# Patient Record
Sex: FEMALE | Race: WHITE | NOT HISPANIC OR LATINO | Employment: OTHER | URBAN - METROPOLITAN AREA
[De-identification: names, ages, dates, MRNs, and addresses within clinical notes are randomized per-mention and may not be internally consistent; named-entity substitution may affect disease eponyms.]

---

## 2017-01-15 ENCOUNTER — MYC REFILL (OUTPATIENT)
Dept: INTERNAL MEDICINE | Facility: CLINIC | Age: 62
End: 2017-01-15

## 2017-01-15 DIAGNOSIS — I10 ESSENTIAL HYPERTENSION, BENIGN: Primary | ICD-10-CM

## 2017-01-15 DIAGNOSIS — F41.1 GENERALIZED ANXIETY DISORDER: ICD-10-CM

## 2017-01-17 RX ORDER — LOSARTAN POTASSIUM 100 MG/1
100 TABLET ORAL DAILY
Qty: 90 TABLET | Refills: 1 | Status: SHIPPED | OUTPATIENT
Start: 2017-01-17 | End: 2017-10-05

## 2017-01-18 ENCOUNTER — MYC MEDICAL ADVICE (OUTPATIENT)
Dept: INTERNAL MEDICINE | Facility: CLINIC | Age: 62
End: 2017-01-18

## 2017-01-20 ENCOUNTER — MYC MEDICAL ADVICE (OUTPATIENT)
Dept: INTERNAL MEDICINE | Facility: CLINIC | Age: 62
End: 2017-01-20

## 2017-01-20 RX ORDER — CLONAZEPAM 1 MG/1
TABLET ORAL
Qty: 45 TABLET | Refills: 0 | Status: SHIPPED | OUTPATIENT
Start: 2017-01-20 | End: 2017-02-20

## 2017-02-08 DIAGNOSIS — G47.00 PERSISTENT INSOMNIA: ICD-10-CM

## 2017-02-08 DIAGNOSIS — G25.81 RESTLESS LEGS SYNDROME (RLS): Primary | ICD-10-CM

## 2017-02-08 RX ORDER — GABAPENTIN 300 MG/1
300 CAPSULE ORAL DAILY
Qty: 30 CAPSULE | Refills: 1 | Status: SHIPPED | OUTPATIENT
Start: 2017-02-08 | End: 2017-04-21

## 2017-02-08 NOTE — TELEPHONE ENCOUNTER
Medication not on protocol; forwarding to provider.       Last Written Prescription Date: 12/1/16  Last Fill Quantity: 30,  # refills: 1   Last Office Visit with [facility], [facility] or [facility] prescribing provider: 12/1/16    Sonya Sandy, Pharmacy [facility]

## 2017-03-17 DIAGNOSIS — F41.1 GENERALIZED ANXIETY DISORDER: ICD-10-CM

## 2017-03-17 NOTE — TELEPHONE ENCOUNTER
Clonazepam 1 mg tablet  Last Written Prescription Date: 02/20/17  Last Fill Quantity: 45,   # refills: 0  Last Office Visit with [facility], [facility] or  [facility] prescribing provider: 11/28/16  Future Office visit:       Routing refill request to provider for review/approval because:  Drug not on the [facility], [facility] or [facility] refill protocol or controlled substance    Yaneth Houston CPhT  On Behalf of [facility]

## 2017-03-21 RX ORDER — CLONAZEPAM 1 MG/1
TABLET ORAL
Qty: 45 TABLET | Refills: 0 | Status: SHIPPED | OUTPATIENT
Start: 2017-03-21 | End: 2017-04-21

## 2017-04-21 DIAGNOSIS — G25.81 RESTLESS LEGS SYNDROME (RLS): ICD-10-CM

## 2017-04-21 DIAGNOSIS — F41.1 GENERALIZED ANXIETY DISORDER: ICD-10-CM

## 2017-04-21 DIAGNOSIS — G47.00 PERSISTENT INSOMNIA: ICD-10-CM

## 2017-04-21 NOTE — TELEPHONE ENCOUNTER
Controlled Substance Refill Request for clonazepam 1mg  Problem List Complete:  No     PROVIDER TO CONSIDER COMPLETION OF PROBLEM LIST AND OVERVIEW/CONTROLLED SUBSTANCE AGREEMENT    Last Written Prescription Date:  03/21/2017  Last Fill Quantity: 45,   # refills: 0    Last Office Visit with [facility] [place] care provider: 12/01/2016    Future Office visit:   Next 5 appointments (look out 90 days)     Apr 27, 2017  2:00 PM CDT   MyChart Physical Adult with Pari Wiley MD   [facility]    [address]  [facility] [id number] [phone]                  Controlled substance agreement on file: No.     Processing:  Staff will hand deliver Rx to on-site pharmacy   checked in past 6 months?  No, route to RN    Thanks,  Shellie Bowles CPhT  [facility]  [phone]

## 2017-04-21 NOTE — TELEPHONE ENCOUNTER
Gabapentin 300mg      Last Written Prescription Date: 02/08/2017  Last Fill Quantity: 30,  # refills: 1   Last Office Visit with [facility], [facility] or [facility] prescribing provider: 12/01/2016                                         Next 5 appointments (look out 90 days)     Apr 27, 2017  2:00 PM CDT   MyChart Physical Adult with [facility] MD   [facility]    [address]  [facility] [id number] [phone]                  Thanks,  Shellie Bowles CPhT  [facility]  [phone]

## 2017-04-23 RX ORDER — GABAPENTIN 300 MG/1
300 CAPSULE ORAL DAILY
Qty: 90 CAPSULE | Refills: 3 | Status: SHIPPED | OUTPATIENT
Start: 2017-04-23 | End: 2018-04-26

## 2017-04-24 ENCOUNTER — MYC MEDICAL ADVICE (OUTPATIENT)
Dept: INTERNAL MEDICINE | Facility: CLINIC | Age: 62
End: 2017-04-24

## 2017-04-26 RX ORDER — CLONAZEPAM 1 MG/1
TABLET ORAL
Qty: 45 TABLET | Refills: 0 | Status: SHIPPED | OUTPATIENT
Start: 2017-04-26 | End: 2017-05-22

## 2017-04-26 NOTE — TELEPHONE ENCOUNTER
Pt calling in related to her appt listed below was cancelled.  Pt was reschedule.   She was asking about her medication Clonazepam and Gabapentin.    I see that Dr. Leslie has signed the clonazepam order.  Pt informed.  That I would find and get faxed to pharmacy.    Gabapentin was filled by Dr. Wiley back on 4/23/17.    Kofi COOK RN

## 2017-04-26 NOTE — TELEPHONE ENCOUNTER
Found RX  Faxed to [facility].    Called pt back and informed that Rx are taken care of.    Kofi COOK RN

## 2017-05-11 ENCOUNTER — OFFICE VISIT (OUTPATIENT)
Dept: INTERNAL MEDICINE | Facility: CLINIC | Age: 62
End: 2017-05-11
Payer: COMMERCIAL

## 2017-05-11 VITALS
DIASTOLIC BLOOD PRESSURE: 68 MMHG | OXYGEN SATURATION: 96 % | WEIGHT: 262.6 LBS | HEIGHT: 59 IN | TEMPERATURE: 98.1 F | HEART RATE: 94 BPM | SYSTOLIC BLOOD PRESSURE: 92 MMHG | BODY MASS INDEX: 52.94 KG/M2 | RESPIRATION RATE: 16 BRPM

## 2017-05-11 DIAGNOSIS — R53.83 FATIGUE, UNSPECIFIED TYPE: ICD-10-CM

## 2017-05-11 DIAGNOSIS — E11.22 TYPE 2 DIABETES MELLITUS WITH STAGE 3 CHRONIC KIDNEY DISEASE, WITHOUT LONG-TERM CURRENT USE OF INSULIN (H): ICD-10-CM

## 2017-05-11 DIAGNOSIS — D64.9 ANEMIA, UNSPECIFIED: ICD-10-CM

## 2017-05-11 DIAGNOSIS — Z00.01 ENCOUNTER FOR ROUTINE ADULT MEDICAL EXAM WITH ABNORMAL FINDINGS: Primary | ICD-10-CM

## 2017-05-11 DIAGNOSIS — R06.09 DYSPNEA ON EXERTION: ICD-10-CM

## 2017-05-11 DIAGNOSIS — N18.30 TYPE 2 DIABETES MELLITUS WITH STAGE 3 CHRONIC KIDNEY DISEASE, WITHOUT LONG-TERM CURRENT USE OF INSULIN (H): ICD-10-CM

## 2017-05-11 DIAGNOSIS — E66.01 MORBID OBESITY, UNSPECIFIED OBESITY TYPE (H): ICD-10-CM

## 2017-05-11 DIAGNOSIS — Z11.59 SCREENING FOR VIRAL DISEASE: ICD-10-CM

## 2017-05-11 LAB
BASOPHILS # BLD AUTO: 0.1 10E9/L (ref 0–0.2)
BASOPHILS NFR BLD AUTO: 1 %
DIFFERENTIAL METHOD BLD: ABNORMAL
EOSINOPHIL # BLD AUTO: 0.3 10E9/L (ref 0–0.7)
EOSINOPHIL NFR BLD AUTO: 3 %
ERYTHROCYTE [DISTWIDTH] IN BLOOD BY AUTOMATED COUNT: 19.6 % (ref 10–15)
HBA1C MFR BLD: 6.8 % (ref 4.3–6)
HCT VFR BLD AUTO: 29.2 % (ref 35–47)
HGB BLD-MCNC: 8.7 G/DL (ref 11.7–15.7)
HYPOCHROMIA BLD QL: PRESENT
LYMPHOCYTES # BLD AUTO: 1.7 10E9/L (ref 0.8–5.3)
LYMPHOCYTES NFR BLD AUTO: 20 %
MCH RBC QN AUTO: 19.1 PG (ref 26.5–33)
MCHC RBC AUTO-ENTMCNC: 29.8 G/DL (ref 31.5–36.5)
MCV RBC AUTO: 64 FL (ref 78–100)
MICROCYTES BLD QL SMEAR: PRESENT
MONOCYTES # BLD AUTO: 0.2 10E9/L (ref 0–1.3)
MONOCYTES NFR BLD AUTO: 2 %
NEUTROPHILS # BLD AUTO: 6.3 10E9/L (ref 1.6–8.3)
NEUTROPHILS NFR BLD AUTO: 74 %
PLATELET # BLD AUTO: 298 10E9/L (ref 150–450)
PLATELET # BLD EST: NORMAL 10*3/UL
RBC # BLD AUTO: 4.56 10E12/L (ref 3.8–5.2)
SPHEROCYTES BLD QL SMEAR: SLIGHT
WBC # BLD AUTO: 8.6 10E9/L (ref 4–11)

## 2017-05-11 PROCEDURE — 80050 GENERAL HEALTH PANEL: CPT | Performed by: INTERNAL MEDICINE

## 2017-05-11 PROCEDURE — 86803 HEPATITIS C AB TEST: CPT | Performed by: INTERNAL MEDICINE

## 2017-05-11 PROCEDURE — 80061 LIPID PANEL: CPT | Performed by: INTERNAL MEDICINE

## 2017-05-11 PROCEDURE — 83550 IRON BINDING TEST: CPT | Performed by: INTERNAL MEDICINE

## 2017-05-11 PROCEDURE — 82728 ASSAY OF FERRITIN: CPT | Performed by: INTERNAL MEDICINE

## 2017-05-11 PROCEDURE — 36415 COLL VENOUS BLD VENIPUNCTURE: CPT | Performed by: INTERNAL MEDICINE

## 2017-05-11 PROCEDURE — 99213 OFFICE O/P EST LOW 20 MIN: CPT | Mod: 25 | Performed by: INTERNAL MEDICINE

## 2017-05-11 PROCEDURE — 83540 ASSAY OF IRON: CPT | Performed by: INTERNAL MEDICINE

## 2017-05-11 PROCEDURE — 83036 HEMOGLOBIN GLYCOSYLATED A1C: CPT | Performed by: INTERNAL MEDICINE

## 2017-05-11 PROCEDURE — 99396 PREV VISIT EST AGE 40-64: CPT | Performed by: INTERNAL MEDICINE

## 2017-05-11 PROCEDURE — 82043 UR ALBUMIN QUANTITATIVE: CPT | Performed by: INTERNAL MEDICINE

## 2017-05-11 RX ORDER — LOSARTAN POTASSIUM 100 MG/1
100 TABLET ORAL DAILY
Qty: 90 TABLET | Refills: 1 | Status: CANCELLED | OUTPATIENT
Start: 2017-05-11

## 2017-05-11 RX ORDER — CLONAZEPAM 1 MG/1
TABLET ORAL
Qty: 45 TABLET | Refills: 0 | Status: CANCELLED | OUTPATIENT
Start: 2017-05-11

## 2017-05-11 RX ORDER — SERTRALINE HYDROCHLORIDE 100 MG/1
200 TABLET, FILM COATED ORAL DAILY
Qty: 180 TABLET | Refills: 1 | Status: CANCELLED | OUTPATIENT
Start: 2017-05-11

## 2017-05-11 NOTE — NURSING NOTE
"Chief Complaint   Patient presents with     Physical     fasting       Initial BP 92/68  Pulse 94  Temp 98.1  F (36.7  C) (Oral)  Resp 16  Ht 4' 10.5\" (1.486 m)  Wt 262 lb 9.6 oz (119.1 kg)  LMP 09/15/2007  SpO2 96%  BMI 53.95 kg/m2 Estimated body mass index is 53.95 kg/(m^2) as calculated from the following:    Height as of this encounter: 4' 10.5\" (1.486 m).    Weight as of this encounter: 262 lb 9.6 oz (119.1 kg).  Medication Reconciliation: complete      Clifton Schroeder CMA      "

## 2017-05-11 NOTE — MR AVS SNAPSHOT
After Visit Summary   5/11/2017    Nicole Reyes    MRN: [id number]           Patient Information     Date Of Birth          1955        Visit Information        Provider Department      5/11/2017 2:40 PM Pari Wiley MD [facility]        Today's Diagnoses     Encounter for routine adult medical exam with abnormal findings    -  1    Type 2 diabetes mellitus with stage 3 chronic kidney disease, without long-term current use of insulin (H)        Screening for viral disease        Dyspnea on exertion        Fatigue, unspecified type        Anemia, unspecified          Care Instructions      Preventive Health Recommendations  Female Ages 50 - 64    Yearly exam: See your health care provider every year in order to  o Review health changes.   o Discuss preventive care.    o Review your medicines if your doctor has prescribed any.      Get a Pap test every three years (unless you have an abnormal result and your provider advises testing more often).    If you get Pap tests with HPV test, you only need to test every 5 years, unless you have an abnormal result.     You do not need a Pap test if your uterus was removed (hysterectomy) and you have not had cancer.    You should be tested each year for STDs (sexually transmitted diseases) if you're at risk.     Have a mammogram every 1 to 2 years.    Have a colonoscopy at age 50, or have a yearly FIT test (stool test). These exams screen for colon cancer.      Have a cholesterol test every 5 years, or more often if advised.    Have a diabetes test (fasting glucose) every three years. If you are at risk for diabetes, you should have this test more often.     If you are at risk for osteoporosis (brittle bone disease), think about having a bone density scan (DEXA).    Shots: Get a flu shot each year. Get a tetanus shot every 10 years.    Nutrition:     Eat at least 5 servings of fruits and vegetables each day.    Eat whole-grain bread,  "whole-wheat pasta and brown rice instead of white grains and rice.    Talk to your provider about Calcium and Vitamin D.     Lifestyle    Exercise at least 150 minutes a week (30 minutes a day, 5 days a week). This will help you control your weight and prevent disease.    Limit alcohol to one drink per day.    No smoking.     Wear sunscreen to prevent skin cancer.     See your dentist every six months for an exam and cleaning.    See your eye doctor every 1 to 2 years.          Follow-ups after your visit        Who to contact     If you have questions or need follow up information about today's clinic visit or your schedule please contact [facility] directly at [phone].  Normal or non-critical lab and imaging results will be communicated to you by MyChart, letter or phone within 4 business days after the clinic has received the results. If you do not hear from us within 7 days, please contact the clinic through [employer] or phone. If you have a critical or abnormal lab result, we will notify you by phone as soon as possible.  Submit refill requests through [employer] or call your pharmacy and they will forward the refill request to us. Please allow 3 business days for your refill to be completed.          Additional Information About Your Visit        [employer] Information     [employer] gives you secure access to your electronic health record. If you see a primary care provider, you can also send messages to your care team and make appointments. If you have questions, please call your primary care clinic.  If you do not have a primary care provider, please call [phone] and they will assist you.        Care EveryWhere ID     This is your Care EveryWhere ID. This could be used by other organizations to access your [facility] medical records  [id number]        Your Vitals Were     Pulse Temperature Respirations Height Last Period Pulse Oximetry    94 98.1  F (36.7  C) (Oral) 16 4' 10.5\" (1.486 m) " 09/15/2007 96%    BMI (Body Mass Index)                   53.95 kg/m2            Blood Pressure from Last 3 Encounters:   05/11/17 92/68   12/01/16 142/70   11/28/16 (!) 156/96    Weight from Last 3 Encounters:   05/11/17 262 lb 9.6 oz (119.1 kg)   12/01/16 271 lb (122.9 kg)   11/28/16 268 lb 12.8 oz (121.9 kg)              We Performed the Following     Basic metabolic panel     CBC with platelets differential     Ferritin     Hemoglobin A1c     Hepatitis C Screen Reflex to HCV RNA Quant and Genotype     Iron and iron binding capacity     Lipid Profile with reflex to direct LDL     Microalbumin quantitative random urine     TSH with free T4 reflex        Primary Care Provider Office Phone # Fax #    Pari Wiley [phone]       [facility] [address] [facility] [address]  [facility] [address]        Thank you!     Thank you for choosing [facility]  for your care. Our goal is always to provide you with excellent care. Hearing back from our patients is one way we can continue to improve our services. Please take a few minutes to complete the written survey that you may receive in the mail after your visit with us. Thank you!             Your Updated Medication List - Protect others around you: Learn how to safely use, store and throw away your medicines at www.disposemymeds.org.          This list is accurate as of: 5/11/17  3:28 PM.  Always use your most recent med list.                   Brand Name Dispense Instructions for use    amitriptyline 50 MG tablet    ELAVIL    90 tablet    Take 1 tablet (50 mg) by mouth At Bedtime       clonazePAM 1 MG tablet    klonoPIN    45 tablet    1 tab po qam, 1/2 tab po qpm       cyclobenzaprine 10 MG tablet    FLEXERIL    90 tablet    Take 1 tablet (10 mg) by mouth 3 times daily as needed for muscle spasms       FREESTYLE LITE test strip   Generic drug:  blood glucose monitoring     100 Strip    Test twice a day       gabapentin 300 MG  capsule    NEURONTIN    90 capsule    Take 1 capsule (300 mg) by mouth daily       ibuprofen 600 MG tablet    ADVIL/MOTRIN    30 tablet    Take 1 tablet (600 mg) by mouth every 6 hours as needed for moderate pain       ketoprofen 10% in PLO 10% topical gel     30 g    Place 1 g onto the skin 3 times daily as needed for moderate pain       losartan 100 MG tablet    COZAAR    90 tablet    Take 1 tablet (100 mg) by mouth daily       metFORMIN 500 MG 24 hr tablet    GLUCOPHAGE-XR    180 tablet    Take 2 tablets (1,000 mg) by mouth daily (with breakfast)       Multi-vitamin Tabs tablet   Generic drug:  multivitamin, therapeutic with minerals      Take 1 tablet by mouth At Bedtime       OMEGA-3 FISH OIL PO      Take 2 g by mouth At Bedtime       oxyCODONE 5 MG IR tablet    ROXICODONE    40 tablet    1-2 po q4 hours prn       pantoprazole 40 MG EC tablet    PROTONIX    90 tablet    Take 1 tablet (40 mg) by mouth At Bedtime       sertraline 100 MG tablet    ZOLOFT    180 tablet    Take 2 tablets (200 mg) by mouth daily Increased dose. Can use up tablets at home first.       simvastatin 20 MG tablet    ZOCOR    90 tablet    Take 1 tablet (20 mg) by mouth At Bedtime       zolpidem 10 MG tablet    AMBIEN    30 tablet    Take 1 tablet (10 mg) by mouth nightly as needed for sleep
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
## 2017-05-12 LAB
ALBUMIN SERPL-MCNC: 3.2 G/DL (ref 3.4–5)
ALP SERPL-CCNC: 114 U/L (ref 40–150)
ALT SERPL W P-5'-P-CCNC: 23 U/L (ref 0–50)
ANION GAP SERPL CALCULATED.3IONS-SCNC: 8 MMOL/L (ref 3–14)
AST SERPL W P-5'-P-CCNC: 17 U/L (ref 0–45)
BILIRUB SERPL-MCNC: 0.3 MG/DL (ref 0.2–1.3)
BUN SERPL-MCNC: 11 MG/DL (ref 7–30)
CALCIUM SERPL-MCNC: 8.2 MG/DL (ref 8.5–10.1)
CHLORIDE SERPL-SCNC: 106 MMOL/L (ref 94–109)
CHOLEST SERPL-MCNC: 209 MG/DL
CO2 SERPL-SCNC: 27 MMOL/L (ref 20–32)
CREAT SERPL-MCNC: 0.87 MG/DL (ref 0.52–1.04)
CREAT UR-MCNC: 482 MG/DL
FERRITIN SERPL-MCNC: 19 NG/ML (ref 8–252)
GFR SERPL CREATININE-BSD FRML MDRD: 66 ML/MIN/1.7M2
GLUCOSE SERPL-MCNC: 124 MG/DL (ref 70–99)
HCV AB SERPL QL IA: NORMAL
HDLC SERPL-MCNC: 25 MG/DL
IRON SATN MFR SERPL: 4 % (ref 15–46)
IRON SERPL-MCNC: 17 UG/DL (ref 35–180)
LDLC SERPL CALC-MCNC: 151 MG/DL
MICROALBUMIN UR-MCNC: 82 MG/L
MICROALBUMIN/CREAT UR: 16.97 MG/G CR (ref 0–25)
NONHDLC SERPL-MCNC: 184 MG/DL
POTASSIUM SERPL-SCNC: 3.6 MMOL/L (ref 3.4–5.3)
PROT SERPL-MCNC: 7.2 G/DL (ref 6.8–8.8)
SODIUM SERPL-SCNC: 141 MMOL/L (ref 133–144)
TIBC SERPL-MCNC: 420 UG/DL (ref 240–430)
TRIGL SERPL-MCNC: 165 MG/DL
TSH SERPL DL<=0.005 MIU/L-ACNC: 3.19 MU/L (ref 0.4–4)

## 2017-05-12 ASSESSMENT — PATIENT HEALTH QUESTIONNAIRE - PHQ9: SUM OF ALL RESPONSES TO PHQ QUESTIONS 1-9: 21

## 2017-05-16 ENCOUNTER — MYC MEDICAL ADVICE (OUTPATIENT)
Dept: INTERNAL MEDICINE | Facility: CLINIC | Age: 62
End: 2017-05-16

## 2017-05-22 ENCOUNTER — MYC MEDICAL ADVICE (OUTPATIENT)
Dept: INTERNAL MEDICINE | Facility: CLINIC | Age: 62
End: 2017-05-22

## 2017-05-22 ENCOUNTER — TELEPHONE (OUTPATIENT)
Dept: INTERNAL MEDICINE | Facility: CLINIC | Age: 62
End: 2017-05-22

## 2017-05-22 DIAGNOSIS — G47.09 OTHER INSOMNIA: ICD-10-CM

## 2017-05-22 DIAGNOSIS — D50.9 IRON DEFICIENCY ANEMIA, UNSPECIFIED IRON DEFICIENCY ANEMIA TYPE: Primary | ICD-10-CM

## 2017-05-22 DIAGNOSIS — R06.00 DYSPNEA, UNSPECIFIED TYPE: Primary | ICD-10-CM

## 2017-05-22 DIAGNOSIS — F41.1 GENERALIZED ANXIETY DISORDER: ICD-10-CM

## 2017-05-22 RX ORDER — AMITRIPTYLINE HYDROCHLORIDE 50 MG/1
TABLET ORAL
Qty: 135 TABLET | Refills: 3 | Status: SHIPPED | OUTPATIENT
Start: 2017-05-22 | End: 2017-10-05

## 2017-05-22 NOTE — TELEPHONE ENCOUNTER
Pt calls back for messages.  She was advised that Dr Wiley said she could use Amitriptyline and she says she doesn't have any.  An rx was sent with the 75mg dose was sent to her pharmacy.  She said she will try this.    Pt is crying and says she feels like she's losing her mind.  She says she is taking 27mg Elemental Ferrous Gluconate, and she feels so sick she can' t eat.  Asking if there is another type that might not make her feel so sick.  Asking how long it might be before her hgb gets better?

## 2017-05-22 NOTE — TELEPHONE ENCOUNTER
Fax received from [facility] for review and signature.  ANIL is needed.  Canned and spoke with patient who requested we mail ANIL to her home address and she will sign and return.  Forms are on my desk until signed ANIL is returned.

## 2017-05-25 DIAGNOSIS — G47.00 INSOMNIA, UNSPECIFIED TYPE: ICD-10-CM

## 2017-05-25 RX ORDER — CLONAZEPAM 1 MG/1
TABLET ORAL
Qty: 45 TABLET | Refills: 0 | Status: SHIPPED | OUTPATIENT
Start: 2017-05-25 | End: 2017-06-22

## 2017-05-25 RX ORDER — ZOLPIDEM TARTRATE 10 MG/1
10 TABLET ORAL
Qty: 30 TABLET | Refills: 5 | Status: SHIPPED | OUTPATIENT
Start: 2017-05-25 | End: 2017-12-01

## 2017-05-25 NOTE — TELEPHONE ENCOUNTER
Controlled Substance Refill Request for Zolpidem 10mg  Problem List Complete:  No     PROVIDER TO CONSIDER COMPLETION OF PROBLEM LIST AND OVERVIEW/CONTROLLED SUBSTANCE AGREEMENT    Last Written Prescription Date:  12/02/2016  Last Fill Quantity: 30,   # refills: 5    Last Office Visit with [facility] primary care provider: 5/11/2017    Future Office visit:     Controlled substance agreement on file: No.     Processing:  Staff will hand deliver Rx to on-site pharmacy   checked in past 6 months?  No, route to RN    Thanks,  Shellie Bowles CPhT  [facility]  [phone]

## 2017-05-30 NOTE — TELEPHONE ENCOUNTER
Silvia, nurse from [employer] calls, left  asking to clarify some information on claim. Claim #: [id number].    See epic charting below.    Called pt, states first day off of work was 05/15.    Called [facility] back, asking when pt had last appt: 05/11/17. First day off of work: 05/15.  If pt has seen MD before last appt: yes. Next appt scheduled: no. MDs specialty: IM.    Unable to answer: ICD associated to [facility] claim, estimated return to work, tx plan.    Unsure if form mentioned below is for [employer] or not. If not will need above information either faxed to: [phone] or called to: [phone].    Please advise, thanks.

## 2017-05-31 PROBLEM — R06.00 DYSPNEA, UNSPECIFIED TYPE: Status: ACTIVE | Noted: 2017-05-31

## 2017-06-07 ENCOUNTER — DOCUMENTATION ONLY (OUTPATIENT)
Dept: LAB | Facility: CLINIC | Age: 62
End: 2017-06-07

## 2017-06-07 DIAGNOSIS — D50.9 IRON DEFICIENCY ANEMIA, UNSPECIFIED IRON DEFICIENCY ANEMIA TYPE: Primary | ICD-10-CM

## 2017-06-09 ENCOUNTER — MYC MEDICAL ADVICE (OUTPATIENT)
Dept: INTERNAL MEDICINE | Facility: CLINIC | Age: 62
End: 2017-06-09

## 2017-06-09 DIAGNOSIS — D50.9 IRON DEFICIENCY ANEMIA, UNSPECIFIED IRON DEFICIENCY ANEMIA TYPE: ICD-10-CM

## 2017-06-09 DIAGNOSIS — D50.9 IRON DEFICIENCY ANEMIA, UNSPECIFIED IRON DEFICIENCY ANEMIA TYPE: Primary | ICD-10-CM

## 2017-06-09 LAB — HGB BLD-MCNC: 10.7 G/DL (ref 11.7–15.7)

## 2017-06-09 PROCEDURE — 85018 HEMOGLOBIN: CPT | Performed by: INTERNAL MEDICINE

## 2017-06-09 PROCEDURE — 36415 COLL VENOUS BLD VENIPUNCTURE: CPT | Performed by: INTERNAL MEDICINE

## 2017-06-15 ENCOUNTER — TELEPHONE (OUTPATIENT)
Dept: INTERNAL MEDICINE | Facility: CLINIC | Age: 62
End: 2017-06-15

## 2017-06-15 NOTE — TELEPHONE ENCOUNTER
[facility] and [facility]  Put in Dr. Wiley's in basket.

## 2017-06-21 DIAGNOSIS — R19.7 DIARRHEA, UNSPECIFIED TYPE: ICD-10-CM

## 2017-06-21 DIAGNOSIS — D50.9 IRON DEFICIENCY ANEMIA, UNSPECIFIED IRON DEFICIENCY ANEMIA TYPE: ICD-10-CM

## 2017-06-21 PROCEDURE — 83540 ASSAY OF IRON: CPT | Performed by: INTERNAL MEDICINE

## 2017-06-21 PROCEDURE — 80048 BASIC METABOLIC PNL TOTAL CA: CPT | Performed by: INTERNAL MEDICINE

## 2017-06-21 PROCEDURE — 83550 IRON BINDING TEST: CPT | Performed by: INTERNAL MEDICINE

## 2017-06-21 PROCEDURE — 85025 COMPLETE CBC W/AUTO DIFF WBC: CPT | Performed by: INTERNAL MEDICINE

## 2017-06-21 PROCEDURE — 36415 COLL VENOUS BLD VENIPUNCTURE: CPT | Performed by: INTERNAL MEDICINE

## 2017-06-21 PROCEDURE — 83735 ASSAY OF MAGNESIUM: CPT | Performed by: INTERNAL MEDICINE

## 2017-06-22 ENCOUNTER — RADIANT APPOINTMENT (OUTPATIENT)
Dept: GENERAL RADIOLOGY | Facility: CLINIC | Age: 62
End: 2017-06-22
Attending: INTERNAL MEDICINE
Payer: COMMERCIAL

## 2017-06-22 ENCOUNTER — OFFICE VISIT (OUTPATIENT)
Dept: INTERNAL MEDICINE | Facility: CLINIC | Age: 62
End: 2017-06-22
Payer: COMMERCIAL

## 2017-06-22 VITALS
TEMPERATURE: 98.2 F | HEART RATE: 100 BPM | OXYGEN SATURATION: 88 % | WEIGHT: 262 LBS | HEIGHT: 59 IN | DIASTOLIC BLOOD PRESSURE: 80 MMHG | RESPIRATION RATE: 14 BRPM | SYSTOLIC BLOOD PRESSURE: 126 MMHG | BODY MASS INDEX: 52.82 KG/M2

## 2017-06-22 DIAGNOSIS — F33.1 MAJOR DEPRESSIVE DISORDER, RECURRENT EPISODE, MODERATE (H): ICD-10-CM

## 2017-06-22 DIAGNOSIS — F41.1 GENERALIZED ANXIETY DISORDER: ICD-10-CM

## 2017-06-22 DIAGNOSIS — D50.9 IRON DEFICIENCY ANEMIA, UNSPECIFIED IRON DEFICIENCY ANEMIA TYPE: ICD-10-CM

## 2017-06-22 DIAGNOSIS — R06.09 DYSPNEA ON EXERTION: ICD-10-CM

## 2017-06-22 DIAGNOSIS — R53.83 FATIGUE, UNSPECIFIED TYPE: ICD-10-CM

## 2017-06-22 DIAGNOSIS — R06.09 DYSPNEA ON EXERTION: Primary | ICD-10-CM

## 2017-06-22 LAB
ANION GAP SERPL CALCULATED.3IONS-SCNC: 12 MMOL/L (ref 3–14)
ANISOCYTOSIS BLD QL SMEAR: ABNORMAL
BASOPHILS # BLD AUTO: 0.2 10E9/L (ref 0–0.2)
BASOPHILS NFR BLD AUTO: 1 %
BUN SERPL-MCNC: 10 MG/DL (ref 7–30)
CALCIUM SERPL-MCNC: 9.1 MG/DL (ref 8.5–10.1)
CHLORIDE SERPL-SCNC: 108 MMOL/L (ref 94–109)
CO2 SERPL-SCNC: 22 MMOL/L (ref 20–32)
CREAT SERPL-MCNC: 0.82 MG/DL (ref 0.52–1.04)
DIFFERENTIAL METHOD BLD: ABNORMAL
EOSINOPHIL # BLD AUTO: 4.4 10E9/L (ref 0–0.7)
EOSINOPHIL NFR BLD AUTO: 29 %
ERYTHROCYTE [DISTWIDTH] IN BLOOD BY AUTOMATED COUNT: 27.9 % (ref 10–15)
GFR SERPL CREATININE-BSD FRML MDRD: 71 ML/MIN/1.7M2
GLUCOSE SERPL-MCNC: 153 MG/DL (ref 70–99)
HCT VFR BLD AUTO: 36.6 % (ref 35–47)
HGB BLD-MCNC: 11.2 G/DL (ref 11.7–15.7)
HYPOCHROMIA BLD QL: PRESENT
IRON SATN MFR SERPL: 31 % (ref 15–46)
IRON SERPL-MCNC: 127 UG/DL (ref 35–180)
LYMPHOCYTES # BLD AUTO: 3 10E9/L (ref 0.8–5.3)
LYMPHOCYTES NFR BLD AUTO: 20 %
MACROCYTES BLD QL SMEAR: PRESENT
MAGNESIUM SERPL-MCNC: 1.8 MG/DL (ref 1.6–2.3)
MCH RBC QN AUTO: 20.8 PG (ref 26.5–33)
MCHC RBC AUTO-ENTMCNC: 30.6 G/DL (ref 31.5–36.5)
MCV RBC AUTO: 68 FL (ref 78–100)
MICROCYTES BLD QL SMEAR: PRESENT
NEUTROPHILS # BLD AUTO: 7.6 10E9/L (ref 1.6–8.3)
NEUTROPHILS NFR BLD AUTO: 50 %
PLATELET # BLD AUTO: 320 10E9/L (ref 150–450)
PLATELET # BLD EST: NORMAL 10*3/UL
POTASSIUM SERPL-SCNC: 3.4 MMOL/L (ref 3.4–5.3)
RBC # BLD AUTO: 5.39 10E12/L (ref 3.8–5.2)
SODIUM SERPL-SCNC: 142 MMOL/L (ref 133–144)
SPHEROCYTES BLD QL SMEAR: SLIGHT
TIBC SERPL-MCNC: 413 UG/DL (ref 240–430)
WBC # BLD AUTO: 15.2 10E9/L (ref 4–11)

## 2017-06-22 PROCEDURE — 71020 XR CHEST 2 VW: CPT

## 2017-06-22 PROCEDURE — 99214 OFFICE O/P EST MOD 30 MIN: CPT | Performed by: INTERNAL MEDICINE

## 2017-06-22 NOTE — PROGRESS NOTES
SUBJECTIVE:                                                    Nicole Reyes is a 61 year old female who presents to clinic today for the following health issues:      Follow up anemia: she has been on iron over a month and had labs yesterday. She does not feel any improvement in her fatigue and dyspnea on exertion. She continues to feel very tired with minimal activity, walking. She has not felt any improvement in her stamina despite trying to do some walking. No chest pains. Heart does beat fast with activity, goes with her dyspnea, slows gradually. No palpitations or fast beats at rest.   No new symptoms. She had a cough a few weeks ago, resolved now the past week.   Her diarrhea is a little better.     Patient Active Problem List   Diagnosis     Essential hypertension, benign     Anemia, iron deficiency     Restless leg syndrome     CRISTÓBAL (obstructive sleep apnea)     CKD (chronic kidney disease) stage 3, GFR 30-59 ml/min     Advanced directives, counseling/discussion     GENERALIZED ANXIETY DIS     Major depressive disorder, recurrent episode, moderate (H)     Health Care Home     GERD (gastroesophageal reflux disease)     Hyperlipidemia LDL goal <70     Eczema     Type 2 diabetes mellitus with stage 3 chronic kidney disease (HCC)     Midline low back pain with left-sided sciatica     Morbid obesity (HCC)     Insomnia, unspecified type     Dyspnea, unspecified type     Current Outpatient Prescriptions   Medication Sig Dispense Refill     clonazePAM (KLONOPIN) 1 MG tablet 1 tab po qam, 1/2 tab po qpm 45 tablet 0     zolpidem (AMBIEN) 10 MG tablet Take 1 tablet (10 mg) by mouth nightly as needed for sleep 30 tablet 5     amitriptyline (ELAVIL) 50 MG tablet Take one and a half tabs every night at bedtime 135 tablet 3     Iron Polysacch [id number]-I21--0.025-1 MG CAPS Take 1 capsule by mouth daily 30 capsule 3     gabapentin (NEURONTIN) 300 MG capsule Take 1 capsule (300 mg) by mouth daily 90 capsule 3      losartan (COZAAR) 100 MG tablet Take 1 tablet (100 mg) by mouth daily 90 tablet 1     sertraline (ZOLOFT) 100 MG tablet Take 2 tablets (200 mg) by mouth daily Increased dose. Can use up tablets at home first. 180 tablet 1     oxyCODONE (ROXICODONE) 5 MG immediate release tablet 1-2 po q4 hours prn 40 tablet 0     simvastatin (ZOCOR) 20 MG tablet Take 1 tablet (20 mg) by mouth At Bedtime 90 tablet 3     pantoprazole (PROTONIX) 40 MG enteric coated tablet Take 1 tablet (40 mg) by mouth At Bedtime 90 tablet 3     metFORMIN (GLUCOPHAGE-XR) 500 MG 24 hr tablet Take 2 tablets (1,000 mg) by mouth daily (with breakfast) 180 tablet 3     cyclobenzaprine (FLEXERIL) 10 MG tablet Take 1 tablet (10 mg) by mouth 3 times daily as needed for muscle spasms 90 tablet 0     ibuprofen (ADVIL,MOTRIN) 600 MG tablet Take 1 tablet (600 mg) by mouth every 6 hours as needed for moderate pain 30 tablet 1     ketoprofen 10% in PLO 10% Place 1 g onto the skin 3 times daily as needed for moderate pain 30 g 0     Omega-3 Fatty Acids (OMEGA-3 FISH OIL PO) Take 2 g by mouth At Bedtime        Multiple Vitamin (MULTI-VITAMIN) per tablet Take 1 tablet by mouth At Bedtime        FREESTYLE LITE TEST VI STRP Test twice a day 100 Strip 12      Social History   Substance Use Topics     Smoking status: Former Smoker     Quit date: 3/18/1979     Smokeless tobacco: Never Used      Comment: quit 1979     Alcohol use Yes      Comment: Twice a month      Reviewed and updated as needed this visit by clinical staff  Tobacco  Allergies  Meds  Med Hx  Surg Hx  Fam Hx  Soc Hx      Reviewed and updated as needed this visit by Provider         ROS:  No fever, chills, palpitations, PND, orthopnea, edema, abdominal pain, chest pains, diarrhea better, no blood in stool or urine.     OBJECTIVE:                                                    /80 (BP Location: Right arm, Patient Position: Sitting, Cuff Size: Adult Large)  Pulse 100  Temp 98.2  F (36.8  C)  "(Oral)  Resp 14  Ht 4' 10.5\" (1.486 m)  Wt 262 lb (118.8 kg)  LMP 09/15/2007  SpO2 (!) 88%  Breastfeeding? No  BMI 53.83 kg/m2  Body mass index is 53.83 kg/(m^2).    Lungs: clear  CV: normal S1, S2 without murmur, S3 or S4.   No edema     Lab Results   Component Value Date    HGB 11.2 06/21/2017    HGB 10.7 06/09/2017    HGB 8.7 05/11/2017    HGB 8.9 10/21/2016    HGB 9.1 10/20/2016    HGB 10.6 10/19/2016    HGB 13.8 12/22/2015     WBC: 15.2, normal diff    Normal chemistry      CXR: no changes       ASSESSMENT/PLAN:                                                        1. Dyspnea on exertion  Her symptoms are not improved with improvement in her hemoglobin, today O2 sat dropped after walking to the room, without abnormal CXR, will need to evaluate further, will start with echo, check official CXR report and consider formal PFTs, possible CT chest. Remain off work for now.   - XR Chest 2 Views; Future  - Echocardiogram Complete; Future    2. Fatigue, unspecified type  As above, she is still on bipap so not likely her CRISTÓBAL, no other abnormal labs,    3. Major depressive disorder, recurrent episode, moderate (H)  stable    4. Iron deficiency anemia, unspecified iron deficiency anemia type  Continue iron for now    5. GENERALIZED ANXIETY DIS  Stable, refill med  - clonazePAM (KLONOPIN) 1 MG tablet; 1 tab po qam, 1/2 tab po qpm  Dispense: 45 tablet; Refill: 0        Pari Wiley MD  [facility]    "

## 2017-06-22 NOTE — MR AVS SNAPSHOT
After Visit Summary   6/22/2017    Nicole Reyes    MRN: [id number]           Patient Information     Date Of Birth          1955        Visit Information        Provider Department      6/22/2017 2:20 PM Pari Wiley MD [facility]        Today's Diagnoses     Dyspnea on exertion    -  1    Fatigue, unspecified type        Major depressive disorder, recurrent episode, moderate (H)        Iron deficiency anemia, unspecified iron deficiency anemia type        GENERALIZED ANXIETY DIS           Follow-ups after your visit        Who to contact     If you have questions or need follow up information about today's clinic visit or your schedule please contact [facility] directly at [phone].  Normal or non-critical lab and imaging results will be communicated to you by MyChart, letter or phone within 4 business days after the clinic has received the results. If you do not hear from us within 7 days, please contact the clinic through [employer] or phone. If you have a critical or abnormal lab result, we will notify you by phone as soon as possible.  Submit refill requests through [employer] or call your pharmacy and they will forward the refill request to us. Please allow 3 business days for your refill to be completed.          Additional Information About Your Visit        MyChart Information     [employer] gives you secure access to your electronic health record. If you see a primary care provider, you can also send messages to your care team and make appointments. If you have questions, please call your primary care clinic.  If you do not have a primary care provider, please call [phone] and they will assist you.        Care EveryWhere ID     This is your Care EveryWhere ID. This could be used by other organizations to access your [place] medical records  [id number]        Your Vitals Were     Pulse Temperature Respirations Height Last Period Pulse Oximetry  "   100 98.2  F (36.8  C) (Oral) 14 4' 10.5\" (1.486 m) 09/15/2007 88%    Breastfeeding? BMI (Body Mass Index)                No 53.83 kg/m2           Blood Pressure from Last 3 Encounters:   06/22/17 126/80   05/11/17 92/68   12/01/16 142/70    Weight from Last 3 Encounters:   06/22/17 262 lb (118.8 kg)   05/11/17 262 lb 9.6 oz (119.1 kg)   12/01/16 271 lb (122.9 kg)                 Where to get your medicines      Some of these will need a paper prescription and others can be bought over the counter.  Ask your nurse if you have questions.     Bring a paper prescription for each of these medications     clonazePAM 1 MG tablet          Primary Care Provider Office Phone # Fax #    Pari Wiley [phone]       [facility] [address] [place] [address]        Equal Access to Services     JUDI ARELLANO : Hadii aad ku hadasho Soomaali, waaxda luqadaha, qaybta kaalmada adeegyada, waxay idiin hayjollyn celeste casillas . So [facility] [phone].    ATENCIÓN: Si habla español, tiene a tovar disposición servicios gratuitos de asistencia lingüística. Llame al [phone].    We comply with applicable federal civil rights laws and [place] laws. We do not discriminate on the basis of race, color, national origin, age, disability sex, sexual orientation or gender identity.            Thank you!     Thank you for choosing [facility]  for your care. Our goal is always to provide you with excellent care. Hearing back from our patients is one way we can continue to improve our services. Please take a few minutes to complete the written survey that you may receive in the mail after your visit with us. Thank you!             Your Updated Medication List - Protect others around you: Learn how to safely use, store and throw away your medicines at www.disposemymeds.org.          This list is accurate as of: 6/22/17 11:59 PM.  Always use your most recent med list.                "    Brand Name Dispense Instructions for use Diagnosis    amitriptyline 50 MG tablet    ELAVIL    135 tablet    Take one and a half tabs every night at bedtime    Other insomnia       clonazePAM 1 MG tablet    klonoPIN    45 tablet    1 tab po qam, 1/2 tab po qpm    Generalized anxiety disorder       cyclobenzaprine 10 MG tablet    FLEXERIL    90 tablet    Take 1 tablet (10 mg) by mouth 3 times daily as needed for muscle spasms    Chronic low back pain with sciatica, sciatica laterality unspecified, unspecified back pain laterality       FREESTYLE LITE test strip   Generic drug:  blood glucose monitoring     100 Strip    Test twice a day    Type 2 diabetes, HbA1c goal < 7% (H)       gabapentin 300 MG capsule    NEURONTIN    90 capsule    Take 1 capsule (300 mg) by mouth daily    Restless legs syndrome (RLS), Persistent insomnia       ibuprofen 600 MG tablet    ADVIL/MOTRIN    30 tablet    Take 1 tablet (600 mg) by mouth every 6 hours as needed for moderate pain        Iron Polysacch Hurte-D84--0.025-1 MG Caps     30 capsule    Take 1 capsule by mouth daily    Iron deficiency anemia, unspecified iron deficiency anemia type       ketoprofen 10% in PLO 10% topical gel     30 g    Place 1 g onto the skin 3 times daily as needed for moderate pain    Community acquired pneumonia       losartan 100 MG tablet    COZAAR    90 tablet    Take 1 tablet (100 mg) by mouth daily    Essential hypertension, benign       metFORMIN 500 MG 24 hr tablet    GLUCOPHAGE-XR    180 tablet    Take 2 tablets (1,000 mg) by mouth daily (with breakfast)    Type 2 diabetes mellitus with stage 3 chronic kidney disease, without long-term current use of insulin (H)       Multi-vitamin Tabs tablet   Generic drug:  multivitamin, therapeutic with minerals      Take 1 tablet by mouth At Bedtime        OMEGA-3 FISH OIL PO      Take 2 g by mouth At Bedtime        oxyCODONE 5 MG IR tablet    ROXICODONE    40 tablet    1-2 po q4 hours prn    Pain,  dental       pantoprazole 40 MG EC tablet    PROTONIX    90 tablet    Take 1 tablet (40 mg) by mouth At Bedtime    Gastroesophageal reflux disease without esophagitis       sertraline 100 MG tablet    ZOLOFT    180 tablet    Take 2 tablets (200 mg) by mouth daily Increased dose. Can use up tablets at home first.    Generalized anxiety disorder, Major depressive disorder, recurrent episode, moderate (H)       simvastatin 20 MG tablet    ZOCOR    90 tablet    Take 1 tablet (20 mg) by mouth At Bedtime    Hyperlipidemia LDL goal <70       zolpidem 10 MG tablet    AMBIEN    30 tablet    Take 1 tablet (10 mg) by mouth nightly as needed for sleep    Insomnia, unspecified type

## 2017-06-22 NOTE — NURSING NOTE
"Chief Complaint   Patient presents with     Results       Initial /80 (BP Location: Right arm, Patient Position: Sitting, Cuff Size: Adult Large)  Pulse 100  Temp 98.2  F (36.8  C) (Oral)  Resp 14  Ht 4' 10.5\" (1.486 m)  Wt 262 lb (118.8 kg)  LMP 09/15/2007  SpO2 (!) 88%  Breastfeeding? No  BMI 53.83 kg/m2 Estimated body mass index is 53.83 kg/(m^2) as calculated from the following:    Height as of this encounter: 4' 10.5\" (1.486 m).    Weight as of this encounter: 262 lb (118.8 kg).  Medication Reconciliation: complete   Chaz VICKERS      "

## 2017-06-23 ENCOUNTER — TELEPHONE (OUTPATIENT)
Dept: PHARMACY | Facility: CLINIC | Age: 62
End: 2017-06-23

## 2017-06-23 NOTE — TELEPHONE ENCOUNTER
[facility]  Put in Dr. Wiley's in basket.

## 2017-06-24 RX ORDER — CLONAZEPAM 1 MG/1
TABLET ORAL
Qty: 45 TABLET | Refills: 0 | Status: SHIPPED | OUTPATIENT
Start: 2017-06-24 | End: 2017-07-21

## 2017-06-28 ENCOUNTER — HOSPITAL ENCOUNTER (OUTPATIENT)
Dept: CARDIOLOGY | Facility: CLINIC | Age: 62
Discharge: HOME | End: 2017-06-28
Attending: INTERNAL MEDICINE | Admitting: INTERNAL MEDICINE
Payer: COMMERCIAL

## 2017-06-28 DIAGNOSIS — R06.09 DYSPNEA ON EXERTION: ICD-10-CM

## 2017-06-28 PROCEDURE — 25500064 ZZH RX 255 OP 636: Performed by: INTERNAL MEDICINE

## 2017-06-28 PROCEDURE — 93306 TTE W/DOPPLER COMPLETE: CPT | Mod: 26 | Performed by: INTERNAL MEDICINE

## 2017-06-28 PROCEDURE — 40000264 ECHO COMPLETE WITH OPTISON

## 2017-06-28 RX ADMIN — HUMAN ALBUMIN MICROSPHERES AND PERFLUTREN 5 ML: 10; .22 INJECTION, SOLUTION INTRAVENOUS at 14:15

## 2017-07-03 ENCOUNTER — MYC MEDICAL ADVICE (OUTPATIENT)
Dept: INTERNAL MEDICINE | Facility: CLINIC | Age: 62
End: 2017-07-03

## 2017-07-05 ENCOUNTER — MYC MEDICAL ADVICE (OUTPATIENT)
Dept: INTERNAL MEDICINE | Facility: CLINIC | Age: 62
End: 2017-07-05

## 2017-07-06 DIAGNOSIS — R06.09 DYSPNEA ON EXERTION: Primary | ICD-10-CM

## 2017-07-06 NOTE — TELEPHONE ENCOUNTER
Writer contacted cardiopulmonary and they will put in the order as a PFT and contact patient directly to schedule.     СЕРГЕЙ Byrne

## 2017-07-06 NOTE — TELEPHONE ENCOUNTER
I ordered formal spirometry but not sure I ordered the correct one or that it got to cardiopulmonary, please call them to be sure they got the order and will call patient.

## 2017-07-11 ENCOUNTER — HOSPITAL ENCOUNTER (OUTPATIENT)
Dept: RESPIRATORY THERAPY | Facility: CLINIC | Age: 62
Discharge: HOME | End: 2017-07-11
Attending: INTERNAL MEDICINE | Admitting: INTERNAL MEDICINE
Payer: COMMERCIAL

## 2017-07-11 DIAGNOSIS — R06.09 DYSPNEA ON EXERTION: ICD-10-CM

## 2017-07-11 LAB
DLCOCOR-%PRED-PRE: 82 %
DLCOCOR-PRE: 15.82 ML/MIN/MMHG
DLCOUNC-%PRED-PRE: 76 %
DLCOUNC-PRE: 14.64 ML/MIN/MMHG
DLCOUNC-PRED: 19.2 ML/MIN/MMHG
ERV-%PRED-PRE: -32 %
ERV-PRE: 0.06 L
ERV-PRED: -0.18 L
EXPTIME-PRE: 8.19 SEC
FEF2575-%PRED-PRE: 103 %
FEF2575-PRE: 2 L/SEC
FEF2575-PRED: 1.94 L/SEC
FEFMAX-%PRED-PRE: 104 %
FEFMAX-PRE: 5.64 L/SEC
FEFMAX-PRED: 5.39 L/SEC
FEV1-%PRED-PRE: 94 %
FEV1-PRE: 1.92 L
FEV1FEV6-PRE: 82 %
FEV1FEV6-PRED: 80 %
FEV1FVC-PRE: 81 %
FEV1FVC-PRED: 80 %
FEV1SVC-PRE: 75 %
FEV1SVC-PRED: 80 %
FIFMAX-PRE: 4.32 L/SEC
FRCPLETH-%PRED-PRE: 65 %
FRCPLETH-PRE: 1.56 L
FRCPLETH-PRED: 2.39 L
FVC-%PRED-PRE: 93 %
FVC-PRE: 2.37 L
FVC-PRED: 2.54 L
IC-%PRED-PRE: 91 %
IC-PRE: 2.49 L
IC-PRED: 2.72 L
RVPLETH-%PRED-PRE: 89 %
RVPLETH-PRE: 1.5 L
RVPLETH-PRED: 1.68 L
TLCPLETH-%PRED-PRE: 100 %
TLCPLETH-PRE: 4.04 L
TLCPLETH-PRED: 4.02 L
VA-%PRED-PRE: 79 %
VA-PRE: 3.31 L
VC-%PRED-PRE: 100 %
VC-PRE: 2.55 L
VC-PRED: 2.54 L

## 2017-07-11 PROCEDURE — 94729 DIFFUSING CAPACITY: CPT

## 2017-07-11 PROCEDURE — 94010 BREATHING CAPACITY TEST: CPT

## 2017-07-11 PROCEDURE — 94726 PLETHYSMOGRAPHY LUNG VOLUMES: CPT

## 2017-07-11 NOTE — PROGRESS NOTES
PFT Note:  Pt completed pulmonary function testing with DLCO.  Good Pt effort and cooperation.     Spirometry  Meets all ATS-ERS recommendations.     Plethysmography  All plethysmographic measurements meet ATS-ERS recommendations.    DLCO Meets all ATS-ERS recommendations. DLCO is an average of 2 maneuvers.  Predicted DLCO is corrected for a Hgb of 11.2 drawn on 6/21/2017.     July 11, 2017.2:44 PM  Teddy Chowdhury

## 2017-07-21 ENCOUNTER — OFFICE VISIT (OUTPATIENT)
Dept: INTERNAL MEDICINE | Facility: CLINIC | Age: 62
End: 2017-07-21
Payer: COMMERCIAL

## 2017-07-21 VITALS
TEMPERATURE: 98.6 F | WEIGHT: 264 LBS | DIASTOLIC BLOOD PRESSURE: 90 MMHG | SYSTOLIC BLOOD PRESSURE: 138 MMHG | HEIGHT: 59 IN | BODY MASS INDEX: 53.22 KG/M2 | HEART RATE: 109 BPM | OXYGEN SATURATION: 94 %

## 2017-07-21 DIAGNOSIS — D72.829 LEUKOCYTOSIS, UNSPECIFIED TYPE: ICD-10-CM

## 2017-07-21 DIAGNOSIS — D50.9 IRON DEFICIENCY ANEMIA, UNSPECIFIED IRON DEFICIENCY ANEMIA TYPE: ICD-10-CM

## 2017-07-21 DIAGNOSIS — F41.1 GENERALIZED ANXIETY DISORDER: ICD-10-CM

## 2017-07-21 DIAGNOSIS — R06.09 DYSPNEA ON EXERTION: Primary | ICD-10-CM

## 2017-07-21 LAB
BASOPHILS # BLD AUTO: 0.1 10E9/L (ref 0–0.2)
BASOPHILS NFR BLD AUTO: 0.4 %
DIFFERENTIAL METHOD BLD: ABNORMAL
EOSINOPHIL # BLD AUTO: 0.4 10E9/L (ref 0–0.7)
EOSINOPHIL NFR BLD AUTO: 3.3 %
ERYTHROCYTE [DISTWIDTH] IN BLOOD BY AUTOMATED COUNT: 24.6 % (ref 10–15)
HCT VFR BLD AUTO: 37.3 % (ref 35–47)
HGB BLD-MCNC: 11.1 G/DL (ref 11.7–15.7)
LYMPHOCYTES # BLD AUTO: 2.4 10E9/L (ref 0.8–5.3)
LYMPHOCYTES NFR BLD AUTO: 21 %
MCH RBC QN AUTO: 21.1 PG (ref 26.5–33)
MCHC RBC AUTO-ENTMCNC: 29.8 G/DL (ref 31.5–36.5)
MCV RBC AUTO: 71 FL (ref 78–100)
MONOCYTES # BLD AUTO: 1.1 10E9/L (ref 0–1.3)
MONOCYTES NFR BLD AUTO: 9.2 %
NEUTROPHILS # BLD AUTO: 7.5 10E9/L (ref 1.6–8.3)
NEUTROPHILS NFR BLD AUTO: 66.1 %
PLATELET # BLD AUTO: 406 10E9/L (ref 150–450)
RBC # BLD AUTO: 5.27 10E12/L (ref 3.8–5.2)
WBC # BLD AUTO: 11.4 10E9/L (ref 4–11)

## 2017-07-21 PROCEDURE — 99213 OFFICE O/P EST LOW 20 MIN: CPT | Performed by: INTERNAL MEDICINE

## 2017-07-21 PROCEDURE — 85025 COMPLETE CBC W/AUTO DIFF WBC: CPT | Performed by: INTERNAL MEDICINE

## 2017-07-21 PROCEDURE — 36415 COLL VENOUS BLD VENIPUNCTURE: CPT | Performed by: INTERNAL MEDICINE

## 2017-07-21 NOTE — TELEPHONE ENCOUNTER
Controlled Substance Refill Request for Clonazepam 1mg  Problem List Complete:  No     PROVIDER TO CONSIDER COMPLETION OF PROBLEM LIST AND OVERVIEW/CONTROLLED SUBSTANCE AGREEMENT    Last Written Prescription Date:  6-  Last Fill Quantity: 45,   # refills: 0    Last Office Visit with [facility] primary care provider: 7-    Future Office visit:     Controlled substance agreement on file: No.     Processing:  Fax Rx to [facility]) pharmacy     checked in past 6 months?  No, route to RN     Thanks!    Dominguez Devi  Pharmacy Technician  [facility]  [address] [place] [address] [phone]

## 2017-07-21 NOTE — PROGRESS NOTES
SUBJECTIVE:                                                    Nicole Reyes is a 61 year old female who presents to clinic today for the following health issues: rash under left breast       1. Follow up dyspnea: she has very mild improvement. She had canceled stress test because of severe reaction after echo, over much of lower left chest. It is clearing well now. Spirometry was completely normal. She thinks she can do a treadmill stress test now. She is worried if she could have a skin reaction again to the stress test.    2. Anemia: she continues on iron every other day now.       Patient Active Problem List   Diagnosis     Essential hypertension, benign     Anemia, iron deficiency     Restless leg syndrome     CRISTÓBAL (obstructive sleep apnea)     CKD (chronic kidney disease) stage 3, GFR 30-59 ml/min     Advanced directives, counseling/discussion     GENERALIZED ANXIETY DIS     Major depressive disorder, recurrent episode, moderate (H)     Health Care Home     GERD (gastroesophageal reflux disease)     Hyperlipidemia LDL goal <70     Eczema     Type 2 diabetes mellitus with stage 3 chronic kidney disease (HCC)     Midline low back pain with left-sided sciatica     Morbid obesity (HCC)     Insomnia, unspecified type     Dyspnea, unspecified type     Current Outpatient Prescriptions   Medication Sig Dispense Refill     clonazePAM (KLONOPIN) 1 MG tablet 1 tab po qam, 1/2 tab po qpm 45 tablet 0     zolpidem (AMBIEN) 10 MG tablet Take 1 tablet (10 mg) by mouth nightly as needed for sleep 30 tablet 5     amitriptyline (ELAVIL) 50 MG tablet Take one and a half tabs every night at bedtime 135 tablet 3     Iron Polysacch Qeioy-V07--0.025-1 MG CAPS Take 1 capsule by mouth daily 30 capsule 3     gabapentin (NEURONTIN) 300 MG capsule Take 1 capsule (300 mg) by mouth daily 90 capsule 3     losartan (COZAAR) 100 MG tablet Take 1 tablet (100 mg) by mouth daily 90 tablet 1     sertraline (ZOLOFT) 100 MG tablet Take 2  "tablets (200 mg) by mouth daily Increased dose. Can use up tablets at home first. 180 tablet 1     oxyCODONE (ROXICODONE) 5 MG immediate release tablet 1-2 po q4 hours prn 40 tablet 0     simvastatin (ZOCOR) 20 MG tablet Take 1 tablet (20 mg) by mouth At Bedtime 90 tablet 3     pantoprazole (PROTONIX) 40 MG enteric coated tablet Take 1 tablet (40 mg) by mouth At Bedtime 90 tablet 3     metFORMIN (GLUCOPHAGE-XR) 500 MG 24 hr tablet Take 2 tablets (1,000 mg) by mouth daily (with breakfast) 180 tablet 3     cyclobenzaprine (FLEXERIL) 10 MG tablet Take 1 tablet (10 mg) by mouth 3 times daily as needed for muscle spasms 90 tablet 0     ibuprofen (ADVIL,MOTRIN) 600 MG tablet Take 1 tablet (600 mg) by mouth every 6 hours as needed for moderate pain 30 tablet 1     Omega-3 Fatty Acids (OMEGA-3 FISH OIL PO) Take 2 g by mouth At Bedtime        Multiple Vitamin (MULTI-VITAMIN) per tablet Take 1 tablet by mouth At Bedtime        FREESTYLE LITE TEST VI STRP Test twice a day 100 Strip 12     ketoprofen 10% in PLO 10% Place 1 g onto the skin 3 times daily as needed for moderate pain 30 g 0      ROS:  negative    OBJECTIVE:     /90  Pulse 109  Temp 98.6  F (37  C) (Oral)  Ht 4' 10.5\" (1.486 m)  Wt 264 lb (119.7 kg)  LMP 09/15/2007  SpO2 94%  BMI 54.24 kg/m2  Body mass index is 54.24 kg/(m^2).    Faint erythematous patch extending left lower breast, medial and lower chest anteriorly       ASSESSMENT/PLAN:       1. Dyspnea on exertion  Advised her reaction was probably due to the gel, not the leads given the extensive involvement. Advised I am not ordering stress echo so should not have problems but she should wash areas of leads when she gets home and can use HC on them right away.   - NM Exercise stress test; Future    2. Iron deficiency anemia, unspecified iron deficiency anemia type  recheck  - CBC with platelets differential    3. Leukocytosis, unspecified type  Recheck.   - CBC with platelets " differential        Pari Wiley MD  [facility]

## 2017-07-21 NOTE — MR AVS SNAPSHOT
After Visit Summary   7/21/2017    Nicole Reyes    MRN: [id number]           Patient Information     Date Of Birth          1955        Visit Information        Provider Department      7/21/2017 3:20 PM Pari Wiley MD [facility]        Today's Diagnoses     Dyspnea on exertion    -  1    Iron deficiency anemia, unspecified iron deficiency anemia type        Leukocytosis, unspecified type           Follow-ups after your visit        Future tests that were ordered for you today     Open Future Orders        Priority Expected Expires Ordered    NM Exercise stress test Routine  7/21/2018 7/21/2017            Who to contact     If you have questions or need follow up information about today's clinic visit or your schedule please contact [facility] directly at [phone].  Normal or non-critical lab and imaging results will be communicated to you by [employer], letter or phone within 4 business days after the clinic has received the results. If you do not hear from us within 7 days, please contact the clinic through [employer] or phone. If you have a critical or abnormal lab result, we will notify you by phone as soon as possible.  Submit refill requests through [facility] or call your pharmacy and they will forward the refill request to us. Please allow 3 business days for your refill to be completed.          Additional Information About Your Visit        [employer] Information     [facility] gives you secure access to your electronic health record. If you see a primary care provider, you can also send messages to your care team and make appointments. If you have questions, please call your primary care clinic.  If you do not have a primary care provider, please call [phone] and they will assist you.        Care EveryWhere ID     This is your Care EveryWhere ID. This could be used by other organizations to access your [facility] medical records  [id number]       "  Your Vitals Were     Pulse Temperature Height Last Period Pulse Oximetry BMI (Body Mass Index)    109 98.6  F (37  C) (Oral) 4' 10.5\" (1.486 m) 09/15/2007 94% 54.24 kg/m2       Blood Pressure from Last 3 Encounters:   07/21/17 138/90   06/22/17 126/80   05/11/17 92/68    Weight from Last 3 Encounters:   07/21/17 264 lb (119.7 kg)   06/22/17 262 lb (118.8 kg)   05/11/17 262 lb 9.6 oz (119.1 kg)              We Performed the Following     CBC with platelets differential        Primary Care Provider Office Phone # Fax #    Pari Wiley [phone]       [facility] [address] [facility] [address]  [facility] [address]        Equal Access to Services     JUDI ARELLANO : Hadii aad ku hadasho Somacey, waaxda luqadaha, qaybta kaalmada adejuan manuelyada, derik casillas . So [facility] [phone].    ATENCIÓN: Si habla español, tiene a tovar disposición servicios gratuitos de asistencia lingüística. Niraj al [phone].    We comply with applicable federal civil rights laws and [place] laws. We do not discriminate on the basis of race, color, national origin, age, disability sex, sexual orientation or gender identity.            Thank you!     Thank you for choosing [facility]  for your care. Our goal is always to provide you with excellent care. Hearing back from our patients is one way we can continue to improve our services. Please take a few minutes to complete the written survey that you may receive in the mail after your visit with us. Thank you!             Your Updated Medication List - Protect others around you: Learn how to safely use, store and throw away your medicines at www.disposemymeds.org.          This list is accurate as of: 7/21/17 11:59 PM.  Always use your most recent med list.                   Brand Name Dispense Instructions for use Diagnosis    amitriptyline 50 MG tablet    ELAVIL    135 tablet    Take one and a half tabs every night at bedtime    " Other insomnia       clonazePAM 1 MG tablet    klonoPIN    45 tablet    1 tab po qam, 1/2 tab po qpm    Generalized anxiety disorder       cyclobenzaprine 10 MG tablet    FLEXERIL    90 tablet    Take 1 tablet (10 mg) by mouth 3 times daily as needed for muscle spasms    Chronic low back pain with sciatica, sciatica laterality unspecified, unspecified back pain laterality       FREESTYLE LITE test strip   Generic drug:  blood glucose monitoring     100 Strip    Test twice a day    Type 2 diabetes, HbA1c goal < 7% (H)       gabapentin 300 MG capsule    NEURONTIN    90 capsule    Take 1 capsule (300 mg) by mouth daily    Restless legs syndrome (RLS), Persistent insomnia       ibuprofen 600 MG tablet    ADVIL/MOTRIN    30 tablet    Take 1 tablet (600 mg) by mouth every 6 hours as needed for moderate pain        Iron Polysacch [place]-F19--0.025-1 MG Caps     30 capsule    Take 1 capsule by mouth daily    Iron deficiency anemia, unspecified iron deficiency anemia type       ketoprofen 10% in PLO 10% topical gel     30 g    Place 1 g onto the skin 3 times daily as needed for moderate pain    Community acquired pneumonia       losartan 100 MG tablet    COZAAR    90 tablet    Take 1 tablet (100 mg) by mouth daily    Essential hypertension, benign       metFORMIN 500 MG 24 hr tablet    GLUCOPHAGE-XR    180 tablet    Take 2 tablets (1,000 mg) by mouth daily (with breakfast)    Type 2 diabetes mellitus with stage 3 chronic kidney disease, without long-term current use of insulin (H)       Multi-vitamin Tabs tablet   Generic drug:  multivitamin, therapeutic with minerals      Take 1 tablet by mouth At Bedtime        OMEGA-3 FISH OIL PO      Take 2 g by mouth At Bedtime        oxyCODONE 5 MG IR tablet    ROXICODONE    40 tablet    1-2 po q4 hours prn    Pain, dental       pantoprazole 40 MG EC tablet    PROTONIX    90 tablet    Take 1 tablet (40 mg) by mouth At Bedtime    Gastroesophageal reflux disease without esophagitis        sertraline 100 MG tablet    ZOLOFT    180 tablet    Take 2 tablets (200 mg) by mouth daily Increased dose. Can use up tablets at home first.    Generalized anxiety disorder, Major depressive disorder, recurrent episode, moderate (H)       simvastatin 20 MG tablet    ZOCOR    90 tablet    Take 1 tablet (20 mg) by mouth At Bedtime    Hyperlipidemia LDL goal <70       zolpidem 10 MG tablet    AMBIEN    30 tablet    Take 1 tablet (10 mg) by mouth nightly as needed for sleep    Insomnia, unspecified type

## 2017-07-21 NOTE — NURSING NOTE
"Chief Complaint   Patient presents with     Derm Problem   Located under left breast , painful and itchy , noted about two weeks ago , used cortisone with some releif     Initial /90  Pulse 109  Temp 98.6  F (37  C) (Oral)  Ht 4' 10.5\" (1.486 m)  Wt 264 lb (119.7 kg)  LMP 09/15/2007  SpO2 94%  BMI 54.24 kg/m2 Estimated body mass index is 54.24 kg/(m^2) as calculated from the following:    Height as of this encounter: 4' 10.5\" (1.486 m).    Weight as of this encounter: 264 lb (119.7 kg).  Medication Reconciliation: complete    "

## 2017-07-24 RX ORDER — CLONAZEPAM 1 MG/1
TABLET ORAL
Qty: 45 TABLET | Refills: 0 | Status: SHIPPED | OUTPATIENT
Start: 2017-07-24 | End: 2017-09-10

## 2017-07-26 ENCOUNTER — TELEPHONE (OUTPATIENT)
Dept: INTERNAL MEDICINE | Facility: CLINIC | Age: 62
End: 2017-07-26

## 2017-07-26 NOTE — TELEPHONE ENCOUNTER
[facility]  Put in Dr. Wiley's in basket.

## 2017-07-27 NOTE — TELEPHONE ENCOUNTER
Done, I don't see she is scheduled for stress test yet, call and recommend she get this scheduled, give her cardiopulmonary number if they have not called her yet.

## 2017-07-31 ENCOUNTER — TELEPHONE (OUTPATIENT)
Dept: INTERNAL MEDICINE | Facility: CLINIC | Age: 62
End: 2017-07-31

## 2017-07-31 NOTE — TELEPHONE ENCOUNTER
Pt calls stating she is scheduled for Friday, 8/4 for her NM test.   She states she will need another week off to complete her test and then to follow up with Dr Wiley on 8/10.     She is asking for Dr Wiley to fax in the paperwork

## 2017-07-31 NOTE — TELEPHONE ENCOUNTER
I want her to do the NM test; it is much better. The echo can miss some artery changes and she is likely to have to do the NM test anyway so I strongly recommend it.

## 2017-08-03 ENCOUNTER — HOSPITAL ENCOUNTER (EMERGENCY)
Facility: CLINIC | Age: 62
Discharge: HOME | End: 2017-08-03
Attending: EMERGENCY MEDICINE | Admitting: EMERGENCY MEDICINE
Payer: COMMERCIAL

## 2017-08-03 VITALS
RESPIRATION RATE: 22 BRPM | OXYGEN SATURATION: 97 % | SYSTOLIC BLOOD PRESSURE: 147 MMHG | HEART RATE: 88 BPM | WEIGHT: 262 LBS | BODY MASS INDEX: 52.82 KG/M2 | HEIGHT: 59 IN | DIASTOLIC BLOOD PRESSURE: 97 MMHG

## 2017-08-03 DIAGNOSIS — R68.84 JAW PAIN: ICD-10-CM

## 2017-08-03 PROCEDURE — 64400 NJX AA&/STRD TRIGEMINAL NRV: CPT

## 2017-08-03 PROCEDURE — 99283 EMERGENCY DEPT VISIT LOW MDM: CPT | Mod: 25

## 2017-08-03 ASSESSMENT — ENCOUNTER SYMPTOMS: FEVER: 0

## 2017-08-03 NOTE — ED AVS SNAPSHOT
[facility] Emergency Department    [address] [place] [address]    Phone:  [phone]    Fax:  [phone]                                       Nicole Reyes   MRN: [id number]    Department:  [facility] Emergency Department   Date of Visit:  8/3/2017           Patient Information     Date Of Birth          1955        Your diagnoses for this visit were:     Jaw pain        You were seen by Thompson Martinez MD.        Discharge Instructions       Please see a dentist as soon as possible.    Return to the Emergency Room if you develop facial swelling, trouble swallowing, fevers more than 102 or if you have any new concerns about your health.        It was my pleasure to take care of you today. Thanks for visiting [facility].     Thompson Martinez MD      Discharge Instructions  Dental Pain    You have been seen today for a toothache. Your pain may be caused by an exposed nerve, an infection (pulpitis), a root abscess, or other problems. You will need to see a dentist for a solution to your tooth problem. Emergency Department care is only to help control your problem until you can see a dentist.  Today, we did not find any sign that your toothache was caused by a serious condition, but sometimes symptoms develop over time and cannot be found during an emergency visit, so it is very important that you follow up with your dentist.      Return to the Emergency Department if:    You develop a fever over 101 degrees Fahrenheit    You can t open your mouth normally, can t move your tongue well, or can t swallow    You have new or increased swelling of your face or neck.    You develop drainage of pus or foul smelling material from around your tooth.  What can I do to help myself?    Take any antibiotic the doctor may have prescribed for you today.    Avoid very hot or very cold foods as both can cause pain.    Make an appointment to see a  dentist as soon as possible. If you wish, we can provide you with a list of low-cost dental clinics.       Remember that you can always come back to the Emergency Department if you are not able to see your regular doctor in the amount of time listed above, if you get any new symptoms, or if there is anything that worries you.        Dental Resources  Name/Address/Phone Eligibility Hours Fee   [facility]  [address], [address]  [place] [address]  [phone] Anyone Call for appointment [facility]  Medical Assistance  Private Insurance   [facility]  [address] [place] [address]  [phone] Anyone Call for appointment    [facility] refers to low-cost dental clinics for non-preventive care    [place] Interpreters available Prices start at   Adults        Cleaning $36-$160        X-Ray $20-40  Children        Cleaning $15        X-ray $10-20        Fluoride $10  Accepts cash, check or credit;  Does not take insurance or MA.   [facility]  [address] [place]  [address]  [phone] Anyone Afternoons and evenings    September-May    Answers phones after 10 AM $30.00 per visit   ($15.00 per visit if 62 or older)   Preventive care.  Restoration care; sliding fee; MA   [facility]  [address] [place] [address] [phone] Children birth to age 18 and pregnant women    [facility] Residents without insurance will be asked to apply for Assured Care. M TH F 8:30 am - 5 pm  T W 8:30 am - 7 pm    30 locations metro wide    Call for appointment and to confirm hours. Sliding Fee  [facility]  Medical Assistance  Assured Access  Private Insurance    8 Languages Spoken   [facility] [address] [address]  [phone] Anyone Call for appointment Sliding Fee  Accept insurance, [place],   [place] and self-pay.  Call if no insurance.    All services provided.  Staff  fluent in Hmong, Laotian, Miguelito, Slovenian, Maltese, Monegasque, and Farsi.   [facility]  [address] [place] [address]  [phone] Children  Adults in a walk in basis Mon - Fri. 8 - 5 pm       (closed 1-2 pm)  General Dentists & Hygienists  Private Dentists  Dentures Fees based on family size and income ranging from 40% - 100% payment by patient.   [facility]  [address] [place]  [address] [phone] Anyone Mon - Fri 7:30 am - 5:00 pm  By Appt.    Tues & Wed @ 3:00 call for urgent care Appt for next day service Sliding fee:  MA; Insurance   [facility]  [address] [place], [place]  [address] [phone] Anyone Call for an appointment.  Days open vary every semester. Adult cleaning $25  Child cleaning $15  X-Rays $10-$15  Whitening services available  $75, includes cleaning  Seniors 50% off   [facility]  [address] [place]  [address]  [phone] Anyone M-F 8 am - 5 pm Most insurances accepted.  MA and Sliding Scale.   [facility]  [address] [place] [address]  [phone] Anyone without insurance Call to make appt   M-F 9:00 am - 5 pm   (Closed noon hour 12-1)    6 pm- 8 pm Evening hours also available for care Sliding fee based on income and size of household.   [facility]  [address] [place] [address]  [address]  [phone] press option 1    For the [facility] [place] press option 4 Anyone              Anyone Monday  4 - 6:30 pm  Tuesday 12:30 - 3 & 4-6:30  Thursday 8:30 - 11 am & 12-2:30 pm  September through May only    All year around on Thursdays from 5-9 pm (only time a dentist is in.) Cleanings & X-Rays Only  Cleanings:  Adults $30                   Kids $20  X-Rays:  Adults $34                Kids $10    MA and Sliding fee   [facility]  [address]  [place] [address]    [phone] Anyone    (Hmong interpreters available) M-F 8:00 am - 5:00 pm       By appointment only  (same day appointments available) Sliding fee ($40+ may be due at appointment, remainder billed); MA; Insurance   [facility]  [address] [place] [address]    [phone]   Anyone    (Hmong interpreters available) M-Th 8:00 am - 8:00 pm  F 8:00 am - 5:00 pm    By appointment only  (same day appointments available) Sliding fee ($40+ may be due at appointment, remainder billed); MA; Insurance   [facility]  [address] [place], [place]  [phone] Anyone    Must live within [facility] to qualify for sliding fee services Mon, Tues, Thurs, Fri  8:30 am - 5:00 pm  Wed. 8:30 am - 7:00 pm  All other services by appt. only Sliding Fee; MA   Offer payment plans    Have financial workers that will assist with [place] and will use sliding fee for those who do not qualify.      [facility]  [address] [place] [phone] Anyone without insurance     Hours and day of week vary  (Call ahead for hours)    Walk-in only Free Services    Cleanings; Fillings; Extractions   [facility]  [address] [place] [address]  [id number] [phone] Anyone Call for an appointment Accept patients with [place] and Medical Assistance.  10% discount if bill is paid in full on day of service.  No sliding fee scale.     [facility]  [address] [place], [place] [phone] Anyone M-F  8 am-5 pm  Call for appt.    Walk-in hours 8 am - 11am and 1 pm - 5 pm, however take scheduled appointments first    No emergency services or oral surgeries Sliding Fee available with an [place] or [place] denial letter and proof of income.    Accepts Assured Access card and MA coverage.     Name/Address/Phone Eligibility Hours Fee   [facility]  [address] [place]  [address]  [phone] Anyone with emergencies only M & W clinic begins at 6 pm   Call ahead    Alternate Fridays for children by Appt only Free   [facility]  [address] [place] [address]  [phone]    Emergencies (adults only):  [phone] Anyone Free walk-in screens for oral surgery    Call ahead for hours    All other services by appt. only  Accepts MA for pediatrics only    Rates are about 25% - 40% less than private dental office.    No sliding fee scale   [facility]  [address] [place] [address]  [address]  [phone] Anyone as long as they do not have health insurance Hours on Mondays, Tuesdays, and Thursdays Sliding fees based on monthly income    No root canals, tooth pulls or emergencies   [facility]  [address] [place] [address]  [phone] Anyone  M - F 8:00 am - 5:00 pm  Wed 8:00 am - 8 pm Sliding fees; MA; Insurance   [facility]  [address] [place] [address]  [address]  [phone] Anyone age 55+ M - F 8:00 am - 4:30 pm    Appt. only Set slightly lower fees;  MA; Insurance         Give Kids a smile day in [facility] Children Takes place in February at a few locations                          Dental Pain:      Dear Emergency Department Patient:     Here at [facility], we are always pleased to evaluate you for emergency conditions and offer a screening examination. Today, we have seen you and determined that you have dental pain and/or a dental problem.  We do not have the equipment and/or advanced training to perform definitive dental care, therefore, you need to be seen by a dentist for further care.     You may see your regular dentist if you have one, or we have attached a list of community dental providers, including some who provide care at a reduced fee.      Please be aware that if a narcotic medication was prescribed,  it will not be refilled in the emergency department.  Accordingly, if you should have ongoing problems and/or pain, you should contact a dentist right away for definitive treatment.    Sincerely,        [facility] (PRAKASH)            Future Appointments        Provider Department Dept Phone Center    8/4/2017 1:00 PM [facility] MED ROOM 1 [facility] Nuclear Medicine [phone] [facility]    8/4/2017  2:30 PM Res[facility]3 [facility] Electrocardiolgy [phone] [facility]    8/10/2017 2:40 PM Pari Wiley MD [facility] [phone] [place]      24 Hour Appointment Hotline       To make an appointment at any [facility] call [phone] ([phone]). If you don't have a family doctor or clinic, we will help you find one. [facility] are conveniently located to serve the needs of you and your family.             Review of your medicines      Our records show that you are taking the medicines listed below. If these are incorrect, please call your family doctor or clinic.        Dose / Directions Last dose taken    amitriptyline 50 MG tablet   Commonly known as:  ELAVIL   Quantity:  135 tablet        Take one and a half tabs every night at bedtime   Refills:  3        clonazePAM 1 MG tablet   Commonly known as:  klonoPIN   Quantity:  45 tablet        1 tab po qam, 1/2 tab po qpm   Refills:  0        cyclobenzaprine 10 MG tablet   Commonly known as:  FLEXERIL   Dose:  10 mg   Quantity:  90 tablet        Take 1 tablet (10 mg) by mouth 3 times daily as needed for muscle spasms   Refills:  0        FREESTYLE LITE test strip   Quantity:  100 Strip   Generic drug:  blood glucose monitoring        Test twice a day   Refills:  12        gabapentin 300 MG capsule   Commonly known as:  NEURONTIN   Dose:  300 mg   Quantity:  90 capsule        Take 1 capsule (300 mg) by mouth daily   Refills:  3        ibuprofen 600 MG tablet   Commonly known  as:  ADVIL/MOTRIN   Dose:  600 mg   Quantity:  30 tablet        Take 1 tablet (600 mg) by mouth every 6 hours as needed for moderate pain   Refills:  1        Iron Polysacch Ijcni-D32--0.025-1 MG Caps   Dose:  1 capsule   Quantity:  30 capsule        Take 1 capsule by mouth daily   Refills:  3        ketoprofen 10% in PLO 10% topical gel   Dose:  1 g   Quantity:  30 g        Place 1 g onto the skin 3 times daily as needed for moderate pain   Refills:  0        losartan 100 MG tablet   Commonly known as:  COZAAR   Dose:  100 mg   Quantity:  90 tablet        Take 1 tablet (100 mg) by mouth daily   Refills:  1        metFORMIN 500 MG 24 hr tablet   Commonly known as:  GLUCOPHAGE-XR   Dose:  1000 mg   Quantity:  180 tablet        Take 2 tablets (1,000 mg) by mouth daily (with breakfast)   Refills:  3        Multi-vitamin Tabs tablet   Dose:  1 tablet   Generic drug:  multivitamin, therapeutic with minerals        Take 1 tablet by mouth At Bedtime   Refills:  0        OMEGA-3 FISH OIL PO   Dose:  2 g        Take 2 g by mouth At Bedtime   Refills:  0        oxyCODONE 5 MG IR tablet   Commonly known as:  ROXICODONE   Quantity:  40 tablet        1-2 po q4 hours prn   Refills:  0        pantoprazole 40 MG EC tablet   Commonly known as:  PROTONIX   Dose:  40 mg   Quantity:  90 tablet        Take 1 tablet (40 mg) by mouth At Bedtime   Refills:  3        sertraline 100 MG tablet   Commonly known as:  ZOLOFT   Dose:  200 mg   Quantity:  180 tablet        Take 2 tablets (200 mg) by mouth daily Increased dose. Can use up tablets at home first.   Refills:  1        simvastatin 20 MG tablet   Commonly known as:  ZOCOR   Dose:  20 mg   Quantity:  90 tablet        Take 1 tablet (20 mg) by mouth At Bedtime   Refills:  3        zolpidem 10 MG tablet   Commonly known as:  AMBIEN   Dose:  10 mg   Quantity:  30 tablet        Take 1 tablet (10 mg) by mouth nightly as needed for sleep   Refills:  5                Orders Needing  Specimen Collection     None      Pending Results     No orders found from 8/1/2017 to 8/4/2017.            Pending Culture Results     No orders found from 8/1/2017 to 8/4/2017.            Pending Results Instructions     If you had any lab results that were not finalized at the time of your Discharge, you can call the ED Lab Result RN at [phone]. You will be contacted by this team for any positive Lab results or changes in treatment. The nurses are available 7 days a week from 10A to 6:30P.  You can leave a message 24 hours per day and they will return your call.        Test Results From Your Hospital Stay               Clinical Quality Measure: Blood Pressure Screening     Your blood pressure was checked while you were in the emergency department today. The last reading we obtained was  BP: (!) 147/97 . Please read the guidelines below about what these numbers mean and what you should do about them.  If your systolic blood pressure (the top number) is less than 120 and your diastolic blood pressure (the bottom number) is less than 80, then your blood pressure is normal. There is nothing more that you need to do about it.  If your systolic blood pressure (the top number) is 120-139 or your diastolic blood pressure (the bottom number) is 80-89, your blood pressure may be higher than it should be. You should have your blood pressure rechecked within a year by a primary care provider.  If your systolic blood pressure (the top number) is 140 or greater or your diastolic blood pressure (the bottom number) is 90 or greater, you may have high blood pressure. High blood pressure is treatable, but if left untreated over time it can put you at risk for heart attack, stroke, or kidney failure. You should have your blood pressure rechecked by a primary care provider within the next 4 weeks.  If your provider in the emergency department today gave you specific instructions to follow-up with your doctor or provider even  sooner than that, you should follow that instruction and not wait for up to 4 weeks for your follow-up visit.        Thank you for choosing [facility]       Thank you for choosing [facility] for your care. Our goal is always to provide you with excellent care. Hearing back from our patients is one way we can continue to improve our services. Please take a few minutes to complete the written survey that you may receive in the mail after you visit with us. Thank you!        [facility] Information     [facility] gives you secure access to your electronic health record. If you see a primary care provider, you can also send messages to your care team and make appointments. If you have questions, please call your primary care clinic.  If you do not have a primary care provider, please call [phone] and they will assist you.        Care EveryWhere ID     This is your Care EveryWhere ID. This could be used by other organizations to access your [facility] medical records  [id number]        Equal Access to Services     JUDI ARELLANO : Anna Champion, nilda johns, mike paz, derik casillas . So [facility] [phone].    ATENCIÓN: Si habla español, tiene a tovar disposición servicios gratuitos de asistencia lingüística. Llame al [phone].    We comply with applicable federal civil rights laws and [place] laws. We do not discriminate on the basis of race, color, national origin, age, disability sex, sexual orientation or gender identity.            After Visit Summary       This is your record. Keep this with you and show to your community pharmacist(s) and doctor(s) at your next visit.

## 2017-08-03 NOTE — ED NOTES
Bed: ED01  Expected date: 8/3/17  Expected time: 2:28 AM  Means of arrival: Ambulance  Comments:  [phone]

## 2017-08-03 NOTE — ED PROVIDER NOTES
"  History     Chief Complaint:  Jaw pain    HPI   Nicole Reyes is a 61 year old female who presents with jaw pain. The patient states that she is currently experiencing jaw pain that radiates into her face. She is not sure why she is having painThe pain is worse on her lower jaw. She spoke with the on call dentist 45 minutes ago and she reccommended to come into ER for pain management before coming in tomorrow morning.    Allergies:  Codeine  Penicillins    Medications:    Klonopin  Ambien  Elavil  Neurontin  Cozaar  Zoloft  Zocor  Protonix  Glucophage    Past Medical History:    CKD  Hypertension  RADHA  Abdominal hernia  Hypertension  Insomnia  Iron deficiency anema  Major depression  Obbsity  CRISTÓBAL  Poster operative seroma  Pure hypercholesterolemia  RLS  Type 2 diabtes    Past Surgical History:    Breast biopsy  Hernia repair  C section  Dilation and curettage  EGD  Herniorrhaphy incisional    Family History:    CHF  Hypertension  CAD  Hyperlipidemia  Cancer    Social History:  Smoking Status: No  Smokeless Tobacco: No  Alcohol Use: Yes   Marital Status:   [2]     Review of Systems   Constitutional: Negative for fever.   HENT: Positive for dental problem.    All other systems reviewed and are negative.    Physical Exam   Vitals:  Patient Vitals for the past 24 hrs:   BP Pulse Resp SpO2 Height Weight   08/03/17 0234 (!) 147/97 88 22 97 % 1.499 m (4' 11\") 118.8 kg (262 lb)      Physical Exam  General: Patient is alert and interactive when I enter the room  Head:  The scalp, face, and head appear normal  Eyes:  Conjunctivae are normal  ENT:    The nose is normal    Pinnae are normal    External acoustic canals are normal    Left jaw pain with no facial swelling    Dental inspection shows caries, no periapical abscess.     Floor of mouth is soft.  No peritonsillar abscess. No uvelitis or    swelling.    Neck:  Trachea midline  CV:  Pulses are normal.   Resp:  No respiratory distress   Musc:  Normal muscular " tone    No major joint effusions    No asymmetric leg swelling    Good capillary refill noted  Skin:  No rash or lesions noted. No facial swelling or erythema.  Neuro:  Speech is normal and fluent. Face is symmetric.     Moving all extremities well.  Facial and trigeminal nerve are tested and   intact.   Psych: Awake. Alert.  Normal affect.  Appropriate interactions.    Emergency Department Course     Procedures:  PROCEDURE: Dental Block    LOCATION: Lower jaw    ANESTHESIA: Regional block using Bupivacaine with epinephrine total of 1.8 mLs    PROCEDURE NOTE: The patient tolerated the procedure well with good relief of his discomfort  and there were no complications.    Emergency Department Course:  Nursing notes and vitals reviewed.  I performed an exam of the patient as documented above.     I discussed the treatment plan with the patient. They expressed understanding of this plan and consented to discharge. They will be discharged home with instructions for care and follow up. In addition, the patient will return to the emergency department if their symptoms persist, worsen, if new symptoms arise or if there is any concern.  All questions were answered.     I personally answered all related questions prior to discharge.    Impression & Plan      Medical Decision Making:  Nicole Reyes is a 61 year old female who presents for evaluation of jaw pain.  This could be secondary to a dental issue. There is no abscess detected around the tooth amenable to incision and drainage. The differential diagnosis includes: cracked tooth syndrome, pulpitis, sub-apical abscess, facial cellulitis, alveolitis amongst others. There is no evidence of deep space infections, significant facial swelling, Lemierre's Syndrome or Enmanuel's angina. There are no posterior pharyngeal space (RPA, PTA) infections detected. A dental block of the left lower jaw was performed per the procedure note above. Follow up with a dentist/endodontist in  the coming days is indicated for further work up and treatment. Patient notes that she has already spoken with a dentist and will go in in the morning.    Diagnosis:    ICD-10-CM    1. Jaw pain R68.84       Disposition:   Discharged    Scribe Disclosure:  Wilder CHAVES, am serving as a scribe at 2:43 AM on 8/3/2017 to document services personally performed by Thompson Martinez MD, based on my observations and the provider's statements to me.   [facility] EMERGENCY DEPARTMENT       Thompson Martinez MD  08/03/17 0449

## 2017-08-03 NOTE — TELEPHONE ENCOUNTER
Patient advised.  FYI:  She is having dental issues right now, pt states PCP knows about this.  She was able to get in to see an oral surgeon today and may have to reschedule stress test for next week. DEYSI Magdaleno R.N.

## 2017-08-04 ENCOUNTER — TELEPHONE (OUTPATIENT)
Dept: INTERNAL MEDICINE | Facility: CLINIC | Age: 62
End: 2017-08-04

## 2017-08-04 NOTE — TELEPHONE ENCOUNTER
Pt calls, stating she missed her NM stress test and had to reschedule for next week because she was in the ED for jaw pain.     She is getting tooth pulled on Monday.     NM Test is next Friday.     She is asking if should have her MARYAM extended.   Her returning to work was supposed to be based on her test results.     Please advise.

## 2017-08-08 NOTE — PROCEDURES
"Please see medical chart for graphs and statistics related to this report.       REFERRING PHYSICIAN: Pari Wiley            TECHNICIAN: Teddy Chowdhury   DIAGNOSIS: SOB, HTN, CKD, obesity, CRISTÓBAL, anemia   HEIGHT: 58.50\"            WEIGHT: 261 lb      DYSPNEA: walking more than 100 yards   COUGH: no cough   WHEEZE: rare      YEARS SMOKED: 7   PKS/DAY: 0.8   YEARS QUIT:  38                                                           MEDICATIONS:        POST-TEST COMMENTS: Patient completed pulmonary function testing with DLCO. Good patient effort and cooperation. All testing meets ATS-ERS recommendations. DLCO is an average of 2 maneuvers. Predicted DLCO is corrected for a Hgb of 11.2 drawn on 2017.       INTERPRETATION:   1. Normal mechanics   2. Normal volumes   3. Normal gas transfer         JENNIFER GRADY MD             D: 2017 09:45   T: 2017 09:56   MT:       Name:     ASHANTI PATEL   MRN:      [id number]        Account:        [id number]   :      1955           Procedure Date: 2017      Document: [id number]       cc: Pari Wiley MD   "

## 2017-08-09 ENCOUNTER — MYC MEDICAL ADVICE (OUTPATIENT)
Dept: INTERNAL MEDICINE | Facility: CLINIC | Age: 62
End: 2017-08-09

## 2017-08-09 ENCOUNTER — TELEPHONE (OUTPATIENT)
Dept: INTERNAL MEDICINE | Facility: CLINIC | Age: 62
End: 2017-08-09

## 2017-08-11 ENCOUNTER — HOSPITAL ENCOUNTER (OUTPATIENT)
Dept: NUCLEAR MEDICINE | Facility: CLINIC | Age: 62
Setting detail: NUCLEAR MEDICINE
Discharge: HOME | End: 2017-08-11
Attending: INTERNAL MEDICINE | Admitting: INTERNAL MEDICINE
Payer: COMMERCIAL

## 2017-08-11 DIAGNOSIS — R06.09 DYSPNEA ON EXERTION: ICD-10-CM

## 2017-08-11 PROCEDURE — A9502 TC99M TETROFOSMIN: HCPCS | Performed by: INTERNAL MEDICINE

## 2017-08-11 PROCEDURE — 93018 CV STRESS TEST I&R ONLY: CPT | Performed by: INTERNAL MEDICINE

## 2017-08-11 PROCEDURE — 93016 CV STRESS TEST SUPVJ ONLY: CPT | Performed by: INTERNAL MEDICINE

## 2017-08-11 PROCEDURE — 34300033 ZZH RX 343: Performed by: INTERNAL MEDICINE

## 2017-08-11 PROCEDURE — 78452 HT MUSCLE IMAGE SPECT MULT: CPT | Mod: 26 | Performed by: INTERNAL MEDICINE

## 2017-08-11 PROCEDURE — 78452 HT MUSCLE IMAGE SPECT MULT: CPT

## 2017-08-11 RX ADMIN — TETROFOSMIN 10.1 MCI.: 1.38 INJECTION, POWDER, LYOPHILIZED, FOR SOLUTION INTRAVENOUS at 12:10

## 2017-08-11 RX ADMIN — TETROFOSMIN 30 MCI.: 1.38 INJECTION, POWDER, LYOPHILIZED, FOR SOLUTION INTRAVENOUS at 14:29

## 2017-08-12 ENCOUNTER — MYC MEDICAL ADVICE (OUTPATIENT)
Dept: INTERNAL MEDICINE | Facility: CLINIC | Age: 62
End: 2017-08-12

## 2017-08-18 ENCOUNTER — OFFICE VISIT (OUTPATIENT)
Dept: INTERNAL MEDICINE | Facility: CLINIC | Age: 62
End: 2017-08-18
Payer: COMMERCIAL

## 2017-08-18 VITALS
SYSTOLIC BLOOD PRESSURE: 104 MMHG | TEMPERATURE: 97.9 F | OXYGEN SATURATION: 96 % | DIASTOLIC BLOOD PRESSURE: 60 MMHG | BODY MASS INDEX: 53.71 KG/M2 | HEIGHT: 59 IN | HEART RATE: 104 BPM | WEIGHT: 266.4 LBS

## 2017-08-18 DIAGNOSIS — E11.22 TYPE 2 DIABETES MELLITUS WITH STAGE 3 CHRONIC KIDNEY DISEASE, WITHOUT LONG-TERM CURRENT USE OF INSULIN (H): ICD-10-CM

## 2017-08-18 DIAGNOSIS — R06.00 DYSPNEA, UNSPECIFIED TYPE: Primary | ICD-10-CM

## 2017-08-18 DIAGNOSIS — N18.30 TYPE 2 DIABETES MELLITUS WITH STAGE 3 CHRONIC KIDNEY DISEASE, WITHOUT LONG-TERM CURRENT USE OF INSULIN (H): ICD-10-CM

## 2017-08-18 DIAGNOSIS — D50.9 IRON DEFICIENCY ANEMIA, UNSPECIFIED IRON DEFICIENCY ANEMIA TYPE: ICD-10-CM

## 2017-08-18 PROCEDURE — 99214 OFFICE O/P EST MOD 30 MIN: CPT | Performed by: INTERNAL MEDICINE

## 2017-08-18 NOTE — MR AVS SNAPSHOT
"              After Visit Summary   8/18/2017    Nicole Reyes    MRN: [id number]           Patient Information     Date Of Birth          1955        Visit Information        Provider Department      8/18/2017 4:20 PM Pari Wiley MD [facility]        Care Instructions    [phone]          Follow-ups after your visit        Follow-up notes from your care team     Return in about 3 months (around 11/15/2017) for Lab Work and see me a week later .      Who to contact     If you have questions or need follow up information about today's clinic visit or your schedule please contact [facility] directly at [phone].  Normal or non-critical lab and imaging results will be communicated to you by MyChart, letter or phone within 4 business days after the clinic has received the results. If you do not hear from us within 7 days, please contact the clinic through [facility] or phone. If you have a critical or abnormal lab result, we will notify you by phone as soon as possible.  Submit refill requests through AGELON ? or call your pharmacy and they will forward the refill request to us. Please allow 3 business days for your refill to be completed.          Additional Information About Your Visit        MyChart Information     AGELON ? gives you secure access to your electronic health record. If you see a primary care provider, you can also send messages to your care team and make appointments. If you have questions, please call your primary care clinic.  If you do not have a primary care provider, please call [phone] and they will assist you.        Care EveryWhere ID     This is your Care EveryWhere ID. This could be used by other organizations to access your [place] medical records  [id number]        Your Vitals Were     Pulse Temperature Height Last Period Pulse Oximetry BMI (Body Mass Index)    104 97.9  F (36.6  C) (Oral) 4' 11\" (1.499 m) 09/15/2007 96% 53.81 kg/m2 "       Blood Pressure from Last 3 Encounters:   08/18/17 104/60   08/03/17 (!) 147/97   07/21/17 138/90    Weight from Last 3 Encounters:   08/18/17 266 lb 6.4 oz (120.8 kg)   08/03/17 262 lb (118.8 kg)   07/21/17 264 lb (119.7 kg)              Today, you had the following     No orders found for display       Primary Care Provider Office Phone # Fax #    Pari Wiley [phone]       Valentin KELLER NICOLLET [facility] [address]  [facility] [address]        Equal Access to Services     [facility]: Hadii paddy faust hadasho Somacey, waaxda luqadaha, anumybta kaalmada celesteyacarmen, derik casillas . [facility] [phone].    ATENCIÓN: Si habla español, tiene a tovar disposición servicios gratuitos de asistencia lingüística. [facility] [phone].    We comply with applicable federal civil rights laws and [place] laws. We do not discriminate on the basis of race, color, national origin, age, disability sex, sexual orientation or gender identity.            Thank you!     Thank you for choosing [facility]  for your care. Our goal is always to provide you with excellent care. Hearing back from our patients is one way we can continue to improve our services. Please take a few minutes to complete the written survey that you may receive in the mail after your visit with us. Thank you!             Your Updated Medication List - Protect others around you: Learn how to safely use, store and throw away your medicines at www.disposemymeds.org.          This list is accurate as of: 8/18/17  4:53 PM.  Always use your most recent med list.                   Brand Name Dispense Instructions for use Diagnosis    amitriptyline 50 MG tablet    ELAVIL    135 tablet    Take one and a half tabs every night at bedtime    Other insomnia       clonazePAM 1 MG tablet    klonoPIN    45 tablet    1 tab po qam, 1/2 tab po qpm    Generalized anxiety disorder       cyclobenzaprine 10 MG tablet    FLEXERIL    90 tablet     Take 1 tablet (10 mg) by mouth 3 times daily as needed for muscle spasms    Chronic low back pain with sciatica, sciatica laterality unspecified, unspecified back pain laterality       FREESTYLE LITE test strip   Generic drug:  blood glucose monitoring     100 Strip    Test twice a day    Type 2 diabetes, HbA1c goal < 7% (H)       gabapentin 300 MG capsule    NEURONTIN    90 capsule    Take 1 capsule (300 mg) by mouth daily    Restless legs syndrome (RLS), Persistent insomnia       ibuprofen 600 MG tablet    ADVIL/MOTRIN    30 tablet    Take 1 tablet (600 mg) by mouth every 6 hours as needed for moderate pain        Iron Polysacch [id number]--0.025-1 MG Caps     30 capsule    Take 1 capsule by mouth daily    Iron deficiency anemia, unspecified iron deficiency anemia type       ketoprofen 10% in PLO 10% topical gel     30 g    Place 1 g onto the skin 3 times daily as needed for moderate pain    Community acquired pneumonia       losartan 100 MG tablet    COZAAR    90 tablet    Take 1 tablet (100 mg) by mouth daily    Essential hypertension, benign       metFORMIN 500 MG 24 hr tablet    GLUCOPHAGE-XR    180 tablet    Take 2 tablets (1,000 mg) by mouth daily (with breakfast)    Type 2 diabetes mellitus with stage 3 chronic kidney disease, without long-term current use of insulin (H)       Multi-vitamin Tabs tablet   Generic drug:  multivitamin, therapeutic with minerals      Take 1 tablet by mouth At Bedtime        OMEGA-3 FISH OIL PO      Take 2 g by mouth At Bedtime        oxyCODONE 5 MG IR tablet    ROXICODONE    40 tablet    1-2 po q4 hours prn    Pain, dental       pantoprazole 40 MG EC tablet    PROTONIX    90 tablet    Take 1 tablet (40 mg) by mouth At Bedtime    Gastroesophageal reflux disease without esophagitis       sertraline 100 MG tablet    ZOLOFT    180 tablet    Take 2 tablets (200 mg) by mouth daily Increased dose. Can use up tablets at home first.    Generalized anxiety disorder, Major  depressive disorder, recurrent episode, moderate (H)       simvastatin 20 MG tablet    ZOCOR    90 tablet    Take 1 tablet (20 mg) by mouth At Bedtime    Hyperlipidemia LDL goal <70       zolpidem 10 MG tablet    AMBIEN    30 tablet    Take 1 tablet (10 mg) by mouth nightly as needed for sleep    Insomnia, unspecified type

## 2017-08-18 NOTE — NURSING NOTE
"Chief Complaint   Patient presents with     Letter for School/Work     Lab Result Notice       Initial /60  Pulse 104  Temp 97.9  F (36.6  C) (Oral)  Ht 4' 11\" (1.499 m)  Wt 266 lb 6.4 oz (120.8 kg)  LMP 09/15/2007  SpO2 96%  BMI 53.81 kg/m2 Estimated body mass index is 53.81 kg/(m^2) as calculated from the following:    Height as of this encounter: 4' 11\" (1.499 m).    Weight as of this encounter: 266 lb 6.4 oz (120.8 kg).  Medication Reconciliation: complete    "

## 2017-08-18 NOTE — PROGRESS NOTES
SUBJECTIVE:   Nicole Reyes is a 61 year old female who presents to clinic today for the following health issues:      Follow up dyspnea, anemia:   She finished her testing, stress test negative.   She feels some improvement, feels ready to go back to work next week but would like to go 1/2 time to start.   She is dealing with some dental issues, was in ED for some jaw pain.   She is interested in the WEL program.     Patient Active Problem List   Diagnosis     Essential hypertension, benign     Anemia, iron deficiency     Restless leg syndrome     CRISTÓBAL (obstructive sleep apnea)     CKD (chronic kidney disease) stage 3, GFR 30-59 ml/min     Advanced directives, counseling/discussion     GENERALIZED ANXIETY DIS     Major depressive disorder, recurrent episode, moderate (H)     Health Care Home     GERD (gastroesophageal reflux disease)     Hyperlipidemia LDL goal <70     Eczema     Type 2 diabetes mellitus with stage 3 chronic kidney disease (HCC)     Midline low back pain with left-sided sciatica     Morbid obesity (HCC)     Insomnia, unspecified type     Dyspnea, unspecified type     Current Outpatient Prescriptions   Medication Sig Dispense Refill     clonazePAM (KLONOPIN) 1 MG tablet 1 tab po qam, 1/2 tab po qpm 45 tablet 0     amitriptyline (ELAVIL) 50 MG tablet Take one and a half tabs every night at bedtime 135 tablet 3     Iron Polysacch Bdrwv-T63--0.025-1 MG CAPS Take 1 capsule by mouth daily 30 capsule 3     gabapentin (NEURONTIN) 300 MG capsule Take 1 capsule (300 mg) by mouth daily 90 capsule 3     losartan (COZAAR) 100 MG tablet Take 1 tablet (100 mg) by mouth daily 90 tablet 1     sertraline (ZOLOFT) 100 MG tablet Take 2 tablets (200 mg) by mouth daily Increased dose. Can use up tablets at home first. 180 tablet 1     simvastatin (ZOCOR) 20 MG tablet Take 1 tablet (20 mg) by mouth At Bedtime 90 tablet 3     pantoprazole (PROTONIX) 40 MG enteric coated tablet Take 1 tablet (40 mg) by mouth At  "Bedtime 90 tablet 3     metFORMIN (GLUCOPHAGE-XR) 500 MG 24 hr tablet Take 2 tablets (1,000 mg) by mouth daily (with breakfast) 180 tablet 3     Omega-3 Fatty Acids (OMEGA-3 FISH OIL PO) Take 2 g by mouth At Bedtime        Multiple Vitamin (MULTI-VITAMIN) per tablet Take 1 tablet by mouth At Bedtime        zolpidem (AMBIEN) 10 MG tablet Take 1 tablet (10 mg) by mouth nightly as needed for sleep 30 tablet 5     oxyCODONE (ROXICODONE) 5 MG immediate release tablet 1-2 po q4 hours prn 40 tablet 0     cyclobenzaprine (FLEXERIL) 10 MG tablet Take 1 tablet (10 mg) by mouth 3 times daily as needed for muscle spasms 90 tablet 0     ibuprofen (ADVIL,MOTRIN) 600 MG tablet Take 1 tablet (600 mg) by mouth every 6 hours as needed for moderate pain 30 tablet 1     ketoprofen 10% in PLO 10% Place 1 g onto the skin 3 times daily as needed for moderate pain 30 g 0     FREESTYLE LITE TEST VI STRP Test twice a day 100 Strip 12      Social History   Substance Use Topics     Smoking status: Former Smoker     Quit date: 3/18/1979     Smokeless tobacco: Never Used      Comment: quit 1979     Alcohol use Yes      Comment: Twice a month        Reviewed and updated as needed this visit by clinical staff  Tobacco  Allergies  Meds  Med Hx  Surg Hx  Fam Hx  Soc Hx      Reviewed and updated as needed this visit by Provider         ROS:  No fever, chills, chest pain, cough    OBJECTIVE:     /60  Pulse 104  Temp 97.9  F (36.6  C) (Oral)  Ht 4' 11\" (1.499 m)  Wt 266 lb 6.4 oz (120.8 kg)  LMP 09/15/2007  SpO2 96%  BMI 53.81 kg/m2  Body mass index is 53.81 kg/(m^2).    Not examined.     Lab Results   Component Value Date    HGB 11.1 07/21/2017    HGB 11.2 06/21/2017    HGB 10.7 06/09/2017    HGB 8.7 05/11/2017    HGB 8.9 10/21/2016    HGB 9.1 10/20/2016    HGB 10.6 10/19/2016    HGB 13.8 12/22/2015            ASSESSMENT/PLAN:         1. Dyspnea, unspecified type  Mildly improved, evaluation shows no significant cardiac or pulmonary " cause. Likely this was primarily due to anemia at first and she devloped significant deconditioning which is now affecting her the most. Discussed slowly increasing activity. Approved to go back to work 1/2 time for 2 weeks, then full time. Discussed the WEL program and recommend she consider it.     2. Iron deficiency anemia, unspecified iron deficiency anemia type  Stable, continue iron a few times a week, recheck with next labs for diabetes.     Pari Wiley MD  [facility]      30 minutes spent with the patient, >50% of time spent counseling about test results, conditioning, WEL program, return to work.

## 2017-08-23 ENCOUNTER — TELEPHONE (OUTPATIENT)
Dept: INTERNAL MEDICINE | Facility: CLINIC | Age: 62
End: 2017-08-23

## 2017-08-23 NOTE — TELEPHONE ENCOUNTER
[facility]  Put in Dr. Wiley's in basket.

## 2017-08-31 ENCOUNTER — TELEPHONE (OUTPATIENT)
Dept: INTERNAL MEDICINE | Facility: CLINIC | Age: 62
End: 2017-08-31

## 2017-09-04 ENCOUNTER — MYC REFILL (OUTPATIENT)
Dept: INTERNAL MEDICINE | Facility: CLINIC | Age: 62
End: 2017-09-04

## 2017-09-04 DIAGNOSIS — G89.29 CHRONIC LOW BACK PAIN WITH SCIATICA, SCIATICA LATERALITY UNSPECIFIED, UNSPECIFIED BACK PAIN LATERALITY: ICD-10-CM

## 2017-09-04 DIAGNOSIS — M54.40 CHRONIC LOW BACK PAIN WITH SCIATICA, SCIATICA LATERALITY UNSPECIFIED, UNSPECIFIED BACK PAIN LATERALITY: ICD-10-CM

## 2017-09-05 RX ORDER — CYCLOBENZAPRINE HCL 10 MG
10 TABLET ORAL 3 TIMES DAILY PRN
Qty: 90 TABLET | Refills: 5 | Status: SHIPPED | OUTPATIENT
Start: 2017-09-05 | End: 2018-10-03

## 2017-09-05 NOTE — TELEPHONE ENCOUNTER
Message from [facility]:  Original authorizing provider: MARINA Donovna would like a refill of the following medications:  cyclobenzaprine (FLEXERIL) 10 MG tablet [Chelsea Cuello NP]    Preferred pharmacy: [facility] [address]    Comment:

## 2017-09-10 ENCOUNTER — MYC REFILL (OUTPATIENT)
Dept: INTERNAL MEDICINE | Facility: CLINIC | Age: 62
End: 2017-09-10

## 2017-09-10 DIAGNOSIS — F41.1 GENERALIZED ANXIETY DISORDER: ICD-10-CM

## 2017-09-10 NOTE — LETTER
[facility]    09/12/17    Patient: Nicole Reyes  YOB: 1955  Medical Record Number: [id number]                                                                  Controlled Substance Agreement  I understand that my care provider has prescribed controlled substances (narcotics, tranquilizers, and/or stimulants) to help manage my condition(s).  I am taking this medicine to help me function or work.  I know that this is strong medicine.  It could have serious side effects and even cause a dependency on the drug.  If I stop these medicines suddenly, I could have severe withdrawal symptoms.    The risks, benefits, and side effects of these medication(s) were explained to me.  I agree that:  1. I will take part in other treatments as advised by my provider.  This may be psychiatry or counseling, physical therapy, behavioral therapy, group treatment, or a referral to a pain clinic.  I will reduce or stop my medicine when my provider tells me to do so.   2. I will take my medicines as prescribed.  I will not change the dose or schedule unless my provider tells me to.  There will be no refills if I  run out early.   I may be contacted at any time without warning and asked to complete a drug test or pill count.   3. I will keep all my appointments at the clinic.  If I miss appointments or fail to follow instructions, my provider may stop my medicine.  4. I will not ask other providers to prescribe controlled substances. And I will not accept controlled substances from other people. If I need another prescribed controlled substance for a new reason, I will notify my provider within one business day.  5. If I enroll in the [place] Medical Marijuana program, I will tell my provider.  I will also sign an agreement to share my medical records with my provider.  6. I will use one pharmacy to fill all of my controlled substance prescriptions.  If my prescription is mailed to my pharmacy, it may  take 5 to 7 days for my medicine to be ready.  7. I understand that my provider, clinic care team, and pharmacy can track controlled substance prescriptions from other providers through a central database (prescription monitoring program).  8. I will bring in my list of medications (or my medicine bottles) each time I come to the clinic.  REV- 04/2016                                                                                                                                            Page 1 of 2      [facility]    09/12/17    Patient: Nicole Reyes  YOB: 1955  Medical Record Number: [id number]    9. Refills of controlled substances will be made only during office hours.  It is up to me to make sure that I do not run out of my medicines on weekends or holidays.    10. I am responsible for my prescriptions.  If the medicine is lost or stolen, it will not be replaced.   I also agree not to share these medicines with anyone.  11. I agree to not use ANY illegal or recreational drugs.  This includes marijuana, cocaine, bath salts or other drugs.  I agree not to use alcohol unless my provider says I may.  I agree to give urine samples whenever asked.  If I fail to give a urine sample, the provider may stop my medicine.     12. I will tell my nurse or provider right away if I become pregnant or have a new medical problem treated outside of [facility].  13. I understand that this medicine can affect my thinking and judgment.  It may be unsafe for me to drive, use machinery and do dangerous tasks.  I will not do any of these things until I know how the medicine affects me.  If my dose changes, I will wait to see how it affects me.  I will contact my provider if I have concerns about medicine side effects.  I understand that if I do not follow any of the conditions above, my prescriptions or treatment may be stopped.    I agree that my provider, clinic care team, and pharmacy may  work with any city, state or federal law enforcement agency that investigates the misuse, sale, or other diversion of my controlled medicine. I will allow my provider to discuss my care with or share a copy of this agreement with any other treating provider, pharmacy or emergency room where I receive care.  I agree to give up (waive) any right of privacy or confidentiality with respect to these authorizations.   I have read this agreement and have asked questions about anything I did not understand.   ___________________________________    ___________________________  Patient Signature                                                           Date and Time  ___________________________________     ____________________________  Witness                                                                            Date and Time  ___________________________________  Pari Wiley MD  REV-  04/2016                                                                                                                                                                 Page 2 of 2

## 2017-09-11 RX ORDER — CLONAZEPAM 1 MG/1
TABLET ORAL
Qty: 45 TABLET | Refills: 0 | Status: SHIPPED | OUTPATIENT
Start: 2017-09-11 | End: 2017-10-05

## 2017-09-11 NOTE — TELEPHONE ENCOUNTER
Pt calls back. She doesn't recall signing a CSA.     Will route to Dr Wiley as FYI so when pt schedules OV in the next month or so, she can sign one.

## 2017-09-11 NOTE — TELEPHONE ENCOUNTER
Message from [facility]:  Original authorizing provider: MD Nicole Chávez would like a refill of the following medications:  clonazePAM (KLONOPIN) 1 MG tablet [Pari Wiley MD]    Preferred pharmacy: [facility] [address]    Comment:

## 2017-09-11 NOTE — TELEPHONE ENCOUNTER
RX faxed to pharmacy. LM for Pt to call back, please advised MD message below.  Denia Carrasco MA

## 2017-09-19 ENCOUNTER — APPOINTMENT (OUTPATIENT)
Dept: GENERAL RADIOLOGY | Facility: CLINIC | Age: 62
DRG: 871 | End: 2017-09-19
Attending: EMERGENCY MEDICINE
Payer: COMMERCIAL

## 2017-09-19 ENCOUNTER — HOSPITAL ENCOUNTER (INPATIENT)
Facility: CLINIC | Age: 62
LOS: 4 days | Discharge: HOME | DRG: 871 | End: 2017-09-23
Attending: EMERGENCY MEDICINE | Admitting: HOSPITALIST
Payer: COMMERCIAL

## 2017-09-19 DIAGNOSIS — J96.01 ACUTE RESPIRATORY FAILURE WITH HYPOXIA (H): ICD-10-CM

## 2017-09-19 DIAGNOSIS — J18.9 PNEUMONIA DUE TO INFECTIOUS ORGANISM, UNSPECIFIED LATERALITY, UNSPECIFIED PART OF LUNG: ICD-10-CM

## 2017-09-19 DIAGNOSIS — A41.9 SEPTIC SHOCK (H): ICD-10-CM

## 2017-09-19 DIAGNOSIS — K08.89 PAIN, DENTAL: ICD-10-CM

## 2017-09-19 DIAGNOSIS — M54.6 ACUTE BILATERAL THORACIC BACK PAIN: Primary | ICD-10-CM

## 2017-09-19 DIAGNOSIS — R65.21 SEPTIC SHOCK (H): ICD-10-CM

## 2017-09-19 LAB
ANION GAP SERPL CALCULATED.3IONS-SCNC: 8 MMOL/L (ref 3–14)
BASOPHILS # BLD AUTO: 0 10E9/L (ref 0–0.2)
BASOPHILS NFR BLD AUTO: 0.2 %
BUN SERPL-MCNC: 10 MG/DL (ref 7–30)
CALCIUM SERPL-MCNC: 8.7 MG/DL (ref 8.5–10.1)
CHLORIDE SERPL-SCNC: 103 MMOL/L (ref 94–109)
CO2 SERPL-SCNC: 29 MMOL/L (ref 20–32)
CREAT SERPL-MCNC: 0.84 MG/DL (ref 0.52–1.04)
DIFFERENTIAL METHOD BLD: ABNORMAL
EOSINOPHIL # BLD AUTO: 0.4 10E9/L (ref 0–0.7)
EOSINOPHIL NFR BLD AUTO: 1.8 %
ERYTHROCYTE [DISTWIDTH] IN BLOOD BY AUTOMATED COUNT: 17.5 % (ref 10–15)
FLUAV+FLUBV RNA SPEC QL NAA+PROBE: NEGATIVE
FLUAV+FLUBV RNA SPEC QL NAA+PROBE: NEGATIVE
GFR SERPL CREATININE-BSD FRML MDRD: 68 ML/MIN/1.7M2
GLUCOSE BLDC GLUCOMTR-MCNC: 160 MG/DL (ref 70–99)
GLUCOSE SERPL-MCNC: 167 MG/DL (ref 70–99)
HCT VFR BLD AUTO: 39.7 % (ref 35–47)
HGB BLD-MCNC: 11.4 G/DL (ref 11.7–15.7)
IMM GRANULOCYTES # BLD: 0.1 10E9/L (ref 0–0.4)
IMM GRANULOCYTES NFR BLD: 0.5 %
LACTATE BLD-SCNC: 1.9 MMOL/L (ref 0.7–2)
LACTATE SERPL-SCNC: 3.3 MMOL/L (ref 0.4–2)
LACTATE SERPL-SCNC: 4.5 MMOL/L (ref 0.4–2)
LYMPHOCYTES # BLD AUTO: 2.1 10E9/L (ref 0.8–5.3)
LYMPHOCYTES NFR BLD AUTO: 9.6 %
MCH RBC QN AUTO: 21.6 PG (ref 26.5–33)
MCHC RBC AUTO-ENTMCNC: 28.7 G/DL (ref 31.5–36.5)
MCV RBC AUTO: 75 FL (ref 78–100)
MONOCYTES # BLD AUTO: 0.8 10E9/L (ref 0–1.3)
MONOCYTES NFR BLD AUTO: 3.5 %
NEUTROPHILS # BLD AUTO: 18.7 10E9/L (ref 1.6–8.3)
NEUTROPHILS NFR BLD AUTO: 84.4 %
NRBC # BLD AUTO: 0 10*3/UL
NRBC BLD AUTO-RTO: 0 /100
NT-PROBNP SERPL-MCNC: 433 PG/ML (ref 0–900)
PLATELET # BLD AUTO: 369 10E9/L (ref 150–450)
POTASSIUM SERPL-SCNC: 3.5 MMOL/L (ref 3.4–5.3)
RBC # BLD AUTO: 5.28 10E12/L (ref 3.8–5.2)
RSV RNA SPEC NAA+PROBE: NEGATIVE
SODIUM SERPL-SCNC: 140 MMOL/L (ref 133–144)
SPECIMEN SOURCE: NORMAL
TROPONIN I SERPL-MCNC: <0.015 UG/L (ref 0–0.04)
WBC # BLD AUTO: 22.2 10E9/L (ref 4–11)

## 2017-09-19 PROCEDURE — 84484 ASSAY OF TROPONIN QUANT: CPT | Performed by: EMERGENCY MEDICINE

## 2017-09-19 PROCEDURE — 25000128 H RX IP 250 OP 636: Performed by: EMERGENCY MEDICINE

## 2017-09-19 PROCEDURE — 96365 THER/PROPH/DIAG IV INF INIT: CPT

## 2017-09-19 PROCEDURE — 93005 ELECTROCARDIOGRAM TRACING: CPT

## 2017-09-19 PROCEDURE — 25000128 H RX IP 250 OP 636: Performed by: HOSPITALIST

## 2017-09-19 PROCEDURE — 87186 SC STD MICRODIL/AGAR DIL: CPT | Performed by: EMERGENCY MEDICINE

## 2017-09-19 PROCEDURE — 99223 1ST HOSP IP/OBS HIGH 75: CPT | Mod: AI | Performed by: HOSPITALIST

## 2017-09-19 PROCEDURE — 83880 ASSAY OF NATRIURETIC PEPTIDE: CPT | Performed by: EMERGENCY MEDICINE

## 2017-09-19 PROCEDURE — 83605 ASSAY OF LACTIC ACID: CPT | Performed by: EMERGENCY MEDICINE

## 2017-09-19 PROCEDURE — 85025 COMPLETE CBC W/AUTO DIFF WBC: CPT | Performed by: EMERGENCY MEDICINE

## 2017-09-19 PROCEDURE — 71020 XR CHEST 2 VW: CPT

## 2017-09-19 PROCEDURE — 87040 BLOOD CULTURE FOR BACTERIA: CPT | Performed by: EMERGENCY MEDICINE

## 2017-09-19 PROCEDURE — 12000007 ZZH R&B INTERMEDIATE

## 2017-09-19 PROCEDURE — 83605 ASSAY OF LACTIC ACID: CPT | Performed by: HOSPITALIST

## 2017-09-19 PROCEDURE — 36415 COLL VENOUS BLD VENIPUNCTURE: CPT | Performed by: EMERGENCY MEDICINE

## 2017-09-19 PROCEDURE — 00000146 ZZHCL STATISTIC GLUCOSE BY METER IP

## 2017-09-19 PROCEDURE — 87800 DETECT AGNT MULT DNA DIREC: CPT | Performed by: EMERGENCY MEDICINE

## 2017-09-19 PROCEDURE — 99285 EMERGENCY DEPT VISIT HI MDM: CPT | Mod: 25

## 2017-09-19 PROCEDURE — 80048 BASIC METABOLIC PNL TOTAL CA: CPT | Performed by: EMERGENCY MEDICINE

## 2017-09-19 PROCEDURE — 96366 THER/PROPH/DIAG IV INF ADDON: CPT

## 2017-09-19 PROCEDURE — 25000132 ZZH RX MED GY IP 250 OP 250 PS 637: Performed by: HOSPITALIST

## 2017-09-19 PROCEDURE — 87631 RESP VIRUS 3-5 TARGETS: CPT | Performed by: EMERGENCY MEDICINE

## 2017-09-19 PROCEDURE — 87077 CULTURE AEROBIC IDENTIFY: CPT | Performed by: EMERGENCY MEDICINE

## 2017-09-19 PROCEDURE — 82565 ASSAY OF CREATININE: CPT | Performed by: HOSPITALIST

## 2017-09-19 PROCEDURE — 96367 TX/PROPH/DG ADDL SEQ IV INF: CPT

## 2017-09-19 RX ORDER — AMOXICILLIN 250 MG
1-2 CAPSULE ORAL 2 TIMES DAILY PRN
Status: DISCONTINUED | OUTPATIENT
Start: 2017-09-19 | End: 2017-09-20

## 2017-09-19 RX ORDER — CLONAZEPAM 0.5 MG/1
0.5 TABLET ORAL AT BEDTIME
Status: DISCONTINUED | OUTPATIENT
Start: 2017-09-19 | End: 2017-09-23 | Stop reason: HOSPADM

## 2017-09-19 RX ORDER — ASPIRIN 81 MG/1
81 TABLET ORAL DAILY
COMMUNITY
End: 2022-10-03

## 2017-09-19 RX ORDER — SODIUM CHLORIDE 9 MG/ML
INJECTION, SOLUTION INTRAVENOUS CONTINUOUS
Status: DISCONTINUED | OUTPATIENT
Start: 2017-09-19 | End: 2017-09-21

## 2017-09-19 RX ORDER — ZOLPIDEM TARTRATE 5 MG/1
10 TABLET ORAL
Status: DISCONTINUED | OUTPATIENT
Start: 2017-09-19 | End: 2017-09-23 | Stop reason: HOSPADM

## 2017-09-19 RX ORDER — LEVOFLOXACIN 5 MG/ML
750 INJECTION, SOLUTION INTRAVENOUS ONCE
Status: DISCONTINUED | OUTPATIENT
Start: 2017-09-19 | End: 2017-09-19

## 2017-09-19 RX ORDER — LOSARTAN POTASSIUM 100 MG/1
100 TABLET ORAL DAILY
Status: DISCONTINUED | OUTPATIENT
Start: 2017-09-20 | End: 2017-09-23 | Stop reason: HOSPADM

## 2017-09-19 RX ORDER — DEXTROSE MONOHYDRATE 25 G/50ML
25-50 INJECTION, SOLUTION INTRAVENOUS
Status: DISCONTINUED | OUTPATIENT
Start: 2017-09-19 | End: 2017-09-23 | Stop reason: HOSPADM

## 2017-09-19 RX ORDER — CLONAZEPAM 1 MG/1
1 TABLET ORAL EVERY MORNING
Status: DISCONTINUED | OUTPATIENT
Start: 2017-09-20 | End: 2017-09-22

## 2017-09-19 RX ORDER — METFORMIN HYDROCHLORIDE 750 MG/1
750 TABLET, EXTENDED RELEASE ORAL
COMMUNITY
End: 2018-02-08

## 2017-09-19 RX ORDER — ASCORBIC ACID 500 MG
1 TABLET ORAL EVERY OTHER DAY
COMMUNITY
End: 2019-04-11

## 2017-09-19 RX ORDER — ACETAMINOPHEN 325 MG/1
650 TABLET ORAL EVERY 4 HOURS PRN
Status: DISCONTINUED | OUTPATIENT
Start: 2017-09-19 | End: 2017-09-23 | Stop reason: HOSPADM

## 2017-09-19 RX ORDER — SERTRALINE HYDROCHLORIDE 100 MG/1
200 TABLET, FILM COATED ORAL DAILY
Status: DISCONTINUED | OUTPATIENT
Start: 2017-09-20 | End: 2017-09-23 | Stop reason: HOSPADM

## 2017-09-19 RX ORDER — LEVOFLOXACIN 5 MG/ML
500 INJECTION, SOLUTION INTRAVENOUS EVERY 24 HOURS
Status: DISCONTINUED | OUTPATIENT
Start: 2017-09-20 | End: 2017-09-23 | Stop reason: HOSPADM

## 2017-09-19 RX ORDER — ONDANSETRON 2 MG/ML
4 INJECTION INTRAMUSCULAR; INTRAVENOUS EVERY 6 HOURS PRN
Status: DISCONTINUED | OUTPATIENT
Start: 2017-09-19 | End: 2017-09-23 | Stop reason: HOSPADM

## 2017-09-19 RX ORDER — LEVOFLOXACIN 5 MG/ML
750 INJECTION, SOLUTION INTRAVENOUS ONCE
Status: COMPLETED | OUTPATIENT
Start: 2017-09-19 | End: 2017-09-19

## 2017-09-19 RX ORDER — OXYCODONE HYDROCHLORIDE 5 MG/1
5-10 TABLET ORAL
Status: DISCONTINUED | OUTPATIENT
Start: 2017-09-19 | End: 2017-09-23 | Stop reason: HOSPADM

## 2017-09-19 RX ORDER — SIMVASTATIN 20 MG
20 TABLET ORAL AT BEDTIME
Status: DISCONTINUED | OUTPATIENT
Start: 2017-09-19 | End: 2017-09-23 | Stop reason: HOSPADM

## 2017-09-19 RX ORDER — NALOXONE HYDROCHLORIDE 0.4 MG/ML
.1-.4 INJECTION, SOLUTION INTRAMUSCULAR; INTRAVENOUS; SUBCUTANEOUS
Status: DISCONTINUED | OUTPATIENT
Start: 2017-09-19 | End: 2017-09-23 | Stop reason: HOSPADM

## 2017-09-19 RX ORDER — NICOTINE POLACRILEX 4 MG
15-30 LOZENGE BUCCAL
Status: DISCONTINUED | OUTPATIENT
Start: 2017-09-19 | End: 2017-09-23 | Stop reason: HOSPADM

## 2017-09-19 RX ORDER — ONDANSETRON 4 MG/1
4 TABLET, ORALLY DISINTEGRATING ORAL EVERY 6 HOURS PRN
Status: DISCONTINUED | OUTPATIENT
Start: 2017-09-19 | End: 2017-09-23 | Stop reason: HOSPADM

## 2017-09-19 RX ORDER — GABAPENTIN 300 MG/1
300 CAPSULE ORAL EVERY EVENING
Status: DISCONTINUED | OUTPATIENT
Start: 2017-09-19 | End: 2017-09-23 | Stop reason: HOSPADM

## 2017-09-19 RX ADMIN — SODIUM CHLORIDE, PRESERVATIVE FREE: 5 INJECTION INTRAVENOUS at 20:35

## 2017-09-19 RX ADMIN — VANCOMYCIN HYDROCHLORIDE 2500 MG: 1 INJECTION, POWDER, LYOPHILIZED, FOR SOLUTION INTRAVENOUS at 18:15

## 2017-09-19 RX ADMIN — ENOXAPARIN SODIUM 40 MG: 40 INJECTION SUBCUTANEOUS at 20:06

## 2017-09-19 RX ADMIN — GABAPENTIN 300 MG: 300 CAPSULE ORAL at 20:06

## 2017-09-19 RX ADMIN — ACETAMINOPHEN 650 MG: 325 TABLET, FILM COATED ORAL at 20:06

## 2017-09-19 RX ADMIN — AMITRIPTYLINE HYDROCHLORIDE 75 MG: 25 TABLET, FILM COATED ORAL at 21:42

## 2017-09-19 RX ADMIN — LEVOFLOXACIN 750 MG: 5 INJECTION, SOLUTION INTRAVENOUS at 16:53

## 2017-09-19 RX ADMIN — CEFEPIME HYDROCHLORIDE 2 G: 2 INJECTION, POWDER, FOR SOLUTION INTRAVENOUS at 16:16

## 2017-09-19 RX ADMIN — SIMVASTATIN 20 MG: 20 TABLET, FILM COATED ORAL at 21:43

## 2017-09-19 RX ADMIN — CLONAZEPAM 0.5 MG: 0.5 TABLET ORAL at 21:43

## 2017-09-19 RX ADMIN — SODIUM CHLORIDE, POTASSIUM CHLORIDE, SODIUM LACTATE AND CALCIUM CHLORIDE 2000 ML: 600; 310; 30; 20 INJECTION, SOLUTION INTRAVENOUS at 16:12

## 2017-09-19 ASSESSMENT — ENCOUNTER SYMPTOMS
SHORTNESS OF BREATH: 1
FEVER: 0
COUGH: 1

## 2017-09-19 ASSESSMENT — ACTIVITIES OF DAILY LIVING (ADL)
RETIRED_EATING: 0-->INDEPENDENT
TRANSFERRING: 0-->INDEPENDENT
TOILETING: 0-->INDEPENDENT
BATHING: 0-->INDEPENDENT
DRESS: 0-->INDEPENDENT
FALL_HISTORY_WITHIN_LAST_SIX_MONTHS: NO
AMBULATION: 0-->INDEPENDENT
RETIRED_COMMUNICATION: 0-->UNDERSTANDS/COMMUNICATES WITHOUT DIFFICULTY
COGNITION: 0 - NO COGNITION ISSUES REPORTED
SWALLOWING: 0-->SWALLOWS FOODS/LIQUIDS WITHOUT DIFFICULTY

## 2017-09-19 NOTE — IP AVS SNAPSHOT
MRN:[id number]                      After Visit Summary   9/19/2017    Nicole Reyes    MRN: [id number]           Thank you!     Thank you for choosing [facility] for your care. Our goal is always to provide you with excellent care. Hearing back from our patients is one way we can continue to improve our services. Please take a few minutes to complete the written survey that you may receive in the mail after you visit. If you would like to speak to someone directly about your visit please contact Patient Relations at [phone]. Thank you!          Patient Information     Date Of Birth          1955        Designated Caregiver       Most Recent Value    Caregiver    Will someone help with your care after discharge? no      About your hospital stay     You were admitted on:  September 19, 2017 You last received care in the:  [place] [address] [facility]    You were discharged on:  September 23, 2017        Reason for your hospital stay       You were hospitalized for pneumonia.                  Who to Call     For medical emergencies, please call 911.  For non-urgent questions about your medical care, please call your primary care provider or clinic, [phone]          Attending Provider     Provider Specialty    Ruben Villatoro MD Emergency Medicine    [facility]       Primary Care Provider Office Phone # Fax #    Pari Wiley [phone]      After Care Instructions     Activity       Your activity upon discharge: activity as tolerated            Diet       Follow this diet upon discharge: Orders Placed This Encounter      Moderate Consistent CHO Diet            Oxygen Adult       [facility] Order 3 liter(s) by nasal cannula continuously with use of portable tank. Expected treatment length is 1 months.. Test on conserving device as applicable.    Patients who qualify for home O2 coverage under the CMS  guidelines require ABG tests or O2 sat readings obtained closest to, but no earlier than 2 days prior to the discharge, as evidence of the need for home oxygen therapy. Testing must be performed while patient is in the chronic stable state. See notes for O2 sats.    I certify that this patient, Nicole Reyes has been under my care and that I, or a nurse practitioner or physician's assistant working with me, had a face-to-face encounter that meets the face-to-face encounter requirements with this patient on 9/23/2017. The patient, Nicole Reyes was evaluated or treated in whole, or in part, for the following medical condition, which necessitates the use of the ordered oxygen. Treatment Diagnosis: hypoxic respiratory failure due to pneumonia    Attending Provider: Susan Guaman  Physician signature: See electronic signature associated with these discharge orders  Date of Order: September 23, 2017                  Follow-up Appointments     Follow-up and recommended labs and tests        Follow up with primary care provider, Pari Wiley, within 7 days for hospital follow- up.  The following labs/tests are recommended: oxygen assessment.                  Further instructions from your care team       1. Follow up with your gastroenterologist regarding your GERD and repeat pneumonia.  2. Follow up with your primary care doctor to see when the oxygen can be stopped.    Oxygen Provider:  Arranged through [employer], contact number [phone]. Follow up call will occur within 3 business days, where home visit needs will be assessed. If you have any questions or concerns please call the oxygen company directly.      Pending Results     Date and Time Order Name Status Description    9/20/2017 1959 Blood culture Preliminary     9/20/2017 1959 Blood culture Preliminary     9/19/2017 1509 Blood culture Preliminary             Statement of Approval     Ordered          09/23/17 1127  I have  "reviewed and agree with all the recommendations and orders detailed in this document.  EFFECTIVE NOW     Approved and electronically signed by:  Susan Guaman MD             Admission Information     Date & Time Provider Department Dept. Phone    9/19/2017 Mili Mills MD [place] [address] [facility] [phone]      Your Vitals Were     Blood Pressure Pulse Temperature Respirations Height Weight    130/59 (BP Location: Right arm) 89 97.3  F (36.3  C) (Oral) 20 1.549 m (5' 1\") 129.3 kg (285 lb)    Last Period Pulse Oximetry BMI (Body Mass Index)             09/15/2007 96% 53.85 kg/m2         [place] Information     [employer] gives you secure access to your electronic health record. If you see a primary care provider, you can also send messages to your care team and make appointments. If you have questions, please call your primary care clinic.  If you do not have a primary care provider, please call [phone] and they will assist you.        Care EveryWhere ID     This is your Care EveryWhere ID. This could be used by other organizations to access your [place] medical records  [id number]        Equal Access to Services     JUDI ARELLANO : Hadii paddy Champion, wanighatda nat, qaybta kaalmada brandi, derik fernandez. So [facility] [phone].    ATENCIÓN: Si habla español, tiene a tovar disposición servicios gratuitos de asistencia lingüística. Llame al [phone].    We comply with applicable federal civil rights laws and [place] laws. We do not discriminate on the basis of race, color, national origin, age, disability sex, sexual orientation or gender identity.               Review of your medicines      START taking        Dose / Directions    levofloxacin 500 MG tablet   Commonly known as:  LEVAQUIN   Used for:  Pneumonia due to infectious organism, unspecified laterality, unspecified part of lung        Dose:  500 mg   Take 1 tablet (500 mg) by mouth " every evening   Quantity:  3 tablet   Refills:  0         CONTINUE these medicines which may have CHANGED, or have new prescriptions. If we are uncertain of the size of tablets/capsules you have at home, strength may be listed as something that might have changed.        Dose / Directions    oxyCODONE 5 MG IR tablet   Commonly known as:  ROXICODONE   This may have changed:    - how much to take  - how to take this  - when to take this  - reasons to take this  - additional instructions   Used for:  Acute bilateral thoracic back pain        Dose:  5 mg   Take 1 tablet (5 mg) by mouth every 6 hours as needed for moderate to severe pain   Quantity:  10 tablet   Refills:  0         CONTINUE these medicines which have NOT CHANGED        Dose / Directions    amitriptyline 50 MG tablet   Commonly known as:  ELAVIL   Used for:  Other insomnia        Take one and a half tabs every night at bedtime   Quantity:  135 tablet   Refills:  3       ascorbic acid 500 MG Tabs        Dose:  1 tablet   Take 1 tablet by mouth every other day with iron capsule   Refills:  0       ASPIRIN EC PO        Dose:  81 mg   Take 81 mg by mouth every evening   Refills:  0       clonazePAM 1 MG tablet   Commonly known as:  klonoPIN   Used for:  Generalized anxiety disorder        1 tab po qam, 1/2 tab po qpm   Quantity:  45 tablet   Refills:  0       cyclobenzaprine 10 MG tablet   Commonly known as:  FLEXERIL   Used for:  Chronic low back pain with sciatica, sciatica laterality unspecified, unspecified back pain laterality        Dose:  10 mg   Take 1 tablet (10 mg) by mouth 3 times daily as needed for muscle spasms   Quantity:  90 tablet   Refills:  5       FREESTYLE LITE test strip   Used for:  Type 2 diabetes, HbA1c goal < 7% (H)   Generic drug:  blood glucose monitoring        Test twice a day   Quantity:  100 Strip   Refills:  12       gabapentin 300 MG capsule   Commonly known as:  NEURONTIN   Used for:  Restless legs syndrome (RLS), Persistent  insomnia        Dose:  300 mg   Take 1 capsule (300 mg) by mouth daily   Quantity:  90 capsule   Refills:  3       ibuprofen 600 MG tablet   Commonly known as:  ADVIL/MOTRIN        Dose:  600 mg   Take 1 tablet (600 mg) by mouth every 6 hours as needed for moderate pain   Quantity:  30 tablet   Refills:  1       Iron Polysacch Jwsja-A59--0.025-1 MG Caps        Dose:  1 capsule   Take 1 capsule by mouth every other day   Refills:  0       losartan 100 MG tablet   Commonly known as:  COZAAR   Used for:  Essential hypertension, benign        Dose:  100 mg   Take 1 tablet (100 mg) by mouth daily   Quantity:  90 tablet   Refills:  1       metFORMIN 750 MG 24 hr tablet   Commonly known as:  GLUCOPHAGE-XR        Dose:  750 mg   Take 750 mg by mouth daily (with breakfast)   Refills:  0       Multi-vitamin Tabs tablet   Generic drug:  multivitamin, therapeutic with minerals        Dose:  1 tablet   Take 1 tablet by mouth At Bedtime   Refills:  0       OMEGA-3 FISH OIL PO        Dose:  2 g   Take 2 g by mouth once a week at bedtime   Refills:  0       pantoprazole 40 MG EC tablet   Commonly known as:  PROTONIX   Used for:  Gastroesophageal reflux disease without esophagitis        Dose:  40 mg   Take 1 tablet (40 mg) by mouth At Bedtime   Quantity:  90 tablet   Refills:  3       sertraline 100 MG tablet   Commonly known as:  ZOLOFT   Used for:  Generalized anxiety disorder, Major depressive disorder, recurrent episode, moderate (H)        Dose:  200 mg   Take 2 tablets (200 mg) by mouth daily Increased dose. Can use up tablets at home first.   Quantity:  180 tablet   Refills:  1       simvastatin 20 MG tablet   Commonly known as:  ZOCOR   Used for:  Hyperlipidemia LDL goal <70        Dose:  20 mg   Take 1 tablet (20 mg) by mouth At Bedtime   Quantity:  90 tablet   Refills:  3       zolpidem 10 MG tablet   Commonly known as:  AMBIEN   Used for:  Insomnia, unspecified type        Dose:  10 mg   Take 1 tablet (10 mg) by  mouth nightly as needed for sleep   Quantity:  30 tablet   Refills:  5            Where to get your medicines      These medications were sent to [place], [place] - [address] [facility]  [address] [facility] [address]     Phone:  [phone]     levofloxacin 500 MG tablet         Some of these will need a paper prescription and others can be bought over the counter. Ask your nurse if you have questions.     Bring a paper prescription for each of these medications     oxyCODONE 5 MG IR tablet               ANTIBIOTIC INSTRUCTION     You've Been Prescribed an Antibiotic - Now What?  Your healthcare team thinks that you or your loved one might have an infection. Some infections can be treated with antibiotics, which are powerful, life-saving drugs. Like all medications, antibiotics have side effects and should only be used when necessary. There are some important things you should know about your antibiotic treatment.      Your healthcare team may run tests before you start taking an antibiotic.    Your team may take samples (e.g., from your blood, urine or other areas) to run tests to look for bacteria. These test can be important to determine if you need an antibiotic at all and, if you do, which antibiotic will work best.      Within a few days, your healthcare team might change or even stop your antibiotic.    Your team may start you on an antibiotic while they are working to find out what is making you sick.    Your team might change your antibiotic because test results show that a different antibiotic would be better to treat your infection.    In some cases, once your team has more information, they learn that you do not need an antibiotic at all. They may find out that you don't have an infection, or that the antibiotic you're taking won't work against your infection. For example, an infection caused by a virus can't be treated with antibiotics. Staying on an  antibiotic when you don't need it is more likely to be harmful than helpful.      You may experience side effects from your antibiotic.    Like all medications, antibiotics have side effects. Some of these can be serious.    Let you healthcare team know if you have any known allergies when you are admitted to the hospital.    One significant side effect of nearly all antibiotics is the risk of severe and sometimes deadly diarrhea caused by Clostridium difficile (C. Difficile). This occurs when a person takes antibiotics because some good germs are destroyed. Antibiotic use allows C. diificile to take over, putting patients at high risk for this serious infection.    As a patient or caregiver, it is important to understand your or your loved one's antibiotic treatment. It is especially important for caregivers to speak up when patients can't speak for themselves. Here are some important questions to ask your healthcare team.    What infection is this antibiotic treating and how do you know I have that infection?    What side effects might occur from this antibiotic?    How long will I need to take this antibiotic?    Is it safe to take this antibiotic with other medications or supplements (e.g., vitamins) that I am taking?     Are there any special directions I need to know about taking this antibiotic? For example, should I take it with food?    How will I be monitored to know whether my infection is responding to the antibiotic?    What tests may help to make sure the right antibiotic is prescribed for me?      Information provided by:  www.cdc.gov/getsmart  U.S. Department of Health and Human Services  Centers for disease Control and Prevention  National Center for Emerging and Zoonotic Infectious Diseases  Division of Healthcare Quality Promotion         Protect others around you: Learn how to safely use, store and throw away your medicines at www.disposemymeds.org.             Medication List: This is a list of  all your medications and when to take them. Check marks below indicate your daily home schedule. Keep this list as a reference.      Medications           Morning Afternoon Evening Bedtime As Needed    amitriptyline 50 MG tablet   Commonly known as:  ELAVIL   Take one and a half tabs every night at bedtime   Last time this was given:  75 mg on 9/22/2017  9:31 PM                                   ascorbic acid 500 MG Tabs   Take 1 tablet by mouth every other day with iron capsule   Last time this was given:  500 mg on 9/22/2017  8:09 AM                                ASPIRIN EC PO   Take 81 mg by mouth every evening                                   clonazePAM 1 MG tablet   Commonly known as:  klonoPIN   1 tab po qam, 1/2 tab po qpm   Last time this was given:  0.5 mg on 9/22/2017  9:31 PM                                   cyclobenzaprine 10 MG tablet   Commonly known as:  FLEXERIL   Take 1 tablet (10 mg) by mouth 3 times daily as needed for muscle spasms                                   FREESTYLE LITE test strip   Test twice a day   Generic drug:  blood glucose monitoring                                gabapentin 300 MG capsule   Commonly known as:  NEURONTIN   Take 1 capsule (300 mg) by mouth daily   Last time this was given:  300 mg on 9/22/2017  9:31 PM                                   ibuprofen 600 MG tablet   Commonly known as:  ADVIL/MOTRIN   Take 1 tablet (600 mg) by mouth every 6 hours as needed for moderate pain                                   Iron Polysacch Vwpbo-Q36--0.025-1 MG Caps   Take 1 capsule by mouth every other day                                levofloxacin 500 MG tablet   Commonly known as:  LEVAQUIN   Take 1 tablet (500 mg) by mouth every evening                                   losartan 100 MG tablet   Commonly known as:  COZAAR   Take 1 tablet (100 mg) by mouth daily   Last time this was given:  100 mg on 9/23/2017  9:43 AM                                metFORMIN 750 MG 24 hr  tablet   Commonly known as:  GLUCOPHAGE-XR   Take 750 mg by mouth daily (with breakfast)   Last time this was given:  750 mg on 9/23/2017  9:43 AM                                   Multi-vitamin Tabs tablet   Take 1 tablet by mouth At Bedtime   Generic drug:  multivitamin, therapeutic with minerals                                   OMEGA-3 FISH OIL PO   Take 2 g by mouth once a week at bedtime                                   oxyCODONE 5 MG IR tablet   Commonly known as:  ROXICODONE   Take 1 tablet (5 mg) by mouth every 6 hours as needed for moderate to severe pain   Last time this was given:  10 mg on 9/22/2017  4:06 PM                                   pantoprazole 40 MG EC tablet   Commonly known as:  PROTONIX   Take 1 tablet (40 mg) by mouth At Bedtime   Last time this was given:  40 mg on 9/23/2017  9:43 AM                                   sertraline 100 MG tablet   Commonly known as:  ZOLOFT   Take 2 tablets (200 mg) by mouth daily Increased dose. Can use up tablets at home first.   Last time this was given:  200 mg on 9/23/2017  9:43 AM                                   simvastatin 20 MG tablet   Commonly known as:  ZOCOR   Take 1 tablet (20 mg) by mouth At Bedtime   Last time this was given:  20 mg on 9/22/2017  9:31 PM                                   zolpidem 10 MG tablet   Commonly known as:  AMBIEN   Take 1 tablet (10 mg) by mouth nightly as needed for sleep   Last time this was given:  10 mg on 9/22/2017 11:04 PM                                             More Information        Pneumonia (Adult)  Pneumonia is an infection deep within the lungs. It is in the small air sacs (alveoli). Pneumonia may be caused by a virus or bacteria. Pneumonia caused by bacteria is usually treated with an antibiotic. Severe cases may need to be treated in the hospital. Milder cases can be treated at home. Symptoms usually start to get better during the first 2 days of treatment.    Home care  Follow these guidelines  when caring for yourself at home:    Rest at home for the first 2 to 3 days, or until you feel stronger. Don t let yourself get overly tired when you go back to your activities.    Stay away from cigarette smoke - yours or other people s.    You may use acetaminophen or ibuprofen to control fever or pain, unless another medicine was prescribed. If you have chronic liver or kidney disease, talk with your healthcare provider before using these medicines. Also talk with your provider if you ve had a stomach ulcer or gastrointestinal bleeding. Don t give aspirin to anyone younger than 18 years of age who is ill with a fever. It may cause severe liver damage.    Your appetite may be poor, so a light diet is fine.    Drink 6 to 8 glasses of fluids every day to make sure you are getting enough fluids. Beverages can include water, sport drinks, sodas without caffeine, juices, tea, or soup. Fluids will help loosen secretions in the lung. This will make it easier for you to cough up the phlegm (sputum). If you also have heart or kidney disease, check with your healthcare provider before you drink extra fluids.    Take antibiotic medicine prescribed until it is all gone, even if you are feeling better after a few days.  Follow-up care  Follow up with your healthcare provider in the next 2 to 3 days, or as advised. This is to be sure the medicine is helping you get better.  If you are 65 or older, you should get a pneumococcal vaccine and a yearly flu (influenza) shot. You should also get these vaccines if you have chronic lung disease like asthma, emphysema, or COPD. Recently, a second type of pneumonia vaccine has become available for everyone over 65 years old. This is in addition to the previous vaccine. Ask your provider about this.  When to seek medical advice  Call your healthcare provider right away if any of these occur:    You don t get better within the first 48 hours of treatment    Shortness of breath gets  worse    Rapid breathing (more than 25 breaths per minute)    Coughing up blood    Chest pain gets worse with breathing    Fever of 100.4 F (38 C) or higher that doesn t get better with fever medicine    Weakness, dizziness, or fainting that gets worse    Thirst or dry mouth that gets worse    Sinus pain, headache, or a stiff neck    Chest pain not caused by coughing  Date Last Reviewed: 1/1/2017 2000-2017 [employer]. [address] [place] [address], [place] [address]. All rights reserved. This information is not intended as a substitute for professional medical care. Always follow your healthcare professional's instructions.

## 2017-09-19 NOTE — ED NOTES
Patient presents today for evaluation of cough for 2-3 days, increased SOB that was worsened when coming into to work. Is visibly shaking but denies fever. Denies chest pain but reports mild nausea. Family has been ill with similar symptoms. AOX4

## 2017-09-19 NOTE — ED NOTES
.  [facility]  ED Nurse Handoff Report    Nicole Reyes is a 61 year old female   ED Chief complaint: Cough  . ED Diagnosis:   Final diagnoses:   Septic shock (H)   HCAP (healthcare-associated pneumonia)   Acute respiratory failure with hypoxia (H)     Allergies:   Allergies   Allergen Reactions     Codeine Rash     Penicillins Rash     Pt has tolerated cephalosporins and penems.        Code Status: Full Code  Activity level - Baseline/Home:  Independent. Activity Level - Current:   Stand with Assist. Lift room needed: No. Bariatric: No   Needed: No   Isolation: No. Infection: Not Applicable  Influenza Pending.     Vital Signs:   Vitals:    09/19/17 1530 09/19/17 1545 09/19/17 1550 09/19/17 1655   BP: (!) 196/99 171/82 167/81 149/75   Resp:    29   Temp:       SpO2: 97%  93% 93%       Cardiac Rhythm:  ,      Pain level:    Patient confused: No. Patient Falls Risk: Yes.   Elimination Status: Has not voided yet    Patient Report - Initial Complaint: Cough. Focused Assessment: Nicole Reyes is a 61 year old female who presents with a cough. The patient states that two days ago she began having a cough and feeling generally unwell. She called in sick from work yesterday and has been feeling progressively worse recently. Her  brought her to the ER today due to increasing shortness of breath and worsening of the cough. Her O2 saturation was in the 60's upon arrival. Here in the ER she reports being extremely cold but denies having recorded any fevers. Of note her children at home are also sick and coughing as well. The patient does note having had pneumonia once in the past but denies having any chronic respiratory issues or using any inhalers. Of note the patient works in healthcare.  Tests Performed: .  Labs Ordered and Resulted from Time of ED Arrival Up to the Time of Departure from the ED   CBC WITH PLATELETS DIFFERENTIAL - Abnormal; Notable for the following:        Result  Value    WBC 22.2 (*)     RBC Count 5.28 (*)     Hemoglobin 11.4 (*)     MCV 75 (*)     MCH 21.6 (*)     MCHC 28.7 (*)     RDW 17.5 (*)     Absolute Neutrophil 18.7 (*)     All other components within normal limits   BASIC METABOLIC PANEL - Abnormal; Notable for the following:     Glucose 167 (*)     All other components within normal limits   LACTIC ACID - Abnormal; Notable for the following:     Lactic Acid 4.5 (*)     All other components within normal limits   LACTIC ACID - Abnormal; Notable for the following:     Lactic Acid 3.3 (*)     All other components within normal limits   TROPONIN I   NT PROBNP INPATIENT   CARDIAC CONTINUOUS MONITORING   CARDIAC CONTINUOUS MONITORING   INFLUENZA A AND B AND RSV PCR   BLOOD CULTURE   BLOOD CULTURE      Abnormal Results: Lactic level, WBC  Treatments provided: IV antibiotics, Bolus LR, Oxygen @ 4L Nasal Cannula   Family Comments: Not present   OBS brochure/video discussed/provided to patient:  No  ED Medications:   Medications   vancomycin (VANCOCIN) 2,500 mg in NaCl 0.9 % 500 mL intermittent infusion (not administered)   levofloxacin (LEVAQUIN) infusion 750 mg (750 mg Intravenous New Bag 9/19/17 1653)   lactated ringers BOLUS 2,000 mL (2,000 mLs Intravenous New Bag 9/19/17 1612)   ceFEPIme (MAXIPIME) 2 g vial to attach to  ml bag for ADULTS or 50 ml bag for PEDS (0 g Intravenous Stopped 9/19/17 1653)     Drips infusing:  Yes  For the majority of the shift, the patient's behavior Green. Interventions performed were NA.     Severe Sepsis OR Septic Shock Diagnosis Present:   Yes    Per the ED Provider, Time Zero for severe sepsis or septic shock is:  1515    3 Hour Severe Sepsis Bundle Completion:  1. Initial Lactic Acid Result:   Recent Labs   Lab Test  09/19/17   1710  09/19/17   1515  10/19/16   1850   LACT  3.3*  4.5*  1.2     2. Blood Cultures before Antibiotics: Yes  3. Broad Spectrum Antibiotics Administered:     Anti-infectives (Future)    Start     Dose/Rate  Route Frequency Ordered Stop    09/19/17 1610  levofloxacin (LEVAQUIN) infusion 750 mg      750 mg  100 mL/hr over 90 Minutes Intravenous ONCE 09/19/17 1609      09/19/17 1603  vancomycin (VANCOCIN) 2,500 mg in NaCl 0.9 % 500 mL intermittent infusion      2,500 mg Intravenous ONCE 09/19/17 1603          4. 1500 ml of IV LR fluids have been given so far      6 Hour Severe Sepsis Bundle Completion:    1. Repeat Lactic Acid Level:   Last result   Lab Results   Component Value Date    LACT 3.3 (H) 09/19/2017       2. Patient currently on Vasopressors =  No        ED Nurse Name/Phone Number: Lisa Mello,   5:26 PM      RECEIVING UNIT ED HANDOFF REVIEW    Above ED Nurse Handoff Report was reviewed: Yes  Reviewed by: ISABEL RUBIO on September 19, 2017 at 7:03 PM

## 2017-09-19 NOTE — H&P
[facility]  Hospitalist Admission Note  Name: Nicole Reyes    MRN: [id number]  YOB: 1955    Age: 61 year old  Date of admission: 9/19/2017              Brief patient summary: Pt is a 61 yr old female with h/o CRISTÓBAL, DM2, recurrent PNAs (once a year), iron deficiency anemia who presented on 9/19/2017 with SOB, chills and was found to have RUL PNA          Assessment and Plan:   Acute hypoxic resp failure , sepsis (HR >110, Lactic acid 4.5, WBC 22K) 2/2 RUL and RML PNA: pt has h/o recurrent PNAs (atleast 1/yr)-looking through the chart she has a large hiatal hernia and severe reflux disease-I suspect her PNA is aspiration in nature. She recalls having bad reflux 4 days ago in the middle of the night, induced vomiting and started coughing afterwards. Her symptoms of chills, fatigue and SOB started the next day. Although she reports exposure to sick children I suspect her infection is bacterial but will r/o influenza. She was requiring 10 L of oxygen on admission with sats in 60s- now weaned down to 5 L.   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 Also advised to lose weight.   --influenza PCR pending     Lactic acidosis: 2/2 sepsis and hypoxic resp failure. Cont IVF and hold metformin     Iron deficiency anemia: due to chronic GIB from NSAID use, Hb stable. Cont protonix PO    DM2: Patient's metformin will be held for now given her lactic acidosis.  We will use sliding scale with high intensity.    Generalized anxiety, RLS.  Resume patient's baseline home medications including Klonopin, amitriptyline, Zoloft.       CRISTÓBAL .  Patient reports she is not compliant with the BiPAP machine at home. Will hold off bipap given concern for aspiration PNA     HTN .  Resume the patient's losartan with hold parameters.     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 She initially thought she contracted cold from her grandchildren she was watching over the weekend. Upon further questioning-she reports having bad reflux on Saturday night, she had to get up in the middle of the night and induce vomiting due to having nausea and burning. After inducing vomiting she had a lot of cough but it eventually settled down. Next morning she started feeling chills and SOB which has been progressive.  She works as a H[employer] at [employer] and came to work this morning but due to severe myalgias and SOB -came in to the ER for evaluation.              Past Medical History:     Patient Active Problem List   Diagnosis     Essential hypertension, benign     Anemia, iron deficiency     Restless leg syndrome     CRISTÓBAL (obstructive sleep apnea)     CKD (chronic kidney disease) stage 3, GFR 30-59 ml/min     Advanced directives, counseling/discussion     GENERALIZED ANXIETY DIS     Major depressive disorder, recurrent episode, moderate (H)     Health Care Home     GERD (gastroesophageal reflux disease)     Hyperlipidemia LDL goal <70     Eczema     Type 2 diabetes mellitus with stage 3 chronic kidney disease (HCC)     Midline low back pain with left-sided sciatica     Morbid obesity (HCC)     Insomnia, unspecified type     Dyspnea, unspecified type      Past Medical History:   Diagnosis Date     CKD (chronic kidney disease) stage 3, GFR 30-59 ml/min       Essential hypertension, benign      Generalized anxiety disorder      Hernia, abdominal      Hypertension      Insomnia, unspecified      Iron deficiency anemia      Major depression     sees a psychiatrist     Menopause     age 53     Obesity, unspecified      CRISTÓBAL (obstructive sleep apnea)     bipap     Postoperative seroma 10/11    drained several times     Pure hypercholesterolemia      RLS (restless legs syndrome)      Type II or unspecified type diabetes mellitus without mention of complication, not stated as uncontrolled                 Past Surgical History:     Past Surgical History:   Procedure Laterality Date     BREAST BIOPSY, RT/LT      2 times; benign     C NONSPECIFIC PROCEDURE      Hernia repair      C NONSPECIFIC PROCEDURE      Lymph node biopsy in neck (benign)       SECTION        SECTION        SECTION       DILATION AND CURETTAGE, HYSTEROSCOPY DIAGNOSTIC, COMBINED  3/22/2013    Procedure: COMBINED DILATION AND CURETTAGE, HYSTEROSCOPY DIAGNOSTIC;  DILATION AND CURETTAGE, HYSTEROSCOPY DIAGNOSTIC   Converted to a general;  Surgeon: Robi Edwards MD;  Location:  OR     ESOPHAGOSCOPY, GASTROSCOPY, DUODENOSCOPY (EGD), COMBINED N/A 2015    Procedure: COMBINED ESOPHAGOSCOPY, GASTROSCOPY, DUODENOSCOPY (EGD);  Surgeon: Obinna Gutierrez MD;  Location:  GI     ESOPHAGOSCOPY, GASTROSCOPY, DUODENOSCOPY (EGD), COMBINED N/A 2015    Procedure: COMBINED ENDOSCOPIC ULTRASOUND, ESOPHAGOSCOPY, GASTROSCOPY, DUODENOSCOPY (EGD), FINE NEEDLE ASPIRATE/BIOPSY;  Surgeon: Sabine Olivares MD;  Location:  GI     HERNIORRHAPHY INCISIONAL (LOCATION)  10/8/2011     incarcerated hernia repair with mesh;     HERNIORRHAPHY INCISIONAL (LOCATION)  10/16/2011     repair incisional hernia with mesh, and removal of current implanted mesh;               Home Medications:     Prior to Admission medications    Medication Sig Last Dose Taking? Auth Provider   clonazePAM  (KLONOPIN) 1 MG tablet 1 tab po qam, 1/2 tab po qpm   Isra Pagan MD   cyclobenzaprine (FLEXERIL) 10 MG tablet Take 1 tablet (10 mg) by mouth 3 times daily as needed for muscle spasms   Pari Wiley MD   zolpidem (AMBIEN) 10 MG tablet Take 1 tablet (10 mg) by mouth nightly as needed for sleep   Pari Wiley MD   amitriptyline (ELAVIL) 50 MG tablet Take one and a half tabs every night at bedtime   Pari Wiley MD   Iron Polysacch Eefcv-W56--0.025-1 MG CAPS Take 1 capsule by mouth daily   Pari Wiley MD   gabapentin (NEURONTIN) 300 MG capsule Take 1 capsule (300 mg) by mouth daily   Pari Wiley MD   losartan (COZAAR) 100 MG tablet Take 1 tablet (100 mg) by mouth daily   Pari Wiley MD   sertraline (ZOLOFT) 100 MG tablet Take 2 tablets (200 mg) by mouth daily Increased dose. Can use up tablets at home first.   Candi Mclaughlin, NP   oxyCODONE (ROXICODONE) 5 MG immediate release tablet 1-2 po q4 hours prn   Pari Wiley MD   simvastatin (ZOCOR) 20 MG tablet Take 1 tablet (20 mg) by mouth At Bedtime   Pari Wiley MD   pantoprazole (PROTONIX) 40 MG enteric coated tablet Take 1 tablet (40 mg) by mouth At Bedtime   Pari Wiley MD   metFORMIN (GLUCOPHAGE-XR) 500 MG 24 hr tablet Take 2 tablets (1,000 mg) by mouth daily (with breakfast)   Pari Wiley MD   ibuprofen (ADVIL,MOTRIN) 600 MG tablet Take 1 tablet (600 mg) by mouth every 6 hours as needed for moderate pain   Tal Sanchez MD   ketoprofen 10% in PLO 10% Place 1 g onto the skin 3 times daily as needed for moderate pain   Valentin Reddy,    Omega-3 Fatty Acids (OMEGA-3 FISH OIL PO) Take 2 g by mouth At Bedtime    Unknown, Entered By History   Multiple Vitamin (MULTI-VITAMIN) per tablet Take 1 tablet by mouth At Bedtime    Unknown, Entered By History   FREESTYLE LITE TEST VI STRP Test twice a day   Pari Wiley MD            Allergies:     Allergies   Allergen Reactions     Codeine Rash     Penicillins Rash     Pt has  tolerated cephalosporins and penems.             Social History:     Social History     Social History     Marital status:      Spouse name: N/A     Number of children: 3     Years of education: N/A     Occupational History     [employer]     Labor and Delivery     Social History Main Topics     Smoking status: Former Smoker     Quit date: 3/18/1979     Smokeless tobacco: Never Used      Comment: quit 1979     Alcohol use Yes      Comment: Twice a month     Drug use: No     Sexual activity: No      Comment: menopausal     Other Topics Concern     Seat Belt Yes     Self-Exams Yes     Social History Narrative                  Family History:     Family History   Problem Relation Age of Onset     Cardiovascular Mother      CHF     Hypertension Mother      C.A.D. Mother      50s     Lipids Mother      CANCER Father      Hodgkin's, Leukemia     DIABETES Sister      Connective Tissue Disorder Sister      Lupus     Lipids Sister      Hypertension Brother      Hypertension Sister      Hypertension Sister      CANCER Brother      Hodgkin's     C.A.D. Brother 61     Hypertension Sister      C.A.D. Brother                    Review of Systems:   The 10 point Review of Systems is negative other than noted in the HPI.          Physical Exam:     Heart Rate: 114, Blood pressure 149/75, temperature 98.5  F (36.9  C), resp. rate 29, last menstrual period 09/15/2007, SpO2 93 %, not currently breastfeeding.  0 lbs 0 oz     General: pleasant felame, Alert, awake, no acute distress.  HEENT: NC/AT, eyes anicteric, external occular movements intact, face symmetric.  Dentition WNL, MM moist.  Cardiac: RRR, S1, S2.  No murmurs appreciated.  Pulmonary: Normal chest rise, normal work of breathing.  RUL crackles no wheezing   Abdomen:  Obese, soft, non-tender, non-distended.  Bowel Sounds Present.  No guarding.  Extremities: no deformities.  Warm, well perfused.  Skin: no rashes or lesions noted.  Warm and  Dry.  Neuro: No focal deficits noted.  Speech clear.  Coordination and strength grossly normal.  Psych: Appropriate affect.         Data:   All new lab and imaging data was reviewed.    Recent Labs  Lab 09/19/17  1515   WBC 22.2*   HGB 11.4*   HCT 39.7   MCV 75*          Recent Labs  Lab 09/19/17  1515      POTASSIUM 3.5   CHLORIDE 103   CO2 29   ANIONGAP 8   *   BUN 10   CR 0.84   GFRESTIMATED 68   GFRESTBLACK 83   GRECIA 8.7          Imaging:  Results for orders placed or performed during the hospital encounter of 09/19/17   Chest XR,  PA & LAT    Narrative    XR CHEST 2 VW 9/19/2017 3:45 PM    COMPARISON: 6/22/2017    HISTORY: Cough and dyspnea.      Impression    IMPRESSION: Consolidation in the right upper lobe, concerning for  pneumonia. Lungs otherwise clear. No pleural effusion or pneumothorax.    MD Mili HANCOCK MD  Hospitalist  [facility] [address] [place] [address] [phone]

## 2017-09-19 NOTE — ED PROVIDER NOTES
History     Chief Complaint:  Cough    HPI   Nicole Reyes is a 61 year old female who presents with a cough. The patient states that two days ago she began having a cough and feeling generally unwell. She called in sick from work yesterday and has been feeling progressively worse recently. Her  brought her to the ER today due to increasing shortness of breath and worsening of the cough. Her O2 saturation was in the 60's upon arrival. Here in the ER she reports being extremely cold but denies having recorded any fevers. Of note her grandchildren at home are also sick and coughing as well. The patient does note having had pneumonia once in the past but denies having any chronic respiratory issues or using any inhalers. Of note the patient works in healthcare.    Allergies:  Codeine  Penicillins    Medications:    Klonopin  Ambien  Gabapentin  Cozaar  Zoloft  Simvastatin  Protonix  Metformin    Past Medical History:    CKD  Hypertension  RADHA  Hernia abdominal  Insomnia  Iron deficiency anemia  Major depression  Menopause  Obesity  CRISTÓBAL  Postoperative seroma  Hypercholesterolemia  RLS  Type 2 diabetes    Past Surgical History:    Breast biopsy  Hernia repair   section  D&C  EGD  Herniorrhaphy incisional    Family History:    CHF  Hypertension  CAD  Lipids  Cancer  Diabetes    Social History:  Smoking Status: Former  Smokeless Tobacco: No  Alcohol Use: Yes   Marital Status:   [2]    Review of Systems   Constitutional: Negative for fever.   Respiratory: Positive for cough and shortness of breath.    All other systems reviewed and are negative.    Physical Exam   Vitals:  Patient Vitals for the past 24 hrs:   BP Temp Heart Rate Resp SpO2   17 1655 149/75 - 114 29 93 %   17 1550 167/81 - - - 93 %   17 1545 171/82 - - - -   17 1530 (!) 196/99 - - - 97 %   17 1510 (!) 178/101 98.5  F (36.9  C) 122 (!) 32 (!) 68 %         Physical Exam  Nursing note and vitals  reviewed.  Constitutional: Cooperative.   HENT:   Mouth/Throat: Moist mucous membranes.   Eyes: EOMI, nonicteric sclera  Cardiovascular: tachycardic, regular rhythm, no murmurs, rubs, or gallops  Pulmonary/Chest: Tachypnea. Right-sided rhonchi/crackles, worse in RUL.  Abdominal: Soft. Nontender, nondistended, no guarding or rigidity. BS present.   Musculoskeletal: Normal range of motion.   Neurological: Alert. Moves all extremities spontaneously.   Skin: Skin is warm and dry. No rash noted.   Psychiatric: Normal mood and affect.     Emergency Department Course     ECG:  ECG taken at 1510, ECG read at 1518  Sinus tachycardia. Low voltage QRS. Borderline ECG  Rate 115 bpm. MO interval 198. QRS duration 78. QT/QTc 312/431. P-R-T axes 58, 31, 74.     Imaging:  Radiology findings were communicated with the patient who voiced understanding of the findings.  Chest XR, PA & LAT:  IMPRESSION: Consolidation in the right upper lobe, concerning for  pneumonia. Lungs otherwise clear. No pleural effusion or pneumothorax.  Reading per radiology.     Laboratory:  Laboratory findings were communicated with the patient who voiced understanding of the findings.  CBC: WBC: 22.2(H), RBC: 5.28(H), HGB: 11.4(L), MCV: 75(L), MCH: 21.6(L), MCHC: 28.7(L), RDW: 17.5(H), Neutrophil: 18.7(H), ow WNL ()  BMP: Glucose: 167(H), o/w WNL (Creatinine 0.84)   Troponin (Collected 1515): <0.015   BNP: 433  Lactic acid: 1515: 4.5(HH)  Lactic acid 1710: 3.3(H)  Influenza A and B and RSV PCR: pending    Interventions:  1612 Normal Saline 2000 mL IV  1653 Levaquin 750 mL IV  1653 Maxipime 2 g IV    Emergency Department Course:  Nursing notes and vitals reviewed.  I performed an exam of the patient as documented above.   IV was inserted and blood was drawn for laboratory testing, results above.  The patient was sent for a XR while in the emergency department, results above.     I discussed the treatment plan with the patient. They expressed  understanding of this plan and consented to admission. I discussed the patient with Dr. Mills, who will admit the patient to a monitored bed for further evaluation and treatment.      I personally reviewed the laboratory results with the Patient and answered all related questions prior to admission.    Impression & Plan      Medical Decision Making:  Nicole Reyes is a 61 year old female who presents to the emergency department today with chief complaint of cough and chills. Differential includes influenza, pneumonia, viral URI, COPD/asthma, and many other processes. Workup is notable for a lactic acid of 4.5, leukocytosis, and a right upper lobe pneumonia. Patient meets criteria for septic shock given her lactic acid level grater than 4. 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 She is started on vancomycin, cefepime, and Levaquin. A repeat lactic was performed after just one liter of fluid and is already improving to 3.3. She is currently receiving additional liters. The patient did have tachycardia initially, but that is also improving with fluid. She is also quite hypoxic on arrival that was requiring oxygen supplementation. Given that patient is improving, I do not believe that we need to escalate care with central line or BiPAP at this time. I discussed with our hospitalist, Dr. Mills who accepts the patient. All of the patient questions are answered and she is agreeable with this plan. Patient is stable at the time of admission.    Diagnosis:    ICD-10-CM    1. Septic shock (H) A41.9 Lactic acid    R65.21    2. HCAP (healthcare-associated pneumonia) J18.9    3. Acute respiratory failure with hypoxia (H) J96.01         Disposition:   Admitted    CMS Diagnoses:   The patient has signs of Septic Shock as evidenced by:    1. Presence of Sepsis, AND  2. Lactic Acid level >4    Time sepsis diagnosis confirmed = 1515 as this was  the time whenLactate was resulted and the level was >4      3 Hour Septic Shock Bundle Completion:  1. Initial Lactic Acid Result:   Recent Labs   Lab Test  09/19/17   1710  09/19/17   1515  10/19/16   1850   LACT  3.3*  4.5*  1.2     2. Blood Cultures before Antibiotics: Yes  3. Broad Spectrum Antibiotics Administered: Yes     Anti-infectives (Future)    Start     Dose/Rate Route Frequency Ordered Stop    09/19/17 1610  levofloxacin (LEVAQUIN) infusion 750 mg      750 mg  100 mL/hr over 90 Minutes Intravenous ONCE 09/19/17 1609      09/19/17 1603  vancomycin (VANCOCIN) 2,500 mg in NaCl 0.9 % 500 mL intermittent infusion      2,500 mg Intravenous ONCE 09/19/17 1603          4. 2000 ml of IV fluids. ordered and not finished by time of transfer out of ED.     the patient was transferred out of the ED prior to the 6 hour masoud.         Scribe Disclosure:  I, Wilder Murguia, am serving as a scribe at 3:04 PM on 9/19/2017 to document services personally performed by Ruben Villatoro MD, based on my observations and the provider's statements to me.   [facility] EMERGENCY DEPARTMENT       Ruben Villatoro MD  09/19/17 [id number]

## 2017-09-19 NOTE — PHARMACY-ADMISSION MEDICATION HISTORY
Admission medication history interview status for this patient is complete. See [facility] admission navigator for allergy information, prior to admission medications and immunization status.     Medication history interview source(s):Patient  Medication history resources (including written lists, pill bottles, clinic record):[facility] med list  Primary pharmacy:[facility]    Changes made to [facility] medication list:  Added: ASA, vitamin C  Deleted: ketoprofen  Changed: iron, fish oil, ibuprofen, metformin    Actions taken by pharmacist (provider contacted, etc):None     Additional medication history information:None    Medication reconciliation/reorder completed by provider prior to medication history? No    Do you take OTC medications (eg tylenol, ibuprofen, fish oil, eye/ear drops, etc)? yes(Y/N)    For patients on insulin therapy: NO (Y/N)  Lantus/levemir/NPH/Mix 70/30 dose:   (Y/N) (see Med list for doses)   Sliding scale Novolog Y/N  If Yes, do you have a baseline novolog pre-meal dose:  units with meals  Patients eat three meals a day:   Y/N    How many episodes of hypoglycemia do you have per week: _______  How many missed doses do you have per week: ______  How many times do you check your blood glucose per day: _______   Any Barriers to therapy - Be specific :  cost of medications, comfortable with giving injections (if applicable), comfortable and confident with current diabetes regimen: Y/N ______________      Prior to Admission medications    Medication Sig Last Dose Taking? Auth Provider   Iron Polysacch [id number]--0.025-1 MG CAPS Take 1 capsule by mouth every other day 9/16/2017 at am Yes Unknown, Entered By History   ascorbic acid 500 MG TABS Take 1 tablet by mouth every other day with iron capsule 9/16/2017 at am Yes Unknown, Entered By History   ASPIRIN EC PO Take 81 mg by mouth every evening 9/18/2017 at pm Yes Unknown, Entered By History   metFORMIN (GLUCOPHAGE-XR) 750 MG 24 hr tablet Take 750 mg by  mouth daily (with breakfast) 9/19/2017 at am Yes Unknown, Entered By History   clonazePAM (KLONOPIN) 1 MG tablet 1 tab po qam, 1/2 tab po qpm 9/19/2017 at 1 mg am Yes Isra Pagan MD   zolpidem (AMBIEN) 10 MG tablet Take 1 tablet (10 mg) by mouth nightly as needed for sleep 9/18/2017 at hs Yes Pari Wiley MD   gabapentin (NEURONTIN) 300 MG capsule Take 1 capsule (300 mg) by mouth daily 9/18/2017 at pm Yes Pari Wiley MD   losartan (COZAAR) 100 MG tablet Take 1 tablet (100 mg) by mouth daily 9/19/2017 at am Yes Pari Wiley MD   sertraline (ZOLOFT) 100 MG tablet Take 2 tablets (200 mg) by mouth daily Increased dose. Can use up tablets at home first. 9/19/2017 at am Yes Candi Mclaughlin NP   ibuprofen (ADVIL,MOTRIN) 600 MG tablet Take 1 tablet (600 mg) by mouth every 6 hours as needed for moderate pain Past Month at Unknown time Yes Tal Sanchez MD   cyclobenzaprine (FLEXERIL) 10 MG tablet Take 1 tablet (10 mg) by mouth 3 times daily as needed for muscle spasms More than a month at Unknown time  Pari Wiley MD   amitriptyline (ELAVIL) 50 MG tablet Take one and a half tabs every night at bedtime 9/15/2017 at hs  Pari Wiley MD   oxyCODONE (ROXICODONE) 5 MG immediate release tablet 1-2 po q4 hours prn More than a month at Unknown time  Pari Wiley MD   simvastatin (ZOCOR) 20 MG tablet Take 1 tablet (20 mg) by mouth At Bedtime on hold  Pari Wiley MD   pantoprazole (PROTONIX) 40 MG enteric coated tablet Take 1 tablet (40 mg) by mouth At Bedtime More than a month at Unknown time  Pari Wiley MD   Omega-3 Fatty Acids (OMEGA-3 FISH OIL PO) Take 2 g by mouth once a week at bedtime 9/15/2017 at hs  Unknown, Entered By History   Multiple Vitamin (MULTI-VITAMIN) per tablet Take 1 tablet by mouth At Bedtime  More than a month at Unknown time  Unknown, Entered By History   FREESTYLE LITE TEST VI STRP Test twice a day   Pari Wiley MD

## 2017-09-20 ENCOUNTER — APPOINTMENT (OUTPATIENT)
Dept: SPEECH THERAPY | Facility: CLINIC | Age: 62
DRG: 871 | End: 2017-09-20
Attending: HOSPITALIST
Payer: COMMERCIAL

## 2017-09-20 LAB
ALBUMIN SERPL-MCNC: 2.6 G/DL (ref 3.4–5)
ALP SERPL-CCNC: 119 U/L (ref 40–150)
ALT SERPL W P-5'-P-CCNC: 29 U/L (ref 0–50)
ANION GAP SERPL CALCULATED.3IONS-SCNC: 5 MMOL/L (ref 3–14)
AST SERPL W P-5'-P-CCNC: 26 U/L (ref 0–45)
BACTERIA SPEC CULT: ABNORMAL
BACTERIA SPEC CULT: ABNORMAL
BASOPHILS # BLD AUTO: 0 10E9/L (ref 0–0.2)
BASOPHILS NFR BLD AUTO: 0.2 %
BILIRUB DIRECT SERPL-MCNC: <0.1 MG/DL (ref 0–0.2)
BILIRUB SERPL-MCNC: 0.5 MG/DL (ref 0.2–1.3)
BUN SERPL-MCNC: 10 MG/DL (ref 7–30)
CALCIUM SERPL-MCNC: 7.9 MG/DL (ref 8.5–10.1)
CHLORIDE SERPL-SCNC: 105 MMOL/L (ref 94–109)
CO2 SERPL-SCNC: 30 MMOL/L (ref 20–32)
CREAT SERPL-MCNC: 0.88 MG/DL (ref 0.52–1.04)
DIFFERENTIAL METHOD BLD: ABNORMAL
EOSINOPHIL # BLD AUTO: 0.3 10E9/L (ref 0–0.7)
EOSINOPHIL NFR BLD AUTO: 1.3 %
ERYTHROCYTE [DISTWIDTH] IN BLOOD BY AUTOMATED COUNT: 17.4 % (ref 10–15)
GFR SERPL CREATININE-BSD FRML MDRD: 66 ML/MIN/1.7M2
GLUCOSE BLDC GLUCOMTR-MCNC: 115 MG/DL (ref 70–99)
GLUCOSE BLDC GLUCOMTR-MCNC: 116 MG/DL (ref 70–99)
GLUCOSE BLDC GLUCOMTR-MCNC: 149 MG/DL (ref 70–99)
GLUCOSE BLDC GLUCOMTR-MCNC: 97 MG/DL (ref 70–99)
GLUCOSE SERPL-MCNC: 131 MG/DL (ref 70–99)
GRAM STN SPEC: ABNORMAL
HBA1C MFR BLD: 7.5 % (ref 4.3–6)
HCT VFR BLD AUTO: 29.2 % (ref 35–47)
HGB BLD-MCNC: 8.7 G/DL (ref 11.7–15.7)
IMM GRANULOCYTES # BLD: 0.1 10E9/L (ref 0–0.4)
IMM GRANULOCYTES NFR BLD: 0.6 %
INTERPRETATION ECG - MUSE: NORMAL
LYMPHOCYTES # BLD AUTO: 2.1 10E9/L (ref 0.8–5.3)
LYMPHOCYTES NFR BLD AUTO: 10.5 %
Lab: ABNORMAL
MCH RBC QN AUTO: 22 PG (ref 26.5–33)
MCHC RBC AUTO-ENTMCNC: 29.8 G/DL (ref 31.5–36.5)
MCV RBC AUTO: 74 FL (ref 78–100)
MONOCYTES # BLD AUTO: 1.3 10E9/L (ref 0–1.3)
MONOCYTES NFR BLD AUTO: 6.2 %
NEUTROPHILS # BLD AUTO: 16.6 10E9/L (ref 1.6–8.3)
NEUTROPHILS NFR BLD AUTO: 81.2 %
NRBC # BLD AUTO: 0 10*3/UL
NRBC BLD AUTO-RTO: 0 /100
PLATELET # BLD AUTO: 242 10E9/L (ref 150–450)
POTASSIUM SERPL-SCNC: 3.6 MMOL/L (ref 3.4–5.3)
PROT SERPL-MCNC: 6.3 G/DL (ref 6.8–8.8)
RBC # BLD AUTO: 3.96 10E12/L (ref 3.8–5.2)
SODIUM SERPL-SCNC: 140 MMOL/L (ref 133–144)
SPECIMEN SOURCE: ABNORMAL
SPECIMEN SOURCE: ABNORMAL
WBC # BLD AUTO: 20.4 10E9/L (ref 4–11)

## 2017-09-20 PROCEDURE — 80076 HEPATIC FUNCTION PANEL: CPT | Performed by: HOSPITALIST

## 2017-09-20 PROCEDURE — 25000132 ZZH RX MED GY IP 250 OP 250 PS 637: Performed by: INTERNAL MEDICINE

## 2017-09-20 PROCEDURE — 25000128 H RX IP 250 OP 636: Performed by: HOSPITALIST

## 2017-09-20 PROCEDURE — 25000132 ZZH RX MED GY IP 250 OP 250 PS 637: Performed by: HOSPITALIST

## 2017-09-20 PROCEDURE — 92526 ORAL FUNCTION THERAPY: CPT | Mod: GN

## 2017-09-20 PROCEDURE — 99233 SBSQ HOSP IP/OBS HIGH 50: CPT | Performed by: INTERNAL MEDICINE

## 2017-09-20 PROCEDURE — 87040 BLOOD CULTURE FOR BACTERIA: CPT | Performed by: INTERNAL MEDICINE

## 2017-09-20 PROCEDURE — 85025 COMPLETE CBC W/AUTO DIFF WBC: CPT | Performed by: HOSPITALIST

## 2017-09-20 PROCEDURE — 25000125 ZZHC RX 250: Performed by: INTERNAL MEDICINE

## 2017-09-20 PROCEDURE — 36415 COLL VENOUS BLD VENIPUNCTURE: CPT | Performed by: INTERNAL MEDICINE

## 2017-09-20 PROCEDURE — 00000146 ZZHCL STATISTIC GLUCOSE BY METER IP

## 2017-09-20 PROCEDURE — 87205 SMEAR GRAM STAIN: CPT | Performed by: HOSPITALIST

## 2017-09-20 PROCEDURE — 40000893 ZZH STATISTIC PT IP EVAL DEFER: Performed by: PHYSICAL THERAPIST

## 2017-09-20 PROCEDURE — 83036 HEMOGLOBIN GLYCOSYLATED A1C: CPT | Performed by: HOSPITALIST

## 2017-09-20 PROCEDURE — 92610 EVALUATE SWALLOWING FUNCTION: CPT | Mod: GN

## 2017-09-20 PROCEDURE — 80048 BASIC METABOLIC PNL TOTAL CA: CPT | Performed by: HOSPITALIST

## 2017-09-20 PROCEDURE — 40000225 ZZH STATISTIC SLP WARD VISIT

## 2017-09-20 PROCEDURE — 36415 COLL VENOUS BLD VENIPUNCTURE: CPT | Performed by: HOSPITALIST

## 2017-09-20 PROCEDURE — 12000007 ZZH R&B INTERMEDIATE

## 2017-09-20 RX ORDER — FERROUS SULFATE 325(65) MG
325 TABLET ORAL DAILY
Status: DISCONTINUED | OUTPATIENT
Start: 2017-09-20 | End: 2017-09-23 | Stop reason: HOSPADM

## 2017-09-20 RX ORDER — BISACODYL 5 MG
5 TABLET, DELAYED RELEASE (ENTERIC COATED) ORAL DAILY PRN
Status: DISCONTINUED | OUTPATIENT
Start: 2017-09-20 | End: 2017-09-23 | Stop reason: HOSPADM

## 2017-09-20 RX ORDER — METFORMIN HYDROCHLORIDE 750 MG/1
750 TABLET, EXTENDED RELEASE ORAL
Status: DISCONTINUED | OUTPATIENT
Start: 2017-09-21 | End: 2017-09-23 | Stop reason: HOSPADM

## 2017-09-20 RX ORDER — PANTOPRAZOLE SODIUM 40 MG/1
40 TABLET, DELAYED RELEASE ORAL EVERY MORNING
Status: DISCONTINUED | OUTPATIENT
Start: 2017-09-21 | End: 2017-09-23 | Stop reason: HOSPADM

## 2017-09-20 RX ORDER — CALCIUM CARBONATE 500 MG/1
500-1000 TABLET, CHEWABLE ORAL
Status: DISCONTINUED | OUTPATIENT
Start: 2017-09-20 | End: 2017-09-23 | Stop reason: HOSPADM

## 2017-09-20 RX ORDER — AMOXICILLIN 250 MG
1 CAPSULE ORAL 2 TIMES DAILY PRN
Status: DISCONTINUED | OUTPATIENT
Start: 2017-09-20 | End: 2017-09-23 | Stop reason: HOSPADM

## 2017-09-20 RX ORDER — ASCORBIC ACID 500 MG
500 TABLET ORAL EVERY OTHER DAY
Status: DISCONTINUED | OUTPATIENT
Start: 2017-09-20 | End: 2017-09-23 | Stop reason: HOSPADM

## 2017-09-20 RX ORDER — ALUMINA, MAGNESIA, AND SIMETHICONE 2400; 2400; 240 MG/30ML; MG/30ML; MG/30ML
30 SUSPENSION ORAL EVERY 4 HOURS PRN
Status: DISCONTINUED | OUTPATIENT
Start: 2017-09-20 | End: 2017-09-23 | Stop reason: HOSPADM

## 2017-09-20 RX ADMIN — SODIUM CHLORIDE, PRESERVATIVE FREE: 5 INJECTION INTRAVENOUS at 16:14

## 2017-09-20 RX ADMIN — SIMVASTATIN 20 MG: 20 TABLET, FILM COATED ORAL at 21:04

## 2017-09-20 RX ADMIN — VANCOMYCIN HYDROCHLORIDE 1500 MG: 10 INJECTION, POWDER, LYOPHILIZED, FOR SOLUTION INTRAVENOUS at 22:25

## 2017-09-20 RX ADMIN — ENOXAPARIN SODIUM 40 MG: 40 INJECTION SUBCUTANEOUS at 21:03

## 2017-09-20 RX ADMIN — GABAPENTIN 300 MG: 300 CAPSULE ORAL at 21:04

## 2017-09-20 RX ADMIN — LEVOFLOXACIN 500 MG: 5 INJECTION, SOLUTION INTRAVENOUS at 15:56

## 2017-09-20 RX ADMIN — LOSARTAN POTASSIUM 100 MG: 100 TABLET ORAL at 08:24

## 2017-09-20 RX ADMIN — ACETAMINOPHEN 650 MG: 325 TABLET, FILM COATED ORAL at 04:36

## 2017-09-20 RX ADMIN — CLONAZEPAM 1 MG: 1 TABLET ORAL at 08:24

## 2017-09-20 RX ADMIN — ACETAMINOPHEN 650 MG: 325 TABLET, FILM COATED ORAL at 21:10

## 2017-09-20 RX ADMIN — ALUMINUM HYDROXIDE, MAGNESIUM HYDROXIDE, AND DIMETHICONE 30 ML: 400; 400; 40 SUSPENSION ORAL at 10:35

## 2017-09-20 RX ADMIN — OXYCODONE HYDROCHLORIDE 10 MG: 5 TABLET ORAL at 15:54

## 2017-09-20 RX ADMIN — CLONAZEPAM 0.5 MG: 0.5 TABLET ORAL at 21:04

## 2017-09-20 RX ADMIN — ENOXAPARIN SODIUM 40 MG: 40 INJECTION SUBCUTANEOUS at 08:24

## 2017-09-20 RX ADMIN — PANTOPRAZOLE SODIUM 40 MG: 40 INJECTION, POWDER, FOR SOLUTION INTRAVENOUS at 10:35

## 2017-09-20 RX ADMIN — CALCIUM CARBONATE (ANTACID) CHEW TAB 500 MG 1000 MG: 500 CHEW TAB at 09:20

## 2017-09-20 RX ADMIN — AMITRIPTYLINE HYDROCHLORIDE 75 MG: 25 TABLET, FILM COATED ORAL at 21:04

## 2017-09-20 RX ADMIN — SERTRALINE HYDROCHLORIDE 200 MG: 100 TABLET ORAL at 08:24

## 2017-09-20 RX ADMIN — OXYCODONE HYDROCHLORIDE AND ACETAMINOPHEN 500 MG: 500 TABLET ORAL at 14:42

## 2017-09-20 RX ADMIN — SODIUM CHLORIDE, PRESERVATIVE FREE: 5 INJECTION INTRAVENOUS at 06:12

## 2017-09-20 RX ADMIN — FERROUS SULFATE TAB 325 MG (65 MG ELEMENTAL FE) 325 MG: 325 (65 FE) TAB at 14:43

## 2017-09-20 RX ADMIN — SENNOSIDES AND DOCUSATE SODIUM 1 TABLET: 8.6; 5 TABLET ORAL at 10:36

## 2017-09-20 ASSESSMENT — PAIN DESCRIPTION - DESCRIPTORS: DESCRIPTORS: ACHING

## 2017-09-20 NOTE — PROGRESS NOTES
"SPIRITUAL HEALTH SERVICES  SPIRITUAL ASSESSMENT Progress Note  [facility]    PRIMARY FOCUS        Emotional/spiritual/Church distress    Support for coping    ILLNESS CIRCUMSTANCES:   Reviewed documentation. Reflective conversation shared with Shayy which integrated elements of illness and family narratives.     Context of Serious Illness/Symptom(s) - Shayy stated that she has pneumonia again.    Resources for Support -  and kids are her support.    DISTRESS:     Emotional/Existential/Relational Distress - none expressed.    Spiritual/Jew Distress - none expressed.    Social/Cultural/Economic Distress - none expressed.    SPIRITUAL/Mormonism COPING:     Mormonism/Yana - Nondenominational    Spiritual Practice(s) - Prayer and Bible study.    Emotional/Existential/Relational Connections - Shayy stated the importance of her  and kids being \"saved\".    GOALS OF CARE:    Goals of Care - Shayy knows the symptoms when she is feeling like pneumonia is \"coming on\". She is resigned that it may come again.    Meaning/Sense-Making - Shayy is very grateful for her family support and stated that she has a strong yana and will take whatever comes her way. She said that she is assured of her salvation.    PLAN: SHS will continue to be avaialble.      Cecilia Olmedo   Intern  [phone]    "

## 2017-09-20 NOTE — PROGRESS NOTES
Discharge Planner SLP   Patient plan for discharge: none stated  Current status: Bedside swallow eval completed today. Pts oropharyngeal swallow is WNL. Recommend pt continue regular textures and thin liquids. Pt should follow strict reflux precautions: eat smaller more frequent meals, avoid PO 2 hours before bed, remain upright for 30-60 minutes following PO, avoid acid inducing food groups, and elevated HOB at night. Recommend pt continue to follow up with GI medicine regarding s/sx of reflux.  Barriers to return to prior living situation: none  Recommendations for discharge: recommend GI consult to further assess esophageal integrity   Rationale for recommendations: pt is tolerating baseline diet w/o difficulty; no further ST needs for dysphagia        Entered by: Amarilis Yun 09/20/2017 11:52 AM

## 2017-09-20 NOTE — PROGRESS NOTES
[facility]    Hospitalist Progress Note    Date of Service (when I saw the patient): 09/20/2017    Assessment & Plan     Brief patient summary: Patient is a 61 yr old female with h/o CRISTÓBAL, DM2, recurrent PNAs (once a year), iron deficiency anemia who presented on 9/19/2017 with SOB, chills and was found to have RUL PNA. Patient has history of hiatal hernia and GERD. She takes po Protonix and TUMS as needed for her reflux symptoms.     1. Acute hypoxic resp failure , sepsis (HR >110, Lactic acid 4.5, WBC 22K) 2/2 RUL and RML PNA:   --Patient has h/o recurrent PNAs, likely due to aspiration 2/2  her reflux symptoms. She has hiatal hernia with recurrent reflux symptoms.  --Was given vanc/levo/cefepime in the ER for HCAP coverage. She was put on Levaquin on admission. Today is day # 2 on abx treatment.   --White cell count down from  22.2 to 20.4. Will recheck in am. Will monitor her clinical response on her current antibiotic.   --SLP eval    2. GERD: Patient on Protonix po at home. Continue Protonix. Also discussed with her regarding outpatient follow up with GI once her pneumonia resolves.      3. Lactic acidosis: 2/2 sepsis and hypoxic resp failure. Lactic acid improved. Will resume metformin. Will continue IV fluid for hydration.      4. Iron deficiency anemia: due to chronic GIB from NSAID use, Hb stable.Cont protonix PO.     5. DM2: Continue metformin, sliding scale with high intensity. Will monitor blood sugar.      6. Generalized anxiety, RLS. Continue home medications including Klonopin, amitriptyline, Zoloft.       7. CRISTÓBAL . Patient reports she is not compliant with the BiPAP machine at home.     8. HTN . Will continue losartan with hold parameters.      DVT Prophylaxis: Enoxaparin (Lovenox) SQ    Code Status: Full Code    Disposition: Expected discharge: Anticipate two nights of hospital course.    Zoe Avendano MD    Interval History   Patient seen and examined. He stated that he is feeling  better.     -Data reviewed today: I reviewed all new labs and imaging results over the last 24 hours.  Physical Exam   Temp: 97.4  F (36.3  C) Temp src: Oral BP: 124/55   Heart Rate: 82 Resp: 18 SpO2: 95 % O2 Device: Nasal cannula Oxygen Delivery: 2 LPM  Vitals:    09/19/17 1937 09/20/17 0436   Weight: 128.7 kg (283 lb 11.2 oz) 127.8 kg (281 lb 12.8 oz)     Vital Signs with Ranges  Temp:  [97.2  F (36.2  C)-98.5  F (36.9  C)] 97.4  F (36.3  C)  Heart Rate:  [] 82  Resp:  [18-32] 18  BP: (109-196)/() 124/55  SpO2:  [68 %-97 %] 95 %  I/O last 3 completed shifts:  In: 1551 [P.O.:390; I.V.:1161]  Out: -     GEN:  Alert, oriented x 3, appears comfortable, NAD.  HEENT:  Normocephalic/atraumatic, no scleral icterus, no nasal discharge, mouth moist.  CV:  Regular rate and rhythm, no murmur or JVD.  S1 + S2 noted, no S3 or S4.  LUNGS:  Clear to auscultation bilaterally without rales/rhonchi/wheezing/retractions.  Symmetric chest rise on inhalation noted.  ABD:  Active bowel sounds, soft, non-tender/non-distended.  No rebound/guarding/rigidity.  EXT:  No edema or cyanosis.  Hands/feet warm to touch with good signs of peripheral perfusion.  No joint synovitis noted.  SKIN:  Dry to touch, no exanthems noted in the visualized areas.  NEURO:  Symmetric muscle strength, sensation to touch grossly intact.  No new focal deficits appreciated.    Medications     NaCl 100 mL/hr at 09/20/17 0612       [START ON 9/21/2017] pantoprazole  40 mg Oral QAM     amitriptyline  75 mg Oral At Bedtime     gabapentin  300 mg Oral QPM     losartan  100 mg Oral Daily     sertraline  200 mg Oral Daily     simvastatin  20 mg Oral At Bedtime     enoxaparin  40 mg Subcutaneous Q12H     levofloxacin  500 mg Intravenous Q24H     clonazePAM  1 mg Oral QAM     clonazePAM  0.5 mg Oral At Bedtime     insulin aspart  1-10 Units Subcutaneous TID AC     insulin aspart  1-7 Units Subcutaneous At Bedtime       Data     Recent Labs  Lab 09/20/17  4314  09/19/17  1515   WBC 20.4* 22.2*   HGB 8.7* 11.4*   MCV 74* 75*    369    140   POTASSIUM 3.6 3.5   CHLORIDE 105 103   CO2 30 29   BUN 10 10   CR 0.88 0.84   ANIONGAP 5 8   GRECIA 7.9* 8.7   * 167*   ALBUMIN 2.6*  --    PROTTOTAL 6.3*  --    BILITOTAL 0.5  --    ALKPHOS 119  --    ALT 29  --    AST 26  --    TROPI  --  <0.015       Recent Results (from the past 24 hour(s))   Chest XR,  PA & LAT    Narrative    XR CHEST 2 VW 9/19/2017 3:45 PM    COMPARISON: 6/22/2017    HISTORY: Cough and dyspnea.      Impression    IMPRESSION: Consolidation in the right upper lobe, concerning for  pneumonia. Lungs otherwise clear. No pleural effusion or pneumothorax.    SVEN MEJIA MD

## 2017-09-20 NOTE — PROGRESS NOTES
09/20/17 1138   General Information   Onset Date 09/19/17   Start of Care Date 09/20/17   Referring Physician Mili Mills MD   Patient Profile Review/OT: Additional Occupational Profile Info See Profile for full history and prior level of function   Patient/Family Goals Statement Pt denies any hx of dysphagia    Swallowing Evaluation Bedside swallow evaluation   Behaviorial Observations WNL (within normal limits)   Mode of current nutrition Oral diet   Type of oral diet Regular;Thin liquid   Respiratory Status O2 Supply   Type of O2 supply Nasal cannula   Comments Pt is a 61 yr old female with h/o CRISTÓBAL, DM2, recurrent PNAs (once a year), iron deficiency anemia who presented on 9/19/2017 with SOB, chills and was found to have RUL PNA . Pt denies any hx of dysphagia, however pt does have hx of reflux which often results in emesis if she is unable to experience relief from medicine. Pt also has hx of hiatal hernia. Pt states she eats regular textures and thin liquids at home w/o difficulty. Bedside swallow eval completed per MD orders to further assess oropharyngeal swallow function.    Clinical Swallow Evaluation   Oral Musculature generally intact   Structural Abnormalities none present   Dentition present and adequate   Mucosal Quality adequate   Mandibular Strength and Mobility intact   Oral Labial Strength and Mobility WFL   Lingual Strength and Mobility WFL   Velar Elevation intact   Buccal Strength and Mobility intact   Laryngeal Function Cough;Throat clear;Swallow;Voicing initiated   Oral Musculature Comments WNL   Additional Documentation Yes   Clinical Swallow Eval: Thin Liquid Texture Trial   Mode of Presentation, Thin Liquids cup;self-fed;straw   Volume of Liquid or Food Presented 4 oz   Oral Phase of Swallow WFL   Pharyngeal Phase of Swallow intact   Diagnostic Statement Pt tolerated thin liquids via straw and cup with no overt s/sx of aspiration    Clinical Swallow Eval: Puree Solid Texture  Trial   Mode of Presentation, Puree spoon;self-fed   Volume of Puree Presented 4 tbsp   Oral Phase, Puree WFL   Pharyngeal Phase, Puree intact   Diagnostic Statement Pt tolerated pureed textures via spoon with no overt s/sx of aspiration    Clinical Swallow Eval: Solid Food Texture Trial   Mode of Presentation, Solid self-fed   Volume of Solid Food Presented 1 melissa cracker   Oral Phase, Solid WFL   Pharyngeal Phase, Solid intact   Diagnostic Statement Pt tolerated regular solid textures with no overt s/sx of aspiration    VFSS Evaluation   VFSS Additional Documentation No   FEES Evaluation   Additional Documentation No   Swallow Compensations   Swallow Compensations Alternate viscosity of consistencies;Pacing;Reduce amounts;Multiple swallow   Results No difficulties noted   Esophageal Phase of Swallow   Patient reports or presents with symptoms of esophageal dysphagia Yes   Esophageal comments Pt with hx of GERD; educated pt on reflux precautions   General Therapy Interventions   Planned Therapy Interventions Dysphagia Treatment   Dysphagia treatment Compensatory strategies for swallowing;Instruction of safe swallow strategies   Swallow Eval: Clinical Impressions   Skilled Criteria for Therapy Intervention Skilled criteria met.  Treatment indicated.   Functional Assessment Scale (FAS) 7   Treatment Diagnosis Normal oropharyngeal swallow function; suspected esophageal phase dysphagia    Diet texture recommendations Regular diet;Thin liquids   Recommended Feeding/Eating Techniques alternate between small bites and sips of food/liquid;maintain upright posture during/after eating for 30 mins;small sips/bites;other (see comments)  (reflux precautions)   Demonstrates Need for Referral to Another Service other (see comments)  (GI medicine)   Therapy Frequency other (see comments)  (x1 tx following eval)   Anticipated Discharge Disposition home   Risks and Benefits of Treatment have been explained. Yes   Patient, family  and/or staff in agreement with Plan of Care Yes   Clinical Impression Comments SLP: Bedside swallow eval completed per MD orders. Pts oropharyngeal swallow is WNL. Pt tolerated thin liquids via cup and straw, pureed textures, and regular solid textures with no overt s/sx of aspiration. Pt endorses hx of reflux which often results in emesis if she is unable to feel relief from meds; educated pt on reflux precautions. Recommend pt continue regular textures and thin liquids. Pt should follow strict reflux precautions: eat smaller more frequent meals, avoid PO 2 hours before bed, remain upright for 30-60 minutes following PO, avoid acid inducing food groups, and elevated HOB at night. Recommend pt continue to follow up with GI medicine regarding s/sx of reflux. No further ST needs indicated for dysphagia. Will complete ST orders.    Total Evaluation Time   Total Evaluation Time (Minutes) 8

## 2017-09-20 NOTE — PLAN OF CARE
Problem: Pneumonia (Adult)  Goal: Signs and Symptoms of Listed Potential Problems Will be Absent or Manageable (Pneumonia)  Signs and symptoms of listed potential problems will be absent or manageable by discharge/transition of care (reference Pneumonia (Adult) CPG).   VSS. Edson pain or any other issue. KHALIL while ambulating. Blood cultures pending. 2L NC. NS at 100 mls/hour. IV ABX maintained for PNA. D/c in 3-4 days.

## 2017-09-20 NOTE — PLAN OF CARE
Problem: Pneumonia (Adult)  Goal: Signs and Symptoms of Listed Potential Problems Will be Absent or Manageable (Pneumonia)  Signs and symptoms of listed potential problems will be absent or manageable by discharge/transition of care (reference Pneumonia (Adult) CPG).   Outcome: No Change  Pt c/o nagging headache upon admission to unit, PRN acetaminophen given per pt request. Admitted to floor with SOB, tachypnea, decreased oxygen sats. Pt satting 90% RA, 2L O2 via NC provided and satting at 93-95%. LS dim bilat lower, clear bilat upper lobe. Influenza swab negative, BC pending. Lovenox injections added for DVT protocol. Hx DM and on metformin baseline, holding metformin d/t elevated lactic acid, SSI coverage for now as ordered. PT/SLP to consult. IVF 100ml/hr. SBA - assist 1 d/t gen weakness, pt calls appropriately for help. A&Ox4. Tele: [facility]

## 2017-09-20 NOTE — PLAN OF CARE
"Problem: Pneumonia (Adult)  Goal: Signs and Symptoms of Listed Potential Problems Will be Absent or Manageable (Pneumonia)  Signs and symptoms of listed potential problems will be absent or manageable by discharge/transition of care (reference Pneumonia (Adult) CPG).   Outcome: No Change  RN SHIFT SUMMARY :  DX/STORY : admit of 9/19 - with fever, SOB, Hypoxia >> Pneumonia   HX : DM, CRISTÓBAL, RLS, Anxiety , anemia , pneumonia, Hiatal Hernia >>reflux could be causing Asp. Pneumonia   LABS:  Lacid acid on admit 4.5- had IVF -went to 3.3 & 1.9 . K 3.6, WBC 20.4, Hgb 8.7 , Hgb A1c=7.5    : glucs qid, -sent sputum but was too spitty so re-ordered   TELE : SR -90's  IV ACCESS: Lac( IVF)  + Rt foot   ASSESS : a/o, up in room with SBA. Voiding well, BM Monday- can have a laxative -\" feels ok for now\". Given IS & gets to 500-750. Exp. Wheeze.   Has 02 at 2l- off now as sats mid 90's on RA , Has CRISTÓBAL & doesn't like CPAP at home , loose np cough- sent small sample but not accepted   TEACHING : given handout on all current meds, & discussed POC   PLAN : at baseline is  & lives in [facility] with spouse - cont. POC of IVF, IS, IV Levaquin , wean 02       "

## 2017-09-20 NOTE — PLAN OF CARE
PT: Orders received, PT screen completed.  Pt admitted with SOB found to have PNA.  Pt lives in house with spouse and adult daughter; has x2 steps to enter but also has ramp access if needed.  Pt uses SEC most of the time, but has FWW if needed at home.  IND with all mobility and ADLs at baseline.  Denies falls history.    Discharge Planner PT   Patient plan for discharge: home  Current status: Supervision supine > sit, Supervision sit <> stand, SBA with FWW to amb x95'.  On RA, O2 sats 91% prior to activity, 84% after activity but able to recover within 40sec of seated rest break and PLB  Barriers to return to prior living situation: none noted  Recommendations for discharge: Home with assist as needed  Rationale for recommendations: Pt demonstrates no acute PT needs at this time.  Will defer to nursing staff to monitor O2 sats with increased activity.  Will complete PT order.  Pt and RN in agreement with this plan.       Entered by: Amparo Quintanilla 09/20/2017 2:10 PM

## 2017-09-21 LAB
ANION GAP SERPL CALCULATED.3IONS-SCNC: 6 MMOL/L (ref 3–14)
BASOPHILS # BLD AUTO: 0 10E9/L (ref 0–0.2)
BASOPHILS NFR BLD AUTO: 0.1 %
BUN SERPL-MCNC: 9 MG/DL (ref 7–30)
CALCIUM SERPL-MCNC: 8 MG/DL (ref 8.5–10.1)
CHLORIDE SERPL-SCNC: 104 MMOL/L (ref 94–109)
CO2 SERPL-SCNC: 28 MMOL/L (ref 20–32)
CREAT SERPL-MCNC: 0.83 MG/DL (ref 0.52–1.04)
DIFFERENTIAL METHOD BLD: ABNORMAL
EOSINOPHIL # BLD AUTO: 0.5 10E9/L (ref 0–0.7)
EOSINOPHIL NFR BLD AUTO: 3.2 %
ERYTHROCYTE [DISTWIDTH] IN BLOOD BY AUTOMATED COUNT: 17.6 % (ref 10–15)
GFR SERPL CREATININE-BSD FRML MDRD: 69 ML/MIN/1.7M2
GLUCOSE BLDC GLUCOMTR-MCNC: 100 MG/DL (ref 70–99)
GLUCOSE BLDC GLUCOMTR-MCNC: 120 MG/DL (ref 70–99)
GLUCOSE BLDC GLUCOMTR-MCNC: 127 MG/DL (ref 70–99)
GLUCOSE BLDC GLUCOMTR-MCNC: 177 MG/DL (ref 70–99)
GLUCOSE SERPL-MCNC: 127 MG/DL (ref 70–99)
HCT VFR BLD AUTO: 27.5 % (ref 35–47)
HGB BLD-MCNC: 8.1 G/DL (ref 11.7–15.7)
IMM GRANULOCYTES # BLD: 0.2 10E9/L (ref 0–0.4)
IMM GRANULOCYTES NFR BLD: 1.5 %
LYMPHOCYTES # BLD AUTO: 1.9 10E9/L (ref 0.8–5.3)
LYMPHOCYTES NFR BLD AUTO: 11.9 %
MCH RBC QN AUTO: 21.7 PG (ref 26.5–33)
MCHC RBC AUTO-ENTMCNC: 29.5 G/DL (ref 31.5–36.5)
MCV RBC AUTO: 74 FL (ref 78–100)
MONOCYTES # BLD AUTO: 1 10E9/L (ref 0–1.3)
MONOCYTES NFR BLD AUTO: 6.6 %
NEUTROPHILS # BLD AUTO: 11.9 10E9/L (ref 1.6–8.3)
NEUTROPHILS NFR BLD AUTO: 76.7 %
NRBC # BLD AUTO: 0 10*3/UL
NRBC BLD AUTO-RTO: 0 /100
PLATELET # BLD AUTO: 225 10E9/L (ref 150–450)
POTASSIUM SERPL-SCNC: 3.7 MMOL/L (ref 3.4–5.3)
RBC # BLD AUTO: 3.73 10E12/L (ref 3.8–5.2)
SODIUM SERPL-SCNC: 138 MMOL/L (ref 133–144)
WBC # BLD AUTO: 15.5 10E9/L (ref 4–11)

## 2017-09-21 PROCEDURE — 99207 ZZC CDG-MDM COMPONENT: MEETS MODERATE - UP CODED: CPT | Performed by: INTERNAL MEDICINE

## 2017-09-21 PROCEDURE — 25000128 H RX IP 250 OP 636: Performed by: HOSPITALIST

## 2017-09-21 PROCEDURE — 12000000 ZZH R&B MED SURG/OB

## 2017-09-21 PROCEDURE — 85025 COMPLETE CBC W/AUTO DIFF WBC: CPT | Performed by: INTERNAL MEDICINE

## 2017-09-21 PROCEDURE — 80048 BASIC METABOLIC PNL TOTAL CA: CPT | Performed by: INTERNAL MEDICINE

## 2017-09-21 PROCEDURE — 00000146 ZZHCL STATISTIC GLUCOSE BY METER IP

## 2017-09-21 PROCEDURE — 36415 COLL VENOUS BLD VENIPUNCTURE: CPT | Performed by: INTERNAL MEDICINE

## 2017-09-21 PROCEDURE — 25000132 ZZH RX MED GY IP 250 OP 250 PS 637: Performed by: INTERNAL MEDICINE

## 2017-09-21 PROCEDURE — 25000132 ZZH RX MED GY IP 250 OP 250 PS 637: Performed by: HOSPITALIST

## 2017-09-21 PROCEDURE — 99233 SBSQ HOSP IP/OBS HIGH 50: CPT | Performed by: INTERNAL MEDICINE

## 2017-09-21 RX ADMIN — CLONAZEPAM 1 MG: 1 TABLET ORAL at 08:11

## 2017-09-21 RX ADMIN — PANTOPRAZOLE SODIUM 40 MG: 40 TABLET, DELAYED RELEASE ORAL at 08:10

## 2017-09-21 RX ADMIN — LEVOFLOXACIN 500 MG: 5 INJECTION, SOLUTION INTRAVENOUS at 16:24

## 2017-09-21 RX ADMIN — OXYCODONE HYDROCHLORIDE 10 MG: 5 TABLET ORAL at 01:21

## 2017-09-21 RX ADMIN — ACETAMINOPHEN 650 MG: 325 TABLET, FILM COATED ORAL at 19:51

## 2017-09-21 RX ADMIN — METFORMIN HYDROCHLORIDE 750 MG: 750 TABLET, EXTENDED RELEASE ORAL at 08:10

## 2017-09-21 RX ADMIN — SERTRALINE HYDROCHLORIDE 200 MG: 100 TABLET ORAL at 08:10

## 2017-09-21 RX ADMIN — ZOLPIDEM TARTRATE 10 MG: 5 TABLET, FILM COATED ORAL at 22:46

## 2017-09-21 RX ADMIN — ENOXAPARIN SODIUM 40 MG: 40 INJECTION SUBCUTANEOUS at 08:11

## 2017-09-21 RX ADMIN — CLONAZEPAM 0.5 MG: 0.5 TABLET ORAL at 21:28

## 2017-09-21 RX ADMIN — FERROUS SULFATE TAB 325 MG (65 MG ELEMENTAL FE) 325 MG: 325 (65 FE) TAB at 08:10

## 2017-09-21 RX ADMIN — GABAPENTIN 300 MG: 300 CAPSULE ORAL at 19:50

## 2017-09-21 RX ADMIN — ACETAMINOPHEN 650 MG: 325 TABLET, FILM COATED ORAL at 13:10

## 2017-09-21 RX ADMIN — AMITRIPTYLINE HYDROCHLORIDE 75 MG: 25 TABLET, FILM COATED ORAL at 21:28

## 2017-09-21 RX ADMIN — ACETAMINOPHEN 650 MG: 325 TABLET, FILM COATED ORAL at 05:46

## 2017-09-21 RX ADMIN — SIMVASTATIN 20 MG: 20 TABLET, FILM COATED ORAL at 21:28

## 2017-09-21 RX ADMIN — VANCOMYCIN HYDROCHLORIDE 1500 MG: 10 INJECTION, POWDER, LYOPHILIZED, FOR SOLUTION INTRAVENOUS at 05:02

## 2017-09-21 RX ADMIN — ENOXAPARIN SODIUM 40 MG: 40 INJECTION SUBCUTANEOUS at 19:50

## 2017-09-21 RX ADMIN — SODIUM CHLORIDE, PRESERVATIVE FREE: 5 INJECTION INTRAVENOUS at 08:12

## 2017-09-21 RX ADMIN — LOSARTAN POTASSIUM 100 MG: 100 TABLET ORAL at 08:11

## 2017-09-21 NOTE — PLAN OF CARE
Problem: Pneumonia (Adult)  Goal: Signs and Symptoms of Listed Potential Problems Will be Absent or Manageable (Pneumonia)  Signs and symptoms of listed potential problems will be absent or manageable by discharge/transition of care (reference Pneumonia (Adult) CPG).   Outcome: No Change  Afebrile, intermittent pain - generalized aching. PRN oxy and tylenol given. BC+ for cocci cluster, repeat 2 site BC drawn by lab, pt started on IV vanco for coverage. A&Ox4, SBA. LS rhonchi lower lobes, dyspnea on exertion, 2L O2 via NC. Tele SR. DM: SSI, restart metformin in AM. IVF infusing 100ml/hr.

## 2017-09-21 NOTE — PHARMACY-VANCOMYCIN DOSING SERVICE
Pharmacy Vancomycin Initial Note  Date of Service 2017  Patient's  1955  61 year old, female    Indication: Bacteremia    Current estimated CrCl = Estimated Creatinine Clearance: 84.6 mL/min (based on Cr of 0.88).    Creatinine for last 3 days  2017:  3:15 PM Creatinine 0.84 mg/dL  2017:  7:40 AM Creatinine 0.88 mg/dL    Recent Vancomycin Level(s) for last 3 days  No results found for requested labs within last 72 hours.      Vancomycin IV Administrations (past 72 hours)                   vancomycin (VANCOCIN) 2,500 mg in NaCl 0.9 % 500 mL intermittent infusion (mg) 2,500 mg New Bag 17                Nephrotoxins and other renal medications (Future)    Start     Dose/Rate Route Frequency Ordered Stop    17  vancomycin (VANCOCIN) 1500 mg in 0.9% NaCl 250 mL PREMIX      1,500 mg Intravenous EVERY 8 HOURS 17            Contrast Orders - past 72 hours     None                Plan:  1.  Start vancomycin  1500 mg IV q8h.   2.  Goal Trough Level: 15-20 mg/L   3.  Pharmacy will check trough levels as appropriate in 1-3 Days.    4. Serum creatinine levels will be ordered daily for the first week of therapy and at least twice weekly for subsequent weeks.    5. Wing method utilized to dose vancomycin therapy: Method 1    Bhavesh Awad

## 2017-09-21 NOTE — PROGRESS NOTES
XCOVER.  Notified BC came back GPC in cluster, which is likely Staph spp. This could be a contaminant or a real infection.  - Blood culture x2  - Vancomycin until final culture result is available

## 2017-09-21 NOTE — PROGRESS NOTES
Received call from [facility] lab with results of BC+ from 9/19 at 1515  from left arm. Now showing staph epidermidis. Lab reported to primary RN Deepthi.

## 2017-09-21 NOTE — PROGRESS NOTES
[facility] laboratory called with results of left arm blood culture: gram positive cocci in clusters. MD paged regarding information received.

## 2017-09-21 NOTE — PLAN OF CARE
Problem: Pneumonia (Adult)  Goal: Signs and Symptoms of Listed Potential Problems Will be Absent or Manageable (Pneumonia)  Signs and symptoms of listed potential problems will be absent or manageable by discharge/transition of care (reference Pneumonia (Adult) CPG).   Outcome: Improving  Patient up with SBA. VSS except temp 99.4. Alert ans oriented x 4. Noted SOB with exertion. On 2 L of oxygen. Lung sounds coarse. On vanco. Had 10 mg of oxycodone for back pain and 650 mg of tylenol for headache with adequate relief.

## 2017-09-21 NOTE — PROGRESS NOTES
[facility]  Hospitalist Progress Note  Susan Guaman MD 09/21/17  Text Page  Pager: [phone] (7am-6pm)    Reason for Stay (Diagnosis): SOB, cough         Assessment and Plan:      Summary of Stay: Nicole Reyes is a 61 year old female with past medical history of CRISTÓBAL (not compliant with CPAP), DM2, SHAWNA, hiatal hernia/GERD and recurrent PNA (about once per year) who was admitted on 9/19/2017 with SOB, chills and was found to have RUL PNA causing acute hypoxic respiratory failure and sepsis.  Slowly improving.    Problem List/Assessment and Plan:     1. Acute hypoxic resp failure and sepsis 2/2 RUL and RML PNA: Presented with SOB and chills.  She was found to have hypoxic respiratory failure as well as sepsis (as evidenced by tachycardia, elevated lactate and leukocytosis) with CXR showing infiltrates in the RUL and RML.  She has history of recurrent PNA (about once per year) which could be due to aspiration symptoms from her known hiatal hernia and reflux.  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  Slowly improving, leukocytosis has improved from 22.2 to 15.5.  Continue to encourage ambulation and IS.  (HR >110, Lactic acid 4.5, WBC 22K)   - Continue Levofloxacin  - CBC in AM  - Speech consulted, appreciate recommendations     2. GERD: Patient on Protonix po at home. Continue Protonix. Also discussed with her regarding outpatient follow up with GI once her pneumonia resolves.       3. Lactic acidosis - Resolved: 2/2 sepsis and hypoxic resp failure, has now normalized.      4. Iron deficiency anemia: due to chronic GIB from NSAID use, Hb stable. Cont Protonix PO.      5. DM2: Continue metformin, sliding scale with high intensity. HgbA1C this admission 7.5.      6. Generalized anxiety, RLS: Continue home medications including Klonopin, amitriptyline, Zoloft.        7. CRISTÓBAL: Patient reports she is  "not compliant with the BiPAP machine at home.      8. HTN: Will continue losartan with hold parameters.     DVT Prophylaxis: Enoxaparin (Lovenox) SQ  Code Status: Full Code  Discharge Dispo: Home  Estimated Disch Date / # of Days until Disch: 1-2 more days pending resolution of hypoxia and improvement of respiratory status        Interval History (Subjective):      Patient states she feels about the same as yesterday. Still has some CP with deep breathing and coughing.  Denies fevers or chills.  She does feel SOB.  No nausea, emesis or abdominal pain.  Has been up to the chair to eat breakfast but has not walked yet today.                  Physical Exam:      Last Vital Signs:  /56 (BP Location: Right arm)  Pulse 94  Temp 98.8  F (37.1  C) (Oral)  Resp 20  Ht 1.549 m (5' 1\")  Wt 129.8 kg (286 lb 3.2 oz)  LMP 09/15/2007  SpO2 92%  BMI 54.08 kg/m2    General: Alert, awake, no acute distress.  HEENT: Normocephalic and atraumatic, eyes anicteric and without scleral injection, EOMI, face symmetric, MMM.  Cardiac: RRR, normal S1, S2. No m/g/r, no LE edema.  Pulmonary: Normal chest rise, normal work of breathing.  Lungs CTAB without crackles or wheezing, decreased sounds at bilateral bases.  Abdomen: soft, non-tender, non-distended.  Normoactive bowel sounds, no guarding or rebound tenderness.  Extremities: no deformities.  Warm, well perfused.  Skin: no rashes or lesions.  Warm and Dry.  Neuro: No focal deficits.  Speech clear.  Coordination and strength grossly normal.  Psych: Alert and oriented x3. Appropriate affect.         Medications:      All current medications were reviewed with changes reflected in problem list.         Data:      All new lab and imaging data was reviewed.   Labs:    Recent Labs  Lab 09/21/17  0725 09/20/17  0740 09/19/17  1515    140 140   POTASSIUM 3.7 3.6 3.5   CHLORIDE 104 105 103   CO2 28 30 29   ANIONGAP 6 5 8   * 131* 167*   BUN 9 10 10   CR 0.83 0.88 0.84 "   GFRESTIMATED 69 66 68   GFRESTBLACK 84 79 83   GRECIA 8.0* 7.9* 8.7       Recent Labs  Lab 09/21/17  0725 09/20/17  0740 09/19/17  1515   WBC 15.5* 20.4* 22.2*   HGB 8.1* 8.7* 11.4*   HCT 27.5* 29.2* 39.7   MCV 74* 74* 75*    242 369      Imaging:   No results found for this or any previous visit (from the past 24 hour(s)).    Susan Guaman MD.

## 2017-09-21 NOTE — PLAN OF CARE
Problem: General Plan of Care (Inpatient Behavioral)  Goal: Plan of Care Review (Adult,OB,Behavioral)  The patient and/or their representative will communicate an understanding of their plan of care.  Outcome: Improving  Alert/oriented. No difficulty eating/aspiratoin. Hgb 8.1, IVF dc'ed. Chronic anemia on iron, denies symptoms/dizziness. Lungs diminished, occassional exp wheeze. O2 2L NC. O2 sats resting 2L 92-94. Amblating on 2L NC 85%,  Ambulating on 3L 90-91%. . Glucoses 127 a.m. & noon. PO intake good. BM yesterday, voids w/o difficulty. Tele discontinued. Afebrile.

## 2017-09-22 LAB
BACTERIA SPEC CULT: ABNORMAL
CREAT SERPL-MCNC: 0.79 MG/DL (ref 0.52–1.04)
CREAT SERPL-MCNC: 0.83 MG/DL (ref 0.52–1.04)
GFR SERPL CREATININE-BSD FRML MDRD: 70 ML/MIN/1.7M2
GFR SERPL CREATININE-BSD FRML MDRD: 74 ML/MIN/1.7M2
GLUCOSE BLDC GLUCOMTR-MCNC: 107 MG/DL (ref 70–99)
GLUCOSE BLDC GLUCOMTR-MCNC: 111 MG/DL (ref 70–99)
GLUCOSE BLDC GLUCOMTR-MCNC: 127 MG/DL (ref 70–99)
GLUCOSE BLDC GLUCOMTR-MCNC: 80 MG/DL (ref 70–99)
GLUCOSE BLDC GLUCOMTR-MCNC: 98 MG/DL (ref 70–99)
GLUCOSE BLDC GLUCOMTR-MCNC: 99 MG/DL (ref 70–99)
LACTATE BLD-SCNC: 0.7 MMOL/L (ref 0.7–2)
Lab: ABNORMAL
PLATELET # BLD AUTO: 225 10E9/L (ref 150–450)
SPECIMEN SOURCE: ABNORMAL

## 2017-09-22 PROCEDURE — 00000146 ZZHCL STATISTIC GLUCOSE BY METER IP

## 2017-09-22 PROCEDURE — 25000132 ZZH RX MED GY IP 250 OP 250 PS 637: Performed by: INTERNAL MEDICINE

## 2017-09-22 PROCEDURE — 25000132 ZZH RX MED GY IP 250 OP 250 PS 637: Performed by: HOSPITALIST

## 2017-09-22 PROCEDURE — 83605 ASSAY OF LACTIC ACID: CPT | Performed by: INTERNAL MEDICINE

## 2017-09-22 PROCEDURE — 90686 IIV4 VACC NO PRSV 0.5 ML IM: CPT | Performed by: INTERNAL MEDICINE

## 2017-09-22 PROCEDURE — 99207 ZZC CDG-MDM COMPONENT: MEETS MODERATE - UP CODED: CPT | Performed by: INTERNAL MEDICINE

## 2017-09-22 PROCEDURE — 12000000 ZZH R&B MED SURG/OB

## 2017-09-22 PROCEDURE — 85049 AUTOMATED PLATELET COUNT: CPT | Performed by: HOSPITALIST

## 2017-09-22 PROCEDURE — 25000128 H RX IP 250 OP 636: Performed by: INTERNAL MEDICINE

## 2017-09-22 PROCEDURE — 25000128 H RX IP 250 OP 636: Performed by: HOSPITALIST

## 2017-09-22 PROCEDURE — 82565 ASSAY OF CREATININE: CPT | Performed by: HOSPITALIST

## 2017-09-22 PROCEDURE — 99233 SBSQ HOSP IP/OBS HIGH 50: CPT | Performed by: INTERNAL MEDICINE

## 2017-09-22 PROCEDURE — 36415 COLL VENOUS BLD VENIPUNCTURE: CPT | Performed by: HOSPITALIST

## 2017-09-22 PROCEDURE — 36415 COLL VENOUS BLD VENIPUNCTURE: CPT | Performed by: INTERNAL MEDICINE

## 2017-09-22 RX ADMIN — GABAPENTIN 300 MG: 300 CAPSULE ORAL at 21:31

## 2017-09-22 RX ADMIN — PANTOPRAZOLE SODIUM 40 MG: 40 TABLET, DELAYED RELEASE ORAL at 08:09

## 2017-09-22 RX ADMIN — ACETAMINOPHEN 650 MG: 325 TABLET, FILM COATED ORAL at 05:01

## 2017-09-22 RX ADMIN — CLONAZEPAM 0.5 MG: 0.5 TABLET ORAL at 21:31

## 2017-09-22 RX ADMIN — FERROUS SULFATE TAB 325 MG (65 MG ELEMENTAL FE) 325 MG: 325 (65 FE) TAB at 08:09

## 2017-09-22 RX ADMIN — SIMVASTATIN 20 MG: 20 TABLET, FILM COATED ORAL at 21:31

## 2017-09-22 RX ADMIN — ENOXAPARIN SODIUM 40 MG: 40 INJECTION SUBCUTANEOUS at 21:31

## 2017-09-22 RX ADMIN — LEVOFLOXACIN 500 MG: 5 INJECTION, SOLUTION INTRAVENOUS at 16:08

## 2017-09-22 RX ADMIN — LOSARTAN POTASSIUM 100 MG: 100 TABLET ORAL at 08:09

## 2017-09-22 RX ADMIN — OXYCODONE HYDROCHLORIDE 10 MG: 5 TABLET ORAL at 16:06

## 2017-09-22 RX ADMIN — METFORMIN HYDROCHLORIDE 750 MG: 750 TABLET, EXTENDED RELEASE ORAL at 08:09

## 2017-09-22 RX ADMIN — ZOLPIDEM TARTRATE 10 MG: 5 TABLET, FILM COATED ORAL at 23:04

## 2017-09-22 RX ADMIN — AMITRIPTYLINE HYDROCHLORIDE 75 MG: 25 TABLET, FILM COATED ORAL at 21:31

## 2017-09-22 RX ADMIN — INFLUENZA A VIRUS A/MICHIGAN/45/2015 X-275 (H1N1) ANTIGEN (FORMALDEHYDE INACTIVATED), INFLUENZA A VIRUS A/HONG KONG/4801/2014 X-263B (H3N2) ANTIGEN (FORMALDEHYDE INACTIVATED), INFLUENZA B VIRUS B/PHUKET/3073/2013 ANTIGEN (FORMALDEHYDE INACTIVATED), AND INFLUENZA B VIRUS B/BRISBANE/60/2008 ANTIGEN (FORMALDEHYDE INACTIVATED) 0.5 ML: 15; 15; 15; 15 INJECTION, SUSPENSION INTRAMUSCULAR at 09:38

## 2017-09-22 RX ADMIN — ENOXAPARIN SODIUM 40 MG: 40 INJECTION SUBCUTANEOUS at 08:09

## 2017-09-22 RX ADMIN — SERTRALINE HYDROCHLORIDE 200 MG: 100 TABLET ORAL at 08:09

## 2017-09-22 RX ADMIN — OXYCODONE HYDROCHLORIDE AND ACETAMINOPHEN 500 MG: 500 TABLET ORAL at 08:09

## 2017-09-22 NOTE — PLAN OF CARE
Problem: General Plan of Care (Inpatient Behavioral)  Goal: Individualization/Patient Specific Goal (IP Behavioral)  The patient and/or their representative will achieve their patient-specific goals related to the plan of care.    The patient-specific goals include:   Outcome: Improving   Patient alert and oriented x 4. Up with SBA. And walker, but c/o SOB and generalize aches. On IV Levaquin. Encoraged to move around more. Had 650 mg of tylenol for generalized body aches.

## 2017-09-22 NOTE — PLAN OF CARE
Problem: General Plan of Care (Inpatient Behavioral)  Goal: Plan of Care Review (Adult,OB,Behavioral)  The patient and/or their representative will communicate an understanding of their plan of care.   Outcome: Improving  Pt A&Ox4, up with SBA and WW. IV to right foot is saline locked.  and 177 this shift. O2 at 93% on 2LPM, increased to 3LPM with activity. LS diminished throughout, encouraged cough and deep breathing and IS use. PRN tylenol and ambien given this shift.

## 2017-09-22 NOTE — PROGRESS NOTES
[facility]  Hospitalist Progress Note  Susan Guaman MD 09/21/17  Text Page  Pager: [phone] (7am-6pm)    Reason for Stay (Diagnosis): SOB, cough         Assessment and Plan:      Summary of Stay: Nicole Reyes is a 61 year old female with past medical history of CRISTÓBAL (not compliant with CPAP), DM2, SHAWNA, hiatal hernia/GERD and recurrent PNA (about once per year) who was admitted on 9/19/2017 with SOB, chills and was found to have RUL PNA causing acute hypoxic respiratory failure and sepsis.  Slowly improving.    Problem List/Assessment and Plan:     1. Acute hypoxic resp failure and sepsis 2/2 RUL and RML PNA: Presented with SOB and chills.  She was found to have hypoxic respiratory failure as well as sepsis (as evidenced by tachycardia, elevated lactate and leukocytosis) with CXR showing infiltrates in the RUL and RML.  She has history of recurrent PNA (about once per year) which could be due to aspiration symptoms from her known hiatal hernia and reflux.  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  Slowly improving, leukocytosis has improved from 22.2 to 15.5.  Continue to encourage ambulation and IS.  - Continue Levofloxacin IV  - CBC in AM  - Speech consulted, appreciate recommendations  - Continue supplemental oxygen, wean as able     2. GERD: Patient on Protonix po at home. Continue Protonix. Also discussed with her regarding outpatient follow up with GI once her pneumonia resolves.       3. Lactic acidosis - Resolved: 2/2 sepsis and hypoxic resp failure, has now normalized.      4. Iron deficiency anemia: due to chronic GIB from NSAID use, Hb stable. Cont Protonix PO.      5. DM2: Continue metformin, sliding scale with high intensity. HgbA1C this admission 7.5.      6. Generalized anxiety, RLS: Continue home medications including Klonopin, amitriptyline, Zoloft.        7. CRISTÓBAL: Patient  "reports she is not compliant with the BiPAP machine at home.      8. HTN: Will continue losartan with hold parameters.     9. Positive Blood Culture - Contaminant: From admission 1/2 cultures positive for staph epi.  This is consistent with contaminant.  Subsequent blood cultures negative.    DVT Prophylaxis: Enoxaparin (Lovenox) SQ  Code Status: Full Code  Discharge Dispo: Home  Estimated Disch Date / # of Days until Disch: Tomorrow as long as stable respiratory status        Interval History (Subjective):      Patient was seen with her bedside nurse this morning.  She feels about the same as yesterday but definitely no worse.  Still gets winded with activity and still requiring oxygen.  She has been on oxygen at home in the past.  Denies CP, nausea, emesis, abdominal pain.  She is having minimal cough.  No dizziness or lightheadedness, just feels tired.                   Physical Exam:      Last Vital Signs:  /73 (BP Location: Right arm)  Pulse 94  Temp 98.6  F (37  C) (Oral)  Resp 24  Ht 1.549 m (5' 1\")  Wt 129.1 kg (284 lb 9.6 oz)  LMP 09/15/2007  SpO2 90%  BMI 53.77 kg/m2    General: Alert, awake, no acute distress.  HEENT: Normocephalic and atraumatic, eyes anicteric and without scleral injection, EOMI, face symmetric, MMM.  Cardiac: RRR, normal S1, S2. No m/g/r, no LE edema.  Pulmonary: Normal chest rise, normal work of breathing.  Lungs CTAB without crackles or wheezing, decreased sounds at bilateral bases.  Abdomen: soft, non-tender, non-distended.  Normoactive bowel sounds, no guarding or rebound tenderness.  Extremities: no deformities.  Warm, well perfused.  Skin: no rashes or lesions.  Warm and Dry.  Neuro: No focal deficits.  Speech clear.  Coordination and strength grossly normal.  Psych: Alert and oriented x3. Appropriate affect.         Medications:      All current medications were reviewed with changes reflected in problem list.         Data:      All new lab and imaging data was " reviewed.   Labs:    Recent Labs  Lab 09/22/17  0614 09/22/17  0100 09/21/17  0725 09/20/17  0740 09/19/17  1515   NA  --   --  138 140 140   POTASSIUM  --   --  3.7 3.6 3.5   CHLORIDE  --   --  104 105 103   CO2  --   --  28 30 29   ANIONGAP  --   --  6 5 8   GLC  --   --  127* 131* 167*   BUN  --   --  9 10 10   CR 0.79 0.83 0.83 0.88 0.84   GFRESTIMATED 74 70 69 66 68   GFRESTBLACK 90 84 84 79 83   GRECIA  --   --  8.0* 7.9* 8.7       Recent Labs  Lab 09/22/17  0614 09/21/17  0725 09/20/17  0740 09/19/17  1515   WBC  --  15.5* 20.4* 22.2*   HGB  --  8.1* 8.7* 11.4*   HCT  --  27.5* 29.2* 39.7   MCV  --  74* 74* 75*    225 242 369      Imaging:   No results found for this or any previous visit (from the past 24 hour(s)).    Susan Guaman MD.

## 2017-09-22 NOTE — PROGRESS NOTES
Problem: General Plan of Care (Inpatient Behavioral)  Goal: Plan of Care Review (Adult,OB,Behavioral)  The patient and/or their representative will communicate an understanding of their plan of care.  Outcome: Improving  Alert/oriented. No difficulty eating/aspiratoin. ungs diminished, occassional exp wheeze. O2 2L NC. O2 sats resting 2L 92-94. . Glucoses 111 & 99 PO intake good. BM 2 days ago, refused stool softners day shift, voids w/o difficulty. Afebrile. Ambulated x2 w/O2

## 2017-09-23 ENCOUNTER — DOCUMENTATION ONLY (OUTPATIENT)
Dept: MEDSURG UNIT | Facility: CLINIC | Age: 62
End: 2017-09-23

## 2017-09-23 VITALS
BODY MASS INDEX: 53.81 KG/M2 | DIASTOLIC BLOOD PRESSURE: 59 MMHG | WEIGHT: 285 LBS | SYSTOLIC BLOOD PRESSURE: 130 MMHG | TEMPERATURE: 97.3 F | HEART RATE: 89 BPM | OXYGEN SATURATION: 96 % | HEIGHT: 61 IN | RESPIRATION RATE: 20 BRPM

## 2017-09-23 LAB
CREAT SERPL-MCNC: 0.79 MG/DL (ref 0.52–1.04)
ERYTHROCYTE [DISTWIDTH] IN BLOOD BY AUTOMATED COUNT: 17.7 % (ref 10–15)
GFR SERPL CREATININE-BSD FRML MDRD: 74 ML/MIN/1.7M2
GLUCOSE BLDC GLUCOMTR-MCNC: 102 MG/DL (ref 70–99)
GLUCOSE BLDC GLUCOMTR-MCNC: 123 MG/DL (ref 70–99)
GLUCOSE BLDC GLUCOMTR-MCNC: 134 MG/DL (ref 70–99)
HCT VFR BLD AUTO: 29.4 % (ref 35–47)
HGB BLD-MCNC: 8.5 G/DL (ref 11.7–15.7)
MCH RBC QN AUTO: 21.4 PG (ref 26.5–33)
MCHC RBC AUTO-ENTMCNC: 28.9 G/DL (ref 31.5–36.5)
MCV RBC AUTO: 74 FL (ref 78–100)
PLATELET # BLD AUTO: 240 10E9/L (ref 150–450)
RBC # BLD AUTO: 3.97 10E12/L (ref 3.8–5.2)
WBC # BLD AUTO: 11 10E9/L (ref 4–11)

## 2017-09-23 PROCEDURE — 00000146 ZZHCL STATISTIC GLUCOSE BY METER IP

## 2017-09-23 PROCEDURE — 25000132 ZZH RX MED GY IP 250 OP 250 PS 637: Performed by: HOSPITALIST

## 2017-09-23 PROCEDURE — 99239 HOSP IP/OBS DSCHRG MGMT >30: CPT | Performed by: INTERNAL MEDICINE

## 2017-09-23 PROCEDURE — 25000132 ZZH RX MED GY IP 250 OP 250 PS 637: Performed by: INTERNAL MEDICINE

## 2017-09-23 PROCEDURE — 82565 ASSAY OF CREATININE: CPT | Performed by: HOSPITALIST

## 2017-09-23 PROCEDURE — 36415 COLL VENOUS BLD VENIPUNCTURE: CPT | Performed by: HOSPITALIST

## 2017-09-23 PROCEDURE — 25000128 H RX IP 250 OP 636: Performed by: HOSPITALIST

## 2017-09-23 PROCEDURE — 85027 COMPLETE CBC AUTOMATED: CPT | Performed by: HOSPITALIST

## 2017-09-23 RX ORDER — LEVOFLOXACIN 500 MG/1
500 TABLET, FILM COATED ORAL EVERY EVENING
Qty: 3 TABLET | Refills: 0 | Status: SHIPPED | OUTPATIENT
Start: 2017-09-23 | End: 2017-10-05

## 2017-09-23 RX ORDER — OXYCODONE HYDROCHLORIDE 5 MG/1
5 TABLET ORAL EVERY 6 HOURS PRN
Qty: 10 TABLET | Refills: 0 | Status: SHIPPED | OUTPATIENT
Start: 2017-09-23 | End: 2017-10-05

## 2017-09-23 RX ADMIN — FERROUS SULFATE TAB 325 MG (65 MG ELEMENTAL FE) 325 MG: 325 (65 FE) TAB at 09:43

## 2017-09-23 RX ADMIN — ENOXAPARIN SODIUM 40 MG: 40 INJECTION SUBCUTANEOUS at 09:43

## 2017-09-23 RX ADMIN — SERTRALINE HYDROCHLORIDE 200 MG: 100 TABLET ORAL at 09:43

## 2017-09-23 RX ADMIN — METFORMIN HYDROCHLORIDE 750 MG: 750 TABLET, EXTENDED RELEASE ORAL at 09:43

## 2017-09-23 RX ADMIN — LOSARTAN POTASSIUM 100 MG: 100 TABLET ORAL at 09:43

## 2017-09-23 RX ADMIN — PANTOPRAZOLE SODIUM 40 MG: 40 TABLET, DELAYED RELEASE ORAL at 09:43

## 2017-09-23 NOTE — DISCHARGE INSTRUCTIONS
1. Follow up with your gastroenterologist regarding your GERD and repeat pneumonia.  2. Follow up with your primary care doctor to see when the oxygen can be stopped.    Oxygen Provider:  Arranged through [facility], contact number [phone]. Follow up call will occur within 3 business days, where home visit needs will be assessed. If you have any questions or concerns please call the oxygen company directly.

## 2017-09-23 NOTE — PLAN OF CARE
Problem: Pneumonia (Adult)  Goal: Signs and Symptoms of Listed Potential Problems Will be Absent or Manageable (Pneumonia)  Signs and symptoms of listed potential problems will be absent or manageable by discharge/transition of care (reference Pneumonia (Adult) CPG).   A&O, VSS, up with SBA with FWW, prn Oxycodone for back pain with relief, LS diminished, ,127, Levaquin IV dx PNA, will continue with POC.

## 2017-09-23 NOTE — DISCHARGE SUMMARY
[facility]  Discharge Summary  Name: Nicole Reyes    MRN: [id number]  YOB: 1955    Age: 61 year old  Date of Discharge:  9/23/2017  Date of Admission: 9/19/2017  Primary Care Provider: Pari Wiley  Discharge Physician:  Susan Guaman MD  Discharging Service:  Hospitalist      Discharge Diagnoses:  1. Acute Hypoxic Respiratory Failure and Sepsis due to RUL and RML PNA  2. GERD  3. Lactic Acidosis  4. SHAWNA  5. DM2  6. Generalized Anxiety and RLS  7. CRISTÓBAL  8. HTN  9. Positive Blood Culture - Contaminant     Hospital Course:  Nicole Reyes is a 61 year old female with past medical history of CRISTÓBAL (not compliant with CPAP), DM2, SHAWNA, hiatal hernia/GERD and recurrent PNA (about once per year) who was admitted on 9/19/2017 with SOB, chills and was found to have RUL PNA causing acute hypoxic respiratory failure and sepsis.  Slowly improving but will require oxygen at discharge.     1. Acute hypoxic resp failure and sepsis 2/2 RUL and RML PNA: Presented with SOB and chills.  She was found to have hypoxic respiratory failure as well as sepsis (as evidenced by tachycardia, elevated lactate and leukocytosis) with CXR showing infiltrates in the RUL and RML.  She has history of recurrent PNA (about once per year) which could be due to aspiration symptoms from her known hiatal hernia and reflux.  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  Slowly improving, leukocytosis and lactic acidosis resolved.  She will complete a 7 day course of Levofloxacin.  Also will require supplemental oxygen at discharge.  Follow up in 1 week with PCP to see if oxygen is still needed.      2. GERD: Patient on Protonix po at home. Continue Protonix. Also discussed with her regarding outpatient follow up with GI once her pneumonia resolves.       3. Lactic acidosis - Resolved: 2/2 sepsis and hypoxic resp failure, has now  "normalized.      4. Iron deficiency anemia: due to chronic GIB from NSAID use, Hb stable. Cont Protonix PO.      5. DM2: Continue metformin, sliding scale with high intensity. HgbA1C this admission 7.5.      6. Generalized anxiety, RLS: Continue home medications including Klonopin, amitriptyline, Zoloft.        7. CRISTÓBAL: Patient reports she is not compliant with the BiPAP machine at home.       8. HTN: Continue PTA Losartan.      9. Positive Blood Culture - Contaminant: From admission 1/2 cultures positive for staph epi.  This is consistent with contaminant.  Subsequent blood cultures negative.        Discharge Disposition:  Discharged to home     Allergies:  Allergies   Allergen Reactions     Codeine Rash     Penicillins Rash     Pt has tolerated cephalosporins and penems.         Condition on Discharge:  Discharge condition: Stable   Discharge vitals: Blood pressure 130/59, pulse 89, temperature 97.3  F (36.3  C), temperature source Oral, resp. rate 20, height 1.549 m (5' 1\"), weight 129.3 kg (285 lb), last menstrual period 09/15/2007, SpO2 96 %, not currently breastfeeding.   Code status on discharge: Full Code     History of Illness:  See detailed admission note for full details.    Physical Exam:  Blood pressure 130/59, pulse 89, temperature 97.3  F (36.3  C), temperature source Oral, resp. rate 20, height 1.549 m (5' 1\"), weight 129.3 kg (285 lb), last menstrual period 09/15/2007, SpO2 96 %, not currently breastfeeding.  Wt Readings from Last 1 Encounters:   09/23/17 129.3 kg (285 lb)     General: Alert, awake, no acute distress.  HEENT: Normocephalic and atraumatic, eyes anicteric and without scleral injection, EOMI, face symmetric, MMM.  Cardiac: RRR, normal S1, S2. No m/g/r, no LE edema.  Pulmonary: Normal chest rise, normal work of breathing.  Lungs CTAB without crackles or wheezing, decreased sounds at bilateral bases.  Abdomen: soft, non-tender, non-distended.  Normoactive bowel sounds, no guarding or " rebound tenderness.  Extremities: no deformities.  Warm, well perfused.  Skin: no rashes or lesions.  Warm and Dry.  Neuro: No focal deficits.  Speech clear.  Coordination and strength grossly normal.  Psych: Alert and oriented x3. Appropriate affect.    Procedures other than Imaging:  IV Antibiotics  Telemetry monitoring     Imaging:  Results for orders placed or performed during the hospital encounter of 09/19/17   Chest XR,  PA & LAT    Narrative    XR CHEST 2 VW 9/19/2017 3:45 PM    COMPARISON: 6/22/2017    HISTORY: Cough and dyspnea.      Impression    IMPRESSION: Consolidation in the right upper lobe, concerning for  pneumonia. Lungs otherwise clear. No pleural effusion or pneumothorax.    SVEN MEJIA MD        Consultations:  Consultations This Hospital Stay   PHARMACY TO DOSE VANCO  SPEECH LANGUAGE PATH ADULT IP CONSULT  PHYSICAL THERAPY ADULT IP CONSULT  PHARMACY TO DOSE VANCO     Recent Lab Results:    Recent Labs  Lab 09/23/17  0614 09/22/17  0614 09/21/17  0725 09/20/17  0740   WBC 11.0  --  15.5* 20.4*   HGB 8.5*  --  8.1* 8.7*   HCT 29.4*  --  27.5* 29.2*   MCV 74*  --  74* 74*    225 225 242       Recent Labs  Lab 09/23/17  0614 09/22/17  0614 09/22/17  0100 09/21/17  0725 09/20/17  0740 09/19/17  1515   NA  --   --   --  138 140 140   POTASSIUM  --   --   --  3.7 3.6 3.5   CHLORIDE  --   --   --  104 105 103   CO2  --   --   --  28 30 29   ANIONGAP  --   --   --  6 5 8   GLC  --   --   --  127* 131* 167*   BUN  --   --   --  9 10 10   CR 0.79 0.79 0.83 0.83 0.88 0.84   GFRESTIMATED 74 74 70 69 66 68   GFRESTBLACK 90 90 84 84 79 83   GRECIA  --   --   --  8.0* 7.9* 8.7       Recent Labs  Lab 09/23/17  0749 09/23/17  0235 09/22/17  2139 09/22/17  1728 09/22/17  1327  09/21/17  0725  09/20/17  0740  09/19/17  1515   GLC  --   --   --   --   --   --  127*  --  131*  --  167*   * 123* 127* 107* 99  < >  --   < >  --   < >  --    < > = values in this interval not displayed.       Pending  Results:    Unresulted Labs Ordered in the Past 30 Days of this Admission     Date and Time Order Name Status Description    9/20/2017 1959 Blood culture Preliminary     9/20/2017 1959 Blood culture Preliminary     9/19/2017 1509 Blood culture Preliminary            Discharge Instructions and Follow-Up:   Discharge Orders   No discharge procedures on file.  Discharge Medications   Current Discharge Medication List      START taking these medications    Details   levofloxacin (LEVAQUIN) 500 MG tablet Take 1 tablet (500 mg) by mouth every evening  Qty: 3 tablet, Refills: 0    Associated Diagnoses: Pneumonia due to infectious organism, unspecified laterality, unspecified part of lung         CONTINUE these medications which have CHANGED    Details   oxyCODONE (ROXICODONE) 5 MG IR tablet Take 1 tablet (5 mg) by mouth every 6 hours as needed for moderate to severe pain  Qty: 10 tablet, Refills: 0    Associated Diagnoses: Acute bilateral thoracic back pain         CONTINUE these medications which have NOT CHANGED    Details   Iron Polysacch Dymup-O82--0.025-1 MG CAPS Take 1 capsule by mouth every other day      ascorbic acid 500 MG TABS Take 1 tablet by mouth every other day with iron capsule      ASPIRIN EC PO Take 81 mg by mouth every evening      metFORMIN (GLUCOPHAGE-XR) 750 MG 24 hr tablet Take 750 mg by mouth daily (with breakfast)      clonazePAM (KLONOPIN) 1 MG tablet 1 tab po qam, 1/2 tab po qpm  Qty: 45 tablet, Refills: 0    Associated Diagnoses: Generalized anxiety disorder      zolpidem (AMBIEN) 10 MG tablet Take 1 tablet (10 mg) by mouth nightly as needed for sleep  Qty: 30 tablet, Refills: 5    Associated Diagnoses: Insomnia, unspecified type      gabapentin (NEURONTIN) 300 MG capsule Take 1 capsule (300 mg) by mouth daily  Qty: 90 capsule, Refills: 3    Associated Diagnoses: Restless legs syndrome (RLS); Persistent insomnia      losartan (COZAAR) 100 MG tablet Take 1 tablet (100 mg) by mouth  daily  Qty: 90 tablet, Refills: 1    Associated Diagnoses: Essential hypertension, benign      sertraline (ZOLOFT) 100 MG tablet Take 2 tablets (200 mg) by mouth daily Increased dose. Can use up tablets at home first.  Qty: 180 tablet, Refills: 1    Associated Diagnoses: Generalized anxiety disorder; Major depressive disorder, recurrent episode, moderate (H)      ibuprofen (ADVIL,MOTRIN) 600 MG tablet Take 1 tablet (600 mg) by mouth every 6 hours as needed for moderate pain  Qty: 30 tablet, Refills: 1      cyclobenzaprine (FLEXERIL) 10 MG tablet Take 1 tablet (10 mg) by mouth 3 times daily as needed for muscle spasms  Qty: 90 tablet, Refills: 5    Associated Diagnoses: Chronic low back pain with sciatica, sciatica laterality unspecified, unspecified back pain laterality      amitriptyline (ELAVIL) 50 MG tablet Take one and a half tabs every night at bedtime  Qty: 135 tablet, Refills: 3    Associated Diagnoses: Other insomnia      simvastatin (ZOCOR) 20 MG tablet Take 1 tablet (20 mg) by mouth At Bedtime  Qty: 90 tablet, Refills: 3    Comments: ### DO NOT FILL NOW.  Please update patient's profile to reflect additional refills.  ####  Associated Diagnoses: Hyperlipidemia LDL goal <70      pantoprazole (PROTONIX) 40 MG enteric coated tablet Take 1 tablet (40 mg) by mouth At Bedtime  Qty: 90 tablet, Refills: 3    Comments: ### DO NOT FILL NOW.  Please update patient's profile to reflect additional refills.  ####  Associated Diagnoses: Gastroesophageal reflux disease without esophagitis      Omega-3 Fatty Acids (OMEGA-3 FISH OIL PO) Take 2 g by mouth once a week at bedtime      Multiple Vitamin (MULTI-VITAMIN) per tablet Take 1 tablet by mouth At Bedtime       FREESTYLE LITE TEST VI STRP Test twice a day  Qty: 100 Strip, Refills: 12    Associated Diagnoses: Type 2 diabetes, HbA1c goal < 7% (H)           Time Spent on this Encounter   ANDIE, Susan Guaman, personally saw the patient today and spent greater than 30 minutes  discharging this patient.    Susan Guaman MD

## 2017-09-23 NOTE — PLAN OF CARE
Problem: Pneumonia (Adult)  Goal: Signs and Symptoms of Listed Potential Problems Will be Absent or Manageable (Pneumonia)  Signs and symptoms of listed potential problems will be absent or manageable by discharge/transition of care (reference Pneumonia (Adult) CPG).   Outcome: Adequate for Discharge Date Met:  09/23/17  Vss. Sob with exertion. D/c to home with home O2. . meds filled here and given to pt. A&OX4 denies pain. Ambulating

## 2017-09-23 NOTE — PROGRESS NOTES
.Patient has been assessed for Home Oxygen needs.  Oxygen readings:   *   RA - at rest  Pulse oximetry SPO2 83 %  *   RA - during activity/with exercise SPO2 80 %  *   O2 at  3 liters/minute (at rest) ...SPO2 93 %  *   O2 at  3 liters/minute (during activity/with exercise) ...SPO2 89 %

## 2017-09-23 NOTE — PLAN OF CARE
Problem: Patient Care Overview  Goal: Plan of Care/Patient Progress Review  Outcome: Improving  Vss and afebrile. O2 sats stable on 3L, unable to wean O2. Pt denies CP, SOB, N/V, pain. . A&Ox4. Up SBA. IV abx. Cont with POC.

## 2017-09-23 NOTE — PROGRESS NOTES
Received intake call for home oxygen at 11:36am Reviewed patient's chart; Patient qualifies under Medicare guidelines and all documentation is in the chart including a good order.   12:27pm- Called to offer choice and patient is okay with [facility] setting them up. Discussed equipment with patient and informed them that we would be to bedside with oxygen in the next 2 hours.   12:47pm- Spoke with care coordinator, Lily, confirmed we received the order around 11:50, called back at 12:47pm to tell her we are all good to go and plan to be there by 2pm at the latest.

## 2017-09-25 ENCOUNTER — APPOINTMENT (OUTPATIENT)
Dept: CT IMAGING | Facility: CLINIC | Age: 62
End: 2017-09-25
Attending: EMERGENCY MEDICINE
Payer: COMMERCIAL

## 2017-09-25 ENCOUNTER — APPOINTMENT (OUTPATIENT)
Dept: GENERAL RADIOLOGY | Facility: CLINIC | Age: 62
End: 2017-09-25
Attending: INTERNAL MEDICINE
Payer: COMMERCIAL

## 2017-09-25 ENCOUNTER — HOSPITAL ENCOUNTER (EMERGENCY)
Facility: CLINIC | Age: 62
Discharge: ANOTHER HOSPITAL | End: 2017-09-26
Attending: INTERNAL MEDICINE | Admitting: INTERNAL MEDICINE
Payer: COMMERCIAL

## 2017-09-25 ENCOUNTER — TELEPHONE (OUTPATIENT)
Dept: INTERNAL MEDICINE | Facility: CLINIC | Age: 62
End: 2017-09-25

## 2017-09-25 VITALS
HEART RATE: 108 BPM | TEMPERATURE: 98.2 F | OXYGEN SATURATION: 99 % | BODY MASS INDEX: 53.09 KG/M2 | DIASTOLIC BLOOD PRESSURE: 59 MMHG | WEIGHT: 281 LBS | SYSTOLIC BLOOD PRESSURE: 105 MMHG | RESPIRATION RATE: 30 BRPM

## 2017-09-25 DIAGNOSIS — A41.9 SEPTIC SHOCK (H): ICD-10-CM

## 2017-09-25 DIAGNOSIS — R65.21 SEPTIC SHOCK (H): ICD-10-CM

## 2017-09-25 DIAGNOSIS — J18.9 HCAP (HEALTHCARE-ASSOCIATED PNEUMONIA): ICD-10-CM

## 2017-09-25 LAB
ALBUMIN SERPL-MCNC: 3.1 G/DL (ref 3.4–5)
ALBUMIN UR-MCNC: NEGATIVE MG/DL
ALP SERPL-CCNC: 203 U/L (ref 40–150)
ALT SERPL W P-5'-P-CCNC: 49 U/L (ref 0–50)
ANION GAP SERPL CALCULATED.3IONS-SCNC: 11 MMOL/L (ref 3–14)
APPEARANCE UR: ABNORMAL
AST SERPL W P-5'-P-CCNC: 78 U/L (ref 0–45)
BACTERIA SPEC CULT: NO GROWTH
BASE EXCESS BLDV CALC-SCNC: 1.2 MMOL/L
BASE EXCESS BLDV CALC-SCNC: 2.6 MMOL/L
BASOPHILS # BLD AUTO: 0.1 10E9/L (ref 0–0.2)
BASOPHILS NFR BLD AUTO: 0.3 %
BILIRUB SERPL-MCNC: 0.5 MG/DL (ref 0.2–1.3)
BILIRUB UR QL STRIP: NEGATIVE
BUN SERPL-MCNC: 15 MG/DL (ref 7–30)
CALCIUM SERPL-MCNC: 8.7 MG/DL (ref 8.5–10.1)
CHLORIDE SERPL-SCNC: 98 MMOL/L (ref 94–109)
CO2 SERPL-SCNC: 29 MMOL/L (ref 20–32)
COLOR UR AUTO: YELLOW
CREAT SERPL-MCNC: 1.07 MG/DL (ref 0.52–1.04)
DIFFERENTIAL METHOD BLD: ABNORMAL
EOSINOPHIL # BLD AUTO: 0 10E9/L (ref 0–0.7)
EOSINOPHIL NFR BLD AUTO: 0.2 %
ERYTHROCYTE [DISTWIDTH] IN BLOOD BY AUTOMATED COUNT: 18.5 % (ref 10–15)
GFR SERPL CREATININE-BSD FRML MDRD: 52 ML/MIN/1.7M2
GLUCOSE SERPL-MCNC: 116 MG/DL (ref 70–99)
GLUCOSE UR STRIP-MCNC: NEGATIVE MG/DL
HCO3 BLDV-SCNC: 29 MMOL/L (ref 21–28)
HCO3 BLDV-SCNC: 31 MMOL/L (ref 21–28)
HCT VFR BLD AUTO: 32.3 % (ref 35–47)
HGB BLD-MCNC: 9.3 G/DL (ref 11.7–15.7)
HGB UR QL STRIP: NEGATIVE
HYALINE CASTS #/AREA URNS LPF: 24 /LPF (ref 0–2)
IMM GRANULOCYTES # BLD: 0.9 10E9/L (ref 0–0.4)
IMM GRANULOCYTES NFR BLD: 3.8 %
KETONES UR STRIP-MCNC: NEGATIVE MG/DL
LACTATE BLD-SCNC: 2.7 MMOL/L (ref 0.7–2)
LACTATE BLD-SCNC: 4.7 MMOL/L (ref 0.7–2)
LEUKOCYTE ESTERASE UR QL STRIP: NEGATIVE
LYMPHOCYTES # BLD AUTO: 1.1 10E9/L (ref 0.8–5.3)
LYMPHOCYTES NFR BLD AUTO: 5 %
Lab: NORMAL
MCH RBC QN AUTO: 21.8 PG (ref 26.5–33)
MCHC RBC AUTO-ENTMCNC: 28.8 G/DL (ref 31.5–36.5)
MCV RBC AUTO: 76 FL (ref 78–100)
MONOCYTES # BLD AUTO: 1.6 10E9/L (ref 0–1.3)
MONOCYTES NFR BLD AUTO: 6.7 %
MUCOUS THREADS #/AREA URNS LPF: PRESENT /LPF
NEUTROPHILS # BLD AUTO: 19.4 10E9/L (ref 1.6–8.3)
NEUTROPHILS NFR BLD AUTO: 83 %
NITRATE UR QL: NEGATIVE
NRBC # BLD AUTO: 0.1 10*3/UL
NRBC BLD AUTO-RTO: 1 /100
O2/TOTAL GAS SETTING VFR VENT: 10 %
O2/TOTAL GAS SETTING VFR VENT: ABNORMAL %
OXYHGB MFR BLDV: 31 %
PCO2 BLDV: 65 MM HG (ref 40–50)
PCO2 BLDV: 73 MM HG (ref 40–50)
PH BLDV: 7.24 PH (ref 7.32–7.43)
PH BLDV: 7.26 PH (ref 7.32–7.43)
PH UR STRIP: 5 PH (ref 5–7)
PLATELET # BLD AUTO: 331 10E9/L (ref 150–450)
PLATELET # BLD EST: NORMAL 10*3/UL
PO2 BLDV: 25 MM HG (ref 25–47)
PO2 BLDV: 31 MM HG (ref 25–47)
POTASSIUM SERPL-SCNC: 3.9 MMOL/L (ref 3.4–5.3)
PROT SERPL-MCNC: 7.8 G/DL (ref 6.8–8.8)
RBC # BLD AUTO: 4.27 10E12/L (ref 3.8–5.2)
RBC #/AREA URNS AUTO: <1 /HPF (ref 0–2)
RBC MORPH BLD: ABNORMAL
RENAL EPI CELLS #/AREA URNS HPF: <1 /HPF
SODIUM SERPL-SCNC: 138 MMOL/L (ref 133–144)
SOURCE: ABNORMAL
SP GR UR STRIP: 1.01 (ref 1–1.03)
SPECIMEN SOURCE: NORMAL
SQUAMOUS #/AREA URNS AUTO: <1 /HPF (ref 0–1)
UROBILINOGEN UR STRIP-MCNC: 0 MG/DL (ref 0–2)
WBC # BLD AUTO: 23 10E9/L (ref 4–11)
WBC #/AREA URNS AUTO: 1 /HPF (ref 0–2)

## 2017-09-25 PROCEDURE — 25000125 ZZHC RX 250

## 2017-09-25 PROCEDURE — 96366 THER/PROPH/DIAG IV INF ADDON: CPT

## 2017-09-25 PROCEDURE — 40000275 ZZH STATISTIC RCP TIME EA 10 MIN

## 2017-09-25 PROCEDURE — 71010 XR CHEST PORT 1 VW: CPT

## 2017-09-25 PROCEDURE — 87040 BLOOD CULTURE FOR BACTERIA: CPT | Performed by: EMERGENCY MEDICINE

## 2017-09-25 PROCEDURE — 99285 EMERGENCY DEPT VISIT HI MDM: CPT | Mod: 25

## 2017-09-25 PROCEDURE — 96361 HYDRATE IV INFUSION ADD-ON: CPT

## 2017-09-25 PROCEDURE — 81001 URINALYSIS AUTO W/SCOPE: CPT | Performed by: EMERGENCY MEDICINE

## 2017-09-25 PROCEDURE — 25000128 H RX IP 250 OP 636: Performed by: EMERGENCY MEDICINE

## 2017-09-25 PROCEDURE — 83605 ASSAY OF LACTIC ACID: CPT | Performed by: EMERGENCY MEDICINE

## 2017-09-25 PROCEDURE — 82803 BLOOD GASES ANY COMBINATION: CPT | Performed by: EMERGENCY MEDICINE

## 2017-09-25 PROCEDURE — 85025 COMPLETE CBC W/AUTO DIFF WBC: CPT | Performed by: EMERGENCY MEDICINE

## 2017-09-25 PROCEDURE — 94640 AIRWAY INHALATION TREATMENT: CPT

## 2017-09-25 PROCEDURE — 87086 URINE CULTURE/COLONY COUNT: CPT | Performed by: EMERGENCY MEDICINE

## 2017-09-25 PROCEDURE — 71260 CT THORAX DX C+: CPT

## 2017-09-25 PROCEDURE — 36415 COLL VENOUS BLD VENIPUNCTURE: CPT | Performed by: EMERGENCY MEDICINE

## 2017-09-25 PROCEDURE — 96375 TX/PRO/DX INJ NEW DRUG ADDON: CPT

## 2017-09-25 PROCEDURE — 82805 BLOOD GASES W/O2 SATURATION: CPT | Performed by: EMERGENCY MEDICINE

## 2017-09-25 PROCEDURE — 96365 THER/PROPH/DIAG IV INF INIT: CPT

## 2017-09-25 PROCEDURE — 80053 COMPREHEN METABOLIC PANEL: CPT | Performed by: EMERGENCY MEDICINE

## 2017-09-25 RX ORDER — DIPHENHYDRAMINE HYDROCHLORIDE 50 MG/ML
25 INJECTION INTRAMUSCULAR; INTRAVENOUS ONCE
Status: COMPLETED | OUTPATIENT
Start: 2017-09-25 | End: 2017-09-25

## 2017-09-25 RX ORDER — SODIUM CHLORIDE 9 MG/ML
1000 INJECTION, SOLUTION INTRAVENOUS CONTINUOUS
Status: DISCONTINUED | OUTPATIENT
Start: 2017-09-25 | End: 2017-09-26 | Stop reason: HOSPADM

## 2017-09-25 RX ORDER — LEVOFLOXACIN 5 MG/ML
750 INJECTION, SOLUTION INTRAVENOUS ONCE
Status: DISCONTINUED | OUTPATIENT
Start: 2017-09-25 | End: 2017-09-25

## 2017-09-25 RX ORDER — SODIUM CHLORIDE, SODIUM LACTATE, POTASSIUM CHLORIDE, CALCIUM CHLORIDE 600; 310; 30; 20 MG/100ML; MG/100ML; MG/100ML; MG/100ML
INJECTION, SOLUTION INTRAVENOUS CONTINUOUS
Status: DISCONTINUED | OUTPATIENT
Start: 2017-09-25 | End: 2017-09-26 | Stop reason: HOSPADM

## 2017-09-25 RX ORDER — IPRATROPIUM BROMIDE AND ALBUTEROL SULFATE 2.5; .5 MG/3ML; MG/3ML
SOLUTION RESPIRATORY (INHALATION)
Status: COMPLETED
Start: 2017-09-25 | End: 2017-09-25

## 2017-09-25 RX ORDER — MEROPENEM 500 MG/1
500 INJECTION, POWDER, FOR SOLUTION INTRAVENOUS ONCE
Status: DISCONTINUED | OUTPATIENT
Start: 2017-09-25 | End: 2017-09-25

## 2017-09-25 RX ORDER — IOPAMIDOL 755 MG/ML
500 INJECTION, SOLUTION INTRAVASCULAR ONCE
Status: COMPLETED | OUTPATIENT
Start: 2017-09-25 | End: 2017-09-25

## 2017-09-25 RX ADMIN — VANCOMYCIN HYDROCHLORIDE 3000 MG: 1 INJECTION, POWDER, LYOPHILIZED, FOR SOLUTION INTRAVENOUS at 17:59

## 2017-09-25 RX ADMIN — DIPHENHYDRAMINE HYDROCHLORIDE 25 MG: 50 INJECTION, SOLUTION INTRAMUSCULAR; INTRAVENOUS at 18:31

## 2017-09-25 RX ADMIN — IOPAMIDOL 83 ML: 755 INJECTION, SOLUTION INTRAVENOUS at 18:11

## 2017-09-25 RX ADMIN — IPRATROPIUM BROMIDE AND ALBUTEROL SULFATE 6 ML: .5; 3 SOLUTION RESPIRATORY (INHALATION) at 17:08

## 2017-09-25 RX ADMIN — SODIUM CHLORIDE 90 ML: 9 INJECTION, SOLUTION INTRAVENOUS at 18:11

## 2017-09-25 RX ADMIN — SODIUM CHLORIDE, POTASSIUM CHLORIDE, SODIUM LACTATE AND CALCIUM CHLORIDE 2825 ML: 600; 310; 30; 20 INJECTION, SOLUTION INTRAVENOUS at 19:32

## 2017-09-25 RX ADMIN — SODIUM CHLORIDE 1000 ML: 9 INJECTION, SOLUTION INTRAVENOUS at 17:52

## 2017-09-25 RX ADMIN — CEFEPIME HYDROCHLORIDE 2 G: 2 INJECTION, POWDER, FOR SOLUTION INTRAVENOUS at 18:35

## 2017-09-25 ASSESSMENT — ENCOUNTER SYMPTOMS
FATIGUE: 1
FEVER: 0
SHORTNESS OF BREATH: 1
CHILLS: 1
COUGH: 1

## 2017-09-25 NOTE — ED PROVIDER NOTES
History     Chief Complaint:  Shortness of Breath    HPI   Nicole Reyes is a 61 year old female who presents with shortness of breath. History is obtained from the patient as well as her 2 daughters who are present at bedside.  Patient was admitted to [facility] from -, with a diagnosis of pneumonia and septic shock, for which she was treated on levofloxacin x 7 days, and discharged back home on  with orders for home O2 (3L NC x 24 hrs).  Since discharge, she has been staying at home, though family acknowledges she hasn't had much activity and continues to be quite fatigued and short of breath.  Pt notes ongoing shortness of breath, cough, pleuritic pain with deep inspiration and associated chills.  She denies any known fevers, denies prior hx of PE/DVT, is not on anticoagulation chronically.  Family checked on the patient today, and found her to be cyanotic and ashen gray.  They note that when she turns over in bed, she will rip the nasal cannula from the oximeter, thus not delivering medications.  Family also acknowledges her eyes seem more swollen, though are unaware of any new medications aside from the antibiotic.  No associated vomiting, diarrhea, nor abdominal pain per patient.    Allergies:  Codeine  Penicillins      Medications:    Oxycodone  Levaquin  Aspirin  Metformin  Klonopin  Flexeril  Ambien  Elavil  Neurontin  Cozaar  Zoloft  Zocor  Protonix    Past Medical History:    CKD, Stage III  Hypertension  Anxiety  GERD  Eczema  Anemia  Sepsis  Hernia   Hypertension  Insomnia  Iron Deficiency  Depression  Menopause  Obesity  CRISTÓBAL  Postoperative Seroma  Hypercholesterolemia  RLS  Diabetes Mellitus, Type II    Past Surgical History:    Breast biopsy x 2  Hernia Repair  Lymph Node Biopsy in Neck   section x 3  D&C, Hysteroscopy Diagnostic  EGD, Combined x 2  Herniorrhaphy Incisional x 2    Family History:    CHF  Hypertension  CAD  Cancer - Hodgkin's,  Leukemia  Diabetes  Lupus  Lipids    Social History:  Presents with 2 female companions   Tobacco use: former smoker, QD: 3/18/1979  Alcohol use: yes, twice a month  PCP: Pari Wiley    Marital Status:        Review of Systems   Constitutional: Positive for chills and fatigue. Negative for fever.   Respiratory: Positive for cough and shortness of breath.    Cardiovascular: Positive for chest pain.   All other systems reviewed and are negative.    Physical Exam     Patient Vitals for the past 24 hrs:   BP Temp Temp src Pulse Heart Rate Resp SpO2 Weight   09/25/17 2300 105/59 - - - 80 - 99 % -   09/25/17 2230 127/75 - - - 77 - 93 % -   09/25/17 2200 128/75 - - - 78 - 95 % -   09/25/17 2145 - - - - 77 - 94 % -   09/25/17 2130 131/75 - - - 79 - 93 % -   09/25/17 2115 133/84 - - - 79 - 94 % -   09/25/17 2100 - - - - 80 - 99 % -   09/25/17 2045 - - - - 82 - 100 % -   09/25/17 2030 - - - - 81 - 98 % -   09/25/17 2015 - - - - 83 - 98 % -   09/25/17 2000 - - - - 84 - 98 % -   09/25/17 1945 - - - - 87 - 100 % -   09/25/17 1930 145/86 - - - 81 - 98 % -   09/25/17 1915 143/85 - - - 84 - 99 % -   09/25/17 1900 148/84 - - - 84 - 100 % -   09/25/17 1845 (!) 145/94 - - - 88 - 97 % -   09/25/17 1830 141/79 - - - - - - -   09/25/17 1800 - - - - - - 95 % -   09/25/17 1757 - - - - - - 99 % -   09/25/17 1756 - - - - - - 96 % -   09/25/17 1755 132/80 - - - - - 96 % -   09/25/17 1745 - - - - - - 94 % -   09/25/17 1730 - - - - - - 96 % -   09/25/17 1658 128/72 98.2  F (36.8  C) Temporal 108 - 30 (!) 79 % 127.5 kg (281 lb)           Physical Exam  General:                        Well-nourished                        Speaking in full sentences                        Obese appearing elderly female                        Appears fatigued  Eyes:                        Conjunctiva without injection or scleral icterus                        PERRL                        Bilateral france-orbital swelling  ENT:                        Moist  mucous membranes                        Posterior oropharynx clear without erythema or exudate                        Nares patent                        Pinnae normal  Neck:                        Full ROM                        No stiffness appreciated  Resp:                        Wheezing noted bilaterally             Diminished breath sounds to R side  CV:                                        Tachycardic rate, regular rhythm                        S1 and S2 present                        No murmur, gallop or rub  GI:                        BS present                        Abdomen soft without distention                        Non-tender to light and deep palpation                        No guarding or rebound tenderness  Skin:                        Warm, dry, well perfused                        No rashes or open wounds on exposed skin  MSK:                        Moves all extremities                        No focal deformities or swelling  Neuro:                        Alert                        Answers questions appropriately                        Moves all extremities equally  Psych:                        Normal affect, normal mood    Emergency Department Course   Imaging:  Radiographic findings were communicated with the patient who voiced understanding of the findings.    Chest XR, PA & LAT:   IMPRESSION: Persistent and increasing right lung infiltrate suggesting pneumonia.      CT Chest with IV Contrast:   IMPRESSION:   1. No evidence of pulmonary embolism.  2. Diffuse atelectasis of both lungs with possible mild consolidation in the right parahilar region. Previously seen small right lung nodules are not currently demonstrated, likely obscured by the more  prominent atelectasis and consolidation.  3. Small right pleural effusion.     Results per radiology.     Laboratory:  CBC: WBC 23.0 (H), HGB 9.3 (L) o/w WNL ()    CMP: (Creatinine 1.07 (H)), Glucose 116 (H), GFR 52 (L), Albumin 3.1  (L), Alkphos 203 (H), AST 78 (H) o/w WNL   1709 Lactic Acid: 4.7 (HH)   1940 Lactic Acid: 2.7 (H)  Blood Gas Venous and Oxyhgb: pH 7.26 (L), 65 (H), Bicarbonate 29 (H) o/w WNL  Blood Culture: Pending     UA with micro: <1 renal tubular epi, mucous present, 24 hyaline casts o/w negative   Urine Culture: Pending    Interventions:1708: Duoneb 6 mL Nebulization  1759: Vancocin 3,000 mg Intermittent Infusion IV  1831: Benadryl. 25 mg IV  1835: Maxipime 2 g IV  1932: Lactated Ringers Bolus 2,825  ML IV    Emergency Department Course:  Past medical records, nursing notes, and vitals reviewed.  1657: I performed an exam of the patient and obtained history, as documented above.  IV inserted and blood drawn.   Above interventions provided.  Patient was transferred from Rm 6 to Rm 11.  Duoneb administered as well as supplemental O2  Patient transitioned to BiPAP, which she tolerated well, and was noting improvement in WOB  The patient was sent for a chest xray and chest CT while in the emergency department, findings above.   1801: I rechecked the patient who states she is feeling better.   1850: I rechecked the patient.  1928: I spoke with Dr. Becerra from [facility] regarding this patient.   1945: I rechecked the patient who once again states she is feeling well.  2048: I spoke to  about the patient.  Vent settings adjusted.  2213: I rechecked the patient.   I personally reviewed the laboratory results with the Patient and answered all related questions prior to transfer.   Findings and plan explained to the Patient.  Patient will be transferred to [facility] via EMS. Discussed the case with Dr. Becerra, who will admit the patient to a monitored bed for further monitoring, evaluation, and treatment.      Impression & Plan    CMS Diagnoses:   The patient has signs of Septic Shock as evidenced by:    1. Presence of Sepsis, AND  2. Lactic Acid level >4    Time sepsis diagnosis confirmed = 1731 as this was the time whenLactate was  resulted and the level was >4    3 Hour Septic Shock Bundle Completion:  1. Initial Lactic Acid Result:   Recent Labs   Lab Test  09/25/17   1940  09/25/17   1709  09/22/17   0100   LACT  2.7*  4.7*  0.7     2. Blood Cultures before Antibiotics: Yes  3. Broad Spectrum Antibiotics Administered: Yes     Anti-infectives     None        4. 3825 mL      6 Hour Severe Sepsis Bundle Completion:  1. Repeat Lactic Acid Level: 2.7  2. MAP>65 after initial IVF bolus, will continue to monitor fluid status and vital signs  I attest to having performed a repeat sepsis exam and assessment of perfusion at 1945 and the results demonstrate improved perfusion.    Medical Decision Making:  Nicole Reyes is a 61 year old female presenting to the ED for evaluation of shortness of breath. Her vital signs on presentation are remarkable for oxygen saturation of 79% on 3 L per nasal canula. Prior records are reviewed extensively including recent hospitalization at this facility from 9/19 - 9/23 for pneumonia for which she was treated with Levaquin. At present symptoms are most consistent with HCAP and subsequent septic shock. Imaging studies, including chest xray and CT of the chest confirm the presence of right lung infiltrate in both the upper and lower lobes. Historical features include subjective fevers, chills, as well as leukocytosis with a WBC of 23 and elevated lactate of 4.7 Patient is meeting criteria for septic shock. She was started on IV fluids (30 cc/kg per sepsis protocol) and maintained adequate MAP goals and perfusion. Broad spectrum antibiotics were initiated including Cefepime and Vancomycin. I strongly considered PE in light of patient's recent hospitalization. CT of the chest demonstrates no findings of PE, although again demonstrates findings consistent with pneumonia and a small right pleural effusion. Patient's respiratory status was tenuous initially and she was subsequently placed on BiPAP, which she tolerated  without difficulty. On serial evaluations she has notes significant improvement in symptoms as well as work of breathing.  Initial VBG revealed pCO2 of 65, which increased to 73 on later readings.  Patient continued to remain alert, answering questions appropriately, and ventilator settings were adjusted with goal of increased MV.  Multiple attempts at ABG were unsuccessful. Patient will be admitted to the ICU although given limited bed availability at this as well as outlying [facility], patient will be transferred to [facility]. Results of above CT scan were reviewed with family and patient, including the identification of previous lung nodules on CT scan which were likely obscured by current infiltrate, which will require ongoing surveillance.  The case was discussed with Dr. Becerra who has accepted the patient for ICU care. The patient will be transferred via [facility] ambulance.  Patient was transferred in critical yet stable condition to [facility] ICU. Family members were updated at bedside and are agreeable. All questions answered prior to transfer of care.    Critical Care time was 65 minutes for this patient excluding procedures.      Diagnosis:    ICD-10-CM   1. HCAP (healthcare-associated pneumonia) J18.9   2. Septic shock (H) A41.9       Disposition:  Transferred to [facility].      9/25/2017   [facility] EMERGENCY DEPARTMENT  IAriana, arnaud serving as a scribe at 4:57 PM on 9/25/2017 to document services personally performed by Tal Reddy MD based on my observations and the provider's statements to me.       Tal Reddy MD  09/26/17 1633

## 2017-09-25 NOTE — ED NOTES
61-year-old female presents to the ER with complaints shortness of breath. Pt was recently discharged with pneumonia and sepsis. Her daughters brought her back in today because pt won't keep her oxygen on when she is sleeping and they found her grayish/blue and foaming at the mouth. Pt is alert but lethargic and falls asleep during triage. Pt states she took 2 Klonopin about 0330 this morning. Pt moved self from wheelchair to bed. sats were found to be in the 70s-80s. O2 placed per oxymask. Pt was sent home on 3L NC per home O2 tank. ER MD and RN x2 were called to the room to take over patient care.

## 2017-09-25 NOTE — TELEPHONE ENCOUNTER
Patient's daughter picked up the phone. Patient is currently sleeping. Writer gave daughter number to call clinic back at. Daughter verbalized understanding.

## 2017-09-25 NOTE — TELEPHONE ENCOUNTER
IP F/U    Date: 09/23/17  Diagnosis: Septic Shock, Acute Bilateral Thoracic Back Pain  Is patient active in care coordination? No. Declined  Was patient in TCU? No

## 2017-09-26 NOTE — ED NOTES
Per patients family, patient is pulling at catheter. Does not want in place. I explained to patient and family the reason for catheter and stressed the importance of having catheter to monitor UOP as patient is receiving IVF and she has not voided in over 4 hours. Patient continues to pull at catheter and wanting it removed.

## 2017-09-26 NOTE — ED NOTES
"Patient is very upset that she has sanchez catheter in place. Is pulling at it, stating she is going to pull it out. Family is concerned that catheter is \"not in the right place.\" Family reassured as patient has had 800mls of UOP since catheter placed. MD made aware and due to patients elevated CO2, it is not appropriate to use sedating medications at this time. Per MD, if patient is adamant, catheter may be removed. Patient is insistent that catheter be taken out. Removed prior to transfer to [facility].   "

## 2017-09-26 NOTE — PROGRESS NOTES
Attempted ABG from radial artery twice. Unable to obtain. Patient very agitated.    Alisha Mitchell  September 25, 2017.11:25 PM         
RT NOTE: Pt placed on BIPAP per orders. Pt appears to be tolerating well. Will continue to follow and monitor.  Marleen Glover    
no indicators present

## 2017-09-27 LAB
BACTERIA SPEC CULT: NO GROWTH
Lab: NORMAL
SPECIMEN SOURCE: NORMAL

## 2017-09-30 ENCOUNTER — DOCUMENTATION ONLY (OUTPATIENT)
Dept: CARE COORDINATION | Facility: CLINIC | Age: 62
End: 2017-09-30

## 2017-09-30 NOTE — PROGRESS NOTES
[facility] now requests orders and shares plan of care/discharge summaries for some patients through [facility].  Please REPLY TO THIS MESSAGE in order to give authorization for orders when needed.  This is considered a verbal order, you will still receive a faxed copy of orders for signature.  Thank you for your assistance in improving collaboration for our patients.    ORDERS SN 2w1, 3w1, 2w1, 1w1, 2PRN for medication education and management, antibiotic infusion therapy, line cares, CV assessment, disesa emanagement and education, diet and nutrition, and lab draws as ordered  PT 1d1 to evaluate and treat to include strength and mobility, home safety, home exercise program, and endurance. Evalaution to be completed after 10/1/2017.  Care coordination orders with [facility] for Midline IV catheter as ordered by Dr. Tello Frank  Anticipated length of therapy Cefepime through 10/3/2017  Labs may be drawn from venous catheter  RN will do site care to the access device per [facility] policy and procedure  Lab draw done routinely on the following day of the week Monday  Lab draw for the first time to be done on the following date 10/02/2017  ONCE A WEEK   ALT, alkaline phosphatase, AST, total and direct bilirubin, BUN, serum  creatinine, CBC d/p, CRP inflammation, ESR, LFTs.  Pharmacist to monitor lab results and adjust IV medication doses per I policy  Use Alteplase per [facility] policy to treat clotted venous catheter. Alteplase use in pregnancy is contraindicated, patient must be  seen in controlled setting if unable to administer meds/flush IV catheter.

## 2017-10-01 LAB
BACTERIA SPEC CULT: NO GROWTH
BACTERIA SPEC CULT: NO GROWTH
Lab: NORMAL
Lab: NORMAL
SPECIMEN SOURCE: NORMAL
SPECIMEN SOURCE: NORMAL

## 2017-10-02 ENCOUNTER — DOCUMENTATION ONLY (OUTPATIENT)
Dept: CARE COORDINATION | Facility: CLINIC | Age: 62
End: 2017-10-02

## 2017-10-02 LAB
ALBUMIN SERPL-MCNC: 3.1 G/DL (ref 3.4–5)
ALP SERPL-CCNC: 119 U/L (ref 40–150)
ALT SERPL W P-5'-P-CCNC: 19 U/L (ref 0–50)
AST SERPL W P-5'-P-CCNC: 17 U/L (ref 0–45)
BASOPHILS # BLD AUTO: 0 10E9/L (ref 0–0.2)
BASOPHILS NFR BLD AUTO: 0.4 %
BILIRUB DIRECT SERPL-MCNC: <0.1 MG/DL (ref 0–0.2)
BILIRUB SERPL-MCNC: 0.4 MG/DL (ref 0.2–1.3)
BUN SERPL-MCNC: 10 MG/DL (ref 7–30)
CREAT SERPL-MCNC: 0.71 MG/DL (ref 0.52–1.04)
CRP SERPL-MCNC: 23.9 MG/L (ref 0–8)
DIFFERENTIAL METHOD BLD: ABNORMAL
EOSINOPHIL # BLD AUTO: 1.1 10E9/L (ref 0–0.7)
EOSINOPHIL NFR BLD AUTO: 10 %
ERYTHROCYTE [DISTWIDTH] IN BLOOD BY AUTOMATED COUNT: 19.1 % (ref 10–15)
ERYTHROCYTE [SEDIMENTATION RATE] IN BLOOD BY WESTERGREN METHOD: 18 MM/H (ref 0–30)
GFR SERPL CREATININE-BSD FRML MDRD: 83 ML/MIN/1.7M2
HCT VFR BLD AUTO: 35.1 % (ref 35–47)
HGB BLD-MCNC: 10.3 G/DL (ref 11.7–15.7)
IMM GRANULOCYTES # BLD: 0.1 10E9/L (ref 0–0.4)
IMM GRANULOCYTES NFR BLD: 1.3 %
LYMPHOCYTES # BLD AUTO: 2 10E9/L (ref 0.8–5.3)
LYMPHOCYTES NFR BLD AUTO: 18.5 %
MCH RBC QN AUTO: 21.4 PG (ref 26.5–33)
MCHC RBC AUTO-ENTMCNC: 29.3 G/DL (ref 31.5–36.5)
MCV RBC AUTO: 73 FL (ref 78–100)
MONOCYTES # BLD AUTO: 0.7 10E9/L (ref 0–1.3)
MONOCYTES NFR BLD AUTO: 6.4 %
NEUTROPHILS # BLD AUTO: 6.9 10E9/L (ref 1.6–8.3)
NEUTROPHILS NFR BLD AUTO: 63.4 %
NRBC # BLD AUTO: 0 10*3/UL
NRBC BLD AUTO-RTO: 0 /100
PLATELET # BLD AUTO: 353 10E9/L (ref 150–450)
PROT SERPL-MCNC: 7.3 G/DL (ref 6.8–8.8)
RBC # BLD AUTO: 4.82 10E12/L (ref 3.8–5.2)
WBC # BLD AUTO: 10.8 10E9/L (ref 4–11)

## 2017-10-02 PROCEDURE — 80076 HEPATIC FUNCTION PANEL: CPT | Performed by: INTERNAL MEDICINE

## 2017-10-02 PROCEDURE — 86140 C-REACTIVE PROTEIN: CPT | Performed by: INTERNAL MEDICINE

## 2017-10-02 PROCEDURE — 85025 COMPLETE CBC W/AUTO DIFF WBC: CPT | Performed by: INTERNAL MEDICINE

## 2017-10-02 PROCEDURE — 84520 ASSAY OF UREA NITROGEN: CPT | Performed by: INTERNAL MEDICINE

## 2017-10-02 PROCEDURE — 82565 ASSAY OF CREATININE: CPT | Performed by: INTERNAL MEDICINE

## 2017-10-02 PROCEDURE — 85652 RBC SED RATE AUTOMATED: CPT | Performed by: INTERNAL MEDICINE

## 2017-10-02 NOTE — PROGRESS NOTES
[facility] now requests orders and shares plan of care/discharge summaries for some patients through [employer].  Please REPLY TO THIS MESSAGE in order to give authorization for orders when needed.  This is considered a verbal order, you will still receive a faxed copy of orders for signature.  Thank you for your assistance in improving collaboration for our patients.    ORDER request  PT 1w1, 2w2 for progressive HEP for strength, endurance, balance, gait/stair/transfer training with 2ww and spc for home safety. The plan will also include falls risk assessment and falls prevention plan, monitor and treat pain, monitor skin integrity, monitor for s/s of depression, diabetic foot care, to achieve the following goals 1. Pt will demo safe and ind use of 2ww and O2 for room to amb in home within 2 weeks.   2. Pt will demo safe and ind abililty to amb out of home through garage mod ind with 2ww and ramp within 10 days.   3. Pt will demo safe ability to amb with 2ww and supervision at least 175 ft outdoors within 3 weeks.   4. Pt will be ind with HEP by dc for ongoing functional gains.   RENETTA MelendezT

## 2017-10-04 ENCOUNTER — TELEPHONE (OUTPATIENT)
Dept: OTHER | Facility: CLINIC | Age: 62
End: 2017-10-04

## 2017-10-04 ENCOUNTER — TELEPHONE (OUTPATIENT)
Dept: INTERNAL MEDICINE | Facility: CLINIC | Age: 62
End: 2017-10-04

## 2017-10-04 ENCOUNTER — CARE COORDINATION (OUTPATIENT)
Dept: CARE COORDINATION | Facility: CLINIC | Age: 62
End: 2017-10-04

## 2017-10-04 DIAGNOSIS — E11.9 DIABETES MELLITUS (H): Primary | ICD-10-CM

## 2017-10-04 NOTE — PROGRESS NOTES
Clinic Care Coordination Contact  OUTREACH    Referral Information:  Referral Source: [employer]  Reason for Contact: Product navigator        Universal Utilization:   ED Visits in last year: 3  Hospital visits in last year: 2  Last PCP appointment: 08/18/17  Missed Appointments: 0          Clinical Concerns:    Current Medical Concerns: Patient was inpatient at [facility] from 9/19/17 to 9/23/17 with pneumonia and sepsis. Patient had been having shortness of breath with exertion for several weeks before admission.  She returned to ED on 9/25/17 with increased shortness of breath, again had sepsis and was transferred to [facility] ICU from 9/25/17 to 9/29/17.       Patient was discharged to home with IV antibiotic infusion and home care all with [facility].     IV antibiotics were discontinued on 10/3/17. Patient continues with  home care for nursing, PT and OT.      Patient is tired and feels weak.  She states she has a history of pneumonia, usually once a year, but this was the worst.     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 Currently is not working, has been on medical leave.      Resources and Interventions:  Current Resources: Home Care;  [facility]      Patient/Caregiver understanding: Patient expresses understanding    Frequency of Care Coordination:  (na)    Upcoming appointment: 10/05/17     Plan: Patient is scheduled to see PCP on 10/5/17 for follow up. She feels she has all the services  she needs currently. Provided clinic care coordinator contact information. Will not open to active care coordination at this time.     Teresa Samayoa RN, CCM - Care Coordinator     10/4/2017    9:33 AM  [phone]

## 2017-10-04 NOTE — TELEPHONE ENCOUNTER
Clinical Product Navigator RN reviewed chart; patient on payer product coverage.  Review results: Met referral criteria for Care Coordinator; referral to be sent.    Pt has had two recent IP admission for pneumonia and sepsis.  Pt has had a change in function.    Rebeca Jaramillo RN/Clinical Product Navigator

## 2017-10-04 NOTE — TELEPHONE ENCOUNTER
[facility] and [facility].  Put in Dr. Wiley's in basket.

## 2017-10-05 ENCOUNTER — OFFICE VISIT (OUTPATIENT)
Dept: INTERNAL MEDICINE | Facility: CLINIC | Age: 62
End: 2017-10-05
Payer: COMMERCIAL

## 2017-10-05 VITALS
TEMPERATURE: 98.2 F | OXYGEN SATURATION: 92 % | HEART RATE: 82 BPM | BODY MASS INDEX: 50.28 KG/M2 | WEIGHT: 266.1 LBS | DIASTOLIC BLOOD PRESSURE: 66 MMHG | SYSTOLIC BLOOD PRESSURE: 122 MMHG

## 2017-10-05 DIAGNOSIS — R06.00 DYSPNEA, UNSPECIFIED TYPE: ICD-10-CM

## 2017-10-05 DIAGNOSIS — F41.1 GENERALIZED ANXIETY DISORDER: ICD-10-CM

## 2017-10-05 DIAGNOSIS — A41.9 SEPSIS, DUE TO UNSPECIFIED ORGANISM: Primary | ICD-10-CM

## 2017-10-05 DIAGNOSIS — I10 ESSENTIAL HYPERTENSION, BENIGN: ICD-10-CM

## 2017-10-05 DIAGNOSIS — D50.9 IRON DEFICIENCY ANEMIA, UNSPECIFIED IRON DEFICIENCY ANEMIA TYPE: ICD-10-CM

## 2017-10-05 DIAGNOSIS — R53.1 GENERALIZED WEAKNESS: ICD-10-CM

## 2017-10-05 DIAGNOSIS — K21.9 GASTROESOPHAGEAL REFLUX DISEASE WITHOUT ESOPHAGITIS: ICD-10-CM

## 2017-10-05 DIAGNOSIS — F33.1 MAJOR DEPRESSIVE DISORDER, RECURRENT EPISODE, MODERATE (H): ICD-10-CM

## 2017-10-05 DIAGNOSIS — J96.01 ACUTE RESPIRATORY FAILURE WITH HYPOXIA (H): ICD-10-CM

## 2017-10-05 DIAGNOSIS — J18.9 PNEUMONIA DUE TO INFECTIOUS ORGANISM, UNSPECIFIED LATERALITY, UNSPECIFIED PART OF LUNG: ICD-10-CM

## 2017-10-05 PROCEDURE — 99496 TRANSJ CARE MGMT HIGH F2F 7D: CPT | Performed by: INTERNAL MEDICINE

## 2017-10-05 NOTE — MR AVS SNAPSHOT
After Visit Summary   10/5/2017    Nicole Reyes    MRN: [id number]           Patient Information     Date Of Birth          1955        Visit Information        Provider Department      10/5/2017 2:40 PM Pari Wiley MD [facility]        Today's Diagnoses     GENERALIZED ANXIETY DIS        Major depressive disorder, recurrent episode, moderate (H)        Gastroesophageal reflux disease without esophagitis        Essential hypertension, benign           Follow-ups after your visit        Follow-up notes from your care team     Return in about 6 weeks (around 11/16/2017) for Lab Work.      Who to contact     If you have questions or need follow up information about today's clinic visit or your schedule please contact [facility] directly at [phone].  Normal or non-critical lab and imaging results will be communicated to you by [employer], letter or phone within 4 business days after the clinic has received the results. If you do not hear from us within 7 days, please contact the clinic through [employer] or phone. If you have a critical or abnormal lab result, we will notify you by phone as soon as possible.  Submit refill requests through [facility] or call your pharmacy and they will forward the refill request to us. Please allow 3 business days for your refill to be completed.          Additional Information About Your Visit        MyChart Information     [facility] gives you secure access to your electronic health record. If you see a primary care provider, you can also send messages to your care team and make appointments. If you have questions, please call your primary care clinic.  If you do not have a primary care provider, please call [phone] and they will assist you.        Care EveryWhere ID     This is your Care EveryWhere ID. This could be used by other organizations to access your [facility] medical records  [id number]        Your Vitals Were      Pulse Temperature Last Period Pulse Oximetry BMI (Body Mass Index)       82 98.2  F (36.8  C) (Oral) 09/15/2007 92% 50.28 kg/m2        Blood Pressure from Last 3 Encounters:   10/05/17 122/66   09/25/17 105/59   09/23/17 130/59    Weight from Last 3 Encounters:   10/05/17 266 lb 1.6 oz (120.7 kg)   09/25/17 281 lb (127.5 kg)   09/23/17 285 lb (129.3 kg)              Today, you had the following     No orders found for display         Today's Medication Changes          These changes are accurate as of: 10/5/17  3:31 PM.  If you have any questions, ask your nurse or doctor.               These medicines have changed or have updated prescriptions.        Dose/Directions    clonazePAM 1 MG tablet   Commonly known as:  klonoPIN   This may have changed:  additional instructions   Used for:  Generalized anxiety disorder        1 tab po qam, 1/2 tab po qpm   Quantity:  45 tablet   Refills:  0       zolpidem 10 MG tablet   Commonly known as:  AMBIEN   This may have changed:  how much to take   Used for:  Insomnia, unspecified type        Dose:  10 mg   Take 1 tablet (10 mg) by mouth nightly as needed for sleep   Quantity:  30 tablet   Refills:  5         Stop taking these medicines if you haven't already. Please contact your care team if you have questions.     oxyCODONE 5 MG IR tablet   Commonly known as:  ROXICODONE   Stopped by:  Pari Wiley MD                    Primary Care Provider Office Phone # Fax #    Pari Wiley [phone]       [address] [place] [address]        Equal Access to Services     [facility]: Hadii paddy ku hadasho Soomaali, waaxda luqadaha, qaybta kaalmada adederik mcdaniel idiin hayaan adeeg kharash la'aan . So [facility] [place] [facility] [phone].    ATENCIÓN: Si habla español, tiene a tovar disposición servicios gratuitos de asistencia lingüística. Llame al [phone].    We comply with applicable federal civil rights laws and [place] laws. We do not discriminate on the basis  of race, color, national origin, age, disability, sex, sexual orientation, or gender identity.            Thank you!     Thank you for choosing [facility]  for your care. Our goal is always to provide you with excellent care. Hearing back from our patients is one way we can continue to improve our services. Please take a few minutes to complete the written survey that you may receive in the mail after your visit with us. Thank you!             Your Updated Medication List - Protect others around you: Learn how to safely use, store and throw away your medicines at www.disposemymeds.org.          This list is accurate as of: 10/5/17  3:31 PM.  Always use your most recent med list.                   Brand Name Dispense Instructions for use Diagnosis    ascorbic acid 500 MG Tabs      Take 1 tablet by mouth every other day with iron capsule        ASPIRIN EC PO      Take 81 mg by mouth every evening        clonazePAM 1 MG tablet    klonoPIN    45 tablet    1 tab po qam, 1/2 tab po qpm    Generalized anxiety disorder       cyclobenzaprine 10 MG tablet    FLEXERIL    90 tablet    Take 1 tablet (10 mg) by mouth 3 times daily as needed for muscle spasms    Chronic low back pain with sciatica, sciatica laterality unspecified, unspecified back pain laterality       FREESTYLE LITE test strip   Generic drug:  blood glucose monitoring     100 Strip    Test twice a day    Type 2 diabetes, HbA1c goal < 7% (H)       gabapentin 300 MG capsule    NEURONTIN    90 capsule    Take 1 capsule (300 mg) by mouth daily    Restless legs syndrome (RLS), Persistent insomnia       ibuprofen 600 MG tablet    ADVIL/MOTRIN    30 tablet    Take 1 tablet (600 mg) by mouth every 6 hours as needed for moderate pain        Iron Polysacch Zurec-L94--0.025-1 MG Caps      Take 1 capsule by mouth every other day        losartan 100 MG tablet    COZAAR    90 tablet    Take 1 tablet (100 mg) by mouth daily    Essential hypertension,  benign       metFORMIN 750 MG 24 hr tablet    GLUCOPHAGE-XR     Take 750 mg by mouth daily (with breakfast)        Multi-vitamin Tabs tablet   Generic drug:  multivitamin, therapeutic with minerals      Take 1 tablet by mouth At Bedtime        OMEGA-3 FISH OIL PO      Take 2 g by mouth once a week at bedtime        pantoprazole 40 MG EC tablet    PROTONIX    90 tablet    Take 1 tablet (40 mg) by mouth At Bedtime    Gastroesophageal reflux disease without esophagitis       sertraline 100 MG tablet    ZOLOFT    180 tablet    Take 2 tablets (200 mg) by mouth daily Increased dose. Can use up tablets at home first.    Generalized anxiety disorder, Major depressive disorder, recurrent episode, moderate (H)       simvastatin 20 MG tablet    ZOCOR    90 tablet    Take 1 tablet (20 mg) by mouth At Bedtime    Hyperlipidemia LDL goal <70       zolpidem 10 MG tablet    AMBIEN    30 tablet    Take 1 tablet (10 mg) by mouth nightly as needed for sleep    Insomnia, unspecified type

## 2017-10-05 NOTE — PROGRESS NOTES
SUBJECTIVE:   Nicole Reyes is a 61 year old female who presents to clinic today for the following health issues:          Hospital Follow-up Visit:    Hospital/Nursing Home/IP Rehab Facility: [facility]   Date of Admission: 09/26/2017  Date of Discharge: 09/29/2017  Reason(s) for Admission: sepsis, pneumonia            Problems taking medications regularly:  None       Medication changes since discharge: yes psych med's changed       Problems adhering to non-medication therapy:  None    Summary of hospitalization:  Discharge summary unavailable  Diagnostic Tests/Treatments reviewed.  Follow up needed: none  Other Healthcare Providers Involved in Patient s Care:         Homecare  Update since discharge: mildly improved     She was first admitted to [facility] on 9/19 through 9/23/17 due to sepsis, pneumonia and respiratory failure. She was discharged home but did not improve then acutely worsened on 9/26. She was taken back to ED and transferred to [facility] to ICU as no bed available out here. She does not remember much about the first few days. She did not require ventilator.   She had IV antibiotic, completed and PICC was removed yesterday. She has one more day levaquin.   She had low hgb, 7.3,  taking iron daily now.   Her O2 is at 3 liters. The nurse checked on 2.5 liters yesterday but still dropped a lot with PT so back up to 3 L. Her sats at rest are 95-96%.   She is getting PT 2 times a week, still very weak and very low endurance. She still has dyspnea with minimal activity.   She was given leave until 10/26/17.   She has mild itchy rash,improving.   She is eating okay, appetite better.     Patient Active Problem List   Diagnosis     Essential hypertension, benign     Anemia, iron deficiency     Restless leg syndrome     CRISTÓBAL (obstructive sleep apnea)     CKD (chronic kidney disease) stage 3, GFR 30-59 ml/min     Advanced directives, counseling/discussion     GENERALIZED ANXIETY DIS     Major depressive  disorder, recurrent episode, moderate (H)     Health Care Home     GERD (gastroesophageal reflux disease)     Hyperlipidemia LDL goal <70     Eczema     Type 2 diabetes mellitus with stage 3 chronic kidney disease (HCC)     Midline low back pain with left-sided sciatica     Morbid obesity (HCC)     Insomnia, unspecified type     Dyspnea, unspecified type     Sepsis (H)     Current Outpatient Prescriptions   Medication Sig Dispense Refill     sertraline (ZOLOFT) 100 MG tablet Take 2 tablets (200 mg) by mouth daily 180 tablet 3     pantoprazole (PROTONIX) 40 MG EC tablet Take 1 tablet (40 mg) by mouth At Bedtime 90 tablet 3     losartan (COZAAR) 100 MG tablet Take 1 tablet (100 mg) by mouth daily 90 tablet 3     clonazePAM (KLONOPIN) 1 MG tablet 0.5 tab po qam, 0.5 tab po qpm 30 tablet 5     Iron Polysacch Xxzjo-V92--0.025-1 MG CAPS Take 1 capsule by mouth every other day       ascorbic acid 500 MG TABS Take 1 tablet by mouth every other day with iron capsule       ASPIRIN EC PO Take 81 mg by mouth every evening       metFORMIN (GLUCOPHAGE-XR) 750 MG 24 hr tablet Take 750 mg by mouth daily (with breakfast)       cyclobenzaprine (FLEXERIL) 10 MG tablet Take 1 tablet (10 mg) by mouth 3 times daily as needed for muscle spasms 90 tablet 5     zolpidem (AMBIEN) 10 MG tablet Take 1 tablet (10 mg) by mouth nightly as needed for sleep (Patient taking differently: Take 5 mg by mouth nightly as needed for sleep ) 30 tablet 5     gabapentin (NEURONTIN) 300 MG capsule Take 1 capsule (300 mg) by mouth daily 90 capsule 3     ibuprofen (ADVIL,MOTRIN) 600 MG tablet Take 1 tablet (600 mg) by mouth every 6 hours as needed for moderate pain 30 tablet 1     Multiple Vitamin (MULTI-VITAMIN) per tablet Take 1 tablet by mouth At Bedtime        FREESTYLE LITE TEST VI STRP Test twice a day 100 Strip 12     simvastatin (ZOCOR) 20 MG tablet Take 1 tablet (20 mg) by mouth At Bedtime 90 tablet 3     Omega-3 Fatty Acids (OMEGA-3 FISH OIL PO)  Take 2 g by mouth once a week at bedtime        Social History   Substance Use Topics     Smoking status: Former Smoker     Quit date: 3/18/1979     Smokeless tobacco: Never Used      Comment: quit 1979     Alcohol use Yes      Comment: Twice a month        ROS:   No fever, chills, chest pain, minimal cough, no nausea, vomiting, abdominal pain, no diarrhea.     Objective:  Patient alert in NAD  /66  Pulse 82  Temp 98.2  F (36.8  C) (Oral)  Wt 266 lb 1.6 oz (120.7 kg)  LMP 09/15/2007  SpO2 92%  BMI 50.28 kg/m2     Lungs: decreased breath sounds, few crackles bases     ASSESSMENT:   (A41.9) Sepsis, due to unspecified organism (H)  (primary encounter diagnosis)  Comment: resolving  Plan: monitor for fever, decline in status as antibiotic is completed    (J18.9) Pneumonia due to infectious organism, unspecified laterality, unspecified part of lung  Comment: improving  Plan: finish antibiotic.   Consider repeat CT or CXR in a few months.     (J96.01) Acute respiratory failure with hypoxia (H)  Comment: mild improvement  Plan: continue oxygen, remain off work, may take a few montsh    (R06.00) Dyspnea, unspecified type  Comment: due to pneumonia  Plan: work with PT    (R53.1) Generalized weakness  Comment: significant after most recent episode  Plan: continue PT      Anemia:   Continue iron, check labs in 6 weeks.         (F41.1) GENERALIZED ANXIETY DIS  Comment: stabl3  Plan: sertraline (ZOLOFT) 100 MG tablet, clonazePAM         (KLONOPIN) 1 MG tablet            (F33.1) Major depressive disorder, recurrent episode, moderate (H)  Comment: stable  Plan: sertraline (ZOLOFT) 100 MG tablet            (K21.9) Gastroesophageal reflux disease without esophagitis  Comment: stable  Plan: pantoprazole (PROTONIX) 40 MG EC tablet            (I10) Essential hypertension, benign  Comment: stable  Plan: losartan (COZAAR) 100 MG tablet                 Post Discharge Medication Reconciliation: discharge medications reconciled,  continue medications without change.  Plan of care communicated with patient and family     Coding guidelines for this visit:  Type of Medical   Decision Making Face-to-Face Visit       within 7 Days of discharge Face-to-Face Visit        within 14 days of discharge   Moderate Complexity [id number]   High Complexity [phone] [id number]            Pari Wiley MD  [facility]         Addendum: It is likely she will need to be off work for a while after this new event. I will complete paperwork indicating likely to be off until 12/1/17.

## 2017-10-08 RX ORDER — CLONAZEPAM 1 MG/1
TABLET ORAL
Qty: 30 TABLET | Refills: 5 | Status: SHIPPED | OUTPATIENT
Start: 2017-10-08 | End: 2018-03-27

## 2017-10-08 RX ORDER — LOSARTAN POTASSIUM 100 MG/1
100 TABLET ORAL DAILY
Qty: 90 TABLET | Refills: 3 | Status: SHIPPED | OUTPATIENT
Start: 2017-10-08 | End: 2018-07-07

## 2017-10-08 RX ORDER — SERTRALINE HYDROCHLORIDE 100 MG/1
200 TABLET, FILM COATED ORAL DAILY
Qty: 180 TABLET | Refills: 3 | Status: SHIPPED | OUTPATIENT
Start: 2017-10-08 | End: 2018-07-07

## 2017-10-08 RX ORDER — PANTOPRAZOLE SODIUM 40 MG/1
40 TABLET, DELAYED RELEASE ORAL AT BEDTIME
Qty: 90 TABLET | Refills: 3 | Status: SHIPPED | OUTPATIENT
Start: 2017-10-08 | End: 2018-07-07

## 2017-10-09 ENCOUNTER — MYC MEDICAL ADVICE (OUTPATIENT)
Dept: INTERNAL MEDICINE | Facility: CLINIC | Age: 62
End: 2017-10-09

## 2017-10-10 NOTE — TELEPHONE ENCOUNTER
Nothing charted that Clonazepam rx was faxed. Found rx in [employer] fax folder with message it was faxed on 10/9 to  mail order pharm. Per message pt wanted rx faxed to  [facility]. See message in my chart      Rx is is [facility] fax folder

## 2017-10-11 ENCOUNTER — TELEPHONE (OUTPATIENT)
Dept: INTERNAL MEDICINE | Facility: CLINIC | Age: 62
End: 2017-10-11

## 2017-10-11 NOTE — TELEPHONE ENCOUNTER
Pat from pts Disability insurance, ? [place] left message.     She has a few questions.   Pts prior claim was from 5/22-8/5/17.  She is asking if Dr Wiley released her to return full-time back to work? As of what date? Was this regular duty or with restrictions?    Is pt off of work again? As of what start date? It is the same conditions? New conditions?    How long is she going to be off, or is this not correct? Or is she released to go back?     OK to . [phone]  Ext [phone]

## 2017-10-13 NOTE — TELEPHONE ENCOUNTER
"Pat from [facility] Disability Claims calling for [employer] disability claims, no ANIL in EPIC, asking for dates patient was put on disability and . Advised Pat will need an ANIL before can give any information about patient. Pat will fax over a request and signed ANIL from Patient. This is to pay out patient short term disability.    Unable to locate form from date below, found a \"[employer] workability form\"  9/6/17 and form \"[employer]-Capacity Questions\" dated 9/6/17.      Contacted patient and informed of no ANIL and would need a signed ANIL if patient would like clinic to communicate with [facility].    "

## 2017-10-18 ENCOUNTER — MYC MEDICAL ADVICE (OUTPATIENT)
Dept: INTERNAL MEDICINE | Facility: CLINIC | Age: 62
End: 2017-10-18

## 2017-10-18 ENCOUNTER — TELEPHONE (OUTPATIENT)
Dept: INTERNAL MEDICINE | Facility: CLINIC | Age: 62
End: 2017-10-18

## 2017-10-18 NOTE — TELEPHONE ENCOUNTER
Gisella, [facility] calls stating pt is going to be discharged from Home care.   Goals have been met.   Weaned from Oxygen during the day.   RA 96%, does go down to 90% with ambulation. She does use Oxygen with sleeping.   Ph #[phone].

## 2017-10-20 ENCOUNTER — TELEPHONE (OUTPATIENT)
Dept: INTERNAL MEDICINE | Facility: CLINIC | Age: 62
End: 2017-10-20

## 2017-10-20 NOTE — TELEPHONE ENCOUNTER
[facility] and [facility]  Put in Dr. Wiley's in basket.

## 2017-10-23 NOTE — TELEPHONE ENCOUNTER
There are some [employer] forms scanned in from August 18, 2017. Also one scanned on 9/6/17,  under Media. Would this be it?

## 2017-10-23 NOTE — TELEPHONE ENCOUNTER
She was above to return to work on 9/5/17 but then became disabled again on 9/25/17 and would expect to be out until 12/1/17.

## 2017-10-30 ENCOUNTER — TELEPHONE (OUTPATIENT)
Dept: INTERNAL MEDICINE | Facility: CLINIC | Age: 62
End: 2017-10-30

## 2017-10-30 NOTE — TELEPHONE ENCOUNTER
A form was sent up by interoffice mail for CPAP/BIPAP orders from [facility]. I need to know if she is on BIPAP still or CPAP and the pressure level(s).

## 2017-11-01 ENCOUNTER — TELEPHONE (OUTPATIENT)
Dept: INTERNAL MEDICINE | Facility: CLINIC | Age: 62
End: 2017-11-01

## 2017-11-01 NOTE — TELEPHONE ENCOUNTER
Call back to Pat and left detailed message with Dr Wiley's message below.   Will leave open for a few days in case she calls back. Then will close.

## 2017-11-01 NOTE — TELEPHONE ENCOUNTER
Call to pt and advised.     She has BIPAP.     4.0-8.0 L. She turned it on and this comes up.

## 2017-11-02 ENCOUNTER — MEDICAL CORRESPONDENCE (OUTPATIENT)
Dept: HEALTH INFORMATION MANAGEMENT | Facility: CLINIC | Age: 62
End: 2017-11-02

## 2017-11-02 DIAGNOSIS — Z53.9 DIAGNOSIS NOT YET DEFINED: Primary | ICD-10-CM

## 2017-11-02 PROCEDURE — G0180 MD CERTIFICATION HHA PATIENT: HCPCS | Performed by: INTERNAL MEDICINE

## 2017-11-27 DIAGNOSIS — N18.30 TYPE 2 DIABETES MELLITUS WITH STAGE 3 CHRONIC KIDNEY DISEASE, WITHOUT LONG-TERM CURRENT USE OF INSULIN (H): ICD-10-CM

## 2017-11-27 DIAGNOSIS — I10 ESSENTIAL HYPERTENSION, BENIGN: ICD-10-CM

## 2017-11-27 DIAGNOSIS — D50.9 IRON DEFICIENCY ANEMIA, UNSPECIFIED IRON DEFICIENCY ANEMIA TYPE: ICD-10-CM

## 2017-11-27 DIAGNOSIS — E11.22 TYPE 2 DIABETES MELLITUS WITH STAGE 3 CHRONIC KIDNEY DISEASE, WITHOUT LONG-TERM CURRENT USE OF INSULIN (H): ICD-10-CM

## 2017-11-27 LAB
ANISOCYTOSIS BLD QL SMEAR: ABNORMAL
BASOPHILS # BLD AUTO: 0.2 10E9/L (ref 0–0.2)
BASOPHILS NFR BLD AUTO: 2 %
DIFFERENTIAL METHOD BLD: ABNORMAL
EOSINOPHIL # BLD AUTO: 0.5 10E9/L (ref 0–0.7)
EOSINOPHIL NFR BLD AUTO: 5 %
ERYTHROCYTE [DISTWIDTH] IN BLOOD BY AUTOMATED COUNT: 22.3 % (ref 10–15)
HBA1C MFR BLD: 6.2 % (ref 4.3–6)
HCT VFR BLD AUTO: 39.9 % (ref 35–47)
HGB BLD-MCNC: 12.3 G/DL (ref 11.7–15.7)
LYMPHOCYTES # BLD AUTO: 2 10E9/L (ref 0.8–5.3)
LYMPHOCYTES NFR BLD AUTO: 21 %
MACROCYTES BLD QL SMEAR: PRESENT
MCH RBC QN AUTO: 23.7 PG (ref 26.5–33)
MCHC RBC AUTO-ENTMCNC: 30.8 G/DL (ref 31.5–36.5)
MCV RBC AUTO: 77 FL (ref 78–100)
MICROCYTES BLD QL SMEAR: PRESENT
MONOCYTES # BLD AUTO: 0.5 10E9/L (ref 0–1.3)
MONOCYTES NFR BLD AUTO: 5 %
NEUTROPHILS # BLD AUTO: 6.2 10E9/L (ref 1.6–8.3)
NEUTROPHILS NFR BLD AUTO: 67 %
PLATELET # BLD AUTO: 281 10E9/L (ref 150–450)
PLATELET # BLD EST: NORMAL 10*3/UL
RBC # BLD AUTO: 5.18 10E12/L (ref 3.8–5.2)
WBC # BLD AUTO: 9.4 10E9/L (ref 4–11)

## 2017-11-27 PROCEDURE — 80048 BASIC METABOLIC PNL TOTAL CA: CPT | Performed by: INTERNAL MEDICINE

## 2017-11-27 PROCEDURE — 83550 IRON BINDING TEST: CPT | Performed by: INTERNAL MEDICINE

## 2017-11-27 PROCEDURE — 83036 HEMOGLOBIN GLYCOSYLATED A1C: CPT | Performed by: INTERNAL MEDICINE

## 2017-11-27 PROCEDURE — 83540 ASSAY OF IRON: CPT | Performed by: INTERNAL MEDICINE

## 2017-11-27 PROCEDURE — 85025 COMPLETE CBC W/AUTO DIFF WBC: CPT | Performed by: INTERNAL MEDICINE

## 2017-11-27 PROCEDURE — 36415 COLL VENOUS BLD VENIPUNCTURE: CPT | Performed by: INTERNAL MEDICINE

## 2017-11-28 LAB
ANION GAP SERPL CALCULATED.3IONS-SCNC: 10 MMOL/L (ref 3–14)
BUN SERPL-MCNC: 10 MG/DL (ref 7–30)
CALCIUM SERPL-MCNC: 8.6 MG/DL (ref 8.5–10.1)
CHLORIDE SERPL-SCNC: 103 MMOL/L (ref 94–109)
CO2 SERPL-SCNC: 28 MMOL/L (ref 20–32)
CREAT SERPL-MCNC: 0.71 MG/DL (ref 0.52–1.04)
GFR SERPL CREATININE-BSD FRML MDRD: 84 ML/MIN/1.7M2
GLUCOSE SERPL-MCNC: 127 MG/DL (ref 70–99)
IRON SATN MFR SERPL: 24 % (ref 15–46)
IRON SERPL-MCNC: 98 UG/DL (ref 35–180)
POTASSIUM SERPL-SCNC: 3.3 MMOL/L (ref 3.4–5.3)
SODIUM SERPL-SCNC: 141 MMOL/L (ref 133–144)
TIBC SERPL-MCNC: 413 UG/DL (ref 240–430)

## 2017-12-01 ENCOUNTER — OFFICE VISIT (OUTPATIENT)
Dept: INTERNAL MEDICINE | Facility: CLINIC | Age: 62
End: 2017-12-01
Payer: COMMERCIAL

## 2017-12-01 VITALS
WEIGHT: 267.7 LBS | SYSTOLIC BLOOD PRESSURE: 118 MMHG | RESPIRATION RATE: 16 BRPM | BODY MASS INDEX: 50.58 KG/M2 | OXYGEN SATURATION: 93 % | HEART RATE: 68 BPM | DIASTOLIC BLOOD PRESSURE: 72 MMHG | TEMPERATURE: 98.2 F

## 2017-12-01 DIAGNOSIS — E66.01 MORBID OBESITY (H): ICD-10-CM

## 2017-12-01 DIAGNOSIS — G47.00 INSOMNIA, UNSPECIFIED TYPE: ICD-10-CM

## 2017-12-01 DIAGNOSIS — N18.30 TYPE 2 DIABETES MELLITUS WITH STAGE 3 CHRONIC KIDNEY DISEASE, WITHOUT LONG-TERM CURRENT USE OF INSULIN (H): Primary | ICD-10-CM

## 2017-12-01 DIAGNOSIS — F33.1 MAJOR DEPRESSIVE DISORDER, RECURRENT EPISODE, MODERATE (H): ICD-10-CM

## 2017-12-01 DIAGNOSIS — E78.5 HYPERLIPIDEMIA LDL GOAL <70: ICD-10-CM

## 2017-12-01 DIAGNOSIS — E11.22 TYPE 2 DIABETES MELLITUS WITH STAGE 3 CHRONIC KIDNEY DISEASE, WITHOUT LONG-TERM CURRENT USE OF INSULIN (H): Primary | ICD-10-CM

## 2017-12-01 DIAGNOSIS — I10 ESSENTIAL HYPERTENSION, BENIGN: ICD-10-CM

## 2017-12-01 DIAGNOSIS — R06.00 DYSPNEA, UNSPECIFIED TYPE: ICD-10-CM

## 2017-12-01 DIAGNOSIS — D50.9 IRON DEFICIENCY ANEMIA, UNSPECIFIED IRON DEFICIENCY ANEMIA TYPE: ICD-10-CM

## 2017-12-01 PROBLEM — A41.9 SEPSIS (H): Status: RESOLVED | Noted: 2017-09-19 | Resolved: 2017-12-01

## 2017-12-01 PROCEDURE — 99214 OFFICE O/P EST MOD 30 MIN: CPT | Performed by: INTERNAL MEDICINE

## 2017-12-01 PROCEDURE — 99207 C FOOT EXAM  NO CHARGE: CPT | Performed by: INTERNAL MEDICINE

## 2017-12-01 RX ORDER — ZOLPIDEM TARTRATE 10 MG/1
10 TABLET ORAL
Qty: 30 TABLET | Refills: 5 | Status: CANCELLED | OUTPATIENT
Start: 2017-12-01

## 2017-12-01 RX ORDER — ZOLPIDEM TARTRATE 10 MG/1
5 TABLET ORAL
Qty: 45 TABLET | Refills: 1 | Status: SHIPPED | OUTPATIENT
Start: 2017-12-01 | End: 2018-05-30

## 2017-12-01 ASSESSMENT — PATIENT HEALTH QUESTIONNAIRE - PHQ9
SUM OF ALL RESPONSES TO PHQ QUESTIONS 1-9: 7
5. POOR APPETITE OR OVEREATING: SEVERAL DAYS

## 2017-12-01 ASSESSMENT — ANXIETY QUESTIONNAIRES
GAD7 TOTAL SCORE: 7
1. FEELING NERVOUS, ANXIOUS, OR ON EDGE: SEVERAL DAYS
2. NOT BEING ABLE TO STOP OR CONTROL WORRYING: SEVERAL DAYS
5. BEING SO RESTLESS THAT IT IS HARD TO SIT STILL: SEVERAL DAYS
6. BECOMING EASILY ANNOYED OR IRRITABLE: SEVERAL DAYS
3. WORRYING TOO MUCH ABOUT DIFFERENT THINGS: SEVERAL DAYS
7. FEELING AFRAID AS IF SOMETHING AWFUL MIGHT HAPPEN: SEVERAL DAYS

## 2017-12-01 NOTE — PROGRESS NOTES
SUBJECTIVE:   Nicole Reyes is a 62 year old female who presents to clinic today for the following health issues:      Follow up after pneumonia, sepsis:   She reports she has been continuing to improve and regain strength. She feels she can go back to work 1/2 time next week for 2 weeks. She has return to work forms.     Diabetes Follow-up    Patient is checking blood sugars: one times daily.    Blood sugar testing frequency justification: Patient modifying lifestyle changes (diet, exercise) with blood sugars  Results are as follows:       am - 130, 120, 99        Diabetic concerns: None     Symptoms of hypoglycemia (low blood sugar): none     Paresthesias (numbness or burning in feet) or sores: No     Date of last diabetic eye exam: 12/2017- scheduled      Hypertension Follow-up      Outpatient blood pressures are being checked at home.  Results are normal     Low Salt Diet: low salt          Amount of exercise or physical activity: None    Problems taking medications regularly: No    Medication side effects: none  Diet: low salt    Other problems:   Morbid obesity: maintaininig weight loss so far  Depression and anxiety: improved   Hyperlipidemia: med has been held         Current concerns: none    Patient Active Problem List   Diagnosis     Essential hypertension, benign     Anemia, iron deficiency     Restless leg syndrome     CRISTÓBAL (obstructive sleep apnea)     CKD (chronic kidney disease) stage 3, GFR 30-59 ml/min     Advanced directives, counseling/discussion     GENERALIZED ANXIETY DIS     Major depressive disorder, recurrent episode, moderate (H)     Health Care Home     GERD (gastroesophageal reflux disease)     Hyperlipidemia LDL goal <70     Eczema     Type 2 diabetes mellitus with stage 3 chronic kidney disease (HCC)     Midline low back pain with left-sided sciatica     Morbid obesity (HCC)     Insomnia, unspecified type     Dyspnea, unspecified type       Current Outpatient Prescriptions    Medication Sig Dispense Refill     sertraline (ZOLOFT) 100 MG tablet Take 2 tablets (200 mg) by mouth daily 180 tablet 3     pantoprazole (PROTONIX) 40 MG EC tablet Take 1 tablet (40 mg) by mouth At Bedtime 90 tablet 3     losartan (COZAAR) 100 MG tablet Take 1 tablet (100 mg) by mouth daily 90 tablet 3     clonazePAM (KLONOPIN) 1 MG tablet 0.5 tab po qam, 0.5 tab po qpm 30 tablet 5     Iron Polysacch Iewjp-N61--0.025-1 MG CAPS Take 1 capsule by mouth every other day       ascorbic acid 500 MG TABS Take 1 tablet by mouth every other day with iron capsule       ASPIRIN EC PO Take 81 mg by mouth every evening       metFORMIN (GLUCOPHAGE-XR) 750 MG 24 hr tablet Take 750 mg by mouth daily (with breakfast)       cyclobenzaprine (FLEXERIL) 10 MG tablet Take 1 tablet (10 mg) by mouth 3 times daily as needed for muscle spasms 90 tablet 5     zolpidem (AMBIEN) 10 MG tablet Take 1 tablet (10 mg) by mouth nightly as needed for sleep (Patient taking differently: Take 5 mg by mouth nightly as needed for sleep ) 30 tablet 5     gabapentin (NEURONTIN) 300 MG capsule Take 1 capsule (300 mg) by mouth daily 90 capsule 3     ibuprofen (ADVIL,MOTRIN) 600 MG tablet Take 1 tablet (600 mg) by mouth every 6 hours as needed for moderate pain 30 tablet 1     Multiple Vitamin (MULTI-VITAMIN) per tablet Take 1 tablet by mouth At Bedtime        FREESTYLE LITE TEST VI STRP Test twice a day 100 Strip 12     simvastatin (ZOCOR) 20 MG tablet Take 1 tablet (20 mg) by mouth At Bedtime (Patient not taking: Reported on 12/1/2017) 90 tablet 3     Omega-3 Fatty Acids (OMEGA-3 FISH OIL PO) Take 2 g by mouth once a week at bedtime         Social History   Substance Use Topics     Smoking status: Former Smoker     Quit date: 3/18/1979     Smokeless tobacco: Never Used      Comment: quit 1979     Alcohol use Yes      Comment: Twice a month        ROS:  General: no fever, chills  Weight: stable, had lost after pneumonia  Vision:negative. Last eye exam  "due.  ENT: negative  Respiratory improved but still some dyspnea on exertion .  Cardiac: no chest pain or pressure  Abdominal: no nausea, vomiting, abdominal pain, bowel changes  Vascular no complaints of claudication  Neurologic:no complaints of neuropathy  Feet no lesions, in grown nails, edema   : no polyuria, hematuria, dysuria    Objective:  Patient alert in NAD  /72  Pulse 68  Temp 98.2  F (36.8  C) (Oral)  Resp 16  Wt 267 lb 11.2 oz (121.4 kg)  LMP 09/15/2007  SpO2 93%  BMI 50.58 kg/m2       Wt Readings from Last 4 Encounters:   12/01/17 267 lb 11.2 oz (121.4 kg)   10/05/17 266 lb 1.6 oz (120.7 kg)   09/25/17 281 lb (127.5 kg)   09/23/17 285 lb (129.3 kg)       CV: CV: normal S1, S2 without murmur, S3 or S4.  Carotid pulses: full  LUNGS: clear  Feet: pulses full, normal capillary refill  No lesions, sores or skin changes  Nails normal  Sensation able to feel fine filament  A lot of thick skin    Lab Results   Component Value Date    A1C 6.2 11/27/2017    A1C 7.5 09/20/2017    A1C 6.8 05/11/2017    A1C 7.2 10/25/2016    A1C 7.2 10/20/2016     Lab Results   Component Value Date    CHOL 209 05/11/2017    HDL 25 05/11/2017     05/11/2017    TRIG 165 05/11/2017    CHOLHDLRATIO 3.0 03/26/2015       PHQ-9 SCORE 12/1/2016 5/11/2017 12/1/2017   Total Score - - -   Total Score 12 21 7      RADHA-7 SCORE 11/2/2016 12/1/2016 12/1/2017   Total Score - - -   Total Score 14 11 7       Lab Results   Component Value Date    HGB 12.3 11/27/2017    HGB 10.3 10/02/2017    HGB 9.3 09/25/2017    HGB 8.5 09/23/2017    HGB 8.1 09/21/2017    HGB 8.7 09/20/2017    HGB 11.4 09/19/2017    HGB 11.1 07/21/2017    HGB 11.2 06/21/2017         ASSESSMENT/PLAN:         BMI:   Estimated body mass index is 50.58 kg/(m^2) as calculated from the following:    Height as of 9/19/17: 5' 1\" (1.549 m).    Weight as of this encounter: 267 lb 11.2 oz (121.4 kg).   Weight management plan: Discussed healthy diet and exercise guidelines " and patient will follow up in 12 months in clinic to re-evaluate.        1. Type 2 diabetes mellitus with stage 3 chronic kidney disease, without long-term current use of insulin (H)  Well controlled, continue diet  - Basic metabolic panel; Future  - Hemoglobin A1c; Future  - Albumin Random Urine Quantitative with Creat Ratio; Future  - FOOT EXAM: use amlactin or kerasol    2. Major depressive disorder, recurrent episode, moderate (H)  Stable, continue med    3. Morbid obesity (HCC)  Improved, continue diet, exercise    4. Dyspnea, unspecified type  Improving after pneumonia and sepsis, return to work 1/2 time 2 weeks, then full time    5. Essential hypertension, benign  Controlled, continue med  - Basic metabolic panel; Future  - Albumin Random Urine Quantitative with Creat Ratio; Future    6. Hyperlipidemia LDL goal <70  stable  - Lipid panel reflex to direct LDL Fasting; Future    7. Iron deficiency anemia, unspecified iron deficiency anemia type  Improved, take iron 2 times a week    8. Insomnia, unspecified type    - zolpidem (AMBIEN) 10 MG tablet; Take 0.5 tablets (5 mg) by mouth nightly as needed for sleep  Dispense: 45 tablet; Refill: 1        Pari Wiley MD  [facility]

## 2017-12-01 NOTE — PATIENT INSTRUCTIONS
Kerasol ointment, Amlactin ointment for feet.     Restart simvastatin after the first of the year.

## 2017-12-01 NOTE — MR AVS SNAPSHOT
After Visit Summary   12/1/2017    Nicole Reyes    MRN: [id number]           Patient Information     Date Of Birth          1955        Visit Information        Provider Department      12/1/2017 12:40 PM Pari Wiley MD [facility]        Today's Diagnoses     Insomnia, unspecified type          Care Instructions    Kerasol ointment, Amlactin ointment for feet.     Restart simvastatin after the first of the year.           Follow-ups after your visit        Follow-up notes from your care team     Return in about 3 months (around 3/1/2018) for Lab Work.      Who to contact     If you have questions or need follow up information about today's clinic visit or your schedule please contact [facility] directly at [phone].  Normal or non-critical lab and imaging results will be communicated to you by MyChart, letter or phone within 4 business days after the clinic has received the results. If you do not hear from us within 7 days, please contact the clinic through [employer] or phone. If you have a critical or abnormal lab result, we will notify you by phone as soon as possible.  Submit refill requests through [employer] or call your pharmacy and they will forward the refill request to us. Please allow 3 business days for your refill to be completed.          Additional Information About Your Visit        MyChart Information     [employer] gives you secure access to your electronic health record. If you see a primary care provider, you can also send messages to your care team and make appointments. If you have questions, please call your primary care clinic.  If you do not have a primary care provider, please call [phone] and they will assist you.        Care EveryWhere ID     This is your Care EveryWhere ID. This could be used by other organizations to access your [facility] medical records  [id number]        Your Vitals Were     Pulse Temperature Respirations  Last Period Pulse Oximetry BMI (Body Mass Index)    68 98.2  F (36.8  C) (Oral) 16 09/15/2007 93% 50.58 kg/m2       Blood Pressure from Last 3 Encounters:   12/01/17 118/72   10/05/17 122/66   09/25/17 105/59    Weight from Last 3 Encounters:   12/01/17 267 lb 11.2 oz (121.4 kg)   10/05/17 266 lb 1.6 oz (120.7 kg)   09/25/17 281 lb (127.5 kg)              Today, you had the following     No orders found for display         Where to get your medicines      Some of these will need a paper prescription and others can be bought over the counter.  Ask your nurse if you have questions.     Bring a paper prescription for each of these medications     zolpidem 10 MG tablet          Primary Care Provider Office Phone # Fax #    Pari Wiley [phone]       [address] [address]        Equal Access to Services     [facility]: Hadii paddy faust hadasho Soomaali, waaxda luqadaha, qaybta kaalmada adeegyada, waxay esther casillas . So [facility] [phone].    ATENCIÓN: Si habla español, tiene a tovar disposición servicios gratuitos de asistencia lingüística. Llame al [phone].    We comply with applicable federal civil rights laws and [place] laws. We do not discriminate on the basis of race, color, national origin, age, disability, sex, sexual orientation, or gender identity.            Thank you!     Thank you for choosing [facility]  for your care. Our goal is always to provide you with excellent care. Hearing back from our patients is one way we can continue to improve our services. Please take a few minutes to complete the written survey that you may receive in the mail after your visit with us. Thank you!             Your Updated Medication List - Protect others around you: Learn how to safely use, store and throw away your medicines at www.disposemymeds.org.          This list is accurate as of: 12/1/17  1:20 PM.  Always use your most recent med  list.                   Brand Name Dispense Instructions for use Diagnosis    ascorbic acid 500 MG Tabs      Take 1 tablet by mouth every other day with iron capsule        ASPIRIN EC PO      Take 81 mg by mouth every evening        clonazePAM 1 MG tablet    klonoPIN    30 tablet    0.5 tab po qam, 0.5 tab po qpm    Generalized anxiety disorder       cyclobenzaprine 10 MG tablet    FLEXERIL    90 tablet    Take 1 tablet (10 mg) by mouth 3 times daily as needed for muscle spasms    Chronic low back pain with sciatica, sciatica laterality unspecified, unspecified back pain laterality       FREESTYLE LITE test strip   Generic drug:  blood glucose monitoring     100 Strip    Test twice a day    Type 2 diabetes, HbA1c goal < 7% (H)       gabapentin 300 MG capsule    NEURONTIN    90 capsule    Take 1 capsule (300 mg) by mouth daily    Restless legs syndrome (RLS), Persistent insomnia       ibuprofen 600 MG tablet    ADVIL/MOTRIN    30 tablet    Take 1 tablet (600 mg) by mouth every 6 hours as needed for moderate pain        Iron Polysacch Lnexn-P62--0.025-1 MG Caps      Take 1 capsule by mouth every other day        losartan 100 MG tablet    COZAAR    90 tablet    Take 1 tablet (100 mg) by mouth daily    Essential hypertension, benign       metFORMIN 750 MG 24 hr tablet    GLUCOPHAGE-XR     Take 750 mg by mouth daily (with breakfast)        Multi-vitamin Tabs tablet   Generic drug:  multivitamin, therapeutic with minerals      Take 1 tablet by mouth At Bedtime        OMEGA-3 FISH OIL PO      Take 2 g by mouth once a week at bedtime        pantoprazole 40 MG EC tablet    PROTONIX    90 tablet    Take 1 tablet (40 mg) by mouth At Bedtime    Gastroesophageal reflux disease without esophagitis       sertraline 100 MG tablet    ZOLOFT    180 tablet    Take 2 tablets (200 mg) by mouth daily    Generalized anxiety disorder, Major depressive disorder, recurrent episode, moderate (H)       simvastatin 20 MG tablet    ZOCOR     90 tablet    Take 1 tablet (20 mg) by mouth At Bedtime    Hyperlipidemia LDL goal <70       zolpidem 10 MG tablet    AMBIEN    45 tablet    Take 0.5 tablets (5 mg) by mouth nightly as needed for sleep    Insomnia, unspecified type

## 2017-12-01 NOTE — NURSING NOTE
"Chief Complaint   Patient presents with     RECHECK     DM and HTN- labs completed     Letter Request     return to work?       Initial /72  Pulse 68  Temp 98.2  F (36.8  C) (Oral)  Resp 16  Wt 267 lb 11.2 oz (121.4 kg)  LMP 09/15/2007  SpO2 93%  BMI 50.58 kg/m2 Estimated body mass index is 50.58 kg/(m^2) as calculated from the following:    Height as of 9/19/17: 5' 1\" (1.549 m).    Weight as of this encounter: 267 lb 11.2 oz (121.4 kg).  Medication Reconciliation: juli Schroeder CMA      Discussed Health Maintenance:  Patient agreed to schedule Colonoscopy at a later date having to deal with a lot right now. Order have been place and information given to patient.       "

## 2017-12-02 ASSESSMENT — ANXIETY QUESTIONNAIRES: GAD7 TOTAL SCORE: 7

## 2017-12-04 ENCOUNTER — TELEPHONE (OUTPATIENT)
Dept: INTERNAL MEDICINE | Facility: CLINIC | Age: 62
End: 2017-12-04

## 2017-12-06 ENCOUNTER — TRANSFERRED RECORDS (OUTPATIENT)
Dept: HEALTH INFORMATION MANAGEMENT | Facility: CLINIC | Age: 62
End: 2017-12-06

## 2017-12-07 DIAGNOSIS — E78.5 HYPERLIPIDEMIA LDL GOAL <70: ICD-10-CM

## 2017-12-11 RX ORDER — SIMVASTATIN 20 MG
20 TABLET ORAL AT BEDTIME
Qty: 90 TABLET | Refills: 1 | Status: SHIPPED | OUTPATIENT
Start: 2017-12-11 | End: 2018-07-07

## 2017-12-11 NOTE — TELEPHONE ENCOUNTER
Recent Labs   Lab Test  05/11/17   1545  03/22/16   1418  03/26/15   1439  02/14/14   1520   CHOL  209*  157  130  161   HDL  25*  34*  44*  39*   LDL  151*  84  64  95   TRIG  165*  195*  109  133   CHOLHDLRATIO   --    --   3.0  4.1       Prescription approved per [facility] Refill Protocol.

## 2017-12-19 ENCOUNTER — TELEPHONE (OUTPATIENT)
Dept: INTERNAL MEDICINE | Facility: CLINIC | Age: 62
End: 2017-12-19

## 2017-12-19 NOTE — LETTER
[facility]  [address], [address]  [place], [place]  [address]                                            TEL:[phone]  FAX:[phone]      12/19/2017     Nicole VINCE Eric   1955     To Whom it May Concern:     Ms. Reyes is under my care. It is medically necessary that she use her walker when walking long distances due to degeneration low back. Use of a walker will not interfere with her work duties.           Sincerely,             Pari Wiley MD

## 2017-12-19 NOTE — TELEPHONE ENCOUNTER
Pt needs a letter to go to [facility] that states she can use a walker to and from her work station. She only uses it for long distances for her back.   The letter needs to state that the walker doesn't hinder her job duties in any way.     Fax to [facility] Attn: Karie Styles #[phone].    Also, am re-faxing her workability form from 12/1 and OV notes as she states they didn't receive them.

## 2018-01-20 ENCOUNTER — HEALTH MAINTENANCE LETTER (OUTPATIENT)
Age: 63
End: 2018-01-20

## 2018-01-29 DIAGNOSIS — E11.22 TYPE 2 DIABETES MELLITUS WITH STAGE 3 CHRONIC KIDNEY DISEASE, WITHOUT LONG-TERM CURRENT USE OF INSULIN (H): ICD-10-CM

## 2018-01-29 DIAGNOSIS — N18.30 TYPE 2 DIABETES MELLITUS WITH STAGE 3 CHRONIC KIDNEY DISEASE, WITHOUT LONG-TERM CURRENT USE OF INSULIN (H): ICD-10-CM

## 2018-02-02 ENCOUNTER — MYC MEDICAL ADVICE (OUTPATIENT)
Dept: INTERNAL MEDICINE | Facility: CLINIC | Age: 63
End: 2018-02-02

## 2018-02-02 NOTE — TELEPHONE ENCOUNTER
"Requested Prescriptions   Pending Prescriptions Disp Refills     metFORMIN (GLUCOPHAGE-XR) 500 MG 24 hr tablet [Pharmacy Med Name: METFORMIN HCL ER 500MG TB24] 180 tablet 3     Sig: TAKE TWO TABLETS BY MOUTH EVERY DAY WITH BREAKFAST    Biguanide Agents Passed    1/29/2018  4:03 PM       Passed - Patient's BP is less than 140/90    BP Readings from Last 3 Encounters:   12/01/17 118/72   10/05/17 122/66   09/25/17 105/59          Passed - Patient has documented LDL within the past 12 mos.    Recent Labs   Lab Test  05/11/17   1545   LDL  151*   Per 05/11/17 result note: \"The LDL is much higher as expected without the simvastatin but for now I would not want to start anything until the other issues are cleared up.\"       Passed - Patient has had a Microalbumin in the past 12 mos.    Recent Labs   Lab Test  05/11/17   1546   MICROL  82   UMALCR  16.97          Passed - Patient is age 10 or older       Passed - Patient has documented A1c within the specified period of time.    Recent Labs   Lab Test  11/27/17   1352   A1C  6.2*          Passed - Patient's CR is NOT>1.4 OR Patient's EGFR is NOT<45 within past 12 mos.    Recent Labs   Lab Test  11/27/17   1352   GFRESTIMATED  84   GFRESTBLACK  >90     Recent Labs   Lab Test  11/27/17   1352   CR  0.71          Passed - Patient does NOT have a diagnosis of CHF.       Passed - Patient is not pregnant       Passed - Patient has not had a positive pregnancy test within the past 12 mos.        Passed - Recent (6 mos) or future visit with authorizing provider's specialty    Patient had office visit in the last 6 months or has a visit in the next 30 days with authorizing provider.  See \"Patient Info\" tab in inbasket, or \"Choose Columns\" in Meds & Orders section of the refill encounter.    Last OV: 12/01/17, per OV note: f/u in 3 months        Routing refill request to provider for review/approval because:  Medication is reported/historical    Epic med list states pt reported " taking Metformin 750mg 24 hr tablets daily with breakfast.   Sent pt mychart message to clarify dosing.

## 2018-02-07 NOTE — TELEPHONE ENCOUNTER
Pt called, states she take 2 of the 500 mg tablets of Metformin with breakfast. Evangelina Drake RN

## 2018-02-08 RX ORDER — METFORMIN HCL 500 MG
TABLET, EXTENDED RELEASE 24 HR ORAL
Qty: 180 TABLET | Refills: 1 | Status: SHIPPED | OUTPATIENT
Start: 2018-02-08 | End: 2018-10-03

## 2018-02-19 ENCOUNTER — MYC MEDICAL ADVICE (OUTPATIENT)
Dept: INTERNAL MEDICINE | Facility: CLINIC | Age: 63
End: 2018-02-19

## 2018-02-19 NOTE — TELEPHONE ENCOUNTER
VM received from pt checking on below rx. Call to pharmacy. States they did not receive this rx. Verbal order given.

## 2018-03-05 ENCOUNTER — TELEPHONE (OUTPATIENT)
Dept: FAMILY MEDICINE | Facility: CLINIC | Age: 63
End: 2018-03-05

## 2018-03-05 NOTE — TELEPHONE ENCOUNTER
Per outreach, patient is aware of overdue screening and will call on their own time for scheduling.     Thanks

## 2018-03-27 DIAGNOSIS — F41.1 GENERALIZED ANXIETY DISORDER: ICD-10-CM

## 2018-03-27 NOTE — TELEPHONE ENCOUNTER
Pt is looking for refills on     Clonazepam 1MG  Last fill: 03/01/2018  Qty: 30  Taking 0.5MG Twice daily    Thank you  Claudine Andujar [facility]

## 2018-03-28 NOTE — TELEPHONE ENCOUNTER
Refill to soon-Rx sent to pharm on 10/8 for #30 x5 refills. Below says last refill was 3/1. Will send pharm a message asking for all refill dates of 10/8 rx          Pharm see above question

## 2018-03-29 NOTE — TELEPHONE ENCOUNTER
Fill dates for 10/08/2017 are as followed  10/11/2017  11/06/2017  12/08/2017  01/05/2018  01/30/2018  03/01/2018

## 2018-03-30 RX ORDER — CLONAZEPAM 1 MG/1
TABLET ORAL
Qty: 30 TABLET | Refills: 5 | Status: SHIPPED | OUTPATIENT
Start: 2018-03-30 | End: 2018-09-27

## 2018-04-26 DIAGNOSIS — G25.81 RESTLESS LEGS SYNDROME (RLS): ICD-10-CM

## 2018-04-26 DIAGNOSIS — G47.00 INSOMNIA, UNSPECIFIED TYPE: ICD-10-CM

## 2018-04-26 DIAGNOSIS — G47.00 PERSISTENT INSOMNIA: ICD-10-CM

## 2018-04-26 RX ORDER — ZOLPIDEM TARTRATE 10 MG/1
5 TABLET ORAL
Qty: 45 TABLET | Refills: 1 | Status: CANCELLED | OUTPATIENT
Start: 2018-04-26

## 2018-04-26 RX ORDER — GABAPENTIN 300 MG/1
CAPSULE ORAL
Qty: 90 CAPSULE | Refills: 3 | Status: SHIPPED | OUTPATIENT
Start: 2018-04-26 | End: 2019-04-04

## 2018-04-26 NOTE — TELEPHONE ENCOUNTER
Zolpidem       Last Written Prescription Date:  12/1/17  Last Fill Quantity: 45,   # refills: 1    Last Office Visit: 12/1/17    Future Office visit:       Routing refill request to provider for review/approval because:  Drug not on the [facility] or [facility] refill protocol or controlled substance      Per .   Zolpidem dispensed 12/1/17 for #45 (90 days) and 3/1/18 for #45 (90 days).

## 2018-04-26 NOTE — TELEPHONE ENCOUNTER
Gabapentin      Last Written Prescription Date:  4-23-17  Last Fill Quantity: 90,   # refills: 3  Last Office Visit: 12-1-17  Future Office visit:       Routing refill request to provider for review/approval because:  Drug not on the [facility] or [facility] refill protocol or controlled substance

## 2018-05-30 DIAGNOSIS — G47.00 INSOMNIA, UNSPECIFIED TYPE: ICD-10-CM

## 2018-05-31 RX ORDER — ZOLPIDEM TARTRATE 10 MG/1
5 TABLET ORAL
Qty: 45 TABLET | Refills: 1 | Status: SHIPPED | OUTPATIENT
Start: 2018-05-31 | End: 2018-11-30

## 2018-06-18 ENCOUNTER — TELEPHONE (OUTPATIENT)
Dept: INTERNAL MEDICINE | Facility: CLINIC | Age: 63
End: 2018-06-18

## 2018-06-18 NOTE — LETTER
[facility]  [address], [address]  [place], [place]  [address]                                            TEL:[phone]  FAX:[phone]      Nicole Reyes  [address]  [facility] [address]          June 18, 2018    Dear Nicole,    In order to ensure we are providing the best quality care, we have reviewed your chart and see that you are due for:     1. Colonoscopy for colon cancer screening  2. Mammogram for breast cancer screening    Please call the clinic at your earliest convenience to schedule an appointment.   Thank you for trusting us with your health care.        Sincerely,      Pari Wiley M.D.

## 2018-06-18 NOTE — LETTER
[facility]  [address], [address]  [place], [place]  [address]                                            TEL:[phone]  FAX:[phone]      Nicole Reyes  [address] [facility] [address]          July 9, 2018    Dear Nicole,    We sent you a letter a couple of weeks ago informing you of what you are due for, we hope that you did receive it. Your health is extremely important to us. Please take the time to consider calling the clinic to discuss your preventative health measures.?   Thank you for allowing us to help you take care of your health!      Sincerely,      Pari Wiley M.D.

## 2018-06-18 NOTE — TELEPHONE ENCOUNTER
Panel Management Review      Patient has the following on her problem list:     Depression / Dysthymia review    Measure:  Needs PHQ-9 score of 4 or less during index window.  Administer PHQ-9 and if score is 5 or more, send encounter to provider for next steps.    5 - 7 month window range:     PHQ-9 SCORE 12/1/2016 5/11/2017 12/1/2017   Total Score - - -   Total Score 12 21 7       If PHQ-9 recheck is 5 or more, route to provider for next steps.    Patient is due for:  None      Composite cancer screening  Chart review shows that this patient is due/due soon for the following Mammogram and Colonoscopy  Summary:    Patient is due/failing the following:   COLONOSCOPY and MAMMOGRAM    Action needed:   Patient needs referral/order: Schedule procedure    Type of outreach:    Sent letter.    Questions for provider review:    None                                                                                                                                     Clifton Schroeder [facility]       Chart routed to Care Team .

## 2018-06-28 DIAGNOSIS — E78.5 HYPERLIPIDEMIA LDL GOAL <70: ICD-10-CM

## 2018-06-28 DIAGNOSIS — N18.30 TYPE 2 DIABETES MELLITUS WITH STAGE 3 CHRONIC KIDNEY DISEASE, WITHOUT LONG-TERM CURRENT USE OF INSULIN (H): ICD-10-CM

## 2018-06-28 DIAGNOSIS — E11.22 TYPE 2 DIABETES MELLITUS WITH STAGE 3 CHRONIC KIDNEY DISEASE, WITHOUT LONG-TERM CURRENT USE OF INSULIN (H): ICD-10-CM

## 2018-06-28 DIAGNOSIS — I10 ESSENTIAL HYPERTENSION, BENIGN: ICD-10-CM

## 2018-06-28 DIAGNOSIS — D50.9 IRON DEFICIENCY ANEMIA, UNSPECIFIED IRON DEFICIENCY ANEMIA TYPE: ICD-10-CM

## 2018-06-28 LAB
HBA1C MFR BLD: 7.1 % (ref 0–5.6)
HGB BLD-MCNC: 13.8 G/DL (ref 11.7–15.7)

## 2018-06-28 PROCEDURE — 80061 LIPID PANEL: CPT | Performed by: INTERNAL MEDICINE

## 2018-06-28 PROCEDURE — 82043 UR ALBUMIN QUANTITATIVE: CPT | Performed by: INTERNAL MEDICINE

## 2018-06-28 PROCEDURE — 36415 COLL VENOUS BLD VENIPUNCTURE: CPT | Performed by: INTERNAL MEDICINE

## 2018-06-28 PROCEDURE — 80048 BASIC METABOLIC PNL TOTAL CA: CPT | Performed by: INTERNAL MEDICINE

## 2018-06-28 PROCEDURE — 85018 HEMOGLOBIN: CPT | Performed by: INTERNAL MEDICINE

## 2018-06-28 PROCEDURE — 83036 HEMOGLOBIN GLYCOSYLATED A1C: CPT | Performed by: INTERNAL MEDICINE

## 2018-06-29 LAB
ANION GAP SERPL CALCULATED.3IONS-SCNC: 7 MMOL/L (ref 3–14)
BUN SERPL-MCNC: 13 MG/DL (ref 7–30)
CALCIUM SERPL-MCNC: 8.3 MG/DL (ref 8.5–10.1)
CHLORIDE SERPL-SCNC: 103 MMOL/L (ref 94–109)
CHOLEST SERPL-MCNC: 145 MG/DL
CO2 SERPL-SCNC: 29 MMOL/L (ref 20–32)
CREAT SERPL-MCNC: 0.86 MG/DL (ref 0.52–1.04)
CREAT UR-MCNC: 253 MG/DL
GFR SERPL CREATININE-BSD FRML MDRD: 67 ML/MIN/1.7M2
GLUCOSE SERPL-MCNC: 123 MG/DL (ref 70–99)
HDLC SERPL-MCNC: 37 MG/DL
LDLC SERPL CALC-MCNC: 82 MG/DL
MICROALBUMIN UR-MCNC: 12 MG/L
MICROALBUMIN/CREAT UR: 4.62 MG/G CR (ref 0–25)
NONHDLC SERPL-MCNC: 108 MG/DL
POTASSIUM SERPL-SCNC: 3.7 MMOL/L (ref 3.4–5.3)
SODIUM SERPL-SCNC: 139 MMOL/L (ref 133–144)
TRIGL SERPL-MCNC: 128 MG/DL

## 2018-07-05 ENCOUNTER — OFFICE VISIT (OUTPATIENT)
Dept: INTERNAL MEDICINE | Facility: CLINIC | Age: 63
End: 2018-07-05
Payer: COMMERCIAL

## 2018-07-05 ENCOUNTER — HOSPITAL ENCOUNTER (OUTPATIENT)
Dept: MAMMOGRAPHY | Facility: CLINIC | Age: 63
Discharge: HOME | End: 2018-07-05
Attending: INTERNAL MEDICINE | Admitting: INTERNAL MEDICINE
Payer: COMMERCIAL

## 2018-07-05 VITALS
WEIGHT: 274.3 LBS | RESPIRATION RATE: 24 BRPM | BODY MASS INDEX: 51.79 KG/M2 | DIASTOLIC BLOOD PRESSURE: 70 MMHG | OXYGEN SATURATION: 94 % | SYSTOLIC BLOOD PRESSURE: 130 MMHG | HEIGHT: 61 IN | HEART RATE: 109 BPM

## 2018-07-05 DIAGNOSIS — Z11.59 SCREENING FOR VIRAL DISEASE: ICD-10-CM

## 2018-07-05 DIAGNOSIS — I10 ESSENTIAL HYPERTENSION, BENIGN: ICD-10-CM

## 2018-07-05 DIAGNOSIS — M25.561 RIGHT KNEE PAIN, UNSPECIFIED CHRONICITY: ICD-10-CM

## 2018-07-05 DIAGNOSIS — E11.22 TYPE 2 DIABETES MELLITUS WITH STAGE 3 CHRONIC KIDNEY DISEASE, WITHOUT LONG-TERM CURRENT USE OF INSULIN (H): Primary | ICD-10-CM

## 2018-07-05 DIAGNOSIS — F33.1 MAJOR DEPRESSIVE DISORDER, RECURRENT EPISODE, MODERATE (H): ICD-10-CM

## 2018-07-05 DIAGNOSIS — L98.9 SKIN LESION: ICD-10-CM

## 2018-07-05 DIAGNOSIS — N18.30 TYPE 2 DIABETES MELLITUS WITH STAGE 3 CHRONIC KIDNEY DISEASE, WITHOUT LONG-TERM CURRENT USE OF INSULIN (H): Primary | ICD-10-CM

## 2018-07-05 DIAGNOSIS — E66.01 MORBID OBESITY (H): ICD-10-CM

## 2018-07-05 DIAGNOSIS — F41.1 GENERALIZED ANXIETY DISORDER: ICD-10-CM

## 2018-07-05 DIAGNOSIS — Z12.39 BREAST SCREENING: ICD-10-CM

## 2018-07-05 DIAGNOSIS — E78.5 HYPERLIPIDEMIA LDL GOAL <100: ICD-10-CM

## 2018-07-05 DIAGNOSIS — K21.9 GASTROESOPHAGEAL REFLUX DISEASE WITHOUT ESOPHAGITIS: ICD-10-CM

## 2018-07-05 PROCEDURE — 99214 OFFICE O/P EST MOD 30 MIN: CPT | Performed by: INTERNAL MEDICINE

## 2018-07-05 PROCEDURE — 77067 SCR MAMMO BI INCL CAD: CPT

## 2018-07-05 ASSESSMENT — PAIN SCALES - GENERAL: PAINLEVEL: NO PAIN (0)

## 2018-07-05 NOTE — PROGRESS NOTES
SUBJECTIVE:   Nicole Reyes is a 62 year old female who presents to clinic today for the following health issues:      Diabetes Follow-up    Patient is checking blood sugars: three times daily.   Blood sugar testing frequency justification: Patient modifying lifestyle changes (diet, exercise) with blood sugars  Results are as follows:    120s am fasting,  2 hours post prandial 150-160    Diabetic concerns: None     Symptoms of hypoglycemia (low blood sugar): none     Paresthesias (numbness or burning in feet) or sores: No     Date of last diabetic eye exam: 12/1/17    BP Readings from Last 2 Encounters:   07/05/18 130/70   12/01/17 118/72     Hemoglobin A1C (%)   Date Value   06/28/2018 7.1 (H)   11/27/2017 6.2 (H)     LDL Cholesterol Calculated (mg/dL)   Date Value   06/28/2018 82   05/11/2017 151 (H)       Diabetes Management Resources    Amount of exercise or physical activity: None    Problems taking medications regularly: No    Medication side effects: none    Diet: diabetic      Other problems:   1. HTN: She does not check her blood pressures  2. Hyperlipidemia: Stable, tolerating medication  3. Insomnia: Stable  4.  Morbid obesity: Her weight has gone up, she is planning on working on this more over the summer  5.  Major depression: Stable    Current concerns:   Right knee has given out at times, some pain sometimes.       Patient Active Problem List   Diagnosis     Essential hypertension, benign     Anemia, iron deficiency     Restless leg syndrome     CRISTÓBAL (obstructive sleep apnea)     CKD (chronic kidney disease) stage 3, GFR 30-59 ml/min     Advanced directives, counseling/discussion     GENERALIZED ANXIETY DIS     Major depressive disorder, recurrent episode, moderate (H)     Health Care Home     GERD (gastroesophageal reflux disease)     Hyperlipidemia LDL goal <70     Eczema     Type 2 diabetes mellitus with stage 3 chronic kidney disease, without long-term current use of insulin (H)     Midline  low back pain with left-sided sciatica     Morbid obesity (HCC)     Insomnia, unspecified type     Dyspnea, unspecified type       Current Outpatient Prescriptions   Medication Sig Dispense Refill     ascorbic acid 500 MG TABS Take 1 tablet by mouth every other day with iron capsule       ASPIRIN EC PO Take 81 mg by mouth every evening       clonazePAM (KLONOPIN) 1 MG tablet 0.5 tab po qam, 0.5 tab po qpm 30 tablet 5     cyclobenzaprine (FLEXERIL) 10 MG tablet Take 1 tablet (10 mg) by mouth 3 times daily as needed for muscle spasms 90 tablet 5     FREESTYLE LITE TEST VI STRP Test twice a day 100 Strip 12     gabapentin (NEURONTIN) 300 MG capsule TAKE ONE CAPSULE BY MOUTH EVERY DAY 90 capsule 3     ibuprofen (ADVIL,MOTRIN) 600 MG tablet Take 1 tablet (600 mg) by mouth every 6 hours as needed for moderate pain 30 tablet 1     Iron Polysacch Kykeg-X58--0.025-1 MG CAPS Take 1 capsule by mouth every other day       losartan (COZAAR) 100 MG tablet Take 1 tablet (100 mg) by mouth daily 90 tablet 3     metFORMIN (GLUCOPHAGE-XR) 500 MG 24 hr tablet TAKE TWO TABLETS BY MOUTH EVERY DAY WITH BREAKFAST 180 tablet 1     Multiple Vitamin (MULTI-VITAMIN) per tablet Take 1 tablet by mouth At Bedtime        Omega-3 Fatty Acids (OMEGA-3 FISH OIL PO) Take 2 g by mouth once a week at bedtime       pantoprazole (PROTONIX) 40 MG EC tablet Take 1 tablet (40 mg) by mouth At Bedtime 90 tablet 3     sertraline (ZOLOFT) 100 MG tablet Take 2 tablets (200 mg) by mouth daily 180 tablet 3     simvastatin (ZOCOR) 20 MG tablet Take 1 tablet (20 mg) by mouth At Bedtime 90 tablet 1     zolpidem (AMBIEN) 10 MG tablet Take 0.5 tablets (5 mg) by mouth nightly as needed for sleep 45 tablet 1       Social History   Substance Use Topics     Smoking status: Former Smoker     Quit date: 3/18/1979     Smokeless tobacco: Never Used      Comment: quit 1979     Alcohol use Yes      Comment: Twice a month        ROS:  General: no fever, chills  Weight:  "increased  Vision:negative. Last eye exam 12/17.  ENT: negative  Respiratory stable dyspnea on exertion .  Cardiac: no chest pain or pressure  Abdominal: no nausea, vomiting, abdominal pain, bowel changes  Vascular no complaints of claudication  Neurologic:no complaints of neuropathy  Feet no lesions, in grown nails, edema   : no polyuria, hematuria, dysuria    Objective:  Patient alert in NAD  /70 (BP Location: Left arm, Patient Position: Sitting, Cuff Size: Adult Large)  Pulse 109  Resp 24  Ht 5' 1\" (1.549 m)  Wt 274 lb 4.8 oz (124.4 kg)  LMP 09/15/2007  SpO2 94%  Breastfeeding? No  BMI 51.83 kg/m2       Wt Readings from Last 4 Encounters:   07/05/18 274 lb 4.8 oz (124.4 kg)   12/01/17 267 lb 11.2 oz (121.4 kg)   10/05/17 266 lb 1.6 oz (120.7 kg)   09/25/17 281 lb (127.5 kg)       CV: CV: normal S1, S2 without murmur, S3 or S4.  Carotid pulses: full  LUNGS: clear      Lab Results   Component Value Date    A1C 7.1 06/28/2018    A1C 6.2 11/27/2017    A1C 7.5 09/20/2017    A1C 6.8 05/11/2017    A1C 7.2 10/25/2016       Lab Results   Component Value Date    CHOL 145 06/28/2018    HDL 37 06/28/2018    LDL 82 06/28/2018    TRIG 128 06/28/2018    CHOLHDLRATIO 3.0 03/26/2015     PHQ-9 SCORE 12/1/2016 5/11/2017 12/1/2017   Total Score - - -   Total Score 12 21 7         ASSESSMENT/PLAN:         BMI:   Estimated body mass index is 51.83 kg/(m^2) as calculated from the following:    Height as of this encounter: 5' 1\" (1.549 m).    Weight as of this encounter: 274 lb 4.8 oz (124.4 kg).   Weight management plan: Discussed healthy diet and exercise guidelines and patient will follow up in 6 months in clinic to re-evaluate.      1. Type 2 diabetes mellitus with stage 3 chronic kidney disease, without long-term current use of insulin (H)  A little worse control, she thinks she can improve her diet and exercise, recheck in 6 months  - Basic metabolic panel; Future  - Hemoglobin A1c; Future    2. Morbid obesity " ([facility])  Work on diet and exercise    3. Major depressive disorder, recurrent episode, moderate (H)  stable  - sertraline (ZOLOFT) 100 MG tablet; Take 2 tablets (200 mg) by mouth daily  Dispense: 180 tablet; Refill: 3    4. Essential hypertension, benign  controlled  - losartan (COZAAR) 100 MG tablet; Take 1 tablet (100 mg) by mouth daily  Dispense: 90 tablet; Refill: 1    5. Hyperlipidemia LDL goal <100  controlled  - simvastatin (ZOCOR) 20 MG tablet; Take 1 tablet (20 mg) by mouth At Bedtime  Dispense: 90 tablet; Refill: 1    6. Skin lesion  She requests referral to dermatology for skin check  - DERMATOLOGY REFERRAL    7. Gastroesophageal reflux disease without esophagitis  Stable  - pantoprazole (PROTONIX) 40 MG EC tablet; Take 1 tablet (40 mg) by mouth At Bedtime  Dispense: 90 tablet; Refill: 3    8. GENERALIZED ANXIETY DIS  Stable  - sertraline (ZOLOFT) 100 MG tablet; Take 2 tablets (200 mg) by mouth daily  Dispense: 180 tablet; Refill: 3    9. Screening for viral disease    - HIV Antigen Antibody Combo; Future    Right knee pain: Likely osteoarthritis, she declines referral to orthopedics at this time    Pari Wiley MD  [facility]

## 2018-07-05 NOTE — MR AVS SNAPSHOT
After Visit Summary   7/5/2018    Nicole Reyes    MRN: [id number]           Patient Information     Date Of Birth          1955        Visit Information        Provider Department      7/5/2018 2:20 PM Pari Wiley MD [facility]        Today's Diagnoses     Skin lesion    -  1      Care Instructions    Shingrix is the new shingles vaccine.                Follow-ups after your visit        Additional Services     DERMATOLOGY REFERRAL       Your provider has referred you to: FMG: [facility] [phone]   [email or web address]  [facility] [phone]  FHN: [facility] [phone]   [email or web address]  [facility] [phone]   [email or web address]    Please be aware that coverage of these services is subject to the terms and limitations of your health insurance plan.  Call member services at your health plan with any benefit or coverage questions.      Please bring the following with you to your appointment:    (1) Any X-Rays, CTs or MRIs which have been performed.  Contact the facility where they were done to arrange for  prior to your scheduled appointment.    (2) List of current medications  (3) This referral request   (4) Any documents/labs given to you for this referral                  Follow-up notes from your care team     Return in about 6 months (around 1/5/2019) for Lab Work with urine and blood and see me a week later.      Who to contact     If you have questions or need follow up information about today's clinic visit or your schedule please contact [facility] directly at [phone].  Normal or non-critical lab and imaging results will be communicated to you by MyChart, letter or phone within 4 business days after the clinic has received the results. If  "you do not hear from us within 7 days, please contact the clinic through [facility] or phone. If you have a critical or abnormal lab result, we will notify you by phone as soon as possible.  Submit refill requests through [facility] or call your pharmacy and they will forward the refill request to us. Please allow 3 business days for your refill to be completed.          Additional Information About Your Visit        [employer] Information     [facility] gives you secure access to your electronic health record. If you see a primary care provider, you can also send messages to your care team and make appointments. If you have questions, please call your primary care clinic.  If you do not have a primary care provider, please call [phone] and they will assist you.        Care EveryWhere ID     This is your Care EveryWhere ID. This could be used by other organizations to access your [place] medical records  [id number]        Your Vitals Were     Pulse Respirations Height Last Period Pulse Oximetry Breastfeeding?    109 24 5' 1\" (1.549 m) 09/15/2007 94% No    BMI (Body Mass Index)                   51.83 kg/m2            Blood Pressure from Last 3 Encounters:   07/05/18 130/70   12/01/17 118/72   10/05/17 122/66    Weight from Last 3 Encounters:   07/05/18 274 lb 4.8 oz (124.4 kg)   12/01/17 267 lb 11.2 oz (121.4 kg)   10/05/17 266 lb 1.6 oz (120.7 kg)              We Performed the Following     DERMATOLOGY REFERRAL        Primary Care Provider Office Phone # Fax #    Pari Wiley [phone]       [address] [facility] [address]  [facility] [address]        Equal Access to Services     [facility]: Hadii aad ku hadasho Soomaali, waaxda luqadaha, qaybta kaalmada brandi, derik fernandez. So [facility] [phone].    ATENCIÓN: Si habla español, tiene a tovar disposición servicios gratuitos de asistencia lingüística. Llame al [phone].    We comply with applicable federal civil rights laws " and [place] laws. We do not discriminate on the basis of race, color, national origin, age, disability, sex, sexual orientation, or gender identity.            Thank you!     Thank you for choosing [facility]  for your care. Our goal is always to provide you with excellent care. Hearing back from our patients is one way we can continue to improve our services. Please take a few minutes to complete the written survey that you may receive in the mail after your visit with us. Thank you!             Your Updated Medication List - Protect others around you: Learn how to safely use, store and throw away your medicines at www.disposemymeds.org.          This list is accurate as of 7/5/18  3:05 PM.  Always use your most recent med list.                   Brand Name Dispense Instructions for use Diagnosis    ascorbic acid 500 MG Tabs      Take 1 tablet by mouth every other day with iron capsule        ASPIRIN EC PO      Take 81 mg by mouth every evening        clonazePAM 1 MG tablet    klonoPIN    30 tablet    0.5 tab po qam, 0.5 tab po qpm    Generalized anxiety disorder       cyclobenzaprine 10 MG tablet    FLEXERIL    90 tablet    Take 1 tablet (10 mg) by mouth 3 times daily as needed for muscle spasms    Chronic low back pain with sciatica, sciatica laterality unspecified, unspecified back pain laterality       FREESTYLE LITE test strip   Generic drug:  blood glucose monitoring     100 Strip    Test twice a day    Type 2 diabetes, HbA1c goal < 7% (H)       gabapentin 300 MG capsule    NEURONTIN    90 capsule    TAKE ONE CAPSULE BY MOUTH EVERY DAY    Restless legs syndrome (RLS), Persistent insomnia       ibuprofen 600 MG tablet    ADVIL/MOTRIN    30 tablet    Take 1 tablet (600 mg) by mouth every 6 hours as needed for moderate pain        Iron Polysacch Ffsys-X80--0.025-1 MG Caps      Take 1 capsule by mouth every other day        losartan 100 MG tablet    COZAAR    90 tablet    Take 1 tablet  (100 mg) by mouth daily    Essential hypertension, benign       metFORMIN 500 MG 24 hr tablet    GLUCOPHAGE-XR    180 tablet    TAKE TWO TABLETS BY MOUTH EVERY DAY WITH BREAKFAST    Type 2 diabetes mellitus with stage 3 chronic kidney disease, without long-term current use of insulin (H)       Multi-vitamin Tabs tablet   Generic drug:  multivitamin, therapeutic with minerals      Take 1 tablet by mouth At Bedtime        OMEGA-3 FISH OIL PO      Take 2 g by mouth once a week at bedtime        pantoprazole 40 MG EC tablet    PROTONIX    90 tablet    Take 1 tablet (40 mg) by mouth At Bedtime    Gastroesophageal reflux disease without esophagitis       sertraline 100 MG tablet    ZOLOFT    180 tablet    Take 2 tablets (200 mg) by mouth daily    Generalized anxiety disorder, Major depressive disorder, recurrent episode, moderate (H)       simvastatin 20 MG tablet    ZOCOR    90 tablet    Take 1 tablet (20 mg) by mouth At Bedtime    Hyperlipidemia LDL goal <70       zolpidem 10 MG tablet    AMBIEN    45 tablet    Take 0.5 tablets (5 mg) by mouth nightly as needed for sleep    Insomnia, unspecified type

## 2018-07-05 NOTE — NURSING NOTE
"/70 (BP Location: Left arm, Patient Position: Sitting, Cuff Size: Adult Large)  Pulse 109  Resp 24  Ht 5' 1\" (1.549 m)  Wt 274 lb 4.8 oz (124.4 kg)  LMP 09/15/2007  SpO2 94%  Breastfeeding? No  BMI 51.83 kg/m2    Discussed Advance Directive planning with patient; however, patient declined at this time.   Cari Parker, Medical Assistant    "

## 2018-07-07 RX ORDER — SERTRALINE HYDROCHLORIDE 100 MG/1
200 TABLET, FILM COATED ORAL DAILY
Qty: 180 TABLET | Refills: 3 | Status: SHIPPED | OUTPATIENT
Start: 2018-07-07 | End: 2019-04-04

## 2018-07-07 RX ORDER — SIMVASTATIN 20 MG
20 TABLET ORAL AT BEDTIME
Qty: 90 TABLET | Refills: 1 | Status: SHIPPED | OUTPATIENT
Start: 2018-07-07 | End: 2019-03-29

## 2018-07-07 RX ORDER — PANTOPRAZOLE SODIUM 40 MG/1
40 TABLET, DELAYED RELEASE ORAL AT BEDTIME
Qty: 90 TABLET | Refills: 3 | Status: SHIPPED | OUTPATIENT
Start: 2018-07-07 | End: 2019-04-04

## 2018-07-07 RX ORDER — LOSARTAN POTASSIUM 100 MG/1
100 TABLET ORAL DAILY
Qty: 90 TABLET | Refills: 1 | Status: SHIPPED | OUTPATIENT
Start: 2018-07-07 | End: 2019-03-29

## 2018-08-15 ENCOUNTER — OFFICE VISIT (OUTPATIENT)
Dept: INTERNAL MEDICINE | Facility: CLINIC | Age: 63
End: 2018-08-15
Payer: COMMERCIAL

## 2018-08-15 ENCOUNTER — RADIANT APPOINTMENT (OUTPATIENT)
Dept: GENERAL RADIOLOGY | Facility: CLINIC | Age: 63
End: 2018-08-15
Attending: INTERNAL MEDICINE
Payer: COMMERCIAL

## 2018-08-15 VITALS
WEIGHT: 265 LBS | HEIGHT: 58 IN | DIASTOLIC BLOOD PRESSURE: 82 MMHG | TEMPERATURE: 98.1 F | HEART RATE: 90 BPM | SYSTOLIC BLOOD PRESSURE: 128 MMHG | OXYGEN SATURATION: 93 % | BODY MASS INDEX: 55.62 KG/M2

## 2018-08-15 DIAGNOSIS — E66.01 MORBID OBESITY (H): ICD-10-CM

## 2018-08-15 DIAGNOSIS — Z23 NEED FOR TDAP VACCINATION: ICD-10-CM

## 2018-08-15 DIAGNOSIS — M25.562 ACUTE PAIN OF LEFT KNEE: Primary | ICD-10-CM

## 2018-08-15 DIAGNOSIS — M25.562 ACUTE PAIN OF LEFT KNEE: ICD-10-CM

## 2018-08-15 PROCEDURE — 99213 OFFICE O/P EST LOW 20 MIN: CPT | Mod: 25 | Performed by: INTERNAL MEDICINE

## 2018-08-15 PROCEDURE — 90471 IMMUNIZATION ADMIN: CPT | Performed by: INTERNAL MEDICINE

## 2018-08-15 PROCEDURE — 73560 X-RAY EXAM OF KNEE 1 OR 2: CPT | Mod: LT

## 2018-08-15 PROCEDURE — 90715 TDAP VACCINE 7 YRS/> IM: CPT | Performed by: INTERNAL MEDICINE

## 2018-08-15 NOTE — NURSING NOTE
"/82 (BP Location: Left arm, Cuff Size: Adult Large)  Pulse 90  Temp 98.1  F (36.7  C) (Oral)  Ht 4' 10\" (1.473 m)  Wt 265 lb (120.2 kg)  LMP 09/15/2007  SpO2 93%  Breastfeeding? No  BMI 55.39 kg/m2    "

## 2018-08-15 NOTE — PROGRESS NOTES
"  SUBJECTIVE:   Nicole Reyes is a 62 year old female who presents to clinic today for the following health issues:      Knee pain.  3 weeks ago she started to have pain on the inside of her knee.  She has not had any swelling.   Not painful when sitting.     She felt a pull on her left knee after climbing the stairs to her pool 3 weeks ago. No trauma to the knee.  She has had arthritis of her right knee in the past.   She had tried icing her knee and resting.  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 She has pain when getting up.  Walking is painful.    She has been using a cane since this incident.      Problem list and histories reviewed & adjusted, as indicated.      Reviewed and updated as needed this visit by clinical staff  Tobacco  Allergies  Meds  Med Hx  Surg Hx  Fam Hx  Soc Hx      Reviewed and updated as needed this visit by Provider         ROS:  CONSTITUTIONAL: NEGATIVE for fever, chills; POS 10 pounds since eating less at night  RESP: NEGATIVE for significant cough or SOB  CV: NEGATIVE for chest pain, palpitations or peripheral edema  MSK: right knee pain    OBJECTIVE:     /82 (BP Location: Left arm, Cuff Size: Adult Large)  Pulse 90  Temp 98.1  F (36.7  C) (Oral)  Ht 4' 10\" (1.473 m)  Wt 265 lb (120.2 kg)  LMP 09/15/2007  SpO2 93%  Breastfeeding? No  BMI 55.39 kg/m2  Body mass index is 55.39 kg/(m^2).  GENERAL: healthy, alert and no distress  NECK: no adenopathy, no asymmetry, masses, or scars and thyroid normal to palpation  RESP: lungs clear to auscultation - no rales, rhonchi or wheezes  CV: regular rate and rhythm, normal S1 S2, no S3 or S4, no murmur, click or rub  MSK: right knee with pain upon palpation of joint line; no synovitis appreciated;  Negative varus; positive valgus      ASSESSMENT/PLAN:       (M25.562) Acute pain of left knee  (primary encounter diagnosis)  Comment: assess for fracture; possible ligamental tear- will have ortho order MRI if deemed " appropriate  Plan: ORTHO  REFERRAL, XR Knee Standing Left        2 Views        -RICE    (Z23) Need for Tdap vaccination  Comment:   Plan: TDAP VACCINE (ADACEL)          -counseled on weight loss      Margaret Parra MD  [facility]

## 2018-08-15 NOTE — MR AVS SNAPSHOT
After Visit Summary   8/15/2018    Nicole Reyes    MRN: [id number]           Patient Information     Date Of Birth          1955        Visit Information        Provider Department      8/15/2018 1:00 PM Margaret Parra MD [facility]        Today's Diagnoses     Acute pain of left knee    -  1       Follow-ups after your visit        Additional Services     ORTHO  REFERRAL       [facility] is referring you to the Orthopedic  Services at [facility].       The  Representative will assist you in the coordination of your Orthopedic and Musculoskeletal Care as prescribed by your physician.    The  Representative will call you within 1 business day to help schedule your appointment, or you may contact the  Representative at:    All areas ~ [phone]     Type of Referral : Non Surgical       Timeframe requested: 3 - 5 days    Coverage of these services is subject to the terms and limitations of your health insurance plan.  Please call member services at your health plan with any benefit or coverage questions.      If X-rays, CT or MRI's have been performed, please contact the facility where they were done to arrange for , prior to your scheduled appointment.  Please bring this referral request to your appointment and present it to your specialist.                  Future tests that were ordered for you today     Open Future Orders        Priority Expected Expires Ordered    XR Knee Standing Left 2 Views Routine 8/15/2018 8/15/2019 8/15/2018            Who to contact     If you have questions or need follow up information about today's clinic visit or your schedule please contact [facility] directly at [phone].  Normal or non-critical lab and imaging results will be communicated to you by MyChart, letter or phone within 4 business days after the clinic has  "received the results. If you do not hear from us within 7 days, please contact the clinic through [facility] or phone. If you have a critical or abnormal lab result, we will notify you by phone as soon as possible.  Submit refill requests through [facility] or call your pharmacy and they will forward the refill request to us. Please allow 3 business days for your refill to be completed.          Additional Information About Your Visit        [employer] Information     [facility] gives you secure access to your electronic health record. If you see a primary care provider, you can also send messages to your care team and make appointments. If you have questions, please call your primary care clinic.  If you do not have a primary care provider, please call [phone] and they will assist you.        Care EveryWhere ID     This is your Care EveryWhere ID. This could be used by other organizations to access your [place] medical records  [id number]        Your Vitals Were     Pulse Temperature Height Last Period Pulse Oximetry Breastfeeding?    90 98.1  F (36.7  C) (Oral) 4' 10\" (1.473 m) 09/15/2007 93% No    BMI (Body Mass Index)                   55.39 kg/m2            Blood Pressure from Last 3 Encounters:   08/15/18 128/82   07/05/18 130/70   12/01/17 118/72    Weight from Last 3 Encounters:   08/15/18 265 lb (120.2 kg)   07/05/18 274 lb 4.8 oz (124.4 kg)   12/01/17 267 lb 11.2 oz (121.4 kg)              We Performed the Following     ORTHO  REFERRAL        Primary Care Provider Office Phone # Fax #    Pari Wiley [phone]       [address]  [facility] [address]        Equal Access to Services     [facility]: Hadii aad ku hadasho Soomaali, waaxda luqadaha, qaybta kaalmada adeegyada, derik casillas . So [facility] [phone].    ATENCIÓN: Si habla español, tiene a tovar disposición servicios gratuitos de asistencia lingüística. Llame al [phone].    We comply " with applicable federal civil rights laws and [place] laws. We do not discriminate on the basis of race, color, national origin, age, disability, sex, sexual orientation, or gender identity.            Thank you!     Thank you for choosing [facility]  for your care. Our goal is always to provide you with excellent care. Hearing back from our patients is one way we can continue to improve our services. Please take a few minutes to complete the written survey that you may receive in the mail after your visit with us. Thank you!             Your Updated Medication List - Protect others around you: Learn how to safely use, store and throw away your medicines at www.disposemymeds.org.          This list is accurate as of 8/15/18  1:18 PM.  Always use your most recent med list.                   Brand Name Dispense Instructions for use Diagnosis    ascorbic acid 500 MG Tabs      Take 1 tablet by mouth every other day with iron capsule        ASPIRIN EC PO      Take 81 mg by mouth every evening        clonazePAM 1 MG tablet    klonoPIN    30 tablet    0.5 tab po qam, 0.5 tab po qpm    Generalized anxiety disorder       cyclobenzaprine 10 MG tablet    FLEXERIL    90 tablet    Take 1 tablet (10 mg) by mouth 3 times daily as needed for muscle spasms    Chronic low back pain with sciatica, sciatica laterality unspecified, unspecified back pain laterality       FREESTYLE LITE test strip   Generic drug:  blood glucose monitoring     100 Strip    Test twice a day    Type 2 diabetes, HbA1c goal < 7% (H)       gabapentin 300 MG capsule    NEURONTIN    90 capsule    TAKE ONE CAPSULE BY MOUTH EVERY DAY    Restless legs syndrome (RLS), Persistent insomnia       ibuprofen 600 MG tablet    ADVIL/MOTRIN    30 tablet    Take 1 tablet (600 mg) by mouth every 6 hours as needed for moderate pain        Iron Polysacch [id number]-M76--0.025-1 MG Caps      Take 1 capsule by mouth every other day        losartan 100 MG tablet     COZAAR    90 tablet    Take 1 tablet (100 mg) by mouth daily    Essential hypertension, benign       metFORMIN 500 MG 24 hr tablet    GLUCOPHAGE-XR    180 tablet    TAKE TWO TABLETS BY MOUTH EVERY DAY WITH BREAKFAST    Type 2 diabetes mellitus with stage 3 chronic kidney disease, without long-term current use of insulin (H)       Multi-vitamin Tabs tablet   Generic drug:  multivitamin, therapeutic with minerals      Take 1 tablet by mouth At Bedtime        OMEGA-3 FISH OIL PO      Take 2 g by mouth once a week at bedtime        pantoprazole 40 MG EC tablet    PROTONIX    90 tablet    Take 1 tablet (40 mg) by mouth At Bedtime    Gastroesophageal reflux disease without esophagitis       sertraline 100 MG tablet    ZOLOFT    180 tablet    Take 2 tablets (200 mg) by mouth daily    Generalized anxiety disorder, Major depressive disorder, recurrent episode, moderate (H)       simvastatin 20 MG tablet    ZOCOR    90 tablet    Take 1 tablet (20 mg) by mouth At Bedtime    Hyperlipidemia LDL goal <100       zolpidem 10 MG tablet    AMBIEN    45 tablet    Take 0.5 tablets (5 mg) by mouth nightly as needed for sleep    Insomnia, unspecified type

## 2018-08-16 ASSESSMENT — PATIENT HEALTH QUESTIONNAIRE - PHQ9: SUM OF ALL RESPONSES TO PHQ QUESTIONS 1-9: 10

## 2018-08-21 ENCOUNTER — MYC MEDICAL ADVICE (OUTPATIENT)
Dept: INTERNAL MEDICINE | Facility: CLINIC | Age: 63
End: 2018-08-21

## 2018-08-21 DIAGNOSIS — M25.562 ACUTE PAIN OF LEFT KNEE: Primary | ICD-10-CM

## 2018-08-23 ENCOUNTER — OFFICE VISIT (OUTPATIENT)
Dept: ORTHOPEDICS | Facility: CLINIC | Age: 63
End: 2018-08-23
Payer: COMMERCIAL

## 2018-08-23 VITALS — DIASTOLIC BLOOD PRESSURE: 78 MMHG | WEIGHT: 265 LBS | SYSTOLIC BLOOD PRESSURE: 130 MMHG | BODY MASS INDEX: 55.39 KG/M2

## 2018-08-23 DIAGNOSIS — M25.562 ACUTE PAIN OF LEFT KNEE: Primary | ICD-10-CM

## 2018-08-23 DIAGNOSIS — M25.362 KNEE INSTABILITY, LEFT: ICD-10-CM

## 2018-08-23 PROCEDURE — 99204 OFFICE O/P NEW MOD 45 MIN: CPT | Performed by: FAMILY MEDICINE

## 2018-08-23 NOTE — PATIENT INSTRUCTIONS
1. Acute pain of left knee    2. Knee instability, left      Concern for meniscal tear  MRI of your left knee has been ordered. Schedule with [facility] ([phone]).   Xray shows no significant arthritis  Ok to start paperwork for short term disability    [facility] and next steps.

## 2018-08-23 NOTE — PROGRESS NOTES
ASSESSMENT & PLAN    1. Acute pain of left knee    2. Knee instability, left      Concern for meniscal tear  Xray shows no significant arthritis  MRI of your left knee has been ordered. Schedule with [facility] ([phone]).   Ok to start paperwork for short term disability. Will send for my signature.    Follow up over [facility] with results and next steps.      -----    SUBJECTIVE  Nicole Reyes is a/an 62 year old female who is seen in consultation at the request of  Margaret Parra M.D. for evaluation of left knee pain. The patient is seen by themselves.    Onset: 1 month(s) ago. Reports insidious onset without acute precipitating event. Patient states that she has been utilizing their outdoor pool and going up and down ladders.  Location of Pain: left medial knee pain.   Rating of Pain at worst: 8/10 with use  Rating of Pain Currently: 8/10 while weightbearing.   Worsened by: flexion, going up and down stairs.   Better with: sitting with knee slightly flexed  Treatments tried: elevation, ice and ibuprofen  Associated symptoms: no distal numbness or tingling; denies swelling or warmth constant dull aching pain, intermittent shooting pains in the knee. Catching noticed in the knee with extension.   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     Patient's past medical, surgical, social, and family histories were reviewed today and no changes are noted.    REVIEW OF SYSTEMS:  10 point ROS is negative other than symptoms noted above in HPI, Past Medical History or as stated below  Constitutional: NEGATIVE for fever, chills, change in weight  Skin: NEGATIVE for worrisome rashes, moles or lesions  GI/: NEGATIVE for bowel or bladder changes  Neuro: NEGATIVE for weakness, dizziness or paresthesias    OBJECTIVE:  /78 (BP Location: Right arm, Patient Position: Chair, Cuff Size: Adult Large)   Wt 265 lb (120.2 kg)  LMP 09/15/2007  BMI 55.39 kg/m2   General: healthy, alert and in no distress, morbidly obese  HEENT: no scleral icterus or conjunctival erythema  Skin: no suspicious lesions or rash. No jaundice.  CV: no pedal edema  Resp: normal respiratory effort without conversational dyspnea   Psych: normal mood and affect  Gait: using a cane, fair coordination and balance  Neuro: Normal light sensory exam of lower extremity  MSK:  LEFT KNEE  Inspection:    normal alignment  Palpation:    Tender about the medial joint line. Remainder of bony and ligamentous landmarks are nontender.    Difficult to appreciate effusion due to body habitus    Patellofemoral crepitus is Present  Range of Motion:     00 extension to 1200 flexion  Strength:    Extensor mechanism intact  Special Tests:    Positive: Jude's    Negative: MCL/valgus stress (0 & 30 deg), LCL/varus stress (0 & 30 deg), Lachman's, posterior drawer    Independent visualization of the below image:  Recent Results (from the past 744 hour(s))   XR Knee Standing Left 2 Views    Narrative    XR KNEE STANDING LT 2 VW 8/15/2018 1:42 PM    COMPARISON: 8/18/2009    HISTORY: Acute LEFT knee pain.      Impression    IMPRESSION: No fractures are seen in the LEFT knee. Joint spaces are  preserved. Small amount of fluid in the joint.    SVEN MEJIA MD     Patient's conditions were thoroughly discussed during today's visit with greater than 50% of the visit spent counseling the patient with total time spent face-to-face with the patient being 15 minutes.    Jomar Helm,  [facility]

## 2018-08-23 NOTE — LETTER
8/23/2018         RE: Nicole Reyes  [address]  [place] [address]        Dear Colleague,    Thank you for referring your patient, Nicole Reyes, to the [facility] SP[facility]TS [facility]. Please see a copy of my visit note below.    ASSESSMENT & PLAN    1. Acute pain of left knee    2. Knee instability, left      Concern for meniscal tear  Xray shows no significant arthritis  MRI of your left knee has been ordered. Schedule with [place] ([phone]).   Ok to start paperwork for short term disability. Will send for my signature.    Follow up over [facility] with results and next steps.      -----    SUBJECTIVE  Nicole Reyes is a/an 62 year old female who is seen in consultation at the request of  Margaret Parra M.D. for evaluation of left knee pain. The patient is seen by themselves.    Onset: 1 month(s) ago. Reports insidious onset without acute precipitating event. Patient states that she has been utilizing their outdoor pool and going up and down ladders.  Location of Pain: left medial knee pain.   Rating of Pain at worst: 8/10 with use  Rating of Pain Currently: 8/10 while weightbearing.   Worsened by: flexion, going up and down stairs.   Better with: sitting with knee slightly flexed  Treatments tried: elevation, ice and ibuprofen  Associated symptoms: no distal numbness or tingling; denies swelling or warmth constant dull aching pain, intermittent shooting pains in the knee. Catching noticed in the knee with extension.   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     Patient's past medical, surgical, social, and family histories were reviewed today and no changes are noted.    REVIEW OF SYSTEMS:  10 point ROS is negative other than symptoms noted above in HPI, Past Medical History or as stated below  Constitutional: NEGATIVE for fever, chills, change in  weight  Skin: NEGATIVE for worrisome rashes, moles or lesions  GI/: NEGATIVE for bowel or bladder changes  Neuro: NEGATIVE for weakness, dizziness or paresthesias    OBJECTIVE:  /78 (BP Location: Right arm, Patient Position: Chair, Cuff Size: Adult Large)  Wt 265 lb (120.2 kg)  LMP 09/15/2007  BMI 55.39 kg/m2   General: healthy, alert and in no distress, morbidly obese  HEENT: no scleral icterus or conjunctival erythema  Skin: no suspicious lesions or rash. No jaundice.  CV: no pedal edema  Resp: normal respiratory effort without conversational dyspnea   Psych: normal mood and affect  Gait: using a cane, fair coordination and balance  Neuro: Normal light sensory exam of lower extremity  MSK:  LEFT KNEE  Inspection:    normal alignment  Palpation:    Tender about the medial joint line. Remainder of bony and ligamentous landmarks are nontender.    Difficult to appreciate effusion due to body habitus    Patellofemoral crepitus is Present  Range of Motion:     00 extension to 1200 flexion  Strength:    Extensor mechanism intact  Special Tests:    Positive: Jude's    Negative: MCL/valgus stress (0 & 30 deg), LCL/varus stress (0 & 30 deg), Lachman's, posterior drawer    Independent visualization of the below image:  Recent Results (from the past 744 hour(s))   XR Knee Standing Left 2 Views    Narrative    XR KNEE STANDING LT 2 VW 8/15/2018 1:42 PM    COMPARISON: 8/18/2009    HISTORY: Acute LEFT knee pain.      Impression    IMPRESSION: No fractures are seen in the LEFT knee. Joint spaces are  preserved. Small amount of fluid in the joint.    SVEN MEJIA MD     Patient's conditions were thoroughly discussed during today's visit with greater than 50% of the visit spent counseling the patient with total time spent face-to-face with the patient being 15 minutes.    Jomar Helm, DO [facility]      Again, thank you for allowing me to participate in the care of your patient.         Sincerely,        Jomar Helm DO

## 2018-08-23 NOTE — MR AVS SNAPSHOT
After Visit Summary   8/23/2018    Nicole Reyes    MRN: [id number]           Patient Information     Date Of Birth          1955        Visit Information        Provider Department      8/23/2018 4:20 PM Jomar Helm,  [facility] SP[facility]        Today's Diagnoses     Acute pain of left knee    -  1    Knee instability, left          Care Instructions    1. Acute pain of left knee    2. Knee instability, left      Concern for meniscal tear  MRI of your left knee has been ordered. Schedule with [facility] ([phone]).   Xray shows no significant arthritis  Ok to start paperwork for short term disability    Follow up over [employer] with results and next steps.            Follow-ups after your visit        Future tests that were ordered for you today     Open Future Orders        Priority Expected Expires Ordered    MR Knee Left w/o Contrast Routine  8/24/2019 8/23/2018            Who to contact     If you have questions or need follow up information about today's clinic visit or your schedule please contact [facility] directly at [phone].  Normal or non-critical lab and imaging results will be communicated to you by [employer], letter or phone within 4 business days after the clinic has received the results. If you do not hear from us within 7 days, please contact the clinic through [employer] or phone. If you have a critical or abnormal lab result, we will notify you by phone as soon as possible.  Submit refill requests through [employer] or call your pharmacy and they will forward the refill request to us. Please allow 3 business days for your refill to be completed.          Additional Information About Your Visit        [employer] Information     [employer] gives you secure access to your electronic health record. If you see a primary care provider, you can also send messages to your care team and make appointments. If you have questions, please call your  primary care clinic.  If you do not have a primary care provider, please call [phone] and they will assist you.        Care EveryWhere ID     This is your Care EveryWhere ID. This could be used by other organizations to access your [facility] medical records  [id number]        Your Vitals Were     Last Period BMI (Body Mass Index)                09/15/2007 55.39 kg/m2           Blood Pressure from Last 3 Encounters:   08/23/18 130/78   08/15/18 128/82   07/05/18 130/70    Weight from Last 3 Encounters:   08/23/18 265 lb (120.2 kg)   08/15/18 265 lb (120.2 kg)   07/05/18 274 lb 4.8 oz (124.4 kg)               Primary Care Provider Office Phone # Fax #    Pari Wiley [phone]       Valentin [address] [place] [id number]        Equal Access to Services     [facility] : Hadii aad ku hadasho Soomaali, waaxda luqadaha, qaybta kaalmada adeegyada, waxay cucoin hayjollyn celeste casillas . So [facility] [phone].    ATENCIÓN: Si habla español, tiene a tovar disposición servicios gratuitos de asistencia lingüística. Niraj al [phone].    We comply with applicable federal civil rights laws and [place] laws. We do not discriminate on the basis of race, color, national origin, age, disability, sex, sexual orientation, or gender identity.            Thank you!     Thank you for choosing [facility] SPORTS MEDICINE  for your care. Our goal is always to provide you with excellent care. Hearing back from our patients is one way we can continue to improve our services. Please take a few minutes to complete the written survey that you may receive in the mail after your visit with us. Thank you!             Your Updated Medication List - Protect others around you: Learn how to safely use, store and throw away your medicines at www.disposemymeds.org.          This list is accurate as of 8/23/18  4:53 PM.  Always use your most recent med list.                   Brand Name Dispense Instructions for  use Diagnosis    ascorbic acid 500 MG Tabs      Take 1 tablet by mouth every other day with iron capsule        ASPIRIN EC PO      Take 81 mg by mouth every evening        clonazePAM 1 MG tablet    klonoPIN    30 tablet    0.5 tab po qam, 0.5 tab po qpm    Generalized anxiety disorder       cyclobenzaprine 10 MG tablet    FLEXERIL    90 tablet    Take 1 tablet (10 mg) by mouth 3 times daily as needed for muscle spasms    Chronic low back pain with sciatica, sciatica laterality unspecified, unspecified back pain laterality       FREESTYLE LITE test strip   Generic drug:  blood glucose monitoring     100 Strip    Test twice a day    Type 2 diabetes, HbA1c goal < 7% (H)       gabapentin 300 MG capsule    NEURONTIN    90 capsule    TAKE ONE CAPSULE BY MOUTH EVERY DAY    Restless legs syndrome (RLS), Persistent insomnia       ibuprofen 600 MG tablet    ADVIL/MOTRIN    30 tablet    Take 1 tablet (600 mg) by mouth every 6 hours as needed for moderate pain        Iron Polysacch Jvbcb-A95--0.025-1 MG Caps      Take 1 capsule by mouth every other day        losartan 100 MG tablet    COZAAR    90 tablet    Take 1 tablet (100 mg) by mouth daily    Essential hypertension, benign       metFORMIN 500 MG 24 hr tablet    GLUCOPHAGE-XR    180 tablet    TAKE TWO TABLETS BY MOUTH EVERY DAY WITH BREAKFAST    Type 2 diabetes mellitus with stage 3 chronic kidney disease, without long-term current use of insulin (H)       Multi-vitamin Tabs tablet   Generic drug:  multivitamin, therapeutic with minerals      Take 1 tablet by mouth At Bedtime        OMEGA-3 FISH OIL PO      Take 2 g by mouth once a week at bedtime        pantoprazole 40 MG EC tablet    PROTONIX    90 tablet    Take 1 tablet (40 mg) by mouth At Bedtime    Gastroesophageal reflux disease without esophagitis       sertraline 100 MG tablet    ZOLOFT    180 tablet    Take 2 tablets (200 mg) by mouth daily    Generalized anxiety disorder, Major depressive disorder,  recurrent episode, moderate (H)       simvastatin 20 MG tablet    ZOCOR    90 tablet    Take 1 tablet (20 mg) by mouth At Bedtime    Hyperlipidemia LDL goal <100       zolpidem 10 MG tablet    AMBIEN    45 tablet    Take 0.5 tablets (5 mg) by mouth nightly as needed for sleep    Insomnia, unspecified type

## 2018-08-24 RX ORDER — TRAMADOL HYDROCHLORIDE 50 MG/1
50 TABLET ORAL EVERY 6 HOURS PRN
Qty: 10 TABLET | Refills: 0 | Status: SHIPPED | OUTPATIENT
Start: 2018-08-24 | End: 2019-04-04

## 2018-08-24 NOTE — TELEPHONE ENCOUNTER
Tramadol prescribed.  Recommend no more refills after this    Do not want to prescribe oxycontin, as this is long-acting.

## 2018-08-27 ENCOUNTER — TELEPHONE (OUTPATIENT)
Dept: ORTHOPEDICS | Facility: CLINIC | Age: 63
End: 2018-08-27

## 2018-08-27 NOTE — TELEPHONE ENCOUNTER
Received 2 requests for the same information/form below:    Reason for Call:  Form, our goal is to have forms completed with 7 days, however, some forms may require a visit or additional information.    Type of letter, form or note:  Attending Physician Statement    Who is the form from?: [employer]    Where did the form come from: form was faxed in    Phone number of person requesting form: n/a  Can we leave a detailed message on this number:  Not Applicable    Desired completion date of form: as soon as possible      How will form be returned?:  fax to [phone]    Has the patient signed a consent form for release of information (may be included with form)? Not Applicable    Form was started and place in Provider Basket [facility] Helm.     WINSTON Goldman RN

## 2018-08-27 NOTE — TELEPHONE ENCOUNTER
Received voicemail from Osman Cordero, regarding STD claim#[id number] stating she needs to verify information for the claim.   She can be reached at [phone].   She did not leave a birth date for patient.     Phone call to [employer] and spoke with Rosamaria. Was able to identify patient via claim number.   She was not sure what was needed. Informed we had not received a written request for information and our fax number was provided.     Please watch for forms.     WINSTON Goldman RN

## 2018-08-29 ENCOUNTER — HOSPITAL ENCOUNTER (OUTPATIENT)
Dept: MRI IMAGING | Facility: CLINIC | Age: 63
Discharge: HOME | End: 2018-08-29
Attending: FAMILY MEDICINE | Admitting: FAMILY MEDICINE
Payer: COMMERCIAL

## 2018-08-29 DIAGNOSIS — M25.562 ACUTE PAIN OF LEFT KNEE: ICD-10-CM

## 2018-08-29 DIAGNOSIS — M25.362 KNEE INSTABILITY, LEFT: ICD-10-CM

## 2018-08-29 PROCEDURE — 73721 MRI JNT OF LWR EXTRE W/O DYE: CPT | Mod: LT

## 2018-08-31 ENCOUNTER — MYC MEDICAL ADVICE (OUTPATIENT)
Dept: ORTHOPEDICS | Facility: CLINIC | Age: 63
End: 2018-08-31

## 2018-08-31 NOTE — TELEPHONE ENCOUNTER
Form filled out / signed and placed in outbox.    First office visit was on 8/23/18.  At which point an MRI was ordered.  Given results of MRI recommend intra-articular injection physical therapy.  2 weeks of light duty (predominantly seated activity, no more than 2 hours of total walking during her shift) after the injection and then going forward full duty thereafter.    MRI results communicated through my chart.  Recommend intra-articular cortisone injection and physical therapy thereafter.  Also communicated recommendations in regards to work.    Requested the patient schedule follow-up visit for cortisone injection.    Jomar Helm DO, CAQSM  [facility]

## 2018-08-31 NOTE — TELEPHONE ENCOUNTER
Patient completed MRI on 8/29/2018.  Plan was to follow-up over my chart with the results and plan.    Please advise on recent MRI and whether short-term disability form should be completed to reflect the recent MRI or whether it should be completed based off of last office visit on 8/23/2018.     Impression    IMPRESSION:   1. Compromised image quality because of large body habitus.  2. Degeneration and probable complex tear of the mid body of the  medial meniscus.  3. Grade 1 medial collateral ligament sprain.  4. Chondromalacia in the medial and patellofemoral compartments as  described above.  5. Small joint space effusion and moderate-sized popliteal cyst.    MD Jay DOUGLAS, ATC

## 2018-09-05 NOTE — TELEPHONE ENCOUNTER
Completed form faxed to the number listed below in previous message.  Form submitted for scanning.    Jay Jones ATC

## 2018-09-07 ENCOUNTER — OFFICE VISIT (OUTPATIENT)
Dept: ORTHOPEDICS | Facility: CLINIC | Age: 63
End: 2018-09-07
Payer: COMMERCIAL

## 2018-09-07 VITALS
HEIGHT: 58 IN | WEIGHT: 265 LBS | SYSTOLIC BLOOD PRESSURE: 142 MMHG | BODY MASS INDEX: 55.62 KG/M2 | DIASTOLIC BLOOD PRESSURE: 90 MMHG

## 2018-09-07 DIAGNOSIS — M25.462 KNEE EFFUSION, LEFT: ICD-10-CM

## 2018-09-07 DIAGNOSIS — S83.232A COMPLEX TEAR OF MEDIAL MENISCUS OF LEFT KNEE AS CURRENT INJURY, INITIAL ENCOUNTER: ICD-10-CM

## 2018-09-07 DIAGNOSIS — M17.12 PRIMARY OSTEOARTHRITIS OF LEFT KNEE: Primary | ICD-10-CM

## 2018-09-07 PROCEDURE — 20611 DRAIN/INJ JOINT/BURSA W/US: CPT | Mod: LT | Performed by: FAMILY MEDICINE

## 2018-09-07 PROCEDURE — 99213 OFFICE O/P EST LOW 20 MIN: CPT | Mod: 25 | Performed by: FAMILY MEDICINE

## 2018-09-07 RX ADMIN — METHYLPREDNISOLONE ACETATE 40 MG: 40 INJECTION, SUSPENSION INTRA-ARTICULAR; INTRALESIONAL; INTRAMUSCULAR; SOFT TISSUE at 14:49

## 2018-09-07 NOTE — PATIENT INSTRUCTIONS
1. Primary osteoarthritis of left knee    2. Complex tear of medial meniscus of left knee as current injury, initial encounter    3. Knee effusion, left      Discussed MRI  Steroid injection of the left knee was performed today in clinic  - Ok to shower  - No bathtub, hot tub or swimming for 2 days  - The lidocaine (what is giving you pain relief right now) will likely stop working in 1-2 hours.  You will then have pain again, similar to before you received the injection. The corticosteroid will not start working until approximately 1-2 weeks from now.  Can repeat in 4 months or anytime thereafter  Referral to physical therapy  Letter provided clarifying restrictions: 2 weeks light duty - mostly seated, no more than 2 hours total of waking during a shift and no running / responding quickly to code situations.    Follow up if not improving after 4-6 therapy sessions.      Official Walk with a Doc Chapter  Join me downstairs in the lobby of the [facility] the 1st Saturday of every month at 1pm for a free health talk. Come, listen and walk at your own pace!

## 2018-09-07 NOTE — MR AVS SNAPSHOT
After Visit Summary   9/7/2018    Nicole Reyes    MRN: [id number]           Patient Information     Date Of Birth          1955        Visit Information        Provider Department      9/7/2018 2:20 PM Jomar Helm,  [facility] SPORTS MEDICINE        Today's Diagnoses     Primary osteoarthritis of left knee    -  1    Complex tear of medial meniscus of left knee as current injury, initial encounter        Knee effusion, left          Care Instructions    1. Primary osteoarthritis of left knee    2. Complex tear of medial meniscus of left knee as current injury, initial encounter    3. Knee effusion, left      Discussed MRI  Steroid injection of the left knee was performed today in clinic  - Ok to shower  - No bathtub, hot tub or swimming for 2 days  - The lidocaine (what is giving you pain relief right now) will likely stop working in 1-2 hours.  You will then have pain again, similar to before you received the injection. The corticosteroid will not start working until approximately 1-2 weeks from now.  Can repeat in 4 months or anytime thereafter  Referral to physical therapy  Letter provided clarifying restrictions: 2 weeks light duty - mostly seated, no more than 2 hours total of waking during a shift and no running / responding quickly to code situations.    Follow up if not improving after 4-6 therapy sessions.      Official Walk with a Doc Chapter  Join me downstairs in the lobby of the [facility] the 1st Saturday of every month at 1pm for a free health talk. Come, listen and walk at your own pace!              Follow-ups after your visit        Who to contact     If you have questions or need follow up information about today's clinic visit or your schedule please contact [facility] SPORTS MEDICINE directly at [phone].  Normal or non-critical lab and imaging results will be communicated to you by MyChart, letter or phone within 4 business  "days after the clinic has received the results. If you do not hear from us within 7 days, please contact the clinic through [employer] or phone. If you have a critical or abnormal lab result, we will notify you by phone as soon as possible.  Submit refill requests through [employer] or call your pharmacy and they will forward the refill request to us. Please allow 3 business days for your refill to be completed.          Additional Information About Your Visit        [employer] Information     [employer] gives you secure access to your electronic health record. If you see a primary care provider, you can also send messages to your care team and make appointments. If you have questions, please call your primary care clinic.  If you do not have a primary care provider, please call [phone] and they will assist you.        Care EveryWhere ID     This is your Care EveryWhere ID. This could be used by other organizations to access your [facility] medical records  [id number]        Your Vitals Were     Height Last Period BMI (Body Mass Index)             4' 10\" (1.473 m) 09/15/2007 55.39 kg/m2          Blood Pressure from Last 3 Encounters:   09/07/18 142/90   08/23/18 130/78   08/15/18 128/82    Weight from Last 3 Encounters:   09/07/18 265 lb (120.2 kg)   08/23/18 265 lb (120.2 kg)   08/15/18 265 lb (120.2 kg)              We Performed the Following     Large Joint Injection/Arthocentesis        Primary Care Provider Office Phone # Fax #    Pari Wiley [phone]       [address] [facility] [address]  [facility] [address]        Equal Access to Services     [facility]: Hadii aad ku hadasho Soomaali, waaxda luqadaha, qaybta kaalmada derik paz . So [facility] [phone].    ATENCIÓN: Si habla español, tiene a tovar disposición servicios gratuitos de asistencia lingüística. Llame al [phone].    We comply with applicable federal civil rights laws and [place] laws. We do not " discriminate on the basis of race, color, national origin, age, disability, sex, sexual orientation, or gender identity.            Thank you!     Thank you for choosing [facility] SPORTS MEDICINE  for your care. Our goal is always to provide you with excellent care. Hearing back from our patients is one way we can continue to improve our services. Please take a few minutes to complete the written survey that you may receive in the mail after your visit with us. Thank you!             Your Updated Medication List - Protect others around you: Learn how to safely use, store and throw away your medicines at www.disposemymeds.org.          This list is accurate as of 9/7/18  2:51 PM.  Always use your most recent med list.                   Brand Name Dispense Instructions for use Diagnosis    ascorbic acid 500 MG Tabs      Take 1 tablet by mouth every other day with iron capsule        ASPIRIN EC PO      Take 81 mg by mouth every evening        clonazePAM 1 MG tablet    klonoPIN    30 tablet    0.5 tab po qam, 0.5 tab po qpm    Generalized anxiety disorder       cyclobenzaprine 10 MG tablet    FLEXERIL    90 tablet    Take 1 tablet (10 mg) by mouth 3 times daily as needed for muscle spasms    Chronic low back pain with sciatica, sciatica laterality unspecified, unspecified back pain laterality       FREESTYLE LITE test strip   Generic drug:  blood glucose monitoring     100 Strip    Test twice a day    Type 2 diabetes, HbA1c goal < 7% (H)       gabapentin 300 MG capsule    NEURONTIN    90 capsule    TAKE ONE CAPSULE BY MOUTH EVERY DAY    Restless legs syndrome (RLS), Persistent insomnia       ibuprofen 600 MG tablet    ADVIL/MOTRIN    30 tablet    Take 1 tablet (600 mg) by mouth every 6 hours as needed for moderate pain        Iron Polysacch Zbiis-W18--0.025-1 MG Caps      Take 1 capsule by mouth every other day        losartan 100 MG tablet    COZAAR    90 tablet    Take 1 tablet (100 mg) by mouth daily     Essential hypertension, benign       metFORMIN 500 MG 24 hr tablet    GLUCOPHAGE-XR    180 tablet    TAKE TWO TABLETS BY MOUTH EVERY DAY WITH BREAKFAST    Type 2 diabetes mellitus with stage 3 chronic kidney disease, without long-term current use of insulin (H)       Multi-vitamin Tabs tablet   Generic drug:  multivitamin, therapeutic with minerals      Take 1 tablet by mouth At Bedtime        OMEGA-3 FISH OIL PO      Take 2 g by mouth once a week at bedtime        pantoprazole 40 MG EC tablet    PROTONIX    90 tablet    Take 1 tablet (40 mg) by mouth At Bedtime    Gastroesophageal reflux disease without esophagitis       sertraline 100 MG tablet    ZOLOFT    180 tablet    Take 2 tablets (200 mg) by mouth daily    Generalized anxiety disorder, Major depressive disorder, recurrent episode, moderate (H)       simvastatin 20 MG tablet    ZOCOR    90 tablet    Take 1 tablet (20 mg) by mouth At Bedtime    Hyperlipidemia LDL goal <100       traMADol 50 MG tablet    ULTRAM    10 tablet    Take 1 tablet (50 mg) by mouth every 6 hours as needed for severe pain    Acute pain of left knee       zolpidem 10 MG tablet    AMBIEN    45 tablet    Take 0.5 tablets (5 mg) by mouth nightly as needed for sleep    Insomnia, unspecified type

## 2018-09-07 NOTE — LETTER
September 7, 2018        Nicole Reyes  [address]  [place] [address]      To whom it may concern:    RE: Nicole Reyes    Patient was seen and treated today at our clinic. I reviewed her MRI and completed a cortisone injection for her left knee.  I would recommend 2 weeks of light duty to include mostly seated work, no more than 2 hours of walking during her shift and would not recommend any running / responding in a code situation.  I have also recommended formal physical therapy.  Beginning Monday, September 24 she may return to full duty and if she unable to do so will require follow-up in my office.    Please contact me for questions or concerns.    Sincerely,        Jomar Helm DO, CAM  [facility] on behalf of Dr. Helm

## 2018-09-07 NOTE — PROGRESS NOTES
"ASSESSMENT & PLAN    1. Primary osteoarthritis of left knee    2. Complex tear of medial meniscus of left knee as current injury, initial encounter    3. 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    Follow up if not improving after 4-6 therapy sessions.      -----    SUBJECTIVE:  Nicole Reyes is a 62 year old female who is seen in follow-up for discussion of left knee MRI results.They were last seen 8/27/2018.     Since their last visit reports 0% - (About the same as last time). They indicate that their current pain level is 8/10. Pain is worse with transitioning from sitting to standing and walking. They have tried rest/activity avoidance, ice and ibuprofen.  She has been off work because she doesn't feel like she can perform all the duties needed for her to return to work.    The patient is seen by themselves.    Patient's past medical, surgical, social, and family histories were reviewed today and no changes are noted.    REVIEW OF SYSTEMS:  Constitutional: NEGATIVE for fever, chills, change in weight  Skin: NEGATIVE for worrisome rashes, moles or lesions  GI/: NEGATIVE for bowel or bladder changes  Neuro: NEGATIVE for weakness, dizziness or paresthesias    OBJECTIVE:  /90  Ht 4' 10\" (1.473 m)  Wt 265 lb (120.2 kg)  LMP 09/15/2007  BMI 55.39 kg/m2   General: healthy, alert and in mild distress, morbidly obese  HEENT: no scleral icterus or conjunctival erythema  Skin: no suspicious lesions or rash. No jaundice.  CV: regular rhythm by palpation, no pedal edema  Resp: normal respiratory effort without conversational dyspnea   Psych: normal " mood and affect  Gait: using a cane, antalgic gait, fair coordination and balance  Neuro: normal light touch sensory exam of the extremities.    MSK:  Deferred    Independent visualization of the below image:  Recent Results (from the past 744 hour(s))   XR Knee Standing Left 2 Views    Narrative    XR KNEE STANDING LT 2 VW 8/15/2018 1:42 PM    COMPARISON: 8/18/2009    HISTORY: Acute LEFT knee pain.      Impression    IMPRESSION: No fractures are seen in the LEFT knee. Joint spaces are  preserved. Small amount of fluid in the joint.    SVEN MEJIA MD   MR Knee Left w/o Contrast    Narrative    MR KNEE LEFT WITHOUT CONTRAST 8/29/2018 4:23 PM    HISTORY:  4-6 week history of medial left knee pain. No specific  injury.     TECHNIQUE:  Axial and coronal T2 with fat suppression. Coronal T1.  Sagittal dual echo T2. Image quality is compromised by large body  habitus.    COMPARISON: None.    FINDINGS:   Medial Meniscus: Degeneration and probable complex tear of the mid  body. The anterior and posterior horn appear normal. No displaced  meniscal fragments or flaps.       Lateral Meniscus: Intrasubstance myxoid degenerative change in the mid  body. No evidence for tear. The remainder of the lateral meniscus is  normal.       Anterior Cruciate Ligament: Unremarkable.     Posterior Cruciate Ligament: Unremarkable.     Medial Collateral Ligament: Soft tissue edema superficial to the  medial collateral ligament consistent with a grade 1 sprain (image 17  of series 5).    Lateral Collateral Ligament Complex, Popliteus Tendon: The iliotibial  band, fibular collateral ligament, biceps femoris tendon, and  popliteus tendon are unremarkable.    Osseous and Cartilaginous Structures: No acute bony abnormality. Grade  II chondromalacia weightbearing medial compartment. Articular  cartilages in the lateral compartment appear normal. Grade III  chondromalacia lateral patellar facet with grade II chondromalacia on  the medial patellar  facet. Trochlear groove articular cartilage shows  probable grade II chondromalacia laterally.    Extensor Mechanism: The quadriceps and patellar tendons are  unremarkable. The medial and lateral patellar retinacula are normal.    Joint Space: Small joint effusion. No definite loose bodies  appreciated.    Additional Findings: 5 x 3 x 0.7 cm popliteal cyst. No  semimembranosus-tibial collateral ligament or pes anserine bursitis.      Impression    IMPRESSION:   1. Compromised image quality because of large body habitus.  2. Degeneration and probable complex tear of the mid body of the  medial meniscus.  3. Grade 1 medial collateral ligament sprain.  4. Chondromalacia in the medial and patellofemoral compartments as  described above.  5. Small joint space effusion and moderate-sized popliteal cyst.    LUCIAN ESTRELLA MD     Large Joint Injection/Arthocentesis  Date/Time: 9/7/2018 2:49 PM  Performed by: MANJEET HELM  Authorized by: MANJEET HELM     Indications:  Osteoarthritis and pain  Needle Size:  22 G  Guidance: ultrasound    Approach:  Superolateral  Location:  Knee  Medications:  40 mg methylPREDNISolone acetate 40 MG/ML  Outcome:  Tolerated well, no immediate complications  Procedure discussed: discussed risks, benefits, and alternatives    Consent Given by:  Patient  Timeout: timeout called immediately prior to procedure    Prep: patient was prepped and draped in usual sterile fashion       Pain noted to be a 8/10 before completion of the procedure and 8/10 after completion of the procedure.            Patient's conditions were thoroughly discussed during today's visit with greater than 50% of the visit spent counseling the patient with total time spent face-to-face with the patient being 25 minutes with injection taking 5 minutes.    Manjeet Helm DO [facility] [place] [facility]

## 2018-09-07 NOTE — LETTER
"    9/7/2018         RE: Nicole Reyes  [address]  [place] [address]        Dear Colleague,    Thank you for referring your patient, Nicole Reyes, to the [facility]. Please see a copy of my visit note below.    ASSESSMENT & PLAN    1. Primary osteoarthritis of left knee    2. Complex tear of medial meniscus of left knee as current injury, initial encounter    3. 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    Follow up if not improving after 4-6 therapy sessions.      -----    SUBJECTIVE:  Nicole Reyes is a 62 year old female who is seen in follow-up for discussion of left knee MRI results.They were last seen 8/27/2018.     Since their last visit reports 0% - (About the same as last time). They indicate that their current pain level is 8/10. Pain is worse with transitioning from sitting to standing and walking. They have tried rest/activity avoidance, ice and ibuprofen.  She has been off work because she doesn't feel like she can perform all the duties needed for her to return to work.    The patient is seen by themselves.    Patient's past medical, surgical, social, and family histories were reviewed today and no changes are noted.    REVIEW OF SYSTEMS:  Constitutional: NEGATIVE for fever, chills, change in weight  Skin: NEGATIVE for worrisome rashes, moles or lesions  GI/: NEGATIVE for bowel or bladder changes  Neuro: NEGATIVE for weakness, dizziness or paresthesias    OBJECTIVE:  /90  Ht 4' 10\" (1.473 m)  Wt 265 lb (120.2 kg)  LMP 09/15/2007  BMI 55.39 kg/m2   General: healthy, alert and in mild " distress, morbidly obese  HEENT: no scleral icterus or conjunctival erythema  Skin: no suspicious lesions or rash. No jaundice.  CV: regular rhythm by palpation, no pedal edema  Resp: normal respiratory effort without conversational dyspnea   Psych: normal mood and affect  Gait: using a cane, antalgic gait, fair coordination and balance  Neuro: normal light touch sensory exam of the extremities.    MSK:  Deferred    Independent visualization of the below image:  Recent Results (from the past 744 hour(s))   XR Knee Standing Left 2 Views    Narrative    XR KNEE STANDING LT 2 VW 8/15/2018 1:42 PM    COMPARISON: 8/18/2009    HISTORY: Acute LEFT knee pain.      Impression    IMPRESSION: No fractures are seen in the LEFT knee. Joint spaces are  preserved. Small amount of fluid in the joint.    SVEN MEJIA MD   MR Knee Left w/o Contrast    Narrative    MR KNEE LEFT WITHOUT CONTRAST 8/29/2018 4:23 PM    HISTORY:  4-6 week history of medial left knee pain. No specific  injury.     TECHNIQUE:  Axial and coronal T2 with fat suppression. Coronal T1.  Sagittal dual echo T2. Image quality is compromised by large body  habitus.    COMPARISON: None.    FINDINGS:   Medial Meniscus: Degeneration and probable complex tear of the mid  body. The anterior and posterior horn appear normal. No displaced  meniscal fragments or flaps.       Lateral Meniscus: Intrasubstance myxoid degenerative change in the mid  body. No evidence for tear. The remainder of the lateral meniscus is  normal.       Anterior Cruciate Ligament: Unremarkable.     Posterior Cruciate Ligament: Unremarkable.     Medial Collateral Ligament: Soft tissue edema superficial to the  medial collateral ligament consistent with a grade 1 sprain (image 17  of series 5).    Lateral Collateral Ligament Complex, Popliteus Tendon: The iliotibial  band, fibular collateral ligament, biceps femoris tendon, and  popliteus tendon are unremarkable.    Osseous and Cartilaginous  Structures: No acute bony abnormality. Grade  II chondromalacia weightbearing medial compartment. Articular  cartilages in the lateral compartment appear normal. Grade III  chondromalacia lateral patellar facet with grade II chondromalacia on  the medial patellar facet. Trochlear groove articular cartilage shows  probable grade II chondromalacia laterally.    Extensor Mechanism: The quadriceps and patellar tendons are  unremarkable. The medial and lateral patellar retinacula are normal.    Joint Space: Small joint effusion. No definite loose bodies  appreciated.    Additional Findings: 5 x 3 x 0.7 cm popliteal cyst. No  semimembranosus-tibial collateral ligament or pes anserine bursitis.      Impression    IMPRESSION:   1. Compromised image quality because of large body habitus.  2. Degeneration and probable complex tear of the mid body of the  medial meniscus.  3. Grade 1 medial collateral ligament sprain.  4. Chondromalacia in the medial and patellofemoral compartments as  described above.  5. Small joint space effusion and moderate-sized popliteal cyst.    LUCIAN ESTRELLA MD     Large Joint Injection/Arthocentesis  Date/Time: 9/7/2018 2:49 PM  Performed by: MANJEET CARTAGENA  Authorized by: MANJEET CARTAGENA     Indications:  Osteoarthritis and pain  Needle Size:  22 G  Guidance: ultrasound    Approach:  Superolateral  Location:  Knee  Medications:  40 mg methylPREDNISolone acetate 40 MG/ML  Outcome:  Tolerated well, no immediate complications  Procedure discussed: discussed risks, benefits, and alternatives    Consent Given by:  Patient  Timeout: timeout called immediately prior to procedure    Prep: patient was prepped and draped in usual sterile fashion       Pain noted to be a 8/10 before completion of the procedure and 8/10 after completion of the procedure.            Patient's conditions were thoroughly discussed during today's visit with greater than 50% of the visit spent counseling the patient with  total time spent face-to-face with the patient being 25 minutes with injection taking 5 minutes.    Jomar Helm DO [facility]          Again, thank you for allowing me to participate in the care of your patient.        Sincerely,        Jomar Helm DO

## 2018-09-12 ENCOUNTER — TELEPHONE (OUTPATIENT)
Dept: ORTHOPEDICS | Facility: CLINIC | Age: 63
End: 2018-09-12

## 2018-09-12 RX ORDER — METHYLPREDNISOLONE ACETATE 40 MG/ML
40 INJECTION, SUSPENSION INTRA-ARTICULAR; INTRALESIONAL; INTRAMUSCULAR; SOFT TISSUE
Status: DISCONTINUED | OUTPATIENT
Start: 2018-09-07 | End: 2019-04-04

## 2018-09-12 NOTE — TELEPHONE ENCOUNTER
Reason for Call:  Form, our goal is to have forms completed with 7 days, however, some forms may require a visit or additional information.    Type of letter, form or note: Disability forms    Who is the form from?:  [employer]    Where did the form come from: form was faxed in    Phone number of person requesting form: Yossi Gates  Can we leave a detailed message on this number:  YES    Desired completion date of form: 7 days    How will form be returned?:  fax to [phone]    Has the patient signed a consent form for release of information (may be included with form)? Not Applicable    Additional comments:     Form was started and place in Provider Basket for provider review/ completion at [facility].     Jay Jones ATC

## 2018-09-19 ENCOUNTER — THERAPY VISIT (OUTPATIENT)
Dept: PHYSICAL THERAPY | Facility: CLINIC | Age: 63
End: 2018-09-19
Payer: COMMERCIAL

## 2018-09-19 DIAGNOSIS — M25.562 ACUTE PAIN OF LEFT KNEE: Primary | ICD-10-CM

## 2018-09-19 PROCEDURE — 97161 PT EVAL LOW COMPLEX 20 MIN: CPT | Mod: GP | Performed by: PHYSICAL THERAPIST

## 2018-09-19 PROCEDURE — 97110 THERAPEUTIC EXERCISES: CPT | Mod: GP | Performed by: PHYSICAL THERAPIST

## 2018-09-19 ASSESSMENT — ACTIVITIES OF DAILY LIVING (ADL)
HOW_WOULD_YOU_RATE_THE_OVERALL_FUNCTION_OF_YOUR_KNEE_DURING_YOUR_USUAL_DAILY_ACTIVITIES?: NEARLY NORMAL
GIVING WAY, BUCKLING OR SHIFTING OF KNEE: THE SYMPTOM AFFECTS MY ACTIVITY MODERATELY
KNEE_ACTIVITY_OF_DAILY_LIVING_SCORE: 44.29
KNEEL ON THE FRONT OF YOUR KNEE: ACTIVITY IS VERY DIFFICULT
RISE FROM A CHAIR: ACTIVITY IS FAIRLY DIFFICULT
GO DOWN STAIRS: ACTIVITY IS FAIRLY DIFFICULT
SQUAT: ACTIVITY IS FAIRLY DIFFICULT
RAW_SCORE: 31
STIFFNESS: THE SYMPTOM AFFECTS MY ACTIVITY MODERATELY
KNEE_ACTIVITY_OF_DAILY_LIVING_SUM: 31
WALK: ACTIVITY IS FAIRLY DIFFICULT
STAND: ACTIVITY IS VERY DIFFICULT
SIT WITH YOUR KNEE BENT: ACTIVITY IS MINIMALLY DIFFICULT
SWELLING: I DO NOT HAVE THE SYMPTOM
PAIN: THE SYMPTOM AFFECTS MY ACTIVITY MODERATELY
HOW_WOULD_YOU_RATE_THE_CURRENT_FUNCTION_OF_YOUR_KNEE_DURING_YOUR_USUAL_DAILY_ACTIVITIES_ON_A_SCALE_FROM_0_TO_100_WITH_100_BEING_YOUR_LEVEL_OF_KNEE_FUNCTION_PRIOR_TO_YOUR_INJURY_AND_0_BEING_THE_INABILITY_TO_PERFORM_ANY_OF_YOUR_USUAL_DAILY_ACTIVITIES?: 45
LIMPING: THE SYMPTOM AFFECTS MY ACTIVITY MODERATELY
AS_A_RESULT_OF_YOUR_KNEE_INJURY,_HOW_WOULD_YOU_RATE_YOUR_CURRENT_LEVEL_OF_DAILY_ACTIVITY?: NEARLY NORMAL
WEAKNESS: THE SYMPTOM AFFECTS MY ACTIVITY MODERATELY
GO UP STAIRS: ACTIVITY IS FAIRLY DIFFICULT

## 2018-09-19 NOTE — PROGRESS NOTES
[facility] Initial Evaluation  Subjective:  Patient is a 62 year old female presenting with rehab left knee hpi. The history is provided by the patient. No  was used.   Nicole Reyes is a 62 year old female with a left knee condition.  Condition occurred with:  Insidious onset.  Condition occurred: for unknown reasons.  This is a new condition  Pt c/o L knee pain x 2-3 months.   Denies injury.   MD visit date 9/7/18.  Imaging showed OA and degenerative meniscus tearing.  Had injection 9/7/18 with moderate improvement..    Patient reports pain:  Medial.    Pain is described as stabbing, shooting and sharp  and reported as 3/10.  Associated symptoms:  Loss of motion/stiffness and loss of strength. Pain is the same all the time.  Symptoms are exacerbated by ascending stairs, bending/squatting, descending stairs, standing, transfers and walking and relieved by activity/movement, rest and ice.  Since onset symptoms are gradually improving.  Special tests:  MRI (degenerative meniscus, OA).      General health as reported by patient is fair.  Pertinent medical history includes:  Anemia, depression, diabetes, high blood pressure, overweight and sleep disorder/apnea.  Medical allergies: PCN, codiene.    Current medications:  Anti-depressants, high blood pressure medication, muscle relaxants and sleep medication.  Current occupation is Health unit coordinator  .  Patient is currently not working due to present treatment problem.  Primary job tasks include:  Prolonged sitting, repetitive tasks and other (computer).                                Objective:  System                                                Knee Evaluation:  ROM:  Strength wnl knee: L knee 4+/5, R knee 5-/5.  AROM      Extension:  Left: 0    Right:  0  Flexion: Left: 90    Right: 120  PROM    Hyperextension: Left: 6   Right:   Extension: Left: 0    Right:  0  Flexion: Left: 105    Right:  125      Strength:          Quad Set Left:  Fair    Pain: +/-   Quad Set Right:  Good    Pain: -    Special Tests:   Left knee positive for the following special tests:  Meniscal and Patellar Compression    Palpation:    Left knee tenderness present at:  Medial Joint Line    Edema:  Edema of the knee: mild swelling ant and medial ankle.      Functional Testing:            Proprioception:   Stork Balance Test:  Left:  10sec  Right:  15-20sec  % of Uninvolved:           General     ROS    Assessment/Plan:    Patient is a 62 year old female with left side knee complaints.    Patient has the following significant findings with corresponding treatment plan.                Diagnosis 1:  L knee pain  Pain -  hot/cold therapy, US, manual therapy, splint/taping/bracing/orthotics, directional preference exercise and home program  Decreased ROM/flexibility - manual therapy and therapeutic exercise  Decreased strength - therapeutic exercise and therapeutic activities  Decreased proprioception - neuro re-education and therapeutic activities  Edema - cold therapy  Impaired gait - gait training  Impaired muscle performance - neuro re-education  Decreased function - therapeutic activities    Therapy Evaluation Codes:   1) History comprised of:   Personal factors that impact the plan of care:      None.    Comorbidity factors that impact the plan of care are:      Diabetes, Depression, High blood pressure, Overweight and Sleep disorder/apnea.     Medications impacting care: Anti-depressant, High blood pressure, Muscle relaxant and Sleep.  2) Examination of Body Systems comprised of:   Body structures and functions that impact the plan of care:      Knee.   Activity limitations that impact the plan of care are:      Driving, Dressing, Sitting, Squatting/kneeling, Stairs, Standing, Walking and Working.  3) Clinical presentation characteristics are:   Stable/Uncomplicated.  4) Decision-Making    Low complexity using standardized patient assessment  instrument and/or measureable assessment of functional outcome.  Cumulative Therapy Evaluation is: Low complexity.    Previous and current functional limitations:  (See Goal Flow Sheet for this information)    Short term and Long term goals: (See Goal Flow Sheet for this information)     Communication ability:  Patient appears to be able to clearly communicate and understand verbal and written communication and follow directions correctly.  Treatment Explanation - The following has been discussed with the patient:   RX ordered/plan of care  Anticipated outcomes  Possible risks and side effects  This patient would benefit from PT intervention to resume normal activities.   Rehab potential is good.    Frequency:  1 X week, once daily  Duration:  for 8 weeks  Discharge Plan:  Achieve all LTG.  Independent in home treatment program.  Reach maximal therapeutic benefit.    Please refer to the daily flowsheet for treatment today, total treatment time and time spent performing 1:1 timed codes.        Anxious

## 2018-09-19 NOTE — MR AVS SNAPSHOT
After Visit Summary   9/19/2018    Nicole Reyes    MRN: [id number]           Patient Information     Date Of Birth          1955        Visit Information        Provider Department      9/19/2018 10:20 AM Giovanni Walton PT [facility] Physical Therapy        Today's Diagnoses     Acute pain of left knee    -  1       Follow-ups after your visit        Your next 10 appointments already scheduled     Sep 26, 2018  3:00 PM CDT   [facility] Extremity with Giovanni Walton PT   [facility] ([facility]    [address]  [facility] [id number] [phone]              Who to contact     If you have questions or need follow up information about today's clinic visit or your schedule please contact [facility] directly at [phone].  Normal or non-critical lab and imaging results will be communicated to you by Subblimehart, letter or phone within 4 business days after the clinic has received the results. If you do not hear from us within 7 days, please contact the clinic through Subblimehart or phone. If you have a critical or abnormal lab result, we will notify you by phone as soon as possible.  Submit refill requests through [employer] or call your pharmacy and they will forward the refill request to us. Please allow 3 business days for your refill to be completed.          Additional Information About Your Visit        MyChart Information     [employer] gives you secure access to your electronic health record. If you see a primary care provider, you can also send messages to your care team and make appointments. If you have questions, please call your primary care clinic.  If you do not have a primary care provider, please call [phone] and they will assist you.        Care EveryWhere ID     This is your Care EveryWhere ID. This could be used by  other organizations to access your [place] medical records  [id number]        Your Vitals Were     Last Period                   09/15/2007            Blood Pressure from Last 3 Encounters:   09/07/18 142/90   08/23/18 130/78   08/15/18 128/82    Weight from Last 3 Encounters:   09/07/18 120.2 kg (265 lb)   08/23/18 120.2 kg (265 lb)   08/15/18 120.2 kg (265 lb)              We Performed the Following     HC PT EVAL, LOW COMPLEXITY     CAM[facility]LE INITIAL EVAL REPORT     THERAPEUTIC EXERCISES        Primary Care Provider Office Phone # Fax #    Pari Wiley [phone]       [address] [facility] [address]  [facility] [address]        Equal Access to Services     JUDI ARELLANO : Hadii paddy resendizo Somacey, waaxda luqadaha, qaybta kaalmada adeegyada, derik casillas . So [facility] [phone].    ATENCIÓN: Si habla español, tiene a tovar disposición servicios gratuitos de asistencia lingüística. [facility] [phone].    We comply with applicable federal civil rights laws and [place] laws. We do not discriminate on the basis of race, color, national origin, age, disability, sex, sexual orientation, or gender identity.            Thank you!     Thank you for choosing [facility] PH[facility] THERAP[facility]  for your care. Our goal is always to provide you with excellent care. Hearing back from our patients is one way we can continue to improve our services. Please take a few minutes to complete the written survey that you may receive in the mail after your visit with us. Thank you!             Your Updated Medication List - Protect others around you: Learn how to safely use, store and throw away your medicines at www.disposemymeds.org.          This list is accurate as of 9/19/18 11:43 AM.  Always use your most recent med list.                   Brand Name Dispense Instructions for use Diagnosis    ascorbic acid 500 MG Tabs      Take 1 tablet by mouth every other day  with iron capsule        ASPIRIN EC PO      Take 81 mg by mouth every evening        clonazePAM 1 MG tablet    klonoPIN    30 tablet    0.5 tab po qam, 0.5 tab po qpm    Generalized anxiety disorder       cyclobenzaprine 10 MG tablet    FLEXERIL    90 tablet    Take 1 tablet (10 mg) by mouth 3 times daily as needed for muscle spasms    Chronic low back pain with sciatica, sciatica laterality unspecified, unspecified back pain laterality       FREESTYLE LITE test strip   Generic drug:  blood glucose monitoring     100 Strip    Test twice a day    Type 2 diabetes, HbA1c goal < 7% (H)       gabapentin 300 MG capsule    NEURONTIN    90 capsule    TAKE ONE CAPSULE BY MOUTH EVERY DAY    Restless legs syndrome (RLS), Persistent insomnia       ibuprofen 600 MG tablet    ADVIL/MOTRIN    30 tablet    Take 1 tablet (600 mg) by mouth every 6 hours as needed for moderate pain        Iron Polysacch [id number]--0.025-1 MG Caps      Take 1 capsule by mouth every other day        losartan 100 MG tablet    COZAAR    90 tablet    Take 1 tablet (100 mg) by mouth daily    Essential hypertension, benign       metFORMIN 500 MG 24 hr tablet    GLUCOPHAGE-XR    180 tablet    TAKE TWO TABLETS BY MOUTH EVERY DAY WITH BREAKFAST    Type 2 diabetes mellitus with stage 3 chronic kidney disease, without long-term current use of insulin (H)       Multi-vitamin Tabs tablet   Generic drug:  multivitamin, therapeutic with minerals      Take 1 tablet by mouth At Bedtime        OMEGA-3 FISH OIL PO      Take 2 g by mouth once a week at bedtime        pantoprazole 40 MG EC tablet    PROTONIX    90 tablet    Take 1 tablet (40 mg) by mouth At Bedtime    Gastroesophageal reflux disease without esophagitis       sertraline 100 MG tablet    ZOLOFT    180 tablet    Take 2 tablets (200 mg) by mouth daily    Generalized anxiety disorder, Major depressive disorder, recurrent episode, moderate (H)       simvastatin 20 MG tablet    ZOCOR    90 tablet    Take 1  tablet (20 mg) by mouth At Bedtime    Hyperlipidemia LDL goal <100       traMADol 50 MG tablet    ULTRAM    10 tablet    Take 1 tablet (50 mg) by mouth every 6 hours as needed for severe pain    Acute pain of left knee       zolpidem 10 MG tablet    AMBIEN    45 tablet    Take 0.5 tablets (5 mg) by mouth nightly as needed for sleep    Insomnia, unspecified type

## 2018-09-26 ENCOUNTER — THERAPY VISIT (OUTPATIENT)
Dept: PHYSICAL THERAPY | Facility: CLINIC | Age: 63
End: 2018-09-26
Payer: COMMERCIAL

## 2018-09-26 DIAGNOSIS — M25.562 ACUTE PAIN OF LEFT KNEE: ICD-10-CM

## 2018-09-26 PROCEDURE — 97112 NEUROMUSCULAR REEDUCATION: CPT | Mod: GP | Performed by: PHYSICAL THERAPIST

## 2018-09-26 PROCEDURE — 97110 THERAPEUTIC EXERCISES: CPT | Mod: GP | Performed by: PHYSICAL THERAPIST

## 2018-09-27 DIAGNOSIS — F41.1 GENERALIZED ANXIETY DISORDER: ICD-10-CM

## 2018-10-02 RX ORDER — CLONAZEPAM 1 MG/1
TABLET ORAL
Qty: 30 TABLET | Refills: 5 | Status: SHIPPED | OUTPATIENT
Start: 2018-10-02 | End: 2019-04-01

## 2018-10-02 NOTE — TELEPHONE ENCOUNTER
Clonazepam.        Last Written Prescription Date:  3/30/18  Last Fill Quantity: 30  # refills: 5    Last Office Visit: 8/15/18  Future Office visit:       Routing refill request to provider for review/approval because:  Drug not on the [facility] or [facility] refill protocol or controlled substance    Confirmed  8/25/18, Dispensed #30, 5/5 refills.

## 2018-10-03 DIAGNOSIS — G89.29 CHRONIC LOW BACK PAIN WITH SCIATICA, SCIATICA LATERALITY UNSPECIFIED, UNSPECIFIED BACK PAIN LATERALITY: ICD-10-CM

## 2018-10-03 DIAGNOSIS — E11.22 TYPE 2 DIABETES MELLITUS WITH STAGE 3 CHRONIC KIDNEY DISEASE, WITHOUT LONG-TERM CURRENT USE OF INSULIN (H): ICD-10-CM

## 2018-10-03 DIAGNOSIS — M54.40 CHRONIC LOW BACK PAIN WITH SCIATICA, SCIATICA LATERALITY UNSPECIFIED, UNSPECIFIED BACK PAIN LATERALITY: ICD-10-CM

## 2018-10-03 DIAGNOSIS — N18.30 TYPE 2 DIABETES MELLITUS WITH STAGE 3 CHRONIC KIDNEY DISEASE, WITHOUT LONG-TERM CURRENT USE OF INSULIN (H): ICD-10-CM

## 2018-10-03 NOTE — TELEPHONE ENCOUNTER
"Requested Prescriptions   Pending Prescriptions Disp Refills     metFORMIN (GLUCOPHAGE-XR) 500 MG 24 hr tablet [Pharmacy Med Name: METFORMIN 500MG ER TABLET] 180 tablet 1    Last Written Prescription Date:  02/08/2018  Last Fill Quantity: 180,  # refills: 1   Last office visit: 8/15/2018 with prescribing provider:     Future Office Visit:   Sig: TAKE TWO TABLETS BY MOUTH EVERY DAY WITH BREAKFAST    Biguanide Agents Failed    10/3/2018  1:57 PM       Failed - Blood pressure less than 140/90 in past 6 months    BP Readings from Last 3 Encounters:   09/07/18 142/90   08/23/18 130/78   08/15/18 128/82                Passed - Patient has documented LDL within the past 12 mos.    Recent Labs   Lab Test  06/28/18   1252   LDL  82            Passed - Patient has had a Microalbumin in the past 15 mos.    Recent Labs   Lab Test  06/28/18   1252   MICROL  12   UMALCR  4.62            Passed - Patient is age 10 or older       Passed - Patient has documented A1c within the specified period of time.    If HgbA1C is 8 or greater, it needs to be on file within the past 3 months.  If less than 8, must be on file within the past 6 months.     Recent Labs   Lab Test  06/28/18   1252   A1C  7.1*            Passed - Patient's CR is NOT>1.4 OR Patient's EGFR is NOT<45 within past 12 mos.    Recent Labs   Lab Test  06/28/18   1252   GFRESTIMATED  67   GFRESTBLACK  81       Recent Labs   Lab Test  06/28/18   1252   CR  0.86            Passed - Patient does NOT have a diagnosis of CHF.       Passed - Patient is not pregnant       Passed - Patient has not had a positive pregnancy test within the past 12 mos.        Passed - Recent (6 mo) or future (30 days) visit within the authorizing provider's specialty    Patient had office visit in the last 6 months or has a visit in the next 30 days with authorizing provider or within the authorizing provider's specialty.  See \"Patient Info\" tab in inbasket, or \"Choose Columns\" in Meds & Orders section " of the refill encounter.            cyclobenzaprine (FLEXERIL) 10 MG tablet [Pharmacy Med Name: CYCLOBENZAPRINE HCL 10MG TABS] 90 tablet 2    Last Written Prescription Date:  09/05/2017  Last Fill Quantity: 90,  # refills: 5   Last office visit: 8/15/2018 with prescribing provider:     Future Office Visit:   Sig: TAKE ONE TABLET BY MOUTH THREE TIMES A DAY AS NEEDED FOR MUSCLE SPASMS    There is no refill protocol information for this order

## 2018-10-05 RX ORDER — CYCLOBENZAPRINE HCL 10 MG
TABLET ORAL
Qty: 90 TABLET | Refills: 2 | Status: SHIPPED | OUTPATIENT
Start: 2018-10-05 | End: 2019-04-04

## 2018-10-05 RX ORDER — METFORMIN HCL 500 MG
TABLET, EXTENDED RELEASE 24 HR ORAL
Qty: 180 TABLET | Refills: 0 | Status: SHIPPED | OUTPATIENT
Start: 2018-10-05 | End: 2019-04-04

## 2018-10-05 NOTE — TELEPHONE ENCOUNTER
Metformin:  Prescription approved per [facility] Refill Protocol.    Cyclobenzaprine:  Routing refill request to provider for review/approval because:  Drug not on the [facility] refill protocol

## 2018-10-10 ENCOUNTER — THERAPY VISIT (OUTPATIENT)
Dept: PHYSICAL THERAPY | Facility: CLINIC | Age: 63
End: 2018-10-10
Payer: COMMERCIAL

## 2018-10-10 DIAGNOSIS — M25.562 ACUTE PAIN OF LEFT KNEE: ICD-10-CM

## 2018-10-10 PROCEDURE — 97110 THERAPEUTIC EXERCISES: CPT | Mod: GP | Performed by: PHYSICAL THERAPIST

## 2018-10-10 PROCEDURE — 97112 NEUROMUSCULAR REEDUCATION: CPT | Mod: GP | Performed by: PHYSICAL THERAPIST

## 2018-10-18 ENCOUNTER — THERAPY VISIT (OUTPATIENT)
Dept: PHYSICAL THERAPY | Facility: CLINIC | Age: 63
End: 2018-10-18
Payer: COMMERCIAL

## 2018-10-18 DIAGNOSIS — M25.562 ACUTE PAIN OF LEFT KNEE: ICD-10-CM

## 2018-10-18 PROCEDURE — 97530 THERAPEUTIC ACTIVITIES: CPT | Mod: GP | Performed by: PHYSICAL THERAPIST

## 2018-10-18 PROCEDURE — 97112 NEUROMUSCULAR REEDUCATION: CPT | Mod: GP | Performed by: PHYSICAL THERAPIST

## 2018-10-18 PROCEDURE — 97110 THERAPEUTIC EXERCISES: CPT | Mod: GP | Performed by: PHYSICAL THERAPIST

## 2018-10-25 ENCOUNTER — THERAPY VISIT (OUTPATIENT)
Dept: PHYSICAL THERAPY | Facility: CLINIC | Age: 63
End: 2018-10-25
Payer: COMMERCIAL

## 2018-10-25 DIAGNOSIS — M25.562 ACUTE PAIN OF LEFT KNEE: ICD-10-CM

## 2018-10-25 PROCEDURE — 97530 THERAPEUTIC ACTIVITIES: CPT | Mod: GP | Performed by: PHYSICAL THERAPIST

## 2018-10-25 PROCEDURE — 97112 NEUROMUSCULAR REEDUCATION: CPT | Mod: GP | Performed by: PHYSICAL THERAPIST

## 2018-10-25 PROCEDURE — 97110 THERAPEUTIC EXERCISES: CPT | Mod: GP | Performed by: PHYSICAL THERAPIST

## 2018-11-07 ENCOUNTER — THERAPY VISIT (OUTPATIENT)
Dept: PHYSICAL THERAPY | Facility: CLINIC | Age: 63
End: 2018-11-07
Payer: COMMERCIAL

## 2018-11-07 DIAGNOSIS — M25.562 ACUTE PAIN OF LEFT KNEE: ICD-10-CM

## 2018-11-07 PROCEDURE — 97530 THERAPEUTIC ACTIVITIES: CPT | Mod: GP | Performed by: PHYSICAL THERAPIST

## 2018-11-07 PROCEDURE — 97110 THERAPEUTIC EXERCISES: CPT | Mod: GP | Performed by: PHYSICAL THERAPIST

## 2018-11-07 PROCEDURE — 97112 NEUROMUSCULAR REEDUCATION: CPT | Mod: GP | Performed by: PHYSICAL THERAPIST

## 2018-11-07 ASSESSMENT — ACTIVITIES OF DAILY LIVING (ADL)
GO DOWN STAIRS: ACTIVITY IS SOMEWHAT DIFFICULT
GIVING WAY, BUCKLING OR SHIFTING OF KNEE: I DO NOT HAVE THE SYMPTOM
LIMPING: I HAVE THE SYMPTOM BUT IT DOES NOT AFFECT MY ACTIVITY
STIFFNESS: I HAVE THE SYMPTOM BUT IT DOES NOT AFFECT MY ACTIVITY
AS_A_RESULT_OF_YOUR_KNEE_INJURY,_HOW_WOULD_YOU_RATE_YOUR_CURRENT_LEVEL_OF_DAILY_ACTIVITY?: NEARLY NORMAL
HOW_WOULD_YOU_RATE_THE_CURRENT_FUNCTION_OF_YOUR_KNEE_DURING_YOUR_USUAL_DAILY_ACTIVITIES_ON_A_SCALE_FROM_0_TO_100_WITH_100_BEING_YOUR_LEVEL_OF_KNEE_FUNCTION_PRIOR_TO_YOUR_INJURY_AND_0_BEING_THE_INABILITY_TO_PERFORM_ANY_OF_YOUR_USUAL_DAILY_ACTIVITIES?: 80
GO UP STAIRS: ACTIVITY IS MINIMALLY DIFFICULT
RAW_SCORE: 54
KNEE_ACTIVITY_OF_DAILY_LIVING_SUM: 54
WEAKNESS: I HAVE THE SYMPTOM BUT IT DOES NOT AFFECT MY ACTIVITY
STAND: ACTIVITY IS MINIMALLY DIFFICULT
SIT WITH YOUR KNEE BENT: ACTIVITY IS MINIMALLY DIFFICULT
SQUAT: ACTIVITY IS SOMEWHAT DIFFICULT
HOW_WOULD_YOU_RATE_THE_OVERALL_FUNCTION_OF_YOUR_KNEE_DURING_YOUR_USUAL_DAILY_ACTIVITIES?: NEARLY NORMAL
PAIN: I HAVE THE SYMPTOM BUT IT DOES NOT AFFECT MY ACTIVITY
WALK: ACTIVITY IS MINIMALLY DIFFICULT
RISE FROM A CHAIR: ACTIVITY IS MINIMALLY DIFFICULT
SWELLING: I DO NOT HAVE THE SYMPTOM
KNEEL ON THE FRONT OF YOUR KNEE: ACTIVITY IS FAIRLY DIFFICULT
KNEE_ACTIVITY_OF_DAILY_LIVING_SCORE: 77.14

## 2018-11-07 NOTE — PROGRESS NOTES
"Subjective:  HPI       Knee Activity of Daily Living Score: 77.14            Objective:  System    Physical Exam    General     ROS    Assessment/Plan:    DISCHARGE REPORT    Progress reporting period is from IE to 11/7/18.       SUBJECTIVE  Subjective changes noted by patient:  .  Subjective: Knee pain mostly gone, more limited by LBP (to see MD soon)    Current pain level is  Current Pain level:  (0-1/10).     Previous pain level was   Initial Pain level: 3/10.   Changes in function:  Yes (See Goal flowsheet attached for changes in current functional level)  Adverse reaction to treatment or activity: None    OBJECTIVE  Changes noted in objective findings:    Objective: B Knee ROM WNL (ERT flex).  Good QS.  MMT: B knee 5/5 (-).  Good control 2\" step down  SLS 30.      ASSESSMENT/PLAN  Updated problem list and treatment plan: Diagnosis 1:  L knee pain  Pain -  home program  Decreased strength - home program  Decreased proprioception - home program  Impaired muscle performance - home program  Decreased function - home program  STG/LTGs have been met or progress has been made towards goals:  Yes (See Goal flow sheet completed today.)  Assessment of Progress: The patient's condition is improving.  Self Management Plans:  Patient is independent in a home treatment program.  Patient is independent in self management of symptoms.  I have re-evaluated this patient and find that the nature, scope, duration and intensity of the therapy is appropriate for the medical condition of the patient.  Nicole continues to require the following intervention to meet STG and LTG's:  PT intervention is no longer required to meet STG/LTG.    Recommendations:  This patient is ready to be discharged from therapy and continue their home treatment program.    Please refer to the daily flowsheet for treatment today, total treatment time and time spent performing 1:1 timed codes.            "

## 2018-11-07 NOTE — MR AVS SNAPSHOT
After Visit Summary   11/7/2018    Nicole Reyes    MRN: [id number]           Patient Information     Date Of Birth          1955        Visit Information        Provider Department      11/7/2018 3:00 PM Giovanni Walton PT [facility] Physical [facility]        Today's Diagnoses     Acute pain of left knee           Follow-ups after your visit        Who to contact     If you have questions or need follow up information about today's clinic visit or your schedule please contact [facility] directly at [phone].  Normal or non-critical lab and imaging results will be communicated to you by MEDEMhart, letter or phone within 4 business days after the clinic has received the results. If you do not hear from us within 7 days, please contact the clinic through MEDEMhart or phone. If you have a critical or abnormal lab result, we will notify you by phone as soon as possible.  Submit refill requests through [employer] or call your pharmacy and they will forward the refill request to us. Please allow 3 business days for your refill to be completed.          Additional Information About Your Visit        MyChart Information     [employer] gives you secure access to your electronic health record. If you see a primary care provider, you can also send messages to your care team and make appointments. If you have questions, please call your primary care clinic.  If you do not have a primary care provider, please call [phone] and they will assist you.        Care EveryWhere ID     This is your Care EveryWhere ID. This could be used by other organizations to access your [place] medical records  [id number]        Your Vitals Were     Last Period                   09/15/2007            Blood Pressure from Last 3 Encounters:   09/07/18 142/90   08/23/18 130/78   08/15/18 128/82    Weight from Last 3 Encounters:    09/07/18 120.2 kg (265 lb)   08/23/18 120.2 kg (265 lb)   08/15/18 120.2 kg (265 lb)              We Performed the Following     [facility] PROGRESS NOTES REPORT     NEUROMUSCULAR RE-EDUCATION     THERAPEUTIC ACTIVITIES     THERAPEUTIC EXERCISES        Primary Care Provider Office Phone # Fax #    Pari Wiley [phone]       [address] [facility] [address]  [facility] [address]        Equal Access to Services     LENORA ARELLANO : Hadii aad ku hadasho Soomaali, waaxda luqadaha, qaybta kaalmada adeegyada, waxay idiin hayaan adeeg kharash la'jollyn . So [facility] [phone].    ATENCIÓN: Si carolina hess, tiene a tovar disposición servicios gratuitos de asistencia lingüística. Llame al [phone].    We comply with applicable federal civil rights laws and [place] laws. We do not discriminate on the basis of race, color, national origin, age, disability, sex, sexual orientation, or gender identity.            Thank you!     Thank you for choosing [facility] PHYSICAL THERAP[facility]  for your care. Our goal is always to provide you with excellent care. Hearing back from our patients is one way we can continue to improve our services. Please take a few minutes to complete the written survey that you may receive in the mail after your visit with us. Thank you!             Your Updated Medication List - Protect others around you: Learn how to safely use, store and throw away your medicines at www.disposemymeds.org.          This list is accurate as of 11/7/18  3:28 PM.  Always use your most recent med list.                   Brand Name Dispense Instructions for use Diagnosis    ascorbic acid 500 MG Tabs      Take 1 tablet by mouth every other day with iron capsule        ASPIRIN EC PO      Take 81 mg by mouth every evening        clonazePAM 1 MG tablet    klonoPIN    30 tablet    0.5 tab po qam, 0.5 tab po qpm    Generalized anxiety disorder       cyclobenzaprine 10 MG tablet    FLEXERIL    90  tablet    TAKE ONE TABLET BY MOUTH THREE TIMES A DAY AS NEEDED FOR MUSCLE SPASMS    Chronic low back pain with sciatica, sciatica laterality unspecified, unspecified back pain laterality       FREESTYLE LITE test strip   Generic drug:  blood glucose monitoring     100 Strip    Test twice a day    Type 2 diabetes, HbA1c goal < 7% (H)       gabapentin 300 MG capsule    NEURONTIN    90 capsule    TAKE ONE CAPSULE BY MOUTH EVERY DAY    Restless legs syndrome (RLS), Persistent insomnia       ibuprofen 600 MG tablet    ADVIL/MOTRIN    30 tablet    Take 1 tablet (600 mg) by mouth every 6 hours as needed for moderate pain        Iron Polysacch Jjskv-M48--0.025-1 MG Caps      Take 1 capsule by mouth every other day        losartan 100 MG tablet    COZAAR    90 tablet    Take 1 tablet (100 mg) by mouth daily    Essential hypertension, benign       metFORMIN 500 MG 24 hr tablet    GLUCOPHAGE-XR    180 tablet    TAKE TWO TABLETS BY MOUTH EVERY DAY WITH BREAKFAST    Type 2 diabetes mellitus with stage 3 chronic kidney disease, without long-term current use of insulin (H)       Multi-vitamin Tabs tablet   Generic drug:  multivitamin, therapeutic with minerals      Take 1 tablet by mouth At Bedtime        OMEGA-3 FISH OIL PO      Take 2 g by mouth once a week at bedtime        pantoprazole 40 MG EC tablet    PROTONIX    90 tablet    Take 1 tablet (40 mg) by mouth At Bedtime    Gastroesophageal reflux disease without esophagitis       sertraline 100 MG tablet    ZOLOFT    180 tablet    Take 2 tablets (200 mg) by mouth daily    Generalized anxiety disorder, Major depressive disorder, recurrent episode, moderate (H)       simvastatin 20 MG tablet    ZOCOR    90 tablet    Take 1 tablet (20 mg) by mouth At Bedtime    Hyperlipidemia LDL goal <100       traMADol 50 MG tablet    ULTRAM    10 tablet    Take 1 tablet (50 mg) by mouth every 6 hours as needed for severe pain    Acute pain of left knee       zolpidem 10 MG tablet     AMBIEN    45 tablet    Take 0.5 tablets (5 mg) by mouth nightly as needed for sleep    Insomnia, unspecified type

## 2018-11-30 DIAGNOSIS — Z79.899 CONTROLLED SUBSTANCE AGREEMENT SIGNED: Primary | ICD-10-CM

## 2018-11-30 DIAGNOSIS — G47.00 INSOMNIA, UNSPECIFIED TYPE: ICD-10-CM

## 2018-12-04 RX ORDER — ZOLPIDEM TARTRATE 10 MG/1
5 TABLET ORAL
Qty: 45 TABLET | Refills: 1 | Status: ON HOLD | OUTPATIENT
Start: 2018-12-04 | End: 2019-04-15

## 2018-12-04 NOTE — TELEPHONE ENCOUNTER
Ambien      Last Written Prescription Date:  5/31/18   Last Fill Quantity: 45,   # refills: 0  Last Office Visit: 8/15/18  Future Office visit:       Routing refill request to provider for review/approval because:  Drug not on the [facility], [facility] or  Health refill protocol or controlled substance    CSA signed    RX monitoring program ([place]) reviewed: Unable to access    [place] profile:  [email or web address]

## 2019-03-04 ENCOUNTER — MYC MEDICAL ADVICE (OUTPATIENT)
Dept: INTERNAL MEDICINE | Facility: CLINIC | Age: 64
End: 2019-03-04

## 2019-03-29 DIAGNOSIS — I10 ESSENTIAL HYPERTENSION, BENIGN: ICD-10-CM

## 2019-03-29 DIAGNOSIS — E78.5 HYPERLIPIDEMIA LDL GOAL <100: ICD-10-CM

## 2019-03-29 DIAGNOSIS — F41.1 GENERALIZED ANXIETY DISORDER: ICD-10-CM

## 2019-03-29 NOTE — TELEPHONE ENCOUNTER
"Requested Prescriptions   Pending Prescriptions Disp Refills     simvastatin (ZOCOR) 20 MG tablet [Pharmacy Med Name: SIMVASTATIN 20MG TABS] 90 tablet 1    Last Written Prescription Date:  07/07/2018  Last Fill Quantity: 90,  # refills: 01   Last office visit: 8/15/2018 with prescribing provider:     Future Office Visit:   Next 5 appointments (look out 90 days)    Apr 04, 2019  3:00 PM CDT  PHYSICAL with Pari Wiley MD  [facility] [address]  [facility] [address]  [phone]        Sig: TAKE ONE TABLET BY MOUTH EVERY NIGHT AT BEDTIME    Statins Protocol Passed - 3/29/2019  9:08 AM       Passed - LDL on file in past 12 months    Recent Labs   Lab Test 06/28/18  1252   LDL 82            Passed - No abnormal creatine kinase in past 12 months    No lab results found.            Passed - Recent (12 mo) or future (30 days) visit within the authorizing provider's specialty    Patient had office visit in the last 12 months or has a visit in the next 30 days with authorizing provider or within the authorizing provider's specialty.  See \"Patient Info\" tab in inbasket, or \"Choose Columns\" in Meds & Orders section of the refill encounter.             Passed - Medication is active on med list       Passed - Patient is age 18 or older       Passed - No active pregnancy on record       Passed - No positive pregnancy test in past 12 months        losartan (COZAAR) 100 MG tablet [Pharmacy Med Name: LOSARTAN POTASSIUM 100MG TABS] 90 tablet 1    Last Written Prescription Date:  07/07/2018  Last Fill Quantity: 90  # refills: 1   Last office visit: 8/15/2018 with prescribing provider:     Future Office Visit:   Next 5 appointments (look out 90 days)    Apr 04, 2019  3:00 PM CDT  PHYSICAL with Pari Wiley MD  [facility] ([facility]) [address]  [facility] [id number] [phone]        Sig: TAKE ONE TABLET BY MOUTH EVERY " "DAY    Angiotensin-II Receptors Failed - 3/29/2019  9:08 AM       Failed - Blood pressure under 140/90 in past 12 months    BP Readings from Last 3 Encounters:   09/07/18 142/90   08/23/18 130/78   08/15/18 128/82                Passed - Recent (12 mo) or future (30 days) visit within the authorizing provider's specialty    Patient had office visit in the last 12 months or has a visit in the next 30 days with authorizing provider or within the authorizing provider's specialty.  See \"Patient Info\" tab in inbasket, or \"Choose Columns\" in Meds & Orders section of the refill encounter.             Passed - Medication is active on med list       Passed - Patient is age 18 or older       Passed - No active pregnancy on record       Passed - Normal serum creatinine on file in past 12 months    Recent Labs   Lab Test 06/28/18  1252   CR 0.86            Passed - Normal serum potassium on file in past 12 months    Recent Labs   Lab Test 06/28/18  1252   POTASSIUM 3.7                   Passed - No positive pregnancy test in past 12 months        "

## 2019-04-01 RX ORDER — LOSARTAN POTASSIUM 100 MG/1
TABLET ORAL
Qty: 90 TABLET | Refills: 1 | Status: SHIPPED | OUTPATIENT
Start: 2019-04-01 | End: 2019-04-04

## 2019-04-01 RX ORDER — CLONAZEPAM 1 MG/1
TABLET ORAL
Qty: 30 TABLET | Refills: 5 | Status: SHIPPED | OUTPATIENT
Start: 2019-04-01 | End: 2019-04-11

## 2019-04-01 RX ORDER — SIMVASTATIN 20 MG
TABLET ORAL
Qty: 90 TABLET | Refills: 0 | Status: SHIPPED | OUTPATIENT
Start: 2019-04-01 | End: 2019-04-04

## 2019-04-01 NOTE — TELEPHONE ENCOUNTER
Simvastatin   Prescription approved per [facility] Refill Protocol.    Losartan   Routing refill request to provider for review/approval because:  Blood pressures  Next 5 appointments (look out 90 days)    Apr 04, 2019  3:00 PM CDT  PHYSICAL with Pari Wiley MD  [facility] ([facility]) [address]  [facility] [id number] [phone]

## 2019-04-01 NOTE — TELEPHONE ENCOUNTER
Controlled Substance Refill Request for Clonazepam  Problem List Complete:  No     PROVIDER TO CONSIDER COMPLETION OF PROBLEM LIST AND OVERVIEW/CONTROLLED SUBSTANCE AGREEMENT    Last Written Prescription Date:  10-2-18  Last Fill Quantity: 30,   # refills: 5    THE MOST RECENT OFFICE VISIT MUST BE WITHIN THE PAST 3 MONTHS. AT LEAST ONE FACE TO FACE VISIT MUST OCCUR EVERY 6 MONTHS. ADDITIONAL VISITS CAN BE VIRTUAL.  (THIS STATEMENT SHOULD BE DELETED.)    Last Office Visit with [facility] primary care provider: 8-15-18    Future Office visit:   Next 5 appointments (look out 90 days)    Apr 02, 2019  2:15 PM CDT  Lab visit with [facility]  [facility] ([facility]) [address]  [facility] [address]  [phone]   Apr 04, 2019  3:00 PM CDT  PHYSICAL with Pari Wiley MD  [facility] ([facility]) [address]  [facility] [address]  [phone]          Controlled substance agreement:   Encounter-Level CSA - 10/05/2017:    Controlled Substance Agreement - Scan on 10/25/2017  6:56 AM: CONTROLLED SUBSTANCE AGREEMENT (below)       Patient-Level CSA:    There are no patient-level csa.         Last Urine Drug Screen: No results found for: CDAUT, No results found for: COMDAT, No results found for: THC13, PCP13, COC13, MAMP13, OPI13, AMP13, BZO13, TCA13, MTD13, BAR13, OXY13, PPX13, BUP13     Processing:  Fax Rx to  Specialty pharmacy     [email or web address]       checked in past 3 months?  Yes 4-1-19 No concerns    Please advise, thanks.

## 2019-04-02 DIAGNOSIS — N18.30 TYPE 2 DIABETES MELLITUS WITH STAGE 3 CHRONIC KIDNEY DISEASE, WITHOUT LONG-TERM CURRENT USE OF INSULIN (H): ICD-10-CM

## 2019-04-02 DIAGNOSIS — E11.22 TYPE 2 DIABETES MELLITUS WITH STAGE 3 CHRONIC KIDNEY DISEASE, WITHOUT LONG-TERM CURRENT USE OF INSULIN (H): ICD-10-CM

## 2019-04-02 DIAGNOSIS — Z11.59 SCREENING FOR VIRAL DISEASE: ICD-10-CM

## 2019-04-02 LAB — HBA1C MFR BLD: 6.5 % (ref 0–5.6)

## 2019-04-02 PROCEDURE — 87389 HIV-1 AG W/HIV-1&-2 AB AG IA: CPT | Performed by: INTERNAL MEDICINE

## 2019-04-02 PROCEDURE — 83036 HEMOGLOBIN GLYCOSYLATED A1C: CPT | Performed by: INTERNAL MEDICINE

## 2019-04-02 PROCEDURE — 80048 BASIC METABOLIC PNL TOTAL CA: CPT | Performed by: INTERNAL MEDICINE

## 2019-04-02 PROCEDURE — 36415 COLL VENOUS BLD VENIPUNCTURE: CPT | Performed by: INTERNAL MEDICINE

## 2019-04-03 LAB
ANION GAP SERPL CALCULATED.3IONS-SCNC: 7 MMOL/L (ref 3–14)
BUN SERPL-MCNC: 11 MG/DL (ref 7–30)
CALCIUM SERPL-MCNC: 8.7 MG/DL (ref 8.5–10.1)
CHLORIDE SERPL-SCNC: 106 MMOL/L (ref 94–109)
CO2 SERPL-SCNC: 29 MMOL/L (ref 20–32)
CREAT SERPL-MCNC: 0.86 MG/DL (ref 0.52–1.04)
GFR SERPL CREATININE-BSD FRML MDRD: 72 ML/MIN/{1.73_M2}
GLUCOSE SERPL-MCNC: 127 MG/DL (ref 70–99)
HIV 1+2 AB+HIV1 P24 AG SERPL QL IA: NONREACTIVE
POTASSIUM SERPL-SCNC: 3.5 MMOL/L (ref 3.4–5.3)
SODIUM SERPL-SCNC: 142 MMOL/L (ref 133–144)

## 2019-04-04 ENCOUNTER — OFFICE VISIT (OUTPATIENT)
Dept: INTERNAL MEDICINE | Facility: CLINIC | Age: 64
End: 2019-04-04
Payer: COMMERCIAL

## 2019-04-04 DIAGNOSIS — Z00.00 ENCOUNTER FOR ROUTINE ADULT HEALTH EXAMINATION WITHOUT ABNORMAL FINDINGS: Primary | ICD-10-CM

## 2019-04-04 DIAGNOSIS — Z12.11 SPECIAL SCREENING FOR MALIGNANT NEOPLASMS, COLON: ICD-10-CM

## 2019-04-04 DIAGNOSIS — N18.30 TYPE 2 DIABETES MELLITUS WITH STAGE 3 CHRONIC KIDNEY DISEASE, WITHOUT LONG-TERM CURRENT USE OF INSULIN (H): ICD-10-CM

## 2019-04-04 DIAGNOSIS — N18.30 CKD (CHRONIC KIDNEY DISEASE) STAGE 3, GFR 30-59 ML/MIN (H): ICD-10-CM

## 2019-04-04 DIAGNOSIS — G25.81 RESTLESS LEGS SYNDROME (RLS): ICD-10-CM

## 2019-04-04 DIAGNOSIS — G47.00 PERSISTENT INSOMNIA: ICD-10-CM

## 2019-04-04 DIAGNOSIS — E66.01 MORBID OBESITY (H): ICD-10-CM

## 2019-04-04 DIAGNOSIS — K21.9 GASTROESOPHAGEAL REFLUX DISEASE WITHOUT ESOPHAGITIS: ICD-10-CM

## 2019-04-04 DIAGNOSIS — F41.1 GENERALIZED ANXIETY DISORDER: ICD-10-CM

## 2019-04-04 DIAGNOSIS — Z12.4 SCREENING FOR MALIGNANT NEOPLASM OF CERVIX: ICD-10-CM

## 2019-04-04 DIAGNOSIS — M54.40 CHRONIC LOW BACK PAIN WITH SCIATICA, SCIATICA LATERALITY UNSPECIFIED, UNSPECIFIED BACK PAIN LATERALITY: ICD-10-CM

## 2019-04-04 DIAGNOSIS — I10 ESSENTIAL HYPERTENSION, BENIGN: ICD-10-CM

## 2019-04-04 DIAGNOSIS — F33.1 MAJOR DEPRESSIVE DISORDER, RECURRENT EPISODE, MODERATE (H): ICD-10-CM

## 2019-04-04 DIAGNOSIS — E11.22 TYPE 2 DIABETES MELLITUS WITH STAGE 3 CHRONIC KIDNEY DISEASE, WITHOUT LONG-TERM CURRENT USE OF INSULIN (H): ICD-10-CM

## 2019-04-04 DIAGNOSIS — G89.29 CHRONIC LOW BACK PAIN WITH SCIATICA, SCIATICA LATERALITY UNSPECIFIED, UNSPECIFIED BACK PAIN LATERALITY: ICD-10-CM

## 2019-04-04 DIAGNOSIS — E78.5 HYPERLIPIDEMIA LDL GOAL <100: ICD-10-CM

## 2019-04-04 PROCEDURE — 99207 C FOOT EXAM  NO CHARGE: CPT | Mod: 25 | Performed by: INTERNAL MEDICINE

## 2019-04-04 PROCEDURE — 99214 OFFICE O/P EST MOD 30 MIN: CPT | Mod: 25 | Performed by: INTERNAL MEDICINE

## 2019-04-04 PROCEDURE — 99396 PREV VISIT EST AGE 40-64: CPT | Performed by: INTERNAL MEDICINE

## 2019-04-04 PROCEDURE — G0145 SCR C/V CYTO,THINLAYER,RESCR: HCPCS | Performed by: INTERNAL MEDICINE

## 2019-04-04 PROCEDURE — 87624 HPV HI-RISK TYP POOLED RSLT: CPT | Performed by: INTERNAL MEDICINE

## 2019-04-04 RX ORDER — METFORMIN HCL 500 MG
1000 TABLET, EXTENDED RELEASE 24 HR ORAL EVERY MORNING
Qty: 180 TABLET | Refills: 3 | Status: SHIPPED | OUTPATIENT
Start: 2019-04-04 | End: 2020-06-01

## 2019-04-04 ASSESSMENT — ENCOUNTER SYMPTOMS
JOINT SWELLING: 1
SORE THROAT: 0
FREQUENCY: 0
HEARTBURN: 1
WEAKNESS: 0
HEMATURIA: 0
PARESTHESIAS: 0
CHILLS: 0
NERVOUS/ANXIOUS: 1
CONSTIPATION: 0
PALPITATIONS: 0
SHORTNESS OF BREATH: 0
MYALGIAS: 1
ARTHRALGIAS: 1
HEMATOCHEZIA: 0
FEVER: 0
BREAST MASS: 0
HEADACHES: 0
NAUSEA: 0
EYE PAIN: 0
ABDOMINAL PAIN: 0
DYSURIA: 0
DIZZINESS: 0
DIARRHEA: 0
COUGH: 0

## 2019-04-04 ASSESSMENT — MIFFLIN-ST. JEOR: SCORE: 1597.12

## 2019-04-04 NOTE — PATIENT INSTRUCTIONS
Shingrix is the new shingles vaccine. Call insurance to find out if covered, whether covered at a pharmacy or doctor's office and the copay.      Try taking 600 mg gabapentin at night, let me know if that helps.       PREVENTIVE HEALTH RECOMMENDATIONS:     Vaccines: Get a flu shot each year. Get a tetanus shot every 10 years.     Exercise for at least 150 minutes a week (an average of 30 minutes a day, 5 days of the week). This will help you control your weight and prevent disease.    Limit alcohol to one drink per day.    No smoking.     Wear sunscreen to prevent skin cancer.     See your dentist twice a year for an exam and cleaning.    Try to get Calcium 1200 mg total per day. It is best to not take it all at once. Try to get Vitamin D at least 0399-6633 units (25-50 mcg) per day.    BMI or Body Mass Index is a way of indicating weight and health risk for cardiovascular diseases, high blood pressure, diabetes.   Definitions:    Underweight is less than 18.5 and will be associated with health risk.   Normal BMI is 18.5 to 25   Overweight is 25-29   Obesity is 30 or greater   Morbid Obesity is 40 or greater or 35 or greater with diabetes, prediabetes or abnormal blood sugar, high blood pressure or elevated cholesterol  Obesity and Morbid Obesity are associated with higher health risks. Lowering calories, exercising more may lower your BMI and even small decreases can have positive impact on lowering health risks.   Your Body mass index is 51.83 kg/m ..,

## 2019-04-04 NOTE — NURSING NOTE
"/90   Pulse 109   Temp 98.4  F (36.9  C) (Oral)   Resp 24   Ht 1.486 m (4' 10.5\")   Wt 114.4 kg (252 lb 4.8 oz)   LMP 09/15/2007   SpO2 91%   BMI 51.83 kg/m      Reviewed health maintenance- pt due for Colonoscopy, pap smear, .     Patient have decided to discuss Cologuard with PCP. Complete pap today.      "
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
## 2019-04-07 VITALS
HEIGHT: 59 IN | DIASTOLIC BLOOD PRESSURE: 86 MMHG | WEIGHT: 252.3 LBS | HEART RATE: 109 BPM | BODY MASS INDEX: 50.86 KG/M2 | RESPIRATION RATE: 24 BRPM | TEMPERATURE: 98.4 F | OXYGEN SATURATION: 91 % | SYSTOLIC BLOOD PRESSURE: 138 MMHG

## 2019-04-07 RX ORDER — LOSARTAN POTASSIUM 100 MG/1
100 TABLET ORAL DAILY
Qty: 90 TABLET | Refills: 1 | Status: SHIPPED | OUTPATIENT
Start: 2019-04-07 | End: 2020-03-17

## 2019-04-07 RX ORDER — GABAPENTIN 300 MG/1
300 CAPSULE ORAL DAILY
Qty: 90 CAPSULE | Refills: 3 | Status: SHIPPED | OUTPATIENT
Start: 2019-04-07 | End: 2019-04-07

## 2019-04-07 RX ORDER — CYCLOBENZAPRINE HCL 10 MG
10 TABLET ORAL 3 TIMES DAILY PRN
Qty: 90 TABLET | Refills: 11 | Status: ON HOLD | OUTPATIENT
Start: 2019-04-07 | End: 2019-11-17

## 2019-04-07 RX ORDER — PANTOPRAZOLE SODIUM 40 MG/1
40 TABLET, DELAYED RELEASE ORAL AT BEDTIME
Qty: 90 TABLET | Refills: 3 | Status: SHIPPED | OUTPATIENT
Start: 2019-04-07 | End: 2020-06-26

## 2019-04-07 RX ORDER — GABAPENTIN 300 MG/1
600 CAPSULE ORAL DAILY
Qty: 180 CAPSULE | Refills: 3 | Status: SHIPPED | OUTPATIENT
Start: 2019-04-07 | End: 2020-03-31

## 2019-04-07 RX ORDER — SIMVASTATIN 20 MG
20 TABLET ORAL AT BEDTIME
Qty: 90 TABLET | Refills: 1 | Status: SHIPPED | OUTPATIENT
Start: 2019-04-07 | End: 2020-02-26

## 2019-04-07 RX ORDER — SERTRALINE HYDROCHLORIDE 100 MG/1
200 TABLET, FILM COATED ORAL DAILY
Qty: 180 TABLET | Refills: 3 | Status: ON HOLD | OUTPATIENT
Start: 2019-04-07 | End: 2020-04-14

## 2019-04-07 ASSESSMENT — ANXIETY QUESTIONNAIRES
7. FEELING AFRAID AS IF SOMETHING AWFUL MIGHT HAPPEN: SEVERAL DAYS
6. BECOMING EASILY ANNOYED OR IRRITABLE: SEVERAL DAYS
2. NOT BEING ABLE TO STOP OR CONTROL WORRYING: SEVERAL DAYS
1. FEELING NERVOUS, ANXIOUS, OR ON EDGE: SEVERAL DAYS
5. BEING SO RESTLESS THAT IT IS HARD TO SIT STILL: NOT AT ALL
3. WORRYING TOO MUCH ABOUT DIFFERENT THINGS: SEVERAL DAYS
GAD7 TOTAL SCORE: 6

## 2019-04-07 ASSESSMENT — PATIENT HEALTH QUESTIONNAIRE - PHQ9
SUM OF ALL RESPONSES TO PHQ QUESTIONS 1-9: 12
5. POOR APPETITE OR OVEREATING: SEVERAL DAYS

## 2019-04-08 ASSESSMENT — ANXIETY QUESTIONNAIRES: GAD7 TOTAL SCORE: 6

## 2019-04-09 LAB
COPATH REPORT: NORMAL
PAP: NORMAL

## 2019-04-10 DIAGNOSIS — G47.00 INSOMNIA, UNSPECIFIED TYPE: ICD-10-CM

## 2019-04-11 ENCOUNTER — HOSPITAL ENCOUNTER (INPATIENT)
Facility: CLINIC | Age: 64
LOS: 7 days | Discharge: HOME | DRG: 871 | End: 2019-04-18
Attending: PHYSICIAN ASSISTANT | Admitting: INTERNAL MEDICINE
Payer: COMMERCIAL

## 2019-04-11 ENCOUNTER — APPOINTMENT (OUTPATIENT)
Dept: GENERAL RADIOLOGY | Facility: CLINIC | Age: 64
DRG: 871 | End: 2019-04-11
Attending: PHYSICIAN ASSISTANT
Payer: COMMERCIAL

## 2019-04-11 DIAGNOSIS — A41.9 SEPSIS (H): ICD-10-CM

## 2019-04-11 DIAGNOSIS — J18.9 PNEUMONIA OF LEFT LOWER LOBE DUE TO INFECTIOUS ORGANISM: ICD-10-CM

## 2019-04-11 LAB
ALBUMIN UR-MCNC: 20 MG/DL
ANION GAP SERPL CALCULATED.3IONS-SCNC: 7 MMOL/L (ref 3–14)
APPEARANCE UR: CLEAR
BACTERIA #/AREA URNS HPF: ABNORMAL /HPF
BASOPHILS # BLD AUTO: 0 10E9/L (ref 0–0.2)
BASOPHILS NFR BLD AUTO: 0.2 %
BILIRUB UR QL STRIP: NEGATIVE
BUN SERPL-MCNC: 9 MG/DL (ref 7–30)
CALCIUM SERPL-MCNC: 8.7 MG/DL (ref 8.5–10.1)
CHLORIDE SERPL-SCNC: 104 MMOL/L (ref 94–109)
CO2 SERPL-SCNC: 28 MMOL/L (ref 20–32)
COLOR UR AUTO: YELLOW
CREAT SERPL-MCNC: 0.89 MG/DL (ref 0.52–1.04)
DIFFERENTIAL METHOD BLD: ABNORMAL
EOSINOPHIL # BLD AUTO: 0.1 10E9/L (ref 0–0.7)
EOSINOPHIL NFR BLD AUTO: 0.5 %
ERYTHROCYTE [DISTWIDTH] IN BLOOD BY AUTOMATED COUNT: 14.1 % (ref 10–15)
FINAL DIAGNOSIS: NORMAL
FLUAV+FLUBV AG SPEC QL: NEGATIVE
FLUAV+FLUBV AG SPEC QL: NEGATIVE
GFR SERPL CREATININE-BSD FRML MDRD: 68 ML/MIN/{1.73_M2}
GLUCOSE BLDC GLUCOMTR-MCNC: 131 MG/DL (ref 70–99)
GLUCOSE BLDC GLUCOMTR-MCNC: 139 MG/DL (ref 70–99)
GLUCOSE SERPL-MCNC: 146 MG/DL (ref 70–99)
GLUCOSE UR STRIP-MCNC: NEGATIVE MG/DL
HCT VFR BLD AUTO: 44.9 % (ref 35–47)
HGB BLD-MCNC: 14.1 G/DL (ref 11.7–15.7)
HGB UR QL STRIP: NEGATIVE
HPV HR 12 DNA CVX QL NAA+PROBE: NEGATIVE
HPV16 DNA SPEC QL NAA+PROBE: NEGATIVE
HPV18 DNA SPEC QL NAA+PROBE: NEGATIVE
IMM GRANULOCYTES # BLD: 0.1 10E9/L (ref 0–0.4)
IMM GRANULOCYTES NFR BLD: 0.3 %
KETONES UR STRIP-MCNC: NEGATIVE MG/DL
LACTATE BLD-SCNC: 1.9 MMOL/L (ref 0.7–2)
LACTATE BLD-SCNC: 4.5 MMOL/L (ref 0.7–2)
LEUKOCYTE ESTERASE UR QL STRIP: NEGATIVE
LYMPHOCYTES # BLD AUTO: 0.5 10E9/L (ref 0.8–5.3)
LYMPHOCYTES NFR BLD AUTO: 2.7 %
MAGNESIUM SERPL-MCNC: 1.4 MG/DL (ref 1.6–2.3)
MCH RBC QN AUTO: 26.2 PG (ref 26.5–33)
MCHC RBC AUTO-ENTMCNC: 31.4 G/DL (ref 31.5–36.5)
MCV RBC AUTO: 84 FL (ref 78–100)
MONOCYTES # BLD AUTO: 0.8 10E9/L (ref 0–1.3)
MONOCYTES NFR BLD AUTO: 4.2 %
MUCOUS THREADS #/AREA URNS LPF: PRESENT /LPF
NEUTROPHILS # BLD AUTO: 17.8 10E9/L (ref 1.6–8.3)
NEUTROPHILS NFR BLD AUTO: 92.1 %
NITRATE UR QL: NEGATIVE
NRBC # BLD AUTO: 0 10*3/UL
NRBC BLD AUTO-RTO: 0 /100
NT-PROBNP SERPL-MCNC: 147 PG/ML (ref 0–900)
PH UR STRIP: 6 PH (ref 5–7)
PLATELET # BLD AUTO: 236 10E9/L (ref 150–450)
PLATELET # BLD AUTO: 264 10E9/L (ref 150–450)
POTASSIUM SERPL-SCNC: 2.8 MMOL/L (ref 3.4–5.3)
RBC # BLD AUTO: 5.38 10E12/L (ref 3.8–5.2)
RBC #/AREA URNS AUTO: 1 /HPF (ref 0–2)
SODIUM SERPL-SCNC: 139 MMOL/L (ref 133–144)
SOURCE: ABNORMAL
SP GR UR STRIP: 1.03 (ref 1–1.03)
SPECIMEN DESCRIPTION: NORMAL
SPECIMEN SOURCE CVX/VAG CYTO: NORMAL
SPECIMEN SOURCE: NORMAL
SQUAMOUS #/AREA URNS AUTO: 2 /HPF (ref 0–1)
TROPONIN I SERPL-MCNC: 0.02 UG/L (ref 0–0.04)
TSH SERPL DL<=0.005 MIU/L-ACNC: 2.87 MU/L (ref 0.4–4)
UROBILINOGEN UR STRIP-MCNC: NORMAL MG/DL (ref 0–2)
WBC # BLD AUTO: 19.3 10E9/L (ref 4–11)
WBC #/AREA URNS AUTO: 3 /HPF (ref 0–5)

## 2019-04-11 PROCEDURE — 85049 AUTOMATED PLATELET COUNT: CPT | Performed by: INTERNAL MEDICINE

## 2019-04-11 PROCEDURE — 99223 1ST HOSP IP/OBS HIGH 75: CPT | Mod: AI | Performed by: INTERNAL MEDICINE

## 2019-04-11 PROCEDURE — 87106 FUNGI IDENTIFICATION YEAST: CPT | Performed by: INTERNAL MEDICINE

## 2019-04-11 PROCEDURE — 83605 ASSAY OF LACTIC ACID: CPT | Performed by: INTERNAL MEDICINE

## 2019-04-11 PROCEDURE — 99291 CRITICAL CARE FIRST HOUR: CPT

## 2019-04-11 PROCEDURE — 87077 CULTURE AEROBIC IDENTIFY: CPT | Performed by: INTERNAL MEDICINE

## 2019-04-11 PROCEDURE — 84484 ASSAY OF TROPONIN QUANT: CPT | Performed by: PHYSICIAN ASSISTANT

## 2019-04-11 PROCEDURE — 25000132 ZZH RX MED GY IP 250 OP 250 PS 637: Performed by: PHYSICIAN ASSISTANT

## 2019-04-11 PROCEDURE — 00000146 ZZHCL STATISTIC GLUCOSE BY METER IP

## 2019-04-11 PROCEDURE — 87205 SMEAR GRAM STAIN: CPT | Performed by: INTERNAL MEDICINE

## 2019-04-11 PROCEDURE — 85025 COMPLETE CBC W/AUTO DIFF WBC: CPT | Performed by: PHYSICIAN ASSISTANT

## 2019-04-11 PROCEDURE — 83605 ASSAY OF LACTIC ACID: CPT

## 2019-04-11 PROCEDURE — 93005 ELECTROCARDIOGRAM TRACING: CPT

## 2019-04-11 PROCEDURE — 83880 ASSAY OF NATRIURETIC PEPTIDE: CPT | Performed by: PHYSICIAN ASSISTANT

## 2019-04-11 PROCEDURE — 84443 ASSAY THYROID STIM HORMONE: CPT | Performed by: PHYSICIAN ASSISTANT

## 2019-04-11 PROCEDURE — 12000000 ZZH R&B MED SURG/OB

## 2019-04-11 PROCEDURE — 83735 ASSAY OF MAGNESIUM: CPT | Performed by: PHYSICIAN ASSISTANT

## 2019-04-11 PROCEDURE — 71046 X-RAY EXAM CHEST 2 VIEWS: CPT

## 2019-04-11 PROCEDURE — 25000125 ZZHC RX 250: Performed by: PHYSICIAN ASSISTANT

## 2019-04-11 PROCEDURE — 80048 BASIC METABOLIC PNL TOTAL CA: CPT | Performed by: PHYSICIAN ASSISTANT

## 2019-04-11 PROCEDURE — 96367 TX/PROPH/DG ADDL SEQ IV INF: CPT

## 2019-04-11 PROCEDURE — 36415 COLL VENOUS BLD VENIPUNCTURE: CPT | Performed by: INTERNAL MEDICINE

## 2019-04-11 PROCEDURE — 96365 THER/PROPH/DIAG IV INF INIT: CPT

## 2019-04-11 PROCEDURE — 40000274 ZZH STATISTIC RCP CONSULT EA 30 MIN

## 2019-04-11 PROCEDURE — 25000132 ZZH RX MED GY IP 250 OP 250 PS 637: Performed by: INTERNAL MEDICINE

## 2019-04-11 PROCEDURE — 81001 URINALYSIS AUTO W/SCOPE: CPT | Performed by: PHYSICIAN ASSISTANT

## 2019-04-11 PROCEDURE — 87070 CULTURE OTHR SPECIMN AEROBIC: CPT | Performed by: INTERNAL MEDICINE

## 2019-04-11 PROCEDURE — 87186 SC STD MICRODIL/AGAR DIL: CPT | Performed by: INTERNAL MEDICINE

## 2019-04-11 PROCEDURE — 25000128 H RX IP 250 OP 636: Performed by: INTERNAL MEDICINE

## 2019-04-11 PROCEDURE — 82803 BLOOD GASES ANY COMBINATION: CPT

## 2019-04-11 PROCEDURE — 83605 ASSAY OF LACTIC ACID: CPT | Performed by: PHYSICIAN ASSISTANT

## 2019-04-11 PROCEDURE — 25000128 H RX IP 250 OP 636: Performed by: PHYSICIAN ASSISTANT

## 2019-04-11 PROCEDURE — 25800030 ZZH RX IP 258 OP 636: Performed by: PHYSICIAN ASSISTANT

## 2019-04-11 PROCEDURE — 87804 INFLUENZA ASSAY W/OPTIC: CPT | Performed by: INTERNAL MEDICINE

## 2019-04-11 PROCEDURE — 94640 AIRWAY INHALATION TREATMENT: CPT

## 2019-04-11 RX ORDER — IPRATROPIUM BROMIDE AND ALBUTEROL SULFATE 2.5; .5 MG/3ML; MG/3ML
3 SOLUTION RESPIRATORY (INHALATION) ONCE
Status: COMPLETED | OUTPATIENT
Start: 2019-04-11 | End: 2019-04-11

## 2019-04-11 RX ORDER — DEXTROSE MONOHYDRATE 25 G/50ML
25-50 INJECTION, SOLUTION INTRAVENOUS
Status: DISCONTINUED | OUTPATIENT
Start: 2019-04-11 | End: 2019-04-18 | Stop reason: HOSPADM

## 2019-04-11 RX ORDER — ASPIRIN 81 MG/1
81 TABLET ORAL EVERY EVENING
Status: DISCONTINUED | OUTPATIENT
Start: 2019-04-11 | End: 2019-04-18 | Stop reason: HOSPADM

## 2019-04-11 RX ORDER — CLONAZEPAM 1 MG/1
0.5 TABLET ORAL 2 TIMES DAILY
COMMUNITY
End: 2019-09-30

## 2019-04-11 RX ORDER — POTASSIUM CL/LIDO/0.9 % NACL 10MEQ/0.1L
10 INTRAVENOUS SOLUTION, PIGGYBACK (ML) INTRAVENOUS
Status: DISCONTINUED | OUTPATIENT
Start: 2019-04-11 | End: 2019-04-18 | Stop reason: HOSPADM

## 2019-04-11 RX ORDER — GABAPENTIN 300 MG/1
600 CAPSULE ORAL DAILY
Status: DISCONTINUED | OUTPATIENT
Start: 2019-04-11 | End: 2019-04-12

## 2019-04-11 RX ORDER — AMOXICILLIN 250 MG
1 CAPSULE ORAL 2 TIMES DAILY PRN
Status: DISCONTINUED | OUTPATIENT
Start: 2019-04-11 | End: 2019-04-18 | Stop reason: HOSPADM

## 2019-04-11 RX ORDER — ALBUTEROL SULFATE 0.83 MG/ML
3 SOLUTION RESPIRATORY (INHALATION)
Status: DISCONTINUED | OUTPATIENT
Start: 2019-04-11 | End: 2019-04-11

## 2019-04-11 RX ORDER — CEFTRIAXONE 2 G/1
2 INJECTION, POWDER, FOR SOLUTION INTRAMUSCULAR; INTRAVENOUS EVERY 24 HOURS
Status: DISCONTINUED | OUTPATIENT
Start: 2019-04-12 | End: 2019-04-18 | Stop reason: HOSPADM

## 2019-04-11 RX ORDER — NALOXONE HYDROCHLORIDE 0.4 MG/ML
.1-.4 INJECTION, SOLUTION INTRAMUSCULAR; INTRAVENOUS; SUBCUTANEOUS
Status: DISCONTINUED | OUTPATIENT
Start: 2019-04-11 | End: 2019-04-18 | Stop reason: HOSPADM

## 2019-04-11 RX ORDER — LANOLIN ALCOHOL/MO/W.PET/CERES
3 CREAM (GRAM) TOPICAL
Status: DISCONTINUED | OUTPATIENT
Start: 2019-04-11 | End: 2019-04-18 | Stop reason: HOSPADM

## 2019-04-11 RX ORDER — HEPARIN SODIUM 5000 [USP'U]/.5ML
5000 INJECTION, SOLUTION INTRAVENOUS; SUBCUTANEOUS EVERY 12 HOURS
Status: DISCONTINUED | OUTPATIENT
Start: 2019-04-11 | End: 2019-04-18 | Stop reason: HOSPADM

## 2019-04-11 RX ORDER — POTASSIUM CHLORIDE 29.8 MG/ML
20 INJECTION INTRAVENOUS
Status: DISCONTINUED | OUTPATIENT
Start: 2019-04-11 | End: 2019-04-18 | Stop reason: HOSPADM

## 2019-04-11 RX ORDER — POLYETHYLENE GLYCOL 3350 17 G/17G
17 POWDER, FOR SOLUTION ORAL DAILY PRN
Status: DISCONTINUED | OUTPATIENT
Start: 2019-04-11 | End: 2019-04-18 | Stop reason: HOSPADM

## 2019-04-11 RX ORDER — ACETAMINOPHEN 325 MG/1
650 TABLET ORAL ONCE
Status: COMPLETED | OUTPATIENT
Start: 2019-04-11 | End: 2019-04-11

## 2019-04-11 RX ORDER — PANTOPRAZOLE SODIUM 40 MG/1
40 TABLET, DELAYED RELEASE ORAL AT BEDTIME
Status: DISCONTINUED | OUTPATIENT
Start: 2019-04-11 | End: 2019-04-18 | Stop reason: HOSPADM

## 2019-04-11 RX ORDER — NICOTINE POLACRILEX 4 MG
15-30 LOZENGE BUCCAL
Status: DISCONTINUED | OUTPATIENT
Start: 2019-04-11 | End: 2019-04-18 | Stop reason: HOSPADM

## 2019-04-11 RX ORDER — SIMVASTATIN 20 MG
20 TABLET ORAL AT BEDTIME
Status: DISCONTINUED | OUTPATIENT
Start: 2019-04-11 | End: 2019-04-18 | Stop reason: HOSPADM

## 2019-04-11 RX ORDER — AMOXICILLIN 250 MG
2 CAPSULE ORAL 2 TIMES DAILY PRN
Status: DISCONTINUED | OUTPATIENT
Start: 2019-04-11 | End: 2019-04-18 | Stop reason: HOSPADM

## 2019-04-11 RX ORDER — POTASSIUM CHLORIDE 1.5 G/1.58G
20-40 POWDER, FOR SOLUTION ORAL
Status: DISCONTINUED | OUTPATIENT
Start: 2019-04-11 | End: 2019-04-18 | Stop reason: HOSPADM

## 2019-04-11 RX ORDER — POTASSIUM CHLORIDE 1500 MG/1
20-40 TABLET, EXTENDED RELEASE ORAL
Status: DISCONTINUED | OUTPATIENT
Start: 2019-04-11 | End: 2019-04-18 | Stop reason: HOSPADM

## 2019-04-11 RX ORDER — ALBUTEROL SULFATE 0.83 MG/ML
3 SOLUTION RESPIRATORY (INHALATION) EVERY 4 HOURS PRN
Status: DISCONTINUED | OUTPATIENT
Start: 2019-04-11 | End: 2019-04-18 | Stop reason: HOSPADM

## 2019-04-11 RX ORDER — IPRATROPIUM BROMIDE AND ALBUTEROL SULFATE 2.5; .5 MG/3ML; MG/3ML
3 SOLUTION RESPIRATORY (INHALATION) 4 TIMES DAILY
Status: DISCONTINUED | OUTPATIENT
Start: 2019-04-11 | End: 2019-04-17

## 2019-04-11 RX ORDER — CEFTRIAXONE 2 G/1
2 INJECTION, POWDER, FOR SOLUTION INTRAMUSCULAR; INTRAVENOUS ONCE
Status: COMPLETED | OUTPATIENT
Start: 2019-04-11 | End: 2019-04-11

## 2019-04-11 RX ORDER — ACETAMINOPHEN 650 MG/1
650 SUPPOSITORY RECTAL EVERY 4 HOURS PRN
Status: DISCONTINUED | OUTPATIENT
Start: 2019-04-11 | End: 2019-04-18 | Stop reason: HOSPADM

## 2019-04-11 RX ORDER — SODIUM CHLORIDE 9 MG/ML
INJECTION, SOLUTION INTRAVENOUS CONTINUOUS
Status: DISCONTINUED | OUTPATIENT
Start: 2019-04-11 | End: 2019-04-12

## 2019-04-11 RX ORDER — CLONAZEPAM 0.5 MG/1
0.5 TABLET ORAL 2 TIMES DAILY
Status: DISCONTINUED | OUTPATIENT
Start: 2019-04-11 | End: 2019-04-18 | Stop reason: HOSPADM

## 2019-04-11 RX ORDER — LIDOCAINE 40 MG/G
CREAM TOPICAL
Status: DISCONTINUED | OUTPATIENT
Start: 2019-04-11 | End: 2019-04-18 | Stop reason: HOSPADM

## 2019-04-11 RX ORDER — PROCHLORPERAZINE MALEATE 10 MG
10 TABLET ORAL EVERY 6 HOURS PRN
Status: DISCONTINUED | OUTPATIENT
Start: 2019-04-11 | End: 2019-04-18 | Stop reason: HOSPADM

## 2019-04-11 RX ORDER — ACETAMINOPHEN 325 MG/1
650 TABLET ORAL EVERY 4 HOURS PRN
Status: DISCONTINUED | OUTPATIENT
Start: 2019-04-11 | End: 2019-04-18 | Stop reason: HOSPADM

## 2019-04-11 RX ORDER — POTASSIUM CHLORIDE 7.45 MG/ML
10 INJECTION INTRAVENOUS
Status: DISCONTINUED | OUTPATIENT
Start: 2019-04-11 | End: 2019-04-18 | Stop reason: HOSPADM

## 2019-04-11 RX ORDER — BISACODYL 10 MG
10 SUPPOSITORY, RECTAL RECTAL DAILY PRN
Status: DISCONTINUED | OUTPATIENT
Start: 2019-04-11 | End: 2019-04-18 | Stop reason: HOSPADM

## 2019-04-11 RX ORDER — PROCHLORPERAZINE 25 MG
25 SUPPOSITORY, RECTAL RECTAL EVERY 12 HOURS PRN
Status: DISCONTINUED | OUTPATIENT
Start: 2019-04-11 | End: 2019-04-18 | Stop reason: HOSPADM

## 2019-04-11 RX ORDER — NITROGLYCERIN 0.4 MG/1
0.4 TABLET SUBLINGUAL EVERY 5 MIN PRN
Status: DISCONTINUED | OUTPATIENT
Start: 2019-04-11 | End: 2019-04-18 | Stop reason: HOSPADM

## 2019-04-11 RX ADMIN — HEPARIN SODIUM 5000 UNITS: 5000 INJECTION, SOLUTION INTRAVENOUS; SUBCUTANEOUS at 20:21

## 2019-04-11 RX ADMIN — CEFTRIAXONE 2 G: 2 INJECTION, POWDER, FOR SOLUTION INTRAMUSCULAR; INTRAVENOUS at 17:15

## 2019-04-11 RX ADMIN — ACETAMINOPHEN 650 MG: 325 TABLET, FILM COATED ORAL at 20:56

## 2019-04-11 RX ADMIN — IPRATROPIUM BROMIDE AND ALBUTEROL SULFATE 3 ML: .5; 3 SOLUTION RESPIRATORY (INHALATION) at 17:14

## 2019-04-11 RX ADMIN — POTASSIUM CHLORIDE 40 MEQ: 1500 TABLET, EXTENDED RELEASE ORAL at 22:53

## 2019-04-11 RX ADMIN — SIMVASTATIN 20 MG: 20 TABLET, FILM COATED ORAL at 21:24

## 2019-04-11 RX ADMIN — PANTOPRAZOLE SODIUM 40 MG: 40 TABLET, DELAYED RELEASE ORAL at 21:24

## 2019-04-11 RX ADMIN — CLONAZEPAM 0.5 MG: 0.5 TABLET ORAL at 20:21

## 2019-04-11 RX ADMIN — AZITHROMYCIN MONOHYDRATE 500 MG: 500 INJECTION, POWDER, LYOPHILIZED, FOR SOLUTION INTRAVENOUS at 18:22

## 2019-04-11 RX ADMIN — GABAPENTIN 600 MG: 300 CAPSULE ORAL at 20:21

## 2019-04-11 RX ADMIN — SODIUM CHLORIDE 1000 ML: 9 INJECTION, SOLUTION INTRAVENOUS at 17:15

## 2019-04-11 RX ADMIN — MELATONIN TAB 3 MG 3 MG: 3 TAB at 22:53

## 2019-04-11 RX ADMIN — SODIUM CHLORIDE 2000 ML: 9 INJECTION, SOLUTION INTRAVENOUS at 20:19

## 2019-04-11 RX ADMIN — ACETAMINOPHEN 650 MG: 325 TABLET, FILM COATED ORAL at 17:15

## 2019-04-11 RX ADMIN — POTASSIUM CHLORIDE 40 MEQ: 1500 TABLET, EXTENDED RELEASE ORAL at 20:56

## 2019-04-11 RX ADMIN — SODIUM CHLORIDE 1000 ML: 9 INJECTION, SOLUTION INTRAVENOUS at 18:21

## 2019-04-11 RX ADMIN — ASPIRIN 81 MG: 81 TABLET, COATED ORAL at 20:21

## 2019-04-11 ASSESSMENT — ENCOUNTER SYMPTOMS
FEVER: 0
COUGH: 1
SHORTNESS OF BREATH: 1
VOMITING: 1
DIZZINESS: 1
RHINORRHEA: 0
CHILLS: 1
SORE THROAT: 0

## 2019-04-11 ASSESSMENT — MIFFLIN-ST. JEOR
SCORE: 1587.81
SCORE: 1647.01

## 2019-04-11 ASSESSMENT — ACTIVITIES OF DAILY LIVING (ADL): ADLS_ACUITY_SCORE: 17

## 2019-04-11 NOTE — TELEPHONE ENCOUNTER
Controlled Substance Refill Request for Zolpidem  Problem List Complete:  No     PROVIDER TO CONSIDER COMPLETION OF PROBLEM LIST AND OVERVIEW/CONTROLLED SUBSTANCE AGREEMENT    Last Written Prescription Date:  12/4/18  Last Fill Quantity: 45,   # refills: 1    THE MOST RECENT OFFICE VISIT MUST BE WITHIN THE PAST 3 MONTHS. AT LEAST ONE FACE TO FACE VISIT MUST OCCUR EVERY 6 MONTHS. ADDITIONAL VISITS CAN BE VIRTUAL.  (THIS STATEMENT SHOULD BE DELETED.)    Last Office Visit with [facility] primary care provider: 4/4/19    Future Office visit:     Controlled substance agreement:   Encounter-Level CSA - 10/05/2017:    Controlled Substance Agreement - Scan on 10/25/2017  6:56 AM: CONTROLLED SUBSTANCE AGREEMENT (below)       Patient-Level CSA:    There are no patient-level csa.         Last Urine Drug Screen: No results found for: CDAUT, No results found for: COMDAT, No results found for: THC13, PCP13, COC13, MAMP13, OPI13, AMP13, BZO13, TCA13, MTD13, BAR13, OXY13, PPX13, BUP13     Processing:  Fax Rx to  Mail/Specialty pharmacy     [email or web address]       checked in past 3 months?  Yes 4/1/19 No concerns     Please advise, thanks.

## 2019-04-11 NOTE — PHARMACY-ADMISSION MEDICATION HISTORY
Admission medication history interview status for this patient is complete. See [facility] admission navigator for allergy information, prior to admission medications and immunization status.     Medication history interview source(s):Patient and Family  Medication history resources (including written lists, pill bottles, clinic record):None  Primary pharmacy:[facility] mail order/ [facility]    Changes made to PTA medication list:  Added: -  Deleted: vit c, mvi  Changed: -    Actions taken by pharmacist (provider contacted, etc):None     Additional medication history information:None    Medication reconciliation/reorder completed by provider prior to medication history? No    Do you take OTC medications (eg tylenol, ibuprofen, fish oil, eye/ear drops, etc)? yes(Y/N)    For patients on insulin therapy: no (Y/N)  Lantus/levemir/NPH/Mix 70/30 dose:   (Y/N) (see Med list for doses)   Sliding scale Novolog Y/N  If Yes, do you have a baseline novolog pre-meal dose:  units with meals  Patients eat three meals a day:   Y/N    How many episodes of hypoglycemia do you have per week: _______  How many missed doses do you have per week: ______  How many times do you check your blood glucose per day: _______  Do you have a Continuous glucose monitor (CGM)   Y/N (remind pt that not approved for hospital use)   Any Barriers to therapy - Be specific :  cost of medications, comfortable with giving injections (if applicable), comfortable and confident with current diabetes regimen: Y/N ______________      Prior to Admission medications    Medication Sig Last Dose Taking? Auth Provider   ASPIRIN EC PO Take 81 mg by mouth every evening 4/10/2019 at Unknown time Yes Unknown, Entered By History   clonazePAM (KLONOPIN) 1 MG tablet Take 0.5 mg by mouth 2 times daily 4/11/2019 at x1 Yes Unknown, Entered By History   cyclobenzaprine (FLEXERIL) 10 MG tablet Take 1 tablet (10 mg) by mouth 3 times daily as needed for muscle spasms  Yes Pari Wiley MD    gabapentin (NEURONTIN) 300 MG capsule Take 2 capsules (600 mg) by mouth daily Past Month at Unknown time Yes Pari Wiley MD   ibuprofen (ADVIL,MOTRIN) 600 MG tablet Take 1 tablet (600 mg) by mouth every 6 hours as needed for moderate pain  Yes Tal Sanchez MD   losartan (COZAAR) 100 MG tablet Take 1 tablet (100 mg) by mouth daily 4/10/2019 at hs Yes Pari Wiley MD   metFORMIN (GLUCOPHAGE-XR) 500 MG 24 hr tablet Take 2 tablets (1,000 mg) by mouth every morning Past Week at Unknown time Yes Pari Wiley MD   pantoprazole (PROTONIX) 40 MG EC tablet Take 1 tablet (40 mg) by mouth At Bedtime 4/10/2019 at Unknown time Yes Pari Wiley MD   sertraline (ZOLOFT) 100 MG tablet Take 2 tablets (200 mg) by mouth daily 4/11/2019 at Unknown time Yes Pari Wiley MD   simvastatin (ZOCOR) 20 MG tablet Take 1 tablet (20 mg) by mouth At Bedtime 4/10/2019 at Unknown time Yes Pari Wiley MD   zolpidem (AMBIEN) 10 MG tablet Take 0.5 tablets (5 mg) by mouth nightly as needed for sleep  Yes Anson Hicks MD   FREESTYLE LITE TEST VI STRP Test twice a day   Pari Wiley MD

## 2019-04-11 NOTE — ED PROVIDER NOTES
History     Chief Complaint:  Cough    HPI   Nicole Reyes is a 63 year old female with a history of pneumonia, CKD stage 3, HTN, HLD, DM2 who presents to the emergency department for evaluation of a cough. The patient reports she was seen in clinic in PCP around 2 days ago, with no notable issues. She states that last night, she began developing chills, vomiting, cough, and shortness of breath. The patient indicates she has experienced dizziness and chest congestion today (does have history of vertigo, this is worse than normal). The patient denies any recorded fever, as well as any nasal congestion, rhinorrhea, or sore throat. She further denies any chest pain or leg swelling. She notes she has had multiple episodes of pneumonia in the past. She denies any history of asthma or COPD.    Allergies:  Codeine  Penicillins     Medications:    Aspirin  Klonopin  Flexeril  Gabapentin  Losartan  Metformin  Protonix  Zoloft  Zocor  Ambien     Past Medical History:    CKD stage 3  HTN  RADHA  Hernia  HTN  Insomnia  Anemia  Depression  Menopause  Obesity  CRISTÓBAL  Postop seroma  Eczema  HLD  RLS   DM2    Past Surgical History:    Breast biopsy  Hernia repair  Lymph node biopsy  C section  D&C  EGD combined  Herniorrhaphy incisional    Family History:    CHF  HTN  CAD  Lipids  Cancer  Diabetes  Lupus  BCC    Social History:  Presents with daughter.  Former smoker, quit 1979.  Positive for alcohol use.   Marital Status:   [2]     Review of Systems   Constitutional: Positive for chills. Negative for fever.   HENT: Positive for congestion. Negative for rhinorrhea and sore throat.    Respiratory: Positive for cough and shortness of breath.    Cardiovascular: Negative for chest pain and leg swelling.   Gastrointestinal: Positive for vomiting.   Neurological: Positive for dizziness.   All other systems reviewed and are negative.      Physical Exam     Patient Vitals for the past 24 hrs:   BP Temp Temp src Pulse Heart Rate  "Resp SpO2 Height Weight   04/11/19 1745 130/71 -- -- 120 -- -- -- -- --   04/11/19 1730 106/76 -- -- 113 -- -- -- -- --   04/11/19 1715 118/66 -- -- 121 -- 24 91 % -- --   04/11/19 1630 100/60 -- -- 129 -- 20 93 % -- --   04/11/19 1624 106/86 100.2  F (37.9  C) Oral -- 127 24 92 % 1.473 m (4' 10\") 114.3 kg (252 lb)     Physical Exam  Constitutional: ill appearing  Head: No external signs of trauma noted to head or face.   Eyes: Pupils are equal, round, and reactive to light. Conjunctiva normal  ENT: MMM. Oropharynx clear and moist. Normal voice.   Neck: normal ROM.    Cardiovascular: Normal rate, regular rhythm, and intact distal pulses.    Respiratory: Effort normal. No respiratory distress. Rales in left base, otherwise lungs are clear.   GI: Soft. There is no tenderness.    Musculoskeletal: No deformities appreciated. Normal ROM. No edema noted.  Neurological: Alert and Oriented x 3. Speech normal. Moves all extremities equally.    Psychiatric: Appropriate mood, affect, and behavior.   Skin: Skin is warm and dry.     Emergency Department Course   ECG:  Time: 1544  Vent. Rate 132 bpm. RI interval 112. QRS duration 74. QT/QTc 366/542. P-R-T axis * 38 73.  Sinus tachycardia.  Low voltage QRS.  Cannot rule out anterior infarct, age undetermined.  Abnormal ECG.  Read time: 1546    Imaging:  Radiographic findings were communicated with the patient who voiced understanding of the findings.    XR Chest 2 views:   Left lower lobe pneumonia. As per radiology.     Laboratory:  UA with micro: Albumin 20, Bacteria Few, Squamous 2 (H), Mucous Present, o/w negative    CBC: WBC: 19.3 (H), HGB: 14.1, PLT: 264  BMP: Glucose 146 (H), Potassium 2.8 (L), o/w WNL (Creatinine: 0.89)    Lactic acid: 4.5 (HH)  Lactic acid (repeat): pending    BNP: 147    1624 Troponin I: 0.019    Blood gas arterial and oxyhgb pending.    Interventions:  1714 DuoNeb 3 mL Nebulization  1715 Tylenol 650 mg PO   Rocephin 2 g IV   NS 1L IV BOLUS  1821 NS 1L " IV BOLUS  Zithromax 500 mg IV ordered, pending administration.    Emergency Department Course:  Nursing notes and vitals reviewed. 1552 I performed an exam of the patient as documented above.     EKG obtained in the ED, see results above.     IV inserted. Medicine administered as documented above. Blood drawn. This was sent to the lab for further testing, results above.    The patient was sent for a XR Chest while in the emergency department, findings above.     1721 I spoke with Dr. Reddy, hospitalist, who agreed to admit the patient.    1722 I rechecked the patient and discussed the results of her workup thus far.    Findings and plan explained to the Patient and daughter who consents to admission. Discussed the patient with Dr. Reddy, who will admit the patient to a medical bed for further monitoring, evaluation, and treatment.    I personally reviewed the laboratory results with the Patient and daughter and answered all related questions prior to admission.    Impression & Plan      The patient has signs of Severe Sepsis as evidenced by:    1. 2 SIRS criteria, AND  2. Suspected infection, AND   3. Organ dysfunction: Lactic Acid > 1.9    Time severe sepsis diagnosis confirmed = 1637 as this was the time when Lactate resulted, and the level was > 1.9      3 Hour Severe Sepsis Bundle Completion:  1. Initial Lactic Acid Result:   Recent Labs   Lab Test 04/11/19  1624 09/25/17  1940 09/25/17  1709   LACT 4.5* 2.7* 4.7*     2. Blood Cultures before Antibiotics: No, antibiotics were started prior to blood culture collection because waiting for the blood culture to be collected would have resulted in a delay of 45 minutes or more to the administration of antibiotics.  3. Broad Spectrum Antibiotics Administered: Yes     Anti-infectives (From now, onward)    Start     Dose/Rate Route Frequency Ordered Stop    04/11/19 1652  azithromycin (ZITHROMAX) 500 mg in sodium chloride 0.9 % 250 mL intermittent infusion       500 mg  over 1 Hours Intravenous ONCE 19 1651          4. 2000 ml of IV fluids.    Severe Sepsis reassessment:  1. Repeat Lactic Acid Level: pending  2. MAP>65 after initial IVF bolus, will continue to monitor fluid status and vital signs  I attest to having performed a repeat sepsis exam and assessment of perfusion at 1722 and the results demonstrate improved perfusion.      Medical Decision Makin year old female presenting with cough and fever. Differential diagnosis includes pneumonia, influenza, sepsis, among others. She has a low grade temp on arrival and is tachycardic and hypoxic. She has a history of sepsis related to pneumonia and that is my concern today as well. EKG does not reveal any ischemia and troponin is negative. Symptoms unlikely cardiac in etiology. No evidence of pulmonary edema or CHF. CXR reveals LLL pneumonia. She meets criteria for sepsis given her tachycardia, hypoxia, fever, and elevated lactate. Fluids administered, repeat lactate improving. She has maintained a normal BP with MAP >65 and does not require pressors. Started on ceftriaxone and azithromycin for community acquired pneumonia. She will be admitted for continued management. Discussed with Dr. Reddy, who accepted the patient for admission to a medical bed for further management.     Diagnosis:    ICD-10-CM   1. Pneumonia of left lower lobe due to infectious organism (H) J18.1   2. Sepsis (H) A41.9       Disposition:  Admitted to Dr. Reddy.    Ahmet CHAVES, am serving as a scribe on 2019 at 3:42 PM to personally document services performed by Cecilia Ferguson PA-C based on my observations and the provider's statements to me.     Ahmet Aranda  2019   [facility] EMERGENCY DEPARTMENT       Cecilia Ferguson PA-C  [phone]

## 2019-04-11 NOTE — ED NOTES
"[facility]  ED Nurse Handoff Report    Nicole Reyes is a 63 year old female   ED Chief complaint: Cough  . ED Diagnosis:   Final diagnoses:   Pneumonia of left lower lobe due to infectious organism (H)   Sepsis (H)     Allergies:   Allergies   Allergen Reactions     Codeine Rash     Penicillins Rash     Pt has tolerated cephalosporins and penems.        Code Status: Full Code  Activity level - Baseline/Home:  Independent. Activity Level - Current:   Stand with Assist. Lift room needed: No. Bariatric: No   Needed: No   Isolation: No. Infection: Not Applicable.     Vital Signs:   Vitals:    04/11/19 1624 04/11/19 1630 04/11/19 1715   BP: 106/86 100/60 118/66   Pulse:  129 121   Resp: 24 20 24   Temp: 100.2  F (37.9  C)     TempSrc: Oral     SpO2: 92% 93% 91%   Weight: 114.3 kg (252 lb)     Height: 1.473 m (4' 10\")         Cardiac Rhythm:  ,      Pain level: 0-10 Pain Scale: 0  Patient confused: No. Patient Falls Risk: Yes.   Elimination Status: Has voided; ambulates to restroom independently with supervision   Patient Report - Initial Complaint: SOB, cough.   Focused Assessment:     Nicole Reyes is a 63 year old female with a history of pneumonia, CKD stage 3, HTN, HLD, DM2 who presents to the emergency department for evaluation of a cough. The patient reports she was seen in clinic in PCP around 2 days ago, with no notable issues. She states that last night, she began developing chills, vomiting, cough, and shortness of breath. The patient indicates she has experienced dizziness and chest congestion today (does have history of vertigo, this is worse than normal). The patient denies any recorded fever, as well as any nasal congestion, rhinorrhea, or sore throat. She further denies any chest pain or leg swelling. She notes she has had multiple episodes of pneumonia in the past. She denies any history of asthma or COPD.    Tests Performed:   XR Chest 2 Views   Preliminary Result "   IMPRESSION: Left lower lobe pneumonia.        Abnormal Results:   Labs Ordered and Resulted from Time of ED Arrival Up to the Time of Departure from the ED   CBC WITH PLATELETS DIFFERENTIAL - Abnormal; Notable for the following components:       Result Value    WBC 19.3 (*)     RBC Count 5.38 (*)     MCH 26.2 (*)     MCHC 31.4 (*)     Absolute Neutrophil 17.8 (*)     Absolute Lymphocytes 0.5 (*)     All other components within normal limits   BASIC METABOLIC PANEL - Abnormal; Notable for the following components:    Potassium 2.8 (*)     Glucose 146 (*)     All other components within normal limits   LACTIC ACID WHOLE BLOOD - Abnormal; Notable for the following components:    Lactic Acid 4.5 (*)     All other components within normal limits   ROUTINE UA WITH MICROSCOPIC - Abnormal; Notable for the following components:    Protein Albumin Urine 20 (*)     Bacteria Urine Few (*)     Squamous Epithelial /HPF Urine 2 (*)     Mucous Urine Present (*)     All other components within normal limits   TROPONIN I   NT PROBNP INPATIENT   BLOOD GAS ARTERIAL AND OXYHGB      Treatments provided: Nebs, abx, PIV & fluids, ABGs  Family Comments:  and daughter at bedside, updated on pt's condition and plan of care.  OBS brochure/video discussed/provided to patient:  N/A  ED Medications:   Medications   0.9% sodium chloride BOLUS (1,000 mLs Intravenous New Bag 4/11/19 1715)   azithromycin (ZITHROMAX) 500 mg in sodium chloride 0.9 % 250 mL intermittent infusion (has no administration in time range)   0.9% sodium chloride BOLUS (has no administration in time range)   acetaminophen (TYLENOL) tablet 650 mg (650 mg Oral Given 4/11/19 1715)   ipratropium - albuterol 0.5 mg/2.5 mg/3 mL (DUONEB) neb solution 3 mL (3 mLs Nebulization Given 4/11/19 1714)   cefTRIAXone (ROCEPHIN) 2 g vial to attach to  ml bag for ADULTS or NS 50 ml bag for PEDS (2 g Intravenous New Bag 4/11/19 1715)     Drips infusing:  Yes  For the majority of  the shift, the patient's behavior Green.     Severe Sepsis OR Septic Shock Diagnosis Present: No      ED Nurse Name/Phone Number: Lisa DEYSI Stephenson,   5:46 PM    RECEIVING UNIT ED HANDOFF REVIEW    Above ED Nurse Handoff Report was reviewed: Yes  Reviewed by: Bambi Quinn on April 11, 2019 at 6:03 PM

## 2019-04-12 ENCOUNTER — APPOINTMENT (OUTPATIENT)
Dept: PHYSICAL THERAPY | Facility: CLINIC | Age: 64
DRG: 871 | End: 2019-04-12
Attending: INTERNAL MEDICINE
Payer: COMMERCIAL

## 2019-04-12 LAB
ANION GAP SERPL CALCULATED.3IONS-SCNC: 2 MMOL/L (ref 3–14)
BUN SERPL-MCNC: 13 MG/DL (ref 7–30)
CALCIUM SERPL-MCNC: 8 MG/DL (ref 8.5–10.1)
CHLORIDE SERPL-SCNC: 112 MMOL/L (ref 94–109)
CO2 BLDCOV-SCNC: 26 MMOL/L (ref 21–28)
CO2 SERPL-SCNC: 29 MMOL/L (ref 20–32)
CREAT SERPL-MCNC: 1.02 MG/DL (ref 0.52–1.04)
ERYTHROCYTE [DISTWIDTH] IN BLOOD BY AUTOMATED COUNT: 14.5 % (ref 10–15)
GFR SERPL CREATININE-BSD FRML MDRD: 58 ML/MIN/{1.73_M2}
GLUCOSE BLDC GLUCOMTR-MCNC: 111 MG/DL (ref 70–99)
GLUCOSE BLDC GLUCOMTR-MCNC: 128 MG/DL (ref 70–99)
GLUCOSE BLDC GLUCOMTR-MCNC: 141 MG/DL (ref 70–99)
GLUCOSE BLDC GLUCOMTR-MCNC: 177 MG/DL (ref 70–99)
GLUCOSE BLDC GLUCOMTR-MCNC: 218 MG/DL (ref 70–99)
GLUCOSE SERPL-MCNC: 143 MG/DL (ref 70–99)
GRAM STN SPEC: ABNORMAL
HCT VFR BLD AUTO: 38.3 % (ref 35–47)
HGB BLD-MCNC: 11.6 G/DL (ref 11.7–15.7)
INTERPRETATION ECG - MUSE: NORMAL
LACTATE BLD-SCNC: 2.7 MMOL/L (ref 0.7–2.1)
Lab: ABNORMAL
MAGNESIUM SERPL-MCNC: 2.5 MG/DL (ref 1.6–2.3)
MCH RBC QN AUTO: 26.1 PG (ref 26.5–33)
MCHC RBC AUTO-ENTMCNC: 30.3 G/DL (ref 31.5–36.5)
MCV RBC AUTO: 86 FL (ref 78–100)
PCO2 BLDV: 43 MM HG (ref 40–50)
PH BLDV: 7.39 PH (ref 7.32–7.43)
PLATELET # BLD AUTO: 227 10E9/L (ref 150–450)
PO2 BLDV: 48 MM HG (ref 25–47)
POTASSIUM SERPL-SCNC: 3.9 MMOL/L (ref 3.4–5.3)
POTASSIUM SERPL-SCNC: 4 MMOL/L (ref 3.4–5.3)
RBC # BLD AUTO: 4.45 10E12/L (ref 3.8–5.2)
SAO2 % BLDV FROM PO2: 83 %
SODIUM SERPL-SCNC: 143 MMOL/L (ref 133–144)
SPECIMEN SOURCE: ABNORMAL
WBC # BLD AUTO: 21.4 10E9/L (ref 4–11)

## 2019-04-12 PROCEDURE — 25800030 ZZH RX IP 258 OP 636: Performed by: INTERNAL MEDICINE

## 2019-04-12 PROCEDURE — 99233 SBSQ HOSP IP/OBS HIGH 50: CPT | Performed by: INTERNAL MEDICINE

## 2019-04-12 PROCEDURE — 80048 BASIC METABOLIC PNL TOTAL CA: CPT | Performed by: INTERNAL MEDICINE

## 2019-04-12 PROCEDURE — 97530 THERAPEUTIC ACTIVITIES: CPT | Mod: GP | Performed by: PHYSICAL THERAPIST

## 2019-04-12 PROCEDURE — 97161 PT EVAL LOW COMPLEX 20 MIN: CPT | Mod: GP | Performed by: PHYSICAL THERAPIST

## 2019-04-12 PROCEDURE — 84132 ASSAY OF SERUM POTASSIUM: CPT | Performed by: INTERNAL MEDICINE

## 2019-04-12 PROCEDURE — 25000128 H RX IP 250 OP 636: Performed by: INTERNAL MEDICINE

## 2019-04-12 PROCEDURE — 25000131 ZZH RX MED GY IP 250 OP 636 PS 637: Performed by: INTERNAL MEDICINE

## 2019-04-12 PROCEDURE — 12000000 ZZH R&B MED SURG/OB

## 2019-04-12 PROCEDURE — 40000275 ZZH STATISTIC RCP TIME EA 10 MIN

## 2019-04-12 PROCEDURE — 83735 ASSAY OF MAGNESIUM: CPT | Performed by: INTERNAL MEDICINE

## 2019-04-12 PROCEDURE — 94640 AIRWAY INHALATION TREATMENT: CPT | Mod: 76

## 2019-04-12 PROCEDURE — 25000132 ZZH RX MED GY IP 250 OP 250 PS 637: Performed by: INTERNAL MEDICINE

## 2019-04-12 PROCEDURE — 85027 COMPLETE CBC AUTOMATED: CPT | Performed by: INTERNAL MEDICINE

## 2019-04-12 PROCEDURE — 36415 COLL VENOUS BLD VENIPUNCTURE: CPT | Performed by: INTERNAL MEDICINE

## 2019-04-12 PROCEDURE — 94640 AIRWAY INHALATION TREATMENT: CPT

## 2019-04-12 PROCEDURE — 25000125 ZZHC RX 250: Performed by: INTERNAL MEDICINE

## 2019-04-12 PROCEDURE — 00000146 ZZHCL STATISTIC GLUCOSE BY METER IP

## 2019-04-12 RX ORDER — GABAPENTIN 300 MG/1
600 CAPSULE ORAL AT BEDTIME
Status: DISCONTINUED | OUTPATIENT
Start: 2019-04-13 | End: 2019-04-18 | Stop reason: HOSPADM

## 2019-04-12 RX ORDER — MAGNESIUM SULFATE HEPTAHYDRATE 40 MG/ML
4 INJECTION, SOLUTION INTRAVENOUS EVERY 4 HOURS PRN
Status: DISCONTINUED | OUTPATIENT
Start: 2019-04-12 | End: 2019-04-18 | Stop reason: HOSPADM

## 2019-04-12 RX ORDER — PREDNISONE 20 MG/1
40 TABLET ORAL DAILY
Status: DISCONTINUED | OUTPATIENT
Start: 2019-04-12 | End: 2019-04-14

## 2019-04-12 RX ADMIN — IPRATROPIUM BROMIDE AND ALBUTEROL SULFATE 3 ML: .5; 3 SOLUTION RESPIRATORY (INHALATION) at 07:48

## 2019-04-12 RX ADMIN — ACETAMINOPHEN 650 MG: 325 TABLET, FILM COATED ORAL at 09:51

## 2019-04-12 RX ADMIN — MICONAZOLE NITRATE: 2 POWDER TOPICAL at 11:02

## 2019-04-12 RX ADMIN — MAGNESIUM SULFATE HEPTAHYDRATE 4 G: 40 INJECTION, SOLUTION INTRAVENOUS at 11:59

## 2019-04-12 RX ADMIN — IPRATROPIUM BROMIDE AND ALBUTEROL SULFATE 3 ML: .5; 3 SOLUTION RESPIRATORY (INHALATION) at 15:44

## 2019-04-12 RX ADMIN — SIMVASTATIN 20 MG: 20 TABLET, FILM COATED ORAL at 21:22

## 2019-04-12 RX ADMIN — IPRATROPIUM BROMIDE AND ALBUTEROL SULFATE 3 ML: .5; 3 SOLUTION RESPIRATORY (INHALATION) at 19:31

## 2019-04-12 RX ADMIN — GABAPENTIN 600 MG: 300 CAPSULE ORAL at 08:38

## 2019-04-12 RX ADMIN — IPRATROPIUM BROMIDE AND ALBUTEROL SULFATE 3 ML: .5; 3 SOLUTION RESPIRATORY (INHALATION) at 11:30

## 2019-04-12 RX ADMIN — CLONAZEPAM 0.5 MG: 0.5 TABLET ORAL at 21:22

## 2019-04-12 RX ADMIN — HEPARIN SODIUM 5000 UNITS: 5000 INJECTION, SOLUTION INTRAVENOUS; SUBCUTANEOUS at 21:21

## 2019-04-12 RX ADMIN — ACETAMINOPHEN 650 MG: 325 TABLET, FILM COATED ORAL at 17:15

## 2019-04-12 RX ADMIN — SODIUM CHLORIDE: 9 INJECTION, SOLUTION INTRAVENOUS at 00:27

## 2019-04-12 RX ADMIN — AZITHROMYCIN MONOHYDRATE 250 MG: 500 INJECTION, POWDER, LYOPHILIZED, FOR SOLUTION INTRAVENOUS at 18:08

## 2019-04-12 RX ADMIN — HEPARIN SODIUM 5000 UNITS: 5000 INJECTION, SOLUTION INTRAVENOUS; SUBCUTANEOUS at 08:37

## 2019-04-12 RX ADMIN — CEFTRIAXONE 2 G: 2 INJECTION, POWDER, FOR SOLUTION INTRAMUSCULAR; INTRAVENOUS at 16:38

## 2019-04-12 RX ADMIN — INSULIN HUMAN 5 UNITS: 100 INJECTION, SUSPENSION SUBCUTANEOUS at 11:49

## 2019-04-12 RX ADMIN — CLONAZEPAM 0.5 MG: 0.5 TABLET ORAL at 08:38

## 2019-04-12 RX ADMIN — PREDNISONE 40 MG: 20 TABLET ORAL at 11:49

## 2019-04-12 RX ADMIN — INSULIN ASPART 1 UNITS: 100 INJECTION, SOLUTION INTRAVENOUS; SUBCUTANEOUS at 18:52

## 2019-04-12 RX ADMIN — ASPIRIN 81 MG: 81 TABLET, COATED ORAL at 21:22

## 2019-04-12 RX ADMIN — PANTOPRAZOLE SODIUM 40 MG: 40 TABLET, DELAYED RELEASE ORAL at 21:22

## 2019-04-12 ASSESSMENT — ACTIVITIES OF DAILY LIVING (ADL)
ADLS_ACUITY_SCORE: 18
ADLS_ACUITY_SCORE: 18
ADLS_ACUITY_SCORE: 17

## 2019-04-12 ASSESSMENT — MIFFLIN-ST. JEOR: SCORE: 1670.6

## 2019-04-12 NOTE — PLAN OF CARE
Vss, no co cp, sob/villavicencio, tylenol given for HA.   Pt educated on use and importance of IS.  Pt was able to verbalize understanding and return proper demonstration.  Tele SR.  , 128.  IVF dc'd, mag replaced with recheck ordered for am and 1630, K+ 4.0, wbc 21.4.  Strict I and O, alarms on for safety, up Ax1+cane, LS with exp wheezes, powder applied to red abd folds, see mar for details.  Pt was started on prednisone, insulin was adjusted as well. Continue poc and monitoring.

## 2019-04-12 NOTE — PROGRESS NOTES
"[facility] Progress Note  FR Med Surg 3    Pt alert and pleasant, denies discomfort and shared context of admission. Shayy is supported by her spouse and children, who live locally and are attentive to her needs.   Shayy shared she and her spouse are \"looking for a new loc community\" as their previous Taoism \"was not working for us fundamentally\".  Shayy remails faithful to her Protestant roots.   Prayer requested and provided for continued healing.    Introduced patient to [facility] and availability for support during   hospital stay.     Plan: [facility] remains available for additional emotional/spiritual support.    Mckinley Wong MA  Staff   Pager: [phone]  Phone: [phone]        "

## 2019-04-12 NOTE — PLAN OF CARE
Lethargic. VSS. LS coarse. Remains on 4L O2. Denies pain. Antibiotics continue for PNA. NS @ 100 ml/hr. . K+ 3.9. Tele SR. Up with 1 assist and cane.   Kelly Gramajo RN on 4/12/2019 at 5:10 AM

## 2019-04-12 NOTE — PROGRESS NOTES
[facility]    Medicine Progress Note - Hospitalist Service       Date of Admission:  4/11/2019  Assessment & Plan   Nicole Reyes is a 63 year old female admitted on 4/11/2019. She has a past medical history significant for diabetes mellitus, hyperlipidemia, sleep apnea, morbid obesity, depression, hypertension, anxiety, and chronic kidney disease.  She presented to emergency room with cough, weakness, and chills.  She is found to have pneumonia.     1.  Pneumonia.  Continue azithromycin and IV ceftriaxone.     2.  Acute bronchospasm.  Having a more prominent wheeze today.  Continued to require supplemental oxygen.  Start prednisone 40 mg a day.  Continue duo nebs.  Continue antibiotics as noted above.     3.  Diabetes mellitus.  Hold metformin due to initial lactic acidosis.  Continue NovoLog sliding scale.  With the addition of prednisone, start NPH 5 units every morning.     4.  Sepsis.  Due to pneumonia.  Sepsis now improving.  Continue antibiotics as noted above.  Stop continuous IV fluids.    5.  Acute hypoxic respiratory failure.  Due to pneumonia and bronchospasm.  Supplemental oxygen as needed.  Antibiotics as above.  Duo nebs 4 times a day.  Add prednisone 40 mg a day as noted above.  Albuterol more often if needed.     6.  Hypertension.  Blood pressure remained low at times.    Continue to hold losartan.     7.  Hyperlipidemia.  Continue simvastatin.     8.  GERD.  Continue pantoprazole.     9.  Hypokalemia.  Potassium replacement protocol.     10.  Lactic acidosis.  Suspect due to sepsis and metformin.  Resolved with IV fluids.  Continue to hold metformin.     11.  Weakness.  Physical therapy consult.    12.  Hypomagnesemia.  Start magnesium replacement protocol.            Diet: Moderate Consistent CHO Diet    DVT Prophylaxis: Heparin SQ  Suarez Catheter: not present  Code Status: Full Code      Disposition Plan   Expected discharge: 2 - 3 days, recommended to prior living  arrangement  Entered: Valentin Reddy DO 04/12/2019, 10:42 AM           Valentin Reddy DO  Hospitalist Service  [facility]    ______________________________________________________________________    Interval History   Cough is increased today.  Has become mildly productive.  Short of breath at times.  Chest feels sore when coughing.  No chest pain otherwise.  Denies fevers, chills, nausea, vomiting, or diarrhea.    Data reviewed today: I reviewed all medications, new labs and imaging results over the last 24 hours.    Physical Exam   Vital Signs: Temp: 98  F (36.7  C) Temp src: Oral BP: 99/45 Pulse: 112 Heart Rate: 84 Resp: 20 SpO2: 97 % O2 Device: Nasal cannula Oxygen Delivery: 4 LPM  Weight: 268 lbs 8 oz  Gen:  NAD, A&Ox3.  Eyes:  PERRL, sclera anicteric.  OP:  MMM, no lesions.  Neck:  Supple.  CV:  Regular, no murmurs.  Lung:  Significant wheeze b/l, normal effort.  Ab:  +BS, soft.  Skin:  Warm, dry to touch.  No rash.  Ext:  No pitting edema LE b/l.      Data   Recent Labs   Lab 04/12/19  0631 04/12/19  0256 04/11/19  2140 04/11/19  1624   WBC 21.4*  --   --  19.3*   HGB 11.6*  --   --  14.1   MCV 86  --   --  84     --  236 264     --   --  139   POTASSIUM 4.0 3.9  --  2.8*   CHLORIDE 112*  --   --  104   CO2 29  --   --  28   BUN 13  --   --  9   CR 1.02  --   --  0.89   ANIONGAP 2*  --   --  7   GRECIA 8.0*  --   --  8.7   *  --   --  146*   TROPI  --   --   --  0.019

## 2019-04-12 NOTE — PLAN OF CARE
PT: PT eval completed. Pt is here with PNA. Pt lives with dtg and spouse. Pt lives ramp accessible home. Pt spouse uses W/C. Pt dtg assists with grocery shopping. 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 Pt inde with all cares, uses cane at baseline, but has a 4WW as well.   Discharge Planner PT   Patient plan for discharge: home with dtg support  Current status: Pt seen at tail end of her walking with RN in [facility], SOB, but SBA, no LOB. Pt on RA at the time, sats dropping to 87% with this activity- ambulating 100 feet. Pt educated on energy conservation and PLB.   Barriers to return to prior living situation: decreased activity tolerance  Recommendations for discharge: home with dtg support  Rationale for recommendations: Pt would benefit from continued IP PT to progress functional endurance. Pt likely able to meet goals to return home with continued skilled therapy. Has support from dtg and is motivated to improve.        Entered by: Pari Leiva 04/12/2019 3:37 PM

## 2019-04-12 NOTE — H&P
[facility] [place]    History and Physical - Hospitalist Service       Date of Admission:  4/11/2019    Assessment & Plan   Nicole Reyes is a 63 year old female admitted on 4/11/2019. She has a past medical history significant for diabetes mellitus, hyperlipidemia, sleep apnea, morbid obesity, depression, hypertension, anxiety, and chronic kidney disease.  She presented to emergency room with cough, weakness, and chills.  She is found to have pneumonia.    1.  Pneumonia.  Continue azithromycin and IV ceftriaxone.    2.  Acute bronchospasm.  Only mild wheeze at this time.  Start scheduled duo nebs.  Antibiotics as noted above.  Albuterol more often if needed.  Try to hold off on steroids if possible due to her diabetes.  If bronchospasm were to get worse, reconsider steroids.    3.  Diabetes mellitus.  Hold metformin due to lactic acidosis.  Start NovoLog sliding scale.  If she does require steroids, would likely need subcutaneous long-acting insulin.    4.  Sepsis.  Due to pneumonia.  Give 2 L normal saline IV fluid bolus now in addition to IV fluids given in the emergency room.  Continue to treat pneumonia with antibiotics as noted above.  Monitor on telemetry.    5.  Acute hypoxic respiratory failure.  Due to pneumonia.  Supplemental oxygen as needed.  Antibiotics as above.  Duo nebs 4 times a day.  Albuterol more often if needed.    6.  Hypertension.  Blood pressure a bit on the low side at times.  Hold losartan initially.    7.  Hyperlipidemia.  Restart simvastatin.    8.  GERD.  Restart pantoprazole.    9.  Hypokalemia.  Start potassium replacement protocol.    10.  Lactic acidosis.  Suspect due to sepsis and metformin.  Give additional 2 L IV fluid bolus now.  Recheck lactic acid level in 2 hours.  Antibiotics as noted above.  Stop metformin.    11.  Weakness.  Physical therapy consult.     Diet: Diabetic diet.  DVT Prophylaxis: Heparin SQ  Suarez Catheter: not present  Code Status: Full  code.    Disposition Plan   Expected discharge: 2 - 3 days, recommended to prior living arrangement   Entered: Valentin Reddy DO 04/11/2019, 7:00 PM         Valentin Reddy DO  [facility]    ______________________________________________________________________    Chief Complaint   Cough.    History is obtained from the patient    History of Present Illness   Nicole Reyes is a 63 year old female who has a past medical history significant for diabetes mellitus, hyperlipidemia, sleep apnea, morbid obesity, depression, hypertension, anxiety, and chronic kidney disease.  She developed a cough yesterday.  Cough has been nonproductive.  She is also been having chills.  Has felt weak compared to usual.  Does feel short of breath at times.  No chest pain.  No palpitations.  No known sick contacts.  Nothing at home was making her feel better.  She did present to emergency in for further evaluation.  Nothing in particular was making her feel worse.  She has had a few episodes of pneumonia in the past.  Symptoms feel similar to previous pneumonia.  Medications given in the emergency room have helped her to feel better.  No other complaints.    Review of Systems    The 10 point Review of Systems is negative other than noted in the HPI     Past Medical History    I have reviewed this patient's medical history and updated it with pertinent information if needed.   Past Medical History:   Diagnosis Date     CKD (chronic kidney disease) stage 3, GFR 30-59 ml/min (H)      Essential hypertension, benign      Generalized anxiety disorder      Hernia, abdominal      Hypertension      Insomnia, unspecified      Iron deficiency anemia 8/09     Major depression     sees a psychiatrist     Menopause     age 53     Obesity, unspecified      CRISTÓBAL (obstructive sleep apnea)     bipap     Postoperative seroma 10/11    drained several times     Pure hypercholesterolemia      RLS (restless legs syndrome)      Type II  or unspecified type diabetes mellitus without mention of complication, not stated as uncontrolled        Past Surgical History   I have reviewed this patient's surgical history and updated it with pertinent information if needed.  Past Surgical History:   Procedure Laterality Date     BREAST BIOPSY, RT/LT      2 times; benign     C NONSPECIFIC PROCEDURE      Hernia repair      C NONSPECIFIC PROCEDURE      Lymph node biopsy in neck (benign)       SECTION        SECTION        SECTION       DILATION AND CURETTAGE, HYSTEROSCOPY DIAGNOSTIC, COMBINED  3/22/2013    Procedure: COMBINED DILATION AND CURETTAGE, HYSTEROSCOPY DIAGNOSTIC;  DILATION AND CURETTAGE, HYSTEROSCOPY DIAGNOSTIC   Converted to a general;  Surgeon: Robi Edwards MD;  Location:  OR     ESOPHAGOSCOPY, GASTROSCOPY, DUODENOSCOPY (EGD), COMBINED N/A 2015    Procedure: COMBINED ESOPHAGOSCOPY, GASTROSCOPY, DUODENOSCOPY (EGD);  Surgeon: Obinna Gutierrez MD;  Location:  GI     ESOPHAGOSCOPY, GASTROSCOPY, DUODENOSCOPY (EGD), COMBINED N/A 2015    Procedure: COMBINED ENDOSCOPIC ULTRASOUND, ESOPHAGOSCOPY, GASTROSCOPY, DUODENOSCOPY (EGD), FINE NEEDLE ASPIRATE/BIOPSY;  Surgeon: Sabine Olivares MD;  Location:  GI     HERNIORRHAPHY INCISIONAL (LOCATION)  10/8/2011     incarcerated hernia repair with mesh;     HERNIORRHAPHY INCISIONAL (LOCATION)  10/16/2011     repair incisional hernia with mesh, and removal of current implanted mesh;       Social History   I have reviewed this patient's social history and updated it with pertinent information if needed.  Social History     Tobacco Use     Smoking status: Former Smoker     Last attempt to quit: 3/18/1979     Years since quittin.0     Smokeless tobacco: Never Used     Tobacco comment: quit    Substance Use Topics     Alcohol use: Yes     Comment: Twice a month     Drug use: No       Family History   I have reviewed this patient's family history and  updated it with pertinent information if needed.   Family History   Problem Relation Age of Onset     Cardiovascular Mother         CHF     Hypertension Mother      C.A.D. Mother         50s     Lipids Mother      Cancer Father         Hodgkin's, Leukemia     Diabetes Sister      Connective Tissue Disorder Sister         Lupus     Lipids Sister      Hypertension Brother      Hypertension Sister      Hypertension Sister      Cancer Brother         Hodgkin's     C.A.D. Brother 61     Hypertension Sister      C.A.D. Brother      Basal cell carcinoma Daughter 38        top of head       Prior to Admission Medications   Prior to Admission Medications   Prescriptions Last Dose Informant Patient Reported? Taking?   ASPIRIN EC PO 4/10/2019 at Unknown time  Yes Yes   Sig: Take 81 mg by mouth every evening   FREESTYLE LITE TEST VI STRP  Self No No   Sig: Test twice a day   clonazePAM (KLONOPIN) 1 MG tablet 4/11/2019 at x1  Yes Yes   Sig: Take 0.5 mg by mouth 2 times daily   cyclobenzaprine (FLEXERIL) 10 MG tablet   No Yes   Sig: Take 1 tablet (10 mg) by mouth 3 times daily as needed for muscle spasms   gabapentin (NEURONTIN) 300 MG capsule Past Month at Unknown time  No Yes   Sig: Take 2 capsules (600 mg) by mouth daily   ibuprofen (ADVIL,MOTRIN) 600 MG tablet   No Yes   Sig: Take 1 tablet (600 mg) by mouth every 6 hours as needed for moderate pain   losartan (COZAAR) 100 MG tablet 4/10/2019 at hs  No Yes   Sig: Take 1 tablet (100 mg) by mouth daily   metFORMIN (GLUCOPHAGE-XR) 500 MG 24 hr tablet Past Week at Unknown time  No Yes   Sig: Take 2 tablets (1,000 mg) by mouth every morning   pantoprazole (PROTONIX) 40 MG EC tablet 4/10/2019 at Unknown time  No Yes   Sig: Take 1 tablet (40 mg) by mouth At Bedtime   sertraline (ZOLOFT) 100 MG tablet 4/11/2019 at Unknown time  No Yes   Sig: Take 2 tablets (200 mg) by mouth daily   simvastatin (ZOCOR) 20 MG tablet 4/10/2019 at Unknown time  No Yes   Sig: Take 1 tablet (20 mg) by mouth  At Bedtime   zolpidem (AMBIEN) 10 MG tablet   No Yes   Sig: Take 0.5 tablets (5 mg) by mouth nightly as needed for sleep      Facility-Administered Medications: None     Allergies   Allergies   Allergen Reactions     Codeine Rash     Penicillins Rash     Pt has tolerated cephalosporins and penems.        Physical Exam   Vital Signs: Temp: 100.2  F (37.9  C) Temp src: Oral BP: 126/74 Pulse: 112 Heart Rate: 127 Resp: 18 SpO2: 93 % O2 Device: Nasal cannula with humidification Oxygen Delivery: 6 LPM  Weight: 252 lbs 0 oz    Gen:  NAD, A&Ox3.  Eyes:  PERRL, sclera anicteric.  OP:  MMM, no lesions.  Neck:  Supple.  CV:  Regular, mildly tachycardic, no murmurs.  Lung:  Mild wheeze b/l, normal effort.  Ab:  +BS, soft.  Skin:  Warm, dry to touch.  No rash.  Ext:  No pitting edema LE b/l.      Data   Data reviewed today: I reviewed all medications, new labs and imaging results over the last 24 hours. I personally reviewed the EKG tracing showing Sinus tachycardia.  No obvious acute ischemia.    Recent Labs   Lab 04/11/19  1624   WBC 19.3*   HGB 14.1   MCV 84         POTASSIUM 2.8*   CHLORIDE 104   CO2 28   BUN 9   CR 0.89   ANIONGAP 7   GRECIA 8.7   *   TROPI 0.019

## 2019-04-12 NOTE — PROGRESS NOTES
04/12/19 1427   Quick Adds   Type of Visit Initial PT Evaluation   Living Environment   Lives With spouse;child(mariama), adult   Living Arrangements house   Home Accessibility stairs to enter home;stairs within home   Number of Stairs, Main Entrance   (ramp to enter home)   Number of Stairs, Within Home, Primary 1  (sunken level)   Living Environment Comment Pt lives with dtg and W/C dependent . 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   Functional Level Prior   Ambulation 1-->assistive equipment   Transferring 1-->assistive equipment   Toileting 0-->independent   Bathing 0-->independent   Communication 0-->understands/communicates without difficulty   Swallowing 0-->swallows foods/liquids without difficulty   Cognition 0 - no cognition issues reported   Fall history within last six months no   Prior Functional Level Comment Independent with cane. No home O2   General Information   Onset of Illness/Injury or Date of Surgery - Date 04/11/19   Referring Physician Dr. Reddy    Patient/Family Goals Statement Pt would like to DC home   Pertinent History of Current Problem (include personal factors and/or comorbidities that impact the POC) Nicole Reyes is a 63 year old female admitted on 4/11/2019. She has a past medical history significant for diabetes mellitus, hyperlipidemia, sleep apnea, morbid obesity, depression, hypertension, anxiety, and chronic kidney disease.  She presented to emergency room with cough, weakness, and chills.  She is found to have pneumonia.   General Info Comments Pt on 4 liters of O2 initially- RN ok'd activity on RA for trial   Cognitive Status Examination   Orientation orientation to person, place and time   Level of  "Consciousness alert   Follows Commands and Answers Questions 100% of the time;able to follow multistep instructions   Personal Safety and Judgment intact   Memory intact   Pain Assessment   Patient Currently in Pain No  (mild knee pain at times, but none currently)   Posture    Posture Forward head position;Protracted shoulders   Range of Motion (ROM)   ROM Comment limited due to adipose tissue   Strength   Strength Comments functional weakness with ambulation, transfers   Bed Mobility   Bed Mobility Comments inde, but effortful   Transfer Skills   Transfer Comments SBA with hurricane   Gait   Gait Comments SBA with hurricane, SOB, increased lateral sway, dropping to 87% on RA   General Therapy Interventions   Planned Therapy Interventions gait training;transfer training;strengthening;risk factor education;home program guidelines;progressive activity/exercise   Clinical Impression   Criteria for Skilled Therapeutic Intervention yes, treatment indicated   PT Diagnosis decreased functional endurance   Influenced by the following impairments SOB, decreased sats with activity   Functional limitations due to impairments decreased ambulation/activity tolerance   Clinical Presentation Stable/Uncomplicated   Clinical Presentation Rationale improving   Clinical Decision Making (Complexity) Low complexity   Therapy Frequency` 3 times/week   Predicted Duration of Therapy Intervention (days/wks) 3 days   Anticipated Equipment Needs at Discharge   (recommend use of her 4WW)   Anticipated Discharge Disposition Home with Assist   Risk & Benefits of therapy have been explained Yes   Patient, Family & other staff in agreement with plan of care Yes   [facility] [employer] TM \"6 Clicks\"   2016, Trustees of [facility], under license to [employer].  All rights reserved.   6 Clicks Short Forms Basic Mobility Inpatient Short Form   [facility] AM-PAC  \"6 Clicks\" V.2 Basic Mobility Inpatient Short Form   1. Turning from " your back to your side while in a flat bed without using bedrails? 3 - A Little   2. Moving from lying on your back to sitting on the side of a flat bed without using bedrails? 3 - A Little   3. Moving to and from a bed to a chair (including a wheelchair)? 3 - A Little   4. Standing up from a chair using your arms (e.g., wheelchair, or bedside chair)? 3 - A Little   5. To walk in hospital room? 3 - A Little   6. Climbing 3-5 steps with a railing? 3 - A Little   Basic Mobility Raw Score (Score out of 24.Lower scores equate to lower levels of function) 18   Total Evaluation Time   Total Evaluation Time (Minutes) 10

## 2019-04-12 NOTE — PLAN OF CARE
Pt arrived to unit at 1910 from ED, accompanied by spouse and daughter. Pt A&Ox4, up with assist x1 with gait belt and pt personal cane. Pt c/o vertigo, SOB, Dyspnea on exertion and pain to chest d/t cough. Pt on 4 lpm nc sating at  96%. LS coarse, expiratory wheezes throughout. Productive cough- Sputum collected. Lactic 4.5 on admission - IV 2L NS bolus given. Lactic recheck 1.9. Potassium 2.8- replaced per protocol. IV abx Zithromax and Rocephin. , 139. Pt has redness noted to abd fold - area cleansed. Miconazole powdered ordered. PT following. Continue with POC.

## 2019-04-13 ENCOUNTER — APPOINTMENT (OUTPATIENT)
Dept: PHYSICAL THERAPY | Facility: CLINIC | Age: 64
DRG: 871 | End: 2019-04-13
Payer: COMMERCIAL

## 2019-04-13 LAB
GLUCOSE BLDC GLUCOMTR-MCNC: 115 MG/DL (ref 70–99)
GLUCOSE BLDC GLUCOMTR-MCNC: 116 MG/DL (ref 70–99)
GLUCOSE BLDC GLUCOMTR-MCNC: 141 MG/DL (ref 70–99)
GLUCOSE BLDC GLUCOMTR-MCNC: 170 MG/DL (ref 70–99)
GLUCOSE BLDC GLUCOMTR-MCNC: 178 MG/DL (ref 70–99)
MAGNESIUM SERPL-MCNC: 2.1 MG/DL (ref 1.6–2.3)

## 2019-04-13 PROCEDURE — 36415 COLL VENOUS BLD VENIPUNCTURE: CPT | Performed by: INTERNAL MEDICINE

## 2019-04-13 PROCEDURE — 94640 AIRWAY INHALATION TREATMENT: CPT | Mod: 76

## 2019-04-13 PROCEDURE — 25000128 H RX IP 250 OP 636: Performed by: INTERNAL MEDICINE

## 2019-04-13 PROCEDURE — 40000275 ZZH STATISTIC RCP TIME EA 10 MIN

## 2019-04-13 PROCEDURE — 94640 AIRWAY INHALATION TREATMENT: CPT

## 2019-04-13 PROCEDURE — 99232 SBSQ HOSP IP/OBS MODERATE 35: CPT | Performed by: INTERNAL MEDICINE

## 2019-04-13 PROCEDURE — 25800030 ZZH RX IP 258 OP 636: Performed by: INTERNAL MEDICINE

## 2019-04-13 PROCEDURE — 25000131 ZZH RX MED GY IP 250 OP 636 PS 637: Performed by: INTERNAL MEDICINE

## 2019-04-13 PROCEDURE — 12000000 ZZH R&B MED SURG/OB

## 2019-04-13 PROCEDURE — 83735 ASSAY OF MAGNESIUM: CPT | Performed by: INTERNAL MEDICINE

## 2019-04-13 PROCEDURE — 25000125 ZZHC RX 250: Performed by: INTERNAL MEDICINE

## 2019-04-13 PROCEDURE — 00000146 ZZHCL STATISTIC GLUCOSE BY METER IP

## 2019-04-13 PROCEDURE — 25000132 ZZH RX MED GY IP 250 OP 250 PS 637: Performed by: INTERNAL MEDICINE

## 2019-04-13 PROCEDURE — 97530 THERAPEUTIC ACTIVITIES: CPT | Mod: GP

## 2019-04-13 RX ORDER — LOSARTAN POTASSIUM 25 MG/1
25 TABLET ORAL DAILY
Status: DISCONTINUED | OUTPATIENT
Start: 2019-04-13 | End: 2019-04-14

## 2019-04-13 RX ORDER — HYDROXYZINE HYDROCHLORIDE 25 MG/1
25-50 TABLET, FILM COATED ORAL EVERY 6 HOURS PRN
Status: DISCONTINUED | OUTPATIENT
Start: 2019-04-13 | End: 2019-04-18 | Stop reason: HOSPADM

## 2019-04-13 RX ORDER — IBUPROFEN 400 MG/1
400 TABLET, FILM COATED ORAL EVERY 8 HOURS PRN
Status: DISCONTINUED | OUTPATIENT
Start: 2019-04-13 | End: 2019-04-18 | Stop reason: HOSPADM

## 2019-04-13 RX ORDER — HYDRALAZINE HYDROCHLORIDE 20 MG/ML
10 INJECTION INTRAMUSCULAR; INTRAVENOUS EVERY 4 HOURS PRN
Status: DISCONTINUED | OUTPATIENT
Start: 2019-04-13 | End: 2019-04-18 | Stop reason: HOSPADM

## 2019-04-13 RX ADMIN — PREDNISONE 40 MG: 20 TABLET ORAL at 10:28

## 2019-04-13 RX ADMIN — PANTOPRAZOLE SODIUM 40 MG: 40 TABLET, DELAYED RELEASE ORAL at 21:14

## 2019-04-13 RX ADMIN — INSULIN HUMAN 5 UNITS: 100 INJECTION, SUSPENSION SUBCUTANEOUS at 10:25

## 2019-04-13 RX ADMIN — IPRATROPIUM BROMIDE AND ALBUTEROL SULFATE 3 ML: .5; 3 SOLUTION RESPIRATORY (INHALATION) at 19:47

## 2019-04-13 RX ADMIN — HEPARIN SODIUM 5000 UNITS: 5000 INJECTION, SOLUTION INTRAVENOUS; SUBCUTANEOUS at 21:14

## 2019-04-13 RX ADMIN — AZITHROMYCIN MONOHYDRATE 250 MG: 500 INJECTION, POWDER, LYOPHILIZED, FOR SOLUTION INTRAVENOUS at 17:43

## 2019-04-13 RX ADMIN — GABAPENTIN 600 MG: 300 CAPSULE ORAL at 21:14

## 2019-04-13 RX ADMIN — HEPARIN SODIUM 5000 UNITS: 5000 INJECTION, SOLUTION INTRAVENOUS; SUBCUTANEOUS at 10:26

## 2019-04-13 RX ADMIN — IPRATROPIUM BROMIDE AND ALBUTEROL SULFATE 3 ML: .5; 3 SOLUTION RESPIRATORY (INHALATION) at 08:02

## 2019-04-13 RX ADMIN — MICONAZOLE NITRATE: 2 POWDER TOPICAL at 16:09

## 2019-04-13 RX ADMIN — ASPIRIN 81 MG: 81 TABLET, COATED ORAL at 21:14

## 2019-04-13 RX ADMIN — LOSARTAN POTASSIUM 25 MG: 25 TABLET, FILM COATED ORAL at 13:33

## 2019-04-13 RX ADMIN — ACETAMINOPHEN 650 MG: 325 TABLET, FILM COATED ORAL at 00:09

## 2019-04-13 RX ADMIN — CEFTRIAXONE 2 G: 2 INJECTION, POWDER, FOR SOLUTION INTRAMUSCULAR; INTRAVENOUS at 15:54

## 2019-04-13 RX ADMIN — CLONAZEPAM 0.5 MG: 0.5 TABLET ORAL at 10:28

## 2019-04-13 RX ADMIN — INSULIN ASPART 1 UNITS: 100 INJECTION, SOLUTION INTRAVENOUS; SUBCUTANEOUS at 17:43

## 2019-04-13 RX ADMIN — SIMVASTATIN 20 MG: 20 TABLET, FILM COATED ORAL at 21:14

## 2019-04-13 RX ADMIN — IPRATROPIUM BROMIDE AND ALBUTEROL SULFATE 3 ML: .5; 3 SOLUTION RESPIRATORY (INHALATION) at 15:13

## 2019-04-13 RX ADMIN — CLONAZEPAM 0.5 MG: 0.5 TABLET ORAL at 21:14

## 2019-04-13 ASSESSMENT — ACTIVITIES OF DAILY LIVING (ADL)
ADLS_ACUITY_SCORE: 17

## 2019-04-13 ASSESSMENT — MIFFLIN-ST. JEOR: SCORE: 1691.92

## 2019-04-13 NOTE — PLAN OF CARE
Discharge Planner PT   Patient plan for discharge: home with dtg support  Current status: Patient supine upon arrival, on 3L NC throughout session.  Patient is independent with bed mobility.  Patient performed sit to stand transfers with 2WW with graded assist from SBA to independent.  Patient amb 250 feet with 2WW and SBA.  Patient required 2 seated rest breaks secondary to fatigue and SOB; maintained SaO2 between 89-93% with mobility (always recovered above 92% within 30 sec standing or seated rest break).  Verbal cueing required for pursed lip breathing at times.  Recommended amb 3-4x daily with RN and walker/cane in order to maintain activity tolerance and strength.  Barriers to return to prior living situation: decreased activity tolerance  Recommendations for discharge: home with dtg support, patient reports that she will use walker at discharge instead of cane (already has a walker at home for use).  Rationale for recommendations: Pt would benefit from continued IP PT to progress functional endurance. Pt likely able to meet goals to return home with continued skilled therapy. Has support from dtg and is motivated to improve.        Entered by: Bea Mendoza 04/13/2019 10:43 AM

## 2019-04-13 NOTE — PLAN OF CARE
VSS. Tele SR.Alert and oriented x4. Up with 1 assist walker and GB. Some SOB on exertion on 3L O2, sats at 94-96 %. Ambulated the oconnell x1 this PM. C/O 4/10 headache, PRN tylenol given with relief. On Mod CHO diet, good appetite.  and 218. Insulin given per protocol.Voiding well. On IV rocephin and IV Zithromax.

## 2019-04-13 NOTE — PROGRESS NOTES
[facility]    Medicine Progress Note - Hospitalist Service       Date of Admission:  4/11/2019  Assessment & Plan   Nicole Reyes is a 63 year old female admitted on 4/11/2019. She has a past medical history significant for diabetes mellitus, hyperlipidemia, sleep apnea, morbid obesity, depression, hypertension, anxiety, and chronic kidney disease.  She presented to emergency room with cough, weakness, and chills.  She is found to have pneumonia.     1.  Pneumonia.  Continue azithromycin and IV ceftriaxone.     2.  Acute bronchospasm.  Continue supplemental oxygen as needed.  Continue prednisone 40 mg a day, duo nebs, antibiotics.  Albuterol more often if needed.     3.  Diabetes mellitus.  Continue NovoLog sliding scale.  NPH 5 units every morning with prednisone while on prednisone.     4.  Sepsis.  Due to pneumonia.  Sepsis now resolved.  Continue antibiotics as noted above.      5.  Acute hypoxic respiratory failure.  Due to pneumonia and bronchospasm.  Supplemental oxygen as needed.  Antibiotics as above.  Duo nebs 4 times a day.  Prednisone 40 mg a day.  Albuterol more often if needed.     6.  Hypertension.  Restart losartan at 25 mg daily.  Have IV hydralazine available if needed.     7.  Hyperlipidemia.  Continue simvastatin.     8.  GERD.  Continue pantoprazole.     9.  Hypokalemia.  Potassium replacement protocol.     10.  Lactic acidosis.  Suspect due to sepsis and metformin.  Resolved with IV fluids.  Continue to hold metformin.     11.  Weakness.  Physical therapy consult.     12.  Hypomagnesemia.  Magnesium replacement protocol.           Diet: Moderate Consistent CHO Diet    DVT Prophylaxis: Heparin SQ  Suarez Catheter: not present  Code Status: Full Code      Disposition Plan   Expected discharge: 1-2 days, recommended to prior living arrangement   Entered: Valentin Reddy DO 04/13/2019, 12:13 PM           Valentin Reddy DO  Hospitalist Service  [facility]  Hospital    ______________________________________________________________________    Interval History   Short of breath.  Coughing.  Has a headache.  Denies chest pain, fevers, chills, nausea, vomiting, or diarrhea.    Data reviewed today: I reviewed all medications, new labs and imaging results over the last 24 hours.     Physical Exam   Vital Signs: Temp: 97.3  F (36.3  C) Temp src: Oral BP: 140/76 Pulse: 77 Heart Rate: 73 Resp: 18 SpO2: 98 % O2 Device: Nasal cannula Oxygen Delivery: 2 LPM  Weight: 273 lbs 3.2 oz  Gen:  NAD, A&Ox3.  Eyes:  PERRL, sclera anicteric.  OP:  MMM, no lesions.  Neck:  Supple.  CV:  Regular, no murmurs.  Lung:  Wheeze b/l, normal effort.  Ab:  +BS, soft.  Skin:  Warm, dry to touch.  No rash.  Ext:  Mild non pitting edema LE b/l.      Data   Recent Labs   Lab 04/12/19  0631 04/12/19  0256 04/11/19  2140 04/11/19  1624   WBC 21.4*  --   --  19.3*   HGB 11.6*  --   --  14.1   MCV 86  --   --  84     --  236 264     --   --  139   POTASSIUM 4.0 3.9  --  2.8*   CHLORIDE 112*  --   --  104   CO2 29  --   --  28   BUN 13  --   --  9   CR 1.02  --   --  0.89   ANIONGAP 2*  --   --  7   GRECIA 8.0*  --   --  8.7   *  --   --  146*   TROPI  --   --   --  0.019

## 2019-04-13 NOTE — PLAN OF CARE
Vitals: VSS, pt afebrile  Situation/Status: Pt admitted for cough, fever, and chills. Pt diagnosed with pnuemonia. Pt c/o of KHALIL and is on 3 L of O2.   Neuro: A/O x 4  Pain: Pt c/o of rib cage/chest pain when taking a deep breath or coughing. Prn tylenol given with some relief.  Activity: A1 with walker and gait belt   Diet: Moderate Carb Diet  Tele/Cardiac: SR  Lungs: LS diminished with fine crackles on left side  GI/: No nausea/vomiting, + Flatus, BS x4.  Skin: CMS intact  Labs:    Plan: Continue with IV antibiotics, nebs, and prednisone. Continue to monitor per POC.

## 2019-04-13 NOTE — PLAN OF CARE
Vss, no co cp/pain/sob, villavicencio when ambulating in halls with staff, weaned O2 down to 2L per NC with sats in the mid to upper 90s%.   Pt continues to use IS.  Tele SR.  , 115.  Mag recheck was 2.1, prednisone continues, strict I and O, alarms on for safety, up Ax1+walker, LS with exp wheezes and LLL fine crackles, declined powder to abd folds this shift.   Continue poc and monitoring.

## 2019-04-14 ENCOUNTER — APPOINTMENT (OUTPATIENT)
Dept: PHYSICAL THERAPY | Facility: CLINIC | Age: 64
DRG: 871 | End: 2019-04-14
Payer: COMMERCIAL

## 2019-04-14 LAB
ANION GAP SERPL CALCULATED.3IONS-SCNC: 2 MMOL/L (ref 3–14)
BUN SERPL-MCNC: 17 MG/DL (ref 7–30)
CALCIUM SERPL-MCNC: 8.8 MG/DL (ref 8.5–10.1)
CHLORIDE SERPL-SCNC: 105 MMOL/L (ref 94–109)
CO2 SERPL-SCNC: 31 MMOL/L (ref 20–32)
CREAT SERPL-MCNC: 0.85 MG/DL (ref 0.52–1.04)
GFR SERPL CREATININE-BSD FRML MDRD: 73 ML/MIN/{1.73_M2}
GLUCOSE BLDC GLUCOMTR-MCNC: 121 MG/DL (ref 70–99)
GLUCOSE BLDC GLUCOMTR-MCNC: 152 MG/DL (ref 70–99)
GLUCOSE BLDC GLUCOMTR-MCNC: 159 MG/DL (ref 70–99)
GLUCOSE BLDC GLUCOMTR-MCNC: 171 MG/DL (ref 70–99)
GLUCOSE BLDC GLUCOMTR-MCNC: 191 MG/DL (ref 70–99)
GLUCOSE SERPL-MCNC: 134 MG/DL (ref 70–99)
PLATELET # BLD AUTO: 216 10E9/L (ref 150–450)
POTASSIUM SERPL-SCNC: 4.5 MMOL/L (ref 3.4–5.3)
SODIUM SERPL-SCNC: 138 MMOL/L (ref 133–144)

## 2019-04-14 PROCEDURE — 99232 SBSQ HOSP IP/OBS MODERATE 35: CPT | Performed by: INTERNAL MEDICINE

## 2019-04-14 PROCEDURE — 94640 AIRWAY INHALATION TREATMENT: CPT

## 2019-04-14 PROCEDURE — 97530 THERAPEUTIC ACTIVITIES: CPT | Mod: GP | Performed by: PHYSICAL THERAPIST

## 2019-04-14 PROCEDURE — 25000131 ZZH RX MED GY IP 250 OP 636 PS 637: Performed by: INTERNAL MEDICINE

## 2019-04-14 PROCEDURE — 25800030 ZZH RX IP 258 OP 636: Performed by: INTERNAL MEDICINE

## 2019-04-14 PROCEDURE — 40000275 ZZH STATISTIC RCP TIME EA 10 MIN

## 2019-04-14 PROCEDURE — 25000132 ZZH RX MED GY IP 250 OP 250 PS 637: Performed by: INTERNAL MEDICINE

## 2019-04-14 PROCEDURE — 12000000 ZZH R&B MED SURG/OB

## 2019-04-14 PROCEDURE — 36415 COLL VENOUS BLD VENIPUNCTURE: CPT | Performed by: INTERNAL MEDICINE

## 2019-04-14 PROCEDURE — 25000128 H RX IP 250 OP 636: Performed by: INTERNAL MEDICINE

## 2019-04-14 PROCEDURE — 85049 AUTOMATED PLATELET COUNT: CPT | Performed by: INTERNAL MEDICINE

## 2019-04-14 PROCEDURE — 25000125 ZZHC RX 250: Performed by: INTERNAL MEDICINE

## 2019-04-14 PROCEDURE — 94640 AIRWAY INHALATION TREATMENT: CPT | Mod: 76

## 2019-04-14 PROCEDURE — 80048 BASIC METABOLIC PNL TOTAL CA: CPT | Performed by: INTERNAL MEDICINE

## 2019-04-14 PROCEDURE — 00000146 ZZHCL STATISTIC GLUCOSE BY METER IP

## 2019-04-14 RX ORDER — LOSARTAN POTASSIUM 100 MG/1
100 TABLET ORAL DAILY
Status: DISCONTINUED | OUTPATIENT
Start: 2019-04-15 | End: 2019-04-18 | Stop reason: HOSPADM

## 2019-04-14 RX ORDER — PREDNISONE 20 MG/1
20 TABLET ORAL DAILY
Status: DISCONTINUED | OUTPATIENT
Start: 2019-04-15 | End: 2019-04-18 | Stop reason: HOSPADM

## 2019-04-14 RX ADMIN — GABAPENTIN 600 MG: 300 CAPSULE ORAL at 21:22

## 2019-04-14 RX ADMIN — IBUPROFEN 400 MG: 400 TABLET ORAL at 00:20

## 2019-04-14 RX ADMIN — INSULIN ASPART 1 UNITS: 100 INJECTION, SOLUTION INTRAVENOUS; SUBCUTANEOUS at 17:59

## 2019-04-14 RX ADMIN — IPRATROPIUM BROMIDE AND ALBUTEROL SULFATE 3 ML: .5; 3 SOLUTION RESPIRATORY (INHALATION) at 15:53

## 2019-04-14 RX ADMIN — CEFTRIAXONE 2 G: 2 INJECTION, POWDER, FOR SOLUTION INTRAMUSCULAR; INTRAVENOUS at 16:07

## 2019-04-14 RX ADMIN — LOSARTAN POTASSIUM 25 MG: 25 TABLET, FILM COATED ORAL at 09:16

## 2019-04-14 RX ADMIN — CLONAZEPAM 0.5 MG: 0.5 TABLET ORAL at 09:16

## 2019-04-14 RX ADMIN — SIMVASTATIN 20 MG: 20 TABLET, FILM COATED ORAL at 21:22

## 2019-04-14 RX ADMIN — IPRATROPIUM BROMIDE AND ALBUTEROL SULFATE 3 ML: .5; 3 SOLUTION RESPIRATORY (INHALATION) at 19:05

## 2019-04-14 RX ADMIN — AZITHROMYCIN MONOHYDRATE 250 MG: 500 INJECTION, POWDER, LYOPHILIZED, FOR SOLUTION INTRAVENOUS at 17:58

## 2019-04-14 RX ADMIN — IPRATROPIUM BROMIDE AND ALBUTEROL SULFATE 3 ML: .5; 3 SOLUTION RESPIRATORY (INHALATION) at 11:27

## 2019-04-14 RX ADMIN — HYDRALAZINE HYDROCHLORIDE 10 MG: 20 INJECTION INTRAMUSCULAR; INTRAVENOUS at 16:13

## 2019-04-14 RX ADMIN — INSULIN HUMAN 5 UNITS: 100 INJECTION, SUSPENSION SUBCUTANEOUS at 09:17

## 2019-04-14 RX ADMIN — PANTOPRAZOLE SODIUM 40 MG: 40 TABLET, DELAYED RELEASE ORAL at 21:22

## 2019-04-14 RX ADMIN — MICONAZOLE NITRATE: 2 POWDER TOPICAL at 21:23

## 2019-04-14 RX ADMIN — GUAIFENESIN 10 ML: 100 SOLUTION ORAL at 00:20

## 2019-04-14 RX ADMIN — PREDNISONE 40 MG: 20 TABLET ORAL at 09:16

## 2019-04-14 RX ADMIN — IPRATROPIUM BROMIDE AND ALBUTEROL SULFATE 3 ML: .5; 3 SOLUTION RESPIRATORY (INHALATION) at 07:45

## 2019-04-14 RX ADMIN — HYDROXYZINE HYDROCHLORIDE 25 MG: 25 TABLET ORAL at 00:20

## 2019-04-14 RX ADMIN — HEPARIN SODIUM 5000 UNITS: 5000 INJECTION, SOLUTION INTRAVENOUS; SUBCUTANEOUS at 21:22

## 2019-04-14 RX ADMIN — IBUPROFEN 400 MG: 400 TABLET ORAL at 16:59

## 2019-04-14 RX ADMIN — ASPIRIN 81 MG: 81 TABLET, COATED ORAL at 21:22

## 2019-04-14 RX ADMIN — HEPARIN SODIUM 5000 UNITS: 5000 INJECTION, SOLUTION INTRAVENOUS; SUBCUTANEOUS at 09:17

## 2019-04-14 RX ADMIN — INSULIN ASPART 2 UNITS: 100 INJECTION, SOLUTION INTRAVENOUS; SUBCUTANEOUS at 12:40

## 2019-04-14 RX ADMIN — CLONAZEPAM 0.5 MG: 0.5 TABLET ORAL at 21:22

## 2019-04-14 ASSESSMENT — MIFFLIN-ST. JEOR: SCORE: 1691.92

## 2019-04-14 ASSESSMENT — ACTIVITIES OF DAILY LIVING (ADL)
ADLS_ACUITY_SCORE: 17

## 2019-04-14 NOTE — PROGRESS NOTES
"[facility]     Date:4/14/19  Admission Dx:PNA  Pulmonary History former smoker,COPD,  CRISTÓBAL  Home Nebulizer/MDI Use:  Home Oxygen:  Acuity Level (RCAT flow sheet):3    Aerosol Therapy initiated: Michael Qid      Pulmonary Hygiene initiated: DB&C      Volume Expansion initiated: I.S volumes      Current Oxygen Requirements: 2lnc.  Current SpO2:93%    Re-evaluation date: 4/17/19    Patient Education: Work on DB&C to bring up secretions      See \"RT Assessments\" flow sheet for patient assessment scoring and Acuity Level Details.             "

## 2019-04-14 NOTE — PLAN OF CARE
Vss ex SBP elevated, see mar for meds given, no co cp/pain/sob, villavicencio when ambulating in halls with staff (she desated on 2L to 83% and took 1min to recover), will continue 2L per NC for now.   Pt continues to use IS with encouragement.  Tele SR.  , 191.   Prednisone and IVS x2 continues, strict I and O, alarms on for safety, up Ax1+walker, up to chiar for meals, LS with exp wheezes, declined powder to abd folds this shift, PT following.   Continue poc and monitoring.

## 2019-04-14 NOTE — PLAN OF CARE
VSS. Tele SR. Alert and oriented x4. No c/o of pain. Some SOB with exertion, on 2L O2 sats at 94 %. Up with 1 assist and walker. Ambulated the oconnell x1 this shift. On mod CHO diet, good appetite.  and 178. On IV Rocephin and IV Zithromax. Mg 2.1. PT following. Will continue to monitor pt.

## 2019-04-14 NOTE — PLAN OF CARE
Discharge Planner PT   Patient plan for discharge: home with dtg support  Current status: Patient supine upon arrival, on 2L NC throughout session.  Patient is independent with bed mobility.  Patient performed sit to stand transfers with 4WW as has one at home independent.  Patient amb 400 feet with 2WW and SBA.  Patient required 2 seated rest breaks secondary to fatigue and SOB; maintained SaO2 95% with mobility.  Verbal cueing required for pursed lip breathing at times. Pt trialed small ambulation into BR with cues, RA. Pt desating to 83% with this. Need 1 min to recover with PLB. Recommended amb 3-4x daily with RN and walker/cane in order to maintain activity tolerance and strength.  Barriers to return to prior living situation: decreased activity tolerance  Recommendations for discharge: home with dtg support, patient reports that she will use walker at discharge instead of cane (already has a walker at home for use).  Rationale for recommendations: Pt would benefit from continued IP PT to progress functional endurance. Pt likely able to meet goals to return home with continued skilled therapy. Has support from dtg and is motivated to improve.        Entered by: Pari Leiva 04/14/2019 9:31 AM

## 2019-04-14 NOTE — PLAN OF CARE
Vitals: VSS. BP elevated but not enough for prn hydralazine. Recheck BP 150s/70s.  Situation/Status: Pt being treated for pneumonia with IV antibiotics. Pt still requiring 2 L of O2, working to wean off.   Neuro: A/O x 4  Pain: Pt c/o pain with taking deep breaths and coughing. Prn atarax, ibuprofen, and robotussin for cough.   Activity: A1 with walker and gait belt  Diet: Consistent carb  Tele/Cardiac: SR  Lungs: LS diminished with fine crackles in lower lobes   GI/: No nausea/vomiting, + Flatus, BS x4.  Skin: CMS intact, trace edema  Plan: Continue with course of antibiotics and plan to discharge in 1-2 days. Continue to monitor per POC.

## 2019-04-14 NOTE — PROGRESS NOTES
[facility]    Medicine Progress Note - Hospitalist Service       Date of Admission:  4/11/2019  Assessment & Plan   Nicole Reyes is a 63 year old female admitted on 4/11/2019. She has a past medical history significant for diabetes mellitus, hyperlipidemia, sleep apnea, morbid obesity, depression, hypertension, anxiety, and chronic kidney disease.  She presented to emergency room with cough, weakness, and chills.  She is found to have pneumonia.     1.  Pneumonia.  Continue azithromycin and IV ceftriaxone.     2.  Acute bronchospasm.  Continue supplemental oxygen as needed.  Decrease prednisone to 20 mg a day, duonebs, antibiotics.  Albuterol more often if needed.     3.  Diabetes mellitus.  Continue NovoLog sliding scale.  NPH 5 units every morning with prednisone while on prednisone.     4.  Sepsis.  Due to pneumonia.  Sepsis now resolved.  Continue antibiotics as noted above.      5.  Acute hypoxic respiratory failure.  Due to pneumonia and bronchospasm.  Supplemental oxygen as needed.  Antibiotics as above.  Duo nebs 4 times a day.  Decrease prednisone to 20 mg a day.  Albuterol more often if needed.     6.  Hypertension.  Increase losartan to 100 mg daily.  Have IV hydralazine available if needed.     7.  Hyperlipidemia.  Continue simvastatin.     8.  GERD.  Continue pantoprazole.     9.  Hypokalemia.  Potassium replacement protocol.     10.  Lactic acidosis.  Suspect due to sepsis and metformin.  Resolved with IV fluids.  Continue to hold metformin.     11.  Weakness.  Physical therapy consult.     12.  Hypomagnesemia.  Magnesium replacement protocol.            Diet: Moderate Consistent CHO Diet    DVT Prophylaxis: Heparin SQ  Suarez Catheter: not present  Code Status: Full Code      Disposition Plan   Expected discharge: 1-2 days, recommended to prior living arrangement   Entered: Valentin Reddy DO 04/14/2019, 2:48 PM           Valentin Reddy DO  Hospitalist Service  [place]  [facility]    ______________________________________________________________________    Interval History   Continues to be short of breath.  Coughing.  Feels weak.  Denies chest pain, fevers, chills, nausea, vomiting, or diarrhea.    Data reviewed today: I reviewed all medications, new labs and imaging results over the last 24 hours.     Physical Exam   Vital Signs: Temp: 95.7  F (35.4  C) Temp src: Oral BP: 161/74   Heart Rate: 69 Resp: 20 SpO2: 92 % O2 Device: Nasal cannula Oxygen Delivery: 2 LPM  Weight: 273 lbs 3.2 oz  Gen:  NAD, A&Ox3.  Eyes:  PERRL, sclera anicteric.  OP:  MMM, no lesions.  Neck:  Supple.  CV:  Regular, no murmurs.  Lung:  Mild wheeze b/l, normal effort.  Ab:  +BS, soft.  Skin:  Warm, dry to touch.  No rash.  Ext:  No pitting edema LE b/l.      Data   Recent Labs   Lab 04/14/19  0612 04/12/19  0631 04/12/19  0256 04/11/19  2140 04/11/19  1624   WBC  --  21.4*  --   --  19.3*   HGB  --  11.6*  --   --  14.1   MCV  --  86  --   --  84    227  --  236 264    143  --   --  139   POTASSIUM 4.5 4.0 3.9  --  2.8*   CHLORIDE 105 112*  --   --  104   CO2 31 29  --   --  28   BUN 17 13  --   --  9   CR 0.85 1.02  --   --  0.89   ANIONGAP 2* 2*  --   --  7   GRECIA 8.8 8.0*  --   --  8.7   * 143*  --   --  146*   TROPI  --   --   --   --  0.019

## 2019-04-15 LAB
BACTERIA SPEC CULT: ABNORMAL
GLUCOSE BLDC GLUCOMTR-MCNC: 109 MG/DL (ref 70–99)
GLUCOSE BLDC GLUCOMTR-MCNC: 117 MG/DL (ref 70–99)
GLUCOSE BLDC GLUCOMTR-MCNC: 117 MG/DL (ref 70–99)
GLUCOSE BLDC GLUCOMTR-MCNC: 172 MG/DL (ref 70–99)
GLUCOSE BLDC GLUCOMTR-MCNC: 83 MG/DL (ref 70–99)
SPECIMEN SOURCE: ABNORMAL

## 2019-04-15 PROCEDURE — 25000132 ZZH RX MED GY IP 250 OP 250 PS 637: Performed by: INTERNAL MEDICINE

## 2019-04-15 PROCEDURE — 94640 AIRWAY INHALATION TREATMENT: CPT

## 2019-04-15 PROCEDURE — 25000125 ZZHC RX 250: Performed by: INTERNAL MEDICINE

## 2019-04-15 PROCEDURE — 25000131 ZZH RX MED GY IP 250 OP 636 PS 637: Performed by: INTERNAL MEDICINE

## 2019-04-15 PROCEDURE — 94640 AIRWAY INHALATION TREATMENT: CPT | Mod: 76

## 2019-04-15 PROCEDURE — 12000000 ZZH R&B MED SURG/OB

## 2019-04-15 PROCEDURE — 40000275 ZZH STATISTIC RCP TIME EA 10 MIN

## 2019-04-15 PROCEDURE — 00000146 ZZHCL STATISTIC GLUCOSE BY METER IP

## 2019-04-15 PROCEDURE — 25000128 H RX IP 250 OP 636: Performed by: INTERNAL MEDICINE

## 2019-04-15 PROCEDURE — 99232 SBSQ HOSP IP/OBS MODERATE 35: CPT | Performed by: INTERNAL MEDICINE

## 2019-04-15 RX ORDER — AMLODIPINE BESYLATE 5 MG/1
5 TABLET ORAL DAILY
Status: DISCONTINUED | OUTPATIENT
Start: 2019-04-15 | End: 2019-04-18 | Stop reason: HOSPADM

## 2019-04-15 RX ORDER — AZITHROMYCIN 250 MG/1
250 TABLET, FILM COATED ORAL EVERY 24 HOURS
Status: COMPLETED | OUTPATIENT
Start: 2019-04-15 | End: 2019-04-15

## 2019-04-15 RX ORDER — SACCHAROMYCES BOULARDII 250 MG
250 CAPSULE ORAL 2 TIMES DAILY
Status: DISCONTINUED | OUTPATIENT
Start: 2019-04-15 | End: 2019-04-18 | Stop reason: HOSPADM

## 2019-04-15 RX ORDER — ZOLPIDEM TARTRATE 10 MG/1
5 TABLET ORAL
Qty: 45 TABLET | Refills: 1 | Status: SHIPPED | OUTPATIENT
Start: 2019-04-15 | End: 2019-09-19

## 2019-04-15 RX ADMIN — INSULIN ASPART 1 UNITS: 100 INJECTION, SOLUTION INTRAVENOUS; SUBCUTANEOUS at 12:37

## 2019-04-15 RX ADMIN — ACETAMINOPHEN 650 MG: 325 TABLET, FILM COATED ORAL at 23:24

## 2019-04-15 RX ADMIN — IPRATROPIUM BROMIDE AND ALBUTEROL SULFATE 3 ML: .5; 3 SOLUTION RESPIRATORY (INHALATION) at 11:52

## 2019-04-15 RX ADMIN — HEPARIN SODIUM 5000 UNITS: 5000 INJECTION, SOLUTION INTRAVENOUS; SUBCUTANEOUS at 11:04

## 2019-04-15 RX ADMIN — Medication 250 MG: at 12:40

## 2019-04-15 RX ADMIN — LOSARTAN POTASSIUM 100 MG: 100 TABLET ORAL at 08:20

## 2019-04-15 RX ADMIN — HYDROXYZINE HYDROCHLORIDE 25 MG: 25 TABLET ORAL at 00:33

## 2019-04-15 RX ADMIN — IPRATROPIUM BROMIDE AND ALBUTEROL SULFATE 3 ML: .5; 3 SOLUTION RESPIRATORY (INHALATION) at 20:08

## 2019-04-15 RX ADMIN — SIMVASTATIN 20 MG: 20 TABLET, FILM COATED ORAL at 20:59

## 2019-04-15 RX ADMIN — Medication 250 MG: at 20:58

## 2019-04-15 RX ADMIN — GUAIFENESIN 10 ML: 100 SOLUTION ORAL at 00:33

## 2019-04-15 RX ADMIN — HYDROXYZINE HYDROCHLORIDE 25 MG: 25 TABLET ORAL at 23:24

## 2019-04-15 RX ADMIN — INSULIN HUMAN 5 UNITS: 100 INJECTION, SUSPENSION SUBCUTANEOUS at 08:19

## 2019-04-15 RX ADMIN — CLONAZEPAM 0.5 MG: 0.5 TABLET ORAL at 20:58

## 2019-04-15 RX ADMIN — CEFTRIAXONE 2 G: 2 INJECTION, POWDER, FOR SOLUTION INTRAMUSCULAR; INTRAVENOUS at 16:43

## 2019-04-15 RX ADMIN — GABAPENTIN 600 MG: 300 CAPSULE ORAL at 20:59

## 2019-04-15 RX ADMIN — PANTOPRAZOLE SODIUM 40 MG: 40 TABLET, DELAYED RELEASE ORAL at 20:59

## 2019-04-15 RX ADMIN — AZITHROMYCIN 250 MG: 250 TABLET, FILM COATED ORAL at 18:06

## 2019-04-15 RX ADMIN — GUAIFENESIN 10 ML: 100 SOLUTION ORAL at 23:24

## 2019-04-15 RX ADMIN — ASPIRIN 81 MG: 81 TABLET, COATED ORAL at 20:58

## 2019-04-15 RX ADMIN — HEPARIN SODIUM 5000 UNITS: 5000 INJECTION, SOLUTION INTRAVENOUS; SUBCUTANEOUS at 20:58

## 2019-04-15 RX ADMIN — ACETAMINOPHEN 650 MG: 325 TABLET, FILM COATED ORAL at 00:33

## 2019-04-15 RX ADMIN — IPRATROPIUM BROMIDE AND ALBUTEROL SULFATE 3 ML: .5; 3 SOLUTION RESPIRATORY (INHALATION) at 08:37

## 2019-04-15 RX ADMIN — IPRATROPIUM BROMIDE AND ALBUTEROL SULFATE 3 ML: .5; 3 SOLUTION RESPIRATORY (INHALATION) at 15:22

## 2019-04-15 RX ADMIN — AMLODIPINE BESYLATE 5 MG: 5 TABLET ORAL at 16:40

## 2019-04-15 RX ADMIN — CLONAZEPAM 0.5 MG: 0.5 TABLET ORAL at 08:20

## 2019-04-15 RX ADMIN — PREDNISONE 20 MG: 20 TABLET ORAL at 08:20

## 2019-04-15 ASSESSMENT — ACTIVITIES OF DAILY LIVING (ADL)
ADLS_ACUITY_SCORE: 15
ADLS_ACUITY_SCORE: 17

## 2019-04-15 NOTE — PLAN OF CARE
Pt a/o x 4; VSS, pt denies pain, headache, dizziness, & n/v. Pt reports dyspnea upon exertion. Pt up SBA w/cane. Lung sounds diminished, fine crackles, on 2L O2. Tele: SR. BS 83, 172. Potassium level 4.5. Pt on PO prednisone, IV Zithromax, Rocephin. Added PO probiotics today. Pt had shower and ambulated around room. Possible discharge 1-2 days. Will continue with plan of care.

## 2019-04-15 NOTE — PROGRESS NOTES
[facility]    Medicine Progress Note - Hospitalist Service       Date of Admission:  4/11/2019  Assessment & Plan   Nicole Reyes is a 63 year old female admitted on 4/11/2019. She has a past medical history significant for diabetes mellitus, hyperlipidemia, sleep apnea, morbid obesity, depression, hypertension, anxiety, and chronic kidney disease.  She presented to emergency room with cough, weakness, and chills.  She is found to have pneumonia.     1.  Pneumonia.  Continue azithromycin and IV ceftriaxone.     2.  Acute bronchospasm.  Continue supplemental oxygen as needed.  Continue prednisone 20 mg a day, duonebs, antibiotics.  Albuterol more often if needed.     3.  Diabetes mellitus.  Continue NovoLog sliding scale.  NPH 5 units every morning with prednisone while on prednisone.     4.  Sepsis.  Due to pneumonia.  Sepsis now resolved.  Continue antibiotics as noted above.      5.  Acute hypoxic respiratory failure.  Due to pneumonia and bronchospasm.  Supplemental oxygen as needed.  Antibiotics as above.  Duo nebs 4 times a day.  Prednisone 20 mg a day.  Albuterol more often if needed.     6.  Hypertension.  Start amlodipine 5 mg a day.  Continue losartan to 100 mg daily.  Have IV hydralazine available if needed.     7.  Hyperlipidemia.  Continue simvastatin.     8.  GERD.  Continue pantoprazole.     9.  Hypokalemia.  Potassium replacement protocol.     10.  Lactic acidosis.  Suspect due to sepsis and metformin.  Resolved with IV fluids.  Continue to hold metformin.     11.  Weakness.  Physical therapy consult.     12.  Hypomagnesemia.  Magnesium replacement protocol.            Diet: Moderate Consistent CHO Diet    DVT Prophylaxis: Heparin SQ  Suarez Catheter: not present  Code Status: Full Code      Disposition Plan   Expected discharge: Tomorrow, recommended to prior living arrangement   Entered: Valentin Reddy DO 04/15/2019, 4:34 PM           Valentin Reddy DO  Hospitalist  Service  [facility]    ______________________________________________________________________    Interval History   Short of breath.  Occasional cough.  Denies chest pain, fevers, chills, nausea, vomiting.    Data reviewed today: I reviewed all medications, new labs and imaging results over the last 24 hours.    Physical Exam   Vital Signs: Temp: 97.8  F (36.6  C) Temp src: Oral BP: (!) 176/99   Heart Rate: 72 Resp: 18 SpO2: 97 % O2 Device: Nasal cannula Oxygen Delivery: 2 LPM  Weight: 273 lbs 3.2 oz  Gen:  NAD, A&Ox3.  Eyes:  PERRL, sclera anicteric.  OP:  MMM, no lesions.  Neck:  Supple.  CV:  Regular, no murmurs.  Lung:  Wheeze b/l, normal effort.  Ab:  +BS, soft.  Skin:  Warm, dry to touch.  No rash.  Ext:  No pitting edema LE b/l.      Data   Recent Labs   Lab 04/14/19  0612 04/12/19  0631 04/12/19  0256 04/11/19  2140 04/11/19  1624   WBC  --  21.4*  --   --  19.3*   HGB  --  11.6*  --   --  14.1   MCV  --  86  --   --  84    227  --  236 264    143  --   --  139   POTASSIUM 4.5 4.0 3.9  --  2.8*   CHLORIDE 105 112*  --   --  104   CO2 31 29  --   --  28   BUN 17 13  --   --  9   CR 0.85 1.02  --   --  0.89   ANIONGAP 2* 2*  --   --  7   GRECIA 8.8 8.0*  --   --  8.7   * 143*  --   --  146*   TROPI  --   --   --   --  0.019

## 2019-04-15 NOTE — PLAN OF CARE
VSS. BP elevated at the start of shift 189/97, PRN hydralazine given. Recheck /70. Tele SR. Up with standby assist and walker. Ambulated oconnell x2 this shift, tolerated well. Some SOB with extertion, but pt states feeling less SOB compared to yesterday, on 2L sats at 94%. C/O headache at beginning of shift, PRN Ibuprofen given with relief of headache. On Mod CHO diet, good appetite.  and 171. Insulin coverage given per protocol. On IV Rocephin and IV Zithromax. K 4.5. Will continue to monitor pt and follow POC.

## 2019-04-15 NOTE — PLAN OF CARE
Vitals: VSS, pt still on 2L of O2  Situation/Status: Pt admitted to hospital with pneumonia, being treated with IV antibiotics, O2, nebs, and prednisone.   Neuro: A/O x 4  Pain: C/o discomfort with coughing and taking a deep breath. Tylenol, atarax, and robotussin given with some relief.   Activity: A1 with walker  Diet: CHO diet  Tele/Cardiac: SR  Lungs: LS diminished   GI/: No nausea/vomiting, + Flatus, BS x4.  Skin: CMS intact, trace edema  Lines/drains: PIV x2  Labs: K 4.5, Mg 2.1, .   Plan: Continue with IV AB and work to wean O2. Continue to monitor per POC.

## 2019-04-16 ENCOUNTER — APPOINTMENT (OUTPATIENT)
Dept: PHYSICAL THERAPY | Facility: CLINIC | Age: 64
DRG: 871 | End: 2019-04-16
Payer: COMMERCIAL

## 2019-04-16 LAB
ANION GAP SERPL CALCULATED.3IONS-SCNC: 5 MMOL/L (ref 3–14)
BUN SERPL-MCNC: 22 MG/DL (ref 7–30)
CALCIUM SERPL-MCNC: 8.2 MG/DL (ref 8.5–10.1)
CHLORIDE SERPL-SCNC: 102 MMOL/L (ref 94–109)
CO2 SERPL-SCNC: 33 MMOL/L (ref 20–32)
CREAT SERPL-MCNC: 0.92 MG/DL (ref 0.52–1.04)
GFR SERPL CREATININE-BSD FRML MDRD: 66 ML/MIN/{1.73_M2}
GLUCOSE BLDC GLUCOMTR-MCNC: 101 MG/DL (ref 70–99)
GLUCOSE BLDC GLUCOMTR-MCNC: 103 MG/DL (ref 70–99)
GLUCOSE BLDC GLUCOMTR-MCNC: 120 MG/DL (ref 70–99)
GLUCOSE BLDC GLUCOMTR-MCNC: 122 MG/DL (ref 70–99)
GLUCOSE BLDC GLUCOMTR-MCNC: 148 MG/DL (ref 70–99)
GLUCOSE SERPL-MCNC: 105 MG/DL (ref 70–99)
POTASSIUM SERPL-SCNC: 3.7 MMOL/L (ref 3.4–5.3)
SODIUM SERPL-SCNC: 140 MMOL/L (ref 133–144)

## 2019-04-16 PROCEDURE — 25000132 ZZH RX MED GY IP 250 OP 250 PS 637: Performed by: INTERNAL MEDICINE

## 2019-04-16 PROCEDURE — 25000125 ZZHC RX 250: Performed by: INTERNAL MEDICINE

## 2019-04-16 PROCEDURE — 94640 AIRWAY INHALATION TREATMENT: CPT

## 2019-04-16 PROCEDURE — 40000275 ZZH STATISTIC RCP TIME EA 10 MIN

## 2019-04-16 PROCEDURE — 00000146 ZZHCL STATISTIC GLUCOSE BY METER IP

## 2019-04-16 PROCEDURE — 25000128 H RX IP 250 OP 636: Performed by: INTERNAL MEDICINE

## 2019-04-16 PROCEDURE — 80048 BASIC METABOLIC PNL TOTAL CA: CPT | Performed by: INTERNAL MEDICINE

## 2019-04-16 PROCEDURE — 99232 SBSQ HOSP IP/OBS MODERATE 35: CPT | Performed by: INTERNAL MEDICINE

## 2019-04-16 PROCEDURE — 36415 COLL VENOUS BLD VENIPUNCTURE: CPT | Performed by: INTERNAL MEDICINE

## 2019-04-16 PROCEDURE — 25000131 ZZH RX MED GY IP 250 OP 636 PS 637: Performed by: INTERNAL MEDICINE

## 2019-04-16 PROCEDURE — 97530 THERAPEUTIC ACTIVITIES: CPT | Mod: GP | Performed by: PHYSICAL THERAPIST

## 2019-04-16 PROCEDURE — 12000000 ZZH R&B MED SURG/OB

## 2019-04-16 RX ADMIN — IPRATROPIUM BROMIDE AND ALBUTEROL SULFATE 3 ML: .5; 3 SOLUTION RESPIRATORY (INHALATION) at 08:30

## 2019-04-16 RX ADMIN — Medication 250 MG: at 20:52

## 2019-04-16 RX ADMIN — Medication 250 MG: at 09:50

## 2019-04-16 RX ADMIN — SIMVASTATIN 20 MG: 20 TABLET, FILM COATED ORAL at 20:53

## 2019-04-16 RX ADMIN — PANTOPRAZOLE SODIUM 40 MG: 40 TABLET, DELAYED RELEASE ORAL at 20:52

## 2019-04-16 RX ADMIN — CLONAZEPAM 0.5 MG: 0.5 TABLET ORAL at 09:51

## 2019-04-16 RX ADMIN — AMLODIPINE BESYLATE 5 MG: 5 TABLET ORAL at 09:51

## 2019-04-16 RX ADMIN — INSULIN ASPART 1 UNITS: 100 INJECTION, SOLUTION INTRAVENOUS; SUBCUTANEOUS at 14:03

## 2019-04-16 RX ADMIN — CEFTRIAXONE 2 G: 2 INJECTION, POWDER, FOR SOLUTION INTRAMUSCULAR; INTRAVENOUS at 17:16

## 2019-04-16 RX ADMIN — HEPARIN SODIUM 5000 UNITS: 5000 INJECTION, SOLUTION INTRAVENOUS; SUBCUTANEOUS at 20:53

## 2019-04-16 RX ADMIN — LOSARTAN POTASSIUM 100 MG: 100 TABLET ORAL at 09:51

## 2019-04-16 RX ADMIN — PREDNISONE 20 MG: 20 TABLET ORAL at 09:50

## 2019-04-16 RX ADMIN — GABAPENTIN 600 MG: 300 CAPSULE ORAL at 20:53

## 2019-04-16 RX ADMIN — ASPIRIN 81 MG: 81 TABLET, COATED ORAL at 20:52

## 2019-04-16 RX ADMIN — HEPARIN SODIUM 5000 UNITS: 5000 INJECTION, SOLUTION INTRAVENOUS; SUBCUTANEOUS at 09:49

## 2019-04-16 RX ADMIN — IBUPROFEN 400 MG: 400 TABLET ORAL at 02:40

## 2019-04-16 RX ADMIN — CLONAZEPAM 0.5 MG: 0.5 TABLET ORAL at 20:52

## 2019-04-16 RX ADMIN — INSULIN HUMAN 5 UNITS: 100 INJECTION, SUSPENSION SUBCUTANEOUS at 09:51

## 2019-04-16 RX ADMIN — ACETAMINOPHEN 650 MG: 325 TABLET, FILM COATED ORAL at 09:53

## 2019-04-16 ASSESSMENT — MIFFLIN-ST. JEOR: SCORE: 1662.43

## 2019-04-16 ASSESSMENT — ACTIVITIES OF DAILY LIVING (ADL)
ADLS_ACUITY_SCORE: 15

## 2019-04-16 NOTE — PLAN OF CARE
Discharge Planner PT   Patient plan for discharge: home with dtg support  Current status: Pt doing well with home exercise program of walking with RNs throughout the day. Pt agreeable to ambulation with PT. Pt was cued for 100 feet, seated rest break, then 150 feet seated rest break and another 50 feet. Pt on 1 liter throughout. Pt on continuous pulse oximetry. Pt desating to 87% with activity. Needing 30 sec seated rest break at most with this activity. Pt using FWW throughout. Pt was educated on PLB, taking smaller walks as needed at home as well. Overall pt understands home program, discussed pulse oximetry and has home unit. PT also educated on monitoring her symptoms too using OMNI scale. Pt agreeable.   Barriers to return to prior living situation:may need home O2  Recommendations for discharge: home with dtg support, patient reports that she will use walker at discharge instead of cane (already has a walker at home for use).  Rationale for recommendations: Pt meeting PT goals with this session, comfortable with HEP       Entered by: Pari Leiva 04/16/2019 4:37 PM     Physical Therapy Discharge Summary     Reason for therapy discharge:    All goals and outcomes met, no further needs identified.     Progress towards therapy goal(s). See goals on Care Plan in [facility] electronic health record for goal details.  Goals met     Therapy recommendation(s):    Continue home exercise program.

## 2019-04-16 NOTE — PLAN OF CARE
VSS on 1L O2 at rest, 2L O2 while ambulating. Tele: SR.  and 148. A/O. SBA. Tylenol given for headache see MAR. Encouraged IS, ambulating and sitting up in chair. Plan for discharge home tomorrow. Continue POC.

## 2019-04-16 NOTE — PLAN OF CARE
Pt A&Ox4, VSS O2 1L NC. Pt o2 stat drop high 80's when ambulating on fadi shift on RA. up independent in rm, SBA while ambulating in oconnell. LS clear/diminished. Tele SR.  ABX IV rocephin, PO Zithromax. Possible discharge tomorrow. Continue POC.

## 2019-04-16 NOTE — PLAN OF CARE
Pt A&Ox4, up independent in rm, SBA while ambulating in oconnell. Pt ambulated x 2 this shift with 1 Lpm Oxygen 89-94%, at rest on 1 lpm 97%. LS clear. Tele SR with peaked T wave. IV SL. ABX IV rocephin. Possible discharge tomorrow. Continue POC.

## 2019-04-16 NOTE — PROGRESS NOTES
[facility]    Medicine Progress Note - Hospitalist Service       Date of Admission:  4/11/2019  Assessment & Plan   Nicole Reyes is a 63 year old female admitted on 4/11/2019. She has a past medical history significant for diabetes mellitus, hyperlipidemia, sleep apnea, morbid obesity, depression, hypertension, anxiety, and chronic kidney disease.  She presented to emergency room with cough, weakness, and chills.  She is found to have pneumonia.     1.  Pneumonia.  Continue IV ceftriaxone with plan for 7 days total.  Today is day 5 of 7.  Did complete course of azithromycin.     2.  Acute bronchospasm.  Continue supplemental oxygen as needed.  Continue prednisone 20 mg a day (today is day 2 of 20 mg dose), duonebs, antibiotics.  Albuterol more often if needed.     3.  Diabetes mellitus.  Continue NovoLog sliding scale.  NPH 5 units every morning with prednisone while on prednisone.     4.  Sepsis.  Due to pneumonia.  Sepsis now resolved.  Continue antibiotics as noted above.      5.  Acute hypoxic respiratory failure.  Due to pneumonia and bronchospasm.  Supplemental oxygen as needed.  Antibiotics as above.  Duo nebs 4 times a day.  Prednisone 20 mg a day.  Albuterol more often if needed.     6.  Hypertension.  Continue losartan 100 mg daily and amlodipine 5 mg daily.  Have IV hydralazine available if needed.     7.  Hyperlipidemia.  Continue simvastatin.     8.  GERD.  Continue pantoprazole.     9.  Hypokalemia.  Potassium replacement protocol.     10.  Lactic acidosis.  Suspect due to sepsis and metformin.  Resolved with IV fluids.  Continue to hold metformin.     11.  Weakness.  Physical therapy consult.     12.  Hypomagnesemia.  Magnesium replacement protocol.            Diet: Moderate Consistent CHO Diet    DVT Prophylaxis: Heparin SQ  Suarez Catheter: not present  Code Status: Full Code      Disposition Plan   Expected discharge: Tomorrow, recommended to prior living arrangement   Entered:  Valentin Reddy DO 04/16/2019, 12:05 PM           Valentin Reddy DO  Hospitalist Service  [facility]    ______________________________________________________________________    Interval History   Short of breath at times.  Occasional cough.  Feels weak.  Denies chest pain, fevers, chills, nausea, vomiting, or diarrhea.    Data reviewed today: I reviewed all medications, new labs and imaging results over the last 24 hours.     Physical Exam   Vital Signs: Temp: 96.3  F (35.7  C) Temp src: Oral BP: 133/68   Heart Rate: 76 Resp: 20 SpO2: 98 % O2 Device: Nasal cannula Oxygen Delivery: 1 LPM  Weight: 266 lbs 11.2 oz  Gen:  NAD, A&Ox3.  Eyes:  PERRL, sclera anicteric.  OP:  MMM, no lesions.  Neck:  Supple.  CV:  Regular, no murmurs.  Lung:  CTA b/l, normal effort.  Ab:  +BS, soft.  Skin:  Warm, dry to touch.  No rash.  Ext:  No pitting edema LE b/l.      Data   Recent Labs   Lab 04/16/19  0650 04/14/19  0612 04/12/19  0631  04/11/19  2140 04/11/19  1624   WBC  --   --  21.4*  --   --  19.3*   HGB  --   --  11.6*  --   --  14.1   MCV  --   --  86  --   --  84   PLT  --  216 227  --  236 264    138 143  --   --  139   POTASSIUM 3.7 4.5 4.0   < >  --  2.8*   CHLORIDE 102 105 112*  --   --  104   CO2 33* 31 29  --   --  28   BUN 22 17 13  --   --  9   CR 0.92 0.85 1.02  --   --  0.89   ANIONGAP 5 2* 2*  --   --  7   GRECIA 8.2* 8.8 8.0*  --   --  8.7   * 134* 143*  --   --  146*   TROPI  --   --   --   --   --  0.019    < > = values in this interval not displayed.

## 2019-04-16 NOTE — PLAN OF CARE
Pt A&Ox4, up independent in rm, SBA while ambulating in oconnell. Pt ambulated x2 this shift with Oxygen. Attempt to wean O2 with ambulating pt ambulated ~150 ft on RA Sating 89-90% at rest on RA 90-94% on 1lpm nc sats 90-97% on RA while ambulating. LS clear/diminished. Tele SR. IV SL. ABX IV rocephin, PO Zithromax. Possible discharge tomorrow. Continue POC.      Patient has been assessed for Home Oxygen needs.  Oxygen readings:   *   RA - at rest  Pulse oximetry SPO2 90-94 %  *   RA - during activity/with exercise SPO2 87-90 %  *   O2 at  1 liters/minute (at rest) ...SPO2 97-99 %  *   O2 at  1 liters/minute (during activity/with exercise) ...SPO2 94-97 %

## 2019-04-17 ENCOUNTER — APPOINTMENT (OUTPATIENT)
Dept: CARDIOLOGY | Facility: CLINIC | Age: 64
DRG: 871 | End: 2019-04-17
Attending: INTERNAL MEDICINE
Payer: COMMERCIAL

## 2019-04-17 ENCOUNTER — TELEPHONE (OUTPATIENT)
Dept: INTERNAL MEDICINE | Facility: CLINIC | Age: 64
End: 2019-04-17

## 2019-04-17 LAB
GLUCOSE BLDC GLUCOMTR-MCNC: 131 MG/DL (ref 70–99)
GLUCOSE BLDC GLUCOMTR-MCNC: 141 MG/DL (ref 70–99)
GLUCOSE BLDC GLUCOMTR-MCNC: 143 MG/DL (ref 70–99)
GLUCOSE BLDC GLUCOMTR-MCNC: 87 MG/DL (ref 70–99)
GLUCOSE BLDC GLUCOMTR-MCNC: 88 MG/DL (ref 70–99)
NT-PROBNP SERPL-MCNC: 1853 PG/ML (ref 0–900)
PLATELET # BLD AUTO: 244 10E9/L (ref 150–450)

## 2019-04-17 PROCEDURE — 83880 ASSAY OF NATRIURETIC PEPTIDE: CPT | Performed by: INTERNAL MEDICINE

## 2019-04-17 PROCEDURE — 36415 COLL VENOUS BLD VENIPUNCTURE: CPT | Performed by: INTERNAL MEDICINE

## 2019-04-17 PROCEDURE — 93306 TTE W/DOPPLER COMPLETE: CPT | Mod: 26 | Performed by: INTERNAL MEDICINE

## 2019-04-17 PROCEDURE — 40000264 ECHOCARDIOGRAM COMPLETE

## 2019-04-17 PROCEDURE — 00000146 ZZHCL STATISTIC GLUCOSE BY METER IP

## 2019-04-17 PROCEDURE — 25000125 ZZHC RX 250: Performed by: INTERNAL MEDICINE

## 2019-04-17 PROCEDURE — 12000000 ZZH R&B MED SURG/OB

## 2019-04-17 PROCEDURE — 40000275 ZZH STATISTIC RCP TIME EA 10 MIN

## 2019-04-17 PROCEDURE — 85049 AUTOMATED PLATELET COUNT: CPT | Performed by: INTERNAL MEDICINE

## 2019-04-17 PROCEDURE — 25000132 ZZH RX MED GY IP 250 OP 250 PS 637: Performed by: INTERNAL MEDICINE

## 2019-04-17 PROCEDURE — 99232 SBSQ HOSP IP/OBS MODERATE 35: CPT | Performed by: INTERNAL MEDICINE

## 2019-04-17 PROCEDURE — 25000131 ZZH RX MED GY IP 250 OP 636 PS 637: Performed by: INTERNAL MEDICINE

## 2019-04-17 PROCEDURE — 25000128 H RX IP 250 OP 636: Performed by: INTERNAL MEDICINE

## 2019-04-17 PROCEDURE — 94640 AIRWAY INHALATION TREATMENT: CPT

## 2019-04-17 PROCEDURE — 25500064 ZZH RX 255 OP 636: Performed by: INTERNAL MEDICINE

## 2019-04-17 PROCEDURE — 94640 AIRWAY INHALATION TREATMENT: CPT | Mod: 76

## 2019-04-17 RX ORDER — IPRATROPIUM BROMIDE AND ALBUTEROL SULFATE 2.5; .5 MG/3ML; MG/3ML
3 SOLUTION RESPIRATORY (INHALATION) EVERY 4 HOURS PRN
Status: DISCONTINUED | OUTPATIENT
Start: 2019-04-17 | End: 2019-04-18 | Stop reason: HOSPADM

## 2019-04-17 RX ADMIN — INSULIN ASPART 1 UNITS: 100 INJECTION, SOLUTION INTRAVENOUS; SUBCUTANEOUS at 14:03

## 2019-04-17 RX ADMIN — PANTOPRAZOLE SODIUM 40 MG: 40 TABLET, DELAYED RELEASE ORAL at 21:52

## 2019-04-17 RX ADMIN — HEPARIN SODIUM 5000 UNITS: 5000 INJECTION, SOLUTION INTRAVENOUS; SUBCUTANEOUS at 21:52

## 2019-04-17 RX ADMIN — INSULIN HUMAN 5 UNITS: 100 INJECTION, SUSPENSION SUBCUTANEOUS at 09:43

## 2019-04-17 RX ADMIN — LOSARTAN POTASSIUM 100 MG: 100 TABLET ORAL at 09:02

## 2019-04-17 RX ADMIN — Medication 250 MG: at 09:02

## 2019-04-17 RX ADMIN — SIMVASTATIN 20 MG: 20 TABLET, FILM COATED ORAL at 21:52

## 2019-04-17 RX ADMIN — CEFTRIAXONE 2 G: 2 INJECTION, POWDER, FOR SOLUTION INTRAMUSCULAR; INTRAVENOUS at 15:57

## 2019-04-17 RX ADMIN — AMLODIPINE BESYLATE 5 MG: 5 TABLET ORAL at 09:03

## 2019-04-17 RX ADMIN — HYDROXYZINE HYDROCHLORIDE 25 MG: 25 TABLET ORAL at 02:06

## 2019-04-17 RX ADMIN — IPRATROPIUM BROMIDE AND ALBUTEROL SULFATE 3 ML: .5; 3 SOLUTION RESPIRATORY (INHALATION) at 11:48

## 2019-04-17 RX ADMIN — HEPARIN SODIUM 5000 UNITS: 5000 INJECTION, SOLUTION INTRAVENOUS; SUBCUTANEOUS at 09:43

## 2019-04-17 RX ADMIN — GABAPENTIN 600 MG: 300 CAPSULE ORAL at 21:51

## 2019-04-17 RX ADMIN — IBUPROFEN 400 MG: 400 TABLET ORAL at 15:50

## 2019-04-17 RX ADMIN — ASPIRIN 81 MG: 81 TABLET, COATED ORAL at 20:06

## 2019-04-17 RX ADMIN — HUMAN ALBUMIN MICROSPHERES AND PERFLUTREN 2 ML: 10; .22 INJECTION, SOLUTION INTRAVENOUS at 10:21

## 2019-04-17 RX ADMIN — GUAIFENESIN 10 ML: 100 SOLUTION ORAL at 02:06

## 2019-04-17 RX ADMIN — ACETAMINOPHEN 650 MG: 325 TABLET, FILM COATED ORAL at 02:06

## 2019-04-17 RX ADMIN — Medication 250 MG: at 20:06

## 2019-04-17 RX ADMIN — PREDNISONE 20 MG: 20 TABLET ORAL at 09:02

## 2019-04-17 RX ADMIN — IPRATROPIUM BROMIDE AND ALBUTEROL SULFATE 3 ML: .5; 3 SOLUTION RESPIRATORY (INHALATION) at 08:11

## 2019-04-17 RX ADMIN — MICONAZOLE NITRATE: 2 POWDER TOPICAL at 17:53

## 2019-04-17 RX ADMIN — CLONAZEPAM 0.5 MG: 0.5 TABLET ORAL at 20:06

## 2019-04-17 RX ADMIN — CLONAZEPAM 0.5 MG: 0.5 TABLET ORAL at 09:02

## 2019-04-17 ASSESSMENT — ACTIVITIES OF DAILY LIVING (ADL)
ADLS_ACUITY_SCORE: 15
ADLS_ACUITY_SCORE: 14

## 2019-04-17 ASSESSMENT — MIFFLIN-ST. JEOR: SCORE: 1643.84

## 2019-04-17 NOTE — PROGRESS NOTES
Patient was seen and examined by me today and medical record reviewed.Plan of care was discussed with patient/family and staff.  Complete progress note will follow.Please refer to my note for detail progress at a later time     Interval History/objective :    Nicole Reyes is still complaining of generalized weakness and shortness of breath.  She is feeling better.  She has been having some pleuritic chest pain as well.     She describes 2 episodes of tachycardia which resolved on its own.  No history of heart disease  Echocardiogram couple years ago showed preserved EF.    Still requires a liter of oxygen and I suspect she may need with exertion    Morbidly obese, no respiratory distress at rest  Diffuse generalized decreased air entry, no crackles or wheezing  No peripheral edema noted    Current problem/Assessment:      Hypoxia    Extreme obesity    Tachyarrhythmia    Pneumonia.      Plan for today       We will check echocardiogram, continue antibiotic, continue telemetry monitoring, try to wean off oxygen as tolerated.  Continue to monitor patient another day.

## 2019-04-17 NOTE — PLAN OF CARE
Pt A&Ox4, up independent in rm, SBA while ambulating in oconnell. Tele SR. Plan to discharge today. Continue POC.

## 2019-04-17 NOTE — PROGRESS NOTES
Patient has been assessed for Home Oxygen needs. Oxygen readings:    *Pulse oximetry (SpO2) = 93% on room air at rest while awake.    *SpO2 improved to 97% on 1liters/minute at rest.    *SpO2 = 84% on room air during activity/with exercise.    *SpO2 improved to 98% on 2liters/minute during activity/with exercise.

## 2019-04-17 NOTE — PLAN OF CARE
VSS. RA while at rest, 2L on ambulation. Tele SR. Echo performed today. Independent in the room, SBA with walker in the oconnell. Plan to continue POC with hope to discharge home tomorrow with home O2.

## 2019-04-17 NOTE — PROGRESS NOTES
[facility]    Medicine Progress Note - Hospitalist Service       Date of Admission:  4/11/2019  Assessment & Plan   Nicole Reyes is a 63 year old female admitted on 4/11/2019. She has a past medical history significant for diabetes mellitus, hyperlipidemia, sleep apnea, morbid obesity, depression, hypertension, anxiety, and chronic kidney disease.  She presented to emergency room with cough, weakness, and chills.  She is found to have pneumonia.     1.  Pneumonia. CAP,Left lower lobe pneumonia.   Continue IV ceftriaxone with plan for 7 days total.  Today is day 6 of 7.  Did complete course of azithromycin.     2.  Acute bronchospasm.  Continue supplemental oxygen as needed.  Continue prednisone 20 mg a day (today is day 3 of 20 mg dose), duonebs, antibiotics.  Albuterol more often if needed.     3.  Diabetes mellitus.  Continue NovoLog sliding scale.  NPH 5 units every morning with prednisone while on prednisone.     4.  Sepsis.  Due to pneumonia.  Sepsis now resolved.  Continue antibiotics as noted above.      5.  Acute hypoxic respiratory failure.  Due to pneumonia and bronchospasm.  Supplemental oxygen as needed.  Antibiotics as above.  Duo nebs 4 times a day.  Prednisone 20 mg a day.  Albuterol more often if needed.     6.  Hypertension.  Continue losartan 100 mg daily and amlodipine 5 mg daily.  Have IV hydralazine available if needed.     7.  Hyperlipidemia.  Continue simvastatin.     8.  GERD.  Continue pantoprazole.     9.  Hypokalemia.  Potassium replacement protocol.     10.  Lactic acidosis.  Suspect due to sepsis and metformin.  Resolved with IV fluids.  Continue to hold metformin.     11.  Weakness.  Physical therapy consult.     12.  Hypomagnesemia.  Magnesium replacement protocol.    13.Tachycardia- get echo , tele monitoring             Diet: Moderate Consistent CHO Diet    DVT Prophylaxis: Heparin SQ  Suarez Catheter: not present  Code Status: Full Code      Disposition Plan    Expected discharge: Tomorrow, recommended to prior living arrangement   Entered: Messi Ross MD 04/17/2019, 1:28 PM           Messi Ross MD  Hospitalist Service  [facility]    ______________________________________________________________________    Interval History       Nicole Reyes is still complaining of generalized weakness and shortness of breath.  She is feeling better.  She has been having some pleuritic chest pain as well.     She describes 2 episodes of tachycardia which resolved on its own.  No history of heart disease  Echocardiogram couple years ago showed preserved EF.    Still requires a liter of oxygen and I suspect she may need with exertion      Data reviewed today: I reviewed all medications, new labs and imaging results over the last 24 hours.     Physical Exam   Vital Signs: Temp: 97.2  F (36.2  C) Temp src: Oral BP: 145/76   Heart Rate: 69 Resp: 20 SpO2: 95 % O2 Device: Nasal cannula Oxygen Delivery: 1 LPM  Weight: 262 lbs 9.6 oz  Gen:  NAD, A&Ox3.  Eyes:  PERRL, sclera anicteric.  OP:  MMM, no lesions.  Neck:  Supple.  CV:  Regular, no murmurs.  Lung:  CTA b/l, normal effort.  Ab:  +BS, soft.  Skin:  Warm, dry to touch.  No rash.  Ext:  No pitting edema LE b/l.    Morbidly obese, no respiratory distress at rest  Diffuse generalized decreased air entry, no crackles or wheezing  No peripheral edema noted        Data   Recent Labs   Lab 04/17/19  0721 04/16/19  0650 04/14/19  0612 04/12/19  0631  04/11/19  1624   WBC  --   --   --  21.4*  --  19.3*   HGB  --   --   --  11.6*  --  14.1   MCV  --   --   --  86  --  84     --  216 227   < > 264   NA  --  140 138 143  --  139   POTASSIUM  --  3.7 4.5 4.0   < > 2.8*   CHLORIDE  --  102 105 112*  --  104   CO2  --  33* 31 29  --  28   BUN  --  22 17 13  --  9   CR  --  0.92 0.85 1.02  --  0.89   ANIONGAP  --  5 2* 2*  --  7   GRECIA  --  8.2* 8.8 8.0*  --  8.7   GLC  --  105* 134* 143*  --  146*   TROPI  --   --    --   --   --  0.019    < > = values in this interval not displayed.

## 2019-04-17 NOTE — TELEPHONE ENCOUNTER
Fax received from [facility]  for review and signature.  Put in Dr. Wiley's in basket.    ANIL is scanned into chart effective 10/12/18.

## 2019-04-18 VITALS
WEIGHT: 260.7 LBS | BODY MASS INDEX: 52.56 KG/M2 | SYSTOLIC BLOOD PRESSURE: 132 MMHG | RESPIRATION RATE: 20 BRPM | TEMPERATURE: 96 F | DIASTOLIC BLOOD PRESSURE: 70 MMHG | HEIGHT: 59 IN | OXYGEN SATURATION: 97 % | HEART RATE: 77 BPM

## 2019-04-18 LAB
ANION GAP SERPL CALCULATED.3IONS-SCNC: 3 MMOL/L (ref 3–14)
BUN SERPL-MCNC: 18 MG/DL (ref 7–30)
CALCIUM SERPL-MCNC: 8.3 MG/DL (ref 8.5–10.1)
CHLORIDE SERPL-SCNC: 102 MMOL/L (ref 94–109)
CO2 SERPL-SCNC: 34 MMOL/L (ref 20–32)
CREAT SERPL-MCNC: 0.89 MG/DL (ref 0.52–1.04)
ERYTHROCYTE [DISTWIDTH] IN BLOOD BY AUTOMATED COUNT: 14.6 % (ref 10–15)
GFR SERPL CREATININE-BSD FRML MDRD: 68 ML/MIN/{1.73_M2}
GLUCOSE BLDC GLUCOMTR-MCNC: 92 MG/DL (ref 70–99)
GLUCOSE SERPL-MCNC: 108 MG/DL (ref 70–99)
HCT VFR BLD AUTO: 39.5 % (ref 35–47)
HGB BLD-MCNC: 12.5 G/DL (ref 11.7–15.7)
MCH RBC QN AUTO: 26.3 PG (ref 26.5–33)
MCHC RBC AUTO-ENTMCNC: 31.6 G/DL (ref 31.5–36.5)
MCV RBC AUTO: 83 FL (ref 78–100)
PLATELET # BLD AUTO: 248 10E9/L (ref 150–450)
POTASSIUM SERPL-SCNC: 3.6 MMOL/L (ref 3.4–5.3)
RBC # BLD AUTO: 4.75 10E12/L (ref 3.8–5.2)
SODIUM SERPL-SCNC: 139 MMOL/L (ref 133–144)
WBC # BLD AUTO: 13.6 10E9/L (ref 4–11)

## 2019-04-18 PROCEDURE — 25000132 ZZH RX MED GY IP 250 OP 250 PS 637: Performed by: INTERNAL MEDICINE

## 2019-04-18 PROCEDURE — 25000128 H RX IP 250 OP 636: Performed by: INTERNAL MEDICINE

## 2019-04-18 PROCEDURE — 99239 HOSP IP/OBS DSCHRG MGMT >30: CPT | Performed by: INTERNAL MEDICINE

## 2019-04-18 PROCEDURE — 25000131 ZZH RX MED GY IP 250 OP 636 PS 637: Performed by: INTERNAL MEDICINE

## 2019-04-18 PROCEDURE — 85027 COMPLETE CBC AUTOMATED: CPT | Performed by: INTERNAL MEDICINE

## 2019-04-18 PROCEDURE — 00000146 ZZHCL STATISTIC GLUCOSE BY METER IP

## 2019-04-18 PROCEDURE — 80048 BASIC METABOLIC PNL TOTAL CA: CPT | Performed by: INTERNAL MEDICINE

## 2019-04-18 PROCEDURE — 36415 COLL VENOUS BLD VENIPUNCTURE: CPT | Performed by: INTERNAL MEDICINE

## 2019-04-18 RX ADMIN — AMLODIPINE BESYLATE 5 MG: 5 TABLET ORAL at 09:07

## 2019-04-18 RX ADMIN — CEFTRIAXONE 2 G: 2 INJECTION, POWDER, FOR SOLUTION INTRAMUSCULAR; INTRAVENOUS at 09:18

## 2019-04-18 RX ADMIN — Medication 250 MG: at 09:07

## 2019-04-18 RX ADMIN — PREDNISONE 20 MG: 20 TABLET ORAL at 09:07

## 2019-04-18 RX ADMIN — HYDROXYZINE HYDROCHLORIDE 25 MG: 25 TABLET ORAL at 03:53

## 2019-04-18 RX ADMIN — LOSARTAN POTASSIUM 100 MG: 100 TABLET ORAL at 09:07

## 2019-04-18 RX ADMIN — ACETAMINOPHEN 650 MG: 325 TABLET, FILM COATED ORAL at 03:53

## 2019-04-18 RX ADMIN — INSULIN HUMAN 5 UNITS: 100 INJECTION, SUSPENSION SUBCUTANEOUS at 10:10

## 2019-04-18 RX ADMIN — CLONAZEPAM 0.5 MG: 0.5 TABLET ORAL at 09:07

## 2019-04-18 ASSESSMENT — ACTIVITIES OF DAILY LIVING (ADL)
ADLS_ACUITY_SCORE: 16

## 2019-04-18 ASSESSMENT — MIFFLIN-ST. JEOR: SCORE: 1635.22

## 2019-04-18 NOTE — DISCHARGE SUMMARY
[facility]  Hospitalist Discharge Summary       Date of Admission:  4/11/2019  Date of Discharge:  4/18/2019 12:23 PM  Discharging Provider: Messi Ross MD      Discharge Diagnoses   #1.  Committee acquired pneumonia with left lower lobe infiltrate adequately treated with ceftriaxone and azithromycin  #2.  Acute bronchospasm  #3.  Acute sepsis secondary to pneumonia  #4.  Hypomagnesemia  #5.  Sinus tachycardia    Follow-ups Needed After Discharge   Follow-up Appointments     Follow-up and recommended labs and tests       Follow up with primary care provider, Pari Wiley, within 7 days for   hospital follow- up.  The following labs/tests are recommended:  Oxygen   supplement need .             Discharge Disposition   Discharged to home  Condition at discharge: Stable    Hospital Course     Nicole Reyes is a 63 year old female admitted on 4/11/2019. She has a past medical history significant for diabetes mellitus, hyperlipidemia, sleep apnea, morbid obesity, depression, hypertension, anxiety, and chronic kidney disease.  She presented to emergency room with cough, weakness, and chills.  She is found to have pneumonia.  Patient was adequately treated with intravenous ceftriaxone and azithromycin as well.  She was treated with bronchodilator therapy as well.  She developed sepsis on the basis of lactic acidosis and tachycardia as well as pneumonia which resolved with antibiotic treatment.  No evidence of bacteremia on blood culture studies.  Patient also had acute hypoxic respiratory failure secondary to combination of sepsis, pneumonia and bronchospasm which has improved significantly.  Patient was closely monitored in the hospital and was discharged in stable condition.  She was recommended to follow-up with her primary care provider in the next 1 or 2 weeks.  Patient understood the plan of care and was stable on discharge.             Consultations This Hospital Stay   PHYSICAL THERAPY  ADULT IP CONSULT    Code Status   Full Code    Time Spent on this Encounter   I, Messi Ross, personally saw the patient today and spent greater than 30 minutes discharging this patient.       Messi Ross MD  [facility]  ______________________________________________________________________    Physical Exam   Vital Signs: Temp: 96  F (35.6  C) Temp src: Oral BP: 132/70   Heart Rate: 67 Resp: 20 SpO2: 97 % O2 Device: Nasal cannula Oxygen Delivery: 1 LPM  Weight: 260 lbs 11.2 oz         Primary Care Physician   Pari Wiley    Discharge Orders      Reason for your hospital stay    Pneumonia     Follow-up and recommended labs and tests     Follow up with primary care provider, Pari Wiley, within 7 days for hospital follow- up.  The following labs/tests are recommended:  Oxygen supplement need .     Activity    Your activity upon discharge: activity as tolerated     Full Code     Diet    Follow this diet upon discharge: Orders Placed This Encounter      Moderate Consistent CHO Diet       Significant Results and Procedures   Most Recent 3 CBC's:  Recent Labs   Lab Test 04/18/19  0715 04/17/19  0721 04/14/19  0612 04/12/19  0631  04/11/19  1624   WBC 13.6*  --   --  21.4*  --  19.3*   HGB 12.5  --   --  11.6*  --  14.1   MCV 83  --   --  86  --  84    244 216 227   < > 264    < > = values in this interval not displayed.     Most Recent 3 BMP's:  Recent Labs   Lab Test 04/18/19  0715 04/16/19  0650 04/14/19  0612    140 138   POTASSIUM 3.6 3.7 4.5   CHLORIDE 102 102 105   CO2 34* 33* 31   BUN 18 22 17   CR 0.89 0.92 0.85   ANIONGAP 3 5 2*   GRECIA 8.3* 8.2* 8.8   * 105* 134*     Most Recent 2 LFT's:  Recent Labs   Lab Test 10/02/17  1445 09/25/17  1709   AST 17 78*   ALT 19 49   ALKPHOS 119 203*   BILITOTAL 0.4 0.5     Most Recent 3 Troponin's:  Recent Labs   Lab Test 04/11/19  1624 09/19/17  1515 12/05/15  0520   TROPI 0.019 <0.015 <0.015  The 99th percentile for upper  reference range is 0.045 ug/L.  Troponin values in   the range of 0.045 - 0.120 ug/L may be associated with risks of adverse   clinical events.     ,   Results for orders placed or performed during the hospital encounter of 04/11/19   XR Chest 2 Views    Narrative    CHEST TWO VIEWS   4/11/2019 4:47 PM     HISTORY: Cough, shortness of breath.    COMPARISON: 9/25/2017.    FINDINGS: The heart size is normal. There is a large left lower lobe  infiltrate. The right lung is clear. No pneumothorax or pleural  effusion. There is a hiatal hernia.      Impression    IMPRESSION: Left lower lobe pneumonia.    RICHARD BARRY MD       Discharge Medications   Discharge Medication List as of 4/18/2019 11:04 AM      CONTINUE these medications which have NOT CHANGED    Details   ASPIRIN EC PO Take 81 mg by mouth every evening, Historical      clonazePAM (KLONOPIN) 1 MG tablet Take 0.5 mg by mouth 2 times daily, Historical      cyclobenzaprine (FLEXERIL) 10 MG tablet Take 1 tablet (10 mg) by mouth 3 times daily as needed for muscle spasms, Disp-90 tablet, R-11, E-PrescribeProfile Rx: patient will contact pharmacy when needed      FREESTYLE LITE TEST VI STRP Test twice a day, Disp-100 Strip, R-12, Fax      gabapentin (NEURONTIN) 300 MG capsule Take 2 capsules (600 mg) by mouth daily, Disp-180 capsule, R-3, E-PrescribeDisregard prescription sent earlier today. ### DO NOT FILL NOW.  Please update patient's profile to reflect additional refills.  ####      ibuprofen (ADVIL,MOTRIN) 600 MG tablet Take 1 tablet (600 mg) by mouth every 6 hours as needed for moderate pain, Disp-30 tablet, R-1, Local Print      losartan (COZAAR) 100 MG tablet Take 1 tablet (100 mg) by mouth daily, Disp-90 tablet, R-1, E-PrescribeProfile Rx: patient will contact pharmacy when needed      metFORMIN (GLUCOPHAGE-XR) 500 MG 24 hr tablet Take 2 tablets (1,000 mg) by mouth every morning, Disp-180 tablet, R-3, E-PrescribeProfile Rx: patient will contact pharmacy when  needed      pantoprazole (PROTONIX) 40 MG EC tablet Take 1 tablet (40 mg) by mouth At Bedtime, Disp-90 tablet, R-3, E-PrescribeProfile Rx: patient will contact pharmacy when needed      sertraline (ZOLOFT) 100 MG tablet Take 2 tablets (200 mg) by mouth daily, Disp-180 tablet, R-3, E-PrescribeProfile Rx: patient will contact pharmacy when needed      simvastatin (ZOCOR) 20 MG tablet Take 1 tablet (20 mg) by mouth At Bedtime, Disp-90 tablet, R-1, E-PrescribeProfile Rx: patient will contact pharmacy when needed      zolpidem (AMBIEN) 10 MG tablet Take 0.5 tablets (5 mg) by mouth nightly as needed for sleep, Disp-45 tablet, R-1, Local Print           Allergies   Allergies   Allergen Reactions     Codeine Rash     Penicillins Rash     Pt has tolerated cephalosporins and penems.

## 2019-04-18 NOTE — PLAN OF CARE
VSS, afebrile, tele displaying SR; on day 6 of 7 IV Rocephin; denies SOB at rest, mild KHALIL, still requiring O2 for ambulating in halls, lung sounds clear, continues with cough but states less frequent, using IS up to 1750; Echo done today EF 55-60%; BG 89 and 131; c/o HA earlier in shift, relief with Ibuprofen; hope to discharge tomorrow with home O2

## 2019-04-18 NOTE — PLAN OF CARE
VSS, afebrile, tele SR;  denies SOB at rest, still requiring O2 for ambulating in halls an while laying in bed, lung sounds clear, continues with cough but states less frequent, using IS.  Echo done today EF 55-60%; BG 92; c/o head ache. Anticipate discharge today with home O2

## 2019-04-18 NOTE — PLAN OF CARE
VSS on RA. Pt ambulated in the [place]nell and O2 sats remained above 92% for entire length of walk and while at rest. Pt denies SOB and pain. Discharged home with all belongings. Verbalized understanding of discharge instructions.

## 2019-04-19 ENCOUNTER — TELEPHONE (OUTPATIENT)
Dept: INTERNAL MEDICINE | Facility: CLINIC | Age: 64
End: 2019-04-19

## 2019-04-19 NOTE — TELEPHONE ENCOUNTER
IP F/U    Date: 04/18/19  Diagnosis: Pneumonia of Left Lower Lobe Due to Infectious Organism  Is patient active in care coordination? No  Was patient in TCU? No    Next 5 appointments (look out 90 days)    Apr 22, 2019  3:00 PM CDT  SHORT with Pari Wiley MD  [facility] [address]  [facility] [address]  [phone]

## 2019-04-22 ENCOUNTER — OFFICE VISIT (OUTPATIENT)
Dept: INTERNAL MEDICINE | Facility: CLINIC | Age: 64
End: 2019-04-22
Payer: COMMERCIAL

## 2019-04-22 VITALS
HEART RATE: 90 BPM | OXYGEN SATURATION: 92 % | BODY MASS INDEX: 52.01 KG/M2 | HEIGHT: 59 IN | RESPIRATION RATE: 22 BRPM | WEIGHT: 258 LBS | TEMPERATURE: 98.2 F | DIASTOLIC BLOOD PRESSURE: 80 MMHG | SYSTOLIC BLOOD PRESSURE: 136 MMHG

## 2019-04-22 DIAGNOSIS — E11.22 TYPE 2 DIABETES MELLITUS WITH STAGE 3 CHRONIC KIDNEY DISEASE, WITHOUT LONG-TERM CURRENT USE OF INSULIN (H): ICD-10-CM

## 2019-04-22 DIAGNOSIS — J18.9 PNEUMONIA DUE TO INFECTIOUS ORGANISM, UNSPECIFIED LATERALITY, UNSPECIFIED PART OF LUNG: ICD-10-CM

## 2019-04-22 DIAGNOSIS — J96.01 ACUTE RESPIRATORY FAILURE WITH HYPOXIA (H): Primary | ICD-10-CM

## 2019-04-22 DIAGNOSIS — N18.30 TYPE 2 DIABETES MELLITUS WITH STAGE 3 CHRONIC KIDNEY DISEASE, WITHOUT LONG-TERM CURRENT USE OF INSULIN (H): ICD-10-CM

## 2019-04-22 PROCEDURE — 99495 TRANSJ CARE MGMT MOD F2F 14D: CPT | Performed by: INTERNAL MEDICINE

## 2019-04-22 ASSESSMENT — MIFFLIN-ST. JEOR: SCORE: 1622.97

## 2019-04-22 NOTE — TELEPHONE ENCOUNTER
Patient has hospital follow up scheduled today. Will keep open to make sure patient is seen. Close if appointment is completed.

## 2019-04-22 NOTE — PROGRESS NOTES
SUBJECTIVE:   Nicole Reyes is a 63 year old female who presents to clinic today for the following   health issues:            Hospital Follow-up Visit:    Hospital/Nursing Home/IP Rehab Facility: [facility]  Date of Admission: 04/11/2019  Date of Discharge: 04/18/2019  Reason(s) for Admission: Pneumonia with sepsis            Problems taking medications regularly:  None       Medication changes since discharge: None       Problems adhering to non-medication therapy:  None    Summary of hospitalization:  [facility] discharge summary reviewed  Diagnostic Tests/Treatments reviewed.  Follow up needed: none  Other Healthcare Providers Involved in Patient s Care:         None  Update since discharge: improved.   She reports that she is having improvement in cough with only a mild cough now, she still has dyspnea on exertion but it has improved.  She continues to feel significant generalized weakness that is still very pronounced.  She was to be off work reports that right now the disability has her going back in a week but she does not believe she will be ready to return next week.  She works 4 days one week then 2 days the next for 8-hour shifts, next week would be a 4-day week.  She would feel she would be more likely able to go back the following week, possibly half time.    She reports that she had been told she might go home with oxygen and apparently they said she would not, she recalls that she did have significant drop in her oxygen level 2 nights before discharge associated with tachycardia.  She was not really feeling the tachycardia, was not sure if it was an abnormal rhythm or not.  I saw some small notes regarding this but no rhythm strips or discussion of the rhythm other than in the discharge it said sinus tachycardia without much detail.  Reviewed the records with nursing and therapy the day before discharge and her oxygen saturations were above 90 with exertion.    Her sugar  was high with the steroids in the hospital but it seems to be getting back down towards baseline now.    Post Discharge Medication Reconciliation: discharge medications reconciled, continue medications without change.  Plan of care communicated with patient     Coding guidelines for this visit:  Type of Medical   Decision Making Face-to-Face Visit       within 7 Days of discharge Face-to-Face Visit        within 14 days of discharge   Moderate Complexity [id number]   High Complexity [id number]              Patient Active Problem List   Diagnosis     Essential hypertension, benign     Restless leg syndrome     CRISTÓBAL (obstructive sleep apnea)     CKD (chronic kidney disease) stage 3, GFR 30-59 ml/min (H)     Advanced directives, counseling/discussion     GENERALIZED ANXIETY DIS     Major depressive disorder, recurrent episode, moderate (H)     Health Care Home     GERD (gastroesophageal reflux disease)     Hyperlipidemia LDL goal <100     Eczema     Type 2 diabetes mellitus with stage 3 chronic kidney disease, without long-term current use of insulin (H)     Midline low back pain with left-sided sciatica     Morbid obesity (HCC)     Insomnia, unspecified type     Dyspnea, unspecified type     Acute pain of left knee     Controlled substance agreement signed-- checked 4-1-19 No Concerns     Screening for cervical cancer     Pneumonia     Current Outpatient Medications   Medication Sig Dispense Refill     ASPIRIN EC PO Take 81 mg by mouth every evening       clonazePAM (KLONOPIN) 1 MG tablet Take 0.5 mg by mouth 2 times daily       cyclobenzaprine (FLEXERIL) 10 MG tablet Take 1 tablet (10 mg) by mouth 3 times daily as needed for muscle spasms 90 tablet 11     FREESTYLE LITE TEST VI STRP Test twice a day 100 Strip 12     gabapentin (NEURONTIN) 300 MG capsule Take 2 capsules (600 mg) by mouth daily 180 capsule 3     ibuprofen (ADVIL,MOTRIN) 600 MG tablet Take 1 tablet (600 mg) by mouth every 6 hours as needed for  "moderate pain 30 tablet 1     losartan (COZAAR) 100 MG tablet Take 1 tablet (100 mg) by mouth daily 90 tablet 1     metFORMIN (GLUCOPHAGE-XR) 500 MG 24 hr tablet Take 2 tablets (1,000 mg) by mouth every morning 180 tablet 3     pantoprazole (PROTONIX) 40 MG EC tablet Take 1 tablet (40 mg) by mouth At Bedtime 90 tablet 3     sertraline (ZOLOFT) 100 MG tablet Take 2 tablets (200 mg) by mouth daily 180 tablet 3     simvastatin (ZOCOR) 20 MG tablet Take 1 tablet (20 mg) by mouth At Bedtime 90 tablet 1     zolpidem (AMBIEN) 10 MG tablet Take 0.5 tablets (5 mg) by mouth nightly as needed for sleep 45 tablet 1     order for DME Equipment being ordered: CPAP 1 Device 0      Social History     Tobacco Use     Smoking status: Former Smoker     Last attempt to quit: 3/18/1979     Years since quittin.1     Smokeless tobacco: Never Used     Tobacco comment: quit    Substance Use Topics     Alcohol use: Yes     Comment: Twice a month     Drug use: No            ROS:  No fever, chills, some mild decreased appetite, mild shortness of breath without PND, orthopnea, no current palpitations, syncope, chest pain, nausea, vomiting    OBJECTIVE:     /80 (BP Location: Right arm, Patient Position: Chair, Cuff Size: Adult Large)   Pulse 90   Temp 98.2  F (36.8  C) (Oral)   Resp 22   Ht 1.486 m (4' 10.5\")   Wt 117 kg (258 lb)   LMP 09/15/2007   SpO2 92%   BMI 53.00 kg/m    Body mass index is 53 kg/m .    Lungs: Clear without wheezes  CV: normal S1, S2 without murmur, S3 or S4.       ASSESSMENT/PLAN:         1. Acute respiratory failure with hypoxia (H)  Related to pneumonia, improving, reviewed her saturations last day and reassured she does not need oxygen, the wheezing is improving.  Continue taking deep breaths to expand the lungs    2. Pneumonia due to infectious organism, unspecified laterality, unspecified part of lung  Resolving.  Will plan on having her return to work on May 6 rather than , she can " contact me later this week to confirm that she is making those plans and will update the following week to make sure she is able to go back.  Encouraged good protein intake and work on exercise.    3. Type 2 diabetes mellitus with stage 3 chronic kidney disease, without long-term current use of insulin (H)  Improving sugars, continue monitoring carefully.    4.  Tachycardia: Appears to the records that this was just sinus tachycardia and probably was related to overexerting and oxygen dropped rather than an separate heart rhythm disturbance.  Currently she is in sinus rhythm, she was not aware of any symptoms associated with the tachycardia so reassured no need to do monitoring at this time.      Pari Wiley MD  [facility]

## 2019-04-23 ENCOUNTER — TELEPHONE (OUTPATIENT)
Dept: INTERNAL MEDICINE | Facility: CLINIC | Age: 64
End: 2019-04-23

## 2019-04-23 NOTE — TELEPHONE ENCOUNTER
Fax received from [facility] for review and signature.  Put in Dr. Wiley's in basket.

## 2019-04-25 ENCOUNTER — MYC MEDICAL ADVICE (OUTPATIENT)
Dept: INTERNAL MEDICINE | Facility: CLINIC | Age: 64
End: 2019-04-25

## 2019-04-25 DIAGNOSIS — L98.9 SKIN LESION: ICD-10-CM

## 2019-04-25 DIAGNOSIS — G47.33 OSA (OBSTRUCTIVE SLEEP APNEA): Primary | ICD-10-CM

## 2019-04-26 DIAGNOSIS — N18.30 TYPE 2 DIABETES MELLITUS WITH STAGE 3 CHRONIC KIDNEY DISEASE, WITHOUT LONG-TERM CURRENT USE OF INSULIN (H): Primary | ICD-10-CM

## 2019-04-26 DIAGNOSIS — E11.9 TYPE 2 DIABETES, HBA1C GOAL < 7% (H): ICD-10-CM

## 2019-04-26 DIAGNOSIS — E11.22 TYPE 2 DIABETES MELLITUS WITH STAGE 3 CHRONIC KIDNEY DISEASE, WITHOUT LONG-TERM CURRENT USE OF INSULIN (H): Primary | ICD-10-CM

## 2019-04-26 NOTE — TELEPHONE ENCOUNTER
See mychart message.   Would like Derm referral. OV notes are still in progress.     Pended DME. Would like Rx MAILED to her Home.

## 2019-04-27 PROBLEM — J18.9 PNEUMONIA: Status: RESOLVED | Noted: 2019-04-11 | Resolved: 2019-04-27

## 2019-04-27 PROBLEM — J96.01 ACUTE RESPIRATORY FAILURE WITH HYPOXIA (H): Status: ACTIVE | Noted: 2019-04-27

## 2019-04-27 PROBLEM — J96.01 ACUTE RESPIRATORY FAILURE WITH HYPOXIA (H): Status: RESOLVED | Noted: 2019-04-27 | Resolved: 2019-04-27

## 2019-04-29 NOTE — TELEPHONE ENCOUNTER
"Requested Prescriptions   Pending Prescriptions Disp Refills     blood glucose (FREESTYLE LITE) test strip  Last refill:   Last office visit: 4/22/19 100 strip 12       Diabetic Supplies Protocol Passed - 4/26/2019 12:37 PM        Passed - Medication is active on med list        Passed - Patient is 18 years of age or older        Passed - Recent (6 mo) or future (30 days) visit within the authorizing provider's specialty     Patient had office visit in the last 6 months or has a visit in the next 30 days with authorizing provider.  See \"Patient Info\" tab in inbasket, or \"Choose Columns\" in Meds & Orders section of the refill encounter.               "

## 2019-04-29 NOTE — TELEPHONE ENCOUNTER
"Routing refill request to provider for review/approval because:  A break in medication    Last refill of Freestyle Lite Test VI strip 2/26/10    Per office visit note 4/4/19:  \"2. Type 2 diabetes mellitus with stage 3 chronic kidney disease, without long-term current use of insulin (H)  Improved control, likely related to weight loss.  Encouraged her to continue working on this.  - metFORMIN (GLUCOPHAGE-XR) 500 MG 24 hr tablet; Take 2 tablets (1,000 mg) by mouth every morning  Dispense: 180 tablet; Refill: 3\"    "

## 2019-04-30 ENCOUNTER — TELEPHONE (OUTPATIENT)
Dept: INTERNAL MEDICINE | Facility: CLINIC | Age: 64
End: 2019-04-30

## 2019-04-30 NOTE — TELEPHONE ENCOUNTER
Fax received from [facility] for review and signature.  Put in Dr. Wiley's in basket.

## 2019-04-30 NOTE — TELEPHONE ENCOUNTER
Medication: Freestyle Lite Test Strips              Other Information:          [facility]  Phone: [phone]  Fax: [phone]

## 2019-05-03 NOTE — TELEPHONE ENCOUNTER
PA Initiation    Medication: Freestyle Lite Test Strips  Insurance Company: [employer] - Phone [phone] Fax [phone]  Pharmacy Filling the Rx: [facility] - [place], [place] - [facility] [address]  Filling Pharmacy Phone: [phone]  Filling Pharmacy Fax:    Start Date: 5/3/2019

## 2019-05-07 NOTE — TELEPHONE ENCOUNTER
Please advise patient and then see if she has a choice of which to use, send new kit and supplies.

## 2019-05-07 NOTE — TELEPHONE ENCOUNTER
PRIOR AUTHORIZATION DENIED    Medication: Freestyle Lite Test Strips- DENIED    Denial Date: 5/7/2019    Denial Rational: Freestyle is non-formulary. Pt must have history of using formulary test strip products (Accu-chek and Onetouch) or non-formulary test strip is using with insulin pump.    Appeal Information:

## 2019-05-08 ENCOUNTER — MYC MEDICAL ADVICE (OUTPATIENT)
Dept: INTERNAL MEDICINE | Facility: CLINIC | Age: 64
End: 2019-05-08

## 2019-05-08 ENCOUNTER — TELEPHONE (OUTPATIENT)
Dept: INTERNAL MEDICINE | Facility: CLINIC | Age: 64
End: 2019-05-08

## 2019-05-08 DIAGNOSIS — N18.30 TYPE 2 DIABETES MELLITUS WITH STAGE 3 CHRONIC KIDNEY DISEASE, WITHOUT LONG-TERM CURRENT USE OF INSULIN (H): ICD-10-CM

## 2019-05-08 DIAGNOSIS — E11.22 TYPE 2 DIABETES MELLITUS WITH STAGE 3 CHRONIC KIDNEY DISEASE, WITHOUT LONG-TERM CURRENT USE OF INSULIN (H): ICD-10-CM

## 2019-05-08 NOTE — TELEPHONE ENCOUNTER
Fax received from [employer] - Return to Work for review and signature.  Put in [employer] in basket.

## 2019-05-08 NOTE — TELEPHONE ENCOUNTER
Per patient's [employer] message, needs new prescription for Accuchek Stephany plus test strips.    Last office visit 4-22-19    Prescription approved per [facility] Refill Protocol.    Patient informed via [employer].

## 2019-06-21 DIAGNOSIS — E78.5 HYPERLIPIDEMIA LDL GOAL <100: ICD-10-CM

## 2019-06-21 DIAGNOSIS — N18.30 CKD (CHRONIC KIDNEY DISEASE) STAGE 3, GFR 30-59 ML/MIN (H): ICD-10-CM

## 2019-06-21 DIAGNOSIS — N18.30 TYPE 2 DIABETES MELLITUS WITH STAGE 3 CHRONIC KIDNEY DISEASE, WITHOUT LONG-TERM CURRENT USE OF INSULIN (H): ICD-10-CM

## 2019-06-21 DIAGNOSIS — E11.22 TYPE 2 DIABETES MELLITUS WITH STAGE 3 CHRONIC KIDNEY DISEASE, WITHOUT LONG-TERM CURRENT USE OF INSULIN (H): ICD-10-CM

## 2019-06-21 LAB
ANION GAP SERPL CALCULATED.3IONS-SCNC: 9 MMOL/L (ref 3–14)
BUN SERPL-MCNC: 13 MG/DL (ref 7–30)
CALCIUM SERPL-MCNC: 8.7 MG/DL (ref 8.5–10.1)
CHLORIDE SERPL-SCNC: 105 MMOL/L (ref 94–109)
CHOLEST SERPL-MCNC: 170 MG/DL
CO2 SERPL-SCNC: 28 MMOL/L (ref 20–32)
CREAT SERPL-MCNC: 0.95 MG/DL (ref 0.52–1.04)
CREAT UR-MCNC: 183 MG/DL
GFR SERPL CREATININE-BSD FRML MDRD: 63 ML/MIN/{1.73_M2}
GLUCOSE SERPL-MCNC: 108 MG/DL (ref 70–99)
HBA1C MFR BLD: 6.7 % (ref 0–5.6)
HDLC SERPL-MCNC: 40 MG/DL
LDLC SERPL CALC-MCNC: 96 MG/DL
MICROALBUMIN UR-MCNC: 6 MG/L
MICROALBUMIN/CREAT UR: 3.53 MG/G CR (ref 0–25)
NONHDLC SERPL-MCNC: 130 MG/DL
POTASSIUM SERPL-SCNC: 4 MMOL/L (ref 3.4–5.3)
SODIUM SERPL-SCNC: 142 MMOL/L (ref 133–144)
TRIGL SERPL-MCNC: 169 MG/DL
TSH SERPL DL<=0.005 MIU/L-ACNC: 2.31 MU/L (ref 0.4–4)

## 2019-06-21 PROCEDURE — 82043 UR ALBUMIN QUANTITATIVE: CPT | Performed by: INTERNAL MEDICINE

## 2019-06-21 PROCEDURE — 36415 COLL VENOUS BLD VENIPUNCTURE: CPT | Performed by: INTERNAL MEDICINE

## 2019-06-21 PROCEDURE — 80048 BASIC METABOLIC PNL TOTAL CA: CPT | Performed by: INTERNAL MEDICINE

## 2019-06-21 PROCEDURE — 83036 HEMOGLOBIN GLYCOSYLATED A1C: CPT | Performed by: INTERNAL MEDICINE

## 2019-06-21 PROCEDURE — 80061 LIPID PANEL: CPT | Performed by: INTERNAL MEDICINE

## 2019-06-21 PROCEDURE — 84443 ASSAY THYROID STIM HORMONE: CPT | Performed by: INTERNAL MEDICINE

## 2019-06-25 ENCOUNTER — OFFICE VISIT (OUTPATIENT)
Dept: INTERNAL MEDICINE | Facility: CLINIC | Age: 64
End: 2019-06-25
Payer: COMMERCIAL

## 2019-06-25 VITALS
TEMPERATURE: 98.6 F | HEIGHT: 59 IN | SYSTOLIC BLOOD PRESSURE: 144 MMHG | OXYGEN SATURATION: 98 % | HEART RATE: 94 BPM | BODY MASS INDEX: 51.16 KG/M2 | WEIGHT: 253.8 LBS | DIASTOLIC BLOOD PRESSURE: 82 MMHG | RESPIRATION RATE: 16 BRPM

## 2019-06-25 DIAGNOSIS — I10 ESSENTIAL HYPERTENSION, BENIGN: ICD-10-CM

## 2019-06-25 DIAGNOSIS — M25.561 PAIN IN BOTH KNEES, UNSPECIFIED CHRONICITY: ICD-10-CM

## 2019-06-25 DIAGNOSIS — E11.22 TYPE 2 DIABETES MELLITUS WITH STAGE 3 CHRONIC KIDNEY DISEASE, WITHOUT LONG-TERM CURRENT USE OF INSULIN (H): Primary | ICD-10-CM

## 2019-06-25 DIAGNOSIS — G47.00 INSOMNIA, UNSPECIFIED TYPE: ICD-10-CM

## 2019-06-25 DIAGNOSIS — N18.30 CKD (CHRONIC KIDNEY DISEASE) STAGE 3, GFR 30-59 ML/MIN (H): ICD-10-CM

## 2019-06-25 DIAGNOSIS — N18.30 TYPE 2 DIABETES MELLITUS WITH STAGE 3 CHRONIC KIDNEY DISEASE, WITHOUT LONG-TERM CURRENT USE OF INSULIN (H): Primary | ICD-10-CM

## 2019-06-25 DIAGNOSIS — M25.562 PAIN IN BOTH KNEES, UNSPECIFIED CHRONICITY: ICD-10-CM

## 2019-06-25 DIAGNOSIS — E78.5 HYPERLIPIDEMIA LDL GOAL <100: ICD-10-CM

## 2019-06-25 DIAGNOSIS — E66.01 MORBID OBESITY (H): ICD-10-CM

## 2019-06-25 DIAGNOSIS — M54.42 MIDLINE LOW BACK PAIN WITH LEFT-SIDED SCIATICA, UNSPECIFIED CHRONICITY: ICD-10-CM

## 2019-06-25 PROCEDURE — 99214 OFFICE O/P EST MOD 30 MIN: CPT | Performed by: INTERNAL MEDICINE

## 2019-06-25 RX ORDER — ZOLPIDEM TARTRATE 6.25 MG/1
6.25 TABLET, FILM COATED, EXTENDED RELEASE ORAL
Qty: 30 TABLET | Refills: 2 | Status: SHIPPED | OUTPATIENT
Start: 2019-06-25 | End: 2019-10-03

## 2019-06-25 ASSESSMENT — MIFFLIN-ST. JEOR: SCORE: 1603.92

## 2019-06-25 NOTE — NURSING NOTE
"Vital signs:  Temp: 98.6  F (37  C) Temp src: Oral BP: 144/82 Pulse: 94   Resp: 16 SpO2: 98 %     Height: 148.6 cm (4' 10.5\") Weight: 115.1 kg (253 lb 12.8 oz)  Estimated body mass index is 52.14 kg/m  as calculated from the following:    Height as of this encounter: 1.486 m (4' 10.5\").    Weight as of this encounter: 115.1 kg (253 lb 12.8 oz).          "

## 2019-06-25 NOTE — PROGRESS NOTES
Subjective     Nicole Reyes is a 63 year old female who presents to clinic today for the following health issues:    HPI   Diabetes Follow-up      How often are you checking your blood sugar? A few times a month    What time of day are you checking your blood sugars (select all that apply)?  At bedtime    Have you had any blood sugars above 200?  No    Have you had any blood sugars below 70?  No    What symptoms do you notice when your blood sugar is low?  None    What concerns do you have today about your diabetes? None     Do you have any of these symptoms? (Select all that apply)  No numbness or tingling in feet.  No redness, sores or blisters on feet.  No complaints of excessive thirst.  No reports of blurry vision.  No significant changes to weight.     Have you had a diabetic eye exam in the last 12 months? No due     BP Readings from Last 2 Encounters:   04/22/19 136/80   04/18/19 132/70     Hemoglobin A1C (%)   Date Value   06/21/2019 6.7 (H)   04/02/2019 6.5 (H)     LDL Cholesterol Calculated (mg/dL)   Date Value   06/21/2019 96   06/28/2018 82       Diabetes Management Resources  Hyperlipidemia Follow-Up      Are you having any of the following symptoms? (Select all that apply)  Shortness of breath    Are you regularly taking any medication or supplement to lower your cholesterol?   Yes- statin    Are you having muscle aches or other side effects that you think could be caused by your cholesterol lowering medication?  Yes       Hypertension Follow-up  Her blood pressures are running high at home, 180/100 and it was the highest, usually 170/90.  She has been taking ibuprofen a couple tablets once a day for her knees.    Do you check your blood pressure regularly outside of the clinic? Yes     Are you following a low salt diet? No    Are your blood pressures ever more than 140 on the top number (systolic) OR more   than 90 on the bottom number (diastolic), for example 140/90? Yes     Depression and  Anxiety Follow-Up    How are you doing with your depression since your last visit? No change    How are you doing with your anxiety since your last visit?  No change    Are you having other symptoms that might be associated with depression or anxiety? Yes:  anxiety lack of sleep     Have you had a significant life event? No     Do you have any concerns with your use of alcohol or other drugs? No    Social History     Tobacco Use     Smoking status: Former Smoker     Last attempt to quit: 3/18/1979     Years since quittin.2     Smokeless tobacco: Never Used     Tobacco comment: quit    Substance Use Topics     Alcohol use: Yes     Comment: Twice a month     Drug use: No     PHQ 2017 8/15/2018 2019   PHQ-9 Total Score 7 10 12   Q9: Thoughts of better off dead/self-harm past 2 weeks Not at all Not at all Not at all     RADHA-7 SCORE 2016   Total Score - - -   Total Score 11 7 6         Suicide Assessment Five-step Evaluation and Treatment (SAFE-T)      Amount of exercise or physical activity: None    Problems taking medications regularly: No    Medication side effects: muscle aches    Diet: regular (no restrictions)      Other problems:   1.  Insomnia: She reports that she is able to get to sleep with gabapentin 690 but is awakening after about 2 hours.  There does not seem to be any specific thing that awakens her, she does not think it is due to joint pain.  She is getting about 4 hours sleep maximum.  2.  Back pain: Her sciatica is continuing to bother her, physical therapy has not helped.  He also is continuing to have knee pain.      Current concerns:   No new concerns    Patient Active Problem List   Diagnosis     Essential hypertension, benign     Restless leg syndrome     CRISTÓBAL (obstructive sleep apnea)     CKD (chronic kidney disease) stage 3, GFR 30-59 ml/min (H)     Advanced directives, counseling/discussion     GENERALIZED ANXIETY DIS     Major depressive disorder,  recurrent episode, moderate (H)     Health Care Home     GERD (gastroesophageal reflux disease)     Hyperlipidemia LDL goal <100     Eczema     Type 2 diabetes mellitus with stage 3 chronic kidney disease, without long-term current use of insulin (H)     Midline low back pain with left-sided sciatica     Morbid obesity (HCC)     Insomnia, unspecified type     Dyspnea, unspecified type     Acute pain of left knee     Controlled substance agreement signed-- checked 4-1-19 No Concerns       Current Outpatient Medications   Medication Sig Dispense Refill     ASPIRIN EC PO Take 81 mg by mouth every evening       blood glucose (ACCU-CHEK BYRON PLUS) test strip Use to test blood sugar 2 times daily or as directed. 100 each 12     clonazePAM (KLONOPIN) 1 MG tablet Take 0.5 mg by mouth 2 times daily       cyclobenzaprine (FLEXERIL) 10 MG tablet Take 1 tablet (10 mg) by mouth 3 times daily as needed for muscle spasms 90 tablet 11     gabapentin (NEURONTIN) 300 MG capsule Take 2 capsules (600 mg) by mouth daily 180 capsule 3     ibuprofen (ADVIL,MOTRIN) 600 MG tablet Take 1 tablet (600 mg) by mouth every 6 hours as needed for moderate pain 30 tablet 1     losartan (COZAAR) 100 MG tablet Take 1 tablet (100 mg) by mouth daily 90 tablet 1     metFORMIN (GLUCOPHAGE-XR) 500 MG 24 hr tablet Take 2 tablets (1,000 mg) by mouth every morning 180 tablet 3     order for DME Equipment being ordered: CPAP 1 Device 0     pantoprazole (PROTONIX) 40 MG EC tablet Take 1 tablet (40 mg) by mouth At Bedtime 90 tablet 3     sertraline (ZOLOFT) 100 MG tablet Take 2 tablets (200 mg) by mouth daily 180 tablet 3     simvastatin (ZOCOR) 20 MG tablet Take 1 tablet (20 mg) by mouth At Bedtime 90 tablet 1     zolpidem (AMBIEN) 10 MG tablet Take 0.5 tablets (5 mg) by mouth nightly as needed for sleep 45 tablet 1     zolpidem ER (AMBIEN CR) 6.25 MG CR tablet Take 1 tablet (6.25 mg) by mouth nightly as needed for sleep 30 tablet 2       Social History  "    Tobacco Use     Smoking status: Former Smoker     Last attempt to quit: 3/18/1979     Years since quittin.3     Smokeless tobacco: Never Used     Tobacco comment: quit    Substance Use Topics     Alcohol use: Yes     Comment: Twice a month     Drug use: No        ROS:  General: no fever, chills  Weight: down a little  Vision:negative. Last eye exam .  ENT: negative  Respiratory negative.  Cardiac: no chest pain or pressure  Abdominal: no nausea, vomiting, abdominal pain, bowel changes  Vascular no complaints of claudication  Neurologic:no complaints of neuropathy  Feet no lesions, in grown nails, edema   : no polyuria, hematuria, dysuria    Objective:  Patient alert in NAD  /82   Pulse 94   Temp 98.6  F (37  C) (Oral)   Resp 16   Ht 1.486 m (4' 10.5\")   Wt 115.1 kg (253 lb 12.8 oz)   LMP 09/15/2007   SpO2 98%   BMI 52.14 kg/m         Wt Readings from Last 4 Encounters:   19 115.1 kg (253 lb 12.8 oz)   19 117 kg (258 lb)   19 118.3 kg (260 lb 11.2 oz)   19 114.4 kg (252 lb 4.8 oz)       CV: CV: normal S1, S2 without murmur, S3 or S4.  Carotid pulses: full  LUNGS: clear      Lab Results   Component Value Date    A1C 6.7 2019    A1C 6.5 2019    A1C 7.1 2018    A1C 6.2 2017    A1C 7.5 2017       Lab Results   Component Value Date    CHOL 170 2019    HDL 40 2019    LDL 96 2019    TRIG 169 2019    CHOLHDLRATIO 3.0 2015     Assessment & Plan     1. Type 2 diabetes mellitus with stage 3 chronic kidney disease, without long-term current use of insulin (H)  Well-controlled, recheck 6 months    2. CKD (chronic kidney disease) stage 3, GFR 30-59 ml/min (H)  Stable, continue medication    3. Essential hypertension, benign  It is elevated here though the reading she is been getting are much higher than the reading we have here.  Advised her to try to do some checks may be at work    4. Hyperlipidemia LDL goal " "<100  Controlled, continue medication    5. Midline low back pain with left-sided sciatica, unspecified chronicity  Recommend referral to orthopedics for her back in her knees    6. Insomnia, unspecified type  Advised to try the Ambien CR product as it will may be gave her more prolonged sleep  - zolpidem ER (AMBIEN CR) 6.25 MG CR tablet; Take 1 tablet (6.25 mg) by mouth nightly as needed for sleep  Dispense: 30 tablet; Refill: 2     Morbid obesity: Work on aggressive diet and exercise  BMI:   Estimated body mass index is 52.14 kg/m  as calculated from the following:    Height as of this encounter: 1.486 m (4' 10.5\").    Weight as of this encounter: 115.1 kg (253 lb 12.8 oz).   Weight management plan: Discussed healthy diet and exercise guidelines            No follow-ups on file.    Pari Wiley MD  [facility]        "

## 2019-07-06 ENCOUNTER — HOSPITAL ENCOUNTER (INPATIENT)
Facility: CLINIC | Age: 64
LOS: 6 days | Discharge: HOME | DRG: 871 | End: 2019-07-12
Attending: EMERGENCY MEDICINE | Admitting: INTERNAL MEDICINE
Payer: COMMERCIAL

## 2019-07-06 ENCOUNTER — APPOINTMENT (OUTPATIENT)
Dept: GENERAL RADIOLOGY | Facility: CLINIC | Age: 64
DRG: 871 | End: 2019-07-06
Attending: EMERGENCY MEDICINE
Payer: COMMERCIAL

## 2019-07-06 DIAGNOSIS — R65.21 SEPTIC SHOCK (H): ICD-10-CM

## 2019-07-06 DIAGNOSIS — J15.9 COMMUNITY ACQUIRED BACTERIAL PNEUMONIA: ICD-10-CM

## 2019-07-06 DIAGNOSIS — R06.89 RESPIRATORY INSUFFICIENCY: ICD-10-CM

## 2019-07-06 DIAGNOSIS — A41.9 SEPTIC SHOCK (H): ICD-10-CM

## 2019-07-06 PROBLEM — J96.00 ACUTE RESPIRATORY FAILURE, UNSP W HYPOXIA OR HYPERCAPNIA (H): Status: ACTIVE | Noted: 2019-07-06

## 2019-07-06 LAB
ALBUMIN SERPL-MCNC: 3.3 G/DL (ref 3.4–5)
ALP SERPL-CCNC: 122 U/L (ref 40–150)
ALT SERPL W P-5'-P-CCNC: 32 U/L (ref 0–50)
ANION GAP SERPL CALCULATED.3IONS-SCNC: 11 MMOL/L (ref 3–14)
APTT PPP: 36 SEC (ref 22–37)
AST SERPL W P-5'-P-CCNC: 39 U/L (ref 0–45)
BASOPHILS # BLD AUTO: 0 10E9/L (ref 0–0.2)
BASOPHILS NFR BLD AUTO: 0.2 %
BILIRUB SERPL-MCNC: 0.7 MG/DL (ref 0.2–1.3)
BUN SERPL-MCNC: 13 MG/DL (ref 7–30)
CALCIUM SERPL-MCNC: 9.1 MG/DL (ref 8.5–10.1)
CHLORIDE SERPL-SCNC: 104 MMOL/L (ref 94–109)
CO2 BLDCOV-SCNC: 29 MMOL/L (ref 21–28)
CO2 SERPL-SCNC: 25 MMOL/L (ref 20–32)
CREAT SERPL-MCNC: 0.87 MG/DL (ref 0.52–1.04)
DIFFERENTIAL METHOD BLD: ABNORMAL
EOSINOPHIL # BLD AUTO: 0 10E9/L (ref 0–0.7)
EOSINOPHIL NFR BLD AUTO: 0 %
ERYTHROCYTE [DISTWIDTH] IN BLOOD BY AUTOMATED COUNT: 15.4 % (ref 10–15)
GFR SERPL CREATININE-BSD FRML MDRD: 71 ML/MIN/{1.73_M2}
GLUCOSE SERPL-MCNC: 119 MG/DL (ref 70–99)
HCT VFR BLD AUTO: 43.5 % (ref 35–47)
HGB BLD-MCNC: 13.6 G/DL (ref 11.7–15.7)
IMM GRANULOCYTES # BLD: 0.2 10E9/L (ref 0–0.4)
IMM GRANULOCYTES NFR BLD: 0.9 %
INR PPP: 1.07 (ref 0.86–1.14)
LACTATE BLD-SCNC: 2.2 MMOL/L (ref 0.7–2)
LACTATE BLD-SCNC: 3.5 MMOL/L (ref 0.7–2.1)
LACTATE BLD-SCNC: 4.1 MMOL/L (ref 0.7–2)
LYMPHOCYTES # BLD AUTO: 0.8 10E9/L (ref 0.8–5.3)
LYMPHOCYTES NFR BLD AUTO: 3.9 %
MCH RBC QN AUTO: 25.6 PG (ref 26.5–33)
MCHC RBC AUTO-ENTMCNC: 31.3 G/DL (ref 31.5–36.5)
MCV RBC AUTO: 82 FL (ref 78–100)
MONOCYTES # BLD AUTO: 0.9 10E9/L (ref 0–1.3)
MONOCYTES NFR BLD AUTO: 4.4 %
NEUTROPHILS # BLD AUTO: 19.3 10E9/L (ref 1.6–8.3)
NEUTROPHILS NFR BLD AUTO: 90.6 %
NRBC # BLD AUTO: 0 10*3/UL
NRBC BLD AUTO-RTO: 0 /100
NT-PROBNP SERPL-MCNC: 1739 PG/ML (ref 0–900)
PCO2 BLDV: 49 MM HG (ref 40–50)
PH BLDV: 7.38 PH (ref 7.32–7.43)
PLATELET # BLD AUTO: 298 10E9/L (ref 150–450)
PO2 BLDV: 33 MM HG (ref 25–47)
POTASSIUM SERPL-SCNC: 3.4 MMOL/L (ref 3.4–5.3)
PROT SERPL-MCNC: 7.3 G/DL (ref 6.8–8.8)
RBC # BLD AUTO: 5.31 10E12/L (ref 3.8–5.2)
SAO2 % BLDV FROM PO2: 60 %
SODIUM SERPL-SCNC: 140 MMOL/L (ref 133–144)
TROPONIN I SERPL-MCNC: <0.015 UG/L (ref 0–0.04)
WBC # BLD AUTO: 21.4 10E9/L (ref 4–11)

## 2019-07-06 PROCEDURE — 12000000 ZZH R&B MED SURG/OB

## 2019-07-06 PROCEDURE — 99285 EMERGENCY DEPT VISIT HI MDM: CPT | Mod: 25

## 2019-07-06 PROCEDURE — 25000128 H RX IP 250 OP 636: Performed by: EMERGENCY MEDICINE

## 2019-07-06 PROCEDURE — 82803 BLOOD GASES ANY COMBINATION: CPT

## 2019-07-06 PROCEDURE — 83880 ASSAY OF NATRIURETIC PEPTIDE: CPT | Performed by: EMERGENCY MEDICINE

## 2019-07-06 PROCEDURE — 96361 HYDRATE IV INFUSION ADD-ON: CPT

## 2019-07-06 PROCEDURE — 83605 ASSAY OF LACTIC ACID: CPT

## 2019-07-06 PROCEDURE — 40000275 ZZH STATISTIC RCP TIME EA 10 MIN

## 2019-07-06 PROCEDURE — 40000983 ZZH STATISTIC HFNC ADULT NON-CPAP

## 2019-07-06 PROCEDURE — 99223 1ST HOSP IP/OBS HIGH 75: CPT | Mod: AI | Performed by: INTERNAL MEDICINE

## 2019-07-06 PROCEDURE — 27210300 ZZH CANNULA HIGH FLOW, ADULT

## 2019-07-06 PROCEDURE — 83605 ASSAY OF LACTIC ACID: CPT | Performed by: EMERGENCY MEDICINE

## 2019-07-06 PROCEDURE — 25000128 H RX IP 250 OP 636: Performed by: INTERNAL MEDICINE

## 2019-07-06 PROCEDURE — 36415 COLL VENOUS BLD VENIPUNCTURE: CPT | Performed by: EMERGENCY MEDICINE

## 2019-07-06 PROCEDURE — 85610 PROTHROMBIN TIME: CPT | Performed by: EMERGENCY MEDICINE

## 2019-07-06 PROCEDURE — 80053 COMPREHEN METABOLIC PANEL: CPT | Performed by: EMERGENCY MEDICINE

## 2019-07-06 PROCEDURE — 96365 THER/PROPH/DIAG IV INF INIT: CPT

## 2019-07-06 PROCEDURE — 85730 THROMBOPLASTIN TIME PARTIAL: CPT | Performed by: EMERGENCY MEDICINE

## 2019-07-06 PROCEDURE — 25000132 ZZH RX MED GY IP 250 OP 250 PS 637: Performed by: EMERGENCY MEDICINE

## 2019-07-06 PROCEDURE — 71045 X-RAY EXAM CHEST 1 VIEW: CPT

## 2019-07-06 PROCEDURE — 25800030 ZZH RX IP 258 OP 636: Performed by: EMERGENCY MEDICINE

## 2019-07-06 PROCEDURE — 00000146 ZZHCL STATISTIC GLUCOSE BY METER IP

## 2019-07-06 PROCEDURE — 85025 COMPLETE CBC W/AUTO DIFF WBC: CPT | Performed by: EMERGENCY MEDICINE

## 2019-07-06 PROCEDURE — 84484 ASSAY OF TROPONIN QUANT: CPT | Performed by: EMERGENCY MEDICINE

## 2019-07-06 PROCEDURE — 96367 TX/PROPH/DG ADDL SEQ IV INF: CPT

## 2019-07-06 PROCEDURE — 87040 BLOOD CULTURE FOR BACTERIA: CPT | Performed by: EMERGENCY MEDICINE

## 2019-07-06 PROCEDURE — 40000281 ZZH STATISTIC TRANSPORT TIME EA 15 MIN

## 2019-07-06 PROCEDURE — 93005 ELECTROCARDIOGRAM TRACING: CPT

## 2019-07-06 PROCEDURE — 25000132 ZZH RX MED GY IP 250 OP 250 PS 637: Performed by: INTERNAL MEDICINE

## 2019-07-06 RX ORDER — CLONAZEPAM 0.5 MG/1
0.5 TABLET ORAL 2 TIMES DAILY
Status: DISCONTINUED | OUTPATIENT
Start: 2019-07-06 | End: 2019-07-12 | Stop reason: HOSPADM

## 2019-07-06 RX ORDER — POTASSIUM CHLORIDE 1500 MG/1
20-40 TABLET, EXTENDED RELEASE ORAL
Status: DISCONTINUED | OUTPATIENT
Start: 2019-07-06 | End: 2019-07-12 | Stop reason: HOSPADM

## 2019-07-06 RX ORDER — NALOXONE HYDROCHLORIDE 0.4 MG/ML
.1-.4 INJECTION, SOLUTION INTRAMUSCULAR; INTRAVENOUS; SUBCUTANEOUS
Status: DISCONTINUED | OUTPATIENT
Start: 2019-07-06 | End: 2019-07-12 | Stop reason: HOSPADM

## 2019-07-06 RX ORDER — IBUPROFEN 600 MG/1
600 TABLET, FILM COATED ORAL ONCE
Status: COMPLETED | OUTPATIENT
Start: 2019-07-06 | End: 2019-07-06

## 2019-07-06 RX ORDER — CYCLOBENZAPRINE HCL 10 MG
10 TABLET ORAL 3 TIMES DAILY PRN
Status: DISCONTINUED | OUTPATIENT
Start: 2019-07-06 | End: 2019-07-12 | Stop reason: HOSPADM

## 2019-07-06 RX ORDER — BISACODYL 10 MG
10 SUPPOSITORY, RECTAL RECTAL DAILY PRN
Status: DISCONTINUED | OUTPATIENT
Start: 2019-07-06 | End: 2019-07-12 | Stop reason: HOSPADM

## 2019-07-06 RX ORDER — IPRATROPIUM BROMIDE AND ALBUTEROL SULFATE 2.5; .5 MG/3ML; MG/3ML
6 SOLUTION RESPIRATORY (INHALATION) ONCE
Status: DISCONTINUED | OUTPATIENT
Start: 2019-07-06 | End: 2019-07-06

## 2019-07-06 RX ORDER — POTASSIUM CHLORIDE 1.5 G/1.58G
20-40 POWDER, FOR SOLUTION ORAL
Status: DISCONTINUED | OUTPATIENT
Start: 2019-07-06 | End: 2019-07-12 | Stop reason: HOSPADM

## 2019-07-06 RX ORDER — PROCHLORPERAZINE MALEATE 5 MG
10 TABLET ORAL EVERY 6 HOURS PRN
Status: DISCONTINUED | OUTPATIENT
Start: 2019-07-06 | End: 2019-07-12 | Stop reason: HOSPADM

## 2019-07-06 RX ORDER — ACETAMINOPHEN 325 MG/1
650 TABLET ORAL EVERY 4 HOURS PRN
Status: DISCONTINUED | OUTPATIENT
Start: 2019-07-06 | End: 2019-07-12 | Stop reason: HOSPADM

## 2019-07-06 RX ORDER — GUAIFENESIN 600 MG/1
600 TABLET, EXTENDED RELEASE ORAL 2 TIMES DAILY
Status: DISCONTINUED | OUTPATIENT
Start: 2019-07-06 | End: 2019-07-12 | Stop reason: HOSPADM

## 2019-07-06 RX ORDER — PROCHLORPERAZINE 25 MG
25 SUPPOSITORY, RECTAL RECTAL EVERY 12 HOURS PRN
Status: DISCONTINUED | OUTPATIENT
Start: 2019-07-06 | End: 2019-07-12 | Stop reason: HOSPADM

## 2019-07-06 RX ORDER — GABAPENTIN 300 MG/1
600 CAPSULE ORAL EVERY EVENING
Status: DISCONTINUED | OUTPATIENT
Start: 2019-07-06 | End: 2019-07-12 | Stop reason: HOSPADM

## 2019-07-06 RX ORDER — NICOTINE POLACRILEX 4 MG
15-30 LOZENGE BUCCAL
Status: DISCONTINUED | OUTPATIENT
Start: 2019-07-06 | End: 2019-07-12 | Stop reason: HOSPADM

## 2019-07-06 RX ORDER — SODIUM CHLORIDE 9 MG/ML
INJECTION, SOLUTION INTRAVENOUS CONTINUOUS
Status: DISCONTINUED | OUTPATIENT
Start: 2019-07-06 | End: 2019-07-08

## 2019-07-06 RX ORDER — HYDROCODONE BITARTRATE AND ACETAMINOPHEN 5; 325 MG/1; MG/1
1-2 TABLET ORAL EVERY 4 HOURS PRN
Status: DISCONTINUED | OUTPATIENT
Start: 2019-07-06 | End: 2019-07-12 | Stop reason: HOSPADM

## 2019-07-06 RX ORDER — DEXTROSE MONOHYDRATE 25 G/50ML
25-50 INJECTION, SOLUTION INTRAVENOUS
Status: DISCONTINUED | OUTPATIENT
Start: 2019-07-06 | End: 2019-07-12 | Stop reason: HOSPADM

## 2019-07-06 RX ORDER — GUAIFENESIN/DEXTROMETHORPHAN 100-10MG/5
10 SYRUP ORAL EVERY 6 HOURS
Status: DISCONTINUED | OUTPATIENT
Start: 2019-07-06 | End: 2019-07-12 | Stop reason: HOSPADM

## 2019-07-06 RX ORDER — SIMVASTATIN 20 MG
20 TABLET ORAL AT BEDTIME
Status: DISCONTINUED | OUTPATIENT
Start: 2019-07-06 | End: 2019-07-12 | Stop reason: HOSPADM

## 2019-07-06 RX ORDER — ONDANSETRON 4 MG/1
4 TABLET, ORALLY DISINTEGRATING ORAL EVERY 6 HOURS PRN
Status: DISCONTINUED | OUTPATIENT
Start: 2019-07-06 | End: 2019-07-12 | Stop reason: HOSPADM

## 2019-07-06 RX ORDER — POLYETHYLENE GLYCOL 3350 17 G/17G
17 POWDER, FOR SOLUTION ORAL DAILY PRN
Status: DISCONTINUED | OUTPATIENT
Start: 2019-07-06 | End: 2019-07-12 | Stop reason: HOSPADM

## 2019-07-06 RX ORDER — ASPIRIN 81 MG/1
81 TABLET ORAL EVERY EVENING
Status: DISCONTINUED | OUTPATIENT
Start: 2019-07-06 | End: 2019-07-12 | Stop reason: HOSPADM

## 2019-07-06 RX ORDER — LEVOFLOXACIN 5 MG/ML
500 INJECTION, SOLUTION INTRAVENOUS EVERY 24 HOURS
Status: DISCONTINUED | OUTPATIENT
Start: 2019-07-06 | End: 2019-07-11

## 2019-07-06 RX ORDER — PANTOPRAZOLE SODIUM 40 MG/1
40 TABLET, DELAYED RELEASE ORAL AT BEDTIME
Status: DISCONTINUED | OUTPATIENT
Start: 2019-07-06 | End: 2019-07-12 | Stop reason: HOSPADM

## 2019-07-06 RX ORDER — POTASSIUM CHLORIDE 29.8 MG/ML
20 INJECTION INTRAVENOUS
Status: DISCONTINUED | OUTPATIENT
Start: 2019-07-06 | End: 2019-07-12 | Stop reason: HOSPADM

## 2019-07-06 RX ORDER — HYDROMORPHONE HYDROCHLORIDE 1 MG/ML
.3-.5 INJECTION, SOLUTION INTRAMUSCULAR; INTRAVENOUS; SUBCUTANEOUS
Status: DISCONTINUED | OUTPATIENT
Start: 2019-07-06 | End: 2019-07-12 | Stop reason: HOSPADM

## 2019-07-06 RX ORDER — POTASSIUM CHLORIDE 7.45 MG/ML
10 INJECTION INTRAVENOUS
Status: DISCONTINUED | OUTPATIENT
Start: 2019-07-06 | End: 2019-07-12 | Stop reason: HOSPADM

## 2019-07-06 RX ORDER — IPRATROPIUM BROMIDE AND ALBUTEROL SULFATE 2.5; .5 MG/3ML; MG/3ML
3 SOLUTION RESPIRATORY (INHALATION)
Status: DISCONTINUED | OUTPATIENT
Start: 2019-07-07 | End: 2019-07-12 | Stop reason: HOSPADM

## 2019-07-06 RX ORDER — ONDANSETRON 2 MG/ML
4 INJECTION INTRAMUSCULAR; INTRAVENOUS EVERY 6 HOURS PRN
Status: DISCONTINUED | OUTPATIENT
Start: 2019-07-06 | End: 2019-07-12 | Stop reason: HOSPADM

## 2019-07-06 RX ORDER — LIDOCAINE 40 MG/G
CREAM TOPICAL
Status: DISCONTINUED | OUTPATIENT
Start: 2019-07-06 | End: 2019-07-12 | Stop reason: HOSPADM

## 2019-07-06 RX ORDER — ZOLPIDEM TARTRATE 5 MG/1
5 TABLET ORAL
Status: DISCONTINUED | OUTPATIENT
Start: 2019-07-06 | End: 2019-07-12 | Stop reason: HOSPADM

## 2019-07-06 RX ORDER — SERTRALINE HYDROCHLORIDE 100 MG/1
200 TABLET, FILM COATED ORAL DAILY
Status: DISCONTINUED | OUTPATIENT
Start: 2019-07-06 | End: 2019-07-12 | Stop reason: HOSPADM

## 2019-07-06 RX ORDER — POTASSIUM CL/LIDO/0.9 % NACL 10MEQ/0.1L
10 INTRAVENOUS SOLUTION, PIGGYBACK (ML) INTRAVENOUS
Status: DISCONTINUED | OUTPATIENT
Start: 2019-07-06 | End: 2019-07-12 | Stop reason: HOSPADM

## 2019-07-06 RX ORDER — IPRATROPIUM BROMIDE AND ALBUTEROL SULFATE 2.5; .5 MG/3ML; MG/3ML
SOLUTION RESPIRATORY (INHALATION)
Status: DISCONTINUED
Start: 2019-07-06 | End: 2019-07-06 | Stop reason: HOSPADM

## 2019-07-06 RX ADMIN — GUAIFENESIN 600 MG: 600 TABLET, EXTENDED RELEASE ORAL at 23:02

## 2019-07-06 RX ADMIN — SERTRALINE HYDROCHLORIDE 200 MG: 100 TABLET ORAL at 23:00

## 2019-07-06 RX ADMIN — IBUPROFEN 600 MG: 600 TABLET ORAL at 20:41

## 2019-07-06 RX ADMIN — PANTOPRAZOLE SODIUM 40 MG: 40 TABLET, DELAYED RELEASE ORAL at 23:02

## 2019-07-06 RX ADMIN — CLONAZEPAM 0.5 MG: 0.5 TABLET ORAL at 23:00

## 2019-07-06 RX ADMIN — SIMVASTATIN 20 MG: 20 TABLET, FILM COATED ORAL at 23:02

## 2019-07-06 RX ADMIN — GUAIFENESIN AND DEXTROMETHORPHAN 10 ML: 100; 10 SYRUP ORAL at 22:59

## 2019-07-06 RX ADMIN — TAZOBACTAM SODIUM AND PIPERACILLIN SODIUM 4.5 G: 500; 4 INJECTION, SOLUTION INTRAVENOUS at 19:47

## 2019-07-06 RX ADMIN — ENOXAPARIN SODIUM 40 MG: 40 INJECTION SUBCUTANEOUS at 23:04

## 2019-07-06 RX ADMIN — GABAPENTIN 600 MG: 300 CAPSULE ORAL at 23:02

## 2019-07-06 RX ADMIN — SODIUM CHLORIDE, POTASSIUM CHLORIDE, SODIUM LACTATE AND CALCIUM CHLORIDE 2000 ML: 600; 310; 30; 20 INJECTION, SOLUTION INTRAVENOUS at 18:57

## 2019-07-06 RX ADMIN — VANCOMYCIN HYDROCHLORIDE 2500 MG: 1 INJECTION, POWDER, LYOPHILIZED, FOR SOLUTION INTRAVENOUS at 22:01

## 2019-07-06 RX ADMIN — ASPIRIN 81 MG: 81 TABLET, COATED ORAL at 23:00

## 2019-07-06 ASSESSMENT — ENCOUNTER SYMPTOMS
WEAKNESS: 1
COUGH: 1
FEVER: 0
NAUSEA: 1
SHORTNESS OF BREATH: 1
CHILLS: 1

## 2019-07-06 ASSESSMENT — MIFFLIN-ST. JEOR: SCORE: 1591.44

## 2019-07-06 NOTE — ED PROVIDER NOTES
History     Chief Complaint:  Shortness of Breath      HPI   Nicole Reyes is a 63 year old female hypertension, hyperlipidemia and a history of pnemonia who presents with shortness of breath. She notes that yesterday night she was unable to get out of bed or go to the bathroom due to marked dyspnea. She notes that she has whole body weakness and would cough when trying to get up. She states that when she would cough she would have fluid and phlegm come up and it was dark brown colored but no blood was present. Here, she notes that she is short of breath and nauseous. She states that walking, physical exertion, and lying flat worsen her cough and shortness of breath. She has no associated chest pain. She has history of pneumonia with last admission in 2019. She did have an episode of post-tussive emesis in which her difficulty breathing seemed to worsen after. The patient's significant other noted that she did have chills this morning.  She denies fever, recent weight gain or history of asthma or COPD.    Cardiac/PE/DVT Risk Factors:  History of hypertension - Yes  History of hyperlipidemia - Yes  History of diabetes - Yes  History of smoking - Yes  Personal history of PE/DVT - No  Family history of PE/DVT - No  Family history of heart complications - Yes  Recent travel - No  Recent surgery - No  Other immobilizations - No  Cancer - No      Allergies:  Codeine  Penicillins    Medications:    Clonazepam  Cyclobenzaprine  Gabapentin  Losartan  Metformin  Pantoprazole  Sertraline  Simvastatin  Zolpidem    Past Medical History:    CKD  Hypertension  Anxiety  Hernia  Insomnia  Depression  Menopause  Obesity  CRISTÓBAL  Pure Hypercholesterolemia  RLS  DM2  GERD  Eczema    Past Surgical History:    Hernia Repair   Section x3  Dilation and Curettage      Family History:    CHF  Hypertension  CAD  Cancer  Basal Cell carcinoma  Diabetes  Lupus      Social History:  Smoking status: Former  Alcohol use: Yes  Drug  use: No  PCP: Pari Wiley  Presents to the ED with spouse and daughter.  Marital Status:         Review of Systems   Constitutional: Positive for chills. Negative for fever.   Respiratory: Positive for cough and shortness of breath.    Cardiovascular: Negative for chest pain.   Gastrointestinal: Positive for nausea.   Neurological: Positive for weakness.   All other systems reviewed and are negative.      Physical Exam     Patient Vitals for the past 24 hrs:   BP Temp Temp src Pulse Heart Rate Resp SpO2   07/06/19 1945 122/74 -- -- 89 87 25 93 %   07/06/19 1930 117/66 -- -- 86 86 23 96 %   07/06/19 1915 114/65 -- -- 91 92 23 95 %   07/06/19 1900 108/69 -- -- 93 96 22 91 %   07/06/19 1856 -- -- -- -- 96 20 94 %   07/06/19 1845 98/61 -- -- 98 98 26 90 %   07/06/19 1830 -- -- -- -- 105 19 92 %   07/06/19 1825 -- -- -- -- 107 18 (!) 88 %   07/06/19 1819 116/64 99.6  F (37.6  C) Oral -- 113 20 (!) 74 %         Physical Exam    General:   Pleasant, ill appearing  female  HEENT:    Oropharynx is moist, without lesions or trismus.  Eyes:    Conjunctiva normal  Neck:    Supple, no meningismus.     CV:     Tachycardic, regular rhythm.      No murmurs, rubs or gallops.       No JVD or unilateral leg swelling.       2+ radial pulses bilateral.       No lower extremity edema.  PULM:    Diffuse inspiratory rales on left base and mid lung     Mild diffuse expiratory wheezing     No respiratory distress although tachypneic     No stridor.  ABD:    Soft, non-tender, non-distended.       No pulsatile masses.       No rebound, guarding or rigidity.  MSK:     No gross deformity to all four extremities.   LYMPH:   No cervical lymphadenopathy.  NEURO:   Alert. Good muscle tone, no atrophy.  Skin:    Warm, dry and intact.    Psych:    Mood is good and affect is appropriate.        Emergency Department Course     ECG:  ECG taken at 1828, ECG read at 1829  Sinus Tachycardia  Cannot rule out inferior infarct, age  undetermined  Anterior Infarct, age undetermined  Abnormal ECG  Rate 106 bpm. AZ interval 188 ms. QRS duration 84 ms. QT/QTc 340/451 ms. P-R-T axes 45 8 61.    Imaging:  Radiology findings were communicated with the patient who voiced understanding of the findings.    XR Chest Port 1 view:   Increased infiltrates in the left lung especially lateral  to left pulmonary hilum and behind heart on the left. Right lung is  clear. Normal pulmonary vascularity.     JAMES SHAW MD  Reading per radiology      Laboratory:  Laboratory findings were communicated with the patient who voiced understanding of the findings.    CBC: WBC 21.4(H) , HGB 13.6,   CMP: Glucose 119(H) o/w WNL (Creatinine 0.87)  INR: 1.07  Partial Thromboplastin time: 36  ISTAT gases lactate venous POCT: Bicarbonate 29(H),    Lactic Acid whole blood(1840): 4.1  Lactic acid (1845): 3.5(H)      Emergency Department Course:   Nursing notes and vitals reviewed.  1827 I performed an exam of the patient as documented above.   1830  EKG was performed, see results above.   1831 The patient was sent for a Chest Port 1 view while in the emergency department, results above.   1841 IV was inserted and blood was drawn for laboratory testing, results above.  1850 Upon recheck patient has increased lung aeration, improved with high flow nasal cannula, FiO2 decreased to 90%    FiO2 decreased to 80%    I spoke to Dr. Sanchez of the hospitalist service who accepts the patient for admission.      I personally reviewed the results with the patient and answered all related questions prior to admission.    Impression & Plan      Medical Decision Making:  Nicole Reyes is a 63 year old female who presents to the emergency department today for evaluation of difficulty breathing.  Patient was noted to have marked hypoxia upon presentation.  She had focal pulmonary findings at the left lung base and left midlung.  Chest x-ray reveals acute infiltrate consistent with  pneumonia.  She has no findings to suggest pulmonary edema and has no signs of volume overload on examination.  Patient was given Zosyn and vancomycin out of concern of hospital-acquired pneumonia given recent hospitalization the last 3 months as well as possible aspiration pneumonia as she had vomited last evening resulted by worsening shortness of breath.  Initial lactate was elevated at 4.1.  Patient given 2 L of IV fluids in which lactic is remarkably improved at 2.2.  She has had no evidence of hypotension in the ED that would require further fluid resuscitation or initiation of vasopressors.    Patient was markedly hypoxic and had significant oxygen requirements upon presentation.  She was on oxygen mask at 15 L with sats at 89%.  She was ventilating well and was not overtly distressed.  We gave her a trial of high flow nasal cannula over BiPAP given the adequate ventilation and lack of distress.  She is on 4 L flow rate with decreasing FiO2 currently at 80%.  Patient will be transferred to the C bed given her high oxygen requirements.    Total critical care time excluding procedures: 30 minutes    Diagnosis:    ICD-10-CM    1. Community acquired bacterial pneumonia J15.9    2. Septic shock (H) A41.9     R65.21    3. Respiratory insufficiency R06.89      Disposition:   The patient is admitted into the care of Dr. Sanchez.      CMS Diagnoses:   The patient has signs of Septic Shock as evidenced by:    1. Presence of Sepsis, AND  2. Lactic Acid level greater than or equal to 4    Time septic shock diagnosis confirmed = 1846 as this was the time when Lactate was resulted and the level was greater than or equal to 4 and diagnosis of bacterial infection with pneumonia on CXR    3 Hour Septic Shock Bundle Completion:  1. Initial Lactic Acid Result:   Recent Labs   Lab Test 07/06/19 2047 07/06/19 1849 07/06/19  1840   LACT 2.2* 3.5* 4.1*     2. Blood Cultures before Antibiotics: Yes  3. Broad Spectrum Antibiotics  Administered: Yes     Anti-infectives (From admission through now)    Start     Dose/Rate Route Frequency Ordered Stop    07/06/19 2110  vancomycin (VANCOCIN) 2,500 mg in sodium chloride 0.9 % 500 mL intermittent infusion      2,500 mg  over 2.5 Hours Intravenous ONCE 07/06/19 2110 07/06/19 1849  piperacillin-tazobactam (ZOSYN) intermittent infusion 4.5 g      4.5 g  200 mL/hr over 30 Minutes Intravenous ONCE 07/06/19 1848 07/06/19 2017        4. 2000 ml of IV fluids.  Patient must be at least 60 in tall to calculate ideal body weight    Severe Sepsis reassessment:  1. Repeat Lactic Acid Level: 2.2  2. MAP>65 after initial IVF bolus, will continue to monitor fluid status and vital signs  I attest to having performed a repeat sepsis exam and assessment of perfusion at 2000 and the results demonstrate improved perfusion.         Scribe Disclosure:  I, Eva Rodríguez, am serving as a scribe at 6:27 PM on 7/6/2019 to document services personally performed by Damien Tapia MD based on my observations and the provider's statements to me.  [facility] EMERGENCY DEPARTMENT       Damien Tapia MD  07/06/19 2212       Damien Tapia MD  07/27/19 0500

## 2019-07-06 NOTE — LETTER
Transition Communication Hand-off for Care Transitions to Next Level of Care Provider    Name: Nicole Reyes  : 1955  MRN #: [id number]  Primary Care Provider: Pari Wiley  Primary Care MD Name: mendoza  Primary Clinic: [address] [facility] [address]  [facility] [address]  Primary Care Clinic Name: [facility]  Reason for Hospitalization:  Respiratory insufficiency [R06.89]  Community acquired bacterial pneumonia [J15.9]  Septic shock (H) [A41.9, R65.21]  Admit Date/Time: 2019  6:15 PM  Discharge Date: 19  Payor Source: Payor: PREFERREDONE / Plan: [employer] [facility] / Product Type: HMO /     Readmission Assessment Measure (COURTNEY) Risk Score/category: Average           Reason for Communication Hand-off Referral: Admission diagnoses: PN    Discharge Plan: Home       Concern for non-adherence with plan of care:   No  Discharge Needs Assessment:  Needs      Most Recent Value   Equipment Currently Used at Home  shower chair, grab bar, tub/shower   # of Referrals Placed by [facility]  Internal Clinic Care Coordination          Already enrolled in Tele-monitoring program and name of program:  NA  Follow-up specialty is recommended: Yes    Follow-up plan:    Future Appointments   Date Time Provider Department Center   7/15/2019  2:20 PM Pari Wiley MD [facility]       Any outstanding tests or procedures:    Procedures     Future Labs/Procedures    Oxygen Adult     Comments:    New Home Oxygen Order 1 liter(s) by nasal cannula continuously with use of portable tank. Expected treatment length is 1 months.. Test on conserving device as applicable.    Patients who qualify for home O2 coverage under the CMS guidelines require ABG tests or O2 sat readings obtained closest to, but no earlier than 2 days prior to the discharge, as evidence of the need for home oxygen therapy. Testing must be performed while patient is in the chronic stable state. See notes for O2 sats.    I certify that this patient,  Nicole Reyes has been under my care and that I, or a nurse practitioner or physician's assistant working with me, had a face-to-face encounter that meets the face-to-face encounter requirements with this patient on 7/11/2019. The patient, Nicole Reyes was evaluated or treated in whole, or in part, for the following medical condition, which necessitates the use of the ordered oxygen. Treatment Diagnosis: pneumonia    Attending Provider: Jose L Weaver  Physician signature: See electronic signature associated with these discharge orders  Date of Order: July 11, 2019                Key Recommendations:  Pt is admitted with PNA.  We did discuss the PNA action care plan.  She is aware of when to contact her primary care provider.  I did give pt an IS and she was able to demonstrate proper use of it.  Pt has a history of CRISTÓBAL.  She said she isn't able to wear a CPAP because she has insomnia when she puts on the mask.  Pt said she was told she should follow up with a sleep center.  I gave pt a hand out on [facility] in [place].  She will discuss it with Dr Wiley and get a referral from her.  Pt has DM.  Her a1c is 6.7 as of 6/21/19.  She checks her blood glucose daily.  It ranges 103 to 120 usually.  She is aware that her blood glucose may be elevated d/t her infection and steroids.   Pt prefers to make her own f/u appt as she has to schedule it around her 's Dialysis days.    Recommendations are to follow up for resolution of pneumonia and complete antibiotics as prescribed. Patient was discharged with O2.     Suzanne Jordan & Jolly Cai    AVS/Discharge Summary is the source of truth; this is a helpful guide for improved communication of patient story

## 2019-07-07 ENCOUNTER — APPOINTMENT (OUTPATIENT)
Dept: PHYSICAL THERAPY | Facility: CLINIC | Age: 64
DRG: 871 | End: 2019-07-07
Attending: INTERNAL MEDICINE
Payer: COMMERCIAL

## 2019-07-07 ENCOUNTER — APPOINTMENT (OUTPATIENT)
Dept: SPEECH THERAPY | Facility: CLINIC | Age: 64
DRG: 871 | End: 2019-07-07
Attending: INTERNAL MEDICINE
Payer: COMMERCIAL

## 2019-07-07 LAB
ANION GAP SERPL CALCULATED.3IONS-SCNC: 9 MMOL/L (ref 3–14)
BUN SERPL-MCNC: 19 MG/DL (ref 7–30)
CALCIUM SERPL-MCNC: 8.2 MG/DL (ref 8.5–10.1)
CHLORIDE SERPL-SCNC: 105 MMOL/L (ref 94–109)
CO2 SERPL-SCNC: 26 MMOL/L (ref 20–32)
CREAT SERPL-MCNC: 0.91 MG/DL (ref 0.52–1.04)
ERYTHROCYTE [DISTWIDTH] IN BLOOD BY AUTOMATED COUNT: 15.2 % (ref 10–15)
GFR SERPL CREATININE-BSD FRML MDRD: 67 ML/MIN/{1.73_M2}
GLUCOSE BLDC GLUCOMTR-MCNC: 103 MG/DL (ref 70–99)
GLUCOSE BLDC GLUCOMTR-MCNC: 106 MG/DL (ref 70–99)
GLUCOSE BLDC GLUCOMTR-MCNC: 109 MG/DL (ref 70–99)
GLUCOSE BLDC GLUCOMTR-MCNC: 123 MG/DL (ref 70–99)
GLUCOSE BLDC GLUCOMTR-MCNC: 124 MG/DL (ref 70–99)
GLUCOSE BLDC GLUCOMTR-MCNC: 184 MG/DL (ref 70–99)
GLUCOSE SERPL-MCNC: 102 MG/DL (ref 70–99)
HCT VFR BLD AUTO: 36.2 % (ref 35–47)
HGB BLD-MCNC: 11.6 G/DL (ref 11.7–15.7)
INTERPRETATION ECG - MUSE: NORMAL
LACTATE BLD-SCNC: 1.1 MMOL/L (ref 0.7–2)
MCH RBC QN AUTO: 26 PG (ref 26.5–33)
MCHC RBC AUTO-ENTMCNC: 32 G/DL (ref 31.5–36.5)
MCV RBC AUTO: 81 FL (ref 78–100)
PLATELET # BLD AUTO: 162 10E9/L (ref 150–450)
POTASSIUM SERPL-SCNC: 3.2 MMOL/L (ref 3.4–5.3)
POTASSIUM SERPL-SCNC: 3.6 MMOL/L (ref 3.4–5.3)
RBC # BLD AUTO: 4.46 10E12/L (ref 3.8–5.2)
SODIUM SERPL-SCNC: 140 MMOL/L (ref 133–144)
VANCOMYCIN SERPL-MCNC: 31.8 MG/L
WBC # BLD AUTO: 17.8 10E9/L (ref 4–11)

## 2019-07-07 PROCEDURE — 00000146 ZZHCL STATISTIC GLUCOSE BY METER IP

## 2019-07-07 PROCEDURE — 25000128 H RX IP 250 OP 636: Performed by: INTERNAL MEDICINE

## 2019-07-07 PROCEDURE — 94640 AIRWAY INHALATION TREATMENT: CPT

## 2019-07-07 PROCEDURE — 40000809 ZZH STATISTIC NO DOCUMENTATION TO SUPPORT CHARGE

## 2019-07-07 PROCEDURE — 99233 SBSQ HOSP IP/OBS HIGH 50: CPT | Performed by: INTERNAL MEDICINE

## 2019-07-07 PROCEDURE — 12000000 ZZH R&B MED SURG/OB

## 2019-07-07 PROCEDURE — 25000132 ZZH RX MED GY IP 250 OP 250 PS 637: Performed by: INTERNAL MEDICINE

## 2019-07-07 PROCEDURE — 83605 ASSAY OF LACTIC ACID: CPT | Performed by: INTERNAL MEDICINE

## 2019-07-07 PROCEDURE — 25000131 ZZH RX MED GY IP 250 OP 636 PS 637: Performed by: INTERNAL MEDICINE

## 2019-07-07 PROCEDURE — 25000125 ZZHC RX 250: Performed by: INTERNAL MEDICINE

## 2019-07-07 PROCEDURE — 25800030 ZZH RX IP 258 OP 636: Performed by: INTERNAL MEDICINE

## 2019-07-07 PROCEDURE — 84132 ASSAY OF SERUM POTASSIUM: CPT | Performed by: INTERNAL MEDICINE

## 2019-07-07 PROCEDURE — 80048 BASIC METABOLIC PNL TOTAL CA: CPT | Performed by: INTERNAL MEDICINE

## 2019-07-07 PROCEDURE — 80202 ASSAY OF VANCOMYCIN: CPT | Performed by: INTERNAL MEDICINE

## 2019-07-07 PROCEDURE — 97530 THERAPEUTIC ACTIVITIES: CPT | Mod: GP

## 2019-07-07 PROCEDURE — 97161 PT EVAL LOW COMPLEX 20 MIN: CPT | Mod: GP

## 2019-07-07 PROCEDURE — 97110 THERAPEUTIC EXERCISES: CPT | Mod: GP

## 2019-07-07 PROCEDURE — 40000275 ZZH STATISTIC RCP TIME EA 10 MIN

## 2019-07-07 PROCEDURE — 94640 AIRWAY INHALATION TREATMENT: CPT | Mod: 76

## 2019-07-07 PROCEDURE — 40000274 ZZH STATISTIC RCP CONSULT EA 30 MIN

## 2019-07-07 PROCEDURE — 36415 COLL VENOUS BLD VENIPUNCTURE: CPT | Performed by: INTERNAL MEDICINE

## 2019-07-07 PROCEDURE — 92610 EVALUATE SWALLOWING FUNCTION: CPT | Mod: GN | Performed by: SPEECH-LANGUAGE PATHOLOGIST

## 2019-07-07 PROCEDURE — 85027 COMPLETE CBC AUTOMATED: CPT | Performed by: INTERNAL MEDICINE

## 2019-07-07 PROCEDURE — 40000983 ZZH STATISTIC HFNC ADULT NON-CPAP

## 2019-07-07 RX ORDER — IPRATROPIUM BROMIDE AND ALBUTEROL SULFATE 2.5; .5 MG/3ML; MG/3ML
3 SOLUTION RESPIRATORY (INHALATION) EVERY 4 HOURS PRN
Status: DISCONTINUED | OUTPATIENT
Start: 2019-07-07 | End: 2019-07-12 | Stop reason: HOSPADM

## 2019-07-07 RX ORDER — PREDNISONE 20 MG/1
40 TABLET ORAL DAILY
Status: DISCONTINUED | OUTPATIENT
Start: 2019-07-07 | End: 2019-07-08

## 2019-07-07 RX ADMIN — HYDROCODONE BITARTRATE AND ACETAMINOPHEN 1 TABLET: 5; 325 TABLET ORAL at 00:13

## 2019-07-07 RX ADMIN — LEVOFLOXACIN 500 MG: 5 INJECTION, SOLUTION INTRAVENOUS at 00:53

## 2019-07-07 RX ADMIN — PREDNISONE 40 MG: 20 TABLET ORAL at 17:16

## 2019-07-07 RX ADMIN — IPRATROPIUM BROMIDE AND ALBUTEROL SULFATE 3 ML: .5; 3 SOLUTION RESPIRATORY (INHALATION) at 15:00

## 2019-07-07 RX ADMIN — GUAIFENESIN 600 MG: 600 TABLET, EXTENDED RELEASE ORAL at 20:50

## 2019-07-07 RX ADMIN — TAZOBACTAM SODIUM AND PIPERACILLIN SODIUM 4.5 G: 500; 4 INJECTION, SOLUTION INTRAVENOUS at 15:43

## 2019-07-07 RX ADMIN — HYDROCODONE BITARTRATE AND ACETAMINOPHEN 1 TABLET: 5; 325 TABLET ORAL at 10:24

## 2019-07-07 RX ADMIN — SERTRALINE HYDROCHLORIDE 200 MG: 100 TABLET ORAL at 09:07

## 2019-07-07 RX ADMIN — GUAIFENESIN AND DEXTROMETHORPHAN 10 ML: 100; 10 SYRUP ORAL at 17:16

## 2019-07-07 RX ADMIN — IPRATROPIUM BROMIDE AND ALBUTEROL SULFATE 3 ML: .5; 3 SOLUTION RESPIRATORY (INHALATION) at 11:38

## 2019-07-07 RX ADMIN — SIMVASTATIN 20 MG: 20 TABLET, FILM COATED ORAL at 22:24

## 2019-07-07 RX ADMIN — GUAIFENESIN AND DEXTROMETHORPHAN 10 ML: 100; 10 SYRUP ORAL at 22:24

## 2019-07-07 RX ADMIN — ASPIRIN 81 MG: 81 TABLET, COATED ORAL at 19:52

## 2019-07-07 RX ADMIN — IPRATROPIUM BROMIDE AND ALBUTEROL SULFATE 3 ML: .5; 3 SOLUTION RESPIRATORY (INHALATION) at 07:22

## 2019-07-07 RX ADMIN — GUAIFENESIN AND DEXTROMETHORPHAN 10 ML: 100; 10 SYRUP ORAL at 10:25

## 2019-07-07 RX ADMIN — SODIUM CHLORIDE: 9 INJECTION, SOLUTION INTRAVENOUS at 00:52

## 2019-07-07 RX ADMIN — PANTOPRAZOLE SODIUM 40 MG: 40 TABLET, DELAYED RELEASE ORAL at 22:24

## 2019-07-07 RX ADMIN — GABAPENTIN 600 MG: 300 CAPSULE ORAL at 19:52

## 2019-07-07 RX ADMIN — GUAIFENESIN 600 MG: 600 TABLET, EXTENDED RELEASE ORAL at 09:07

## 2019-07-07 RX ADMIN — TAZOBACTAM SODIUM AND PIPERACILLIN SODIUM 4.5 G: 500; 4 INJECTION, SOLUTION INTRAVENOUS at 02:19

## 2019-07-07 RX ADMIN — SODIUM CHLORIDE: 9 INJECTION, SOLUTION INTRAVENOUS at 20:54

## 2019-07-07 RX ADMIN — POTASSIUM CHLORIDE 40 MEQ: 1500 TABLET, EXTENDED RELEASE ORAL at 11:46

## 2019-07-07 RX ADMIN — CLONAZEPAM 0.5 MG: 0.5 TABLET ORAL at 20:50

## 2019-07-07 RX ADMIN — Medication 1500 MG: at 05:25

## 2019-07-07 RX ADMIN — IPRATROPIUM BROMIDE AND ALBUTEROL SULFATE 3 ML: .5; 3 SOLUTION RESPIRATORY (INHALATION) at 20:27

## 2019-07-07 RX ADMIN — ENOXAPARIN SODIUM 40 MG: 40 INJECTION SUBCUTANEOUS at 22:23

## 2019-07-07 RX ADMIN — CLONAZEPAM 0.5 MG: 0.5 TABLET ORAL at 09:08

## 2019-07-07 RX ADMIN — Medication 1500 MG: at 13:37

## 2019-07-07 RX ADMIN — IPRATROPIUM BROMIDE AND ALBUTEROL SULFATE 3 ML: .5; 3 SOLUTION RESPIRATORY (INHALATION) at 23:30

## 2019-07-07 RX ADMIN — TAZOBACTAM SODIUM AND PIPERACILLIN SODIUM 4.5 G: 500; 4 INJECTION, SOLUTION INTRAVENOUS at 09:08

## 2019-07-07 RX ADMIN — POTASSIUM CHLORIDE 20 MEQ: 1500 TABLET, EXTENDED RELEASE ORAL at 14:05

## 2019-07-07 RX ADMIN — GUAIFENESIN AND DEXTROMETHORPHAN 10 ML: 100; 10 SYRUP ORAL at 05:31

## 2019-07-07 RX ADMIN — TAZOBACTAM SODIUM AND PIPERACILLIN SODIUM 4.5 G: 500; 4 INJECTION, SOLUTION INTRAVENOUS at 20:50

## 2019-07-07 ASSESSMENT — ACTIVITIES OF DAILY LIVING (ADL)
ADLS_ACUITY_SCORE: 15
ADLS_ACUITY_SCORE: 14
ADLS_ACUITY_SCORE: 14
ADLS_ACUITY_SCORE: 15
ADLS_ACUITY_SCORE: 14
ADLS_ACUITY_SCORE: 14

## 2019-07-07 NOTE — PROGRESS NOTES
"[facility] RCAT      Date: 07/07/2019     Admission Dx: Acute Respiratory Failure     Pulmonary History: Recurrent PNA     Home Nebulizer/MDI Use: Not consistent with home care     Home Oxygen: None     Acuity Level (RCAT flow sheet): 3     Aerosol Therapy initiated: Duoneb QID, and Q4 prn     Pulmonary Hygiene initiated: Deep breathe and cough TID     Volume Expansion initiated: Incentive Spirometer TID     Current Oxygen Requirements: HFNC 40L/70%     Current SpO2: 94%     Re-evaluation date: 07/10/2019     Patient Education: Reviewed medication mechanisms of action     See \"RT Assessments\" flow sheet for patient assessment scoring and Acuity Level Details.                                          "

## 2019-07-07 NOTE — PLAN OF CARE
VS stable. A & O x 4.   Tele:sinus rhythm.   Diet:mod cho.   Ambulates:SBA.   IV meds: NS running at 100 ml/hr. IV antibiotics given.   B, 109  Pain:pain in ribs from coughing. PRN's given   Abnormal labs: K+ 3.2, replaced. Recheck pending. WBC 17.8.   Abnormal assessments: APPLE skin fully this shift as patient was working with therapy. Did see bottom of feet where she has patches of dry skin. Patient says she has psoriasis. Lungs- crackles & expiratory wheezes.     Intake & output: Voiding.   Drains/devices : High flow nasal cannula in place- respiratory monitoring closely.   Discharge plan: TBD. Will continue to monitor and review plan of care.

## 2019-07-07 NOTE — PROGRESS NOTES
07/07/19 1000   General Information   Onset Date 07/06/19   Start of Care Date 07/07/19   Referring Physician Dr. Sanchez   Patient Profile Review/OT: Additional Occupational Profile Info See Profile for full history and prior level of function   Patient/Family Goals Statement None stated   Swallowing Evaluation Bedside swallow evaluation   Behavorial Observations WNL (within normal limits)   Mode of current nutrition Oral diet   Type of oral diet Regular;Thin liquid   Respiratory Status O2 Supply   Type of O2 supply Nasal cannula   Comments Pt was admitted 7/6/19 with recurrent PNA. She was hospitalized in 4/19 with PNA as well. She reports that this is her 4th PNA in the past 5 years. Pt has GERD and reports severe reflux with emesis the night before she became ill and started coughing. She denies difficulty eating or drinking PTA. She does not have dentures with her, but reports wearing an upper plate to eat at home. She denies difficulty with chewing during admission, as she is self-selecting softer foods.   Clinical Swallow Evaluation   Oral Musculature generally intact   Structural Abnormalities present   Dentition   (Missing top teeth and all but 3 lower teeth)   Additional Documentation Yes   Clinical Swallow Eval: Thin Liquid Texture Trial   Mode of Presentation, Thin Liquids cup;straw;self-fed   Volume of Liquid or Food Presented 1 ounce apple juice   Oral Phase of Swallow WFL   Pharyngeal Phase of Swallow intact   Diagnostic Statement No overt signs of penetration/aspiration   Clinical Swallow Eval: Puree Solid Texture Trial   Mode of Presentation, Puree spoon;self-fed   Volume of Puree Presented 3 bites apple sauce   Oral Phase, Puree WFL   Pharyngeal Phase, Puree intact   Diagnostic Statement No overt signs of penetration/aspiration   Clinical Swallow Eval: Solid Food Texture Trial   Mode of Presentation, Solid self-fed   Volume of Solid Food Presented 1/2 melissa cracker   Oral Phase, Solid WFL    Pharyngeal Phase, Solid repeated swallows;reduction in laryngeal movement   Diagnostic Statement No overt signs of penetration/aspiration   Swallow Eval: Clinical Impressions   Skilled Criteria for Therapy Intervention No problems identified which require skilled intervention   Functional Assessment Scale (FAS) 7   Treatment Diagnosis Swallow WFL, no oropharyngeal dysphagia   Diet texture recommendations Regular diet;Thin liquids   Recommended Feeding/Eating Techniques alternate between small bites and sips of food/liquid;maintain upright posture during/after eating for 30 mins;small sips/bites   Anticipated Discharge Disposition home   Risks and Benefits of Treatment have been explained. Yes   Patient, family and/or staff in agreement with Plan of Care Yes   Clinical Impression Comments Patient was seen for clinical swallow evaluation per MD orders. Patient presents with swallow WFL and no signs of oropharyngeal dysphagia. Patient did require a little additional time to masticate a dry cracker. Also noted with solids were multiple swallows and slightly decreased hyolaryngeal excursion. Suspect these are d/t debility and odynophagia rather than pharyngeal impairment. Based on patient's description of severe reflux with emesis the night before PNA symptoms appeared, suspect that if there is an aspiration component to recurrent PNAs, this is d/t aspiration of refluxate and not oropharyngeal dysphagia. RECOMMEND: Continue regular consistency diet (with patient self-selecting softer foods until she gets her dentures) with thin liquids. Patient should follow general safe swallowing precautions as well as strict reflux precautions. Further speech-language treatment is not indicated at this time. Please re-consult as indicated.   Total Evaluation Time   Total Evaluation Time (Minutes) 20

## 2019-07-07 NOTE — PHARMACY-VANCOMYCIN DOSING SERVICE
Pharmacy Vancomycin Initial Note  Date of Service 2019  Patient's  1955  63 year old, female    Indication: [facility] Pneumonia    Current estimated CrCl = Estimated Creatinine Clearance: 119.8 mL/min (based on SCr of 0.87 mg/dL).    Creatinine for last 3 days  2019:  6:40 PM Creatinine 0.87 mg/dL    Recent Vancomycin Level(s) for last 3 days  No results found for requested labs within last 72 hours.      Vancomycin IV Administrations (past 72 hours)                   vancomycin (VANCOCIN) 2,500 mg in sodium chloride 0.9 % 500 mL intermittent infusion (mg) 2,500 mg New Bag 19 2201                Nephrotoxins and other renal medications (From now, onward)    Start     Dose/Rate Route Frequency Ordered Stop    19 0600  vancomycin 1500 mg in 0.9% NaCl 250 ml intermittent infusion 1,500 mg      1,500 mg  over 90 Minutes Intravenous EVERY 8 HOURS 19 2259      19 0200  piperacillin-tazobactam (ZOSYN) intermittent infusion 4.5 g     Note to Pharmacy:  Pharmacy can adjust dose based on renal function.    4.5 g  200 mL/hr over 30 Minutes Intravenous EVERY 6 HOURS 19 2241            Contrast Orders - past 72 hours (72h ago, onward)    None                Plan:  1.  Start vancomycin  1500 mg IV q8h.   2.  Goal Trough Level: 15-20 mg/L   3.  Pharmacy will check trough levels as appropriate in 1-3 Days.    4. Serum creatinine levels will be ordered daily for the first week of therapy and at least twice weekly for subsequent weeks.    5. Tollhouse method utilized to dose vancomycin therapy: Method 1    Adriana Mccrary

## 2019-07-07 NOTE — PROGRESS NOTES
RT- Patient placed on HFNC 40L 100% at 1855 for increased oxygen needs. Patient tolerating well. Will continue to monitor and support.     Domo Max, RT on 7/6/2019 at 7:07 PM

## 2019-07-07 NOTE — PROGRESS NOTES
07/07/19 1002   Quick Adds   Type of Visit Initial PT Evaluation   Living Environment   Lives With spouse   Living Arrangements house   Home Accessibility stairs to enter home;stairs within home   Number of Stairs, Main Entrance 2   Number of Stairs, Within Home, Primary 10   Transportation Anticipated family or friend will provide   Living Environment Comment Pt denies concerns about going home.    Self-Care   Usual Activity Tolerance good   Current Activity Tolerance fair   Equipment Currently Used at Home shower chair;grab bar, tub/shower   Functional Level Prior   Ambulation 0-->independent   Transferring 0-->independent   Toileting 0-->independent   Bathing 0-->independent   Communication 0-->understands/communicates without difficulty   Fall history within last six months no   Which of the above functional risks had a recent onset or change? ambulation   General Information   Onset of Illness/Injury or Date of Surgery - Date 07/06/19   Referring Physician Yenifer Sanchez MD   Patient/Family Goals Statement Pt plans to retun home after her hospital stay.    Pertinent History of Current Problem (include personal factors and/or comorbidities that impact the POC) 63 year old female with history of type 2 diabetes mellitus, obesity, hypertension, hyperlipidemia, history of obstructive sleep apnea noncompliant with CPAP, osteoarthritis and sciatica, history of recurrent pneumonias presented to the emergency room with complaints of cough and shortness of breath.   Precautions/Limitations fall precautions   Cognitive Status Examination   Orientation orientation to person, place and time   Level of Consciousness alert   Follows Commands and Answers Questions 100% of the time   Personal Safety and Judgment intact   Memory intact   Pain Assessment   Patient Currently in Pain Yes, see Vital Sign flowsheet  (Muscular chest pain: 5-6/10)   Integumentary/Edema   Integumentary/Edema no deficits were identifed   Range of  "Motion (ROM)   ROM Quick Adds No deficits were identified   Strength   Strength Comments Pt is able to perform sit<> stand with no assistance.Strength WFL.    Bed Mobility   Bed Mobility Comments SBA bed <> sitting EOB.   Transfer Skills   Transfer Comments SBA without AD bed <> bed side chair.    Gait   Gait Comments Pt ambulates 5' in without AD in  room with SBA for lines/tubes.     Balance   Balance Comments Pt has good balance with sitting and standing and does not need assistance from PT.     Sensory Examination   Sensory Perception no deficits were identified   General Therapy Interventions   Planned Therapy Interventions home program guidelines;progressive activity/exercise;strengthening;gait training;transfer training   Clinical Impression   Criteria for Skilled Therapeutic Intervention yes, treatment indicated   PT Diagnosis Generalized weakness   Influenced by the following impairments Decreased LE strength, decreased activity tolerance, and O2 supplementation.    Functional limitations due to impairments Transfers,  gait, and activity tolerance.    Clinical Presentation Stable/Uncomplicated   Clinical Presentation Rationale 1-2 body system involvement with stable condition.    Clinical Decision Making (Complexity) Low complexity   Therapy Frequency Daily   Predicted Duration of Therapy Intervention (days/wks) 2-3   Anticipated Discharge Disposition Home with Assist   Risk & Benefits of therapy have been explained Yes   Patient, Family & other staff in agreement with plan of care Yes   [facility] LED Light Sense TM \"6 Clicks\"   2016, Trustees of [facility], under license to [employer].  All rights reserved.   6 Clicks Short Forms Basic Mobility Inpatient Short Form   [facility] [employer]  \"6 Clicks\" V.2 Basic Mobility Inpatient Short Form   1. Turning from your back to your side while in a flat bed without using bedrails? 4 - None   2. Moving from lying on your back to sitting on the side of a " flat bed without using bedrails? 4 - None   3. Moving to and from a bed to a chair (including a wheelchair)? 4 - None   4. Standing up from a chair using your arms (e.g., wheelchair, or bedside chair)? 4 - None   5. To walk in hospital room? 4 - None   6. Climbing 3-5 steps with a railing? 3 - A Little   Basic Mobility Raw Score (Score out of 24.Lower scores equate to lower levels of function) 23   Total Evaluation Time   Total Evaluation Time (Minutes) 14

## 2019-07-07 NOTE — PLAN OF CARE
"Patient admitted to room 313, VS stable, c/o muscles pain\" coughing a lot\". Robitussin given.  Alert and oriented. Vanco  running.  Continue to monitor.     "

## 2019-07-07 NOTE — PROGRESS NOTES
RT- Patient transported to 3rd floor from ED on 15L oxymask. Patient tolerated well. No compllications noted. Patient placed on HFNC 40L 70% on arrival to 3rd floor. Will continue to monitor and support.    Domo Max, RT on 7/6/2019 at 11:51 PM

## 2019-07-07 NOTE — PHARMACY-ADMISSION MEDICATION HISTORY
Admission medication history interview status for this patient is complete. See [facility] admission navigator for allergy information, prior to admission medications and immunization status.     Medication history interview source(s):Patient  Medication history resources (including written lists, pill bottles, clinic record):None  Primary pharmacy: [facility] ([facility] or [facility])    Changes made to PTA medication list:  Added: none  Deleted: none  Changed: none    Actions taken by pharmacist (provider contacted, etc):None     Additional medication history information: Changing from Ambien IR to Ambien ER to try and see if ER helps sleep through the night.    Medication reconciliation/reorder completed by provider prior to medication history? No    Do you take OTC medications (eg tylenol, ibuprofen, fish oil, eye/ear drops, etc)? N     Prior to Admission medications    Medication Sig Last Dose Taking? Auth Provider   ASPIRIN EC PO Take 81 mg by mouth every evening 7/5/2019 at PM Yes Unknown, Entered By History   clonazePAM (KLONOPIN) 1 MG tablet Take 0.5 mg by mouth 2 times daily 7/5/2019 at PM Yes Unknown, Entered By History   gabapentin (NEURONTIN) 300 MG capsule Take 2 capsules (600 mg) by mouth daily 7/5/2019 at PM Yes Pari Wiley MD   ibuprofen (ADVIL,MOTRIN) 600 MG tablet Take 1 tablet (600 mg) by mouth every 6 hours as needed for moderate pain 7/6/2019 at 1500 Yes Tal Sanchez MD   losartan (COZAAR) 100 MG tablet Take 1 tablet (100 mg) by mouth daily 7/5/2019 at AM Yes Pari Wiley MD   metFORMIN (GLUCOPHAGE-XR) 500 MG 24 hr tablet Take 2 tablets (1,000 mg) by mouth every morning 7/5/2019 at AM Yes Pari Wiley MD   pantoprazole (PROTONIX) 40 MG EC tablet Take 1 tablet (40 mg) by mouth At Bedtime 7/5/2019 at PM Yes Pari Wiley MD   sertraline (ZOLOFT) 100 MG tablet Take 2 tablets (200 mg) by mouth daily 7/5/2019 at AM Yes Pari Wiley MD   simvastatin (ZOCOR) 20 MG tablet Take 1 tablet (20 mg) by  mouth At Bedtime 7/5/2019 at PM Yes Pari Wiley MD   zolpidem (AMBIEN) 10 MG tablet Take 0.5 tablets (5 mg) by mouth nightly as needed for sleep Past Month Yes Pari Wiley MD   zolpidem ER (AMBIEN CR) 6.25 MG CR tablet Take 1 tablet (6.25 mg) by mouth nightly as needed for sleep 7/4/2019 at PM Yes Pari Wiley MD   blood glucose (ACCU-CHEK BYRON PLUS) test strip Use to test blood sugar 2 times daily or as directed.   Pari Wiley MD   cyclobenzaprine (FLEXERIL) 10 MG tablet Take 1 tablet (10 mg) by mouth 3 times daily as needed for muscle spasms Unknown  Pari Wiley MD   order for DME Equipment being ordered: CPAP   Pari Wiley MD

## 2019-07-07 NOTE — H&P
[facility]  History and Physical   Hospitalist  Yenifer Sanchez MD       Nicole Reyes MRN# [id number]   YOB: 1955 Age: 63 year old      Date of Admission:  7/6/2019         Assessment and Plan:   minda Reyes is a 63 year old female with history of type 2 diabetes mellitus, obesity, hypertension, hyperlipidemia, history of obstructive sleep apnea noncompliant with CPAP, osteoarthritis and sciatica, history of recurrent pneumonias presented to the emergency room with complaints of cough and shortness of breath.  Was found to be hypoxic to 79% needing high flow nasal cannula at 6 L and lactate elevated at 4.1, WBC count of 21K    1.  Acute hypoxic respiratory failure and sepsis secondary to possible hospital-acquired pneumonia;  She was hospitalized recently with pneumonia in April 2019 so hospital-acquired pneumonia is a possibility so we will continue IV Zosyn and vancomycin and will add Levaquin 500 mg IV daily  Follow-up on the cultures once the cultures remain negative will plan on  Narrowing  down the antibiotics  Sputum culture will be ordered  Wean oxygen as tolerated  DuoNeb's 4 times daily  Robitussin-DM 10 mL every 6 hours to help with cough  Encourage incentive spirometry  RCA T CONSULTATION will be obtained   encourage incentive spirometry  Speech therapy consultation for evaluation for dysphagia    2.  Diabetes mellitus type 2;  Hemoglobin A1c was 6.7% on June 21 of 2019  Hold metformin for now  Sliding scale insulin with NovoLog medium dose will be ordered  Moderate carb controlled diet    3.  Hypertension;  She is at risk of hypotension with sepsis and respiratory failure and pneumonia  So we will hold the losartan  Monitor blood pressure closely    4.  Hyperlipidemia;  Continue statin prior to admission dose    5.  Obesity;  Needs lifestyle modification  6.  Obstructive sleep apnea;  Noncompliant with CPAP discussed with her the importance of using CPAP and  that she needs to follow-up in.  Sleep clinic  7.  DVT prophylaxis;  Subcu Lovenox    8.  History of GERD;  Continue Protonix    CODE STATUS;  Full code    She will be admitted as inpatient to the [facility] telemetry unit with close monitoring             Code Status:   Full Code         Primary Care Physician:   Pari Wiley [phone]         Chief Complaint:   Cough shortness of breath, hypoxia    History is obtained from the patient         History of Present Illness:   Nicole Reyes is a 63 year old female with history of type 2 diabetes mellitus, obesity, hypertension, hyperlipidemia, history of obstructive sleep apnea noncompliant with CPAP, osteoarthritis and sciatica, history of recurrent pneumonias presented to the emergency room with complaints of cough and shortness of breath.  She started feeling short of breath since yesterday and started coughing since last night with brownish fluidlike sputum production she also had low-grade fever 100.4  F at home prior to coming into the emergency room.  Also had some chills last night.  She is coughing so much having gagging spells and posttussive emesis with brownish colored fluid several times since last night.  She was hospitalized from April 11-18, 2019 with pneumonia with acute hypoxic respiratory failure and sepsis and was treated with ceftriaxone Zithromax and improved and got discharged home.  She tells me that this is her fourth hospitalization for pneumonia.  She was seen by Dr. Damien Tapia in the emergency room.  On arrival she was hypoxic to 79% on room air so she was placed on high flow nasal cannula and she is currently needing 6 L of oxygen on a high flow nasal cannula and is able to maintain oxygen saturations at 94%.  Chest x-ray showed left-sided infiltrate consistent with pneumonia.  she was given a DuoNeb,.  Ibuprofen and a dose of Zosyn.  With recent hospitalization for pneumonia in April there is concern for hospital-acquired pneumonia  so the antibiotic coverage was broadened with vancomycin and Zosyn.  She denies having any leg leg swellings or recent weight gain . she does have history of rash with itching with penicillins but is was able to tolerate Zosyn okay in the emergency room.  She showed signs of sepsis with tachycardia on arrival to the emergency room with elevated lactate at 4.1 and WBC count was elevated at 21K.  So she is getting admitted with acute hypoxic respiratory failure and sepsis secondary to possible hospital-acquired pneumonia with history of recurrent pneumonia         Past Medical History:     Past Medical History:   Diagnosis Date     CKD (chronic kidney disease) stage 3, GFR 30-59 ml/min (H) creatinine is normal today at 0.8      Essential hypertension, benign      Generalized anxiety disorder      Hernia, abdominal      Hypertension      Insomnia, unspecified      Iron deficiency anemia      Major depression     sees a psychiatrist     Menopause     age 53     Obesity, unspecified      CRISTÓBAL (obstructive sleep apnea) noncompliant with  CPAP or BiPAP     bipap     Postoperative seroma 10/11    drained several times     Pure hypercholesterolemia      RLS (restless legs syndrome)      Type II or unspecified type diabetes mellitus without mention of complication, not stated as uncontrolled  Hemoglobin A1c was 6.7% on               Past Surgical History:     Past Surgical History:   Procedure Laterality Date     BREAST BIOPSY, RT/LT      2 times; benign     C NONSPECIFIC PROCEDURE      Hernia repair      C NONSPECIFIC PROCEDURE      Lymph node biopsy in neck (benign)       SECTION        SECTION        SECTION       DILATION AND CURETTAGE, HYSTEROSCOPY DIAGNOSTIC, COMBINED  3/22/2013    Procedure: COMBINED DILATION AND CURETTAGE, HYSTEROSCOPY DIAGNOSTIC;  DILATION AND CURETTAGE, HYSTEROSCOPY DIAGNOSTIC   Converted to a general;  Surgeon: Robi Edwards MD;  Location:  OR      ESOPHAGOSCOPY, GASTROSCOPY, DUODENOSCOPY (EGD), COMBINED N/A 12/8/2015    Procedure: COMBINED ESOPHAGOSCOPY, GASTROSCOPY, DUODENOSCOPY (EGD);  Surgeon: Obinna Gutierrez MD;  Location:  GI     ESOPHAGOSCOPY, GASTROSCOPY, DUODENOSCOPY (EGD), COMBINED N/A 12/22/2015    Procedure: COMBINED ENDOSCOPIC ULTRASOUND, ESOPHAGOSCOPY, GASTROSCOPY, DUODENOSCOPY (EGD), FINE NEEDLE ASPIRATE/BIOPSY;  Surgeon: Sabine Olivares MD;  Location:  GI     HERNIORRHAPHY INCISIONAL (LOCATION)  10/8/2011     incarcerated hernia repair with mesh;     HERNIORRHAPHY INCISIONAL (LOCATION)  10/16/2011     repair incisional hernia with mesh, and removal of current implanted mesh;              Home Medications:     Prior to Admission medications    Medication Sig Last Dose Taking? Auth Provider   ASPIRIN EC PO Take 81 mg by mouth every evening 7/5/2019 at PM Yes Unknown, Entered By History   clonazePAM (KLONOPIN) 1 MG tablet Take 0.5 mg by mouth 2 times daily 7/5/2019 at PM Yes Unknown, Entered By History   gabapentin (NEURONTIN) 300 MG capsule Take 2 capsules (600 mg) by mouth daily 7/5/2019 at PM Yes Pari Wiley MD   ibuprofen (ADVIL,MOTRIN) 600 MG tablet Take 1 tablet (600 mg) by mouth every 6 hours as needed for moderate pain 7/6/2019 at 1500 Yes Tal Sanchez MD   losartan (COZAAR) 100 MG tablet Take 1 tablet (100 mg) by mouth daily 7/5/2019 at AM Yes Pari Wiley MD   metFORMIN (GLUCOPHAGE-XR) 500 MG 24 hr tablet Take 2 tablets (1,000 mg) by mouth every morning 7/5/2019 at AM Yes Pari Wiley MD   pantoprazole (PROTONIX) 40 MG EC tablet Take 1 tablet (40 mg) by mouth At Bedtime 7/5/2019 at PM Yes Pari Wiley MD   sertraline (ZOLOFT) 100 MG tablet Take 2 tablets (200 mg) by mouth daily 7/5/2019 at AM Yes Pari Wiley MD   simvastatin (ZOCOR) 20 MG tablet Take 1 tablet (20 mg) by mouth At Bedtime 7/5/2019 at PM Yes Pari Wiley MD   zolpidem (AMBIEN) 10 MG tablet Take 0.5 tablets (5 mg) by mouth nightly as  needed for sleep Past Month Yes Pari Wiley MD   zolpidem ER (AMBIEN CR) 6.25 MG CR tablet Take 1 tablet (6.25 mg) by mouth nightly as needed for sleep 2019 at PM Yes Pari Wiley MD   blood glucose (ACCU-CHEK BYRON PLUS) test strip Use to test blood sugar 2 times daily or as directed.   Pari Wiley MD   cyclobenzaprine (FLEXERIL) 10 MG tablet Take 1 tablet (10 mg) by mouth 3 times daily as needed for muscle spasms Unknown  Pari Wiley MD   order for DME Equipment being ordered: CPAP   Pari Wiley MD            Allergies:     Allergies   Allergen Reactions     Codeine Rash     Penicillins Rash     Pt has tolerated cephalosporins and penems.   Pt tolerated Zosyn 2019            Social History:     Social History     Tobacco Use     Smoking status: Former Smoker     Last attempt to quit: 3/18/1979     Years since quittin.3     Smokeless tobacco: Never Used     Tobacco comment: quit    Substance Use Topics     Alcohol use: Yes     Comment: Twice a month     She only smoked for 5 years            Family History:     Family History   Problem Relation Age of Onset     Cardiovascular Mother         CHF     Hypertension Mother      C.A.D. Mother         50s     Lipids Mother      Cancer Father         Hodgkin's, Leukemia     Diabetes Sister      Connective Tissue Disorder Sister         Lupus     Lipids Sister      Hypertension Brother      Hypertension Sister      Hypertension Sister      Cancer Brother         Hodgkin's     C.A.D. Brother 61     Hypertension Sister      C.A.D. Brother      Basal cell carcinoma Daughter 38        top of head            Review of Systems:       The 10 point Review of Systems is negative other than noted in the HPI           Physical Exam:   Blood pressure (!) 131/96, pulse 84, temperature 99.6  F (37.6  C), temperature source Oral, resp. rate 17, last menstrual period 09/15/2007, SpO2 95 %, not currently breastfeeding.  0 lbs 0 oz    Constitutional: Alert, awake  not in any distress   Psych: Mood and affect are normal    Neuro: Cranial nerves 2-12 are intact, muscle power 5/5, no focal weakness   Eyes: No conjunctival injection, No scleral icterus, PERRLA   ENT: Ear, nose , throat are normal. Mucous membranes are dry       Cardiovascular:  Normal rate rhythm regular, no murmurs   Lungs:  Coarse breath sounds bilaterally.  No rales rhonchi or wheezing heard on auscultation   Abdomen:  Soft, nontender, nondistended, bowel sounds positive   Skin:  Warm and dry   Musculoskeletal:  No signs of active tenosynovitis seen   Other:           Data:   All new lab and imaging data was reviewed.   Recent Labs   Lab Test 07/06/19  1921 07/06/19  1840 04/18/19  0715  10/19/16  1630   WBC  --  21.4* 13.6*   < > 17.7*   HGB  --  13.6 12.5   < > 10.6*   MCV  --  82 83   < > 72*   PLT  --  298 248   < > 271   INR 1.07  --   --   --  0.99    < > = values in this interval not displayed.      Last Comprehensive Metabolic Panel:  Sodium   Date Value Ref Range Status   07/06/2019 140 133 - 144 mmol/L Final     Potassium   Date Value Ref Range Status   07/06/2019 3.4 3.4 - 5.3 mmol/L Final     Comment:     Specimen slightly hemolyzed, potassium may be falsely elevated     Chloride   Date Value Ref Range Status   07/06/2019 104 94 - 109 mmol/L Final     Carbon Dioxide   Date Value Ref Range Status   07/06/2019 25 20 - 32 mmol/L Final     Anion Gap   Date Value Ref Range Status   07/06/2019 11 3 - 14 mmol/L Final     Glucose   Date Value Ref Range Status   07/06/2019 119 (H) 70 - 99 mg/dL Final     Urea Nitrogen   Date Value Ref Range Status   07/06/2019 13 7 - 30 mg/dL Final     Creatinine   Date Value Ref Range Status   07/06/2019 0.87 0.52 - 1.04 mg/dL Final     GFR Estimate   Date Value Ref Range Status   07/06/2019 71 >60 mL/min/[1.73_m2] Final     Comment:     Non  GFR Calc  Starting 12/18/2018, serum creatinine based estimated GFR (eGFR) will be   calculated using the Chronic  Kidney Disease Epidemiology Collaboration   (CKD-EPI) equation.       Calcium   Date Value Ref Range Status   07/06/2019 9.1 8.5 - 10.1 mg/dL Final     Bilirubin Total   Date Value Ref Range Status   07/06/2019 0.7 0.2 - 1.3 mg/dL Final     Alkaline Phosphatase   Date Value Ref Range Status   07/06/2019 122 40 - 150 U/L Final     ALT   Date Value Ref Range Status   07/06/2019 32 0 - 50 U/L Final     AST   Date Value Ref Range Status   07/06/2019 39 0 - 45 U/L Final     Comment:     Specimen is hemolyzed which can falsely elevate AST. Analysis of a   non-hemolyzed specimen may result in a lower value.           Recent Results (from the past 24 hour(s))   XR Chest Port 1 View    Narrative    CHEST ONE VIEW PORTABLE   7/6/2019 6:43 PM     HISTORY: Short of breath.    COMPARISON: 4/11/2019.      Impression    IMPRESSION: Increased infiltrates in the left lung especially lateral  to left pulmonary hilum and behind heart on the left. Right lung is  clear. Normal pulmonary vascularity.     JAMES SHAW MD       Twelve-lead EKG shows sinus rhythm with anterior infarct or inferior infarct no change from previous EKG  Recent Labs   Lab Test 07/06/19  1840 06/21/19  1438    142   POTASSIUM 3.4 4.0   CHLORIDE 104 105   CO2 25 28   BUN 13 13   CR 0.87 0.95   ANIONGAP 11 9   GRECIA 9.1 8.7   * 108*     Recent Labs   Lab Test 07/06/19  1840 04/11/19  1624 09/19/17  1515   TROPI <0.015 0.019 <0.015

## 2019-07-07 NOTE — PLAN OF CARE
Discharge Planner SLP   Patient plan for discharge: None stated  Current status: Patient was seen for clinical swallow evaluation per MD orders. Patient presents with swallow WFL and no signs of oropharyngeal dysphagia. Patient did require a little additional time to masticate a dry cracker. Also noted with solids were multiple swallows and slightly decreased hyolaryngeal excursion. Suspect these are d/t debility and odynophagia rather than pharyngeal impairment. Based on patient's description of severe reflux with emesis the night before PNA symptoms appeared, suspect that if there is an aspiration component to recurrent PNAs, this is d/t aspiration of refluxate and not oropharyngeal dysphagia. RECOMMEND: Continue regular consistency diet (with patient self-selecting softer foods until she gets her dentures) with thin liquids. Patient should follow general safe swallowing precautions as well as strict reflux precautions. Further speech-language treatment is not indicated at this time. Please re-consult as indicated.  Barriers to return to prior living situation: Medical/respiratory status  Recommendations for discharge: Home  Rationale for recommendations: Patient is tolerating baseline diet with swallow WFL. No further speech-language intervention indicated at this time.       Entered by: Carol Bobby 07/07/2019 11:15 AM

## 2019-07-07 NOTE — PLAN OF CARE
PT: Orders Received. Chart Reviewed. Pt is a 63 year old female with history of type 2 diabetes mellitus, obesity, hypertension, hyperlipidemia, history of obstructive sleep apnea noncompliant with CPAP, osteoarthritis and sciatica, history of recurrent pneumonias presented to the emergency room with complaints of cough and shortness of breath. Pt lives in a home with her spouse who is able to assist for portions of the day if needed.  She has stairs to enter her home and 10 stairs she needs to take inside her home. She did not need any  assistance with her ADL's before her admit to  the hospital.     Discharge Planner PT   Patient plan for discharge: Home with assist   Current status: Pt is supine with HOB elevated at the start of therapy with stable vitals on HFNC 40L/70%. Pt is able tp perform bed mobility supine <> sitting EOB with SBA. She perform bed to bedside chair tranfer 5'  with SBA  without an AD and has stable COM but decreaed nilsa and step length.  Pt performs sit  <> stand x 2 with SBA and remains above 90 throughout. Pt is limited with ambulation today due to HFNC.    Barriers to return to prior living situation: Need for O2 at this point, activity tolerance,  and stair ambulation.    Recommendations for discharge: Home with assist  Rationale for recommendations: Pt does not need assistance with her bed mobility, tranfers, or short gait today.  If she is able to wean from O2 will be appropriate for discharge home.       Entered by: Michael Rehman 07/07/2019 10:37 AM

## 2019-07-07 NOTE — PROGRESS NOTES
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  Was found to be hypoxic to 79% needing high flow nasal cannula at 6 L and lactate elevated at 4.1, WBC count of 21K     1.  Acute hypoxic respiratory failure and sepsis secondary to possible hospital-acquired pneumonia;  --She was hospitalized recently with pneumonia in April 2019 so hospital-acquired pneumonia is a possibility so we will continue IV Zosyn and vancomycin and Levaquin (started 7/6)  --She reports this is about her fourth hospitalization in 5 years for pneumonia.  --She has had swallow evaluation which she said was unremarkable  --Cultures are negative thus far  --Now afebrile.  White blood cell count improving and lactate normalized.  --Continue with high flow oxygen and wean as able.  She reports she has used up on oxygen at home when recovering from pneumonias.  She was not on oxygen prior to admission.  --We will add prednisone for a 5-day course  --DuoNeb 4 times daily  --Robitussin-DM 10 mL every 6 hours to help with cough  --Encourage incentive spirometry  --Refer to pulmonology at discharge to assess etiology of recurrent pneumonia.  She does states she has pretty significant GERD and this may be playing a role.     2.  Diabetes mellitus type 2;  --Hemoglobin A1c was 6.7% on June 21 of 2019  --Hold metformin for now  --Sliding scale insulin with NovoLog medium dose will be ordered  --Moderate carb controlled diet  --BG okay  "right now at 102 but may increase with steroid treatment.     3.  Hypertension;  --Blood pressure okay off her chronic dose of losartan.  Resume with blood pressure increases     4.  Hyperlipidemia;  --Continue statin prior to admission dose     5.  Obstructive sleep apnea;  --Does not use CPAP regularly.  Discussed with her the importance of using CPAP   -- follow-up in sleep clinic    6.  History of GERD;  Continue Protonix     CODE STATUS;  Full code      DVT Prophylaxis: Enoxaparin (Lovenox) SQ  Code Status: Full Code  Disposition Plan   Expected discharge in 3 days to prior living arrangement once breathing and sepsis improved.     Entered: Bg Acevedo 07/07/2019, 3:53 PM     Incidental Findings/ Discharge Issues: None            Interval History (Subjective):      Feels a little better.  She has chest wall pain with coughing.  She has a productive cough.  She feels generally tired.                  Physical Exam:      Last Vital Signs:  /65 (BP Location: Right arm)   Pulse 85   Temp 97.7  F (36.5  C) (Oral)   Resp 20   Ht 1.473 m (4' 10\")   Wt 114.7 kg (252 lb 12.8 oz)   LMP 09/15/2007   SpO2 97%   BMI 52.84 kg/m        Vital signs reviewed  General:  Alert, calm, NAD  CV: regular rate and rhythm, no murmurs or rubs  Lungs: Coarse bilaterally, normal respiratory effort  HEENT:  Pupil round, equal, conjuctivae, sclerae and lids normal, neck is supple  Abdomen:  Soft, nontender, nondistended, no masses, normal bowel sounds  Extremities:  No edema  Neuro: normal strength and sensation in all 4 extremities, cranial nerves grossly intact  Psychiatric:  Mood and affect within normal limits  Skin:  No rashes or wounds evident  Heme:  No lymphandenopathy appreciated           Medications:      All current medications were reviewed with changes reflected in problem list.         Data:      All new lab and imaging data was reviewed.   Labs:  WBC 17.8 from 21.4  Lactate 1.1 from 2.2  "

## 2019-07-07 NOTE — ED NOTES
[facility]  ED Nurse Handoff Report    Nicole Reyes is a 63 year old female   ED Chief complaint: Shortness of Breath  . ED Diagnosis:   Final diagnoses:   Community acquired bacterial pneumonia   Septic shock (H)   Respiratory insufficiency     Allergies:   Allergies   Allergen Reactions     Codeine Rash     Penicillins Rash     Pt has tolerated cephalosporins and penems.   Pt tolerated Zosyn 7/6/2019       Code Status: Full Code  Activity level - Baseline/Home:  Independent. Activity Level - Current:   Assist X 1. Lift room needed: No. Bariatric: No   Needed: No   Isolation: No. Infection: Not Applicable.     Vital Signs:   Vitals:    07/06/19 2015 07/06/19 2030 07/06/19 2045 07/06/19 2100   BP: 127/69 111/69 123/79 (!) 131/96   Pulse: 87 89 85 84   Resp: 9 20 26 17   Temp:       TempSrc:       SpO2: 95% 94% 96% 95%       Cardiac Rhythm:  ,      Pain level: 0-10 Pain Scale: 7  Patient confused: No. Patient Falls Risk: Yes.   Elimination Status: Has voided   Patient Report - Initial Complaint: Patient c/o vomiting, sore throat, SOB and cough which started yesterday. Febrile at home. Intital SpO2 74% on room air.  Focused Assessment: Respiratory - Respiratory WDL: -WDL except; effort/expansion; rhythm/pattern  Rhythm/Pattern, Respiratory: assisted mechanically; shortness of breath (HFNC) Expansion/Accessory Muscles/Retractions: abdominal muscle use  Breath Sounds: All Fields   Head To Toe Assessment - All Lung Fields Breath Sounds: Anterior:; Lateral:; diminished     Current SpO2 93% on 80% FI02 per 6L HFNC   Tests Performed: Lab, CXR. Abnormal Results:   Labs Ordered and Resulted from Time of ED Arrival Up to the Time of Departure from the ED   CBC WITH PLATELETS DIFFERENTIAL - Abnormal; Notable for the following components:       Result Value    WBC 21.4 (*)     RBC Count 5.31 (*)     MCH 25.6 (*)     MCHC 31.3 (*)     RDW 15.4 (*)     Absolute Neutrophil 19.3 (*)     All other  components within normal limits   COMPREHENSIVE METABOLIC PANEL - Abnormal; Notable for the following components:    Glucose 119 (*)     Albumin 3.3 (*)     All other components within normal limits   LACTIC ACID WHOLE BLOOD - Abnormal; Notable for the following components:    Lactic Acid 4.1 (*)     All other components within normal limits   NT PROBNP INPATIENT - Abnormal; Notable for the following components:    N-Terminal Pro BNP Inpatient 1,739 (*)     All other components within normal limits   LACTIC ACID WHOLE BLOOD - Abnormal; Notable for the following components:    Lactic Acid 2.2 (*)     All other components within normal limits   ISTAT  GASES LACTATE NIMO POCT - Abnormal; Notable for the following components:    Bicarbonate Venous 29 (*)     Lactic Acid 3.5 (*)     All other components within normal limits   TROPONIN I   INR   PARTIAL THROMBOPLASTIN TIME   PULSE OXIMETRY NURSING   CARDIAC CONTINUOUS MONITORING   MEASURE URINE OUTPUT   PATIENT CARE ORDER   ISTAT CG4 GASES LACTATE NIMO NURSING POCT   BLOOD CULTURE   BLOOD CULTURE     XR Chest Port 1 View   Final Result   IMPRESSION: Increased infiltrates in the left lung especially lateral   to left pulmonary hilum and behind heart on the left. Right lung is   clear. Normal pulmonary vascularity.       JAMES SHAW MD      .   Treatments provided: See MAR.  Lactate improving with 2L LR infusion.  Family Comments: Multiple family members present  OBS brochure/video discussed/provided to patient:  N/A  ED Medications:   Medications   ipratropium - albuterol 0.5 mg/2.5 mg/3 mL (DUONEB) 0.5-2.5 (3) MG/3ML neb solution (  Canceled Entry 7/6/19 1856)   ipratropium - albuterol 0.5 mg/2.5 mg/3 mL (DUONEB) neb solution 6 mL (6 mLs Nebulization Not Given 7/6/19 1856)   vancomycin (VANCOCIN) 2,500 mg in sodium chloride 0.9 % 500 mL intermittent infusion (has no administration in time range)   piperacillin-tazobactam (ZOSYN) intermittent infusion 4.5 g (0 g  Intravenous Stopped 7/6/19 2017)   lactated ringers BOLUS 2,000 mL (0 mLs Intravenous Stopped 7/6/19 2046)   ibuprofen (ADVIL/MOTRIN) tablet 600 mg (600 mg Oral Given 7/6/19 2041)     Drips infusing:  Yes  For the majority of the shift, the patient's behavior Green. Interventions performed were none.     Severe Sepsis OR Septic Shock Diagnosis Present: Yes    Per the ED Provider, Time Zero for severe sepsis or septic shock is:  1846    3 Hour Severe Sepsis Bundle Completion:  1. Initial Lactic Acid Result:   Recent Labs   Lab Test 07/06/19 2047 07/06/19 1849 07/06/19 1840   LACT 2.2* 3.5* 4.1*     2. Blood Cultures before Antibiotics: Yes  3. Broad Spectrum Antibiotics Administered: Zosyn and Vanc     Anti-infectives (From now, onward)    Start     Dose/Rate Route Frequency Ordered Stop    07/06/19 2110  vancomycin (VANCOCIN) 2,500 mg in sodium chloride 0.9 % 500 mL intermittent infusion      2,500 mg  over 2.5 Hours Intravenous ONCE 07/06/19 2110          4. 2000 ml of IV fluids have been given so far      6 Hour Severe Sepsis Bundle Completion:    1. Repeat Lactic Acid Level:   Last result   Lab Results   Component Value Date    LACT 2.2 (H) 07/06/2019     2. Patient currently on Vasopressors =  No      ED Nurse Name/Phone Number: Matt Cisneros,   9:27 PM  RECEIVING UNIT ED HANDOFF REVIEW    Above ED Nurse Handoff Report was reviewed: Yes  Reviewed by: Johanny Ruiz on July 6, 2019 at 9:41 PM

## 2019-07-07 NOTE — PLAN OF CARE
Cardiac- WDL  Lungs- crackles, pneumonia. SOB. 40 L O2 at 70% on HFNC.  Skin- wdl  GI- BM on 7/06  - retained 464 ml of urine. Nurse talked with patient about straight cath and going to bathroom. Pt. Understood and would get up after more sleep.  Diet- diabetic.   Pain- gave 1 tablet Norco for back pain and muscle pain from coughing. Reported decrease in pain.  Mobility- stand by assist  Neuro- AxOm x4    Denia Sena on 7/7/2019 at 6:22 AM

## 2019-07-08 ENCOUNTER — APPOINTMENT (OUTPATIENT)
Dept: PHYSICAL THERAPY | Facility: CLINIC | Age: 64
DRG: 871 | End: 2019-07-08
Payer: COMMERCIAL

## 2019-07-08 LAB
ANION GAP SERPL CALCULATED.3IONS-SCNC: 5 MMOL/L (ref 3–14)
BUN SERPL-MCNC: 16 MG/DL (ref 7–30)
CALCIUM SERPL-MCNC: 8.2 MG/DL (ref 8.5–10.1)
CHLORIDE SERPL-SCNC: 108 MMOL/L (ref 94–109)
CO2 SERPL-SCNC: 29 MMOL/L (ref 20–32)
CREAT SERPL-MCNC: 0.8 MG/DL (ref 0.52–1.04)
ERYTHROCYTE [DISTWIDTH] IN BLOOD BY AUTOMATED COUNT: 15.2 % (ref 10–15)
GFR SERPL CREATININE-BSD FRML MDRD: 78 ML/MIN/{1.73_M2}
GLUCOSE BLDC GLUCOMTR-MCNC: 159 MG/DL (ref 70–99)
GLUCOSE BLDC GLUCOMTR-MCNC: 182 MG/DL (ref 70–99)
GLUCOSE BLDC GLUCOMTR-MCNC: 183 MG/DL (ref 70–99)
GLUCOSE BLDC GLUCOMTR-MCNC: 184 MG/DL (ref 70–99)
GLUCOSE BLDC GLUCOMTR-MCNC: 213 MG/DL (ref 70–99)
GLUCOSE SERPL-MCNC: 162 MG/DL (ref 70–99)
HCT VFR BLD AUTO: 33.3 % (ref 35–47)
HGB BLD-MCNC: 10.3 G/DL (ref 11.7–15.7)
MCH RBC QN AUTO: 26.1 PG (ref 26.5–33)
MCHC RBC AUTO-ENTMCNC: 30.9 G/DL (ref 31.5–36.5)
MCV RBC AUTO: 85 FL (ref 78–100)
MRSA DNA SPEC QL NAA+PROBE: NEGATIVE
PLATELET # BLD AUTO: 193 10E9/L (ref 150–450)
POTASSIUM SERPL-SCNC: 4.5 MMOL/L (ref 3.4–5.3)
RBC # BLD AUTO: 3.94 10E12/L (ref 3.8–5.2)
SODIUM SERPL-SCNC: 142 MMOL/L (ref 133–144)
SPECIMEN SOURCE: NORMAL
VANCOMYCIN SERPL-MCNC: 20 MG/L
WBC # BLD AUTO: 10.5 10E9/L (ref 4–11)

## 2019-07-08 PROCEDURE — 25000132 ZZH RX MED GY IP 250 OP 250 PS 637: Performed by: INTERNAL MEDICINE

## 2019-07-08 PROCEDURE — 36415 COLL VENOUS BLD VENIPUNCTURE: CPT | Performed by: INTERNAL MEDICINE

## 2019-07-08 PROCEDURE — 80048 BASIC METABOLIC PNL TOTAL CA: CPT | Performed by: INTERNAL MEDICINE

## 2019-07-08 PROCEDURE — 00000146 ZZHCL STATISTIC GLUCOSE BY METER IP

## 2019-07-08 PROCEDURE — 40000983 ZZH STATISTIC HFNC ADULT NON-CPAP

## 2019-07-08 PROCEDURE — 25000125 ZZHC RX 250: Performed by: INTERNAL MEDICINE

## 2019-07-08 PROCEDURE — 85027 COMPLETE CBC AUTOMATED: CPT | Performed by: INTERNAL MEDICINE

## 2019-07-08 PROCEDURE — 25000128 H RX IP 250 OP 636: Performed by: INTERNAL MEDICINE

## 2019-07-08 PROCEDURE — 97530 THERAPEUTIC ACTIVITIES: CPT | Mod: GP | Performed by: PHYSICAL THERAPY ASSISTANT

## 2019-07-08 PROCEDURE — 94640 AIRWAY INHALATION TREATMENT: CPT

## 2019-07-08 PROCEDURE — 12000000 ZZH R&B MED SURG/OB

## 2019-07-08 PROCEDURE — 87640 STAPH A DNA AMP PROBE: CPT | Performed by: INTERNAL MEDICINE

## 2019-07-08 PROCEDURE — 80202 ASSAY OF VANCOMYCIN: CPT | Performed by: INTERNAL MEDICINE

## 2019-07-08 PROCEDURE — 94640 AIRWAY INHALATION TREATMENT: CPT | Mod: 76

## 2019-07-08 PROCEDURE — 87641 MR-STAPH DNA AMP PROBE: CPT | Performed by: INTERNAL MEDICINE

## 2019-07-08 PROCEDURE — 25800030 ZZH RX IP 258 OP 636: Performed by: INTERNAL MEDICINE

## 2019-07-08 PROCEDURE — 25000131 ZZH RX MED GY IP 250 OP 636 PS 637: Performed by: INTERNAL MEDICINE

## 2019-07-08 PROCEDURE — 40000275 ZZH STATISTIC RCP TIME EA 10 MIN

## 2019-07-08 PROCEDURE — 99232 SBSQ HOSP IP/OBS MODERATE 35: CPT | Performed by: INTERNAL MEDICINE

## 2019-07-08 RX ADMIN — ACETAMINOPHEN 650 MG: 325 TABLET, FILM COATED ORAL at 14:53

## 2019-07-08 RX ADMIN — GABAPENTIN 600 MG: 300 CAPSULE ORAL at 22:25

## 2019-07-08 RX ADMIN — ZOLPIDEM TARTRATE 5 MG: 5 TABLET, FILM COATED ORAL at 01:25

## 2019-07-08 RX ADMIN — SIMVASTATIN 20 MG: 20 TABLET, FILM COATED ORAL at 22:26

## 2019-07-08 RX ADMIN — GUAIFENESIN AND DEXTROMETHORPHAN 10 ML: 100; 10 SYRUP ORAL at 11:18

## 2019-07-08 RX ADMIN — HYDROCODONE BITARTRATE AND ACETAMINOPHEN 1 TABLET: 5; 325 TABLET ORAL at 01:04

## 2019-07-08 RX ADMIN — IPRATROPIUM BROMIDE AND ALBUTEROL SULFATE 3 ML: .5; 3 SOLUTION RESPIRATORY (INHALATION) at 19:35

## 2019-07-08 RX ADMIN — HYDROCODONE BITARTRATE AND ACETAMINOPHEN 1 TABLET: 5; 325 TABLET ORAL at 22:25

## 2019-07-08 RX ADMIN — IPRATROPIUM BROMIDE AND ALBUTEROL SULFATE 3 ML: .5; 3 SOLUTION RESPIRATORY (INHALATION) at 15:31

## 2019-07-08 RX ADMIN — CLONAZEPAM 0.5 MG: 0.5 TABLET ORAL at 09:11

## 2019-07-08 RX ADMIN — PANTOPRAZOLE SODIUM 40 MG: 40 TABLET, DELAYED RELEASE ORAL at 22:26

## 2019-07-08 RX ADMIN — VANCOMYCIN HYDROCHLORIDE 1500 MG: 5 INJECTION, POWDER, LYOPHILIZED, FOR SOLUTION INTRAVENOUS at 09:21

## 2019-07-08 RX ADMIN — GUAIFENESIN AND DEXTROMETHORPHAN 10 ML: 100; 10 SYRUP ORAL at 22:24

## 2019-07-08 RX ADMIN — ASPIRIN 81 MG: 81 TABLET, COATED ORAL at 22:25

## 2019-07-08 RX ADMIN — GUAIFENESIN 600 MG: 600 TABLET, EXTENDED RELEASE ORAL at 09:10

## 2019-07-08 RX ADMIN — IPRATROPIUM BROMIDE AND ALBUTEROL SULFATE 3 ML: .5; 3 SOLUTION RESPIRATORY (INHALATION) at 07:46

## 2019-07-08 RX ADMIN — ZOLPIDEM TARTRATE 5 MG: 5 TABLET, FILM COATED ORAL at 22:24

## 2019-07-08 RX ADMIN — TAZOBACTAM SODIUM AND PIPERACILLIN SODIUM 4.5 G: 500; 4 INJECTION, SOLUTION INTRAVENOUS at 22:24

## 2019-07-08 RX ADMIN — GUAIFENESIN 600 MG: 600 TABLET, EXTENDED RELEASE ORAL at 22:25

## 2019-07-08 RX ADMIN — TAZOBACTAM SODIUM AND PIPERACILLIN SODIUM 4.5 G: 500; 4 INJECTION, SOLUTION INTRAVENOUS at 16:15

## 2019-07-08 RX ADMIN — PREDNISONE 40 MG: 20 TABLET ORAL at 09:10

## 2019-07-08 RX ADMIN — TAZOBACTAM SODIUM AND PIPERACILLIN SODIUM 4.5 G: 500; 4 INJECTION, SOLUTION INTRAVENOUS at 04:50

## 2019-07-08 RX ADMIN — IPRATROPIUM BROMIDE AND ALBUTEROL SULFATE 3 ML: .5; 3 SOLUTION RESPIRATORY (INHALATION) at 11:09

## 2019-07-08 RX ADMIN — CLONAZEPAM 0.5 MG: 0.5 TABLET ORAL at 22:24

## 2019-07-08 RX ADMIN — GUAIFENESIN AND DEXTROMETHORPHAN 10 ML: 100; 10 SYRUP ORAL at 05:13

## 2019-07-08 RX ADMIN — GUAIFENESIN AND DEXTROMETHORPHAN 10 ML: 100; 10 SYRUP ORAL at 16:15

## 2019-07-08 RX ADMIN — LEVOFLOXACIN 500 MG: 5 INJECTION, SOLUTION INTRAVENOUS at 01:05

## 2019-07-08 RX ADMIN — TAZOBACTAM SODIUM AND PIPERACILLIN SODIUM 4.5 G: 500; 4 INJECTION, SOLUTION INTRAVENOUS at 11:18

## 2019-07-08 RX ADMIN — HYDROCODONE BITARTRATE AND ACETAMINOPHEN 1 TABLET: 5; 325 TABLET ORAL at 23:18

## 2019-07-08 RX ADMIN — SERTRALINE HYDROCHLORIDE 200 MG: 100 TABLET ORAL at 09:11

## 2019-07-08 RX ADMIN — ENOXAPARIN SODIUM 40 MG: 40 INJECTION SUBCUTANEOUS at 22:24

## 2019-07-08 ASSESSMENT — ACTIVITIES OF DAILY LIVING (ADL)
ADLS_ACUITY_SCORE: 15

## 2019-07-08 NOTE — PHARMACY-VANCOMYCIN DOSING SERVICE
Pharmacy Vancomycin Note  Date of Service 2019  Patient's  1955   63 year old, female    Indication: Healthcare-Associated Pneumonia  Goal Trough Level: 15-20 mg/L  Day of Therapy: 2  Current Vancomycin regimen:  On hold (previously 1500mg q8h)    Current estimated CrCl = Estimated Creatinine Clearance: 130.3 mL/min (based on SCr of 0.8 mg/dL).    Creatinine for last 3 days  2019:  6:40 PM Creatinine 0.87 mg/dL  2019:  6:12 AM Creatinine 0.91 mg/dL  2019:  6:16 AM Creatinine 0.80 mg/dL    Recent Vancomycin Levels (past 3 days)  2019:  9:11 PM Vancomycin Level 31.8 mg/L  2019:  6:16 AM Vancomycin Level 20.0 mg/L    Vancomycin IV Administrations (past 72 hours)                   vancomycin 1500 mg in 0.9% NaCl 250 ml intermittent infusion 1,500 mg (mg) 1,500 mg Given 19 1337     1,500 mg Given  0525    vancomycin (VANCOCIN) 2,500 mg in sodium chloride 0.9 % 500 mL intermittent infusion (mg) 2,500 mg New Bag 19 2201                Nephrotoxins and other renal medications (From now, onward)    Start     Dose/Rate Route Frequency Ordered Stop    19 1000  vancomycin 1500 mg in 0.9% NaCl 250 ml intermittent infusion 1,500 mg      1,500 mg  over 90 Minutes Intravenous EVERY 12 HOURS 19 0836      19 0200  piperacillin-tazobactam (ZOSYN) intermittent infusion 4.5 g     Note to Pharmacy:  Pharmacy can adjust dose based on renal function.    4.5 g  200 mL/hr over 30 Minutes Intravenous EVERY 6 HOURS 19 2241               Contrast Orders - past 72 hours (72h ago, onward)    None          Interpretation of levels and current regimen:  Trough level is  Therapeutic    Has serum creatinine changed > 50% in last 72 hours: No    Urine output:  unable to determine    Renal Function: Stable    Plan:  1.  Decrease Dose to 1500mg q12h  2.  Pharmacy will check trough levels as appropriate in 1-3 Days.    3. Serum creatinine levels will be ordered daily for the first  week of therapy and at least twice weekly for subsequent weeks.      Teddy Champion        .

## 2019-07-08 NOTE — PLAN OF CARE
Cardiac- Tele SR  Lungs- diminished with crackles. 35 L O2 with 45%  Skin- psoriasis on bottom of feet  GI- BM today  - WDL  Diet- diabetic   Pain- gave norco for back and muscle pain due to coughing. Reported decrease in pain.   Mobility- assist with 1

## 2019-07-08 NOTE — PLAN OF CARE
RN [id number]. VSS. Pt. A and ox4. High flow canula, decreased O2 his shift per Rt. On cont. Pulse ox. Mod CHO diet. SBA. Lung sounds diminished. Awaiting flyer RN o place new PIV in order to cont. Antibiotics. Pt. vanco level 31.1, per pharm hold vanco and redraw levels in AM. 1 bowel movement during shift. Pt. Resting in bed, will continue to monitor.

## 2019-07-08 NOTE — PHARMACY-VANCOMYCIN DOSING SERVICE
Pharmacy Vancomycin Note  Date of Service 2019  Patient's  1955   63 year old, female    Indication: Healthcare-Associated Pneumonia  Goal Trough Level: 15-20 mg/L  Day of Therapy: 2  Current Vancomycin regimen:  1,500 mg IV q8h after loading dose 2,500 mg IV given  in ED.    Current estimated CrCl = Estimated Creatinine Clearance: 114.6 mL/min (based on SCr of 0.91 mg/dL).    Creatinine for last 3 days  2019:  6:40 PM Creatinine 0.87 mg/dL  2019:  6:12 AM Creatinine 0.91 mg/dL    Recent Vancomycin Levels (past 3 days)  2019:  9:11 PM Vancomycin Level 31.8 mg/L    Vancomycin IV Administrations (past 72 hours)                   vancomycin 1500 mg in 0.9% NaCl 250 ml intermittent infusion 1,500 mg (mg) 1,500 mg Given 19 1337     1,500 mg Given  05    vancomycin (VANCOCIN) 2,500 mg in sodium chloride 0.9 % 500 mL intermittent infusion (mg) 2,500 mg New Bag 19 2201                Nephrotoxins and other renal medications (From now, onward)    Start     Dose/Rate Route Frequency Ordered Stop    19 2245  vancomycin 1500 mg in 0.9% NaCl 250 ml intermittent infusion 1,500 mg      1,500 mg  over 90 Minutes Intravenous HOLD 19 2235      19 0200  piperacillin-tazobactam (ZOSYN) intermittent infusion 4.5 g     Note to Pharmacy:  Pharmacy can adjust dose based on renal function.    4.5 g  200 mL/hr over 30 Minutes Intravenous EVERY 6 HOURS 19 2241               Contrast Orders - past 72 hours (72h ago, onward)    None          Interpretation of levels and current regimen:  Trough level is  Supratherapeutic    Has serum creatinine changed > 50% in last 72 hours: No    Urine output:  unable to determine    Renal Function: Stable    Plan:  1. Hold vanco dose tonight.  2. Recheck vanco level in AM 8.    Ana Buck Pharm.D.        .

## 2019-07-08 NOTE — PLAN OF CARE
Discharge Planner PT   Patient plan for discharge: Home with assist   Current status:   PT - Pt in bed upon arrival and agreeable to PT.  Pt transfers supine to sit and sit to/from stand indep.  Pt transfers bed to chair with supervision without AD with highflow O2. Pt performed seated thera exs to B LE x 15 reps: heel/toe raises, marching, LAQs and hip add/abd.  Pt tolerated well.  Note O2 sats @ beginning to be 93% and HR 78 and O2 sats during and following 95% and HR 76 with high flow O2.   Barriers to return to prior living situation: Need for O2 at this point, activity tolerance,  and stair ambulation.    Recommendations for discharge: Home with assist per plan established by the PT.    Rationale for recommendations: Pt does not need assistance with her bed mobility, tranfers, or short gait today.  If she is able to wean from O2 will be appropriate for discharge home.       Entered by: Daniela Henriquez 07/08/2019 8:28 AM

## 2019-07-08 NOTE — PROGRESS NOTES
[facility]    Hospitalist Progress Note      Assessment & Plan   Nicole Reyes is a 63 year old female who was admitted on 7/6/2019.    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  Admitted for left-sided pneumonia and sepsis.  For her significant hypoxia she has been requiring high flow oxygen.    Overall slow but steady improvement.  Breathing has improved but still requiring high flow oxygen.  But still has cough.  Gets short of breath with minimal activity.  Her high flow oxygen is slowly being titrated down.    Plan:    Pneumonia.  Presumed hospital-acquired pneumonia.  - Continue triple antibiotic treatment with IV vancomycin, IV Zosyn, IV Levaquin.   (start date 7/6/2019).  - Check MRSA nares swab.  If negative then vancomycin can potentially be discontinued.  - Leukocytosis has resolved.  Patient is now afebrile.    Acute hypoxic respiratory failure.  -Secondary to pneumonia.  -On high flow oxygen.  Slowly titrate down the oxygen.  - Was started on oral prednisone for wheezing, probably due to associated bronchitis.  No history of asthma or COPD.    Diabetes  -Holding the metformin.  Last A1c of 6.7.  -On insulin sliding scale.  Closely monitor steroid treatment.    Hypertension  -Losartan on hold.    Dyslipidemia  - On statin.    Gastroesophageal reflux  -On Protonix.      DVT Prophylaxis: Enoxaparin (Lovenox) SQ  Code Status: Full Code  Expected discharge: 2 - 3 days, depending on clinical response.    Jose L Weaver MD  Text Page (7am - 6pm, M-F)    Interval History   Patient was evaluated with nursing staff. Overnight issues  discussed.  Still has shortness of breath especially with activity.  Overall feels better.  Still feels very tired and exhausted.  Has high flow oxygen in place.    -Data reviewed today: Labs and medications.    Physical Exam   Temp: 97.9  F (36.6  C) Temp src: Oral BP: 135/61   Heart Rate: 70 Resp: 20 SpO2: 95 % O2 Device: High Flow Nasal Cannula (HFNC) Oxygen Delivery: Other (Comments)(30 LPM)  Vitals:    07/06/19 2237   Weight: 114.7 kg (252 lb 12.8 oz)     Vital Signs with Ranges  Temp:  [97.7  F (36.5  C)-98.4  F (36.9  C)] 97.9  F (36.6  C)  Heart Rate:  [63-93] 70  Resp:  [18-22] 20  BP: (115-135)/(61-65) 135/61  FiO2 (%):  [40 %-70 %] 40 %  SpO2:  [94 %-100 %] 95 %  I/O last 3 completed shifts:  In: 480 [P.O.:480]  Out: -     Constitutional: Awake, alert, cooperative, no apparent distress  HEENT: Trachea midline, sclera is clear   Respiratory: Left-sided crackles.  Minimal wheezing.  Cardiovascular: Regular rate and rhythm, normal S1 and S2, and no murmur noted  GI: Normal bowel sounds, soft, non-distended, non-tender  Skin/Integumen: No rashes, no cyanosis, no edema  Psych: appropriate affect, no agitation   Extremities: No pitting edema     Medications     sodium chloride 100 mL/hr at 07/07/19 2054       aspirin  81 mg Oral QPM     clonazePAM  0.5 mg Oral BID     enoxaparin  40 mg Subcutaneous Q24H     gabapentin  600 mg Oral QPM     guaiFENesin  600 mg Oral BID     guaiFENesin-dextromethorphan  10 mL Oral Q6H     insulin aspart  1-7 Units Subcutaneous TID AC     insulin aspart  1-5 Units Subcutaneous At Bedtime     ipratropium - albuterol 0.5 mg/2.5 mg/3 mL  3 mL Nebulization 4x daily     levofloxacin  500 mg Intravenous Q24H     pantoprazole  40 mg Oral At Bedtime     piperacillin-tazobactam  4.5 g Intravenous Q6H     [START ON 7/9/2019] predniSONE  30 mg Oral Daily     sertraline  200 mg Oral Daily     simvastatin  20 mg Oral At Bedtime     sodium chloride (PF)  3 mL Intracatheter Q8H     vancomycin  (VANCOCIN) IV  1,500 mg Intravenous Q12H       Data   Recent Labs   Lab 07/08/19  0616 07/07/19  1836 07/07/19  0612 07/06/19  1921 07/06/19  1840   WBC 10.5  --  17.8*  --  21.4*   HGB 10.3*  --  11.6*  --  13.6   MCV 85  --  81  --  82     --  162  --  298   INR  --   --   --  1.07  --      --  140  --  140   POTASSIUM 4.5 3.6 3.2*  --  3.4   CHLORIDE 108  --  105  --  104   CO2 29  --  26  --  25   BUN 16  --  19  --  13   CR 0.80  --  0.91  --  0.87   ANIONGAP 5  --  9  --  11   GRECIA 8.2*  --  8.2*  --  9.1   *  --  102*  --  119*   ALBUMIN  --   --   --   --  3.3*   PROTTOTAL  --   --   --   --  7.3   BILITOTAL  --   --   --   --  0.7   ALKPHOS  --   --   --   --  122   ALT  --   --   --   --  32   AST  --   --   --   --  39   TROPI  --   --   --   --  <0.015       No results found for this or any previous visit (from the past 24 hour(s)).

## 2019-07-08 NOTE — PLAN OF CARE
Pt A&Ox4 up w/ SBA, tele SR, /182, nasal swab collected - neg for MRSA and vanco d/c'd, IVF d/c'd, sating 95% on 9 lpm NC, full shower, pt makes needs known, will continue POC.

## 2019-07-08 NOTE — CONSULTS
Care Transition Initial Assessment - RN        Met with: Patient.  DATA   Active Problems:    Acute respiratory failure, unsp w hypoxia or hypercapnia (H)       Cognitive Status: awake, alert and oriented.  Primary Care Clinic Name: [facility]  Primary Care MD Name: [email or web address]  Contact information and PCP information verified: YES  Lives With: spouse   Living Arrangements: house         Who is your support system?:        Insurance concerns: No Insurance issues identified  ASSESSMENT  Patient currently receives the following services:  none       Identified issues/concerns regarding health management: Pt is admitted with PNA.  We did discuss the PNA action care plan.  She is aware of when to contact her primary care provider.  I did give pt an IS and she was able to demonstrate proper use of it.  Pt has a history of CRISTÓBAL.  She said she isn't able to wear a CPAP because she has insomnia when she puts on the mask.  Pt said she was told she should follow up with a sleep center.  I gave pt a hand out on [facility] in [place].  She will discuss it with Dr Wiley and get a referral from her.  Pt has DM.  Her a1c is 6.7 as of 6/21/19.  She checks her blood glucose daily.  It ranges 103 to 120 usually.  She is aware that her blood glucose may be elevated d/t her infection and steroids.  Hand off will be give to Dr Wiley's RN CTS at time of discharge.  Pt prefers to make her own f/u appt as she has to schedule it around her 's Dialysis days.      PLAN  Patient given options and choices for discharge yes .  Patient/family is agreeable to the plan?  Yes:   Patient anticipates discharging to home .        Patient anticipates needs for home equipment: No    Appointments: TBD pt prefers to schedule    Care  (CTS) will continue to follow as needed.    Yarelis RUSHING CTS [id number]

## 2019-07-08 NOTE — PLAN OF CARE
Patient on HFNC 35L 45% titrated Fi02 down from 60%, breath sounds decreased, received scheduled neb.    Zoie Holt  July 8, 2019.2:07 AM

## 2019-07-09 ENCOUNTER — APPOINTMENT (OUTPATIENT)
Dept: PHYSICAL THERAPY | Facility: CLINIC | Age: 64
DRG: 871 | End: 2019-07-09
Payer: COMMERCIAL

## 2019-07-09 LAB
ANION GAP SERPL CALCULATED.3IONS-SCNC: 2 MMOL/L (ref 3–14)
BUN SERPL-MCNC: 18 MG/DL (ref 7–30)
CALCIUM SERPL-MCNC: 8.7 MG/DL (ref 8.5–10.1)
CHLORIDE SERPL-SCNC: 107 MMOL/L (ref 94–109)
CO2 SERPL-SCNC: 32 MMOL/L (ref 20–32)
CREAT SERPL-MCNC: 0.8 MG/DL (ref 0.52–1.04)
GFR SERPL CREATININE-BSD FRML MDRD: 77 ML/MIN/{1.73_M2}
GLUCOSE BLDC GLUCOMTR-MCNC: 144 MG/DL (ref 70–99)
GLUCOSE BLDC GLUCOMTR-MCNC: 152 MG/DL (ref 70–99)
GLUCOSE BLDC GLUCOMTR-MCNC: 160 MG/DL (ref 70–99)
GLUCOSE BLDC GLUCOMTR-MCNC: 185 MG/DL (ref 70–99)
GLUCOSE BLDC GLUCOMTR-MCNC: 94 MG/DL (ref 70–99)
GLUCOSE SERPL-MCNC: 110 MG/DL (ref 70–99)
PLATELET # BLD AUTO: 196 10E9/L (ref 150–450)
POTASSIUM SERPL-SCNC: 4.1 MMOL/L (ref 3.4–5.3)
SODIUM SERPL-SCNC: 141 MMOL/L (ref 133–144)

## 2019-07-09 PROCEDURE — 85049 AUTOMATED PLATELET COUNT: CPT | Performed by: INTERNAL MEDICINE

## 2019-07-09 PROCEDURE — 00000146 ZZHCL STATISTIC GLUCOSE BY METER IP

## 2019-07-09 PROCEDURE — 25000125 ZZHC RX 250: Performed by: INTERNAL MEDICINE

## 2019-07-09 PROCEDURE — 25000131 ZZH RX MED GY IP 250 OP 636 PS 637: Performed by: INTERNAL MEDICINE

## 2019-07-09 PROCEDURE — 97530 THERAPEUTIC ACTIVITIES: CPT | Mod: GP | Performed by: PHYSICAL THERAPIST

## 2019-07-09 PROCEDURE — 99232 SBSQ HOSP IP/OBS MODERATE 35: CPT | Performed by: INTERNAL MEDICINE

## 2019-07-09 PROCEDURE — 94640 AIRWAY INHALATION TREATMENT: CPT

## 2019-07-09 PROCEDURE — 25000132 ZZH RX MED GY IP 250 OP 250 PS 637: Performed by: INTERNAL MEDICINE

## 2019-07-09 PROCEDURE — 36415 COLL VENOUS BLD VENIPUNCTURE: CPT | Performed by: INTERNAL MEDICINE

## 2019-07-09 PROCEDURE — 80048 BASIC METABOLIC PNL TOTAL CA: CPT | Performed by: INTERNAL MEDICINE

## 2019-07-09 PROCEDURE — 25000128 H RX IP 250 OP 636: Performed by: INTERNAL MEDICINE

## 2019-07-09 PROCEDURE — 12000000 ZZH R&B MED SURG/OB

## 2019-07-09 PROCEDURE — 40000275 ZZH STATISTIC RCP TIME EA 10 MIN

## 2019-07-09 PROCEDURE — 94640 AIRWAY INHALATION TREATMENT: CPT | Mod: 76

## 2019-07-09 RX ORDER — LOSARTAN POTASSIUM 100 MG/1
100 TABLET ORAL DAILY
Status: DISCONTINUED | OUTPATIENT
Start: 2019-07-09 | End: 2019-07-12 | Stop reason: HOSPADM

## 2019-07-09 RX ADMIN — GABAPENTIN 600 MG: 300 CAPSULE ORAL at 22:14

## 2019-07-09 RX ADMIN — TAZOBACTAM SODIUM AND PIPERACILLIN SODIUM 4.5 G: 500; 4 INJECTION, SOLUTION INTRAVENOUS at 16:47

## 2019-07-09 RX ADMIN — GUAIFENESIN AND DEXTROMETHORPHAN 10 ML: 100; 10 SYRUP ORAL at 12:42

## 2019-07-09 RX ADMIN — HYDROCODONE BITARTRATE AND ACETAMINOPHEN 1 TABLET: 5; 325 TABLET ORAL at 22:14

## 2019-07-09 RX ADMIN — PREDNISONE 30 MG: 20 TABLET ORAL at 09:29

## 2019-07-09 RX ADMIN — IPRATROPIUM BROMIDE AND ALBUTEROL SULFATE 3 ML: .5; 3 SOLUTION RESPIRATORY (INHALATION) at 20:19

## 2019-07-09 RX ADMIN — IPRATROPIUM BROMIDE AND ALBUTEROL SULFATE 3 ML: .5; 3 SOLUTION RESPIRATORY (INHALATION) at 16:55

## 2019-07-09 RX ADMIN — GUAIFENESIN 600 MG: 600 TABLET, EXTENDED RELEASE ORAL at 22:15

## 2019-07-09 RX ADMIN — TAZOBACTAM SODIUM AND PIPERACILLIN SODIUM 4.5 G: 500; 4 INJECTION, SOLUTION INTRAVENOUS at 04:07

## 2019-07-09 RX ADMIN — IPRATROPIUM BROMIDE AND ALBUTEROL SULFATE 3 ML: .5; 3 SOLUTION RESPIRATORY (INHALATION) at 11:48

## 2019-07-09 RX ADMIN — ENOXAPARIN SODIUM 40 MG: 40 INJECTION SUBCUTANEOUS at 22:14

## 2019-07-09 RX ADMIN — PANTOPRAZOLE SODIUM 40 MG: 40 TABLET, DELAYED RELEASE ORAL at 22:15

## 2019-07-09 RX ADMIN — ASPIRIN 81 MG: 81 TABLET, COATED ORAL at 19:44

## 2019-07-09 RX ADMIN — IPRATROPIUM BROMIDE AND ALBUTEROL SULFATE 3 ML: .5; 3 SOLUTION RESPIRATORY (INHALATION) at 08:32

## 2019-07-09 RX ADMIN — GUAIFENESIN AND DEXTROMETHORPHAN 10 ML: 100; 10 SYRUP ORAL at 05:44

## 2019-07-09 RX ADMIN — TAZOBACTAM SODIUM AND PIPERACILLIN SODIUM 4.5 G: 500; 4 INJECTION, SOLUTION INTRAVENOUS at 09:53

## 2019-07-09 RX ADMIN — LEVOFLOXACIN 500 MG: 5 INJECTION, SOLUTION INTRAVENOUS at 00:31

## 2019-07-09 RX ADMIN — CLONAZEPAM 0.5 MG: 0.5 TABLET ORAL at 22:14

## 2019-07-09 RX ADMIN — CLONAZEPAM 0.5 MG: 0.5 TABLET ORAL at 09:30

## 2019-07-09 RX ADMIN — GUAIFENESIN 600 MG: 600 TABLET, EXTENDED RELEASE ORAL at 09:30

## 2019-07-09 RX ADMIN — TAZOBACTAM SODIUM AND PIPERACILLIN SODIUM 4.5 G: 500; 4 INJECTION, SOLUTION INTRAVENOUS at 22:14

## 2019-07-09 RX ADMIN — GUAIFENESIN AND DEXTROMETHORPHAN 10 ML: 100; 10 SYRUP ORAL at 18:05

## 2019-07-09 RX ADMIN — SIMVASTATIN 20 MG: 20 TABLET, FILM COATED ORAL at 22:14

## 2019-07-09 RX ADMIN — LOSARTAN POTASSIUM 100 MG: 100 TABLET ORAL at 13:36

## 2019-07-09 RX ADMIN — SERTRALINE HYDROCHLORIDE 200 MG: 100 TABLET ORAL at 09:29

## 2019-07-09 ASSESSMENT — ACTIVITIES OF DAILY LIVING (ADL)
ADLS_ACUITY_SCORE: 16
ADLS_ACUITY_SCORE: 15
ADLS_ACUITY_SCORE: 16

## 2019-07-09 NOTE — PLAN OF CARE
VSS. 02 3L nc stat high 90's. IV levofloxacin and iv zosyn for pneumonia. . Tele SR. Anticipate discharge home 1-2 days. Will continue to monitor.

## 2019-07-09 NOTE — PLAN OF CARE
Discharge Planner PT   Patient plan for discharge: home once medically stable  Current status: SBA/independent with functional transfers and gait skills in hallway x 200+ft, no assistive device needed. Pt on 1.5-2L O2 via nc throughout session.  SaO2 stable at rest 95-96%, dropping to 83-85% with activity, requires 30-45 sec PLB/rest to recover > 90%.  Instructed pt in progressive LE strengthening and walking program, should be walking hallways 3-4x/day.  Barriers to return to prior living situation: none anticipated, pending O2 wean to RA   Recommendations for discharge: home with continued HEP/walking program once medically stable  Rationale for recommendations: Pt has met/nearly met all PT mobility goals, can continue established HEP/walking program on her own with nursing staff to monitor/titrate O2 needs.  IP PT signing off.       Entered by: Celso Rowley 07/09/2019 12:05 PM       Physical Therapy Discharge Summary    Reason for therapy discharge:    All goals and outcomes met, no further needs identified.    Progress towards therapy goal(s). See goals on Care Plan in [facility] electronic health record for goal details.  Goals met    Therapy recommendation(s):    Continue home exercise program.

## 2019-07-09 NOTE — PROGRESS NOTES
[facility]    Hospitalist Progress Note      Assessment & Plan   Nicole Reyes is a 63 year old female who was admitted on 7/6/2019.    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  Admitted for left-sided pneumonia and sepsis.    Initially she required high flow oxygen.  She was transitioned off the high flow oxygen and to 3 L nasal cannula on 7/8/2019.     Slow improvement.  Still has shortness of breath and significant oxygen requirement.    Plan:    Pneumonia.  Presumed hospital-acquired pneumonia.  - Continue IV Zosyn, IV Levaquin.   (start date 7/6/2019).  IV vancomycin was stopped yesterday after MRSA swab came back negative.  - We will complete 5 days of IV antibiotics and then transition to oral antibiotics.     Acute hypoxic respiratory failure.  -Secondary to pneumonia.  -Was initially treated with high flow oxygen and currently on 3 L nasal cannula.  - Was started on oral prednisone for wheezing, probably due to associated bronchitis.  No history of asthma or COPD.  Titrate down the oral prednisone.     Diabetes  -Holding the metformin.  Last A1c of 6.7.  -On insulin sliding scale.  Closely monitor due to steroid treatment.     Hypertension  -Resume home dose of losartan     Dyslipidemia  - On statin.     Gastroesophageal reflux  -On Protonix.        DVT Prophylaxis: Enoxaparin (Lovenox) SQ  Code Status: Full Code  Expected discharge: Likely in the next 1 to 2 days.  May require home oxygen.    Jose L Weaver MD  Text Page (7am - 6pm, M-F)    Interval History   Patient was evaluated with nursing staff. Overnight issues discussed.  Feeling  better.  Shortness of breath has improved.  Still has significant cough.  At rest she is comfortable but short of breath with minimal activity.     -Data reviewed today: Labs and medications.    Physical Exam   Temp: 97.9  F (36.6  C) Temp src: Oral BP: 155/88   Heart Rate: 67 Resp: 20 SpO2: 93 % O2 Device: Nasal cannula Oxygen Delivery: 1 LPM  Vitals:    07/06/19 2237   Weight: 114.7 kg (252 lb 12.8 oz)     Vital Signs with Ranges  Temp:  [97.9  F (36.6  C)-98.5  F (36.9  C)] 97.9  F (36.6  C)  Heart Rate:  [60-82] 67  Resp:  [18-20] 20  BP: (144-180)/(82-95) 155/88  SpO2:  [91 %-100 %] 93 %  I/O last 3 completed shifts:  In: 600 [P.O.:600]  Out: -     Constitutional: Awake, alert, cooperative, no apparent distress  HEENT: Trachea midline, sclera is clear   Respiratory: Left-sided crackles have improved.  Occasional wheezing.  Cardiovascular: Regular rate and rhythm, normal S1 and S2, and no murmur noted  GI: Normal bowel sounds, soft, non-distended, non-tender  Skin/Integumen: No rashes, no cyanosis, no edema  Psych: appropriate affect, no agitation   Extremities: No pitting edema     Medications       aspirin  81 mg Oral QPM     clonazePAM  0.5 mg Oral BID     enoxaparin  40 mg Subcutaneous Q24H     gabapentin  600 mg Oral QPM     guaiFENesin  600 mg Oral BID     guaiFENesin-dextromethorphan  10 mL Oral Q6H     insulin aspart  1-7 Units Subcutaneous TID AC     insulin aspart  1-5 Units Subcutaneous At Bedtime     ipratropium - albuterol 0.5 mg/2.5 mg/3 mL  3 mL Nebulization 4x daily     levofloxacin  500 mg Intravenous Q24H     pantoprazole  40 mg Oral At Bedtime     piperacillin-tazobactam  4.5 g Intravenous Q6H     predniSONE  30 mg Oral Daily     sertraline  200 mg Oral Daily     simvastatin  20 mg Oral At Bedtime     sodium chloride (PF)  3 mL Intracatheter Q8H       Data   Recent Labs   Lab 07/09/19  0612 07/08/19  0616 07/07/19  1836 07/07/19  0612 07/06/19  1921 07/06/19  1840   WBC  --  10.5  --  17.8*   --  21.4*   HGB  --  10.3*  --  11.6*  --  13.6   MCV  --  85  --  81  --  82    193  --  162  --  298   INR  --   --   --   --  1.07  --     142  --  140  --  140   POTASSIUM 4.1 4.5 3.6 3.2*  --  3.4   CHLORIDE 107 108  --  105  --  104   CO2 32 29  --  26  --  25   BUN 18 16  --  19  --  13   CR 0.80 0.80  --  0.91  --  0.87   ANIONGAP 2* 5  --  9  --  11   GRECIA 8.7 8.2*  --  8.2*  --  9.1   * 162*  --  102*  --  119*   ALBUMIN  --   --   --   --   --  3.3*   PROTTOTAL  --   --   --   --   --  7.3   BILITOTAL  --   --   --   --   --  0.7   ALKPHOS  --   --   --   --   --  122   ALT  --   --   --   --   --  32   AST  --   --   --   --   --  39   TROPI  --   --   --   --   --  <0.015       No results found for this or any previous visit (from the past 24 hour(s)).

## 2019-07-09 NOTE — PLAN OF CARE
Pt A/O x 4. VSS on 1.5 L O2 per NC , weaned from 3L this AM , sats low to mid 90's. Denied pain. BG 94 and 160. Tele SB , denied CP . LS coarse at bilat bases with exp wheezes. Intermittent, congested cough; scheduled Robitussin given. PIV to right FA intact, SL . Up SBA, voiding well, had BM . Ambulated halls x 2 today with RT and support staff, using walker outside room. Possible discharge 1-2 days, continue POC.

## 2019-07-09 NOTE — PLAN OF CARE
VSS, afeb., BP's elevated a bit at 155/86. LS are dim, 96% on 4L, weaned down from 6L. Pt ambulated to BR x2 with sba x1. Good appetite, ate 100% for dinner, good fluid intake. Cont on IV Levaquin and Zosyn. Plan to stay 2-3 + days for IV abx. BG's were 213 and 184. Tele is SR.

## 2019-07-10 LAB
GLUCOSE BLDC GLUCOMTR-MCNC: 112 MG/DL (ref 70–99)
GLUCOSE BLDC GLUCOMTR-MCNC: 117 MG/DL (ref 70–99)
GLUCOSE BLDC GLUCOMTR-MCNC: 146 MG/DL (ref 70–99)
GLUCOSE BLDC GLUCOMTR-MCNC: 161 MG/DL (ref 70–99)
GLUCOSE BLDC GLUCOMTR-MCNC: 92 MG/DL (ref 70–99)

## 2019-07-10 PROCEDURE — 40000274 ZZH STATISTIC RCP CONSULT EA 30 MIN

## 2019-07-10 PROCEDURE — 99232 SBSQ HOSP IP/OBS MODERATE 35: CPT | Performed by: INTERNAL MEDICINE

## 2019-07-10 PROCEDURE — 94640 AIRWAY INHALATION TREATMENT: CPT | Mod: 76

## 2019-07-10 PROCEDURE — 25000132 ZZH RX MED GY IP 250 OP 250 PS 637: Performed by: INTERNAL MEDICINE

## 2019-07-10 PROCEDURE — 25000128 H RX IP 250 OP 636: Performed by: INTERNAL MEDICINE

## 2019-07-10 PROCEDURE — 40000275 ZZH STATISTIC RCP TIME EA 10 MIN

## 2019-07-10 PROCEDURE — 25000125 ZZHC RX 250: Performed by: INTERNAL MEDICINE

## 2019-07-10 PROCEDURE — 94640 AIRWAY INHALATION TREATMENT: CPT

## 2019-07-10 PROCEDURE — 00000146 ZZHCL STATISTIC GLUCOSE BY METER IP

## 2019-07-10 PROCEDURE — 25000131 ZZH RX MED GY IP 250 OP 636 PS 637: Performed by: INTERNAL MEDICINE

## 2019-07-10 PROCEDURE — 12000000 ZZH R&B MED SURG/OB

## 2019-07-10 RX ORDER — PREDNISONE 20 MG/1
20 TABLET ORAL DAILY
Status: COMPLETED | OUTPATIENT
Start: 2019-07-11 | End: 2019-07-11

## 2019-07-10 RX ADMIN — ENOXAPARIN SODIUM 40 MG: 40 INJECTION SUBCUTANEOUS at 22:23

## 2019-07-10 RX ADMIN — TAZOBACTAM SODIUM AND PIPERACILLIN SODIUM 4.5 G: 500; 4 INJECTION, SOLUTION INTRAVENOUS at 22:25

## 2019-07-10 RX ADMIN — TAZOBACTAM SODIUM AND PIPERACILLIN SODIUM 4.5 G: 500; 4 INJECTION, SOLUTION INTRAVENOUS at 03:55

## 2019-07-10 RX ADMIN — TAZOBACTAM SODIUM AND PIPERACILLIN SODIUM 4.5 G: 500; 4 INJECTION, SOLUTION INTRAVENOUS at 16:18

## 2019-07-10 RX ADMIN — GABAPENTIN 600 MG: 300 CAPSULE ORAL at 22:23

## 2019-07-10 RX ADMIN — IPRATROPIUM BROMIDE AND ALBUTEROL SULFATE 3 ML: .5; 3 SOLUTION RESPIRATORY (INHALATION) at 08:08

## 2019-07-10 RX ADMIN — GUAIFENESIN 600 MG: 600 TABLET, EXTENDED RELEASE ORAL at 08:45

## 2019-07-10 RX ADMIN — TAZOBACTAM SODIUM AND PIPERACILLIN SODIUM 4.5 G: 500; 4 INJECTION, SOLUTION INTRAVENOUS at 11:12

## 2019-07-10 RX ADMIN — IPRATROPIUM BROMIDE AND ALBUTEROL SULFATE 3 ML: .5; 3 SOLUTION RESPIRATORY (INHALATION) at 11:36

## 2019-07-10 RX ADMIN — PREDNISONE 30 MG: 20 TABLET ORAL at 08:45

## 2019-07-10 RX ADMIN — IPRATROPIUM BROMIDE AND ALBUTEROL SULFATE 3 ML: .5; 3 SOLUTION RESPIRATORY (INHALATION) at 20:24

## 2019-07-10 RX ADMIN — SIMVASTATIN 20 MG: 20 TABLET, FILM COATED ORAL at 22:23

## 2019-07-10 RX ADMIN — CLONAZEPAM 0.5 MG: 0.5 TABLET ORAL at 20:31

## 2019-07-10 RX ADMIN — SERTRALINE HYDROCHLORIDE 200 MG: 100 TABLET ORAL at 08:45

## 2019-07-10 RX ADMIN — LOSARTAN POTASSIUM 100 MG: 100 TABLET ORAL at 08:45

## 2019-07-10 RX ADMIN — GUAIFENESIN AND DEXTROMETHORPHAN 10 ML: 100; 10 SYRUP ORAL at 14:53

## 2019-07-10 RX ADMIN — GUAIFENESIN AND DEXTROMETHORPHAN 10 ML: 100; 10 SYRUP ORAL at 00:02

## 2019-07-10 RX ADMIN — IPRATROPIUM BROMIDE AND ALBUTEROL SULFATE 3 ML: .5; 3 SOLUTION RESPIRATORY (INHALATION) at 15:57

## 2019-07-10 RX ADMIN — ASPIRIN 81 MG: 81 TABLET, COATED ORAL at 19:18

## 2019-07-10 RX ADMIN — GUAIFENESIN AND DEXTROMETHORPHAN 10 ML: 100; 10 SYRUP ORAL at 08:45

## 2019-07-10 RX ADMIN — GUAIFENESIN 600 MG: 600 TABLET, EXTENDED RELEASE ORAL at 20:31

## 2019-07-10 RX ADMIN — GUAIFENESIN AND DEXTROMETHORPHAN 10 ML: 100; 10 SYRUP ORAL at 19:18

## 2019-07-10 RX ADMIN — LEVOFLOXACIN 500 MG: 5 INJECTION, SOLUTION INTRAVENOUS at 00:05

## 2019-07-10 RX ADMIN — CLONAZEPAM 0.5 MG: 0.5 TABLET ORAL at 08:45

## 2019-07-10 RX ADMIN — PANTOPRAZOLE SODIUM 40 MG: 40 TABLET, DELAYED RELEASE ORAL at 22:23

## 2019-07-10 RX ADMIN — ZOLPIDEM TARTRATE 5 MG: 5 TABLET, FILM COATED ORAL at 00:02

## 2019-07-10 ASSESSMENT — MIFFLIN-ST. JEOR: SCORE: 1485.3

## 2019-07-10 ASSESSMENT — ACTIVITIES OF DAILY LIVING (ADL)
ADLS_ACUITY_SCORE: 16
ADLS_ACUITY_SCORE: 15
ADLS_ACUITY_SCORE: 16
ADLS_ACUITY_SCORE: 15
ADLS_ACUITY_SCORE: 16
ADLS_ACUITY_SCORE: 16

## 2019-07-10 NOTE — PLAN OF CARE
VSS ex /105. Recheck  following no intervention 137/75.  02 1.5L nc stat high 90's. IV levofloxacin and iv zosyn for pneumonia. . Tele SR. Anticipate discharge home 1-2 days. Will continue to monitor.

## 2019-07-10 NOTE — PROGRESS NOTES
"[facility] RCAT     Date: 07/10/19  Admission Dx: Respiratory failure/Pneumonia  Pulmonary History: CRISTÓBAL, recurrent pneumonia hx, former smoker  Home Nebulizer/MDI Use: none  Home Oxygen: None  Acuity Level (RCAT flow sheet): 3  Aerosol Therapy initiated: Duoneb QID, Duoneb Q4 prn      Pulmonary Hygiene initiated: Coughing techniques      Volume Expansion initiated: Incentive Spirometry      Current Oxygen Requirements: 1 LPM nasal cannula  Current SpO2: 96%    Re-evaluation date: 07/13/19    Patient Education: Discussed use and benefits of respiratory medications.      See \"RT Assessments\" flow sheet for patient assessment scoring and Acuity Level Details.             "

## 2019-07-10 NOTE — PROGRESS NOTES
"SPIRITUAL HEALTH SERVICES Progress Note  [facility] Med/Surg 3    SHS visit per length of stay.    Introduced myself and oriented pt, Shayy, to SHS.  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    Pt was very receptive of SHS visit and welcomed conversation.  Pt briefly discussed her hospitalization experience and recent bouts of pneumonia, for which she is currently being treated.    Pt identifies as an Spiritism Hinduism and describes her theology as conservative.  Pt spoke of her respect for all people, of all faiths, but believes that the God of the Hinduism loc is the one true God of all.  She spoke of \"coming to the Lord\" at the age of 23 following what she, now, identifies as postpartum depression which presented some coping mechanism challenges-- \"I placed my trust in the Lord to help me and I woke up the next day a total believer.  It was night and day.\"  Pt engaged in further storytelling of other Buddhist experiences.    Pt spoke of the Bible as the Word of God, and believes in a literal interpretation.  \"Everything is explained in the book. Answers to every question are in there.  In order to find them, you need to know it.\"  i.e. pt cited that she feels the \"end times\" are near and spoke of the book of Revelation as the key to understanding what's to come.  Pt joyfully expressed her ongoing study of the Bible and prayer practice.    Pt spoke of her support system which consists of: her spouse, Julien, 2 daughters, a son, and many extended family members, coworkers, and friends.  Pt stated, \"There's a lot of prayers happening.\"    SHS visit came to a close as several members of her care team entered to provide other medical services. Pt feels that SHS is an important part of caring for patients and is grateful for our work  Pt welcomed prayers and thanked me for my visit.  Pt led closing prayer.    Plan: SHS continues to be available for spiritual/emotional " support.      Amarilis Silva Intern  Pager: [phone]

## 2019-07-10 NOTE — PLAN OF CARE
Pt A/O x 4. VSS on 1L O2 per NC , O2 sats in low to mid 90's. Denied pain. BG 92 and 146. Tele SR , denied CP . LS dim, some SOB and KHALIL, intermittent tight, congested cough. PIV to right hand intact, SL . Up to bathroom SBA , had two loose BM's today, voiding well. Ambulated hallways with assist x 1 and walker. Using IS independently. Plan to complete IV abx and discharge home 7/11. Continue POC.

## 2019-07-10 NOTE — PLAN OF CARE
VSS, afebrile; O2 at 1-1.5 L NC, sats maintained low 90's; coarse BS; frequent cough-scheduled Robitussin, using IS, pt states helps; up ambulating in [place] with walker/SBA, tolerating well;  pre dinner; probably home 1-2 days

## 2019-07-10 NOTE — PROGRESS NOTES
[facility]    Hospitalist Progress Note      Assessment & Plan   Nicole Reyes is a 63 year old female who was admitted on 7/6/2019.    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  Admitted for left-sided pneumonia and sepsis.     Initially she required high flow oxygen.  She was transitioned off the high flow oxygen and to 3 L nasal cannula on 7/8/2019.  Today she is transitioning down to 1 L nasal cannula.     Slow improvement.  Shortness of breath is improving.     Plan:     Pneumonia.  Presumed hospital-acquired pneumonia.  - Continue IV Zosyn, IV Levaquin.   (start date 7/6/2019).  IV vancomycin was stopped on 7/8/2019 after MRSA swab came back negative.  - We will complete 5 days of IV antibiotics and then transition to oral antibiotics.  So 1 more day of IV antibiotics.     Acute hypoxic respiratory failure.  -Secondary to pneumonia.  -Was initially treated with high flow oxygen and currently on 3 L nasal cannula.  - Was started on oral prednisone for wheezing, probably due to associated bronchitis.  No history of asthma or COPD.  Titrate down the oral prednisone.     Diabetes  -Holding the metformin.  Last A1c of 6.7.  -On insulin sliding scale.  Closely monitor due to steroid treatment.     Hypertension  -Elevated blood pressure as losartan was discontinued at admission.  It was restarted yesterday.     Dyslipidemia  - On statin.     Gastroesophageal reflux  -On Protonix.        DVT Prophylaxis: Enoxaparin (Lovenox) SQ  Code Status: Full Code  Expected discharge: Likely discharge tomorrow.  May require home oxygen.    Jose L Weaver MD  Text  Page (7am - 6pm, M-F)    Interval History   Patient was evaluated with nursing staff. Overnight issues discussed.  Feeling better.  Looking forward to get up and walk.  Shortness of breath is still present.  Requiring less oxygen.  Still has cough.  Less productive.  Denies any chest pain.  Denies nausea or vomiting.  appetite is improving.    -Data reviewed today: Labs and medications.    Physical Exam   Temp: 97.9  F (36.6  C) Temp src: Oral BP: 141/74   Heart Rate: 76 Resp: 18 SpO2: 92 % O2 Device: Nasal cannula Oxygen Delivery: 1 LPM  Vitals:    07/06/19 2237 07/10/19 0549   Weight: 114.7 kg (252 lb 12.8 oz) 104.1 kg (229 lb 6.4 oz)     Vital Signs with Ranges  Temp:  [97.8  F (36.6  C)-98.2  F (36.8  C)] 97.9  F (36.6  C)  Heart Rate:  [59-76] 76  Resp:  [18-20] 18  BP: (137-194)/() 141/74  SpO2:  [84 %-97 %] 92 %  I/O last 3 completed shifts:  In: 740 [P.O.:740]  Out: -     Constitutional: Awake, alert, cooperative, no apparent distress  HEENT: Trachea midline, sclera is clear   Respiratory: Clear to auscultation bilaterally, minimal wheezing.  Mild crackles more so on the left side.  Cardiovascular: Regular rate and rhythm, normal S1 and S2, and no murmur noted  GI: Normal bowel sounds, soft, non-distended, non-tender  Skin/Integumen: No rashes, no cyanosis, no edema      Medications       aspirin  81 mg Oral QPM     clonazePAM  0.5 mg Oral BID     enoxaparin  40 mg Subcutaneous Q24H     gabapentin  600 mg Oral QPM     guaiFENesin  600 mg Oral BID     guaiFENesin-dextromethorphan  10 mL Oral Q6H     insulin aspart  1-7 Units Subcutaneous TID AC     insulin aspart  1-5 Units Subcutaneous At Bedtime     ipratropium - albuterol 0.5 mg/2.5 mg/3 mL  3 mL Nebulization 4x daily     levofloxacin  500 mg Intravenous Q24H     losartan  100 mg Oral Daily     pantoprazole  40 mg Oral At Bedtime     piperacillin-tazobactam  4.5 g Intravenous Q6H     predniSONE  30 mg Oral Daily     sertraline  200 mg Oral Daily      simvastatin  20 mg Oral At Bedtime     sodium chloride (PF)  3 mL Intracatheter Q8H       Data   Recent Labs   Lab 07/09/19  0612 07/08/19  0616 07/07/19  1836 07/07/19 0612 07/06/19  1921 07/06/19  1840   WBC  --  10.5  --  17.8*  --  21.4*   HGB  --  10.3*  --  11.6*  --  13.6   MCV  --  85  --  81  --  82    193  --  162  --  298   INR  --   --   --   --  1.07  --     142  --  140  --  140   POTASSIUM 4.1 4.5 3.6 3.2*  --  3.4   CHLORIDE 107 108  --  105  --  104   CO2 32 29  --  26  --  25   BUN 18 16  --  19  --  13   CR 0.80 0.80  --  0.91  --  0.87   ANIONGAP 2* 5  --  9  --  11   GRECIA 8.7 8.2*  --  8.2*  --  9.1   * 162*  --  102*  --  119*   ALBUMIN  --   --   --   --   --  3.3*   PROTTOTAL  --   --   --   --   --  7.3   BILITOTAL  --   --   --   --   --  0.7   ALKPHOS  --   --   --   --   --  122   ALT  --   --   --   --   --  32   AST  --   --   --   --   --  39   TROPI  --   --   --   --   --  <0.015       No results found for this or any previous visit (from the past 24 hour(s)).

## 2019-07-11 ENCOUNTER — TELEPHONE (OUTPATIENT)
Dept: INTERNAL MEDICINE | Facility: CLINIC | Age: 64
End: 2019-07-11

## 2019-07-11 LAB
GLUCOSE BLDC GLUCOMTR-MCNC: 116 MG/DL (ref 70–99)
GLUCOSE BLDC GLUCOMTR-MCNC: 143 MG/DL (ref 70–99)
GLUCOSE BLDC GLUCOMTR-MCNC: 149 MG/DL (ref 70–99)
GLUCOSE BLDC GLUCOMTR-MCNC: 89 MG/DL (ref 70–99)

## 2019-07-11 PROCEDURE — 25000132 ZZH RX MED GY IP 250 OP 250 PS 637: Performed by: INTERNAL MEDICINE

## 2019-07-11 PROCEDURE — 25000125 ZZHC RX 250: Performed by: INTERNAL MEDICINE

## 2019-07-11 PROCEDURE — 94640 AIRWAY INHALATION TREATMENT: CPT | Mod: 76

## 2019-07-11 PROCEDURE — 00000146 ZZHCL STATISTIC GLUCOSE BY METER IP

## 2019-07-11 PROCEDURE — 25000128 H RX IP 250 OP 636: Performed by: INTERNAL MEDICINE

## 2019-07-11 PROCEDURE — 12000000 ZZH R&B MED SURG/OB

## 2019-07-11 PROCEDURE — 25000131 ZZH RX MED GY IP 250 OP 636 PS 637: Performed by: INTERNAL MEDICINE

## 2019-07-11 PROCEDURE — 94640 AIRWAY INHALATION TREATMENT: CPT

## 2019-07-11 PROCEDURE — 99232 SBSQ HOSP IP/OBS MODERATE 35: CPT | Performed by: INTERNAL MEDICINE

## 2019-07-11 PROCEDURE — 25000132 ZZH RX MED GY IP 250 OP 250 PS 637: Performed by: HOSPITALIST

## 2019-07-11 PROCEDURE — 40000275 ZZH STATISTIC RCP TIME EA 10 MIN

## 2019-07-11 RX ORDER — CEFDINIR 300 MG/1
300 CAPSULE ORAL EVERY 12 HOURS SCHEDULED
Status: DISCONTINUED | OUTPATIENT
Start: 2019-07-11 | End: 2019-07-12 | Stop reason: HOSPADM

## 2019-07-11 RX ORDER — GUAIFENESIN/DEXTROMETHORPHAN 100-10MG/5
10 SYRUP ORAL 3 TIMES DAILY PRN
Qty: 236 ML | Refills: 0 | Status: ON HOLD | OUTPATIENT
Start: 2019-07-11 | End: 2019-11-17

## 2019-07-11 RX ORDER — FUROSEMIDE 10 MG/ML
20 INJECTION INTRAMUSCULAR; INTRAVENOUS ONCE
Status: COMPLETED | OUTPATIENT
Start: 2019-07-11 | End: 2019-07-11

## 2019-07-11 RX ORDER — CEFDINIR 300 MG/1
300 CAPSULE ORAL 2 TIMES DAILY
Qty: 10 CAPSULE | Refills: 0 | Status: SHIPPED | OUTPATIENT
Start: 2019-07-11 | End: 2019-09-06

## 2019-07-11 RX ORDER — ALBUTEROL SULFATE 90 UG/1
1-2 AEROSOL, METERED RESPIRATORY (INHALATION) EVERY 6 HOURS PRN
Qty: 1 INHALER | Refills: 0 | Status: SHIPPED | OUTPATIENT
Start: 2019-07-11 | End: 2021-11-22

## 2019-07-11 RX ORDER — CEFUROXIME AXETIL 250 MG/1
500 TABLET ORAL EVERY 12 HOURS SCHEDULED
Status: DISCONTINUED | OUTPATIENT
Start: 2019-07-11 | End: 2019-07-11

## 2019-07-11 RX ADMIN — ZOLPIDEM TARTRATE 5 MG: 5 TABLET, FILM COATED ORAL at 00:26

## 2019-07-11 RX ADMIN — IPRATROPIUM BROMIDE AND ALBUTEROL SULFATE 3 ML: .5; 3 SOLUTION RESPIRATORY (INHALATION) at 08:00

## 2019-07-11 RX ADMIN — GABAPENTIN 600 MG: 300 CAPSULE ORAL at 21:52

## 2019-07-11 RX ADMIN — ASPIRIN 81 MG: 81 TABLET, COATED ORAL at 19:41

## 2019-07-11 RX ADMIN — CLONAZEPAM 0.5 MG: 0.5 TABLET ORAL at 10:11

## 2019-07-11 RX ADMIN — GUAIFENESIN 600 MG: 600 TABLET, EXTENDED RELEASE ORAL at 21:52

## 2019-07-11 RX ADMIN — CEFDINIR 300 MG: 300 CAPSULE ORAL at 23:00

## 2019-07-11 RX ADMIN — IPRATROPIUM BROMIDE AND ALBUTEROL SULFATE 3 ML: .5; 3 SOLUTION RESPIRATORY (INHALATION) at 19:57

## 2019-07-11 RX ADMIN — TAZOBACTAM SODIUM AND PIPERACILLIN SODIUM 4.5 G: 500; 4 INJECTION, SOLUTION INTRAVENOUS at 16:29

## 2019-07-11 RX ADMIN — GUAIFENESIN 600 MG: 600 TABLET, EXTENDED RELEASE ORAL at 10:11

## 2019-07-11 RX ADMIN — GUAIFENESIN AND DEXTROMETHORPHAN 10 ML: 100; 10 SYRUP ORAL at 19:41

## 2019-07-11 RX ADMIN — SIMVASTATIN 20 MG: 20 TABLET, FILM COATED ORAL at 21:52

## 2019-07-11 RX ADMIN — CLONAZEPAM 0.5 MG: 0.5 TABLET ORAL at 21:52

## 2019-07-11 RX ADMIN — TAZOBACTAM SODIUM AND PIPERACILLIN SODIUM 4.5 G: 500; 4 INJECTION, SOLUTION INTRAVENOUS at 04:23

## 2019-07-11 RX ADMIN — ENOXAPARIN SODIUM 40 MG: 40 INJECTION SUBCUTANEOUS at 21:55

## 2019-07-11 RX ADMIN — LOSARTAN POTASSIUM 100 MG: 100 TABLET ORAL at 10:11

## 2019-07-11 RX ADMIN — GUAIFENESIN AND DEXTROMETHORPHAN 10 ML: 100; 10 SYRUP ORAL at 13:26

## 2019-07-11 RX ADMIN — LEVOFLOXACIN 500 MG: 5 INJECTION, SOLUTION INTRAVENOUS at 00:26

## 2019-07-11 RX ADMIN — PREDNISONE 20 MG: 20 TABLET ORAL at 10:11

## 2019-07-11 RX ADMIN — GUAIFENESIN AND DEXTROMETHORPHAN 10 ML: 100; 10 SYRUP ORAL at 10:10

## 2019-07-11 RX ADMIN — HYDROCODONE BITARTRATE AND ACETAMINOPHEN 1 TABLET: 5; 325 TABLET ORAL at 00:32

## 2019-07-11 RX ADMIN — TAZOBACTAM SODIUM AND PIPERACILLIN SODIUM 4.5 G: 500; 4 INJECTION, SOLUTION INTRAVENOUS at 10:05

## 2019-07-11 RX ADMIN — FUROSEMIDE 20 MG: 10 INJECTION, SOLUTION INTRAVENOUS at 16:27

## 2019-07-11 RX ADMIN — SERTRALINE HYDROCHLORIDE 200 MG: 100 TABLET ORAL at 10:11

## 2019-07-11 RX ADMIN — IPRATROPIUM BROMIDE AND ALBUTEROL SULFATE 3 ML: .5; 3 SOLUTION RESPIRATORY (INHALATION) at 11:28

## 2019-07-11 RX ADMIN — IPRATROPIUM BROMIDE AND ALBUTEROL SULFATE 3 ML: .5; 3 SOLUTION RESPIRATORY (INHALATION) at 16:36

## 2019-07-11 RX ADMIN — TAZOBACTAM SODIUM AND PIPERACILLIN SODIUM 4.5 G: 500; 4 INJECTION, SOLUTION INTRAVENOUS at 21:54

## 2019-07-11 RX ADMIN — PANTOPRAZOLE SODIUM 40 MG: 40 TABLET, DELAYED RELEASE ORAL at 21:52

## 2019-07-11 RX ADMIN — GUAIFENESIN AND DEXTROMETHORPHAN 10 ML: 100; 10 SYRUP ORAL at 01:24

## 2019-07-11 ASSESSMENT — ACTIVITIES OF DAILY LIVING (ADL)
ADLS_ACUITY_SCORE: 16
ADLS_ACUITY_SCORE: 16
ADLS_ACUITY_SCORE: 17
ADLS_ACUITY_SCORE: 15
ADLS_ACUITY_SCORE: 16
ADLS_ACUITY_SCORE: 15

## 2019-07-11 NOTE — PROGRESS NOTES
I certify that this patient, Nicole Reyes has been under my care and that I, or a nurse practitioner or physician's assistant working with me, had a face-to-face encounter that meets face-to-face encounter requirements with this patient on July 11, 2019.    Nicole Reyes is now in a chronic stable state and continues to require supplemental oxygen due to continued oxygen desaturation.  This patient has been treated in part, or in whole for the following medical condition(s):  Pneumonia  Treatments tried and failed or ruled out to treat hypoxemia include antibiotics/bronchodilators/steroid.  If portability is ordered, is the patient mobile within the home? Yes    Oxygen readings:   *   RA - at rest  Pulse oximetry SP 82 %  *   RA - during activity/with exercise SPO2 82 %  *   O2 at 1 liters/minute (at rest) ...SPO2  96%  *   O2 at 2liters/minute (during activity/with exercise) ...SPO2 91%

## 2019-07-11 NOTE — DISCHARGE SUMMARY
[facility]    Discharge Summary  Hospitalist    Date of Admission:  7/6/2019  Date of Discharge:  7/12/2019  Discharging Provider: Jose L Weaver MD  Date of Service (when I saw the patient): 07/12/19    Discharge Diagnoses   pneumonia.  Unspecified organism.  Treated as hospital-acquired pneumonia due to recent hospitalization.  Sepsis at presentation.  Acute hypoxic respiratory failure secondary to pneumonia.  Diabetes.  Dyslipidemia.  Gastroesophageal reflux disease.  Hypertension.    History of Present Illness   Nicole Reyes is an 63 year old female with history of type 2 diabetes mellitus, obesity, hypertension, hyperlipidemia, history of obstructive sleep apnea noncompliant with CPAP, osteoarthritis and sciatica, history of recurrent pneumonias presented to the emergency room with complaints of cough and shortness of breath.  Was found to be hypoxic to 79% needing high flow nasal cannula at 6 L and lactate elevated at 4.1, WBC count of 21K.    Hospital Course     Patient was treated with broad-spectrum antibiotics in form of IV vancomycin, IV Zosyn, IV Levaquin.  Patient was given broad-spectrum antibiotic treatment regime due to her sepsis and concern of hospital-acquired pneumonia.She was treated as hospital-acquired pneumonia due to her recent hospitalization back in April.    She was initially very hypoxic and required high flow oxygen to maintain satisfactory oxygenation.  She was treated with broad-spectrum antibiotics, IV fluids, high flow oxygen.  She was also treated with bronchodilators due to wheezing.  Later on prednisone was also added due to her ongoing wheezing in the setting of hypoxia.  She does not carry a diagnosis of asthma or COPD.    Slowly, patient showed a positive response.  We managed to wean her off of the high flow oxygen to nasal cannula.  Currently she is requiring 1 L of oxygen by nasal cannula.  She desaturates on room air to 82%.  She will require home oxygen  which is being arranged.  She agrees to be on oxygen at home.    She completed 5 days of IV antibiotic treatment including IV Levaquin for atypical coverage.  She has been discharged on oral Omnicef.    Her home medications are being continued as before.  Follow-up will be with her primary care physician including a follow-up chest x-ray.    I personally evaluated and examined the patient on the day of discharge.    Jose L Weaver MD      Pending Results   These results will be followed up by PCP  Unresulted Labs Ordered in the Past 30 Days of this Admission     Date and Time Order Name Status Description    7/6/2019 1836 Blood culture Preliminary     7/6/2019 1836 Blood culture Preliminary                Primary Care Physician   Pari Wiley        Discharge Disposition   Discharged to home  Condition at discharge: Stable    Consultations This Hospital Stay   PHARMACY TO DOSE VANCO  PHARMACY TO DOSE VANCO  SPEECH LANGUAGE PATH ADULT IP CONSULT  PHYSICAL THERAPY ADULT IP CONSULT  CARE COORDINATOR IP CONSULT    Time Spent on this Encounter   Discharge time: greater than 30 minutes.    Discharge Orders      Reason for your hospital stay    Pneumonia     Follow-up and recommended labs and tests     Follow up with primary care provider, Pari Wiley, within 7 days for hospital follow- up.  The following labs/tests are recommended: repeat chest xray in 4-6 weeks.     Activity    Your activity upon discharge: activity as tolerated     Oxygen Adult    [place] [facility] Order 1 liter(s) by nasal cannula continuously with use of portable tank. Expected treatment length is 1 months.. Test on conserving device as applicable.    Patients who qualify for home O2 coverage under the CMS guidelines require ABG tests or O2 sat readings obtained closest to, but no earlier than 2 days prior to the discharge, as evidence of the need for home oxygen therapy. Testing must be performed while patient is in the chronic stable state. See notes  for O2 sats.    I certify that this patient, Nicole Reyes has been under my care and that I, or a nurse practitioner or physician's assistant working with me, had a face-to-face encounter that meets the face-to-face encounter requirements with this patient on 7/11/2019. The patient, Nicole Reyes was evaluated or treated in whole, or in part, for the following medical condition, which necessitates the use of the ordered oxygen. Treatment Diagnosis: pneumonia    Attending Provider: Jose L Weaver  Physician signature: See electronic signature associated with these discharge orders  Date of Order: July 11, 2019     Diet    Follow this diet upon discharge: Orders Placed This Encounter      Moderate Consistent CHO Diet     Discharge Medications   Current Discharge Medication List      START taking these medications    Details   albuterol (PROAIR HFA/PROVENTIL HFA/VENTOLIN HFA) 108 (90 Base) MCG/ACT inhaler Inhale 1-2 puffs into the lungs every 6 hours as needed for shortness of breath / dyspnea or wheezing  Qty: 1 Inhaler, Refills: 0    Comments: Please also dispense a spacer  Associated Diagnoses: Community acquired bacterial pneumonia      cefdinir (OMNICEF) 300 MG capsule Take 1 capsule (300 mg) by mouth 2 times daily for 5 days  Qty: 10 capsule, Refills: 0    Associated Diagnoses: Community acquired bacterial pneumonia      guaiFENesin-dextromethorphan (ROBITUSSIN DM) 100-10 MG/5ML syrup Take 10 mLs by mouth 3 times daily as needed for cough  Qty: 236 mL, Refills: 0    Associated Diagnoses: Community acquired bacterial pneumonia         CONTINUE these medications which have NOT CHANGED    Details   ASPIRIN EC PO Take 81 mg by mouth every evening      clonazePAM (KLONOPIN) 1 MG tablet Take 0.5 mg by mouth 2 times daily      gabapentin (NEURONTIN) 300 MG capsule Take 2 capsules (600 mg) by mouth daily  Qty: 180 capsule, Refills: 3    Comments: Disregard prescription sent earlier today. ### DO NOT FILL NOW.   Please update patient's profile to reflect additional refills.  ####  Associated Diagnoses: Restless legs syndrome (RLS); Persistent insomnia      ibuprofen (ADVIL,MOTRIN) 600 MG tablet Take 1 tablet (600 mg) by mouth every 6 hours as needed for moderate pain  Qty: 30 tablet, Refills: 1      losartan (COZAAR) 100 MG tablet Take 1 tablet (100 mg) by mouth daily  Qty: 90 tablet, Refills: 1    Comments: Profile Rx: patient will contact pharmacy when needed  Associated Diagnoses: Essential hypertension, benign      metFORMIN (GLUCOPHAGE-XR) 500 MG 24 hr tablet Take 2 tablets (1,000 mg) by mouth every morning  Qty: 180 tablet, Refills: 3    Comments: Profile Rx: patient will contact pharmacy when needed  Associated Diagnoses: Type 2 diabetes mellitus with stage 3 chronic kidney disease, without long-term current use of insulin (H)      pantoprazole (PROTONIX) 40 MG EC tablet Take 1 tablet (40 mg) by mouth At Bedtime  Qty: 90 tablet, Refills: 3    Comments: Profile Rx: patient will contact pharmacy when needed  Associated Diagnoses: Gastroesophageal reflux disease without esophagitis      sertraline (ZOLOFT) 100 MG tablet Take 2 tablets (200 mg) by mouth daily  Qty: 180 tablet, Refills: 3    Comments: Profile Rx: patient will contact pharmacy when needed  Associated Diagnoses: Generalized anxiety disorder; Major depressive disorder, recurrent episode, moderate (H)      simvastatin (ZOCOR) 20 MG tablet Take 1 tablet (20 mg) by mouth At Bedtime  Qty: 90 tablet, Refills: 1    Comments: Profile Rx: patient will contact pharmacy when needed  Associated Diagnoses: Hyperlipidemia LDL goal <100      zolpidem (AMBIEN) 10 MG tablet Take 0.5 tablets (5 mg) by mouth nightly as needed for sleep  Qty: 45 tablet, Refills: 1    Associated Diagnoses: Insomnia, unspecified type      zolpidem ER (AMBIEN CR) 6.25 MG CR tablet Take 1 tablet (6.25 mg) by mouth nightly as needed for sleep  Qty: 30 tablet, Refills: 2    Associated Diagnoses:  Insomnia, unspecified type      blood glucose (ACCU-CHEK BYRON PLUS) test strip Use to test blood sugar 2 times daily or as directed.  Qty: 100 each, Refills: 12    Associated Diagnoses: Type 2 diabetes mellitus with stage 3 chronic kidney disease, without long-term current use of insulin (H)      cyclobenzaprine (FLEXERIL) 10 MG tablet Take 1 tablet (10 mg) by mouth 3 times daily as needed for muscle spasms  Qty: 90 tablet, Refills: 11    Comments: Profile Rx: patient will contact pharmacy when needed  Associated Diagnoses: Chronic low back pain with sciatica, sciatica laterality unspecified, unspecified back pain laterality      order for DME Equipment being ordered: CPAP  Qty: 1 Device, Refills: 0    Associated Diagnoses: CRISTÓBAL (obstructive sleep apnea)           Allergies   Allergies   Allergen Reactions     Codeine Rash     Penicillins Rash     Pt has tolerated cephalosporins and penems.   Pt tolerated Zosyn 7/6/2019     Data   Most Recent 3 CBC's:  Recent Labs   Lab Test 07/09/19  0612 07/08/19  0616 07/07/19  0612 07/06/19  1840   WBC  --  10.5 17.8* 21.4*   HGB  --  10.3* 11.6* 13.6   MCV  --  85 81 82    193 162 298      Most Recent 3 BMP's:  Recent Labs   Lab Test 07/09/19  0612 07/08/19  0616 07/07/19  1836 07/07/19  0612    142  --  140   POTASSIUM 4.1 4.5 3.6 3.2*   CHLORIDE 107 108  --  105   CO2 32 29  --  26   BUN 18 16  --  19   CR 0.80 0.80  --  0.91   ANIONGAP 2* 5  --  9   GRECIA 8.7 8.2*  --  8.2*   * 162*  --  102*     Most Recent 2 LFT's:  Recent Labs   Lab Test 07/06/19  1840 10/02/17  1445   AST 39 17   ALT 32 19   ALKPHOS 122 119   BILITOTAL 0.7 0.4     Most Recent INR's and Anticoagulation Dosing History:  Anticoagulation Dose History     Recent Dosing and Labs Latest Ref Rng & Units 10/17/2011 10/28/2011 10/19/2016 7/6/2019    INR 0.86 - 1.14 1.14 1.04 0.99 1.07        Most Recent 3 Troponin's:  Recent Labs   Lab Test 07/06/19  1840 04/11/19  1624 09/19/17  1515   TROPI  <0.015 0.019 <0.015     Most Recent Cholesterol Panel:  Recent Labs   Lab Test 06/21/19  1438   CHOL 170   LDL 96   HDL 40*   TRIG 169*     Most Recent 6 Bacteria Isolates From Any Culture (See EPIC Reports for Culture Details):  Recent Labs   Lab Test 07/06/19  1840 04/11/19  2200 09/25/17  2320 09/25/17  1755 09/25/17  1709 09/20/17  2030   CULT No growth after 5 days  No growth after 5 days Moderate growth  Normal zenon    Heavy growth  Leeanne albicans / dubliniensis  Candida albicans and Candida dubliniensis are not routinely speciated  Susceptibility testing not routinely done  *  Light growth  Streptococcus agalactiae sero group B  * No growth No growth No growth No growth     Most Recent TSH, T4 and A1c Labs:  Recent Labs   Lab Test 06/21/19  1438  03/22/16  1418   TSH 2.31   < > 4.09*   T4  --   --  1.12   A1C 6.7*   < > 6.8*    < > = values in this interval not displayed.

## 2019-07-11 NOTE — PROGRESS NOTES
Patient has been assessed for Home Oxygen needs.  Oxygen readings:   *   RA - at rest  Pulse oximetry SP 82 %  *   RA - during activity/with exercise SPO2 82 %  *   O2 at 1 liters/minute (at rest) ...SPO2  96%  *   O2 at 2liters/minute (during activity/with exercise) ...SPO2 91%

## 2019-07-11 NOTE — PROGRESS NOTES
[facility]    Hospitalist Progress Note      Assessment & Plan   Nicole Reyes is a 63 year old female who was admitted on 7/6/2019.    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  Admitted for left-sided pneumonia and sepsis.     Initially she required high flow oxygen.  She was transitioned off the high flow oxygen and to 3 L nasal cannula on 7/8/2019.  Today she is transitioning down to 1 L nasal cannula.     Slow improvement.  Shortness of breath is improving.  We are hoping to discharge her today but in the afternoon she was not feeling well.  Feeling very tired.  Elevated blood pressure.  Also complaining of bilateral lower extremity edema.    Plan:     Pneumonia.  Presumed hospital-acquired pneumonia.  - Continue IV Zosyn, IV Levaquin.   (start date 7/6/2019).  IV vancomycin was stopped on 7/8/2019 after MRSA swab came back negative.  - Continue IV antibiotic.  Completed 5 days of Levaquin so that can be stopped.  Continue IV Zosyn.     Acute hypoxic respiratory failure.  -Secondary to pneumonia.  May also have volume overload.  -Was initially treated with high flow oxygen and currently on 1 L nasal cannula.  - Was started on oral prednisone for wheezing, probably due to associated bronchitis.  No history of asthma or COPD.  Titrate down the oral prednisone.  -We will give a dose of IV Lasix for bilateral lower extremity edema which should help her blood pressure as well.     Diabetes  -Holding the metformin.  Last A1c of 6.7.  -On insulin sliding scale.  Closely monitor due to steroid treatment.     Hypertension  -Elevated blood pressure as  losartan was discontinued at admission.  It has been restarted.  - Volume overload is also contributing along with prednisone use.  - We will give a dose of IV Lasix.  Closely monitor.  - IV blood pressure medicines for systolic blood pressure greater than 180.     Dyslipidemia  - On statin.     Gastroesophageal reflux  -On Protonix.        DVT Prophylaxis: Enoxaparin (Lovenox) SQ  Code Status: Full Code  Expected discharge: Likely discharge tomorrow.     Jose L Weaver MD  Text Page (7am - 6pm, M-F)    Interval History   Patient was evaluated with nursing staff. Overnight issues discussed.  Feeling tired and somewhat unwell this afternoon.  Blood pressure elevation.  Also complaining of bilateral lower extremity edema.  No tenderness.      -Data reviewed today: Labs and medications.    Physical Exam   Temp: 98.2  F (36.8  C) Temp src: Oral BP: 165/84 Pulse: 71 Heart Rate: 69 Resp: 20 SpO2: (!) 88 % O2 Device: Nasal cannula Oxygen Delivery: 1 LPM  Vitals:    07/06/19 2237 07/10/19 0549   Weight: 114.7 kg (252 lb 12.8 oz) 104.1 kg (229 lb 6.4 oz)     Vital Signs with Ranges  Temp:  [98  F (36.7  C)-98.7  F (37.1  C)] 98.2  F (36.8  C)  Pulse:  [71] 71  Heart Rate:  [63-73] 69  Resp:  [16-20] 20  BP: (139-165)/() 165/84  SpO2:  [88 %-97 %] 88 %  I/O last 3 completed shifts:  In: 1053 [P.O.:1040; I.V.:13]  Out: -     Constitutional: Awake, alert, cooperative, no apparent distress  HEENT: Trachea midline, sclera is clear   Respiratory: Minimal wheezing, occasionally.  Bibasilar crackles more so on the left side, fine crackles.  Good air entry bilaterally.  Cardiovascular: Regular rate and rhythm, normal S1 and S2, and no murmur noted  GI: Normal bowel sounds, soft, non-distended, non-tender  Skin/Integumen: No rashes, no cyanosis, bilateral lower extremity pitting edema.  No calf tenderness.    Medications       aspirin  81 mg Oral QPM     clonazePAM  0.5 mg Oral BID     enoxaparin  40 mg Subcutaneous Q24H      furosemide  20 mg Intravenous Once     gabapentin  600 mg Oral QPM     guaiFENesin  600 mg Oral BID     guaiFENesin-dextromethorphan  10 mL Oral Q6H     insulin aspart  1-7 Units Subcutaneous TID AC     insulin aspart  1-5 Units Subcutaneous At Bedtime     ipratropium - albuterol 0.5 mg/2.5 mg/3 mL  3 mL Nebulization 4x daily     losartan  100 mg Oral Daily     pantoprazole  40 mg Oral At Bedtime     piperacillin-tazobactam  4.5 g Intravenous Q6H     sertraline  200 mg Oral Daily     simvastatin  20 mg Oral At Bedtime     sodium chloride (PF)  3 mL Intracatheter Q8H       Data   Recent Labs   Lab 07/09/19  0612 07/08/19  0616 07/07/19  1836 07/07/19 0612 07/06/19  1921 07/06/19  1840   WBC  --  10.5  --  17.8*  --  21.4*   HGB  --  10.3*  --  11.6*  --  13.6   MCV  --  85  --  81  --  82    193  --  162  --  298   INR  --   --   --   --  1.07  --     142  --  140  --  140   POTASSIUM 4.1 4.5 3.6 3.2*  --  3.4   CHLORIDE 107 108  --  105  --  104   CO2 32 29  --  26  --  25   BUN 18 16  --  19  --  13   CR 0.80 0.80  --  0.91  --  0.87   ANIONGAP 2* 5  --  9  --  11   GRECIA 8.7 8.2*  --  8.2*  --  9.1   * 162*  --  102*  --  119*   ALBUMIN  --   --   --   --   --  3.3*   PROTTOTAL  --   --   --   --   --  7.3   BILITOTAL  --   --   --   --   --  0.7   ALKPHOS  --   --   --   --   --  122   ALT  --   --   --   --   --  32   AST  --   --   --   --   --  39   TROPI  --   --   --   --   --  <0.015       No results found for this or any previous visit (from the past 24 hour(s)).

## 2019-07-11 NOTE — PROVIDER NOTIFICATION
"Paged Dr. Weaver at 1411 \"Pt BP is 165/84 and 150/114. Do you feel like she needs any adjustments before discharge? Thanks!\"      Paged Dr. Weaver at 1504 \" Pt states she does not feel comfortable discharging today. Could you please come talk to her? Thanks!  "

## 2019-07-11 NOTE — PLAN OF CARE
Writer here for four hours. Pt A/O x 4. VSS on 1L O2 per NC . C/O abdominal pain, denied pharmacological interventions and wanted to rest. BG 89, breakfast consumed. Tele SB/SR with prol QT , denied CP . LS dim, SOB and KHALIL present. PIV to right hand intact, SL . Up to bathroom SBA , voiding well and had loose BM per LPN report. Completion of IV abx today, adequate for discharge. Continue POC.

## 2019-07-11 NOTE — PLAN OF CARE
VSS. IV zosyn and  Levaquin for pneumonia. Anticipate switching to PO antibiotics to day as discharge home is planned today. PRN pain med for abd pain with relief. . O2 1L stat 96%. Pt may need home O2. Tele SR. Will continue to monitor.

## 2019-07-11 NOTE — PLAN OF CARE
"This writer took care of this patient for 4 hours today. VSS other than elevated BP. Patient also still requiring oxygen (see flowsheets for details).  Home medical was called in order to get her oxygen for at home. They stated to the patient that they'd be \"seeing her in a little bit\". Paged Dr. Weaver about slightly elevated BP. Patient also had + 2 edema in bilateral ankles & legs. MD aware. Pt still coughing and just states \"I feel exhausted and still don't feel good and don't feel comfortable to go home\".  All info relayed to Dr. Weaver.  Per conversation with Dr. Weaver he decided to keep her one more day and give her IV lasix.   "

## 2019-07-11 NOTE — PLAN OF CARE
VSS on 1L. Tele - SR. Pain 5/10 from coughing, but per patient is tolerable. LS diminished. Blood sugars 161 and 117. Receiving IV zosyn for pneumonia. Up SBA. Plan: discharge home tomorrow.

## 2019-07-12 VITALS
BODY MASS INDEX: 52.45 KG/M2 | TEMPERATURE: 97.7 F | OXYGEN SATURATION: 93 % | HEART RATE: 71 BPM | WEIGHT: 249.9 LBS | RESPIRATION RATE: 18 BRPM | HEIGHT: 58 IN | SYSTOLIC BLOOD PRESSURE: 140 MMHG | DIASTOLIC BLOOD PRESSURE: 70 MMHG

## 2019-07-12 LAB
BACTERIA SPEC CULT: NO GROWTH
BACTERIA SPEC CULT: NO GROWTH
CREAT SERPL-MCNC: 0.99 MG/DL (ref 0.52–1.04)
GFR SERPL CREATININE-BSD FRML MDRD: 60 ML/MIN/{1.73_M2}
GLUCOSE BLDC GLUCOMTR-MCNC: 103 MG/DL (ref 70–99)
GLUCOSE BLDC GLUCOMTR-MCNC: 103 MG/DL (ref 70–99)
GLUCOSE BLDC GLUCOMTR-MCNC: 181 MG/DL (ref 70–99)
Lab: NORMAL
Lab: NORMAL
PLATELET # BLD AUTO: 258 10E9/L (ref 150–450)
SPECIMEN SOURCE: NORMAL
SPECIMEN SOURCE: NORMAL

## 2019-07-12 PROCEDURE — 00000146 ZZHCL STATISTIC GLUCOSE BY METER IP

## 2019-07-12 PROCEDURE — 36415 COLL VENOUS BLD VENIPUNCTURE: CPT | Performed by: INTERNAL MEDICINE

## 2019-07-12 PROCEDURE — 25000125 ZZHC RX 250: Performed by: INTERNAL MEDICINE

## 2019-07-12 PROCEDURE — 99239 HOSP IP/OBS DSCHRG MGMT >30: CPT | Performed by: INTERNAL MEDICINE

## 2019-07-12 PROCEDURE — 25000132 ZZH RX MED GY IP 250 OP 250 PS 637: Performed by: HOSPITALIST

## 2019-07-12 PROCEDURE — 25000132 ZZH RX MED GY IP 250 OP 250 PS 637: Performed by: INTERNAL MEDICINE

## 2019-07-12 PROCEDURE — 85049 AUTOMATED PLATELET COUNT: CPT | Performed by: INTERNAL MEDICINE

## 2019-07-12 PROCEDURE — 40000275 ZZH STATISTIC RCP TIME EA 10 MIN

## 2019-07-12 PROCEDURE — 82565 ASSAY OF CREATININE: CPT | Performed by: INTERNAL MEDICINE

## 2019-07-12 PROCEDURE — 94640 AIRWAY INHALATION TREATMENT: CPT

## 2019-07-12 RX ADMIN — GUAIFENESIN AND DEXTROMETHORPHAN 10 ML: 100; 10 SYRUP ORAL at 09:59

## 2019-07-12 RX ADMIN — IPRATROPIUM BROMIDE AND ALBUTEROL SULFATE 3 ML: .5; 3 SOLUTION RESPIRATORY (INHALATION) at 07:15

## 2019-07-12 RX ADMIN — LOSARTAN POTASSIUM 100 MG: 100 TABLET ORAL at 09:58

## 2019-07-12 RX ADMIN — ZOLPIDEM TARTRATE 5 MG: 5 TABLET, FILM COATED ORAL at 00:05

## 2019-07-12 RX ADMIN — CLONAZEPAM 0.5 MG: 0.5 TABLET ORAL at 09:58

## 2019-07-12 RX ADMIN — SERTRALINE HYDROCHLORIDE 200 MG: 100 TABLET ORAL at 09:58

## 2019-07-12 RX ADMIN — GUAIFENESIN 600 MG: 600 TABLET, EXTENDED RELEASE ORAL at 09:59

## 2019-07-12 RX ADMIN — CEFDINIR 300 MG: 300 CAPSULE ORAL at 09:58

## 2019-07-12 RX ADMIN — GUAIFENESIN AND DEXTROMETHORPHAN 10 ML: 100; 10 SYRUP ORAL at 02:00

## 2019-07-12 ASSESSMENT — MIFFLIN-ST. JEOR: SCORE: 1578.29

## 2019-07-12 ASSESSMENT — ACTIVITIES OF DAILY LIVING (ADL)
ADLS_ACUITY_SCORE: 15
ADLS_ACUITY_SCORE: 16
ADLS_ACUITY_SCORE: 16

## 2019-07-12 NOTE — PLAN OF CARE
Elevated blood pressure otherwise VSS. Tele - SR. Muscular pain 5/10 due to coughing - per patient is tolerable for her at the moment. IV lasix x1 for BLE. Blood sugar 181. Ambulated in hallway x1 this shift. Up SBA . Plan: discharge tomorrow with home oxygen.     2240: patient lost IV access after IV zosyn. MD paged if okay for no IV access.  2244: new orders placed by MD. See note. Orders placed for okay for not IV access.

## 2019-07-12 NOTE — PROGRESS NOTES
Pt discharged to home with daughter as transport. Discharge meds filled by [facility] pharmacy-with pt upon discharge. Supplemental oxygen delivered to pt yesterday-with pt upon discharge. Pt attempted to reach [facility] home medical prior to leaving hospital-unable to reach-left a VM for home O2 set up. Discharge instructions reviewed with pt-no questions or concerns.

## 2019-07-12 NOTE — PLAN OF CARE
VSS on 1L O2 NC. . Pt started on PO antibiotics. Anticipate discharge today. Will continue to monitor.

## 2019-07-12 NOTE — PROGRESS NOTES
[facility] Hospitalist Note:    This writer was paged pt lost PIV. Discharge planned tomorrow on Omnicef. Will start now.    Bhavesh Paiz DO MPH  [facility] Hospitalist  [address] [place] [address]  Pager: [phone]  07/11/2019

## 2019-07-12 NOTE — PROGRESS NOTES
Transition Communication Hand-off for Care Transitions to Next Level of Care Provider    Name: Nicole Reyes  : 1955  MRN #: [id number]  Primary Care Provider: Pari Wiley  Primary Care MD Name: mendoza  Primary Clinic: [address] [facility] [address]  [facility] [address]  Primary Care Clinic [facility]: [facility]  Reason for Hospitalization:  Respiratory insufficiency [R06.89]  Community acquired bacterial pneumonia [J15.9]  Septic shock (H) [A41.9, R65.21]  Admit Date/Time: 2019  6:15 PM  Discharge Date: 19  Payor Source: Payor: PREFERREDONE / Plan: [employer] NON [facility] / Product Type: HMO /     Readmission Assessment Measure (COURTNEY) Risk Score/category: Average           Reason for Communication Hand-off Referral: Admission diagnoses: PN    Discharge Plan: Home       Concern for non-adherence with plan of care:   No  Discharge Needs Assessment:  Needs      Most Recent Value   Equipment Currently Used at Home  shower chair, grab bar, tub/shower   # of Referrals Placed by [facility]  Internal Clinic Care Coordination          Already enrolled in Tele-monitoring program and name of program:  NA  Follow-up specialty is recommended: Yes    Follow-up plan:    Future Appointments   Date Time Provider Department Center   7/15/2019  2:20 PM Pari Wiley MD [facility] [place]       Any outstanding tests or procedures:    Procedures     Future Labs/Procedures    Oxygen Adult     Comments:    New Home Oxygen Order 1 liter(s) by nasal cannula continuously with use of portable tank. Expected treatment length is 1 months.. Test on conserving device as applicable.    Patients who qualify for home O2 coverage under the CMS guidelines require ABG tests or O2 sat readings obtained closest to, but no earlier than 2 days prior to the discharge, as evidence of the need for home oxygen therapy. Testing must be performed while patient is in the chronic stable state. See notes for O2 sats.    I certify that this patient,  Nicole Reyes has been under my care and that I, or a nurse practitioner or physician's assistant working with me, had a face-to-face encounter that meets the face-to-face encounter requirements with this patient on 7/11/2019. The patient, Nicole Reyes was evaluated or treated in whole, or in part, for the following medical condition, which necessitates the use of the ordered oxygen. Treatment Diagnosis: pneumonia    Attending Provider: Jose L Weaver  Physician signature: See electronic signature associated with these discharge orders  Date of Order: July 11, 2019                Key Recommendations:  Pt is admitted with PNA.  We did discuss the PNA action care plan.  She is aware of when to contact her primary care provider.  I did give pt an IS and she was able to demonstrate proper use of it.  Pt has a history of CRISTÓBAL.  She said she isn't able to wear a CPAP because she has insomnia when she puts on the mask.  Pt said she was told she should follow up with a sleep center.  I gave pt a hand out on [facility] in [place].  She will discuss it with Dr Wiley and get a referral from her.  Pt has DM.  Her a1c is 6.7 as of 6/21/19.  She checks her blood glucose daily.  It ranges 103 to 120 usually.  She is aware that her blood glucose may be elevated d/t her infection and steroids.   Pt prefers to make her own f/u appt as she has to schedule it around her 's Dialysis days.    Recommendations are to follow up for resolution of pneumonia and complete antibiotics as prescribed. Patient was discharged with O2.     Suzanne Jordan & Jolly aCi    AVS/Discharge Summary is the source of truth; this is a helpful guide for improved communication of patient story

## 2019-07-12 NOTE — PLAN OF CARE
"/70 (BP Location: Left arm)   Pulse 71   Temp 97.7  F (36.5  C) (Oral)   Resp 18   Ht 1.473 m (4' 10\")   Wt 113.4 kg (249 lb 14.4 oz)   LMP 09/15/2007   SpO2 93%   BMI 52.23 kg/m      Tele: SB/SR with PVC's. Up ind in room, SBA in hallways. KHALIL. Continues on 1-2L NC at rest and with activity. O2 sats 86% RA at rest. . No PIV in place. BLE edema improved per pt report. +1 BLE edema. Supplemental oxygen ordered and delivered to pt's room yesterday for anticipated discharge yesterday. Discharge home today. Meds to be filled by [facility] pharmacy.   "

## 2019-07-15 ENCOUNTER — OFFICE VISIT (OUTPATIENT)
Dept: INTERNAL MEDICINE | Facility: CLINIC | Age: 64
End: 2019-07-15
Payer: COMMERCIAL

## 2019-07-15 ENCOUNTER — TELEPHONE (OUTPATIENT)
Dept: INTERNAL MEDICINE | Facility: CLINIC | Age: 64
End: 2019-07-15

## 2019-07-15 VITALS
WEIGHT: 249.8 LBS | DIASTOLIC BLOOD PRESSURE: 72 MMHG | SYSTOLIC BLOOD PRESSURE: 128 MMHG | HEART RATE: 91 BPM | RESPIRATION RATE: 24 BRPM | TEMPERATURE: 99.1 F | HEIGHT: 58 IN | OXYGEN SATURATION: 90 % | BODY MASS INDEX: 52.44 KG/M2

## 2019-07-15 DIAGNOSIS — J18.9 RECURRENT PNEUMONIA: Primary | ICD-10-CM

## 2019-07-15 DIAGNOSIS — J96.00 ACUTE RESPIRATORY FAILURE, UNSP W HYPOXIA OR HYPERCAPNIA (H): ICD-10-CM

## 2019-07-15 DIAGNOSIS — E11.22 TYPE 2 DIABETES MELLITUS WITH STAGE 3 CHRONIC KIDNEY DISEASE, WITHOUT LONG-TERM CURRENT USE OF INSULIN (H): ICD-10-CM

## 2019-07-15 DIAGNOSIS — N18.30 TYPE 2 DIABETES MELLITUS WITH STAGE 3 CHRONIC KIDNEY DISEASE, WITHOUT LONG-TERM CURRENT USE OF INSULIN (H): ICD-10-CM

## 2019-07-15 PROCEDURE — 99495 TRANSJ CARE MGMT MOD F2F 14D: CPT | Performed by: INTERNAL MEDICINE

## 2019-07-15 ASSESSMENT — MIFFLIN-ST. JEOR: SCORE: 1577.84

## 2019-07-15 NOTE — NURSING NOTE
"Vital signs:  Temp: 99.1  F (37.3  C) Temp src: Oral BP: 128/72 Pulse: 91   Resp: 24 SpO2: 90 %     Height: 147.3 cm (4' 10\") Weight: 113.3 kg (249 lb 12.8 oz)  Estimated body mass index is 52.21 kg/m  as calculated from the following:    Height as of this encounter: 1.473 m (4' 10\").    Weight as of this encounter: 113.3 kg (249 lb 12.8 oz).          "

## 2019-07-15 NOTE — TELEPHONE ENCOUNTER
Fax received from [facility]   for review and signature.  Put in Dr Wiley's in basket.

## 2019-07-15 NOTE — PROGRESS NOTES
Subjective     Nicole Reyes is a 63 year old female who presents to clinic today for the following health issues:    \[facility]\""       Hospital Follow-up Visit:    Hospital/Nursing Home/IP Rehab Facility: [facility]  Date of Admission: 07/06/2019  Date of Discharge: 07/12/2019  Reason(s) for Admission: Sepsis due to recurrent pneumonia            Problems taking medications regularly:  None       Medication changes since discharge: None       Problems adhering to non-medication therapy:  None    Summary of hospitalization:  [facility] discharge summary reviewed  Diagnostic Tests/Treatments reviewed.  Follow up needed: none  Other Healthcare Providers Involved in Patient s Care:         None  Update since discharge: stable.     She reports that she continues on 1 L of oxygen at rest, increases it to 2 L with activity since her saturations were running 86-88 with activity, they are running about 90 at rest.  She has gone off of the prednisone without any worsening since stopping it but has continued stable cough with a little green mucus, some pain with the breath and continued dyspnea.  No fever or chills, certainly no worsening since she is been at home.  She was not given any specific return to work date.  She had not been back to work for very long after the last pneumonia in April when this happened.  Is concerned it may take much longer to recover she still felt fairly weak when she started back at work.  There was no consultation to pulmonary made.  She reports her blood sugars are coming down after getting off of the steroids.    Post Discharge Medication Reconciliation: discharge medications reconciled, continue medications without change.  Plan of care communicated with patient     Coding guidelines for this visit:  Type of Medical   Decision Making Face-to-Face Visit       within 7 Days of discharge Face-to-Face Visit        within 14 days of discharge   Moderate Complexity [id number]    High Complexity [id number] [id number]              Patient Active Problem List   Diagnosis     Essential hypertension, benign     Restless leg syndrome     CRISTÓBAL (obstructive sleep apnea)     CKD (chronic kidney disease) stage 3, GFR 30-59 ml/min (H)     Advanced directives, counseling/discussion     GENERALIZED ANXIETY DIS     Major depressive disorder, recurrent episode, moderate (H)     Health Care Home     GERD (gastroesophageal reflux disease)     Hyperlipidemia LDL goal <100     Eczema     Type 2 diabetes mellitus with stage 3 chronic kidney disease, without long-term current use of insulin (H)     Midline low back pain with left-sided sciatica     Morbid obesity (HCC)     Insomnia, unspecified type     Dyspnea, unspecified type     Acute pain of left knee     Controlled substance agreement signed-- checked 4-1-19 No Concerns     Acute respiratory failure, unsp w hypoxia or hypercapnia (H)     Current Outpatient Medications   Medication Sig Dispense Refill     albuterol (PROAIR HFA/PROVENTIL HFA/VENTOLIN HFA) 108 (90 Base) MCG/ACT inhaler Inhale 1-2 puffs into the lungs every 6 hours as needed for shortness of breath / dyspnea or wheezing 1 Inhaler 0     ASPIRIN EC PO Take 81 mg by mouth every evening       blood glucose (ACCU-CHEK BYRON PLUS) test strip Use to test blood sugar 2 times daily or as directed. 100 each 12     clonazePAM (KLONOPIN) 1 MG tablet Take 0.5 mg by mouth 2 times daily       cyclobenzaprine (FLEXERIL) 10 MG tablet Take 1 tablet (10 mg) by mouth 3 times daily as needed for muscle spasms 90 tablet 11     gabapentin (NEURONTIN) 300 MG capsule Take 2 capsules (600 mg) by mouth daily 180 capsule 3     guaiFENesin-dextromethorphan (ROBITUSSIN DM) 100-10 MG/5ML syrup Take 10 mLs by mouth 3 times daily as needed for cough 236 mL 0     ibuprofen (ADVIL,MOTRIN) 600 MG tablet Take 1 tablet (600 mg) by mouth every 6 hours as needed for moderate pain 30 tablet 1     losartan (COZAAR) 100 MG tablet Take 1  "tablet (100 mg) by mouth daily 90 tablet 1     metFORMIN (GLUCOPHAGE-XR) 500 MG 24 hr tablet Take 2 tablets (1,000 mg) by mouth every morning 180 tablet 3     order for DME Equipment being ordered: CPAP 1 Device 0     pantoprazole (PROTONIX) 40 MG EC tablet Take 1 tablet (40 mg) by mouth At Bedtime 90 tablet 3     sertraline (ZOLOFT) 100 MG tablet Take 2 tablets (200 mg) by mouth daily 180 tablet 3     simvastatin (ZOCOR) 20 MG tablet Take 1 tablet (20 mg) by mouth At Bedtime 90 tablet 1     zolpidem ER (AMBIEN CR) 6.25 MG CR tablet Take 1 tablet (6.25 mg) by mouth nightly as needed for sleep 30 tablet 2     zolpidem (AMBIEN) 10 MG tablet Take 0.5 tablets (5 mg) by mouth nightly as needed for sleep (Patient not taking: Reported on 7/15/2019) 45 tablet 1      Social History     Tobacco Use     Smoking status: Former Smoker     Last attempt to quit: 3/18/1979     Years since quittin.3     Smokeless tobacco: Never Used     Tobacco comment: quit    Substance Use Topics     Alcohol use: Yes     Comment: Twice a month     Drug use: No          Reviewed and updated as needed this visit by Provider         Review of Systems   No current fever, chills, still some cough, occasionally productive of small amounts of green mucus but decreasing overall, some soreness in her chest, appetite is okay, no abdominal concerns, no palpitations, lightheadedness      Objective    /72   Pulse 91   Temp 99.1  F (37.3  C) (Oral)   Resp 24   Ht 1.473 m (4' 10\")   Wt 113.3 kg (249 lb 12.8 oz)   LMP 09/15/2007   SpO2 90%   BMI 52.21 kg/m    Body mass index is 52.21 kg/m .  Physical Exam     Lungs: There is some dullness at the left base with few crackles, no wheezes          Assessment & Plan     1. Recurrent pneumonia  She has had now 2 serious pneumonias despite having had both pneumonia shots, fairly short time between these 2 episodes so I recommend a consultation with pulmonary to help determine if there is seems to " be any underlying reason why she may have severe recurrent pneumonias or any recommendations for management or prevention.  She is in agreement with this plan.  Completed her disability paperwork for now with a return to work date in about 3 months.  It is possible she may be able to return sooner however given the too bad she has had this year anticipating a longer recovery time.  No need for any continued antibiotics or steroids.  - PULMONARY MEDICINE REFERRAL    2. Acute respiratory failure, unsp w hypoxia or hypercapnia (H)  Improving, still requiring oxygen with low saturations.  She likely has significant restriction because of her body habitus.  Continue the oxygen at the 2 L with activity and 1 L at rest    3. Type 2 diabetes mellitus with stage 3 chronic kidney disease, without long-term current use of insulin (H)  Overall stable, sugars are improving, call for any concerns regarding elevated levels         Pari Wiley MD  [facility]

## 2019-07-16 ENCOUNTER — PATIENT OUTREACH (OUTPATIENT)
Dept: CARE COORDINATION | Facility: CLINIC | Age: 64
End: 2019-07-16

## 2019-07-16 ASSESSMENT — ACTIVITIES OF DAILY LIVING (ADL): DEPENDENT_IADLS:: INDEPENDENT

## 2019-07-16 NOTE — LETTER
[facility]  Complex Care Plan  About Me:    Patient Name:  Nicole Patel    YOB: 1955  Age:         63 year old   [facility] MRN:    [id number] Telephone Information:  Home Phone [phone]   Mobile [phone]       Address:  [address] [place] [address] Email address:  [email or web address]      Emergency Contact(s)    Name Relationship Lgl Grd Work Phone Home Phone Mobile Phone   1. STAR PATEL Spouse  NONE [phone]   2. LUCIA PATEL* Daughter  NONE [phone]   3. IVONE CLEVELAND Daughter   [phone]            Primary language:  English     needed? No   [facility] [employer]:  [phone] op. 1  Other communication barriers:    Preferred Method of Communication:  Talat  Current living arrangement: I live in a private home with spouse  Mobility Status/ Medical Equipment: Independent    Health Maintenance  Health Maintenance Reviewed:      My Access Plan  Medical Emergency 911   Primary Clinic Line [facility] - [phone]   24 Hour Appointment Line [phone] or  [phone] ([phone]) (toll-free)   24 Hour Nurse Line [phone] (toll-free)   Preferred Urgent Care     Preferred Hospital [facility]  [phone]   Preferred Pharmacy [facility] MAIL ORDER     Behavioral Health Crisis Line The National Suicide Prevention Lifeline at 1-884.159.3838 or 911       My Care Team Members  Patient Care Team       Relationship Specialty Notifications Start End    Pari Wiley MD PCP - General   6/4/03     Phone: [phone] Fax: [phone]         [address] [facility] [address]    Pari Wiley MD Assigned PCP   10/4/12     Phone: [phone] Fax: [phone]         [address] [facility] [address]    Tatiana Morley, RN Lead Care Coordinator Primary Care - CC  7/16/19     Phone: [phone] Fax: [phone]                My  Care Plans  Goals and (Comments)  Goals        General    return to work     Notes - Note created  7/16/2019  2:24 PM by Tatiana Morley RN    Goal Statement: I will be able to return to work.  Measure of Success: Patient will recover from pneumonia and will be able to maintain baseline activity level without getting short of breath.  When patient is able to return to normal activity level with out shortness of breath and supplemental oxygen she will be able to return to work.  Supportive Steps to Achieve: Follow up with providers as recommended.  Establish care with Pulmonology.  Take medications as recommended.  Increase activity as tolerated.  Barriers: shortness of breath with activity.  Patient needed to increase supplemental Oxygen to 2 L NC.  Strengths: Very supportive .  Work is able to support MARYAM until September 2019.  Date to Achieve By: 1 Sept 2019  Patient expressed understanding of goal: yes.                    My Medical and Care Information  Problem List   Patient Active Problem List   Diagnosis     Essential hypertension, benign     Restless leg syndrome     CRISTÓBAL (obstructive sleep apnea)     CKD (chronic kidney disease) stage 3, GFR 30-59 ml/min (H)     Advanced directives, counseling/discussion     GENERALIZED ANXIETY DIS     Major depressive disorder, recurrent episode, moderate (H)     Health Care Home     GERD (gastroesophageal reflux disease)     Hyperlipidemia LDL goal <100     Eczema     Type 2 diabetes mellitus with stage 3 chronic kidney disease, without long-term current use of insulin (H)     Midline low back pain with left-sided sciatica     Morbid obesity (HCC)     Insomnia, unspecified type     Dyspnea, unspecified type     Acute pain of left knee     Controlled substance agreement signed-- checked 4-1-19 No Concerns     Acute respiratory failure, unsp w hypoxia or hypercapnia (H)          Care Coordination Start Date: 7/16/2019   Frequency of Care Coordination: monthly    Form Last Updated: 07/16/2019

## 2019-07-16 NOTE — LETTER
[place] CARE COORDINATION  [address]  [facility] [address]    July 16, 2019    Nicole Reyes  [address]  [place] [address]      Dear Nicole,    I am a clinic care coordinator who works with Pari Wiley MD at [facility]. I wanted to thank you for spending the time to talk with me.  Please do not hesitate to call me with questions or concerns about your health care. Below is a description of clinic care coordination and how I can further assist you.     The clinic care coordinator is a registered nurse and/or  who understand the health care system. The goal of clinic care coordination is to help you manage your health and improve access to the [facility] system in the most efficient manner. The registered nurse can assist you in meeting your health care goals by providing education, coordinating services, and strengthening the communication among your providers. The  can assist you with financial, behavioral, psychosocial, chemical dependency, counseling, and/or psychiatric resources.    Please feel free to contact me at [phone], with any questions or concerns. We at [facility] are focused on providing you with the highest-quality healthcare experience possible and that all starts with you.     Sincerely,     Tatiana Morley RN  Care Coordination  Phone:  [phone]  Email: [email or web address]

## 2019-07-16 NOTE — PROGRESS NOTES
Clinic Care Coordination Contact    Referral Information:  Referral Source: IP Handoff    Chart reviewed.  Patient was admitted to [facility] 7/6/19-7/12/19 for treatment of pneumonia.  CTS handoff received.  Patient was discharged home with new home O2.    Patient has been enrolled in care coordination.     Chief Complaint   Patient presents with     Clinic Care Coordination - [facility]        Universal Utilization: No concerns.     Clinical Concerns:  Current Medical Concerns:  RN CC spoke with patient over phone.  Pneumonia- patient states she is till very shortness of breath with minimal activity.  She had f/u with PCP yesterday and they decided to increase her supplemental O2 to 2 L to assist with keeping O2 saturations above 90%.  Patient states with this change she is able to maintain 91-92% with activity.  Patient has pulse ox that she is using to monitor O2 sats closely.  She is also using Incentive Spirometer, meter is reaching 1500.  She is keeping IS by chair and using regularly throughout the day.  PCP has referred patient to [facility] for recurrent PNA.  She has not yet scheduled this appointment.    Patient has history of DM2, which is well controlled.  A1c 6.7.  Patient checks blood sugar daily.    Current Behavioral Concerns: none      Education Provided to patient: care coordination.      Health Maintenance Reviewed:    Clinical Pathway: Clinic Care Coordination Pneumonia Assessment  Oxygen/DME    Are you currently on oxygen? Yes     Is this new for you? Yes     Is it set up at home? Yes     What liter flow were you instructed to use and how often do you use it? 2L, continuously    Who is your supply company? [facility] [employer]  Activity:    How much activity can you do before you are SOB? minimal    Have you had to reduce your activities because of dyspnea or other symptoms?  Yes, doing small projects around house.  Not able to return to work until able to tolerate activity  better.  Emotions/Lifestyle:      Do you smoke? reports that she quit smoking about 40 years ago. She has never used smokeless tobacco.   Patient is well supported by her spouse, who is very attentive and helpful.    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    Medication Management:  No questions or concerns    START taking these medications     Details   albuterol (PROAIR HFA/PROVENTIL HFA/VENTOLIN HFA) 108 (90 Base) MCG/ACT inhaler Inhale 1-2 puffs into the lungs every 6 hours as needed for shortness of breath / dyspnea or wheezing  Qty: 1 Inhaler, Refills: 0     Comments: Please also dispense a spacer  Associated Diagnoses: Community acquired bacterial pneumonia       cefdinir (OMNICEF) 300 MG capsule Take 1 capsule (300 mg) by mouth 2 times daily for 5 days  Qty: 10 capsule, Refills: 0     Associated Diagnoses: Community acquired bacterial pneumonia       guaiFENesin-dextromethorphan (ROBITUSSIN DM) 100-10 MG/5ML syrup Take 10 mLs by mouth 3 times daily as needed for cough  Qty: 236 mL, Refills: 0     Associated Diagnoses: Community acquired bacterial pneumonia         Functional Status:  Dependent ADLs:: Independent  Dependent IADLs:: Independent  Bed or wheelchair confined:: No  Mobility Status: Independent  Fallen 2 or more times in the past year?: No  Any fall with injury in the past year?: No    Living Situation:  Current living arrangement:: I live in a private home with spouse  Type of residence:: Private home - stairs    Diet/Exercise/Sleep:  MOD CHO diet.    Transportation:  No concerns.           Psychosocial:  Quaker or spiritual beliefs that impact treatment:: No  Mental health DX:: No  Mental health management concern (GOAL):: No  Informal Support system:: Spouse     Financial/Insurance:   Possible concern.  Patient is on MARYAM from work until September 2019 due to shortness of breath with minimal activity.  Patient cannot return to work until she is able to tolerate  activity without shortness of breath or need of supplemental O2.     Resources and Interventions:  Community Resources: None  Supplies used at home:: Oxygen Tubing/Supplies  Equipment Currently Used at Home: shower chair, grab bar, tub/shower    Advance Care Plan/Directive  Advanced Care Plans/Directives on file:: No  Advanced Care Plan/Directive Status: Considering Options    Referrals Placed: Specialty Providers     Goals:   Goals        General    return to work (pt-stated)     Notes - Note created  7/16/2019  2:24 PM by Tatiana Morley RN    Goal Statement: I will be able to return to work.  Measure of Success: Patient will recover from pneumonia and will be able to maintain baseline activity level without getting short of breath.  When patient is able to return to normal activity level with out shortness of breath and supplemental oxygen she will be able to return to work.  Supportive Steps to Achieve: Follow up with providers as recommended.  Establish care with Pulmonology.  Take medications as recommended.  Increase activity as tolerated.  Barriers: shortness of breath with activity.  Patient needed to increase supplemental Oxygen to 2 L NC.  Strengths: Very supportive .  Work is able to support MARYAM until September 2019.  Date to Achieve By: 1 Sept 2019  Patient expressed understanding of goal: yes.              Outreach Frequency: monthly      Plan:   Patient to establish care with Pulmonology. Patient will continue to use Incentive Spirometer and increase activity daily as she is able to tolerate.  Take medications as prescribed.    Patient has been enrolled in care coordination with goal of returning to work.  RN CC will outreach monthly.  Patient has been given RN CC contact info and is encouraged to call with any question or concerns.    Tatiana Morley RN  Care Coordination  Phone:  [phone]  Email: [email or web address]

## 2019-07-17 ENCOUNTER — TELEPHONE (OUTPATIENT)
Dept: INTERNAL MEDICINE | Facility: CLINIC | Age: 64
End: 2019-07-17

## 2019-07-17 NOTE — TELEPHONE ENCOUNTER
[facility]  Put in Dr. Wiley's in basket.

## 2019-07-29 ENCOUNTER — TRANSFERRED RECORDS (OUTPATIENT)
Dept: HEALTH INFORMATION MANAGEMENT | Facility: CLINIC | Age: 64
End: 2019-07-29

## 2019-08-15 ENCOUNTER — MYC MEDICAL ADVICE (OUTPATIENT)
Dept: INTERNAL MEDICINE | Facility: CLINIC | Age: 64
End: 2019-08-15

## 2019-08-15 ENCOUNTER — TELEPHONE (OUTPATIENT)
Dept: INTERNAL MEDICINE | Facility: CLINIC | Age: 64
End: 2019-08-15

## 2019-08-15 NOTE — TELEPHONE ENCOUNTER
Panel Management Review      Patient has the following on her problem list:     Depression / Dysthymia review    Measure:  Needs PHQ-9 score of 4 or less during index window.  Administer PHQ-9 and if score is 5 or more, send encounter to provider for next steps.    5 - 7 month window range: 09/2019    PHQ-9 SCORE 12/1/2017 8/15/2018 4/7/2019   PHQ-9 Total Score - - -   PHQ-9 Total Score 7 10 12       If PHQ-9 recheck is 5 or more, route to provider for next steps.    Patient is due for:  None    Diabetes    ASA: Passed    Last A1C  Lab Results   Component Value Date    A1C 6.7 06/21/2019    A1C 6.5 04/02/2019    A1C 7.1 06/28/2018    A1C 6.2 11/27/2017    A1C 7.5 09/20/2017     A1C tested: MONITOR    Last LDL:    Lab Results   Component Value Date    CHOL 170 06/21/2019     Lab Results   Component Value Date    HDL 40 06/21/2019     Lab Results   Component Value Date    LDL 96 06/21/2019     Lab Results   Component Value Date    TRIG 169 06/21/2019     Lab Results   Component Value Date    CHOLHDLRATIO 3.0 03/26/2015     Lab Results   Component Value Date    NHDL 130 06/21/2019       Is the patient on a Statin? YES             Is the patient on Aspirin? YES    Medications     HMG CoA Reductase Inhibitors     simvastatin (ZOCOR) 20 MG tablet       Salicylates     ASPIRIN EC PO             Last three blood pressure readings:  BP Readings from Last 3 Encounters:   07/15/19 128/72   07/12/19 140/70   06/25/19 144/82       Date of last diabetes office visit: 06/25/2019     Tobacco History:     History   Smoking Status     Former Smoker     Quit date: 3/18/1979   Smokeless Tobacco     Never Used     Comment: quit 1979         Hypertension   Last three blood pressure readings:  BP Readings from Last 3 Encounters:   07/15/19 128/72   07/12/19 140/70   06/25/19 144/82     Blood pressure: Passed    HTN Guidelines:  Less than 140/90      Composite cancer screening  Chart review shows that this patient is due/due soon for the  following Colonoscopy  Summary:    Patient is due/failing the following:   Eye exam and COLONOSCOPY    Action needed:   Ask patient for information of last eye exam and remind pt to schedule colonoscopy.    Type of outreach:    Sent [employer]il[employer] message.    Questions for provider review:    None                                                                                                                                     Clifton Schroeder CMA       Chart routed to Care Team .

## 2019-08-23 ENCOUNTER — PATIENT OUTREACH (OUTPATIENT)
Dept: CARE COORDINATION | Facility: CLINIC | Age: 64
End: 2019-08-23

## 2019-08-23 NOTE — PROGRESS NOTES
Scheduled Follow Up Call: Attempt 1   Community Health Worker called and left a message for the patient. If the patient is returning my call, please transfer the patient to [facility] at [phone].    Plan  Patient had an appointment with [facility] on 07/29/19 with Anila Land MD.  1.  Follow up how the appointment went, and if there were any instructions for her.  2.  Per chart review Dr. Land states patient does have sleep apnea, but has not been using CPAP.    ______________________  Next Outreach: 09/03/19  Planned Outreach Frequency: Monthly  Preferred Phone Number: [phone]    Last Assessment Date: 07/16/19  Care Plan Completion Date: 07/16/19

## 2019-09-04 ENCOUNTER — PATIENT OUTREACH (OUTPATIENT)
Dept: CARE COORDINATION | Facility: CLINIC | Age: 64
End: 2019-09-04

## 2019-09-04 NOTE — PROGRESS NOTES
Scheduled Follow Up Call: Attempt 2   Community Health Worker called and left a message for the patient. If the patient is returning my call, please [facility] [phone].  I have called the patient 2 times over the past two weeks and have been unsuccessful in reaching him/her.    The patient has not returned any of my messages.                                                   I have mailed a letter to the patient requesting a return call and will continue attempting to reach out to the patient in one month.   I will also check the patient's chart for upcoming appointments, ER reports that may contain a new phone number, or any other recent activity.    Plan  Patient had an appointment with [facility] on 07/29/19 with Anila Land MD.  1.  Follow up how the appointment went, and if there were any instructions for her.  2.  Per chart review Dr. Land states patient does have sleep apnea, but has not been using CPAP.    ______________________  Next Outreach: 10/04/19  Planned Outreach Frequency: Monthly  Preferred Phone Number: [phone]    Last Assessment Date: 07/16/19  Care Plan Completion Date: 07/16/19

## 2019-09-04 NOTE — LETTER
September 4, 2019      Nicole Reyes  [address]  [place] [address]      Dear Shayy,                                                                        On July 16th, 2019, you enrolled with the [facility] Care  Coordination Services.  In order for the Clinic Care  Coordination team to provide guidance in completing the goals and actions steps you have established, you and I agreed we would be in contact every month.                                  We last spoke on July 16th, 2019, in order to follow up on goals and action steps.  Since then, I have tried calling you 2 times in the last two weeks and have been unsuccessful in reaching you.              Please call me at [phone] at your earliest convenience.  If you reach my voicemail, please leave a message with your daytime telephone number and a date and time that I can return your call.                                                                            Sincerely,                                                                         Fabrice Rogers, MARTHA                                                                                          Clinic Care Coordination                                                  [facility] : [place], [place] and [place]                               Phone: [phone]

## 2019-09-06 ENCOUNTER — OFFICE VISIT (OUTPATIENT)
Dept: INTERNAL MEDICINE | Facility: CLINIC | Age: 64
End: 2019-09-06
Payer: COMMERCIAL

## 2019-09-06 ENCOUNTER — ANCILLARY PROCEDURE (OUTPATIENT)
Dept: GENERAL RADIOLOGY | Facility: CLINIC | Age: 64
End: 2019-09-06
Attending: INTERNAL MEDICINE
Payer: COMMERCIAL

## 2019-09-06 VITALS
WEIGHT: 250.1 LBS | SYSTOLIC BLOOD PRESSURE: 134 MMHG | HEART RATE: 114 BPM | RESPIRATION RATE: 14 BRPM | BODY MASS INDEX: 52.5 KG/M2 | HEIGHT: 58 IN | DIASTOLIC BLOOD PRESSURE: 86 MMHG | TEMPERATURE: 97.6 F | OXYGEN SATURATION: 91 %

## 2019-09-06 DIAGNOSIS — R05.9 COUGH: Primary | ICD-10-CM

## 2019-09-06 DIAGNOSIS — R05.9 COUGH: ICD-10-CM

## 2019-09-06 DIAGNOSIS — K44.9 HIATAL HERNIA: ICD-10-CM

## 2019-09-06 DIAGNOSIS — J18.9 RECURRENT PNEUMONIA: ICD-10-CM

## 2019-09-06 PROCEDURE — 99214 OFFICE O/P EST MOD 30 MIN: CPT | Performed by: INTERNAL MEDICINE

## 2019-09-06 PROCEDURE — 71046 X-RAY EXAM CHEST 2 VIEWS: CPT

## 2019-09-06 RX ORDER — CEFDINIR 300 MG/1
300 CAPSULE ORAL 2 TIMES DAILY
Qty: 20 CAPSULE | Refills: 0 | Status: SHIPPED | OUTPATIENT
Start: 2019-09-06 | End: 2019-10-15

## 2019-09-06 RX ORDER — PREDNISONE 20 MG/1
TABLET ORAL
Qty: 18 TABLET | Refills: 0 | Status: SHIPPED | OUTPATIENT
Start: 2019-09-06 | End: 2019-10-15

## 2019-09-06 ASSESSMENT — PATIENT HEALTH QUESTIONNAIRE - PHQ9
SUM OF ALL RESPONSES TO PHQ QUESTIONS 1-9: 8
SUM OF ALL RESPONSES TO PHQ QUESTIONS 1-9: 8

## 2019-09-06 ASSESSMENT — MIFFLIN-ST. JEOR: SCORE: 1579.2

## 2019-09-06 NOTE — PROGRESS NOTES
Subjective     Nicole Reyes is a 63 year old female who presents to clinic today for the following health issues:    HPI     Follow-up pneumonia:  She reports that she has been having some URI type symptoms the past week, gradually worsening.  She has had a lot of malaise, increasing cough, had a temp of 101 several days ago.  She is having a fair amount of sneezing.  She had been gradually feeling better prior to the onset of the symptoms.    She is having to go back to work the week of October 22.  I will be getting some disability paperwork for that.  She saw pulmonary who did not feel she had any significant evidence of any underlying disease but suggested that she could be having some aspiration from acid reflux and they recommended referral to GI.  They also suggested an evaluation with immunology to see if she has some type of immunologic disorder that is making her susceptible to repeat recurrent pneumonias.    Patient Active Problem List   Diagnosis     Essential hypertension, benign     Restless leg syndrome     CRISTÓBAL (obstructive sleep apnea)     CKD (chronic kidney disease) stage 3, GFR 30-59 ml/min (H)     Advanced directives, counseling/discussion     GENERALIZED ANXIETY DIS     Major depressive disorder, recurrent episode, moderate (H)     Health Care Home     GERD (gastroesophageal reflux disease)     Hyperlipidemia LDL goal <100     Eczema     Type 2 diabetes mellitus with stage 3 chronic kidney disease, without long-term current use of insulin (H)     Midline low back pain with left-sided sciatica     Morbid obesity (HCC)     Insomnia, unspecified type     Dyspnea, unspecified type     Acute pain of left knee     Controlled substance agreement signed     Acute respiratory failure, unsp w hypoxia or hypercapnia (H)     Current Outpatient Medications   Medication Sig Dispense Refill     albuterol (PROAIR HFA/PROVENTIL HFA/VENTOLIN HFA) 108 (90 Base) MCG/ACT inhaler Inhale 1-2 puffs into the lungs  every 6 hours as needed for shortness of breath / dyspnea or wheezing 1 Inhaler 0     ASPIRIN EC PO Take 81 mg by mouth every evening       blood glucose (ACCU-CHEK BYRON PLUS) test strip Use to test blood sugar 2 times daily or as directed. 100 each 12     cefdinir (OMNICEF) 300 MG capsule Take 1 capsule (300 mg) by mouth 2 times daily 20 capsule 0     clonazePAM (KLONOPIN) 1 MG tablet Take 0.5 mg by mouth 2 times daily       cyclobenzaprine (FLEXERIL) 10 MG tablet Take 1 tablet (10 mg) by mouth 3 times daily as needed for muscle spasms 90 tablet 11     gabapentin (NEURONTIN) 300 MG capsule Take 2 capsules (600 mg) by mouth daily 180 capsule 3     guaiFENesin-dextromethorphan (ROBITUSSIN DM) 100-10 MG/5ML syrup Take 10 mLs by mouth 3 times daily as needed for cough 236 mL 0     ibuprofen (ADVIL,MOTRIN) 600 MG tablet Take 1 tablet (600 mg) by mouth every 6 hours as needed for moderate pain 30 tablet 1     losartan (COZAAR) 100 MG tablet Take 1 tablet (100 mg) by mouth daily 90 tablet 1     metFORMIN (GLUCOPHAGE-XR) 500 MG 24 hr tablet Take 2 tablets (1,000 mg) by mouth every morning 180 tablet 3     order for DME Equipment being ordered: CPAP 1 Device 0     pantoprazole (PROTONIX) 40 MG EC tablet Take 1 tablet (40 mg) by mouth At Bedtime 90 tablet 3     predniSONE (DELTASONE) 20 MG tablet 2 po daily x5, then 1 po daily x5, then 1/2 po daily x5 18 tablet 0     sertraline (ZOLOFT) 100 MG tablet Take 2 tablets (200 mg) by mouth daily 180 tablet 3     simvastatin (ZOCOR) 20 MG tablet Take 1 tablet (20 mg) by mouth At Bedtime 90 tablet 1     zolpidem ER (AMBIEN CR) 6.25 MG CR tablet Take 1 tablet (6.25 mg) by mouth nightly as needed for sleep 30 tablet 2     zolpidem (AMBIEN) 10 MG tablet Take 0.5 tablets (5 mg) by mouth nightly as needed for sleep (Patient not taking: Reported on 7/15/2019) 45 tablet 1      Social History     Tobacco Use     Smoking status: Former Smoker     Last attempt to quit: 3/18/1979     Years  "since quittin.5     Smokeless tobacco: Never Used     Tobacco comment: quit    Substance Use Topics     Alcohol use: Yes     Comment: Twice a month     Drug use: No          Reviewed and updated as needed this visit by Provider         Review of Systems   She had a fever several days ago, no night sweats, some chills with the fever, no sinus pain, postnasal drainage, purulent rhinorrhea, no sore throat, minimal nasal congestion, no chest pain, cough is productive of some mucus , no palpitations, no nausea, vomiting, she does not feel any heartburn, not really aware of waterbrash      Objective    /86   Pulse 114   Temp 97.6  F (36.4  C) (Oral)   Resp 14   Ht 1.473 m (4' 10\")   Wt 113.4 kg (250 lb 1.6 oz)   LMP 09/15/2007   SpO2 91%   BMI 52.27 kg/m    Body mass index is 52.27 kg/m .  Physical Exam     Lungs: No crackles, moderate wheezes  CV: normal S1, S2 without murmur, S3 or S4.   Chest x-ray: No Infiltrates, previous pneumonia has cleared        Assessment & Plan     1. Cough  Advised that chest x-ray does not show any pneumonia, but with the change in cough, fever and increased wheezing will start on antibiotic and prednisone.  - XR Chest 2 Views; Future  - cefdinir (OMNICEF) 300 MG capsule; Take 1 capsule (300 mg) by mouth 2 times daily  Dispense: 20 capsule; Refill: 0  - predniSONE (DELTASONE) 20 MG tablet; 2 po daily x5, then 1 po daily x5, then 1/2 po daily x5  Dispense: 18 tablet; Refill: 0    2. Recurrent pneumonia  Reviewed and discussed the pulmonary consultation, advised that I do suggest she go ahead and undergo immunology evaluation, referral provided.  We will complete her paperwork to plan on returning to work on 2020  - IMMUNOLOGY REFERRAL    3. Hiatal hernia  She does have a known hiatal hernia and could still have significant reflux even if the acid is controlled with the PPI.  Agree that I would recommend a GI consultation.  - GASTROENTEROLOGY ADULT REF " "CONSULT ONLY     BMI:   Estimated body mass index is 52.27 kg/m  as calculated from the following:    Height as of this encounter: 1.473 m (4' 10\").    Weight as of this encounter: 113.4 kg (250 lb 1.6 oz).             No follow-ups on file.    Pari Wiley MD  [facility]    "

## 2019-09-07 ASSESSMENT — PATIENT HEALTH QUESTIONNAIRE - PHQ9: SUM OF ALL RESPONSES TO PHQ QUESTIONS 1-9: 8

## 2019-09-12 ENCOUNTER — TELEPHONE (OUTPATIENT)
Dept: INTERNAL MEDICINE | Facility: CLINIC | Age: 64
End: 2019-09-12

## 2019-09-17 ENCOUNTER — MYC REFILL (OUTPATIENT)
Dept: INTERNAL MEDICINE | Facility: CLINIC | Age: 64
End: 2019-09-17

## 2019-09-17 DIAGNOSIS — G47.00 INSOMNIA, UNSPECIFIED TYPE: ICD-10-CM

## 2019-09-17 RX ORDER — ZOLPIDEM TARTRATE 6.25 MG/1
6.25 TABLET, FILM COATED, EXTENDED RELEASE ORAL
Qty: 30 TABLET | Refills: 2 | Status: CANCELLED | OUTPATIENT
Start: 2019-09-17

## 2019-09-19 DIAGNOSIS — G47.00 INSOMNIA, UNSPECIFIED TYPE: ICD-10-CM

## 2019-09-19 RX ORDER — ZOLPIDEM TARTRATE 10 MG/1
5 TABLET ORAL
Qty: 45 TABLET | Refills: 1 | Status: SHIPPED | OUTPATIENT
Start: 2019-09-19 | End: 2019-10-15

## 2019-09-19 NOTE — TELEPHONE ENCOUNTER
Patient has been trying a different dose 6.25 er but wants to go back to the old one 10 mg with a 3 mo supply sent to mail order.    Mandy Perez, [facility]  [place]

## 2019-09-19 NOTE — TELEPHONE ENCOUNTER
Zolpidem refill request.     Last refill on 6/25/19 for #30 with 2 refills     Last OV on 9/6/19     Routing refill request to provider for review/approval because:  Drug not on the [facility] refill protocol

## 2019-09-23 RX ORDER — ZOLPIDEM TARTRATE 6.25 MG/1
TABLET, FILM COATED, EXTENDED RELEASE ORAL
Qty: 30 TABLET | Refills: 2 | OUTPATIENT
Start: 2019-09-23

## 2019-09-30 DIAGNOSIS — G47.00 INSOMNIA, UNSPECIFIED TYPE: ICD-10-CM

## 2019-09-30 RX ORDER — ZOLPIDEM TARTRATE 10 MG/1
5 TABLET ORAL
Qty: 45 TABLET | Refills: 1 | Status: CANCELLED | OUTPATIENT
Start: 2019-09-30

## 2019-10-02 ENCOUNTER — HEALTH MAINTENANCE LETTER (OUTPATIENT)
Age: 64
End: 2019-10-02

## 2019-10-02 NOTE — TELEPHONE ENCOUNTER
Clonazepam refill request.   Pt should have Zolpidem at her pharmacy.     Call to  pharmacy. Zolpidem 10 mg, #45 mailed to pt on 9/25/19. Confirmed via [place] . This is 3 month supply     Last Clonazepam refilled on 8/29/19 for #30.     Last OV 9/6/19    Routing refill request to provider for review/approval because:  Drug not on the [facility] refill protocol

## 2019-10-02 NOTE — TELEPHONE ENCOUNTER
Patient states that she takes Zolpidem 10mg HS instead of 1/2 tablet (5mg) that we have on file. If change appropriate, pls send new rx

## 2019-10-02 NOTE — TELEPHONE ENCOUNTER
Pl advise pt that she should not be taking Ambien more than 5 mg.  And clonazepam not refilled by Dr. Wiley. , Pl hold for PCP

## 2019-10-03 RX ORDER — CLONAZEPAM 1 MG/1
0.5 TABLET ORAL 2 TIMES DAILY
Qty: 30 TABLET | Refills: 3 | Status: SHIPPED | OUTPATIENT
Start: 2019-10-03 | End: 2020-02-13

## 2019-10-03 NOTE — TELEPHONE ENCOUNTER
Dr Wiley, please see pts message. She has been taking 10 mg of Zolpidem. Please advise if OK to continue or if needs to cut back?

## 2019-10-05 ENCOUNTER — MYC MEDICAL ADVICE (OUTPATIENT)
Dept: INTERNAL MEDICINE | Facility: CLINIC | Age: 64
End: 2019-10-05

## 2019-10-07 NOTE — TELEPHONE ENCOUNTER
See her [employer] message.     She was sent Panel Management in AUGUST but just read it on 10/5.   Lastly, Dr Wiley would like to see you in office in mid September. Please schedule fasting labs 1 week prior to seeing her.   Thank You. Clifton Schroeder, RANCHO      Also, please advise for the Shortness of breath, low sats.

## 2019-10-10 NOTE — TELEPHONE ENCOUNTER
She has not read  my message, she does not have labs and diabetes appointment until December.  Please    call her and give her this message.

## 2019-10-11 ENCOUNTER — PATIENT OUTREACH (OUTPATIENT)
Dept: CARE COORDINATION | Facility: CLINIC | Age: 64
End: 2019-10-11

## 2019-10-11 NOTE — TELEPHONE ENCOUNTER
Spoke with patient.  Advised of primary care provider's mychart message below.  States she does have an appointment 10-15-19 for a return to work assessment.    Also states she has future appointments scheduled with the immunologist and the GI specialist.

## 2019-10-11 NOTE — LETTER
October 11, 2019      Nicole Reyes  [address]  [place] [address]      Dear Nicole,                                                                       You enrolled with the [facility] Care Coordination Services.  In order for us provide guidance we need to connect on a monthly bases. I have been trying to reach you for 2 months and have been unsuccessful.  At this time, you have been disenrolled from Clinic Care Coordination and you will no longer receive follow up calls from the Clinic Care Coordination team. If you would like access to services in the future, please discuss your needs with your Primary Care Provider.                                                                          Sincerely,                                                                         Fabrice Rogers, MARTHA                                                                                          Clinic Care Coordination                                                  [facility] : [place], [place] and [place]                               Phone: [phone]

## 2019-10-11 NOTE — PROGRESS NOTES
Scheduled Follow Up Call: Attempt 3   Community Health Worker called and left a message for the patient. If the patient is returning my call, please transfer the patient to Elvia at [phone].   I have called the patient 3 times over the past six weeks, mailed an disenrollment letter today and have been unsuccessful in reaching him/her.      Patient has been disenrolled from Clinic Care coordination services, should they return my call or answer my letter, I will discuss clinic care coordination re-enrollment.

## 2019-10-15 ENCOUNTER — MEDICAL CORRESPONDENCE (OUTPATIENT)
Dept: HEALTH INFORMATION MANAGEMENT | Facility: CLINIC | Age: 64
End: 2019-10-15

## 2019-10-15 ENCOUNTER — TRANSFERRED RECORDS (OUTPATIENT)
Dept: HEALTH INFORMATION MANAGEMENT | Facility: CLINIC | Age: 64
End: 2019-10-15

## 2019-10-15 ENCOUNTER — TELEPHONE (OUTPATIENT)
Dept: INTERNAL MEDICINE | Facility: CLINIC | Age: 64
End: 2019-10-15

## 2019-10-15 ENCOUNTER — OFFICE VISIT (OUTPATIENT)
Dept: INTERNAL MEDICINE | Facility: CLINIC | Age: 64
End: 2019-10-15
Payer: COMMERCIAL

## 2019-10-15 VITALS
OXYGEN SATURATION: 86 % | DIASTOLIC BLOOD PRESSURE: 73 MMHG | TEMPERATURE: 99.2 F | RESPIRATION RATE: 18 BRPM | BODY MASS INDEX: 53.84 KG/M2 | HEART RATE: 125 BPM | WEIGHT: 256.5 LBS | SYSTOLIC BLOOD PRESSURE: 129 MMHG | HEIGHT: 58 IN

## 2019-10-15 DIAGNOSIS — G47.00 INSOMNIA, UNSPECIFIED TYPE: ICD-10-CM

## 2019-10-15 DIAGNOSIS — R06.00 DYSPNEA, UNSPECIFIED TYPE: Primary | ICD-10-CM

## 2019-10-15 DIAGNOSIS — R09.02 HYPOXIA: ICD-10-CM

## 2019-10-15 PROCEDURE — 99214 OFFICE O/P EST MOD 30 MIN: CPT | Performed by: INTERNAL MEDICINE

## 2019-10-15 RX ORDER — ZOLPIDEM TARTRATE 10 MG/1
10 TABLET ORAL AT BEDTIME
Qty: 90 TABLET | Refills: 1 | Status: SHIPPED | OUTPATIENT
Start: 2019-10-15 | End: 2020-05-06

## 2019-10-15 ASSESSMENT — MIFFLIN-ST. JEOR: SCORE: 1608.23

## 2019-10-15 NOTE — NURSING NOTE
"/73 (BP Location: Left arm, Patient Position: Sitting, Cuff Size: Adult Large)   Pulse 125   Temp 99.2  F (37.3  C) (Oral)   Resp 18   Ht 1.473 m (4' 10\")   Wt 116.3 kg (256 lb 8 oz)   LMP 09/15/2007   SpO2 (!) 86%   BMI 53.61 kg/m      "

## 2019-10-15 NOTE — PROGRESS NOTES
Subjective     Nicole Reyes is a 63 year old female who presents to clinic today for the following health issues:    HPI     Follow-up pneumonia, hypoxia: She reports that her breathing has minimally improved.  She continues on oxygen with activity and at night.  When she sleeps with oxygen on, her a.m. saturation is 92%.  During the day, the saturation on oxygen is in the low to mid 90s.  It still drops down below 90% with activity without oxygen, today after walking down the [place], her sat dropped to 86% off of oxygen.  She had a consultation with pulmonary.  She reports that was a fairly brief visit, had normal spirometry and DLCO and was told that her pneumonia may be related to reflux and/or immune issues so she has appointments pending with GI and immunology.  She continues to feel generalized weakness with activity.    In the past couple days she has had some feeling warm, mild cough, slight wheezes with cough.  This is not really affected her breathing significantly, no nasal congestion, rhinorrhea or sore throat.    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      Patient Active Problem List   Diagnosis     Essential hypertension, benign     Restless leg syndrome     CRISTÓBAL (obstructive sleep apnea)     CKD (chronic kidney disease) stage 3, GFR 30-59 ml/min (H)     Advanced directives, counseling/discussion     GENERALIZED ANXIETY DIS     Major depressive disorder, recurrent episode, moderate (H)     Health Care Home     GERD (gastroesophageal reflux disease)     Hyperlipidemia LDL goal <100     Eczema     Type 2 diabetes mellitus with stage 3 chronic kidney disease, without long-term current use of insulin (H)     Midline low back pain with left-sided sciatica     Morbid obesity (HCC)     Insomnia, unspecified type     Dyspnea, unspecified type     Acute pain of left knee     Controlled substance agreement signed     Acute respiratory failure, unsp w hypoxia or  hypercapnia (H)     Current Outpatient Medications   Medication Sig Dispense Refill     albuterol (PROAIR HFA/PROVENTIL HFA/VENTOLIN HFA) 108 (90 Base) MCG/ACT inhaler Inhale 1-2 puffs into the lungs every 6 hours as needed for shortness of breath / dyspnea or wheezing 1 Inhaler 0     ASPIRIN EC PO Take 81 mg by mouth every evening       blood glucose (ACCU-CHEK BYRON PLUS) test strip Use to test blood sugar 2 times daily or as directed. 100 each 12     clonazePAM (KLONOPIN) 1 MG tablet Take 0.5 tablets (0.5 mg) by mouth 2 times daily 30 tablet 3     cyclobenzaprine (FLEXERIL) 10 MG tablet Take 1 tablet (10 mg) by mouth 3 times daily as needed for muscle spasms 90 tablet 11     gabapentin (NEURONTIN) 300 MG capsule Take 2 capsules (600 mg) by mouth daily 180 capsule 3     guaiFENesin-dextromethorphan (ROBITUSSIN DM) 100-10 MG/5ML syrup Take 10 mLs by mouth 3 times daily as needed for cough 236 mL 0     ibuprofen (ADVIL,MOTRIN) 600 MG tablet Take 1 tablet (600 mg) by mouth every 6 hours as needed for moderate pain 30 tablet 1     losartan (COZAAR) 100 MG tablet Take 1 tablet (100 mg) by mouth daily 90 tablet 1     metFORMIN (GLUCOPHAGE-XR) 500 MG 24 hr tablet Take 2 tablets (1,000 mg) by mouth every morning 180 tablet 3     order for DME Equipment being ordered: CPAP 1 Device 0     pantoprazole (PROTONIX) 40 MG EC tablet Take 1 tablet (40 mg) by mouth At Bedtime 90 tablet 3     sertraline (ZOLOFT) 100 MG tablet Take 2 tablets (200 mg) by mouth daily 180 tablet 3     simvastatin (ZOCOR) 20 MG tablet Take 1 tablet (20 mg) by mouth At Bedtime 90 tablet 1     zolpidem (AMBIEN) 10 MG tablet Take 1 tablet (10 mg) by mouth At Bedtime 90 tablet 1      Social History     Tobacco Use     Smoking status: Former Smoker     Last attempt to quit: 3/18/1979     Years since quittin.6     Smokeless tobacco: Never Used     Tobacco comment: quit    Substance Use Topics     Alcohol use: Yes     Comment: Twice a month     Drug  "use: No          Reviewed and updated as needed this visit by Provider         Review of Systems   No true fever but is felt warm and cold a few days, no sore throat, minimal rhinorrhea, no nasal congestion, postnasal drainage, chest pain, palpitations      Objective    /73 (BP Location: Left arm, Patient Position: Sitting, Cuff Size: Adult Large)   Pulse 125   Temp 99.2  F (37.3  C) (Oral)   Resp 18   Ht 1.473 m (4' 10\")   Wt 116.3 kg (256 lb 8 oz)   LMP 09/15/2007   SpO2 (!) 86%   BMI 53.61 kg/m    Body mass index is 53.61 kg/m .  Physical Exam     Oropharynx clear  Neck without adenopathy  Lungs: Slight decreased breath sounds, no wheezes on normal expiration but mild wheezing on forced expiration          Assessment & Plan     1. Dyspnea, unspecified type  Pulmonary did not really have much explanation as to why she has continued dyspnea, hypoxia.  She will be having evaluation with GI and immunology primarily relating to helping understand why she is had recurrent pneumonia.  I recommend she see cardiology to help understand the dyspnea and hypoxia issue and she is in agreement with this.  She does have some mild URI type symptoms, continue using inhalers but at this point it seems more viral than bacterial.  If she is having increasing productive cough, more wheezing may need to reconsider antibiotic and prednisone  - CARDIOLOGY EVAL ADULT REFERRAL    2. Hypoxia  As above  - CARDIOLOGY EVAL ADULT REFERRAL    3. Insomnia, unspecified type    - zolpidem (AMBIEN) 10 MG tablet; Take 1 tablet (10 mg) by mouth At Bedtime  Dispense: 90 tablet; Refill: 1     BMI:   Estimated body mass index is 53.61 kg/m  as calculated from the following:    Height as of this encounter: 1.473 m (4' 10\").    Weight as of this encounter: 116.3 kg (256 lb 8 oz).   Weight management plan: Discussed healthy diet and exercise guidelines            No follow-ups on file.    Pari Wiley MD  [facility]        "

## 2019-10-15 NOTE — TELEPHONE ENCOUNTER
Fax received from [facility] for review and signature.  Put in Dr. Wiley's in basket.

## 2019-10-21 ENCOUNTER — TELEPHONE (OUTPATIENT)
Dept: INTERNAL MEDICINE | Facility: CLINIC | Age: 64
End: 2019-10-21

## 2019-10-21 NOTE — TELEPHONE ENCOUNTER
Fax received from [employer] for review and signature.  Put in Dr. Wiley's in basket.

## 2019-10-24 ENCOUNTER — TRANSFERRED RECORDS (OUTPATIENT)
Dept: HEALTH INFORMATION MANAGEMENT | Facility: CLINIC | Age: 64
End: 2019-10-24

## 2019-10-25 ENCOUNTER — OFFICE VISIT (OUTPATIENT)
Dept: CARDIOLOGY | Facility: CLINIC | Age: 64
End: 2019-10-25
Attending: INTERNAL MEDICINE
Payer: COMMERCIAL

## 2019-10-25 VITALS
BODY MASS INDEX: 53.53 KG/M2 | HEIGHT: 58 IN | DIASTOLIC BLOOD PRESSURE: 102 MMHG | WEIGHT: 255 LBS | OXYGEN SATURATION: 89 % | HEART RATE: 92 BPM | SYSTOLIC BLOOD PRESSURE: 146 MMHG

## 2019-10-25 DIAGNOSIS — R06.02 SHORTNESS OF BREATH: Primary | ICD-10-CM

## 2019-10-25 PROCEDURE — 99204 OFFICE O/P NEW MOD 45 MIN: CPT | Performed by: INTERNAL MEDICINE

## 2019-10-25 ASSESSMENT — MIFFLIN-ST. JEOR: SCORE: 1596.29

## 2019-10-25 NOTE — PROGRESS NOTES
"HISTORY OF PRESENT ILLNESS:  \"recurrent pneumonias\" evaluated by pulmonary. PFTs OK. Diagnosed with sleep apnea, difficult to use machine. Currently very limited exercise tolerance : profound fatigue. No angina but short of breath easily. No hemoptysis, no yellow sputum. Frequently gets 'pneurmonia\"  7 times in since 2014  Sepsis 2 times. Going to see a gastroenterologist because of esophageal H/H. Seen initially on CT 2017.Has severe reflux. Could be aspiration. No MI. Has diabetes. Does not smoke. Has hypertension. On simvastatin. De saturates easily.     Family history of CAD.     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     Orders this Visit:  No orders of the defined types were placed in this encounter.    No orders of the defined types were placed in this encounter.    There are no discontinued medications.    No diagnosis found.    CURRENT MEDICATIONS:  Current Outpatient Medications   Medication Sig Dispense Refill     albuterol (PROAIR HFA/PROVENTIL HFA/VENTOLIN HFA) 108 (90 Base) MCG/ACT inhaler Inhale 1-2 puffs into the lungs every 6 hours as needed for shortness of breath / dyspnea or wheezing 1 Inhaler 0     ASPIRIN EC PO Take 81 mg by mouth every evening       blood glucose (ACCU-CHEK BYRON PLUS) test strip Use to test blood sugar 2 times daily or as directed. 100 each 12     clonazePAM (KLONOPIN) 1 MG tablet Take 0.5 tablets (0.5 mg) by mouth 2 times daily 30 tablet 3     cyclobenzaprine (FLEXERIL) 10 MG tablet Take 1 tablet (10 mg) by mouth 3 times daily as needed for muscle spasms 90 tablet 11     gabapentin (NEURONTIN) 300 MG capsule Take 2 capsules (600 mg) by mouth daily 180 capsule 3     guaiFENesin-dextromethorphan (ROBITUSSIN DM) 100-10 MG/5ML syrup Take 10 mLs by mouth 3 times daily as needed for cough 236 mL 0     ibuprofen (ADVIL,MOTRIN) 600 MG tablet Take 1 tablet (600 mg) by mouth every 6 hours as needed for moderate pain 30 tablet 1     losartan (COZAAR) 100 MG tablet Take 1 tablet (100 mg) " "by mouth daily 90 tablet 1     metFORMIN (GLUCOPHAGE-XR) 500 MG 24 hr tablet Take 2 tablets (1,000 mg) by mouth every morning 180 tablet 3     order for DME Equipment being ordered: CPAP 1 Device 0     pantoprazole (PROTONIX) 40 MG EC tablet Take 1 tablet (40 mg) by mouth At Bedtime 90 tablet 3     sertraline (ZOLOFT) 100 MG tablet Take 2 tablets (200 mg) by mouth daily 180 tablet 3     simvastatin (ZOCOR) 20 MG tablet Take 1 tablet (20 mg) by mouth At Bedtime 90 tablet 1     zolpidem (AMBIEN) 10 MG tablet Take 1 tablet (10 mg) by mouth At Bedtime 90 tablet 1       ALLERGIES     Allergies   Allergen Reactions     Codeine Rash     Penicillins Rash     Pt has tolerated cephalosporins and penems.   Pt tolerated Zosyn 7/6/2019       PAST MEDICAL, SURGICAL, FAMILY, SOCIAL HISTORY:  History was reviewed and updated as needed, see medical record.    Review of Systems:  A 12-point review of systems was completed, see medical record for detailed review of systems information.    Physical Exam:  Vitals: BP (!) 146/102 (BP Location: Right arm, Patient Position: Sitting, Cuff Size: Adult Regular)   Pulse 92   Ht 1.473 m (4' 9.99\")   Wt 115.7 kg (255 lb)   LMP 09/15/2007   SpO2 (!) 89%   BMI 53.31 kg/m      Constitutional:           Skin:           Head:           Eyes:           ENT:           Neck:           Chest:           Cardiac:                    Abdomen:           Vascular:                                        Extremities and Back:           Neurological:           ASSESSMENT:  Suspect recurrent aspiration Sleep apnea morbid obesity contributing to poor pulmonary mechanics. I think cardiac testing would be very low yield and would consider repeat CT scan to exclude PE, but would not do before pulmonary sees.       RECOMMENDATIONS:   1) Hold off on cardiac testing at this time  2) GI to see   3) increase simvastatin to 40 daily  4) consider CT scan if GI finds nothinge      Recent Lab Results:  LIPID " RESULTS:  Lab Results   Component Value Date    CHOL 170 06/21/2019    HDL 40 (L) 06/21/2019    LDL 96 06/21/2019    TRIG 169 (H) 06/21/2019    CHOLHDLRATIO 3.0 03/26/2015       LIVER ENZYME RESULTS:  Lab Results   Component Value Date    AST 39 07/06/2019    ALT 32 07/06/2019       CBC RESULTS:  Lab Results   Component Value Date    WBC 10.5 07/08/2019    RBC 3.94 07/08/2019    HGB 10.3 (L) 07/08/2019    HCT 33.3 (L) 07/08/2019    MCV 85 07/08/2019    MCH 26.1 (L) 07/08/2019    MCHC 30.9 (L) 07/08/2019    RDW 15.2 (H) 07/08/2019     07/12/2019       BMP RESULTS:  Lab Results   Component Value Date     07/09/2019    POTASSIUM 4.1 07/09/2019    CHLORIDE 107 07/09/2019    CO2 32 07/09/2019    ANIONGAP 2 (L) 07/09/2019     (H) 07/09/2019    BUN 18 07/09/2019    CR 0.99 07/12/2019    GFRESTIMATED 60 (L) 07/12/2019    GFRESTBLACK 70 07/12/2019    GRECIA 8.7 07/09/2019        A1C RESULTS:  Lab Results   Component Value Date    A1C 6.7 (H) 06/21/2019       INR RESULTS:  Lab Results   Component Value Date    INR 1.07 07/06/2019    INR 0.99 10/19/2016       We greatly appreciate the opportunity to be involved in the care of your patient, Nicole Reyes.    Sincerely,  Edwar Merritt MD      CC  Pari Wiley MD  [address]  [place] [address]

## 2019-10-25 NOTE — LETTER
"10/25/2019    Pari Wiley MD  [address]  [facility] [address]    RE: Nicole Reyes       Dear Colleague,    I had the pleasure of seeing Nicole Reyes in the [facility].    HISTORY OF PRESENT ILLNESS:  \"recurrent pneumonias\" evaluated by pulmonary. PFTs OK. Diagnosed with sleep apnea, difficult to use machine. Currently very limited exercise tolerance : profound fatigue. No angina but short of breath easily. No hemoptysis, no yellow sputum. Frequently gets 'pneurmonia\"  7 times in since 2014  Sepsis 2 times. Going to see a gastroenterologist because of esophageal H/H. Seen initially on CT 2017.Has severe reflux. Could be aspiration. No MI. Has diabetes. Does not smoke. Has hypertension. On simvastatin. De saturates easily.     Family history of CAD.     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     Orders this Visit:  No orders of the defined types were placed in this encounter.    No orders of the defined types were placed in this encounter.    There are no discontinued medications.    No diagnosis found.    CURRENT MEDICATIONS:  Current Outpatient Medications   Medication Sig Dispense Refill     albuterol (PROAIR HFA/PROVENTIL HFA/VENTOLIN HFA) 108 (90 Base) MCG/ACT inhaler Inhale 1-2 puffs into the lungs every 6 hours as needed for shortness of breath / dyspnea or wheezing 1 Inhaler 0     ASPIRIN EC PO Take 81 mg by mouth every evening       blood glucose (ACCU-CHEK BYRON PLUS) test strip Use to test blood sugar 2 times daily or as directed. 100 each 12     clonazePAM (KLONOPIN) 1 MG tablet Take 0.5 tablets (0.5 mg) by mouth 2 times daily 30 tablet 3     cyclobenzaprine (FLEXERIL) 10 MG tablet Take 1 tablet (10 mg) by mouth 3 times daily as needed for muscle spasms 90 tablet 11     gabapentin (NEURONTIN) 300 MG capsule Take 2 capsules (600 mg) by mouth daily 180 capsule 3     guaiFENesin-dextromethorphan (ROBITUSSIN DM) 100-10 MG/5ML syrup Take 10 mLs by mouth 3 " "times daily as needed for cough 236 mL 0     ibuprofen (ADVIL,MOTRIN) 600 MG tablet Take 1 tablet (600 mg) by mouth every 6 hours as needed for moderate pain 30 tablet 1     losartan (COZAAR) 100 MG tablet Take 1 tablet (100 mg) by mouth daily 90 tablet 1     metFORMIN (GLUCOPHAGE-XR) 500 MG 24 hr tablet Take 2 tablets (1,000 mg) by mouth every morning 180 tablet 3     order for DME Equipment being ordered: CPAP 1 Device 0     pantoprazole (PROTONIX) 40 MG EC tablet Take 1 tablet (40 mg) by mouth At Bedtime 90 tablet 3     sertraline (ZOLOFT) 100 MG tablet Take 2 tablets (200 mg) by mouth daily 180 tablet 3     simvastatin (ZOCOR) 20 MG tablet Take 1 tablet (20 mg) by mouth At Bedtime 90 tablet 1     zolpidem (AMBIEN) 10 MG tablet Take 1 tablet (10 mg) by mouth At Bedtime 90 tablet 1       ALLERGIES     Allergies   Allergen Reactions     Codeine Rash     Penicillins Rash     Pt has tolerated cephalosporins and penems.   Pt tolerated Zosyn 7/6/2019       PAST MEDICAL, SURGICAL, FAMILY, SOCIAL HISTORY:  History was reviewed and updated as needed, see medical record.    Review of Systems:  A 12-point review of systems was completed, see medical record for detailed review of systems information.    Physical Exam:  Vitals: BP (!) 146/102 (BP Location: Right arm, Patient Position: Sitting, Cuff Size: Adult Regular)   Pulse 92   Ht 1.473 m (4' 9.99\")   Wt 115.7 kg (255 lb)   LMP 09/15/2007   SpO2 (!) 89%   BMI 53.31 kg/m       Constitutional:           Skin:           Head:           Eyes:           ENT:           Neck:           Chest:           Cardiac:                    Abdomen:           Vascular:                                        Extremities and Back:           Neurological:           ASSESSMENT:  Suspect recurrent aspiration Sleep apnea morbid obesity contributing to poor pulmonary mechanics. I think cardiac testing would be very low yield and would consider repeat CT scan to exclude PE, but would not do " before pulmonary sees.       RECOMMENDATIONS:   1) Hold off on cardiac testing at this time  2) GI to see   3) increase simvastatin to 40 daily  4) consider CT scan if GI finds nothinge      Recent Lab Results:  LIPID RESULTS:  Lab Results   Component Value Date    CHOL 170 06/21/2019    HDL 40 (L) 06/21/2019    LDL 96 06/21/2019    TRIG 169 (H) 06/21/2019    CHOLHDLRATIO 3.0 03/26/2015       LIVER ENZYME RESULTS:  Lab Results   Component Value Date    AST 39 07/06/2019    ALT 32 07/06/2019       CBC RESULTS:  Lab Results   Component Value Date    WBC 10.5 07/08/2019    RBC 3.94 07/08/2019    HGB 10.3 (L) 07/08/2019    HCT 33.3 (L) 07/08/2019    MCV 85 07/08/2019    MCH 26.1 (L) 07/08/2019    MCHC 30.9 (L) 07/08/2019    RDW 15.2 (H) 07/08/2019     07/12/2019       BMP RESULTS:  Lab Results   Component Value Date     07/09/2019    POTASSIUM 4.1 07/09/2019    CHLORIDE 107 07/09/2019    CO2 32 07/09/2019    ANIONGAP 2 (L) 07/09/2019     (H) 07/09/2019    BUN 18 07/09/2019    CR 0.99 07/12/2019    GFRESTIMATED 60 (L) 07/12/2019    GFRESTBLACK 70 07/12/2019    GRECIA 8.7 07/09/2019        A1C RESULTS:  Lab Results   Component Value Date    A1C 6.7 (H) 06/21/2019       INR RESULTS:  Lab Results   Component Value Date    INR 1.07 07/06/2019    INR 0.99 10/19/2016       We greatly appreciate the opportunity to be involved in the care of your patient, Nicole Reyes.    Sincerely,  Edwar Merritt MD      CC  Pari Wiley MD  [address] [place] [address]                                                                       Thank you for allowing me to participate in the care of your patient.      Sincerely,     Edwar Merritt MD     [facility]    cc:   Pari Wiley MD  [address]  [place] [phone]

## 2019-10-25 NOTE — LETTER
10/25/2019      Pari Wiley MD  [address] [facility] [address]  [facility] [address]      RE: Nicole CLARKE Eric       Dear Colleague,    I had the pleasure of seeing Nicole Reyes in the [facility].    Service Date: 10/25/2019      PRIMARY CARE DOCTOR:  Dr. Wiley.      HISTORY OF PRESENT ILLNESS:  Nicole Reyes, a 64-year-old woman with morbid obesity, sleep apnea, recurrent pneumonia, diabetes mellitus, hypertension, and hiatal hernia was evaluated in consultation at your request for hypoxia, fatigue, and exertional dyspnea.        Since  2014, Ms. Reyes estimates she has been hospitalized at least 7 times for recurrent pneumonia, most recently in 07/2019.  On 2 occasions, her pneumonia has been complicated by sepsis.  The patient has a known esophageal hiatal hernia which was first documented in 2017 on a CT scan.  The patient states that she desaturates very easily.  When seen in your office, her oxygen saturations were in the 86%-88% range.  She uses oxygen therapy on an intermittent basis.      The patient carries a diagnosis of sleep apnea and admits it is very difficult to comply with her CPAP mask.  She was seen in consultation by the [facility] in 07/2019.  Their evaluation included normal pulmonary function studies and at the time she was seen, she was not requiring oxygen for ambulation.  An appointment with Gastroenterology is planned for next week.      The patient does not describe chest, arm, neck, jaw or back discomfort with exertion.  There is no documented history of coronary artery disease.  There is a family history of premature coronary artery disease.  She has been treated with statin therapy and blood pressure medications for hypertension and for diabetes mellitus.  She specifically denies fever, cough, hemoptysis, purulent sputum, pleuritic chest pain, or deep venous thrombosis.  Echocardiogram in 04/2019 showed an ejection fraction of 60% with mild  left atrial enlargement but no significant valvular stenosis or insufficiency.  Right-sided chambers were of normal size with no tricuspid insufficiency for estimation of pulmonary pressures.      PAST MEDICAL HISTORY:   1.  Morbid obesity.   2.  Sleep apnea.   3.  Large hiatal hernia.   4.  Hypertension.   5.  Diabetes mellitus.   6.  Sleep apnea.      ALLERGIES:  Codeine, penicillins.      HABITS:  The patient does not abuse tobacco or alcohol.      SOCIAL HISTORY:  The patient is .  She has 3 children and 3 grandchildren.  She has worked as a health unit coordinator.  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      REVIEW OF SYSTEMS:  A 12-point review of systems was performed.  Outside the issues mentioned in the HPI, there are no other complaints.      MEDICATIONS:   1.  Albuterol inhaler p.r.n.   2.  Aspirin 81 daily.   3.  Clonazepam 1 mg tablet 0.5 twice a day.   4.  Gabapentin 600 mg daily.   5.  Losartan 100 mg daily.   6.  Metformin 1 gram twice a day.   7.  Oxygen therapy as needed.   8.  Sertraline 200 mg daily.   9.  Simvastatin 20 mg at bedtime.      PHYSICAL EXAMINATION:   GENERAL:  Exam today demonstrates a very pleasant, cooperative and intelligent 64-year-old woman who is overweight.   VITAL SIGNS:  Her blood pressure was 144/102.  Her heart rate was 92.  Her height is 1.5 meters, her weight is 115.7 kg.  Her BMI is 53.   LUNGS:  Clear to percussion and auscultation.   CARDIOVASCULAR:  Exam shows a normal S1 with a normal S2.  There is no S3.  There is no murmur, rub or click.   EXTREMITIES:  Her pulses are full and symmetrical.   ABDOMEN:  Exam shows obesity, not feeling internal organs.   NEUROLOGIC:  Cranial nerves II-XII are intact.  Strength equal and symmetrical.  She displays normal insight and judgment.      LABORATORY STUDIES:  Her echocardiogram from 04/17/2019 showed a normal ejection fraction with mild left atrial enlargement but  otherwise is unremarkable.  Her ECG from 07/2019 showed a normal sinus rhythm with poor R-wave progression which is likely due to lead placement.  It otherwise was unremarkable.      ASSESSMENT:  This 64-year-old woman with morbid obesity and sleep apnea has a history of recurrent pneumonia in the setting of a large hiatal hernia.  She has no history of hemoptysis, purulent sputum, pleuritic chest pain, or deep venous thrombosis at this time.  A CT scan in 2017  showed no evidence of pulmonary embolus or parenchymal lung disease at that time.  Her echo shows a normal ejection fraction with no evidence of intracardiac shunting.  I believe the likelihood of finding a cardiac cause for her dyspnea and hypoxia is quite low at this time.  I agree with your plans for the GI service consultation to examine the possibility of recurrent aspiration pneumonia related to hiatal hernia.  I am certain that her obesity also affects lung mechanics, as does her sleep apnea.  Weight loss will need to be part of her treatment.  If the GI service feels that aspiration pneumonia from her hiatal hernia is not a likely cause of her complaints, you could consider repeating a CT scan to look for pulmonary embolus.  Her medical therapy appears to be reasonable, but I would increase her simvastatin from 20 to 40 mg daily, the dose most proven to prevent adverse vascular events. .      RECOMMENDATIONS:   1.  I am not enthusiastic about further cardiac testing at this time.   2.  I would strongly recommend weight loss and a regular exercise program.   3.  I agree with plans for GI evaluation to assess for possible aspiration pneumonia from hiatal hernia.   4.  If GI evaluation is negative, I would recommend you consider repeat CT scan to assess for vascular or parenchymal lung disease.   5.  Increase simvastatin from 20 to 40 mg daily.   6.  Treatment of sleep apnea.      We have appreciated the opportunity to be involved in the care of this  most pleasant woman.      Edwar Merritt MD       cc:   Pari Wiley MD    [facility]    [address]    [place]  [address]         EDWAR MERRITT MD             D: 10/25/2019   T: 10/25/2019   MT: [id number]      Name:     ASHANTI PATEL   MRN:      [id number]        Account:      [id number]   :      1955           Service Date: 10/25/2019      Document: [id number]           Outpatient Encounter Medications as of 10/25/2019   Medication Sig Dispense Refill     albuterol (PROAIR HFA/PROVENTIL HFA/VENTOLIN HFA) 108 (90 Base) MCG/ACT inhaler Inhale 1-2 puffs into the lungs every 6 hours as needed for shortness of breath / dyspnea or wheezing 1 Inhaler 0     ASPIRIN EC PO Take 81 mg by mouth every evening       blood glucose (ACCU-CHEK BYRON PLUS) test strip Use to test blood sugar 2 times daily or as directed. 100 each 12     clonazePAM (KLONOPIN) 1 MG tablet Take 0.5 tablets (0.5 mg) by mouth 2 times daily 30 tablet 3     cyclobenzaprine (FLEXERIL) 10 MG tablet Take 1 tablet (10 mg) by mouth 3 times daily as needed for muscle spasms 90 tablet 11     gabapentin (NEURONTIN) 300 MG capsule Take 2 capsules (600 mg) by mouth daily 180 capsule 3     guaiFENesin-dextromethorphan (ROBITUSSIN DM) 100-10 MG/5ML syrup Take 10 mLs by mouth 3 times daily as needed for cough 236 mL 0     ibuprofen (ADVIL,MOTRIN) 600 MG tablet Take 1 tablet (600 mg) by mouth every 6 hours as needed for moderate pain 30 tablet 1     losartan (COZAAR) 100 MG tablet Take 1 tablet (100 mg) by mouth daily 90 tablet 1     metFORMIN (GLUCOPHAGE-XR) 500 MG 24 hr tablet Take 2 tablets (1,000 mg) by mouth every morning 180 tablet 3     order for DME Equipment being ordered: CPAP 1 Device 0     pantoprazole (PROTONIX) 40 MG EC tablet Take 1 tablet (40 mg) by mouth At Bedtime 90 tablet 3     sertraline (ZOLOFT) 100 MG tablet Take 2 tablets (200 mg) by mouth daily 180 tablet 3     simvastatin (ZOCOR) 20 MG tablet  Take 1 tablet (20 mg) by mouth At Bedtime 90 tablet 1     zolpidem (AMBIEN) 10 MG tablet Take 1 tablet (10 mg) by mouth At Bedtime 90 tablet 1     No facility-administered encounter medications on file as of 10/25/2019.        Again, thank you for allowing me to participate in the care of your patient.      Sincerely,    Edwar Merritt MD     [facility]

## 2019-10-25 NOTE — PROGRESS NOTES
Service Date: 10/25/2019      PRIMARY CARE DOCTOR:  Dr. Wiley.      HISTORY OF PRESENT ILLNESS:  Nicole Reyes, a 64-year-old woman with morbid obesity, sleep apnea, recurrent pneumonia, diabetes mellitus, hypertension, and hiatal hernia was evaluated in consultation at your request for hypoxia, fatigue, and exertional dyspnea.        Since  2014, Ms. Reyes estimates she has been hospitalized at least 7 times for recurrent pneumonia, most recently in 07/2019.  On 2 occasions, her pneumonia has been complicated by sepsis.  The patient has a known esophageal hiatal hernia which was first documented in 2017 on a CT scan.  The patient states that she desaturates very easily.  When seen in your office, her oxygen saturations were in the 86%-88% range.  She uses oxygen therapy on an intermittent basis.      The patient carries a diagnosis of sleep apnea and admits it is very difficult to comply with her CPAP mask.  She was seen in consultation by the [facility] in 07/2019.  Their evaluation included normal pulmonary function studies and at the time she was seen, she was not requiring oxygen for ambulation.  An appointment with Gastroenterology is planned for next week.      The patient does not describe chest, arm, neck, jaw or back discomfort with exertion.  There is no documented history of coronary artery disease.  There is a family history of premature coronary artery disease.  She has been treated with statin therapy and blood pressure medications for hypertension and for diabetes mellitus.  She specifically denies fever, cough, hemoptysis, purulent sputum, pleuritic chest pain, or deep venous thrombosis.  Echocardiogram in 04/2019 showed an ejection fraction of 60% with mild left atrial enlargement but no significant valvular stenosis or insufficiency.  Right-sided chambers were of normal size with no tricuspid insufficiency for estimation of pulmonary pressures.      PAST MEDICAL HISTORY:   1.  Morbid  obesity.   2.  Sleep apnea.   3.  Large hiatal hernia.   4.  Hypertension.   5.  Diabetes mellitus.   6.  Sleep apnea.      ALLERGIES:  Codeine, penicillins.      HABITS:  The patient does not abuse tobacco or alcohol.      SOCIAL HISTORY:  The patient is .  She has 3 children and 3 grandchildren.  She has worked as a health unit coordinator.  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      REVIEW OF SYSTEMS:  A 12-point review of systems was performed.  Outside the issues mentioned in the HPI, there are no other complaints.      MEDICATIONS:   1.  Albuterol inhaler p.r.n.   2.  Aspirin 81 daily.   3.  Clonazepam 1 mg tablet 0.5 twice a day.   4.  Gabapentin 600 mg daily.   5.  Losartan 100 mg daily.   6.  Metformin 1 gram twice a day.   7.  Oxygen therapy as needed.   8.  Sertraline 200 mg daily.   9.  Simvastatin 20 mg at bedtime.      PHYSICAL EXAMINATION:   GENERAL:  Exam today demonstrates a very pleasant, cooperative and intelligent 64-year-old woman who is overweight.   VITAL SIGNS:  Her blood pressure was 144/102.  Her heart rate was 92.  Her height is 1.5 meters, her weight is 115.7 kg.  Her BMI is 53.   LUNGS:  Clear to percussion and auscultation.   CARDIOVASCULAR:  Exam shows a normal S1 with a normal S2.  There is no S3.  There is no murmur, rub or click.   EXTREMITIES:  Her pulses are full and symmetrical.   ABDOMEN:  Exam shows obesity, not feeling internal organs.   NEUROLOGIC:  Cranial nerves II-XII are intact.  Strength equal and symmetrical.  She displays normal insight and judgment.      LABORATORY STUDIES:  Her echocardiogram from 04/17/2019 showed a normal ejection fraction with mild left atrial enlargement but otherwise is unremarkable.  Her ECG from 07/2019 showed a normal sinus rhythm with poor R-wave progression which is likely due to lead placement.  It otherwise was unremarkable.      ASSESSMENT:  This 64-year-old woman with morbid obesity  and sleep apnea has a history of recurrent pneumonia in the setting of a large hiatal hernia.  She has no history of hemoptysis, purulent sputum, pleuritic chest pain, or deep venous thrombosis at this time.  A CT scan in 2017  showed no evidence of pulmonary embolus or parenchymal lung disease at that time.  Her echo shows a normal ejection fraction with no evidence of intracardiac shunting.  I believe the likelihood of finding a cardiac cause for her dyspnea and hypoxia is quite low at this time.  I agree with your plans for the GI service consultation to examine the possibility of recurrent aspiration pneumonia related to hiatal hernia.  I am certain that her obesity also affects lung mechanics, as does her sleep apnea.  Weight loss will need to be part of her treatment.  If the GI service feels that aspiration pneumonia from her hiatal hernia is not a likely cause of her complaints, you could consider repeating a CT scan to look for pulmonary embolus.  Her medical therapy appears to be reasonable, but I would increase her simvastatin from 20 to 40 mg daily, the dose most proven to prevent adverse vascular events. .      RECOMMENDATIONS:   1.  I am not enthusiastic about further cardiac testing at this time.   2.  I would strongly recommend weight loss and a regular exercise program.   3.  I agree with plans for GI evaluation to assess for possible aspiration pneumonia from hiatal hernia.   4.  If GI evaluation is negative, I would recommend you consider repeat CT scan to assess for vascular or parenchymal lung disease.   5.  Increase simvastatin from 20 to 40 mg daily.   6.  Treatment of sleep apnea.      We have appreciated the opportunity to be involved in the care of this most pleasant woman.      Mary Jo Merritt MD       cc:   Pari Wiley MD    [facility]    [address]    [place]  [address]         MARYJ O MERRITT MD             D: 10/25/2019   T: 10/25/2019   MT:  LJ      Name:     ASHANTI PATEL   MRN:      [id number]        Account:      [id number]   :      1955           Service Date: 10/25/2019      Document: [id number]

## 2019-10-30 ENCOUNTER — TELEPHONE (OUTPATIENT)
Dept: INTERNAL MEDICINE | Facility: CLINIC | Age: 64
End: 2019-10-30

## 2019-10-30 NOTE — TELEPHONE ENCOUNTER
This is a form for her CPAP and they are requesting that they include device and setting in face-to-face notes.  I need to know what her settings are.

## 2019-10-30 NOTE — TELEPHONE ENCOUNTER
Fax received from [facility] for review and signature.  Put in Dr. Wiley's in basket.

## 2019-10-31 ENCOUNTER — TRANSFERRED RECORDS (OUTPATIENT)
Dept: HEALTH INFORMATION MANAGEMENT | Facility: CLINIC | Age: 64
End: 2019-10-31

## 2019-11-13 ENCOUNTER — HOSPITAL ENCOUNTER (INPATIENT)
Facility: CLINIC | Age: 64
LOS: 5 days | Discharge: HOME | DRG: 871 | End: 2019-11-18
Attending: EMERGENCY MEDICINE | Admitting: INTERNAL MEDICINE
Payer: COMMERCIAL

## 2019-11-13 ENCOUNTER — APPOINTMENT (OUTPATIENT)
Dept: GENERAL RADIOLOGY | Facility: CLINIC | Age: 64
DRG: 871 | End: 2019-11-13
Attending: EMERGENCY MEDICINE
Payer: COMMERCIAL

## 2019-11-13 DIAGNOSIS — A41.9 SEVERE SEPSIS (H): ICD-10-CM

## 2019-11-13 DIAGNOSIS — R65.20 SEVERE SEPSIS (H): ICD-10-CM

## 2019-11-13 DIAGNOSIS — J18.9 PNEUMONIA DUE TO INFECTIOUS ORGANISM, UNSPECIFIED LATERALITY, UNSPECIFIED PART OF LUNG: Primary | ICD-10-CM

## 2019-11-13 DIAGNOSIS — J18.9 COMMUNITY ACQUIRED PNEUMONIA OF LEFT LOWER LOBE OF LUNG: ICD-10-CM

## 2019-11-13 LAB
ALBUMIN SERPL-MCNC: 3.5 G/DL (ref 3.4–5)
ALP SERPL-CCNC: 118 U/L (ref 40–150)
ALT SERPL W P-5'-P-CCNC: 33 U/L (ref 0–50)
ANION GAP SERPL CALCULATED.3IONS-SCNC: 6 MMOL/L (ref 3–14)
ANION GAP SERPL CALCULATED.3IONS-SCNC: 7 MMOL/L (ref 3–14)
ANISOCYTOSIS BLD QL SMEAR: SLIGHT
AST SERPL W P-5'-P-CCNC: 25 U/L (ref 0–45)
BASOPHILS # BLD AUTO: 0 10E9/L (ref 0–0.2)
BASOPHILS NFR BLD AUTO: 0.1 %
BILIRUB SERPL-MCNC: 0.4 MG/DL (ref 0.2–1.3)
BUN SERPL-MCNC: 13 MG/DL (ref 7–30)
BUN SERPL-MCNC: 14 MG/DL (ref 7–30)
CALCIUM SERPL-MCNC: 7.6 MG/DL (ref 8.5–10.1)
CALCIUM SERPL-MCNC: 8.5 MG/DL (ref 8.5–10.1)
CHLORIDE SERPL-SCNC: 104 MMOL/L (ref 94–109)
CHLORIDE SERPL-SCNC: 108 MMOL/L (ref 94–109)
CO2 BLDCOV-SCNC: 24 MMOL/L (ref 21–28)
CO2 BLDCOV-SCNC: 28 MMOL/L (ref 21–28)
CO2 SERPL-SCNC: 26 MMOL/L (ref 20–32)
CO2 SERPL-SCNC: 28 MMOL/L (ref 20–32)
CREAT SERPL-MCNC: 0.74 MG/DL (ref 0.52–1.04)
CREAT SERPL-MCNC: 0.77 MG/DL (ref 0.52–1.04)
DIFFERENTIAL METHOD BLD: ABNORMAL
EOSINOPHIL # BLD AUTO: 0.1 10E9/L (ref 0–0.7)
EOSINOPHIL NFR BLD AUTO: 0.3 %
ERYTHROCYTE [DISTWIDTH] IN BLOOD BY AUTOMATED COUNT: 16.2 % (ref 10–15)
FLUAV+FLUBV AG SPEC QL: NEGATIVE
FLUAV+FLUBV AG SPEC QL: NEGATIVE
GFR SERPL CREATININE-BSD FRML MDRD: 82 ML/MIN/{1.73_M2}
GFR SERPL CREATININE-BSD FRML MDRD: 85 ML/MIN/{1.73_M2}
GLUCOSE BLDC GLUCOMTR-MCNC: 116 MG/DL (ref 70–99)
GLUCOSE SERPL-MCNC: 120 MG/DL (ref 70–99)
GLUCOSE SERPL-MCNC: 161 MG/DL (ref 70–99)
HCT VFR BLD AUTO: 39.9 % (ref 35–47)
HGB BLD-MCNC: 11.5 G/DL (ref 11.7–15.7)
IMM GRANULOCYTES # BLD: 0.1 10E9/L (ref 0–0.4)
IMM GRANULOCYTES NFR BLD: 0.4 %
LACTATE BLD-SCNC: 1.5 MMOL/L (ref 0.7–2.1)
LACTATE BLD-SCNC: 3 MMOL/L (ref 0.7–2.1)
LYMPHOCYTES # BLD AUTO: 0.8 10E9/L (ref 0.8–5.3)
LYMPHOCYTES NFR BLD AUTO: 4.3 %
MCH RBC QN AUTO: 21.2 PG (ref 26.5–33)
MCHC RBC AUTO-ENTMCNC: 28.8 G/DL (ref 31.5–36.5)
MCV RBC AUTO: 74 FL (ref 78–100)
MICROCYTES BLD QL SMEAR: PRESENT
MONOCYTES # BLD AUTO: 0.5 10E9/L (ref 0–1.3)
MONOCYTES NFR BLD AUTO: 3 %
NEUTROPHILS # BLD AUTO: 16.4 10E9/L (ref 1.6–8.3)
NEUTROPHILS NFR BLD AUTO: 91.9 %
NRBC # BLD AUTO: 0 10*3/UL
NRBC BLD AUTO-RTO: 0 /100
PCO2 BLDV: 38 MM HG (ref 40–50)
PCO2 BLDV: 46 MM HG (ref 40–50)
PH BLDV: 7.39 PH (ref 7.32–7.43)
PH BLDV: 7.41 PH (ref 7.32–7.43)
PLATELET # BLD AUTO: 212 10E9/L (ref 150–450)
PLATELET # BLD AUTO: 235 10E9/L (ref 150–450)
PLATELET # BLD EST: ABNORMAL 10*3/UL
PO2 BLDV: 38 MM HG (ref 25–47)
PO2 BLDV: 53 MM HG (ref 25–47)
POTASSIUM SERPL-SCNC: 3 MMOL/L (ref 3.4–5.3)
POTASSIUM SERPL-SCNC: 3.4 MMOL/L (ref 3.4–5.3)
PROT SERPL-MCNC: 7.7 G/DL (ref 6.8–8.8)
RBC # BLD AUTO: 5.42 10E12/L (ref 3.8–5.2)
SAO2 % BLDV FROM PO2: 72 %
SAO2 % BLDV FROM PO2: 86 %
SODIUM SERPL-SCNC: 139 MMOL/L (ref 133–144)
SODIUM SERPL-SCNC: 140 MMOL/L (ref 133–144)
SPECIMEN SOURCE: NORMAL
TROPONIN I SERPL-MCNC: <0.015 UG/L (ref 0–0.04)
WBC # BLD AUTO: 17.8 10E9/L (ref 4–11)

## 2019-11-13 PROCEDURE — 94660 CPAP INITIATION&MGMT: CPT

## 2019-11-13 PROCEDURE — 87641 MR-STAPH DNA AMP PROBE: CPT | Performed by: INTERNAL MEDICINE

## 2019-11-13 PROCEDURE — 00000146 ZZHCL STATISTIC GLUCOSE BY METER IP

## 2019-11-13 PROCEDURE — 85049 AUTOMATED PLATELET COUNT: CPT | Performed by: INTERNAL MEDICINE

## 2019-11-13 PROCEDURE — 80048 BASIC METABOLIC PNL TOTAL CA: CPT | Performed by: INTERNAL MEDICINE

## 2019-11-13 PROCEDURE — 96375 TX/PRO/DX INJ NEW DRUG ADDON: CPT

## 2019-11-13 PROCEDURE — 25000128 H RX IP 250 OP 636: Performed by: EMERGENCY MEDICINE

## 2019-11-13 PROCEDURE — 87800 DETECT AGNT MULT DNA DIREC: CPT | Performed by: EMERGENCY MEDICINE

## 2019-11-13 PROCEDURE — 84484 ASSAY OF TROPONIN QUANT: CPT | Performed by: EMERGENCY MEDICINE

## 2019-11-13 PROCEDURE — 20000003 ZZH R&B ICU

## 2019-11-13 PROCEDURE — 87040 BLOOD CULTURE FOR BACTERIA: CPT | Performed by: EMERGENCY MEDICINE

## 2019-11-13 PROCEDURE — 99285 EMERGENCY DEPT VISIT HI MDM: CPT | Mod: 25

## 2019-11-13 PROCEDURE — 96365 THER/PROPH/DIAG IV INF INIT: CPT

## 2019-11-13 PROCEDURE — 83605 ASSAY OF LACTIC ACID: CPT

## 2019-11-13 PROCEDURE — 80053 COMPREHEN METABOLIC PANEL: CPT | Performed by: EMERGENCY MEDICINE

## 2019-11-13 PROCEDURE — 71045 X-RAY EXAM CHEST 1 VIEW: CPT

## 2019-11-13 PROCEDURE — 25000128 H RX IP 250 OP 636: Performed by: INTERNAL MEDICINE

## 2019-11-13 PROCEDURE — 25800030 ZZH RX IP 258 OP 636: Performed by: EMERGENCY MEDICINE

## 2019-11-13 PROCEDURE — 40000275 ZZH STATISTIC RCP TIME EA 10 MIN

## 2019-11-13 PROCEDURE — 87186 SC STD MICRODIL/AGAR DIL: CPT | Performed by: EMERGENCY MEDICINE

## 2019-11-13 PROCEDURE — 82803 BLOOD GASES ANY COMBINATION: CPT

## 2019-11-13 PROCEDURE — 25000132 ZZH RX MED GY IP 250 OP 250 PS 637: Performed by: INTERNAL MEDICINE

## 2019-11-13 PROCEDURE — 87640 STAPH A DNA AMP PROBE: CPT | Performed by: INTERNAL MEDICINE

## 2019-11-13 PROCEDURE — 36415 COLL VENOUS BLD VENIPUNCTURE: CPT | Performed by: INTERNAL MEDICINE

## 2019-11-13 PROCEDURE — 99223 1ST HOSP IP/OBS HIGH 75: CPT | Mod: AI | Performed by: INTERNAL MEDICINE

## 2019-11-13 PROCEDURE — 87804 INFLUENZA ASSAY W/OPTIC: CPT | Performed by: EMERGENCY MEDICINE

## 2019-11-13 PROCEDURE — 96361 HYDRATE IV INFUSION ADD-ON: CPT

## 2019-11-13 PROCEDURE — 25800030 ZZH RX IP 258 OP 636: Performed by: INTERNAL MEDICINE

## 2019-11-13 PROCEDURE — 85025 COMPLETE CBC W/AUTO DIFF WBC: CPT | Performed by: EMERGENCY MEDICINE

## 2019-11-13 PROCEDURE — 87077 CULTURE AEROBIC IDENTIFY: CPT | Performed by: EMERGENCY MEDICINE

## 2019-11-13 RX ORDER — GABAPENTIN 300 MG/1
600 CAPSULE ORAL DAILY
Status: DISCONTINUED | OUTPATIENT
Start: 2019-11-14 | End: 2019-11-18 | Stop reason: HOSPADM

## 2019-11-13 RX ORDER — ZOLPIDEM TARTRATE 5 MG/1
10 TABLET ORAL AT BEDTIME
Status: DISCONTINUED | OUTPATIENT
Start: 2019-11-13 | End: 2019-11-14

## 2019-11-13 RX ORDER — PROCHLORPERAZINE 25 MG
25 SUPPOSITORY, RECTAL RECTAL EVERY 12 HOURS PRN
Status: DISCONTINUED | OUTPATIENT
Start: 2019-11-13 | End: 2019-11-18 | Stop reason: HOSPADM

## 2019-11-13 RX ORDER — SODIUM CHLORIDE, SODIUM LACTATE, POTASSIUM CHLORIDE, CALCIUM CHLORIDE 600; 310; 30; 20 MG/100ML; MG/100ML; MG/100ML; MG/100ML
INJECTION, SOLUTION INTRAVENOUS CONTINUOUS
Status: DISCONTINUED | OUTPATIENT
Start: 2019-11-13 | End: 2019-11-14

## 2019-11-13 RX ORDER — ONDANSETRON 2 MG/ML
4 INJECTION INTRAMUSCULAR; INTRAVENOUS EVERY 6 HOURS PRN
Status: DISCONTINUED | OUTPATIENT
Start: 2019-11-13 | End: 2019-11-18 | Stop reason: HOSPADM

## 2019-11-13 RX ORDER — GUAIFENESIN/DEXTROMETHORPHAN 100-10MG/5
10 SYRUP ORAL 3 TIMES DAILY PRN
Status: DISCONTINUED | OUTPATIENT
Start: 2019-11-13 | End: 2019-11-18 | Stop reason: HOSPADM

## 2019-11-13 RX ORDER — CLONAZEPAM 0.5 MG/1
0.5 TABLET ORAL 2 TIMES DAILY
Status: DISCONTINUED | OUTPATIENT
Start: 2019-11-13 | End: 2019-11-18 | Stop reason: HOSPADM

## 2019-11-13 RX ORDER — ASPIRIN 81 MG/1
81 TABLET ORAL EVERY EVENING
Status: DISCONTINUED | OUTPATIENT
Start: 2019-11-13 | End: 2019-11-18 | Stop reason: HOSPADM

## 2019-11-13 RX ORDER — CEFTRIAXONE 2 G/1
2 INJECTION, POWDER, FOR SOLUTION INTRAMUSCULAR; INTRAVENOUS EVERY 24 HOURS
Status: DISCONTINUED | OUTPATIENT
Start: 2019-11-14 | End: 2019-11-18 | Stop reason: HOSPADM

## 2019-11-13 RX ORDER — POTASSIUM CL/LIDO/0.9 % NACL 10MEQ/0.1L
10 INTRAVENOUS SOLUTION, PIGGYBACK (ML) INTRAVENOUS
Status: DISCONTINUED | OUTPATIENT
Start: 2019-11-13 | End: 2019-11-18 | Stop reason: HOSPADM

## 2019-11-13 RX ORDER — SIMVASTATIN 20 MG
20 TABLET ORAL AT BEDTIME
Status: DISCONTINUED | OUTPATIENT
Start: 2019-11-13 | End: 2019-11-18 | Stop reason: HOSPADM

## 2019-11-13 RX ORDER — PANTOPRAZOLE SODIUM 40 MG/1
40 TABLET, DELAYED RELEASE ORAL AT BEDTIME
Status: DISCONTINUED | OUTPATIENT
Start: 2019-11-13 | End: 2019-11-18 | Stop reason: HOSPADM

## 2019-11-13 RX ORDER — NICOTINE POLACRILEX 4 MG
15-30 LOZENGE BUCCAL
Status: DISCONTINUED | OUTPATIENT
Start: 2019-11-13 | End: 2019-11-18 | Stop reason: HOSPADM

## 2019-11-13 RX ORDER — NALOXONE HYDROCHLORIDE 0.4 MG/ML
.1-.4 INJECTION, SOLUTION INTRAMUSCULAR; INTRAVENOUS; SUBCUTANEOUS
Status: DISCONTINUED | OUTPATIENT
Start: 2019-11-13 | End: 2019-11-18 | Stop reason: HOSPADM

## 2019-11-13 RX ORDER — AMOXICILLIN 250 MG
2 CAPSULE ORAL 2 TIMES DAILY PRN
Status: DISCONTINUED | OUTPATIENT
Start: 2019-11-13 | End: 2019-11-18 | Stop reason: HOSPADM

## 2019-11-13 RX ORDER — ONDANSETRON 4 MG/1
4 TABLET, ORALLY DISINTEGRATING ORAL EVERY 6 HOURS PRN
Status: DISCONTINUED | OUTPATIENT
Start: 2019-11-13 | End: 2019-11-18 | Stop reason: HOSPADM

## 2019-11-13 RX ORDER — PROCHLORPERAZINE MALEATE 10 MG
10 TABLET ORAL EVERY 6 HOURS PRN
Status: DISCONTINUED | OUTPATIENT
Start: 2019-11-13 | End: 2019-11-18 | Stop reason: HOSPADM

## 2019-11-13 RX ORDER — BISACODYL 10 MG
10 SUPPOSITORY, RECTAL RECTAL DAILY PRN
Status: DISCONTINUED | OUTPATIENT
Start: 2019-11-13 | End: 2019-11-18 | Stop reason: HOSPADM

## 2019-11-13 RX ORDER — ACETAMINOPHEN 325 MG/1
650 TABLET ORAL EVERY 4 HOURS PRN
Status: DISCONTINUED | OUTPATIENT
Start: 2019-11-13 | End: 2019-11-18 | Stop reason: HOSPADM

## 2019-11-13 RX ORDER — POTASSIUM CHLORIDE 7.45 MG/ML
10 INJECTION INTRAVENOUS
Status: DISCONTINUED | OUTPATIENT
Start: 2019-11-13 | End: 2019-11-18 | Stop reason: HOSPADM

## 2019-11-13 RX ORDER — DEXTROSE MONOHYDRATE 25 G/50ML
25-50 INJECTION, SOLUTION INTRAVENOUS
Status: DISCONTINUED | OUTPATIENT
Start: 2019-11-13 | End: 2019-11-18 | Stop reason: HOSPADM

## 2019-11-13 RX ORDER — ALBUTEROL SULFATE 0.83 MG/ML
3 SOLUTION RESPIRATORY (INHALATION)
Status: DISCONTINUED | OUTPATIENT
Start: 2019-11-13 | End: 2019-11-14

## 2019-11-13 RX ORDER — CEFTRIAXONE 2 G/1
2 INJECTION, POWDER, FOR SOLUTION INTRAMUSCULAR; INTRAVENOUS ONCE
Status: COMPLETED | OUTPATIENT
Start: 2019-11-13 | End: 2019-11-13

## 2019-11-13 RX ORDER — CYCLOBENZAPRINE HCL 10 MG
10 TABLET ORAL 3 TIMES DAILY PRN
Status: DISCONTINUED | OUTPATIENT
Start: 2019-11-13 | End: 2019-11-18 | Stop reason: HOSPADM

## 2019-11-13 RX ORDER — AMOXICILLIN 250 MG
1 CAPSULE ORAL 2 TIMES DAILY PRN
Status: DISCONTINUED | OUTPATIENT
Start: 2019-11-13 | End: 2019-11-18 | Stop reason: HOSPADM

## 2019-11-13 RX ORDER — SERTRALINE HYDROCHLORIDE 100 MG/1
200 TABLET, FILM COATED ORAL DAILY
Status: DISCONTINUED | OUTPATIENT
Start: 2019-11-14 | End: 2019-11-18 | Stop reason: HOSPADM

## 2019-11-13 RX ORDER — POTASSIUM CHLORIDE 1500 MG/1
20-40 TABLET, EXTENDED RELEASE ORAL
Status: DISCONTINUED | OUTPATIENT
Start: 2019-11-13 | End: 2019-11-18 | Stop reason: HOSPADM

## 2019-11-13 RX ORDER — LOSARTAN POTASSIUM 100 MG/1
100 TABLET ORAL DAILY
Status: DISCONTINUED | OUTPATIENT
Start: 2019-11-14 | End: 2019-11-18 | Stop reason: HOSPADM

## 2019-11-13 RX ORDER — MAGNESIUM SULFATE HEPTAHYDRATE 40 MG/ML
4 INJECTION, SOLUTION INTRAVENOUS EVERY 4 HOURS PRN
Status: DISCONTINUED | OUTPATIENT
Start: 2019-11-13 | End: 2019-11-18 | Stop reason: HOSPADM

## 2019-11-13 RX ORDER — POTASSIUM CHLORIDE 1.5 G/1.58G
20-40 POWDER, FOR SOLUTION ORAL
Status: DISCONTINUED | OUTPATIENT
Start: 2019-11-13 | End: 2019-11-18 | Stop reason: HOSPADM

## 2019-11-13 RX ORDER — POTASSIUM CHLORIDE 29.8 MG/ML
20 INJECTION INTRAVENOUS
Status: DISCONTINUED | OUTPATIENT
Start: 2019-11-13 | End: 2019-11-18 | Stop reason: HOSPADM

## 2019-11-13 RX ORDER — IPRATROPIUM BROMIDE AND ALBUTEROL SULFATE 2.5; .5 MG/3ML; MG/3ML
3 SOLUTION RESPIRATORY (INHALATION) 4 TIMES DAILY
Status: DISCONTINUED | OUTPATIENT
Start: 2019-11-13 | End: 2019-11-17

## 2019-11-13 RX ORDER — IBUPROFEN 600 MG/1
600 TABLET, FILM COATED ORAL EVERY 6 HOURS PRN
Status: DISCONTINUED | OUTPATIENT
Start: 2019-11-13 | End: 2019-11-18 | Stop reason: HOSPADM

## 2019-11-13 RX ADMIN — SODIUM CHLORIDE, POTASSIUM CHLORIDE, SODIUM LACTATE AND CALCIUM CHLORIDE: 600; 310; 30; 20 INJECTION, SOLUTION INTRAVENOUS at 22:01

## 2019-11-13 RX ADMIN — SODIUM CHLORIDE 2000 ML: 9 INJECTION, SOLUTION INTRAVENOUS at 15:48

## 2019-11-13 RX ADMIN — ASPIRIN 81 MG: 81 TABLET, COATED ORAL at 22:01

## 2019-11-13 RX ADMIN — SIMVASTATIN 20 MG: 20 TABLET, FILM COATED ORAL at 22:01

## 2019-11-13 RX ADMIN — CEFTRIAXONE 2 G: 2 INJECTION, POWDER, FOR SOLUTION INTRAMUSCULAR; INTRAVENOUS at 17:08

## 2019-11-13 RX ADMIN — AZITHROMYCIN MONOHYDRATE 500 MG: 500 INJECTION, POWDER, LYOPHILIZED, FOR SOLUTION INTRAVENOUS at 17:36

## 2019-11-13 RX ADMIN — CLONAZEPAM 0.5 MG: 0.5 TABLET ORAL at 22:01

## 2019-11-13 RX ADMIN — PANTOPRAZOLE SODIUM 40 MG: 40 TABLET, DELAYED RELEASE ORAL at 22:01

## 2019-11-13 RX ADMIN — ENOXAPARIN SODIUM 40 MG: 40 INJECTION SUBCUTANEOUS at 22:01

## 2019-11-13 RX ADMIN — ZOLPIDEM TARTRATE 10 MG: 5 TABLET, COATED ORAL at 22:01

## 2019-11-13 ASSESSMENT — MIFFLIN-ST. JEOR
SCORE: 1600.96
SCORE: 1547.89

## 2019-11-13 ASSESSMENT — ACTIVITIES OF DAILY LIVING (ADL)
RETIRED_COMMUNICATION: 0-->UNDERSTANDS/COMMUNICATES WITHOUT DIFFICULTY
COGNITION: 0 - NO COGNITION ISSUES REPORTED
SWALLOWING: 0-->SWALLOWS FOODS/LIQUIDS WITHOUT DIFFICULTY
DRESS: 0-->INDEPENDENT
RETIRED_EATING: 0-->INDEPENDENT
WHICH_OF_THE_ABOVE_FUNCTIONAL_RISKS_HAD_A_RECENT_ONSET_OR_CHANGE?: SWALLOWING
BATHING: 0-->INDEPENDENT

## 2019-11-13 ASSESSMENT — ENCOUNTER SYMPTOMS
ABDOMINAL PAIN: 0
COUGH: 1
FEVER: 1
VOMITING: 0
SHORTNESS OF BREATH: 1

## 2019-11-13 NOTE — ED NOTES
Patient currently on 9 litters of oxygen via oxy-mask. Oxygen saturation 90%. MD made aware and currently at bedside. Will continue to monitor.

## 2019-11-13 NOTE — PROGRESS NOTES
"A BiPAP of  /6 @ 50% was applied to the pt via the mask for an increase in WOB and/or SOB.  The bridge of the nose remained intact, gel pad not in place at this time.Pt is tolerating it well. Will continue to monitor and assess the pt's current respiratory status and needs.    Vital signs:  Temp: 99.8  F (37.7  C)   BP: (!) 147/81 Pulse: 122 Heart Rate: 114 Resp: 21 SpO2: 92 % O2 Device: BiPAP/CPAP Oxygen Delivery: 9 LPM Height: 147.3 cm (4' 10\") Weight: 116.1 kg (256 lb)  Estimated body mass index is 53.5 kg/m  as calculated from the following:    Height as of this encounter: 1.473 m (4' 10\").    Weight as of this encounter: 116.1 kg (256 lb).    Past Medical History:   Diagnosis Date     CKD (chronic kidney disease) stage 3, GFR 30-59 ml/min (H)      Essential hypertension, benign      Generalized anxiety disorder      Hernia, abdominal      Hypertension      Insomnia, unspecified      Iron deficiency anemia      Major depression     sees a psychiatrist     Menopause     age 53     Obesity, unspecified      CRISTÓBAL (obstructive sleep apnea)     bipap     Postoperative seroma 10/11    drained several times     Pure hypercholesterolemia      RLS (restless legs syndrome)      Type II or unspecified type diabetes mellitus without mention of complication, not stated as uncontrolled        Past Surgical History:   Procedure Laterality Date     BREAST BIOPSY, RT/LT      2 times; benign     C NONSPECIFIC PROCEDURE      Hernia repair      C NONSPECIFIC PROCEDURE      Lymph node biopsy in neck (benign)       SECTION        SECTION        SECTION       DILATION AND CURETTAGE, HYSTEROSCOPY DIAGNOSTIC, COMBINED  3/22/2013    Procedure: COMBINED DILATION AND CURETTAGE, HYSTEROSCOPY DIAGNOSTIC;  DILATION AND CURETTAGE, HYSTEROSCOPY DIAGNOSTIC   Converted to a general;  Surgeon: Robi Edwards MD;  Location: [facility] OR     ESOPHAGOSCOPY, GASTROSCOPY, DUODENOSCOPY (EGD), COMBINED N/A 2015    " Procedure: COMBINED ESOPHAGOSCOPY, GASTROSCOPY, DUODENOSCOPY (EGD);  Surgeon: Obinna Gutierrez MD;  Location:  GI     ESOPHAGOSCOPY, GASTROSCOPY, DUODENOSCOPY (EGD), COMBINED N/A 2015    Procedure: COMBINED ENDOSCOPIC ULTRASOUND, ESOPHAGOSCOPY, GASTROSCOPY, DUODENOSCOPY (EGD), FINE NEEDLE ASPIRATE/BIOPSY;  Surgeon: Sabine Olivares MD;  Location:  GI     HERNIORRHAPHY INCISIONAL (LOCATION)  10/8/2011     incarcerated hernia repair with mesh;     HERNIORRHAPHY INCISIONAL (LOCATION)  10/16/2011     repair incisional hernia with mesh, and removal of current implanted mesh;       Family History   Problem Relation Age of Onset     Cardiovascular Mother         CHF     Hypertension Mother      C.A.D. Mother         50s     Lipids Mother      Cancer Father         Hodgkin's, Leukemia     Diabetes Sister      Connective Tissue Disorder Sister         Lupus     Lipids Sister      Hypertension Brother      Hypertension Sister      Hypertension Sister      Cancer Brother         Hodgkin's     C.A.D. Brother 61     Hypertension Sister      C.A.D. Brother      Basal cell carcinoma Daughter 38        top of head       Social History     Tobacco Use     Smoking status: Former Smoker     Last attempt to quit: 3/18/1979     Years since quittin.6     Smokeless tobacco: Never Used     Tobacco comment: quit    Substance Use Topics     Alcohol use: Yes     Comment: Twice a month     Venous Blood Gas  Recent Labs   Lab 19  1514   PHV 7.41   PCO2V 38*   PO2V 38   HCO3V 24     Edwar Burks, RT  2019

## 2019-11-13 NOTE — ED PROVIDER NOTES
History     Chief Complaint:  Hypoxia    HPI   Nicole Reyes is a 64 year old female with a history of pneumonia, hypertension, hypercholesterolemia, diabetes, acute respiratory failure, among others who presents with daughter for evaluation of fever, cough and hypoxia. Yesterday the patient felt baseline and was able to get a new CPAP yesterday. In the evening yesterday the patient did not feel like eating, but did not attribute it to anything. Later in the evening she began having myaglias, but no cough, and could take deep inspirations without complications. She then developed a cough, as well as chills. She was able to go to sleep and woke up around 1300 today. Her daughter noticed she had short, quick breathing and took her temperature multiple times, and got a reading as high as 102.1 F, but she states it was inconsistent based on location. Her daughter gave her Tylenol to help alleviate a possible fever.   She measured her O2 sats, which was 77, and had a heart rate of 122, prompting their arrival.    Here, the patient states she has not had a flu shot this year. Her daughter notes she has had similar presentation of symptoms in the past and was diagnosed with pneumonia and sepsis. The patient does not state any other symptoms.    Allergies:  Codeine  Penicillins     Medications:    Albuterol  Aspirin  Klonopin  Flexeril  Neurontin  Robitussin  Cozaar  Glucophage  Protonix  Zoloft  Zocor  Ambien    Past Medical History:    CKD  Hypertension  Anxiety  Hernia, abdominal  Insomnia  Iron deficiency anemia  Depression  Menopause  Obesity   CRISTÓBAL  Postoperative Seroma  Hypercholesterolemia  Restless legs syndrome  Diabetes, type 2   GERD  Acute respiratory failure  Pneumonia     Past Surgical History:    Breast biopsy x2  Hernia repair  Lymph node biopsy in neck   section x 2  D&C  EGD x2  Herniorrhaphy incisional x2    Family History:    Mother - CHF, hypertension, CAD, lipids  Father -  "Cancer  Sister(s) - Hypertension, diabetes, Lupus,  Brother(s) - Hypertension, cancer, CAD    Social History:  The patient was accompanied to the ED by daughter.  Smoking Status: Former, 1979  Smokeless Tobacco: Never  Alcohol Use: Yes  Drug Use: No   Marital Status:   [2]     Review of Systems   Constitutional: Positive for fever.   HENT: Positive for congestion.    Respiratory: Positive for cough and shortness of breath.    Cardiovascular: Negative for chest pain and leg swelling.   Gastrointestinal: Negative for abdominal pain and vomiting.   All other systems reviewed and are negative.      Physical Exam     Patient Vitals for the past 24 hrs:   BP Temp Pulse Heart Rate Resp SpO2 Height Weight   11/13/19 1526 -- -- -- 114 21 92 % -- --   11/13/19 1525 -- -- -- -- -- 92 % -- --   11/13/19 1510 -- -- -- -- -- 90 % -- --   11/13/19 1502 (!) 147/81 -- -- -- -- -- -- --   11/13/19 1501 -- -- -- -- -- 92 % -- --   11/13/19 1459 -- 99.8  F (37.7  C) 122 -- 22 (!) 75 % 1.473 m (4' 10\") 116.1 kg (256 lb)      Physical Exam    Nursing note and vitals reviewed.  Constitutional: Cooperative.   HENT:   Mouth/Throat: dry mucous membranes.   Eyes: EOMI, nonicteric sclera  Cardiovascular: tachycardic, regular rhythm, no murmurs, rubs, or gallops  Pulmonary/Chest: tachypneic. Increased work of breathing. No wheezing/rhonchi.   Abdominal: Soft. Nontender, nondistended, no guarding or rigidity. BS present.   Musculoskeletal: Normal range of motion.   Neurological: Alert. Moves all extremities spontaneously.   Skin: Skin is warm and dry. No rash noted.   Psychiatric: Normal mood and affect.    Emergency Department Course     ECG:  ECG taken at 1500, ECG read at 1501 by Dr. Ruben Villatoro MD  Sinus tachycardia  Cannot rule out anterior infarct, age undetermined  Abnormal ECG  Rate 118 bpm. MN interval 172. QRS duration 82. QT/QTc 314/440. P-R-T axes 55 27 96.     Imaging:  Radiology findings were communicated with the " patient who voiced understanding of the findings.    XR Chest 2 Views  IMPRESSION: Single portable semiupright view of the chest was  obtained. Stable enlargement of the cardiac silhouette and mild  pulmonary vascular congestion. New predominantly left lower lobe  patchy airspace opacities, concerning for infection. No significant  pleural effusion or pneumothorax.  Reading per radiology.     Laboratory:  Laboratory findings were communicated with the patient who voiced understanding of the findings.    CBC: WBC 17.8 (H), HGB 11.5 (L) o/w WNL ()   CMP: Potassium 3.0 (L), Glucose 161 (H) o/w WNL (Creatinine 0.74)   Troponin (Collected 1510): <0.015   Blood Culture x2: Pending     Influenza A/B Antigen: Negative     ISTAT gases lactate gema POCT: pH: 7.41, PCO2: 38 (L), PO2: 38, Bicarbonate: 24, O2 Sat: 72, Lactic acid: 3.0 (H)     Interventions:  1548 0.9% NaCl bolus 1000 mL IV      Emergency Department Course:  Nursing notes and vitals reviewed.  EKG obtained in the ED, see results above.    The patient was sent for a XR Chest 2 Views while in the emergency department, results above.    IV was inserted and blood was drawn for laboratory testing, results above.   A nasal swab was obtained for laboratory testing, findings above.      (1500)   I performed an exam of the patient as documented above. History obtained from patient and daughter.    (1514)   I rechecked patient.    (1517)   I called RT to notify them patient needed bipap    (1532)   I rechecked patient.    (1630)  Rechecked patient. Updated as to x-ray findings. Antibiotics ordered.     (1650) Request for admission made.     (1846)  I spoke with Dr. Rao of the Hospitalist service service regarding patient's presentation, findings, and plan of care.     Findings and plan explained to the Patient who consents to admission. Discussed the patient with Dr. Rao, who will admit the patient to an ICU bed for further monitoring, evaluation, and  treatment.     Impression & Plan      Medical Decision Making:  Patient presents with fever, shortness of breath, cough, and concern for sepsis.  Patient has similar presentation in the past.  On arrival, she is needing respiratory support in the form of BiPAP given significant hypoxia and increased work of breathing with tachypnea.  Respiratory status stabilized on BiPAP.  Patient was later found to have an elevated lactate and a left lung infiltrate suggestive of pneumonia and severe sepsis.  I will treat this as community acquired pneumonia given no hospitalization in the last 3 months. Lactic improving. HR improving. Dr. Rao from our hospitalist service accepts for admission. All questions answered. Pt/daughter in agreement with plan.     Critical Care time was 40 minutes for this patient excluding procedures.     Diagnosis:    ICD-10-CM   1. Community acquired pneumonia of left lower lobe of lung (H) J18.1   2. Severe sepsis (H) A41.9    R65.20        Disposition:   Admission.    CMS Diagnoses:   The patient has signs of Severe Sepsis  as evidenced by:    1. 2 SIRS criteria, AND  2. Suspected infection, AND   3. Organ dysfunction: Lactic Acid > 2.0 and New need for positive pressure ventilation    Time severe sepsis diagnosis confirmed 1517 11/13/19  as this was the time when Acute Resp Failure requiring BiPAP or Mechanical Vent    3 Hour Severe Sepsis Bundle Completion:  1. Initial Lactic Acid Result:   Recent Labs   Lab Test 11/13/19  1930 11/13/19  1514 07/07/19  0612   LACT 1.5 3.0* 1.1     2. Blood Cultures before Antibiotics: Yes  3. Broad Spectrum Antibiotics Administered:  yes       Anti-infectives (From admission through now)    Start     Dose/Rate Route Frequency Ordered Stop    11/13/19 1640  cefTRIAXone (ROCEPHIN) 2 g vial to attach to  ml bag for ADULTS or NS 50 ml bag for PEDS      2 g  over 30 Minutes Intravenous ONCE 11/13/19 1639 11/13/19 1735    11/13/19 1640  azithromycin  (ZITHROMAX) 500 mg in sodium chloride 0.9 % 250 mL intermittent infusion      500 mg  over 1 Hours Intravenous ONCE 11/13/19 1639 11/13/19 6804          4. Volume of IV Fluid administered in ED: 2000ml       Severe Sepsis reassessment:  1. Repeat Lactic Acid Level: 1.5  2. MAP>65 after initial IVF bolus, will continue to monitor fluid status and vital signs       Scribe Disclosure:  I, Nilesh Dias, am serving as a scribe at 3:08 PM on 11/13/2019 to document services personally performed by Ruben Villatoro MD, based on my observations and the provider's statements to me.  11/13/2019   [facility] EMERGENCY DEPARTMENT       Ruben Villatoro MD  11/13/19 5493

## 2019-11-13 NOTE — ED TRIAGE NOTES
Patient presents with complaints of increasing shortness of breath which started yesterday. Per triage nurse patient oxygen saturations where in the mid 70's in triage. Patient denies any chest pain. . ABC intact without need for intervention at this time.

## 2019-11-14 ENCOUNTER — APPOINTMENT (OUTPATIENT)
Dept: SPEECH THERAPY | Facility: CLINIC | Age: 64
DRG: 871 | End: 2019-11-14
Attending: INTERNAL MEDICINE
Payer: COMMERCIAL

## 2019-11-14 ENCOUNTER — APPOINTMENT (OUTPATIENT)
Dept: GENERAL RADIOLOGY | Facility: CLINIC | Age: 64
DRG: 871 | End: 2019-11-14
Attending: INTERNAL MEDICINE
Payer: COMMERCIAL

## 2019-11-14 ENCOUNTER — APPOINTMENT (OUTPATIENT)
Dept: SPEECH THERAPY | Facility: CLINIC | Age: 64
DRG: 871 | End: 2019-11-14
Payer: COMMERCIAL

## 2019-11-14 LAB
ALBUMIN UR-MCNC: 20 MG/DL
ANION GAP SERPL CALCULATED.3IONS-SCNC: 6 MMOL/L (ref 3–14)
APPEARANCE UR: CLEAR
BASE EXCESS BLDA CALC-SCNC: 2.8 MMOL/L
BILIRUB UR QL STRIP: NEGATIVE
BUN SERPL-MCNC: 13 MG/DL (ref 7–30)
CALCIUM SERPL-MCNC: 7.6 MG/DL (ref 8.5–10.1)
CHLORIDE SERPL-SCNC: 107 MMOL/L (ref 94–109)
CO2 SERPL-SCNC: 28 MMOL/L (ref 20–32)
COLOR UR AUTO: YELLOW
CREAT SERPL-MCNC: 0.73 MG/DL (ref 0.52–1.04)
ERYTHROCYTE [DISTWIDTH] IN BLOOD BY AUTOMATED COUNT: 16.5 % (ref 10–15)
GFR SERPL CREATININE-BSD FRML MDRD: 87 ML/MIN/{1.73_M2}
GLUCOSE BLDC GLUCOMTR-MCNC: 105 MG/DL (ref 70–99)
GLUCOSE BLDC GLUCOMTR-MCNC: 112 MG/DL (ref 70–99)
GLUCOSE BLDC GLUCOMTR-MCNC: 115 MG/DL (ref 70–99)
GLUCOSE BLDC GLUCOMTR-MCNC: 115 MG/DL (ref 70–99)
GLUCOSE BLDC GLUCOMTR-MCNC: 151 MG/DL (ref 70–99)
GLUCOSE BLDC GLUCOMTR-MCNC: 99 MG/DL (ref 70–99)
GLUCOSE SERPL-MCNC: 122 MG/DL (ref 70–99)
GLUCOSE UR STRIP-MCNC: NEGATIVE MG/DL
HCO3 BLD-SCNC: 28 MMOL/L (ref 21–28)
HCT VFR BLD AUTO: 31.8 % (ref 35–47)
HGB BLD-MCNC: 9.4 G/DL (ref 11.7–15.7)
HGB UR QL STRIP: NEGATIVE
INTERPRETATION ECG - MUSE: NORMAL
KETONES UR STRIP-MCNC: NEGATIVE MG/DL
LEUKOCYTE ESTERASE UR QL STRIP: ABNORMAL
MAGNESIUM SERPL-MCNC: 1.4 MG/DL (ref 1.6–2.3)
MCH RBC QN AUTO: 22 PG (ref 26.5–33)
MCHC RBC AUTO-ENTMCNC: 29.6 G/DL (ref 31.5–36.5)
MCV RBC AUTO: 75 FL (ref 78–100)
MRSA DNA SPEC QL NAA+PROBE: NEGATIVE
MUCOUS THREADS #/AREA URNS LPF: PRESENT /LPF
NITRATE UR QL: NEGATIVE
O2/TOTAL GAS SETTING VFR VENT: 6 %
OXYHGB MFR BLD: 93 % (ref 92–100)
PCO2 BLD: 47 MM HG (ref 35–45)
PH BLD: 7.39 PH (ref 7.35–7.45)
PH UR STRIP: 6 PH (ref 5–7)
PLATELET # BLD AUTO: 205 10E9/L (ref 150–450)
PO2 BLD: 72 MM HG (ref 80–105)
POTASSIUM SERPL-SCNC: 3.1 MMOL/L (ref 3.4–5.3)
POTASSIUM SERPL-SCNC: 3.3 MMOL/L (ref 3.4–5.3)
POTASSIUM SERPL-SCNC: 3.6 MMOL/L (ref 3.4–5.3)
RBC # BLD AUTO: 4.27 10E12/L (ref 3.8–5.2)
RBC #/AREA URNS AUTO: 1 /HPF (ref 0–2)
SODIUM SERPL-SCNC: 141 MMOL/L (ref 133–144)
SOURCE: ABNORMAL
SP GR UR STRIP: 1.02 (ref 1–1.03)
SPECIMEN SOURCE: NORMAL
SQUAMOUS #/AREA URNS AUTO: 1 /HPF (ref 0–1)
UROBILINOGEN UR STRIP-MCNC: NORMAL MG/DL (ref 0–2)
WBC # BLD AUTO: 19.5 10E9/L (ref 4–11)
WBC #/AREA URNS AUTO: 72 /HPF (ref 0–5)

## 2019-11-14 PROCEDURE — 84132 ASSAY OF SERUM POTASSIUM: CPT | Performed by: INTERNAL MEDICINE

## 2019-11-14 PROCEDURE — 80048 BASIC METABOLIC PNL TOTAL CA: CPT | Performed by: INTERNAL MEDICINE

## 2019-11-14 PROCEDURE — 99233 SBSQ HOSP IP/OBS HIGH 50: CPT | Performed by: INTERNAL MEDICINE

## 2019-11-14 PROCEDURE — 25000128 H RX IP 250 OP 636: Performed by: INTERNAL MEDICINE

## 2019-11-14 PROCEDURE — 74230 X-RAY XM SWLNG FUNCJ C+: CPT

## 2019-11-14 PROCEDURE — 25000132 ZZH RX MED GY IP 250 OP 250 PS 637: Performed by: INTERNAL MEDICINE

## 2019-11-14 PROCEDURE — 36415 COLL VENOUS BLD VENIPUNCTURE: CPT | Performed by: INTERNAL MEDICINE

## 2019-11-14 PROCEDURE — 92526 ORAL FUNCTION THERAPY: CPT | Mod: GN | Performed by: SPEECH-LANGUAGE PATHOLOGIST

## 2019-11-14 PROCEDURE — 25000125 ZZHC RX 250: Performed by: INTERNAL MEDICINE

## 2019-11-14 PROCEDURE — 85027 COMPLETE CBC AUTOMATED: CPT | Performed by: INTERNAL MEDICINE

## 2019-11-14 PROCEDURE — 82805 BLOOD GASES W/O2 SATURATION: CPT | Performed by: INTERNAL MEDICINE

## 2019-11-14 PROCEDURE — 92611 MOTION FLUOROSCOPY/SWALLOW: CPT | Mod: GN | Performed by: SPEECH-LANGUAGE PATHOLOGIST

## 2019-11-14 PROCEDURE — 25800030 ZZH RX IP 258 OP 636: Performed by: INTERNAL MEDICINE

## 2019-11-14 PROCEDURE — 92610 EVALUATE SWALLOWING FUNCTION: CPT | Mod: GN

## 2019-11-14 PROCEDURE — 83735 ASSAY OF MAGNESIUM: CPT | Performed by: INTERNAL MEDICINE

## 2019-11-14 PROCEDURE — 00000146 ZZHCL STATISTIC GLUCOSE BY METER IP

## 2019-11-14 PROCEDURE — 40000275 ZZH STATISTIC RCP TIME EA 10 MIN

## 2019-11-14 PROCEDURE — 25000131 ZZH RX MED GY IP 250 OP 636 PS 637: Performed by: INTERNAL MEDICINE

## 2019-11-14 PROCEDURE — 87086 URINE CULTURE/COLONY COUNT: CPT | Performed by: INTERNAL MEDICINE

## 2019-11-14 PROCEDURE — 12000000 ZZH R&B MED SURG/OB

## 2019-11-14 PROCEDURE — 94640 AIRWAY INHALATION TREATMENT: CPT | Mod: 76

## 2019-11-14 PROCEDURE — 94660 CPAP INITIATION&MGMT: CPT

## 2019-11-14 PROCEDURE — 94640 AIRWAY INHALATION TREATMENT: CPT

## 2019-11-14 PROCEDURE — 81001 URINALYSIS AUTO W/SCOPE: CPT | Performed by: INTERNAL MEDICINE

## 2019-11-14 RX ORDER — ALBUTEROL SULFATE 0.83 MG/ML
3 SOLUTION RESPIRATORY (INHALATION) EVERY 4 HOURS PRN
Status: DISCONTINUED | OUTPATIENT
Start: 2019-11-14 | End: 2019-11-18 | Stop reason: HOSPADM

## 2019-11-14 RX ORDER — ZOLPIDEM TARTRATE 5 MG/1
5-10 TABLET ORAL
Status: CANCELLED | OUTPATIENT
Start: 2019-11-14

## 2019-11-14 RX ORDER — BARIUM SULFATE 400 MG/ML
SUSPENSION ORAL ONCE
Status: DISCONTINUED | OUTPATIENT
Start: 2019-11-14 | End: 2019-11-14 | Stop reason: CLARIF

## 2019-11-14 RX ORDER — AZITHROMYCIN 250 MG/1
250 TABLET, FILM COATED ORAL DAILY
Status: COMPLETED | OUTPATIENT
Start: 2019-11-14 | End: 2019-11-17

## 2019-11-14 RX ADMIN — CEFTRIAXONE 2 G: 2 INJECTION, POWDER, FOR SOLUTION INTRAMUSCULAR; INTRAVENOUS at 15:29

## 2019-11-14 RX ADMIN — ASPIRIN 81 MG: 81 TABLET, COATED ORAL at 20:08

## 2019-11-14 RX ADMIN — POTASSIUM CHLORIDE 20 MEQ: 1500 TABLET, EXTENDED RELEASE ORAL at 07:20

## 2019-11-14 RX ADMIN — POTASSIUM CHLORIDE 20 MEQ: 1500 TABLET, EXTENDED RELEASE ORAL at 18:37

## 2019-11-14 RX ADMIN — LOSARTAN POTASSIUM 100 MG: 100 TABLET ORAL at 08:44

## 2019-11-14 RX ADMIN — POTASSIUM CHLORIDE 20 MEQ: 1500 TABLET, EXTENDED RELEASE ORAL at 06:48

## 2019-11-14 RX ADMIN — GABAPENTIN 600 MG: 300 CAPSULE ORAL at 08:44

## 2019-11-14 RX ADMIN — ENOXAPARIN SODIUM 40 MG: 40 INJECTION SUBCUTANEOUS at 21:11

## 2019-11-14 RX ADMIN — POTASSIUM CHLORIDE 40 MEQ: 1500 TABLET, EXTENDED RELEASE ORAL at 15:36

## 2019-11-14 RX ADMIN — INSULIN ASPART 1 UNITS: 100 INJECTION, SOLUTION INTRAVENOUS; SUBCUTANEOUS at 12:41

## 2019-11-14 RX ADMIN — SERTRALINE HYDROCHLORIDE 200 MG: 100 TABLET ORAL at 08:44

## 2019-11-14 RX ADMIN — POTASSIUM CHLORIDE 20 MEQ: 1500 TABLET, EXTENDED RELEASE ORAL at 08:43

## 2019-11-14 RX ADMIN — IPRATROPIUM BROMIDE AND ALBUTEROL SULFATE 3 ML: .5; 3 SOLUTION RESPIRATORY (INHALATION) at 11:56

## 2019-11-14 RX ADMIN — SIMVASTATIN 20 MG: 20 TABLET, FILM COATED ORAL at 21:11

## 2019-11-14 RX ADMIN — ACETAMINOPHEN 650 MG: 325 TABLET, FILM COATED ORAL at 05:20

## 2019-11-14 RX ADMIN — SODIUM CHLORIDE, POTASSIUM CHLORIDE, SODIUM LACTATE AND CALCIUM CHLORIDE: 600; 310; 30; 20 INJECTION, SOLUTION INTRAVENOUS at 08:44

## 2019-11-14 RX ADMIN — IPRATROPIUM BROMIDE AND ALBUTEROL SULFATE 3 ML: .5; 3 SOLUTION RESPIRATORY (INHALATION) at 15:31

## 2019-11-14 RX ADMIN — IPRATROPIUM BROMIDE AND ALBUTEROL SULFATE 3 ML: .5; 3 SOLUTION RESPIRATORY (INHALATION) at 19:23

## 2019-11-14 RX ADMIN — CLONAZEPAM 0.5 MG: 0.5 TABLET ORAL at 08:44

## 2019-11-14 RX ADMIN — AZITHROMYCIN MONOHYDRATE 250 MG: 250 TABLET ORAL at 15:36

## 2019-11-14 RX ADMIN — PANTOPRAZOLE SODIUM 40 MG: 40 TABLET, DELAYED RELEASE ORAL at 21:11

## 2019-11-14 RX ADMIN — IPRATROPIUM BROMIDE AND ALBUTEROL SULFATE 3 ML: .5; 3 SOLUTION RESPIRATORY (INHALATION) at 08:21

## 2019-11-14 RX ADMIN — CLONAZEPAM 0.5 MG: 0.5 TABLET ORAL at 21:11

## 2019-11-14 ASSESSMENT — MIFFLIN-ST. JEOR: SCORE: 1585.75

## 2019-11-14 ASSESSMENT — ACTIVITIES OF DAILY LIVING (ADL)
ADLS_ACUITY_SCORE: 14
ADLS_ACUITY_SCORE: 14
ADLS_ACUITY_SCORE: 15
ADLS_ACUITY_SCORE: 14
ADLS_ACUITY_SCORE: 15
ADLS_ACUITY_SCORE: 14

## 2019-11-14 NOTE — PROGRESS NOTES
Respiratory Therapy Note        An ABG was completed on the pt's right radial artery @ 15:50 on a 6 Lpm NC.  Pressure was held until bleeding stopped with no complications noted during the procedure.      November 14, 2019 4:00 PM  Teddy Chowdhury RT

## 2019-11-14 NOTE — PROGRESS NOTES
[facility]  Hospitalist Progress Note    Admit Date:  11/13/2019  Date of Service (when I saw the patient): 11/14/2019   Provider:  Gina Devine DO    History of hospital stay:    Ms. Roberts is a 64-year-old female with history of type 2 diabetes, stage III chronic kidney disease, restless leg syndrome, hypercholesterolemia, obstructive sleep apnea, depression, iron deficiency anemia, obesity, hypertension, anxiety disorder and recurrent pneumonias who presents to the ED with SOB and fevers    ,Assessment & Plan       Problem list:     1.  Sepsis due to left lower lung pneumonia.  She had fever of 102.1 degrees, lactic acidosis of 3, tachycardia (122), and leukocytosis with white blood cell count of 17.8.    Received 2L IVF in the ED   No hypotension  tachycardia and fevers have resolved    Evidence of LLL infiltrate on CXR  She will continue on IV rocephin and azithromycin - clinically is responding to this treatment    2.  Acute hypoxic respiratory failure due to left lower lung pneumonia.    Currently off BiPAP support.    Trial transitioning to NC and wean as able  Has a h/o chronic respiratory failure - on nocturnal oxygen and CPAP     3.  History of frequent pneumonias.    Speech therapy consult appreciated - ok to advance to DD3 diet with aspiration precautions  Plan to perform outpt video swallow later today to eval swallow dysfunction   She states that she has seen pulmonary medicine in the past - was scheduled to have outpt swallow eval and then possible immunology eval  Infiltrates on CXR reviewed and noted on 4/11/19, 7/6/19     5.  Type 2 diabetes.   Hold prior to admission metformin.   Continue medium resistance sliding scale insulin for now.       6.  Chronic kidney disease, stage III.  hypokalemia   will continue to supplement potassium, as able.   will check magnesium level, as well    7.  Stable medical conditions include hypercholesterolemia, hypertension, anxiety disorder,  "and depression     8.  Weakness and deconditioning  May need PT/OT for safe discharge planning    Diet: NPO for Medical/Clinical Reasons Except for: Meds, Ice Chips    DVT Prophylaxis: Enoxaparin (Lovenox) SQ  Suarez Catheter: not present  Code Status: Full Code      Disposition Plan   Expected discharge: 1-2 days, recommended to prior living arrangement once O2 use less than 2 liters/minute.  Entered: Gina Devine,  11/14/2019, 6:15 AM       The patient's care was discussed with the Bedside Nurse and Patient.    Interval History    \"I am feeling better\".  Feels very tired. No CP or SOB.  Minimal cough.  No F/C today.  Feels \"hungry\".  No HA.  Just about to get up out of bed with nursing. No N/V or abd pain.    -Data reviewed today: I reviewed all new labs and imaging results over the last 24 hours. I personally reviewed no images or EKG's today.    Physical Exam   Temp: 98.7  F (37.1  C) Temp src: Oral BP: 128/69 Pulse: 76 Heart Rate: 83 Resp: 28 SpO2: 99 % O2 Device: Oxymask Oxygen Delivery: 10 LPM  Vitals:    11/13/19 1459 11/13/19 2100 11/14/19 0500   Weight: 116.1 kg (256 lb) 110.8 kg (244 lb 4.8 oz) 114.6 kg (252 lb 10.4 oz)     Vital Signs with Ranges  Temp:  [98.7  F (37.1  C)-100.1  F (37.8  C)] 98.7  F (37.1  C)  Pulse:  [] 76  Heart Rate:  [] 83  Resp:  [11-38] 28  BP: (103-149)/(63-85) 128/69  FiO2 (%):  [30 %-50 %] 30 %  SpO2:  [75 %-100 %] 99 %  No intake/output data recorded.    GEN:  Appears ill and tired but alert, oriented x 3  HEENT:  Normocephalic/atraumatic, no scleral icterus, no nasal discharge, mouth and membranes moist, no oral ulcers or thrush noted.  NECK:  No clear thyromegaly of clear JVD  CV:  Regular rate and rhythm, no loud murmur to ausc.  S1 + S2 noted, no S3 or S4.  LUNGS: inspiratory crackles at the left lung base to ausc - otherwise clear to auscultation ant/lat/post bilaterally.  No clear rhonchi/wheezing auscultated bilaterally.  No costal retractions " bilaterally.  Symmetric chest rise on inhalation noted.  ABD:  Active bowel sounds, soft, non-tender/no real distension.  No rebound/guarding/rigidity.  No masses palpated.  No obvious HSM to exam.  EXT:  No edema or cyanosis bilaterally. No joint synovitis noted.  No calf-tenderness or asymmetry noted.  SKIN:  Dry to touch, no rashes or jaundice noted.  PSYCH:  Mood flattened but cooperative, Not tearful or depressed.  Maintains direct eye contact.  NEURO:  No tremors at rest, speech clear and appropriate.  CN 2-12 intact to gross testing bilaterally. Memory appears intact.    Data   Labs:  Recent Labs   Lab 11/14/19  0500 11/13/19  1517 11/13/19  1510   CULT PENDING No growth after 11 hours No growth after 11 hours     No results for input(s): PH, PHARTERIAL, PO2, QI1RANTGMMC, SAT, PCO2, HCO3, BASEEXCESS, LISSET, BEB in the last 168 hours.    Invalid input(s): [id number]  Recent Labs   Lab 11/14/19  1246 11/14/19  0541 11/13/19  2238 11/13/19  1510   NA  --  141 140 139   POTASSIUM 3.3* 3.1* 3.4 3.0*   CHLORIDE  --  107 108 104   CO2  --  28 26 28   ANIONGAP  --  6 6 7   GLC  --  122* 120* 161*   BUN  --  13 14 13   CR  --  0.73 0.77 0.74   GFRESTIMATED  --  87 82 85   GFRESTBLACK  --  >90 >90 >90   GRECIA  --  7.6* 7.6* 8.5     Recent Labs   Lab 11/14/19  0541 11/13/19  2238 11/13/19  1510   WBC 19.5*  --  17.8*   HGB 9.4*  --  11.5*   HCT 31.8*  --  39.9   MCV 75*  --  74*    212 235      Recent Imaging:   Recent Results (from the past 24 hour(s))   XR Chest Port 1 View    Narrative    CHEST ONE VIEW PORTABLE  11/13/2019 4:03 PM     HISTORY: Hypoxia and tachycardia.    COMPARISON: Chest x-ray on 9/6/2019.      Impression    IMPRESSION: Single portable semiupright view of the chest was  obtained. Stable enlargement of the cardiac silhouette and mild  pulmonary vascular congestion. New predominantly left lower lobe  patchy airspace opacities, concerning for infection. No significant  pleural effusion or  pneumothorax.    ADELITA PAULSON MD       Medications     lactated ringers 100 mL/hr at 11/14/19 0400       aspirin  81 mg Oral QPM     azithromycin  250 mg Intravenous Q24H     cefTRIAXone  2 g Intravenous Q24H     clonazePAM  0.5 mg Oral BID     enoxaparin ANTICOAGULANT  40 mg Subcutaneous Q24H     gabapentin  600 mg Oral Daily     influenza vaccine adult (product based on age)  0.5 mL Intramuscular Prior to discharge     insulin aspart  1-6 Units Subcutaneous Q4H     ipratropium - albuterol 0.5 mg/2.5 mg/3 mL  3 mL Nebulization 4x Daily     losartan  100 mg Oral Daily     pantoprazole  40 mg Oral At Bedtime     sertraline  200 mg Oral Daily     simvastatin  20 mg Oral At Bedtime     zolpidem  10 mg Oral At Bedtime

## 2019-11-14 NOTE — PROGRESS NOTES
Clinical Swallow Evaluation:      11/14/19 1247   General Information   Onset Date 11/13/19   Start of Care Date 11/14/19   Referring Physician Bg Rao MD   Patient Profile Review/OT: Additional Occupational Profile Info See Profile for full history and prior level of function   Patient/Family Goals Statement to eat/drink   Swallowing Evaluation Bedside swallow evaluation   Behaviorial Observations WFL (within functional limits)   Mode of current nutrition NPO   Respiratory Status O2 Supply   Type of O2 supply Nasal cannula  (10 L oxymask tx to 8 L NC by RN for session)   Comments Pt admitted with PNA, pt frequently admitted for recurrent PNA's and MD concerns for aspiration. Pt denies any dysphagia symptoms besides her significant reflux (upper GI XR in 2016 revealed large paraesophageal hernia, large volume of reflux and esophageal dysmotility). She was seen 7/2019 by SLP for CSE where a functional swallow noted, regular/thin and d/c. Pt is scheduled for an OP video swallow study on 12/3/19 to assess for oropharyngeal dysphagia/risk for silent aspiration.   Clinical Swallow Evaluation   Oral Musculature generally intact   Structural Abnormalities none present   Dentition lower dentures  (upper dentures not present)   Mucosal Quality good   Mandibular Strength and Mobility intact   Oral Labial Strength and Mobility WFL   Lingual Strength and Mobility WFL   Velar Elevation intact   Buccal Strength and Mobility intact   Laryngeal Function Cough;Swallow;Voicing initiated;Dry swallow palpated;Throat clear   Additional Documentation Yes   Clinical Swallow Eval: Thin Liquid Texture Trial   Mode of Presentation, Thin Liquids cup;straw;self-fed   Volume of Liquid or Food Presented 6 oz   Oral Phase of Swallow WFL   Pharyngeal Phase of Swallow intact   Diagnostic Statement no s/s noted   Clinical Swallow Eval: Puree Solid Texture Trial   Mode of Presentation, Puree spoon;self-fed   Volume of Puree Presented 4 oz    Oral Phase, Puree WFL   Pharyngeal Phase, Puree intact   Diagnostic Statement no s/s noted   Clinical Swallow Eval: Solid Food Texture Trial   Mode of Presentation, Solid self-fed   Volume of Solid Food Presented bites of melissa crackers   Oral Phase, Solid   (difficulty masticaiton)   Pharyngeal Phase, Solid intact   Diagnostic Statement Mastication with dry general item a little hard per pt and prolonged (d/t upper dentures not present) - she is agreeable to mildly softer solids   Esophageal Phase of Swallow   Patient reports or presents with symptoms of esophageal dysphagia Yes   Esophageal comments (upper GI XR in 2016 revealed large paraesophageal hernia, large volume of reflux and esophageal dysmotility   General Therapy Interventions   Planned Therapy Interventions Dysphagia Treatment   Intervention Comments video   Swallow Eval: Clinical Impressions   Skilled Criteria for Therapy Intervention Skilled criteria met.  Treatment indicated.   Functional Assessment Scale (FAS) 5   Treatment Diagnosis dysphagia   Diet texture recommendations Dysphagia diet level 3;Thin liquids   Recommended Feeding/Eating Techniques maintain upright posture during/after eating for 30 mins  (reflux precautions)   Demonstrates Need for Referral to Another Service   (GI?)   Therapy Frequency 3x/week   Predicted Duration of Therapy Intervention (days/wks) 1 week   Anticipated Discharge Disposition home   Risks and Benefits of Treatment have been explained. Yes   Patient, family and/or staff in agreement with Plan of Care Yes   Clinical Impression Comments Clinical swallow evaluation completed. Oromotor WNL, pt does not have upper dentures present this admission. Transferred from 10 L oxymask to 8 L NC by RN prior to session - 02 sats remained in high 90's. Evaluation completed with thin liquids, puree solids, general solids. She currently presents with minimal oropharyngeal dysphagia at bedside. Mastication with dry general item a  little hard per pt and prolonged (d/t upper dentures not present) - she is agreeable to mildly softer solids. Swallow appears relatively timely. X1 delayed throat clear across all - unable to determine if related to laryngeal aspiration, reflux or habitual. Also unable to r/o silent aspiration at bedside. Will plan to pursue the video swallow study this admission (rather than in 3 weeks as OP) to objectively assess oropharyngeal swallow function and if silent aspiration is contributing to recurrent PNA's- pt and MD in agreement. Will complete tomorrow.   Total Evaluation Time   Total Evaluation Time (Minutes) 42

## 2019-11-14 NOTE — H&P
[facility]  History and Physical   Hospitalist Service    Bg Rao MD    Nicole Reyes MRN# [id number]   YOB: 1955 Age: 64 year old      Date of Admission:  2019           Assessment and Plan:   Shayy Roberts is a 64-year-old female with history of type 2 diabetes, stage III chronic kidney disease, restless leg syndrome, hypercholesterolemia, obstructive sleep apnea, depression, iron deficiency anemia, obesity, hypertension, anxiety disorder, abdominal hernia with surgical repair, and previous .  She also has had multiple pneumonias.  She seems to be hospitalized approximately every 3 months with pneumonia and often associated sepsis.  She was scheduled to have an outpatient swallow evaluation for dysphagia in the next week or so as part of an evaluation for frequent pneumonias.  She came to the emergency department today for evaluation of feeling poorly for one day.  She had had some cough.  She felt a little bit weak yesterday.  Today, she developed fever and chills.  Her daughter measured her temperature to be 102.1 degrees.  Her heart rate was 122.  Her daughter checked her oxygen saturations and they were 77%.  She came to the emergency department for evaluation.  Her work-up here showed temperature of 99.8 degrees, heart rate of 122, and oxygen saturations of 75% on room air.  She was placed on supplemental oxygen and then ultimately BiPAP with improvement of dyspnea and hypoxia.  CBC showed white blood cell count of 17.8.  Basic metabolic panel showed potassium of 3.  Liver function tests were normal.  Lactic acid was elevated at 3 but came down to 1.5 after 2 L of IV fluid.  Influenza testing was negative.  Chest x-ray was obtained and showed left lower lung infiltrate.  Shayy was given Rocephin and Zithromax for pneumonia.  I was asked to admit her to the intensive care unit with sepsis and acute hypoxic respiratory failure due to left lower  lung community-acquired pneumonia.    Problem list:    1.  Sepsis due to left lower lung pneumonia.  She had fever of 102.1 degrees, lactic acidosis of 3, tachycardia (122), and leukocytosis with white blood cell count of 17.8.  She is hemodynamically stable.  Treat pneumonia with Rocephin and Zithromax.  She received 2 L of IV fluids in the emergency department.  Continue hydration with lactated Ringer's.    2.  Acute hypoxic respiratory failure due to left lower lung pneumonia.  Continue BiPAP support.  Keep n.p.o. for now.    3.  Left lower lung pneumonia, community-acquired.  Continue Rocephin and Zithromax.    4.  History of frequent pneumonias.  She does not really endorse symptoms of dysphasia but she was supposed to have an outpatient evaluation for dysphasia.  We will keep n.p.o. for now.  I will ask speech therapy to see for swallow evaluation here in the hospital.    5.  Type 2 diabetes.  Hold prior to admission metformin.  I will order medium resistance sliding scale insulin for now.  Once diet started her metformin can be restarted.    6.  Chronic kidney disease, stage III.  Stable.    7.  Obstructive sleep apnea.  She will be using BiPAP for now.    8.  Stable medical conditions include hypercholesterolemia, hypertension, anxiety disorder, and depression.    Full code  Lovenox for DVT prophylaxis  Disposition: Admit as inpatient to the intensive care unit           Code Status:   Full Code         Primary Care Physician:   Pari Wiley [phone]         Chief Complaint:   Fever, chills, cough, and hypoxia    History is obtained from Shayy, emergency department provider, and the medical record         History of Present Illness:   Shayy Roberts is a 64-year-old female with history of type 2 diabetes, stage III chronic kidney disease, restless leg syndrome, hypercholesterolemia, obstructive sleep apnea, depression, iron deficiency anemia, obesity, hypertension, anxiety disorder, abdominal hernia  with surgical repair, and previous .  She also has had multiple pneumonias.  She seems to be hospitalized approximately every 3 months with pneumonia and often associated sepsis.  She was scheduled to have an outpatient swallow evaluation for dysphagia in the next week or so as part of an evaluation for frequent pneumonias.  She came to the emergency department today for evaluation of feeling poorly for one day.  She had had some cough.  She felt a little bit weak yesterday.  Today, she developed fever and chills.  Her daughter measured her temperature to be 102.1 degrees.  Her heart rate was 122.  Her daughter checked her oxygen saturations and they were 77%.  She came to the emergency department for evaluation.  Her work-up here showed temperature of 99.8 degrees, heart rate of 122, and oxygen saturations of 75% on room air.  She was placed on supplemental oxygen and then ultimately BiPAP with improvement of dyspnea and hypoxia.  CBC showed white blood cell count of 17.8.  Basic metabolic panel showed potassium of 3.  Liver function tests were normal.  Lactic acid was elevated at 3 but came down to 1.5 after 2 L of IV fluid.  Influenza testing was negative.  Chest x-ray was obtained and showed left lower lung infiltrate.  Shayy was given Rocephin and Zithromax for pneumonia.  I was asked to admit her to the intensive care unit with sepsis and acute hypoxic respiratory failure due to left lower lung community-acquired pneumonia.           Past Medical History:     Patient Active Problem List   Diagnosis     Essential hypertension, benign     Restless leg syndrome     CRISTÓBAL (obstructive sleep apnea)     CKD (chronic kidney disease) stage 3, GFR 30-59 ml/min (H)     Advanced directives, counseling/discussion     GENERALIZED ANXIETY DIS     Major depressive disorder, recurrent episode, moderate (H)     Health Care Home     GERD (gastroesophageal reflux disease)     Hyperlipidemia LDL goal <100     Eczema      Type 2 diabetes mellitus with stage 3 chronic kidney disease, without long-term current use of insulin (H)     Midline low back pain with left-sided sciatica     Morbid obesity (HCC)     Insomnia, unspecified type     Dyspnea, unspecified type     Sepsis (H)     Acute pain of left knee     Controlled substance agreement signed     Pneumonia due to infectious organism     Acute respiratory failure with hypoxia (H)     Acute respiratory failure, unsp w hypoxia or hypercapnia (H)      Past Medical History:   Diagnosis Date     CKD (chronic kidney disease) stage 3, GFR 30-59 ml/min (H)      Essential hypertension, benign      Generalized anxiety disorder      Hernia, abdominal      Hypertension      Insomnia, unspecified      Iron deficiency anemia      Major depression     sees a psychiatrist     Menopause     age 53     Obesity, unspecified      CRISTÓBAL (obstructive sleep apnea)     bipap     Postoperative seroma 10/11    drained several times     Pure hypercholesterolemia      RLS (restless legs syndrome)      Type II or unspecified type diabetes mellitus without mention of complication, not stated as uncontrolled              Past Surgical History:     Past Surgical History:   Procedure Laterality Date     BREAST BIOPSY, RT/LT      2 times; benign     C NONSPECIFIC PROCEDURE      Hernia repair      C NONSPECIFIC PROCEDURE      Lymph node biopsy in neck (benign)       SECTION        SECTION        SECTION       DILATION AND CURETTAGE, HYSTEROSCOPY DIAGNOSTIC, COMBINED  3/22/2013    Procedure: COMBINED DILATION AND CURETTAGE, HYSTEROSCOPY DIAGNOSTIC;  DILATION AND CURETTAGE, HYSTEROSCOPY DIAGNOSTIC   Converted to a general;  Surgeon: Robi Edwards MD;  Location:  OR     ESOPHAGOSCOPY, GASTROSCOPY, DUODENOSCOPY (EGD), COMBINED N/A 2015    Procedure: COMBINED ESOPHAGOSCOPY, GASTROSCOPY, DUODENOSCOPY (EGD);  Surgeon: Obinna Gutierrez MD;  Location:  GI     ESOPHAGOSCOPY,  GASTROSCOPY, DUODENOSCOPY (EGD), COMBINED N/A 12/22/2015    Procedure: COMBINED ENDOSCOPIC ULTRASOUND, ESOPHAGOSCOPY, GASTROSCOPY, DUODENOSCOPY (EGD), FINE NEEDLE ASPIRATE/BIOPSY;  Surgeon: Sabine Olivares MD;  Location: [facility]     HERNIORRHAPHY INCISIONAL (LOCATION)  10/8/2011     incarcerated hernia repair with mesh;     HERNIORRHAPHY INCISIONAL (LOCATION)  10/16/2011     repair incisional hernia with mesh, and removal of current implanted mesh;            Home Medications:     Prior to Admission medications    Medication Sig Last Dose Taking? Auth Provider   aspirin 81 MG EC tablet Take 81 mg by mouth every evening  11/12/2019 at Unknown time Yes Unknown, Entered By History   clonazePAM (KLONOPIN) 1 MG tablet Take 0.5 tablets (0.5 mg) by mouth 2 times daily 11/12/2019 at Unknown time Yes Pair Wiley MD   gabapentin (NEURONTIN) 300 MG capsule Take 2 capsules (600 mg) by mouth daily 11/12/2019 at Unknown time Yes Pari Wiley MD   losartan (COZAAR) 100 MG tablet Take 1 tablet (100 mg) by mouth daily 11/12/2019 at Unknown time Yes Pari Wiley MD   metFORMIN (GLUCOPHAGE-XR) 500 MG 24 hr tablet Take 2 tablets (1,000 mg) by mouth every morning 11/12/2019 at Unknown time Yes Pari Wiley MD   pantoprazole (PROTONIX) 40 MG EC tablet Take 1 tablet (40 mg) by mouth At Bedtime 11/12/2019 at Unknown time Yes Pari Wiley MD   sertraline (ZOLOFT) 100 MG tablet Take 2 tablets (200 mg) by mouth daily 11/12/2019 at Unknown time Yes Pari Wiley MD   simvastatin (ZOCOR) 20 MG tablet Take 1 tablet (20 mg) by mouth At Bedtime 11/12/2019 at Unknown time Yes Pari Wiley MD   zolpidem (AMBIEN) 10 MG tablet Take 1 tablet (10 mg) by mouth At Bedtime 11/12/2019 at Unknown time Yes Pari Wiley MD   albuterol (PROAIR HFA/PROVENTIL HFA/VENTOLIN HFA) 108 (90 Base) MCG/ACT inhaler Inhale 1-2 puffs into the lungs every 6 hours as needed for shortness of breath / dyspnea or wheezing  at prn  Jose L Weaver MD   blood glucose  "(ACCU-CHEK BYRON PLUS) test strip Use to test blood sugar 2 times daily or as directed.   Pari Wiley MD   cyclobenzaprine (FLEXERIL) 10 MG tablet Take 1 tablet (10 mg) by mouth 3 times daily as needed for muscle spasms More than a month at prn  Pari Wiley MD   guaiFENesin-dextromethorphan (ROBITUSSIN DM) 100-10 MG/5ML syrup Take 10 mLs by mouth 3 times daily as needed for cough  at prn  Jose L Weaver MD   ibuprofen (ADVIL,MOTRIN) 600 MG tablet Take 1 tablet (600 mg) by mouth every 6 hours as needed for moderate pain 2019 at prn  Tal Sanchez MD            Allergies:     Allergies   Allergen Reactions     Codeine Rash     Penicillins Rash     Pt has tolerated cephalosporins and penems.   Pt tolerated Zosyn 2019            Social History:     Social History     Tobacco Use     Smoking status: Former Smoker     Last attempt to quit: 3/18/1979     Years since quittin.6     Smokeless tobacco: Never Used     Tobacco comment: quit    Substance Use Topics     Alcohol use: Yes     Comment: Twice a month             Family History:     Family History   Problem Relation Age of Onset     Cardiovascular Mother         CHF     Hypertension Mother      C.A.D. Mother         50s     Lipids Mother      Cancer Father         Hodgkin's, Leukemia     Diabetes Sister      Connective Tissue Disorder Sister         Lupus     Lipids Sister      Hypertension Brother      Hypertension Sister      Hypertension Sister      Cancer Brother         Hodgkin's     C.A.D. Brother 61     Hypertension Sister      C.A.D. Brother      Basal cell carcinoma Daughter 38        top of head              Review of Systems:   The 10 point Review of Systems is negative other than as noted in the HPI.           Physical Exam:   Blood pressure 130/83, pulse 92, temperature 99.8  F (37.7  C), resp. rate 30, height 1.473 m (4' 10\"), weight 116.1 kg (256 lb), last menstrual period 09/15/2007, SpO2 98 %, not currently " breastfeeding.  256 lbs 0 oz      GENERAL: Pleasant and cooperative. No acute distress.  On BiPAP.  EYES: Pupils equal and round. No scleral erythema or icterus.  ENT: External ears are normal without deformity. Posterior oropharynx is without erythem, swelling, or exudate.  NECK: Supple. No masses or swelling. No tenderness. Thyroid is normal without mass or tenderness.  CHEST: Some rhonchi in both lung bases to auscultation. Normal breath sounds. No retractions.   CV: Regular rate and rhythm. No JVD. Pulses normal.  ABDOMEN: Bowel sounds present. No tenderness. No masses or hernia.  EXTREMETIES: No clubbing, cyanosis, or ischemia.  SKIN: Warm and dry to touch. No wounds or rashes.  NEUROLOGIC: Strength and sensation are normal. Deep tendon reflexes are normal. Cranial nerves are normal.           Data:   All new lab and imaging data was reviewed.     Results for orders placed or performed during the hospital encounter of 11/13/19 (from the past 24 hour(s))   EKG 12 lead   Result Value Ref Range    Interpretation ECG Click View Image link to view waveform and result    CBC with platelets differential   Result Value Ref Range    WBC 17.8 (H) 4.0 - 11.0 10e9/L    RBC Count 5.42 (H) 3.8 - 5.2 10e12/L    Hemoglobin 11.5 (L) 11.7 - 15.7 g/dL    Hematocrit 39.9 35.0 - 47.0 %    MCV 74 (L) 78 - 100 fl    MCH 21.2 (L) 26.5 - 33.0 pg    MCHC 28.8 (L) 31.5 - 36.5 g/dL    RDW 16.2 (H) 10.0 - 15.0 %    Platelet Count 235 150 - 450 10e9/L    Diff Method Automated Method     % Neutrophils 91.9 %    % Lymphocytes 4.3 %    % Monocytes 3.0 %    % Eosinophils 0.3 %    % Basophils 0.1 %    % Immature Granulocytes 0.4 %    Nucleated RBCs 0 0 /100    Absolute Neutrophil 16.4 (H) 1.6 - 8.3 10e9/L    Absolute Lymphocytes 0.8 0.8 - 5.3 10e9/L    Absolute Monocytes 0.5 0.0 - 1.3 10e9/L    Absolute Eosinophils 0.1 0.0 - 0.7 10e9/L    Absolute Basophils 0.0 0.0 - 0.2 10e9/L    Abs Immature Granulocytes 0.1 0 - 0.4 10e9/L    Absolute  Nucleated RBC 0.0     Anisocytosis Slight     Microcytes Present     Platelet Estimate       Automated count confirmed.  Platelet morphology is normal.   Comprehensive metabolic panel   Result Value Ref Range    Sodium 139 133 - 144 mmol/L    Potassium 3.0 (L) 3.4 - 5.3 mmol/L    Chloride 104 94 - 109 mmol/L    Carbon Dioxide 28 20 - 32 mmol/L    Anion Gap 7 3 - 14 mmol/L    Glucose 161 (H) 70 - 99 mg/dL    Urea Nitrogen 13 7 - 30 mg/dL    Creatinine 0.74 0.52 - 1.04 mg/dL    GFR Estimate 85 >60 mL/min/[1.73_m2]    GFR Estimate If Black >90 >60 mL/min/[1.73_m2]    Calcium 8.5 8.5 - 10.1 mg/dL    Bilirubin Total 0.4 0.2 - 1.3 mg/dL    Albumin 3.5 3.4 - 5.0 g/dL    Protein Total 7.7 6.8 - 8.8 g/dL    Alkaline Phosphatase 118 40 - 150 U/L    ALT 33 0 - 50 U/L    AST 25 0 - 45 U/L   Troponin I   Result Value Ref Range    Troponin I ES <0.015 0.000 - 0.045 ug/L   ISTAT gases lactate gema POCT   Result Value Ref Range    Ph Venous 7.41 7.32 - 7.43 pH    PCO2 Venous 38 (L) 40 - 50 mm Hg    PO2 Venous 38 25 - 47 mm Hg    Bicarbonate Venous 24 21 - 28 mmol/L    O2 Sat Venous 72 %    Lactic Acid 3.0 (H) 0.7 - 2.1 mmol/L   Influenza A/B antigen   Result Value Ref Range    Influenza A/B Agn Specimen Nasopharyngeal     Influenza A Negative NEG^Negative    Influenza B Negative NEG^Negative   XR Chest Port 1 View    Narrative    CHEST ONE VIEW PORTABLE  11/13/2019 4:03 PM     HISTORY: Hypoxia and tachycardia.    COMPARISON: Chest x-ray on 9/6/2019.      Impression    IMPRESSION: Single portable semiupright view of the chest was  obtained. Stable enlargement of the cardiac silhouette and mild  pulmonary vascular congestion. New predominantly left lower lobe  patchy airspace opacities, concerning for infection. No significant  pleural effusion or pneumothorax.    ADELITA PAULSON MD   ISTAT gases lactate gema POCT   Result Value Ref Range    Ph Venous 7.39 7.32 - 7.43 pH    PCO2 Venous 46 40 - 50 mm Hg    PO2 Venous 53 (H) 25 - 47  mm Hg    Bicarbonate Venous 28 21 - 28 mmol/L    O2 Sat Venous 86 %    Lactic Acid 1.5 0.7 - 2.1 mmol/L

## 2019-11-14 NOTE — ED NOTES
[facility]  ED Nurse Handoff Report    Nicole Reyes is a 64 year old female   ED Chief complaint: Hypoxia  . ED Diagnosis:   Final diagnoses:   Community acquired pneumonia of left lower lobe of lung (H)   Severe sepsis (H)     Allergies:   Allergies   Allergen Reactions     Codeine Rash     Penicillins Rash     Pt has tolerated cephalosporins and penems.   Pt tolerated Zosyn 7/6/2019       Code Status: Full Code  Activity level - Baseline/Home:  Independent. Activity Level - Current:   Assist X 2. Lift room needed: No. Bariatric: No   Needed: No   Isolation: No. Infection: Not Applicable.     Vital Signs:   Vitals:    11/13/19 1745 11/13/19 1800 11/13/19 1815 11/13/19 1816   BP: 139/79 130/72 126/80    Pulse: 94 95 94    Resp: 25 20 11 21   Temp:       SpO2: 100%   99%   Weight:       Height:           Cardiac Rhythm:  ,   Cardiac  Cardiac Rhythm: Sinus tachycardia  Pain level:    Patient confused: No. Patient Falls Risk: Yes.   Elimination Status:  Has not voided  Patient Report - Initial Complaint:    Patient presents with complaints of increasing shortness of breath which started yesterday. Per triage nurse patient oxygen saturations where in the mid 70's in triage. Patient denies any chest pain. . ABC intact without need for intervention at this time.      . Focused Assessment:   15:30 Respiratory Respiratory - Respiratory WDL: -WDL except (hypoxic, tachypneic, tachycardic) Rhythm/Pattern, Respiratory: shortness of breath; tachypneic; labored         15:30 Cardiac Cardiac - Cardiac WDL: -WDL except   Cardiac Monitoring - EKG Monitoring: Yes  Cardiac Regularity: Regular  Cardiac Rhythm: ST        Tests Performed: labs, xray, EKG. Abnormal Results:   Labs Ordered and Resulted from Time of ED Arrival Up to the Time of Departure from the ED   CBC WITH PLATELETS DIFFERENTIAL - Abnormal; Notable for the following components:       Result Value    WBC 17.8 (*)     RBC Count 5.42 (*)      Hemoglobin 11.5 (*)     MCV 74 (*)     MCH 21.2 (*)     MCHC 28.8 (*)     RDW 16.2 (*)     Absolute Neutrophil 16.4 (*)     All other components within normal limits   COMPREHENSIVE METABOLIC PANEL - Abnormal; Notable for the following components:    Potassium 3.0 (*)     Glucose 161 (*)     All other components within normal limits   ISTAT  GASES LACTATE NIMO POCT - Abnormal; Notable for the following components:    PCO2 Venous 38 (*)     Lactic Acid 3.0 (*)     All other components within normal limits   TROPONIN I   ISTAT CG4 GASES LACTATE NIMO NURSING POCT   PERIPHERAL IV CATHETER   PULSE OXIMETRY NURSING   CARDIAC CONTINUOUS MONITORING   INFLUENZA A/B ANTIGEN   BLOOD CULTURE   BLOOD CULTURE     XR Chest Port 1 View   Final Result   IMPRESSION: Single portable semiupright view of the chest was   obtained. Stable enlargement of the cardiac silhouette and mild   pulmonary vascular congestion. New predominantly left lower lobe   patchy airspace opacities, concerning for infection. No significant   pleural effusion or pneumothorax.      ADELITA PAULSON MD        .   Treatments provided: abx, fluids  Family Comments: daughter went home to get things- will come back   OBS brochure/video discussed/provided to patient:  N/A  ED Medications:   Medications   azithromycin (ZITHROMAX) 500 mg in sodium chloride 0.9 % 250 mL intermittent infusion (500 mg Intravenous New Bag 11/13/19 0975)   0.9% sodium chloride BOLUS (2,000 mLs Intravenous New Bag 11/13/19 1548)   cefTRIAXone (ROCEPHIN) 2 g vial to attach to  ml bag for ADULTS or NS 50 ml bag for PEDS (0 g Intravenous Stopped 11/13/19 7616)     Drips infusing:  Yes  For the majority of the shift, the patient's behavior Green. Interventions performed were none.     Severe Sepsis OR Septic Shock Diagnosis Present:   Yes    Per the ED Provider, Time Zero for severe sepsis or septic shock is:  1514    3 Hour Severe Sepsis Bundle Completion:  1. Initial Lactic Acid Result:    Recent Labs   Lab Test 11/13/19  1514 07/07/19  0612 07/06/19  2047   LACT 3.0* 1.1 2.2*     2. Blood Cultures before Antibiotics: Yes  3. Broad Spectrum Antibiotics Administered:     Anti-infectives (From now, onward)    Start     Dose/Rate Route Frequency Ordered Stop    11/13/19 1640  azithromycin (ZITHROMAX) 500 mg in sodium chloride 0.9 % 250 mL intermittent infusion      500 mg  over 1 Hours Intravenous ONCE 11/13/19 1639          4. 2000 ml of IV fluids have been given so far      6 Hour Severe Sepsis Bundle Completion:    1. Repeat Lactic Acid Level: Not drawn  2. Patient currently on Vasopressors =  No      ED Nurse Name/Phone Number: Tameka Ochoa RN,   6:25 PM

## 2019-11-14 NOTE — PROGRESS NOTES
Respiratory Therapy  Patient remains on a BIPAP of 12/6 at  40 % via mask for increased WOB. The bridge of the nose is clean and dry. Pt tolerating well. BS diminished and coarse. Will start Duoneb QID in the morning.     Temp: 99.4  F (37.4  C) Temp src: Axillary BP: 120/68 Pulse: 81 Heart Rate: 78 Resp: 24 SpO2: 99 % O2 Device: BiPAP/CPAP Oxygen Delivery: 9 LPM    Will continue to monitor and assess the pt respiratory status and needs.    Domo Max, RT on 11/14/2019 at 1:49 AM

## 2019-11-14 NOTE — PLAN OF CARE
Discharge Planner SLP   Patient plan for discharge: Did not state  Current status: SLP: Pt presents with a functional oral and pharyngeal phase of swallowing on Video Swallow Study. Thin liquids by cup and straw, puree solids and regular solids trialed. Adequate oral phase, good timing, adequate epiglottic inversion, and good pharyngeal constriction throughout the study. No penetration or aspiration observed. Noted slowing in the upper esophagus. Also anterior web noted with liquid trials, however, this did not obstruct the flow of the bolus. Images reviewed with pt.     Recommend: regular diet and thin liquids with strict use of GERD precautions. Continue follow up with GI. No further SLP services indicated at this time.     Barriers to return to prior living situation: None from SLP  Recommendations for discharge: Home  Rationale for recommendations: No further oral/pharyngeal work up/treatment recommended at this time. Will complete orders.        Entered by: Elsa Calzada 11/14/2019 3:28 PM

## 2019-11-14 NOTE — PLAN OF CARE
Discharge Planner SLP   Patient plan for discharge: home  Current status: Pt admitted with PNA, pt frequently admitted for recurrent PNA's and MD concerns for aspiration. Pt denies any dysphagia symptoms besides her significant reflux (upper GI XR in 2016 revealed large paraesophageal hernia, large volume of reflux and esophageal dysmotility). Pt is scheduled for an OP video swallow study on 12/3/19 to assess for oropharyngeal dysphagia/risk for silent aspiration.     Clinical swallow evaluation completed. Oromotor WNL, pt does not have upper dentures present this admission. Transferred from 10 L oxymask to 8 L NC by RN prior to session - 02 sats remained in high 90's. Evaluation completed with thin liquids, puree solids, general solids. She currently presents with minimal oropharyngeal dysphagia at bedside. Mastication with dry general item a little hard per pt and prolonged (d/t upper dentures not present) - she is agreeable to mildly softer solids. Swallow appears relatively timely. X1 delayed throat clear across all - unable to determine if related to laryngeal aspiration, reflux or habitual. Also unable to r/o silent aspiration at bedside. Will plan to pursue the video swallow study this admission (rather than in 3 weeks as OP) to objectively assess oropharyngeal swallow function and if silent aspiration is contributing to recurrent PNA's- pt and MD in agreement. Will complete tomorrow.     Recommendations:  1) dysphagia diet level 3 (softer/moister as she does not have upper dentures here), thin liquids  2) STRICT reflux precautions - upright for all meals/minimum of 60 minutes post  3) if decline in respiratory status after PO initiation, recommend re-making pt NPO until video swallow study is completed.     Barriers to return to prior living situation: respiratory/medical needs, ? Silent aspiration  Recommendations for discharge: home  Rationale for recommendations: further POC pending video swallow study        Entered by: Gisella Tang 11/14/2019 11:39 AM

## 2019-11-14 NOTE — PLAN OF CARE
ICU End of Shift Summary.  For vital signs and complete assessments, please see documentation flowsheets.     Pertinent assessments: Tmax 99.4, improved on recheck. Other VSS on Bipap 12/6 30-50% FiO2. Able to place on 10 LPM oxymask this AM with good tolerance, will continue to monitor. C/O bilateral shoulder pain, heating pad applied, tylenol provided this AM with rest after administration. Up with SBA-A1 to bathroom, voided with adequate UO this shift, UA collected and sent. PIV infusing IVF at 100 ml/hr. Tolerating PO medications, ice chips. K+ 3.1, replaced PO this AM. Tele SR. Independently positioning self in bed. Alert and oriented, able to make needs known.  Major Shift Events: Wean to oxymask, UA/UC sent  Plan (Upcoming Events): SLP, SW consult  Discharge/Transfer Needs: TBD, pending tolerance of supplemental O2    Bedside Shift Report Completed :  Yes  Bedside Safety Check Completed: Yes

## 2019-11-14 NOTE — PROGRESS NOTES
11/14/19 1529   General Information   Onset Date 11/13/19   Start of Care Date 11/14/19   Referring Physician Bg Rao MD   Patient Profile Review/OT: Additional Occupational Profile Info See Profile for full history and prior level of function   Patient/Family Goals Statement determine aspiration risk   Swallowing Evaluation Videofluoroscopic evaluation   Behaviorial Observations WFL (within functional limits)   Mode of current nutrition Oral diet   Type of oral diet Dysphagia diet level 3;Thin liquid   Respiratory Status O2 Supply   Type of O2 supply Nasal cannula  (6L)   Comments Recurrent pna. Esophageal dysphagia. Refer to bedside swallow evaluation.    VFSS Eval: Thin Liquid Texture Trial   Mode of Presentation, Thin Liquid cup;straw;self-fed   Preparatory Phase WFL   Oral Phase, Thin Liquid WFL   Pharyngeal Phase, Thin Liquid WFL   Rosenbek's Penetration Aspiration Scale: Thin Liquid Trial Results 1 - no aspiration, contrast does not enter airway   Diagnostic Statement WNL; anterior web noted   VFSS Eval: Puree Solid Texture Trial   Mode of Presentation, Puree spoon;self-fed   Preparatory Phase WFL   Oral Phase, Puree WFL   Pharyngeal Phase, Puree WFL   Rosenbek's Penetration Aspiration Scale: Puree Food Trial Results 1 - no aspiration, contrast does not enter airway   Diagnostic Statement WFL   VFSS Eval: Solid Food Texture Trial   Mode of Presentation, Solid self-fed   Preparatory Phase WFL   Oral Phase, Solid WFL   Pharyngeal Phase, Solid WFL   Rosenbek's Penetration Aspiration Scale: Solid Food Trial Results 1 - no aspiration, contrast does not enter airway   Diagnostic Statement WFL   Esophageal Phase of Swallow   Patient reports or presents with symptoms of esophageal dysphagia Yes   General Therapy Interventions   Planned Therapy Interventions Dysphagia Treatment   Dysphagia treatment Instruction of safe swallow strategies   Swallow Eval: Clinical Impressions   Skilled Criteria for Therapy  Intervention Skilled criteria met.  Treatment indicated.   Functional Assessment Scale (FAS) 6   Treatment Diagnosis dysphagia   Diet texture recommendations Regular diet;Thin liquids   Recommended Feeding/Eating Techniques   (GERD precautions)   Demonstrates Need for Referral to Another Service   (GI)   Anticipated Discharge Disposition home   Risks and Benefits of Treatment have been explained. Yes   Patient, family and/or staff in agreement with Plan of Care Yes   Clinical Impression Comments SLP: Pt presents with a functional oral and pharyngeal phase of swallowing on Video Swallow Study. Thin liquids by cup and straw, puree solids and regular solids trialed. Adequate oral phase, good timing, adequate epiglottic inversion, and good pharyngeal constriction throughout the study. No penetration or aspiration observed. Noted slowing in the upper esophagus. Also anterior web noted with liquid trials, however, this did not obstruct the flow of the bolus. Images reviewed with pt. Recommend: regular diet and thin liquids with strict use of GERD precautions. Continue follow up with GI. No further SLP services indicated at this time.    Total Evaluation Time   Total Evaluation Time (Minutes) 20

## 2019-11-14 NOTE — PHARMACY-ADMISSION MEDICATION HISTORY
Admission medication history interview status for this patient is complete. See [facility] admission navigator for allergy information, prior to admission medications and immunization status.     Medication history interview source(s):Patient  Medication history resources (including written lists, pill bottles, clinic record):None  Primary pharmacy:[facility]    Changes made to PTA medication list:  Added: none  Deleted: none  Changed: none    Actions taken by pharmacist (provider contacted, etc):None     Additional medication history information:None    Medication reconciliation/reorder completed by provider prior to medication history?  Yes     Do you take OTC medications (eg tylenol, ibuprofen, fish oil, eye/ear drops, etc)? Yes     For patients on insulin therapy: No      Prior to Admission medications    Medication Sig Last Dose Taking? Auth Provider   aspirin 81 MG EC tablet Take 81 mg by mouth every evening  11/12/2019 at Unknown time Yes Unknown, Entered By History   clonazePAM (KLONOPIN) 1 MG tablet Take 0.5 tablets (0.5 mg) by mouth 2 times daily 11/12/2019 at Unknown time Yes Pari Wiley MD   gabapentin (NEURONTIN) 300 MG capsule Take 2 capsules (600 mg) by mouth daily 11/12/2019 at Unknown time Yes Pari Wiley MD   losartan (COZAAR) 100 MG tablet Take 1 tablet (100 mg) by mouth daily 11/12/2019 at Unknown time Yes Pari Wiley MD   metFORMIN (GLUCOPHAGE-XR) 500 MG 24 hr tablet Take 2 tablets (1,000 mg) by mouth every morning 11/12/2019 at Unknown time Yes Pari Wiley MD   pantoprazole (PROTONIX) 40 MG EC tablet Take 1 tablet (40 mg) by mouth At Bedtime 11/12/2019 at Unknown time Yes Pari Wiley MD   sertraline (ZOLOFT) 100 MG tablet Take 2 tablets (200 mg) by mouth daily 11/12/2019 at Unknown time Yes Pari Wiley MD   simvastatin (ZOCOR) 20 MG tablet Take 1 tablet (20 mg) by mouth At Bedtime 11/12/2019 at Unknown time Yes Pari Wiley MD   zolpidem (AMBIEN) 10 MG tablet Take 1 tablet (10  mg) by mouth At Bedtime 11/12/2019 at Unknown time Yes Pari Wiley MD   albuterol (PROAIR HFA/PROVENTIL HFA/VENTOLIN HFA) 108 (90 Base) MCG/ACT inhaler Inhale 1-2 puffs into the lungs every 6 hours as needed for shortness of breath / dyspnea or wheezing  at prn  Jose L Weaver MD   blood glucose (ACCU-CHEK BYRON PLUS) test strip Use to test blood sugar 2 times daily or as directed.   Pari Wiley MD   cyclobenzaprine (FLEXERIL) 10 MG tablet Take 1 tablet (10 mg) by mouth 3 times daily as needed for muscle spasms More than a month at prn  Pari Wiley MD   guaiFENesin-dextromethorphan (ROBITUSSIN DM) 100-10 MG/5ML syrup Take 10 mLs by mouth 3 times daily as needed for cough  at prn  Jose L Weaver MD   ibuprofen (ADVIL,MOTRIN) 600 MG tablet Take 1 tablet (600 mg) by mouth every 6 hours as needed for moderate pain 11/11/2019 at prn  Tal Sanchez MD

## 2019-11-14 NOTE — PLAN OF CARE
"ICU End of Shift Summary.  For vital signs and complete assessments, please see documentation flowsheets.     Pertinent assessments: A&Ox4, afebrile, VSS, Oxymask in place and titrating O2 as able, lungs diminished with fine crackles in LLL, KHALIL, no cough, brief in place. Regular diet.    Major Shift Events: -BiPAP off since 0500                                    -Bedside and video swallow completed; regular diet ordered             -Pt very lethargic in afternoon. Stated she gets \"this way\" with sepsis and all she wants to do is sleep. ABGs ordered, within baseline    Plan (Upcoming Events): Monitor respiratory status and wean off O2 as able.   Discharge/Transfer Needs: medical tele over flow orders in    Bedside Shift Report Completed : yes  Bedside Safety Check Completed: yes     "

## 2019-11-14 NOTE — CONSULTS
Per ICU care team rounds, patient has concerns regarding insurance moving forward after current insurance is no longer active. CM contacted Financial Counselor's office (*[id number]) to request follow up with patient regarding insurance questions.     Jolly Cai MA/RN Case Manager  Inpatient Care Coordination  [facility]   [phone]

## 2019-11-15 ENCOUNTER — TELEPHONE (OUTPATIENT)
Dept: INTERNAL MEDICINE | Facility: CLINIC | Age: 64
End: 2019-11-15

## 2019-11-15 LAB
ANION GAP SERPL CALCULATED.3IONS-SCNC: 4 MMOL/L (ref 3–14)
BACTERIA SPEC CULT: NO GROWTH
BASOPHILS # BLD AUTO: 0.1 10E9/L (ref 0–0.2)
BASOPHILS NFR BLD AUTO: 0.4 %
BUN SERPL-MCNC: 10 MG/DL (ref 7–30)
CALCIUM SERPL-MCNC: 8.4 MG/DL (ref 8.5–10.1)
CHLORIDE SERPL-SCNC: 107 MMOL/L (ref 94–109)
CO2 SERPL-SCNC: 29 MMOL/L (ref 20–32)
CREAT SERPL-MCNC: 0.72 MG/DL (ref 0.52–1.04)
DIFFERENTIAL METHOD BLD: ABNORMAL
EOSINOPHIL # BLD AUTO: 0.6 10E9/L (ref 0–0.7)
EOSINOPHIL NFR BLD AUTO: 3.9 %
ERYTHROCYTE [DISTWIDTH] IN BLOOD BY AUTOMATED COUNT: 16.4 % (ref 10–15)
GFR SERPL CREATININE-BSD FRML MDRD: 89 ML/MIN/{1.73_M2}
GLUCOSE BLDC GLUCOMTR-MCNC: 102 MG/DL (ref 70–99)
GLUCOSE BLDC GLUCOMTR-MCNC: 106 MG/DL (ref 70–99)
GLUCOSE BLDC GLUCOMTR-MCNC: 122 MG/DL (ref 70–99)
GLUCOSE BLDC GLUCOMTR-MCNC: 125 MG/DL (ref 70–99)
GLUCOSE BLDC GLUCOMTR-MCNC: 127 MG/DL (ref 70–99)
GLUCOSE BLDC GLUCOMTR-MCNC: 172 MG/DL (ref 70–99)
GLUCOSE SERPL-MCNC: 127 MG/DL (ref 70–99)
HCT VFR BLD AUTO: 29.8 % (ref 35–47)
HGB BLD-MCNC: 8.8 G/DL (ref 11.7–15.7)
IMM GRANULOCYTES # BLD: 0.1 10E9/L (ref 0–0.4)
IMM GRANULOCYTES NFR BLD: 0.4 %
LYMPHOCYTES # BLD AUTO: 2.1 10E9/L (ref 0.8–5.3)
LYMPHOCYTES NFR BLD AUTO: 14.8 %
Lab: NORMAL
MAGNESIUM SERPL-MCNC: 2 MG/DL (ref 1.6–2.3)
MCH RBC QN AUTO: 21.9 PG (ref 26.5–33)
MCHC RBC AUTO-ENTMCNC: 29.5 G/DL (ref 31.5–36.5)
MCV RBC AUTO: 74 FL (ref 78–100)
MONOCYTES # BLD AUTO: 0.7 10E9/L (ref 0–1.3)
MONOCYTES NFR BLD AUTO: 5.2 %
NEUTROPHILS # BLD AUTO: 10.7 10E9/L (ref 1.6–8.3)
NEUTROPHILS NFR BLD AUTO: 75.3 %
NRBC # BLD AUTO: 0 10*3/UL
NRBC BLD AUTO-RTO: 0 /100
PLATELET # BLD AUTO: 205 10E9/L (ref 150–450)
POTASSIUM SERPL-SCNC: 3.8 MMOL/L (ref 3.4–5.3)
RBC # BLD AUTO: 4.01 10E12/L (ref 3.8–5.2)
SODIUM SERPL-SCNC: 140 MMOL/L (ref 133–144)
SPECIMEN SOURCE: NORMAL
WBC # BLD AUTO: 14.2 10E9/L (ref 4–11)

## 2019-11-15 PROCEDURE — 25000128 H RX IP 250 OP 636: Performed by: INTERNAL MEDICINE

## 2019-11-15 PROCEDURE — 36415 COLL VENOUS BLD VENIPUNCTURE: CPT | Performed by: INTERNAL MEDICINE

## 2019-11-15 PROCEDURE — 12000000 ZZH R&B MED SURG/OB

## 2019-11-15 PROCEDURE — 94640 AIRWAY INHALATION TREATMENT: CPT

## 2019-11-15 PROCEDURE — 80048 BASIC METABOLIC PNL TOTAL CA: CPT | Performed by: INTERNAL MEDICINE

## 2019-11-15 PROCEDURE — 99232 SBSQ HOSP IP/OBS MODERATE 35: CPT | Performed by: HOSPITALIST

## 2019-11-15 PROCEDURE — 83735 ASSAY OF MAGNESIUM: CPT | Performed by: INTERNAL MEDICINE

## 2019-11-15 PROCEDURE — 25000132 ZZH RX MED GY IP 250 OP 250 PS 637: Performed by: INTERNAL MEDICINE

## 2019-11-15 PROCEDURE — 85025 COMPLETE CBC W/AUTO DIFF WBC: CPT | Performed by: INTERNAL MEDICINE

## 2019-11-15 PROCEDURE — 25000125 ZZHC RX 250: Performed by: INTERNAL MEDICINE

## 2019-11-15 PROCEDURE — 00000146 ZZHCL STATISTIC GLUCOSE BY METER IP

## 2019-11-15 PROCEDURE — 90682 RIV4 VACC RECOMBINANT DNA IM: CPT | Performed by: INTERNAL MEDICINE

## 2019-11-15 PROCEDURE — 40000274 ZZH STATISTIC RCP CONSULT EA 30 MIN

## 2019-11-15 PROCEDURE — 40000275 ZZH STATISTIC RCP TIME EA 10 MIN

## 2019-11-15 PROCEDURE — 94640 AIRWAY INHALATION TREATMENT: CPT | Mod: 76

## 2019-11-15 RX ORDER — ZOLPIDEM TARTRATE 5 MG/1
10 TABLET ORAL
Status: DISCONTINUED | OUTPATIENT
Start: 2019-11-15 | End: 2019-11-18 | Stop reason: HOSPADM

## 2019-11-15 RX ADMIN — SIMVASTATIN 20 MG: 20 TABLET, FILM COATED ORAL at 21:42

## 2019-11-15 RX ADMIN — ACETAMINOPHEN 650 MG: 325 TABLET, FILM COATED ORAL at 21:42

## 2019-11-15 RX ADMIN — POTASSIUM CHLORIDE 20 MEQ: 1500 TABLET, EXTENDED RELEASE ORAL at 05:33

## 2019-11-15 RX ADMIN — INSULIN ASPART 1 UNITS: 100 INJECTION, SOLUTION INTRAVENOUS; SUBCUTANEOUS at 13:30

## 2019-11-15 RX ADMIN — ACETAMINOPHEN 650 MG: 325 TABLET, FILM COATED ORAL at 00:55

## 2019-11-15 RX ADMIN — MAGNESIUM SULFATE HEPTAHYDRATE 4 G: 40 INJECTION, SOLUTION INTRAVENOUS at 01:16

## 2019-11-15 RX ADMIN — ENOXAPARIN SODIUM 40 MG: 40 INJECTION SUBCUTANEOUS at 21:42

## 2019-11-15 RX ADMIN — IPRATROPIUM BROMIDE AND ALBUTEROL SULFATE 3 ML: .5; 3 SOLUTION RESPIRATORY (INHALATION) at 08:19

## 2019-11-15 RX ADMIN — LOSARTAN POTASSIUM 100 MG: 100 TABLET ORAL at 08:06

## 2019-11-15 RX ADMIN — IPRATROPIUM BROMIDE AND ALBUTEROL SULFATE 3 ML: .5; 3 SOLUTION RESPIRATORY (INHALATION) at 19:59

## 2019-11-15 RX ADMIN — INFLUENZA A VIRUS A/BRISBANE/02/2018 (H1N1) RECOMBINANT HEMAGGLUTININ ANTIGEN, INFLUENZA A VIRUS A/KANSAS/14/2017 (H3N2) RECOMBINANT HEMAGGLUTININ ANTIGEN, INFLUENZA B VIRUS B/PHUKET/3073/2013 RECOMBINANT HEMAGGLUTININ ANTIGEN, AND INFLUENZA B VIRUS B/MARYLAND/15/2016 RECOMBINANT HEMAGGLUTININ ANTIGEN 0.5 ML: 45; 45; 45; 45 INJECTION INTRAMUSCULAR at 12:36

## 2019-11-15 RX ADMIN — ACETAMINOPHEN 650 MG: 325 TABLET, FILM COATED ORAL at 16:26

## 2019-11-15 RX ADMIN — GABAPENTIN 600 MG: 300 CAPSULE ORAL at 08:06

## 2019-11-15 RX ADMIN — CEFTRIAXONE 2 G: 2 INJECTION, POWDER, FOR SOLUTION INTRAMUSCULAR; INTRAVENOUS at 16:29

## 2019-11-15 RX ADMIN — IPRATROPIUM BROMIDE AND ALBUTEROL SULFATE 3 ML: .5; 3 SOLUTION RESPIRATORY (INHALATION) at 11:55

## 2019-11-15 RX ADMIN — SERTRALINE HYDROCHLORIDE 200 MG: 100 TABLET ORAL at 08:06

## 2019-11-15 RX ADMIN — AZITHROMYCIN MONOHYDRATE 250 MG: 250 TABLET ORAL at 08:06

## 2019-11-15 RX ADMIN — PANTOPRAZOLE SODIUM 40 MG: 40 TABLET, DELAYED RELEASE ORAL at 21:41

## 2019-11-15 RX ADMIN — ASPIRIN 81 MG: 81 TABLET, COATED ORAL at 21:41

## 2019-11-15 RX ADMIN — POTASSIUM CHLORIDE 20 MEQ: 1500 TABLET, EXTENDED RELEASE ORAL at 01:14

## 2019-11-15 RX ADMIN — ZOLPIDEM TARTRATE 10 MG: 5 TABLET, COATED ORAL at 00:55

## 2019-11-15 RX ADMIN — CLONAZEPAM 0.5 MG: 0.5 TABLET ORAL at 21:42

## 2019-11-15 RX ADMIN — CLONAZEPAM 0.5 MG: 0.5 TABLET ORAL at 08:06

## 2019-11-15 RX ADMIN — IPRATROPIUM BROMIDE AND ALBUTEROL SULFATE 3 ML: .5; 3 SOLUTION RESPIRATORY (INHALATION) at 15:42

## 2019-11-15 ASSESSMENT — ACTIVITIES OF DAILY LIVING (ADL)
ADLS_ACUITY_SCORE: 14

## 2019-11-15 ASSESSMENT — MIFFLIN-ST. JEOR: SCORE: 1580.55

## 2019-11-15 NOTE — PROGRESS NOTES
"                                                            [facility]    Date: 11/15/19    Admission Dx: CAP and severe sepsis    Pulmonary History: [facility], multiple PNA admissions    Home Nebulizer/MDI Use: Albuterol MDI  Q6 prn  Home Oxygen: none    Acuity Level (RCAT flow sheet): 3    Aerosol Therapy initiated: Duoneb QID, Albuterol Q2 prn    Pulmonary Hygiene initiated: coughing technqiues    Volume Expansion initiated: incentive spirometry    Current Oxygen Requirements: 5L NC    Current SpO2: 96%    Re-evaluation date: 11/18/19    Patient Education: Will educate pt on the indications and benefits of nebulizer's as well as deep breathing and coughing techniques.    Lizbet Bolanos, RT on 11/15/2019 at 6:32 AM      See \"RT Assessments\" flow sheet for patient assessment scoring and Acuity Level Details.             "

## 2019-11-15 NOTE — PLAN OF CARE
A&Ox4. VSS ex temp was 99.7 at start of shift. Tylenol given x1. 96% 02 on 5 LPM.  and 122. Mag replaced x1 and potassium replaced x2. Recheck tomorrow AM. Denies pain. Will continue to monitor.

## 2019-11-15 NOTE — PROGRESS NOTES
Report given to СЕРГЕЙ Hawkins on 3rd floor.  All questions answered.  HS meds passed prior to transfer.  All belonigings accompanied pt to floor.  Transfer by wheelchair.

## 2019-11-15 NOTE — PROGRESS NOTES
[facility]    Hospitalist Progress Note             Date of Admission:  11/13/2019                   Day of hospitalization: 2    Assessment and Plan:      Nicole Reyes is a 64 year old female history of type 2 diabetes, stage III chronic kidney disease, restless leg syndrome, hypercholesterolemia, obstructive sleep apnea, depression, iron deficiency anemia, obesity, hypertension, anxiety disorder and recurrent pneumonias who presents to the ED with SOB and fevers      1.  Sepsis due to left lower lung pneumonia.  - continue ceftriaxone and azithromycin.  cultures have remaining negative  -Patient will need to follow-up with pulmonary and gastroenterology for her repeated episodes of pneumonia  -She has had a video swallow study with speech path no evidence of active aspiration     2.  Acute hypoxic respiratory failure due to left lower lung pneumonia.    Patient was initially treated with BiPAP therapy in the ICU, only on 3 L nasal cannula  Has a h/o chronic respiratory failure - on nocturnal oxygen and CPAP     3.  History of frequent pneumonias.    -Above  She states that she has seen pulmonary medicine in the past - was scheduled to have outpt swallow eval and will need further evaluation with pulmonary upon discharge     5.  Type 2 diabetes.   Hold prior to admission metformin.   Continue medium resistance sliding scale insulin for now.       6.  Chronic kidney disease, stage III.  hypokalemia  Replete as needed     7.  Stable medical conditions include hypercholesterolemia, hypertension, anxiety disorder, and depression     8.  Weakness and deconditioning  May need PT/OT for safe discharge planning           ---------     # Code status:  Full  # Anticipated discharge date and Disposition: in 1-2 days  # DVT: Lovenox  # IVF:  none                      Radha Watts MD  Text Page (7am - 6pm, M-F)               Subjective   Chief Complaint: cough  Subjective: feels tired and SOB, but  "improving. +cough, no fever, chills, nausea, vomiting or abdominal pain       Objective   BP (!) 142/51 (BP Location: Left arm)   Pulse 82   Temp 97.4  F (36.3  C) (Oral)   Resp 20   Ht 1.473 m (4' 10\")   Wt 114.1 kg (251 lb 8 oz)   LMP 09/15/2007   SpO2 94%   BMI 52.56 kg/m       Physical Exam  General: Pt in NAD, normal appearance  HEENT: OP clear MMM, no JVD  Lungs: bibasilar crackles, normal breathing  without accessory muscle usage, no wheezing, rhonchi or crackles  Cardiac: +S1, S2, RRR, no MRG, no edema  Abdominal: normal bowel sounds, NT/ND, no hepatosplenomegaly  Skin: warm, dry, normal turgor, no rash  Psyche: A& O x3, appropriate affect          Intake/Output Summary (Last 24 hours) at 11/15/2019 1305  Last data filed at 11/15/2019 1157  Gross per 24 hour   Intake 200 ml   Output 650 ml   Net -450 ml           Labs and Imaging Results:      Recent Labs   Lab 11/15/19  0416 11/14/19  0541   WBC 14.2* 19.5*   HGB 8.8* 9.4*    205        Recent Labs   Lab 11/15/19  0416 11/14/19  0541    141   CO2 29 28   BUN 10 13      No results for input(s): INR, PTT in the last 168 hours.   No results for input(s): CKMB in the last 168 hours.    Invalid input(s): TROPONINT     Recent Labs   Lab 11/13/19  1510   ALBUMIN 3.5   AST 25   ALT 33   ALKPHOS 118        Micro:     Radio:  XR Video Swallow w/o Esophagram w/SLP or OT   Final Result   IMPRESSION: Normal video esophagram. Please see further details in   report by speech pathology.      JOHN COELHO MD      XR Chest Port 1 View   Final Result   IMPRESSION: Single portable semiupright view of the chest was   obtained. Stable enlargement of the cardiac silhouette and mild   pulmonary vascular congestion. New predominantly left lower lobe   patchy airspace opacities, concerning for infection. No significant   pleural effusion or pneumothorax.      ADELITA PAULSON MD              Medications:      Scheduled Meds:      aspirin  81 mg Oral QPM     " azithromycin  250 mg Oral Daily     cefTRIAXone  2 g Intravenous Q24H     clonazePAM  0.5 mg Oral BID     enoxaparin ANTICOAGULANT  40 mg Subcutaneous Q24H     gabapentin  600 mg Oral Daily     insulin aspart  1-6 Units Subcutaneous Q4H     ipratropium - albuterol 0.5 mg/2.5 mg/3 mL  3 mL Nebulization 4x Daily     losartan  100 mg Oral Daily     pantoprazole  40 mg Oral At Bedtime     sertraline  200 mg Oral Daily     simvastatin  20 mg Oral At Bedtime     Continuous Infusions:    PRN Meds:  acetaminophen **OR** [DISCONTINUED] acetaminophen, albuterol, bisacodyl, cyclobenzaprine, glucose **OR** dextrose **OR** glucagon, guaiFENesin-dextromethorphan, ibuprofen, magnesium hydroxide, magnesium sulfate, magnesium sulfate, naloxone, ondansetron **OR** ondansetron, potassium chloride, potassium chloride with lidocaine, potassium chloride, potassium chloride, potassium chloride, prochlorperazine **OR** prochlorperazine **OR** prochlorperazine, senna-docusate **OR** senna-docusate, zolpidem

## 2019-11-15 NOTE — PLAN OF CARE
"Pt arrived to the floor at 2130    /55 (BP Location: Left arm)   Pulse 82   Temp 98.8  F (37.1  C) (Oral)   Resp 20   Ht 1.473 m (4' 10\")   Wt 114.6 kg (252 lb 10.4 oz)   LMP 09/15/2007   SpO2 99%   BMI 52.80 kg/m        A&Ox4. SBA. Pt c/o bilat. shoulder pain, KHALIL. Denies N/V, lightheadedness, and dizziness. IV SL. On 5L oxygen. LS diminished with LLL crackles. Skin: blanchable redness to groin and skin folds. Regular diet, pt would like GI consult due to GERD. Will continue POC.     "

## 2019-11-15 NOTE — PROGRESS NOTES
Respiratory Therapy Note        Pt wore BIPAP overnight but has maintained off today on a NC or oxymask.  Her breath sounds are diminished but clear.  ABG was drawn due to increasing somnolence at 15:50; 7.39/47/72/28 93% on a 6 Lpm NC.    November 14, 2019 6:07 PM  Teddy Chowdhury, RT

## 2019-11-16 LAB
BASOPHILS # BLD AUTO: 0 10E9/L (ref 0–0.2)
BASOPHILS NFR BLD AUTO: 0.3 %
CREAT SERPL-MCNC: 0.7 MG/DL (ref 0.52–1.04)
DIFFERENTIAL METHOD BLD: ABNORMAL
EOSINOPHIL # BLD AUTO: 0.5 10E9/L (ref 0–0.7)
EOSINOPHIL NFR BLD AUTO: 5 %
ERYTHROCYTE [DISTWIDTH] IN BLOOD BY AUTOMATED COUNT: 16.5 % (ref 10–15)
GFR SERPL CREATININE-BSD FRML MDRD: >90 ML/MIN/{1.73_M2}
GLUCOSE BLDC GLUCOMTR-MCNC: 103 MG/DL (ref 70–99)
GLUCOSE BLDC GLUCOMTR-MCNC: 103 MG/DL (ref 70–99)
GLUCOSE BLDC GLUCOMTR-MCNC: 107 MG/DL (ref 70–99)
GLUCOSE BLDC GLUCOMTR-MCNC: 112 MG/DL (ref 70–99)
GLUCOSE BLDC GLUCOMTR-MCNC: 120 MG/DL (ref 70–99)
GLUCOSE BLDC GLUCOMTR-MCNC: 135 MG/DL (ref 70–99)
HCT VFR BLD AUTO: 29.5 % (ref 35–47)
HGB BLD-MCNC: 8.5 G/DL (ref 11.7–15.7)
IMM GRANULOCYTES # BLD: 0 10E9/L (ref 0–0.4)
IMM GRANULOCYTES NFR BLD: 0.4 %
LYMPHOCYTES # BLD AUTO: 1.6 10E9/L (ref 0.8–5.3)
LYMPHOCYTES NFR BLD AUTO: 15.2 %
MCH RBC QN AUTO: 21.3 PG (ref 26.5–33)
MCHC RBC AUTO-ENTMCNC: 28.8 G/DL (ref 31.5–36.5)
MCV RBC AUTO: 74 FL (ref 78–100)
MONOCYTES # BLD AUTO: 0.5 10E9/L (ref 0–1.3)
MONOCYTES NFR BLD AUTO: 5.1 %
NEUTROPHILS # BLD AUTO: 7.6 10E9/L (ref 1.6–8.3)
NEUTROPHILS NFR BLD AUTO: 74 %
NRBC # BLD AUTO: 0 10*3/UL
NRBC BLD AUTO-RTO: 0 /100
PLATELET # BLD AUTO: 216 10E9/L (ref 150–450)
RBC # BLD AUTO: 3.99 10E12/L (ref 3.8–5.2)
WBC # BLD AUTO: 10.1 10E9/L (ref 4–11)

## 2019-11-16 PROCEDURE — 94640 AIRWAY INHALATION TREATMENT: CPT

## 2019-11-16 PROCEDURE — 36415 COLL VENOUS BLD VENIPUNCTURE: CPT | Performed by: INTERNAL MEDICINE

## 2019-11-16 PROCEDURE — 94640 AIRWAY INHALATION TREATMENT: CPT | Mod: 76

## 2019-11-16 PROCEDURE — 99232 SBSQ HOSP IP/OBS MODERATE 35: CPT | Performed by: HOSPITALIST

## 2019-11-16 PROCEDURE — 25000132 ZZH RX MED GY IP 250 OP 250 PS 637: Performed by: INTERNAL MEDICINE

## 2019-11-16 PROCEDURE — 85025 COMPLETE CBC W/AUTO DIFF WBC: CPT | Performed by: INTERNAL MEDICINE

## 2019-11-16 PROCEDURE — 00000146 ZZHCL STATISTIC GLUCOSE BY METER IP

## 2019-11-16 PROCEDURE — 25000128 H RX IP 250 OP 636: Performed by: INTERNAL MEDICINE

## 2019-11-16 PROCEDURE — 82565 ASSAY OF CREATININE: CPT | Performed by: INTERNAL MEDICINE

## 2019-11-16 PROCEDURE — 12000000 ZZH R&B MED SURG/OB

## 2019-11-16 PROCEDURE — 40000275 ZZH STATISTIC RCP TIME EA 10 MIN

## 2019-11-16 PROCEDURE — 25000125 ZZHC RX 250: Performed by: INTERNAL MEDICINE

## 2019-11-16 RX ADMIN — IPRATROPIUM BROMIDE AND ALBUTEROL SULFATE 3 ML: .5; 3 SOLUTION RESPIRATORY (INHALATION) at 19:43

## 2019-11-16 RX ADMIN — AZITHROMYCIN MONOHYDRATE 250 MG: 250 TABLET ORAL at 07:19

## 2019-11-16 RX ADMIN — ZOLPIDEM TARTRATE 10 MG: 5 TABLET, COATED ORAL at 22:00

## 2019-11-16 RX ADMIN — CLONAZEPAM 0.5 MG: 0.5 TABLET ORAL at 07:18

## 2019-11-16 RX ADMIN — CEFTRIAXONE 2 G: 2 INJECTION, POWDER, FOR SOLUTION INTRAMUSCULAR; INTRAVENOUS at 16:35

## 2019-11-16 RX ADMIN — ASPIRIN 81 MG: 81 TABLET, COATED ORAL at 22:00

## 2019-11-16 RX ADMIN — CLONAZEPAM 0.5 MG: 0.5 TABLET ORAL at 22:00

## 2019-11-16 RX ADMIN — ENOXAPARIN SODIUM 40 MG: 40 INJECTION SUBCUTANEOUS at 21:59

## 2019-11-16 RX ADMIN — GABAPENTIN 600 MG: 300 CAPSULE ORAL at 07:19

## 2019-11-16 RX ADMIN — SIMVASTATIN 20 MG: 20 TABLET, FILM COATED ORAL at 21:59

## 2019-11-16 RX ADMIN — PANTOPRAZOLE SODIUM 40 MG: 40 TABLET, DELAYED RELEASE ORAL at 22:00

## 2019-11-16 RX ADMIN — SERTRALINE HYDROCHLORIDE 200 MG: 100 TABLET ORAL at 07:19

## 2019-11-16 RX ADMIN — LOSARTAN POTASSIUM 100 MG: 100 TABLET ORAL at 07:19

## 2019-11-16 RX ADMIN — ZOLPIDEM TARTRATE 10 MG: 5 TABLET, COATED ORAL at 00:15

## 2019-11-16 RX ADMIN — IPRATROPIUM BROMIDE AND ALBUTEROL SULFATE 3 ML: .5; 3 SOLUTION RESPIRATORY (INHALATION) at 11:34

## 2019-11-16 RX ADMIN — IPRATROPIUM BROMIDE AND ALBUTEROL SULFATE 3 ML: .5; 3 SOLUTION RESPIRATORY (INHALATION) at 07:33

## 2019-11-16 RX ADMIN — IPRATROPIUM BROMIDE AND ALBUTEROL SULFATE 3 ML: .5; 3 SOLUTION RESPIRATORY (INHALATION) at 16:00

## 2019-11-16 ASSESSMENT — ACTIVITIES OF DAILY LIVING (ADL)
ADLS_ACUITY_SCORE: 14

## 2019-11-16 ASSESSMENT — MIFFLIN-ST. JEOR: SCORE: 1594.61

## 2019-11-16 NOTE — PLAN OF CARE
Vss. Sats stable on 3LNC sob with exertion. Bg stable. Calls aprop. Up in room sba. Good appetite.

## 2019-11-16 NOTE — PLAN OF CARE
VSS. A and Ox4. PIV SL. Regular diet, carb count. 3L NC. SBA. Dyspnea on exertion reported. Attempting to collect sputum sample. Pt. Complains of rt. Shoulder pain, PRN tylenol given. Pt. Resting in bed, will continue to monitor.

## 2019-11-16 NOTE — PROGRESS NOTES
[facility]    Hospitalist Progress Note             Date of Admission:  11/13/2019                   Day of hospitalization: 3    Assessment and Plan:      Nicole Reyes is a 64 year old female history of type 2 diabetes, stage III chronic kidney disease, restless leg syndrome, hypercholesterolemia, obstructive sleep apnea, depression, iron deficiency anemia, obesity, hypertension, anxiety disorder and recurrent pneumonias who presents to the ED with SOB and fevers      1.  Sepsis due to left lower lung pneumonia.  - continue ceftriaxone and azithromycin.  cultures have remaining negative  -Patient will need to follow-up with immunology and gastroenterology for her repeated episodes of pneumonia, per previous pulmonary notes, her PFTs are normal, felt she is at risk for aspiration from her hiatal hernia and GI evaluation may be appropriate  -She has had a video swallow study with speech path no evidence of active aspiration     2.  Acute hypoxic respiratory failure due to left lower lung pneumonia.    Patient was initially treated with BiPAP therapy in the ICU, improving, back to her home requirements  Has a h/o chronic respiratory failure - on nocturnal oxygen and CPAP  - will order home oxygen with respiratory therapy     3.  History of frequent pneumonias.    -Above  She states that she has seen pulmonary medicine in the past - was scheduled to have outpt swallow eval and will need further evaluation with pulmonary upon discharge     5.  Type 2 diabetes.   Hold prior to admission metformin.   Continue medium resistance sliding scale insulin for now.       6.  Chronic kidney disease, stage III.  hypokalemia  Replete as needed     7.  Stable medical conditions include hypercholesterolemia, hypertension, anxiety disorder, and depression     8.  Weakness and deconditioning  May need PT/OT for safe discharge planning           ---------     # Code status:  Full  # Anticipated discharge date and  "Disposition: in 1-2 days  # DVT: Lovenox  # IVF:  none                      Radha aWtts MD  Text Page (7am - 6pm, M-F)               Subjective   Chief Complaint: cough  Subjective: feels tired and SOB, but improving. +cough, no fever, chills, nausea, vomiting or abdominal pain       Objective   /74 (BP Location: Right arm)   Pulse 82   Temp 96.7  F (35.9  C) (Axillary)   Resp 20   Ht 1.473 m (4' 10\")   Wt 115.5 kg (254 lb 9.6 oz)   LMP 09/15/2007   SpO2 95%   BMI 53.21 kg/m       Physical Exam  General: Pt in NAD, normal appearance  HEENT: OP clear MMM, no JVD  Lungs: bibasilar crackles, normal breathing  without accessory muscle usage, no wheezing, rhonchi or crackles  Cardiac: +S1, S2, RRR, no MRG, no edema  Abdominal: normal bowel sounds, NT/ND, no hepatosplenomegaly  Skin: warm, dry, normal turgor, no rash  Psyche: A& O x3, appropriate affect          Intake/Output Summary (Last 24 hours) at 11/15/2019 1305  Last data filed at 11/15/2019 1157  Gross per 24 hour   Intake 200 ml   Output 650 ml   Net -450 ml           Labs and Imaging Results:      Recent Labs   Lab 11/16/19  0737 11/15/19  0416   WBC 10.1 14.2*   HGB 8.5* 8.8*    205        Recent Labs   Lab 11/15/19  0416 11/14/19  0541    141   CO2 29 28   BUN 10 13      No results for input(s): INR, PTT in the last 168 hours.   No results for input(s): CKMB in the last 168 hours.    Invalid input(s): TROPONINT     Recent Labs   Lab 11/13/19  1510   ALBUMIN 3.5   AST 25   ALT 33   ALKPHOS 118        Micro:     Radio:  XR Video Swallow w/o Esophagram w/SLP or OT   Final Result   IMPRESSION: Normal video esophagram. Please see further details in   report by speech pathology.      JOHN COELHO MD      XR Chest Port 1 View   Final Result   IMPRESSION: Single portable semiupright view of the chest was   obtained. Stable enlargement of the cardiac silhouette and mild   pulmonary vascular congestion. New predominantly left lower lobe "   patchy airspace opacities, concerning for infection. No significant   pleural effusion or pneumothorax.      ADELITA PAULSON MD              Medications:      Scheduled Meds:      aspirin  81 mg Oral QPM     azithromycin  250 mg Oral Daily     cefTRIAXone  2 g Intravenous Q24H     clonazePAM  0.5 mg Oral BID     enoxaparin ANTICOAGULANT  40 mg Subcutaneous Q24H     gabapentin  600 mg Oral Daily     insulin aspart  1-6 Units Subcutaneous Q4H     ipratropium - albuterol 0.5 mg/2.5 mg/3 mL  3 mL Nebulization 4x Daily     losartan  100 mg Oral Daily     pantoprazole  40 mg Oral At Bedtime     sertraline  200 mg Oral Daily     simvastatin  20 mg Oral At Bedtime     Continuous Infusions:    PRN Meds:  acetaminophen **OR** [DISCONTINUED] acetaminophen, albuterol, bisacodyl, cyclobenzaprine, glucose **OR** dextrose **OR** glucagon, guaiFENesin-dextromethorphan, ibuprofen, magnesium hydroxide, magnesium sulfate, magnesium sulfate, naloxone, ondansetron **OR** ondansetron, potassium chloride, potassium chloride with lidocaine, potassium chloride, potassium chloride, potassium chloride, prochlorperazine **OR** prochlorperazine **OR** prochlorperazine, senna-docusate **OR** senna-docusate, zolpidem

## 2019-11-16 NOTE — PLAN OF CARE
VSS, 3L supplemental O2. SOB with exertion, nonproductive cough. Prn ambien for sleep. On rocephin, zithromax. Ambulating SBA, regular diet.

## 2019-11-17 LAB
GLUCOSE BLDC GLUCOMTR-MCNC: 109 MG/DL (ref 70–99)
GLUCOSE BLDC GLUCOMTR-MCNC: 116 MG/DL (ref 70–99)
GLUCOSE BLDC GLUCOMTR-MCNC: 117 MG/DL (ref 70–99)
GLUCOSE BLDC GLUCOMTR-MCNC: 174 MG/DL (ref 70–99)
GLUCOSE BLDC GLUCOMTR-MCNC: 83 MG/DL (ref 70–99)
GLUCOSE BLDC GLUCOMTR-MCNC: 99 MG/DL (ref 70–99)

## 2019-11-17 PROCEDURE — 40000274 ZZH STATISTIC RCP CONSULT EA 30 MIN

## 2019-11-17 PROCEDURE — 12000000 ZZH R&B MED SURG/OB

## 2019-11-17 PROCEDURE — 00000146 ZZHCL STATISTIC GLUCOSE BY METER IP

## 2019-11-17 PROCEDURE — 25000125 ZZHC RX 250: Performed by: INTERNAL MEDICINE

## 2019-11-17 PROCEDURE — 94762 N-INVAS EAR/PLS OXIMTRY CONT: CPT

## 2019-11-17 PROCEDURE — 25000132 ZZH RX MED GY IP 250 OP 250 PS 637: Performed by: INTERNAL MEDICINE

## 2019-11-17 PROCEDURE — 94640 AIRWAY INHALATION TREATMENT: CPT

## 2019-11-17 PROCEDURE — 94640 AIRWAY INHALATION TREATMENT: CPT | Mod: 76

## 2019-11-17 PROCEDURE — 40000275 ZZH STATISTIC RCP TIME EA 10 MIN

## 2019-11-17 PROCEDURE — 99232 SBSQ HOSP IP/OBS MODERATE 35: CPT | Performed by: HOSPITALIST

## 2019-11-17 PROCEDURE — 25000128 H RX IP 250 OP 636: Performed by: INTERNAL MEDICINE

## 2019-11-17 PROCEDURE — 25000125 ZZHC RX 250: Performed by: HOSPITALIST

## 2019-11-17 RX ORDER — IPRATROPIUM BROMIDE AND ALBUTEROL SULFATE 2.5; .5 MG/3ML; MG/3ML
3 SOLUTION RESPIRATORY (INHALATION) EVERY 4 HOURS PRN
Status: DISCONTINUED | OUTPATIENT
Start: 2019-11-17 | End: 2019-11-17

## 2019-11-17 RX ORDER — LEVOFLOXACIN 750 MG/1
750 TABLET, FILM COATED ORAL DAILY
Qty: 7 TABLET | Refills: 0 | Status: SHIPPED | OUTPATIENT
Start: 2019-11-17 | End: 2019-11-25

## 2019-11-17 RX ORDER — IPRATROPIUM BROMIDE AND ALBUTEROL SULFATE 2.5; .5 MG/3ML; MG/3ML
3 SOLUTION RESPIRATORY (INHALATION)
Status: DISCONTINUED | OUTPATIENT
Start: 2019-11-17 | End: 2019-11-18 | Stop reason: HOSPADM

## 2019-11-17 RX ORDER — LEVOFLOXACIN 750 MG/1
750 TABLET, FILM COATED ORAL DAILY
Qty: 7 TABLET | Refills: 0 | Status: SHIPPED | OUTPATIENT
Start: 2019-11-17 | End: 2019-11-17

## 2019-11-17 RX ADMIN — SERTRALINE HYDROCHLORIDE 200 MG: 100 TABLET ORAL at 07:13

## 2019-11-17 RX ADMIN — CLONAZEPAM 0.5 MG: 0.5 TABLET ORAL at 07:14

## 2019-11-17 RX ADMIN — IPRATROPIUM BROMIDE AND ALBUTEROL SULFATE 3 ML: .5; 3 SOLUTION RESPIRATORY (INHALATION) at 11:40

## 2019-11-17 RX ADMIN — CLONAZEPAM 0.5 MG: 0.5 TABLET ORAL at 21:41

## 2019-11-17 RX ADMIN — AZITHROMYCIN MONOHYDRATE 250 MG: 250 TABLET ORAL at 07:14

## 2019-11-17 RX ADMIN — CEFTRIAXONE 2 G: 2 INJECTION, POWDER, FOR SOLUTION INTRAMUSCULAR; INTRAVENOUS at 16:16

## 2019-11-17 RX ADMIN — PANTOPRAZOLE SODIUM 40 MG: 40 TABLET, DELAYED RELEASE ORAL at 21:41

## 2019-11-17 RX ADMIN — ENOXAPARIN SODIUM 40 MG: 40 INJECTION SUBCUTANEOUS at 21:41

## 2019-11-17 RX ADMIN — IPRATROPIUM BROMIDE AND ALBUTEROL SULFATE 3 ML: .5; 3 SOLUTION RESPIRATORY (INHALATION) at 20:35

## 2019-11-17 RX ADMIN — ASPIRIN 81 MG: 81 TABLET, COATED ORAL at 21:41

## 2019-11-17 RX ADMIN — ACETAMINOPHEN 650 MG: 325 TABLET, FILM COATED ORAL at 16:53

## 2019-11-17 RX ADMIN — LOSARTAN POTASSIUM 100 MG: 100 TABLET ORAL at 07:14

## 2019-11-17 RX ADMIN — INSULIN ASPART 1 UNITS: 100 INJECTION, SOLUTION INTRAVENOUS; SUBCUTANEOUS at 21:36

## 2019-11-17 RX ADMIN — IPRATROPIUM BROMIDE AND ALBUTEROL SULFATE 3 ML: .5; 3 SOLUTION RESPIRATORY (INHALATION) at 07:56

## 2019-11-17 RX ADMIN — GABAPENTIN 600 MG: 300 CAPSULE ORAL at 07:14

## 2019-11-17 RX ADMIN — SIMVASTATIN 20 MG: 20 TABLET, FILM COATED ORAL at 21:42

## 2019-11-17 ASSESSMENT — ACTIVITIES OF DAILY LIVING (ADL)
ADLS_ACUITY_SCORE: 14

## 2019-11-17 ASSESSMENT — MIFFLIN-ST. JEOR: SCORE: 1630.44

## 2019-11-17 NOTE — PLAN OF CARE
Pt vss, lungs are clear, pt has been encouraged to use IS hourly.   BS+, eating and drinking well.   Blood sugars stable today no coverage   Up with SBA with O2 on. Pt needed o2 2L on walk and 1L at rest  Following care plan and pt was educated on care plan expectations. Pt plan was to discharge home today but lab called with positive blood cultures. MD aware and discontinue discharge at this time.  Plans to d/c to tomorrow once we are sure blood cultures are correct  Will also discharge home home with more oxygen demand so new orders will be in.

## 2019-11-17 NOTE — DISCHARGE SUMMARY
Discharge Summary  Hospitalist Service      Nicole Reyes MRN# [id number]   YOB: 1955 Age: 64 year old     Date of Admission:  11/13/2019  Date of Discharge:  11/18/2019  Admitting Physician:  Bg Rao MD  Discharge Physician: Radha Watts MD   Discharging Service: Hospitalist Service     Primary Provider: Pari Wiley  Primary Care Physician Phone Number: [phone]         Discharge Diagnoses/Problem Oriented Hospital Course (Providers):      Discharge Diagnoses   Sepsis due to left lower lung pneumonia.  +blood cultures for staph epi  Acute hypoxic respiratory failure due to left lower lung pneumonia.    History of frequent pneumonias.    Type 2 DM    Hospital Course   Nicole Reyes is a 64 year old female history of type 2 diabetes, stage III chronic kidney disease, restless leg syndrome, hypercholesterolemia, obstructive sleep apnea, depression, iron deficiency anemia, obesity, hypertension, anxiety disorder and recurrent pneumonias who presents to the ED with SOB and fevers. She has been admitted for pneumonia and sepsis. Initially patient was in the ICU for BIPAP therapy. Her symptoms significantly improved thus she was transferred out of the ICU.  On the floor patient has been doing well back to her home oxygen needs.  Evaluated by speech therapy recommended regular diet and further work-up with gastroenterology and immunology.  Her video swallow was normal.  She did have blood cultures growing staph epidermidis, likely contaminant. Patient is doing well and will be discharged.      1.  Sepsis due to left lower lung pneumonia.  - continue ceftriaxone and azithromycin.  cultures grew staph epi, thus most likely contaminant   -Patient will need to follow-up with immunology and gastroenterology for her repeated episodes of pneumonia, per previous pulmonary notes, her PFTs are normal, felt she is at risk for aspiration from her hiatal hernia and GI evaluation may be  appropriate  -She has had a video swallow study with speech path no evidence of active aspiration     2. +blood cultures for staph epi  - most likely contaminent     3.  Acute hypoxic respiratory failure due to left lower lung pneumonia.    Patient was initially treated with BiPAP therapy in the ICU, improving, back to her home requirements  Has a h/o chronic respiratory failure - on nocturnal oxygen and CPAP  - will order home oxygen with respiratory therapy     4.  History of frequent pneumonias.    -Above  She states that she has seen pulmonary medicine in the past - was scheduled to have outpt swallow eval and will need further evaluation with GI and immunology      6.  Type 2 diabetes.  Resume home medications              Code Status:      Full Code         Important Results:                  Pending Results:        Unresulted Labs Ordered in the Past 30 Days of this Admission     Date and Time Order Name Status Description    11/13/2019 1516 Blood culture Preliminary     11/13/2019 1516 Blood culture Preliminary                Discharge Instructions and Follow-Up:      Follow-up Appointments     Follow-up and recommended labs and tests       Follow up with primary care provider, Pari Wiley, within 7 days for   hospital follow- up.  No follow up labs or test are needed. Follow up with   gastroenterology and immunology.                  Discharge Disposition:        Discharged to home          Discharge Medications:        Current Discharge Medication List      START taking these medications    Details   levofloxacin (LEVAQUIN) 750 MG tablet Take 1 tablet (750 mg) by mouth daily  Qty: 7 tablet, Refills: 0    Associated Diagnoses: Pneumonia due to infectious organism, unspecified laterality, unspecified part of lung         CONTINUE these medications which have NOT CHANGED    Details   aspirin 81 MG EC tablet Take 81 mg by mouth every evening       clonazePAM (KLONOPIN) 1 MG tablet Take 0.5 tablets (0.5 mg) by  mouth 2 times daily  Qty: 30 tablet, Refills: 3    Associated Diagnoses: Insomnia, unspecified type      gabapentin (NEURONTIN) 300 MG capsule Take 2 capsules (600 mg) by mouth daily  Qty: 180 capsule, Refills: 3    Comments: Disregard prescription sent earlier today. ### DO NOT FILL NOW.  Please update patient's profile to reflect additional refills.  ####  Associated Diagnoses: Restless legs syndrome (RLS); Persistent insomnia      losartan (COZAAR) 100 MG tablet Take 1 tablet (100 mg) by mouth daily  Qty: 90 tablet, Refills: 1    Comments: Profile Rx: patient will contact pharmacy when needed  Associated Diagnoses: Essential hypertension, benign      metFORMIN (GLUCOPHAGE-XR) 500 MG 24 hr tablet Take 2 tablets (1,000 mg) by mouth every morning  Qty: 180 tablet, Refills: 3    Comments: Profile Rx: patient will contact pharmacy when needed  Associated Diagnoses: Type 2 diabetes mellitus with stage 3 chronic kidney disease, without long-term current use of insulin (H)      pantoprazole (PROTONIX) 40 MG EC tablet Take 1 tablet (40 mg) by mouth At Bedtime  Qty: 90 tablet, Refills: 3    Comments: Profile Rx: patient will contact pharmacy when needed  Associated Diagnoses: Gastroesophageal reflux disease without esophagitis      sertraline (ZOLOFT) 100 MG tablet Take 2 tablets (200 mg) by mouth daily  Qty: 180 tablet, Refills: 3    Comments: Profile Rx: patient will contact pharmacy when needed  Associated Diagnoses: Generalized anxiety disorder; Major depressive disorder, recurrent episode, moderate (H)      simvastatin (ZOCOR) 20 MG tablet Take 1 tablet (20 mg) by mouth At Bedtime  Qty: 90 tablet, Refills: 1    Comments: Profile Rx: patient will contact pharmacy when needed  Associated Diagnoses: Hyperlipidemia LDL goal <100      zolpidem (AMBIEN) 10 MG tablet Take 1 tablet (10 mg) by mouth At Bedtime  Qty: 90 tablet, Refills: 1    Comments: ### DO NOT FILL NOW.  Please update patient's profile to reflect additional  "refills.  ####  Associated Diagnoses: Insomnia, unspecified type      albuterol (PROAIR HFA/PROVENTIL HFA/VENTOLIN HFA) 108 (90 Base) MCG/ACT inhaler Inhale 1-2 puffs into the lungs every 6 hours as needed for shortness of breath / dyspnea or wheezing  Qty: 1 Inhaler, Refills: 0    Comments: Please also dispense a spacer  Associated Diagnoses: Community acquired bacterial pneumonia      blood glucose (ACCU-CHEK BYRON PLUS) test strip Use to test blood sugar 2 times daily or as directed.  Qty: 100 each, Refills: 12    Associated Diagnoses: Type 2 diabetes mellitus with stage 3 chronic kidney disease, without long-term current use of insulin (H)         STOP taking these medications       cyclobenzaprine (FLEXERIL) 10 MG tablet Comments:   Reason for Stopping:         guaiFENesin-dextromethorphan (ROBITUSSIN DM) 100-10 MG/5ML syrup Comments:   Reason for Stopping:         ibuprofen (ADVIL,MOTRIN) 600 MG tablet Comments:   Reason for Stopping:                    Allergies:         Allergies   Allergen Reactions     Codeine Rash     Penicillins Rash     Pt has tolerated cephalosporins and penems.   Pt tolerated Zosyn 7/6/2019            Consultations This Hospital Stay:        No consultations were requested during this admission          Condition and Physical Exam on Discharge:        Discharge condition: Stable   Discharge vitals: Blood pressure (!) 149/80, pulse 82, temperature 98  F (36.7  C), temperature source Oral, resp. rate 18, height 1.473 m (4' 10\"), weight 119.1 kg (262 lb 8 oz), last menstrual period 09/15/2007, SpO2 93 %, not currently breastfeeding.   General: Pt in NAD, normal appearance  HEENT: OP clear MMM, no JVD  Lungs: Clear to Auscultation Bilateral, normal breathing  without accessory muscle usage, no wheezing, rhonchi or crackles  Cardiac: +S1, S2, RRR, no MRG, no edema  Abdominal: normal bowel sounds, NT/ND, no hepatosplenomegaly  Skin: warm, dry, normal turgor, no rash  Psyche: A& O x3, " appropriate affect            Discharge Orders for Skilled Facility (from Discharge Orders):        After Care Instructions     Activity      Your activity upon discharge: activity as tolerated         Diet      Follow this diet upon discharge: Orders Placed This Encounter      Regular Diet Adult         Oxygen Adult      Renew Home Oxygen Order  Prescription change to 2 liter(s) by nasal cannula continuously with use of portable tank.  Expected treatment length is 3 months. Also resume evening oxygen usage as well    Attending Provider: Radha Watts MD  Physician signature: See electronic signature associated with these discharge orders  Date of Order: November 17, 2019                    Rehab orders for Skilled Facility (from Discharge Orders):               Discharge Time:      Greater than 30 minutes.        Image Results From This Hospital Stay (For Non-EPIC Providers):        Results for orders placed or performed during the hospital encounter of 11/13/19   XR Chest Port 1 View    Narrative    CHEST ONE VIEW PORTABLE  11/13/2019 4:03 PM     HISTORY: Hypoxia and tachycardia.    COMPARISON: Chest x-ray on 9/6/2019.      Impression    IMPRESSION: Single portable semiupright view of the chest was  obtained. Stable enlargement of the cardiac silhouette and mild  pulmonary vascular congestion. New predominantly left lower lobe  patchy airspace opacities, concerning for infection. No significant  pleural effusion or pneumothorax.    ADELITA PAULSON MD   XR Video Swallow w/o Esophagram w/SLP or OT    Narrative    VIDEO SWALLOW WITHOUT ESOPHAGRAM WITH SLP OR OT 11/14/2019 1:54 PM     HISTORY: Silent aspiration question. Recurrent PNA.     FLUOROSCOPY TIME: 0.8 minutes.    IMAGES: A total of 5 spot fluoro and/or cine loops are obtained during  exam.    FINDINGS: Fluoroscopic assistance was provided to speech pathology for  evaluation of the patient's swallowing mechanism. Note that only the  cervical esophagus was  evaluated.     Patient was able to swallow the varying consistencies of barium  without difficulty. No laryngeal penetration or aspiration.      Impression    IMPRESSION: Normal video esophagram. Please see further details in  report by speech pathology.    JOHN COELHO MD           Most Recent Lab Results In EPIC (For Non-EPIC Providers):    Most Recent 3 CBC's:  Recent Labs   Lab Test 11/16/19  0737 11/15/19  0416 11/14/19  0541   WBC 10.1 14.2* 19.5*   HGB 8.5* 8.8* 9.4*   MCV 74* 74* 75*    205 205      Most Recent 3 BMP's:  Recent Labs   Lab Test 11/16/19  0737 11/15/19  0416 11/14/19  2238 11/14/19  1246 11/14/19  0541 11/13/19 2238   NA  --  140  --   --  141 140   POTASSIUM  --  3.8 3.6 3.3* 3.1* 3.4   CHLORIDE  --  107  --   --  107 108   CO2  --  29  --   --  28 26   BUN  --  10  --   --  13 14   CR 0.70 0.72  --   --  0.73 0.77   ANIONGAP  --  4  --   --  6 6   GRECIA  --  8.4*  --   --  7.6* 7.6*   GLC  --  127*  --   --  122* 120*     Most Recent 3 Troponin's:No lab results found.  Most Recent 3 INR's:  Recent Labs   Lab Test 07/06/19  1921 10/19/16  1630 10/28/11  1055   INR 1.07 0.99 1.04     Most Recent 2 LFT's:  Recent Labs   Lab Test 11/13/19  1510 07/06/19  1840   AST 25 39   ALT 33 32   ALKPHOS 118 122   BILITOTAL 0.4 0.7     Most Recent Cholesterol Panel:  Recent Labs   Lab Test 06/21/19  1438   CHOL 170   LDL 96   HDL 40*   TRIG 169*     Most Recent 6 Bacteria Isolates From Any Culture (See EPIC Reports for Culture Details):  Recent Labs   Lab Test 11/14/19  0500 11/13/19  1517 11/13/19  1510 07/06/19  1840 04/11/19  2200 09/25/17  2320   CULT No growth No growth after 4 days No growth after 4 days No growth  No growth Moderate growth  Normal zenon    Heavy growth  Leeanne albicans / dubliniensis  Candida albicans and Candida dubliniensis are not routinely speciated  Susceptibility testing not routinely done  *  Light growth  Streptococcus agalactiae sero group B  * No growth     Most  Recent TSH, T4 and HgbA1c:  Recent Labs   Lab Test 06/21/19  1438  03/22/16  1418   TSH 2.31   < > 4.09*   T4  --   --  1.12   A1C 6.7*   < > 6.8*    < > = values in this interval not displayed.

## 2019-11-17 NOTE — PROGRESS NOTES
[facility]    Hospitalist Progress Note             Date of Admission:  11/13/2019                   Day of hospitalization: 4    Assessment and Plan:      Nicole Reyes is a 64 year old female history of type 2 diabetes, stage III chronic kidney disease, restless leg syndrome, hypercholesterolemia, obstructive sleep apnea, depression, iron deficiency anemia, obesity, hypertension, anxiety disorder and recurrent pneumonias who presents to the ED with SOB and fevers      1.  Sepsis due to left lower lung pneumonia.  - continue ceftriaxone and azithromycin.  cultures positive see below  -Patient will need to follow-up with immunology and gastroenterology for her repeated episodes of pneumonia, per previous pulmonary notes, her PFTs are normal, felt she is at risk for aspiration from her hiatal hernia and GI evaluation may be appropriate  -She has had a video swallow study with speech path no evidence of active aspiration     2. +blood cultures for gram + cocci in clusters  - will follow up cultures, and further workup pending results    3.  Acute hypoxic respiratory failure due to left lower lung pneumonia.    Patient was initially treated with BiPAP therapy in the ICU, improving, back to her home requirements  Has a h/o chronic respiratory failure - on nocturnal oxygen and CPAP  - will order home oxygen with respiratory therapy     4.  History of frequent pneumonias.    -Above  She states that she has seen pulmonary medicine in the past - was scheduled to have outpt swallow eval and will need further evaluation with pulmonary upon discharge     6.  Type 2 diabetes.   Hold prior to admission metformin.   Continue medium resistance sliding scale insulin for now.       7.  Chronic kidney disease, stage III.  hypokalemia  Replete as needed     8.  Stable medical conditions include hypercholesterolemia, hypertension, anxiety disorder, and depression     9.  Weakness and deconditioning  improving          "  ---------     # Code status:  Full  # Anticipated discharge date and Disposition: in 1-2 days  # DVT: Lovenox  # IVF:  none                      Radha Watts MD  Text Page (7am - 6pm, M-F)               Subjective   Chief Complaint: cough  Subjective: feels tired and SOB, but improving. +cough, no fever, chills, nausea, vomiting or abdominal pain       Objective   BP (!) 149/80 (BP Location: Left arm)   Pulse 82   Temp 98  F (36.7  C) (Oral)   Resp 18   Ht 1.473 m (4' 10\")   Wt 119.1 kg (262 lb 8 oz)   LMP 09/15/2007   SpO2 93%   BMI 54.86 kg/m       Physical Exam  General: Pt in NAD, normal appearance  HEENT: OP clear MMM, no JVD  Lungs: bibasilar crackles, normal breathing  without accessory muscle usage, no wheezing, rhonchi or crackles  Cardiac: +S1, S2, RRR, no MRG, no edema  Abdominal: normal bowel sounds, NT/ND, no hepatosplenomegaly  Skin: warm, dry, normal turgor, no rash  Psyche: A& O x3, appropriate affect          Intake/Output Summary (Last 24 hours) at 11/15/2019 1305  Last data filed at 11/15/2019 1157  Gross per 24 hour   Intake 200 ml   Output 650 ml   Net -450 ml           Labs and Imaging Results:      Recent Labs   Lab 11/16/19  0737 11/15/19  0416   WBC 10.1 14.2*   HGB 8.5* 8.8*    205        Recent Labs   Lab 11/15/19  0416 11/14/19  0541    141   CO2 29 28   BUN 10 13      No results for input(s): INR, PTT in the last 168 hours.   No results for input(s): CKMB in the last 168 hours.    Invalid input(s): TROPONINT     Recent Labs   Lab 11/13/19  1510   ALBUMIN 3.5   AST 25   ALT 33   ALKPHOS 118        Micro:     Radio:  XR Video Swallow w/o Esophagram w/SLP or OT   Final Result   IMPRESSION: Normal video esophagram. Please see further details in   report by speech pathology.      JOHN COELHO MD      XR Chest Port 1 View   Final Result   IMPRESSION: Single portable semiupright view of the chest was   obtained. Stable enlargement of the cardiac silhouette and mild "   pulmonary vascular congestion. New predominantly left lower lobe   patchy airspace opacities, concerning for infection. No significant   pleural effusion or pneumothorax.      ADELITA PAULSON MD              Medications:      Scheduled Meds:      aspirin  81 mg Oral QPM     cefTRIAXone  2 g Intravenous Q24H     clonazePAM  0.5 mg Oral BID     enoxaparin ANTICOAGULANT  40 mg Subcutaneous Q24H     gabapentin  600 mg Oral Daily     insulin aspart  1-6 Units Subcutaneous Q4H     ipratropium - albuterol 0.5 mg/2.5 mg/3 mL  3 mL Nebulization 4x Daily     losartan  100 mg Oral Daily     pantoprazole  40 mg Oral At Bedtime     sertraline  200 mg Oral Daily     simvastatin  20 mg Oral At Bedtime     Continuous Infusions:    PRN Meds:  acetaminophen **OR** [DISCONTINUED] acetaminophen, albuterol, bisacodyl, cyclobenzaprine, glucose **OR** dextrose **OR** glucagon, guaiFENesin-dextromethorphan, ibuprofen, magnesium hydroxide, magnesium sulfate, magnesium sulfate, naloxone, ondansetron **OR** ondansetron, potassium chloride, potassium chloride with lidocaine, potassium chloride, potassium chloride, potassium chloride, prochlorperazine **OR** prochlorperazine **OR** prochlorperazine, senna-docusate **OR** senna-docusate, zolpidem               Improved

## 2019-11-17 NOTE — PLAN OF CARE
VSS, 3L supplemental O2. Pulse oximetry study overnight. Nonproductive cough, encouraging deep breathing. Continues on rocephin, zithro. Ambulating SBA to bathroom, regular diet.

## 2019-11-17 NOTE — PROGRESS NOTES
"[facility] RCAT     Date: 11/17/19    Admission Dx: Sepsis due to left lower lung pneumonia    Pulmonary History: recurrent pneumonia    Home Nebulizer/MDI Use: Albuterol inhaler prn    Home Oxygen: 2 LPM nasal cannula    Acuity Level (RCAT flow sheet): Acuity level three    Aerosol Therapy initiated: Duoneb QID and Q4 prn    Pulmonary Hygiene initiated: Cough and deep breath TID    Volume Expansion initiated: IS TID    Current Oxygen Requirements: 2 LPM    Current SpO2: 93%    Re-evaluation date: 11/20/19    Patient Education: Education performed with patient in regards to indications/benefits and dosing of bronchodilators. Will continue to do education with patient.    See \"RT Assessments\" flow sheet for patient assessment scoring and Acuity Level Details.    Yaneth Swain, RT  11/17/2019 5:15 PM                 "

## 2019-11-17 NOTE — PROGRESS NOTES
Patient has been assessed for Home Oxygen needs.  Oxygen readings:   *   RA - at rest  Pulse oximetry SPO2 92 %  *   RA - during activity/with exercise SPO2 80 %  *   O2 at  2 liters/minute (at rest) ...SPO2 94 %  *   O2 at  2 liters/minute (during activity/with exercise) ...SPO2 90 %

## 2019-11-17 NOTE — CONSULTS
SWS Note    Data: Per SWS consult order, SW to assist with new home oxygen.     Assessment:  New home oxygen is set up by nursing staff; SW spoke to nurse and Charge.    Plan: SW available upon request.    J CARLOS Lundy, [facility]  Casual    [facility]  [phone]

## 2019-11-17 NOTE — PROGRESS NOTES
RT-Overnight oximetry study complete. Results faxed and placed in chart.    Martinez Anderson, RT on 11/17/2019 at 7:16 AM

## 2019-11-17 NOTE — PLAN OF CARE
A&Ox4, VSS, denies pain, up with SBA, continues on Rocephin IV dx PNA, Potasium 3.8, Magnesium 2.0 dated 11/15, MD paged if need to recheck both lab and was told that it was fine, Bg 103,120, still needs sputum sample, Overnight Oxygen study, prn  Ambien for sleep aid, will continue with POC.

## 2019-11-18 VITALS
HEIGHT: 58 IN | BODY MASS INDEX: 55.1 KG/M2 | HEART RATE: 83 BPM | OXYGEN SATURATION: 99 % | SYSTOLIC BLOOD PRESSURE: 151 MMHG | TEMPERATURE: 97.2 F | RESPIRATION RATE: 20 BRPM | WEIGHT: 262.5 LBS | DIASTOLIC BLOOD PRESSURE: 70 MMHG

## 2019-11-18 LAB
GLUCOSE BLDC GLUCOMTR-MCNC: 106 MG/DL (ref 70–99)
GLUCOSE BLDC GLUCOMTR-MCNC: 112 MG/DL (ref 70–99)
GLUCOSE BLDC GLUCOMTR-MCNC: 124 MG/DL (ref 70–99)

## 2019-11-18 PROCEDURE — 99239 HOSP IP/OBS DSCHRG MGMT >30: CPT | Performed by: HOSPITALIST

## 2019-11-18 PROCEDURE — 25000132 ZZH RX MED GY IP 250 OP 250 PS 637: Performed by: INTERNAL MEDICINE

## 2019-11-18 PROCEDURE — 00000146 ZZHCL STATISTIC GLUCOSE BY METER IP

## 2019-11-18 RX ADMIN — SERTRALINE HYDROCHLORIDE 200 MG: 100 TABLET ORAL at 08:40

## 2019-11-18 RX ADMIN — GABAPENTIN 600 MG: 300 CAPSULE ORAL at 08:41

## 2019-11-18 RX ADMIN — LOSARTAN POTASSIUM 100 MG: 100 TABLET ORAL at 08:41

## 2019-11-18 RX ADMIN — ACETAMINOPHEN 650 MG: 325 TABLET, FILM COATED ORAL at 01:10

## 2019-11-18 RX ADMIN — CLONAZEPAM 0.5 MG: 0.5 TABLET ORAL at 08:41

## 2019-11-18 RX ADMIN — ZOLPIDEM TARTRATE 10 MG: 5 TABLET, COATED ORAL at 01:10

## 2019-11-18 ASSESSMENT — ACTIVITIES OF DAILY LIVING (ADL)
ADLS_ACUITY_SCORE: 14

## 2019-11-18 NOTE — PLAN OF CARE
Vss. BG stable. A&OX4. Sats stable on 1.5LNC. d.c inst reviewed. Meds filled here given to pt. Oxygen present for discharge

## 2019-11-18 NOTE — PLAN OF CARE
A&Ox4  Activity:SBA  Diet:Reg  VSS  O2: Pt on 1.5L O2 her bl is 2L  B, 112  PIV: SL RA  Pain: Denies  GI/: continent B/B  Labs: Blood cultures showed no growth  Discharge: Plan to discharge today.  Continue plan of care.

## 2019-11-18 NOTE — PLAN OF CARE
A&Ox4, VSS, denies pain, up with SBA, continues on Rocephin IV, currently on 1.5LPM of Oxygen, KHALIL, plan to discharge tomorrow, will continue with POC.

## 2019-11-19 ENCOUNTER — TELEPHONE (OUTPATIENT)
Dept: INTERNAL MEDICINE | Facility: CLINIC | Age: 64
End: 2019-11-19

## 2019-11-19 ENCOUNTER — TRANSFERRED RECORDS (OUTPATIENT)
Dept: HEALTH INFORMATION MANAGEMENT | Facility: CLINIC | Age: 64
End: 2019-11-19

## 2019-11-19 LAB
BACTERIA SPEC CULT: ABNORMAL
BACTERIA SPEC CULT: NO GROWTH
Lab: ABNORMAL
Lab: NORMAL
SPECIMEN SOURCE: ABNORMAL
SPECIMEN SOURCE: NORMAL

## 2019-11-19 NOTE — TELEPHONE ENCOUNTER
IP F/U    Date: 11/18/19  Diagnosis: Community Acquired Pneumonia Of Left Lower Lobe Of Lung (H), Pneumonia Due To Infectious Organism, Unspecified   Is patient active in care coordination? No  Was patient in TCU? No    Next 5 appointments (look out 90 days)    Nov 25, 2019  3:00 PM CST  [facility] with Pari Wiley MD  [facility] [address] [place] [id number] [phone]        ED / Discharge Outreach Protocol    Patient Contact    Attempt # 1    Was call answered?  No.  Left message on voicemail with information to call me back.

## 2019-11-20 NOTE — TELEPHONE ENCOUNTER
"Hospital/TCU/ED for chronic condition Discharge Protocol    \"Hi, my name is Eli العلي RN, a registered nurse, and I am calling from [facility].  I am calling to follow up and see how things are going for you after your recent emergency visit/hospital/TCU stay.\"    Tell me how you are doing now that you are home?\" was sent home with o2 and antibiotic, feeling better with O2- continuous- 2lpm       Discharge Instructions    \"Let's review your discharge instructions.  What is/are the follow-up recommendations?  Pt. Response: Follow-up Appointments    Follow-up and recommended labs and tests      Follow up with primary care provider, Pari Wiley, within 7 days for   hospital follow- up.  No follow up labs or test are needed. Follow up with gastroenterology and immunology    \"Has an appointment with your primary care provider been scheduled?\"   Yes. (confirm)   Next 5 appointments (look out 90 days)    Nov 25, 2019  3:00 PM CST  SHORT with Pari Wiley MD  [facility] ([facility] [address] [facility] [id number] [phone]          \"When you see the provider, I would recommend that you bring your medications with you.\"    Medications    \"Tell me what changed about your medicines when you discharged?\"    Changes to chronic meds?    0-1    \"What questions do you have about your medications?\"    None     New diagnoses of heart failure, COPD, diabetes, or MI?    No              Post Discharge Medication Reconciliation Status: discharge medications reconciled, continue medications without change.    Was MTM referral placed (*Make sure to put transitions as reason for referral)?   No    Call Summary    \"What questions or concerns do you have about your recent visit and your follow-up care?\"     none    \"If you have questions or things don't continue to improve, we encourage you contact us through the main clinic number (give number).  Even if the clinic is " "not open, triage nurses are available 24/7 to help you.     We would like you to know that our clinic has extended hours (provide information).  We also have urgent care (provide details on closest location and hours/contact info)\"      \"Thank you for your time and take care!\"    "

## 2019-11-21 NOTE — PROCEDURES
Procedure Date: 2019      Interpretation of Nocturnal Recording Oximetry for study night of:  2019   Study running from:  9614 ws 2422   This study was:   1.  On 2 LPM% FIO2 by nasal cannula   Findings Include:   1.  Tachycardia   2.  Pulse mean 84 bpm   3.  Range 62 - 102 bpm   4.  Awake sats were documented: 95% on 2 L O2   5.  Asleep sats documented 97% on 2 L O2   Suggested Follow Up:   1.  Clinical correlation   2.  Schedule Outpatient Sleep Study   Other:    On oxygen 2 L NC 0.5% below 90% but 56 desaturations events with NEHA 84% in a pattern suggestive of REM sleep with sleep disordered breathing         JENNIFER GRADY MD             D: 2019   T: 2019   MT: [id number]      Name:     ASHANTI PAETL   MRN:      [id number]        Account:        [id number]   :      1955           Procedure Date: 2019      Document: [id number]       cc: Pari Wiley MD

## 2019-11-25 ENCOUNTER — OFFICE VISIT (OUTPATIENT)
Dept: INTERNAL MEDICINE | Facility: CLINIC | Age: 64
End: 2019-11-25
Payer: COMMERCIAL

## 2019-11-25 VITALS
SYSTOLIC BLOOD PRESSURE: 150 MMHG | RESPIRATION RATE: 16 BRPM | DIASTOLIC BLOOD PRESSURE: 83 MMHG | HEIGHT: 58 IN | OXYGEN SATURATION: 96 % | HEART RATE: 111 BPM | BODY MASS INDEX: 50.8 KG/M2 | TEMPERATURE: 97.7 F | WEIGHT: 242 LBS

## 2019-11-25 DIAGNOSIS — J18.9 PNEUMONIA DUE TO INFECTIOUS ORGANISM, UNSPECIFIED LATERALITY, UNSPECIFIED PART OF LUNG: Primary | ICD-10-CM

## 2019-11-25 DIAGNOSIS — M25.511 ACUTE PAIN OF BOTH SHOULDERS: ICD-10-CM

## 2019-11-25 DIAGNOSIS — M25.512 ACUTE PAIN OF BOTH SHOULDERS: ICD-10-CM

## 2019-11-25 PROCEDURE — 99495 TRANSJ CARE MGMT MOD F2F 14D: CPT | Performed by: INTERNAL MEDICINE

## 2019-11-25 ASSESSMENT — MIFFLIN-ST. JEOR: SCORE: 1537.45

## 2019-11-25 NOTE — PROGRESS NOTES
Subjective     Nicole Reyes is a 64 year old female who presents to clinic today for the following health issues:    hospitals           Hospital Follow-up Visit:    Hospital/Nursing Home/IP Rehab Facility: [facility]  Date of Admission: 11/13/19  Date of Discharge: 11/18/19  Reason(s) for Admission: Pneumonia with respiratory failure            Problems taking medications regularly:  None       Medication changes since discharge: None       Problems adhering to non-medication therapy:  None    Summary of hospitalization:  [facility] discharge summary reviewed  Diagnostic Tests/Treatments reviewed.  Follow up needed: none  Other Healthcare Providers Involved in Patient s Care:         Specialist appointment - Immunologist 12/6/2019 and GI next month  Update since discharge: stable.   She was admitted again with a new pneumonia and respiratory failure.  Her oxygen was restarted at 2 L continuous.  There is continued concern that this is due to recurrent aspiration.  She will see the immunologist on 12/6, will also follow-up with GI next month.  She did have a video swallow study that did not show aspiration on it.    Her job was eliminated although she is still on disability.  She still has some short-term to next month and then will have long-term coverage for 2 years.    Her other concern today is bilateral shoulder pain.  This is been going on for few months but much worse in the last few weeks.  It hurts to reach her arms out, lift them.  This is interfering with sleep.  There is been no injury.      Post Discharge Medication Reconciliation: discharge medications reconciled, continue medications without change.  Plan of care communicated with patient     Coding guidelines for this visit:  Type of Medical   Decision Making Face-to-Face Visit       within 7 Days of discharge Face-to-Face Visit        within 14 days of discharge   Moderate Complexity [id number]   High Complexity 45315 [id number]               Patient Active Problem List   Diagnosis     Essential hypertension, benign     Restless leg syndrome     CRISTÓBAL (obstructive sleep apnea)     CKD (chronic kidney disease) stage 3, GFR 30-59 ml/min (H)     Advanced directives, counseling/discussion     GENERALIZED ANXIETY DIS     Major depressive disorder, recurrent episode, moderate (H)     Health Care Home     GERD (gastroesophageal reflux disease)     Hyperlipidemia LDL goal <100     Eczema     Type 2 diabetes mellitus with stage 3 chronic kidney disease, without long-term current use of insulin (H)     Midline low back pain with left-sided sciatica     Morbid obesity (HCC)     Insomnia, unspecified type     Dyspnea, unspecified type     Acute pain of left knee     Controlled substance agreement signed     Current Outpatient Medications   Medication Sig Dispense Refill     albuterol (PROAIR HFA/PROVENTIL HFA/VENTOLIN HFA) 108 (90 Base) MCG/ACT inhaler Inhale 1-2 puffs into the lungs every 6 hours as needed for shortness of breath / dyspnea or wheezing 1 Inhaler 0     aspirin 81 MG EC tablet Take 81 mg by mouth every evening        blood glucose (ACCU-CHEK BYRON PLUS) test strip Use to test blood sugar 2 times daily or as directed. 100 each 12     clonazePAM (KLONOPIN) 1 MG tablet Take 0.5 tablets (0.5 mg) by mouth 2 times daily 30 tablet 3     gabapentin (NEURONTIN) 300 MG capsule Take 2 capsules (600 mg) by mouth daily 180 capsule 3     losartan (COZAAR) 100 MG tablet Take 1 tablet (100 mg) by mouth daily 90 tablet 1     metFORMIN (GLUCOPHAGE-XR) 500 MG 24 hr tablet Take 2 tablets (1,000 mg) by mouth every morning 180 tablet 3     pantoprazole (PROTONIX) 40 MG EC tablet Take 1 tablet (40 mg) by mouth At Bedtime 90 tablet 3     sertraline (ZOLOFT) 100 MG tablet Take 2 tablets (200 mg) by mouth daily 180 tablet 3     simvastatin (ZOCOR) 20 MG tablet Take 1 tablet (20 mg) by mouth At Bedtime 90 tablet 1     zolpidem (AMBIEN) 10 MG tablet Take 1 tablet (10  "mg) by mouth At Bedtime 90 tablet 1      Social History     Tobacco Use     Smoking status: Former Smoker     Last attempt to quit: 3/18/1979     Years since quittin.7     Smokeless tobacco: Never Used     Tobacco comment: quit    Substance Use Topics     Alcohol use: Yes     Comment: Twice a month     Drug use: No            Reviewed and updated as needed this visit by Provider         Review of Systems   She has had no cough, no fever, dyspnea has been worsened with the most recent pneumonia, very mild improvement since discharge.  No chest pain.      Objective    BP (!) 150/83 (BP Location: Right arm, Patient Position: Sitting, Cuff Size: Adult Regular)   Pulse 111   Temp 97.7  F (36.5  C) (Oral)   Resp 16   Ht 1.473 m (4' 10\")   Wt 109.8 kg (242 lb)   LMP 09/15/2007   SpO2 96%   BMI 50.58 kg/m    Body mass index is 50.58 kg/m .  Physical Exam     Lungs: Slight decreased breath sounds, no crackles, no wheezes          Assessment & Plan     1. Pneumonia due to infectious organism, unspecified laterality, unspecified part of lung  Acute infection seems to be slowly resolving, oxygen levels are improving.  Will await work-ups by immunology and GI, she will need to remain off work indefinitely at this time        2. Acute pain of both shoulders  Refer to Ortho for shoulder pain.  - ORTHO  REFERRAL       Return in about 3 months (around 2020).    Pari Wiley MD  [facility] [address]        "

## 2019-11-25 NOTE — NURSING NOTE
"BP (!) 150/83 (BP Location: Right arm, Patient Position: Sitting, Cuff Size: Adult Regular)   Pulse 111   Temp 97.7  F (36.5  C) (Oral)   Resp 16   Ht 1.473 m (4' 10\")   Wt 109.8 kg (242 lb)   LMP 09/15/2007   SpO2 96%   BMI 50.58 kg/m      "

## 2019-12-02 ENCOUNTER — OFFICE VISIT (OUTPATIENT)
Dept: ORTHOPEDICS | Facility: CLINIC | Age: 64
End: 2019-12-02
Payer: COMMERCIAL

## 2019-12-02 ENCOUNTER — ANCILLARY PROCEDURE (OUTPATIENT)
Dept: GENERAL RADIOLOGY | Facility: CLINIC | Age: 64
End: 2019-12-02
Attending: FAMILY MEDICINE
Payer: COMMERCIAL

## 2019-12-02 VITALS
WEIGHT: 242 LBS | HEIGHT: 58 IN | BODY MASS INDEX: 50.8 KG/M2 | SYSTOLIC BLOOD PRESSURE: 130 MMHG | DIASTOLIC BLOOD PRESSURE: 90 MMHG

## 2019-12-02 DIAGNOSIS — M25.511 BILATERAL SHOULDER PAIN: ICD-10-CM

## 2019-12-02 DIAGNOSIS — M25.512 CHRONIC PAIN OF BOTH SHOULDERS: Primary | ICD-10-CM

## 2019-12-02 DIAGNOSIS — M25.511 CHRONIC PAIN OF BOTH SHOULDERS: Primary | ICD-10-CM

## 2019-12-02 DIAGNOSIS — M25.512 BILATERAL SHOULDER PAIN: ICD-10-CM

## 2019-12-02 DIAGNOSIS — M75.42 IMPINGEMENT SYNDROME OF LEFT SHOULDER: ICD-10-CM

## 2019-12-02 DIAGNOSIS — G89.29 CHRONIC PAIN OF BOTH SHOULDERS: Primary | ICD-10-CM

## 2019-12-02 DIAGNOSIS — M75.81 TENDINITIS OF RIGHT ROTATOR CUFF: ICD-10-CM

## 2019-12-02 DIAGNOSIS — M75.82 TENDINITIS OF LEFT ROTATOR CUFF: ICD-10-CM

## 2019-12-02 DIAGNOSIS — M75.41 IMPINGEMENT SYNDROME OF RIGHT SHOULDER: ICD-10-CM

## 2019-12-02 PROBLEM — J96.01 ACUTE RESPIRATORY FAILURE WITH HYPOXIA (H): Status: RESOLVED | Noted: 2019-04-27 | Resolved: 2019-12-02

## 2019-12-02 PROBLEM — J96.00 ACUTE RESPIRATORY FAILURE, UNSP W HYPOXIA OR HYPERCAPNIA (H): Status: RESOLVED | Noted: 2019-07-06 | Resolved: 2019-12-02

## 2019-12-02 PROBLEM — A41.9 SEPSIS (H): Status: RESOLVED | Noted: 2017-09-19 | Resolved: 2019-12-02

## 2019-12-02 PROBLEM — J18.9 PNEUMONIA DUE TO INFECTIOUS ORGANISM: Status: RESOLVED | Noted: 2019-04-11 | Resolved: 2019-12-02

## 2019-12-02 PROCEDURE — 73030 X-RAY EXAM OF SHOULDER: CPT | Mod: LT

## 2019-12-02 PROCEDURE — 20610 DRAIN/INJ JOINT/BURSA W/O US: CPT | Mod: 50 | Performed by: FAMILY MEDICINE

## 2019-12-02 PROCEDURE — 99214 OFFICE O/P EST MOD 30 MIN: CPT | Mod: 25 | Performed by: FAMILY MEDICINE

## 2019-12-02 RX ADMIN — METHYLPREDNISOLONE ACETATE 40 MG: 40 INJECTION, SUSPENSION INTRA-ARTICULAR; INTRALESIONAL; INTRAMUSCULAR; SOFT TISSUE at 14:38

## 2019-12-02 ASSESSMENT — MIFFLIN-ST. JEOR: SCORE: 1537.45

## 2019-12-02 NOTE — PROGRESS NOTES
ASSESSMENT & PLAN    1. Chronic pain of both shoulders    2. Tendinitis of right rotator cuff    3. Tendinitis of left rotator cuff    4. Impingement syndrome of right shoulder    5. Impingement syndrome of left shoulder      Seen in consultation for bilateral shoulder pain, acute and atraumatic  Mild OA on xray but suspect pain is more related to impingement  Steroid injection of the bilateral shoulder: subacromial space was performed today in clinic  Does not desire structured therapy  Can use as needed Ibuprofen but as little as possible because your blood pressure has been higher than normal  If pain is not well controlled in 1-2 weeks would recommend structured physical therapy    -----    SUBJECTIVE  Nicole Reyes is a/an 64 year old Right handed female who is seen in consultation at the request of  Pari Wiley M.D. for evaluation of bilateral shoulder pain. The patient is seen by themselves.    Onset: 3 month(s) ago with pain progressively getting worse. Reports insidious onset without acute precipitating event.  Location of Pain: lateral aspect of bilateral shoulders  Rating of Pain at worst: 10/10  Rating of Pain Currently: 8/10  Worsened by: shoulder abduction and flexion, reaching behind back and overhead, lifting  Better with: ibuprofen provides minimal relief  Treatments tried: rest/activity avoidance, heat and ibuprofen  Associated symptoms: no distal numbness or tingling; denies swelling or warmth  Orthopedic history: NO  Relevant surgical history: NO  Patient Social History: retired    Patient's past medical, surgical, social, and family histories were reviewed today and no pertinent history related to patient's presenting problem.    REVIEW OF SYSTEMS:  10 point ROS is negative other than symptoms noted above in HPI, Past Medical History or as stated below  Constitutional: NEGATIVE for fever, chills, change in weight  Skin: NEGATIVE for worrisome rashes, moles or lesions  GI/: NEGATIVE for  "bowel or bladder changes  Neuro: NEGATIVE for weakness, dizziness or paresthesias    OBJECTIVE:  BP (!) 130/90   Ht 1.473 m (4' 10\")   Wt 109.8 kg (242 lb)   LMP 09/15/2007   BMI 50.58 kg/m     General: healthy, alert and in no distress  HEENT: no scleral icterus or conjunctival erythema  Skin: no suspicious lesions or rash. No jaundice.  CV: regular rhythm by palpation  Resp: normal respiratory effort without conversational dyspnea   Psych: normal mood and affect  Gait: normal steady gait with appropriate coordination and balance  Neuro: normal light touch sensory exam of the bilateral upper extremities.    MSK:  BILATERAL SHOULDER  Inspection:    no atrophy  Palpation:    Tender about the AC (left greater than right) joint and supraspinatus insertion. Remainder of bony and tendinous landmarks are nontender.  Active Range of Motion:     Abduction 1650, FF 1650, , IR normal.    Strength:    Scapular plane abduction 5/5, painful,  ER 5/5, IR 5/5  Special Tests:    Positive: Gunderson', supraspinatus (empty can) and crossed arm adduction (left greater than right)      Independent visualization of the below image:  XR SHOULDER BILATERAL G/E 2 VW 12/2/2019 2:21 PM      HISTORY: Bilateral shoulder pain.     COMPARISON: None.     Right: Mild hypertrophic changes in the glenohumeral joint. Small  subacromial enthesophyte. The acromioclavicular joint is unremarkable.     Left: Mild hypertrophic changes in the glenohumeral joint. The  acromioclavicular joint is unremarkable.                                                                      IMPRESSION: Bilateral glenohumeral osteoarthritis.      FEDERICO WHITNEY MD    Large Joint Injection/Arthocentesis: bilateral subacromial bursa  Date/Time: 12/2/2019 2:38 PM  Performed by: Jomar Helm DO  Authorized by: Jomar Helm DO     Indications:  Pain  Needle Size:  25 G  Guidance: landmark guided    Approach:  Posterior  Location:  Shoulder  Laterality:  " Bilateral      Site:  Bilateral subacromial bursa  Medications (Right):  40 mg methylPREDNISolone 40 MG/ML  Medications (Left):  40 mg methylPREDNISolone 40 MG/ML  Outcome:  Tolerated well, no immediate complications  Procedure discussed: discussed risks, benefits, and alternatives    Consent Given by:  Patient  Timeout: timeout called immediately prior to procedure    Prep: patient was prepped and draped in usual sterile fashion                 Jomar Helm DO [facility]

## 2019-12-02 NOTE — PATIENT INSTRUCTIONS
1. Chronic pain of both shoulders    2. Tendinitis of right rotator cuff    3. Tendinitis of left rotator cuff    4. Impingement syndrome of right shoulder    5. Impingement syndrome of left shoulder      Steroid injection of the bilateral shoulder: subacromial space was performed today in clinic  Can use as needed Ibuprofen but as little as possible because your blood pressure has been higher than normal    - Would not soak in a hot tub, bath or swimming pool for 48 hours  - Ok to shower  - Ice today and only do your normal amounts of activity  - The lidocaine (what is giving you pain relief right now) will likely stop working in 1-2 hours.  You will then have pain again, similar to before you received the injection. The corticosteroid will not start working until approximately 1-2 weeks from now.  In a small percentage of people, cortisone can cause flushing/redness in the face. This usually lasts for 1-3 days and resolves. Cool compress and Ibuprofen/Tylenol can help if this happens.  If pain is not well controlled in 1-2 weeks would recommend structured physical therapy

## 2019-12-02 NOTE — LETTER
12/2/2019         RE: Nicole Reyes  [address]  [place] [id number]        Dear Colleague,    Thank you for referring your patient, Nicole Reyes, to the [facility]. Please see a copy of my visit note below.    ASSESSMENT & PLAN    1. Chronic pain of both shoulders    2. Tendinitis of right rotator cuff    3. Tendinitis of left rotator cuff    4. Impingement syndrome of right shoulder    5. Impingement syndrome of left shoulder      Seen in consultation for bilateral shoulder pain, acute and atraumatic  Mild OA on xray but suspect pain is more related to impingement  Steroid injection of the bilateral shoulder: subacromial space was performed today in clinic  Does not desire structured therapy  Can use as needed Ibuprofen but as little as possible because your blood pressure has been higher than normal  If pain is not well controlled in 1-2 weeks would recommend structured physical therapy    -----    SUBJECTIVE  Nicole Reyes is a/an 64 year old Right handed female who is seen in consultation at the request of  Pari Wiley M.D. for evaluation of bilateral shoulder pain. The patient is seen by themselves.    Onset: 3 month(s) ago with pain progressively getting worse. Reports insidious onset without acute precipitating event.  Location of Pain: lateral aspect of bilateral shoulders  Rating of Pain at worst: 10/10  Rating of Pain Currently: 8/10  Worsened by: shoulder abduction and flexion, reaching behind back and overhead, lifting  Better with: ibuprofen provides minimal relief  Treatments tried: rest/activity avoidance, heat and ibuprofen  Associated symptoms: no distal numbness or tingling; denies swelling or warmth  Orthopedic history: NO  Relevant surgical history: NO  Patient Social History: retired    Patient's past medical, surgical, social, and family histories were reviewed today and no pertinent history related to patient's presenting problem.    REVIEW  "OF SYSTEMS:  10 point ROS is negative other than symptoms noted above in HPI, Past Medical History or as stated below  Constitutional: NEGATIVE for fever, chills, change in weight  Skin: NEGATIVE for worrisome rashes, moles or lesions  GI/: NEGATIVE for bowel or bladder changes  Neuro: NEGATIVE for weakness, dizziness or paresthesias    OBJECTIVE:  BP (!) 130/90   Ht 1.473 m (4' 10\")   Wt 109.8 kg (242 lb)   LMP 09/15/2007   BMI 50.58 kg/m      General: healthy, alert and in no distress  HEENT: no scleral icterus or conjunctival erythema  Skin: no suspicious lesions or rash. No jaundice.  CV: regular rhythm by palpation  Resp: normal respiratory effort without conversational dyspnea   Psych: normal mood and affect  Gait: normal steady gait with appropriate coordination and balance  Neuro: normal light touch sensory exam of the bilateral upper extremities.    MSK:  BILATERAL SHOULDER  Inspection:    no atrophy  Palpation:    Tender about the AC (left greater than right) joint and supraspinatus insertion. Remainder of bony and tendinous landmarks are nontender.  Active Range of Motion:     Abduction 1650, FF 1650, , IR normal.    Strength:    Scapular plane abduction 5/5, painful,  ER 5/5, IR 5/5  Special Tests:    Positive: Gunderson', supraspinatus (empty can) and crossed arm adduction (left greater than right)      Independent visualization of the below image:  XR SHOULDER BILATERAL G/E 2 VW 12/2/2019 2:21 PM      HISTORY: Bilateral shoulder pain.     COMPARISON: None.     Right: Mild hypertrophic changes in the glenohumeral joint. Small  subacromial enthesophyte. The acromioclavicular joint is unremarkable.     Left: Mild hypertrophic changes in the glenohumeral joint. The  acromioclavicular joint is unremarkable.                                                                      IMPRESSION: Bilateral glenohumeral osteoarthritis.      FEDERICO WHITNEY MD    Large Joint Injection/Arthocentesis: " bilateral subacromial bursa  Date/Time: 12/2/2019 2:38 PM  Performed by: Jomar Helm DO  Authorized by: Jomar Helm DO     Indications:  Pain  Needle Size:  25 G  Guidance: landmark guided    Approach:  Posterior  Location:  Shoulder  Laterality:  Bilateral      Site:  Bilateral subacromial bursa  Medications (Right):  40 mg methylPREDNISolone 40 MG/ML  Medications (Left):  40 mg methylPREDNISolone 40 MG/ML  Outcome:  Tolerated well, no immediate complications  Procedure discussed: discussed risks, benefits, and alternatives    Consent Given by:  Patient  Timeout: timeout called immediately prior to procedure    Prep: patient was prepped and draped in usual sterile fashion                 Jomar Helm DO [facility]      Again, thank you for allowing me to participate in the care of your patient.        Sincerely,        Jomar Helm DO

## 2019-12-03 RX ORDER — METHYLPREDNISOLONE ACETATE 40 MG/ML
40 INJECTION, SUSPENSION INTRA-ARTICULAR; INTRALESIONAL; INTRAMUSCULAR; SOFT TISSUE
Status: DISCONTINUED | OUTPATIENT
Start: 2019-12-02 | End: 2020-01-20

## 2019-12-04 ENCOUNTER — TELEPHONE (OUTPATIENT)
Dept: INTERNAL MEDICINE | Facility: CLINIC | Age: 64
End: 2019-12-04

## 2019-12-04 NOTE — TELEPHONE ENCOUNTER
[facility].  Put in Dr. Wiley's in basket.

## 2019-12-05 ENCOUNTER — TRANSFERRED RECORDS (OUTPATIENT)
Dept: HEALTH INFORMATION MANAGEMENT | Facility: CLINIC | Age: 64
End: 2019-12-05

## 2019-12-31 ENCOUNTER — OFFICE VISIT (OUTPATIENT)
Dept: INTERNAL MEDICINE | Facility: CLINIC | Age: 64
End: 2019-12-31
Payer: COMMERCIAL

## 2019-12-31 VITALS
DIASTOLIC BLOOD PRESSURE: 81 MMHG | BODY MASS INDEX: 52.31 KG/M2 | RESPIRATION RATE: 18 BRPM | OXYGEN SATURATION: 93 % | WEIGHT: 249.2 LBS | TEMPERATURE: 98.2 F | HEIGHT: 58 IN | HEART RATE: 117 BPM | SYSTOLIC BLOOD PRESSURE: 129 MMHG

## 2019-12-31 DIAGNOSIS — Z01.818 PREOP GENERAL PHYSICAL EXAM: Primary | ICD-10-CM

## 2019-12-31 DIAGNOSIS — K21.9 GASTROESOPHAGEAL REFLUX DISEASE WITHOUT ESOPHAGITIS: ICD-10-CM

## 2019-12-31 LAB — HGB BLD-MCNC: 12.4 G/DL (ref 11.7–15.7)

## 2019-12-31 PROCEDURE — 93000 ELECTROCARDIOGRAM COMPLETE: CPT | Performed by: INTERNAL MEDICINE

## 2019-12-31 PROCEDURE — 85018 HEMOGLOBIN: CPT | Performed by: INTERNAL MEDICINE

## 2019-12-31 PROCEDURE — 36415 COLL VENOUS BLD VENIPUNCTURE: CPT | Performed by: INTERNAL MEDICINE

## 2019-12-31 PROCEDURE — 99214 OFFICE O/P EST MOD 30 MIN: CPT | Performed by: INTERNAL MEDICINE

## 2019-12-31 ASSESSMENT — MIFFLIN-ST. JEOR: SCORE: 1570.11

## 2019-12-31 NOTE — PROGRESS NOTES
[place] [address]  [facility] [address]  [phone]  Dept: [phone]    PRE-OP EVALUATION:  Today's date: 2019    Nicole eRyes (: 1955) presents for pre-operative evaluation assessment as requested by Dr. Stauffer.  She requires evaluation and anesthesia risk assessment prior to undergoing surgery/procedure for treatment of presumed acid reflux causing recurrent pneumonia  Fax number for surgical facility: [facility]  Primary Physician: Pari Wiley  Type of Anesthesia Anticipated: MAC    Patient has a Health Care Directive or Living Will:  NO    Preop Questions 2019   What are you having done? EGD   Date of Surgery/Procedure: 2020 @ 9:00 am   Facility or Hospital where procedure/surgery will be performed: [facility]   1.  Do you have a history of Heart attack, stroke, stent, coronary bypass surgery, or other heart surgery? No   2.  Do you ever have any pain or discomfort in your chest? No   3.  Do you have a history of  Heart Failure? No   4.   Are you troubled by shortness of breath when:  walking on a level surface, or up a slight hill, or at night? YES -she has continued dyspnea on exertion since her most recent pneumonia   5.  Do you currently have a cold, bronchitis or other respiratory infection? No   6.  Do you have a cough, shortness of breath, or wheezing? YES -    7.  Do you sometimes get pains in the calves of your legs when you walk? No   8. Do you or anyone in your family have previous history of blood clots? No   9.  Do you or does anyone in your family have a serious bleeding problem such as prolonged bleeding following surgeries or cuts? No   10. Have you ever had problems with anemia or been told to take iron pills? YES - anemia with illnesses, taking iron now   11. Have you had any abnormal blood loss such as black, tarry or bloody stools, or abnormal vaginal bleeding? No   12. Have you ever had a blood  transfusion? YES - distant past with surgery   13. Have you or any of your relatives ever had problems with anesthesia? No   14. Do you have sleep apnea, excessive snoring or daytime drowsiness? YES - known CRISTÓBAL on bipap   15. Do you have any prosthetic heart valves? No   16. Do you have prosthetic joints? No   17. Is there any chance that you may be pregnant? No         HPI:     HPI related to upcoming procedure: She has had multiple recurrent pneumonias resulting in respiratory failure.  Evaluation suggests that underlying cause may be chronic acid reflux so she will undergo endoscopy for assessment of changes as well as measurements for possible esophageal acid measurements      DIABETES - Patient has a longstanding history of DiabetesType Type II . Patient is being treated with oral agents and denies significant side effects. Control has been good. Complicating factors include but are not limited to: hyperlipidemia.     She still has had dyspnea since her most recent pneumonia, still on oxygen with activity and at night but is improving.    MEDICAL HISTORY:     Patient Active Problem List    Diagnosis Date Noted     Controlled substance agreement signed 12/04/2018     Priority: Medium     Acute pain of left knee 09/19/2018     Priority: Medium     Dyspnea, unspecified type 05/31/2017     Priority: Medium     Insomnia, unspecified type 05/23/2016     Priority: Medium     Morbid obesity (HCC) 10/15/2015     Priority: Medium     Midline low back pain with left-sided sciatica 09/10/2015     Priority: Medium     Type 2 diabetes mellitus with stage 3 chronic kidney disease, without long-term current use of insulin (H) 09/08/2015     Priority: Medium     Eczema 02/16/2014     Priority: Medium     Hyperlipidemia LDL goal <100 11/01/2012     Priority: Medium     GERD (gastroesophageal reflux disease) 09/03/2012     Priority: Medium     Health Care Home 11/28/2011     Priority: Medium       DX V65.8 REPLACED WITH [id number]  HEALTH CARE HOME (2013)         GENERALIZED ANXIETY DIS 2011     Priority: Medium     Major depressive disorder, recurrent episode, moderate (H) 2011     Priority: Medium     Advanced directives, counseling/discussion 2011     Priority: Medium     Discussed Advance Directive planning with patient; information given to patient to review.       CKD (chronic kidney disease) stage 3, GFR 30-59 ml/min (H)      Priority: Medium     Restless leg syndrome 2010     Priority: Medium     CRISTÓBAL (obstructive sleep apnea)      Priority: Medium     Essential hypertension, benign 2003     Priority: Medium      Past Medical History:   Diagnosis Date     Blood transfusion      CKD (chronic kidney disease) stage 3, GFR 30-59 ml/min (H)      Essential hypertension, benign      Gastroesophageal reflux disease      Generalized anxiety disorder      Hernia, abdominal      Hiatal hernia      History of blood transfusion     with a hernia repair     Hypertension      Insomnia, unspecified      Iron deficiency anemia      Major depression     sees a psychiatrist     Menopause     age 53     Obesity, unspecified      CRISTÓBAL (obstructive sleep apnea)     bipap     Oxygen dependent     2 LPM at home     Postoperative seroma 10/11    drained several times     Pure hypercholesterolemia      RLS (restless legs syndrome)      Type II or unspecified type diabetes mellitus without mention of complication, not stated as uncontrolled      Past Surgical History:   Procedure Laterality Date     BREAST BIOPSY, RT/LT      2 times; benign     C NONSPECIFIC PROCEDURE      Hernia repair      C NONSPECIFIC PROCEDURE      Lymph node biopsy in neck (benign)       SECTION        SECTION        SECTION       DILATION AND CURETTAGE, HYSTEROSCOPY DIAGNOSTIC, COMBINED  3/22/2013    Procedure: COMBINED DILATION AND CURETTAGE, HYSTEROSCOPY DIAGNOSTIC;  DILATION AND CURETTAGE, HYSTEROSCOPY DIAGNOSTIC    Converted to a general;  Surgeon: Robi Edwards MD;  Location: [facility] OR     ESOPHAGOSCOPY, GASTROSCOPY, DUODENOSCOPY (EGD), COMBINED N/A 12/8/2015    Procedure: COMBINED ESOPHAGOSCOPY, GASTROSCOPY, DUODENOSCOPY (EGD);  Surgeon: Obinna Gutierrez MD;  Location: [facility] GI     ESOPHAGOSCOPY, GASTROSCOPY, DUODENOSCOPY (EGD), COMBINED N/A 12/22/2015    Procedure: COMBINED ENDOSCOPIC ULTRASOUND, ESOPHAGOSCOPY, GASTROSCOPY, DUODENOSCOPY (EGD), FINE NEEDLE ASPIRATE/BIOPSY;  Surgeon: Sabine Olivares MD;  Location: [facility]     HERNIORRHAPHY INCISIONAL (LOCATION)  10/8/2011     incarcerated hernia repair with mesh;     HERNIORRHAPHY INCISIONAL (LOCATION)  10/16/2011     repair incisional hernia with mesh, and removal of current implanted mesh;     Current Outpatient Medications   Medication Sig Dispense Refill     albuterol (PROAIR HFA/PROVENTIL HFA/VENTOLIN HFA) 108 (90 Base) MCG/ACT inhaler Inhale 1-2 puffs into the lungs every 6 hours as needed for shortness of breath / dyspnea or wheezing 1 Inhaler 0     aspirin 81 MG EC tablet Take 81 mg by mouth every evening        blood glucose (ACCU-CHEK BYRON PLUS) test strip Use to test blood sugar 2 times daily or as directed. 100 each 12     clonazePAM (KLONOPIN) 1 MG tablet Take 0.5 tablets (0.5 mg) by mouth 2 times daily 30 tablet 3     gabapentin (NEURONTIN) 300 MG capsule Take 2 capsules (600 mg) by mouth daily 180 capsule 3     losartan (COZAAR) 100 MG tablet Take 1 tablet (100 mg) by mouth daily 90 tablet 1     metFORMIN (GLUCOPHAGE-XR) 500 MG 24 hr tablet Take 2 tablets (1,000 mg) by mouth every morning 180 tablet 3     pantoprazole (PROTONIX) 40 MG EC tablet Take 1 tablet (40 mg) by mouth At Bedtime 90 tablet 3     sertraline (ZOLOFT) 100 MG tablet Take 2 tablets (200 mg) by mouth daily 180 tablet 3     simvastatin (ZOCOR) 20 MG tablet Take 1 tablet (20 mg) by mouth At Bedtime 90 tablet 1     zolpidem (AMBIEN) 10 MG tablet Take 1 tablet (10 mg) by mouth At  "Bedtime 90 tablet 1     OTC products: iron    Allergies   Allergen Reactions     Codeine Rash     Penicillins Rash     Pt has tolerated cephalosporins and penems.   Pt tolerated Zosyn 2019      Latex Allergy: NO    Social History     Tobacco Use     Smoking status: Former Smoker     Last attempt to quit: 3/18/1979     Years since quittin.8     Smokeless tobacco: Never Used     Tobacco comment: quit    Substance Use Topics     Alcohol use: Yes     Comment: Twice a month     History   Drug Use No       REVIEW OF SYSTEMS:   GENERAL: negative for, fever, chills, weight loss, weight gain  EYES: negative  ENT: negative  RESPIRATORY: no cough, gradually improving dyspnea on exertion   CARDIOVASCULAR: negative for, palpitations, tachycardia, irregular heart beat and chest pain  GI: negative for, nausea, vomiting, abdominal pain, melena and hematochezia  : negative  MUSCULOSKELETAL: negative  NEUROLOGIC: negative for, seizures, local weakness, numbness or tingling of hands and numbness or tingling of feet, stable am headache  SKIN: negative  ENDOCRINE: negative         EXAM:     Patient is alert, oriented, cooperative in no acute distress.  /81   Pulse 117   Temp 98.2  F (36.8  C) (Oral)   Resp 18   Ht 1.473 m (4' 10\")   Wt 113 kg (249 lb 3.2 oz)   LMP 09/15/2007   SpO2 93%   BMI 52.08 kg/m      HEENT: PERRL, EOMI, TM's are normal. Oropharynx is clear.  NECK: No lymphadenopathy or thyromegaly. Carotid pulses full without bruits.  LUNGS: clear  CV: normal S1, S2 without murmur, S3 or S4 present. Pulses are 2/2 throughout. No JVD.  ABDOMEN: Bowel sounds present, nontender without hepatosplenomegaly. Liver is normal size to percussion.  EXTREMITIES: no edema present, unremarkable joints  NEUROLOGIC: Cranial nerves II-XII intact, reflexes 2/4 throughout, strength 5/5,  SKIN: without rashes or significant lesions     DIAGNOSTICS:   EKG: appears normal, NSR, normal axis, normal intervals, no acute ST/T " changes c/w ischemia, no LVH by voltage criteria, unchanged from previous tracings  Lab: hgb 12. 4    Recent Labs   Lab Test 11/16/19  0737 11/15/19  0416 11/14/19  2238  11/14/19  0541  07/06/19  1921  06/21/19  1438  04/02/19  1415  10/19/16  1630   HGB 8.5* 8.8*  --   --  9.4*   < >  --    < >  --    < >  --    < > 10.6*    205  --   --  205   < >  --    < >  --    < >  --    < > 271   INR  --   --   --   --   --   --  1.07  --   --   --   --   --  0.99   NA  --  140  --   --  141   < >  --    < > 142   < > 142   < > 137   POTASSIUM  --  3.8 3.6   < > 3.1*   < >  --    < > 4.0   < > 3.5   < > 3.4   CR 0.70 0.72  --   --  0.73   < >  --    < > 0.95   < > 0.86   < > 0.95   A1C  --   --   --   --   --   --   --   --  6.7*  --  6.5*   < >  --     < > = values in this interval not displayed.        IMPRESSION:   Reason for surgery/procedure: Recurrent pneumonia due to possible acid reflux  Diagnosis/reason for consult: preop    The proposed surgical procedure is considered LOW risk.    REVISED CARDIAC RISK INDEX  The patient has the following serious cardiovascular risks for perioperative complications such as (MI, PE, VFib and 3  AV Block):  No serious cardiac risks  INTERPRETATION: 0 risks: Class I (very low risk - 0.4% complication rate)    The patient has the following additional risks for perioperative complications:  Morbid obesity      ICD-10-CM    1. Preop general physical exam Z01.818 EKG 12-lead complete w/read - Clinics     Hemoglobin   2. Gastroesophageal reflux disease without esophagitis K21.9        RECOMMENDATIONS:         Obstructive Sleep Apnea (or suspected sleep apnea)  Monitor oxygen carefully because of tendency towards hypoxia after recent pneumonia and sleep apnea      Anemia  resolved      She will hold all her medications the morning of the procedure    APPROVAL GIVEN to proceed with proposed procedure, without further diagnostic evaluation       Signed Electronically by: Pari Wiley,  MD    Copy of this evaluation report is provided to requesting physician.    [facility] Preop Guidelines    Revised Cardiac Risk Index

## 2020-01-03 ENCOUNTER — HOSPITAL ENCOUNTER (OUTPATIENT)
Facility: CLINIC | Age: 65
Discharge: HOME | End: 2020-01-03
Attending: INTERNAL MEDICINE | Admitting: INTERNAL MEDICINE
Payer: COMMERCIAL

## 2020-01-03 ENCOUNTER — ANESTHESIA (OUTPATIENT)
Dept: SURGERY | Facility: CLINIC | Age: 65
End: 2020-01-03
Payer: COMMERCIAL

## 2020-01-03 ENCOUNTER — ANESTHESIA EVENT (OUTPATIENT)
Dept: SURGERY | Facility: CLINIC | Age: 65
End: 2020-01-03
Payer: COMMERCIAL

## 2020-01-03 VITALS
BODY MASS INDEX: 51.22 KG/M2 | RESPIRATION RATE: 18 BRPM | WEIGHT: 244 LBS | DIASTOLIC BLOOD PRESSURE: 96 MMHG | TEMPERATURE: 98.1 F | HEIGHT: 58 IN | SYSTOLIC BLOOD PRESSURE: 138 MMHG | OXYGEN SATURATION: 96 % | HEART RATE: 75 BPM

## 2020-01-03 LAB
GLUCOSE BLDC GLUCOMTR-MCNC: 100 MG/DL (ref 70–99)
GLUCOSE BLDC GLUCOMTR-MCNC: 142 MG/DL (ref 70–99)
UPPER GI ENDOSCOPY: NORMAL

## 2020-01-03 PROCEDURE — 71000027 ZZH RECOVERY PHASE 2 EACH 15 MINS: Performed by: INTERNAL MEDICINE

## 2020-01-03 PROCEDURE — 25000128 H RX IP 250 OP 636: Performed by: NURSE ANESTHETIST, CERTIFIED REGISTERED

## 2020-01-03 PROCEDURE — 40000063 ZZH STATISTIC EGD (OR PROCEDURE): Performed by: INTERNAL MEDICINE

## 2020-01-03 PROCEDURE — 37000008 ZZH ANESTHESIA TECHNICAL FEE, 1ST 30 MIN: Performed by: INTERNAL MEDICINE

## 2020-01-03 PROCEDURE — 40000306 ZZH STATISTIC PRE PROC ASSESS II: Performed by: INTERNAL MEDICINE

## 2020-01-03 PROCEDURE — 36000050 ZZH SURGERY LEVEL 2 1ST 30 MIN: Performed by: INTERNAL MEDICINE

## 2020-01-03 PROCEDURE — 25800030 ZZH RX IP 258 OP 636: Performed by: ANESTHESIOLOGY

## 2020-01-03 PROCEDURE — 82962 GLUCOSE BLOOD TEST: CPT

## 2020-01-03 PROCEDURE — 25000125 ZZHC RX 250: Performed by: NURSE ANESTHETIST, CERTIFIED REGISTERED

## 2020-01-03 PROCEDURE — 27210794 ZZH OR GENERAL SUPPLY STERILE: Performed by: INTERNAL MEDICINE

## 2020-01-03 RX ORDER — ONDANSETRON 4 MG/1
4 TABLET, ORALLY DISINTEGRATING ORAL EVERY 6 HOURS PRN
Status: DISCONTINUED | OUTPATIENT
Start: 2020-01-03 | End: 2020-01-03 | Stop reason: HOSPADM

## 2020-01-03 RX ORDER — ONDANSETRON 2 MG/ML
INJECTION INTRAMUSCULAR; INTRAVENOUS PRN
Status: DISCONTINUED | OUTPATIENT
Start: 2020-01-03 | End: 2020-01-03

## 2020-01-03 RX ORDER — LIDOCAINE 40 MG/G
CREAM TOPICAL
Status: DISCONTINUED | OUTPATIENT
Start: 2020-01-03 | End: 2020-01-03 | Stop reason: HOSPADM

## 2020-01-03 RX ORDER — ONDANSETRON 2 MG/ML
4 INJECTION INTRAMUSCULAR; INTRAVENOUS EVERY 6 HOURS PRN
Status: DISCONTINUED | OUTPATIENT
Start: 2020-01-03 | End: 2020-01-03 | Stop reason: HOSPADM

## 2020-01-03 RX ORDER — KETAMINE HYDROCHLORIDE 10 MG/ML
INJECTION INTRAMUSCULAR; INTRAVENOUS PRN
Status: DISCONTINUED | OUTPATIENT
Start: 2020-01-03 | End: 2020-01-03

## 2020-01-03 RX ORDER — GLYCOPYRROLATE 0.2 MG/ML
INJECTION, SOLUTION INTRAMUSCULAR; INTRAVENOUS PRN
Status: DISCONTINUED | OUTPATIENT
Start: 2020-01-03 | End: 2020-01-03

## 2020-01-03 RX ORDER — LIDOCAINE HYDROCHLORIDE 10 MG/ML
INJECTION, SOLUTION INFILTRATION; PERINEURAL PRN
Status: DISCONTINUED | OUTPATIENT
Start: 2020-01-03 | End: 2020-01-03

## 2020-01-03 RX ORDER — PROPOFOL 10 MG/ML
INJECTION, EMULSION INTRAVENOUS PRN
Status: DISCONTINUED | OUTPATIENT
Start: 2020-01-03 | End: 2020-01-03

## 2020-01-03 RX ORDER — SODIUM CHLORIDE, SODIUM LACTATE, POTASSIUM CHLORIDE, CALCIUM CHLORIDE 600; 310; 30; 20 MG/100ML; MG/100ML; MG/100ML; MG/100ML
INJECTION, SOLUTION INTRAVENOUS CONTINUOUS
Status: DISCONTINUED | OUTPATIENT
Start: 2020-01-03 | End: 2020-01-03 | Stop reason: HOSPADM

## 2020-01-03 RX ORDER — FLUMAZENIL 0.1 MG/ML
0.2 INJECTION, SOLUTION INTRAVENOUS
Status: DISCONTINUED | OUTPATIENT
Start: 2020-01-03 | End: 2020-01-03 | Stop reason: HOSPADM

## 2020-01-03 RX ORDER — NALOXONE HYDROCHLORIDE 0.4 MG/ML
.1-.4 INJECTION, SOLUTION INTRAMUSCULAR; INTRAVENOUS; SUBCUTANEOUS
Status: DISCONTINUED | OUTPATIENT
Start: 2020-01-03 | End: 2020-01-03 | Stop reason: HOSPADM

## 2020-01-03 RX ADMIN — PROPOFOL 50 MG: 10 INJECTION, EMULSION INTRAVENOUS at 09:01

## 2020-01-03 RX ADMIN — PROPOFOL 50 MG: 10 INJECTION, EMULSION INTRAVENOUS at 09:04

## 2020-01-03 RX ADMIN — Medication 30 MG: at 09:01

## 2020-01-03 RX ADMIN — LIDOCAINE HYDROCHLORIDE 100 MG: 10 INJECTION, SOLUTION INFILTRATION; PERINEURAL at 09:01

## 2020-01-03 RX ADMIN — MIDAZOLAM 2 MG: 1 INJECTION INTRAMUSCULAR; INTRAVENOUS at 08:54

## 2020-01-03 RX ADMIN — ONDANSETRON HYDROCHLORIDE 4 MG: 2 INJECTION, SOLUTION INTRAVENOUS at 09:02

## 2020-01-03 RX ADMIN — SODIUM CHLORIDE, POTASSIUM CHLORIDE, SODIUM LACTATE AND CALCIUM CHLORIDE: 600; 310; 30; 20 INJECTION, SOLUTION INTRAVENOUS at 08:34

## 2020-01-03 RX ADMIN — GLYCOPYRROLATE 0.2 MG: 0.2 INJECTION, SOLUTION INTRAMUSCULAR; INTRAVENOUS at 08:54

## 2020-01-03 ASSESSMENT — ENCOUNTER SYMPTOMS: DYSRHYTHMIAS: 0

## 2020-01-03 ASSESSMENT — MIFFLIN-ST. JEOR: SCORE: 1546.53

## 2020-01-03 NOTE — DISCHARGE INSTRUCTIONS
ESOPHAGOGASTRODUODENOSCOPY DISCHARGE INSTRUCTIONS    You may not drive, use heavy equipment or consume alcohol for 24 hours because the drugs you were given may cause dizziness, drowsiness, forgetfulness and slower reaction time.    Small pieces or tissue (biopsies) or polyps may have been removed.    You may resume your regular diet and medications. Exception: If you had a biopsy or polypectomy, do not take aspirin, aleve (naproxen) or ibuprofen for the next 10 days.  Tylenol (acetaminophen) is safe to take.    Additional instructions:  If you had a biopsy or polypectomy, the pathology report will be sent to your doctor.  If you have not received the results within 10 days, call your doctor's office.    What to watch for:  Problems rarely occur after the procedure.  It is important for you to be aware of the early signs of a possible complication.  Call immediately if you notice any of the followin.  Unusual pain or difficulty swallowing.    2.  Unusual abdominal or chest pain.    3.  Vomiting of blood.    4.  Black or bloody stools.    5.  Temperature above 100.6 degrees F             GENERAL ANESTHESIA OR SEDATION ADULT DISCHARGE INSTRUCTIONS   SPECIAL PRECAUTIONS FOR 24 HOURS AFTER SURGERY    IT IS NOT UNUSUAL TO FEEL LIGHT-HEADED OR FAINT, UP TO 24 HOURS AFTER SURGERY OR WHILE TAKING PAIN MEDICATION.  IF YOU HAVE THESE SYMPTOMS; SIT FOR A FEW MINUTES BEFORE STANDING AND HAVE SOMEONE ASSIST YOU WHEN YOU GET UP TO WALK OR USE THE BATHROOM.    YOU SHOULD REST AND RELAX FOR THE NEXT 24 HOURS AND YOU MUST MAKE ARRANGEMENTS TO HAVE SOMEONE STAY WITH YOU FOR AT LEAST 24 HOURS AFTER YOUR DISCHARGE.  AVOID HAZARDOUS AND STRENUOUS ACTIVITIES.  DO NOT MAKE IMPORTANT DECISIONS FOR 24 HOURS.    DO NOT DRIVE ANY VEHICLE OR OPERATE MECHANICAL EQUIPMENT FOR 24 HOURS FOLLOWING THE END OF YOUR SURGERY.  EVEN THOUGH YOU MAY FEEL NORMAL, YOUR REACTIONS MAY BE AFFECTED BY THE MEDICATION YOU HAVE RECEIVED.    DO NOT DRINK  ALCOHOLIC BEVERAGES FOR 24 HOURS FOLLOWING YOUR SURGERY.    DRINK CLEAR LIQUIDS (APPLE JUICE, GINGER ALE, 7-UP, BROTH, ETC.).  PROGRESS TO YOUR REGULAR DIET AS YOU FEEL ABLE.    YOU MAY HAVE A DRY MOUTH, A SORE THROAT, MUSCLES ACHES OR TROUBLE SLEEPING.  THESE SHOULD GO AWAY AFTER 24 HOURS.    CALL YOUR DOCTOR FOR ANY OF THE FOLLOWING:  SIGNS OF INFECTION (FEVER, GROWING TENDERNESS AT THE SURGERY SITE, A LARGE AMOUNT OF DRAINAGE OR BLEEDING, SEVERE PAIN, FOUL-SMELLING DRAINAGE, REDNESS OR SWELLING.    IT HAS BEEN OVER 8 TO 10 HOURS SINCE SURGERY AND YOU ARE STILL NOT ABLE TO URINATE (PASS WATER).

## 2020-01-03 NOTE — ANESTHESIA POSTPROCEDURE EVALUATION
Patient: Nicole Reyes    Procedure(s):  ESOPHAGOGASTRODUODENOSCOPY    Diagnosis:Gastroesophageal reflux disease, esophagitis presence not specified [K21.9]  Diagnosis Additional Information: No value filed.    Anesthesia Type:  MAC    Note:  Anesthesia Post Evaluation    Patient location during evaluation: PACU  Patient participation: Able to fully participate in evaluation  Level of consciousness: awake  Pain management: adequate  Airway patency: patent  Cardiovascular status: acceptable  Respiratory status: acceptable  Hydration status: euvolemic  PONV: controlled     Anesthetic complications: None          Last vitals:  Vitals:    01/03/20 0915 01/03/20 0920 01/03/20 0925   BP: 116/57 103/88 104/59   Pulse: 83 82 81   Resp: 20 18 16   Temp: 97  F (36.1  C)     SpO2: 98% 100%          Electronically Signed By: Bhavesh Meier MD  January 3, 2020  9:47 AM

## 2020-01-03 NOTE — ANESTHESIA PREPROCEDURE EVALUATION
Anesthesia Pre-Procedure Evaluation    Patient: Nicole Reyes   MRN: [id number] : 1955          Preoperative Diagnosis: Gastroesophageal reflux disease, esophagitis presence not specified [K21.9]    Procedure(s):  ESOPHAGOGASTRODUODENOSCOPY (EGD) ([facility])    Past Medical History:   Diagnosis Date     Blood transfusion      CKD (chronic kidney disease) stage 3, GFR 30-59 ml/min (H)      Essential hypertension, benign      Gastroesophageal reflux disease      Generalized anxiety disorder      Hernia, abdominal      Hiatal hernia      History of blood transfusion     with a hernia repair     Hypertension      Insomnia, unspecified      Iron deficiency anemia      Major depression     sees a psychiatrist     Menopause     age 53     Obesity, unspecified      CRISTÓBAL (obstructive sleep apnea)     bipap     Oxygen dependent     2 LPM at home     Postoperative seroma 10/11    drained several times     Pure hypercholesterolemia      RLS (restless legs syndrome)      Type II or unspecified type diabetes mellitus without mention of complication, not stated as uncontrolled      Past Surgical History:   Procedure Laterality Date     BREAST BIOPSY, RT/LT      2 times; benign     C NONSPECIFIC PROCEDURE      Hernia repair      C NONSPECIFIC PROCEDURE      Lymph node biopsy in neck (benign)       SECTION        SECTION        SECTION       DILATION AND CURETTAGE, HYSTEROSCOPY DIAGNOSTIC, COMBINED  3/22/2013    Procedure: COMBINED DILATION AND CURETTAGE, HYSTEROSCOPY DIAGNOSTIC;  DILATION AND CURETTAGE, HYSTEROSCOPY DIAGNOSTIC   Converted to a general;  Surgeon: Robi Edwards MD;  Location:  OR     ESOPHAGOSCOPY, GASTROSCOPY, DUODENOSCOPY (EGD), COMBINED N/A 2015    Procedure: COMBINED ESOPHAGOSCOPY, GASTROSCOPY, DUODENOSCOPY (EGD);  Surgeon: Obinna Gutierrez MD;  Location:  GI     ESOPHAGOSCOPY, GASTROSCOPY, DUODENOSCOPY (EGD), COMBINED N/A 2015    Procedure:  COMBINED ENDOSCOPIC ULTRASOUND, ESOPHAGOSCOPY, GASTROSCOPY, DUODENOSCOPY (EGD), FINE NEEDLE ASPIRATE/BIOPSY;  Surgeon: Sabine Olivares MD;  Location:  GI     HERNIORRHAPHY INCISIONAL (LOCATION)  10/8/2011     incarcerated hernia repair with mesh;     HERNIORRHAPHY INCISIONAL (LOCATION)  10/16/2011     repair incisional hernia with mesh, and removal of current implanted mesh;     Anesthesia Evaluation     . Pt has had prior anesthetic. Type: MAC           ROS/MED HX    ENT/Pulmonary:     (+)sleep apnea, uses CPAP , recent URI . .    Neurologic:      (-) Dementia and migraines   Cardiovascular: Comment: Name: ASHANTI PATEL  MRN: [id number]  : 1955  Study Date: 2019 09:51 AM  Age: 63 yrs  Gender: Female  [facility]  Reason For Study: Tachycardia  Ordering Physician: JOESPH WILSON  Referring Physician: Pari Wiley  Performed By: Noy Negron RDCS     BSA: 2.0 m2  Height: 58 in  Weight: 262 lb  HR: 64  BP: 145/76 mmHg  _____________________________________________________________________________  __        Procedure  Complete Portable Echo Adult. Contrast Optison.  _____________________________________________________________________________  __        Interpretation Summary     The visual ejection fraction is estimated at 55-60%.  The left atrium is mild to moderately dilated.  The study was technically difficult. Optison contrast was used without  apparent complications.    (+) Dyslipidemia, hypertension----. : . . KHALIL, . :. .      (-) CAD, CHF, arrhythmias and pulmonary hypertension   METS/Exercise Tolerance:     Hematologic:     (+) Anemia, -      Musculoskeletal:        (-) arthritis   GI/Hepatic:     (+) GERD hiatal hernia,       Renal/Genitourinary:     (+) chronic renal disease,       Endo:     (+) type II DM Obesity, .      Psychiatric:        (-) psychiatric history   Infectious Disease:  - neg infectious disease ROS       Malignancy:      - no malignancy   Other:     - neg other ROS                      Physical Exam      Airway   Mallampati: II  TM distance: >3 FB  Neck ROM: full    Dental     Cardiovascular   Rhythm and rate: regular and normal  (-) no murmur    Pulmonary    breath sounds clear to auscultation    Other findings: Lab Test        12/31/19     11/16/19     11/15/19     11/14/19      --          07/06/19      --          10/19/16      --          10/28/11                       1614          0737          0416          0541           --           1921           --           1630           --           1055          WBC           --          10.1         14.2*        19.5*          < >         --            < >        17.7*          < >        9.0           HGB          12.4         8.5*         8.8*         9.4*           < >         --            < >        10.6*          < >        8.4*          MCV           --          74*          74*          75*            < >         --            < >        72*            < >        83            PLT           --          216          205          205            < >         --            < >        271            < >        392           INR           --           --           --           --           --          1.07          --          0.99          --          1.04           < > = values in this interval not displayed.                  Lab Test        11/16/19     11/15/19     11/14/19     11/14/19     11/14/19     11/13/19                       0737          0416          2238          1246          0541          2238          NA            --          140           --           --          141          140           POTASSIUM     --          3.8          3.6          3.3*         3.1*         3.4           CHLORIDE      --          107           --           --          107          108           CO2           --          29            --           --          28           26            BUN           --           "10            --           --          13           14            CR           0.70         0.72          --           --          0.73         0.77          ANIONGAP      --          4             --           --          6            6             GRECIA           --          8.4*          --           --          7.6*         7.6*          GLC           --          127*          --           --          122*         120*                Lab Results   Component Value Date    WBC 10.1 11/16/2019    HGB 12.4 12/31/2019    HCT 29.5 (L) 11/16/2019     11/16/2019    CRP 23.9 (H) 10/02/2017    SED 18 10/02/2017     11/15/2019    POTASSIUM 3.8 11/15/2019    CHLORIDE 107 11/15/2019    CO2 29 11/15/2019    BUN 10 11/15/2019    CR 0.70 11/16/2019     (H) 11/15/2019    GRECIA 8.4 (L) 11/15/2019    PHOS 2.9 10/19/2011    MAG 2.0 11/15/2019    ALBUMIN 3.5 11/13/2019    PROTTOTAL 7.7 11/13/2019    ALT 33 11/13/2019    AST 25 11/13/2019    ALKPHOS 118 11/13/2019    BILITOTAL 0.4 11/13/2019    PTT 36 07/06/2019    INR 1.07 07/06/2019    TSH 2.31 06/21/2019    T4 1.12 03/22/2016       Preop Vitals  BP Readings from Last 3 Encounters:   01/03/20 (!) 172/81   12/31/19 129/81   12/02/19 (!) 130/90    Pulse Readings from Last 3 Encounters:   12/31/19 117   11/25/19 111   11/17/19 83      Resp Readings from Last 3 Encounters:   01/03/20 18   12/31/19 18   11/25/19 16    SpO2 Readings from Last 3 Encounters:   01/03/20 93%   12/31/19 93%   11/25/19 96%      Temp Readings from Last 1 Encounters:   01/03/20 96.5  F (35.8  C) (Temporal)    Ht Readings from Last 1 Encounters:   01/03/20 1.473 m (4' 10\")      Wt Readings from Last 1 Encounters:   01/03/20 110.7 kg (244 lb)    Estimated body mass index is 51 kg/m  as calculated from the following:    Height as of this encounter: 1.473 m (4' 10\").    Weight as of this encounter: 110.7 kg (244 lb).       Anesthesia Plan      History & Physical Review  History and physical reviewed " and following examination; no interval change.    ASA Status:  3 .    NPO Status:  > 8 hours    Plan for MAC Reason for MAC:  Deep or markedly invasive procedure (G8)  PONV prophylaxis:  Ondansetron (or other 5HT-3)       Postoperative Care  Postoperative pain management:  IV analgesics and Oral pain medications.      Consents  Anesthetic plan, risks, benefits and alternatives discussed with:  Patient..                 Bhavesh Meier MD                    .

## 2020-01-03 NOTE — ANESTHESIA CARE TRANSFER NOTE
Patient: Nicole Reyes    Procedure(s):  ESOPHAGOGASTRODUODENOSCOPY    Diagnosis: Gastroesophageal reflux disease, esophagitis presence not specified [K21.9]  Diagnosis Additional Information: No value filed.    Anesthesia Type:   MAC     Note:  Airway :Room Air  Patient transferred to:Phase II  Comments: Janae Report: Identifed the Patient, Identified the Reponsible Provider, Reviewed the pertinent medical history, Discussed the surgical course, Reviewed Intra-OP anesthesia mangement and issues during anesthesia, Set expectations for post-procedure period and Allowed opportunity for questions and acknowledgement of understanding      Vitals: (Last set prior to Anesthesia Care Transfer)    CRNA VITALS  1/3/2020 0840 - 1/3/2020 0915      1/3/2020             NIBP:  113/89    Pulse:  86    NIBP Mean:  98    SpO2:  95 %                Electronically Signed By: LEONCIO Shaw CRNA  January 3, 2020  9:15 AM

## 2020-01-16 ENCOUNTER — APPOINTMENT (OUTPATIENT)
Dept: GENERAL RADIOLOGY | Facility: CLINIC | Age: 65
DRG: 194 | End: 2020-01-16
Attending: EMERGENCY MEDICINE
Payer: COMMERCIAL

## 2020-01-16 ENCOUNTER — HOSPITAL ENCOUNTER (INPATIENT)
Facility: CLINIC | Age: 65
LOS: 4 days | Discharge: HOME | DRG: 194 | End: 2020-01-20
Attending: EMERGENCY MEDICINE | Admitting: INTERNAL MEDICINE
Payer: COMMERCIAL

## 2020-01-16 DIAGNOSIS — E87.6 HYPOKALEMIA: ICD-10-CM

## 2020-01-16 DIAGNOSIS — J15.9 COMMUNITY ACQUIRED BACTERIAL PNEUMONIA: ICD-10-CM

## 2020-01-16 DIAGNOSIS — J18.9 PNEUMONIA OF LEFT LOWER LOBE DUE TO INFECTIOUS ORGANISM: Primary | ICD-10-CM

## 2020-01-16 DIAGNOSIS — N17.9 AKI (ACUTE KIDNEY INJURY) (H): ICD-10-CM

## 2020-01-16 LAB
ANION GAP SERPL CALCULATED.3IONS-SCNC: 6 MMOL/L (ref 3–14)
BASE DEFICIT BLDV-SCNC: 1 MMOL/L
BASOPHILS # BLD AUTO: 0.1 10E9/L (ref 0–0.2)
BASOPHILS NFR BLD AUTO: 0.4 %
BUN SERPL-MCNC: 17 MG/DL (ref 7–30)
CALCIUM SERPL-MCNC: 9.3 MG/DL (ref 8.5–10.1)
CHLORIDE SERPL-SCNC: 105 MMOL/L (ref 94–109)
CO2 SERPL-SCNC: 28 MMOL/L (ref 20–32)
CREAT SERPL-MCNC: 1.27 MG/DL (ref 0.52–1.04)
DIFFERENTIAL METHOD BLD: ABNORMAL
EOSINOPHIL # BLD AUTO: 0.4 10E9/L (ref 0–0.7)
EOSINOPHIL NFR BLD AUTO: 1.7 %
ERYTHROCYTE [DISTWIDTH] IN BLOOD BY AUTOMATED COUNT: 21.8 % (ref 10–15)
FLUAV+FLUBV AG SPEC QL: NEGATIVE
FLUAV+FLUBV AG SPEC QL: NEGATIVE
GFR SERPL CREATININE-BSD FRML MDRD: 44 ML/MIN/{1.73_M2}
GLUCOSE BLDC GLUCOMTR-MCNC: 163 MG/DL (ref 70–99)
GLUCOSE SERPL-MCNC: 114 MG/DL (ref 70–99)
HBA1C MFR BLD: 6.5 % (ref 0–5.6)
HCO3 BLDV-SCNC: 27 MMOL/L (ref 21–28)
HCT VFR BLD AUTO: 40.4 % (ref 35–47)
HGB BLD-MCNC: 12 G/DL (ref 11.7–15.7)
IMM GRANULOCYTES # BLD: 0.2 10E9/L (ref 0–0.4)
IMM GRANULOCYTES NFR BLD: 0.8 %
LYMPHOCYTES # BLD AUTO: 2.2 10E9/L (ref 0.8–5.3)
LYMPHOCYTES NFR BLD AUTO: 9.7 %
MCH RBC QN AUTO: 22.4 PG (ref 26.5–33)
MCHC RBC AUTO-ENTMCNC: 29.7 G/DL (ref 31.5–36.5)
MCV RBC AUTO: 76 FL (ref 78–100)
MONOCYTES # BLD AUTO: 1.1 10E9/L (ref 0–1.3)
MONOCYTES NFR BLD AUTO: 4.9 %
NEUTROPHILS # BLD AUTO: 18.4 10E9/L (ref 1.6–8.3)
NEUTROPHILS NFR BLD AUTO: 82.5 %
NRBC # BLD AUTO: 0 10*3/UL
NRBC BLD AUTO-RTO: 0 /100
NT-PROBNP SERPL-MCNC: 109 PG/ML (ref 0–900)
O2/TOTAL GAS SETTING VFR VENT: 2 %
OXYHGB MFR BLDV: 46 %
PCO2 BLDV: 58 MM HG (ref 40–50)
PH BLDV: 7.28 PH (ref 7.32–7.43)
PLATELET # BLD AUTO: 325 10E9/L (ref 150–450)
PO2 BLDV: 30 MM HG (ref 25–47)
POTASSIUM SERPL-SCNC: 2.9 MMOL/L (ref 3.4–5.3)
PROCALCITONIN SERPL-MCNC: 0.1 NG/ML
RBC # BLD AUTO: 5.35 10E12/L (ref 3.8–5.2)
SODIUM SERPL-SCNC: 139 MMOL/L (ref 133–144)
SPECIMEN SOURCE: NORMAL
WBC # BLD AUTO: 22.2 10E9/L (ref 4–11)

## 2020-01-16 PROCEDURE — 96365 THER/PROPH/DIAG IV INF INIT: CPT

## 2020-01-16 PROCEDURE — 87040 BLOOD CULTURE FOR BACTERIA: CPT | Performed by: EMERGENCY MEDICINE

## 2020-01-16 PROCEDURE — 71046 X-RAY EXAM CHEST 2 VIEWS: CPT

## 2020-01-16 PROCEDURE — 96361 HYDRATE IV INFUSION ADD-ON: CPT

## 2020-01-16 PROCEDURE — 96366 THER/PROPH/DIAG IV INF ADDON: CPT

## 2020-01-16 PROCEDURE — 36415 COLL VENOUS BLD VENIPUNCTURE: CPT | Performed by: EMERGENCY MEDICINE

## 2020-01-16 PROCEDURE — 12000000 ZZH R&B MED SURG/OB

## 2020-01-16 PROCEDURE — 40000275 ZZH STATISTIC RCP TIME EA 10 MIN

## 2020-01-16 PROCEDURE — 96367 TX/PROPH/DG ADDL SEQ IV INF: CPT

## 2020-01-16 PROCEDURE — 84145 PROCALCITONIN (PCT): CPT | Performed by: EMERGENCY MEDICINE

## 2020-01-16 PROCEDURE — 99223 1ST HOSP IP/OBS HIGH 75: CPT | Mod: AI | Performed by: INTERNAL MEDICINE

## 2020-01-16 PROCEDURE — 82805 BLOOD GASES W/O2 SATURATION: CPT | Performed by: EMERGENCY MEDICINE

## 2020-01-16 PROCEDURE — 80048 BASIC METABOLIC PNL TOTAL CA: CPT | Performed by: EMERGENCY MEDICINE

## 2020-01-16 PROCEDURE — 25000128 H RX IP 250 OP 636: Performed by: INTERNAL MEDICINE

## 2020-01-16 PROCEDURE — 00000146 ZZHCL STATISTIC GLUCOSE BY METER IP

## 2020-01-16 PROCEDURE — 25800030 ZZH RX IP 258 OP 636: Performed by: EMERGENCY MEDICINE

## 2020-01-16 PROCEDURE — 25000132 ZZH RX MED GY IP 250 OP 250 PS 637: Performed by: INTERNAL MEDICINE

## 2020-01-16 PROCEDURE — 25000128 H RX IP 250 OP 636: Performed by: EMERGENCY MEDICINE

## 2020-01-16 PROCEDURE — 83880 ASSAY OF NATRIURETIC PEPTIDE: CPT | Performed by: EMERGENCY MEDICINE

## 2020-01-16 PROCEDURE — 83036 HEMOGLOBIN GLYCOSYLATED A1C: CPT | Performed by: EMERGENCY MEDICINE

## 2020-01-16 PROCEDURE — 94660 CPAP INITIATION&MGMT: CPT

## 2020-01-16 PROCEDURE — 87804 INFLUENZA ASSAY W/OPTIC: CPT | Performed by: EMERGENCY MEDICINE

## 2020-01-16 PROCEDURE — 99285 EMERGENCY DEPT VISIT HI MDM: CPT | Mod: 25

## 2020-01-16 PROCEDURE — 85025 COMPLETE CBC W/AUTO DIFF WBC: CPT | Performed by: EMERGENCY MEDICINE

## 2020-01-16 RX ORDER — ZOLPIDEM TARTRATE 5 MG/1
10 TABLET ORAL
Status: DISCONTINUED | OUTPATIENT
Start: 2020-01-16 | End: 2020-01-20 | Stop reason: HOSPADM

## 2020-01-16 RX ORDER — AZITHROMYCIN 250 MG/1
250 TABLET, FILM COATED ORAL DAILY
Status: DISCONTINUED | OUTPATIENT
Start: 2020-01-17 | End: 2020-01-20 | Stop reason: HOSPADM

## 2020-01-16 RX ORDER — SIMVASTATIN 20 MG
20 TABLET ORAL AT BEDTIME
Status: DISCONTINUED | OUTPATIENT
Start: 2020-01-16 | End: 2020-01-20 | Stop reason: HOSPADM

## 2020-01-16 RX ORDER — POTASSIUM CHLORIDE 29.8 MG/ML
20 INJECTION INTRAVENOUS
Status: DISCONTINUED | OUTPATIENT
Start: 2020-01-16 | End: 2020-01-20 | Stop reason: HOSPADM

## 2020-01-16 RX ORDER — ONDANSETRON 2 MG/ML
4 INJECTION INTRAMUSCULAR; INTRAVENOUS EVERY 6 HOURS PRN
Status: DISCONTINUED | OUTPATIENT
Start: 2020-01-16 | End: 2020-01-20 | Stop reason: HOSPADM

## 2020-01-16 RX ORDER — POTASSIUM CL/LIDO/0.9 % NACL 10MEQ/0.1L
10 INTRAVENOUS SOLUTION, PIGGYBACK (ML) INTRAVENOUS
Status: DISCONTINUED | OUTPATIENT
Start: 2020-01-16 | End: 2020-01-16

## 2020-01-16 RX ORDER — SODIUM CHLORIDE AND POTASSIUM CHLORIDE 150; 900 MG/100ML; MG/100ML
INJECTION, SOLUTION INTRAVENOUS CONTINUOUS
Status: DISCONTINUED | OUTPATIENT
Start: 2020-01-16 | End: 2020-01-19

## 2020-01-16 RX ORDER — SERTRALINE HYDROCHLORIDE 100 MG/1
200 TABLET, FILM COATED ORAL DAILY
Status: DISCONTINUED | OUTPATIENT
Start: 2020-01-17 | End: 2020-01-20 | Stop reason: HOSPADM

## 2020-01-16 RX ORDER — AMOXICILLIN 250 MG
2 CAPSULE ORAL 2 TIMES DAILY PRN
Status: DISCONTINUED | OUTPATIENT
Start: 2020-01-16 | End: 2020-01-20 | Stop reason: HOSPADM

## 2020-01-16 RX ORDER — NICOTINE POLACRILEX 4 MG
15-30 LOZENGE BUCCAL
Status: DISCONTINUED | OUTPATIENT
Start: 2020-01-16 | End: 2020-01-20 | Stop reason: HOSPADM

## 2020-01-16 RX ORDER — BENZONATATE 100 MG/1
100 CAPSULE ORAL 3 TIMES DAILY PRN
Status: DISCONTINUED | OUTPATIENT
Start: 2020-01-16 | End: 2020-01-20 | Stop reason: HOSPADM

## 2020-01-16 RX ORDER — DEXTROSE MONOHYDRATE 25 G/50ML
25-50 INJECTION, SOLUTION INTRAVENOUS
Status: DISCONTINUED | OUTPATIENT
Start: 2020-01-16 | End: 2020-01-20 | Stop reason: HOSPADM

## 2020-01-16 RX ORDER — CLONAZEPAM 0.5 MG/1
0.5 TABLET ORAL 2 TIMES DAILY PRN
Status: DISCONTINUED | OUTPATIENT
Start: 2020-01-16 | End: 2020-01-20 | Stop reason: HOSPADM

## 2020-01-16 RX ORDER — PANTOPRAZOLE SODIUM 40 MG/1
40 TABLET, DELAYED RELEASE ORAL AT BEDTIME
Status: DISCONTINUED | OUTPATIENT
Start: 2020-01-16 | End: 2020-01-20 | Stop reason: HOSPADM

## 2020-01-16 RX ORDER — POTASSIUM CL/LIDO/0.9 % NACL 10MEQ/0.1L
10 INTRAVENOUS SOLUTION, PIGGYBACK (ML) INTRAVENOUS
Status: DISCONTINUED | OUTPATIENT
Start: 2020-01-16 | End: 2020-01-20 | Stop reason: HOSPADM

## 2020-01-16 RX ORDER — ACETAMINOPHEN 325 MG/1
650 TABLET ORAL EVERY 4 HOURS PRN
Status: DISCONTINUED | OUTPATIENT
Start: 2020-01-16 | End: 2020-01-20 | Stop reason: HOSPADM

## 2020-01-16 RX ORDER — LIDOCAINE 40 MG/G
CREAM TOPICAL
Status: DISCONTINUED | OUTPATIENT
Start: 2020-01-16 | End: 2020-01-20 | Stop reason: HOSPADM

## 2020-01-16 RX ORDER — ONDANSETRON 4 MG/1
4 TABLET, ORALLY DISINTEGRATING ORAL EVERY 6 HOURS PRN
Status: DISCONTINUED | OUTPATIENT
Start: 2020-01-16 | End: 2020-01-20 | Stop reason: HOSPADM

## 2020-01-16 RX ORDER — HYDRALAZINE HYDROCHLORIDE 20 MG/ML
10 INJECTION INTRAMUSCULAR; INTRAVENOUS EVERY 4 HOURS PRN
Status: DISCONTINUED | OUTPATIENT
Start: 2020-01-16 | End: 2020-01-20 | Stop reason: HOSPADM

## 2020-01-16 RX ORDER — CEFTRIAXONE 1 G/1
1 INJECTION, POWDER, FOR SOLUTION INTRAMUSCULAR; INTRAVENOUS EVERY 24 HOURS
Status: DISCONTINUED | OUTPATIENT
Start: 2020-01-17 | End: 2020-01-20 | Stop reason: HOSPADM

## 2020-01-16 RX ORDER — PROCHLORPERAZINE MALEATE 5 MG
10 TABLET ORAL EVERY 6 HOURS PRN
Status: DISCONTINUED | OUTPATIENT
Start: 2020-01-16 | End: 2020-01-20 | Stop reason: HOSPADM

## 2020-01-16 RX ORDER — ALBUTEROL SULFATE 90 UG/1
1-2 AEROSOL, METERED RESPIRATORY (INHALATION) EVERY 6 HOURS PRN
Status: DISCONTINUED | OUTPATIENT
Start: 2020-01-16 | End: 2020-01-20 | Stop reason: HOSPADM

## 2020-01-16 RX ORDER — POTASSIUM CHLORIDE 1.5 G/1.58G
20-40 POWDER, FOR SOLUTION ORAL
Status: DISCONTINUED | OUTPATIENT
Start: 2020-01-16 | End: 2020-01-20 | Stop reason: HOSPADM

## 2020-01-16 RX ORDER — GABAPENTIN 300 MG/1
600 CAPSULE ORAL EVERY EVENING
Status: DISCONTINUED | OUTPATIENT
Start: 2020-01-16 | End: 2020-01-20 | Stop reason: HOSPADM

## 2020-01-16 RX ORDER — POTASSIUM CHLORIDE 1500 MG/1
20-40 TABLET, EXTENDED RELEASE ORAL
Status: DISCONTINUED | OUTPATIENT
Start: 2020-01-16 | End: 2020-01-20 | Stop reason: HOSPADM

## 2020-01-16 RX ORDER — PROCHLORPERAZINE 25 MG
25 SUPPOSITORY, RECTAL RECTAL EVERY 12 HOURS PRN
Status: DISCONTINUED | OUTPATIENT
Start: 2020-01-16 | End: 2020-01-20 | Stop reason: HOSPADM

## 2020-01-16 RX ORDER — NALOXONE HYDROCHLORIDE 0.4 MG/ML
.1-.4 INJECTION, SOLUTION INTRAMUSCULAR; INTRAVENOUS; SUBCUTANEOUS
Status: DISCONTINUED | OUTPATIENT
Start: 2020-01-16 | End: 2020-01-20 | Stop reason: HOSPADM

## 2020-01-16 RX ORDER — POTASSIUM CHLORIDE 7.45 MG/ML
10 INJECTION INTRAVENOUS
Status: DISCONTINUED | OUTPATIENT
Start: 2020-01-16 | End: 2020-01-20 | Stop reason: HOSPADM

## 2020-01-16 RX ORDER — POLYETHYLENE GLYCOL 3350 17 G/17G
17 POWDER, FOR SOLUTION ORAL DAILY PRN
Status: DISCONTINUED | OUTPATIENT
Start: 2020-01-16 | End: 2020-01-20 | Stop reason: HOSPADM

## 2020-01-16 RX ORDER — ASPIRIN 81 MG/1
81 TABLET ORAL EVERY EVENING
Status: DISCONTINUED | OUTPATIENT
Start: 2020-01-16 | End: 2020-01-20 | Stop reason: HOSPADM

## 2020-01-16 RX ORDER — CEFTRIAXONE 1 G/1
1 INJECTION, POWDER, FOR SOLUTION INTRAMUSCULAR; INTRAVENOUS ONCE
Status: COMPLETED | OUTPATIENT
Start: 2020-01-16 | End: 2020-01-16

## 2020-01-16 RX ORDER — AMOXICILLIN 250 MG
1 CAPSULE ORAL 2 TIMES DAILY PRN
Status: DISCONTINUED | OUTPATIENT
Start: 2020-01-16 | End: 2020-01-20 | Stop reason: HOSPADM

## 2020-01-16 RX ADMIN — AZITHROMYCIN MONOHYDRATE 500 MG: 500 INJECTION, POWDER, LYOPHILIZED, FOR SOLUTION INTRAVENOUS at 20:32

## 2020-01-16 RX ADMIN — Medication 10 MEQ: at 18:18

## 2020-01-16 RX ADMIN — ASPIRIN 81 MG: 81 TABLET, COATED ORAL at 22:24

## 2020-01-16 RX ADMIN — POTASSIUM CHLORIDE AND SODIUM CHLORIDE: 900; 150 INJECTION, SOLUTION INTRAVENOUS at 22:14

## 2020-01-16 RX ADMIN — SIMVASTATIN 20 MG: 20 TABLET, FILM COATED ORAL at 22:24

## 2020-01-16 RX ADMIN — GABAPENTIN 600 MG: 300 CAPSULE ORAL at 22:14

## 2020-01-16 RX ADMIN — PANTOPRAZOLE SODIUM 40 MG: 40 TABLET, DELAYED RELEASE ORAL at 22:24

## 2020-01-16 RX ADMIN — CEFTRIAXONE 1 G: 1 INJECTION, POWDER, FOR SOLUTION INTRAMUSCULAR; INTRAVENOUS at 20:07

## 2020-01-16 RX ADMIN — SODIUM CHLORIDE 1000 ML: 9 INJECTION, SOLUTION INTRAVENOUS at 18:17

## 2020-01-16 ASSESSMENT — MIFFLIN-ST. JEOR: SCORE: 1565.58

## 2020-01-16 ASSESSMENT — ENCOUNTER SYMPTOMS
SHORTNESS OF BREATH: 1
COUGH: 1
CHILLS: 1
CHEST TIGHTNESS: 1
FEVER: 1

## 2020-01-16 NOTE — ED TRIAGE NOTES
Pt presents with c/o increased SOB, and cold-like symptoms for the past 2 weeks. The last few days pt reports worsening symptoms and is concerned for pneumonia.   ABCs intact, A&Ox4.

## 2020-01-16 NOTE — ED PROVIDER NOTES
History     Chief Complaint:  Cough and Shortness of Breath    HPI   Nicole Reyes is a 64 year old female with a history or recurrent pneumonia - currently on 2 L oxygen at home, type II diabetes, hypertension, and chronic kidney disease who presents for evaluation of a cough and shortness of breath. Over the last year, the patient reports three hospitalizations for pneumonia - most recently in November. Since this last hospitalization, she and her primary team have been trying to figure out if there is a common cause linking these recurrent episodes. She underwent an EGD recently to assess the possibility of reflux/aspiration as the causes, but the patient reports nothing significant was found on this. Two weeks ago, the patient reports the onset of cold symptoms which slowly progressed into chest congestion and increased dyspnea at night. She noted her lungs sounded wet and rattle-y a couple nights ago which felt similar to her previous episodes of pneumonia, so after her intermediate party today upstairs, she presented to the ED for evaluation. Currently, she reports a productive cough as well as fevers and chills. She denies any current chest pain. Over the last few weeks, she has had to progressively increase her home oxygen from 2 L, but not her portable one; she has been on this oxygen since 2019 because of these pneumonias. She denies any recent travel or history of COPD.    Allergies:  Codeine  Penicillins    Medications:    Albuterol inhaler  Baby aspirin   Klonopin  Gabapentin  Losartan  Metformin  Protonix  Sertraline  Simvastatin  Ambien     Past Medical History:    Chronic kidney disease  Blood transfusion  Hypertension  GERD  Anxiety  Hiatal hernia   Anemia  Depression  CRISTÓBAL  Oxygen dependent   Postoperative seroma  Hyperlipidemia  Restless legs syndrome  Type II diabetes    Past Surgical History:    Hernia repair x2     D&C  EGD, multiple    Family History:     CHF  Hypertension  CAD  Hyperlipidemia  Cancer  Diabetes  Lupus    Social History:  Marital Status:   [2]  Former smoker  Positive for alcohol use. Comment: twice a month.   Negative for drug use.      Review of Systems   Constitutional: Positive for chills and fever.   HENT: Positive for congestion.    Respiratory: Positive for cough, chest tightness and shortness of breath.    Cardiovascular: Negative for chest pain.   All other systems reviewed and are negative.      Physical Exam     Patient Vitals for the past 24 hrs:   BP Temp Temp src Pulse Heart Rate Resp SpO2 Weight   01/16/20 2000 123/59 -- -- 78 -- 16 95 % --   01/16/20 1830 109/54 -- -- 80 -- -- 96 % --   01/16/20 1800 -- -- -- -- -- -- 96 % --   01/16/20 1730 116/62 -- -- 87 -- -- 91 % --   01/16/20 1715 -- -- -- -- -- -- 95 % --   01/16/20 1700 -- -- -- -- -- -- 92 % --   01/16/20 1653 135/72 -- -- -- -- -- 97 % --   01/16/20 1650 135/72 98.9  F (37.2  C) Oral -- 86 20 (!) 85 % 109.8 kg (242 lb)      Physical Exam  Constitutional: Alert, attentive, GCS 15. On nasal cannula at baseline O2 rate.   HENT:    Nose: Nose normal.    Mouth/Throat: Oropharynx is clear, mucous membranes are moist.   Eyes: EOM are normal, anicteric, conjugate gaze  CV: regular rate and rhythm; no murmurs  Chest: Effort normal and breath sounds clear without wheezing or rales, symmetric bilaterally   GI:  non tender. No distension. No guarding or rebound.    MSK: No LE edema, no tenderness to palpation of BLE.  Neurological: Alert, attentive, moving all extremities equally.   Skin: Skin is warm and dry.    Emergency Department Course   Imaging:  Radiographic findings were communicated with the patient who voiced understanding of the findings.  XR Chest 2 views:   Near complete resolution of left mid and lower lung consolidation. Mild residual or recurrent opacity at the left base. Right lung is clear. No pleural effusion or pneumothorax. Small hiatal hernia. Normal heart  size, as per radiology.     Laboratory:  CBC: WBC: 22.2 (H), HGB: 12.0, PLT: 325  BMP: Glucose 114 (H), K: 2.9 (L), Creatinine: 1.27 (H), GFR: 44 (L), o/w WNL   Blood gas venous and oxyhgb: pH: 7.28 (L), pCO2: 58 (H), o/w WNL  BNP: 109  Procalcitonin: 0.10  Blood cultures x2: Pending    Influenza A/B: Negative    Procedures:  None    Interventions:  1817 NS 1L IV  1818 Potassium chloride, 10 mEq, IV infusion  2007 Rocephin, 1 g, IV   2032 Zithromax, 500 mg, IV infusion    Emergency Department Course:  Nursing notes and vitals reviewed.   1744: I performed an exam of the patient as documented above.      IV was inserted and blood was drawn for laboratory testing, results above. Medicine administered as documented above.   The patient was sent for a chest x-ray while in the emergency department, results above.      1915: I rechecked the patient and discussed the results of her workup thus far.     1931: I consulted with Dr. Guaman of the hospitalist services. She is in agreement to accept the patient for admission.    Findings and plan explained to the Patient who consents to admission. Discussed the patient with Dr. Guaman, who will admit the patient to a medical bed for further monitoring, evaluation, and treatment.    I personally reviewed the laboratory and imaging results with the Patient and answered all related questions prior to admission.    Impression & Plan      Medical Decision Making:  Nicole Reyes is a 64 year old female with a past medical history significant for diabetes, recurrent pneumonia - on home O2, who presents for evaluation of increasing shortness of breath, cough, and malaise. She was found to have likely new right lower lobe pneumonia. She was last in the hospital two months prior. She was afebrile here, though she does have marked leukocytosis without evidence of sepsis. O2 sats at baseline. Influenza testing negative. I did not suspect ACS or PE. She was initiated on ceftriaxone and  azithromycin. She was also found to have a mild CAMMIE and mild hypokalemia. She was admitted to medicine for electrolyte repletion, IV hydration, and monitoring for pneumonia.     Diagnosis:    ICD-10-CM    1. CAMMIE (acute kidney injury) (H) N17.9 Blood culture     Blood culture     Nt probnp inpatient     Nt probnp inpatient     Procalcitonin     Procalcitonin     Hemoglobin A1c     Hemoglobin A1c     Glucose by meter     Glucose by meter     CANCELED: BNP     CANCELED: Procalcitonin     CANCELED: Hemoglobin A1c   2. Hypokalemia E87.6    3. Community acquired bacterial pneumonia J15.9        Disposition:  Admitted to medicine under the care of Dr. Deniz Helm MD  [employer]  11:51 PM 01/16/20     Scribe Disclosure:  I, Jolly Fredrick, am serving as a scribe on 1/16/2020 at 5:44 PM to personally document services performed by Bhavesh Helm MD based on my observations and the provider's statements to me.     1/16/2020   [facility] EMERGENCY DEPARTMENT       Bhavesh Helm MD  01/16/20 [id number]

## 2020-01-17 LAB
ANION GAP SERPL CALCULATED.3IONS-SCNC: 5 MMOL/L (ref 3–14)
BUN SERPL-MCNC: 13 MG/DL (ref 7–30)
CALCIUM SERPL-MCNC: 8.3 MG/DL (ref 8.5–10.1)
CHLORIDE SERPL-SCNC: 112 MMOL/L (ref 94–109)
CO2 SERPL-SCNC: 27 MMOL/L (ref 20–32)
CREAT SERPL-MCNC: 0.85 MG/DL (ref 0.52–1.04)
ERYTHROCYTE [DISTWIDTH] IN BLOOD BY AUTOMATED COUNT: 21.7 % (ref 10–15)
GFR SERPL CREATININE-BSD FRML MDRD: 72 ML/MIN/{1.73_M2}
GLUCOSE BLDC GLUCOMTR-MCNC: 105 MG/DL (ref 70–99)
GLUCOSE BLDC GLUCOMTR-MCNC: 112 MG/DL (ref 70–99)
GLUCOSE BLDC GLUCOMTR-MCNC: 124 MG/DL (ref 70–99)
GLUCOSE BLDC GLUCOMTR-MCNC: 94 MG/DL (ref 70–99)
GLUCOSE BLDC GLUCOMTR-MCNC: 95 MG/DL (ref 70–99)
GLUCOSE SERPL-MCNC: 113 MG/DL (ref 70–99)
HCT VFR BLD AUTO: 36.3 % (ref 35–47)
HGB BLD-MCNC: 10.6 G/DL (ref 11.7–15.7)
MCH RBC QN AUTO: 21.8 PG (ref 26.5–33)
MCHC RBC AUTO-ENTMCNC: 29.2 G/DL (ref 31.5–36.5)
MCV RBC AUTO: 75 FL (ref 78–100)
PLATELET # BLD AUTO: 228 10E9/L (ref 150–450)
POTASSIUM SERPL-SCNC: 3.7 MMOL/L (ref 3.4–5.3)
RBC # BLD AUTO: 4.86 10E12/L (ref 3.8–5.2)
SODIUM SERPL-SCNC: 144 MMOL/L (ref 133–144)
WBC # BLD AUTO: 12.9 10E9/L (ref 4–11)

## 2020-01-17 PROCEDURE — 94660 CPAP INITIATION&MGMT: CPT

## 2020-01-17 PROCEDURE — 12000000 ZZH R&B MED SURG/OB

## 2020-01-17 PROCEDURE — 85027 COMPLETE CBC AUTOMATED: CPT | Performed by: INTERNAL MEDICINE

## 2020-01-17 PROCEDURE — 25000132 ZZH RX MED GY IP 250 OP 250 PS 637: Performed by: INTERNAL MEDICINE

## 2020-01-17 PROCEDURE — 25000128 H RX IP 250 OP 636: Performed by: INTERNAL MEDICINE

## 2020-01-17 PROCEDURE — 99233 SBSQ HOSP IP/OBS HIGH 50: CPT | Performed by: INTERNAL MEDICINE

## 2020-01-17 PROCEDURE — 36415 COLL VENOUS BLD VENIPUNCTURE: CPT | Performed by: INTERNAL MEDICINE

## 2020-01-17 PROCEDURE — 40000275 ZZH STATISTIC RCP TIME EA 10 MIN

## 2020-01-17 PROCEDURE — 00000146 ZZHCL STATISTIC GLUCOSE BY METER IP

## 2020-01-17 PROCEDURE — 80048 BASIC METABOLIC PNL TOTAL CA: CPT | Performed by: INTERNAL MEDICINE

## 2020-01-17 RX ORDER — LOSARTAN POTASSIUM 50 MG/1
100 TABLET ORAL DAILY
Status: DISCONTINUED | OUTPATIENT
Start: 2020-01-17 | End: 2020-01-20 | Stop reason: HOSPADM

## 2020-01-17 RX ADMIN — AZITHROMYCIN MONOHYDRATE 250 MG: 250 TABLET ORAL at 16:28

## 2020-01-17 RX ADMIN — LOSARTAN POTASSIUM 100 MG: 50 TABLET, FILM COATED ORAL at 14:32

## 2020-01-17 RX ADMIN — SERTRALINE HYDROCHLORIDE 200 MG: 100 TABLET ORAL at 10:05

## 2020-01-17 RX ADMIN — SIMVASTATIN 20 MG: 20 TABLET, FILM COATED ORAL at 21:34

## 2020-01-17 RX ADMIN — CLONAZEPAM 0.5 MG: 0.5 TABLET ORAL at 23:14

## 2020-01-17 RX ADMIN — POTASSIUM CHLORIDE AND SODIUM CHLORIDE: 900; 150 INJECTION, SOLUTION INTRAVENOUS at 19:19

## 2020-01-17 RX ADMIN — ASPIRIN 81 MG: 81 TABLET, COATED ORAL at 19:27

## 2020-01-17 RX ADMIN — POTASSIUM CHLORIDE AND SODIUM CHLORIDE: 900; 150 INJECTION, SOLUTION INTRAVENOUS at 08:52

## 2020-01-17 RX ADMIN — ZOLPIDEM TARTRATE 10 MG: 5 TABLET, COATED ORAL at 23:16

## 2020-01-17 RX ADMIN — GABAPENTIN 600 MG: 300 CAPSULE ORAL at 19:27

## 2020-01-17 RX ADMIN — PANTOPRAZOLE SODIUM 40 MG: 40 TABLET, DELAYED RELEASE ORAL at 21:34

## 2020-01-17 RX ADMIN — CEFTRIAXONE 1 G: 1 INJECTION, POWDER, FOR SOLUTION INTRAMUSCULAR; INTRAVENOUS at 19:27

## 2020-01-17 ASSESSMENT — ACTIVITIES OF DAILY LIVING (ADL)
AMBULATION: 0-->INDEPENDENT
FALL_HISTORY_WITHIN_LAST_SIX_MONTHS: NO
RETIRED_EATING: 0-->INDEPENDENT
SWALLOWING: 0-->SWALLOWS FOODS/LIQUIDS WITHOUT DIFFICULTY
ADLS_ACUITY_SCORE: 14
TOILETING: 0-->INDEPENDENT
RETIRED_COMMUNICATION: 0-->UNDERSTANDS/COMMUNICATES WITHOUT DIFFICULTY
TRANSFERRING: 0-->INDEPENDENT
ADLS_ACUITY_SCORE: 14
ADLS_ACUITY_SCORE: 14
COGNITION: 0 - NO COGNITION ISSUES REPORTED
BATHING: 0-->INDEPENDENT
ADLS_ACUITY_SCORE: 12
ADLS_ACUITY_SCORE: 14
ADLS_ACUITY_SCORE: 14
DRESS: 0-->INDEPENDENT

## 2020-01-17 NOTE — PHARMACY-ADMISSION MEDICATION HISTORY
Admission medication history interview status for this patient is complete. See [facility] admission navigator for allergy information, prior to admission medications and immunization status.     Medication history interview source(s):Patient  Medication history resources (including written lists, pill bottles, clinic record):None    Changes made to PTA medication list:  Added: none  Deleted: none  Changed: none    Actions taken by pharmacist (provider contacted, etc):None     Additional medication history information:None    Medication reconciliation/reorder completed by provider prior to medication history? No    For patients on insulin therapy: no (Yes/No)   Lantus/levemir/NPH/Mix 70/30 dose: ___ in AM/PM or twice daily   Sliding scale Novolog Y/N   If Yes, do you have a baseline novolog pre-meal dose: ______units with meals   Patients eat three meals a day: Y/N ---  How many episodes of hypoglycemia (low blood glucose) do you have weekly: ---   How many missed doses do you have a week: ---  How many times do you check your blood glucose per day: ---  Any Barriers to therapy: cost of medications/comfortable with giving injections (if applicable)/ comfortable and confident with current diabetes regimen ---      Prior to Admission medications    Medication Sig Last Dose Taking? Auth Provider   albuterol (PROAIR HFA/PROVENTIL HFA/VENTOLIN HFA) 108 (90 Base) MCG/ACT inhaler Inhale 1-2 puffs into the lungs every 6 hours as needed for shortness of breath / dyspnea or wheezing  Yes Jose L Weaver MD   aspirin 81 MG EC tablet Take 81 mg by mouth every evening  1/15/2020 at Unknown time Yes Unknown, Entered By History   clonazePAM (KLONOPIN) 1 MG tablet Take 0.5 tablets (0.5 mg) by mouth 2 times daily 1/16/2020 at am Yes Pari Wiley MD   gabapentin (NEURONTIN) 300 MG capsule Take 2 capsules (600 mg) by mouth daily 1/15/2020 at hs Yes Pari Wiley MD   losartan (COZAAR) 100 MG tablet Take 1 tablet (100 mg) by mouth daily  1/16/2020 at am Yes Pari Wiley MD   metFORMIN (GLUCOPHAGE-XR) 500 MG 24 hr tablet Take 2 tablets (1,000 mg) by mouth every morning 1/16/2020 at am Yes Pari Wiley MD   pantoprazole (PROTONIX) 40 MG EC tablet Take 1 tablet (40 mg) by mouth At Bedtime 1/15/2020 at Unknown time Yes Pari Wiley MD   sertraline (ZOLOFT) 100 MG tablet Take 2 tablets (200 mg) by mouth daily 1/16/2020 at am Yes Pari Wiley MD   simvastatin (ZOCOR) 20 MG tablet Take 1 tablet (20 mg) by mouth At Bedtime 1/15/2020 at Unknown time Yes Pari Wiley MD   zolpidem (AMBIEN) 10 MG tablet Take 1 tablet (10 mg) by mouth At Bedtime 1/15/2020 at Unknown time Yes Pari Wiley MD   blood glucose (ACCU-CHEK BYRON PLUS) test strip Use to test blood sugar 2 times daily or as directed.   Pari Wiley MD

## 2020-01-17 NOTE — ED NOTES
[facility]  ED Nurse Handoff Report    Nicole Reyes is a 64 year old female   ED Chief complaint: Cough and Shortness of Breath  . ED Diagnosis:   Final diagnoses:   CAMMIE (acute kidney injury) (H)     Allergies:   Allergies   Allergen Reactions     Codeine Rash     Penicillins Rash     Pt has tolerated cephalosporins and penems.   Pt tolerated Zosyn 7/6/2019       Code Status: Full Code  Activity level - Baseline/Home:  Independent. Activity Level - Current:   Independent. Lift room needed: No. Bariatric: No   Needed: No   Isolation: Yes. Infection: Other - pneumonia    Vital Signs:   Vitals:    01/16/20 1730 01/16/20 1800 01/16/20 1830 01/16/20 2000   BP: 116/62  109/54 123/59   Pulse: 87  80 78   Resp:    16   Temp:       TempSrc:       SpO2: 91% 96% 96% 95%   Weight:           Cardiac Rhythm:  ,      Pain level:    Patient confused: No. Patient Falls Risk: Yes.   Elimination Status: Has voided   Patient Report - Initial Complaint: Cough, SOB. Focused Assessment: Pt presents for reports of cough and cold-like symptoms x2 weeks. Pt states that over the last few days she's been feeling increasingly more weak and symptoms are worsening including SOB and productive cough. Pt is chronically on 2L home O2 and concerned for developing pneumonia for which she was last hospitalized in November. ABCs intact, A&Ox4.   Tests Performed:   Labs Ordered and Resulted from Time of ED Arrival Up to the Time of Departure from the ED   CBC WITH PLATELETS DIFFERENTIAL - Abnormal; Notable for the following components:       Result Value    WBC 22.2 (*)     RBC Count 5.35 (*)     MCV 76 (*)     MCH 22.4 (*)     MCHC 29.7 (*)     RDW 21.8 (*)     Absolute Neutrophil 18.4 (*)     All other components within normal limits   BLOOD GAS VENOUS AND OXYHGB - Abnormal; Notable for the following components:    Ph Venous 7.28 (*)     PCO2 Venous 58 (*)     All other components within normal limits   BASIC METABOLIC  PANEL - Abnormal; Notable for the following components:    Potassium 2.9 (*)     Glucose 114 (*)     Creatinine 1.27 (*)     GFR Estimate 44 (*)     GFR Estimate If Black 52 (*)     All other components within normal limits   NT PROBNP INPATIENT   PROCALCITONIN   INFLUENZA A/B ANTIGEN   BLOOD CULTURE   BLOOD CULTURE     XR Chest 2 Views   Final Result   IMPRESSION: Near complete resolution of left mid and lower lung consolidation. Mild residual or recurrent opacity at the left base. Right lung is clear. No pleural effusion or pneumothorax. Small hiatal hernia. Normal heart size.        Treatments provided: IVF. IV antibiotics, IV and potassium (see MAR).  Family Comments: NA  OBS brochure/video discussed/provided to patient:  Yes  ED Medications:   Medications   potassium chloride 10 mEq in 100 mL intermittent infusion with 10 mg lidocaine (0 mEq Intravenous Stopped 1/16/20 1921)   cefTRIAXone (ROCEPHIN) 1 g vial to attach to  mL bag for ADULTS or NS 50 mL bag for PEDS (1 g Intravenous New Bag 1/16/20 2007)   azithromycin (ZITHROMAX) 500 mg in sodium chloride 0.9 % 250 mL intermittent infusion (has no administration in time range)   0.9% sodium chloride BOLUS (0 mLs Intravenous Stopped 1/16/20 2006)     Drips infusing:  No  For the majority of the shift, the patient's behavior Green. Interventions performed were none.     Severe Sepsis OR Septic Shock Diagnosis Present: No    RECEIVING UNIT ED HANDOFF REVIEW    Above ED Nurse Handoff Report was reviewed: Yes  Reviewed by: Angelica Mclean RN on January 16, 2020 at 9:03 PM         ED Nurse Name/Phone Number: Lisa Lima RN,   8:17 PM

## 2020-01-17 NOTE — PLAN OF CARE
Pt remains hospitalized for PNA/CAMMIE. Tachy,other vital signs stable, currently wearing C-pap with 2L. Denies any pain. BG check 95. /hr, continues IV Rocephin and oral Zithromax. Up independently/SBA. LS exp wheezing, KHALIL. K replaced, recheck this AM.     Ambulated to bathroom, voided x 1 at end of shift, currently on 2L NC.

## 2020-01-17 NOTE — H&P
[facility]  Hospitalist Admission Note  Name: Nicole Reyes    MRN: [id number]  YOB: 1955    Age: 64 year old  Date of admission: 1/16/2020  Primary care provider: Pari Wiley    Chief Complaint:  Cough, SOB, fever    Assessment and Plan:     Nicole Reyes is a 64 year old female with PMH including CRISTÓBAL (uses CPAP), DM2, hiatal hernia, GERD with multiple prior episodes of PNA now on supplemental oxygen (2 L) who was admitted on 01/16/20 several day history of URI symptoms followed by fever and productive cough and was found to have pneumonia and acute kidney injury.    1.  Pneumonia with chronic hypoxic respiratory failure: Patient was treated for pneumonia and hospitalized in 11/2019.  She did improve after that episode.  Developed cold symptoms approximately 1 week ago with progressive cough and fever.  Chest x-ray shows near complete resolution of the left mid and lower lung consolidations from November with mild residual or recurrent opacity in the left base.  She also has leukocytosis and did have fevers at home.  Her influenza was negative.  She was given ceftriaxone and azithromycin in the emergency room.  We will continue with these antibiotics.  -Continue ceftriaxone and azithromycin  -Antitussives  -Wean oxygen as able (at baseline on 2 L)  -Should follow-up with immunology if she is not already done this, this was recommended at her hospital discharge in November    2.  Acute kidney injury: Baseline creatinine approximately 0.7.  Creatinine today is elevated at 1.27.  She also has hypokalemia.  This is likely due to decreased oral intake in the setting of infection.  Hydrate overnight and repeat in the morning.  -Avoid nephrotoxic agents  -Hold losartan until renal function improves  -Normal saline with KCl at 100 cc/h  -BMP in the morning    3.  Type 2 diabetes: Prior to admission on metformin 1000 mg daily.  Will hold this for now particularly given elevated  creatinine.  Sliding scale insulin.    4.  Hypertension: Prior to admission she is on losartan.  We will hold this kidney injury.  We will have PRN hydralazine available as needed.    5.  GERD: Known GERD with hiatal hernia.  Continue Protonix.    6.  CRISTÓBAL: Resume CPAP per home settings.    7.  Hypokalemia: Replace per protocol.    8.  Anxiety: Patient is on Klonopin 0.5 mg twice daily.  I have ordered this but change it to as needed.  Also continue her Zoloft.    Diet: Diabetic diet  DVT Prophylaxis: Low Risk/Ambulatory with no VTE prophylaxis indicated  Suarez Catheter: not present  Code Status: Full Code    Disposition Plan   Expected discharge: admit to inpatient status, recommended to prior living arrangement once antibiotic plan established, O2 use less than 3 liters/minute and renal function improved.  Entered: Susan Guaman MD 01/16/2020, 7:30 PM     The patient's care was discussed with the Patient.    Susan Guaman MD  [facility]      History of Present Illness:  Nicole Reyes is a 64 year old female with PMH including CRISTÓBAL (uses CPAP), DM2, hiatal hernia, GERD with multiple prior episodes of PNA now on supplemental oxygen (2 L) who was admitted on 01/16/20 several day history of URI symptoms followed by fever and productive cough and was found to have pneumonia and acute kidney injury.  History was obtained through patient interview, chart review and discussion with Dr. Helm in the ER.    The patient reports that approximately 1 week ago she developed cold symptoms.  She had sneezing, rhinorrhea and a slightly sore throat.  Her symptoms then went into her chest.  Over the past several days she felt that her cough was getting worse.  At night when she went to bed it would feels as if her lungs were filling up and she had significant coughing.  A couple of days ago she did measure her temperature to 101 degrees.  He has been on supplemental oxygen since she had pneumonia this past  July.  She reports that she had been doing okay without wearing oxygen but then 3 days ago she noticed that her oxygen saturations were worsening.  Even with 2 L which is what she was prescribed she was satting 88 to 89%.  Her cough has been productive of a greenish sputum.  She has noted intermittent fevers and chills.  She denies myalgias, nausea, vomiting, chest pain, appetite changes, diarrhea or constipation.  She has had some dizziness and lightheadedness but has not had any falls.  No known sick contacts.    She reports she has had 4 bouts of pneumonia.  Her last hospitalization was in November.  She did feel that she recovered from that bout of pneumonia prior to this episode.  She reports that she did see a pulmonologist.  She cannot recall the name of the pulmonologist but states that she had some testing and it was not felt that she had underlying lung disease.  There is concern that she could be having aspiration from acid reflux and she did undergo an EGD and was told that it looked good other than a hiatal hernia.  Pulmonary did suggest an evaluation with immunology to see if she had some type of immunological disorders making her susceptible to recurrent pneumonias.    When she was hospitalized in November she did have a video swallow study with speech path and there was no evidence of aspiration.     Past Medical History:  Past Medical History:   Diagnosis Date     Blood transfusion      CKD (chronic kidney disease) stage 3, GFR 30-59 ml/min (H)      Essential hypertension, benign      Gastroesophageal reflux disease      Generalized anxiety disorder      Hernia, abdominal      Hiatal hernia      History of blood transfusion 2011    with a hernia repair     Hypertension      Insomnia, unspecified      Iron deficiency anemia 8/09     Major depression     sees a psychiatrist     Menopause     age 53     Obesity, unspecified      CRISTÓBAL (obstructive sleep apnea)     bipap     Oxygen dependent     2 LPM at  home     Postoperative seroma 10/11    drained several times     Pure hypercholesterolemia      RLS (restless legs syndrome)      Type II or unspecified type diabetes mellitus without mention of complication, not stated as uncontrolled      Past Surgical History:  Past Surgical History:   Procedure Laterality Date     BREAST BIOPSY, RT/LT      2 times; benign     C NONSPECIFIC PROCEDURE      Hernia repair      C NONSPECIFIC PROCEDURE      Lymph node biopsy in neck (benign)       SECTION        SECTION        SECTION       DILATION AND CURETTAGE, HYSTEROSCOPY DIAGNOSTIC, COMBINED  3/22/2013    Procedure: COMBINED DILATION AND CURETTAGE, HYSTEROSCOPY DIAGNOSTIC;  DILATION AND CURETTAGE, HYSTEROSCOPY DIAGNOSTIC   Converted to a general;  Surgeon: Robi Edwards MD;  Location: [facility] OR     ESOPHAGOSCOPY, GASTROSCOPY, DUODENOSCOPY (EGD), COMBINED N/A 2015    Procedure: COMBINED ESOPHAGOSCOPY, GASTROSCOPY, DUODENOSCOPY (EGD);  Surgeon: Obinna Gutierrez MD;  Location:  GI     ESOPHAGOSCOPY, GASTROSCOPY, DUODENOSCOPY (EGD), COMBINED N/A 2015    Procedure: COMBINED ENDOSCOPIC ULTRASOUND, ESOPHAGOSCOPY, GASTROSCOPY, DUODENOSCOPY (EGD), FINE NEEDLE ASPIRATE/BIOPSY;  Surgeon: Sabine Olivares MD;  Location:  GI     ESOPHAGOSCOPY, GASTROSCOPY, DUODENOSCOPY (EGD), COMBINED N/A 1/3/2020    Procedure: ESOPHAGOGASTRODUODENOSCOPY;  Surgeon: Ascencion Stauffer MD;  Location: [facility] OR     HERNIORRHAPHY INCISIONAL (LOCATION)  10/8/2011     incarcerated hernia repair with mesh;     HERNIORRHAPHY INCISIONAL (LOCATION)  10/16/2011     repair incisional hernia with mesh, and removal of current implanted mesh;     Social History:  Social History     Tobacco Use     Smoking status: Former Smoker     Last attempt to quit: 3/18/1979     Years since quittin.8     Smokeless tobacco: Never Used     Tobacco comment: quit    Substance Use Topics     Alcohol use: Yes     Comment: Twice a  month     Social History     Social History Narrative     Not on file     Family History:  Family History   Problem Relation Age of Onset     Cardiovascular Mother         CHF     Hypertension Mother      C.A.D. Mother         50s     Lipids Mother      Cancer Father         Hodgkin's, Leukemia     Diabetes Sister      Connective Tissue Disorder Sister         Lupus     Lipids Sister      Hypertension Brother      Hypertension Sister      Hypertension Sister      Cancer Brother         Hodgkin's     C.A.D. Brother 61     Hypertension Sister      C.A.D. Brother      Basal cell carcinoma Daughter 38        top of head     Allergies:  Allergies   Allergen Reactions     Codeine Rash     Penicillins Rash     Pt has tolerated cephalosporins and penems.   Pt tolerated Zosyn 7/6/2019     Medications:  Current Facility-Administered Medications   Medication     azithromycin (ZITHROMAX) 500 mg in sodium chloride 0.9 % 250 mL intermittent infusion     cefTRIAXone (ROCEPHIN) 1 g vial to attach to  mL bag for ADULTS or NS 50 mL bag for PEDS     methylPREDNISolone (DEPO-MEDROL) injection 40 mg     methylPREDNISolone (DEPO-MEDROL) injection 40 mg     potassium chloride 10 mEq in 100 mL intermittent infusion with 10 mg lidocaine     Current Outpatient Medications   Medication     albuterol (PROAIR HFA/PROVENTIL HFA/VENTOLIN HFA) 108 (90 Base) MCG/ACT inhaler     aspirin 81 MG EC tablet     clonazePAM (KLONOPIN) 1 MG tablet     gabapentin (NEURONTIN) 300 MG capsule     losartan (COZAAR) 100 MG tablet     metFORMIN (GLUCOPHAGE-XR) 500 MG 24 hr tablet     pantoprazole (PROTONIX) 40 MG EC tablet     sertraline (ZOLOFT) 100 MG tablet     simvastatin (ZOCOR) 20 MG tablet     zolpidem (AMBIEN) 10 MG tablet     blood glucose (ACCU-CHEK BYRON PLUS) test strip      Review of Systems:  A Comprehensive greater than 10 system review of systems was carried out.  Pertinent positives and negatives are noted above.  Otherwise negative for  contributory information.     Physical Exam:  Blood pressure 109/54, pulse 80, temperature 98.9  F (37.2  C), temperature source Oral, resp. rate 20, weight 109.8 kg (242 lb), last menstrual period 09/15/2007, SpO2 96 %, not currently breastfeeding.  Wt Readings from Last 1 Encounters:   01/16/20 109.8 kg (242 lb)     Exam:   General: Alert, awake, no acute distress.  HEENT: NC/AT, eyes anicteric and without injection, EOMI, face symmetric.  Dentition WNL, MMM.  Cardiac: RRR, normal S1, S2.  No murmurs/g/r.  No LE edema  Pulmonary: Normal chest rise, normal work of breathing.  Rhonchi most notable in the LLL but some wheezing throughout lung fields with coughing  Abdomen: soft, non-tender, non-distended.  Normoactive BS.  No guarding or rebound tenderness.  Extremities: no deformities.  Warm, well perfused.  Skin: no rashes or lesions noted.  Warm and Dry.  Neuro: No focal deficits noted.  Speech clear.  Coordination and strength grossly normal.  Psych: Appropriate affect. Alert and oriented x3    Data Reviewed Today:  Imaging:  Results for orders placed or performed during the hospital encounter of 01/16/20   XR Chest 2 Views    Narrative    EXAM: XR CHEST 2 VW  LOCATION: [facility]  DATE/TIME: 1/16/2020 6:01 PM    INDICATION: Cough, shortness of breath  COMPARISON: 11/13/2019      Impression    IMPRESSION: Near complete resolution of left mid and lower lung consolidation. Mild residual or recurrent opacity at the left base. Right lung is clear. No pleural effusion or pneumothorax. Small hiatal hernia. Normal heart size.     Labs:  Recent Labs   Lab 01/16/20  1654   WBC 22.2*   HGB 12.0   HCT 40.4   MCV 76*        Recent Labs   Lab 01/16/20  1654      POTASSIUM 2.9*   CHLORIDE 105   CO2 28   ANIONGAP 6   *   BUN 17   CR 1.27*   GFRESTIMATED 44*   GFRESTBLACK 52*   GRECIA 9.3       Susan Guaman MD  Hospitalist  [facility]

## 2020-01-17 NOTE — PROGRESS NOTES
[facility]  Hospitalist Admission Note  Name: Nicole Reyes    MRN: [id number]  YOB: 1955    Age: 64 year old  Date of admission: 1/16/2020  Primary care provider: Pari Wiley    Chief Complaint:  Cough, SOB, fever    Assessment and Plan:     Nicole Reyes is a 64 year old female with PMH including CRISTÓBAL (uses CPAP), DM2, hiatal hernia, GERD with multiple prior episodes of PNA now on supplemental oxygen (2 L) who was admitted on 01/16/20 several day history of URI symptoms followed by fever and productive cough and was found to have pneumonia and acute kidney injury.    1.  Pneumonia with chronic hypoxic respiratory failure: Patient was treated for pneumonia and hospitalized in 11/2019.  She did improve after that episode.  Developed cold symptoms approximately 1 week ago with progressive cough and fever.  Chest x-ray shows near complete resolution of the left mid and lower lung consolidations from November with mild residual or recurrent opacity in the left base.  She also has leukocytosis and did have fevers at home.  Her influenza was negative.  She was given ceftriaxone and azithromycin in the emergency room.  We will continue with these antibiotics.  -Continue ceftriaxone and azithromycin  -Antitussives  -Wean oxygen as able (at baseline on 2 L)  -Should follow-up with immunology if she is not already done this, this was recommended at her hospital discharge in November    2.  Acute kidney injury: RESOLVED  Baseline creatinine approximately 0.7.  Creatinine today is elevated at 1.27.  She also has hypokalemia.  This is likely due to decreased oral intake in the setting of infection.  Hydrate overnight and repeat in the morning.  -Avoid nephrotoxic agents  -Resume losartan  -BMP in the morning    3.  Type 2 diabetes: Prior to admission on metformin 1000 mg daily.  Will hold this for now particularly given elevated creatinine.  Sliding scale insulin.    4.  Hypertension:  Prior to admission she is on losartan.  We will hold this kidney injury.  We will have PRN hydralazine available as needed.    5.  GERD: Known GERD with hiatal hernia.  Continue Protonix.    6.  CRISTÓBAL: Resume CPAP per home settings.    7.  Hypokalemia: RESOLVED Replaced per protocol.    8.  Anxiety: Patient is on Klonopin 0.5 mg twice daily.  I have ordered this but change it to as needed.  Also continue her Zoloft.    Diet: Diabetic diet  DVT Prophylaxis: Low Risk/Ambulatory with no VTE prophylaxis indicated  Suarez Catheter: not present  Code Status: Full Code    Disposition Plan      Expected discharge: at least 2-3 days    Entered: Romina Mccall MD 01/17/2020, 12:25 PM     The patient's care was discussed with the Patient.    Romina Mccall MD  [facility]    Medications:  Current Facility-Administered Medications   Medication     0.9% sodium chloride + KCl 20 mEq/L infusion     acetaminophen (TYLENOL) tablet 650 mg     albuterol (PROAIR HFA/PROVENTIL HFA/VENTOLIN HFA) 108 (90 Base) MCG/ACT inhaler 1-2 puff     aspirin EC tablet 81 mg     azithromycin (ZITHROMAX) tablet 250 mg     benzonatate (TESSALON) capsule 100 mg     cefTRIAXone (ROCEPHIN) 1 g vial to attach to  mL bag for ADULTS or NS 50 mL bag for PEDS     clonazePAM (klonoPIN) tablet 0.5 mg     glucose gel 15-30 g    Or     dextrose 50 % injection 25-50 mL    Or     glucagon injection 1 mg     gabapentin (NEURONTIN) capsule 600 mg     guaiFENesin (ROBITUSSIN) 20 mg/mL solution 10 mL     hydrALAZINE (APRESOLINE) injection 10 mg     insulin aspart (NovoLOG) inj (RAPID ACTING)     insulin aspart (NovoLOG) inj (RAPID ACTING)     lidocaine (LMX4) cream     lidocaine 1 % 0.1-1 mL     melatonin tablet 1 mg     naloxone (NARCAN) injection 0.1-0.4 mg     ondansetron (ZOFRAN-ODT) ODT tab 4 mg    Or     ondansetron (ZOFRAN) injection 4 mg     pantoprazole (PROTONIX) EC tablet 40 mg     polyethylene glycol (MIRALAX/GLYCOLAX) Packet 17 g  "    potassium chloride (KLOR-CON) Packet 20-40 mEq     potassium chloride 10 mEq in 100 mL intermittent infusion with 10 mg lidocaine     potassium chloride 10 mEq in 100 mL sterile water intermittent infusion (premix)     potassium chloride 20 mEq in 50 mL intermittent infusion     potassium chloride ER (K-DUR/KLOR-CON M) CR tablet 20-40 mEq     prochlorperazine (COMPAZINE) injection 10 mg    Or     prochlorperazine (COMPAZINE) tablet 10 mg    Or     prochlorperazine (COMPAZINE) Suppository 25 mg     senna-docusate (SENOKOT-S/PERICOLACE) 8.6-50 MG per tablet 1 tablet    Or     senna-docusate (SENOKOT-S/PERICOLACE) 8.6-50 MG per tablet 2 tablet     sertraline (ZOLOFT) tablet 200 mg     simvastatin (ZOCOR) tablet 20 mg     sodium chloride (PF) 0.9% PF flush 3 mL     sodium chloride (PF) 0.9% PF flush 3 mL     zolpidem (AMBIEN) tablet 10 mg      Review of Systems:  A Comprehensive greater than 10 system review of systems was carried out.  Pertinent positives and negatives are noted above.  Otherwise negative for contributory information.     Physical Exam:  Blood pressure 127/64, pulse 89, temperature 96.5  F (35.8  C), temperature source Oral, resp. rate 16, height 1.499 m (4' 11\"), weight 111 kg (244 lb 11.2 oz), last menstrual period 09/15/2007, SpO2 95 %, not currently breastfeeding.  Wt Readings from Last 1 Encounters:   01/16/20 111 kg (244 lb 11.2 oz)     Exam:   General: Alert, awake, no acute distress.  HEENT: NC/AT, eyes anicteric and without injection, EOMI, face symmetric.  Dentition WNL, MMM.  Cardiac: RRR, normal S1, S2.  No murmurs/g/r.  No LE edema  Pulmonary: Normal chest rise, normal work of breathing.  Rhonchi most notable in the LLL but some wheezing throughout lung fields with coughing  Abdomen: soft, non-tender, non-distended.  Normoactive BS.  No guarding or rebound tenderness.  Extremities: no deformities.  Warm, well perfused.  Skin: no rashes or lesions noted.  Warm and Dry.  Neuro: No focal " deficits noted.  Speech clear.  Coordination and strength grossly normal.  Psych: Appropriate affect. Alert and oriented x3    Data Reviewed Today:  Imaging:  Results for orders placed or performed during the hospital encounter of 01/16/20   XR Chest 2 Views    Narrative    EXAM: XR CHEST 2 VW  LOCATION: [facility]  DATE/TIME: 1/16/2020 6:01 PM    INDICATION: Cough, shortness of breath  COMPARISON: 11/13/2019      Impression    IMPRESSION: Near complete resolution of left mid and lower lung consolidation. Mild residual or recurrent opacity at the left base. Right lung is clear. No pleural effusion or pneumothorax. Small hiatal hernia. Normal heart size.     Labs:  Recent Labs   Lab 01/17/20  0645 01/16/20  1654   WBC 12.9* 22.2*   HGB 10.6* 12.0   HCT 36.3 40.4   MCV 75* 76*    325     Recent Labs   Lab 01/17/20  0645 01/16/20  1654    139   POTASSIUM 3.7 2.9*   CHLORIDE 112* 105   CO2 27 28   ANIONGAP 5 6   * 114*   BUN 13 17   CR 0.85 1.27*   GFRESTIMATED 72 44*   GFRESTBLACK 84 52*   GRECIA 8.3* 9.3       Romina Mccall MD  Hospitalist  [facility]

## 2020-01-17 NOTE — PROGRESS NOTES
A CPAP on Auto-CPAP [id number] with 2L O2 bled-in was applied to the pt via the full face mask for an increase in WOB and/or SOB.  The bridge of the nose is intact with no signs of skin breakdown. Pt is tolerating it well. Will continue to monitor and assess the pt's current respiratory status and needs.

## 2020-01-17 NOTE — PLAN OF CARE
Patient denies SOB and pain. On 2 LPM of oxygen. NS w/20 meq of K+ @ 100 mL/hr. . IV Rocephin. Discharge to be determined.

## 2020-01-17 NOTE — PLAN OF CARE
Pt up SBA. IV infusing- when pt drinking more fluids IV can be discontinued. Good appetite. No coverage needed with insulin. Slept most of shift. Up in chair for a few hours. Voiding. Passing flatus. Will continue to monitor.

## 2020-01-18 LAB
ANION GAP SERPL CALCULATED.3IONS-SCNC: 2 MMOL/L (ref 3–14)
BASOPHILS # BLD AUTO: 0 10E9/L (ref 0–0.2)
BASOPHILS NFR BLD AUTO: 0.4 %
BUN SERPL-MCNC: 9 MG/DL (ref 7–30)
CALCIUM SERPL-MCNC: 8.3 MG/DL (ref 8.5–10.1)
CHLORIDE SERPL-SCNC: 114 MMOL/L (ref 94–109)
CO2 SERPL-SCNC: 29 MMOL/L (ref 20–32)
CREAT SERPL-MCNC: 0.75 MG/DL (ref 0.52–1.04)
DIFFERENTIAL METHOD BLD: ABNORMAL
EOSINOPHIL # BLD AUTO: 0.4 10E9/L (ref 0–0.7)
EOSINOPHIL NFR BLD AUTO: 5 %
ERYTHROCYTE [DISTWIDTH] IN BLOOD BY AUTOMATED COUNT: 21.3 % (ref 10–15)
GFR SERPL CREATININE-BSD FRML MDRD: 84 ML/MIN/{1.73_M2}
GLUCOSE BLDC GLUCOMTR-MCNC: 101 MG/DL (ref 70–99)
GLUCOSE BLDC GLUCOMTR-MCNC: 104 MG/DL (ref 70–99)
GLUCOSE BLDC GLUCOMTR-MCNC: 105 MG/DL (ref 70–99)
GLUCOSE BLDC GLUCOMTR-MCNC: 115 MG/DL (ref 70–99)
GLUCOSE BLDC GLUCOMTR-MCNC: 123 MG/DL (ref 70–99)
GLUCOSE SERPL-MCNC: 104 MG/DL (ref 70–99)
HCT VFR BLD AUTO: 33.7 % (ref 35–47)
HGB BLD-MCNC: 9.8 G/DL (ref 11.7–15.7)
IMM GRANULOCYTES # BLD: 0.1 10E9/L (ref 0–0.4)
IMM GRANULOCYTES NFR BLD: 0.9 %
LYMPHOCYTES # BLD AUTO: 2 10E9/L (ref 0.8–5.3)
LYMPHOCYTES NFR BLD AUTO: 25.4 %
MCH RBC QN AUTO: 22.3 PG (ref 26.5–33)
MCHC RBC AUTO-ENTMCNC: 29.1 G/DL (ref 31.5–36.5)
MCV RBC AUTO: 77 FL (ref 78–100)
MONOCYTES # BLD AUTO: 0.6 10E9/L (ref 0–1.3)
MONOCYTES NFR BLD AUTO: 7.2 %
NEUTROPHILS # BLD AUTO: 4.8 10E9/L (ref 1.6–8.3)
NEUTROPHILS NFR BLD AUTO: 61.1 %
NRBC # BLD AUTO: 0 10*3/UL
NRBC BLD AUTO-RTO: 0 /100
PLATELET # BLD AUTO: 205 10E9/L (ref 150–450)
POTASSIUM SERPL-SCNC: 4.5 MMOL/L (ref 3.4–5.3)
RBC # BLD AUTO: 4.39 10E12/L (ref 3.8–5.2)
SODIUM SERPL-SCNC: 145 MMOL/L (ref 133–144)
WBC # BLD AUTO: 7.8 10E9/L (ref 4–11)

## 2020-01-18 PROCEDURE — 85025 COMPLETE CBC W/AUTO DIFF WBC: CPT | Performed by: INTERNAL MEDICINE

## 2020-01-18 PROCEDURE — 00000146 ZZHCL STATISTIC GLUCOSE BY METER IP

## 2020-01-18 PROCEDURE — 25000132 ZZH RX MED GY IP 250 OP 250 PS 637: Performed by: INTERNAL MEDICINE

## 2020-01-18 PROCEDURE — 25000128 H RX IP 250 OP 636: Performed by: INTERNAL MEDICINE

## 2020-01-18 PROCEDURE — 94660 CPAP INITIATION&MGMT: CPT

## 2020-01-18 PROCEDURE — 80048 BASIC METABOLIC PNL TOTAL CA: CPT | Performed by: INTERNAL MEDICINE

## 2020-01-18 PROCEDURE — 99232 SBSQ HOSP IP/OBS MODERATE 35: CPT | Performed by: INTERNAL MEDICINE

## 2020-01-18 PROCEDURE — 36415 COLL VENOUS BLD VENIPUNCTURE: CPT | Performed by: INTERNAL MEDICINE

## 2020-01-18 PROCEDURE — 40000275 ZZH STATISTIC RCP TIME EA 10 MIN

## 2020-01-18 PROCEDURE — 12000000 ZZH R&B MED SURG/OB

## 2020-01-18 RX ORDER — METFORMIN HCL 500 MG
500 TABLET, EXTENDED RELEASE 24 HR ORAL EVERY MORNING
Status: DISCONTINUED | OUTPATIENT
Start: 2020-01-19 | End: 2020-01-20 | Stop reason: HOSPADM

## 2020-01-18 RX ADMIN — ACETAMINOPHEN 650 MG: 325 TABLET, FILM COATED ORAL at 23:52

## 2020-01-18 RX ADMIN — SERTRALINE HYDROCHLORIDE 200 MG: 100 TABLET ORAL at 08:20

## 2020-01-18 RX ADMIN — POTASSIUM CHLORIDE AND SODIUM CHLORIDE: 900; 150 INJECTION, SOLUTION INTRAVENOUS at 18:26

## 2020-01-18 RX ADMIN — GUAIFENESIN 10 ML: 100 SOLUTION ORAL at 01:11

## 2020-01-18 RX ADMIN — GABAPENTIN 600 MG: 300 CAPSULE ORAL at 19:31

## 2020-01-18 RX ADMIN — LOSARTAN POTASSIUM 100 MG: 50 TABLET, FILM COATED ORAL at 08:20

## 2020-01-18 RX ADMIN — MICONAZOLE NITRATE: 20 POWDER TOPICAL at 19:33

## 2020-01-18 RX ADMIN — ASPIRIN 81 MG: 81 TABLET, COATED ORAL at 19:31

## 2020-01-18 RX ADMIN — PANTOPRAZOLE SODIUM 40 MG: 40 TABLET, DELAYED RELEASE ORAL at 21:23

## 2020-01-18 RX ADMIN — SIMVASTATIN 20 MG: 20 TABLET, FILM COATED ORAL at 21:23

## 2020-01-18 RX ADMIN — CEFTRIAXONE 1 G: 1 INJECTION, POWDER, FOR SOLUTION INTRAMUSCULAR; INTRAVENOUS at 19:32

## 2020-01-18 RX ADMIN — AZITHROMYCIN MONOHYDRATE 250 MG: 250 TABLET ORAL at 18:05

## 2020-01-18 RX ADMIN — POTASSIUM CHLORIDE AND SODIUM CHLORIDE: 900; 150 INJECTION, SOLUTION INTRAVENOUS at 06:04

## 2020-01-18 ASSESSMENT — ACTIVITIES OF DAILY LIVING (ADL)
ADLS_ACUITY_SCORE: 12

## 2020-01-18 NOTE — PLAN OF CARE
BP's elevated - 147/72.  Denies pain.  LS: BUL exp wheezes, BLL dim, cough - syrup given. Denies SOB.  Supplemental O2 in use - 2 lpm.  IVF infusing.  Tx: Rocephin & Zithromax.

## 2020-01-18 NOTE — PROGRESS NOTES
RT Note:    Patient went on CPAP at midnight, but was off when checked at 4am.    Alisha Mitchell, RT  January 18, 2020.6:34 AM

## 2020-01-18 NOTE — PROGRESS NOTES
[facility]  Hospitalist Progress Note  Mei Ibrahim MD 01/18/2020    Reason for Stay (Diagnosis): Community-acquired pneumonia         Assessment and Plan:      Summary of Stay: Nicole Reyes is a 64 year old female with a history of chronic hypoxic respiratory failure on oxygen at 2 L by nasal cannula since 7/2019, obstructive sleep apnea on CPAP, obesity with BMI of 50, type 2 diabetes, GERD, hiatal hernia, with a history of 3 episodes of pneumonia in 2019 one with associated sepsis admitted on 1/16/2020 with URI symptoms chest x-ray showing resolution of left mid lung and lower lobe pneumonia, but with consolidation of the left base suspicious for recurrent pneumonia.      Problem List:   1. Recurrent left basilar pneumonia: Influenza testing was negative.  Currently on day 2 of ceftriaxone/azithromycin  2. Chronic hypoxic respiratory failure: She is chronically on 2 L by nasal cannula, and saturations have been in the upper 90% range on her typical supplement oxygen.  Etiology for her hypoxic respiratory failure is unclear to me.  Sounds like she has had episodes after pneumonia where she is required temporary oxygen but is weaned off.  This time however she has not been able to wean off due to hypoxia at home.  She has been on oxygen since July 2019.  She reports that she has been seen by a pulmonologist but has never been given a specific answer to why this is going on.  I do wonder about central hypoventilation syndrome and obesity with restrictive lung disease as a potential component  3. Hypernatremia: Mild: Recheck in the morning  4. CRISTÓBAL: Continue home BiPAP  5. Type 2 diabetes: PTA regimen is metformin 1 g p.o. daily.  Hemoglobin A1c shows excellent control at 6.5% also appears to be on aspirin 81 mg a day.  Metformin is currently on hold blood sugars are ranging from .  Will resume today at 500 mg p.o. twice daily  6. Depression/anxiety with insomnia: Continue sertraline at 200 mg  "p.o. daily, clonazepam 0.5 mg p.o. twice daily and zolpidem 10 mg p.o. nightly as needed  7. Hyperlipidemia: Continue statin as at home and BP is under variable control  8. Hypertension: Resumed on chronic losartan 100 mg p.o. 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     Entered: Mei Culvern 01/18/2020, 1:12 PM               Interval History (Subjective):      Feeling better since admission but still not back to baseline.  Mild fatigue.  No chest pain.  Breathing is improved but was short of breath when I entered the room as she moved to the chair and did not bring her oxygen.  No nausea vomiting or diarrhea.  P.o. intake is fair                  Physical Exam:      Last Vital Signs:  /70 (BP Location: Right arm)   Pulse 89   Temp 95.5  F (35.3  C) (Oral)   Resp 16   Ht 1.499 m (4' 11\")   Wt 111 kg (244 lb 11.2 oz)   LMP 09/15/2007   SpO2 98%   BMI 49.42 kg/m      I/O: Inaccurate    Pleasant no acute distress looks stated age head is normocephalic atraumatic and sclera clear she is very obese.  Lung sounds are diminished but without crackles or wheezing.  Heart is also distant but regular without murmurs rubs or gallops she does not have any acute lower extremity edema abdomen is obese but soft nontender nondistended her affect is appropriate she is alert and oriented             Medications:      All current medications were reviewed with changes reflected in problem list.         Data:      All new lab and imaging data was reviewed.   Labs:  Recent Labs   Lab 01/18/20  0701   *   POTASSIUM 4.5   CHLORIDE 114*   CO2 29   ANIONGAP 2*   *   BUN 9   CR 0.75   GFRESTIMATED 84 "   GFRESTBLACK >90   RGECIA 8.3*     Recent Labs   Lab 01/18/20  0701   WBC 7.8   HGB 9.8*   HCT 33.7*   MCV 77*        Recent Labs   Lab 01/18/20  1215 01/18/20  0805 01/18/20  0701 01/18/20  0114 01/17/20  2111 01/17/20  1741  01/17/20  0645  01/16/20  1654   GLC  --   --  104*  --   --   --   --  113*  --  114*   * 104*  --  105* 94 105*   < >  --    < >  --     < > = values in this interval not displayed.      Imaging:

## 2020-01-18 NOTE — PLAN OF CARE
Resident remain hospitalized for PNA, A&Ox4, SBA, VSS, denies pain, LS wheezing, infrequent productive cough, on O2 2 lpm at 95%, denies SOB. LBM 1/17/20, voiding adequately without difficulty. Tolerating modcho diet.  &94. On Rocephin and Zithromax. IVF running due to poor fluid intake. Using CPAP at night.Disposition TBD.

## 2020-01-19 LAB
ANION GAP SERPL CALCULATED.3IONS-SCNC: 2 MMOL/L (ref 3–14)
BUN SERPL-MCNC: 7 MG/DL (ref 7–30)
CALCIUM SERPL-MCNC: 8.1 MG/DL (ref 8.5–10.1)
CHLORIDE SERPL-SCNC: 112 MMOL/L (ref 94–109)
CO2 SERPL-SCNC: 29 MMOL/L (ref 20–32)
CREAT SERPL-MCNC: 0.67 MG/DL (ref 0.52–1.04)
GFR SERPL CREATININE-BSD FRML MDRD: >90 ML/MIN/{1.73_M2}
GLUCOSE BLDC GLUCOMTR-MCNC: 104 MG/DL (ref 70–99)
GLUCOSE BLDC GLUCOMTR-MCNC: 106 MG/DL (ref 70–99)
GLUCOSE BLDC GLUCOMTR-MCNC: 116 MG/DL (ref 70–99)
GLUCOSE BLDC GLUCOMTR-MCNC: 92 MG/DL (ref 70–99)
GLUCOSE BLDC GLUCOMTR-MCNC: 93 MG/DL (ref 70–99)
GLUCOSE SERPL-MCNC: 105 MG/DL (ref 70–99)
POTASSIUM SERPL-SCNC: 4.1 MMOL/L (ref 3.4–5.3)
SODIUM SERPL-SCNC: 143 MMOL/L (ref 133–144)

## 2020-01-19 PROCEDURE — 80048 BASIC METABOLIC PNL TOTAL CA: CPT | Performed by: INTERNAL MEDICINE

## 2020-01-19 PROCEDURE — 36415 COLL VENOUS BLD VENIPUNCTURE: CPT | Performed by: INTERNAL MEDICINE

## 2020-01-19 PROCEDURE — 99232 SBSQ HOSP IP/OBS MODERATE 35: CPT | Performed by: INTERNAL MEDICINE

## 2020-01-19 PROCEDURE — 25000132 ZZH RX MED GY IP 250 OP 250 PS 637: Performed by: INTERNAL MEDICINE

## 2020-01-19 PROCEDURE — 00000146 ZZHCL STATISTIC GLUCOSE BY METER IP

## 2020-01-19 PROCEDURE — 25000128 H RX IP 250 OP 636: Performed by: INTERNAL MEDICINE

## 2020-01-19 PROCEDURE — 12000000 ZZH R&B MED SURG/OB

## 2020-01-19 RX ADMIN — ASPIRIN 81 MG: 81 TABLET, COATED ORAL at 19:17

## 2020-01-19 RX ADMIN — GABAPENTIN 600 MG: 300 CAPSULE ORAL at 19:17

## 2020-01-19 RX ADMIN — MICONAZOLE NITRATE: 20 POWDER TOPICAL at 08:56

## 2020-01-19 RX ADMIN — SIMVASTATIN 20 MG: 20 TABLET, FILM COATED ORAL at 21:22

## 2020-01-19 RX ADMIN — ZOLPIDEM TARTRATE 10 MG: 5 TABLET, COATED ORAL at 01:17

## 2020-01-19 RX ADMIN — PANTOPRAZOLE SODIUM 40 MG: 40 TABLET, DELAYED RELEASE ORAL at 21:22

## 2020-01-19 RX ADMIN — ZOLPIDEM TARTRATE 10 MG: 5 TABLET, COATED ORAL at 23:31

## 2020-01-19 RX ADMIN — POTASSIUM CHLORIDE AND SODIUM CHLORIDE: 900; 150 INJECTION, SOLUTION INTRAVENOUS at 04:37

## 2020-01-19 RX ADMIN — MICONAZOLE NITRATE: 20 POWDER TOPICAL at 21:22

## 2020-01-19 RX ADMIN — CLONAZEPAM 0.5 MG: 0.5 TABLET ORAL at 01:17

## 2020-01-19 RX ADMIN — ACETAMINOPHEN 650 MG: 325 TABLET, FILM COATED ORAL at 23:31

## 2020-01-19 RX ADMIN — CEFTRIAXONE 1 G: 1 INJECTION, POWDER, FOR SOLUTION INTRAMUSCULAR; INTRAVENOUS at 19:18

## 2020-01-19 RX ADMIN — LOSARTAN POTASSIUM 100 MG: 50 TABLET, FILM COATED ORAL at 08:56

## 2020-01-19 RX ADMIN — CLONAZEPAM 0.5 MG: 0.5 TABLET ORAL at 23:31

## 2020-01-19 RX ADMIN — SERTRALINE HYDROCHLORIDE 200 MG: 100 TABLET ORAL at 08:56

## 2020-01-19 RX ADMIN — AZITHROMYCIN MONOHYDRATE 250 MG: 250 TABLET ORAL at 19:17

## 2020-01-19 RX ADMIN — METFORMIN HYDROCHLORIDE 500 MG: 500 TABLET, EXTENDED RELEASE ORAL at 08:56

## 2020-01-19 ASSESSMENT — ACTIVITIES OF DAILY LIVING (ADL)
ADLS_ACUITY_SCORE: 12

## 2020-01-19 NOTE — PLAN OF CARE
Admitted for PNA   Exp wheezes, some SOB but otherwise improving  RA at rest, 1.5 LPM O2 with activity  IS encouraged  Ambulated in hallway  , 92  Independent with transfers

## 2020-01-19 NOTE — PLAN OF CARE
Patient admitted with pneumonia. Continues on rocephin and Zithromax. Vitals stable, oxygen at 1.5 LPM nasal cannula. Dyspnea on exertion, expiratory wheezes. Occasional non-productive cough.

## 2020-01-19 NOTE — PROGRESS NOTES
[facility]  Hospitalist Progress Note  Mei Ibrahim MD 01/19/2020    Reason for Stay (Diagnosis): Community-acquired pneumonia         Assessment and Plan:      Summary of Stay: Nicole Reyes is a 64 year old female with a history of chronic hypoxic respiratory failure on oxygen at 2 L by nasal cannula since 7/2019, obstructive sleep apnea on CPAP, obesity with BMI of 50, type 2 diabetes, GERD, hiatal hernia, with a history of 3 episodes of pneumonia in 2019 one with associated sepsis admitted on 1/16/2020 with URI symptoms chest x-ray showing resolution of left mid lung and lower lobe pneumonia, but with consolidation of the left base suspicious for recurrent pneumonia.      Problem List:   1. Recurrent left basilar pneumonia: Influenza testing was negative.  Currently on day 3 of ceftriaxone/azithromycin  2. Chronic hypoxic respiratory failure: She is chronically on 2 L by nasal cannula, and saturations have been in the upper 90% range on her typical supplement oxygen.  Etiology for her hypoxic respiratory failure is unclear to me.  Sounds like she has had episodes after pneumonia where she is required temporary oxygen but is weaned off.  This time however she has not been able to wean off due to hypoxia at home.  She has been on oxygen since July 2019.  She reports that she has been seen by a pulmonologist but has never been given a specific answer to why this is going on.  I do wonder about central hypoventilation syndrome and obesity with restrictive lung disease as a potential component  3. Hypernatremia: Mild: Rechecked and resolved   4. CRISTÓBAL: Continue home BiPAP  5. Type 2 diabetes: PTA regimen is metformin 1 g p.o. daily.  Hemoglobin A1c shows excellent control at 6.5% also appears to be on aspirin 81 mg a day.  Metformin was initially held but then half dosing was resumed 1/18/2020 blood sugars are under excellent control   6. Depression/anxiety with insomnia: Continue sertraline at 200 mg  "p.o. daily, clonazepam 0.5 mg p.o. twice daily and zolpidem 10 mg p.o. nightly as needed  7. Hyperlipidemia: Continue statin as at home and BP is under variable control  8. Hypertension: Resumed on chronic losartan 100 mg p.o. 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     Entered: Mei Ibrahim 01/19/2020, 4:28 PM               Interval History (Subjective):      Feels like she will be ready for discharge in the morning.  Bit more fatigued today is did not sleep well last night.  This, aches and pains from being in the hospital bed.  She denies any chest pain or shortness of breath.                  Physical Exam:      Last Vital Signs:  BP (!) 157/77 (BP Location: Right arm)   Pulse 89   Temp 96.9  F (36.1  C) (Oral)   Resp 18   Ht 1.499 m (4' 11\")   Wt 111 kg (244 lb 11.2 oz)   LMP 09/15/2007   SpO2 97%   BMI 49.42 kg/m      I/O: Inaccurate    Pleasant no acute distress looks stated age head is normocephalic atraumatic and sclera clear she is very obese.  Lung sounds are diminished but without crackles or wheezing.  Heart is also distant but regular without murmurs rubs or gallops she does not have any acute lower extremity edema abdomen is obese but soft nontender nondistended her affect is appropriate she is alert and oriented             Medications:      All current medications were reviewed with changes reflected in problem list.         Data:      All new lab and imaging data was reviewed.   Labs:  Recent Labs   Lab 01/19/20  0626      POTASSIUM 4.1   CHLORIDE 112*   CO2 29   ANIONGAP 2*   *   BUN 7   CR 0.67   GFRESTIMATED >90   GFRESTBLACK >90   GRECIA 8.1*     Recent Labs   Lab " 01/18/20  0701   WBC 7.8   HGB 9.8*   HCT 33.7*   MCV 77*        Recent Labs   Lab 01/19/20  1303 01/19/20  0840 01/19/20  0626 01/19/20  0159 01/18/20  2106 01/18/20  1733  01/18/20  0701  01/17/20  0645  01/16/20  1654   GLC  --   --  105*  --   --   --   --  104*  --  113*  --  114*   BGM 92 104*  --  106* 123* 101*   < >  --    < >  --    < >  --     < > = values in this interval not displayed.      Imaging:

## 2020-01-19 NOTE — PLAN OF CARE
Pt up in chair and ambulating in room and hallway. On 1.5L oxygen, maintaining sats above 92%. Non productive cough. Lung sounds clear. Afebrile. Good appetite. Rib cage pain from coughing, declines need for pain medication.

## 2020-01-20 VITALS
HEART RATE: 72 BPM | WEIGHT: 244.7 LBS | OXYGEN SATURATION: 99 % | BODY MASS INDEX: 49.33 KG/M2 | TEMPERATURE: 96.8 F | HEIGHT: 59 IN | DIASTOLIC BLOOD PRESSURE: 83 MMHG | RESPIRATION RATE: 15 BRPM | SYSTOLIC BLOOD PRESSURE: 157 MMHG

## 2020-01-20 LAB
GLUCOSE BLDC GLUCOMTR-MCNC: 100 MG/DL (ref 70–99)
GLUCOSE BLDC GLUCOMTR-MCNC: 104 MG/DL (ref 70–99)

## 2020-01-20 PROCEDURE — 25000132 ZZH RX MED GY IP 250 OP 250 PS 637: Performed by: INTERNAL MEDICINE

## 2020-01-20 PROCEDURE — 99239 HOSP IP/OBS DSCHRG MGMT >30: CPT | Performed by: INTERNAL MEDICINE

## 2020-01-20 PROCEDURE — 00000146 ZZHCL STATISTIC GLUCOSE BY METER IP

## 2020-01-20 RX ORDER — AZITHROMYCIN 250 MG/1
250 TABLET, FILM COATED ORAL DAILY
Qty: 1 TABLET | Refills: 0 | Status: ON HOLD | OUTPATIENT
Start: 2020-01-20 | End: 2020-04-13

## 2020-01-20 RX ORDER — CEFPODOXIME PROXETIL 200 MG/1
200 TABLET, FILM COATED ORAL 2 TIMES DAILY
Qty: 6 TABLET | Refills: 0 | Status: ON HOLD | OUTPATIENT
Start: 2020-01-20 | End: 2020-04-13

## 2020-01-20 RX ADMIN — LOSARTAN POTASSIUM 100 MG: 50 TABLET, FILM COATED ORAL at 08:05

## 2020-01-20 RX ADMIN — SERTRALINE HYDROCHLORIDE 200 MG: 100 TABLET ORAL at 08:05

## 2020-01-20 RX ADMIN — METFORMIN HYDROCHLORIDE 500 MG: 500 TABLET, EXTENDED RELEASE ORAL at 08:05

## 2020-01-20 ASSESSMENT — ACTIVITIES OF DAILY LIVING (ADL)
ADLS_ACUITY_SCORE: 12

## 2020-01-20 NOTE — PLAN OF CARE
Vitals stable, 2 LPM  oxygen overnight. Patient reports breathing is improved since admission. Continues on Rocephin, possible discharge home today.

## 2020-01-20 NOTE — PLAN OF CARE
Up walking in [place] twice this shift. Wears oxygen with rest and activity. Sats 97% on 2L. Up independent with cane in room. When walking in [place] staff helps with oxygen tank. Lung sounds diminished right base. Pt using IS independently. Complains of rib cage pain, declines intervention for this.

## 2020-01-20 NOTE — PLAN OF CARE
Patient states her breathing has improved by >50%, tolerating activity up independently in room with cane and maintaining oxygen sat >90% with 2 L n/c, occasional prod cough, using IS, up in chair for meal and tolerating regular diet, plan is to discharge home today with daughter for transport, already on oxygen at baseline prn at 2 L, all discharge instructions and medications reviewed with patient and she verbalizes understanding of discharge instructions.

## 2020-01-20 NOTE — DISCHARGE SUMMARY
Discharge Summary  Hospitalist Service    Nicole Reyes MRN# [id number]   YOB: 1955 Age: 64 year old     Date of Admission:  1/16/2020  Date of Discharge:  1/20/2020  Admitting Physician:  Susan Guaman MD  Discharge Physician: Mei Ibrahim MD  Discharging Service: Hospitalist Service     Primary Provider: Pari Wiley  Primary Care Physician Phone Number: [phone]         Discharge Diagnoses/Problem Oriented Hospital Course (Providers):    Nicole Reyes was admitted on 1/16/2020 by Susan Guaman MD and I would refer you to their history and physical.  The following problems were addressed during her hospitalization:    Recurrent left basilar pneumonia  Chronic hypoxic respiratory failure on chronic oxygen at 2 L by nasal cannula  Transient hyponatremia  Severe obesity with BMI of 50  CRISTÓBAL/central hypoventilation syndrome on BiPAP  Type 2 diabetes  Depression with anxiety and insomnia  HLP  Hypertension         Code Status:      Full Code        Brief Hospital Stay Summary Sent Home With Patient in AVS:       Summary of Stay: Nicole Reyes is a 64 year old female with a history of chronic hypoxic respiratory failure on oxygen at 2 L by nasal cannula since 7/2019, obstructive sleep apnea on CPAP, obesity with BMI of 50, type 2 diabetes, GERD, hiatal hernia, with a history of 3 episodes of pneumonia in 2019 one with associated sepsis admitted on 1/16/2020 with URI symptoms chest x-ray showing resolution of left mid lung and lower lobe pneumonia, but with consolidation of the left base suspicious for recurrent pneumonia.    She was initiated on typical CAP pneumonia therapy with ceftriaxone and azithromycin and she has done well.  She is feeling back to normal and wishes for discharge home        Problem List:   1. Recurrent left basilar pneumonia: Influenza testing was negative.    Completed 4 days of azithromycin and ceftriaxone while in hospital.  Will discharge with 1 more  day of azithromycin to complete a 5-day course, and 3 more days of Cefpodoxime to complete a 7-day course.  2. Chronic hypoxic respiratory failure: She is chronically on 2 L by nasal cannula, and saturations have been in the upper 90% range on her typical supplement oxygen.  Etiology for her hypoxic respiratory failure is unclear to me.  Sounds like she has had episodes after pneumonia where she is required temporary oxygen but is weaned off.  This time however she has not been able to wean off due to hypoxia at home.  She has been on oxygen since July 2019.  She reports that she has been seen by a pulmonologist but has never been given a specific answer to why this is going on.  I do wonder about central hypoventilation syndrome and obesity with restrictive lung disease as a potential component  3. Hypernatremia: Mild: Rechecked and resolved   4. CRISTÓBAL: Continue home BiPAP  5. Type 2 diabetes: PTA regimen is metformin 1 g p.o. daily.  Hemoglobin A1c shows excellent control at 6.5% also appears to be on aspirin 81 mg a day.  Metformin was initially held but then half dosing was resumed 1/18/2020 blood sugars are under excellent control-can resume typical dosing at discharge  6. Depression/anxiety with insomnia: Continue sertraline at 200 mg p.o. daily, clonazepam 0.5 mg p.o. twice daily and zolpidem 10 mg p.o. nightly as needed all continued during this hospitalization  7. Hyperlipidemia: Continue statin as at home   8. Hypertension: Resumed on chronic losartan 100 mg p.o. 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  Diet: Regular texture  Suarez: Not in place  Access: Peripheral IV             Important Results:      As noted below         Pending Results:        Unresulted Labs Ordered in the Past 30 Days of this Admission     Date and Time Order  Name Status Description    1/16/2020 1753 Blood culture Preliminary     1/16/2020 1752 Blood culture Preliminary       No growth to date after 5 days      Discharge Instructions and Follow-Up:      Follow-up Appointments     Follow-up and recommended labs and tests       F/u with Dr Wiley in 7-10 days for recheck               Discharge Disposition:      Discharged to home         Discharge Medications:        Current Discharge Medication List      START taking these medications    Details   azithromycin (ZITHROMAX) 250 MG tablet Take 1 tablet (250 mg) by mouth daily for 1 day  Qty: 1 tablet, Refills: 0    Associated Diagnoses: Pneumonia of left lower lobe due to infectious organism (H)      cefpodoxime (VANTIN) 200 MG tablet Take 1 tablet (200 mg) by mouth 2 times daily for 3 days  Qty: 6 tablet, Refills: 0    Associated Diagnoses: Pneumonia of left lower lobe due to infectious organism (H)         CONTINUE these medications which have NOT CHANGED    Details   albuterol (PROAIR HFA/PROVENTIL HFA/VENTOLIN HFA) 108 (90 Base) MCG/ACT inhaler Inhale 1-2 puffs into the lungs every 6 hours as needed for shortness of breath / dyspnea or wheezing  Qty: 1 Inhaler, Refills: 0    Comments: Please also dispense a spacer  Associated Diagnoses: Community acquired bacterial pneumonia      aspirin 81 MG EC tablet Take 81 mg by mouth every evening       clonazePAM (KLONOPIN) 1 MG tablet Take 0.5 tablets (0.5 mg) by mouth 2 times daily  Qty: 30 tablet, Refills: 3    Associated Diagnoses: Insomnia, unspecified type      gabapentin (NEURONTIN) 300 MG capsule Take 2 capsules (600 mg) by mouth daily  Qty: 180 capsule, Refills: 3    Comments: Disregard prescription sent earlier today. ### DO NOT FILL NOW.  Please update patient's profile to reflect additional refills.  ####  Associated Diagnoses: Restless legs syndrome (RLS); Persistent insomnia      losartan (COZAAR) 100 MG tablet Take 1 tablet (100 mg) by mouth daily  Qty: 90 tablet,  Refills: 1    Comments: Profile Rx: patient will contact pharmacy when needed  Associated Diagnoses: Essential hypertension, benign      metFORMIN (GLUCOPHAGE-XR) 500 MG 24 hr tablet Take 2 tablets (1,000 mg) by mouth every morning  Qty: 180 tablet, Refills: 3    Comments: Profile Rx: patient will contact pharmacy when needed  Associated Diagnoses: Type 2 diabetes mellitus with stage 3 chronic kidney disease, without long-term current use of insulin (H)      pantoprazole (PROTONIX) 40 MG EC tablet Take 1 tablet (40 mg) by mouth At Bedtime  Qty: 90 tablet, Refills: 3    Comments: Profile Rx: patient will contact pharmacy when needed  Associated Diagnoses: Gastroesophageal reflux disease without esophagitis      sertraline (ZOLOFT) 100 MG tablet Take 2 tablets (200 mg) by mouth daily  Qty: 180 tablet, Refills: 3    Comments: Profile Rx: patient will contact pharmacy when needed  Associated Diagnoses: Generalized anxiety disorder; Major depressive disorder, recurrent episode, moderate (H)      simvastatin (ZOCOR) 20 MG tablet Take 1 tablet (20 mg) by mouth At Bedtime  Qty: 90 tablet, Refills: 1    Comments: Profile Rx: patient will contact pharmacy when needed  Associated Diagnoses: Hyperlipidemia LDL goal <100      zolpidem (AMBIEN) 10 MG tablet Take 1 tablet (10 mg) by mouth At Bedtime  Qty: 90 tablet, Refills: 1    Comments: ### DO NOT FILL NOW.  Please update patient's profile to reflect additional refills.  ####  Associated Diagnoses: Insomnia, unspecified type      blood glucose (ACCU-CHEK BYRON PLUS) test strip Use to test blood sugar 2 times daily or as directed.  Qty: 100 each, Refills: 12    Associated Diagnoses: Type 2 diabetes mellitus with stage 3 chronic kidney disease, without long-term current use of insulin (H)               Allergies:         Allergies   Allergen Reactions     Codeine Rash     Penicillins Rash     Pt has tolerated cephalosporins and penems.   Pt tolerated Zosyn 7/6/2019            "Consultations This Hospital Stay:      No consultations were requested during this admission         Condition and Physical on Discharge:      Discharge condition: Stable   Vitals: Blood pressure (!) 157/83, pulse 72, temperature 96.8  F (36  C), temperature source Oral, resp. rate 15, height 1.499 m (4' 11\"), weight 111 kg (244 lb 11.2 oz), last menstrual period 09/15/2007, SpO2 99 %, not currently breastfeeding.     Constitutional:  Pleasant no acute distress looks stated age head is normocephalic atraumatic and sclera are clear   Lungs:  Better lung expansion today, atelectatic crackles have resolved.  Still with occasional bibasilar expiratory wheeze   Cardiovascular:  RRR no MRG, no significant lower extremity edema   Abdomen:  Obese but soft nontender nondistended, p.o. intake is good   Skin:  Warm and dry no cyanosis or clubbing of the extremities   Other:  Affect appropriate she is alert and oriented and ambulating without difficulty         Discharge Time:      Greater than 30 minutes.        Image Results From This Hospital Stay (For Non-EPIC Providers):        Results for orders placed or performed during the hospital encounter of 01/16/20   XR Chest 2 Views    Narrative    EXAM: XR CHEST 2 VW  LOCATION: [facility]  DATE/TIME: 1/16/2020 6:01 PM    INDICATION: Cough, shortness of breath  COMPARISON: 11/13/2019      Impression    IMPRESSION: Near complete resolution of left mid and lower lung consolidation. Mild residual or recurrent opacity at the left base. Right lung is clear. No pleural effusion or pneumothorax. Small hiatal hernia. Normal heart size.           Most Recent Lab Results In EPIC (For Non-EPIC Providers):    Most Recent 3 CBC's:  Recent Labs   Lab Test 01/18/20  0701 01/17/20  0645 01/16/20  1654   WBC 7.8 12.9* 22.2*   HGB 9.8* 10.6* 12.0   MCV 77* 75* 76*    228 325      Most Recent 3 BMP's:  Recent Labs   Lab Test 01/19/20  0626 01/18/20  0701 01/17/20  0645    " 145* 144   POTASSIUM 4.1 4.5 3.7   CHLORIDE 112* 114* 112*   CO2 29 29 27   BUN 7 9 13   CR 0.67 0.75 0.85   ANIONGAP 2* 2* 5   GRECIA 8.1* 8.3* 8.3*   * 104* 113*     Most Recent Cholesterol Panel:  Recent Labs   Lab Test 06/21/19  1438   CHOL 170   LDL 96   HDL 40*   TRIG 169*     Most Recent 6 Bacteria Isolates From Any Culture (See EPIC Reports for Culture Details):  Recent Labs   Lab Test 01/16/20  1838 01/16/20  1654 11/14/19  0500 11/13/19  1517 11/13/19  1510 07/06/19  1840   CULT No growth after 4 days No growth after 4 days No growth No growth Cultured on the 4th day of incubation:  Staphylococcus epidermidis  *  Critical Value/Significant Value, preliminary result only, called to and read back by  Odilia Cage RN at [id number]    (Note)  POSITIVE for STAPHYLOCOCCUS EPIDERMIDIS and NEGATIVE for the mecA  gene (not resistant to methicillin) by Verigene nucleic acid test.  The mecA gene was not detected. Final identification and  antimicrobial susceptibility testing will be verified by standard  methods.    Specimen tested with Verigene multiplex, gram-positive blood culture  nucleic acid test for the following targets: Staph aureus, Staph  epidermidis, Staph lugdunensis, other Staph species, Enterococcus  faecalis, Enterococcus faecium, Streptococcus species, S. agalactiae,  S. anginosus grp., S. pneumoniae, S. pyogenes, Listeria sp., mecA  (methicillin resistance) and Severino/B (vancomycin resistance).    Critical Value/Significant Value called to and read back by Temo Lara RN, @0791 11/17/19..   No growth  No growth     Most Recent TSH, T4 and HgbA1c:   Recent Labs   Lab Test 01/16/20  1654 06/21/19  1438  03/22/16  1418   TSH  --  2.31   < > 4.09*   T4  --   --   --  1.12   A1C 6.5* 6.7*   < > 6.8*    < > = values in this interval not displayed.

## 2020-01-23 ENCOUNTER — TELEPHONE (OUTPATIENT)
Dept: INTERNAL MEDICINE | Facility: CLINIC | Age: 65
End: 2020-01-23

## 2020-01-23 NOTE — TELEPHONE ENCOUNTER
"IP F/U    Date: 1/20/20  Diagnosis: Kameron (Acute Kidney Injury) (H), Pneumonia Of Left Lower Lobe Due To Infectious Organism (H)  Is patient active in care coordination? No  Was patient in TCU? No    Hospital/TCU/ED for chronic condition Discharge Protocol    \"Hi, my name is Jordyn Perez RN, a registered nurse, and I am calling from [facility].  I am calling to follow up and see how things are going for you after your recent emergency visit/hospital/TCU stay.\"    Tell me how you are doing now that you are home?\" I am doing better      Discharge Instructions    \"Let's review your discharge instructions.  What is/are the follow-up recommendations?  Pt. Response: within 7-10 days    \"Has an appointment with your primary care provider been scheduled?\"   No (schedule appointment)   Next 5 appointments (look out 90 days)    Jan 27, 2020  3:00 PM CST  SHORT with Pari Wiley MD  [facility] [address] [place] [address] [phone]            \"When you see the provider, I would recommend that you bring your medications with you.\"    Medications    \"Tell me what changed about your medicines when you discharged?\"    Changes to chronic meds?    0-1    \"What questions do you have about your medications?\"    None     New diagnoses of heart failure, COPD, diabetes, or MI?    No              Post Discharge Medication Reconciliation Status: discharge medications reconciled and changed, per note/orders (see AVS).    Was MTM referral placed (*Make sure to put transitions as reason for referral)?   No    Call Summary    \"What questions or concerns do you have about your recent visit and your follow-up care?\"     should i take a probiotic?. Advice given: it is a good idea to take a probiotic secondary to taking antibiotics.      \"If you have questions or things don't continue to improve, we encourage you contact us through the main clinic number (give number).  " "Even if the clinic is not open, triage nurses are available 24/7 to help you.     We would like you to know that our clinic has extended hours (provide information).  We also have urgent care (provide details on closest location and hours/contact info)\"      \"Thank you for your time and take care!\"             "

## 2020-01-27 ENCOUNTER — OFFICE VISIT (OUTPATIENT)
Dept: INTERNAL MEDICINE | Facility: CLINIC | Age: 65
End: 2020-01-27
Payer: COMMERCIAL

## 2020-01-27 VITALS
RESPIRATION RATE: 16 BRPM | DIASTOLIC BLOOD PRESSURE: 86 MMHG | OXYGEN SATURATION: 94 % | TEMPERATURE: 97.3 F | BODY MASS INDEX: 48.83 KG/M2 | HEIGHT: 59 IN | HEART RATE: 109 BPM | SYSTOLIC BLOOD PRESSURE: 141 MMHG | WEIGHT: 242.2 LBS

## 2020-01-27 DIAGNOSIS — K21.00 GASTROESOPHAGEAL REFLUX DISEASE WITH ESOPHAGITIS: ICD-10-CM

## 2020-01-27 DIAGNOSIS — J18.9 PNEUMONIA DUE TO INFECTIOUS ORGANISM, UNSPECIFIED LATERALITY, UNSPECIFIED PART OF LUNG: Primary | ICD-10-CM

## 2020-01-27 PROCEDURE — 99214 OFFICE O/P EST MOD 30 MIN: CPT | Performed by: INTERNAL MEDICINE

## 2020-01-27 ASSESSMENT — MIFFLIN-ST. JEOR: SCORE: 1554.24

## 2020-01-27 NOTE — NURSING NOTE
"BP (!) 141/86 (BP Location: Right arm, Patient Position: Sitting, Cuff Size: Adult Large)   Pulse 109   Temp 97.3  F (36.3  C) (Oral)   Resp 16   Ht 1.499 m (4' 11\")   Wt 109.9 kg (242 lb 3.2 oz)   LMP 09/15/2007   SpO2 94%   BMI 48.92 kg/m      "

## 2020-01-27 NOTE — PROGRESS NOTES
Subjective     Nicole Reyes is a 64 year old female who presents to clinic today for the following health issues:    HPI       Hospital Follow-up Visit:    Hospital/Nursing Home/IP Rehab Facility: [facility]  Date of Admission: 01/16/2020  Date of Discharge: 01/20/2020  Reason(s) for Admission: Pneumonia            Problems taking medications regularly:  None       Medication changes since discharge: none       Problems adhering to non-medication therapy:  None    Summary of hospitalization:  [employer] discharge summary reviewed  Diagnostic Tests/Treatments reviewed.  Follow up needed: none  Other Healthcare Providers Involved in Patient s Care:         Specialist appointment - GI  Update since discharge: improved.     She reports there was some question whether there actually was a new pneumonia since the radiologist felt the x-ray had shown improvement from the last one.  She is admitted more because of the oxygen levels.  She had her upper endoscopy and will follow up with GI, they are probably going to do manometry and eventually she may still have a Nissen procedure as the thought is she is having aspiration causing her pneumonias.    Post Discharge Medication Reconciliation: discharge medications reconciled, continue medications without change.  Plan of care communicated with patient     Coding guidelines for this visit:  Type of Medical   Decision Making Face-to-Face Visit       within 7 Days of discharge Face-to-Face Visit        within 14 days of discharge   Moderate Complexity [id number]   High Complexity [id number]          Patient Active Problem List   Diagnosis     Essential hypertension, benign     Restless leg syndrome     CRISTÓBAL (obstructive sleep apnea)     CKD (chronic kidney disease) stage 3, GFR 30-59 ml/min (H)     Advanced directives, counseling/discussion     GENERALIZED ANXIETY DIS     Major depressive disorder, recurrent episode, moderate (H)     Health Care Home      GERD (gastroesophageal reflux disease)     Hyperlipidemia LDL goal <100     Eczema     Type 2 diabetes mellitus with stage 3 chronic kidney disease, without long-term current use of insulin (H)     Midline low back pain with left-sided sciatica     Morbid obesity (HCC)     Insomnia, unspecified type     Dyspnea, unspecified type     Acute pain of left knee     Controlled substance agreement signed     Pneumonia     Current Outpatient Medications   Medication Sig Dispense Refill     albuterol (PROAIR HFA/PROVENTIL HFA/VENTOLIN HFA) 108 (90 Base) MCG/ACT inhaler Inhale 1-2 puffs into the lungs every 6 hours as needed for shortness of breath / dyspnea or wheezing 1 Inhaler 0     aspirin 81 MG EC tablet Take 81 mg by mouth every evening        blood glucose (ACCU-CHEK BYRON PLUS) test strip Use to test blood sugar 2 times daily or as directed. 100 each 12     clonazePAM (KLONOPIN) 1 MG tablet Take 0.5 tablets (0.5 mg) by mouth 2 times daily 30 tablet 3     gabapentin (NEURONTIN) 300 MG capsule Take 2 capsules (600 mg) by mouth daily 180 capsule 3     losartan (COZAAR) 100 MG tablet Take 1 tablet (100 mg) by mouth daily 90 tablet 1     metFORMIN (GLUCOPHAGE-XR) 500 MG 24 hr tablet Take 2 tablets (1,000 mg) by mouth every morning 180 tablet 3     pantoprazole (PROTONIX) 40 MG EC tablet Take 1 tablet (40 mg) by mouth At Bedtime 90 tablet 3     sertraline (ZOLOFT) 100 MG tablet Take 2 tablets (200 mg) by mouth daily 180 tablet 3     simvastatin (ZOCOR) 20 MG tablet Take 1 tablet (20 mg) by mouth At Bedtime 90 tablet 1     zolpidem (AMBIEN) 10 MG tablet Take 1 tablet (10 mg) by mouth At Bedtime 90 tablet 1      Social History     Tobacco Use     Smoking status: Former Smoker     Last attempt to quit: 3/18/1979     Years since quittin.9     Smokeless tobacco: Never Used     Tobacco comment: quit    Substance Use Topics     Alcohol use: Yes     Comment: Twice a month     Drug use: No        Reviewed and updated as  "needed this visit by Provider       Review of Systems   No fever, chills, minimal cough, dyspnea is overall at baseline      Objective    BP (!) 141/86 (BP Location: Right arm, Patient Position: Sitting, Cuff Size: Adult Large)   Pulse 109   Temp 97.3  F (36.3  C) (Oral)   Resp 16   Ht 1.499 m (4' 11\")   Wt 109.9 kg (242 lb 3.2 oz)   LMP 09/15/2007   SpO2 94%   BMI 48.92 kg/m    Body mass index is 48.92 kg/m .  Physical Exam     Lungs: Few crackles at the left base          Assessment & Plan     1. Pneumonia due to infectious organism, unspecified laterality, unspecified part of lung  It is unclear whether she had a true new pneumonia, little more aspiration.  Clinically she seems to be back to baseline.  We will continue with the GI evaluation is planned    2. Gastroesophageal reflux disease with esophagitis  She will continue working with GI for assessment of aspiration             No follow-ups on file.    Pari Wiley MD  [facility]        "

## 2020-02-04 ENCOUNTER — TRANSFERRED RECORDS (OUTPATIENT)
Dept: HEALTH INFORMATION MANAGEMENT | Facility: CLINIC | Age: 65
End: 2020-02-04

## 2020-02-11 DIAGNOSIS — G47.00 INSOMNIA, UNSPECIFIED TYPE: ICD-10-CM

## 2020-02-13 RX ORDER — CLONAZEPAM 1 MG/1
0.5 TABLET ORAL 2 TIMES DAILY
Qty: 30 TABLET | Refills: 3 | Status: ON HOLD | OUTPATIENT
Start: 2020-02-13 | End: 2020-04-13

## 2020-02-13 NOTE — TELEPHONE ENCOUNTER
Clonazepam       Last Written Prescription Date:  10/3/19  Last Fill Quantity: 30,   # refills: 3    Last Office Visit: 1/27/20    Future Office visit:       Routing refill request to provider for review/approval because:  Drug not on the [facility], [facility] or [facility] refill protocol or controlled substance    Reviewed [facility] today, Clonazepam last filled on 1/8/20 for #30. No concerns.     CSA signed on problem list.

## 2020-03-02 ENCOUNTER — OFFICE VISIT (OUTPATIENT)
Dept: ORTHOPEDICS | Facility: CLINIC | Age: 65
End: 2020-03-02
Payer: COMMERCIAL

## 2020-03-02 VITALS
HEIGHT: 59 IN | WEIGHT: 242 LBS | BODY MASS INDEX: 48.79 KG/M2 | SYSTOLIC BLOOD PRESSURE: 150 MMHG | DIASTOLIC BLOOD PRESSURE: 100 MMHG

## 2020-03-02 DIAGNOSIS — M75.41 IMPINGEMENT SYNDROME OF RIGHT SHOULDER: ICD-10-CM

## 2020-03-02 DIAGNOSIS — G89.29 CHRONIC PAIN OF BOTH SHOULDERS: Primary | ICD-10-CM

## 2020-03-02 DIAGNOSIS — M25.511 CHRONIC PAIN OF BOTH SHOULDERS: Primary | ICD-10-CM

## 2020-03-02 DIAGNOSIS — M25.512 CHRONIC PAIN OF BOTH SHOULDERS: Primary | ICD-10-CM

## 2020-03-02 DIAGNOSIS — M75.42 IMPINGEMENT SYNDROME OF LEFT SHOULDER: ICD-10-CM

## 2020-03-02 DIAGNOSIS — M75.82 TENDINITIS OF LEFT ROTATOR CUFF: ICD-10-CM

## 2020-03-02 DIAGNOSIS — M75.81 TENDINITIS OF RIGHT ROTATOR CUFF: ICD-10-CM

## 2020-03-02 PROCEDURE — 99214 OFFICE O/P EST MOD 30 MIN: CPT | Performed by: FAMILY MEDICINE

## 2020-03-02 RX ORDER — MELOXICAM 15 MG/1
15 TABLET ORAL DAILY
Qty: 30 TABLET | Refills: 0 | Status: ON HOLD | OUTPATIENT
Start: 2020-03-02 | End: 2020-04-13

## 2020-03-02 RX ORDER — HYDROCODONE BITARTRATE AND ACETAMINOPHEN 5; 325 MG/1; MG/1
1 TABLET ORAL
Qty: 7 TABLET | Refills: 0 | Status: ON HOLD | OUTPATIENT
Start: 2020-03-02 | End: 2020-04-13

## 2020-03-02 ASSESSMENT — MIFFLIN-ST. JEOR: SCORE: 1553.33

## 2020-03-02 NOTE — PATIENT INSTRUCTIONS
1. Chronic pain of both shoulders    2. Tendinitis of right rotator cuff    3. Tendinitis of left rotator cuff    4. Impingement syndrome of right shoulder    5. Impingement syndrome of left shoulder      Recommend therapy and referral placed  Rx for Mobic. Take in the AM and with food  Take Tylenol 1,000mg three times a day. Can take with the Mobic.  Rx for Norco. Take at bedtime as needed. No alcohol or driving while taking  Can use SalonPas - available over the counter    Follow-up if not improving after 4-6 therapy sessions

## 2020-03-02 NOTE — PROGRESS NOTES
"ASSESSMENT & PLAN    1. Chronic pain of both shoulders    2. Tendinitis of right rotator cuff    3. Tendinitis of left rotator cuff    4. Impingement syndrome of right shoulder    5. Impingement syndrome of left shoulder      Returns for continued / worsening bilateral shoulder pain  Long discussion had regarding plan from previous visit  Recommend therapy and referral placed  Rx for Mobic. Take in the AM and with food  Take Tylenol 1,000mg three times a day. Can take with the Mobic.  Rx for Norco. Take at bedtime as needed. No alcohol or driving while taking  Can use SalonPas - available over the counter    Follow-up if not improving after 4-6 therapy sessions    -----    SUBJECTIVE:  Nicole Reyes is a 64 year old female who is seen in follow-up for bilateral shoulder pain.They were last seen 12/2/2019 and received bilateral subacromial cortisone injections that provided good relief lasting approximately 6-8 weeks.     Patient reports that pain has returned and is worsening. She notes increased pain with sleeping on her sides, turning the steering wheel and lifting. She notes pain in the right shoulder is worse than the left shoulder. They indicate that their current pain level is 10/10. They have tried rest/activity avoidance and ibuprofen (800mg once/day). Tylenol 1,000mg twice / day in the past.    The patient is seen by themselves.    Patient's past medical, surgical, social, and family histories were reviewed today and no pertinent history related to patient's presenting problem.    REVIEW OF SYSTEMS:  Constitutional: NEGATIVE for fever, chills, change in weight  Skin: NEGATIVE for worrisome rashes, moles or lesions  GI/: NEGATIVE for bowel or bladder changes  Neuro: NEGATIVE for weakness, dizziness or paresthesias    OBJECTIVE:  BP (!) 150/100   Ht 1.499 m (4' 11\")   Wt 109.8 kg (242 lb)   LMP 09/15/2007   BMI 48.88 kg/m     General: healthy, alert and in no distress  HEENT: no scleral icterus or " conjunctival erythema  Skin: no suspicious lesions or rash. No jaundice.  CV: regular rhythm by palpation, no pedal edema  Resp: normal respiratory effort without conversational dyspnea   Psych: normal mood and affect  Gait: normal steady gait with appropriate coordination and balance  Neuro: normal light touch sensory exam of the extremities.    MSK:  Deferred    Independent visualization of the below image:  XR SHOULDER BILATERAL G/E 2 VW 12/2/2019 2:21 PM      HISTORY: Bilateral shoulder pain.     COMPARISON: None.     Right: Mild hypertrophic changes in the glenohumeral joint. Small  subacromial enthesophyte. The acromioclavicular joint is unremarkable.     Left: Mild hypertrophic changes in the glenohumeral joint. The  acromioclavicular joint is unremarkable.                                                                      IMPRESSION: Bilateral glenohumeral osteoarthritis.      FEDERICO WHITNEY MD    Patient's conditions were thoroughly discussed during today's visit with greater than 50% of the visit spent counseling the patient with total time spent face-to-face with the patient being 25 minutes.    Jomar Helm DO [facility]

## 2020-03-02 NOTE — LETTER
3/2/2020         RE: Nicole Reyes  [address]  [place] [address]        Dear Colleague,    Thank you for referring your patient, Nicole Reyes, to the [facility]. Please see a copy of my visit note below.    ASSESSMENT & PLAN    1. Chronic pain of both shoulders    2. Tendinitis of right rotator cuff    3. Tendinitis of left rotator cuff    4. Impingement syndrome of right shoulder    5. Impingement syndrome of left shoulder      Returns for continued / worsening bilateral shoulder pain  Long discussion had regarding plan from previous visit  Recommend therapy and referral placed  Rx for Mobic. Take in the AM and with food  Take Tylenol 1,000mg three times a day. Can take with the Mobic.  Rx for Norco. Take at bedtime as needed. No alcohol or driving while taking  Can use SalonPas - available over the counter    Follow-up if not improving after 4-6 therapy sessions    -----    SUBJECTIVE:  Nicole Reyes is a 64 year old female who is seen in follow-up for bilateral shoulder pain.They were last seen 12/2/2019 and received bilateral subacromial cortisone injections that provided good relief lasting approximately 6-8 weeks.     Patient reports that pain has returned and is worsening. She notes increased pain with sleeping on her sides, turning the steering wheel and lifting. She notes pain in the right shoulder is worse than the left shoulder. They indicate that their current pain level is 10/10. They have tried rest/activity avoidance and ibuprofen (800mg once/day). Tylenol 1,000mg twice / day in the past.    The patient is seen by themselves.    Patient's past medical, surgical, social, and family histories were reviewed today and no pertinent history related to patient's presenting problem.    REVIEW OF SYSTEMS:  Constitutional: NEGATIVE for fever, chills, change in weight  Skin: NEGATIVE for worrisome rashes, moles or lesions  GI/: NEGATIVE for bowel or bladder  "changes  Neuro: NEGATIVE for weakness, dizziness or paresthesias    OBJECTIVE:  BP (!) 150/100   Ht 1.499 m (4' 11\")   Wt 109.8 kg (242 lb)   LMP 09/15/2007   BMI 48.88 kg/m      General: healthy, alert and in no distress  HEENT: no scleral icterus or conjunctival erythema  Skin: no suspicious lesions or rash. No jaundice.  CV: regular rhythm by palpation, no pedal edema  Resp: normal respiratory effort without conversational dyspnea   Psych: normal mood and affect  Gait: normal steady gait with appropriate coordination and balance  Neuro: normal light touch sensory exam of the extremities.    MSK:  Deferred    Independent visualization of the below image:  XR SHOULDER BILATERAL G/E 2 VW 12/2/2019 2:21 PM      HISTORY: Bilateral shoulder pain.     COMPARISON: None.     Right: Mild hypertrophic changes in the glenohumeral joint. Small  subacromial enthesophyte. The acromioclavicular joint is unremarkable.     Left: Mild hypertrophic changes in the glenohumeral joint. The  acromioclavicular joint is unremarkable.                                                                      IMPRESSION: Bilateral glenohumeral osteoarthritis.      FEDERICO WHITNEY MD    Patient's conditions were thoroughly discussed during today's visit with greater than 50% of the visit spent counseling the patient with total time spent face-to-face with the patient being 25 minutes.    Jomar Helm DO [facility]          Again, thank you for allowing me to participate in the care of your patient.        Sincerely,        Jomar Helm DO    "

## 2020-03-10 ENCOUNTER — TRANSFERRED RECORDS (OUTPATIENT)
Dept: HEALTH INFORMATION MANAGEMENT | Facility: CLINIC | Age: 65
End: 2020-03-10

## 2020-03-12 ENCOUNTER — THERAPY VISIT (OUTPATIENT)
Dept: PHYSICAL THERAPY | Facility: CLINIC | Age: 65
End: 2020-03-12
Payer: COMMERCIAL

## 2020-03-12 DIAGNOSIS — I10 ESSENTIAL HYPERTENSION, BENIGN: ICD-10-CM

## 2020-03-12 DIAGNOSIS — G89.29 CHRONIC PAIN OF BOTH SHOULDERS: ICD-10-CM

## 2020-03-12 DIAGNOSIS — M25.512 CHRONIC PAIN OF BOTH SHOULDERS: ICD-10-CM

## 2020-03-12 DIAGNOSIS — M75.82 TENDINITIS OF LEFT ROTATOR CUFF: ICD-10-CM

## 2020-03-12 DIAGNOSIS — M75.41 IMPINGEMENT SYNDROME OF RIGHT SHOULDER: ICD-10-CM

## 2020-03-12 DIAGNOSIS — M25.512 PAIN OF BOTH SHOULDER JOINTS: ICD-10-CM

## 2020-03-12 DIAGNOSIS — M25.511 PAIN OF BOTH SHOULDER JOINTS: ICD-10-CM

## 2020-03-12 DIAGNOSIS — M75.42 IMPINGEMENT SYNDROME OF LEFT SHOULDER: ICD-10-CM

## 2020-03-12 DIAGNOSIS — M75.81 TENDINITIS OF RIGHT ROTATOR CUFF: ICD-10-CM

## 2020-03-12 DIAGNOSIS — M25.511 CHRONIC PAIN OF BOTH SHOULDERS: ICD-10-CM

## 2020-03-12 PROCEDURE — 97110 THERAPEUTIC EXERCISES: CPT | Mod: GP | Performed by: PHYSICAL THERAPIST

## 2020-03-12 PROCEDURE — 97112 NEUROMUSCULAR REEDUCATION: CPT | Mod: GP | Performed by: PHYSICAL THERAPIST

## 2020-03-12 PROCEDURE — 97161 PT EVAL LOW COMPLEX 20 MIN: CPT | Mod: GP | Performed by: PHYSICAL THERAPIST

## 2020-03-12 NOTE — PROGRESS NOTES
[facility] Initial Evaluation  Subjective:  The history is provided by the patient. No  was used.   Patient Health History  Nicole Reyes being seen for R>L shoulder pain.     Date of Onset: 11/2019.   Problem occurred: over use   Pain is reported as 6/10 on pain scale.  General health as reported by patient is good.  Pertinent medical history includes: depression, diabetes, high blood pressure, kidney disease, overweight and sleep disorder/apnea.   Red flags:  Pain at rest/night.   Other medical allergies details: see EPIC.    Other surgery history details: 3 c sections and 1 hernia repair.    Current medications:  Anti-depressants, anti-inflammatory, high blood pressure medication, muscle relaxants and sleep medication.    Current occupation is Retired.   Primary job tasks include:  Lifting/carrying.                  Therapist Generated HPI Evaluation  Problem details: Pt c/o R>L shoulder pain since 11/2019.  Denies specific injury but relates to overuse.  MD order date 3/2/2020.  Had B shoulder injection 12/2/2019 with aprox 6-8 week improvement but symptoms have returned/worsened.  Pt is R handed..         Type of problem:  Bilateral shoulders.    This is a new condition.  Condition occurred with:  Repetition/overuse.  Where condition occurred: for unknown reasons.  Patient reports pain:  Anterior, lateral and posterior.  Pain is described as aching, sharp, shooting and stabbing and is constant.  Pain radiates to:  Upper arm and cervical. Pain is the same all the time.    Associated symptoms:  Loss of strength, loss of motion/stiffness, painful arc and catching. Symptoms are exacerbated by carrying, certain positions, lifting, lying on extremity, using arm overhead, using arm behind back and using arm at shoulder level  and relieved by NSAID's, muscle relaxants and rest.  Special tests included:  X-ray (B shoulder OA).                             Objective:  System                   Shoulder Evaluation:  ROM:  AROM:  normal (ERP with B flex, abd IR/ext)                                  Strength:    Flexion: Left:4+/5    Pain: -/+    Right: 4/5      Pain:  +  Extension:  Left: 5-/5     Pain:-    Right: 5-/5     Pain:-  Abduction:  Left: 4+/5   Pain:-/+    Right: 4/5      Pain:+  Adduction:  Left: 5-/5     Pain:-    Right: 5-/5      Pain:-  Internal Rotation:  Left:5-/5      Pain:-    Right: 5-/5      Pain:-  External Rotation:   Left:4+/5      Pain:-/+   Right:4 and 4-/5      Pain:+      Horizontal Adduction:  Right:/5     Pain:-        Special Tests:    Left shoulder positive for the following special tests:  Impingement  Right shoulder positive for the following special tests:Impingement    Mobility Tests:                Scapulohumeral rhythm right:  Hypermobile  Scapulohumeral rhythm right:  Hypermobile                                   General     ROS    Assessment/Plan:    Patient is a 64 year old female with both sides shoulder complaints.    Patient has the following significant findings with corresponding treatment plan.                Diagnosis 1:  R>L shoulder pain  Pain -  hot/cold therapy, US, directional preference exercise and home program  Decreased strength - therapeutic exercise and therapeutic activities  Impaired muscle performance - neuro re-education  Decreased function - therapeutic activities  Impaired posture - neuro re-education    Therapy Evaluation Codes:   Cumulative Therapy Evaluation is: Low complexity.    Previous and current functional limitations:  (See Goal Flow Sheet for this information)    Short term and Long term goals: (See Goal Flow Sheet for this information)     Communication ability:  Patient appears to be able to clearly communicate and understand verbal and written communication and follow directions correctly.  Treatment Explanation - The following has been discussed with the patient:   RX ordered/plan of  care  Anticipated outcomes  Possible risks and side effects  This patient would benefit from PT intervention to resume normal activities.   Rehab potential is good.    Frequency:  1 X week, once daily  Duration:  for 6 weeks  Discharge Plan:  Achieve all LTG.  Independent in home treatment program.  Reach maximal therapeutic benefit.    Please refer to the daily flowsheet for treatment today, total treatment time and time spent performing 1:1 timed codes.

## 2020-03-13 NOTE — TELEPHONE ENCOUNTER
"Requested Prescriptions   Pending Prescriptions Disp Refills     losartan (COZAAR) 100 MG tablet [Pharmacy Med Name: LOSARTAN POTASSIUM 100MG TABS] 90 tablet 1     Sig: TAKE ONE TABLET BY MOUTH ONCE DAILY   Last Written Prescription Date:  04/07/2019  Last Fill Quantity:90 ,  # refills: 01  Last office visit: 1/27/2020 with prescribing provider:     Future Office Visit:      Angiotensin-II Receptors Failed - 3/12/2020  4:15 PM        Failed - Last blood pressure under 140/90 in past 12 months     BP Readings from Last 3 Encounters:   03/02/20 (!) 150/100   01/27/20 (!) 141/86   01/20/20 (!) 157/83                 Passed - Recent (12 mo) or future (30 days) visit within the authorizing provider's specialty     Patient has had an office visit with the authorizing provider or a provider within the authorizing providers department within the previous 12 mos or has a future within next 30 days. See \"Patient Info\" tab in inbasket, or \"Choose Columns\" in Meds & Orders section of the refill encounter.              Passed - Medication is active on med list        Passed - Patient is age 18 or older        Passed - No active pregnancy on record        Passed - Normal serum creatinine on file in past 12 months     Recent Labs   Lab Test 01/19/20  0626   CR 0.67       Ok to refill medication if creatinine is low          Passed - Normal serum potassium on file in past 12 months     Recent Labs   Lab Test 01/19/20  0626   POTASSIUM 4.1                    Passed - No positive pregnancy test in past 12 months           "

## 2020-03-17 RX ORDER — LOSARTAN POTASSIUM 100 MG/1
TABLET ORAL
Qty: 90 TABLET | Refills: 1 | Status: SHIPPED | OUTPATIENT
Start: 2020-03-17 | End: 2020-09-22

## 2020-03-27 ENCOUNTER — MYC MEDICAL ADVICE (OUTPATIENT)
Dept: INTERNAL MEDICINE | Facility: CLINIC | Age: 65
End: 2020-03-27

## 2020-03-30 ENCOUNTER — E-VISIT (OUTPATIENT)
Dept: INTERNAL MEDICINE | Facility: CLINIC | Age: 65
End: 2020-03-30
Payer: COMMERCIAL

## 2020-03-30 DIAGNOSIS — G25.81 RESTLESS LEGS SYNDROME (RLS): ICD-10-CM

## 2020-03-30 DIAGNOSIS — G47.00 PERSISTENT INSOMNIA: ICD-10-CM

## 2020-03-30 PROCEDURE — 99207 ZZC NO BILLABLE SERVICE THIS VISIT: CPT | Performed by: INTERNAL MEDICINE

## 2020-03-31 RX ORDER — GABAPENTIN 300 MG/1
900 CAPSULE ORAL DAILY
Qty: 270 CAPSULE | Refills: 3 | Status: SHIPPED | OUTPATIENT
Start: 2020-03-31 | End: 2020-12-10

## 2020-04-06 ENCOUNTER — TELEPHONE (OUTPATIENT)
Dept: PHYSICAL THERAPY | Facility: CLINIC | Age: 65
End: 2020-04-06

## 2020-04-06 DIAGNOSIS — M25.512 PAIN OF BOTH SHOULDER JOINTS: ICD-10-CM

## 2020-04-06 DIAGNOSIS — M25.511 PAIN OF BOTH SHOULDER JOINTS: ICD-10-CM

## 2020-04-06 NOTE — TELEPHONE ENCOUNTER
Called and informed that [facility] is currently closed and 4/16/20 appointment is cancelled. Hub phone number given to schedule virtual care or be called when clinic reopens.

## 2020-04-10 DIAGNOSIS — F41.1 GENERALIZED ANXIETY DISORDER: ICD-10-CM

## 2020-04-10 DIAGNOSIS — F33.1 MAJOR DEPRESSIVE DISORDER, RECURRENT EPISODE, MODERATE (H): ICD-10-CM

## 2020-04-10 NOTE — TELEPHONE ENCOUNTER
"Requested Prescriptions   Pending Prescriptions Disp Refills     sertraline (ZOLOFT) 100 MG tablet [Pharmacy Med Name: SERTRALINE HCL 100MG TABS] 180 tablet 3     Sig: TAKE TWO TABLETS BY MOUTH ONCE DAILY   Last Written Prescription Date:  04/07/2019  Last Fill Quantity: 180,  # refills: 03   Last office visit: 1/27/2020 with prescribing provider:     Future Office Visit:      SSRIs Protocol Failed - 4/10/2020  2:07 PM        Failed - PHQ-9 score less than 5 in past 6 months     Please review last PHQ-9 score.           Passed - Medication is active on med list        Passed - Patient is age 18 or older        Passed - No active pregnancy on record        Passed - No positive pregnancy test in last 12 months        Passed - Recent (6 mo) or future (30 days) visit within the authorizing provider's specialty     Patient had office visit in the last 6 months or has a visit in the next 30 days with authorizing provider or within the authorizing provider's specialty.  See \"Patient Info\" tab in inbasket, or \"Choose Columns\" in Meds & Orders section of the refill encounter.               "

## 2020-04-13 ENCOUNTER — HOSPITAL ENCOUNTER (INPATIENT)
Facility: CLINIC | Age: 65
LOS: 2 days | Discharge: HOME | DRG: 871 | End: 2020-04-15
Attending: EMERGENCY MEDICINE | Admitting: INTERNAL MEDICINE
Payer: COMMERCIAL

## 2020-04-13 ENCOUNTER — APPOINTMENT (OUTPATIENT)
Dept: GENERAL RADIOLOGY | Facility: CLINIC | Age: 65
DRG: 871 | End: 2020-04-13
Attending: EMERGENCY MEDICINE
Payer: COMMERCIAL

## 2020-04-13 DIAGNOSIS — J15.9 COMMUNITY ACQUIRED BACTERIAL PNEUMONIA: ICD-10-CM

## 2020-04-13 DIAGNOSIS — R65.20 SEVERE SEPSIS (H): ICD-10-CM

## 2020-04-13 DIAGNOSIS — J18.9 PNEUMONIA DUE TO INFECTIOUS ORGANISM, UNSPECIFIED LATERALITY, UNSPECIFIED PART OF LUNG: Primary | ICD-10-CM

## 2020-04-13 DIAGNOSIS — R09.02 HYPOXIA: ICD-10-CM

## 2020-04-13 DIAGNOSIS — A41.9 SEVERE SEPSIS (H): ICD-10-CM

## 2020-04-13 LAB
ALBUMIN SERPL-MCNC: 3.3 G/DL (ref 3.4–5)
ALP SERPL-CCNC: 115 U/L (ref 40–150)
ALT SERPL W P-5'-P-CCNC: 61 U/L (ref 0–50)
ANION GAP SERPL CALCULATED.3IONS-SCNC: 6 MMOL/L (ref 3–14)
AST SERPL W P-5'-P-CCNC: 44 U/L (ref 0–45)
BASE EXCESS BLDV CALC-SCNC: 1.1 MMOL/L
BASOPHILS # BLD AUTO: 0 10E9/L (ref 0–0.2)
BASOPHILS NFR BLD AUTO: 0.1 %
BILIRUB SERPL-MCNC: 0.4 MG/DL (ref 0.2–1.3)
BUN SERPL-MCNC: 11 MG/DL (ref 7–30)
CALCIUM SERPL-MCNC: 8.7 MG/DL (ref 8.5–10.1)
CHLORIDE SERPL-SCNC: 107 MMOL/L (ref 94–109)
CO2 SERPL-SCNC: 26 MMOL/L (ref 20–32)
CREAT SERPL-MCNC: 0.8 MG/DL (ref 0.52–1.04)
DIFFERENTIAL METHOD BLD: ABNORMAL
EOSINOPHIL # BLD AUTO: 0.1 10E9/L (ref 0–0.7)
EOSINOPHIL NFR BLD AUTO: 0.5 %
ERYTHROCYTE [DISTWIDTH] IN BLOOD BY AUTOMATED COUNT: 17.2 % (ref 10–15)
GFR SERPL CREATININE-BSD FRML MDRD: 78 ML/MIN/{1.73_M2}
GLUCOSE BLDC GLUCOMTR-MCNC: 106 MG/DL (ref 70–99)
GLUCOSE BLDC GLUCOMTR-MCNC: 107 MG/DL (ref 70–99)
GLUCOSE SERPL-MCNC: 190 MG/DL (ref 70–99)
HCO3 BLDV-SCNC: 27 MMOL/L (ref 21–28)
HCT VFR BLD AUTO: 44 % (ref 35–47)
HGB BLD-MCNC: 13.6 G/DL (ref 11.7–15.7)
IMM GRANULOCYTES # BLD: 0.1 10E9/L (ref 0–0.4)
IMM GRANULOCYTES NFR BLD: 0.5 %
LACTATE BLD-SCNC: 1.2 MMOL/L (ref 0.7–2)
LACTATE BLD-SCNC: 2.2 MMOL/L (ref 0.7–2)
LYMPHOCYTES # BLD AUTO: 0.7 10E9/L (ref 0.8–5.3)
LYMPHOCYTES NFR BLD AUTO: 3.7 %
MAGNESIUM SERPL-MCNC: 1.6 MG/DL (ref 1.6–2.3)
MCH RBC QN AUTO: 24.8 PG (ref 26.5–33)
MCHC RBC AUTO-ENTMCNC: 30.9 G/DL (ref 31.5–36.5)
MCV RBC AUTO: 80 FL (ref 78–100)
MONOCYTES # BLD AUTO: 0.7 10E9/L (ref 0–1.3)
MONOCYTES NFR BLD AUTO: 3.6 %
NEUTROPHILS # BLD AUTO: 16.8 10E9/L (ref 1.6–8.3)
NEUTROPHILS NFR BLD AUTO: 91.6 %
NRBC # BLD AUTO: 0 10*3/UL
NRBC BLD AUTO-RTO: 0 /100
NT-PROBNP SERPL-MCNC: 163 PG/ML (ref 0–900)
O2/TOTAL GAS SETTING VFR VENT: 2 %
PCO2 BLDV: 45 MM HG (ref 40–50)
PH BLDV: 7.38 PH (ref 7.32–7.43)
PLATELET # BLD AUTO: 223 10E9/L (ref 150–450)
PO2 BLDV: 60 MM HG (ref 25–47)
POTASSIUM SERPL-SCNC: 3.5 MMOL/L (ref 3.4–5.3)
PROCALCITONIN SERPL-MCNC: 2.3 NG/ML
PROT SERPL-MCNC: 7.4 G/DL (ref 6.8–8.8)
RBC # BLD AUTO: 5.49 10E12/L (ref 3.8–5.2)
SARS-COV-2 PCR COMMENT: NORMAL
SARS-COV-2 RNA SPEC QL NAA+PROBE: NEGATIVE
SARS-COV-2 RNA SPEC QL NAA+PROBE: NORMAL
SODIUM SERPL-SCNC: 139 MMOL/L (ref 133–144)
SPECIMEN SOURCE: NORMAL
SPECIMEN SOURCE: NORMAL
TROPONIN I SERPL-MCNC: <0.015 UG/L (ref 0–0.04)
WBC # BLD AUTO: 18.4 10E9/L (ref 4–11)

## 2020-04-13 PROCEDURE — 83735 ASSAY OF MAGNESIUM: CPT | Performed by: EMERGENCY MEDICINE

## 2020-04-13 PROCEDURE — 80053 COMPREHEN METABOLIC PANEL: CPT | Performed by: EMERGENCY MEDICINE

## 2020-04-13 PROCEDURE — 99285 EMERGENCY DEPT VISIT HI MDM: CPT | Mod: 25

## 2020-04-13 PROCEDURE — 84145 PROCALCITONIN (PCT): CPT | Performed by: INTERNAL MEDICINE

## 2020-04-13 PROCEDURE — 25000125 ZZHC RX 250

## 2020-04-13 PROCEDURE — 87205 SMEAR GRAM STAIN: CPT | Performed by: INTERNAL MEDICINE

## 2020-04-13 PROCEDURE — 87106 FUNGI IDENTIFICATION YEAST: CPT | Performed by: INTERNAL MEDICINE

## 2020-04-13 PROCEDURE — 94640 AIRWAY INHALATION TREATMENT: CPT

## 2020-04-13 PROCEDURE — 82803 BLOOD GASES ANY COMBINATION: CPT | Performed by: EMERGENCY MEDICINE

## 2020-04-13 PROCEDURE — 87070 CULTURE OTHR SPECIMN AEROBIC: CPT | Performed by: INTERNAL MEDICINE

## 2020-04-13 PROCEDURE — 40000275 ZZH STATISTIC RCP TIME EA 10 MIN

## 2020-04-13 PROCEDURE — 83880 ASSAY OF NATRIURETIC PEPTIDE: CPT | Performed by: EMERGENCY MEDICINE

## 2020-04-13 PROCEDURE — 40000274 ZZH STATISTIC RCP CONSULT EA 30 MIN

## 2020-04-13 PROCEDURE — 87040 BLOOD CULTURE FOR BACTERIA: CPT | Performed by: EMERGENCY MEDICINE

## 2020-04-13 PROCEDURE — 96365 THER/PROPH/DIAG IV INF INIT: CPT

## 2020-04-13 PROCEDURE — 87635 SARS-COV-2 COVID-19 AMP PRB: CPT | Performed by: EMERGENCY MEDICINE

## 2020-04-13 PROCEDURE — 99223 1ST HOSP IP/OBS HIGH 75: CPT | Mod: AI | Performed by: INTERNAL MEDICINE

## 2020-04-13 PROCEDURE — 84484 ASSAY OF TROPONIN QUANT: CPT | Performed by: EMERGENCY MEDICINE

## 2020-04-13 PROCEDURE — 96367 TX/PROPH/DG ADDL SEQ IV INF: CPT

## 2020-04-13 PROCEDURE — 25800030 ZZH RX IP 258 OP 636: Performed by: INTERNAL MEDICINE

## 2020-04-13 PROCEDURE — 71045 X-RAY EXAM CHEST 1 VIEW: CPT

## 2020-04-13 PROCEDURE — 83605 ASSAY OF LACTIC ACID: CPT | Performed by: EMERGENCY MEDICINE

## 2020-04-13 PROCEDURE — 96361 HYDRATE IV INFUSION ADD-ON: CPT

## 2020-04-13 PROCEDURE — 25800030 ZZH RX IP 258 OP 636: Performed by: EMERGENCY MEDICINE

## 2020-04-13 PROCEDURE — 12000000 ZZH R&B MED SURG/OB

## 2020-04-13 PROCEDURE — 85025 COMPLETE CBC W/AUTO DIFF WBC: CPT | Performed by: EMERGENCY MEDICINE

## 2020-04-13 PROCEDURE — 25000128 H RX IP 250 OP 636: Performed by: INTERNAL MEDICINE

## 2020-04-13 PROCEDURE — 25000132 ZZH RX MED GY IP 250 OP 250 PS 637: Performed by: INTERNAL MEDICINE

## 2020-04-13 PROCEDURE — 00000146 ZZHCL STATISTIC GLUCOSE BY METER IP

## 2020-04-13 PROCEDURE — 25000128 H RX IP 250 OP 636: Performed by: EMERGENCY MEDICINE

## 2020-04-13 RX ORDER — LOSARTAN POTASSIUM 25 MG/1
100 TABLET ORAL DAILY
Status: DISCONTINUED | OUTPATIENT
Start: 2020-04-13 | End: 2020-04-15 | Stop reason: HOSPADM

## 2020-04-13 RX ORDER — PANTOPRAZOLE SODIUM 40 MG/1
40 TABLET, DELAYED RELEASE ORAL AT BEDTIME
Status: DISCONTINUED | OUTPATIENT
Start: 2020-04-13 | End: 2020-04-15 | Stop reason: HOSPADM

## 2020-04-13 RX ORDER — POTASSIUM CHLORIDE 1.5 G/1.58G
20-40 POWDER, FOR SOLUTION ORAL
Status: DISCONTINUED | OUTPATIENT
Start: 2020-04-13 | End: 2020-04-15 | Stop reason: HOSPADM

## 2020-04-13 RX ORDER — MAGNESIUM SULFATE HEPTAHYDRATE 40 MG/ML
4 INJECTION, SOLUTION INTRAVENOUS EVERY 4 HOURS PRN
Status: DISCONTINUED | OUTPATIENT
Start: 2020-04-13 | End: 2020-04-15 | Stop reason: HOSPADM

## 2020-04-13 RX ORDER — LIDOCAINE 40 MG/G
CREAM TOPICAL
Status: DISCONTINUED | OUTPATIENT
Start: 2020-04-13 | End: 2020-04-15 | Stop reason: HOSPADM

## 2020-04-13 RX ORDER — NICOTINE POLACRILEX 4 MG
15-30 LOZENGE BUCCAL
Status: DISCONTINUED | OUTPATIENT
Start: 2020-04-13 | End: 2020-04-15 | Stop reason: HOSPADM

## 2020-04-13 RX ORDER — SERTRALINE HYDROCHLORIDE 100 MG/1
200 TABLET, FILM COATED ORAL DAILY
Status: DISCONTINUED | OUTPATIENT
Start: 2020-04-13 | End: 2020-04-15 | Stop reason: HOSPADM

## 2020-04-13 RX ORDER — ALBUTEROL SULFATE 0.83 MG/ML
SOLUTION RESPIRATORY (INHALATION)
Status: COMPLETED
Start: 2020-04-13 | End: 2020-04-13

## 2020-04-13 RX ORDER — ALBUTEROL SULFATE 90 UG/1
2 AEROSOL, METERED RESPIRATORY (INHALATION) EVERY 4 HOURS PRN
Status: DISCONTINUED | OUTPATIENT
Start: 2020-04-13 | End: 2020-04-15 | Stop reason: HOSPADM

## 2020-04-13 RX ORDER — CLONAZEPAM 0.5 MG/1
1 TABLET ORAL AT BEDTIME
COMMUNITY
End: 2020-08-07

## 2020-04-13 RX ORDER — ALBUTEROL SULFATE 90 UG/1
2 AEROSOL, METERED RESPIRATORY (INHALATION)
Status: DISCONTINUED | OUTPATIENT
Start: 2020-04-13 | End: 2020-04-13 | Stop reason: ALTCHOICE

## 2020-04-13 RX ORDER — CEFTRIAXONE 2 G/1
2 INJECTION, POWDER, FOR SOLUTION INTRAMUSCULAR; INTRAVENOUS EVERY 24 HOURS
Status: DISCONTINUED | OUTPATIENT
Start: 2020-04-14 | End: 2020-04-15 | Stop reason: HOSPADM

## 2020-04-13 RX ORDER — GABAPENTIN 300 MG/1
900 CAPSULE ORAL DAILY
Status: DISCONTINUED | OUTPATIENT
Start: 2020-04-13 | End: 2020-04-15 | Stop reason: HOSPADM

## 2020-04-13 RX ORDER — HYDROCODONE BITARTRATE AND ACETAMINOPHEN 5; 325 MG/1; MG/1
1 TABLET ORAL
Status: DISCONTINUED | OUTPATIENT
Start: 2020-04-13 | End: 2020-04-13

## 2020-04-13 RX ORDER — POTASSIUM CL/LIDO/0.9 % NACL 10MEQ/0.1L
10 INTRAVENOUS SOLUTION, PIGGYBACK (ML) INTRAVENOUS
Status: DISCONTINUED | OUTPATIENT
Start: 2020-04-13 | End: 2020-04-15 | Stop reason: HOSPADM

## 2020-04-13 RX ORDER — CLONAZEPAM 0.5 MG/1
0.5 TABLET ORAL 2 TIMES DAILY PRN
Status: DISCONTINUED | OUTPATIENT
Start: 2020-04-13 | End: 2020-04-15 | Stop reason: HOSPADM

## 2020-04-13 RX ORDER — DEXTROSE MONOHYDRATE 25 G/50ML
25-50 INJECTION, SOLUTION INTRAVENOUS
Status: DISCONTINUED | OUTPATIENT
Start: 2020-04-13 | End: 2020-04-15 | Stop reason: HOSPADM

## 2020-04-13 RX ORDER — POTASSIUM CHLORIDE 7.45 MG/ML
10 INJECTION INTRAVENOUS
Status: DISCONTINUED | OUTPATIENT
Start: 2020-04-13 | End: 2020-04-15 | Stop reason: HOSPADM

## 2020-04-13 RX ORDER — ASPIRIN 81 MG/1
81 TABLET ORAL EVERY EVENING
Status: DISCONTINUED | OUTPATIENT
Start: 2020-04-13 | End: 2020-04-15 | Stop reason: HOSPADM

## 2020-04-13 RX ORDER — POTASSIUM CHLORIDE 29.8 MG/ML
20 INJECTION INTRAVENOUS
Status: DISCONTINUED | OUTPATIENT
Start: 2020-04-13 | End: 2020-04-15 | Stop reason: HOSPADM

## 2020-04-13 RX ORDER — AZITHROMYCIN 250 MG/1
250 TABLET, FILM COATED ORAL DAILY
Status: DISCONTINUED | OUTPATIENT
Start: 2020-04-14 | End: 2020-04-15 | Stop reason: HOSPADM

## 2020-04-13 RX ORDER — SIMVASTATIN 20 MG
20 TABLET ORAL AT BEDTIME
Status: DISCONTINUED | OUTPATIENT
Start: 2020-04-13 | End: 2020-04-15 | Stop reason: HOSPADM

## 2020-04-13 RX ORDER — ALBUTEROL SULFATE 0.83 MG/ML
2.5 SOLUTION RESPIRATORY (INHALATION)
Status: DISCONTINUED | OUTPATIENT
Start: 2020-04-13 | End: 2020-04-15 | Stop reason: HOSPADM

## 2020-04-13 RX ORDER — ZOLPIDEM TARTRATE 5 MG/1
10 TABLET ORAL
Status: DISCONTINUED | OUTPATIENT
Start: 2020-04-13 | End: 2020-04-15 | Stop reason: HOSPADM

## 2020-04-13 RX ORDER — CEFTRIAXONE 2 G/1
2 INJECTION, POWDER, FOR SOLUTION INTRAMUSCULAR; INTRAVENOUS ONCE
Status: COMPLETED | OUTPATIENT
Start: 2020-04-13 | End: 2020-04-13

## 2020-04-13 RX ORDER — ALBUTEROL SULFATE 90 UG/1
1-2 AEROSOL, METERED RESPIRATORY (INHALATION) EVERY 6 HOURS PRN
Status: DISCONTINUED | OUTPATIENT
Start: 2020-04-13 | End: 2020-04-13

## 2020-04-13 RX ORDER — POTASSIUM CHLORIDE 1500 MG/1
20-40 TABLET, EXTENDED RELEASE ORAL
Status: DISCONTINUED | OUTPATIENT
Start: 2020-04-13 | End: 2020-04-15 | Stop reason: HOSPADM

## 2020-04-13 RX ORDER — NALOXONE HYDROCHLORIDE 0.4 MG/ML
.1-.4 INJECTION, SOLUTION INTRAMUSCULAR; INTRAVENOUS; SUBCUTANEOUS
Status: DISCONTINUED | OUTPATIENT
Start: 2020-04-13 | End: 2020-04-15 | Stop reason: HOSPADM

## 2020-04-13 RX ADMIN — SIMVASTATIN 20 MG: 20 TABLET, FILM COATED ORAL at 21:57

## 2020-04-13 RX ADMIN — ALBUTEROL SULFATE 2.5 MG: 2.5 SOLUTION RESPIRATORY (INHALATION) at 20:13

## 2020-04-13 RX ADMIN — ASPIRIN 81 MG: 81 TABLET, COATED ORAL at 20:50

## 2020-04-13 RX ADMIN — GABAPENTIN 900 MG: 300 CAPSULE ORAL at 17:30

## 2020-04-13 RX ADMIN — SODIUM CHLORIDE 250 ML: 9 INJECTION, SOLUTION INTRAVENOUS at 19:13

## 2020-04-13 RX ADMIN — CEFTRIAXONE 2 G: 2 INJECTION, POWDER, FOR SOLUTION INTRAMUSCULAR; INTRAVENOUS at 14:04

## 2020-04-13 RX ADMIN — AZITHROMYCIN MONOHYDRATE 500 MG: 500 INJECTION, POWDER, LYOPHILIZED, FOR SOLUTION INTRAVENOUS at 14:35

## 2020-04-13 RX ADMIN — SERTRALINE HYDROCHLORIDE 200 MG: 100 TABLET ORAL at 17:30

## 2020-04-13 RX ADMIN — SODIUM CHLORIDE 500 ML: 9 INJECTION, SOLUTION INTRAVENOUS at 12:58

## 2020-04-13 RX ADMIN — PANTOPRAZOLE SODIUM 40 MG: 40 TABLET, DELAYED RELEASE ORAL at 21:58

## 2020-04-13 RX ADMIN — ENOXAPARIN SODIUM 40 MG: 40 INJECTION SUBCUTANEOUS at 16:48

## 2020-04-13 ASSESSMENT — ENCOUNTER SYMPTOMS
HEADACHES: 1
VOMITING: 0
NAUSEA: 0
FEVER: 1
SHORTNESS OF BREATH: 1
COUGH: 1
LIGHT-HEADEDNESS: 1
SORE THROAT: 0
CHILLS: 1

## 2020-04-13 ASSESSMENT — ACTIVITIES OF DAILY LIVING (ADL)
ADLS_ACUITY_SCORE: 16
ADLS_ACUITY_SCORE: 15

## 2020-04-13 ASSESSMENT — MIFFLIN-ST. JEOR
SCORE: 1566.04
SCORE: 1557.87

## 2020-04-13 NOTE — H&P
[facility]    Hospitalist History and Physical    Name: Nicole Reyes    MRN: [id number]  YOB: 1955    Age: 64 year old  Date of Admission:  4/13/2020  Date of Service (when I saw the patient): 04/13/20    Assessment & Plan   Nicole Reyes is a 64 year old female with past medical history significant for obstructive sleep apnea( uses CPAP), diabetes mellitus type 2, hiatal hernia, GERD with multiple episodes of pneumonia on supplemental O2 2 L at home presented to the emergency room with 1 day history of cough fever as high as 102, cough and chills.  Chest x-ray is concerning for pneumonia admitted for sepsis secondary to pneumonia and to rule out CO VID-19    Sepsis secondary to pneumonia  -On presentation patient was febrile tachycardic elevated lactic acid and leukocytosis  -Blood culture sent in the emergency room  -Started on ceftriaxone and azithromycin  -Patient high risk with her comorbidities we will put her in isolation and rule out CO VID 19  -Wean supplemental O2 as able  -We will check procalcitonin    CO VID 19 precautions  -Patient considered as P UI  -Test obtained in the emergency room  -Contact and droplet isolation as recommended  -Light lymphopenia in setting of elevated absolute neutrophils    Lactic acidosis  -Secondary to pneumonia  -Careful hydration given CO VID 19  -Resolved with initial treatment in the emergency room    Leukocytosis likely secondary to pneumonia  -Antibiotics as above  -Blood culture sent  -Check in a.m.      History of sleep apnea uses CPAP at home  -We will hold up CPAP use tonight as patient is P UI  -Can resume CPAP with home settings if rules out    Diabetes mellitus type 2  -At home on metformin will hold that for now in the setting of sepsis  -Sliding scale insulin  -Last hemoglobin A1c was 6.5    Hypertension  -Continue prior to admission losartan  -PRN hydralazine if needed    GERD  -Continue with  Protonix    Hyperlipidemia  -Continue on statins    Anxiety and depression  -Resumed all prior to admission meds    DVT Prophylaxis: Enoxaparin (Lovenox) SQ  Code Status: Full Code    Disposition: Admitted as inpatient    Primary Care Physician   Pari Wiley    Chief Complaint   Fever cough shortness of breath 1 day    History is obtained from the patient    History of Present Illness   Nicole Reyes is a 64 year old female with past medical history significant for obstructive sleep apnea on CPAP, recurrent pneumonias requiring home O2, Beatties mellitus, hypertension, hyperlipidemia who presented to the emergency room with 1 day history of fever of 102 at home productive cough, chills and shortness of breath.  Patient denies any exposure to sick contacts with CO VID-19.  Lives with  and daughter.  She has been retired and at home denies any other exposures.  She was however hospitalized in January.  Complains of generalized malaise and weakness.    Evaluation in the emergency room showed chest x-ray which was concerning for pneumonia.  She had elevated lactic acid levels was tachycardic admitted for sepsis secondary to pneumonia and to rule out CO VID-19.  Started on treatment with ceftriaxone and azithromycin.    Review of all the other symptoms are negative.      Past Medical History    Past Medical History:   Diagnosis Date     Blood transfusion      CKD (chronic kidney disease) stage 3, GFR 30-59 ml/min (H)      Essential hypertension, benign      Gastroesophageal reflux disease      Generalized anxiety disorder      Hernia, abdominal      Hiatal hernia      History of blood transfusion 2011    with a hernia repair     Hypertension      Insomnia, unspecified      Iron deficiency anemia 8/09     Major depression     sees a psychiatrist     Menopause     age 53     Obesity, unspecified      CRISTÓBAL (obstructive sleep apnea)     bipap     Oxygen dependent     2 LPM at home     Postoperative seroma 10/11     drained several times     Pure hypercholesterolemia      RLS (restless legs syndrome)      Type II or unspecified type diabetes mellitus without mention of complication, not stated as uncontrolled          Past Surgical History   Past Surgical History:   Procedure Laterality Date     BREAST BIOPSY, RT/LT      2 times; benign     C NONSPECIFIC PROCEDURE      Hernia repair      C NONSPECIFIC PROCEDURE      Lymph node biopsy in neck (benign)       SECTION        SECTION        SECTION       DILATION AND CURETTAGE, HYSTEROSCOPY DIAGNOSTIC, COMBINED  3/22/2013    Procedure: COMBINED DILATION AND CURETTAGE, HYSTEROSCOPY DIAGNOSTIC;  DILATION AND CURETTAGE, HYSTEROSCOPY DIAGNOSTIC   Converted to a general;  Surgeon: Robi Edwards MD;  Location: [facility] OR     ESOPHAGOSCOPY, GASTROSCOPY, DUODENOSCOPY (EGD), COMBINED N/A 2015    Procedure: COMBINED ESOPHAGOSCOPY, GASTROSCOPY, DUODENOSCOPY (EGD);  Surgeon: Obinna Gutierrez MD;  Location:  GI     ESOPHAGOSCOPY, GASTROSCOPY, DUODENOSCOPY (EGD), COMBINED N/A 2015    Procedure: COMBINED ENDOSCOPIC ULTRASOUND, ESOPHAGOSCOPY, GASTROSCOPY, DUODENOSCOPY (EGD), FINE NEEDLE ASPIRATE/BIOPSY;  Surgeon: Sabine Olivares MD;  Location:  GI     ESOPHAGOSCOPY, GASTROSCOPY, DUODENOSCOPY (EGD), COMBINED N/A 1/3/2020    Procedure: ESOPHAGOGASTRODUODENOSCOPY;  Surgeon: Ascencion Stauffer MD;  Location: [facility] OR     HERNIORRHAPHY INCISIONAL (LOCATION)  10/8/2011     incarcerated hernia repair with mesh;     HERNIORRHAPHY INCISIONAL (LOCATION)  10/16/2011     repair incisional hernia with mesh, and removal of current implanted mesh;       Prior to Admission Medications   Prior to Admission Medications   Prescriptions Last Dose Informant Patient Reported? Taking?   HYDROcodone-acetaminophen (NORCO) 5-325 MG tablet   No No   Sig: Take 1 tablet by mouth nightly as needed for severe pain   albuterol (PROAIR HFA/PROVENTIL HFA/VENTOLIN HFA) 108 (90  Base) MCG/ACT inhaler   No No   Sig: Inhale 1-2 puffs into the lungs every 6 hours as needed for shortness of breath / dyspnea or wheezing   aspirin 81 MG EC tablet   Yes No   Sig: Take 81 mg by mouth every evening    azithromycin (ZITHROMAX) 250 MG tablet   No No   Sig: Take 1 tablet (250 mg) by mouth daily for 1 day   blood glucose (ACCU-CHEK BYRON PLUS) test strip   No No   Sig: Use to test blood sugar 2 times daily or as directed.   cefpodoxime (VANTIN) 200 MG tablet   No No   Sig: Take 1 tablet (200 mg) by mouth 2 times daily for 3 days   clonazePAM (KLONOPIN) 1 MG tablet   No No   Sig: Take 0.5 tablets (0.5 mg) by mouth 2 times daily   gabapentin (NEURONTIN) 300 MG capsule   No No   Sig: Take 3 capsules (900 mg) by mouth daily   losartan (COZAAR) 100 MG tablet   No No   Sig: TAKE ONE TABLET BY MOUTH ONCE DAILY   meloxicam (MOBIC) 15 MG tablet   No No   Sig: Take 1 tablet (15 mg) by mouth daily   metFORMIN (GLUCOPHAGE-XR) 500 MG 24 hr tablet   No No   Sig: Take 2 tablets (1,000 mg) by mouth every morning   pantoprazole (PROTONIX) 40 MG EC tablet   No No   Sig: Take 1 tablet (40 mg) by mouth At Bedtime   sertraline (ZOLOFT) 100 MG tablet   No No   Sig: Take 2 tablets (200 mg) by mouth daily   simvastatin (ZOCOR) 20 MG tablet   No No   Sig: TAKE ONE TABLET BY MOUTH AT BEDTIME   zolpidem (AMBIEN) 10 MG tablet   No No   Sig: Take 1 tablet (10 mg) by mouth At Bedtime      Facility-Administered Medications: None     Allergies   Allergies   Allergen Reactions     Codeine Rash     Penicillins Rash     Pt has tolerated cephalosporins and penems.   Pt tolerated Zosyn 2019       Social History   Social History     Tobacco Use     Smoking status: Former Smoker     Last attempt to quit: 3/18/1979     Years since quittin.1     Smokeless tobacco: Never Used     Tobacco comment: quit    Substance Use Topics     Alcohol use: Yes     Comment: Twice a month     Social History     Social History Narrative     Not on  file     Lives with family  and daughter.  Denies any smoking.  Occasional use of alcohol.    Family History   Siblings have non-Hodgkin's lymphoma and leukemia.  Family history significant for diabetes hypertension and coronary artery disease.    Review of Systems   A Comprehensive greater than 10 system review of systems was carried out.  Pertinent positives and negatives are noted above.  Otherwise negative for contributory information.    Physical Exam   Temp: 99.6  F (37.6  C) Temp src: Temporal BP: (!) 145/82 Pulse: 112 Heart Rate: 113 Resp: 20 SpO2: 90 % O2 Device: Nasal cannula Oxygen Delivery: 2 LPM  Vital Signs with Ranges  Temp:  [99.6  F (37.6  C)] 99.6  F (37.6  C)  Pulse:  [112-134] 112  Heart Rate:  [113-134] 113  Resp:  [20] 20  BP: (142-145)/(82-85) 145/82  SpO2:  [90 %] 90 %  243 lbs 0 oz    GEN:  Alert, oriented x 3, appears comfortable, no overt distress  Patient awake moving all her extremities.  Did not appear in any respiratory distress.    Patient was evaluated remotely as she is a PUI.  Was in the emergency room isolation.  Limited physical exam due to CO VID 19 rule out  Please refer to exam by Dr. Dang ED note from 4/13/2020 11:50 AM      Data   Data reviewed today:  I personally reviewed the chest x-ray image(s) showing Left sided opacities..    Recent Labs   Lab 04/13/20  1256   PHV 7.38   PO2V 60*   PCO2V 45   HCO3V 27     Recent Labs   Lab 04/13/20  1256   WBC 18.4*   HGB 13.6   HCT 44.0   MCV 80        Recent Labs   Lab 04/13/20  1256      POTASSIUM 3.5   CHLORIDE 107   CO2 26   ANIONGAP 6   *   BUN 11   CR 0.80   GFRESTIMATED 78   GFRESTBLACK >90   GRECIA 8.7     No results for input(s): CULT in the last 168 hours.  Recent Labs   Lab 04/13/20  1256   NTBNPI 163     Recent Labs   Lab 04/13/20  1256   AST 44   ALT 61*   ALKPHOS 115   BILITOTAL 0.4     No results for input(s): INR in the last 168 hours.  Recent Labs   Lab 04/13/20  1256   LACT 2.2*     No  results for input(s): LIPASE in the last 168 hours.  No results for input(s): CHOL, HDL, LDL, TRIG, CHOLHDLRATIO in the last 168 hours.  Recent Labs   Lab 04/13/20  1256   TROPI <0.015     No results for input(s): COLOR, APPEARANCE, URINEGLC, URINEBILI, URINEKETONE, SG, UBLD, URINEPH, PROTEIN, UROBILINOGEN, NITRITE, LEUKEST, RBCU, WBCU in the last 168 hours.  No results for input(s): URINEKETONE in the last 168 hours.    Invalid input(s):  KETONEURINE    Recent Results (from the past 24 hour(s))   XR Chest Port 1 View    Narrative    CHEST ONE VIEW PORTABLE   4/13/2020 1:23 PM     HISTORY:  Dyspnea. Shortness of breath.    COMPARISON: 1/16/2020.      Impression    IMPRESSION: Mild patchy opacities in the left mid and lower lung have  a similar appearance to the previous exam. Pneumonia could have this  appearance. The right lung remains clear. Heart size appears stable.  Pulmonary vascularity is within normal limits. Small hiatal hernia.

## 2020-04-13 NOTE — PROGRESS NOTES
"formerly [facility] RCAT  Date: April 13, 2020  Admission Dx: Pneumonia  Pulmonary History: CRISTÓBAL  Home Nebulizer/MDI Use: Albuterol MDI  Home Oxygen: 2L NC  Acuity Level (RCAT flow sheet): Level 3    Aerosol Therapy initiated: Albuterol MDI QID    Pulmonary Hygiene initiated: Coughing and deep breathing techniques    Volume Expansion initiated: Incentive spirometer    Current Oxygen Requirements: 2L NC  Current SpO2: 95%    Re-evaluation date: 4/16/2020    Patient Education: Education was provided on RCAT process. Will continue to do education with patient.    See \"RT Assessments\" flow sheet for patient assessment scoring and Acuity Level Details.     Domo Max, RT on 4/13/2020 at 3:47 PM      "

## 2020-04-13 NOTE — ED TRIAGE NOTES
Pt c/o chills and shortness of breath since 0730 today. Pt has a dry cough and a headache that also started today.

## 2020-04-13 NOTE — ED NOTES
[facility]  ED Nurse Handoff Report    Nicole Reyes is a 64 year old female   ED Chief complaint: Shortness of Breath  . ED Diagnosis:   Final diagnoses:   Severe sepsis (H)   Hypoxia   Community acquired bacterial pneumonia     Allergies:   Allergies   Allergen Reactions     Codeine Rash     Penicillins Rash     Pt has tolerated cephalosporins and penems.   Pt tolerated Zosyn 7/6/2019       Code Status: Full Code  Activity level - Baseline/Home:  Independent. Activity Level - Current:   Stand by Assist. Lift room needed: No. Bariatric: No   Needed: No   Isolation: Yes. Infection: Not Applicable  PUI- COVID19.     Vital Signs:   Vitals:    04/13/20 1400 04/13/20 1415 04/13/20 1430 04/13/20 1445   BP: (!) 128/93 106/59 (!) 140/51 111/76   Pulse: 102 98 96 94   Resp: 29 18 24 25   Temp:       TempSrc:       SpO2: 91% 93% 93% 91%   Weight:       Height:           Cardiac Rhythm:  ,      Pain level: 0-10 Pain Scale: 4  Patient confused: No. Patient Falls Risk: Yes.   Elimination Status: Has voided   Patient Report - Initial Complaint: SOB. Focused Assessment:  64 year old female with a history or recurrent pneumonia - currently on 2 L oxygen at home, type II diabetes, hypertension, and chronic kidney disease who presents for evaluation of chills and shortness of breath, onset today. This morning, the patient reports feeling abnormally chilled as soon as she woke up around 0730. As she went about her typical morning routine, she also noticed increased dyspnea, a mild dry cough, and the gradual onset of a headache. Around 0830, her daughter checked her temperature given the chills and she was found to be febrile to 102 degrees at which point she took a dose of Tylenol. When the symptoms persisted, she decided to present to the ED for evaluation. Here, she is afebrile. She continues to report the chills, headache, cough, and also lightheadedness. She denies any chest pain, abdominal pain,  nausea, vomiting, or sore throat. She has had no known sick contacts of late. Of note, the patient has been struggling with recurrent pneumonia of the left lower lobe and has been treated multiple times in the last nine months.   Tests Performed: labs, imaging. Abnormal Results:   Labs Ordered and Resulted from Time of ED Arrival Up to the Time of Departure from the ED   CBC WITH PLATELETS DIFFERENTIAL - Abnormal; Notable for the following components:       Result Value    WBC 18.4 (*)     RBC Count 5.49 (*)     MCH 24.8 (*)     MCHC 30.9 (*)     RDW 17.2 (*)     Absolute Neutrophil 16.8 (*)     Absolute Lymphocytes 0.7 (*)     All other components within normal limits   COMPREHENSIVE METABOLIC PANEL - Abnormal; Notable for the following components:    Glucose 190 (*)     Albumin 3.3 (*)     ALT 61 (*)     All other components within normal limits   LACTIC ACID WHOLE BLOOD - Abnormal; Notable for the following components:    Lactic Acid 2.2 (*)     All other components within normal limits   BLOOD GAS VENOUS - Abnormal; Notable for the following components:    PO2 Venous 60 (*)     All other components within normal limits   MAGNESIUM   TROPONIN I   NT PROBNP INPATIENT   COVID-19 VIRUS (CORONAVIRUS) BY PCR   LACTIC ACID WHOLE BLOOD   VITAL SIGNS   PULSE OXIMETRY NURSING   CARDIAC CONTINUOUS MONITORING   PERIPHERAL IV CATHETER   NOTIFY PHYSICIAN   BLOOD CULTURE   BLOOD CULTURE     XR Chest Port 1 View   Preliminary Result   IMPRESSION: Mild patchy opacities in the left mid and lower lung have   a similar appearance to the previous exam. Pneumonia could have this   appearance. The right lung remains clear. Heart size appears stable.   Pulmonary vascularity is within normal limits. Small hiatal hernia.         Treatments provided: See MAR  Family Comments: not present  OBS brochure/video discussed/provided to patient:  No  ED Medications:   Medications   sodium chloride (PF) 0.9% PF flush 3 mL (has no administration in  time range)   sodium chloride (PF) 0.9% PF flush 3 mL (has no administration in time range)   azithromycin (ZITHROMAX) 500 mg in sodium chloride 0.9 % 250 mL intermittent infusion (500 mg Intravenous New Bag 4/13/20 1435)   0.9% sodium chloride BOLUS (0 mLs Intravenous Stopped 4/13/20 1406)   cefTRIAXone (ROCEPHIN) 2 g vial to attach to  ml bag for ADULTS or NS 50 ml bag for PEDS (0 g Intravenous Stopped 4/13/20 1435)     Drips infusing:  Yes  For the majority of the shift, the patient's behavior Green. Interventions performed were na.    Sepsis treatment initiated: No       ED Nurse Name/Phone Number: Evangelina Vega RN,   3:01 PM    RECEIVING UNIT ED HANDOFF REVIEW    Above ED Nurse Handoff Report was reviewed: Yes  Reviewed by: Autumn Moore RN on April 13, 2020 at 3:04 PM

## 2020-04-13 NOTE — PROVIDER NOTIFICATION
Negative COVID-19 result paged to Dr. Mccall. Asked for clarification if isolation and be discontinued and transferred out of [facility].    Provider put in orders to continue droplet isolation only, may discontinue covid precautions.

## 2020-04-13 NOTE — PHARMACY-ADMISSION MEDICATION HISTORY
Admission medication history interview status for this patient is complete. See [facility] admission navigator for allergy information, prior to admission medications and immunization status.     Medication history interview source(s):Patient @ *[phone]  Medication history resources (including written lists, pill bottles, clinic record):None    Changes made to PTA medication list:  Added: none  Deleted: norco and Mobic  Changed: klonopin    Actions taken by pharmacist (provider contacted, etc):called patient in inpatient Room     Additional medication history information:None    Medication reconciliation/reorder completed by provider prior to medication history? Yes    For patients on insulin therapy: no (Yes/No)   Lantus/levemir/NPH/Mix 70/30 dose: ___ in AM/PM or twice daily   Sliding scale Novolog Y/N   If Yes, do you have a baseline novolog pre-meal dose: ______units with meals   Patients eat three meals a day: Y/N ---  How many episodes of hypoglycemia (low blood glucose) do you have weekly: ---   How many missed doses do you have a week: ---  How many times do you check your blood glucose per day: ---  Any Barriers to therapy: cost of medications/comfortable with giving injections (if applicable)/ comfortable and confident with current diabetes regimen ---      Prior to Admission medications    Medication Sig Last Dose Taking? Auth Provider   albuterol (PROAIR HFA/PROVENTIL HFA/VENTOLIN HFA) 108 (90 Base) MCG/ACT inhaler Inhale 1-2 puffs into the lungs every 6 hours as needed for shortness of breath / dyspnea or wheezing  Yes Jose L Weaver MD   aspirin 81 MG EC tablet Take 81 mg by mouth every evening  4/12/2020 at Unknown time Yes Unknown, Entered By History   clonazePAM (KLONOPIN) 0.5 MG tablet Take 0.5 mg by mouth 2 times daily as needed for anxiety 4/12/2020 at Unknown time Yes Unknown, Entered By History   gabapentin (NEURONTIN) 300 MG capsule Take 3 capsules (900 mg) by mouth daily 4/12/2020 at Unknown time Yes  Pari Wiley MD   losartan (COZAAR) 100 MG tablet TAKE ONE TABLET BY MOUTH ONCE DAILY 4/12/2020 at Unknown time Yes Pari Wiley MD   metFORMIN (GLUCOPHAGE-XR) 500 MG 24 hr tablet Take 2 tablets (1,000 mg) by mouth every morning 4/12/2020 at Unknown time Yes Pari Wiley MD   pantoprazole (PROTONIX) 40 MG EC tablet Take 1 tablet (40 mg) by mouth At Bedtime 4/12/2020 at Unknown time Yes Pari Wiley MD   sertraline (ZOLOFT) 100 MG tablet Take 2 tablets (200 mg) by mouth daily 4/12/2020 at Unknown time Yes Pari Wiley MD   simvastatin (ZOCOR) 20 MG tablet TAKE ONE TABLET BY MOUTH AT BEDTIME 4/12/2020 at Unknown time Yes Pari Wiley MD   zolpidem (AMBIEN) 10 MG tablet Take 1 tablet (10 mg) by mouth At Bedtime Past Month at Unknown time Yes Pari Wiley MD   blood glucose (ACCU-CHEK BYRON PLUS) test strip Use to test blood sugar 2 times daily or as directed.   Pari Wiley MD

## 2020-04-13 NOTE — LETTER
Transition Communication Hand-off for Care Transitions to Next Level of Care Provider    Name: Nicole Reyes  : 1955  MRN #: [id number]  Primary Care Provider: Pari Wiley  Primary Care MD Name: mendoza  Primary Clinic: [address] [facility] [address]  [facility] [address]  [facility]  Reason for Hospitalization:  Hypoxia [R09.02]  Community acquired bacterial pneumonia [J15.9]  Severe sepsis (H) [A41.9, R65.20]  Admit Date/Time: 2020 11:59 AM  Discharge Date: 4/15/20  Payor Source: Payor: PREFERREDONE / Plan: [employer]REF[employer] HMO / Product Type: PPO /     Readmission Assessment Measure (COURTNEY) Risk Score/category: COURTNEY ELEVATED            Reason for Communication Hand-off Referral: Admission diagnoses: PN  Multiple providers/specialties    Discharge Plan:       Concern for non-adherence with plan of care:   NO  Discharge Needs Assessment:  Needs      Most Recent Value   # of Referrals Placed by [facility]  Internal Clinic Care Coordination          Already enrolled in Tele-monitoring program and name of program:  na  Follow-up specialty is recommended: No    Follow-up plan:  No future appointments.    Any outstanding tests or procedures:            Key Recommendations:  Pt is admitted with recurrent PNA.  COURTNEY ELEVATED.  Pt said she has been worked up as an outpatient with Pulmonary at [facility] with no findings.  Then she went to [facility] with Dr Juan and he felt that her hiatal hernia is causing her issues.  Pt does have CRISTÓBAL.  She is compliant with her CPAP.  Pt has DM.  She does check her blood glucose daily and is compliant with her metformin.  Pt would benefit from care coordination in the clinic with her recurrent PNA.      Suzanne Jordan RN    AVS/Discharge Summary is the source of truth; this is a helpful guide for improved communication of patient story

## 2020-04-13 NOTE — ED PROVIDER NOTES
History     Chief Complaint:  Shortness of Breath    HPI   Nicole Reyes is a 64 year old female with a history or recurrent pneumonia - currently on 2 L oxygen at home, type II diabetes, hypertension, and chronic kidney disease who presents for evaluation of chills and shortness of breath, onset today. This morning, the patient reports feeling abnormally chilled as soon as she woke up around 0730. As she went about her typical morning routine, she also noticed increased dyspnea, a mild dry cough, and the gradual onset of a headache. Around 0830, her daughter checked her temperature given the chills and she was found to be febrile to 102 degrees at which point she took a dose of Tylenol. When the symptoms persisted, she decided to present to the ED for evaluation. Here, she is afebrile. She continues to report the chills, headache, cough, and also lightheadedness. She denies any chest pain, abdominal pain, nausea, vomiting, or sore throat. She has had no known sick contacts of late. Of note, the patient has been struggling with recurrent pneumonia of the left lower lobe and has been treated multiple times in the last nine months.     Allergies:  Codeine  Penicillins    Medications:    Albuterol inhaler  Baby aspirin   Klonopin  Gabapentin  Losartan   Mobic  Metformin  Protonix  Sertraline  Simvastatin   Ambien     Past Medical History:    Blood transfusion   CKD, stage III  Hypertension  GERD  Anxiety   Abdominal hernia  Hiatal hernia  Hypertension   Insomnia  Anemia  Depression   CRISTÓBAL  Oxygen dependent  Postoperative seroma  Hyperlipidemia  Restless legs syndrome  Type II diabetes     Past Surgical History:    Hernia repair   x3  D&C  EGD x3  Incisional herniorrhaphy x2    Family History:    CHF  Hypertension  CAD in her 50s - Mother  Hyperlipidemia   Diabetes  Leukemia  Lupus     Social History:  Marital Status:   [2]  Former smoker. Quit .   Positive for alcohol use. Comment: twice a  "month.   Negative for drug use.      Review of Systems   Constitutional: Positive for chills and fever (at home).   HENT: Negative for sore throat.    Respiratory: Positive for cough and shortness of breath.    Cardiovascular: Negative for chest pain.   Gastrointestinal: Negative for nausea and vomiting.   Neurological: Positive for light-headedness and headaches.   All other systems reviewed and are negative.    Physical Exam     Patient Vitals for the past 24 hrs:   BP Temp Temp src Pulse Heart Rate Resp SpO2 Height Weight   04/13/20 1522 117/69 98.9  F (37.2  C) Oral -- 96 20 95 % -- 111 kg (244 lb 12.8 oz)   04/13/20 1500 117/60 -- -- 92 93 27 92 % -- --   04/13/20 1445 111/76 -- -- 94 92 25 91 % -- --   04/13/20 1430 (!) 140/51 -- -- 96 96 24 93 % -- --   04/13/20 1415 106/59 -- -- 98 99 18 93 % -- --   04/13/20 1400 (!) 128/93 -- -- 102 103 29 91 % -- --   04/13/20 1315 (!) 145/82 -- -- 112 113 -- -- -- --   04/13/20 1205 -- -- -- 134 134 -- -- -- --   04/13/20 1158 (!) 142/85 99.6  F (37.6  C) Temporal 134 -- 20 90 % 1.499 m (4' 11\") 110.2 kg (243 lb)      Physical Exam  Constitutional: Vital signs reviewed as above.   Head: No external signs of trauma noted.  Eyes: Pupils are equal, round, and reactive to light.   Neck: No JVD noted  Cardiovascular: Tachycardic rate, regular rhythm and normal heart sounds.  No murmur heard. Equal B/L peripheral pulses.  Pulmonary/Chest: No respiratory distress. Patient has no wheezes. Patient has coarse sounds at the B/L bases.   Gastrointestinal: Soft. There is no tenderness.   Musculoskeletal/Extremities: No edema noted. Normal tone.  Neurological: Patient is alert and oriented to person, place, and time.   Skin: Skin is warm and dry. There is no diaphoresis noted.   Psychiatric: The patient appears calm.      Emergency Department Course     Imaging:  Radiographic findings were communicated with the patient who voiced understanding of the findings.  XR Chest Port 1 view: "   Mild patchy opacities in the left mid and lower lung have   a similar appearance to the previous exam. Pneumonia could have this   appearance. The right lung remains clear. Heart size appears stable.   Pulmonary vascularity is within normal limits. Small hiatal hernia, as per radiology.     Laboratory:  CBC: WBC: 18.4 (H), HGB: 13.6, PLT: 223  CMP: Glucose 190 (H), Albumin: 3.3 (L), ALT: 61 (H), o/w WNL (Creatinine: 0.80)  BNP: 163  Magnesium: 1.6  1311 Lactic acid: 2.2 (H)  1256 Troponin: <0.015   Blood gas venous: pO2: 60 (H), o/w WNL    Blood cultures x2: pending     COVID-19 Virus by PCR: Pending     Procedures:  None     Interventions:  1258 0.9% sodium chloride, 500 mL, IV   1404 Ceftriaxone, 2 g, IV   1435 Azithromycin, 500 mg, IV     Emergency Department Course:  Nursing notes and vitals reviewed.   1222: I performed an exam of the patient as documented above.      IV was inserted and blood was drawn for laboratory testing, results above. Medicine administered as documented above.   Portable chest x-ray was obtained in the ED, findings above.     ED Course as of Apr 13 1622   Mon Apr 13, 2020   1359 Updated patient via iPad.      1419 D/W Dr. Mccall. Ok for admit.        Findings and plan explained to the Patient who consents to admission. Discussed the patient with Dr. Mccall, who will admit the patient to a medical bed for further monitoring, evaluation, and treatment.    I personally reviewed the laboratory and imaging results with the Patient and answered all related questions prior to admission.     Impression & Plan      CMS Diagnoses:   The patient has signs of Severe Sepsis      If one the following conditions is present, a 30 mL/kg bolus is recommended as part of the 6 hour bundle (IBW can be used for BMI >30, or document refusal/contraindication):      1.   Initial hypotension  defined as 2 bps < 90 or map < 65 in the 6hrs before or 6hrs after time zero.     2.  Lactate >4.     The patient has  signs of Severe Sepsis as evidenced by:    1. 2 SIRS criteria, AND  2. Suspected infection, AND   3. Organ dysfunction: Lactic Acid > 2.0    Time severe sepsis diagnosis confirmed: 1311  04/13/20 as this was the time when Lactate resulted, and the level was > 2.0    3 Hour Severe Sepsis Bundle Completion:    1. Initial Lactic Acid Result:   Recent Labs   Lab Test 04/13/20  1456 04/13/20  1256 11/13/19  1930   LACT 1.2 2.2* 1.5     2. Blood Cultures before Antibiotics: Yes  3. Broad Spectrum Antibiotics Administered:  yes       Anti-infectives (From admission through now)    Start     Dose/Rate Route Frequency Ordered Stop    04/13/20 1345  cefTRIAXone (ROCEPHIN) 2 g vial to attach to  ml bag for ADULTS or NS 50 ml bag for PEDS      2 g  over 30 Minutes Intravenous ONCE 04/13/20 1344 04/13/20 1435    04/13/20 1345  azithromycin (ZITHROMAX) 500 mg in sodium chloride 0.9 % 250 mL intermittent infusion      500 mg  over 1 Hours Intravenous ONCE 04/13/20 1344 04/13/20 1535          4. Fluid volume administered in ED:  Full bolus not administered due to potential COVID-19 (fluid restriction for those patients)    BMI Readings from Last 1 Encounters:   04/13/20 49.44 kg/m      30 mL/kg fluids based on weight: 3,330 mL  30 mL/kg fluids based on IBW (must be >= 60 inches tall): Patient ideal weight not available.                Severe Sepsis reassessment:  1. Repeat Lactic Acid Level: 1.2  2. MAP>65 after initial IVF bolus, will continue to monitor fluid status and vital signs    Medical Decision Making:  Nicole Reyes is a 64 year old female who presents to the ED due to fever and shortness of breath.  Please see the HPI and exam for specifics.  The patient has pneumonia as demonstrated by her x-ray though it is very possible that she also has COVID-19 (testing pending).  She was given antibiotics but was not given a full 30 mL/kg fluid bolus for sepsis due to a fluid restricted approach for COVID-19 patients.   She was admitted to the hospital for further monitoring and care.    Diagnosis:    ICD-10-CM    1. Severe sepsis (H)  A41.9 Blood culture    R65.20 Blood culture     COVID-19 Virus (Coronavirus) by PCR Nasopharyngeal swab     Lactic acid whole blood   2. Hypoxia  R09.02    3. Community acquired bacterial pneumonia  J15.9      Covid-19  Nicole Reyes was evaluated during a global COVID-19 pandemic, which necessitated consideration that the patient might be at risk for infection with the SARS-CoV-2 virus that causes COVID-19.   Applicable protocols for evaluation were followed during the patient's care.      Disposition:  Admitted to medicine under the care of Dr. Mccall.    Scribe Disclosure:  I, Jolly Alcala, am serving as a scribe on 4/13/2020 at 12:22 PM to personally document services performed by Teodoro Dang DO based on my observations and the provider's statements to me.      4/13/2020   [facility] EMERGENCY DEPARTMENT       Teodoro Dang DO  04/13/20 1627

## 2020-04-13 NOTE — PROVIDER NOTIFICATION
"  \"Patient's BP is 88/41 with mild lightheadedness sitting in bed.\"    MD called and said to give 250 mL NS bolus over 1 hour.  "

## 2020-04-14 LAB
ANION GAP SERPL CALCULATED.3IONS-SCNC: 3 MMOL/L (ref 3–14)
BUN SERPL-MCNC: 13 MG/DL (ref 7–30)
CALCIUM SERPL-MCNC: 8.8 MG/DL (ref 8.5–10.1)
CHLORIDE SERPL-SCNC: 105 MMOL/L (ref 94–109)
CO2 SERPL-SCNC: 30 MMOL/L (ref 20–32)
CREAT SERPL-MCNC: 0.9 MG/DL (ref 0.52–1.04)
ERYTHROCYTE [DISTWIDTH] IN BLOOD BY AUTOMATED COUNT: 17.3 % (ref 10–15)
GFR SERPL CREATININE-BSD FRML MDRD: 67 ML/MIN/{1.73_M2}
GLUCOSE BLDC GLUCOMTR-MCNC: 109 MG/DL (ref 70–99)
GLUCOSE BLDC GLUCOMTR-MCNC: 117 MG/DL (ref 70–99)
GLUCOSE BLDC GLUCOMTR-MCNC: 119 MG/DL (ref 70–99)
GLUCOSE BLDC GLUCOMTR-MCNC: 124 MG/DL (ref 70–99)
GLUCOSE BLDC GLUCOMTR-MCNC: 128 MG/DL (ref 70–99)
GLUCOSE SERPL-MCNC: 108 MG/DL (ref 70–99)
GRAM STN SPEC: NORMAL
HCT VFR BLD AUTO: 39.1 % (ref 35–47)
HGB BLD-MCNC: 11.8 G/DL (ref 11.7–15.7)
Lab: NORMAL
MCH RBC QN AUTO: 25.2 PG (ref 26.5–33)
MCHC RBC AUTO-ENTMCNC: 30.2 G/DL (ref 31.5–36.5)
MCV RBC AUTO: 83 FL (ref 78–100)
PLATELET # BLD AUTO: 211 10E9/L (ref 150–450)
POTASSIUM SERPL-SCNC: 3.7 MMOL/L (ref 3.4–5.3)
RBC # BLD AUTO: 4.69 10E12/L (ref 3.8–5.2)
SODIUM SERPL-SCNC: 138 MMOL/L (ref 133–144)
SPECIMEN SOURCE: NORMAL
WBC # BLD AUTO: 18.9 10E9/L (ref 4–11)

## 2020-04-14 PROCEDURE — 12000000 ZZH R&B MED SURG/OB

## 2020-04-14 PROCEDURE — 36415 COLL VENOUS BLD VENIPUNCTURE: CPT | Performed by: INTERNAL MEDICINE

## 2020-04-14 PROCEDURE — 80048 BASIC METABOLIC PNL TOTAL CA: CPT | Performed by: INTERNAL MEDICINE

## 2020-04-14 PROCEDURE — 00000146 ZZHCL STATISTIC GLUCOSE BY METER IP

## 2020-04-14 PROCEDURE — 25000125 ZZHC RX 250: Performed by: INTERNAL MEDICINE

## 2020-04-14 PROCEDURE — 94640 AIRWAY INHALATION TREATMENT: CPT | Mod: 76

## 2020-04-14 PROCEDURE — 25000128 H RX IP 250 OP 636: Performed by: INTERNAL MEDICINE

## 2020-04-14 PROCEDURE — 85027 COMPLETE CBC AUTOMATED: CPT | Performed by: INTERNAL MEDICINE

## 2020-04-14 PROCEDURE — 40000275 ZZH STATISTIC RCP TIME EA 10 MIN

## 2020-04-14 PROCEDURE — 25000132 ZZH RX MED GY IP 250 OP 250 PS 637: Performed by: INTERNAL MEDICINE

## 2020-04-14 PROCEDURE — 99233 SBSQ HOSP IP/OBS HIGH 50: CPT | Performed by: HOSPITALIST

## 2020-04-14 PROCEDURE — 94640 AIRWAY INHALATION TREATMENT: CPT

## 2020-04-14 RX ORDER — SERTRALINE HYDROCHLORIDE 100 MG/1
TABLET, FILM COATED ORAL
Qty: 180 TABLET | Refills: 3 | Status: SHIPPED | OUTPATIENT
Start: 2020-04-14 | End: 2020-12-10

## 2020-04-14 RX ORDER — CLONAZEPAM 0.5 MG/1
0.5 TABLET ORAL 2 TIMES DAILY PRN
Status: DISCONTINUED | OUTPATIENT
Start: 2020-04-14 | End: 2020-04-14

## 2020-04-14 RX ADMIN — LOSARTAN POTASSIUM 100 MG: 25 TABLET, FILM COATED ORAL at 08:29

## 2020-04-14 RX ADMIN — GABAPENTIN 900 MG: 300 CAPSULE ORAL at 08:29

## 2020-04-14 RX ADMIN — ENOXAPARIN SODIUM 40 MG: 40 INJECTION SUBCUTANEOUS at 17:39

## 2020-04-14 RX ADMIN — ALBUTEROL SULFATE 2.5 MG: 2.5 SOLUTION RESPIRATORY (INHALATION) at 15:20

## 2020-04-14 RX ADMIN — SERTRALINE HYDROCHLORIDE 200 MG: 100 TABLET ORAL at 08:29

## 2020-04-14 RX ADMIN — SIMVASTATIN 20 MG: 20 TABLET, FILM COATED ORAL at 21:31

## 2020-04-14 RX ADMIN — ALBUTEROL SULFATE 2.5 MG: 2.5 SOLUTION RESPIRATORY (INHALATION) at 19:30

## 2020-04-14 RX ADMIN — ASPIRIN 81 MG: 81 TABLET, COATED ORAL at 20:13

## 2020-04-14 RX ADMIN — PANTOPRAZOLE SODIUM 40 MG: 40 TABLET, DELAYED RELEASE ORAL at 21:31

## 2020-04-14 RX ADMIN — ZOLPIDEM TARTRATE 10 MG: 5 TABLET, COATED ORAL at 02:10

## 2020-04-14 RX ADMIN — ALBUTEROL SULFATE 2.5 MG: 2.5 SOLUTION RESPIRATORY (INHALATION) at 07:54

## 2020-04-14 RX ADMIN — CEFTRIAXONE 2 G: 2 INJECTION, POWDER, FOR SOLUTION INTRAMUSCULAR; INTRAVENOUS at 14:34

## 2020-04-14 RX ADMIN — AZITHROMYCIN MONOHYDRATE 250 MG: 250 TABLET ORAL at 08:29

## 2020-04-14 RX ADMIN — ALBUTEROL SULFATE 2.5 MG: 2.5 SOLUTION RESPIRATORY (INHALATION) at 11:21

## 2020-04-14 ASSESSMENT — ACTIVITIES OF DAILY LIVING (ADL)
ADLS_ACUITY_SCORE: 16

## 2020-04-14 ASSESSMENT — MIFFLIN-ST. JEOR: SCORE: 1586.45

## 2020-04-14 NOTE — PLAN OF CARE
"/60 (BP Location: Left arm)   Pulse 80   Temp 98.5  F (36.9  C) (Oral)   Resp 18   Ht 1.499 m (4' 11\")   Wt 113.1 kg (249 lb 4.8 oz)   LMP 09/15/2007   SpO2 95%   BMI 50.35 kg/m      End of Shift Summary  For vital signs and complete assessments, please see documentation flowsheets.     Pertinent assessments: denies SOB, non-productive cough.  2L NC, baseline per patient. No pain or nausea.      Major Shift Events: , 128, 109, 124. WBC 18.9. Plan to leave in a few days.  Treatment Plan: zithromax, rocephin.     Discharge Readiness: Medically active  Expected Discharge Date: TBD  Discharge Disposition: Home with Self care  Barriers/Criteria for discharge unknown          " +1 b/l

## 2020-04-14 NOTE — PLAN OF CARE
End of Shift Summary  For vital signs and complete assessments, please see documentation flowsheets.     Pertinent assessments: soft bps, bolus of 250mL given, recheck bp: 116/83. Max temp: 99.4, not symptomatic. 2L NC, baseline per patient. No pain. Up assist of 1. Lethargic this evening. B, 107. WBC: 18.4  Major Shift Events: Covid-neg, bolus for low blood pressures. Sputum sample pending.   Treatment Plan: zithromax, rocephin.     Discharge Readiness: Medically active  Expected Discharge Date: TBD  Discharge Disposition: Home with Self care  Barriers/Criteria for discharge unknown

## 2020-04-14 NOTE — PLAN OF CARE
End of Shift Summary  For vital signs and complete assessments, please see documentation flowsheets.     Pertinent assessments: denies SOB, productive cough.  2L NC, baseline per patient. No pain or nausea.      Major Shift Events: Covid-neg,   Treatment Plan: zithromax, rocephin.     Discharge Readiness: Medically active  Expected Discharge Date: TBD  Discharge Disposition: Home with Self care  Barriers/Criteria for discharge unknown

## 2020-04-14 NOTE — CONSULTS
Care Transition Initial Assessment - RN        Met with: Patient.  DATA   Active Problems:    Pneumonia       Cognitive Status: awake, alert and oriented.  Primary Care Clinic Name: [facility]  Primary Care MD Name: mendoza  Contact information and PCP information verified: Yes  Lives With: spouse             Who is your support system?:        Insurance concerns: No Insurance issues identified  ASSESSMENT  Patient currently receives the following services:  none        Identified issues/concerns regarding health management: Pt is admitted with recurrent PNA.  COURTNEY ELEVATED.  Pt said she has been worked up as an outpatient with Pulmonary at [facility] with no findings.  Then she went to [facility] with Dr Juan and he felt that her hiatal hernia is causing her issues.  Pt does have CRISTÓBAL.  She is compliant with her CPAP.  She also has home 02 with portability . However, she isn't sure through what company.  Pt has DM.  She does check her blood glucose daily and is compliant with her metformin.  Pt would benefit from care coordination in the clinic with her recurrent PNA.   Hand off will be given to Clinic RN CTS at time of discharge.          PLAN  Patient given options and choices for discharge yes .  Patient/family is agreeable to the plan?  Yes:   Patient anticipates discharging to home .        Patient anticipates needs for home equipment: No  Transportation/person available to transport on day of discharge  is her .  Plan/Disposition: Home   Appointments: TBD      Care  (CTS) will continue to follow as needed.    Yarelis Jordan RN BSN   Inpatient Care Coordination  [facility]  [phone]

## 2020-04-14 NOTE — PROGRESS NOTES
[facility]    Hospitalist Progress Note  Name: Nicole Reyes    MRN: [id number]  Provider:  Bhavesh Paiz DO, MPH  Date of Service: 04/14/2020    Summary of Stay: Nicole Reyes is a 64 year old female with past medical history significant for obstructive sleep apnea( uses CPAP), diabetes mellitus type 2, hiatal hernia, GERD with multiple episodes of pneumonia on supplemental O2 2 L at home presented to the emergency room with 1 day history of cough fever as high as high as 102, cough and chills.  Chest x-ray is concerning for pneumonia admitted for sepsis secondary to pneumonia and to rule out COVID-19    Problem List:   Sepsis secondary to pneumonia: On presentation patient was febrile tachycardic elevated lactic acid and leukocytosis.  -Blood culture sent in the emergency room  -Started on ceftriaxone and azithromycin  -COVID19 negative  -Stop droplet isolation     Lactic acidosis  -Secondary to pneumonia  -Stop IVF and push PO  -Resolved with initial treatment in the emergency room     History of sleep apnea uses CPAP at home  -Can resume CPAP with home settings     Diabetes mellitus type 2  -At home on metformin will hold that for now in the setting of sepsis  -Sliding scale insulin  -Last hemoglobin A1c was 6.5     Hypertension  -Continue prior to admission losartan  -PRN hydralazine if needed     GERD  -Continue with Protonix     Hyperlipidemia  -Continue on statin     Anxiety and depression  -Resumed all prior to admission meds    DVT Prophylaxis: Enoxaparin (Lovenox) SQ  Code Status: Full Code  Diet: Combination Diet Regular Diet Adult    Suarez Catheter: not present  Disposition: Expected discharge in 1-2 days to home. Goals prior to discharge include manage infection.   Incidental Findings: None.  Family updated today: No     Interval History   Assumed care from previous hospitalist. The history was fully reviewed.  The patient reports doing well but quite tired. No chest pain or  shortness of breath. No nausea, vomiting, diarrhea, constipation. No fevers. No other specific complaints identified.     -Data reviewed today: I personally reviewed all new labs and imaging results over the last 24 hours.     Physical Exam   Temp: 97.9  F (36.6  C) Temp src: Oral BP: 133/62 Pulse: 80 Heart Rate: 76 Resp: 20 SpO2: 95 % O2 Device: Nasal cannula Oxygen Delivery: 2 LPM  Vitals:    04/13/20 1158 04/13/20 1522 04/14/20 0552   Weight: 110.2 kg (243 lb) 111 kg (244 lb 12.8 oz) 113.1 kg (249 lb 4.8 oz)     Vital Signs with Ranges  Temp:  [97.9  F (36.6  C)-99.6  F (37.6  C)] 97.9  F (36.6  C)  Pulse:  [] 80  Heart Rate:  [] 76  Resp:  [16-29] 20  BP: ()/(38-93) 133/62  SpO2:  [90 %-100 %] 95 %  I/O last 3 completed shifts:  In: 240 [P.O.:240]  Out: 400 [Urine:400]    GENERAL: No apparent distress. Awake, alert, and fully oriented. Obese.  HEENT: Normocephalic, atraumatic. Extraocular movements intact.  CARDIOVASCULAR: Regular rate and rhythm without murmurs or rubs. No S3.  PULMONARY: Clear bilaterally.  GASTROINTESTINAL: Soft, non-tender, non-distended. Bowel sounds normoactive.   EXTREMITIES: No cyanosis or clubbing. No edema.  NEUROLOGICAL: CN 2-12 grossly intact, no focal neurological deficits.  DERMATOLOGICAL: No rash, ulcer, bruising, nor jaundice.     Medications     - MEDICATION INSTRUCTIONS -       - MEDICATION INSTRUCTIONS -       - MEDICATION INSTRUCTIONS -         albuterol  2.5 mg Nebulization 4x daily     aspirin  81 mg Oral QPM     azithromycin  250 mg Oral Daily     cefTRIAXone  2 g Intravenous Q24H     enoxaparin ANTICOAGULANT  40 mg Subcutaneous Q24H     gabapentin  900 mg Oral Daily     insulin aspart  1-7 Units Subcutaneous TID AC     insulin aspart  1-5 Units Subcutaneous At Bedtime     losartan  100 mg Oral Daily     pantoprazole  40 mg Oral At Bedtime     sertraline  200 mg Oral Daily     simvastatin  20 mg Oral At Bedtime     sodium chloride (PF)  3 mL Intracatheter  Q8H     Data     Laboratory:  Recent Labs   Lab 04/14/20  0546 04/13/20  1256   WBC 18.9* 18.4*   HGB 11.8 13.6   HCT 39.1 44.0   MCV 83 80    223     Recent Labs   Lab 04/14/20  0546 04/13/20  1256    139   POTASSIUM 3.7 3.5   CHLORIDE 105 107   CO2 30 26   ANIONGAP 3 6   * 190*   BUN 13 11   CR 0.90 0.80   GFRESTIMATED 67 78   GFRESTBLACK 78 >90   GRECIA 8.8 8.7     Recent Labs   Lab 04/14/20  0806 04/14/20  0546 04/14/20  0201 04/13/20  2111 04/13/20  1729 04/13/20  1256   GLC  --  108*  --   --   --  190*   *  --  117* 107* 106*  --      Recent Labs   Lab 04/13/20  1256   CULT No growth after 16 hours  No growth after 16 hours       Imaging:  Recent Results (from the past 24 hour(s))   XR Chest Port 1 View    Narrative    CHEST ONE VIEW PORTABLE   4/13/2020 1:23 PM     HISTORY:  Dyspnea. Shortness of breath.    COMPARISON: 1/16/2020.      Impression    IMPRESSION: Mild patchy opacities in the left mid and lower lung have  a similar appearance to the previous exam. Pneumonia could have this  appearance. The right lung remains clear. Heart size appears stable.  Pulmonary vascularity is within normal limits. Small hiatal hernia.    MD Bhavesh GASTON DO MPH  [facility] Hospitalist  [address] [place] [address]  Pager: [phone]  04/14/2020

## 2020-04-15 VITALS
WEIGHT: 246.4 LBS | BODY MASS INDEX: 49.68 KG/M2 | TEMPERATURE: 96.6 F | DIASTOLIC BLOOD PRESSURE: 67 MMHG | OXYGEN SATURATION: 96 % | HEART RATE: 80 BPM | SYSTOLIC BLOOD PRESSURE: 133 MMHG | HEIGHT: 59 IN | RESPIRATION RATE: 20 BRPM

## 2020-04-15 LAB
ERYTHROCYTE [DISTWIDTH] IN BLOOD BY AUTOMATED COUNT: 17 % (ref 10–15)
GLUCOSE BLDC GLUCOMTR-MCNC: 134 MG/DL (ref 70–99)
GLUCOSE BLDC GLUCOMTR-MCNC: 152 MG/DL (ref 70–99)
HCT VFR BLD AUTO: 34.9 % (ref 35–47)
HGB BLD-MCNC: 10.7 G/DL (ref 11.7–15.7)
MCH RBC QN AUTO: 24.7 PG (ref 26.5–33)
MCHC RBC AUTO-ENTMCNC: 30.7 G/DL (ref 31.5–36.5)
MCV RBC AUTO: 81 FL (ref 78–100)
PLATELET # BLD AUTO: 175 10E9/L (ref 150–450)
RBC # BLD AUTO: 4.33 10E12/L (ref 3.8–5.2)
WBC # BLD AUTO: 12.3 10E9/L (ref 4–11)

## 2020-04-15 PROCEDURE — 25000131 ZZH RX MED GY IP 250 OP 636 PS 637: Performed by: INTERNAL MEDICINE

## 2020-04-15 PROCEDURE — 40000275 ZZH STATISTIC RCP TIME EA 10 MIN

## 2020-04-15 PROCEDURE — 94640 AIRWAY INHALATION TREATMENT: CPT | Mod: 76

## 2020-04-15 PROCEDURE — 25000132 ZZH RX MED GY IP 250 OP 250 PS 637: Performed by: HOSPITALIST

## 2020-04-15 PROCEDURE — 25000125 ZZHC RX 250: Performed by: INTERNAL MEDICINE

## 2020-04-15 PROCEDURE — 00000146 ZZHCL STATISTIC GLUCOSE BY METER IP

## 2020-04-15 PROCEDURE — 85027 COMPLETE CBC AUTOMATED: CPT | Performed by: HOSPITALIST

## 2020-04-15 PROCEDURE — 99239 HOSP IP/OBS DSCHRG MGMT >30: CPT | Performed by: HOSPITALIST

## 2020-04-15 PROCEDURE — 36415 COLL VENOUS BLD VENIPUNCTURE: CPT | Performed by: HOSPITALIST

## 2020-04-15 PROCEDURE — 94640 AIRWAY INHALATION TREATMENT: CPT

## 2020-04-15 PROCEDURE — 25000132 ZZH RX MED GY IP 250 OP 250 PS 637: Performed by: INTERNAL MEDICINE

## 2020-04-15 RX ORDER — AZITHROMYCIN 250 MG/1
250 TABLET, FILM COATED ORAL DAILY
Qty: 2 TABLET | Refills: 0 | Status: ON HOLD | OUTPATIENT
Start: 2020-04-16 | End: 2020-05-09

## 2020-04-15 RX ORDER — ACETAMINOPHEN 650 MG/1
650 SUPPOSITORY RECTAL EVERY 4 HOURS PRN
Status: DISCONTINUED | OUTPATIENT
Start: 2020-04-15 | End: 2020-04-15 | Stop reason: HOSPADM

## 2020-04-15 RX ORDER — CEFPODOXIME PROXETIL 200 MG/1
200 TABLET, FILM COATED ORAL 2 TIMES DAILY
Qty: 14 TABLET | Refills: 0 | Status: ON HOLD | OUTPATIENT
Start: 2020-04-15 | End: 2020-05-09

## 2020-04-15 RX ORDER — ACETAMINOPHEN 325 MG/1
650 TABLET ORAL EVERY 4 HOURS PRN
Status: DISCONTINUED | OUTPATIENT
Start: 2020-04-15 | End: 2020-04-15 | Stop reason: HOSPADM

## 2020-04-15 RX ADMIN — SERTRALINE HYDROCHLORIDE 200 MG: 100 TABLET ORAL at 08:31

## 2020-04-15 RX ADMIN — ZOLPIDEM TARTRATE 10 MG: 5 TABLET, COATED ORAL at 01:26

## 2020-04-15 RX ADMIN — ALBUTEROL SULFATE 2.5 MG: 2.5 SOLUTION RESPIRATORY (INHALATION) at 07:22

## 2020-04-15 RX ADMIN — GABAPENTIN 900 MG: 300 CAPSULE ORAL at 08:31

## 2020-04-15 RX ADMIN — LOSARTAN POTASSIUM 100 MG: 25 TABLET, FILM COATED ORAL at 08:31

## 2020-04-15 RX ADMIN — ALBUTEROL SULFATE 2.5 MG: 2.5 SOLUTION RESPIRATORY (INHALATION) at 11:31

## 2020-04-15 RX ADMIN — INSULIN ASPART 1 UNITS: 100 INJECTION, SOLUTION INTRAVENOUS; SUBCUTANEOUS at 08:57

## 2020-04-15 RX ADMIN — ACETAMINOPHEN 650 MG: 325 TABLET, FILM COATED ORAL at 09:10

## 2020-04-15 RX ADMIN — AZITHROMYCIN MONOHYDRATE 250 MG: 250 TABLET ORAL at 08:31

## 2020-04-15 ASSESSMENT — ACTIVITIES OF DAILY LIVING (ADL)
ADLS_ACUITY_SCORE: 15

## 2020-04-15 ASSESSMENT — MIFFLIN-ST. JEOR: SCORE: 1573.29

## 2020-04-15 NOTE — DISCHARGE SUMMARY
Hospitalist Discharge Summary  [facility]    Nicole Reyes MRN# [id number]   YOB: 1955 Age: 64 year old     Date of Admission:  4/13/2020  Date of Discharge:  4/15/2020  Admitting Physician:  Romina Mccall MD  Discharge Physician:  Bhavesh Paiz DO  Discharging Service:  Hospitalist     Primary Provider: Pari Wiley          Discharge Diagnosis:   Recurrent pneumonia, suspect aspiration  Sepsis secondary to pneumonia  Lactic acidosis, resolved  Sleep apnea compliant with home CPAP  Type 2 diabetes mellitus  Hypertension  Chronic hypoxic respiratory failure on baseline 2 L of oxygen by nasal cannula  GERD  Hyperlipidemia  Anxiety and depression             Discharge Disposition:   Discharged to home           Allergies:   Allergies   Allergen Reactions     Codeine Rash     Penicillins Rash     Pt has tolerated cephalosporins and penems.   Pt tolerated Zosyn 7/6/2019              Discharge Medications:   Current Discharge Medication List      START taking these medications    Details   cefpodoxime (VANTIN) 200 MG tablet Take 1 tablet (200 mg) by mouth 2 times daily for 5 days  Qty: 14 tablet, Refills: 0    Associated Diagnoses: Pneumonia due to infectious organism, unspecified laterality, unspecified part of lung         CONTINUE these medications which have CHANGED    Details   azithromycin (ZITHROMAX) 250 MG tablet Take 1 tablet (250 mg) by mouth daily for 2 days  Qty: 2 tablet, Refills: 0    Associated Diagnoses: Pneumonia due to infectious organism, unspecified laterality, unspecified part of lung         CONTINUE these medications which have NOT CHANGED    Details   albuterol (PROAIR HFA/PROVENTIL HFA/VENTOLIN HFA) 108 (90 Base) MCG/ACT inhaler Inhale 1-2 puffs into the lungs every 6 hours as needed for shortness of breath / dyspnea or wheezing  Qty: 1 Inhaler, Refills: 0    Comments: Please also dispense a spacer  Associated Diagnoses: Community acquired bacterial  pneumonia      aspirin 81 MG EC tablet Take 81 mg by mouth every evening       clonazePAM (KLONOPIN) 0.5 MG tablet Take 0.5 mg by mouth 2 times daily as needed for anxiety      gabapentin (NEURONTIN) 300 MG capsule Take 3 capsules (900 mg) by mouth daily  Qty: 270 capsule, Refills: 3    Associated Diagnoses: Restless legs syndrome (RLS); Persistent insomnia      losartan (COZAAR) 100 MG tablet TAKE ONE TABLET BY MOUTH ONCE DAILY  Qty: 90 tablet, Refills: 1    Associated Diagnoses: Essential hypertension, benign      metFORMIN (GLUCOPHAGE-XR) 500 MG 24 hr tablet Take 2 tablets (1,000 mg) by mouth every morning  Qty: 180 tablet, Refills: 3    Comments: Profile Rx: patient will contact pharmacy when needed  Associated Diagnoses: Type 2 diabetes mellitus with stage 3 chronic kidney disease, without long-term current use of insulin (H)      pantoprazole (PROTONIX) 40 MG EC tablet Take 1 tablet (40 mg) by mouth At Bedtime  Qty: 90 tablet, Refills: 3    Comments: Profile Rx: patient will contact pharmacy when needed  Associated Diagnoses: Gastroesophageal reflux disease without esophagitis      simvastatin (ZOCOR) 20 MG tablet TAKE ONE TABLET BY MOUTH AT BEDTIME  Qty: 90 tablet, Refills: 0    Associated Diagnoses: Hyperlipidemia LDL goal <100      zolpidem (AMBIEN) 10 MG tablet Take 1 tablet (10 mg) by mouth At Bedtime  Qty: 90 tablet, Refills: 1    Comments: ### DO NOT FILL NOW.  Please update patient's profile to reflect additional refills.  ####  Associated Diagnoses: Insomnia, unspecified type      blood glucose (ACCU-CHEK BYRON PLUS) test strip Use to test blood sugar 2 times daily or as directed.  Qty: 100 each, Refills: 12    Associated Diagnoses: Type 2 diabetes mellitus with stage 3 chronic kidney disease, without long-term current use of insulin (H)      sertraline (ZOLOFT) 100 MG tablet TAKE TWO TABLETS BY MOUTH ONCE DAILY  Qty: 180 tablet, Refills: 3    Associated Diagnoses: Generalized anxiety disorder; Major  "depressive disorder, recurrent episode, moderate (H)                    Condition on Discharge:   Discharge condition: Stable   Discharge vitals: Blood pressure 133/67, pulse 80, temperature 96.6  F (35.9  C), temperature source Oral, resp. rate 20, height 1.499 m (4' 11\"), weight 111.8 kg (246 lb 6.4 oz), last menstrual period 09/15/2007, SpO2 95 %, not currently breastfeeding.   Code status on discharge: Full Code      BASIC PHYSICAL EXAMINATION:  GENERAL: No apparent distress.  CARDIOVASCULAR: Regular rate and rhythm without murmurs.  PULMONARY: Clear to auscultation bilaterally.   GASTROINTESTINAL: Abdomen soft, non-tender.  EXTREMITIES: No edema, pulses intact.  NEUROLOGIC: No focal deficits.            History of Illness:   See detailed admission note for full details.               Procedures excluding imaging which is summarized below:   Please see details in the electronic medical record.           Consultations:   CARE COORDINATOR IP CONSULT          Significant Results:   Results for orders placed or performed during the hospital encounter of 04/13/20   XR Chest Port 1 View    Narrative    CHEST ONE VIEW PORTABLE   4/13/2020 1:23 PM     HISTORY:  Dyspnea. Shortness of breath.    COMPARISON: 1/16/2020.      Impression    IMPRESSION: Mild patchy opacities in the left mid and lower lung have  a similar appearance to the previous exam. Pneumonia could have this  appearance. The right lung remains clear. Heart size appears stable.  Pulmonary vascularity is within normal limits. Small hiatal hernia.    MARY JO FLORES MD       Transthoracic Echocardiogram Results:  No results found for this or any previous visit (from the past 4320 hour(s)).             Pending Results:   Unresulted Labs Ordered in the Past 30 Days of this Admission     Date and Time Order Name Status Description    4/13/2020 1543 Sputum Culture Aerobic Bacterial Preliminary     4/13/2020 1231 Blood culture Preliminary     4/13/2020 1231 Blood " culture Preliminary                       Discharge Instructions and Follow-Up:   Discharge instructions and follow-up:   Discharge Procedure Orders   Follow-up and recommended labs and tests    Order Comments: Follow up with primary care provider, Pari Wiley, within 7 days to evaluate medication change and for hospital follow- up.  No follow up labs or test are needed.     Activity   Order Comments: Your activity upon discharge: activity as tolerated     Order Specific Question Answer Comments   Is discharge order? Yes      Full Code     Order Specific Question Answer Comments   Code status determined by: Discussion with patient/legal decision maker      Diet   Order Comments: Follow this diet upon discharge: Orders Placed This Encounter      Combination Diet Regular Diet Adult     Order Specific Question Answer Comments   Is discharge order? Yes              Hospital Course:   Nicole Reyes is a 64 year old female with past medical history significant for obstructive sleep apnea( uses CPAP), diabetes mellitus type 2, hiatal hernia, GERD with multiple episodes of pneumonia on supplemental O2 2 L at home presented to the emergency room with 1 day history of cough fever as high as high as 102, cough and chills.  Chest x-ray is concerning for pneumonia and admitted for sepsis secondary to pneumonia and to rule out COVID-19.    She has been ruled out for COVID-19.  She was started on ceftriaxone and azithromycin.  No further fevers documented and clinically feeling improved.  Oxygenation is at baseline.  She uses a CPAP at home for her sleep apnea.  She reports seeing both an infectious disease and pulmonary specialist in the past with suspicion of aspiration leading to her recurrent pneumonias.  She is already on an oral PPI.  She has historically responded fairly well to conventional treatment for community-acquired pneumonia with ceftriaxone and azithromycin.  Plan will be to complete a course of azithromycin  and prescribe Vantin on discharge.  The remainder of her complex chronic medical issues appear stable and she appears suitable for discharge today.    The patient was seen, examined, and counseled on this day. The hospitalization and plan of care was reviewed with the patient extensively. All questions were addressed and the patient agreed to follow-up as noted above.      Total time spent in face to face contact with the patient and coordinating discharge was:  35 Minutes    Bhavesh Paiz DO, MPH  Counts include 234 beds at the [facility] Hospitalist  [address] [place] [address]  Pager: [phone]  04/15/2020

## 2020-04-15 NOTE — PLAN OF CARE
"[place]: A&O  VS: /67 (BP Location: Left arm)   Pulse 80   Temp 96.6  F (35.9  C) (Oral)   Resp 20   Ht 1.499 m (4' 11\")   Wt 111.8 kg (246 lb 6.4 oz)   LMP 09/15/2007   SpO2 96%   BMI 49.77 kg/m    Tele: SR, 1st AVB  LS: dim on 2LNC, denies SOB  GI: active, last BM today, denies nausea  : BR w/o difficulty  Skin: intact  Activity: SBA   Diet: reg   Pain: 3/10 HA, taking tylenol w/relief  Lab: .   Plan: nebs, discharge today with zithromax and vantin           "

## 2020-04-15 NOTE — PROGRESS NOTES
"Checked with patient at 11pm to ask if she was ready for CPAP. Patient refused to go on CPAP for the night at this time, stated she felt \"too hot to wear it tonight.\" Instructed patient to have her nurse page RT to set up CPAP if she changes her mind.    Alisha Mitchell, RT  April 15, 2020.1:07 AM        "

## 2020-04-15 NOTE — PLAN OF CARE
"Patient's After Visit Summary was reviewed with patient and.   Patient verbalized understanding of After Visit Summary, recommended follow up and was given an opportunity to ask questions.   Discharge medications sent home with patient/family: YES, zithromax and vantin   Discharged with daughter    Belongings taken with. Pt has oxygen in car to go home with. On 2L intermittently at home (per pt).    Daughter driving pt home, where they live together.    /67 (BP Location: Left arm)   Pulse 80   Temp 96.6  F (35.9  C) (Oral)   Resp 20   Ht 1.499 m (4' 11\")   Wt 111.8 kg (246 lb 6.4 oz)   LMP 09/15/2007   SpO2 96%   BMI 49.77 kg/m        "

## 2020-04-15 NOTE — PLAN OF CARE
To Do:  End of Shift Summary  For vital signs and complete assessments, please see documentation flowsheets.     Pertinent assessments: denies SOB, non-productive cough.  2L NC, baseline per patient. No pain or nausea.   & 134.    Major Shift Events: Ambien for sleep. Uneventful night.  Treatment Plan: zithromax, rocephin.     Discharge Readiness: Medically active  Expected Discharge Date: 4/15  Discharge Disposition: Home with Self care  Barriers/Criteria for discharge. None

## 2020-04-16 ENCOUNTER — TELEPHONE (OUTPATIENT)
Dept: INTERNAL MEDICINE | Facility: CLINIC | Age: 65
End: 2020-04-16

## 2020-04-16 ENCOUNTER — PATIENT OUTREACH (OUTPATIENT)
Dept: CARE COORDINATION | Facility: CLINIC | Age: 65
End: 2020-04-16

## 2020-04-16 LAB
BACTERIA SPEC CULT: ABNORMAL
BACTERIA SPEC CULT: ABNORMAL
SPECIMEN SOURCE: ABNORMAL

## 2020-04-16 NOTE — PROGRESS NOTES
Transition Communication Hand-off for Care Transitions to Next Level of Care Provider    Name: Nicole Reyes  : 1955  MRN #: [id number]  Primary Care Provider: Pari Wiley  Primary Care MD Name: mendoza  Primary Clinic: [address] [facility] [address]  [facility] [address]  Primary Care Clinic Name: [facility]  Reason for Hospitalization:  Hypoxia [R09.02]  Community acquired bacterial pneumonia [J15.9]  Severe sepsis (H) [A41.9, R65.20]  Admit Date/Time: 2020 11:59 AM  Discharge Date: 4/15/20  Payor Source: Payor: PREFERREDONE / Plan: PREFERREDONE HMO / Product Type: PPO /     Readmission Assessment Measure (COURTNEY) Risk Score/category: COURTNEY ELEVATED            Reason for Communication Hand-off Referral: Admission diagnoses: PN  Multiple providers/specialties    Discharge Plan:       Concern for non-adherence with plan of care:   NO  Discharge Needs Assessment:  Needs      Most Recent Value   # of Referrals Placed by [facility]  Internal Clinic Care Coordination          Already enrolled in Tele-monitoring program and name of program:  na  Follow-up specialty is recommended: No    Follow-up plan:  No future appointments.    Any outstanding tests or procedures:            Key Recommendations:  Pt is admitted with recurrent PNA.  COURTNEY ELEVATED.  Pt said she has been worked up as an outpatient with Pulmonary at [facility] with no findings.  Then she went to [facility] with Dr Juan and he felt that her hiatal hernia is causing her issues.  Pt does have CRISTÓBAL.  She is compliant with her CPAP.  Pt has DM.  She does check her blood glucose daily and is compliant with her metformin.  Pt would benefit from care coordination in the clinic with her recurrent PNA.      Suzanne Jordan RN    AVS/Discharge Summary is the source of truth; this is a helpful guide for improved communication of patient story

## 2020-04-16 NOTE — TELEPHONE ENCOUNTER
"Hospital/TCU/ED for chronic condition Discharge Protocol    \"Hi, my name is Eli العلي RN, a registered nurse, and I am calling from [facility].  I am calling to follow up and see how things are going for you after your recent emergency visit/hospital/TCU stay.\"    Tell me how you are doing now that you are home?\" wiped out but feeling okay, wondering when to schedule follow-up       Discharge Instructions    \"Let's review your discharge instructions.  What is/are the follow-up recommendations?  Pt. Response: Follow up with primary care provider, Pari Wiley, within 7 days to evaluate medication change and for hospital follow- up.  No follow up labs or test are needed.    \"Has an appointment with your primary care provider been scheduled?\"   No (schedule appointment)     Next 5 appointments (look out 90 days)    Apr 23, 2020  2:40 PM CDT  Telephone Visit with Pari Wiley MD  [facility] [address] [place] [id number] [phone]        \"When you see the provider, I would recommend that you bring your medications with you.\"    Medications    \"Tell me what changed about your medicines when you discharged?\"    Changes to chronic meds?    0-1    \"What questions do you have about your medications?\"    None     New diagnoses of heart failure, COPD, diabetes, or MI?    No              Post Discharge Medication Reconciliation Status: discharge medications reconciled and changed, per note/orders (see AVS).    Was MTM referral placed (*Make sure to put transitions as reason for referral)?   No    Call Summary    \"What questions or concerns do you have about your recent visit and your follow-up care?\"     none    \"If you have questions or things don't continue to improve, we encourage you contact us through the main clinic number (give number).  Even if the clinic is not open, triage nurses are available 24/7 to help you.     We would like you to " "know that our clinic has extended hours (provide information).  We also have urgent care (provide details on closest location and hours/contact info)\"      \"Thank you for your time and take care!\"    "

## 2020-04-16 NOTE — TELEPHONE ENCOUNTER
IP F/U    Date: 4/15/20  Diagnosis: Severe Sepsis (H), Pneumonia Due To Infectious Organism, Unspecified Laterality, Unspecified Part Of Lung  Is patient active in care coordination? Yes - Route to Care Coordination (P [id number]) see patient outreach encounter done today.   Was patient in TCU? No

## 2020-04-16 NOTE — LETTER
[facility] CARE COORDINATION  [address]  [facility] [address]  April 16, 2020      Nicole Reyes  [address]  [place] [address]      Dear Nicole,    I am a clinic community health worker who works with Pari Wiley MD at [facility].  I wanted to thank you for spending the time to talk with me and provide you with my contact information for you to be able to call me with any questions or concerns.  Below is a description of clinic care coordination and how I can further assist you.      The clinic care coordination team is made up of a registered nurse,  and community health worker who understand the health care system. The goal of clinic care coordination is to help you manage your health and improve access to the health care system in the most efficient manner. The team can assist you in meeting your health care goals by providing education, coordinating services, strengthening the communication among your providers and supporting you with any resource needs.    Please feel free to contact me at [phone] with any questions or concerns. We are focused on providing you with the highest-quality healthcare experience possible and that all starts with you.     Sincerely,     BLANCA Addison  Clinic Care Coordination  [facility] : [place], [place], [place], and [place]  Phone: [phone]

## 2020-04-16 NOTE — PROGRESS NOTES
Clinic Care Coordination Contact  Community Health Worker Initial Outreach    CHW Initial Information Gathering:  Referral Source: IP Handoff  Preferred Hospital: [facility] [place]  [phone]  Preferred Urgent Care: Other(Not Assessed)  Current living arrangement:: I live in a private home with family, I live in a private home with spouse  Type of residence:: Private home - stairs  Community Resources: None  Supplies used at home:: Other(Not Assessed)  Equipment Currently Used at Home: other (see comments)(Not Assessed)  Informal Support system:: Family, Spouse  No PCP office visit in Past Year: Yes  Transportation means:: Other(Not Assessed)    Patient accepts CC: No, Patient is following shelter in place guidelines. Has  and daughter who live with her who have been supportive with all her needs.     Patient shared that she was sent home with antibiotics.  She still has a cough, but it doesn't hurt.  Has an oxygen mask that she's been using to help.  Continues to monitor her temperature.    Patient acknowledges that she needs to follow up with Dr Wiley.  CHW offered assistance with getting her through to scheduling.  However, patient declined stating that she had the number.  Patient was still feeling ill, so she'll call when she's feeling a little better.    CHW encouraged patient to contact CC if any support or assistance was needed.  Introduced self and roles with CC.  CHW will send introduction letter to patient via [facility] to reference more information of CC and contact.  Patient acknowledged understanding.    BLANCA Addison  Clinic Care Coordination  [facility] : [place], [place], [place], and [place]  Phone: [phone]

## 2020-04-16 NOTE — TELEPHONE ENCOUNTER
Please keep plan for additional IP follow up. Patient declined Care Coordination enrollment, however, shared with CHW that she was still feeling ill. May benefit to having RN contact patient to ensure she's following through discharge instructions.    Thank you,    Fabrice Rogers, W  Clinic Care Coordination  [facility] : [place], and [place]  Phone: [phone]

## 2020-04-19 LAB
BACTERIA SPEC CULT: NO GROWTH
BACTERIA SPEC CULT: NO GROWTH
SPECIMEN SOURCE: NORMAL
SPECIMEN SOURCE: NORMAL

## 2020-04-23 ENCOUNTER — VIRTUAL VISIT (OUTPATIENT)
Dept: INTERNAL MEDICINE | Facility: CLINIC | Age: 65
End: 2020-04-23
Payer: COMMERCIAL

## 2020-04-23 DIAGNOSIS — E11.22 TYPE 2 DIABETES MELLITUS WITH STAGE 3 CHRONIC KIDNEY DISEASE, WITHOUT LONG-TERM CURRENT USE OF INSULIN (H): ICD-10-CM

## 2020-04-23 DIAGNOSIS — J18.9 PNEUMONIA OF LEFT UPPER LOBE DUE TO INFECTIOUS ORGANISM: Primary | ICD-10-CM

## 2020-04-23 DIAGNOSIS — N18.30 TYPE 2 DIABETES MELLITUS WITH STAGE 3 CHRONIC KIDNEY DISEASE, WITHOUT LONG-TERM CURRENT USE OF INSULIN (H): ICD-10-CM

## 2020-04-23 PROCEDURE — 99213 OFFICE O/P EST LOW 20 MIN: CPT | Mod: TEL | Performed by: INTERNAL MEDICINE

## 2020-04-23 ASSESSMENT — PATIENT HEALTH QUESTIONNAIRE - PHQ9: SUM OF ALL RESPONSES TO PHQ QUESTIONS 1-9: 4

## 2020-04-23 NOTE — PROGRESS NOTES
"Nicole Reyes is a 64 year old female who is being evaluated via a billable telephone visit.      The patient has been notified of following:     \"This telephone visit will be conducted via a call between you and your physician/provider. We have found that certain health care needs can be provided without the need for a physical exam.  This service lets us provide the care you need with a short phone conversation.  If a prescription is necessary we can send it directly to your pharmacy.  If lab work is needed we can place an order for that and you can then stop by our lab to have the test done at a later time.    Telephone visits are billed at different rates depending on your insurance coverage. During this emergency period, for some insurers they may be billed the same as an in-person visit.  Please reach out to your insurance provider with any questions.    If during the course of the call the physician/provider feels a telephone visit is not appropriate, you will not be charged for this service.\"    Patient has given verbal consent for Telephone visit?  Yes    How would you like to obtain your AVS? {AVS Preference:820664}    Subjective     Nicole Reyes is a 64 year old female who presents to clinic today for the following health issues:    HPI    Hospital Follow-up Visit:    Hospital/Nursing Home/IP Rehab Facility: [facility]  Date of Admission: 04/13/2020  Date of Discharge: 04/15/2020  Reason(s) for Admission: Pneumonia      Was your hospitalization related to COVID-19? No   Problems taking medications regularly:  None  Medication changes since discharge: VANTIN  Problems adhering to non-medication therapy:  None    Summary of hospitalization:  {HOSPITAL DISCHARGE SUMMARY INFO:919247::\"[facility] discharge summary reviewed\"}  Diagnostic Tests/Treatments reviewed.  Follow up needed: {NONE DEFAULTED:953342::\"none\"}  Other Healthcare Providers Involved in Patient s Care:         " "{those currently involved after discharge:240006::\"None\"}  Update since discharge: {IMPROVED DEFAULT:541193::\"improved.\"} {TIP  Include information from family/caregivers, SNF, Care Coordination :864795}      Post Discharge Medication Reconciliation: {ACO Med Rec (Provider):925365}.  Plan of care communicated with {Communicate Plan to:195024::\"patient\"}     {Reference  Coding guidelines- Moderate Complexity F2F/Video within 7 - 14 days of discharge 84810, High Complexity F2F/Video within 7 days 27135 or [id number] days 95338 :692440}         {PEDS Chronic and Acute Problems (Optional):022737}     {additonal problems for provider to add (Optional):625434}    {HIST REVIEW/ LINKS 2 (Optional):825369}    Reviewed and updated as needed this visit by Provider         Review of Systems   {ROS COMP (Optional):676388}       Objective   Reported vitals:  LMP 09/15/2007    {GENERAL APPEARANCE:50::\"healthy\",\"alert\",\"no distress\"}  PSYCH: Alert and oriented times 3; coherent speech, normal   rate and volume, able to articulate logical thoughts, able   to abstract reason, no tangential thoughts, no hallucinations   or delusions  Her affect is { :4987651::\"normal\"}  RESP: No cough, no audible wheezing, able to talk in full sentences  Remainder of exam unable to be completed due to telephone visits    {Diagnostic Test Results (Optional):010022::\"Diagnostic Test Results:\",\"Labs reviewed in Epic\"}        Assessment/Plan:  {Diagnosis, Associated Orders and Comment:330648}    No follow-ups on file.      Phone call duration:  *** minutes    {signature options:889249}        "

## 2020-04-23 NOTE — PROGRESS NOTES
"Nicole Reyes is a 64 year old female who is being evaluated via a billable video visit.      The patient has been notified of following:     \"This video visit will be conducted via a call between you and your physician/provider. We have found that certain health care needs can be provided without the need for an in-person physical exam.  This service lets us provide the care you need with a video conversation.  If a prescription is necessary we can send it directly to your pharmacy.  If lab work is needed we can place an order for that and you can then stop by our lab to have the test done at a later time.    Video visits are billed at different rates depending on your insurance coverage.  Please reach out to your insurance provider with any questions.    If during the course of the call the physician/provider feels a video visit is not appropriate, you will not be charged for this service.\"    Patient has given verbal consent for Video visit? Yes    How would you like to obtain your AVS? [facility]    Patient would like the video invitation sent by: Text to cell phone: [phone]    Will anyone else be joining your video visit? No    {If patient encounters technical issues they should call [phone] :286381}    Subjective     Nicole Reyes is a 64 year old female who presents to clinic today for the following health issues:    [facility]    Hospital Follow-up Visit:    Hospital/Nursing Home/IP Rehab Facility: [facility]  Date of Admission: 04/13/2020  Date of Discharge: 04/15/2020  Reason(s) for Admission: Pneumonia      Was your hospitalization related to COVID-19? No   Problems taking medications regularly:  None  Medication changes since discharge: VANTIN  Problems adhering to non-medication therapy:  None    Summary of hospitalization:  {HOSPITAL DISCHARGE SUMMARY INFO:884570::\"[facility] discharge summary reviewed\"}  Diagnostic Tests/Treatments reviewed.  Follow up needed: {NONE " "DEFAULTED:367449::\"none\"}  Other Healthcare Providers Involved in Patient s Care:         {those currently involved after discharge:100305::\"None\"}  Update since discharge: {IMPROVED DEFAULT:662057::\"improved.\"} {TIP  Include information from family/caregivers, SNF, Care Coordination :567825}      Post Discharge Medication Reconciliation: {ACO Med Rec (Provider):508797}.  Plan of care communicated with {Communicate Plan to:863368::\"patient\"}     {Reference  Coding guidelines- Moderate Complexity F2F/Video within 7 - 14 days of discharge 00072, High Complexity F2F/Video within 7 days 81217 or [id number] days 26035 :571729}         {PEDS Chronic and Acute Problems (Optional):482752}     Video Start Time: {video visit start/end time for provider to select:824971}    {additonal problems for provider to add (Optional):377761}    {HIST REVIEW/ LINKS 2 (Optional):502041}    Reviewed and updated as needed this visit by Provider         Review of Systems   {ROS COMP (Optional):050295}      Objective    LMP 09/15/2007   Estimated body mass index is 49.77 kg/m  as calculated from the following:    Height as of 4/13/20: 1.499 m (4' 11\").    Weight as of 4/15/20: 111.8 kg (246 lb 6.4 oz).  Physical Exam     {video visit exam brief selected:790129::\"GENERAL: healthy, alert and no distress\",\"EYES: Eyes grossly normal to inspection, conjunctivae and sclerae normal\",\"RESP: no audible wheeze, cough, or visible cyanosis.  No visible retractions or increased work of breathing.  Able to speak fully in complete sentences.\",\"NEURO: Cranial nerves grossly intact, mentation intact and speech normal\",\"PSYCH: mentation appears normal, affect normal/bright, judgement and insight intact, normal speech and appearance well-groomed\"}      {Diagnostic Test Results (Optional):830813::\"Diagnostic Test Results:\",\"Labs reviewed in Epic\"}        {PROVIDER CHARTING PREFERENCE:991261}      Video-Visit Details    Type of service:  Video Visit    Video End " "Time:{video visit start/end time for provider to select:953042}    Originating Location (pt. Location): {video visit patient location:442973::\"Home\"}    Distant Location (provider location):  [facility]     Mode of Communication:  Video Conference via [employer]Duck[employer]    No follow-ups on file.       {signature options:200854}        "

## 2020-04-23 NOTE — PROGRESS NOTES
"Nicole Reyes is a 64 year old female who is being evaluated via a billable telephone visit.      The patient has been notified of following:     \"This telephone visit will be conducted via a call between you and your physician/provider. We have found that certain health care needs can be provided without the need for a physical exam.  This service lets us provide the care you need with a short phone conversation.  If a prescription is necessary we can send it directly to your pharmacy.  If lab work is needed we can place an order for that and you can then stop by our lab to have the test done at a later time.    Telephone visits are billed at different rates depending on your insurance coverage. During this emergency period, for some insurers they may be billed the same as an in-person visit.  Please reach out to your insurance provider with any questions.    If during the course of the call the physician/provider feels a telephone visit is not appropriate, you will not be charged for this service.\"    Patient has given verbal consent for Telephone visit?  Yes    How would you like to obtain your AVS? [facility]    Subjective     Nicole Reyes is a 64 year old female who presents to clinic today for the following health issues:    [facility]    Hospital Follow-up Visit:    Hospital/Nursing Home/IP Rehab Facility: [facility]  Date of Admission: 04/13/2020  Date of Discharge: 04/15/2020  Reason(s) for Admission: Pneumonia      Was your hospitalization related to COVID-19? No   Problems taking medications regularly:  None  Medication changes since discharge: She completed her antibiotics.  Problems adhering to non-medication therapy:  None    Summary of hospitalization:  [facility] discharge summary reviewed  Diagnostic Tests/Treatments reviewed.  Follow up needed: none  Other Healthcare Providers Involved in Patient s Care:         Specialist appointment - GI after pandemic  Update since " discharge: improved.   Post Discharge Medication Reconciliation: discharge medications reconciled, continue medications without change.  Plan of care communicated with patient       She reports she is having improvement in cough, no recurrent fever and continues to feel gradually improved.  Her oxygen saturations still are dropping into the 80s so wearing the oxygen much of the time other than a complete rest.  She had some questions regarding the recent chest x-ray compared to the previous one in January.  Feeling is her pneumonias have been caused by chronic reflux and eventual plan is for her to have a Nissen procedure however this is been delayed due to the pandemic.    She has some questions regarding keeping herself healthy when some of the pandemic restrictions are eased.    Patient Active Problem List   Diagnosis     Essential hypertension, benign     Restless leg syndrome     CRISTÓBAL (obstructive sleep apnea)     CKD (chronic kidney disease) stage 3, GFR 30-59 ml/min (H)     Advanced directives, counseling/discussion     GENERALIZED ANXIETY DIS     Major depressive disorder, recurrent episode, moderate (H)     Health Care Home     GERD (gastroesophageal reflux disease)     Hyperlipidemia LDL goal <100     Eczema     Type 2 diabetes mellitus with stage 3 chronic kidney disease, without long-term current use of insulin (H)     Midline low back pain with left-sided sciatica     Morbid obesity (HCC)     Insomnia, unspecified type     Dyspnea, unspecified type     Acute pain of left knee     Controlled substance agreement signed     Pneumonia     Pain of both shoulder joints     Current Outpatient Medications   Medication Sig Dispense Refill     albuterol (PROAIR HFA/PROVENTIL HFA/VENTOLIN HFA) 108 (90 Base) MCG/ACT inhaler Inhale 1-2 puffs into the lungs every 6 hours as needed for shortness of breath / dyspnea or wheezing 1 Inhaler 0     aspirin 81 MG EC tablet Take 81 mg by mouth every evening        blood  glucose (ACCU-CHEK BYRON PLUS) test strip Use to test blood sugar 2 times daily or as directed. 100 each 12     clonazePAM (KLONOPIN) 0.5 MG tablet Take 0.5 mg by mouth 2 times daily as needed for anxiety       gabapentin (NEURONTIN) 300 MG capsule Take 3 capsules (900 mg) by mouth daily 270 capsule 3     losartan (COZAAR) 100 MG tablet TAKE ONE TABLET BY MOUTH ONCE DAILY 90 tablet 1     metFORMIN (GLUCOPHAGE-XR) 500 MG 24 hr tablet Take 2 tablets (1,000 mg) by mouth every morning 180 tablet 3     pantoprazole (PROTONIX) 40 MG EC tablet Take 1 tablet (40 mg) by mouth At Bedtime 90 tablet 3     sertraline (ZOLOFT) 100 MG tablet TAKE TWO TABLETS BY MOUTH ONCE DAILY 180 tablet 3     simvastatin (ZOCOR) 20 MG tablet TAKE ONE TABLET BY MOUTH AT BEDTIME 90 tablet 0     zolpidem (AMBIEN) 10 MG tablet Take 1 tablet (10 mg) by mouth At Bedtime 90 tablet 1      Social History     Tobacco Use     Smoking status: Former Smoker     Last attempt to quit: 3/18/1979     Years since quittin.1     Smokeless tobacco: Never Used     Tobacco comment: quit    Substance Use Topics     Alcohol use: Yes     Comment: Twice a month     Drug use: No        Reviewed and updated as needed this visit by Provider         Review of Systems   No current fever, chills, chest pain, still some dyspnea and cough.       Objective       Assessment/Plan:  1. Pneumonia of left upper lobe due to infectious organism (H)  She is improving, seems to be doing well after finishing the antibiotic but advised if she were to have any significant increase in symptoms to call.  Advised that her chest x-ray did indeed look different from January which suggest there was some new pneumonia changes.  We discussed the importance of protecting herself against getting the coronavirus and would still recommend being very cautious when being out amongst family or in public when restrictions are eased.      Return in about 2 months (around 2020) for Diabetes, see  me a week after lab.      Phone call duration:  16 minutes    Pari Wiley MD

## 2020-05-05 DIAGNOSIS — G47.00 INSOMNIA, UNSPECIFIED TYPE: ICD-10-CM

## 2020-05-06 RX ORDER — ZOLPIDEM TARTRATE 10 MG/1
TABLET ORAL
Qty: 90 TABLET | Refills: 1 | Status: SHIPPED | OUTPATIENT
Start: 2020-05-06 | End: 2020-11-02

## 2020-05-06 NOTE — TELEPHONE ENCOUNTER
Zolpidem      Last Written Prescription Date:  10-15-19  Last Fill Quantity: 90,   # refills: 1  Last Office Visit: 4-23-20 virtual visit  Future Office visit:       Routing refill request to provider for review/approval because:  Drug not on the [facility], [facility] or [facility] refill protocol or controlled substance    Please advise, thanks.

## 2020-05-09 ENCOUNTER — APPOINTMENT (OUTPATIENT)
Dept: CT IMAGING | Facility: CLINIC | Age: 65
DRG: 871 | End: 2020-05-09
Attending: EMERGENCY MEDICINE
Payer: COMMERCIAL

## 2020-05-09 ENCOUNTER — HOSPITAL ENCOUNTER (INPATIENT)
Facility: CLINIC | Age: 65
LOS: 4 days | Discharge: HOME | DRG: 871 | End: 2020-05-13
Attending: EMERGENCY MEDICINE | Admitting: INTERNAL MEDICINE
Payer: COMMERCIAL

## 2020-05-09 DIAGNOSIS — A41.9 SEPSIS, DUE TO UNSPECIFIED ORGANISM, UNSPECIFIED WHETHER ACUTE ORGAN DYSFUNCTION PRESENT (H): ICD-10-CM

## 2020-05-09 DIAGNOSIS — Z20.822 SUSPECTED COVID-19 VIRUS INFECTION: ICD-10-CM

## 2020-05-09 DIAGNOSIS — J18.9 PNEUMONIA OF BOTH LUNGS DUE TO INFECTIOUS ORGANISM, UNSPECIFIED PART OF LUNG: ICD-10-CM

## 2020-05-09 DIAGNOSIS — R09.02 HYPOXIA: ICD-10-CM

## 2020-05-09 PROBLEM — J69.0 ASPIRATION PNEUMONIA (H): Status: ACTIVE | Noted: 2020-05-09

## 2020-05-09 LAB
ALBUMIN SERPL-MCNC: 3.6 G/DL (ref 3.4–5)
ALP SERPL-CCNC: 103 U/L (ref 40–150)
ALT SERPL W P-5'-P-CCNC: 28 U/L (ref 0–50)
ANION GAP SERPL CALCULATED.3IONS-SCNC: 3 MMOL/L (ref 3–14)
AST SERPL W P-5'-P-CCNC: 23 U/L (ref 0–45)
BASE EXCESS BLDV CALC-SCNC: 4.1 MMOL/L
BASOPHILS # BLD AUTO: 0 10E9/L (ref 0–0.2)
BASOPHILS NFR BLD AUTO: 0.2 %
BILIRUB SERPL-MCNC: 0.4 MG/DL (ref 0.2–1.3)
BUN SERPL-MCNC: 12 MG/DL (ref 7–30)
CALCIUM SERPL-MCNC: 8.8 MG/DL (ref 8.5–10.1)
CHLORIDE SERPL-SCNC: 105 MMOL/L (ref 94–109)
CO2 SERPL-SCNC: 31 MMOL/L (ref 20–32)
CREAT SERPL-MCNC: 0.89 MG/DL (ref 0.52–1.04)
D DIMER PPP FEU-MCNC: 1.4 UG/ML FEU (ref 0–0.5)
DIFFERENTIAL METHOD BLD: ABNORMAL
EOSINOPHIL # BLD AUTO: 0.1 10E9/L (ref 0–0.7)
EOSINOPHIL NFR BLD AUTO: 0.4 %
ERYTHROCYTE [DISTWIDTH] IN BLOOD BY AUTOMATED COUNT: 16.1 % (ref 10–15)
GFR SERPL CREATININE-BSD FRML MDRD: 69 ML/MIN/{1.73_M2}
GLUCOSE SERPL-MCNC: 145 MG/DL (ref 70–99)
HCO3 BLDV-SCNC: 32 MMOL/L (ref 21–28)
HCT VFR BLD AUTO: 44.7 % (ref 35–47)
HGB BLD-MCNC: 13.4 G/DL (ref 11.7–15.7)
IMM GRANULOCYTES # BLD: 0.1 10E9/L (ref 0–0.4)
IMM GRANULOCYTES NFR BLD: 0.4 %
LACTATE BLD-SCNC: 2 MMOL/L (ref 0.7–2)
LYMPHOCYTES # BLD AUTO: 1.1 10E9/L (ref 0.8–5.3)
LYMPHOCYTES NFR BLD AUTO: 5.6 %
MCH RBC QN AUTO: 25.1 PG (ref 26.5–33)
MCHC RBC AUTO-ENTMCNC: 30 G/DL (ref 31.5–36.5)
MCV RBC AUTO: 84 FL (ref 78–100)
MONOCYTES # BLD AUTO: 1 10E9/L (ref 0–1.3)
MONOCYTES NFR BLD AUTO: 5 %
NEUTROPHILS # BLD AUTO: 16.6 10E9/L (ref 1.6–8.3)
NEUTROPHILS NFR BLD AUTO: 88.4 %
NRBC # BLD AUTO: 0 10*3/UL
NRBC BLD AUTO-RTO: 0 /100
O2/TOTAL GAS SETTING VFR VENT: ABNORMAL %
PCO2 BLDV: 58 MM HG (ref 40–50)
PH BLDV: 7.34 PH (ref 7.32–7.43)
PLATELET # BLD AUTO: 259 10E9/L (ref 150–450)
PO2 BLDV: 22 MM HG (ref 25–47)
POTASSIUM SERPL-SCNC: 4.2 MMOL/L (ref 3.4–5.3)
PROT SERPL-MCNC: 7.9 G/DL (ref 6.8–8.8)
RBC # BLD AUTO: 5.34 10E12/L (ref 3.8–5.2)
SARS-COV-2 PCR COMMENT: NORMAL
SARS-COV-2 RNA SPEC QL NAA+PROBE: NEGATIVE
SARS-COV-2 RNA SPEC QL NAA+PROBE: NORMAL
SODIUM SERPL-SCNC: 139 MMOL/L (ref 133–144)
SPECIMEN SOURCE: NORMAL
SPECIMEN SOURCE: NORMAL
TROPONIN I SERPL-MCNC: <0.015 UG/L (ref 0–0.04)
WBC # BLD AUTO: 18.8 10E9/L (ref 4–11)

## 2020-05-09 PROCEDURE — 83605 ASSAY OF LACTIC ACID: CPT | Performed by: EMERGENCY MEDICINE

## 2020-05-09 PROCEDURE — 85025 COMPLETE CBC W/AUTO DIFF WBC: CPT | Performed by: EMERGENCY MEDICINE

## 2020-05-09 PROCEDURE — 25000128 H RX IP 250 OP 636: Performed by: INTERNAL MEDICINE

## 2020-05-09 PROCEDURE — 87635 SARS-COV-2 COVID-19 AMP PRB: CPT | Performed by: EMERGENCY MEDICINE

## 2020-05-09 PROCEDURE — 85379 FIBRIN DEGRADATION QUANT: CPT | Performed by: EMERGENCY MEDICINE

## 2020-05-09 PROCEDURE — 80053 COMPREHEN METABOLIC PANEL: CPT | Performed by: EMERGENCY MEDICINE

## 2020-05-09 PROCEDURE — 25000132 ZZH RX MED GY IP 250 OP 250 PS 637: Performed by: EMERGENCY MEDICINE

## 2020-05-09 PROCEDURE — 82803 BLOOD GASES ANY COMBINATION: CPT | Performed by: EMERGENCY MEDICINE

## 2020-05-09 PROCEDURE — 93005 ELECTROCARDIOGRAM TRACING: CPT

## 2020-05-09 PROCEDURE — 96368 THER/DIAG CONCURRENT INF: CPT

## 2020-05-09 PROCEDURE — 25000132 ZZH RX MED GY IP 250 OP 250 PS 637: Performed by: INTERNAL MEDICINE

## 2020-05-09 PROCEDURE — 96366 THER/PROPH/DIAG IV INF ADDON: CPT

## 2020-05-09 PROCEDURE — 96365 THER/PROPH/DIAG IV INF INIT: CPT | Mod: 59

## 2020-05-09 PROCEDURE — 71275 CT ANGIOGRAPHY CHEST: CPT

## 2020-05-09 PROCEDURE — 84484 ASSAY OF TROPONIN QUANT: CPT | Performed by: EMERGENCY MEDICINE

## 2020-05-09 PROCEDURE — 87040 BLOOD CULTURE FOR BACTERIA: CPT | Performed by: EMERGENCY MEDICINE

## 2020-05-09 PROCEDURE — 25800030 ZZH RX IP 258 OP 636: Performed by: EMERGENCY MEDICINE

## 2020-05-09 PROCEDURE — 12000000 ZZH R&B MED SURG/OB

## 2020-05-09 PROCEDURE — 25000125 ZZHC RX 250: Performed by: EMERGENCY MEDICINE

## 2020-05-09 PROCEDURE — 25000128 H RX IP 250 OP 636: Performed by: EMERGENCY MEDICINE

## 2020-05-09 PROCEDURE — 99291 CRITICAL CARE FIRST HOUR: CPT | Mod: 25

## 2020-05-09 RX ORDER — LIDOCAINE 40 MG/G
CREAM TOPICAL
Status: DISCONTINUED | OUTPATIENT
Start: 2020-05-09 | End: 2020-05-13 | Stop reason: HOSPADM

## 2020-05-09 RX ORDER — ONDANSETRON 4 MG/1
4 TABLET, ORALLY DISINTEGRATING ORAL EVERY 6 HOURS PRN
Status: DISCONTINUED | OUTPATIENT
Start: 2020-05-09 | End: 2020-05-13 | Stop reason: HOSPADM

## 2020-05-09 RX ORDER — ALBUTEROL SULFATE 90 UG/1
1-2 AEROSOL, METERED RESPIRATORY (INHALATION) EVERY 6 HOURS PRN
Status: DISCONTINUED | OUTPATIENT
Start: 2020-05-09 | End: 2020-05-13 | Stop reason: HOSPADM

## 2020-05-09 RX ORDER — ASPIRIN 81 MG/1
81 TABLET ORAL EVERY EVENING
Status: DISCONTINUED | OUTPATIENT
Start: 2020-05-09 | End: 2020-05-13 | Stop reason: HOSPADM

## 2020-05-09 RX ORDER — CLONAZEPAM 0.5 MG/1
1 TABLET ORAL AT BEDTIME
Status: DISCONTINUED | OUTPATIENT
Start: 2020-05-09 | End: 2020-05-13 | Stop reason: HOSPADM

## 2020-05-09 RX ORDER — ACETAMINOPHEN 325 MG/1
650 TABLET ORAL EVERY 4 HOURS PRN
Status: DISCONTINUED | OUTPATIENT
Start: 2020-05-09 | End: 2020-05-13 | Stop reason: HOSPADM

## 2020-05-09 RX ORDER — SERTRALINE HYDROCHLORIDE 100 MG/1
100 TABLET, FILM COATED ORAL DAILY
Status: DISCONTINUED | OUTPATIENT
Start: 2020-05-09 | End: 2020-05-13 | Stop reason: HOSPADM

## 2020-05-09 RX ORDER — PANTOPRAZOLE SODIUM 40 MG/1
40 TABLET, DELAYED RELEASE ORAL
Status: DISCONTINUED | OUTPATIENT
Start: 2020-05-10 | End: 2020-05-13 | Stop reason: HOSPADM

## 2020-05-09 RX ORDER — SIMVASTATIN 20 MG
20 TABLET ORAL AT BEDTIME
Status: DISCONTINUED | OUTPATIENT
Start: 2020-05-09 | End: 2020-05-13 | Stop reason: HOSPADM

## 2020-05-09 RX ORDER — NALOXONE HYDROCHLORIDE 0.4 MG/ML
.1-.4 INJECTION, SOLUTION INTRAMUSCULAR; INTRAVENOUS; SUBCUTANEOUS
Status: DISCONTINUED | OUTPATIENT
Start: 2020-05-09 | End: 2020-05-13 | Stop reason: HOSPADM

## 2020-05-09 RX ORDER — ONDANSETRON 2 MG/ML
4 INJECTION INTRAMUSCULAR; INTRAVENOUS EVERY 6 HOURS PRN
Status: DISCONTINUED | OUTPATIENT
Start: 2020-05-09 | End: 2020-05-13 | Stop reason: HOSPADM

## 2020-05-09 RX ORDER — ACETAMINOPHEN 500 MG
1000 TABLET ORAL ONCE
Status: COMPLETED | OUTPATIENT
Start: 2020-05-09 | End: 2020-05-09

## 2020-05-09 RX ORDER — GABAPENTIN 300 MG/1
900 CAPSULE ORAL AT BEDTIME
Status: DISCONTINUED | OUTPATIENT
Start: 2020-05-09 | End: 2020-05-13 | Stop reason: HOSPADM

## 2020-05-09 RX ORDER — LOSARTAN POTASSIUM 100 MG/1
100 TABLET ORAL DAILY
Status: DISCONTINUED | OUTPATIENT
Start: 2020-05-09 | End: 2020-05-13 | Stop reason: HOSPADM

## 2020-05-09 RX ORDER — IOPAMIDOL 755 MG/ML
500 INJECTION, SOLUTION INTRAVASCULAR ONCE
Status: COMPLETED | OUTPATIENT
Start: 2020-05-09 | End: 2020-05-09

## 2020-05-09 RX ADMIN — ACETAMINOPHEN 1000 MG: 500 TABLET ORAL at 16:25

## 2020-05-09 RX ADMIN — CLONAZEPAM 1 MG: 0.5 TABLET ORAL at 23:32

## 2020-05-09 RX ADMIN — SERTRALINE HYDROCHLORIDE 100 MG: 100 TABLET ORAL at 22:20

## 2020-05-09 RX ADMIN — ACETAMINOPHEN 650 MG: 325 TABLET, FILM COATED ORAL at 22:24

## 2020-05-09 RX ADMIN — IOPAMIDOL 86 ML: 755 INJECTION, SOLUTION INTRAVENOUS at 17:10

## 2020-05-09 RX ADMIN — SODIUM CHLORIDE 100 ML: 9 INJECTION, SOLUTION INTRAVENOUS at 17:10

## 2020-05-09 RX ADMIN — LOSARTAN POTASSIUM 100 MG: 100 TABLET, FILM COATED ORAL at 22:18

## 2020-05-09 RX ADMIN — SIMVASTATIN 20 MG: 20 TABLET, FILM COATED ORAL at 22:20

## 2020-05-09 RX ADMIN — ASPIRIN 81 MG: 81 TABLET, COATED ORAL at 22:20

## 2020-05-09 RX ADMIN — GABAPENTIN 900 MG: 300 CAPSULE ORAL at 22:20

## 2020-05-09 RX ADMIN — AZITHROMYCIN MONOHYDRATE 500 MG: 500 INJECTION, POWDER, LYOPHILIZED, FOR SOLUTION INTRAVENOUS at 17:43

## 2020-05-09 RX ADMIN — TAZOBACTAM SODIUM AND PIPERACILLIN SODIUM 4.5 G: 500; 4 INJECTION, SOLUTION INTRAVENOUS at 16:54

## 2020-05-09 RX ADMIN — TAZOBACTAM SODIUM AND PIPERACILLIN SODIUM 3.38 G: 375; 3 INJECTION, SOLUTION INTRAVENOUS at 22:16

## 2020-05-09 ASSESSMENT — ENCOUNTER SYMPTOMS
NAUSEA: 0
FEVER: 1
SHORTNESS OF BREATH: 1
COUGH: 0
VOMITING: 0

## 2020-05-09 ASSESSMENT — MIFFLIN-ST. JEOR: SCORE: 1565.13

## 2020-05-09 NOTE — ED NOTES
[facility]  ED Nurse Handoff Report    Nicole Reyes is a 64 year old female   ED Chief complaint: Shortness of Breath and Fever  . ED Diagnosis:   Final diagnoses:   Pneumonia of both lungs due to infectious organism, unspecified part of lung   Suspected Covid-19 Virus Infection   Sepsis, due to unspecified organism, unspecified whether acute organ dysfunction present (H)   Hypoxia     Allergies:   Allergies   Allergen Reactions     Codeine Rash     Penicillins Rash     Pt has tolerated cephalosporins and penems.   Pt tolerated Zosyn 7/6/2019       Code Status: Full Code  Activity level - Baseline/Home:  Independent. Activity Level - Current:   Assist X 1. Lift room needed: No. Bariatric: No   Needed: No   Isolation: Yes. Infection: COVID Pending    Vital Signs:   Vitals:    05/09/20 1730 05/09/20 1745 05/09/20 1800 05/09/20 1815   BP: 134/77 124/62 111/75    Pulse: 98 98 93    Resp: 26 26 28 25   Temp:       TempSrc:       SpO2: 96% 98% 96% 97%       Cardiac Rhythm:  ,   Cardiac  Cardiac Rhythm: Normal sinus rhythm  Pain level:    Patient confused: No. Patient Falls Risk: Yes.   Elimination Status: Has voided   Patient Report - Initial Complaint: SOB, Fever. Focused Assessment:  Nicole Reyes is a 64 year old female who presents to the emergency department for evaluation of shortness of breath and fever. The patient woke up in the middle of the night and went to the bathroom when she noticed that she had some shortness of breath and fever. Her fever got up to 101. . The patient is currently also on oxygen (2l at baseline)and uses albuterol. The patient was here a month ago and was tested for COVID. The patient denies cough, chest pain, leg swelling, nausea, or vomiting. She has not traveled recently.     On arrival the patient was noted to have oxygen saturations of 75% on room air in triage. Placed in critical care room and MD called to evaluate immediately.      Tests  Performed: ekg, labs, imaging. Abnormal Results:   Labs Ordered and Resulted from Time of ED Arrival Up to the Time of Departure from the ED   CBC WITH PLATELETS DIFFERENTIAL - Abnormal; Notable for the following components:       Result Value    WBC 18.8 (*)     RBC Count 5.34 (*)     MCH 25.1 (*)     MCHC 30.0 (*)     RDW 16.1 (*)     Absolute Neutrophil 16.6 (*)     All other components within normal limits   COMPREHENSIVE METABOLIC PANEL - Abnormal; Notable for the following components:    Glucose 145 (*)     All other components within normal limits   BLOOD GAS VENOUS - Abnormal; Notable for the following components:    PCO2 Venous 58 (*)     PO2 Venous 22 (*)     Bicarbonate Venous 32 (*)     All other components within normal limits   D DIMER QUANTITATIVE - Abnormal; Notable for the following components:    D Dimer 1.4 (*)     All other components within normal limits   LACTIC ACID WHOLE BLOOD   TROPONIN I   COVID-19 VIRUS (CORONAVIRUS) BY PCR   SARS-COV-2 (COVID-19) VIRUS RT-PCR   PULSE OXIMETRY NURSING   CARDIAC CONTINUOUS MONITORING   STRICT INTAKE AND OUTPUT   PERIPHERAL IV CATHETER   BLOOD CULTURE   BLOOD CULTURE     CT Chest Pulmonary Embolism w Contrast   Final Result   IMPRESSION:   1.  No evidence for pulmonary emboli.   2.  Bilateral airspace and groundglass infiltrates both lungs concerning for pneumonia. Covid 19 pneumonia possible.   3.  Esophageal hiatal hernia.   4.  Fatty liver.   5.  Gallstones.      Imaging features can be seen with (COVID-19)  pneumonia, though are nonspecific and can occur with a variety of infectious and noninfectious processes.        .   Treatments provided: See MAR  Family Comments: N/A  OBS brochure/video discussed/provided to patient:  N/A  ED Medications:   Medications   sodium chloride (PF) 0.9% PF flush 3 mL (has no administration in time range)   sodium chloride (PF) 0.9% PF flush 3 mL (has no administration in time range)   azithromycin (ZITHROMAX) 500 mg in  sodium chloride 0.9 % 250 mL intermittent infusion (500 mg Intravenous New Bag 5/9/20 1743)   acetaminophen (TYLENOL) tablet 1,000 mg (1,000 mg Oral Given 5/9/20 1625)   piperacillin-tazobactam (ZOSYN) intermittent infusion 4.5 g (4.5 g Intravenous New Bag 5/9/20 1654)   iopamidol (ISOVUE-370) solution 500 mL (86 mLs Intravenous Given 5/9/20 1710)   for CT scan flush use (100 mLs Intravenous Given 5/9/20 1710)     Drips infusing:  No  For the majority of the shift, the patient's behavior Green. Interventions performed were N/A.    Sepsis treatment initiated: No       ED Nurse Name/Phone Number: Suzanne Iyer RN,   6:16 PM    RECEIVING UNIT ED HANDOFF REVIEW    Above ED Nurse Handoff Report was reviewed: Yes  Reviewed by: Zoie Colmenares RN on May 9, 2020 at 7:23 PM

## 2020-05-09 NOTE — ED PROVIDER NOTES
History     Chief Complaint:  Shortness of Breath and Fever       HPI   Nicole Reyes is a 64 year old female who presents to the emergency department for evaluation of shortness of breath and fever. The patient woke up in the middle of the night and went to the bathroom when she noticed that she had some shortness of breath and fever. Her fever got up to 101. . The patient is currently also on oxygen (2l at baseline)and uses albuterol. The patient was here a month ago and was tested for COVID. The patient denies cough, chest pain, leg swelling, nausea, or vomiting. She has not traveled recently.    On arrival the patient was noted to have oxygen saturations of 75% on room air in triage. Placed in critical care room and MD called to evaluate immediately.     Allergies:  Codeine  Penicillins     Medications:    Albuterol  Aspirin  Klonopin  Neurontin  Cozaar  Glucophage  Protonix  Zoloft  Zocor  Ambien    Past Medical History:    Blood transfusion  CKD  Hypertension  GERD  Anxiety  Hernia, abdominal  Hiatal hernia  Blood transfusion  Hypertension  Insomnia  Iron deficiency anemia  Depression  Menopause   Obesity  CRISTÓBAL  Oxygen dependent  Postoperative seroma  Hypercholesterolemia  RLS  Type 2 diabetes mellitus    Past Surgical History:    Breast biopsy x2  Hernia repair  Lymph node biopsy in neck   section x3  Dilation and curettage, hysteroscopy diagnostic, combined  EGD, combined x3  Herniorrhaphy incisional x2    Family History:    CHF  Hypertension  CAD  Lipids  Hodgkin's Leukemia  Diabetes  Lupus  Basal cell carcinoma    Social History:  The patient presents today alone.  Smoking Status: Former Smoker  Smokeless Tobacco: Never Used  Alcohol Use: Yes  Drug Use: No  PCP: Pari Wiley      Review of Systems   Constitutional: Positive for fever.   Respiratory: Positive for shortness of breath. Negative for cough.    Cardiovascular: Negative for chest pain and leg swelling.   Gastrointestinal: Negative  for nausea and vomiting.   All other systems reviewed and are negative.        Physical Exam     Patient Vitals for the past 24 hrs:   BP Heart Rate Resp SpO2   05/09/20 1530 (!) 179/92 105 16 99 %       Physical Exam    Nursing note and vitals reviewed.    Constitutional: Pleasant and well groomed. Elevated BMI.          HENT:    Mouth/Throat: Oropharynx is without swelling or erythema. Oral mucosa moist.    Eyes: Conjunctivae are normal. No scleral icterus.    Neck: Neck supple.   Cardiovascular: Borderline tachycardic rate, regular rhythm and intact distal pulses.    Pulmonary/Chest: Effort normal and breath sounds normal. Speaking in multiple complete sentence.   Abdominal: Soft.  No distension. There is no tenderness.   Musculoskeletal:  No edema, No calf tenderness  Neurological:Alert and answering questions appropriately. Coordination normal. Lower extremity dorsi/plantar flexion strength symmetric and intact.   Skin: Skin is warm and dry.   Psychiatric: Normal mood and affect.     Emergency Department Course     ECG:  Time: 1530  Vent. Rate 107 bpm. AZ interval 194. QRS duration 78. QT/QTc 338/451. P-R-T axis 62 0 89.  Sinus tachycardia  Possible Anterior infarct  Abnormal ECG  Read time: 1532  Agree with read. Artifact present. No change compared with 12/31/19    Imaging:  Radiology findings were communicated with the patient who voiced understanding of the findings.    CT Chest Pulmonary Embolism w Contrast:  1.  No evidence for pulmonary emboli.  2.  Bilateral airspace and groundglass infiltrates both lungs concerning for pneumonia. Covid 19 pneumonia possible.  3.  Esophageal hiatal hernia.  4.  Fatty liver.  5.  Gallstones.    Imaging features can be seen with (COVID-19)  pneumonia, though are nonspecific and can occur with a variety of infectious and noninfectious processes.  As per radiology.    Laboratory:  Laboratory findings were communicated with the patient who voiced understanding of the  findings.    CBC: WBC 18.8 (H) o/w WNL. (HGB 13.4, )   CMP: Glucose 145 (H) o/w WNL (Creatinine 0.89)    Blood gas venous: Ph 7.34, PCO2 58 (H), PO2 22 (L), Bicarbonate 32 (H), Base Excess 4.1    D dimer quantitative 1.4 (H)    Lactic acid whole blood 2.0     Troponin I <0.015    Blood Culture In process x2    COVID-19 Virus (Coronavirus) by PCR Nasopharyngeal swab: pending      Interventions:  1625 Tylenol 1000 mg PO    1654 Zosyn 4.5 g IV    1743 Zithromax 500 mg IV    Emergency Department Course:    1537 Nursing notes and vitals reviewed.    1541 I performed an exam of the patient as documented above.     1544 IV was inserted and blood was drawn for laboratory testing, results above.     1551 A nasopharyngeal sample was obtained for laboratory testing as documented above.     1710 The patient was sent for a CT Chest Pulmonary Embolism w Contrast while in the emergency department, results above.      1800 Patient rechecked and updated on plan of admission.    1827 I spoke with Dr. Shearer of the Hospitalist service from [facility] regarding patient's presentation, findings, and plan of care.     1947  I received a call from lab. COVID test is negative.     Findings and plan explained to the Patient who consents to admission. Discussed the patient with Dr. Shearer, who will admit the patient to the hospital for further monitoring, evaluation, and treatment.     Impression & Plan     CMS Diagnoses: The Lactic acid level is elevated due to sepsis, at this time there is no sign of severe sepsis or septic shock.    Medical Decision Making:  Nicole Reyes is a 64 year old female who presents to the emergency department today with fever, cough, shortness of breath in the setting of being oxygen dependent and having frequent (suspected) aspiration pneumonias recently. The differential diagnosis included but was not limited to CAP, aspiration pneumonia, COVID19, PE, obstructive lung disease, CHF. I was  called to the room for a rapid evaluation as noted above. She was mentating well without signs of respiratory distress. Oxygen saturations improved with supplemental nasal cannula which has since been titrated back down to 2 l NC. EKG was unchanged from previous. D dimer returned elevated prompting CT pulmonary angiogram which revealed bilateral ground glass infiltrates suggestive of pneumonia, possibly COVID-19. Antibiotics were initiated to treat for possible aspiration pneumonia as this has been suspected. She has not developed increased work of breathing and has continued to have oxygen saturations in mid 90s on 2l NC.  IF fluids were limited due to concern for COVID 19. She is clinically euvolemic. COVID testing in pending.   Dr. Shearer has accepted the patient for admission for ongoing evaluation, monitoring and management.         Discharge Diagnosis:    ICD-10-CM    1. Pneumonia of both lungs due to infectious organism, unspecified part of lung  J18.9    2. Suspected Covid-19 Virus Infection  Z20.828    3. Sepsis, due to unspecified organism, unspecified whether acute organ dysfunction present (H)  A41.9    4. Hypoxia  R09.02      Disposition:  The patient is admitted into the care of Dr. Shearer.      Addendum: Call from lab just prior to transfer to the inpatient bed. COVID test is NEGATIVE.     Scribe Disclosure:  I, Ja De León, am serving as a scribe at 3:31 PM on 5/9/2020 to document services personally performed by Lorena Modi MD based on my observations and the provider's statements to me.      Lorena Modi MD  05/09/20 2056     - Likely multifactorial 2/2 pleural effusions, bacterial pneumonia and restrictive lung disease 2/2 obesity  - Currently on 4L NC with Sat in Mid 90s  - Lasix 40mg PO daily

## 2020-05-09 NOTE — LETTER
Transition Communication Hand-off for Care Transitions to Next Level of Care Provider    Name: Nicole Reyes  : 1955  MRN #: [id number]  Primary Care Provider: Pari Wiley  Primary Care MD Name: Pari Wiley  Primary Clinic: [address] [facility] [address]  [facility] [address]  Primary Care Clinic Name: [facility]  Reason for Hospitalization:  Hypoxia [R09.02]  Pneumonia of both lungs due to infectious organism, unspecified part of lung [J18.9]  Sepsis, due to unspecified organism, unspecified whether acute organ dysfunction present (H) [A41.9]  Suspected Covid-19 Virus Infection [Z20.828]  Admit Date/Time: 2020  3:21 PM  Discharge Date: 2020  Payor Source: Payor: PREFERREDONE / Plan: PREFERREDONE HMO / Product Type: PPO /     Readmission Assessment Measure (COURTNEY) Risk Score/category: elevated             Reason for Communication Hand-off Referral: Fragility    Discharge Plan: home       Concern for non-adherence with plan of care:   no  Discharge Needs Assessment:  Needs      Most Recent Value   # of Referrals Placed by CTS  Scheduled Follow-up appointments          Already enrolled in Tele-monitoring program and name of program:  none  Follow-up specialty is recommended: Yes    Follow-up plan:    Future Appointments   Date Time Provider Department Center   2020  2:00 PM Pari Wiley MD [facility] [place]       Any outstanding tests or procedures:                    Key Recommendations:  CTS identifies pt as high risk due to Elevated COURTNEY. Patient was admitted with aspiration pneumonia and a hiatal hernia. Patient has home O2 and CPAP through [place]. Patient has a history of CKD, HTN, GERD, anxiety, anemia, insomnia, depression, CRISTÓBAL, RLS, and Diabetes. Patient has had 3 hospitalizations within the past 6 months for pneumonia and aspiration pneumonia.     Recommendations for follow up include to follow up with thoracic surgeon for hiatal hernia repair.    Jolly Cai MA/RN Case  Manager  Inpatient Care Coordination  [facility]   [phone]    AVS/Discharge Summary is the source of truth; this is a helpful guide for improved communication of patient story

## 2020-05-09 NOTE — ED TRIAGE NOTES
"Pt states, \"I woke up in the middle of the night and went to the bathroom, I had some SOB and fever going on, I also was here a month ago and was tested for covid.\" O2 in traige on room air was 75%.   "

## 2020-05-10 LAB
ANION GAP SERPL CALCULATED.3IONS-SCNC: 2 MMOL/L (ref 3–14)
BUN SERPL-MCNC: 17 MG/DL (ref 7–30)
CALCIUM SERPL-MCNC: 8.4 MG/DL (ref 8.5–10.1)
CHLORIDE SERPL-SCNC: 103 MMOL/L (ref 94–109)
CO2 SERPL-SCNC: 32 MMOL/L (ref 20–32)
CREAT SERPL-MCNC: 1.07 MG/DL (ref 0.52–1.04)
ERYTHROCYTE [DISTWIDTH] IN BLOOD BY AUTOMATED COUNT: 16.3 % (ref 10–15)
GFR SERPL CREATININE-BSD FRML MDRD: 55 ML/MIN/{1.73_M2}
GLUCOSE SERPL-MCNC: 120 MG/DL (ref 70–99)
HCT VFR BLD AUTO: 37.2 % (ref 35–47)
HGB BLD-MCNC: 11.3 G/DL (ref 11.7–15.7)
MCH RBC QN AUTO: 25.2 PG (ref 26.5–33)
MCHC RBC AUTO-ENTMCNC: 30.4 G/DL (ref 31.5–36.5)
MCV RBC AUTO: 83 FL (ref 78–100)
PLATELET # BLD AUTO: 203 10E9/L (ref 150–450)
POTASSIUM SERPL-SCNC: 4.2 MMOL/L (ref 3.4–5.3)
RBC # BLD AUTO: 4.49 10E12/L (ref 3.8–5.2)
SODIUM SERPL-SCNC: 137 MMOL/L (ref 133–144)
WBC # BLD AUTO: 18.1 10E9/L (ref 4–11)

## 2020-05-10 PROCEDURE — 99223 1ST HOSP IP/OBS HIGH 75: CPT | Mod: AI | Performed by: INTERNAL MEDICINE

## 2020-05-10 PROCEDURE — 36415 COLL VENOUS BLD VENIPUNCTURE: CPT | Performed by: INTERNAL MEDICINE

## 2020-05-10 PROCEDURE — 25800030 ZZH RX IP 258 OP 636: Performed by: HOSPITALIST

## 2020-05-10 PROCEDURE — 12000000 ZZH R&B MED SURG/OB

## 2020-05-10 PROCEDURE — 85027 COMPLETE CBC AUTOMATED: CPT | Performed by: INTERNAL MEDICINE

## 2020-05-10 PROCEDURE — 25000128 H RX IP 250 OP 636: Performed by: INTERNAL MEDICINE

## 2020-05-10 PROCEDURE — 25000128 H RX IP 250 OP 636: Performed by: HOSPITALIST

## 2020-05-10 PROCEDURE — 99207 ZZC APP CREDIT; MD BILLING SHARED VISIT: CPT | Performed by: HOSPITALIST

## 2020-05-10 PROCEDURE — 80048 BASIC METABOLIC PNL TOTAL CA: CPT | Performed by: INTERNAL MEDICINE

## 2020-05-10 PROCEDURE — 25000132 ZZH RX MED GY IP 250 OP 250 PS 637: Performed by: INTERNAL MEDICINE

## 2020-05-10 RX ADMIN — SERTRALINE HYDROCHLORIDE 100 MG: 100 TABLET ORAL at 08:41

## 2020-05-10 RX ADMIN — SIMVASTATIN 20 MG: 20 TABLET, FILM COATED ORAL at 21:56

## 2020-05-10 RX ADMIN — TAZOBACTAM SODIUM AND PIPERACILLIN SODIUM 3.38 G: 375; 3 INJECTION, SOLUTION INTRAVENOUS at 10:56

## 2020-05-10 RX ADMIN — PANTOPRAZOLE SODIUM 40 MG: 40 TABLET, DELAYED RELEASE ORAL at 08:41

## 2020-05-10 RX ADMIN — ACETAMINOPHEN 650 MG: 325 TABLET, FILM COATED ORAL at 08:47

## 2020-05-10 RX ADMIN — ACETAMINOPHEN 650 MG: 325 TABLET, FILM COATED ORAL at 20:59

## 2020-05-10 RX ADMIN — TAZOBACTAM SODIUM AND PIPERACILLIN SODIUM 3.38 G: 375; 3 INJECTION, SOLUTION INTRAVENOUS at 03:59

## 2020-05-10 RX ADMIN — CLONAZEPAM 1 MG: 0.5 TABLET ORAL at 21:56

## 2020-05-10 RX ADMIN — TAZOBACTAM SODIUM AND PIPERACILLIN SODIUM 3.38 G: 375; 3 INJECTION, SOLUTION INTRAVENOUS at 16:13

## 2020-05-10 RX ADMIN — ASPIRIN 81 MG: 81 TABLET, COATED ORAL at 20:59

## 2020-05-10 RX ADMIN — GABAPENTIN 900 MG: 300 CAPSULE ORAL at 21:56

## 2020-05-10 RX ADMIN — TAZOBACTAM SODIUM AND PIPERACILLIN SODIUM 3.38 G: 375; 3 INJECTION, SOLUTION INTRAVENOUS at 21:57

## 2020-05-10 RX ADMIN — AZITHROMYCIN MONOHYDRATE 500 MG: 500 INJECTION, POWDER, LYOPHILIZED, FOR SOLUTION INTRAVENOUS at 08:40

## 2020-05-10 RX ADMIN — PANTOPRAZOLE SODIUM 40 MG: 40 TABLET, DELAYED RELEASE ORAL at 16:13

## 2020-05-10 RX ADMIN — LOSARTAN POTASSIUM 100 MG: 100 TABLET, FILM COATED ORAL at 08:41

## 2020-05-10 ASSESSMENT — ACTIVITIES OF DAILY LIVING (ADL)
ADLS_ACUITY_SCORE: 15

## 2020-05-10 ASSESSMENT — MIFFLIN-ST. JEOR: SCORE: 1554.24

## 2020-05-10 NOTE — PLAN OF CARE
To Do:  End of Shift Summary cared for patient   For vital signs and complete assessments, please see documentation flowsheets.     Pertinent assessments: +SOB, tolerating regular diet, Tmax 99.1, denies cough, Lungs CTA, diminished. Remains on 2 LPM Baseline, skin intact  Major Shift Events: none  Treatment Plan: Zosyn & Zithromax IV    Discharge Readiness: Medically active  Expected Discharge Date: 1-2 days  Discharge Disposition: Home with Self care  Barriers to Discharge: IV ABX

## 2020-05-10 NOTE — PLAN OF CARE
End of Shift Summary cared for patient [id number].  For vital signs and complete assessments, please see documentation flowsheets.     Pertinent assessments: patient c/o pain in arm and a headache. Gave Tylenol.  On 2L O2, which is baseline for patient. Up independently in room with a walker.  Major Shift Events Covid result (-).  Treatment Plan: Zosyn.    Discharge Readiness: Medically active  Expected Discharge Date: TBD  Discharge Disposition: Home with Self care

## 2020-05-10 NOTE — H&P
Admitted:     05/09/2020      PRIMARY CARE PHYSICIAN:  Pari Wiley MD      CHIEF COMPLAINT:  Shortness of breath, fever.      HISTORY OF PRESENT ILLNESS:  Nicole Drake is 64 and has had a remote history of tobacco use, and recently in the last month or so has been diagnosed with multiple episodes of pneumonia that was recently thought to be due to recurrent aspiration pneumonia.  She has a moderate hiatal hernia.  She has been oxygen-dependent.  She also has sleep apnea for which she wears CPAP, although she has not really had this fitted again for over 5 years.  The patient woke up this morning after having an uneventful day yesterday.  She woke up and had chills, shortness of breath and cough.  She had a fever up to 101.  Her oxygen saturations at home were in the 70s.  Heart rates were in the 120s.  The patient was brought to [facility] for further assessment.      In the Emergency Department, the patient's oxygen saturation was 75% on room air.  It recovered up to 90+ % with 2 liters.  The patient was seen by Dr. Lorena Modi.  The patient had temperature of 99.9, down from 101.  She is also tachycardic but also improved with the improved fever.  She is also tachypneic with a respiratory rate of 34, blood pressure 179/92.  BMP was normal.  LFTs were normal.  Troponin is negative.  White count was elevated at 18,800, hemoglobin 13.4, platelets 259,000.  D-dimer is elevated at 1.4.  The patient had a CT done.  This revealed no evidence of PE, but bilateral airspace and ground-glass infiltrates in both lungs concerning for pneumonia.  COVID-19 pneumonia was also possible.  Esophageal hiatal hernia was confirmed.  Fatty liver and gallstones were noted.  Eventually, the COVID test came back negative.  Venous blood gas showed pH of 7.34, pCO2 of 50, pO2 of 22.  Glucose is 145.  Troponin was negative.  A 12-lead ECG shows sinus tachycardia.  The patient was given initially Zithromax and Zosyn  and is being admitted for possible recurrent aspiration in the setting of moderate hiatal hernia.      PAST MEDICAL HISTORY:   1.  Chronic kidney disease.   2.  Hypertension.   3.  GERD.   4.  Anxiety.   5.  Hiatal hernia.   6.  Abdominal hernia.   7.  History of blood transfusion.   8.  Hypertension.   9.  Insomnia.   10.  Iron deficiency anemia.   11.  Depression.   12.  Menopause.   13.  Obesity.   14.  Obstructive sleep apnea with oxygen dependence.   15.  Postoperative seroma.   16.  Hypercholesterolemia.   17.  Restless legs syndrome.   18.  Type 2 diabetes.      PAST SURGICAL HISTORY:   1.  Breast biopsy x 2.   2.  Hernia repair.   3.  Lymph node biopsy.   4.   x 3.   5.  D and C with hysteroscopy   6.  EGD x 3.   7.  Herniorrhaphy, incisional, x2.       ALLERGIES:  CODEINE, PENICILLIN.      CURRENT MEDICATIONS:   1.  Albuterol.   2.  Aspirin.   3.  Clonazepam.   4.  Gabapentin.   5.  Cozaar.   6.  Metformin.   7.  Protonix.   8.  Sertraline.   9.  Simvastatin.   10.  Ambien.      REVIEW OF SYSTEMS:  Ten points reviewed and as dictated in history of present illness.  Positive for fever, shortness of breath.  No cough, no chest pain, no leg swelling.  No nausea or vomiting, but positive hypoxemia.      SOCIAL HISTORY:  Former smoker.  Occasional alcohol.  The patient lives with her daughter.  She is full code.      FAMILY HISTORY:  Positive for hypertension, heart disease, heart failure, Hodgkin's leukemia, diabetes and lupus.      PHYSICAL EXAMINATION:   VITAL SIGNS:  Temperature 99.2, heart rate 83, respirations 18, blood pressure 109/43, saturations 96% on room air.   GENERAL:  The patient is an alert 64-year-old  female.  She is on oxygen and is oriented x 3.   HEENT:  Pupils equal.  Sclerae are anicteric.  Mucous membranes are moist.  No central cyanosis.  Oropharynx without lesions.   NECK:  JVP is not elevated.   PULMONARY:  Lungs are diminished at the bases bilaterally.    CARDIOVASCULAR:  S1, S2, borderline tachycardic.   ABDOMEN:  Soft, nontender.  She has morbid obesity.   MUSCULOSKELETAL:  No edema.   NEUROLOGIC:  She is able to move all 4 extremities.   SKIN:  Warm, dry, well perfused.   PSYCHIATRIC:  Mood and affect, stable.      LABORATORY DATA:  As dictated in history of present illness.      ASSESSMENT:  Nicole Drake is a 64-year-old with morbid obesity with known sleep apnea, who has intermittent multiple pneumonias, most of these fevers occurring after she wakes up in the morning with concern for ongoing recurrent aspiration pneumonitis  She is in the process of possibly undergoing hiatal hernia repair and being admitted for further treatment.      PLAN:   1.  Bilateral pulmonary infiltrates and fever, suspected recurrent aspiration pneumonia in the setting of a moderate-size hiatal hernia:  The patient was treated with Zithromax and Zosyn.  We will continue the patient on just Zosyn alone.  She ended up being COVID negative.  Her fever has come down.  The patient will be seen by Thoracic Surgery and Speech Therapy.  We will put her on clear liquid diet.  We are going to hold her Ambien at nighttime.  Her D-dimer was elevated, but no evidence of pulmonary embolism.   2.  Sleep apnea:  The patient is compliant with her CPAP.  If she does not wear CPAP, she normally wears oxygen.  Her sleep study was greater than 5 years ago.  She has actually lost some weight.  The patient should have reevaluation of her sleep study and further titration.   3.  Diabetes:  We are going to hold the patient's metformin as we put the patient on clear liquid diet pending Speech evaluation.   4.  Hypertension:  We will continue the patient on Cozaar.   5.  History of reflux disease with hiatal hernia:  The patient will be on Protonix.   6.  History of depression:  The patient will continue the Zoloft.   7.  Hyperlipidemia:  We will continue the patient on Zocor.   8.  History of restless  legs syndrome:  The patient takes clonazepam at bedtime.      DEEP VENOUS THROMBOSIS PROPHYLAXIS:  The patient will receive compression boots.      CODE STATUS:  Full.         FOX CARDENAS MD             D: 05/10/2020   T: 05/10/2020   MT: ROSSI      Name:     ASHANTI PATEL   MRN:      [id number]        Account:      [id number]   :      1955        Admitted:     2020                   Document: [id number]       cc: Pari Wiley MD

## 2020-05-10 NOTE — PHARMACY-ADMISSION MEDICATION HISTORY
Admission medication history interview status for this patient is complete. See [facility] admission navigator for allergy information, prior to admission medications and immunization status.     Medication history interview done via telephone during Covid-19 pandemic, indicate source(s): Patient  Medication history resources (including written lists, pill bottles, clinic record):None    Changes made to PTA medication list:  Added: none  Deleted:  entries (azithromycin and cefpodoxime)  Changed: clonazepam 0.5 mg bid prn -> 1 mg qhs    Actions taken by pharmacist (provider contacted, etc):None     Additional medication history information: patient has not taken any medications today. Pt takes gabapentin at bedtime.     Medication reconciliation/reorder completed by provider prior to medication history?  Yes    Prior to Admission medications    Medication Sig Last Dose Taking? Auth Provider   albuterol (PROAIR HFA/PROVENTIL HFA/VENTOLIN HFA) 108 (90 Base) MCG/ACT inhaler Inhale 1-2 puffs into the lungs every 6 hours as needed for shortness of breath / dyspnea or wheezing  Yes Jose L Weaevr MD   aspirin 81 MG EC tablet Take 81 mg by mouth every evening  2020 Yes Unknown, Entered By History   clonazePAM (KLONOPIN) 0.5 MG tablet Take 1 mg by mouth At Bedtime  2020 Yes Unknown, Entered By History   gabapentin (NEURONTIN) 300 MG capsule Take 3 capsules (900 mg) by mouth daily 2020 at bedtime Yes Pari Wiley MD   losartan (COZAAR) 100 MG tablet TAKE ONE TABLET BY MOUTH ONCE DAILY 2020 Yes Pari Wiley MD   metFORMIN (GLUCOPHAGE-XR) 500 MG 24 hr tablet Take 2 tablets (1,000 mg) by mouth every morning 2020 Yes Pari Wiley MD   pantoprazole (PROTONIX) 40 MG EC tablet Take 1 tablet (40 mg) by mouth At Bedtime 2020 Yes Pari Wiley MD   sertraline (ZOLOFT) 100 MG tablet TAKE TWO TABLETS BY MOUTH ONCE DAILY 2020 Yes Pari Wiley MD   simvastatin (ZOCOR) 20 MG tablet TAKE ONE TABLET BY MOUTH AT  BEDTIME 5/8/2020 Yes Pari Wiley MD   zolpidem (AMBIEN) 10 MG tablet TAKE 1 TABLET BY MOUTH EVERY NIGHT AT BEDTIME 5/8/2020 Yes Pari Wiley MD   blood glucose (ACCU-CHEK BYRON PLUS) test strip Use to test blood sugar 2 times daily or as directed.   Pari Wiley MD

## 2020-05-10 NOTE — PLAN OF CARE
To Do:  End of Shift Summary cared for patient [id number].  For vital signs and complete assessments, please see documentation flowsheets.     Pertinent assessments: patient c/o pain in arm and a headache. Gave Tylenol.  On 2L O2, which is baseline for patient. Up independently in room with a walker.  Major Shift Events Covid result (-).  Treatment Plan: Zosyn.    Discharge Readiness: Medically active  Expected Discharge Date: TBD  Discharge Disposition: Home with Self care

## 2020-05-10 NOTE — PROGRESS NOTES
Patient resulted NEGATIVE for COVID-19 swab, admitting provider contacted and assessed pt and removed isolation precautions. Patient was moved to room 527 with all personal belongings.

## 2020-05-10 NOTE — CONSULTS
CTS identifies pt as high risk due to Elevated COURTNEY. Patient was admitted with aspiration pneumonia and a hiatal hernia. Patient has home O2 and CPAP through [place]. Patient has a history of CKD, HTN, GERD, anxiety, anemia, insomnia, depression, CRISTÓBAL, RLS, and Diabetes. Patient has had 3 hospitalizations within the past 6 months for pneumonia and aspiration pneumonia.     A hospital follow up appointment was scheduled for the patient.   Telephone Visit with Pari Wiley MD   Tuesday May 19, 2020 2:00 PM   [facility]    Handoff will be given to PCP clinic care coordinator at discharge.  Patient had no other concerns at this time. Patient states will have support from family at discharge.  or daughter will transport at discharge.      CM will continue to follow patient for any additional discharge needs.     Jolly Cai MA/RN Case Manager  Inpatient Care Coordination  [facility]   [phone]

## 2020-05-10 NOTE — PROGRESS NOTES
RT Note:  I met with the patient to offer her a hospital owned CPAP.  The patient declined using our CPAP and wishes to use O2 tonight.  The patient is aware that she can call at any time to have a machine brought in.    RN aware.    Lisa Drake, RT

## 2020-05-10 NOTE — PROGRESS NOTES
HOSPITALIST FOLLOW UP NOTE:    The patient was seen and examined, I agree with the history, exam, assessment and plan of Dr Shearer.    Bhavesh Paiz DO MPH  [facility] Hospitalist  [address] [place] [address]  Pager: [phone]  05/10/2020

## 2020-05-11 LAB — INTERPRETATION ECG - MUSE: NORMAL

## 2020-05-11 PROCEDURE — 25000132 ZZH RX MED GY IP 250 OP 250 PS 637: Performed by: HOSPITALIST

## 2020-05-11 PROCEDURE — 25800030 ZZH RX IP 258 OP 636: Performed by: HOSPITALIST

## 2020-05-11 PROCEDURE — 99233 SBSQ HOSP IP/OBS HIGH 50: CPT | Performed by: HOSPITALIST

## 2020-05-11 PROCEDURE — 25000128 H RX IP 250 OP 636: Performed by: INTERNAL MEDICINE

## 2020-05-11 PROCEDURE — 25000132 ZZH RX MED GY IP 250 OP 250 PS 637: Performed by: INTERNAL MEDICINE

## 2020-05-11 PROCEDURE — 12000000 ZZH R&B MED SURG/OB

## 2020-05-11 PROCEDURE — 25000128 H RX IP 250 OP 636: Performed by: HOSPITALIST

## 2020-05-11 RX ORDER — ZOLPIDEM TARTRATE 5 MG/1
5 TABLET ORAL
Status: DISCONTINUED | OUTPATIENT
Start: 2020-05-11 | End: 2020-05-13 | Stop reason: HOSPADM

## 2020-05-11 RX ADMIN — ASPIRIN 81 MG: 81 TABLET, COATED ORAL at 20:04

## 2020-05-11 RX ADMIN — PANTOPRAZOLE SODIUM 40 MG: 40 TABLET, DELAYED RELEASE ORAL at 17:04

## 2020-05-11 RX ADMIN — ZOLPIDEM TARTRATE 5 MG: 5 TABLET, COATED ORAL at 22:31

## 2020-05-11 RX ADMIN — TAZOBACTAM SODIUM AND PIPERACILLIN SODIUM 3.38 G: 375; 3 INJECTION, SOLUTION INTRAVENOUS at 16:27

## 2020-05-11 RX ADMIN — SERTRALINE HYDROCHLORIDE 100 MG: 100 TABLET ORAL at 08:09

## 2020-05-11 RX ADMIN — TAZOBACTAM SODIUM AND PIPERACILLIN SODIUM 3.38 G: 375; 3 INJECTION, SOLUTION INTRAVENOUS at 04:08

## 2020-05-11 RX ADMIN — SIMVASTATIN 20 MG: 20 TABLET, FILM COATED ORAL at 22:31

## 2020-05-11 RX ADMIN — TAZOBACTAM SODIUM AND PIPERACILLIN SODIUM 3.38 G: 375; 3 INJECTION, SOLUTION INTRAVENOUS at 09:37

## 2020-05-11 RX ADMIN — ACETAMINOPHEN 650 MG: 325 TABLET, FILM COATED ORAL at 20:05

## 2020-05-11 RX ADMIN — AZITHROMYCIN MONOHYDRATE 500 MG: 500 INJECTION, POWDER, LYOPHILIZED, FOR SOLUTION INTRAVENOUS at 08:11

## 2020-05-11 RX ADMIN — CLONAZEPAM 1 MG: 0.5 TABLET ORAL at 22:31

## 2020-05-11 RX ADMIN — TAZOBACTAM SODIUM AND PIPERACILLIN SODIUM 3.38 G: 375; 3 INJECTION, SOLUTION INTRAVENOUS at 22:32

## 2020-05-11 RX ADMIN — PANTOPRAZOLE SODIUM 40 MG: 40 TABLET, DELAYED RELEASE ORAL at 08:09

## 2020-05-11 RX ADMIN — GABAPENTIN 900 MG: 300 CAPSULE ORAL at 22:31

## 2020-05-11 ASSESSMENT — ACTIVITIES OF DAILY LIVING (ADL)
ADLS_ACUITY_SCORE: 15

## 2020-05-11 ASSESSMENT — MIFFLIN-ST. JEOR: SCORE: 1554.24

## 2020-05-11 NOTE — PLAN OF CARE
To Do:  End of Shift Summary cared for patient   For vital signs and complete assessments, please see documentation flowsheets.     Pertinent assessments: A&Ox4, VSS, afebrile, up to BR independently, some SOB with activities, LS diminished, Remains on 2 LPM Baseline.   Major Shift Events: none  Treatment Plan: Zosyn & Zithromax IV    Discharge Readiness: Medically active  Expected Discharge Date: 1-2 days  Discharge Disposition: Home with Self care  Barriers to Discharge: IV ABX

## 2020-05-11 NOTE — PLAN OF CARE
For vital signs and complete assessments, please see documentation flowsheets.     Pertinent assessments: A&Ox4, VSS, afebrile, up to BR independently, some SOB with activities, LS diminished, Remains on 2 LPM Baseline.   Major Shift Events: none  Treatment Plan: Zosyn & Zithromax IV    Discharge Readiness: Medically active  Expected Discharge Date: 1-2 days  Discharge Disposition: Home with Self care  Barriers to Discharge: IV ABX

## 2020-05-11 NOTE — PROGRESS NOTES
[facility]    Hospitalist Progress Note  Name: Nicole Reyes    MRN: [id number]  Provider:  Bhavesh Paiz DO, MPH  Date of Service: 05/11/2020    Summary of Stay: Nicole Reyes is a 64 year old female with past medical history significant for obstructive sleep apnea( uses CPAP), diabetes mellitus type 2, hiatal hernia, GERD with multiple episodes of pneumonia on supplemental O2 2 L at home presented to the emergency room on 5/9 with cough, fever, and shortness of breath.  In the Emergency Department, the patient's oxygen saturation was 75% on room air.  It recovered up to 90+ % with 2 liters.  The patient was seen by Dr. Lorena Modi.  The patient had temperature of 99.9, down from 101.  She is also tachycardic but also improved with the improved fever.  She is also tachypneic with a respiratory rate of 34, blood pressure 179/92.  BMP was normal.  LFTs were normal.  Troponin is negative.  White count was elevated at 18,800, hemoglobin 13.4, platelets 259,000.  D-dimer is elevated at 1.4.  The patient had a CT done.  This revealed no evidence of PE, but bilateral airspace and ground-glass infiltrates in both lungs concerning for pneumonia.  COVID-19 pneumonia was also possible.  Esophageal hiatal hernia was confirmed.  Fatty liver and gallstones were noted.  Eventually, the COVID test came back negative.  Venous blood gas showed pH of 7.34, pCO2 of 50, pO2 of 22.  Glucose is 145.  Troponin was negative.  A 12-lead ECG shows sinus tachycardia.  The patient was given initially Zithromax and Zosyn and is being admitted for possible recurrent aspiration in the setting of moderate hiatal hernia.      Problem List:   Sepsis secondary to recurrent aspiration pneumonia in the context of hiatal hernia: Now hemodynamically stable and much improved.  Holding off on thoracic surgery and speech therapy consults.  She clearly will need repair of her hiatal hernia in the outpatient setting to prevent further  episodes of aspiration pneumonia.  Tolerating regular diet so I see no role and SLP at this time.  -Continue on zosyn and azithromycin  -Add mucolytic's and other supportive care  -COVID19 negative     History of sleep apnea uses CPAP at home  -Can resume CPAP with home settings     Diabetes mellitus type 2  -At home on metformin will hold that for now in the setting of sepsis  -Sliding scale insulin     Hypertension  -Continue prior to admission losartan  -PRN hydralazine if needed     GERD  -Continue with Protonix     Hyperlipidemia  -Continue on statin     Anxiety and depression and restless leg syndrome  -Resumed all prior to admission meds    DVT Prophylaxis: Pneumatic Compression Devices  Code Status: Full Code  Diet: Regular Diet Adult    Suarez Catheter: not present  Disposition: Expected discharge in 2 days to home. Goals prior to discharge include management of infection.   Incidental Findings: None.  Family updated today: No.     Interval History   The patient reports doing well. No chest pain or shortness of breath. No nausea, vomiting, diarrhea, constipation. No fevers. No other specific complaints identified.     -Data reviewed today: I personally reviewed all new labs and imaging results over the last 24 hours.     Physical Exam   Temp: 97.6  F (36.4  C) Temp src: Oral BP: 100/42 Pulse: 68 Heart Rate: 78 Resp: 20 SpO2: 95 % O2 Device: Nasal cannula Oxygen Delivery: 2 LPM  Vitals:    05/09/20 1958 05/10/20 0917 05/11/20 0618   Weight: 110.9 kg (244 lb 9.6 oz) 109.9 kg (242 lb 3.2 oz) 109.9 kg (242 lb 3.2 oz)     Vital Signs with Ranges  Temp:  [97.6  F (36.4  C)-99.5  F (37.5  C)] 97.6  F (36.4  C)  Pulse:  [68] 68  Heart Rate:  [74-78] 78  Resp:  [18-20] 20  BP: (100-138)/(42-58) 100/42  SpO2:  [95 %-98 %] 95 %  No intake/output data recorded.    GENERAL: No apparent distress. Awake, alert, and fully oriented.  HEENT: Normocephalic, atraumatic. Extraocular movements intact.  CARDIOVASCULAR: Regular  rate and rhythm without murmurs or rubs. No S3.  PULMONARY: Clear bilaterally.  GASTROINTESTINAL: Soft, non-tender, non-distended. Bowel sounds normoactive.   EXTREMITIES: No cyanosis or clubbing. 1+ BL LE edema.  NEUROLOGICAL: CN 2-12 grossly intact, no focal neurological deficits.  DERMATOLOGICAL: No rash, ulcer, bruising, nor jaundice.     Medications       aspirin  81 mg Oral QPM     azithromycin  500 mg Intravenous Q24H     clonazePAM  1 mg Oral At Bedtime     gabapentin  900 mg Oral At Bedtime     losartan  100 mg Oral Daily     pantoprazole  40 mg Oral BID AC     piperacillin-tazobactam  3.375 g Intravenous Q6H     sertraline  100 mg Oral Daily     simvastatin  20 mg Oral At Bedtime     sodium chloride (PF)  3 mL Intracatheter Q8H     sodium chloride (PF)  3 mL Intracatheter Q8H     Data     Laboratory:  Recent Labs   Lab 05/10/20  0651 05/09/20  1544   WBC 18.1* 18.8*   HGB 11.3* 13.4   HCT 37.2 44.7   MCV 83 84    259     Recent Labs   Lab 05/10/20  0651 05/09/20  1544    139   POTASSIUM 4.2 4.2   CHLORIDE 103 105   CO2 32 31   ANIONGAP 2* 3   * 145*   BUN 17 12   CR 1.07* 0.89   GFRESTIMATED 55* 69   GFRESTBLACK 63 80   GRECIA 8.4* 8.8     Recent Labs   Lab 05/09/20  1632 05/09/20  1544   CULT No growth after 2 days No growth after 2 days       Imaging:  No results found for this or any previous visit (from the past 24 hour(s)).      Bhavesh Paiz DO MPH  [facility] Hospitalist  [address] [place] [address]  Pager: [phone]  05/11/2020

## 2020-05-11 NOTE — PLAN OF CARE
End of Shift Summary cared for patient   For vital signs and complete assessments, please see documentation flowsheets.     Pertinent assessments: A&Ox4, VSS, afebrile, was up in room, some SOB with activities, LS diminished, Remains on 2 LPM Baseline.   Major Shift Events: none  Treatment Plan: Zosyn & Zithromax IV    Discharge Readiness: Medically active  Expected Discharge Date: 1-2 days  Discharge Disposition: Home with Self care  Barriers to Discharge: IV ABX

## 2020-05-12 LAB
ANION GAP SERPL CALCULATED.3IONS-SCNC: 4 MMOL/L (ref 3–14)
BUN SERPL-MCNC: 11 MG/DL (ref 7–30)
CALCIUM SERPL-MCNC: 8.5 MG/DL (ref 8.5–10.1)
CHLORIDE SERPL-SCNC: 107 MMOL/L (ref 94–109)
CO2 SERPL-SCNC: 29 MMOL/L (ref 20–32)
CREAT SERPL-MCNC: 0.8 MG/DL (ref 0.52–1.04)
ERYTHROCYTE [DISTWIDTH] IN BLOOD BY AUTOMATED COUNT: 15.8 % (ref 10–15)
GFR SERPL CREATININE-BSD FRML MDRD: 77 ML/MIN/{1.73_M2}
GLUCOSE SERPL-MCNC: 112 MG/DL (ref 70–99)
HCT VFR BLD AUTO: 36.7 % (ref 35–47)
HGB BLD-MCNC: 10.9 G/DL (ref 11.7–15.7)
MCH RBC QN AUTO: 25.2 PG (ref 26.5–33)
MCHC RBC AUTO-ENTMCNC: 29.7 G/DL (ref 31.5–36.5)
MCV RBC AUTO: 85 FL (ref 78–100)
PLATELET # BLD AUTO: 191 10E9/L (ref 150–450)
POTASSIUM SERPL-SCNC: 3.5 MMOL/L (ref 3.4–5.3)
RBC # BLD AUTO: 4.33 10E12/L (ref 3.8–5.2)
SODIUM SERPL-SCNC: 140 MMOL/L (ref 133–144)
WBC # BLD AUTO: 11.7 10E9/L (ref 4–11)

## 2020-05-12 PROCEDURE — 25000128 H RX IP 250 OP 636: Performed by: HOSPITALIST

## 2020-05-12 PROCEDURE — 12000000 ZZH R&B MED SURG/OB

## 2020-05-12 PROCEDURE — 25800030 ZZH RX IP 258 OP 636: Performed by: HOSPITALIST

## 2020-05-12 PROCEDURE — 25000128 H RX IP 250 OP 636: Performed by: INTERNAL MEDICINE

## 2020-05-12 PROCEDURE — 25000132 ZZH RX MED GY IP 250 OP 250 PS 637: Performed by: HOSPITALIST

## 2020-05-12 PROCEDURE — 85027 COMPLETE CBC AUTOMATED: CPT | Performed by: HOSPITALIST

## 2020-05-12 PROCEDURE — 99232 SBSQ HOSP IP/OBS MODERATE 35: CPT | Performed by: HOSPITALIST

## 2020-05-12 PROCEDURE — 83036 HEMOGLOBIN GLYCOSYLATED A1C: CPT | Performed by: HOSPITALIST

## 2020-05-12 PROCEDURE — 36415 COLL VENOUS BLD VENIPUNCTURE: CPT | Performed by: HOSPITALIST

## 2020-05-12 PROCEDURE — 25000125 ZZHC RX 250: Performed by: HOSPITALIST

## 2020-05-12 PROCEDURE — 80048 BASIC METABOLIC PNL TOTAL CA: CPT | Performed by: HOSPITALIST

## 2020-05-12 PROCEDURE — 99207 ZZC CDG-CUT & PASTE-POTENTIAL IMPACT ON LEVEL: CPT | Performed by: HOSPITALIST

## 2020-05-12 PROCEDURE — 25000132 ZZH RX MED GY IP 250 OP 250 PS 637: Performed by: INTERNAL MEDICINE

## 2020-05-12 RX ORDER — SCOLOPAMINE TRANSDERMAL SYSTEM 1 MG/1
1 PATCH, EXTENDED RELEASE TRANSDERMAL
Status: DISCONTINUED | OUTPATIENT
Start: 2020-05-12 | End: 2020-05-13 | Stop reason: HOSPADM

## 2020-05-12 RX ADMIN — GABAPENTIN 900 MG: 300 CAPSULE ORAL at 22:21

## 2020-05-12 RX ADMIN — PANTOPRAZOLE SODIUM 40 MG: 40 TABLET, DELAYED RELEASE ORAL at 16:21

## 2020-05-12 RX ADMIN — ACETAMINOPHEN 650 MG: 325 TABLET, FILM COATED ORAL at 20:17

## 2020-05-12 RX ADMIN — TAZOBACTAM SODIUM AND PIPERACILLIN SODIUM 3.38 G: 375; 3 INJECTION, SOLUTION INTRAVENOUS at 09:56

## 2020-05-12 RX ADMIN — SERTRALINE HYDROCHLORIDE 100 MG: 100 TABLET ORAL at 08:33

## 2020-05-12 RX ADMIN — TAZOBACTAM SODIUM AND PIPERACILLIN SODIUM 3.38 G: 375; 3 INJECTION, SOLUTION INTRAVENOUS at 22:21

## 2020-05-12 RX ADMIN — PANTOPRAZOLE SODIUM 40 MG: 40 TABLET, DELAYED RELEASE ORAL at 08:33

## 2020-05-12 RX ADMIN — SIMVASTATIN 20 MG: 20 TABLET, FILM COATED ORAL at 22:21

## 2020-05-12 RX ADMIN — CLONAZEPAM 1 MG: 0.5 TABLET ORAL at 22:21

## 2020-05-12 RX ADMIN — LOSARTAN POTASSIUM 100 MG: 100 TABLET, FILM COATED ORAL at 08:33

## 2020-05-12 RX ADMIN — ASPIRIN 81 MG: 81 TABLET, COATED ORAL at 20:15

## 2020-05-12 RX ADMIN — SCOPALAMINE 1 PATCH: 1 PATCH, EXTENDED RELEASE TRANSDERMAL at 09:54

## 2020-05-12 RX ADMIN — ACETAMINOPHEN 650 MG: 325 TABLET, FILM COATED ORAL at 13:25

## 2020-05-12 RX ADMIN — TAZOBACTAM SODIUM AND PIPERACILLIN SODIUM 3.38 G: 375; 3 INJECTION, SOLUTION INTRAVENOUS at 04:28

## 2020-05-12 RX ADMIN — TAZOBACTAM SODIUM AND PIPERACILLIN SODIUM 3.38 G: 375; 3 INJECTION, SOLUTION INTRAVENOUS at 16:21

## 2020-05-12 RX ADMIN — AZITHROMYCIN MONOHYDRATE 500 MG: 500 INJECTION, POWDER, LYOPHILIZED, FOR SOLUTION INTRAVENOUS at 08:34

## 2020-05-12 ASSESSMENT — ACTIVITIES OF DAILY LIVING (ADL)
ADLS_ACUITY_SCORE: 15

## 2020-05-12 ASSESSMENT — MIFFLIN-ST. JEOR: SCORE: 1579.19

## 2020-05-12 NOTE — PROGRESS NOTES
[facility]    Hospitalist Progress Note  Name: Nicole Reyes    MRN: [id number]  Provider:  Bhavesh Paiz DO, MPH  Date of Service: 05/12/2020    Summary of Stay: Nicole Reyes is a 64 year old female with past medical history significant for obstructive sleep apnea( uses CPAP), diabetes mellitus type 2, hiatal hernia, GERD with multiple episodes of pneumonia on supplemental O2 2 L at home presented to the emergency room on 5/9 with cough, fever, and shortness of breath.  In the Emergency Department, the patient's oxygen saturation was 75% on room air.  It recovered up to 90+ % with 2 liters.  The patient was seen by Dr. Lorena Modi.  The patient had temperature of 99.9, down from 101.  She is also tachycardic but also improved with the improved fever.  She is also tachypneic with a respiratory rate of 34, blood pressure 179/92.  BMP was normal.  LFTs were normal.  Troponin is negative.  White count was elevated at 18,800, hemoglobin 13.4, platelets 259,000.  D-dimer is elevated at 1.4.  The patient had a CT done.  This revealed no evidence of PE, but bilateral airspace and ground-glass infiltrates in both lungs concerning for pneumonia.  COVID-19 pneumonia was also possible.  Esophageal hiatal hernia was confirmed.  Fatty liver and gallstones were noted.  Eventually, the COVID test came back negative.  Venous blood gas showed pH of 7.34, pCO2 of 50, pO2 of 22.  Glucose is 145.  Troponin was negative.  A 12-lead ECG shows sinus tachycardia.  The patient was given initially Zithromax and Zosyn and is being admitted for possible recurrent aspiration in the setting of moderate hiatal hernia.      Problem List:   Sepsis secondary to recurrent aspiration pneumonia in the context of hiatal hernia: Now hemodynamically stable and much improved.  Holding off on thoracic surgery and speech therapy consults.  She clearly will need repair of her hiatal hernia in the outpatient setting to prevent further  episodes of aspiration pneumonia.  Tolerating regular diet so I see no role and SLP at this time.  -Continue on zosyn and azithromycin  -Add mucolytic's and other supportive care  -COVID19 negative     History of sleep apnea uses CPAP at home  -Can resume CPAP with home settings     Diabetes mellitus type 2  -At home on metformin will hold that for now in the setting of sepsis  -Sliding scale insulin     Hypertension  -Continue prior to admission losartan  -PRN hydralazine if needed     GERD  -Continue with Protonix     Hyperlipidemia  -Continue on statin     Anxiety and depression and restless leg syndrome  -Resumed all prior to admission meds    DVT Prophylaxis: Pneumatic Compression Devices  Code Status: Full Code  Diet: Regular Diet Adult    Suarez Catheter: not present  Disposition: Expected discharge in 2 days to home. Goals prior to discharge include management of infection.   Incidental Findings: None.  Family updated today: No.     Interval History   The patient reports doing well. No chest pain or shortness of breath. No nausea, vomiting, diarrhea, constipation. No fevers. No other specific complaints identified.     -Data reviewed today: I personally reviewed all new labs and imaging results over the last 24 hours.     Physical Exam   Temp: 98.5  F (36.9  C) Temp src: Oral BP: 128/52 Pulse: 73 Heart Rate: 66 Resp: 20 SpO2: 95 % O2 Device: Nasal cannula with humidification Oxygen Delivery: 2 LPM  Vitals:    05/10/20 0917 05/11/20 0618 05/12/20 0617   Weight: 109.9 kg (242 lb 3.2 oz) 109.9 kg (242 lb 3.2 oz) 112.4 kg (247 lb 11.2 oz)     Vital Signs with Ranges  Temp:  [98.5  F (36.9  C)-99.1  F (37.3  C)] 98.5  F (36.9  C)  Pulse:  [73] 73  Heart Rate:  [66-78] 66  Resp:  [18-20] 20  BP: (112-128)/(43-52) 128/52  SpO2:  [95 %-99 %] 95 %  I/O last 3 completed shifts:  In: 240 [P.O.:240]  Out: -     GENERAL: No apparent distress. Awake, alert, and fully oriented.  HEENT: Normocephalic, atraumatic. Extraocular  movements intact.  CARDIOVASCULAR: Regular rate and rhythm without murmurs or rubs. No S3.  PULMONARY: Clear bilaterally.  GASTROINTESTINAL: Soft, non-tender, non-distended. Bowel sounds normoactive.   EXTREMITIES: No cyanosis or clubbing. 1+ BL LE edema.  NEUROLOGICAL: CN 2-12 grossly intact, no focal neurological deficits.  DERMATOLOGICAL: No rash, ulcer, bruising, nor jaundice.     Medications       aspirin  81 mg Oral QPM     azithromycin  500 mg Intravenous Q24H     clonazePAM  1 mg Oral At Bedtime     gabapentin  900 mg Oral At Bedtime     losartan  100 mg Oral Daily     pantoprazole  40 mg Oral BID AC     piperacillin-tazobactam  3.375 g Intravenous Q6H     scopolamine  1 patch Transdermal Q72H    And     scopolamine   Transdermal Q8H     sertraline  100 mg Oral Daily     simvastatin  20 mg Oral At Bedtime     sodium chloride (PF)  3 mL Intracatheter Q8H     Data     Laboratory:  Recent Labs   Lab 05/12/20  0700 05/10/20  0651 05/09/20  1544   WBC 11.7* 18.1* 18.8*   HGB 10.9* 11.3* 13.4   HCT 36.7 37.2 44.7   MCV 85 83 84    203 259     Recent Labs   Lab 05/12/20  0700 05/10/20  0651 05/09/20  1544    137 139   POTASSIUM 3.5 4.2 4.2   CHLORIDE 107 103 105   CO2 29 32 31   ANIONGAP 4 2* 3   * 120* 145*   BUN 11 17 12   CR 0.80 1.07* 0.89   GFRESTIMATED 77 55* 69   GFRESTBLACK 90 63 80   GRECIA 8.5 8.4* 8.8     Recent Labs   Lab 05/09/20  1632 05/09/20  1544   CULT No growth after 3 days No growth after 3 days       Imaging:  No results found for this or any previous visit (from the past 24 hour(s)).      Bhavesh Paiz DO MPH  [facility] Hospitalist  [address] [place] [address]  Pager: [phone]  05/12/2020

## 2020-05-12 NOTE — PLAN OF CARE
End of Shift Summary cared for patient   For vital signs and complete assessments, please see documentation flowsheets.     Pertinent assessments: Afebrile. Denies pain. 2L oxygen. LS clear. KHALIL. Infrequent dry cough  Major Shift Events: none  Treatment Plan: Zosyn & Zithromax IV, oxygen, output appt regarding hernia     Discharge Readiness: Medically active  Expected Discharge Date: 1-2 days  Discharge Disposition: Home with Self care  Barriers to Discharge: IV ABX

## 2020-05-12 NOTE — PLAN OF CARE
To Do:  End of Shift Summary cared for patient   For vital signs and complete assessments, please see documentation flowsheets.     Pertinent assessments:  up independently, c/o shoulder pain, tylenol & heat effective.  Major Shift Events: none  Treatment Plan: Zosyn & Zithromax IV, 2 LPM O2 via NC    Discharge Readiness: Medically active  Expected Discharge Date: 1-2 days  Discharge Disposition: Home with Self care  Barriers to Discharge: IV ABX

## 2020-05-13 VITALS
BODY MASS INDEX: 49.53 KG/M2 | WEIGHT: 245.7 LBS | TEMPERATURE: 97.8 F | RESPIRATION RATE: 20 BRPM | HEIGHT: 59 IN | HEART RATE: 73 BPM | OXYGEN SATURATION: 95 % | DIASTOLIC BLOOD PRESSURE: 63 MMHG | SYSTOLIC BLOOD PRESSURE: 112 MMHG

## 2020-05-13 LAB
GLUCOSE BLDC GLUCOMTR-MCNC: 112 MG/DL (ref 70–99)
GLUCOSE BLDC GLUCOMTR-MCNC: 90 MG/DL (ref 70–99)
HBA1C MFR BLD: 6.9 % (ref 0–5.6)

## 2020-05-13 PROCEDURE — 99239 HOSP IP/OBS DSCHRG MGMT >30: CPT | Performed by: HOSPITALIST

## 2020-05-13 PROCEDURE — 25800030 ZZH RX IP 258 OP 636: Performed by: HOSPITALIST

## 2020-05-13 PROCEDURE — 25000132 ZZH RX MED GY IP 250 OP 250 PS 637: Performed by: HOSPITALIST

## 2020-05-13 PROCEDURE — 00000146 ZZHCL STATISTIC GLUCOSE BY METER IP

## 2020-05-13 PROCEDURE — 25000128 H RX IP 250 OP 636: Performed by: HOSPITALIST

## 2020-05-13 PROCEDURE — 25000132 ZZH RX MED GY IP 250 OP 250 PS 637: Performed by: INTERNAL MEDICINE

## 2020-05-13 PROCEDURE — 25000128 H RX IP 250 OP 636: Performed by: INTERNAL MEDICINE

## 2020-05-13 RX ORDER — NICOTINE POLACRILEX 4 MG
15-30 LOZENGE BUCCAL
Status: DISCONTINUED | OUTPATIENT
Start: 2020-05-13 | End: 2020-05-13 | Stop reason: HOSPADM

## 2020-05-13 RX ORDER — CEFPODOXIME PROXETIL 200 MG/1
200 TABLET, FILM COATED ORAL 2 TIMES DAILY
Qty: 14 TABLET | Refills: 0 | Status: SHIPPED | OUTPATIENT
Start: 2020-05-13 | End: 2020-07-09

## 2020-05-13 RX ORDER — DEXTROSE MONOHYDRATE 25 G/50ML
25-50 INJECTION, SOLUTION INTRAVENOUS
Status: DISCONTINUED | OUTPATIENT
Start: 2020-05-13 | End: 2020-05-13 | Stop reason: HOSPADM

## 2020-05-13 RX ADMIN — PANTOPRAZOLE SODIUM 40 MG: 40 TABLET, DELAYED RELEASE ORAL at 09:22

## 2020-05-13 RX ADMIN — AZITHROMYCIN MONOHYDRATE 500 MG: 500 INJECTION, POWDER, LYOPHILIZED, FOR SOLUTION INTRAVENOUS at 07:50

## 2020-05-13 RX ADMIN — TAZOBACTAM SODIUM AND PIPERACILLIN SODIUM 3.38 G: 375; 3 INJECTION, SOLUTION INTRAVENOUS at 04:11

## 2020-05-13 RX ADMIN — ZOLPIDEM TARTRATE 5 MG: 5 TABLET, COATED ORAL at 00:08

## 2020-05-13 RX ADMIN — SERTRALINE HYDROCHLORIDE 100 MG: 100 TABLET ORAL at 09:22

## 2020-05-13 RX ADMIN — TAZOBACTAM SODIUM AND PIPERACILLIN SODIUM 3.38 G: 375; 3 INJECTION, SOLUTION INTRAVENOUS at 10:23

## 2020-05-13 ASSESSMENT — ACTIVITIES OF DAILY LIVING (ADL)
ADLS_ACUITY_SCORE: 15

## 2020-05-13 ASSESSMENT — MIFFLIN-ST. JEOR: SCORE: 1570.12

## 2020-05-13 NOTE — PLAN OF CARE
Pt verbalizes understanding of discharge instructions  Has RX for Vantin in hand  Encouraged to make appt for thoracic surgeon  No concerns at this time

## 2020-05-13 NOTE — PROGRESS NOTES
Transition Communication Hand-off for Care Transitions to Next Level of Care Provider    Name: Nicole Reyes  : 1955  MRN #: [id number]  Primary Care Provider: Pari Wiley  Primary Care MD Name: Pari Wiley  Primary Clinic: [address] [facility] [address]  [facility] [address]  Primary Care Clinic Name: [facility]  Reason for Hospitalization:  Hypoxia [R09.02]  Pneumonia of both lungs due to infectious organism, unspecified part of lung [J18.9]  Sepsis, due to unspecified organism, unspecified whether acute organ dysfunction present (H) [A41.9]  Suspected Covid-19 Virus Infection [Z20.828]  Admit Date/Time: 2020  3:21 PM  Discharge Date: 2020  Payor Source: Payor: PREFERREDONE / Plan: PREFERREDONE HMO / Product Type: PPO /     Readmission Assessment Measure (COURTNEY) Risk Score/category: elevated             Reason for Communication Hand-off Referral: Fragility    Discharge Plan: home       Concern for non-adherence with plan of care:   no  Discharge Needs Assessment:  Needs      Most Recent Value   # of Referrals Placed by CTS  Scheduled Follow-up appointments          Already enrolled in Tele-monitoring program and name of program:  none  Follow-up specialty is recommended: Yes    Follow-up plan:    Future Appointments   Date Time Provider Department Center   2020  2:00 PM Pari Wiley MD [facility] [place]       Any outstanding tests or procedures:                    Key Recommendations:  CTS identifies pt as high risk due to Elevated COURTNEY. Patient was admitted with aspiration pneumonia and a hiatal hernia. Patient has home O2 and CPAP through [place]. Patient has a history of CKD, HTN, GERD, anxiety, anemia, insomnia, depression, CRISTÓBAL, RLS, and Diabetes. Patient has had 3 hospitalizations within the past 6 months for pneumonia and aspiration pneumonia.     Recommendations for follow up include to follow up with thoracic surgeon for hiatal hernia repair.    Jolly Cai MA/RN Case  Manager  Inpatient Care Coordination  [facility]   [phone]    AVS/Discharge Summary is the source of truth; this is a helpful guide for improved communication of patient story

## 2020-05-13 NOTE — DISCHARGE SUMMARY
Hospitalist Discharge Summary  [facility]    Nicole Reyes MRN# [id number]   YOB: 1955 Age: 64 year old     Date of Admission:  5/9/2020  Date of Discharge:  5/13/2020  Admitting Physician:  Giorgio Shearer MD  Discharge Physician:  Bhavesh Paiz DO  Discharging Service:  Hospitalist     Primary Provider: Pari Wiley          Discharge Diagnosis:   Sepsis secondary to recurrent aspiration pneumonia in the context of an unrepaired hiatal hernia  Sleep apnea on home CPAP  Chronic hypoxic respiratory failure at baseline on 2 L oxygen by nasal cannula  Type 2 diabetes mellitus  Hypertension  GERD  Hyperlipidemia  Anxiety and depression  Restless leg syndrome             Discharge Disposition:   Discharged to home           Allergies:   Allergies   Allergen Reactions     Codeine Rash     Penicillins Rash     Pt has tolerated cephalosporins and penems.   Pt tolerated Zosyn 7/6/2019              Discharge Medications:   Current Discharge Medication List      START taking these medications    Details   cefpodoxime (VANTIN) 200 MG tablet Take 1 tablet (200 mg) by mouth 2 times daily for 5 days  Qty: 14 tablet, Refills: 0    Associated Diagnoses: Hypoxia         CONTINUE these medications which have NOT CHANGED    Details   albuterol (PROAIR HFA/PROVENTIL HFA/VENTOLIN HFA) 108 (90 Base) MCG/ACT inhaler Inhale 1-2 puffs into the lungs every 6 hours as needed for shortness of breath / dyspnea or wheezing  Qty: 1 Inhaler, Refills: 0    Comments: Please also dispense a spacer  Associated Diagnoses: Community acquired bacterial pneumonia      aspirin 81 MG EC tablet Take 81 mg by mouth every evening       clonazePAM (KLONOPIN) 0.5 MG tablet Take 1 mg by mouth At Bedtime       gabapentin (NEURONTIN) 300 MG capsule Take 3 capsules (900 mg) by mouth daily  Qty: 270 capsule, Refills: 3    Associated Diagnoses: Restless legs syndrome (RLS); Persistent insomnia      losartan (COZAAR) 100 MG  "tablet TAKE ONE TABLET BY MOUTH ONCE DAILY  Qty: 90 tablet, Refills: 1    Associated Diagnoses: Essential hypertension, benign      metFORMIN (GLUCOPHAGE-XR) 500 MG 24 hr tablet Take 2 tablets (1,000 mg) by mouth every morning  Qty: 180 tablet, Refills: 3    Comments: Profile Rx: patient will contact pharmacy when needed  Associated Diagnoses: Type 2 diabetes mellitus with stage 3 chronic kidney disease, without long-term current use of insulin (H)      pantoprazole (PROTONIX) 40 MG EC tablet Take 1 tablet (40 mg) by mouth At Bedtime  Qty: 90 tablet, Refills: 3    Comments: Profile Rx: patient will contact pharmacy when needed  Associated Diagnoses: Gastroesophageal reflux disease without esophagitis      sertraline (ZOLOFT) 100 MG tablet TAKE TWO TABLETS BY MOUTH ONCE DAILY  Qty: 180 tablet, Refills: 3    Associated Diagnoses: Generalized anxiety disorder; Major depressive disorder, recurrent episode, moderate (H)      simvastatin (ZOCOR) 20 MG tablet TAKE ONE TABLET BY MOUTH AT BEDTIME  Qty: 90 tablet, Refills: 0    Associated Diagnoses: Hyperlipidemia LDL goal <100      zolpidem (AMBIEN) 10 MG tablet TAKE 1 TABLET BY MOUTH EVERY NIGHT AT BEDTIME  Qty: 90 tablet, Refills: 1    Associated Diagnoses: Insomnia, unspecified type      blood glucose (ACCU-CHEK BYRON PLUS) test strip Use to test blood sugar 2 times daily or as directed.  Qty: 100 each, Refills: 12    Associated Diagnoses: Type 2 diabetes mellitus with stage 3 chronic kidney disease, without long-term current use of insulin (H)                    Condition on Discharge:   Discharge condition: Stable   Discharge vitals: Blood pressure 112/63, pulse 73, temperature 97.8  F (36.6  C), temperature source Oral, resp. rate 20, height 1.499 m (4' 11\"), weight 111.4 kg (245 lb 11.2 oz), last menstrual period 09/15/2007, SpO2 93 %, not currently breastfeeding.   Code status on discharge: Full Code      BASIC PHYSICAL EXAMINATION:  GENERAL: No apparent " distress.  CARDIOVASCULAR: Regular rate and rhythm without murmurs.  PULMONARY: Clear to auscultation bilaterally.   GASTROINTESTINAL: Abdomen soft, non-tender.  EXTREMITIES: No edema, pulses intact.  NEUROLOGIC: No focal deficits.            History of Illness:   See detailed admission note for full details.               Procedures excluding imaging which is summarized below:   Please see details in the electronic medical record.           Consultations:   SPEECH LANGUAGE PATH ADULT IP CONSULT  THORACIC SURGERY IP CONSULT  CARE COORDINATOR IP CONSULT          Significant Results:   Results for orders placed or performed during the hospital encounter of 05/09/20   CT Chest Pulmonary Embolism w Contrast    Narrative    EXAM: CT CHEST PULMONARY EMBOLISM W CONTRAST  LOCATION: [facility]  DATE/TIME: 5/9/2020 5:09 PM    INDICATION: Hypoxia, shortness of breath, elevated d-dimer.  COMPARISON: CTA chest exam 09/25/2017  TECHNIQUE: CT angiogram chest during arterial phase injection IV contrast. 2D and 3D MIP reconstructions were performed by the CT technologist. Dose reduction techniques were used.   CONTRAST: 86mL Isovue-370    FINDINGS:  ANGIOGRAM CHEST: Pulmonary arteries are normal caliber and negative for pulmonary emboli. Thoracic aorta is negative for dissection. No CT evidence of right heart strain.    LUNGS AND PLEURA: Scattered nodular, airspace, and groundglass infiltrates within both lungs predominantly located in a central distribution. No effusions.    MEDIASTINUM/AXILLAE: Numerous borderline enlarged mediastinal and hilar lymph nodes. Moderate sized esophageal hiatal hernia.     UPPER ABDOMEN: Diffuse fatty infiltration of the liver. Gallstone.    MUSCULOSKELETAL: Hypertrophic changes thoracic spine.      Impression    IMPRESSION:  1.  No evidence for pulmonary emboli.  2.  Bilateral airspace and groundglass infiltrates both lungs concerning for pneumonia. Covid 19 pneumonia possible.  3.   Esophageal hiatal hernia.  4.  Fatty liver.  5.  Gallstones.    Imaging features can be seen with (COVID-19)  pneumonia, though are nonspecific and can occur with a variety of infectious and noninfectious processes.       Transthoracic Echocardiogram Results:  No results found for this or any previous visit (from the past 4320 hour(s)).             Pending Results:   Unresulted Labs Ordered in the Past 30 Days of this Admission     Date and Time Order Name Status Description    5/9/2020 1543 Blood culture Preliminary     5/9/2020 1543 Blood culture Preliminary                       Discharge Instructions and Follow-Up:   Discharge instructions and follow-up:   Discharge Procedure Orders   Follow-up and recommended labs and tests    Order Comments: Follow up with primary care provider, Pari Wiley, within 7 days to evaluate medication change and for hospital follow- up.  No follow up labs or test are needed.  Follow-up thoracic surgery for hernia repair.     Activity   Order Comments: Your activity upon discharge: activity as tolerated     Order Specific Question Answer Comments   Is discharge order? Yes      Full Code     Order Specific Question Answer Comments   Code status determined by: Discussion with patient/legal decision maker      Diet   Order Comments: Follow this diet upon discharge: Orders Placed This Encounter      Regular Diet Adult     Order Specific Question Answer Comments   Is discharge order? Yes              Hospital Course:   Nicole Reyes is a 64 year old female with past medical history significant for obstructive sleep apnea( uses CPAP), diabetes mellitus type 2, hiatal hernia, GERD with multiple episodes of pneumonia on supplemental O2 2 L at home presented to the emergency room on 5/9 with cough, fever, and shortness of breath.      In the Emergency Department, the patient's oxygen saturation was 75% on room air.  It recovered up to 90% with 2 liters.  The patient was seen by   Lorena Modi.  The patient had temperature of 99.9, down from 101.  She is also tachycardic but also improved with the improved fever.  She is also tachypneic with a respiratory rate of 34, blood pressure 179/92.  BMP was normal.  LFTs were normal.  Troponin is negative.  White count was elevated at 18,800, hemoglobin 13.4, platelets 259,000.  D-dimer is elevated at 1.4.  The patient had a CT done.  This revealed no evidence of PE, but bilateral airspace and ground-glass infiltrates in both lungs concerning for pneumonia.  COVID-19 pneumonia was also possible but ultimately tested negative.  Esophageal hiatal hernia was confirmed.  Fatty liver and gallstones were noted.  Venous blood gas showed pH of 7.34, pCO2 of 50, pO2 of 22.  Glucose is 145.  A 12-lead ECG shows sinus tachycardia.  The patient was given initially Zithromax and Zosyn and was admitted for possible recurrent aspiration in the setting of moderate hiatal hernia.     Similar to previous episodes, she improved with broad-spectrum antibiotics.  White blood cell trended to normal and there was no recurrence of fever.  Lungs are clear on exam now and oxygenation is stable on baseline 2 L nasal cannula.  She feels symptomatically improved as well.  She has completed azithromycin and will discharge with a few more days of Vantin.  She will contact a thoracic surgeon to discuss elective hiatal hernia repair.  She has tolerated a regular diet in the hospital in the meantime.  Patient is aware that she will likely continue to have recurrent aspiration pneumonia until her hiatal hernia is repaired to provide definitive management.  Unfortunately we are in the thick of the COVID-19 pandemic and nonessential surgeries are being delayed.    The patient was seen, examined, and counseled on this day. The hospitalization and plan of care was reviewed with the patient extensively. All questions were addressed and the patient agreed to follow-up as noted above.       Total time spent in face to face contact with the patient and coordinating discharge was:  35 Minutes    Bhavesh Paiz DO, MPH  [facility] Hospitalist  [address] [place] [address]  Pager: [phone]  05/13/2020

## 2020-05-13 NOTE — PROGRESS NOTES
End of Shift Summary  For vital signs and complete assessments, please see documentation flowsheets.     Pertinent assessments: currently on 2L, Tylenol x 1 for a HA, Ambien @ . Scopolamine patch in place.     Major Shift Events   Treatment Plan: Zosyn, Zithromax

## 2020-05-14 ENCOUNTER — TELEPHONE (OUTPATIENT)
Dept: INTERNAL MEDICINE | Facility: CLINIC | Age: 65
End: 2020-05-14

## 2020-05-14 NOTE — TELEPHONE ENCOUNTER
IP F/U    Date: 5/13/20  Diagnosis: Pneumonia of both lungs due to infectious organism, unspecified part of lung   Is patient active in care coordination? No  Was patient in TCU? No      
Per chart, care coordination will reach out to patient. Will close encounter.   
normal...

## 2020-05-15 ENCOUNTER — PATIENT OUTREACH (OUTPATIENT)
Dept: CARE COORDINATION | Facility: CLINIC | Age: 65
End: 2020-05-15

## 2020-05-15 LAB
BACTERIA SPEC CULT: NO GROWTH
Lab: NORMAL
SPECIMEN SOURCE: NORMAL

## 2020-05-15 NOTE — PROGRESS NOTES
Clinic Care Coordination Contact  [facility]/Voicemail       Clinical Data: Care Coordinator Outreach  Patient was hospitalized at [facility] from 5.9.2020 to 5.13.2020 with diagnoses of:  Sepsis secondary to recurrent aspiration pneumonia in the context of an unrepaired hiatal hernia  Sleep apnea on home CPAP  Chronic hypoxic respiratory failure at baseline on 2 L oxygen by nasal cannula  Type 2 diabetes mellitus  Hypertension  GERD  Hyperlipidemia  Anxiety and depression  Restless leg syndrome    Outreach attempted x 1.  Left message on patient's voicemail with call back information and requested return call.  Plan: Care Coordinator will try to reach patient again in 3-5 business days.      Darlene Negron [facility]  Clinic Care Coordinator  Ph. [phone]  [email or web address]

## 2020-05-16 LAB
BACTERIA SPEC CULT: NO GROWTH
SPECIMEN SOURCE: NORMAL

## 2020-05-19 ENCOUNTER — VIRTUAL VISIT (OUTPATIENT)
Dept: INTERNAL MEDICINE | Facility: CLINIC | Age: 65
End: 2020-05-19
Payer: COMMERCIAL

## 2020-05-19 DIAGNOSIS — K21.9 GASTROESOPHAGEAL REFLUX DISEASE WITHOUT ESOPHAGITIS: ICD-10-CM

## 2020-05-19 DIAGNOSIS — J69.0 ASPIRATION PNEUMONIA OF BOTH LUNGS DUE TO GASTRIC SECRETIONS, UNSPECIFIED PART OF LUNG (H): Primary | ICD-10-CM

## 2020-05-19 PROCEDURE — 99213 OFFICE O/P EST LOW 20 MIN: CPT | Mod: GT | Performed by: INTERNAL MEDICINE

## 2020-05-19 NOTE — PROGRESS NOTES
"Nicole Reyes is a 64 year old female who is being evaluated via a billable video visit.      The patient has been notified of following:     \"This video visit will be conducted via a call between you and your physician/provider. We have found that certain health care needs can be provided without the need for an in-person physical exam.  This service lets us provide the care you need with a video conversation.  If a prescription is necessary we can send it directly to your pharmacy.  If lab work is needed we can place an order for that and you can then stop by our lab to have the test done at a later time.    Video visits are billed at different rates depending on your insurance coverage.  Please reach out to your insurance provider with any questions.    If during the course of the call the physician/provider feels a video visit is not appropriate, you will not be charged for this service.\"    Patient has given verbal consent for Video visit? Yes    How would you like to obtain your AVS? Mail a copy    Patient would like the video invitation sent by: Text to cell phone: [phone]    Will anyone else be joining your video visit? No    Subjective     Nicole Reyes is a 64 year old female who presents today via video visit for the following health issues:    HPI    Video start time: 2:01  Hospital Follow-up Visit:    Hospital/Nursing Home/IP Rehab Facility: [facility]  Date of Admission: 05/09/2020  Date of Discharge: 05/13/2020  Reason(s) for Admission: Aspiration Pneumonia      Was your hospitalization related to COVID-19? No   Problems taking medications regularly:  None  Medication changes since discharge: completed antibiotic  Problems adhering to non-medication therapy:  None    Summary of hospitalization:  [facility] discharge summary reviewed  Diagnostic Tests/Treatments reviewed.  Follow up needed: none  Other Healthcare Providers Involved in Patient s Care:         " Specialist appointment - needs referral to surgeon  Update since discharge: improved.  She continues to be very fatigued, feels like her energy is much lower after this episode than last month, had not really recovered completely after the last pneumonia in April.  She reports no significant cough.  She has mild dyspnea on exertion.  She is monitoring her oxygen levels and using it at night, occasionally during the day if it drops with activity.      She has had multiple recurrent pneumonias felt to be due to underlying aspiration.  She did have esophageal manometry which was abnormal.  The GI specialist had recommended a surgeon that is not in network at the very getting of the pandemic.  She had not yet gotten back to him for more information about another surgeon because surgeries were canceled due to the pandemic.  She was told by the hospital that surgeries are starting up again so she would like to get going on this referral process.      Post Discharge Medication Reconciliation: discharge medications reconciled, continue medications without change.  Plan of care communicated with patient               Patient Active Problem List   Diagnosis     Essential hypertension, benign     Restless leg syndrome     CRISTÓBAL (obstructive sleep apnea)     CKD (chronic kidney disease) stage 3, GFR 30-59 ml/min (H)     Advanced directives, counseling/discussion     GENERALIZED ANXIETY DIS     Major depressive disorder, recurrent episode, moderate (H)     Health Care Home     GERD (gastroesophageal reflux disease)     Hyperlipidemia LDL goal <100     Eczema     Type 2 diabetes mellitus with stage 3 chronic kidney disease, without long-term current use of insulin (H)     Midline low back pain with left-sided sciatica     Morbid obesity (HCC)     Insomnia, unspecified type     Dyspnea, unspecified type     Acute pain of left knee     Controlled substance agreement signed     Pneumonia     Pain of both shoulder joints     Aspiration  "pneumonia (H)     Current Outpatient Medications   Medication Sig Dispense Refill     albuterol (PROAIR HFA/PROVENTIL HFA/VENTOLIN HFA) 108 (90 Base) MCG/ACT inhaler Inhale 1-2 puffs into the lungs every 6 hours as needed for shortness of breath / dyspnea or wheezing 1 Inhaler 0     aspirin 81 MG EC tablet Take 81 mg by mouth every evening        blood glucose (ACCU-CHEK BYRON PLUS) test strip Use to test blood sugar 2 times daily or as directed. 100 each 12     clonazePAM (KLONOPIN) 0.5 MG tablet Take 1 mg by mouth At Bedtime        gabapentin (NEURONTIN) 300 MG capsule Take 3 capsules (900 mg) by mouth daily 270 capsule 3     losartan (COZAAR) 100 MG tablet TAKE ONE TABLET BY MOUTH ONCE DAILY 90 tablet 1     metFORMIN (GLUCOPHAGE-XR) 500 MG 24 hr tablet Take 2 tablets (1,000 mg) by mouth every morning 180 tablet 3     pantoprazole (PROTONIX) 40 MG EC tablet Take 1 tablet (40 mg) by mouth At Bedtime 90 tablet 3     sertraline (ZOLOFT) 100 MG tablet TAKE TWO TABLETS BY MOUTH ONCE DAILY 180 tablet 3     simvastatin (ZOCOR) 20 MG tablet TAKE ONE TABLET BY MOUTH AT BEDTIME 90 tablet 0     zolpidem (AMBIEN) 10 MG tablet TAKE 1 TABLET BY MOUTH EVERY NIGHT AT BEDTIME 90 tablet 1      Social History     Tobacco Use     Smoking status: Former Smoker     Last attempt to quit: 3/18/1979     Years since quittin.2     Smokeless tobacco: Never Used     Tobacco comment: quit    Substance Use Topics     Alcohol use: Yes     Comment: Twice a month     Drug use: No              Reviewed and updated as needed this visit by Provider         Review of Systems   No fever, chills, cough, some dyspnea on exertion, decreased energy,      Objective    LMP 09/15/2007   Estimated body mass index is 49.63 kg/m  as calculated from the following:    Height as of 20: 1.499 m (4' 11\").    Weight as of 20: 111.4 kg (245 lb 11.2 oz).  Physical Exam     GENERAL: Healthy, alert and no distress  EYES: Eyes grossly normal to " inspection.  No discharge or erythema, or obvious scleral/conjunctival abnormalities.  RESP: No audible wheeze, cough, or visible cyanosis.  No visible retractions or increased work of breathing.    SKIN: Visible skin clear. No significant rash, abnormal pigmentation or lesions.  NEURO: Cranial nerves grossly intact.  Mentation and speech appropriate for age.  PSYCH: Mentation appears normal, affect normal/bright, judgement and insight intact, normal speech and appearance well-groomed.              Assessment & Plan     1. Aspiration pneumonia of both lungs due to gastric secretions, unspecified part of lung (H)  Her pneumonia seems to be resolving, just completing her antibiotics.  She will call if there is any recurring symptoms.  I did give her referral information to the general surgeons so we can start the process for considering reflux surgery to help prevent further episodes of reflux pneumonia  - GENERAL SURG ADULT REFERRAL; Future    2. Gastroesophageal reflux disease without esophagitis  As above  - GENERAL SURG ADULT REFERRAL; Future         Video-Visit Details    Type of service:  Video Visit    Video End Time:2:14    Originating Location (pt. Location): Home    Distant Location (provider location): Home     Platform used for Video Visit: Doximity    Return in about 6 weeks (around 7/1/2020) for Diabetes, see me a week after lab.       Pari Wiley MD

## 2020-05-29 DIAGNOSIS — E11.22 TYPE 2 DIABETES MELLITUS WITH STAGE 3 CHRONIC KIDNEY DISEASE, WITHOUT LONG-TERM CURRENT USE OF INSULIN (H): ICD-10-CM

## 2020-05-29 DIAGNOSIS — E78.5 HYPERLIPIDEMIA LDL GOAL <100: ICD-10-CM

## 2020-05-29 DIAGNOSIS — N18.30 TYPE 2 DIABETES MELLITUS WITH STAGE 3 CHRONIC KIDNEY DISEASE, WITHOUT LONG-TERM CURRENT USE OF INSULIN (H): ICD-10-CM

## 2020-06-01 RX ORDER — METFORMIN HCL 500 MG
TABLET, EXTENDED RELEASE 24 HR ORAL
Qty: 180 TABLET | Refills: 0 | Status: SHIPPED | OUTPATIENT
Start: 2020-06-01 | End: 2020-09-22

## 2020-06-01 RX ORDER — SIMVASTATIN 20 MG
TABLET ORAL
Qty: 90 TABLET | Refills: 0 | Status: SHIPPED | OUTPATIENT
Start: 2020-06-01 | End: 2020-09-22

## 2020-06-03 DIAGNOSIS — G47.00 INSOMNIA, UNSPECIFIED TYPE: ICD-10-CM

## 2020-06-04 NOTE — PROGRESS NOTES
Clinic Care Coordination Contact    Clinic Care Coordination Contact  OUTREACH    Referral Information:  Referral Source: IP Handoff  Primary Diagnosis: Pneumonia  Full assessment not completed as Pt declines Clinic Care Coordination at this time.     Chief Complaint   Patient presents with     Clinic Care Coordination - Post Hospital     Care coordination        Universal Utilization: Reviewed on this date.   Difficulty keeping appointments:: No  Compliance Concerns: No  No-Show Concerns: No  No PCP office visit in Past Year: No  Utilization    Last refreshed: 6/3/2020  8:03 AM:  Hospital Admissions 6           Last refreshed: 6/3/2020  8:03 AM:  ED Visits 5           Last refreshed: 6/3/2020  8:03 AM:  No Show Count (past year) 0              Current as of: 6/3/2020  8:03 AM            Clinical Concerns:  Current Medical Concerns:  Patient was hospitalized at [facility] from 5.9.2020 to 5.13.2020 with diagnoses of:  Sepsis secondary to recurrent aspiration pneumonia in the context of an unrepaired hiatal hernia  Sleep apnea on home CPAP  Chronic hypoxic respiratory failure at baseline on 2 L oxygen by nasal cannula  Type 2 diabetes mellitus  Hypertension  GERD  Hyperlipidemia  Anxiety and depression  Restless leg syndrome  Patient Active Problem List   Diagnosis     Essential hypertension, benign     Restless leg syndrome     CRISTÓBAL (obstructive sleep apnea)     CKD (chronic kidney disease) stage 3, GFR 30-59 ml/min (H)     Advanced directives, counseling/discussion     GENERALIZED ANXIETY DIS     Major depressive disorder, recurrent episode, moderate (H)     Health Care Home     GERD (gastroesophageal reflux disease)     Hyperlipidemia LDL goal <100     Eczema     Type 2 diabetes mellitus with stage 3 chronic kidney disease, without long-term current use of insulin (H)     Midline low back pain with left-sided sciatica     Morbid obesity (HCC)     Insomnia, unspecified type     Dyspnea, unspecified  type     Acute pain of left knee     Controlled substance agreement signed     Pneumonia     Pain of both shoulder joints     Aspiration pneumonia (H)     Current Behavioral Concerns: None noted at this time.     Education Provided to patient: Role of IVAN CC and reason for outreach.      Health Maintenance Reviewed: Not assessed  Clinical Pathway: None    Medication Management:  Independent. No noted concerns at this time regarding medication management.      Living Situation:  Current living arrangement:: I live in a private home with family, I live in a private home with spouse  Type of residence: Private home - stairs    Lifestyle & Psychosocial Needs:   Informal Support system:: Family, Spouse   Socioeconomic History     Marital status:      Spouse name: Not on file     Number of children: 3     Years of education: Not on file     Highest education level: Not on file   Occupational History     Occupation: Unit Clerk     Employer: [employer]     Comment: Labor and Delivery     Tobacco Use     Smoking status: Former Smoker     Last attempt to quit: 3/18/1979     Years since quittin.2     Smokeless tobacco: Never Used     Tobacco comment: quit    Substance and Sexual Activity     Alcohol use: Yes     Comment: Twice a month     Drug use: No     Sexual activity: Never     Comment: menopausal        Resources and Interventions:  Community Resources: None  Supplies used at home: Oxygen Tubing/Supplies    Referrals Placed: None     Goals: No Patient Stated Goal formed at this time.     Patient/Caregiver understanding: Pt reports understanding and denies any additional questions or concerns at this times. IVAN CC engaged in AIDET communication during encounter.    Patient endorses that all of her needs are met and that she is in the process of setting up her hernia surgery for when elective surgeries resume. Pt identifies no need for Clinic Care Coordination support at this time.    Plan: No  further outreaches will be made at this time unless a new referral is made or a change in the pt's status occurs. Patient was provided with this writer's contact information and encouraged to call with any questions or concerns.      Darlene Negron, [facility]  Clinic Care Coordinator  Ph. [phone]  [email or web address]

## 2020-06-05 NOTE — TELEPHONE ENCOUNTER
Called patient and she stated she has gotten refills from Dr. Wiley before.      Patient aware primary care provider is out of office until Monday and would like provider to review.  Noted that medication was discontinued on 4/13/20 stating reason was medication rec clean up.

## 2020-06-08 RX ORDER — CLONAZEPAM 1 MG/1
0.5 TABLET ORAL 2 TIMES DAILY
Qty: 30 TABLET | Refills: 3 | Status: SHIPPED | OUTPATIENT
Start: 2020-06-08 | End: 2020-07-09

## 2020-06-08 RX ORDER — CLONAZEPAM 0.5 MG/1
1 TABLET ORAL AT BEDTIME
Status: CANCELLED | OUTPATIENT
Start: 2020-06-08

## 2020-06-08 NOTE — ED AVS SNAPSHOT
[facility]    [address]    [facility] [address]    Phone:  [phone]    Fax:  [phone]                                       Nicole Reyes   MRN: [id number]    Department:  [facility]   Date of Visit:  8/3/2017           After Visit Summary Signature Page     I have received my discharge instructions, and my questions have been answered. I have discussed any challenges I see with this plan with the nurse or doctor.    ..........................................................................................................................................  Patient/Patient Representative Signature      ..........................................................................................................................................  Patient Representative Print Name and Relationship to Patient    ..................................................               ................................................  Date                                            Time    ..........................................................................................................................................  Reviewed by Signature/Title    ...................................................              ..............................................  Date                                                            Time           "Jailene Breen is a 52 year old male who is being evaluated via a billable telephone visit.      The patient has been notified of following:     \"This telephone visit will be conducted via a call between you and Santa Farrell PsyD LP. We have found that certain health care needs can be provided without the need for an in-person session.  This service lets us provide the care you need with a phone conversation.       If during the course of the call I feel a telephone visit is not appropriate, you will not be charged for this service.\"      Reviewed that patient is in a quiet private place, no recording without permission, all apps and notifications of any devices are turned off. Began the session by talking about the risks and benefits of telehealth, what to do if there is a break in the connection, reviewed the safety protocol for during and after sessions. The purpose of using telehealth for this session was due to the COVID-19 pandemic and was to reduce the spread of the disease and protect both the clinician and client.             Patient has given verbal consent for telephone visit? YES    Phone call contact time  Call Started at 8:00 AM  Call Ended at 8:55 AM    Total 55 minutes     Pain Diagnoses per pain provider:   Multiple joint pain      Ankylosing spondylitis of lumbosacral region (H)      Rheumatoid arthritis involving multiple joints (H)      Lumbar facet joint pain      Sacroiliac joint pain      Myofascial pain      Spondylosis of lumbar region without myelopathy or radiculopathy      DDD (degenerative disc disease), lumbar         DATA: Patient reports his pain is mildly improved. Patient's mood is variable - discussed some variability in his mood is 'normal' and it is of concern if it occurs more days than not and persists. Activity level is mildly increased. Stress level is stable - states despite feeling there are a lot of stressors, specifically financial, he is managing well. Sleep hygiene " continues to be poor - he reports he struggles to obtain good sleep due to pain still. States at times his stress will keep him from falling asleep, however this has been less of a problem lately. States he tosses and turns, tries to sleep in his recliner. Patient reports engaging in self-care for his pain 2-3 days. He is feeling better overall. He is exploring some new hobbies and plans to pace his activity so that his hands aren't impacted negatively. He is working on trying to stay on top of housework, noting this helps his mood and everyone else 'seems to step up too.'     ASSESSMENT: Easily engaged and readily utilizes skills discussed in sessions.  Progress toward goals: good.    Pain Status: improved    Emotional Status: had fluctuating course              Medication / chemical use concerns:  None    PLAN:   Next Appointment: Jailene Breen's next appointment is scheduled for 6/15 at 8:00 am.  Assignment/Objectives /interventions for next session: Continue to engage in gentle movement in the form of brief exercise or housework on a daily.     Telephone-Visit Details    Type of service:  Telephone Visit    Originating Location (pt. Location): Home    Distant Location (provider location):  [facility]     Mode of Communication:  Telephone    I have reviewed the note as documented above.  This accurately captures the substance of my conversation with the patient.    Santa Farrell PsyD LP  Licensed Psychologist  Outpatient Clinic Therapist  [facility]    Disclaimer: This note consists of symbols derived from keyboarding, dictation and/or voice recognition software. As a result, there may be errors in the script that have gone undetected. Please consider this when interpreting information found in this chart.

## 2020-06-11 ENCOUNTER — TELEPHONE (OUTPATIENT)
Dept: INTERNAL MEDICINE | Facility: CLINIC | Age: 65
End: 2020-06-11

## 2020-06-26 DIAGNOSIS — K21.9 GASTROESOPHAGEAL REFLUX DISEASE WITHOUT ESOPHAGITIS: ICD-10-CM

## 2020-06-26 RX ORDER — PANTOPRAZOLE SODIUM 40 MG/1
TABLET, DELAYED RELEASE ORAL
Qty: 90 TABLET | Refills: 2 | Status: SHIPPED | OUTPATIENT
Start: 2020-06-26 | End: 2020-12-10

## 2020-06-26 NOTE — TELEPHONE ENCOUNTER
Pending Prescriptions:                       Disp   Refills    pantoprazole (PROTONIX) 40 MG EC tablet [*90 tab*2            Sig: TAKE ONE TABLET BY MOUTH AT BEDTIME    Prescription approved per [facility] Refill Protocol.

## 2020-07-09 ENCOUNTER — VIRTUAL VISIT (OUTPATIENT)
Dept: SURGERY | Facility: CLINIC | Age: 65
End: 2020-07-09
Attending: INTERNAL MEDICINE
Payer: COMMERCIAL

## 2020-07-09 VITALS — HEIGHT: 59 IN | WEIGHT: 242 LBS | BODY MASS INDEX: 48.79 KG/M2

## 2020-07-09 DIAGNOSIS — N18.30 TYPE 2 DIABETES MELLITUS WITH STAGE 3 CHRONIC KIDNEY DISEASE, WITHOUT LONG-TERM CURRENT USE OF INSULIN (H): ICD-10-CM

## 2020-07-09 DIAGNOSIS — E11.22 TYPE 2 DIABETES MELLITUS WITH STAGE 3 CHRONIC KIDNEY DISEASE, WITHOUT LONG-TERM CURRENT USE OF INSULIN (H): ICD-10-CM

## 2020-07-09 DIAGNOSIS — K44.9 HIATAL HERNIA: Primary | ICD-10-CM

## 2020-07-09 DIAGNOSIS — E66.01 MORBID OBESITY (H): ICD-10-CM

## 2020-07-09 PROCEDURE — 99203 OFFICE O/P NEW LOW 30 MIN: CPT | Mod: TEL | Performed by: SURGERY

## 2020-07-09 ASSESSMENT — MIFFLIN-ST. JEOR: SCORE: 1553.33

## 2020-07-09 NOTE — PROGRESS NOTES
"Nicole Reyes is a 64 year old female who is being evaluated via a billable telephone visit.      The patient has been notified of following:     \"This telephone visit will be conducted via a call between you and your physician/provider. We have found that certain health care needs can be provided without the need for a physical exam.  This service lets us provide the care you need with a short phone conversation.  If a prescription is necessary we can send it directly to your pharmacy.  If lab work is needed we can place an order for that and you can then stop by our lab to have the test done at a later time.    Telephone visits are billed at different rates depending on your insurance coverage. During this emergency period, for some insurers they may be billed the same as an in-person visit.  Please reach out to your insurance provider with any questions.    If during the course of the call the physician/provider feels a telephone visit is not appropriate, you will not be charged for this service.\"    Patient has given verbal consent for Telephone visit?  Yes    What phone number would you like to be contacted at? [phone]    How would you like to obtain your AVS? Talat Do     Nicole Reyes is a 64 year old female who presents via phone visit today for the following health issues:She is been referred by her primary care physician to evaluate for acid reflux disease and hiatal hernia.  She describes that she has had multiple episodes of hospitalization for pneumonia.  Most recently she was hospitalized in May of this year for what has been presumably silent aspiration pneumonia.  She also has a longstanding history of acid reflux disease.  She states that her acid reflux symptoms are mostly controlled with medication.  She has been thoroughly evaluated and had imaging that showed a modest sized hiatal hernia.    Over the telephone today we had an extensive discussion regarding the " "pathophysiology of hiatal hernia and how this contributes to reflux.  I believe that she is having issues and symptoms related to this hiatal hernia and it likely needs to be repaired.  That being said we also discussed her overall health.  She has a BMI of 40.88 as well as multiple medical comorbid conditions related to obesity.  These include obstructive sleep apnea, type 2 diabetes, hypertension as well as her GERD.  All this being said I discussed with her that I felt that her best surgical option would be Carol-en-Y bypass with hiatal hernia repair.  This was described to her in detail.  She seemed amenable to bariatric surgery.  She is interested in starting the process to move forward with this.  She will be contacted by my bariatric clinic to initiate.             Review of Systems   Constitutional, HEENT, cardiovascular, pulmonary, gi and gu systems are negative, except as otherwise noted.       Objective   Reported vitals:  Ht 1.499 m (4' 11\")   Wt 109.8 kg (242 lb)   LMP 09/15/2007   BMI 48.88 kg/m     healthy, alert and no distress  PSYCH: Alert and oriented times 3; coherent speech, normal   rate and volume, able to articulate logical thoughts, able   to abstract reason, no tangential thoughts, no hallucinations   or delusions  Her affect is normal  RESP: No cough, no audible wheezing, able to talk in full sentences  Remainder of exam unable to be completed due to telephone visits            Assessment/Plan:  Hiatal hernia with acid reflux disease in addition to morbid obesity with multiple medical comorbidities as outlined above.  Patient expressed interest in proceeding with potential bariatric surgery.  She will be initiated in our bariatric clinic.    Total time spent was 40 minutes with greater than 50% in telephone conversation.      Phone call duration:  30 minutes    Teodoro Isidro MD            "

## 2020-07-10 ENCOUNTER — APPOINTMENT (OUTPATIENT)
Dept: GENERAL RADIOLOGY | Facility: CLINIC | Age: 65
DRG: 871 | End: 2020-07-10
Attending: EMERGENCY MEDICINE
Payer: COMMERCIAL

## 2020-07-10 ENCOUNTER — HOSPITAL ENCOUNTER (INPATIENT)
Facility: CLINIC | Age: 65
LOS: 4 days | Discharge: HOME | DRG: 871 | End: 2020-07-14
Attending: EMERGENCY MEDICINE | Admitting: INTERNAL MEDICINE
Payer: COMMERCIAL

## 2020-07-10 DIAGNOSIS — A41.9 SEPSIS DUE TO PNEUMONIA (H): ICD-10-CM

## 2020-07-10 DIAGNOSIS — J69.0 ASPIRATION PNEUMONIA OF LEFT LOWER LOBE, UNSPECIFIED ASPIRATION PNEUMONIA TYPE (H): ICD-10-CM

## 2020-07-10 DIAGNOSIS — J96.21 ACUTE ON CHRONIC RESPIRATORY FAILURE WITH HYPOXIA (H): ICD-10-CM

## 2020-07-10 DIAGNOSIS — J18.9 PNEUMONIA OF LEFT UPPER LOBE DUE TO INFECTIOUS ORGANISM: Primary | ICD-10-CM

## 2020-07-10 DIAGNOSIS — J18.9 SEPSIS DUE TO PNEUMONIA (H): ICD-10-CM

## 2020-07-10 LAB
ALBUMIN SERPL-MCNC: 3.2 G/DL (ref 3.4–5)
ALP SERPL-CCNC: 102 U/L (ref 40–150)
ALT SERPL W P-5'-P-CCNC: 46 U/L (ref 0–50)
ANION GAP SERPL CALCULATED.3IONS-SCNC: 4 MMOL/L (ref 3–14)
AST SERPL W P-5'-P-CCNC: 34 U/L (ref 0–45)
BASOPHILS # BLD AUTO: 0 10E9/L (ref 0–0.2)
BASOPHILS NFR BLD AUTO: 0.2 %
BILIRUB SERPL-MCNC: 0.4 MG/DL (ref 0.2–1.3)
BUN SERPL-MCNC: 10 MG/DL (ref 7–30)
CALCIUM SERPL-MCNC: 8.8 MG/DL (ref 8.5–10.1)
CHLORIDE SERPL-SCNC: 105 MMOL/L (ref 94–109)
CO2 SERPL-SCNC: 30 MMOL/L (ref 20–32)
CREAT SERPL-MCNC: 0.82 MG/DL (ref 0.52–1.04)
DIFFERENTIAL METHOD BLD: ABNORMAL
EOSINOPHIL # BLD AUTO: 0.1 10E9/L (ref 0–0.7)
EOSINOPHIL NFR BLD AUTO: 0.3 %
ERYTHROCYTE [DISTWIDTH] IN BLOOD BY AUTOMATED COUNT: 16.2 % (ref 10–15)
GFR SERPL CREATININE-BSD FRML MDRD: 75 ML/MIN/{1.73_M2}
GLUCOSE BLDC GLUCOMTR-MCNC: 129 MG/DL (ref 70–99)
GLUCOSE BLDC GLUCOMTR-MCNC: 148 MG/DL (ref 70–99)
GLUCOSE SERPL-MCNC: 152 MG/DL (ref 70–99)
HCT VFR BLD AUTO: 43.3 % (ref 35–47)
HGB BLD-MCNC: 13 G/DL (ref 11.7–15.7)
IMM GRANULOCYTES # BLD: 0.1 10E9/L (ref 0–0.4)
IMM GRANULOCYTES NFR BLD: 0.5 %
LACTATE BLD-SCNC: 1.9 MMOL/L (ref 0.7–2)
LYMPHOCYTES # BLD AUTO: 0.7 10E9/L (ref 0.8–5.3)
LYMPHOCYTES NFR BLD AUTO: 3.8 %
MCH RBC QN AUTO: 25.1 PG (ref 26.5–33)
MCHC RBC AUTO-ENTMCNC: 30 G/DL (ref 31.5–36.5)
MCV RBC AUTO: 84 FL (ref 78–100)
MONOCYTES # BLD AUTO: 0.6 10E9/L (ref 0–1.3)
MONOCYTES NFR BLD AUTO: 3.5 %
NEUTROPHILS # BLD AUTO: 16.1 10E9/L (ref 1.6–8.3)
NEUTROPHILS NFR BLD AUTO: 91.7 %
NRBC # BLD AUTO: 0 10*3/UL
NRBC BLD AUTO-RTO: 0 /100
PLATELET # BLD AUTO: 215 10E9/L (ref 150–450)
POTASSIUM SERPL-SCNC: 3.6 MMOL/L (ref 3.4–5.3)
PROT SERPL-MCNC: 7.3 G/DL (ref 6.8–8.8)
RBC # BLD AUTO: 5.17 10E12/L (ref 3.8–5.2)
SARS-COV-2 PCR COMMENT: NORMAL
SARS-COV-2 RNA SPEC QL NAA+PROBE: NEGATIVE
SARS-COV-2 RNA SPEC QL NAA+PROBE: NORMAL
SODIUM SERPL-SCNC: 139 MMOL/L (ref 133–144)
SPECIMEN SOURCE: NORMAL
SPECIMEN SOURCE: NORMAL
WBC # BLD AUTO: 17.5 10E9/L (ref 4–11)

## 2020-07-10 PROCEDURE — 87040 BLOOD CULTURE FOR BACTERIA: CPT | Performed by: EMERGENCY MEDICINE

## 2020-07-10 PROCEDURE — 71045 X-RAY EXAM CHEST 1 VIEW: CPT

## 2020-07-10 PROCEDURE — 00000146 ZZHCL STATISTIC GLUCOSE BY METER IP

## 2020-07-10 PROCEDURE — 84145 PROCALCITONIN (PCT): CPT | Performed by: INTERNAL MEDICINE

## 2020-07-10 PROCEDURE — 25000128 H RX IP 250 OP 636: Performed by: INTERNAL MEDICINE

## 2020-07-10 PROCEDURE — 25000132 ZZH RX MED GY IP 250 OP 250 PS 637: Performed by: EMERGENCY MEDICINE

## 2020-07-10 PROCEDURE — 25800030 ZZH RX IP 258 OP 636: Performed by: EMERGENCY MEDICINE

## 2020-07-10 PROCEDURE — 25000128 H RX IP 250 OP 636: Performed by: EMERGENCY MEDICINE

## 2020-07-10 PROCEDURE — 83605 ASSAY OF LACTIC ACID: CPT | Performed by: EMERGENCY MEDICINE

## 2020-07-10 PROCEDURE — 80053 COMPREHEN METABOLIC PANEL: CPT | Performed by: EMERGENCY MEDICINE

## 2020-07-10 PROCEDURE — 25800030 ZZH RX IP 258 OP 636: Performed by: INTERNAL MEDICINE

## 2020-07-10 PROCEDURE — 99223 1ST HOSP IP/OBS HIGH 75: CPT | Mod: AI | Performed by: INTERNAL MEDICINE

## 2020-07-10 PROCEDURE — U0003 INFECTIOUS AGENT DETECTION BY NUCLEIC ACID (DNA OR RNA); SEVERE ACUTE RESPIRATORY SYNDROME CORONAVIRUS 2 (SARS-COV-2) (CORONAVIRUS DISEASE [COVID-19]), AMPLIFIED PROBE TECHNIQUE, MAKING USE OF HIGH THROUGHPUT TECHNOLOGIES AS DESCRIBED BY CMS-2020-01-R: HCPCS | Performed by: EMERGENCY MEDICINE

## 2020-07-10 PROCEDURE — 96366 THER/PROPH/DIAG IV INF ADDON: CPT

## 2020-07-10 PROCEDURE — 12000000 ZZH R&B MED SURG/OB

## 2020-07-10 PROCEDURE — 96365 THER/PROPH/DIAG IV INF INIT: CPT

## 2020-07-10 PROCEDURE — 36415 COLL VENOUS BLD VENIPUNCTURE: CPT | Performed by: INTERNAL MEDICINE

## 2020-07-10 PROCEDURE — 99285 EMERGENCY DEPT VISIT HI MDM: CPT | Mod: 25

## 2020-07-10 PROCEDURE — 25000132 ZZH RX MED GY IP 250 OP 250 PS 637: Performed by: INTERNAL MEDICINE

## 2020-07-10 PROCEDURE — 85025 COMPLETE CBC W/AUTO DIFF WBC: CPT | Performed by: EMERGENCY MEDICINE

## 2020-07-10 PROCEDURE — C9803 HOPD COVID-19 SPEC COLLECT: HCPCS

## 2020-07-10 RX ORDER — HEPARIN SODIUM 5000 [USP'U]/.5ML
5000 INJECTION, SOLUTION INTRAVENOUS; SUBCUTANEOUS EVERY 12 HOURS
Status: DISCONTINUED | OUTPATIENT
Start: 2020-07-10 | End: 2020-07-14 | Stop reason: HOSPADM

## 2020-07-10 RX ORDER — GABAPENTIN 300 MG/1
900 CAPSULE ORAL AT BEDTIME
Status: DISCONTINUED | OUTPATIENT
Start: 2020-07-10 | End: 2020-07-14 | Stop reason: HOSPADM

## 2020-07-10 RX ORDER — ACETAMINOPHEN 325 MG/1
650 TABLET ORAL EVERY 4 HOURS PRN
Status: DISCONTINUED | OUTPATIENT
Start: 2020-07-10 | End: 2020-07-14 | Stop reason: HOSPADM

## 2020-07-10 RX ORDER — DEXTROSE MONOHYDRATE 25 G/50ML
25-50 INJECTION, SOLUTION INTRAVENOUS
Status: DISCONTINUED | OUTPATIENT
Start: 2020-07-10 | End: 2020-07-14 | Stop reason: HOSPADM

## 2020-07-10 RX ORDER — LIDOCAINE 40 MG/G
CREAM TOPICAL
Status: DISCONTINUED | OUTPATIENT
Start: 2020-07-10 | End: 2020-07-14 | Stop reason: HOSPADM

## 2020-07-10 RX ORDER — ACETAMINOPHEN 500 MG
500 TABLET ORAL EVERY 4 HOURS PRN
Status: DISCONTINUED | OUTPATIENT
Start: 2020-07-10 | End: 2020-07-10

## 2020-07-10 RX ORDER — SIMVASTATIN 20 MG
20 TABLET ORAL AT BEDTIME
Status: DISCONTINUED | OUTPATIENT
Start: 2020-07-10 | End: 2020-07-14 | Stop reason: HOSPADM

## 2020-07-10 RX ORDER — SERTRALINE HYDROCHLORIDE 100 MG/1
100 TABLET, FILM COATED ORAL DAILY
Status: DISCONTINUED | OUTPATIENT
Start: 2020-07-11 | End: 2020-07-10

## 2020-07-10 RX ORDER — SODIUM CHLORIDE 9 MG/ML
INJECTION, SOLUTION INTRAVENOUS CONTINUOUS
Status: ACTIVE | OUTPATIENT
Start: 2020-07-10 | End: 2020-07-11

## 2020-07-10 RX ORDER — NICOTINE POLACRILEX 4 MG
15-30 LOZENGE BUCCAL
Status: DISCONTINUED | OUTPATIENT
Start: 2020-07-10 | End: 2020-07-14 | Stop reason: HOSPADM

## 2020-07-10 RX ORDER — NALOXONE HYDROCHLORIDE 0.4 MG/ML
.1-.4 INJECTION, SOLUTION INTRAMUSCULAR; INTRAVENOUS; SUBCUTANEOUS
Status: DISCONTINUED | OUTPATIENT
Start: 2020-07-10 | End: 2020-07-14 | Stop reason: HOSPADM

## 2020-07-10 RX ORDER — SERTRALINE HYDROCHLORIDE 100 MG/1
200 TABLET, FILM COATED ORAL DAILY
Status: DISCONTINUED | OUTPATIENT
Start: 2020-07-11 | End: 2020-07-14 | Stop reason: HOSPADM

## 2020-07-10 RX ORDER — ACETAMINOPHEN 500 MG
1000 TABLET ORAL ONCE
Status: COMPLETED | OUTPATIENT
Start: 2020-07-10 | End: 2020-07-10

## 2020-07-10 RX ORDER — CLONAZEPAM 1 MG/1
1 TABLET ORAL AT BEDTIME
Status: DISCONTINUED | OUTPATIENT
Start: 2020-07-10 | End: 2020-07-14 | Stop reason: HOSPADM

## 2020-07-10 RX ORDER — BLOOD SUGAR DIAGNOSTIC
STRIP MISCELLANEOUS
Qty: 100 EACH | Refills: 3 | Status: SHIPPED | OUTPATIENT
Start: 2020-07-10 | End: 2020-12-15

## 2020-07-10 RX ORDER — ASPIRIN 81 MG/1
81 TABLET ORAL EVERY EVENING
Status: DISCONTINUED | OUTPATIENT
Start: 2020-07-10 | End: 2020-07-14 | Stop reason: HOSPADM

## 2020-07-10 RX ORDER — ALBUTEROL SULFATE 90 UG/1
1-2 AEROSOL, METERED RESPIRATORY (INHALATION) EVERY 6 HOURS PRN
Status: DISCONTINUED | OUTPATIENT
Start: 2020-07-10 | End: 2020-07-11

## 2020-07-10 RX ORDER — PANTOPRAZOLE SODIUM 40 MG/1
40 TABLET, DELAYED RELEASE ORAL
Status: DISCONTINUED | OUTPATIENT
Start: 2020-07-10 | End: 2020-07-14 | Stop reason: HOSPADM

## 2020-07-10 RX ADMIN — GABAPENTIN 900 MG: 300 CAPSULE ORAL at 22:53

## 2020-07-10 RX ADMIN — TAZOBACTAM SODIUM AND PIPERACILLIN SODIUM 3.38 G: 375; 3 INJECTION, SOLUTION INTRAVENOUS at 22:05

## 2020-07-10 RX ADMIN — ASPIRIN 81 MG 81 MG: 81 TABLET ORAL at 22:56

## 2020-07-10 RX ADMIN — CLONAZEPAM 1 MG: 1 TABLET ORAL at 22:54

## 2020-07-10 RX ADMIN — Medication 1 MG: at 22:54

## 2020-07-10 RX ADMIN — SIMVASTATIN 20 MG: 20 TABLET, FILM COATED ORAL at 21:09

## 2020-07-10 RX ADMIN — ACETAMINOPHEN 1000 MG: 500 TABLET, FILM COATED ORAL at 15:25

## 2020-07-10 RX ADMIN — SODIUM CHLORIDE: 9 INJECTION, SOLUTION INTRAVENOUS at 21:02

## 2020-07-10 RX ADMIN — HEPARIN SODIUM 5000 UNITS: 10000 INJECTION, SOLUTION INTRAVENOUS; SUBCUTANEOUS at 21:07

## 2020-07-10 RX ADMIN — PANTOPRAZOLE SODIUM 40 MG: 40 TABLET, DELAYED RELEASE ORAL at 21:09

## 2020-07-10 RX ADMIN — SODIUM CHLORIDE 1000 ML: 9 INJECTION, SOLUTION INTRAVENOUS at 16:50

## 2020-07-10 RX ADMIN — TAZOBACTAM SODIUM AND PIPERACILLIN SODIUM 4.5 G: 500; 4 INJECTION, SOLUTION INTRAVENOUS at 15:26

## 2020-07-10 ASSESSMENT — ENCOUNTER SYMPTOMS
SHORTNESS OF BREATH: 1
MYALGIAS: 1
CHILLS: 1

## 2020-07-10 ASSESSMENT — MIFFLIN-ST. JEOR
SCORE: 1537.45
SCORE: 1556.96

## 2020-07-10 NOTE — ED PROVIDER NOTES
"  History     Chief Complaint:  Chills; Generalized Body Aches; Pharyngitis; Headache; and Shortness of Breath               HPI:  Nicole Reyes is a 64 year old female with a history of Type II diabetes, hypercholesterolemia, and hypertension, who presents with chills, body aches, headache, and shortness of breath. The patient reports she woke up at 0300 with chills, and has since had worsening symptoms including body aches, shortness of breath, and headache prompting her visit to the emergency department.  She reports multiple previous hospitalizations for pneumonia, and is suspicious that this is ongoing again.  She reports having an appointment yesterday with a surgeon to discuss fixing her hiatal hernia.  She denies recent COVID-19 exposures.  She reports using home oxygen only at night.  She is noted to be 83% on room air on my arrival to room.    Allergies:  Codeine  Penicillins     Medications:    Albuterol  Aspirin 81 mg  Klonopin  Neurontin  Cozaar  Metformin  Protonix  Zoloft  Zocor  Ambien    Past Medical History:    CKD  Hypertension  WADE  Anxiety  Abdominal hernia  Hiatal hernia  Insomnia  Iron deficiency anemia  Depression  Obesity  CRISTÓBAL  Hypercholesterolemia   Type II diabetes  Restless leg syndrome    Past Surgical History:     section  Hernia repair    Family History:    Hypertension  CAD, mother  Hyperlipidemia  Leukemia  Hodgkin's  Diabetes  Lupus    Social History:  Smoking status: Former  Alcohol use: Yes  Drug use: No  Patient presents alone.  PCP: Pari Wiley    Marital Status:       Review of Systems   Constitutional: Positive for chills.   Respiratory: Positive for shortness of breath.    Musculoskeletal: Positive for myalgias.   All other systems reviewed and are negative.      Physical Exam     Patient Vitals for the past 24 hrs:   BP Temp Temp src Heart Rate Resp SpO2 Height Weight   07/10/20 1310 (!) 137/97 102.2  F (39  C) Oral 118 (!) 32 91 % 1.473 m (4' 10\") " 109.8 kg (242 lb)      Physical Exam    Nursing note and vitals reviewed.  Constitutional: Cooperative.   HENT:   Mouth/Throat: Moist mucous membranes.   Eyes: EOMI, nonicteric sclera  Cardiovascular: Tachycardic, regular rhythm, no murmurs, rubs, or gallops  Pulmonary/Chest: Mildly tachypneic.  Coarse rhonchi noted in left lung.  Right lung clear.  Abdominal: Soft. Nontender, nondistended, no guarding or rigidity.   Musculoskeletal: Normal range of motion.   Neurological: Alert. Moves all extremities spontaneously.   Skin: Skin is warm and dry. No rash noted.   Psychiatric: Normal mood and affect.      Emergency Department Course     Imaging:  Radiology findings were communicated with the patient who voiced understanding of the findings.    XR Chest Port 1 Views:  IMPRESSION: Questionable opacity projected over the mid left lung   could represent viral or bacterial pneumonia. No pneumothorax or   pleural effusion.   Report per radiology     Laboratory:  Laboratory findings were communicated with the patient who voiced understanding of the findings.    CBC: WBC 17.5 (H), HGB 13.0,    CMP: Glucose 152 (H), Albumin 3.2 (L) o/w WNL. (Creatinine 0.82)   Lactic Acid: 1.9     Symptomatic COVID-19 Virus (Coronavirus) by PCR: Pending    Blood cultures: Pending     Interventions:  1525 Tylenol 1000 mg PO  1526 Zosyn intermittent infusion 4.5 g IV       Emergency Department Course:  Nursing notes and vitals reviewed.  1401: I performed an exam of the patient as documented above.    IV was inserted and blood was drawn for laboratory testing, results above.    The patient was sent for a Chest Port while in the emergency department, results above.         1523 I spoke with Dr. Vincent of the Hospitalist service regarding patient's presentation, findings, and plan of care.     1537 I spoke with BLADIMIR Drake to Dr. Mcgowan of the Hospitalist service regarding patient's presentation, findings, and plan of care.     Findings and  plan explained to the Patient who consents to admission. Discussed the patient with Dr. Vincent, who will admit the patient to a  bed for further monitoring, evaluation, and treatment.   I personally reviewed the laboratory and imaging results with the Patient and answered all related questions prior to admission.   Impression & Plan         Covid-19  Nicole Reyes was evaluated during a global COVID-19 pandemic, which necessitated consideration that the patient might be at risk for infection with the SARS-CoV-2 virus that causes COVID-19.   Applicable protocols for evaluation were followed during the patient's care.   COVID-19 was considered as part of the patient's evaluation. The plan for testing is:  a test was obtained during this visit.       Medical Decision Making:  Patient presents with constellation of symptoms suggestive of infection.  She has physical exam findings notable for left lung rhonchi, and her chest x-ray suggests left lung pneumonia.  She has history of this.  Per chart review, this is thought to be most likely aspiration related to her hiatal hernia.  Patient has a penicillin allergy, therefore I ordered Zosyn as opposed to Unasyn as she has tolerated this in the past.  No signs of severe sepsis at this time.  She is requiring oxygen.  COVID swab is pending, but given her history, I suspect this is bacterial.  Discussed with Dr. Vincent from our hospitalist service who accepts patient for admission.  All of her questions were answered and she is in agreement with the plan.    Diagnosis:    ICD-10-CM    1. Aspiration pneumonia of left lower lobe, unspecified aspiration pneumonia type (H)  J69.0 Blood culture   2. Acute on chronic respiratory failure with hypoxia (H)  J96.21    3. Sepsis due to pneumonia (H)  J18.9     A41.9        Disposition:  Admitted under the supervision of Dr. Vincent      Scribe Disclosure:  Ashley CHAVES, am serving as a scribe at 2:04 PM on 7/10/2020 to document  services personally performed by Ruben Villatoro MD based on my observations and the provider's statements to me.    Ashley Pope  7/10/2020   [facility] EMERGENCY DEPARTMENT       Ruben Villatoro MD  07/11/20 0319

## 2020-07-10 NOTE — ED NOTES
[facility]  ED Nurse Handoff Report    Nicole Reyes is a 64 year old female   ED Chief complaint: Chills; Generalized Body Aches; Pharyngitis; Headache; and Shortness of Breath  . ED Diagnosis:   Final diagnoses:   Aspiration pneumonia of left lower lobe, unspecified aspiration pneumonia type (H)   Acute on chronic respiratory failure with hypoxia (H)   Sepsis due to pneumonia (H)     Allergies:   Allergies   Allergen Reactions     Codeine Rash     Penicillins Rash     Pt has tolerated cephalosporins and penems.   Pt tolerated Zosyn 7/6/2019       Code Status: Full Code  Activity level - Baseline/Home:  Independent. Activity Level - Current:   Assist X 1. Lift room needed: No. Bariatric: No   Needed: No   Isolation: Yes. Infection: Not Applicable  Other .     Vital Signs:   Vitals:    07/10/20 1845 07/10/20 1900 07/10/20 1915 07/10/20 1930   BP: 111/53 120/62 133/72 (!) 144/68   Pulse: 74 76 88 83   Resp:    18   Temp:    98.9   TempSrc:    oral   SpO2: 92% 98% 92% 90%   Weight:    109.8kg   Height:         Cardiac Rhythm:  ,      Pain level: 0-10 Pain Scale: 8  Patient confused: No. Patient Falls Risk: Yes.   Elimination Status: Has voided   Patient Report - Initial Complaint: Pt woke at 0300 with chills.  Sxs worsened since with body aches, shortness of breath, headache.. Focused Assessment:    Cognitive - Cognitive/Neuro/Behavioral WDL: all  Level Of Consciousness: alert  Arousal Level: opens eyes spontaneously  Orientation: oriented x 4  Follows Commands: yes  Speech: spontaneous; logical; clear  Best Language: 0 - No aphasia  Mood/Behavior: calm; cooperative; behavior appropriate to situation   Noel Coma Scale - Best Eye Response: 4-->(E4) spontaneous  Best Motor Response: 6-->(M6) obeys commands  Best Verbal Response: 5-->(V5) oriented  [place] Coma Scale Score: 15  KF     15:34 Respiratory Respiratory - Respiratory WDL: all  Rhythm/Pattern, Respiratory: pattern regular;  unlabored; depth regular  Expansion/Accessory Muscles/Retractions: no retractions; expansion symmetric; no use of accessory muscles         Tests Performed: labs, xray. Abnormal Results:   Labs Ordered and Resulted from Time of ED Arrival Up to the Time of Departure from the ED   COMPREHENSIVE METABOLIC PANEL - Abnormal; Notable for the following components:       Result Value    Glucose 152 (*)     Albumin 3.2 (*)     All other components within normal limits   CBC WITH PLATELETS DIFFERENTIAL - Abnormal; Notable for the following components:    WBC 17.5 (*)     MCH 25.1 (*)     MCHC 30.0 (*)     RDW 16.2 (*)     Absolute Neutrophil 16.1 (*)     Absolute Lymphocytes 0.7 (*)     All other components within normal limits   LACTIC ACID WHOLE BLOOD   COVID-19 VIRUS (CORONAVIRUS) BY PCR   PERIPHERAL IV CATHETER   PULSE OXIMETRY NURSING   CARDIAC CONTINUOUS MONITORING   NURSING DRAW AND HOLD   ISTAT CG4 GASES LACTATE NIMO NURSING POCT   PULSE OXIMETRY NURSING   CARDIAC CONTINUOUS MONITORING   STRICT INTAKE AND OUTPUT   PERIPHERAL IV CATHETER   BLOOD CULTURE   BLOOD CULTURE     XR Chest Port 1 View   Final Result   IMPRESSION: Questionable opacity projected over the mid left lung   could represent viral or bacterial pneumonia. No pneumothorax or   pleural effusion.      JENNIFER MATHEW MD        .   Treatments provided: abx  Family Comments: NA  OBS brochure/video discussed/provided to patient:  N/A  ED Medications:   Medications   sodium chloride (PF) 0.9% PF flush 3 mL (has no administration in time range)   sodium chloride (PF) 0.9% PF flush 3 mL (has no administration in time range)   0.9% sodium chloride BOLUS (has no administration in time range)   sodium chloride (PF) 0.9% PF flush 3 mL (has no administration in time range)   sodium chloride (PF) 0.9% PF flush 3 mL (has no administration in time range)   piperacillin-tazobactam (ZOSYN) intermittent infusion 4.5 g (4.5 g Intravenous New Bag 7/10/20 9130)    acetaminophen (TYLENOL) tablet 1,000 mg (1,000 mg Oral Given 7/10/20 1525)     Drips infusing:  Yes  For the majority of the shift, the patient's behavior Green. Interventions performed were NA.    Sepsis treatment initiated: No       ED Nurse Name/Phone Number: Rosey Elias RN,   3:31 PM    RECEIVING UNIT ED HANDOFF REVIEW    Above ED Nurse Handoff Report was reviewed: Yes  Reviewed by: Jolly Perez RN on July 10, 2020 at 7:36 PM

## 2020-07-10 NOTE — TELEPHONE ENCOUNTER
Pending Prescriptions:                       Disp   Refills    blood glucose (ACCU-CHEK BYRON PLUS) test*100 ea*3            Sig: USE TO TEST BLOOD SUGAR TWO TIMES A DAY OR AS           DIRECTED    Prescription approved per [facility] Refill Protocol.

## 2020-07-10 NOTE — H&P
"  [facility]    History and Physical - Hospitalist Service       Date of Admission:  7/10/2020    Assessment & Plan   Nicole Reyes is a 64 year old female admitted on 7/10/2020. She has a hx of CRISTÓBAL on BiPAP, HTN, type 2 DM, RLS who woke up with chills, myalgias, HA and SOB. Noted to be febrile here with leucocytosis and CXR showed ? Opacity mid left lung likely a PNA. Her temp at home was 101 F. She denies any sick contacts. She denies any cough. Her myalgias are everywhere. She states she has a hx of aspiration PNA's which can occur when laying and standing. Mild HA in temples 7/10. No neck stiffness/photophobia. Discussed care with DR Tse and am asked to admit for further eval.      1. Sepsis due to PNA.  - ? Aspiration versus COVID.  - IV zosyn.  - procal.  - SLP.    2. CRISTÓBAL.  - oxygen at night, whilst COVID pending.    3. DM.  - sliding scale insulin.       Diet: ADA diet.  DVT Prophylaxis: Enoxaparin (Lovenox) SQ  Suarez Catheter: not present  Code Status: Full Code.  Rule Out COVID-19 Handoff:  Nicole is NOT A LOW SUSPICION PUI (needs further investigation).    Follow these instructions:    If COVID test is positive -> continue isolation precautions    If 1st COVID test is negative -> continue isolation precautions    -  Order a repeat COVID test to be done 72 hours after the 1st test  -  Place \"PUI Isolation\" nurse communication order  -  Consider ID consult    If 2nd COVID test performed after 72 hours is negative -> consider discontinuing COVID-specific isolation precautions if clinical course atypical for COVID and/or an alternative diagnosis emerges       Disposition Plan   Expected discharge: 2 - 3 days, recommended to prior living arrangement once SIRS/Sepsis treated.  Entered: Syd Vincent MD 07/10/2020, 3:24 PM     The patient's care was discussed with the Patient.    Syd Vincent MD  [facility]    ______________________________________________________________________    Chief Complaint     Chills/myalgias, SOB and HA today.    History is obtained from the patient    History of Present Illness   Nicole Reyes is a 64 year old female who has a hx of CRISTÓBAL on BiPAP, HTN, type 2 DM, RLS who woke up with chills, myalgias, HA and SOB. Noted to be febrile here with leucocytosis and CXR showed ? Opacity mid left lung likely a PNA. Her temp at home was 101 F. She denies any sick contacts. She denies any cough. Her myalgias are everywhere. She states she has a hx of aspiration PNA's which can occur when laying and standing. Mild HA in temples 7/10. No neck stiffness/photophobia. Discussed care with DR Tse and am asked to admit for further eval.      Review of Systems    The 10 point Review of Systems is negative other than noted in the HPI or here.     Past Medical History    I have reviewed this patient's medical history and updated it with pertinent information if needed.   Past Medical History:   Diagnosis Date     Blood transfusion      CKD (chronic kidney disease) stage 3, GFR 30-59 ml/min (H)      Essential hypertension, benign      Gastroesophageal reflux disease      Generalized anxiety disorder      Hernia, abdominal      Hiatal hernia      History of blood transfusion 2011    with a hernia repair     Hypertension      Insomnia, unspecified      Iron deficiency anemia 8/09     Major depression     sees a psychiatrist     Menopause     age 53     Obesity, unspecified      CRISTÓBAL (obstructive sleep apnea)     bipap     Oxygen dependent     2 LPM at home     Postoperative seroma 10/11    drained several times     Pure hypercholesterolemia      RLS (restless legs syndrome)      Type II or unspecified type diabetes mellitus without mention of complication, not stated as uncontrolled        Past Surgical History   I have reviewed this patient's surgical history and updated it with pertinent  information if needed.  Past Surgical History:   Procedure Laterality Date     BREAST BIOPSY, RT/LT      2 times; benign     C NONSPECIFIC PROCEDURE      Hernia repair      C NONSPECIFIC PROCEDURE      Lymph node biopsy in neck (benign)       SECTION        SECTION        SECTION       DILATION AND CURETTAGE, HYSTEROSCOPY DIAGNOSTIC, COMBINED  3/22/2013    Procedure: COMBINED DILATION AND CURETTAGE, HYSTEROSCOPY DIAGNOSTIC;  DILATION AND CURETTAGE, HYSTEROSCOPY DIAGNOSTIC   Converted to a general;  Surgeon: Robi Edwards MD;  Location: [facility] OR     ESOPHAGOSCOPY, GASTROSCOPY, DUODENOSCOPY (EGD), COMBINED N/A 2015    Procedure: COMBINED ESOPHAGOSCOPY, GASTROSCOPY, DUODENOSCOPY (EGD);  Surgeon: Obinna Gutierrez MD;  Location:  GI     ESOPHAGOSCOPY, GASTROSCOPY, DUODENOSCOPY (EGD), COMBINED N/A 2015    Procedure: COMBINED ENDOSCOPIC ULTRASOUND, ESOPHAGOSCOPY, GASTROSCOPY, DUODENOSCOPY (EGD), FINE NEEDLE ASPIRATE/BIOPSY;  Surgeon: Sabine Olivares MD;  Location:  GI     ESOPHAGOSCOPY, GASTROSCOPY, DUODENOSCOPY (EGD), COMBINED N/A 1/3/2020    Procedure: ESOPHAGOGASTRODUODENOSCOPY;  Surgeon: Ascencion Stauffer MD;  Location: [facility] OR     HERNIORRHAPHY INCISIONAL (LOCATION)  10/8/2011     incarcerated hernia repair with mesh;     HERNIORRHAPHY INCISIONAL (LOCATION)  10/16/2011     repair incisional hernia with mesh, and removal of current implanted mesh;       Social History   I have reviewed this patient's social history and updated it with pertinent information if needed.      Family History   I have reviewed this patient's family history and updated it with pertinent information if needed.  Family History   Problem Relation Age of Onset     Cardiovascular Mother         CHF     Hypertension Mother      C.A.D. Mother         50s     Lipids Mother      Cancer Father         Hodgkin's, Leukemia     Diabetes Sister      Connective Tissue Disorder Sister         Lupus      Lipids Sister      Hypertension Brother      Hypertension Sister      Hypertension Sister      Cancer Brother         Hodgkin's     C.A.D. Brother 61     Hypertension Sister      C.A.D. Brother      Basal cell carcinoma Daughter 38        top of head       Prior to Admission Medications   Prior to Admission Medications   Prescriptions Last Dose Informant Patient Reported? Taking?   albuterol (PROAIR HFA/PROVENTIL HFA/VENTOLIN HFA) 108 (90 Base) MCG/ACT inhaler   No No   Sig: Inhale 1-2 puffs into the lungs every 6 hours as needed for shortness of breath / dyspnea or wheezing   aspirin 81 MG EC tablet   Yes No   Sig: Take 81 mg by mouth every evening    blood glucose (ACCU-CHEK BYRON PLUS) test strip   No No   Sig: USE TO TEST BLOOD SUGAR TWO TIMES A DAY OR AS DIRECTED   clonazePAM (KLONOPIN) 0.5 MG tablet   Yes No   Sig: Take 1 mg by mouth At Bedtime    gabapentin (NEURONTIN) 300 MG capsule   No No   Sig: Take 3 capsules (900 mg) by mouth daily   losartan (COZAAR) 100 MG tablet   No No   Sig: TAKE ONE TABLET BY MOUTH ONCE DAILY   metFORMIN (GLUCOPHAGE-XR) 500 MG 24 hr tablet   No No   Sig: TAKE TWO TABLETS BY MOUTH EVERY MORNING   pantoprazole (PROTONIX) 40 MG EC tablet   No No   Sig: TAKE ONE TABLET BY MOUTH AT BEDTIME   sertraline (ZOLOFT) 100 MG tablet   No No   Sig: TAKE TWO TABLETS BY MOUTH ONCE DAILY   simvastatin (ZOCOR) 20 MG tablet   No No   Sig: TAKE ONE TABLET BY MOUTH AT BEDTIME   zolpidem (AMBIEN) 10 MG tablet   No No   Sig: TAKE 1 TABLET BY MOUTH EVERY NIGHT AT BEDTIME      Facility-Administered Medications: None     Allergies   Allergies   Allergen Reactions     Codeine Rash     Penicillins Rash     Pt has tolerated cephalosporins and penems.   Pt tolerated Zosyn 7/6/2019       Physical Exam   Vital Signs: Temp: 102.2  F (39  C) Temp src: Oral BP: (!) 137/97   Heart Rate: 118 Resp: (!) 32 SpO2: 91 % O2 Device: None (Room air)    Weight: 242 lbs 0 oz    Gen - AAO x 3 in NAD.  Lungs - CTA B.  Heart  "- RR,S1+S2 nml, no m/g/r.  Abd - soft, NT, ND, + BS.  Ext - no edema.  Skin - + ecchymosis LE's.  Neuro - CN II-XII wnl, GIBBONS\"s.    Data   Data reviewed today: I reviewed all medications, new labs and imaging results over the last 24 hours.     Recent Labs   Lab 07/10/20  1413   WBC 17.5*   HGB 13.0   MCV 84         POTASSIUM 3.6   CHLORIDE 105   CO2 30   BUN 10   CR 0.82   ANIONGAP 4   GRECIA 8.8   *   ALBUMIN 3.2*   PROTTOTAL 7.3   BILITOTAL 0.4   ALKPHOS 102   ALT 46   AST 34     Recent Results (from the past 24 hour(s))   XR Chest Port 1 View    Narrative    CHEST ONE VIEW PORTABLE   7/10/2020 2:37 PM     HISTORY: Fever.    COMPARISON: 5/9/2020      Impression    IMPRESSION: Questionable opacity projected over the mid left lung  could represent viral or bacterial pneumonia. No pneumothorax or  pleural effusion.    JENNIFER MATHEW MD     "

## 2020-07-11 LAB
ANION GAP SERPL CALCULATED.3IONS-SCNC: 4 MMOL/L (ref 3–14)
BASOPHILS # BLD AUTO: 0 10E9/L (ref 0–0.2)
BASOPHILS NFR BLD AUTO: 0.2 %
BUN SERPL-MCNC: 13 MG/DL (ref 7–30)
CALCIUM SERPL-MCNC: 8 MG/DL (ref 8.5–10.1)
CHLORIDE SERPL-SCNC: 106 MMOL/L (ref 94–109)
CO2 SERPL-SCNC: 31 MMOL/L (ref 20–32)
CREAT SERPL-MCNC: 0.88 MG/DL (ref 0.52–1.04)
DIFFERENTIAL METHOD BLD: ABNORMAL
EOSINOPHIL # BLD AUTO: 0.4 10E9/L (ref 0–0.7)
EOSINOPHIL NFR BLD AUTO: 2.5 %
ERYTHROCYTE [DISTWIDTH] IN BLOOD BY AUTOMATED COUNT: 16.1 % (ref 10–15)
GFR SERPL CREATININE-BSD FRML MDRD: 69 ML/MIN/{1.73_M2}
GLUCOSE BLDC GLUCOMTR-MCNC: 109 MG/DL (ref 70–99)
GLUCOSE BLDC GLUCOMTR-MCNC: 114 MG/DL (ref 70–99)
GLUCOSE BLDC GLUCOMTR-MCNC: 116 MG/DL (ref 70–99)
GLUCOSE BLDC GLUCOMTR-MCNC: 127 MG/DL (ref 70–99)
GLUCOSE BLDC GLUCOMTR-MCNC: 129 MG/DL (ref 70–99)
GLUCOSE SERPL-MCNC: 120 MG/DL (ref 70–99)
HCT VFR BLD AUTO: 36.6 % (ref 35–47)
HGB BLD-MCNC: 10.8 G/DL (ref 11.7–15.7)
IMM GRANULOCYTES # BLD: 0.1 10E9/L (ref 0–0.4)
IMM GRANULOCYTES NFR BLD: 0.4 %
LYMPHOCYTES # BLD AUTO: 2 10E9/L (ref 0.8–5.3)
LYMPHOCYTES NFR BLD AUTO: 12.4 %
MCH RBC QN AUTO: 25.1 PG (ref 26.5–33)
MCHC RBC AUTO-ENTMCNC: 29.5 G/DL (ref 31.5–36.5)
MCV RBC AUTO: 85 FL (ref 78–100)
MONOCYTES # BLD AUTO: 0.7 10E9/L (ref 0–1.3)
MONOCYTES NFR BLD AUTO: 4 %
NEUTROPHILS # BLD AUTO: 13.2 10E9/L (ref 1.6–8.3)
NEUTROPHILS NFR BLD AUTO: 80.5 %
NRBC # BLD AUTO: 0 10*3/UL
NRBC BLD AUTO-RTO: 0 /100
PLATELET # BLD AUTO: 185 10E9/L (ref 150–450)
POTASSIUM SERPL-SCNC: 3.6 MMOL/L (ref 3.4–5.3)
PROCALCITONIN SERPL-MCNC: 2.12 NG/ML
RBC # BLD AUTO: 4.31 10E12/L (ref 3.8–5.2)
SODIUM SERPL-SCNC: 141 MMOL/L (ref 133–144)
WBC # BLD AUTO: 16.4 10E9/L (ref 4–11)

## 2020-07-11 PROCEDURE — 36415 COLL VENOUS BLD VENIPUNCTURE: CPT | Performed by: INTERNAL MEDICINE

## 2020-07-11 PROCEDURE — 25000132 ZZH RX MED GY IP 250 OP 250 PS 637: Performed by: INTERNAL MEDICINE

## 2020-07-11 PROCEDURE — 85025 COMPLETE CBC W/AUTO DIFF WBC: CPT | Performed by: INTERNAL MEDICINE

## 2020-07-11 PROCEDURE — 12000000 ZZH R&B MED SURG/OB

## 2020-07-11 PROCEDURE — 40000274 ZZH STATISTIC RCP CONSULT EA 30 MIN

## 2020-07-11 PROCEDURE — 94640 AIRWAY INHALATION TREATMENT: CPT | Mod: 76

## 2020-07-11 PROCEDURE — 94640 AIRWAY INHALATION TREATMENT: CPT

## 2020-07-11 PROCEDURE — 99233 SBSQ HOSP IP/OBS HIGH 50: CPT | Performed by: INTERNAL MEDICINE

## 2020-07-11 PROCEDURE — 00000146 ZZHCL STATISTIC GLUCOSE BY METER IP

## 2020-07-11 PROCEDURE — 25000128 H RX IP 250 OP 636: Performed by: INTERNAL MEDICINE

## 2020-07-11 PROCEDURE — 40000275 ZZH STATISTIC RCP TIME EA 10 MIN

## 2020-07-11 PROCEDURE — 80048 BASIC METABOLIC PNL TOTAL CA: CPT | Performed by: INTERNAL MEDICINE

## 2020-07-11 RX ORDER — IBUPROFEN 400 MG/1
400 TABLET, FILM COATED ORAL 3 TIMES DAILY PRN
Status: DISCONTINUED | OUTPATIENT
Start: 2020-07-11 | End: 2020-07-14 | Stop reason: HOSPADM

## 2020-07-11 RX ORDER — ALBUTEROL SULFATE 90 UG/1
2 AEROSOL, METERED RESPIRATORY (INHALATION)
Status: DISCONTINUED | OUTPATIENT
Start: 2020-07-11 | End: 2020-07-14 | Stop reason: HOSPADM

## 2020-07-11 RX ORDER — ALBUTEROL SULFATE 90 UG/1
1-2 AEROSOL, METERED RESPIRATORY (INHALATION)
Status: DISCONTINUED | OUTPATIENT
Start: 2020-07-11 | End: 2020-07-14 | Stop reason: HOSPADM

## 2020-07-11 RX ADMIN — TAZOBACTAM SODIUM AND PIPERACILLIN SODIUM 3.38 G: 375; 3 INJECTION, SOLUTION INTRAVENOUS at 09:40

## 2020-07-11 RX ADMIN — TAZOBACTAM SODIUM AND PIPERACILLIN SODIUM 3.38 G: 375; 3 INJECTION, SOLUTION INTRAVENOUS at 04:18

## 2020-07-11 RX ADMIN — TAZOBACTAM SODIUM AND PIPERACILLIN SODIUM 3.38 G: 375; 3 INJECTION, SOLUTION INTRAVENOUS at 22:06

## 2020-07-11 RX ADMIN — ALBUTEROL SULFATE 2 PUFF: 108 INHALANT RESPIRATORY (INHALATION) at 15:15

## 2020-07-11 RX ADMIN — SERTRALINE HYDROCHLORIDE 200 MG: 100 TABLET ORAL at 08:50

## 2020-07-11 RX ADMIN — TAZOBACTAM SODIUM AND PIPERACILLIN SODIUM 3.38 G: 375; 3 INJECTION, SOLUTION INTRAVENOUS at 16:16

## 2020-07-11 RX ADMIN — ALBUTEROL SULFATE 2 PUFF: 108 INHALANT RESPIRATORY (INHALATION) at 11:17

## 2020-07-11 RX ADMIN — GABAPENTIN 900 MG: 300 CAPSULE ORAL at 22:00

## 2020-07-11 RX ADMIN — HEPARIN SODIUM 5000 UNITS: 10000 INJECTION, SOLUTION INTRAVENOUS; SUBCUTANEOUS at 22:01

## 2020-07-11 RX ADMIN — ALBUTEROL SULFATE 2 PUFF: 108 INHALANT RESPIRATORY (INHALATION) at 19:26

## 2020-07-11 RX ADMIN — HEPARIN SODIUM 5000 UNITS: 10000 INJECTION, SOLUTION INTRAVENOUS; SUBCUTANEOUS at 09:40

## 2020-07-11 RX ADMIN — PANTOPRAZOLE SODIUM 40 MG: 40 TABLET, DELAYED RELEASE ORAL at 16:17

## 2020-07-11 RX ADMIN — CLONAZEPAM 1 MG: 1 TABLET ORAL at 22:00

## 2020-07-11 RX ADMIN — IBUPROFEN 400 MG: 400 TABLET ORAL at 09:40

## 2020-07-11 RX ADMIN — ASPIRIN 81 MG 81 MG: 81 TABLET ORAL at 20:12

## 2020-07-11 RX ADMIN — SIMVASTATIN 20 MG: 20 TABLET, FILM COATED ORAL at 22:00

## 2020-07-11 RX ADMIN — PANTOPRAZOLE SODIUM 40 MG: 40 TABLET, DELAYED RELEASE ORAL at 08:50

## 2020-07-11 ASSESSMENT — ACTIVITIES OF DAILY LIVING (ADL)
ADLS_ACUITY_SCORE: 15

## 2020-07-11 NOTE — PROGRESS NOTES
[facility]    Medicine Progress Note - Hospitalist Service       Date of Admission:  7/10/2020  Assessment & Plan   Nicole Reyes is a 64 year old female admitted on 7/10/2020. She has a hx of CRISTÓBAL on BiPAP, HTN, type 2 DM, RLS who woke up with chills, myalgias, HA and SOB. Noted to be febrile here with leucocytosis and CXR showed ? Opacity mid left lung likely a PNA. Her temp at home was 101 F. She denies any sick contacts. She denies any cough. Her myalgias are everywhere. She states she has a hx of aspiration PNA's which can occur when laying and standing. Mild HA in temples 7/10. No neck stiffness/photophobia. Discussed care with Dr Tse and am asked to admit for further eval.      1. Sepsis due to PNA.  - ? Aspiration versus COVID.  - IV zosyn.  - procal elevated, monitor.  - Initial COVID 19 negative, will retest in 48 hours.   - continue isolation for now.    2. CRISTÓBAL.  - oxygen at night, whilst COVID pending.    3. DM.  - sliding scale insulin.         Diet: Combination Diet [id number] Calories: Moderate Consistent CHO (4-6 CHO units/meal)    DVT Prophylaxis: Heparin SQ  Suarez Catheter: not present  Code Status: Full Code           Disposition Plan   Expected discharge: 2 - 3 days, recommended to prior living arrangement once antibiotic plan established.  Entered: Syd Vincent MD 07/11/2020, 8:42 AM       The patient's care was discussed with the Patient.    Syd Vincent MD  Hospitalist Service  [facility]    ______________________________________________________________________    Interval History     Mild dry cough. No fevers. SOB stable.     Data reviewed today: I reviewed all medications, new labs and imaging results over the last 24 hours. I personally reviewed no images or EKG's today.    Physical Exam   Vital Signs: Temp: 97.5  F (36.4  C) Temp src: Oral BP: 113/64 Pulse: 73 Heart Rate: 61 Resp: 20 SpO2: 98 % O2 Device: Nasal cannula Oxygen Delivery: 2  "LPM  Weight: 246 lbs 4.8 oz    Gen - AAO x 3 in NAD.  Lungs - CTA B.  Heart - RR,S1+S2 nml, no m/g/r.  Abd - soft, NT, ND, + BS.  Ext - no edema.  Skin - + ecchymosis LE's.  Neuro - CN II-XII wnl, GIBBONS\"s.    Data   Recent Labs   Lab 07/10/20  1413   WBC 17.5*   HGB 13.0   MCV 84         POTASSIUM 3.6   CHLORIDE 105   CO2 30   BUN 10   CR 0.82   ANIONGAP 4   GRECIA 8.8   *   ALBUMIN 3.2*   PROTTOTAL 7.3   BILITOTAL 0.4   ALKPHOS 102   ALT 46   AST 34     Recent Results (from the past 24 hour(s))   XR Chest Port 1 View    Narrative    CHEST ONE VIEW PORTABLE   7/10/2020 2:37 PM     HISTORY: Fever.    COMPARISON: 5/9/2020      Impression    IMPRESSION: Questionable opacity projected over the mid left lung  could represent viral or bacterial pneumonia. No pneumothorax or  pleural effusion.    JENNIFER MATHEW MD     Medications     sodium chloride 100 mL/hr at 07/10/20 2102       albuterol  1-2 puff Inhalation 4x daily     aspirin  81 mg Oral QPM     clonazePAM  1 mg Oral At Bedtime     gabapentin  900 mg Oral At Bedtime     heparin ANTICOAGULANT  5,000 Units Subcutaneous Q12H     insulin aspart  1-7 Units Subcutaneous TID AC     insulin aspart  1-5 Units Subcutaneous At Bedtime     pantoprazole  40 mg Oral BID AC     piperacillin-tazobactam  3.375 g Intravenous Q6H     sertraline  200 mg Oral Daily     simvastatin  20 mg Oral At Bedtime     sodium chloride (PF)  3 mL Intracatheter Q8H     "

## 2020-07-11 NOTE — PROGRESS NOTES
RECEIVING UNIT ED HANDOFF REVIEW    Above ED Nurse Handoff Report was reviewed: Yes  Reviewed by: Jolly Perez RN on July 10, 2020 at 7:35 PM

## 2020-07-11 NOTE — PROGRESS NOTES
Patient resulted NEGATIVE for COVID-19 swab - admitting provider requesting reswab after 72hrs and on-call notified of results and requested for scheduling of reswab for continuation of care.

## 2020-07-11 NOTE — PLAN OF CARE
Pertinent assessments: Pt has diminished lung sounds. States she is SOB but doesn't appear to be SOB. On O2. Afebrile,  receiving zosyn  Major Shift Events Admission  Treatment Plan: Reswab for COVID on 13th, zosyn    Discharge Readiness: Medically active  Discharge Disposition: Home with Self care

## 2020-07-11 NOTE — PHARMACY-ADMISSION MEDICATION HISTORY
Admission medication history interview status for this patient is complete. See [facility] admission navigator for allergy information, prior to admission medications and immunization status.     Medication history interview done via telephone during Covid-19 pandemic, indicate source(s): Patient  Medication history resources (including written lists, pill bottles, clinic record):None    Changes made to PTA medication list:  Added: -  Deleted: -  Changed: -    Actions taken by pharmacist (provider contacted, etc): called pt for mr    Additional medication history information:None    Medication reconciliation/reorder completed by provider prior to medication history?  yes   (Y/N)     For patients on insulin therapy: no  (Y/N)  Sliding scale Novolog: -  Do you have a baseline novolog pre-meal dose:   __  units with meals or __ units/carb unit with meals  Do you eat three meals a day:  -  How many times do you check your blood glucose per day:  -  How many episodes of hypoglycemia do you have per week: -  Do you have a Continuous glucose monitor (CGM) : - (remind pt that not approved for hospital use)  Any specific barriers to therapy? -  (cost, comfortable with injections, confident with current diabetes regimen?)      Prior to Admission medications    Medication Sig Last Dose Taking? Auth Provider   albuterol (PROAIR HFA/PROVENTIL HFA/VENTOLIN HFA) 108 (90 Base) MCG/ACT inhaler Inhale 1-2 puffs into the lungs every 6 hours as needed for shortness of breath / dyspnea or wheezing  Yes Jose L Weaver MD   aspirin 81 MG EC tablet Take 81 mg by mouth every evening  7/9/2020 at Unknown time Yes Unknown, Entered By History   clonazePAM (KLONOPIN) 0.5 MG tablet Take 1 mg by mouth At Bedtime  7/9/2020 at Unknown time Yes Unknown, Entered By History   gabapentin (NEURONTIN) 300 MG capsule Take 3 capsules (900 mg) by mouth daily 7/9/2020 at hs Yes Pari Wiley MD   losartan (COZAAR) 100 MG tablet TAKE ONE TABLET BY MOUTH ONCE DAILY  7/10/2020 at Unknown time Yes Pari Wiley MD   metFORMIN (GLUCOPHAGE-XR) 500 MG 24 hr tablet TAKE TWO TABLETS BY MOUTH EVERY MORNING 7/10/2020 at Unknown time Yes Pari Wiley MD   pantoprazole (PROTONIX) 40 MG EC tablet TAKE ONE TABLET BY MOUTH AT BEDTIME 7/9/2020 at Unknown time Yes Pari Wiley MD   sertraline (ZOLOFT) 100 MG tablet TAKE TWO TABLETS BY MOUTH ONCE DAILY 7/10/2020 at Unknown time Yes Pari Wiley MD   simvastatin (ZOCOR) 20 MG tablet TAKE ONE TABLET BY MOUTH AT BEDTIME 7/9/2020 at Unknown time Yes Pari Wiley MD   zolpidem (AMBIEN) 10 MG tablet TAKE 1 TABLET BY MOUTH EVERY NIGHT AT BEDTIME 7/9/2020 at Unknown time Yes Pari Wiley MD   blood glucose (ACCU-CHEK BYRON PLUS) test strip USE TO TEST BLOOD SUGAR TWO TIMES A DAY OR AS DIRECTED   Pari Wiley MD

## 2020-07-11 NOTE — PROGRESS NOTES
"Formerly [facility] RCAT     Date:7/11/2020    Admission Dx:PNA    Pulmonary History: Past smoker, CRISTÓBAL    Home Nebulizer/MDI Use:2p Alb Q6 prn    Home Oxygen:2L @ noc    Acuity Level (RCAT flow sheet):3    Aerosol Therapy initiated:2p Alb QID & Q2 prn      Pulmonary Hygiene initiated:NA-Good NPC      Volume Expansion initiated:IS      Current Oxygen Requirements:2L  Current SpO2:98%    Re-evaluation date:7/14/2020    Patient Education:Education to be performed with the patient in regards to indications/benefits and possible side effects of bronchodilators.          See \"RT Assessments\" flow sheet for patient assessment scoring and Acuity Level Details.             "

## 2020-07-11 NOTE — PLAN OF CARE
End of Shift Summary  For vital signs and complete assessments, please see documentation flowsheets.     Pertinent assessments: LLL diminished LS, mild SOB. On 2LPM NC when sleeping. Denies pain, N/V/D. Up with SBA for help with IV pole.    Major Shift Events: None    Treatment Plan: Zosyn, COVID retest tomorrow, telemetry.    Discharge Readiness: Medically active  Expected Discharge Date: 2-3 days per MD  Discharge Disposition: Home with Self care  Barriers/Criteria for discharge: COVID test, IV abx

## 2020-07-12 LAB
ANION GAP SERPL CALCULATED.3IONS-SCNC: 5 MMOL/L (ref 3–14)
BASOPHILS # BLD AUTO: 0 10E9/L (ref 0–0.2)
BASOPHILS NFR BLD AUTO: 0.2 %
BUN SERPL-MCNC: 10 MG/DL (ref 7–30)
CALCIUM SERPL-MCNC: 8.3 MG/DL (ref 8.5–10.1)
CHLORIDE SERPL-SCNC: 107 MMOL/L (ref 94–109)
CO2 SERPL-SCNC: 29 MMOL/L (ref 20–32)
CREAT SERPL-MCNC: 0.81 MG/DL (ref 0.52–1.04)
DIFFERENTIAL METHOD BLD: ABNORMAL
EOSINOPHIL # BLD AUTO: 0.4 10E9/L (ref 0–0.7)
EOSINOPHIL NFR BLD AUTO: 3.9 %
ERYTHROCYTE [DISTWIDTH] IN BLOOD BY AUTOMATED COUNT: 16.2 % (ref 10–15)
GFR SERPL CREATININE-BSD FRML MDRD: 76 ML/MIN/{1.73_M2}
GLUCOSE BLDC GLUCOMTR-MCNC: 102 MG/DL (ref 70–99)
GLUCOSE BLDC GLUCOMTR-MCNC: 105 MG/DL (ref 70–99)
GLUCOSE BLDC GLUCOMTR-MCNC: 120 MG/DL (ref 70–99)
GLUCOSE BLDC GLUCOMTR-MCNC: 98 MG/DL (ref 70–99)
GLUCOSE SERPL-MCNC: 108 MG/DL (ref 70–99)
HCT VFR BLD AUTO: 35 % (ref 35–47)
HGB BLD-MCNC: 10.4 G/DL (ref 11.7–15.7)
IMM GRANULOCYTES # BLD: 0 10E9/L (ref 0–0.4)
IMM GRANULOCYTES NFR BLD: 0.4 %
LYMPHOCYTES # BLD AUTO: 1.9 10E9/L (ref 0.8–5.3)
LYMPHOCYTES NFR BLD AUTO: 20.4 %
MCH RBC QN AUTO: 25.4 PG (ref 26.5–33)
MCHC RBC AUTO-ENTMCNC: 29.7 G/DL (ref 31.5–36.5)
MCV RBC AUTO: 85 FL (ref 78–100)
MONOCYTES # BLD AUTO: 0.5 10E9/L (ref 0–1.3)
MONOCYTES NFR BLD AUTO: 5 %
NEUTROPHILS # BLD AUTO: 6.7 10E9/L (ref 1.6–8.3)
NEUTROPHILS NFR BLD AUTO: 70.1 %
NRBC # BLD AUTO: 0 10*3/UL
NRBC BLD AUTO-RTO: 0 /100
PLATELET # BLD AUTO: 155 10E9/L (ref 150–450)
POTASSIUM SERPL-SCNC: 4 MMOL/L (ref 3.4–5.3)
RBC # BLD AUTO: 4.1 10E12/L (ref 3.8–5.2)
SODIUM SERPL-SCNC: 141 MMOL/L (ref 133–144)
WBC # BLD AUTO: 9.5 10E9/L (ref 4–11)

## 2020-07-12 PROCEDURE — 12000000 ZZH R&B MED SURG/OB

## 2020-07-12 PROCEDURE — 99233 SBSQ HOSP IP/OBS HIGH 50: CPT | Performed by: INTERNAL MEDICINE

## 2020-07-12 PROCEDURE — 94640 AIRWAY INHALATION TREATMENT: CPT

## 2020-07-12 PROCEDURE — 94640 AIRWAY INHALATION TREATMENT: CPT | Mod: 76

## 2020-07-12 PROCEDURE — 00000146 ZZHCL STATISTIC GLUCOSE BY METER IP

## 2020-07-12 PROCEDURE — 25000132 ZZH RX MED GY IP 250 OP 250 PS 637: Performed by: INTERNAL MEDICINE

## 2020-07-12 PROCEDURE — 25000128 H RX IP 250 OP 636: Performed by: INTERNAL MEDICINE

## 2020-07-12 PROCEDURE — 80048 BASIC METABOLIC PNL TOTAL CA: CPT | Performed by: INTERNAL MEDICINE

## 2020-07-12 PROCEDURE — 85025 COMPLETE CBC W/AUTO DIFF WBC: CPT | Performed by: INTERNAL MEDICINE

## 2020-07-12 PROCEDURE — 40000275 ZZH STATISTIC RCP TIME EA 10 MIN

## 2020-07-12 PROCEDURE — 36415 COLL VENOUS BLD VENIPUNCTURE: CPT | Performed by: INTERNAL MEDICINE

## 2020-07-12 RX ORDER — ZOLPIDEM TARTRATE 5 MG/1
10 TABLET ORAL AT BEDTIME
Status: DISCONTINUED | OUTPATIENT
Start: 2020-07-12 | End: 2020-07-14 | Stop reason: HOSPADM

## 2020-07-12 RX ADMIN — ASPIRIN 81 MG 81 MG: 81 TABLET ORAL at 20:37

## 2020-07-12 RX ADMIN — SERTRALINE HYDROCHLORIDE 200 MG: 100 TABLET ORAL at 08:20

## 2020-07-12 RX ADMIN — HEPARIN SODIUM 5000 UNITS: 10000 INJECTION, SOLUTION INTRAVENOUS; SUBCUTANEOUS at 09:04

## 2020-07-12 RX ADMIN — TAZOBACTAM SODIUM AND PIPERACILLIN SODIUM 3.38 G: 375; 3 INJECTION, SOLUTION INTRAVENOUS at 09:00

## 2020-07-12 RX ADMIN — ZOLPIDEM TARTRATE 10 MG: 5 TABLET, COATED ORAL at 22:25

## 2020-07-12 RX ADMIN — IBUPROFEN 400 MG: 400 TABLET ORAL at 23:48

## 2020-07-12 RX ADMIN — PANTOPRAZOLE SODIUM 40 MG: 40 TABLET, DELAYED RELEASE ORAL at 06:04

## 2020-07-12 RX ADMIN — ALBUTEROL SULFATE 2 PUFF: 108 INHALANT RESPIRATORY (INHALATION) at 19:32

## 2020-07-12 RX ADMIN — SIMVASTATIN 20 MG: 20 TABLET, FILM COATED ORAL at 22:25

## 2020-07-12 RX ADMIN — ALBUTEROL SULFATE 2 PUFF: 108 INHALANT RESPIRATORY (INHALATION) at 15:50

## 2020-07-12 RX ADMIN — PANTOPRAZOLE SODIUM 40 MG: 40 TABLET, DELAYED RELEASE ORAL at 15:42

## 2020-07-12 RX ADMIN — GABAPENTIN 900 MG: 300 CAPSULE ORAL at 22:25

## 2020-07-12 RX ADMIN — IBUPROFEN 400 MG: 400 TABLET ORAL at 15:43

## 2020-07-12 RX ADMIN — CLONAZEPAM 1 MG: 1 TABLET ORAL at 22:25

## 2020-07-12 RX ADMIN — ALBUTEROL SULFATE 2 PUFF: 108 INHALANT RESPIRATORY (INHALATION) at 11:23

## 2020-07-12 RX ADMIN — ALBUTEROL SULFATE 2 PUFF: 108 INHALANT RESPIRATORY (INHALATION) at 07:27

## 2020-07-12 RX ADMIN — HEPARIN SODIUM 5000 UNITS: 10000 INJECTION, SOLUTION INTRAVENOUS; SUBCUTANEOUS at 22:26

## 2020-07-12 RX ADMIN — ZOLPIDEM TARTRATE 10 MG: 5 TABLET, COATED ORAL at 01:30

## 2020-07-12 RX ADMIN — TAZOBACTAM SODIUM AND PIPERACILLIN SODIUM 3.38 G: 375; 3 INJECTION, SOLUTION INTRAVENOUS at 03:59

## 2020-07-12 RX ADMIN — TAZOBACTAM SODIUM AND PIPERACILLIN SODIUM 3.38 G: 375; 3 INJECTION, SOLUTION INTRAVENOUS at 22:31

## 2020-07-12 RX ADMIN — TAZOBACTAM SODIUM AND PIPERACILLIN SODIUM 3.38 G: 375; 3 INJECTION, SOLUTION INTRAVENOUS at 15:44

## 2020-07-12 ASSESSMENT — ACTIVITIES OF DAILY LIVING (ADL)
ADLS_ACUITY_SCORE: 15

## 2020-07-12 NOTE — CONSULTS
CTS identifies pt as high risk due to Elevated COURTNEY. Patient was admitted  With sepsis due to pneumonia.  She has a history with aspiration pneumonia and a hiatal hernia. Patient has home O2 and CPAP through [place]. Patient has a history of CKD, HTN, GERD, anxiety, anemia, insomnia, depression, CRISTÓBAL, RLS, and Diabetes. Patient has had 4 hospitalizations within the past 6 months for pneumonia and aspiration pneumonia.      Patient politely declined appointment support and has agreed to make PCP follow up appointment in the next 7-10 days.    Handoff will be given to PCP clinic care coordinator at discharge.  Patient had no other concerns at this time. Patient states will have support from family at discharge.  or daughter will transport at discharge.      CM will continue to follow patient for any additional discharge needs.    Marzena Beach RN, BSN, PHN, CTS  Care Coordinator  [facility]  [phone]

## 2020-07-12 NOTE — PLAN OF CARE
End of Shift Summary  For vital signs and complete assessments, please see documentation flowsheets.     Pertinent assessments: LS clear, tolerating mod cho diet. Afebrile. Attempted to wean to RA, replaced 2L as sats were at 90%.  Treatment Plan: IV zosyn, re-swab 7/12

## 2020-07-12 NOTE — PROGRESS NOTES
[facility]    Medicine Progress Note - Hospitalist Service       Date of Admission:  7/10/2020  Assessment & Plan   Nicole Reyes is a 64 year old female admitted on 7/10/2020. She has a hx of CRISTÓBAL on BiPAP, HTN, type 2 DM, RLS who woke up with chills, myalgias, HA and SOB. Noted to be febrile here with leucocytosis and CXR showed ? Opacity mid left lung likely a PNA. Her temp at home was 101 F. She denies any sick contacts. She denies any cough. Her myalgias are everywhere. She states she has a hx of aspiration PNA's which can occur when laying and standing. Mild HA in temples 7/10. No neck stiffness/photophobia. Discussed care with Dr Tse and am asked to admit for further eval.      1. Sepsis due to PNA.  - ? Aspiration versus COVID. Probable aspiration PNA.   - IV zosyn.  - procal elevated, monitor.  - Initial COVID 19 negative, will retest in 48 hours.   - continue isolation for now.    2. CRISTÓBAL.  - oxygen at night, whilst COVID being excluded.    3. DM.  - sliding scale insulin.           Diet: Combination Diet 9298-9568 Calories: Moderate Consistent CHO (4-6 CHO units/meal)    DVT Prophylaxis: Heparin SQ  Suarez Catheter: not present  Code Status: Full Code           Disposition Plan   Expected discharge: tomorrow, recommended to prior living arrangement once antibiotic plan established.  Entered: Syd Vincent MD 07/12/2020, 8:02 AM       The patient's care was discussed with the Patient.    Syd Vincent MD  Hospitalist Service  [facility]    ______________________________________________________________________    Interval History     No fevers/cough. SOB is better.    Data reviewed today: I reviewed all medications, new labs and imaging results over the last 24 hours. I personally reviewed no images or EKG's today.    Physical Exam   Vital Signs: Temp: 98.1  F (36.7  C) Temp src: Oral BP: 129/56 Pulse: 72 Heart Rate: 76 Resp: 18 SpO2: 92 % O2 Device: None (Room air)  Oxygen Delivery: 2 LPM  Weight: 246 lbs 4.8 oz    Gen - AAO x 3, seen on iPAD.    Data   Recent Labs   Lab 07/12/20  0703 07/11/20  0911 07/10/20  1413   WBC 9.5 16.4* 17.5*   HGB 10.4* 10.8* 13.0   MCV 85 85 84    185 215    141 139   POTASSIUM 4.0 3.6 3.6   CHLORIDE 107 106 105   CO2 29 31 30   BUN 10 13 10   CR 0.81 0.88 0.82   ANIONGAP 5 4 4   GRECIA 8.3* 8.0* 8.8   * 120* 152*   ALBUMIN  --   --  3.2*   PROTTOTAL  --   --  7.3   BILITOTAL  --   --  0.4   ALKPHOS  --   --  102   ALT  --   --  46   AST  --   --  34     Medications       albuterol  1-2 puff Inhalation 4x daily     aspirin  81 mg Oral QPM     clonazePAM  1 mg Oral At Bedtime     gabapentin  900 mg Oral At Bedtime     heparin ANTICOAGULANT  5,000 Units Subcutaneous Q12H     insulin aspart  1-7 Units Subcutaneous TID AC     insulin aspart  1-5 Units Subcutaneous At Bedtime     pantoprazole  40 mg Oral BID AC     piperacillin-tazobactam  3.375 g Intravenous Q6H     sertraline  200 mg Oral Daily     simvastatin  20 mg Oral At Bedtime     sodium chloride (PF)  3 mL Intracatheter Q8H     zolpidem  10 mg Oral At Bedtime

## 2020-07-12 NOTE — PLAN OF CARE
End of Shift Summary  For vital signs and complete assessments, please see documentation flowsheets.     Pertinent assessments: Still requiring 1-2L NC, IS at bedside and using frequently. KHALIL improved from yesterday per patient report.   Treatment Plan: IV zosyn, re-swab 7/13

## 2020-07-12 NOTE — PLAN OF CARE
End of Shift Summary  For vital signs and complete assessments, please see documentation flowsheets.  Cared for patient from [id number]     Pertinent assessments: A&O x4. VSS, denies pain/nausea. SOB with activity, lungs sound clear. 2L NC while at rest. Tolerating mod-carb diet.    Major Shift Events: B  Treatment Plan: Zosyn. Plan to re-swab for covid tomorrow    Discharge Readiness: Medically active  Expected Discharge Date: TBD  Discharge Disposition: Home with Self care  Barriers/Criteria for discharge: unknow

## 2020-07-12 NOTE — PLAN OF CARE
End of Shift Summary  For vital signs and complete assessments, please see documentation flowsheets.     Pertinent assessments: Afebrile. Continues to wear 2 L oxygen, id mid 90s. LS clear. BS x4. Denies pain and nausea. Having some SOB with activity, none noted at rest. Tele monitoring - SR, HR 74. BG during night was 102. A&O. Slept well, gave Ambien.  Major Shift Events: none  Treatment Plan: Zosyn. Re-swab COVID 7/12    Discharge Readiness: Medically active  Expected Discharge Date: TBD  Discharge Disposition: Home with Self care  Barriers/Criteria for discharge: none

## 2020-07-13 LAB
GLUCOSE BLDC GLUCOMTR-MCNC: 116 MG/DL (ref 70–99)
GLUCOSE BLDC GLUCOMTR-MCNC: 125 MG/DL (ref 70–99)
GLUCOSE BLDC GLUCOMTR-MCNC: 155 MG/DL (ref 70–99)
GLUCOSE BLDC GLUCOMTR-MCNC: 89 MG/DL (ref 70–99)
SARS-COV-2 PCR COMMENT: NORMAL
SARS-COV-2 RNA SPEC QL NAA+PROBE: NEGATIVE
SARS-COV-2 RNA SPEC QL NAA+PROBE: NORMAL
SPECIMEN SOURCE: NORMAL
SPECIMEN SOURCE: NORMAL

## 2020-07-13 PROCEDURE — 25000132 ZZH RX MED GY IP 250 OP 250 PS 637: Performed by: INTERNAL MEDICINE

## 2020-07-13 PROCEDURE — 99232 SBSQ HOSP IP/OBS MODERATE 35: CPT | Performed by: INTERNAL MEDICINE

## 2020-07-13 PROCEDURE — 94640 AIRWAY INHALATION TREATMENT: CPT | Mod: 76

## 2020-07-13 PROCEDURE — 25000128 H RX IP 250 OP 636: Performed by: INTERNAL MEDICINE

## 2020-07-13 PROCEDURE — 00000146 ZZHCL STATISTIC GLUCOSE BY METER IP

## 2020-07-13 PROCEDURE — 94640 AIRWAY INHALATION TREATMENT: CPT

## 2020-07-13 PROCEDURE — 12000000 ZZH R&B MED SURG/OB

## 2020-07-13 PROCEDURE — 40000275 ZZH STATISTIC RCP TIME EA 10 MIN

## 2020-07-13 RX ADMIN — AMOXICILLIN AND CLAVULANATE POTASSIUM 1 TABLET: 875; 125 TABLET, FILM COATED ORAL at 20:47

## 2020-07-13 RX ADMIN — PANTOPRAZOLE SODIUM 40 MG: 40 TABLET, DELAYED RELEASE ORAL at 05:53

## 2020-07-13 RX ADMIN — ALBUTEROL SULFATE 2 PUFF: 108 INHALANT RESPIRATORY (INHALATION) at 08:05

## 2020-07-13 RX ADMIN — HEPARIN SODIUM 5000 UNITS: 10000 INJECTION, SOLUTION INTRAVENOUS; SUBCUTANEOUS at 21:45

## 2020-07-13 RX ADMIN — CLONAZEPAM 1 MG: 1 TABLET ORAL at 21:45

## 2020-07-13 RX ADMIN — TAZOBACTAM SODIUM AND PIPERACILLIN SODIUM 3.38 G: 375; 3 INJECTION, SOLUTION INTRAVENOUS at 11:02

## 2020-07-13 RX ADMIN — ALBUTEROL SULFATE 2 PUFF: 108 INHALANT RESPIRATORY (INHALATION) at 16:09

## 2020-07-13 RX ADMIN — ALBUTEROL SULFATE 2 PUFF: 108 INHALANT RESPIRATORY (INHALATION) at 12:45

## 2020-07-13 RX ADMIN — GABAPENTIN 900 MG: 300 CAPSULE ORAL at 21:44

## 2020-07-13 RX ADMIN — PANTOPRAZOLE SODIUM 40 MG: 40 TABLET, DELAYED RELEASE ORAL at 17:28

## 2020-07-13 RX ADMIN — ZOLPIDEM TARTRATE 10 MG: 5 TABLET, COATED ORAL at 21:44

## 2020-07-13 RX ADMIN — ALBUTEROL SULFATE 2 PUFF: 108 INHALANT RESPIRATORY (INHALATION) at 20:35

## 2020-07-13 RX ADMIN — TAZOBACTAM SODIUM AND PIPERACILLIN SODIUM 3.38 G: 375; 3 INJECTION, SOLUTION INTRAVENOUS at 04:23

## 2020-07-13 RX ADMIN — SIMVASTATIN 20 MG: 20 TABLET, FILM COATED ORAL at 21:45

## 2020-07-13 RX ADMIN — HEPARIN SODIUM 5000 UNITS: 10000 INJECTION, SOLUTION INTRAVENOUS; SUBCUTANEOUS at 11:01

## 2020-07-13 RX ADMIN — IBUPROFEN 400 MG: 400 TABLET ORAL at 11:01

## 2020-07-13 RX ADMIN — ASPIRIN 81 MG 81 MG: 81 TABLET ORAL at 20:47

## 2020-07-13 RX ADMIN — SERTRALINE HYDROCHLORIDE 200 MG: 100 TABLET ORAL at 08:04

## 2020-07-13 ASSESSMENT — ACTIVITIES OF DAILY LIVING (ADL)
ADLS_ACUITY_SCORE: 15

## 2020-07-13 NOTE — PLAN OF CARE
To Do:  End of Shift Summary  For vital signs and complete assessments, please see documentation flowsheets.     Pertinent assessments: Still requiring 1L NC, was on room air sating between 88-96%IS at bedside and using frequently. Denied SOB/KHALIL, ls Diminished/clear. Ind.  Major Shift events: Covid negative/Iso d/c'd  Treatment Plan: Oral Augmentin    Discharge Readiness: Medically active  Expected Discharge Date: 7/14  Discharge Disposition: Home with Self care  Barriers/Criteria for discharge: none

## 2020-07-13 NOTE — PROGRESS NOTES
[facility]    Medicine Progress Note - Hospitalist Service       Date of Admission:  7/10/2020  Assessment & Plan    Nicole Reyes is a 64 year old female admitted on 7/10/2020 with chills, myalgias, HA and SOB. Noted to be febrile here with elevated white count, and CXR showing opacity mid left lung likely a PNA. Febrile at home 101 F.  Patient has a past history significant for CRISTÓBAL uses CPAP, diabetes mellitus type 2, hiatal hernia, GERD, chronic hypoxemic respiratory failure, uses 2 L oxygen at night and as needed during the daytime.  She has frequent admissions to the hospital with pneumonia, suspected aspiration events related to her hiatal hernia.        1. Sepsis due to PNA.  Suspected aspiration pneumonia secondary to her hiatal hernia.  This has been a recurrent event with multiple hospitalizations related to this.  Had elevated procalcitonin, elevated white cell count  --Overall, patient is much improved.  White count is improving.  She had an allergy listed to penicillins, although she is tolerating Zosyn and had received Augmentin last year.  We will switch from IV Zosyn to Augmentin and watch her in the hospital overnight.  Patient is agreeable to this.  --On isolation.  Initial COVID was negative, repeat is pending.  If repeat is also negative then discontinue isolation  -Tolerating diet without any issues.  Has had a previous swallow study that was unremarkable.  Patient reports that these are silent aspiration episodes.  --Patient had seen surgery just prior to this admission to discuss hernia repair.  She has been worked up for the hiatal hernia repair along with a Carol-en-Y.  Advise follow-up with the bariatric surgery team    2. CRISTÓBAL.  - oxygen at night, whilst COVID being excluded.  Otherwise uses a CPAP    3. DM.  - sliding scale insulin.  Metformin being held    4.  Hypertension    5.  GERD    6.  Hyperlipidemia           Diet: Combination Diet [id number] Calories: Moderate  Consistent CHO (4-6 CHO units/meal)    DVT Prophylaxis: Heparin SQ  Suarez Catheter: not present  Code Status: Full Code           Disposition Plan   Expected discharge: tomorrow, recommended to prior living arrangement if tolerated PO Augmentin   Entered: Napoleon Strauss MD 07/13/2020, 2:13 PM       The patient's care was discussed with the Bedside Nurse and Patient.    Napoleon Strauss MD  Hospitalist Service  [facility]    ______________________________________________________________________    Interval History     No fevers/cough. SOB is better. Mild cough, repeat covid is pending.     Data reviewed today: I reviewed all medications, new labs and imaging results over the last 24 hours. I personally reviewed     Physical Exam   Vital Signs: Temp: 98.6  F (37  C) Temp src: Oral BP: 129/46 Pulse: 75 Heart Rate: 76 Resp: 16 SpO2: 98 % O2 Device: None (Room air) Oxygen Delivery: 1 LPM  Weight: 246 lbs 4.8 oz    Gen - AAO x 3  LUNGS- Coarse sounds in left side  CVS - RRR, nl S1, S2, no rubs or gallops, no JVD  Abdomen - Soft NT ND  Ext - Warm and dry, no pitting edema  SKIN - no cyanosis or clubbing    Data   Recent Labs   Lab 07/12/20  0703 07/11/20  0911 07/10/20  1413   WBC 9.5 16.4* 17.5*   HGB 10.4* 10.8* 13.0   MCV 85 85 84    185 215    141 139   POTASSIUM 4.0 3.6 3.6   CHLORIDE 107 106 105   CO2 29 31 30   BUN 10 13 10   CR 0.81 0.88 0.82   ANIONGAP 5 4 4   GRECIA 8.3* 8.0* 8.8   * 120* 152*   ALBUMIN  --   --  3.2*   PROTTOTAL  --   --  7.3   BILITOTAL  --   --  0.4   ALKPHOS  --   --  102   ALT  --   --  46   AST  --   --  34     Medications       albuterol  1-2 puff Inhalation 4x daily     amoxicillin-clavulanate  1 tablet Oral Q12H [facility]     aspirin  81 mg Oral QPM     clonazePAM  1 mg Oral At Bedtime     gabapentin  900 mg Oral At Bedtime     heparin ANTICOAGULANT  5,000 Units Subcutaneous Q12H     insulin aspart  1-7 Units Subcutaneous TID AC     insulin aspart  1-5  Units Subcutaneous At Bedtime     pantoprazole  40 mg Oral BID AC     sertraline  200 mg Oral Daily     simvastatin  20 mg Oral At Bedtime     sodium chloride (PF)  3 mL Intracatheter Q8H     zolpidem  10 mg Oral At Bedtime

## 2020-07-13 NOTE — PLAN OF CARE
End of Shift Summary  For vital signs and complete assessments, please see documentation flowsheets.     Pertinent assessments: Still requiring 1-2L NC, IS at bedside and using frequently. KHALIL improved from yesterday per patient report. Cough at times, non-productive. Up in room independently.   Treatment Plan: IV zosyn, re-swab 7/13    Discharge Readiness: Medically active  Expected Discharge Date: TBD  Discharge Disposition: Home with Self care  Barriers/Criteria for discharge: none

## 2020-07-14 VITALS
OXYGEN SATURATION: 91 % | SYSTOLIC BLOOD PRESSURE: 145 MMHG | TEMPERATURE: 97.7 F | HEART RATE: 71 BPM | DIASTOLIC BLOOD PRESSURE: 67 MMHG | HEIGHT: 58 IN | WEIGHT: 246.3 LBS | BODY MASS INDEX: 51.7 KG/M2 | RESPIRATION RATE: 18 BRPM

## 2020-07-14 LAB
GLUCOSE BLDC GLUCOMTR-MCNC: 107 MG/DL (ref 70–99)
GLUCOSE BLDC GLUCOMTR-MCNC: 147 MG/DL (ref 70–99)

## 2020-07-14 PROCEDURE — 25000128 H RX IP 250 OP 636: Performed by: INTERNAL MEDICINE

## 2020-07-14 PROCEDURE — 00000146 ZZHCL STATISTIC GLUCOSE BY METER IP

## 2020-07-14 PROCEDURE — 94640 AIRWAY INHALATION TREATMENT: CPT

## 2020-07-14 PROCEDURE — 40000275 ZZH STATISTIC RCP TIME EA 10 MIN

## 2020-07-14 PROCEDURE — 25000132 ZZH RX MED GY IP 250 OP 250 PS 637: Performed by: INTERNAL MEDICINE

## 2020-07-14 PROCEDURE — 99239 HOSP IP/OBS DSCHRG MGMT >30: CPT | Performed by: INTERNAL MEDICINE

## 2020-07-14 PROCEDURE — 94640 AIRWAY INHALATION TREATMENT: CPT | Mod: 76

## 2020-07-14 PROCEDURE — 25000131 ZZH RX MED GY IP 250 OP 636 PS 637: Performed by: INTERNAL MEDICINE

## 2020-07-14 RX ADMIN — SERTRALINE HYDROCHLORIDE 200 MG: 100 TABLET ORAL at 08:47

## 2020-07-14 RX ADMIN — INSULIN ASPART 1 UNITS: 100 INJECTION, SOLUTION INTRAVENOUS; SUBCUTANEOUS at 09:19

## 2020-07-14 RX ADMIN — ALBUTEROL SULFATE 2 PUFF: 108 INHALANT RESPIRATORY (INHALATION) at 11:42

## 2020-07-14 RX ADMIN — ALBUTEROL SULFATE 2 PUFF: 108 INHALANT RESPIRATORY (INHALATION) at 08:07

## 2020-07-14 RX ADMIN — AMOXICILLIN AND CLAVULANATE POTASSIUM 1 TABLET: 875; 125 TABLET, FILM COATED ORAL at 08:47

## 2020-07-14 RX ADMIN — HEPARIN SODIUM 5000 UNITS: 10000 INJECTION, SOLUTION INTRAVENOUS; SUBCUTANEOUS at 11:26

## 2020-07-14 RX ADMIN — PANTOPRAZOLE SODIUM 40 MG: 40 TABLET, DELAYED RELEASE ORAL at 06:44

## 2020-07-14 ASSESSMENT — ACTIVITIES OF DAILY LIVING (ADL)
ADLS_ACUITY_SCORE: 15

## 2020-07-14 NOTE — PROGRESS NOTES
Patient has been assessed for Home Oxygen needs.  Oxygen readings:   *   RA - at rest  Pulse oximetry SPO2 92 %  *   RA - during activity/with exercise SPO2 85 %  *   O2 at  2L liters/minute (at rest) ...SPO2 92 %  *   O2 at  2L liters/minute (during activity/with exercise) ...SPO2 92 %

## 2020-07-14 NOTE — PLAN OF CARE
To Do:  End of Shift Summary  For vital signs and complete assessments, please see documentation flowsheets.     Pertinent assessments: On 2L O2 NC. Patient reports this is baseline. KHALIL. Ambulates independently in room. Denies pain. BG's 89, drank apple juice. Hopeful to discharge home tomorrow.     Major Shift events: Tolerating PO Augmentin.     Treatment Plan: Oral Augmentin, and supplemental oxygen.     Discharge Readiness: Medically active  Expected Discharge Date: 7/14  Discharge Disposition: Home with Self care  Barriers/Criteria for discharge: None

## 2020-07-14 NOTE — CONSULTS
Pt is in need of new home 02.  I called [employer] and left a message with the answering service.  They will call me back within 30 minutes    Yarelis Jordan RN BSN   Inpatient Care Coordination  [facility]  [phone]      ADDENDUM: 11:16 she already has a concentrator, fill station and two tanks.  They will bring a tank for transport.      11:39: Pt's family now can bring a tank upon discharge.  [employer] [facility] notified.

## 2020-07-14 NOTE — PROGRESS NOTES
Oxygen Documentation:   I certify that this patient, Nicole Reyes has been under my care (or a nurse practitioner or physican's assistant working with me). This is the face-to-face encounter for oxygen medical necessity.      Nicole Reyes is now in a chronic stable state and continues to require supplemental oxygen. Patient has continued oxygen desaturation due to Chronic Respiratory Failure with Hypoxia J93.11.    Alternative treatment(s) tried or considered and deemed clinically infective for treatment of Chronic Respiratory Failure with Hypoxia J93.11 include pulmonary toileting, Inhalers, CPAP  If portability is ordered, is the patient mobile within the home? yes    **Patients who qualify for home O2 coverage under the CMS guidelines require ABG tests or O2 sat readings obtained closest to, but no earlier than 2 days prior to the discharge, as evidence of the need for home oxygen therapy. Testing must be performed while patient is in the chronic stable state. See notes for O2 sats.**

## 2020-07-14 NOTE — PROGRESS NOTES
Pt to D/C to home.  Pt provided with d/c instructions, including new medications, when medications were last given, and when to take them again.  Pt also informed to f/u with PCP as scheduled.  Pt verbalized understanding of all d/c and f/u instructions.  All questions were answered at this time.  Copy of paperwork sent with pt.  Medication/Scripts (augmentin) x 1 sent with pt.  Family to provide transport.  All personal belongings sent with pt.     Family will bring home O2 for the ride home.

## 2020-07-14 NOTE — DISCHARGE SUMMARY
[facility]  Hospitalist Discharge Summary       Date of Admission:  7/10/2020  Date of Discharge:  7/14/2020    Discharging Provider: Napoleon Strauss MD      Discharge Diagnoses   #Sepsis secondary to left sided pneumonia  #Known hiatal hernia  #Recurrent episodes of pneumonia  #CRISTÓBAL  #Diabetes mellitus  #Chronic hypoxemic respiratory failure  #Hypertension    Follow-ups Needed After Discharge   Follow-up Appointments     Follow-up and recommended labs and tests       Follow up with primary care provider, Pari Wiley, within 7 days for   hospital follow- up.     Continue work up with your surgery team for the hiatal hernia             Unresulted Labs Ordered in the Past 30 Days of this Admission     Date and Time Order Name Status Description    7/10/2020 1344 Blood culture Preliminary     7/10/2020 1344 Blood culture Preliminary           Hospital Course     Assessment & Plan      Nicole Reyes is a 64 year old female admitted on 7/10/2020 with chills, myalgias, HA and SOB. Noted to be febrile here with elevated white count, and CXR showing opacity mid left lung likely a PNA. Febrile at home 101 F.  Patient has a past history significant for CRISTÓBAL uses CPAP, diabetes mellitus type 2, hiatal hernia, GERD, chronic hypoxemic respiratory failure, uses 2 L oxygen at night and as needed during the daytime.  She has frequent admissions to the hospital with pneumonia, suspected aspiration events related to her hiatal hernia.    Patient had evidence of sepsis, felt secondary to pneumonia.  Suspected aspiration pneumonia related to her hiatal hernia.  Patient reports history of silent aspirations and recurrent hospitalizations with pneumonia related to this issue.  Patient was treated with IV Zosyn with improvement in her symptoms.  Her fever, white count and breathing status is improving.  She has a reported history of allergy to penicillins although she tolerated IV Zosyn and then Augmentin which she is  tolerated without any issues.  Discharged home on oral Augmentin.  Her COVID testing was negative x2.    In terms of her hiatal hernia, patient had seen surgery just prior to this admission to discuss the repair for that.  Appears that there was plan for the hiatal hernia repair along with a Carol-en-Y bypass.  Patient is advised to continue follow-up with her bariatric surgery team.    At this point patient is doing a lot better.  She tells me at home she typically uses oxygen at nighttime and as needed during the daytime although she has been on continuous oxygen in the past.  She does have a pulse oximeter at home and feels very comfortable adjusting her oxygen levels.  For now, I suggested her to continue on continuous oxygen and reevaluate based on oxygen requirements in the coming days.    Consultations This Hospital Stay   CARE COORDINATOR IP CONSULT  CARE COORDINATOR IP CONSULT    Code Status   Prior    Time Spent on this Encounter   INapoleon MD, personally saw the patient today and spent greater than 30 minutes discharging this patient.       Napoleon Strauss MD  [facility]  ______________________________________________________________________    Physical Exam   Vital Signs: Temp: 97.7  F (36.5  C) Temp src: Oral BP: (!) 145/67 Pulse: 71 Heart Rate: 73 Resp: 18 SpO2: 91 % O2 Device: Nasal cannula Oxygen Delivery: 1 LPM  Weight: 246 lbs 4.8 oz  Patient is awake and alert, no acute distress  Lungs are clear to auscultation   Cardiac exam reveals regular rate and rhythm, normal S1, S2  Abdomen is soft, non-tender, no rebound or guarding       Primary Care Physician   Pari Wiley    Discharge Disposition   Discharged to home  Condition at discharge: Stable        Discharge Orders      Reason for your hospital stay    Pneumonia     Follow-up and recommended labs and tests     Follow up with primary care provider, Pari Wiley, within 7 days for hospital follow- up.     Continue work  up with your surgery team for the hiatal hernia     Activity    Your activity upon discharge: activity as tolerated     Oxygen Adult/Peds    Oxygen Documentation:   I certify that this patient, Nicole Reyes has been under my care (or a nurse practitioner or physican's assistant working with me). This is the face-to-face encounter for oxygen medical necessity.      Nicole Reyes is now in a chronic stable state and continues to require supplemental oxygen. Patient has continued oxygen desaturation due to Chronic Respiratory Failure with Hypoxia J93.11.    Alternative treatment(s) tried or considered and deemed clinically infective for treatment of Chronic Respiratory Failure with Hypoxia J93.11 include pulmonary toileting, CPAP, antibiotics.  If portability is ordered, is the patient mobile within the home? yes    **Patients who qualify for home O2 coverage under the CMS guidelines require ABG tests or O2 sat readings obtained closest to, but no earlier than 2 days prior to the discharge, as evidence of the need for home oxygen therapy. Testing must be performed while patient is in the chronic stable state. See notes for O2 sats.**     Diet    Follow this diet upon discharge:  Moderate Consistent CHO (4-6 CHO units/meal)     Discharge Medications   Discharge Medication List as of 7/14/2020 11:35 AM      START taking these medications    Details   amoxicillin-clavulanate (AUGMENTIN) 875-125 MG tablet Take 1 tablet by mouth 2 times daily for 7 days, Disp-14 tablet,R-0, E-Prescribe         CONTINUE these medications which have NOT CHANGED    Details   albuterol (PROAIR HFA/PROVENTIL HFA/VENTOLIN HFA) 108 (90 Base) MCG/ACT inhaler Inhale 1-2 puffs into the lungs every 6 hours as needed for shortness of breath / dyspnea or wheezing, Disp-1 Inhaler, R-0, E-PrescribePlease also dispense a spacer      aspirin 81 MG EC tablet Take 81 mg by mouth every evening , Historical      blood glucose (ACCU-CHEK BYRON PLUS)  test strip USE TO TEST BLOOD SUGAR TWO TIMES A DAY OR AS DIRECTED, Disp-100 each,R-3, E-Prescribe      clonazePAM (KLONOPIN) 0.5 MG tablet Take 1 mg by mouth At Bedtime , Historical      gabapentin (NEURONTIN) 300 MG capsule Take 3 capsules (900 mg) by mouth daily, Disp-270 capsule,R-3, E-Prescribe      losartan (COZAAR) 100 MG tablet TAKE ONE TABLET BY MOUTH ONCE DAILY, Disp-90 tablet,R-1, E-Prescribe      metFORMIN (GLUCOPHAGE-XR) 500 MG 24 hr tablet TAKE TWO TABLETS BY MOUTH EVERY MORNING, Disp-180 tablet,R-0, E-Prescribe      pantoprazole (PROTONIX) 40 MG EC tablet TAKE ONE TABLET BY MOUTH AT BEDTIME, Disp-90 tablet,R-2, E-Prescribe      sertraline (ZOLOFT) 100 MG tablet TAKE TWO TABLETS BY MOUTH ONCE DAILY, Disp-180 tablet,R-3, E-Prescribe      simvastatin (ZOCOR) 20 MG tablet TAKE ONE TABLET BY MOUTH AT BEDTIME, Disp-90 tablet,R-0, E-Prescribe      zolpidem (AMBIEN) 10 MG tablet TAKE 1 TABLET BY MOUTH EVERY NIGHT AT BEDTIME, Disp-90 tablet,R-1, E-Prescribe           Allergies   Allergies   Allergen Reactions     Codeine Rash     Penicillins Rash     Pt has tolerated cephalosporins and penems.   Pt tolerated Zosyn 7/6/2019       Significant Results and Procedures   Results for orders placed or performed during the hospital encounter of 07/10/20   XR Chest Port 1 View    Narrative    CHEST ONE VIEW PORTABLE   7/10/2020 2:37 PM     HISTORY: Fever.    COMPARISON: 5/9/2020      Impression    IMPRESSION: Questionable opacity projected over the mid left lung  could represent viral or bacterial pneumonia. No pneumothorax or  pleural effusion.    JENNIFER MATHEW MD

## 2020-07-14 NOTE — PROGRESS NOTES
I scheduled Pt for f/u with primary:    Jul21 Telephone Visit with Pari Wiley MD   Tuesday Jul 21, 2020 2:20 PM   [facility]  Note: this is not an onsite visit; there is no need to come to the facility.  Please have a list of all current medications available for appointment.  [facility]   [address] [place]   [facility] [address]   [phone]      Discharge AVS updated    Yarelis Jordan RN BSN   Inpatient Care Coordination  [facility]  [phone]

## 2020-07-15 ENCOUNTER — PATIENT OUTREACH (OUTPATIENT)
Dept: CARE COORDINATION | Facility: CLINIC | Age: 65
End: 2020-07-15

## 2020-07-15 ENCOUNTER — TELEPHONE (OUTPATIENT)
Dept: INTERNAL MEDICINE | Facility: CLINIC | Age: 65
End: 2020-07-15

## 2020-07-15 DIAGNOSIS — Z71.89 OTHER SPECIFIED COUNSELING: ICD-10-CM

## 2020-07-15 NOTE — PROGRESS NOTES
Patient identified as high risk for readmission.  Patient meets criteria for care coordination outreach.    Darlene Negron, [facility]  Clinic Care Coordinator  Ph. [phone]  [email or web address]

## 2020-07-15 NOTE — PROGRESS NOTES
Clinic Care Coordination Contact  [facility]/Voicemail       Clinical Data: Care Coordinator Outreach  Outreach attempted x 1.  Left message on patient's voicemail with call back information and requested return call.    Chart Review:  Referral made off discharge report.  ED/Admitted for pneumonia.  Follow up with PCP within 7 days (07/21/20).    Plan:  Care Coordinator will try to reach patient again in 1-2 business days.    BLANCA Addison  Clinic Care Coordination  [facility] : [place], [place], [place], and [place]  Phone: [phone]    ______________________  Next Outreach:  07/16/20

## 2020-07-15 NOTE — TELEPHONE ENCOUNTER
IP F/U    Date: 7/14/20  Diagnosis: Aspiration Pneumonia Of Left Lower Lobe, Unspecified Aspiration Pneumonia Type (H), Pneumonia Of Left Upper Lobe  Is patient active in care coordination? Yes - Route to Care Coordination (P [id number])  Was patient in TCU? No

## 2020-07-16 ENCOUNTER — PATIENT OUTREACH (OUTPATIENT)
Dept: NURSING | Facility: CLINIC | Age: 65
End: 2020-07-16
Payer: COMMERCIAL

## 2020-07-16 DIAGNOSIS — Z20.822 COVID-19 RULED OUT: Primary | ICD-10-CM

## 2020-07-16 NOTE — PROGRESS NOTES
Clinic Care Coordination Contact    Situation: Patient chart reviewed by care coordinator.    Background: Recent ED/IP visit. COVID-19 Test performed.     Assessment: COVID-19 testing. Results: Negative.        Plan/Recommendations: Patient meets criteria for GetWell Loop based on current COVID-19 testing status protocol; referral placed by Care Coordinator.  No patient outreach will be made at this time; Care Coordination to remain available should a change in patient status occur or patient needs arise.      Darlene Negron [facility]  Clinic Care Coordinator  Ph. [phone]  [email or web address]

## 2020-07-16 NOTE — PROGRESS NOTES
Clinic Care Coordination Contact  Community Health Worker Initial Outreach    CHW Initial Information Gathering:  Referral Source: IP Report  Preferred Hospital: [facility] [place]  [phone]  Current living arrangement:: Not Assessed  Informal Support system:: Spouse, Children, Friends, Family  No PCP office visit in Past Year: No  CHW Additional Questions  If ED/Hospital discharge, follow-up appointment scheduled as recommended?: Yes  Medication changes made following ED/Hospital discharge?: N/A  MyChart active?: Yes  Patient sent Social Determinants of Health questionnaire?: No    Patient accepts CC: No, No need for CC at this time.    Spoke to Shayy,  Shayy reports that she's been doing fine and has no questions or concerns.  Confirmed that patient has follow up scheduled with PCP.  CHW offered if support and resources were needed at this time.  However, patient declined, stating that her spouse and daughter have been assisting her.  She had recalled our last conversation from before.  CHW encouraged patient to contact CC if there were any changes.    Fabrice Rogers, CHW  Clinic Care Coordination  [facility]ics : [place], [place], [place], and [place]  Phone: [phone]

## 2020-07-21 ENCOUNTER — VIRTUAL VISIT (OUTPATIENT)
Dept: INTERNAL MEDICINE | Facility: CLINIC | Age: 65
End: 2020-07-21
Payer: COMMERCIAL

## 2020-07-21 DIAGNOSIS — M54.42 MIDLINE LOW BACK PAIN WITH LEFT-SIDED SCIATICA, UNSPECIFIED CHRONICITY: ICD-10-CM

## 2020-07-21 DIAGNOSIS — J69.0 ASPIRATION PNEUMONIA OF BOTH LUNGS DUE TO GASTRIC SECRETIONS, UNSPECIFIED PART OF LUNG (H): Primary | ICD-10-CM

## 2020-07-21 DIAGNOSIS — M25.511 PAIN OF BOTH SHOULDER JOINTS: ICD-10-CM

## 2020-07-21 DIAGNOSIS — M25.512 PAIN OF BOTH SHOULDER JOINTS: ICD-10-CM

## 2020-07-21 DIAGNOSIS — K21.9 GASTROESOPHAGEAL REFLUX DISEASE WITHOUT ESOPHAGITIS: ICD-10-CM

## 2020-07-21 PROCEDURE — 99495 TRANSJ CARE MGMT MOD F2F 14D: CPT | Performed by: INTERNAL MEDICINE

## 2020-07-21 RX ORDER — MULTIPLE VITAMINS W/ MINERALS TAB 9MG-400MCG
1 TAB ORAL DAILY
COMMUNITY

## 2020-07-21 NOTE — PROGRESS NOTES
"Nicole Reyes is a 64 year old female who is being evaluated via a billable video visit.      The patient has been notified of following:     \"This video visit will be conducted via a call between you and your physician/provider. We have found that certain health care needs can be provided without the need for an in-person physical exam.  This service lets us provide the care you need with a video conversation.  If a prescription is necessary we can send it directly to your pharmacy.  If lab work is needed we can place an order for that and you can then stop by our lab to have the test done at a later time.    Video visits are billed at different rates depending on your insurance coverage.  Please reach out to your insurance provider with any questions.    If during the course of the call the physician/provider feels a video visit is not appropriate, you will not be charged for this service.\"    Patient has given verbal consent for Video visit? Yes  How would you like to obtain your AVS? Mail a copy  If you are dropped from the video visit, the video invite should be resent to: Text to cell phone: [phone]  Will anyone else be joining your video visit? No    Subjective     Nicole Reyes is a 64 year old female who presents today via video visit for the following health issues:    HPI    Video Start Time:2:27    Hospital Follow-up Visit:    Hospital/Nursing Home/IP Rehab Facility: [facility]  Date of Admission: 07/10/2020  Date of Discharge: 07/14/2020  Reason(s) for Admission: aspiration Pneumonia      Was your hospitalization related to COVID-19? No   Problems taking medications regularly:  None  Medication changes since discharge: none, finishes her antibiotic today  Problems adhering to non-medication therapy:  None    Summary of hospitalization:  [facility] discharge summary reviewed  Diagnostic Tests/Treatments reviewed.  Follow up needed: none  Other Healthcare Providers " Involved in Patient s Care:         Specialist appointment - surgeon  Update since discharge: stable.  She has some continued mild cough, still has some dyspnea on exertion, stable oxygen levels on oxygen at night.      She did have negative COVID testing.  It was felt that this was another aspiration pneumonia.  She had an appointment with the surgeon who feels they want to do a gastric sleeve and the hiatal hernia repair as they feel that will give her more benefit to preventing reflux and aspiration.  She is going to need to go through the bariatric surgery clinic and will probably be November before she would have the surgery done.  She has some questions regarding whether Medicare covers a gastric sleeve since she was started on Medicare around that time.    Her sugars were good in the hospital and she did have an A1c done in May      Post Discharge Medication Reconciliation: discharge medications reconciled, continue medications without change.  Plan of care communicated with patient              Other concerns: She has been having continued bilateral low back pain with some sciatica that has gradually gotten a little worse.  She also is having continued right shoulder pain.  She had started some therapy for the shoulder but then that got delayed.  Therapist had been aware of the back but had not had orders to do that.  She is wondering about referral back to start this again.    Patient Active Problem List   Diagnosis     Essential hypertension, benign     Restless leg syndrome     CRISTÓBAL (obstructive sleep apnea)     CKD (chronic kidney disease) stage 3, GFR 30-59 ml/min (H)     Advanced directives, counseling/discussion     GENERALIZED ANXIETY DIS     Major depressive disorder, recurrent episode, moderate (H)     Health Care Home     GERD (gastroesophageal reflux disease)     Hyperlipidemia LDL goal <100     Eczema     Type 2 diabetes mellitus with stage 3 chronic kidney disease, without long-term current use  of insulin (H)     Midline low back pain with left-sided sciatica     Morbid obesity (HCC)     Insomnia, unspecified type     Dyspnea, unspecified type     Acute pain of left knee     Controlled substance agreement signed     Pneumonia     Pain of both shoulder joints     Aspiration pneumonia (H)     Current Outpatient Medications   Medication Sig Dispense Refill     albuterol (PROAIR HFA/PROVENTIL HFA/VENTOLIN HFA) 108 (90 Base) MCG/ACT inhaler Inhale 1-2 puffs into the lungs every 6 hours as needed for shortness of breath / dyspnea or wheezing 1 Inhaler 0     aspirin 81 MG EC tablet Take 81 mg by mouth every evening        blood glucose (ACCU-CHEK BYRON PLUS) test strip USE TO TEST BLOOD SUGAR TWO TIMES A DAY OR AS DIRECTED 100 each 3     clonazePAM (KLONOPIN) 0.5 MG tablet Take 1 mg by mouth At Bedtime        gabapentin (NEURONTIN) 300 MG capsule Take 3 capsules (900 mg) by mouth daily 270 capsule 3     losartan (COZAAR) 100 MG tablet TAKE ONE TABLET BY MOUTH ONCE DAILY 90 tablet 1     metFORMIN (GLUCOPHAGE-XR) 500 MG 24 hr tablet TAKE TWO TABLETS BY MOUTH EVERY MORNING 180 tablet 0     multivitamin w/minerals (MULTI-VITAMIN) tablet Take 1 tablet by mouth daily       pantoprazole (PROTONIX) 40 MG EC tablet TAKE ONE TABLET BY MOUTH AT BEDTIME 90 tablet 2     sertraline (ZOLOFT) 100 MG tablet TAKE TWO TABLETS BY MOUTH ONCE DAILY 180 tablet 3     simvastatin (ZOCOR) 20 MG tablet TAKE ONE TABLET BY MOUTH AT BEDTIME 90 tablet 0     zolpidem (AMBIEN) 10 MG tablet TAKE 1 TABLET BY MOUTH EVERY NIGHT AT BEDTIME 90 tablet 1      Social History     Tobacco Use     Smoking status: Former Smoker     Last attempt to quit: 3/18/1979     Years since quittin.3     Smokeless tobacco: Never Used     Tobacco comment: quit    Substance Use Topics     Alcohol use: Yes     Comment: Twice a month     Drug use: No                    Reviewed and updated as needed this visit by Provider         Review of Systems   No current  fever, chills, mild cough, mild shortness of breath over baseline, no abdominal concerns      Objective             Physical Exam     GENERAL: Healthy, alert and no distress  EYES: Eyes grossly normal to inspection.  No discharge or erythema, or obvious scleral/conjunctival abnormalities.  RESP: No audible wheeze, cough, or visible cyanosis.  No visible retractions or increased work of breathing.    SKIN: Visible skin clear. No significant rash, abnormal pigmentation or lesions.  NEURO: Cranial nerves grossly intact.  Mentation and speech appropriate for age.  PSYCH: Mentation appears normal, affect normal/bright, judgement and insight intact, normal speech and appearance well-groomed.      Lab Results   Component Value Date    A1C 6.9 05/12/2020    A1C 6.5 01/16/2020    A1C 6.7 06/21/2019    A1C 6.5 04/02/2019              Assessment & Plan     1. Aspiration pneumonia of both lungs due to gastric secretions, unspecified part of lung (H)  This is another aspiration pneumonia like previous episodes, complete antibiotics and call if any recurrent symptoms    2. Gastroesophageal reflux disease without esophagitis  Plan for eventual hiatal hernia repair and the gastric sleeve as above    3. Pain of both shoulder joints  We will refer her for a therapy    4. Midline low back pain with left-sided sciatica, unspecified chronicity  Refer for therapy         Video-Visit Details    Type of service:  Video Visit    Video End Time:2:44    Originating Location (pt. Location): Home    Distant Location (provider location):Home    Platform used for Video Visit: Doximity    Return in about 4 months (around 11/21/2020) for Diabetes, see me a week after lab.       Pari Wiley MD

## 2020-08-03 ENCOUNTER — APPOINTMENT (OUTPATIENT)
Dept: GENERAL RADIOLOGY | Facility: CLINIC | Age: 65
End: 2020-08-03
Attending: EMERGENCY MEDICINE
Payer: COMMERCIAL

## 2020-08-03 ENCOUNTER — HOSPITAL ENCOUNTER (EMERGENCY)
Facility: CLINIC | Age: 65
Discharge: HOME | End: 2020-08-03
Attending: EMERGENCY MEDICINE | Admitting: EMERGENCY MEDICINE
Payer: COMMERCIAL

## 2020-08-03 VITALS
HEART RATE: 68 BPM | TEMPERATURE: 98.1 F | OXYGEN SATURATION: 92 % | RESPIRATION RATE: 20 BRPM | DIASTOLIC BLOOD PRESSURE: 81 MMHG | SYSTOLIC BLOOD PRESSURE: 129 MMHG

## 2020-08-03 DIAGNOSIS — J18.9 PNEUMONIA OF LEFT LOWER LOBE DUE TO INFECTIOUS ORGANISM: ICD-10-CM

## 2020-08-03 LAB
ANION GAP SERPL CALCULATED.3IONS-SCNC: 6 MMOL/L (ref 3–14)
BASOPHILS # BLD AUTO: 0 10E9/L (ref 0–0.2)
BASOPHILS NFR BLD AUTO: 0.4 %
BUN SERPL-MCNC: 13 MG/DL (ref 7–30)
CALCIUM SERPL-MCNC: 8.2 MG/DL (ref 8.5–10.1)
CHLORIDE SERPL-SCNC: 108 MMOL/L (ref 94–109)
CO2 SERPL-SCNC: 29 MMOL/L (ref 20–32)
CREAT SERPL-MCNC: 0.84 MG/DL (ref 0.52–1.04)
DIFFERENTIAL METHOD BLD: ABNORMAL
EOSINOPHIL # BLD AUTO: 0.6 10E9/L (ref 0–0.7)
EOSINOPHIL NFR BLD AUTO: 5.7 %
ERYTHROCYTE [DISTWIDTH] IN BLOOD BY AUTOMATED COUNT: 16.1 % (ref 10–15)
GFR SERPL CREATININE-BSD FRML MDRD: 73 ML/MIN/{1.73_M2}
GLUCOSE SERPL-MCNC: 97 MG/DL (ref 70–99)
HCT VFR BLD AUTO: 36 % (ref 35–47)
HGB BLD-MCNC: 10.7 G/DL (ref 11.7–15.7)
IMM GRANULOCYTES # BLD: 0 10E9/L (ref 0–0.4)
IMM GRANULOCYTES NFR BLD: 0.4 %
LYMPHOCYTES # BLD AUTO: 1.9 10E9/L (ref 0.8–5.3)
LYMPHOCYTES NFR BLD AUTO: 18.4 %
MCH RBC QN AUTO: 24.9 PG (ref 26.5–33)
MCHC RBC AUTO-ENTMCNC: 29.7 G/DL (ref 31.5–36.5)
MCV RBC AUTO: 84 FL (ref 78–100)
MONOCYTES # BLD AUTO: 0.6 10E9/L (ref 0–1.3)
MONOCYTES NFR BLD AUTO: 6 %
NEUTROPHILS # BLD AUTO: 7.2 10E9/L (ref 1.6–8.3)
NEUTROPHILS NFR BLD AUTO: 69.1 %
NRBC # BLD AUTO: 0 10*3/UL
NRBC BLD AUTO-RTO: 0 /100
PLATELET # BLD AUTO: 263 10E9/L (ref 150–450)
POTASSIUM SERPL-SCNC: 3.5 MMOL/L (ref 3.4–5.3)
RBC # BLD AUTO: 4.29 10E12/L (ref 3.8–5.2)
SODIUM SERPL-SCNC: 143 MMOL/L (ref 133–144)
WBC # BLD AUTO: 10.4 10E9/L (ref 4–11)

## 2020-08-03 PROCEDURE — 80048 BASIC METABOLIC PNL TOTAL CA: CPT | Performed by: EMERGENCY MEDICINE

## 2020-08-03 PROCEDURE — 85025 COMPLETE CBC W/AUTO DIFF WBC: CPT | Performed by: EMERGENCY MEDICINE

## 2020-08-03 PROCEDURE — 71045 X-RAY EXAM CHEST 1 VIEW: CPT

## 2020-08-03 PROCEDURE — 99285 EMERGENCY DEPT VISIT HI MDM: CPT | Mod: 25

## 2020-08-03 PROCEDURE — C9803 HOPD COVID-19 SPEC COLLECT: HCPCS

## 2020-08-03 PROCEDURE — 96360 HYDRATION IV INFUSION INIT: CPT

## 2020-08-03 PROCEDURE — U0003 INFECTIOUS AGENT DETECTION BY NUCLEIC ACID (DNA OR RNA); SEVERE ACUTE RESPIRATORY SYNDROME CORONAVIRUS 2 (SARS-COV-2) (CORONAVIRUS DISEASE [COVID-19]), AMPLIFIED PROBE TECHNIQUE, MAKING USE OF HIGH THROUGHPUT TECHNOLOGIES AS DESCRIBED BY CMS-2020-01-R: HCPCS | Performed by: EMERGENCY MEDICINE

## 2020-08-03 PROCEDURE — 25800030 ZZH RX IP 258 OP 636: Performed by: EMERGENCY MEDICINE

## 2020-08-03 RX ORDER — SODIUM CHLORIDE 9 MG/ML
INJECTION, SOLUTION INTRAVENOUS CONTINUOUS
Status: DISCONTINUED | OUTPATIENT
Start: 2020-08-03 | End: 2020-08-03 | Stop reason: HOSPADM

## 2020-08-03 RX ADMIN — SODIUM CHLORIDE: 9 INJECTION, SOLUTION INTRAVENOUS at 15:48

## 2020-08-03 ASSESSMENT — ENCOUNTER SYMPTOMS
COUGH: 1
FEVER: 1
SHORTNESS OF BREATH: 1
RHINORRHEA: 0
SORE THROAT: 0
CHEST TIGHTNESS: 1

## 2020-08-03 NOTE — ED PROVIDER NOTES
History     Chief Complaint:  Shortness of Breath and Cough    HPI   Nicole Reyes is a 64 year old female with a history of recurrent pneumonia and aspirations who presents with cough and shortness of breath. She was admitted 7/10 with left sided pneumonia. She reports her cough began 2 days ago and has progressively worsened. Today, her cough continued and she noted that her chest tightness was increasing. Her cough is productive and she reports green sputum. She is also experiencing an undocumented low grade fever of 100.2 degrees. She believes her symptoms are somewhat similar to previous episodes of aspiration. She denies rhinorrhea, sore throat, anosmia, and ageusia. She is not immunocompromised and denies a history of COPD, asthma, and emphysema.     Allergies:  Codeine      Medications:    Albuterol  Aspirin 81 mg  Klonopin  Neurontin  Cozaar  Metformin  Multivitamin  Protonix  Zoloft  Zocor  Ambien     Past Medical History:    CKD, stage 3  Hypertension  GERD  RADHA  Abdominal hernia  Insomnia  Anemia  Depression  Menopause  Obesity  CRISTÓBAL  Hypercholesterolemia  RLS  Type II diabetes  Hyperlipidemia  Eczema  Aspiration pneumonia    Past Surgical History:    Breast biopsy  Hernia repair   x3  Dilation and curettage  Herniorrhaphy incisional x2    Family History:    CHF  Hypertension  CAD  Lipids  Cancer  Diabetes  Lupus    Social History:  Smoking status: former, quit   Alcohol use: yes, twice a month  Marital Status:   [2]     Review of Systems   Constitutional: Positive for fever.   HENT: Negative for rhinorrhea and sore throat.    Respiratory: Positive for cough, chest tightness and shortness of breath.    All other systems reviewed and are negative.      Physical Exam     Patient Vitals for the past 24 hrs:   BP Temp Temp src Pulse Heart Rate Resp SpO2   20 1550 136/70 -- -- 75 -- -- 90 %   20 1327 (!) 148/84 98.1  F (36.7  C) Oral -- 97 20 90 %       Physical  Exam  General: Patient is alert and interactive when I enter the room  Head:  The scalp, face, and head appear normal  Eyes:  The pupils are equal, round, and reactive to light    Conjunctivae and sclerae are normal  ENT:    External acoustic canals are normal    The oropharynx is normal without erythema.     Uvula is in the midline  Neck:  Normal range of motion  CV:  Regular rate. S1/S2. No murmurs.   Resp:  Lungs are clear without wheezes or rales. No distress  GI:  Abdomen is soft, no rigidity, guarding, or rebound    No distension. No tenderness to palpation in any quadrant.     MS:  Normal tone. Joints grossly normal without effusions.     No asymmetric leg swelling, calf or thigh tenderness.      Normal motor assessment of all extremities.  Skin:  No rash or lesions noted. Normal capillary refill noted  Neuro: Speech is normal and fluent. Face is symmetric.     Moving all extremities well.   Psych:  Awake. Alert.  Normal affect.  Appropriate interactions.  Lymph: No anterior cervical lymphadenopathy noted      Emergency Department Course       Imaging:  Radiographic findings were communicated with the patient who voiced understanding of the findings.  XR Chest Port 1 View   Preliminary Result   IMPRESSION: Single portable AP view of the chest was obtained. Stable   mild enlargement of the cardiac silhouette and mild pulmonary vascular   congestion. Left basilar/retrocardiac pulmonary opacities, could   represent atelectasis versus infection. No significant right pleural   effusion. No significant pneumothorax.          Laboratory:  Laboratory findings were communicated with the patient who voiced understanding of the findings.  Labs Ordered and Resulted from Time of ED Arrival Up to the Time of Departure from the ED   CBC WITH PLATELETS DIFFERENTIAL - Abnormal; Notable for the following components:       Result Value    Hemoglobin 10.7 (*)     MCH 24.9 (*)     MCHC 29.7 (*)     RDW 16.1 (*)     All other  components within normal limits   BASIC METABOLIC PANEL - Abnormal; Notable for the following components:    Calcium 8.2 (*)     All other components within normal limits   COVID-19 VIRUS (CORONAVIRUS) BY PCR   PERIPHERAL IV CATHETER           Interventions:  1548 Sodium chloride 0.9%, 1000, IV    Emergency Department Course:      Past medical records, nursing notes, and vitals reviewed.   I performed an exam of the patient and obtained history, as documented above.     Blood drawn. This was sent to the lab for further testing, results above.    The patient was sent for a chest XR while in the emergency department, findings above.      Impression & Plan             CMS Diagnoses:         Medical Decision Makin-year-old female who is not immunocompromised with history of recurrent episodes of pneumonia thought secondarily to aspiration presents for evaluation of cough, low-grade fevers but without chest pain nor other symptoms of coronavirus.  A broad differential was of course considered.  At this point given the findings above, I think the best plan is home with oxygen as needed.  She is not hypoxic here talking to me and the lowest oxygen saturation was 90% in triage however here she has been greater than 94% most of the time. Would not hospitalize at this time.  Augmentin as tolereated this in past and best choice if there is some aspiration going on at night.       Critical Care time:  none    Diagnosis:    ICD-10-CM    1. Pneumonia of left lower lobe due to infectious organism  J18.9        Disposition:  discharged to home    Discharge Medications:  New Prescriptions    AMOXICILLIN-CLAVULANATE (AUGMENTIN) 875-125 MG TABLET    Take 1 tablet by mouth 2 times daily for 7 days     Scribe Disclosure:  I, Rachelle Barkley, am serving as a scribe at 4:14 PM on 8/3/2020 to document services personally performed by Thompson Martinez MD based on my observations and the provider's statements to me.     Rachelle  Filippo  8/3/2020   [facility] EMERGENCY DEPARTMENT       Thompson Martinez MD  08/03/20 [id number]

## 2020-08-03 NOTE — ED NOTES
Patient discharged to home. Patient received follow-up with PCP if needed and medication instructions for Augmentin. Patient received discharge instructions and has no other questions at this time.

## 2020-08-03 NOTE — ED AVS SNAPSHOT
[facility] Emergency Department  [address]  [facility] [address]  Phone:  [phone]  Fax:  [phone]                                    Nicole Reyes   MRN: [id number]    Department:  [facility] Emergency Department   Date of Visit:  8/3/2020           After Visit Summary Signature Page    I have received my discharge instructions, and my questions have been answered. I have discussed any challenges I see with this plan with the nurse or doctor.    ..........................................................................................................................................  Patient/Patient Representative Signature      ..........................................................................................................................................  Patient Representative Print Name and Relationship to Patient    ..................................................               ................................................  Date                                   Time    ..........................................................................................................................................  Reviewed by Signature/Title    ...................................................              ..............................................  Date                                               Time          22EPIC Rev 08/18

## 2020-08-04 ENCOUNTER — PATIENT OUTREACH (OUTPATIENT)
Dept: CARE COORDINATION | Facility: CLINIC | Age: 65
End: 2020-08-04

## 2020-08-04 DIAGNOSIS — Z71.89 OTHER SPECIFIED COUNSELING: ICD-10-CM

## 2020-08-04 LAB
SARS-COV-2 RNA SPEC QL NAA+PROBE: NOT DETECTED
SPECIMEN SOURCE: NORMAL

## 2020-08-04 NOTE — PROGRESS NOTES
Patient identified as high risk for readmission.  Patient meets criteria for care coordination outreach.    Darlene Negron, [facility]  Clinic Care Coordinator  Ph. [phone]  [email or web address]

## 2020-08-04 NOTE — PROGRESS NOTES
Clinic Care Coordination Contact  [facility]/Voicemail       Clinical Data: Care Coordinator Outreach  Outreach attempted x 1.  Left message on patient's voicemail with call back information and requested return call.    Chart Review:  Referral made off discharge.  ED for cough and SOB.  Follow up with PCP in 2 days.    Plan:  Care Coordinator will try to reach patient again in 1-2 business days.    BLANCA Addison  Clinic Care Coordination  [facility] Clinics : [place], [place], [place], and [place]  Phone: [phone]    ______________________  Next Outreach:  08/05/20

## 2020-08-05 ENCOUNTER — MYC MEDICAL ADVICE (OUTPATIENT)
Dept: CARE COORDINATION | Facility: CLINIC | Age: 65
End: 2020-08-05

## 2020-08-05 ENCOUNTER — MYC MEDICAL ADVICE (OUTPATIENT)
Dept: INTERNAL MEDICINE | Facility: CLINIC | Age: 65
End: 2020-08-05

## 2020-08-05 ENCOUNTER — PATIENT OUTREACH (OUTPATIENT)
Dept: NURSING | Facility: CLINIC | Age: 65
End: 2020-08-05
Payer: COMMERCIAL

## 2020-08-05 DIAGNOSIS — G47.00 INSOMNIA, UNSPECIFIED TYPE: ICD-10-CM

## 2020-08-05 RX ORDER — CLONAZEPAM 0.5 MG/1
1 TABLET ORAL AT BEDTIME
Status: CANCELLED | OUTPATIENT
Start: 2020-08-05

## 2020-08-05 NOTE — PROGRESS NOTES
Clinic Care Coordination Contact  Community Health Worker Initial Outreach    CHW Initial Information Gathering:  Referral Source: ED Follow-Up  Preferred Hospital: [facility], [place]  [phone]  Current living arrangement:: I live in a private home with family, I live in a private home with spouse  Type of residence:: Private home - stairs  Community Resources: None  Supplies used at home:: Diabetic Supplies, Oxygen Tubing/Supplies  Equipment Currently Used at Home: cane, straight, shower chair, walker, rolling, walker, standard  Informal Support system:: Friends, Family, Spouse, Children  No PCP office visit in Past Year: No  Transportation means:: Regular car  CHW Additional Questions  If ED/Hospital discharge, follow-up appointment scheduled as recommended?: Yes  Medication changes made following ED/Hospital discharge?: Yes, patient to be scheduled with CC RN/SW within approx 1 business day  [facility] active?: Yes  Patient sent Social Determinants of Health questionnaire?: Yes    Patient accepts CC: Yes. Patient scheduled for assessment with Tim Wright CC RN, on 08/10/20 at 2:30PM. Patient noted desire to discuss CC.     Spoke to Shayy Lucas states that she hasn't been feeling any different since she's been home even with taking the Amoxicillin.  She had started the medication on Monday, using her spirometer and O2, however, still feels the same.  Although, patient states that the ED doctor reported everything was normal, patient is wondering what her next steps should be if the amoxicillin doesn't work, which she should be finished with Rx by Monday.    Patient agreed for CC RN assessment to discuss next steps to being relieved of pneumonia so patient can continue on with surgery.  SDOH has been sent via [place].    BLANCA Addison  Clinic Care Coordination  [facility] Clinics : [place], [place], [place], and [place]  Phone: [phone]

## 2020-08-06 NOTE — TELEPHONE ENCOUNTER
Patient was in emergency department with pneumonia, told to follow-up with primary care provider in 2 days (tested negative for COVID-19 in emergency department) . Patient has some concerns regarding if Amoxacillin does not work what her next steps would be and has questions regarding increasing her Klonopin by half dose due to increased anxiety. Primary care provider does not have any in-clinic visits available for the rest of this week. Would in-clinic appointment be more appropriate for patient? Or virtual visit? When can patient be seen if no in-clinic appointments available? Please advise,     Thank you

## 2020-08-07 RX ORDER — CLONAZEPAM 0.5 MG/1
TABLET ORAL AT BEDTIME
Status: CANCELLED | OUTPATIENT
Start: 2020-08-07

## 2020-08-07 RX ORDER — CLONAZEPAM 1 MG/1
1 TABLET ORAL 2 TIMES DAILY PRN
Qty: 60 TABLET | Refills: 1 | Status: SHIPPED | OUTPATIENT
Start: 2020-08-07 | End: 2020-11-02

## 2020-08-07 NOTE — TELEPHONE ENCOUNTER
Patient needs refill of medication, medication and pharmacy pended. Please adjust dose and sign as appropriate,     Thank you

## 2020-08-10 ENCOUNTER — PATIENT OUTREACH (OUTPATIENT)
Dept: NURSING | Facility: CLINIC | Age: 65
End: 2020-08-10
Attending: INTERNAL MEDICINE
Payer: COMMERCIAL

## 2020-08-10 SDOH — ECONOMIC STABILITY: FOOD INSECURITY: WITHIN THE PAST 12 MONTHS, THE FOOD YOU BOUGHT JUST DIDN'T LAST AND YOU DIDN'T HAVE MONEY TO GET MORE.: NEVER TRUE

## 2020-08-10 SDOH — ECONOMIC STABILITY: TRANSPORTATION INSECURITY
IN THE PAST 12 MONTHS, HAS THE LACK OF TRANSPORTATION KEPT YOU FROM MEDICAL APPOINTMENTS OR FROM GETTING MEDICATIONS?: NO

## 2020-08-10 SDOH — SOCIAL STABILITY: SOCIAL INSECURITY
WITHIN THE LAST YEAR, HAVE YOU BEEN KICKED, HIT, SLAPPED, OR OTHERWISE PHYSICALLY HURT BY YOUR PARTNER OR EX-PARTNER?: NO

## 2020-08-10 SDOH — SOCIAL STABILITY: SOCIAL NETWORK
IN A TYPICAL WEEK, HOW MANY TIMES DO YOU TALK ON THE PHONE WITH FAMILY, FRIENDS, OR NEIGHBORS?: MORE THAN THREE TIMES A WEEK

## 2020-08-10 SDOH — SOCIAL STABILITY: SOCIAL INSECURITY: WITHIN THE LAST YEAR, HAVE YOU BEEN HUMILIATED OR EMOTIONALLY ABUSED IN OTHER WAYS BY YOUR PARTNER OR EX-PARTNER?: NO

## 2020-08-10 SDOH — SOCIAL STABILITY: SOCIAL INSECURITY: WITHIN THE LAST YEAR, HAVE YOU BEEN AFRAID OF YOUR PARTNER OR EX-PARTNER?: NO

## 2020-08-10 SDOH — ECONOMIC STABILITY: TRANSPORTATION INSECURITY
IN THE PAST 12 MONTHS, HAS LACK OF TRANSPORTATION KEPT YOU FROM MEETINGS, WORK, OR FROM GETTING THINGS NEEDED FOR DAILY LIVING?: NO

## 2020-08-10 SDOH — ECONOMIC STABILITY: FOOD INSECURITY: WITHIN THE PAST 12 MONTHS, YOU WORRIED THAT YOUR FOOD WOULD RUN OUT BEFORE YOU GOT MONEY TO BUY MORE.: NEVER TRUE

## 2020-08-10 SDOH — SOCIAL STABILITY: SOCIAL INSECURITY
WITHIN THE LAST YEAR, HAVE TO BEEN RAPED OR FORCED TO HAVE ANY KIND OF SEXUAL ACTIVITY BY YOUR PARTNER OR EX-PARTNER?: NO

## 2020-08-10 SDOH — HEALTH STABILITY: MENTAL HEALTH
STRESS IS WHEN SOMEONE FEELS TENSE, NERVOUS, ANXIOUS, OR CAN'T SLEEP AT NIGHT BECAUSE THEIR MIND IS TROUBLED. HOW STRESSED ARE YOU?: TO SOME EXTENT

## 2020-08-10 ASSESSMENT — ACTIVITIES OF DAILY LIVING (ADL): DEPENDENT_IADLS:: INDEPENDENT

## 2020-08-10 NOTE — PROGRESS NOTES
Clinic Care Coordination Contact    Clinic Care Coordination Contact  OUTREACH    Referral Information:  Referral Source: ED Follow-Up      Chief Complaint   Patient presents with     Clinic Care Coordination - Post Hospital     Post ED         [place] Utilization: Reviewed and noted multiple ED visits for Pneumonia related.   Clinic Utilization  No PCP office visit in Past Year: No  Utilization    Last refreshed: 8/10/2020  3:21 PM:  Hospital Admissions 6           Last refreshed: 8/10/2020  3:21 PM:  ED Visits 6           Last refreshed: 8/10/2020  3:21 PM:  No Show Count (past year) 0              Current as of: 8/10/2020  3:21 PM              Clinical Concerns: Patient was at [facility] emergency department on 8/3/2020 for diagnosis of Pneumonia of L Lower lobe due to infectious organism  Phone call to Patient. Patient indicates her last dose of antibiotic is today and she is feeling  much better . Denies any fever or chest tightness but has cough here and there. Reports getting short of breath when she is up and moving, but has oxygen to use if her saturations dropped. Reports increase oxygen use at nighttime due to not liking the use of CPAP Mask. Patient reports having a lot of  muscular things  going on and will be starting PT tomorrow. Reports that she continues to use the spirometry and checking her oxygen saturation as needed. Patient endorses no depression symptoms but anxiety is elevated thus the reason for asking PCP to increase klonopin. Denies any financial hardship nor difficulty getting to medical appointments along with completion of follow up cares. Patient reports her daughter living with her and her spouse. Daughter has been helping them out a lot. Endorses no additional support, resources, or need at this time.     Current Medical Concerns:    Patient Active Problem List   Diagnosis     Essential hypertension, benign     Restless leg syndrome     CRISTÓBAL (obstructive sleep apnea)     CKD  (chronic kidney disease) stage 3, GFR 30-59 ml/min (H)     Advanced directives, counseling/discussion     GENERALIZED ANXIETY DIS     Major depressive disorder, recurrent episode, moderate (H)     Health Care Home     GERD (gastroesophageal reflux disease)     Hyperlipidemia LDL goal <100     Eczema     Type 2 diabetes mellitus with stage 3 chronic kidney disease, without long-term current use of insulin (H)     Midline low back pain with left-sided sciatica     Morbid obesity (HCC)     Insomnia, unspecified type     Dyspnea, unspecified type     Acute pain of left knee     Controlled substance agreement signed     Pain of both shoulder joints     Aspiration pneumonia (H)       Current Behavioral Concerns: Has depression and anxiety, taking medication for this    Education Provided to patient: Introduction and CC s role. Urgent care versus ER. Contacting Senior Linkage line when Medicare kicks in to select Medicare plan.     Health Maintenance Reviewed:    Clinical Pathway: None    Medication Management:  Reviewed and reconciled. Patient reports getting support with medication set up.      Functional Status:  Dependent ADLs:: Independent  Dependent IADLs:: Independent  Bed or wheelchair confined:: No  Mobility Status: Independent  Fallen 2 or more times in the past year?: No  Any fall with injury in the past year?: No    Living Situation:  Current living arrangement:: I live in a private home with family, I live in a private home with spouse  Type of residence:: Private home - stairs    Lifestyle & Psychosocial Needs:  Lifestyle     Physical activity     Days per week: Not on file     Minutes per session: Not on file     Stress: To some extent     Social Needs     Financial resource strain: Not on file     Food insecurity     Worry: Never true     Inability: Never true     Transportation needs     Medical: No     Non-medical: No     Inadequate nutrition (GOAL):: No  Tube Feeding: No  Inadequate activity/exercise  (GOAL):: Yes  Significant changes in sleep pattern (GOAL): No  Transportation means:: Regular car     Mental health DX:: Yes  Mental health DX how managed:: Medication  Informal Support system:: Friends, Family, Spouse, Children   Socioeconomic History     Marital status:      Spouse name: Not on file     Number of children: 3     Years of education: Not on file     Highest education level: Not on file   Occupational History     Occupation: Unit Clerk     Employer: [employer]     Comment: Labor and Delivery   Relationships     Social connections     Talks on phone: More than three times a week     Gets together: Not on file     Attends Hoahaoism service: Not on file     Active member of club or organization: Not on file     Attends meetings of clubs or organizations: Not on file     Relationship status: Not on file     Intimate partner violence     Fear of current or ex partner: No     Emotionally abused: No     Physically abused: No     Forced sexual activity: No     Tobacco Use     Smoking status: Former Smoker     Last attempt to quit: 3/18/1979     Years since quittin.4     Smokeless tobacco: Never Used     Tobacco comment: quit    Substance and Sexual Activity     Alcohol use: Yes     Comment: Twice a month     Drug use: No     Sexual activity: Never     Comment: menopausal            Resources and Interventions:  Current Resources:      Community Resources: Other (see comment)(PT)  Supplies used at home:: Diabetic Supplies, Oxygen Tubing/Supplies, Other(CPAP Machine)  Equipment Currently Used at Home: cane, straight, shower chair, walker, rolling, walker, standard, glucometer    Advance Care Plan/Directive  Advanced Care Plans/Directives on file:: No  Advanced Care Plan/Directive Status: Considering Options    Referrals Placed: None       Patient/Caregiver understanding: Patient verbalized understanding and denies any additional questions or concerns at this time. RNCC engaged in  [employer] communications during encounter.          Future Appointments              Tomorrow Giogrio Spicer, [facility]          Plan: Patient will complete ED follow up visit with PCP.   CC will send out introduction letter with contact information along with phone number for senior linkage line and information on obtaining health care advanced directive.   Patient will contact CC if there is any question or support need.   At this time, patient denies outstanding need for connection or referral to resources or assistance navigating recommended follow up care. No further Care Coordination outreaches scheduled at this time, patient provided with this writer's number and encouraged to call with future needs or questions.    Tim Wright RN Care Coordinator  [facility] Clinics: [place], [place], [place], [place]  Phone: [phone]  E-Mail: [email or web address]

## 2020-08-10 NOTE — LETTER
[facility]ARE COORDIN[facility]  [address]  [facility] [address]    August 10, 2020    Nicole Reyes  [address]  [place] [address]      Dear Nicole,    I am a clinic care coordinator who works with Pari Wiley MD at [facility]. I wanted to thank you for spending the time to talk with me.  Below is a description of clinic care coordination and how I can further assist you.      The clinic care coordination team is made up of a registered nurse,  and community health worker who understand the health care system. The goal of clinic care coordination is to help you manage your health and improve access to the health care system in the most efficient manner. The team can assist you in meeting your health care goals by providing education, coordinating services, strengthening the communication among your providers and supporting you with any resource needs.    * To obtained a copy of the Advanced Health Care Document, I recommend going to: [facility]: [email or web address] or you can also called at: [phone]  * The Senior Linkage line is a very resourceful line for helping with enrolling into Medicare and selecting Medicare plan. They can be reached at: [phone]    Please feel free to contact me at [phone] with any questions or concerns. We are focused on providing you with the highest-quality healthcare experience possible and that all starts with you.     Sincerely,     Tim Wright RN Care Coordinator  [facility]: [place], [place], [place], [place]  Phone: [phone]  E-Mail: [email or web address]

## 2020-08-11 ENCOUNTER — THERAPY VISIT (OUTPATIENT)
Dept: PHYSICAL THERAPY | Facility: CLINIC | Age: 65
End: 2020-08-11
Payer: COMMERCIAL

## 2020-08-11 DIAGNOSIS — M54.42 MIDLINE LOW BACK PAIN WITH LEFT-SIDED SCIATICA, UNSPECIFIED CHRONICITY: ICD-10-CM

## 2020-08-11 DIAGNOSIS — M25.511 PAIN OF BOTH SHOULDER JOINTS: ICD-10-CM

## 2020-08-11 DIAGNOSIS — M25.512 PAIN OF BOTH SHOULDER JOINTS: ICD-10-CM

## 2020-08-11 PROCEDURE — 97161 PT EVAL LOW COMPLEX 20 MIN: CPT | Mod: GP | Performed by: PHYSICAL THERAPIST

## 2020-08-11 PROCEDURE — 97110 THERAPEUTIC EXERCISES: CPT | Mod: GP | Performed by: PHYSICAL THERAPIST

## 2020-08-11 ASSESSMENT — ACTIVITIES OF DAILY LIVING (ADL)
STIFFNESS: THE SYMPTOM AFFECTS MY ACTIVITY SLIGHTLY
WALK: ACTIVITY IS FAIRLY DIFFICULT
HOW_WOULD_YOU_RATE_THE_OVERALL_FUNCTION_OF_YOUR_KNEE_DURING_YOUR_USUAL_DAILY_ACTIVITIES?: NEARLY NORMAL
LIMPING: THE SYMPTOM AFFECTS MY ACTIVITY MODERATELY
RISE FROM A CHAIR: ACTIVITY IS FAIRLY DIFFICULT
SIT WITH YOUR KNEE BENT: ACTIVITY IS SOMEWHAT DIFFICULT
GO DOWN STAIRS: ACTIVITY IS FAIRLY DIFFICULT
RAW_SCORE: 31
KNEEL ON THE FRONT OF YOUR KNEE: ACTIVITY IS VERY DIFFICULT
HOW_WOULD_YOU_RATE_THE_CURRENT_FUNCTION_OF_YOUR_KNEE_DURING_YOUR_USUAL_DAILY_ACTIVITIES_ON_A_SCALE_FROM_0_TO_100_WITH_100_BEING_YOUR_LEVEL_OF_KNEE_FUNCTION_PRIOR_TO_YOUR_INJURY_AND_0_BEING_THE_INABILITY_TO_PERFORM_ANY_OF_YOUR_USUAL_DAILY_ACTIVITIES?: 90
AS_A_RESULT_OF_YOUR_KNEE_INJURY,_HOW_WOULD_YOU_RATE_YOUR_CURRENT_LEVEL_OF_DAILY_ACTIVITY?: NEARLY NORMAL
GO UP STAIRS: ACTIVITY IS VERY DIFFICULT
PAIN: THE SYMPTOM AFFECTS MY ACTIVITY SLIGHTLY
STAND: ACTIVITY IS VERY DIFFICULT
SWELLING: I DO NOT HAVE THE SYMPTOM
SQUAT: I AM UNABLE TO DO THE ACTIVITY
WEAKNESS: THE SYMPTOM AFFECTS MY ACTIVITY SLIGHTLY
KNEE_ACTIVITY_OF_DAILY_LIVING_SUM: 31
KNEE_ACTIVITY_OF_DAILY_LIVING_SCORE: 44.29
GIVING WAY, BUCKLING OR SHIFTING OF KNEE: THE SYMPTOM AFFECTS MY ACTIVITY SLIGHTLY

## 2020-08-11 NOTE — PROGRESS NOTES
[facility] Initial Evaluation  Subjective:  The history is provided by the patient. No  was used.   Therapist Generated HPI Evaluation  Problem details: Onset of bilateral shoulder pain 11-19 secondary to repetitive activities. Pt received bilateral shoulder injections 12-2-19 which gave temporary relief. Pt also had two treatments of physical therapy in March and April of 2019. Pt notes continued pain and was referred by MD for physical therapy on 7-26-19..         Type of problem:  Bilateral shoulders (R>L).    This is a chronic condition.  Condition occurred with:  Repetition/overuse.  Where condition occurred: for unknown reasons.  Patient reports pain:  Lateral.  Pain is described as burning and is constant.  Pain radiates to:  Upper arm. Pain is worse in the A.M..  Since onset symptoms are gradually worsening.  Associated symptoms:  Loss of motion/stiffness and loss of strength. Symptoms are exacerbated by carrying, lifting, using arm behind back, lying on extremity, using arm at shoulder level and using arm overhead  and relieved by rest, NSAID's and heat.    Previous treatment includes physical therapy (couple years ago). There was mild improvement following previous treatment.  Barriers include:  None as reported by patient.    Therapist Generated HPI Evaluation  Problem details: History of lumbar pain for years of unknown eitiology. Pt has noted increased symptoms over the past year. Pt referred by MD for physical therapy on 7-26-20..         Type of problem:  Lumbar.    This is a chronic condition.  Condition occurred with:  Insidious onset.  Where condition occurred: for unknown reasons.  Patient reports pain:  Lumbar spine left and lumbar spine right.  Pain is described as burning and is constant.  Pain radiates to:  Gluteals left, gluteals right, thigh left and thigh right. Pain is worse in the A.M..  Since onset symptoms are gradually worsening.  Associated  symptoms:  Loss of motion/stiffness and loss of strength. Symptoms are exacerbated by standing, sitting, lifting, carrying and bending  and relieved by activity/movement and heat.    Previous treatment includes physical therapy (couple years ago). There was mild improvement following previous treatment.  Barriers include:  None as reported by patient.                        Objective:  Standing Alignment:    Cervical/thoracic deviations alignment: forward head and shoulder posture.    Lumbar deviations alignment: increased lumbar lordosis.                Flexibility/Screens:       Lower Extremity:  Decreased left lower extremity flexibility:Hamstrings    Decreased right lower extremity flexibility:  Hamstrings               Lumbar/SI Evaluation  ROM:    AROM Lumbar:   Flexion:          Minimal loss  Ext:                    Moderate loss pain   Side Bend:        Left:  Minimal loss    Right:  Minimal loss  Rotation:           Left:     Right:   Side Glide:        Left:     Right:         Strength: weak pelvic stabilizers and lower abdominals.   Lumbar Myotomes:  normal            Lumbar DTR's:  normal        Lumbar Dermtomes:  normal                Neural Tension/Mobility:      Left side:SLR or SLR w/DF  negative.     Right side:   SLR w/DF or SLR  negative.   Lumbar Palpation:  Palpation (lumbar): point tenderness L3-L5 spinous processes and adjacent lumbar paraspinals.                      Shoulder Evaluation:  ROM:  AROM:    Flexion:  Left:  130    Right:  110  Extension: Left: 50Right: 40  Abduction:  Left: 82   Right:  60      External Rotation:  Left:  80    Right:  85                PROM:    Flexion:  Left:  140    Right: 114    Extension:  Left:  55    Right:  57  Abduction:  Left:  100fff    Right:  65    Internal Rotation:  Left:  60    Right:  70  External Rotation:  Left:  85    Right:  90                    Strength:    Flexion: Left:4/5 Weak/painful    Pain:    Right: 4/5  Weak/painful     Pain:    Extension:  Left: 4+/5    Pain:    Right: 4+/5    Pain:  Abduction:  Left: 4/5  Weak/painful  Pain:    Right: 4-/5   Weak/painful    Pain:  Adduction:  Left: 5/5    Pain:    Right: 5/5     Pain:  Internal Rotation:  Left:4/5     Pain:    Right: 4/5     Pain:  External Rotation:   Left:4-/5   Weak/painful    Pain:   Right:4-/5   Weak/painful    Pain:              Special Tests:    Left shoulder positive for the following special tests:  Impingement  Right shoulder positive for the following special tests:Impingement    Mobility Tests:    Glenohumeral anterior right:  Hypomobile  Glenohumeral posterior left:  Hypomobile    Glenohumeral inferior left:  Hypomobile  Glenohumeral inferior right:  Hypomobile                                             General     ROS    Assessment/Plan:    Patient is a 64 year old female with lumbar and both sides shoulder complaints.    Patient has the following significant findings with corresponding treatment plan.                Diagnosis 1:  Bilateral shoulder pain/ lumbar pain  Pain -  hot/cold therapy, self management, education, home program and modalities as indicated  Decreased ROM/flexibility - therapeutic exercise, therapeutic activity and home program  Decreased strength - therapeutic exercise, therapeutic activities and home program    Therapy Evaluation Codes:   1) History comprised of:   Personal factors that impact the plan of care:      None.    Comorbidity factors that impact the plan of care are:      Diabetes, Depression, High blood pressure, Menopausal, Numbness/tingling, Overweight, Sleep disorder/apnea and Weakness.     Medications impacting care: Anti-depressant, High blood pressure, Muscle relaxant and Sleep.  2) Examination of Body Systems comprised of:   Body structures and functions that impact the plan of care:      Lumbar spine and Shoulder.   Activity limitations that impact the plan of care are:      Bathing, Bending, Dressing, Lifting and  Standing.  3) Clinical presentation characteristics are:   Stable/Uncomplicated.  4) Decision-Making    Low complexity using standardized patient assessment instrument and/or measureable assessment of functional outcome.  Cumulative Therapy Evaluation is: Low complexity.    Previous and current functional limitations:  (See Goal Flow Sheet for this information)    Short term and Long term goals: (See Goal Flow Sheet for this information)     Communication ability:  Patient appears to be able to clearly communicate and understand verbal and written communication and follow directions correctly.  Treatment Explanation - The following has been discussed with the patient:   RX ordered/plan of care  Anticipated outcomes  Possible risks and side effects  This patient would benefit from PT intervention to resume normal activities.   Rehab potential is good.    Frequency:  1 X week, once daily  Duration:  for 6 weeks  Discharge Plan:  Achieve all LTG.  Independent in home treatment program.  Reach maximal therapeutic benefit.    Please refer to the daily flowsheet for treatment today, total treatment time and time spent performing 1:1 timed codes.

## 2020-08-25 ENCOUNTER — THERAPY VISIT (OUTPATIENT)
Dept: PHYSICAL THERAPY | Facility: CLINIC | Age: 65
End: 2020-08-25
Payer: COMMERCIAL

## 2020-08-25 DIAGNOSIS — M25.511 CHRONIC PAIN OF BOTH SHOULDERS: ICD-10-CM

## 2020-08-25 DIAGNOSIS — M25.512 CHRONIC PAIN OF BOTH SHOULDERS: ICD-10-CM

## 2020-08-25 DIAGNOSIS — G89.29 CHRONIC PAIN OF BOTH SHOULDERS: ICD-10-CM

## 2020-08-25 DIAGNOSIS — M54.42 MIDLINE LOW BACK PAIN WITH LEFT-SIDED SCIATICA, UNSPECIFIED CHRONICITY: ICD-10-CM

## 2020-08-25 DIAGNOSIS — M25.511 PAIN OF BOTH SHOULDER JOINTS: Primary | ICD-10-CM

## 2020-08-25 DIAGNOSIS — M25.512 PAIN OF BOTH SHOULDER JOINTS: Primary | ICD-10-CM

## 2020-08-25 PROCEDURE — 97112 NEUROMUSCULAR REEDUCATION: CPT | Mod: GP | Performed by: PHYSICAL THERAPY ASSISTANT

## 2020-08-25 PROCEDURE — 97110 THERAPEUTIC EXERCISES: CPT | Mod: GP | Performed by: PHYSICAL THERAPY ASSISTANT

## 2020-08-25 PROCEDURE — 97530 THERAPEUTIC ACTIVITIES: CPT | Mod: GP | Performed by: PHYSICAL THERAPY ASSISTANT

## 2020-08-26 NOTE — PROGRESS NOTES
D/C per IE status secondary to no return.  No follow ups due to COVID shut down.  Pt to be re evaluated upon return.

## 2020-09-01 ENCOUNTER — THERAPY VISIT (OUTPATIENT)
Dept: PHYSICAL THERAPY | Facility: CLINIC | Age: 65
End: 2020-09-01
Payer: COMMERCIAL

## 2020-09-01 DIAGNOSIS — M54.42 MIDLINE LOW BACK PAIN WITH LEFT-SIDED SCIATICA, UNSPECIFIED CHRONICITY: ICD-10-CM

## 2020-09-01 DIAGNOSIS — M25.511 PAIN OF BOTH SHOULDER JOINTS: Primary | ICD-10-CM

## 2020-09-01 DIAGNOSIS — M25.512 CHRONIC PAIN OF BOTH SHOULDERS: ICD-10-CM

## 2020-09-01 DIAGNOSIS — M25.512 PAIN OF BOTH SHOULDER JOINTS: Primary | ICD-10-CM

## 2020-09-01 DIAGNOSIS — M25.511 CHRONIC PAIN OF BOTH SHOULDERS: ICD-10-CM

## 2020-09-01 DIAGNOSIS — G89.29 CHRONIC PAIN OF BOTH SHOULDERS: ICD-10-CM

## 2020-09-01 PROCEDURE — 97110 THERAPEUTIC EXERCISES: CPT | Mod: GP | Performed by: PHYSICAL THERAPY ASSISTANT

## 2020-09-01 PROCEDURE — 97010 HOT OR COLD PACKS THERAPY: CPT | Mod: GP | Performed by: PHYSICAL THERAPY ASSISTANT

## 2020-09-09 ENCOUNTER — THERAPY VISIT (OUTPATIENT)
Dept: PHYSICAL THERAPY | Facility: CLINIC | Age: 65
End: 2020-09-09
Payer: COMMERCIAL

## 2020-09-09 DIAGNOSIS — M25.512 PAIN OF BOTH SHOULDER JOINTS: Primary | ICD-10-CM

## 2020-09-09 DIAGNOSIS — M25.511 PAIN OF BOTH SHOULDER JOINTS: Primary | ICD-10-CM

## 2020-09-09 DIAGNOSIS — M54.42 MIDLINE LOW BACK PAIN WITH LEFT-SIDED SCIATICA, UNSPECIFIED CHRONICITY: ICD-10-CM

## 2020-09-09 PROCEDURE — 97110 THERAPEUTIC EXERCISES: CPT | Mod: GP | Performed by: PHYSICAL THERAPY ASSISTANT

## 2020-09-09 PROCEDURE — 97112 NEUROMUSCULAR REEDUCATION: CPT | Mod: GP | Performed by: PHYSICAL THERAPY ASSISTANT

## 2020-09-11 ENCOUNTER — VIRTUAL VISIT (OUTPATIENT)
Dept: INTERNAL MEDICINE | Facility: CLINIC | Age: 65
End: 2020-09-11
Payer: COMMERCIAL

## 2020-09-11 DIAGNOSIS — F51.01 PRIMARY INSOMNIA: ICD-10-CM

## 2020-09-11 DIAGNOSIS — M65.949 TENOSYNOVITIS OF HAND: ICD-10-CM

## 2020-09-11 DIAGNOSIS — J69.0 RECURRENT ASPIRATION PNEUMONIA (H): Primary | ICD-10-CM

## 2020-09-11 DIAGNOSIS — Z12.31 ENCOUNTER FOR SCREENING MAMMOGRAM FOR BREAST CANCER: ICD-10-CM

## 2020-09-11 PROCEDURE — 99214 OFFICE O/P EST MOD 30 MIN: CPT | Mod: 95 | Performed by: INTERNAL MEDICINE

## 2020-09-11 RX ORDER — LEMBOREXANT 5 MG/1
5 TABLET, FILM COATED ORAL AT BEDTIME
Qty: 10 TABLET | Refills: 0 | Status: SHIPPED | OUTPATIENT
Start: 2020-09-11 | End: 2020-09-29

## 2020-09-11 NOTE — PROGRESS NOTES
"Nicole Reyes is a 64 year old female who is being evaluated via a billable video visit.      The patient has been notified of following:     \"This video visit will be conducted via a call between you and your physician/provider. We have found that certain health care needs can be provided without the need for an in-person physical exam.  This service lets us provide the care you need with a video conversation.  If a prescription is necessary we can send it directly to your pharmacy.  If lab work is needed we can place an order for that and you can then stop by our lab to have the test done at a later time.    Video visits are billed at different rates depending on your insurance coverage.  Please reach out to your insurance provider with any questions.    If during the course of the call the physician/provider feels a video visit is not appropriate, you will not be charged for this service.\"    Patient has given verbal consent for Video visit? Yes  How would you like to obtain your AVS? MyChart  If you are dropped from the video visit, the video invite should be resent to: Send to e-mail at: [email or web address]  Will anyone else be joining your video visit? No    Subjective     Nicole Reyes is a 64 year old female who presents today via video visit for the following health issues:    [facility]    Video Start Time: 3:47    1.  Recurrent aspiration pneumonia: She reports she continues to be stable after the most recent episode which was likely more of a pneumonitis.  GI surgery is on hold, she is going on Medicare in a month.    2.  Insomnia: She has been having more struggles to get to sleep and stay asleep.  The Ambien is not working as well.  She had previously been on trazodone and did not tolerate it, melatonin has not worked for her.  She was reading about Dayvigo and is wondering about trying that medication.    3.  She is complaining of some pain in her left home.  This is at the radial side of the " palm, can be very sensitive with pressure on it, varies.  Associated with this she gets some numbness in the fingertips of her first and third fingers intermittently.  This is been going on a few months.  She has been seeing physical therapy for her shoulder and the therapist suggested she might need to see hand specialist.    4.  She would like a referral for her mammogram      Patient Active Problem List   Diagnosis     Essential hypertension, benign     Restless leg syndrome     CRISTÓBAL (obstructive sleep apnea)     CKD (chronic kidney disease) stage 3, GFR 30-59 ml/min (H)     Advanced directives, counseling/discussion     GENERALIZED ANXIETY DIS     Major depressive disorder, recurrent episode, moderate (H)     Health Care Home     GERD (gastroesophageal reflux disease)     Hyperlipidemia LDL goal <100     Eczema     Type 2 diabetes mellitus with stage 3 chronic kidney disease, without long-term current use of insulin (H)     Midline low back pain with left-sided sciatica     Morbid obesity (HCC)     Insomnia, unspecified type     Dyspnea, unspecified type     Acute pain of left knee     Controlled substance agreement signed     Pain of both shoulder joints     Aspiration pneumonia (H)     Current Outpatient Medications   Medication Sig Dispense Refill     albuterol (PROAIR HFA/PROVENTIL HFA/VENTOLIN HFA) 108 (90 Base) MCG/ACT inhaler Inhale 1-2 puffs into the lungs every 6 hours as needed for shortness of breath / dyspnea or wheezing 1 Inhaler 0     aspirin 81 MG EC tablet Take 81 mg by mouth every evening        blood glucose (ACCU-CHEK BYRON PLUS) test strip USE TO TEST BLOOD SUGAR TWO TIMES A DAY OR AS DIRECTED 100 each 3     clonazePAM (KLONOPIN) 1 MG tablet Take 1 tablet (1 mg) by mouth 2 times daily as needed for anxiety 60 tablet 1     gabapentin (NEURONTIN) 300 MG capsule Take 3 capsules (900 mg) by mouth daily 270 capsule 3     Lemborexant (DAYVIGO) 5 MG TABS Take 5 mg by mouth At Bedtime 10 tablet 0      losartan (COZAAR) 100 MG tablet TAKE ONE TABLET BY MOUTH ONCE DAILY 90 tablet 1     metFORMIN (GLUCOPHAGE-XR) 500 MG 24 hr tablet TAKE TWO TABLETS BY MOUTH EVERY MORNING 180 tablet 0     multivitamin w/minerals (MULTI-VITAMIN) tablet Take 1 tablet by mouth daily       pantoprazole (PROTONIX) 40 MG EC tablet TAKE ONE TABLET BY MOUTH AT BEDTIME 90 tablet 2     sertraline (ZOLOFT) 100 MG tablet TAKE TWO TABLETS BY MOUTH ONCE DAILY 180 tablet 3     simvastatin (ZOCOR) 20 MG tablet TAKE ONE TABLET BY MOUTH AT BEDTIME 90 tablet 0     zolpidem (AMBIEN) 10 MG tablet TAKE 1 TABLET BY MOUTH EVERY NIGHT AT BEDTIME 90 tablet 1      Social History     Tobacco Use     Smoking status: Former Smoker     Last attempt to quit: 3/18/1979     Years since quittin.5     Smokeless tobacco: Never Used     Tobacco comment: quit    Substance Use Topics     Alcohol use: Yes     Comment: Twice a month     Drug use: No        Review of Systems   No fever, chills, cough, stable dyspnea on exertion, no chest pain      Objective           Vitals:  No vitals were obtained today due to virtual visit.    Physical Exam     GENERAL: Healthy, alert and no distress  EYES: Eyes grossly normal to inspection.  No discharge or erythema, or obvious scleral/conjunctival abnormalities.  RESP: No audible wheeze, cough, or visible cyanosis.  No visible retractions or increased work of breathing.    SKIN: Visible skin clear. No significant rash, abnormal pigmentation or lesions.  NEURO: Cranial nerves grossly intact.  Mentation and speech appropriate for age.  PSYCH: Mentation appears normal, affect normal/bright, judgement and insight intact, normal speech and appearance well-groomed.              Assessment & Plan     Recurrent aspiration pneumonia (H)  Currently seems to be back to baseline after the most recent episode.  Awaiting GI for surgery for the acid reflux    Primary insomnia  We will try dayvigo  - Lemborexant (DAYVIGO) 5 MG TABS; Take 5 mg  by mouth At Bedtime    Tenosynovitis of hand  Her home symptoms are probably a Claudine synovitis, refer to Ortho hand.  They can evaluate whether she might have carpal tunnel in addition to that.  - [facility]opedic [facility] Spine  Referral; Future    Encounter for screening mammogram for breast cancer    - *MA Screening Digital Bilateral; Future         Return in about 2 months (around 11/11/2020) for Welcome to Medicare.    Pari Wiley MD  [facility]      Video-Visit Details    Type of service:  Video Visit    Video End Time:4:03    Originating Location (pt. Location): Home    Distant Location (provider location):  Home     Platform used for Video Visit: Kika

## 2020-09-12 PROBLEM — T17.800A ASPIRATION INTO LOWER RESPIRATORY TRACT: Status: ACTIVE | Noted: 2020-09-12

## 2020-09-12 PROBLEM — J69.0 ASPIRATION PNEUMONIA (H): Status: RESOLVED | Noted: 2020-05-09 | Resolved: 2020-09-12

## 2020-09-16 ENCOUNTER — TELEPHONE (OUTPATIENT)
Dept: INTERNAL MEDICINE | Facility: CLINIC | Age: 65
End: 2020-09-16

## 2020-09-16 NOTE — TELEPHONE ENCOUNTER
Fax received from [facility] for review and signature.  Put in Dr. Wiley's in basket.

## 2020-09-21 ENCOUNTER — THERAPY VISIT (OUTPATIENT)
Dept: PHYSICAL THERAPY | Facility: CLINIC | Age: 65
End: 2020-09-21
Payer: COMMERCIAL

## 2020-09-21 DIAGNOSIS — M25.512 PAIN OF BOTH SHOULDER JOINTS: Primary | ICD-10-CM

## 2020-09-21 DIAGNOSIS — E11.22 TYPE 2 DIABETES MELLITUS WITH STAGE 3 CHRONIC KIDNEY DISEASE, WITHOUT LONG-TERM CURRENT USE OF INSULIN (H): ICD-10-CM

## 2020-09-21 DIAGNOSIS — E78.5 HYPERLIPIDEMIA LDL GOAL <100: ICD-10-CM

## 2020-09-21 DIAGNOSIS — I10 ESSENTIAL HYPERTENSION, BENIGN: ICD-10-CM

## 2020-09-21 DIAGNOSIS — N18.30 TYPE 2 DIABETES MELLITUS WITH STAGE 3 CHRONIC KIDNEY DISEASE, WITHOUT LONG-TERM CURRENT USE OF INSULIN (H): ICD-10-CM

## 2020-09-21 DIAGNOSIS — M54.42 MIDLINE LOW BACK PAIN WITH LEFT-SIDED SCIATICA, UNSPECIFIED CHRONICITY: ICD-10-CM

## 2020-09-21 DIAGNOSIS — M25.511 PAIN OF BOTH SHOULDER JOINTS: Primary | ICD-10-CM

## 2020-09-21 PROCEDURE — 97110 THERAPEUTIC EXERCISES: CPT | Mod: GP | Performed by: PHYSICAL THERAPY ASSISTANT

## 2020-09-22 ENCOUNTER — MEDICAL CORRESPONDENCE (OUTPATIENT)
Dept: HEALTH INFORMATION MANAGEMENT | Facility: CLINIC | Age: 65
End: 2020-09-22

## 2020-09-22 RX ORDER — SIMVASTATIN 20 MG
TABLET ORAL
Qty: 90 TABLET | Refills: 0 | Status: SHIPPED | OUTPATIENT
Start: 2020-09-22 | End: 2020-12-07

## 2020-09-22 RX ORDER — METFORMIN HCL 500 MG
TABLET, EXTENDED RELEASE 24 HR ORAL
Qty: 180 TABLET | Refills: 0 | Status: SHIPPED | OUTPATIENT
Start: 2020-09-22 | End: 2020-12-07

## 2020-09-22 RX ORDER — LOSARTAN POTASSIUM 100 MG/1
TABLET ORAL
Qty: 90 TABLET | Refills: 0 | Status: SHIPPED | OUTPATIENT
Start: 2020-09-22 | End: 2020-12-07

## 2020-09-22 NOTE — TELEPHONE ENCOUNTER
Losartan, metformin Prescription approved per [facility], [facility] or [facility] refill protocol.  Any COOK - Registered Nurse  [facility]  Acute and Diagnostic Services

## 2020-09-28 ENCOUNTER — THERAPY VISIT (OUTPATIENT)
Dept: PHYSICAL THERAPY | Facility: CLINIC | Age: 65
End: 2020-09-28
Payer: COMMERCIAL

## 2020-09-28 ENCOUNTER — MYC MEDICAL ADVICE (OUTPATIENT)
Dept: INTERNAL MEDICINE | Facility: CLINIC | Age: 65
End: 2020-09-28

## 2020-09-28 DIAGNOSIS — M54.42 MIDLINE LOW BACK PAIN WITH LEFT-SIDED SCIATICA, UNSPECIFIED CHRONICITY: ICD-10-CM

## 2020-09-28 DIAGNOSIS — F51.01 PRIMARY INSOMNIA: ICD-10-CM

## 2020-09-28 DIAGNOSIS — M25.512 PAIN OF BOTH SHOULDER JOINTS: Primary | ICD-10-CM

## 2020-09-28 DIAGNOSIS — M25.511 PAIN OF BOTH SHOULDER JOINTS: Primary | ICD-10-CM

## 2020-09-28 DIAGNOSIS — G47.00 INSOMNIA, UNSPECIFIED TYPE: ICD-10-CM

## 2020-09-28 PROCEDURE — 97112 NEUROMUSCULAR REEDUCATION: CPT | Mod: GP | Performed by: PHYSICAL THERAPY ASSISTANT

## 2020-09-28 PROCEDURE — 97110 THERAPEUTIC EXERCISES: CPT | Mod: GP | Performed by: PHYSICAL THERAPY ASSISTANT

## 2020-09-28 NOTE — TELEPHONE ENCOUNTER
Please see message below and advise.    Ambien  Routing refill request to provider for review/approval because:  Drug not on the [facility] refill protocol

## 2020-09-28 NOTE — PROGRESS NOTES
Subjective:  HPI  Physical Exam                    Objective:  System                   Shoulder Evaluation:  ROM:  AROM:    Flexion:  Left:  150    Right:  148    Abduction:  Left: 135   Right:  154                Flexion/External Rotation:  Left:  T2    Right:  T2  Extension/Internal Rotation:  Left:  T12    Right:  T11          Strength:    Flexion: Left:5-/5   Pain:    Right: 4+/5     Pain:     Abduction:  Left: 5-/5  Pain:    Right: 4/5     Pain:    Internal Rotation:  Left:5-/5     Pain:    Right: 5-/5     Pain:  External Rotation:   Left:5-/5     Pain:   Right:4-/5     Pain:                                                     General     ROS    Assessment/Plan:    PROGRESS  REPORT    Progress reporting period is from 8/11/20 to 9/28/20.      SUBJECTIVE  Subjective changes noted by patient:  Right shoulder is painful and can lay on it but it aches. Patient reports that she is better overall.    The shoulders 75% better.  She states that she is unable to lift with the right shoulder.   Current pain level is .       Previous pain level was:   Initial Pain level: (right shoulder 10/10 left shoulder 3/10 lumbar 8/10)   Changes in function:  Yes (See Goal flowsheet attached for changes in current functional level)     Adverse reaction to treatment or activity: None     OBJECTIVE  Changes noted in objective findings:  Yes, patient has weakness with the right shoulder with ER.  Unable to reach overhead with any weight.  Has improved with her SPADI scores.  Patient has a good HEP for the low back.  Patient has abdominal weakness but has had multiple abdominal surgeries and is difficult to control.  Have instructed her on multiple different tasks with good posture and control with lifting and standing.  Not overusing the gluteal mm for standing.

## 2020-09-29 RX ORDER — LEMBOREXANT 5 MG/1
5 TABLET, FILM COATED ORAL AT BEDTIME
Qty: 90 TABLET | Refills: 0 | Status: SHIPPED | OUTPATIENT
Start: 2020-09-29 | End: 2020-11-08

## 2020-09-29 RX ORDER — ZOLPIDEM TARTRATE 10 MG/1
10 TABLET ORAL AT BEDTIME
Qty: 90 TABLET | Refills: 1 | OUTPATIENT
Start: 2020-09-29

## 2020-09-30 ENCOUNTER — HOSPITAL ENCOUNTER (OUTPATIENT)
Dept: MAMMOGRAPHY | Facility: CLINIC | Age: 65
Discharge: HOME | End: 2020-09-30
Attending: INTERNAL MEDICINE | Admitting: INTERNAL MEDICINE
Payer: COMMERCIAL

## 2020-09-30 DIAGNOSIS — Z12.31 ENCOUNTER FOR SCREENING MAMMOGRAM FOR BREAST CANCER: ICD-10-CM

## 2020-09-30 PROCEDURE — 77067 SCR MAMMO BI INCL CAD: CPT

## 2020-09-30 NOTE — PROGRESS NOTES
Subjective:  HPI  Physical Exam                    Objective:  System    Physical Exam    General     ROS    Assessment/Plan:    ASSESSMENT/PLAN  Updated problem list and treatment plan: Diagnosis 1:  Bilateral shoulder pain/ lumbar  Pain -  hot/cold therapy, self management, education and home program  Decreased ROM/flexibility - therapeutic exercise, therapeutic activity and home program  Decreased strength - therapeutic exercise, therapeutic activities and home program  Progress toward STG/LTGs have been made:  Yes,   Assessment of Progress: The patient's condition is improving.  Self Management Plans:  Patient has been instructed in a home treatment program.  I have re-evaluated this patient and find that the nature, scope, duration and intensity of the therapy is appropriate for the medical condition of the patient.  Nicole continues to require the following intervention to meet STG and LT's:  PT    Recommendations:  Pt will continue with HEP for 2-3 weeks and then return for further therapy as needed.    Please refer to the daily flowsheet for treatment today, total treatment time and time spent performing 1:1 timed codes.

## 2020-10-21 ENCOUNTER — TRANSFERRED RECORDS (OUTPATIENT)
Dept: HEALTH INFORMATION MANAGEMENT | Facility: CLINIC | Age: 65
End: 2020-10-21

## 2020-10-22 ASSESSMENT — ENCOUNTER SYMPTOMS
PALPITATIONS: 0
CONSTIPATION: 0
ARTHRALGIAS: 1
COUGH: 0
PARESTHESIAS: 0
ABDOMINAL PAIN: 0
NERVOUS/ANXIOUS: 1
JOINT SWELLING: 0
DYSURIA: 0
EYE PAIN: 0
BREAST MASS: 0
FREQUENCY: 0
HEMATOCHEZIA: 0
HEADACHES: 1
SHORTNESS OF BREATH: 0
DIARRHEA: 0
HEMATURIA: 0
NAUSEA: 0
DIZZINESS: 1
MYALGIAS: 1
SORE THROAT: 0
WEAKNESS: 0
FEVER: 0
HEARTBURN: 1
CHILLS: 0

## 2020-10-22 ASSESSMENT — ACTIVITIES OF DAILY LIVING (ADL)
CURRENT_FUNCTION: LAUNDRY REQUIRES ASSISTANCE
CURRENT_FUNCTION: HOUSEWORK REQUIRES ASSISTANCE

## 2020-10-23 ENCOUNTER — OFFICE VISIT (OUTPATIENT)
Dept: INTERNAL MEDICINE | Facility: CLINIC | Age: 65
End: 2020-10-23
Payer: MEDICARE

## 2020-10-23 VITALS
BODY MASS INDEX: 50.2 KG/M2 | HEART RATE: 87 BPM | HEIGHT: 59 IN | TEMPERATURE: 97.9 F | SYSTOLIC BLOOD PRESSURE: 172 MMHG | WEIGHT: 249 LBS | DIASTOLIC BLOOD PRESSURE: 85 MMHG | RESPIRATION RATE: 20 BRPM | OXYGEN SATURATION: 94 %

## 2020-10-23 DIAGNOSIS — F41.1 GENERALIZED ANXIETY DISORDER: ICD-10-CM

## 2020-10-23 DIAGNOSIS — J69.0 RECURRENT ASPIRATION PNEUMONIA (H): ICD-10-CM

## 2020-10-23 DIAGNOSIS — F33.0 MILD EPISODE OF RECURRENT MAJOR DEPRESSIVE DISORDER (H): ICD-10-CM

## 2020-10-23 DIAGNOSIS — Z00.00 WELCOME TO MEDICARE PREVENTIVE VISIT: Primary | ICD-10-CM

## 2020-10-23 DIAGNOSIS — Z12.11 SCREEN FOR COLON CANCER: ICD-10-CM

## 2020-10-23 DIAGNOSIS — G47.00 INSOMNIA, UNSPECIFIED TYPE: ICD-10-CM

## 2020-10-23 DIAGNOSIS — E66.01 MORBID OBESITY (H): ICD-10-CM

## 2020-10-23 DIAGNOSIS — I10 ESSENTIAL HYPERTENSION, BENIGN: ICD-10-CM

## 2020-10-23 DIAGNOSIS — H81.12 BENIGN PAROXYSMAL POSITIONAL VERTIGO OF LEFT EAR: ICD-10-CM

## 2020-10-23 DIAGNOSIS — E11.9 TYPE 2 DIABETES MELLITUS WITHOUT COMPLICATION, WITHOUT LONG-TERM CURRENT USE OF INSULIN (H): ICD-10-CM

## 2020-10-23 PROBLEM — M25.562 ACUTE PAIN OF LEFT KNEE: Status: RESOLVED | Noted: 2018-09-19 | Resolved: 2020-10-23

## 2020-10-23 LAB — HBA1C MFR BLD: 6.6 % (ref 0–5.6)

## 2020-10-23 PROCEDURE — 82043 UR ALBUMIN QUANTITATIVE: CPT | Performed by: INTERNAL MEDICINE

## 2020-10-23 PROCEDURE — 83036 HEMOGLOBIN GLYCOSYLATED A1C: CPT | Performed by: INTERNAL MEDICINE

## 2020-10-23 PROCEDURE — G0402 INITIAL PREVENTIVE EXAM: HCPCS | Performed by: INTERNAL MEDICINE

## 2020-10-23 PROCEDURE — G0008 ADMIN INFLUENZA VIRUS VAC: HCPCS | Performed by: INTERNAL MEDICINE

## 2020-10-23 PROCEDURE — 90732 PPSV23 VACC 2 YRS+ SUBQ/IM: CPT | Performed by: INTERNAL MEDICINE

## 2020-10-23 PROCEDURE — 99207 PR FOOT EXAM NO CHARGE: CPT | Performed by: INTERNAL MEDICINE

## 2020-10-23 PROCEDURE — 80061 LIPID PANEL: CPT | Performed by: INTERNAL MEDICINE

## 2020-10-23 PROCEDURE — 99214 OFFICE O/P EST MOD 30 MIN: CPT | Mod: 25 | Performed by: INTERNAL MEDICINE

## 2020-10-23 PROCEDURE — 36415 COLL VENOUS BLD VENIPUNCTURE: CPT | Performed by: INTERNAL MEDICINE

## 2020-10-23 PROCEDURE — G0009 ADMIN PNEUMOCOCCAL VACCINE: HCPCS | Performed by: INTERNAL MEDICINE

## 2020-10-23 PROCEDURE — G0403 EKG FOR INITIAL PREVENT EXAM: HCPCS | Performed by: INTERNAL MEDICINE

## 2020-10-23 PROCEDURE — 80048 BASIC METABOLIC PNL TOTAL CA: CPT | Performed by: INTERNAL MEDICINE

## 2020-10-23 PROCEDURE — 90662 IIV NO PRSV INCREASED AG IM: CPT | Performed by: INTERNAL MEDICINE

## 2020-10-23 ASSESSMENT — ENCOUNTER SYMPTOMS
NERVOUS/ANXIOUS: 1
DIARRHEA: 0
MYALGIAS: 1
FEVER: 0
HEMATURIA: 0
EYE PAIN: 0
COUGH: 0
HEADACHES: 1
JOINT SWELLING: 0
BREAST MASS: 0
ABDOMINAL PAIN: 0
DYSURIA: 0
DIZZINESS: 1
FREQUENCY: 0
CONSTIPATION: 0
ARTHRALGIAS: 1
HEMATOCHEZIA: 0
SORE THROAT: 0
WEAKNESS: 0
PALPITATIONS: 0
CHILLS: 0
PARESTHESIAS: 0
HEARTBURN: 1
NAUSEA: 0
SHORTNESS OF BREATH: 0

## 2020-10-23 ASSESSMENT — MIFFLIN-ST. JEOR: SCORE: 1572.15

## 2020-10-23 ASSESSMENT — ACTIVITIES OF DAILY LIVING (ADL)
CURRENT_FUNCTION: LAUNDRY REQUIRES ASSISTANCE
CURRENT_FUNCTION: HOUSEWORK REQUIRES ASSISTANCE

## 2020-10-23 NOTE — NURSING NOTE
"BP (!) 172/85 (BP Location: Right arm, Patient Position: Sitting, Cuff Size: Adult Regular)   Pulse 87   Temp 97.9  F (36.6  C) (Oral)   Resp 20   Ht 1.486 m (4' 10.5\")   Wt 112.9 kg (249 lb)   LMP 09/15/2007   SpO2 94%   BMI 51.16 kg/m      "

## 2020-10-23 NOTE — PROGRESS NOTES
"SUBJECTIVE:   Nicole Reyes is a 65 year old female who presents for Preventive Visit.      Patient has been advised of split billing requirements and indicates understanding: Yes   Are you in the first 12 months of your Medicare coverage?  Yes,  Visual Acuity:  Right Eye: 20/25   Left Eye: 20/30  Both Eyes: 20/30    Healthy Habits:     In general, how would you rate your overall health?  Good    Frequency of exercise:  None    Do you usually eat at least 4 servings of fruit and vegetables a day, include whole grains    & fiber and avoid regularly eating high fat or \"junk\" foods?  No    Taking medications regularly:  Yes    Medication side effects:  Muscle aches    Ability to successfully perform activities of daily living:  Housework requires assistance and laundry requires assistance    Home Safety:  No safety concerns identified    Hearing Impairment:  Need to ask people to speak up or repeat themselves and difficulty understanding soft or whispered speech    In the past 6 months, have you been bothered by leaking of urine?  No    In general, how would you rate your overall mental or emotional health?  Fair      PHQ-2 Total Score: 0    Additional concerns today:  No    Do you feel safe in your environment? Yes    Have you ever done Advance Care Planning? (For example, a Health Directive, POLST, or a discussion with a medical provider or your loved ones about your wishes): No, advance care planning information given to patient to review.  Patient plans to discuss their wishes with loved ones or provider.        Fall risk  Fallen 2 or more times in the past year?: No  Any fall with injury in the past year?: No    Cognitive Screening   1) Repeat 3 items (Leader, Season, Table)    2) Clock draw: NORMAL  3) 3 item recall: Recalls 3 objects  Results: 3 items recalled: COGNITIVE IMPAIRMENT LESS LIKELY    Mini-CogTM Copyright S Curtis. Licensed by the author for use in [facility]; reprinted with " permission (domenica@[facility]). All rights reserved.      Do you have sleep apnea, excessive snoring or daytime drowsiness?: yes    Reviewed and updated as needed this visit by clinical staff                 Problems:   1. Recurrent aspiration pneumonia: he is scheduled for video swallow. Pulmonary feels the reflux is related to the sleep apnea. She recommended insomnia specialist.   2. Aspiration: GI is delaying surgery until after the first of the year, is going to do gastric sleeve also to help with weight loss which they feel is necessary to prevent aspiration long term.   3. Diabetes: stable  4. Morbid obesity: stable weight  5. Depression and anxiety: doing well     Patient Active Problem List   Diagnosis     Essential hypertension, benign     Restless leg syndrome     CRISTÓBAL (obstructive sleep apnea)     CKD (chronic kidney disease) stage 3, GFR 30-59 ml/min (H)     Advanced directives, counseling/discussion     GENERALIZED ANXIETY DIS     Major depressive disorder, recurrent episode, moderate (H)     Health Care Home     GERD (gastroesophageal reflux disease)     Hyperlipidemia LDL goal <100     Eczema     Type 2 diabetes mellitus with stage 3 chronic kidney disease, without long-term current use of insulin (H)     Midline low back pain with left-sided sciatica     Morbid obesity (HCC)     Insomnia, unspecified type     Dyspnea, unspecified type     Acute pain of left knee     Controlled substance agreement signed     Pain of both shoulder joints     Recurrent aspiration pneumonia (H)     Current Outpatient Medications   Medication Sig Dispense Refill     albuterol (PROAIR HFA/PROVENTIL HFA/VENTOLIN HFA) 108 (90 Base) MCG/ACT inhaler Inhale 1-2 puffs into the lungs every 6 hours as needed for shortness of breath / dyspnea or wheezing 1 Inhaler 0     aspirin 81 MG EC tablet Take 81 mg by mouth every evening        blood glucose (ACCU-CHEK BYRON PLUS) test strip USE TO TEST BLOOD SUGAR TWO TIMES A DAY OR AS DIRECTED  100 each 3     clonazePAM (KLONOPIN) 1 MG tablet Take 1 tablet (1 mg) by mouth 2 times daily as needed for anxiety 60 tablet 1     gabapentin (NEURONTIN) 300 MG capsule Take 3 capsules (900 mg) by mouth daily 270 capsule 3     Lemborexant (DAYVIGO) 5 MG TABS Take 5 mg by mouth At Bedtime 90 tablet 0     losartan (COZAAR) 100 MG tablet TAKE ONE TABLET BY MOUTH ONCE DAILY 90 tablet 0     metFORMIN (GLUCOPHAGE-XR) 500 MG 24 hr tablet TAKE TWO TABLETS BY MOUTH EVERY MORNING 180 tablet 0     multivitamin w/minerals (MULTI-VITAMIN) tablet Take 1 tablet by mouth daily       pantoprazole (PROTONIX) 40 MG EC tablet TAKE ONE TABLET BY MOUTH AT BEDTIME 90 tablet 2     sertraline (ZOLOFT) 100 MG tablet TAKE TWO TABLETS BY MOUTH ONCE DAILY 180 tablet 3     simvastatin (ZOCOR) 20 MG tablet TAKE ONE TABLET BY MOUTH AT BEDTIME 90 tablet 0     zolpidem (AMBIEN) 10 MG tablet TAKE 1 TABLET BY MOUTH EVERY NIGHT AT BEDTIME 90 tablet 1      Reviewed and updated as needed this visit by Provider                Social History     Tobacco Use     Smoking status: Former Smoker     Quit date: 3/18/1979     Years since quittin.6     Smokeless tobacco: Never Used     Tobacco comment: quit    Substance Use Topics     Alcohol use: Yes     Comment: Twice a month     If you drink alcohol do you typically have >3 drinks per day or >7 drinks per week? No    Alcohol Use 10/22/2020   Prescreen: >3 drinks/day or >7 drinks/week? No   Prescreen: >3 drinks/day or >7 drinks/week? -   No flowsheet data found.          Current providers sharing in care for this patient include:   Patient Care Team:  Pari Wiley MD as PCP - General  Pari Wiley MD as Assigned PCP    The following health maintenance items are reviewed in Epic and correct as of today:  Health Maintenance   Topic Date Due     HEPATITIS B IMMUNIZATION (1 of 3 - Risk 3-dose series) 10/23/1974     ZOSTER IMMUNIZATION (1 of 2) 10/23/2005     COLORECTAL CANCER SCREENING  2017      MEDICARE ANNUAL WELLNESS VISIT  04/04/2020     DIABETIC FOOT EXAM  04/07/2020     LIPID  06/21/2020     MICROALBUMIN  06/21/2020     INFLUENZA VACCINE (1) 09/01/2020     EYE EXAM  10/15/2020     PHQ-9  10/23/2020     Pneumococcal Vaccine: 65+ Years (2 of 2 - PPSV23) 10/23/2020     A1C  11/12/2020     BMP  08/03/2021     FALL RISK ASSESSMENT  08/10/2021     MAMMO SCREENING  09/30/2022     ADVANCE CARE PLANNING  04/04/2024     DTAP/TDAP/TD IMMUNIZATION (3 - Td) 08/15/2028     DEXA  Completed     HEPATITIS C SCREENING  Completed     HIV SCREENING  Completed     DEPRESSION ACTION PLAN  Completed     Pneumococcal Vaccine: Pediatrics (0 to 5 Years) and At-Risk Patients (6 to 64 Years)  Aged Out     IPV IMMUNIZATION  Aged Out     MENINGITIS IMMUNIZATION  Aged Out           Review of Systems   Constitutional: Negative for chills and fever.   HENT: Positive for hearing loss. Negative for congestion, ear pain and sore throat.    Eyes: Negative for pain and visual disturbance.   Respiratory: Negative for cough and shortness of breath.    Cardiovascular: Negative for chest pain, palpitations and peripheral edema.   Gastrointestinal: Positive for heartburn. Negative for abdominal pain, constipation, diarrhea, hematochezia and nausea.   Breasts:  Negative for tenderness, breast mass and discharge.   Genitourinary: Negative for dysuria, frequency, genital sores, hematuria, pelvic pain, urgency, vaginal bleeding and vaginal discharge.   Musculoskeletal: Positive for arthralgias and myalgias. Negative for joint swelling.   Skin: Negative for rash.   Neurological: Positive for dizziness and headaches. Negative for weakness and paresthesias.   Psychiatric/Behavioral: Negative for mood changes. The patient is nervous/anxious.      The dizziness is vertigo lying on left side    OBJECTIVE:   LMP 09/15/2007    Physical Exam      Patient alert, in no acute distress  BP (!) 172/85 (BP Location: Right arm, Patient Position: Sitting, Cuff  "Size: Adult Regular)   Pulse 87   Temp 97.9  F (36.6  C) (Oral)   Resp 20   Ht 1.486 m (4' 10.5\")   Wt 112.9 kg (249 lb)   LMP 09/15/2007   SpO2 94%   BMI 51.16 kg/m      HEENT: extraocular movements are intact, pupils equal and reactive to light and accommodation, TMs clear  NECK: Neck supple. No adenopathy. Thyroid symmetric, normal size,, Carotids without bruits.  PULMONARY: clear to auscultation  CARDIAC: regular rate and rhythm and no murmurs, clicks, or gallops  PULSES: 2/2 throughout  BACK: no spinal or CVAT  ABDOMINAL: Soft, nontender.  Normal bowel sounds.  No hepatosplenomegaly or abnormal masses  BREAST: No breast masses or tenderness, No axillary masses or tenderness and No galactorrhea  REFLEXES: 1+ throughout  Feet: normal pulses, lightouch       PHQ 9/6/2019 4/23/2020 10/27/2020   PHQ-9 Total Score 8 4 4   Q9: Thoughts of better off dead/self-harm past 2 weeks Not at all Not at all Not at all     RADHA-7 SCORE 12/1/2017 4/7/2019 10/27/2020   Total Score - - -   Total Score 7 6 7       EKG: NSR, poor r wave progression consistent with pulmonary disease, low voltage, no ischemia    ASSESSMENT / PLAN:   1. Welcome to Medicare preventive visit  Flu and pneumovax 23    2. Type 2 diabetes mellitus without complication, without long-term current use of insulin (H)  Lab scheduled, recommend eye exam  - FOOT EXAM    3. Recurrent aspiration pneumonia (H)  No new episodes, working on treating aspiration    4. Mild episode of recurrent major depressive disorder (H)  Doing well, continue med  - EMOTIONAL / BEHAVIORAL ASSESSMENT    5. Morbid obesity (HCC)  Work on with surgery    6. Essential hypertension, benign  High today, home checks     7. GENERALIZED ANXIETY DIS  stable  - EMOTIONAL / BEHAVIORAL ASSESSMENT    8. Insomnia, unspecified type  Will work on with specialist    Vertigo: likely BPPV, given handout with home exercise      Patient has been advised of split billing requirements and indicates " "understanding: Yes  COUNSELING:  Reviewed preventive health counseling, as reflected in patient instructions    Estimated body mass index is 51.48 kg/m  as calculated from the following:    Height as of 7/10/20: 1.473 m (4' 10\").    Weight as of 7/10/20: 111.7 kg (246 lb 4.8 oz).    Weight management plan: surgery in future    She reports that she quit smoking about 41 years ago. She has never used smokeless tobacco.      Appropriate preventive services were discussed with this patient, including applicable screening as appropriate for cardiovascular disease, diabetes, osteopenia/osteoporosis, and glaucoma.  As appropriate for age/gender, discussed screening for colorectal cancer, prostate cancer, breast cancer, and cervical cancer. Checklist reviewing preventive services available has been given to the patient.    Reviewed patients plan of care and provided an AVS. The Basic Care Plan (routine screening as documented in Health Maintenance) for Nicole meets the Care Plan requirement. This Care Plan has been established and reviewed with the Patient.    Counseling Resources:  ATP IV Guidelines  Pooled Cohorts Equation Calculator  Breast Cancer Risk Calculator  Breast Cancer: Medication to Reduce Risk  FRAX Risk Assessment  ICSI Preventive Guidelines  Dietary Guidelines for Americans, 2010  [employer]'s MyPlate  ASA Prophylaxis  Lung CA Screening    Pari Wiley MD  [facility]    Identified Health Risks:  "

## 2020-10-24 LAB
ANION GAP SERPL CALCULATED.3IONS-SCNC: 4 MMOL/L (ref 3–14)
BUN SERPL-MCNC: 13 MG/DL (ref 7–30)
CALCIUM SERPL-MCNC: 9 MG/DL (ref 8.5–10.1)
CHLORIDE SERPL-SCNC: 105 MMOL/L (ref 94–109)
CHOLEST SERPL-MCNC: 197 MG/DL
CO2 SERPL-SCNC: 32 MMOL/L (ref 20–32)
CREAT SERPL-MCNC: 0.75 MG/DL (ref 0.52–1.04)
CREAT UR-MCNC: 186 MG/DL
GFR SERPL CREATININE-BSD FRML MDRD: 84 ML/MIN/{1.73_M2}
GLUCOSE SERPL-MCNC: 102 MG/DL (ref 70–99)
HDLC SERPL-MCNC: 52 MG/DL
LDLC SERPL CALC-MCNC: 123 MG/DL
MICROALBUMIN UR-MCNC: 17 MG/L
MICROALBUMIN/CREAT UR: 8.98 MG/G CR (ref 0–25)
NONHDLC SERPL-MCNC: 145 MG/DL
POTASSIUM SERPL-SCNC: 4 MMOL/L (ref 3.4–5.3)
SODIUM SERPL-SCNC: 141 MMOL/L (ref 133–144)
TRIGL SERPL-MCNC: 109 MG/DL

## 2020-10-27 ASSESSMENT — ANXIETY QUESTIONNAIRES
1. FEELING NERVOUS, ANXIOUS, OR ON EDGE: SEVERAL DAYS
2. NOT BEING ABLE TO STOP OR CONTROL WORRYING: SEVERAL DAYS
3. WORRYING TOO MUCH ABOUT DIFFERENT THINGS: SEVERAL DAYS
7. FEELING AFRAID AS IF SOMETHING AWFUL MIGHT HAPPEN: SEVERAL DAYS
5. BEING SO RESTLESS THAT IT IS HARD TO SIT STILL: NOT AT ALL
6. BECOMING EASILY ANNOYED OR IRRITABLE: SEVERAL DAYS
GAD7 TOTAL SCORE: 7

## 2020-10-27 ASSESSMENT — PATIENT HEALTH QUESTIONNAIRE - PHQ9
SUM OF ALL RESPONSES TO PHQ QUESTIONS 1-9: 4
5. POOR APPETITE OR OVEREATING: MORE THAN HALF THE DAYS

## 2020-10-28 ASSESSMENT — ANXIETY QUESTIONNAIRES: GAD7 TOTAL SCORE: 7

## 2020-11-02 ENCOUNTER — MYC REFILL (OUTPATIENT)
Dept: INTERNAL MEDICINE | Facility: CLINIC | Age: 65
End: 2020-11-02

## 2020-11-02 DIAGNOSIS — G47.00 INSOMNIA, UNSPECIFIED TYPE: ICD-10-CM

## 2020-11-03 ENCOUNTER — MYC MEDICAL ADVICE (OUTPATIENT)
Dept: INTERNAL MEDICINE | Facility: CLINIC | Age: 65
End: 2020-11-03

## 2020-11-04 NOTE — TELEPHONE ENCOUNTER
Controlled Substance Refill Request for Zopidem, Klonopin  Problem List Complete:  Yes    Last Written Prescription Date:  5/6/20, 8/7/20  Last Fill Quantity: 90,60   # refills: 1,1    THE MOST RECENT OFFICE VISIT MUST BE WITHIN THE PAST 3 MONTHS. AT LEAST ONE FACE TO FACE VISIT MUST OCCUR EVERY 6 MONTHS. ADDITIONAL VISITS CAN BE VIRTUAL.      [place] [facility] [place]: 10/23/20    Future Office visit:     Controlled substance agreement:   Encounter-Level CSA - 10/05/2017:    Controlled Substance Agreement - Scan on 10/25/2017  6:56 AM: CONTROLLED SUBSTANCE AGREEMENT     Patient-Level CSA:    There are no patient-level csa.         Last Urine Drug Screen: No results found for: CDAUT, No results found for: COMDAT, No results found for: THC13, PCP13, COC13, MAMP13, OPI13, AMP13, BZO13, TCA13, MTD13, BAR13, OXY13, PPX13, BUP13     Processing:  Rx to be electronically transmitted to pharmacy by provider     [email or web address]   checked in past 3 months?  Yes 11/4/20- no concerns     .Routing refill request to provider for review/approval because:  Drug not on the [facility] [place]

## 2020-11-05 ENCOUNTER — MYC MEDICAL ADVICE (OUTPATIENT)
Dept: INTERNAL MEDICINE | Facility: CLINIC | Age: 65
End: 2020-11-05

## 2020-11-05 RX ORDER — ZOLPIDEM TARTRATE 10 MG/1
10 TABLET ORAL AT BEDTIME
Qty: 90 TABLET | Refills: 1 | Status: SHIPPED | OUTPATIENT
Start: 2020-11-05 | End: 2021-05-04

## 2020-11-05 RX ORDER — CLONAZEPAM 1 MG/1
1 TABLET ORAL 2 TIMES DAILY PRN
Qty: 60 TABLET | Refills: 1 | Status: SHIPPED | OUTPATIENT
Start: 2020-11-05 | End: 2021-01-06

## 2020-11-06 DIAGNOSIS — Z11.59 ENCOUNTER FOR SCREENING FOR OTHER VIRAL DISEASES: Primary | ICD-10-CM

## 2020-11-08 ENCOUNTER — APPOINTMENT (OUTPATIENT)
Dept: GENERAL RADIOLOGY | Facility: CLINIC | Age: 65
DRG: 177 | End: 2020-11-08
Attending: EMERGENCY MEDICINE
Payer: MEDICARE

## 2020-11-08 ENCOUNTER — HOSPITAL ENCOUNTER (INPATIENT)
Facility: CLINIC | Age: 65
LOS: 3 days | Discharge: HOME | DRG: 177 | End: 2020-11-11
Attending: EMERGENCY MEDICINE | Admitting: INTERNAL MEDICINE
Payer: MEDICARE

## 2020-11-08 DIAGNOSIS — G47.33 OSA (OBSTRUCTIVE SLEEP APNEA): Primary | ICD-10-CM

## 2020-11-08 DIAGNOSIS — J18.9 PNEUMONIA OF BOTH LUNGS DUE TO INFECTIOUS ORGANISM, UNSPECIFIED PART OF LUNG: ICD-10-CM

## 2020-11-08 DIAGNOSIS — R05.9 COUGH: ICD-10-CM

## 2020-11-08 DIAGNOSIS — R09.02 HYPOXIA: ICD-10-CM

## 2020-11-08 LAB
ALBUMIN SERPL-MCNC: 2.8 G/DL (ref 3.4–5)
ALP SERPL-CCNC: 94 U/L (ref 40–150)
ALT SERPL W P-5'-P-CCNC: 29 U/L (ref 0–50)
ANION GAP SERPL CALCULATED.3IONS-SCNC: 8 MMOL/L (ref 3–14)
AST SERPL W P-5'-P-CCNC: 26 U/L (ref 0–45)
BASE EXCESS BLDV CALC-SCNC: 4.6 MMOL/L
BASOPHILS # BLD AUTO: 0.1 10E9/L (ref 0–0.2)
BASOPHILS NFR BLD AUTO: 0.3 %
BILIRUB SERPL-MCNC: 0.5 MG/DL (ref 0.2–1.3)
BUN SERPL-MCNC: 14 MG/DL (ref 7–30)
CALCIUM SERPL-MCNC: 8.8 MG/DL (ref 8.5–10.1)
CHLORIDE SERPL-SCNC: 104 MMOL/L (ref 94–109)
CO2 SERPL-SCNC: 28 MMOL/L (ref 20–32)
CREAT SERPL-MCNC: 0.7 MG/DL (ref 0.52–1.04)
CRP SERPL-MCNC: 170 MG/L (ref 0–8)
D DIMER PPP FEU-MCNC: 1.4 UG/ML FEU (ref 0–0.5)
DIFFERENTIAL METHOD BLD: ABNORMAL
EOSINOPHIL # BLD AUTO: 0.3 10E9/L (ref 0–0.7)
EOSINOPHIL NFR BLD AUTO: 1.3 %
ERYTHROCYTE [DISTWIDTH] IN BLOOD BY AUTOMATED COUNT: 15.9 % (ref 10–15)
GFR SERPL CREATININE-BSD FRML MDRD: >90 ML/MIN/{1.73_M2}
GLUCOSE BLDC GLUCOMTR-MCNC: 153 MG/DL (ref 70–99)
GLUCOSE BLDC GLUCOMTR-MCNC: 203 MG/DL (ref 70–99)
GLUCOSE SERPL-MCNC: 159 MG/DL (ref 70–99)
HCO3 BLDV-SCNC: 30 MMOL/L (ref 21–28)
HCT VFR BLD AUTO: 40.7 % (ref 35–47)
HGB BLD-MCNC: 12.1 G/DL (ref 11.7–15.7)
IMM GRANULOCYTES # BLD: 0.1 10E9/L (ref 0–0.4)
IMM GRANULOCYTES NFR BLD: 0.6 %
LABORATORY COMMENT REPORT: NORMAL
LACTATE BLD-SCNC: 1.1 MMOL/L (ref 0.7–2)
LYMPHOCYTES # BLD AUTO: 1 10E9/L (ref 0.8–5.3)
LYMPHOCYTES NFR BLD AUTO: 4.6 %
MCH RBC QN AUTO: 24.6 PG (ref 26.5–33)
MCHC RBC AUTO-ENTMCNC: 29.7 G/DL (ref 31.5–36.5)
MCV RBC AUTO: 83 FL (ref 78–100)
MONOCYTES # BLD AUTO: 0.8 10E9/L (ref 0–1.3)
MONOCYTES NFR BLD AUTO: 3.4 %
NEUTROPHILS # BLD AUTO: 20.2 10E9/L (ref 1.6–8.3)
NEUTROPHILS NFR BLD AUTO: 89.8 %
NRBC # BLD AUTO: 0 10*3/UL
NRBC BLD AUTO-RTO: 0 /100
O2/TOTAL GAS SETTING VFR VENT: ABNORMAL %
PCO2 BLDV: 49 MM HG (ref 40–50)
PH BLDV: 7.4 PH (ref 7.32–7.43)
PLATELET # BLD AUTO: 277 10E9/L (ref 150–450)
PO2 BLDV: 64 MM HG (ref 25–47)
POTASSIUM SERPL-SCNC: 3.3 MMOL/L (ref 3.4–5.3)
POTASSIUM SERPL-SCNC: 3.6 MMOL/L (ref 3.4–5.3)
PROT SERPL-MCNC: 7.1 G/DL (ref 6.8–8.8)
RBC # BLD AUTO: 4.91 10E12/L (ref 3.8–5.2)
SARS-COV-2 RNA SPEC QL NAA+PROBE: NEGATIVE
SARS-COV-2 RNA SPEC QL NAA+PROBE: NORMAL
SODIUM SERPL-SCNC: 140 MMOL/L (ref 133–144)
SPECIMEN SOURCE: NORMAL
SPECIMEN SOURCE: NORMAL
WBC # BLD AUTO: 22.5 10E9/L (ref 4–11)

## 2020-11-08 PROCEDURE — 250N000011 HC RX IP 250 OP 636: Performed by: EMERGENCY MEDICINE

## 2020-11-08 PROCEDURE — C9803 HOPD COVID-19 SPEC COLLECT: HCPCS

## 2020-11-08 PROCEDURE — 80053 COMPREHEN METABOLIC PANEL: CPT | Performed by: EMERGENCY MEDICINE

## 2020-11-08 PROCEDURE — 99285 EMERGENCY DEPT VISIT HI MDM: CPT | Mod: 25

## 2020-11-08 PROCEDURE — 82803 BLOOD GASES ANY COMBINATION: CPT | Performed by: EMERGENCY MEDICINE

## 2020-11-08 PROCEDURE — U0003 INFECTIOUS AGENT DETECTION BY NUCLEIC ACID (DNA OR RNA); SEVERE ACUTE RESPIRATORY SYNDROME CORONAVIRUS 2 (SARS-COV-2) (CORONAVIRUS DISEASE [COVID-19]), AMPLIFIED PROBE TECHNIQUE, MAKING USE OF HIGH THROUGHPUT TECHNOLOGIES AS DESCRIBED BY CMS-2020-01-R: HCPCS | Performed by: EMERGENCY MEDICINE

## 2020-11-08 PROCEDURE — 250N000012 HC RX MED GY IP 250 OP 636 PS 637: Performed by: INTERNAL MEDICINE

## 2020-11-08 PROCEDURE — 86140 C-REACTIVE PROTEIN: CPT | Performed by: EMERGENCY MEDICINE

## 2020-11-08 PROCEDURE — 84132 ASSAY OF SERUM POTASSIUM: CPT | Performed by: INTERNAL MEDICINE

## 2020-11-08 PROCEDURE — 87040 BLOOD CULTURE FOR BACTERIA: CPT | Performed by: EMERGENCY MEDICINE

## 2020-11-08 PROCEDURE — 96375 TX/PRO/DX INJ NEW DRUG ADDON: CPT

## 2020-11-08 PROCEDURE — 85379 FIBRIN DEGRADATION QUANT: CPT | Performed by: EMERGENCY MEDICINE

## 2020-11-08 PROCEDURE — 999N001017 HC STATISTIC GLUCOSE BY METER IP

## 2020-11-08 PROCEDURE — 250N000013 HC RX MED GY IP 250 OP 250 PS 637: Performed by: EMERGENCY MEDICINE

## 2020-11-08 PROCEDURE — 120N000001 HC R&B MED SURG/OB

## 2020-11-08 PROCEDURE — 83605 ASSAY OF LACTIC ACID: CPT | Performed by: EMERGENCY MEDICINE

## 2020-11-08 PROCEDURE — 96365 THER/PROPH/DIAG IV INF INIT: CPT

## 2020-11-08 PROCEDURE — 250N000011 HC RX IP 250 OP 636: Performed by: INTERNAL MEDICINE

## 2020-11-08 PROCEDURE — 250N000013 HC RX MED GY IP 250 OP 250 PS 637: Performed by: INTERNAL MEDICINE

## 2020-11-08 PROCEDURE — 96361 HYDRATE IV INFUSION ADD-ON: CPT

## 2020-11-08 PROCEDURE — 99223 1ST HOSP IP/OBS HIGH 75: CPT | Mod: AI | Performed by: INTERNAL MEDICINE

## 2020-11-08 PROCEDURE — 71045 X-RAY EXAM CHEST 1 VIEW: CPT

## 2020-11-08 PROCEDURE — 85379 FIBRIN DEGRADATION QUANT: CPT | Performed by: INTERNAL MEDICINE

## 2020-11-08 PROCEDURE — 93005 ELECTROCARDIOGRAM TRACING: CPT

## 2020-11-08 PROCEDURE — 96368 THER/DIAG CONCURRENT INF: CPT

## 2020-11-08 PROCEDURE — 36415 COLL VENOUS BLD VENIPUNCTURE: CPT | Performed by: INTERNAL MEDICINE

## 2020-11-08 PROCEDURE — 258N000003 HC RX IP 258 OP 636: Performed by: EMERGENCY MEDICINE

## 2020-11-08 PROCEDURE — 85025 COMPLETE CBC W/AUTO DIFF WBC: CPT | Performed by: EMERGENCY MEDICINE

## 2020-11-08 RX ORDER — AMOXICILLIN 250 MG
2 CAPSULE ORAL 2 TIMES DAILY
Status: DISCONTINUED | OUTPATIENT
Start: 2020-11-08 | End: 2020-11-11 | Stop reason: HOSPADM

## 2020-11-08 RX ORDER — GABAPENTIN 300 MG/1
900 CAPSULE ORAL AT BEDTIME
Status: DISCONTINUED | OUTPATIENT
Start: 2020-11-08 | End: 2020-11-11 | Stop reason: HOSPADM

## 2020-11-08 RX ORDER — ASPIRIN 81 MG/1
81 TABLET ORAL EVERY EVENING
Status: DISCONTINUED | OUTPATIENT
Start: 2020-11-08 | End: 2020-11-11 | Stop reason: HOSPADM

## 2020-11-08 RX ORDER — DEXAMETHASONE SODIUM PHOSPHATE 10 MG/ML
6 INJECTION, SOLUTION INTRAMUSCULAR; INTRAVENOUS ONCE
Status: COMPLETED | OUTPATIENT
Start: 2020-11-08 | End: 2020-11-08

## 2020-11-08 RX ORDER — AMOXICILLIN 250 MG
1 CAPSULE ORAL 2 TIMES DAILY
Status: DISCONTINUED | OUTPATIENT
Start: 2020-11-08 | End: 2020-11-11 | Stop reason: HOSPADM

## 2020-11-08 RX ORDER — CEFTRIAXONE 2 G/1
2 INJECTION, POWDER, FOR SOLUTION INTRAMUSCULAR; INTRAVENOUS ONCE
Status: COMPLETED | OUTPATIENT
Start: 2020-11-08 | End: 2020-11-08

## 2020-11-08 RX ORDER — CEFTRIAXONE 1 G/1
1 INJECTION, POWDER, FOR SOLUTION INTRAMUSCULAR; INTRAVENOUS EVERY 24 HOURS
Status: DISCONTINUED | OUTPATIENT
Start: 2020-11-09 | End: 2020-11-11

## 2020-11-08 RX ORDER — SIMVASTATIN 20 MG
20 TABLET ORAL AT BEDTIME
Status: DISCONTINUED | OUTPATIENT
Start: 2020-11-08 | End: 2020-11-11 | Stop reason: HOSPADM

## 2020-11-08 RX ORDER — GABAPENTIN 300 MG/1
900 CAPSULE ORAL AT BEDTIME
Status: DISCONTINUED | OUTPATIENT
Start: 2020-11-09 | End: 2020-11-08

## 2020-11-08 RX ORDER — POLYETHYLENE GLYCOL 3350 17 G/17G
17 POWDER, FOR SOLUTION ORAL DAILY
Status: DISCONTINUED | OUTPATIENT
Start: 2020-11-09 | End: 2020-11-11 | Stop reason: HOSPADM

## 2020-11-08 RX ORDER — DEXTROSE MONOHYDRATE 25 G/50ML
25-50 INJECTION, SOLUTION INTRAVENOUS
Status: DISCONTINUED | OUTPATIENT
Start: 2020-11-08 | End: 2020-11-11 | Stop reason: HOSPADM

## 2020-11-08 RX ORDER — POTASSIUM CHLORIDE 1500 MG/1
40 TABLET, EXTENDED RELEASE ORAL ONCE
Status: COMPLETED | OUTPATIENT
Start: 2020-11-08 | End: 2020-11-08

## 2020-11-08 RX ORDER — AZITHROMYCIN 250 MG/1
250 TABLET, FILM COATED ORAL DAILY
Status: DISCONTINUED | OUTPATIENT
Start: 2020-11-09 | End: 2020-11-11 | Stop reason: HOSPADM

## 2020-11-08 RX ORDER — NICOTINE POLACRILEX 4 MG
15-30 LOZENGE BUCCAL
Status: DISCONTINUED | OUTPATIENT
Start: 2020-11-08 | End: 2020-11-11 | Stop reason: HOSPADM

## 2020-11-08 RX ORDER — SERTRALINE HYDROCHLORIDE 100 MG/1
200 TABLET, FILM COATED ORAL DAILY
Status: DISCONTINUED | OUTPATIENT
Start: 2020-11-09 | End: 2020-11-11 | Stop reason: HOSPADM

## 2020-11-08 RX ORDER — CLONAZEPAM 1 MG/1
1 TABLET ORAL 2 TIMES DAILY PRN
Status: DISCONTINUED | OUTPATIENT
Start: 2020-11-08 | End: 2020-11-11 | Stop reason: HOSPADM

## 2020-11-08 RX ORDER — ZOLPIDEM TARTRATE 5 MG/1
10 TABLET ORAL AT BEDTIME
Status: DISCONTINUED | OUTPATIENT
Start: 2020-11-08 | End: 2020-11-11 | Stop reason: HOSPADM

## 2020-11-08 RX ORDER — PANTOPRAZOLE SODIUM 40 MG/1
40 TABLET, DELAYED RELEASE ORAL AT BEDTIME
Status: DISCONTINUED | OUTPATIENT
Start: 2020-11-08 | End: 2020-11-11 | Stop reason: HOSPADM

## 2020-11-08 RX ORDER — POTASSIUM CHLORIDE 20MEQ/15ML
20 LIQUID (ML) ORAL ONCE
Status: COMPLETED | OUTPATIENT
Start: 2020-11-08 | End: 2020-11-08

## 2020-11-08 RX ORDER — LOSARTAN POTASSIUM 100 MG/1
100 TABLET ORAL DAILY
Status: DISCONTINUED | OUTPATIENT
Start: 2020-11-09 | End: 2020-11-11 | Stop reason: HOSPADM

## 2020-11-08 RX ORDER — DEXAMETHASONE SODIUM PHOSPHATE 4 MG/ML
6 INJECTION, SOLUTION INTRA-ARTICULAR; INTRALESIONAL; INTRAMUSCULAR; INTRAVENOUS; SOFT TISSUE EVERY 24 HOURS
Status: DISCONTINUED | OUTPATIENT
Start: 2020-11-09 | End: 2020-11-10

## 2020-11-08 RX ORDER — POLYETHYLENE GLYCOL 3350 17 G/17G
17 POWDER, FOR SOLUTION ORAL DAILY PRN
Status: DISCONTINUED | OUTPATIENT
Start: 2020-11-08 | End: 2020-11-11 | Stop reason: HOSPADM

## 2020-11-08 RX ORDER — ACETAMINOPHEN 325 MG/1
650 TABLET ORAL EVERY 4 HOURS PRN
Status: DISCONTINUED | OUTPATIENT
Start: 2020-11-08 | End: 2020-11-11 | Stop reason: HOSPADM

## 2020-11-08 RX ORDER — LIDOCAINE 40 MG/G
CREAM TOPICAL
Status: DISCONTINUED | OUTPATIENT
Start: 2020-11-08 | End: 2020-11-11 | Stop reason: HOSPADM

## 2020-11-08 RX ORDER — ALBUTEROL SULFATE 90 UG/1
1-2 AEROSOL, METERED RESPIRATORY (INHALATION) EVERY 6 HOURS PRN
Status: DISCONTINUED | OUTPATIENT
Start: 2020-11-08 | End: 2020-11-11 | Stop reason: HOSPADM

## 2020-11-08 RX ADMIN — GABAPENTIN 900 MG: 300 CAPSULE ORAL at 21:35

## 2020-11-08 RX ADMIN — ASPIRIN 81 MG: 81 TABLET, COATED ORAL at 20:02

## 2020-11-08 RX ADMIN — AZITHROMYCIN MONOHYDRATE 500 MG: 500 INJECTION, POWDER, LYOPHILIZED, FOR SOLUTION INTRAVENOUS at 14:31

## 2020-11-08 RX ADMIN — CEFTRIAXONE 2 G: 2 INJECTION, POWDER, FOR SOLUTION INTRAMUSCULAR; INTRAVENOUS at 14:04

## 2020-11-08 RX ADMIN — DEXAMETHASONE SODIUM PHOSPHATE 6 MG: 10 INJECTION, SOLUTION INTRAMUSCULAR; INTRAVENOUS at 14:57

## 2020-11-08 RX ADMIN — POTASSIUM CHLORIDE 20 MEQ: 1.5 SOLUTION ORAL at 14:28

## 2020-11-08 RX ADMIN — ACETAMINOPHEN 650 MG: 325 TABLET, FILM COATED ORAL at 20:02

## 2020-11-08 RX ADMIN — INSULIN ASPART 1 UNITS: 100 INJECTION, SOLUTION INTRAVENOUS; SUBCUTANEOUS at 18:11

## 2020-11-08 RX ADMIN — CLONAZEPAM 1 MG: 1 TABLET ORAL at 21:34

## 2020-11-08 RX ADMIN — POTASSIUM CHLORIDE 40 MEQ: 1500 TABLET, EXTENDED RELEASE ORAL at 18:13

## 2020-11-08 RX ADMIN — ENOXAPARIN SODIUM 40 MG: 40 INJECTION SUBCUTANEOUS at 20:04

## 2020-11-08 RX ADMIN — SODIUM CHLORIDE 500 ML: 9 INJECTION, SOLUTION INTRAVENOUS at 13:17

## 2020-11-08 RX ADMIN — SIMVASTATIN 20 MG: 20 TABLET, FILM COATED ORAL at 21:34

## 2020-11-08 RX ADMIN — PANTOPRAZOLE SODIUM 40 MG: 40 TABLET, DELAYED RELEASE ORAL at 21:34

## 2020-11-08 RX ADMIN — ZOLPIDEM TARTRATE 10 MG: 5 TABLET, COATED ORAL at 21:34

## 2020-11-08 ASSESSMENT — ENCOUNTER SYMPTOMS
SHORTNESS OF BREATH: 1
FATIGUE: 1
FEVER: 1
ABDOMINAL PAIN: 0
VOMITING: 1
DIARRHEA: 0
COUGH: 1

## 2020-11-08 ASSESSMENT — ACTIVITIES OF DAILY LIVING (ADL): ADLS_ACUITY_SCORE: 17

## 2020-11-08 ASSESSMENT — MIFFLIN-ST. JEOR: SCORE: 1576

## 2020-11-08 NOTE — ED PROVIDER NOTES
History     Chief Complaint:  Cough, Fatigue, Fever, and Shortness of Breath      HPI   Nicole Reyes is a 65-year-old female presenting to the ED evaluation of a cough, fatigue, fever, and shortness of breath.  Patient reports her symptoms first began on Friday, 2 days previous.  Patient was brought in by EMS.  Of note, patient does have use of home O2 (2L) at night, though is not typically on it during the day.  Upon arrival to the ED, off supplemental O2, her oxygen saturation was 75%, though improved to 95% on 4 L.  Here, she reports her breathing feels somewhat labored.  She denies associated chest pain.  She took Tylenol prior to arrival for a temperature of 100.1.  She denies abdominal pain.  She did experience 2 episodes of emesis, though she relates this to coughing.  Her cough has been somewhat productive of mucus.  She denies associated diarrhea.  She denies any known ill contacts or time around those with Covid-19 though does acknowledge spending time at her family members house last weekend during the Halloween holiday.  No other complaints or concerns are voiced at this time.    Allergies:  Codeine   Penicillins     Medications:    Metformin   Aspirin 81mg   Gabapentin   Losartan   Klonopin   Ambien   Zoloft   Protonix     Past Medical History:    CKD   RADHA  GERD  Hypertension   Abdominal hernia   CRISTÓBAL  Obesity   Type 2 diabetes   Postoperative seroma   Oxygen dependent   Monopause   Depression   Hypercholesteremia    Insomnia   RLS   Eczema    Past Surgical History:    Breast biopsy bilateral    section x 3  D & C   EGD combined 3  Herniorrhaphy x2    Family History:    Mother: CHF, hypertension, coronary artery disease, lipids  Father: non-hodkins lymphoma   Sister: diabetes, Lupus, hypertension   Brother: hodkins, coronary artery disease, hypertension     Social History:  Smoking status: former quit   Alcohol use: yes  Drug use: no   The patient presents to the emergency department  "alone  Pari Wiley   Marital Status:   [2]    Review of Systems   Constitutional: Positive for fatigue and fever.   Respiratory: Positive for cough and shortness of breath.    Cardiovascular: Negative for chest pain.   Gastrointestinal: Positive for vomiting. Negative for abdominal pain and diarrhea.   All other systems reviewed and are negative.    Physical Exam     Patient Vitals for the past 24 hrs:   BP Temp Temp src Pulse Resp SpO2 Height Weight   11/08/20 1609 131/65 100  F (37.8  C) Oral 89 20 97 % 1.473 m (4' 10\") 114.1 kg (251 lb 9.6 oz)   11/08/20 1545 116/60 -- -- 91 -- 95 % -- --   11/08/20 1530 107/82 -- -- 93 -- 96 % -- --   11/08/20 1515 134/82 -- -- 90 -- 97 % -- --   11/08/20 1500 125/66 -- -- 90 -- 96 % -- --   11/08/20 1445 (!) 154/82 -- -- 95 -- 93 % -- --   11/08/20 1434 -- -- -- -- -- -- -- 112.9 kg (249 lb)   11/08/20 1430 112/71 -- -- 92 -- 95 % -- --   11/08/20 1415 (!) 171/84 -- -- 92 -- 94 % -- --   11/08/20 1400 (!) 157/79 -- -- 92 -- 92 % -- --   11/08/20 1345 (!) 152/62 -- -- 97 -- 93 % -- --   11/08/20 1330 129/64 -- -- 93 -- 92 % -- --   11/08/20 1315 (!) 143/56 -- -- 96 -- 93 % -- --   11/08/20 1221 (!) 148/84 98.7  F (37.1  C) Oral 105 22 95 % -- --       Physical Exam  General:   Well-nourished   Speaking in full sentences  Eyes:   Conjunctiva without injection or scleral icterus  ENT:   Moist mucous membranes   Nares patent   Pinnae normal  Neck:   Full ROM   No stiffness appreciated  Resp:   Lungs CTAB   No crackles, wheezing or audible rubs   Good air movement  CV:    Tachycardic rate, regular rhythm   S1 and S2 present   No murmur, gallop or rub  GI:   BS present   Abdomen soft without distention   Non-tender to light and deep palpation   No guarding or rebound tenderness  Skin:   Warm, dry, well perfused   No rashes or open wounds on exposed skin  MSK:   Moves all extremities   No focal deformities or swelling  Neuro:   Alert   Answers questions appropriately   Moves " all extremities equally   Gait stable  Psych:   Normal affect, normal mood      Emergency Department Course   ECG (12:26:04):  Rate 101 bpm. DC interval 182. QRS duration 82. QT/QTc 372/482. P-R-T axes 50 -2 65. Sinus tachycardia. Otherwise normal ECG. Interpreted at 1247 by Tal Reddy MD.    Imaging:  Radiology findings were communicated with the patient who voiced understanding of the findings.    XR Chest Port 1 View  IMPRESSION: Portable chest. Patchy airspace opacities are noted in the  mid and lower lungs, possibly infectious. Heart is enlarged,  unchanged. No pneumothorax. No definite pleural effusions. Small  hiatal hernia is noted.  As read by Radiology.    Laboratory:  Laboratory findings were communicated with the patient who voiced understanding of the findings.    Symptomatic COVID-19 Virus by PCR Nasopharyngeal swab: pending    Blood Culture x2: Pending    CBC: WBC: 22.5 (H), HGB: 12.1, PLT: 277    CMP: Glucose 159 (H), Potassium: 3.3 (L), Albumin: 2.8 (L), o/w WNL (Creatinine: 0.70)    Lactic acid (1238): 1.1    VBG and oxyhgb: pH 7.40 / PCO2 49 / PO2 64 (H) / Bicarb 30 (H) / FlO2 4L / Base excess 4.6    Interventions:  Ceftriaxone  Azithromycin  Decadron    Emergency Department Course:  Past medical records, nursing notes, and vitals reviewed.  1228: I performed an exam of the patient and obtained history, as documented above.     EKG was obtained and reviewed in the emergency department.    IV inserted and blood drawn.    The patient was sent for a chest XR while in the emergency department, results above.     I rechecked the patient. I reviewed the results with the Patient and answered all related questions prior to admission.     I discussed the patient with Dr. Worthy, of hospitalist.    Impression & Plan   Covid-19  Nicole Reyes was evaluated during a global COVID-19 pandemic, which necessitated consideration that the patient might be at risk for infection with the SARS-CoV-2  virus that causes COVID-19.   Applicable protocols for evaluation were followed during the patient's care.   COVID-19 was considered as part of the patient's evaluation. The plan for testing is:  a test was obtained during this visit.    Medical Decision Making:  Di Reyes is a 65 yr old F with PMH significant for recurrent PNA, on O2 at night, presenting to ED for evaluation of cough, fever, SOB and fatigue.  VS on presentation reveal elevated BP, HR of 105, RR of 22, and SpO2 of 95% on 4L NC.  Hx, exam, and ED course as outlined above.  Sx concerning for pneumonia given symptom complex, CXR revealing patchy airspace opacities to mid and lower lungs bilaterally and WBC of 22.5  Lactate normal at 1.1.  BC x 2 obtained and pending.  VBG with pH 7.4, pCO2 49, and Bicarb 30 (appears compensated).  COVID 19 remains on differential, especially given CXR appearance and recent family gathering, though no positive exposure known.  Pt treated with Ceftriaxone, Azithromycin, and following discussion with hospitalist, Decadron given suspicion for COVID.  Pt will require hospitalization given increased O2 requirements.  Case discussed with Dr. Worthy, who has accepted for admission.  Questions answered of patient prior to admission.    Diagnosis:    ICD-10-CM    1. Cough  R05 Blood culture     Blood culture     Symptomatic COVID-19 Virus (Coronavirus) by PCR     CRP inflammation     CRP inflammation     D dimer quantitative     D dimer quantitative     SARS-CoV-2 COVID-19 Virus (Coronavirus) RT-PCR     SARS-CoV-2 COVID-19 Virus (Coronavirus) RT-PCR     Glucose by meter     Glucose by meter     CANCELED: D dimer quantitative     CANCELED: CRP inflammation   2. Pneumonia of both lungs due to infectious organism, unspecified part of lung  J18.9    3. Hypoxia  R09.02        Disposition:  Admitted to Dr. Worthy.    Janene Lora  11/8/2020   [facility] EMERGENCY DEPT  Scribe Disclosure:  Janene CHAVES, arnaud  serving as a scribe at 12:28 PM on 11/8/2020 to document services personally performed by Tal Reddy MD based on my observations and the provider's statements to me.       Tal Reddy MD  11/08/20 1907

## 2020-11-08 NOTE — ED NOTES
Pt transported to floor via ER transport. Pt maintaining oxygen saturations with 4L of oxygen. ABCs intact, GCS 15 at time of departure from ER.

## 2020-11-08 NOTE — ED NOTES
[facility]  ED Nurse Handoff Report    Nicole Reyes is a 65 year old female   ED Chief complaint: Cough, Fatigue, Fever, and Shortness of Breath  . ED Diagnosis:   Final diagnoses:   Cough   Pneumonia of both lungs due to infectious organism, unspecified part of lung   Hypoxia     Allergies:   Allergies   Allergen Reactions     Codeine Rash     Penicillins Rash     Pt has tolerated cephalosporins and penems.   Pt tolerated Zosyn 7/6/2019       Code Status: Full Code  Activity level - Baseline/Home:  Independent. Activity Level - Current:   Assist X 1. Lift room needed: No. Bariatric: No   Needed: No   Isolation: Yes. Infection: Not Applicable  COVID r/o and special precautions.     Vital Signs:   Vitals:    11/08/20 1400 11/08/20 1415 11/08/20 1430 11/08/20 1434   BP: (!) 157/79 (!) 171/84 112/71    Pulse: 92 92 92    Resp:       Temp:       TempSrc:       SpO2: 92% 94% 95%    Weight:    112.9 kg (249 lb)       Cardiac Rhythm:  ,      Pain level:    Patient confused: No. Patient Falls Risk: Yes.   Elimination Status: Has voided   Patient Report - Initial Complaint:   ED Triage Notes Addendum Pt arrives via EMS for cough, fatigue, fever, and SOB. Pt took tylenol for temp of 100.1 1 hour PTA. For EMS Pt was 85% on RA, improved to 95% on 3L. Pt was not on oxygen from EMS upon entering the ER, room air sat at 75%, pt recovered to 95% with 4L via nasal cannula.       Focused Assessment:   12:49 Respiratory Respiratory - Respiratory WDL: .WDL except; cough; rhythm/pattern  Rhythm/Pattern, Respiratory: shortness of breath; tachypnea  MN     12:49 Neurological Cognitive - Cognitive/Neuro/Behavioral WDL: WDL   Noel Coma Scale - Best Eye Response: 4-->(E4) spontaneous  Best Motor Response: 6-->(M6) obeys commands  Best Verbal Response: 5-->(V5) oriented  [place] Coma Scale Score: 15         Tests Performed: labs, imaging. Abnormal Results:   Labs Ordered and Resulted from Time of ED Arrival  Up to the Time of Departure from the ED   CBC WITH PLATELETS DIFFERENTIAL - Abnormal; Notable for the following components:       Result Value    WBC 22.5 (*)     MCH 24.6 (*)     MCHC 29.7 (*)     RDW 15.9 (*)     Absolute Neutrophil 20.2 (*)     All other components within normal limits   COMPREHENSIVE METABOLIC PANEL - Abnormal; Notable for the following components:    Potassium 3.3 (*)     Glucose 159 (*)     Albumin 2.8 (*)     All other components within normal limits   BLOOD GAS VENOUS - Abnormal; Notable for the following components:    PO2 Venous 64 (*)     Bicarbonate Venous 30 (*)     All other components within normal limits   LACTIC ACID WHOLE BLOOD   COVID-19 VIRUS (CORONAVIRUS) BY PCR   PULSE OXIMETRY NURSING   CARDIAC CONTINUOUS MONITORING   STRICT INTAKE AND OUTPUT   PERIPHERAL IV CATHETER   BLOOD CULTURE   BLOOD CULTURE     XR Chest Port 1 View   Final Result   IMPRESSION: Portable chest. Patchy airspace opacities are noted in the   mid and lower lungs, possibly infectious. Heart is enlarged,   unchanged. No pneumothorax. No definite pleural effusions. Small   hiatal hernia is noted.      JOHN COELHO MD        .   Treatments provided: see MAR  Family Comments: none at bedside  OBS brochure/video discussed/provided to patient:  N/A  ED Medications:   Medications   sodium chloride (PF) 0.9% PF flush 3 mL (has no administration in time range)   sodium chloride (PF) 0.9% PF flush 3 mL (has no administration in time range)   azithromycin (ZITHROMAX) 500 mg in sodium chloride 0.9 % 250 mL intermittent infusion (500 mg Intravenous New Bag 11/8/20 1431)   0.9% sodium chloride BOLUS (0 mLs Intravenous Stopped 11/8/20 1434)   cefTRIAXone (ROCEPHIN) 2 g vial to attach to  ml bag for ADULTS or NS 50 ml bag for PEDS (0 g Intravenous Stopped 11/8/20 1435)   potassium chloride (KAYCIEL) solution 20 mEq (20 mEq Oral Given 11/8/20 1428)     Drips infusing:  No  For the majority of the shift, the patient's  behavior Green. Interventions performed were N/A.    Sepsis treatment initiated: No     Patient tested for COVID 19 prior to admission: YES symptomatic swab sent    ED Nurse Name/Phone Number: Autumn Manuel RN,   2:41 PM    RECEIVING UNIT ED HANDOFF REVIEW    Above ED Nurse Handoff Report was reviewed: Yes  Reviewed by: Angel Barton RN on November 8, 2020 at 3:34 PM

## 2020-11-08 NOTE — PHARMACY-ADMISSION MEDICATION HISTORY
Admission medication history interview status for this patient is complete. See [facility] admission navigator for allergy information, prior to admission medications and immunization status.     Medication history interview done via telephone during Covid-19 pandemic, indicate source(s): Patient  Medication history resources (including written lists, pill bottles, clinic record):None  Pharmacy: Not ID'd    Changes made to PTA medication list:  Added: None  Deleted: Lemborexant  Changed: None    Actions taken by pharmacist (provider contacted, etc):None     Additional medication history information:None    Medication reconciliation/reorder completed by provider prior to medication history?  N     Prior to Admission medications    Medication Sig Last Dose Taking? Auth Provider   albuterol (PROAIR HFA/PROVENTIL HFA/VENTOLIN HFA) 108 (90 Base) MCG/ACT inhaler Inhale 1-2 puffs into the lungs every 6 hours as needed for shortness of breath / dyspnea or wheezing 11/8/2020 at AM Yes Jose L Weaver MD   aspirin 81 MG EC tablet Take 81 mg by mouth every evening  11/7/2020 at PM Yes Unknown, Entered By History   clonazePAM (KLONOPIN) 1 MG tablet Take 1 tablet (1 mg) by mouth 2 times daily as needed for anxiety  Yes Pari Wiley MD   gabapentin (NEURONTIN) 300 MG capsule Take 3 capsules (900 mg) by mouth daily 11/7/2020 at HS Yes Pari Wiley MD   losartan (COZAAR) 100 MG tablet TAKE ONE TABLET BY MOUTH ONCE DAILY 11/8/2020 at AM Yes Pari Wiley MD   metFORMIN (GLUCOPHAGE-XR) 500 MG 24 hr tablet TAKE TWO TABLETS BY MOUTH EVERY MORNING 11/7/2020 at AM Yes Pari Wiley MD   multivitamin w/minerals (MULTI-VITAMIN) tablet Take 1 tablet by mouth daily 11/7/2020 at AM Yes Reported, Patient   pantoprazole (PROTONIX) 40 MG EC tablet TAKE ONE TABLET BY MOUTH AT BEDTIME 11/7/2020 at HS Yes Pari Wiley MD   sertraline (ZOLOFT) 100 MG tablet TAKE TWO TABLETS BY MOUTH ONCE DAILY 11/8/2020 at AM Yes Pari Wiley MD   simvastatin (ZOCOR) 20  MG tablet TAKE ONE TABLET BY MOUTH AT BEDTIME 11/6/2020 at HS Yes Pari Wiley MD   zolpidem (AMBIEN) 10 MG tablet Take 1 tablet (10 mg) by mouth At Bedtime 11/7/2020 at HS Yes Pari Wiley MD   blood glucose (ACCU-CHEK BYRON PLUS) test strip USE TO TEST BLOOD SUGAR TWO TIMES A DAY OR AS DIRECTED   Pari Wiley MD

## 2020-11-08 NOTE — H&P
[facility]    History and Physical - Hospitalist Service       Date of Admission:  11/8/2020     Assessment & Plan   Nicole Reyes is a 65 year old female with a history of recurrent pneumonias attributed to aspiration related to hiatal hernia, chronic respiratory failure on nocturnal O2, CRISTÓBAL, insomnia, hypertension, and diabetes on metformin, who is admitted to the hospitalist service for respiratory failure and community-acquired pneumonia.    Acute hypoxic respiratory failure  Community-acquired pneumonia  Rule out COVID-19 infection   Patient presented with 2 days of fever and cough.  Apparently had been around family members 1 week ago.  Not known to have any COVID-19 positive exposures.  History of recurrent pneumonias is noted.  On initial vitals was hypoxic and required 4L supplemental oxygen to maintain O2 sats.  WBC count 22.5.  Chest x-ray shows bilateral opacities.  Patient received ceftriaxone and azithromycin in the ED.  Symptomatic COVID-19 swab is pending.  Dexamethasone given empirically if this is COVID-19.  Continue ceftriaxone, azithromycin and dexamethasone.  Stop antibiotics if COVID-19 swab returns positive.  Trend WBC count.  Leave on precautions overnight if COVID swab returns negative.  Albuterol as needed.    Chronic medical conditions  Morbid obesity, BMI over 50: Noted and complicates cares.  CRISTÓBAL: She can continue nocturnal CPAP despite being under investigation for COVID-19.  Insomnia: Continue home medications.  DM2: Hold metformin, use sliding scale insulin instead.  Depression/anxiety: Continue clonazepam and sertraline.  HTN: Continue losartan.     Diet: Regular  DVT Prophylaxis: Enoxaparin (Lovenox) subcutaneous BID  Suarez Catheter: No  Code Status: Full Code    Disposition Plan   Expected discharge:   Anticipate 3-4 days in the hospital.     Mandeep Worthy MD  [facility]  Hospital    ______________________________________________________________________    Chief Complaint   Cough  Fever    History of Present Illness   Nicole Reyes is a 65 year old female with a history of recurrent pneumonias attributed to aspiration related to hiatal hernia, chronic respiratory failure on nocturnal O2, CRISTÓBAL, insomnia, hypertension, diabetes on metformin, presented to the ED for complaint of fever and cough.  Patient reports that she gets recurrent pneumonias.  She has been admitted to this hospital as recently as July for that.  She states that she was feeling fine until 2 days ago, when she developed a cough.  The cough is been progressive and today she had a fever up to 100.3  F.  For these reasons, she presented to the ED.  Vital signs in the ED were normal.  Labs notable for WBC count of 22.5, potassium 3.3.  Chest x-ray showed patchy bilateral airspace opacities in the mid and lower lung fields.  Was given ceftriaxone and azithromycin as well as dexamethasone.    Patient says that other than the cough and fever she has not had any new symptoms.  Not really feeling short of breath.  Denies chest pain, loss of sense of taste/smell.  No GI symptoms of diarrhea or abdominal pain.    Review of Systems    The 10 point Review of Systems is negative other than noted in the HPI or here.     Physical Exam   /82   Pulse 93   Temp 98.7  F (37.1  C) (Oral)   Resp 22   Wt 112.9 kg (249 lb)   LMP 09/15/2007   SpO2 96%   BMI 51.16 kg/m         General: No acute distress, lying flat in the bed.    HEENT: No scleral icterus. Oropharynx moist.     Neck: Supple.    Pulmonary: Normal work of breathing. Clear to auscultation bilaterally.    Cardiovascular: Regular rate and rhythm without murmur or extra heart sounds.    Abdomen: Soft and non-tender.    Extremities: No peripheral edema. No clubbing or cyanosis.     Neurologic: Awake, alert, appropriate.    Skin: Warm and dry.    Psychiatric: Normal  affect and mood.     Data   Data reviewed today: I have reviewed all labs and imaging results.    Recent Labs   Lab 11/08/20  1238   WBC 22.5*   HGB 12.1   MCV 83         POTASSIUM 3.3*   CHLORIDE 104   CO2 28   BUN 14   CR 0.70   ANIONGAP 8   GRECIA 8.8   *   ALBUMIN 2.8*   PROTTOTAL 7.1   BILITOTAL 0.5   ALKPHOS 94   ALT 29   AST 26     Recent Results (from the past 24 hour(s))   XR Chest Port 1 View    Narrative    CHEST ONE VIEW PORTABLE    11/8/2020 1:27 PM     HISTORY: Fever    COMPARISON: Chest x-ray 8/3/2020.      Impression    IMPRESSION: Portable chest. Patchy airspace opacities are noted in the  mid and lower lungs, possibly infectious. Heart is enlarged,  unchanged. No pneumothorax. No definite pleural effusions. Small  hiatal hernia is noted.    JOHN COELHO MD       Past Medical History    I have reviewed this patient's medical history and updated it with pertinent information if needed.   Past Medical History:   Diagnosis Date     Blood transfusion      CKD (chronic kidney disease) stage 3, GFR 30-59 ml/min      Essential hypertension, benign      Gastroesophageal reflux disease      Generalized anxiety disorder      Hernia, abdominal      Hiatal hernia      History of blood transfusion 2011    with a hernia repair     Hypertension      Insomnia, unspecified      Iron deficiency anemia 8/09     Major depression     sees a psychiatrist     Menopause     age 53     Obesity, unspecified      CRISTÓBAL (obstructive sleep apnea)     bipap     Oxygen dependent     2 LPM at home     Postoperative seroma 10/11    drained several times     Pure hypercholesterolemia      RLS (restless legs syndrome)      Type II or unspecified type diabetes mellitus without mention of complication, not stated as uncontrolled         Past Surgical History    I have reviewed this patient's surgical history and updated it with pertinent information if needed.  Past Surgical History:   Procedure Laterality Date     BREAST  BIOPSY, RT/LT      2 times; benign      SECTION        SECTION        SECTION       DILATION AND CURETTAGE, HYSTEROSCOPY DIAGNOSTIC, COMBINED  3/22/2013    Procedure: COMBINED DILATION AND CURETTAGE, HYSTEROSCOPY DIAGNOSTIC;  DILATION AND CURETTAGE, HYSTEROSCOPY DIAGNOSTIC   Converted to a general;  Surgeon: Robi Edwards MD;  Location: [facility] OR     ESOPHAGOSCOPY, GASTROSCOPY, DUODENOSCOPY (EGD), COMBINED N/A 2015    Procedure: COMBINED ESOPHAGOSCOPY, GASTROSCOPY, DUODENOSCOPY (EGD);  Surgeon: Obinna Gutierrez MD;  Location:  GI     ESOPHAGOSCOPY, GASTROSCOPY, DUODENOSCOPY (EGD), COMBINED N/A 2015    Procedure: COMBINED ENDOSCOPIC ULTRASOUND, ESOPHAGOSCOPY, GASTROSCOPY, DUODENOSCOPY (EGD), FINE NEEDLE ASPIRATE/BIOPSY;  Surgeon: Sabine Olivares MD;  Location:  GI     ESOPHAGOSCOPY, GASTROSCOPY, DUODENOSCOPY (EGD), COMBINED N/A 1/3/2020    Procedure: ESOPHAGOGASTRODUODENOSCOPY;  Surgeon: Ascencion Stauffer MD;  Location: [facility] OR     HERNIORRHAPHY INCISIONAL (LOCATION)  10/8/2011     incarcerated hernia repair with mesh;     HERNIORRHAPHY INCISIONAL (LOCATION)  10/16/2011     repair incisional hernia with mesh, and removal of current implanted mesh;     [facility] NONSPECIFIC PROCEDURE      Hernia repair      [facility] NONSPECIFIC PROCEDURE      Lymph node biopsy in neck (benign)         Social History    I have reviewed this patient's social history and updated it with pertinent information if needed.  Social History     Tobacco Use     Smoking status: Former Smoker     Quit date: 3/18/1979     Years since quittin.6     Smokeless tobacco: Never Used     Tobacco comment: quit    Substance Use Topics     Alcohol use: Yes     Comment: Twice a month     Drug use: No          Family History    I have reviewed this patient's family history and updated it with pertinent information if needed.   Family History   Problem Relation Age of Onset     Cardiovascular Mother          CHF     Hypertension Mother      C.A.D. Mother         50s     Lipids Mother      Cancer Father         Hodgkin's, Leukemia     Diabetes Sister      Connective Tissue Disorder Sister         Lupus     Lipids Sister      Hypertension Brother      Hypertension Sister      Hypertension Sister      Cancer Brother         Hodgkin's     C.A.D. Brother 61     Hypertension Sister      C.A.D. Brother      Basal cell carcinoma Daughter 38        top of head          Prior to Admission Medications    Prior to Admission Medications   Prescriptions Last Dose Informant Patient Reported? Taking?   albuterol (PROAIR HFA/PROVENTIL HFA/VENTOLIN HFA) 108 (90 Base) MCG/ACT inhaler 11/8/2020 at AM  No Yes   Sig: Inhale 1-2 puffs into the lungs every 6 hours as needed for shortness of breath / dyspnea or wheezing   aspirin 81 MG EC tablet 11/7/2020 at PM  Yes Yes   Sig: Take 81 mg by mouth every evening    blood glucose (ACCU-CHEK BYRON PLUS) test strip   No No   Sig: USE TO TEST BLOOD SUGAR TWO TIMES A DAY OR AS DIRECTED   clonazePAM (KLONOPIN) 1 MG tablet   No Yes   Sig: Take 1 tablet (1 mg) by mouth 2 times daily as needed for anxiety   gabapentin (NEURONTIN) 300 MG capsule 11/7/2020 at HS  No Yes   Sig: Take 3 capsules (900 mg) by mouth daily   losartan (COZAAR) 100 MG tablet 11/8/2020 at AM  No Yes   Sig: TAKE ONE TABLET BY MOUTH ONCE DAILY   metFORMIN (GLUCOPHAGE-XR) 500 MG 24 hr tablet 11/7/2020 at AM  No Yes   Sig: TAKE TWO TABLETS BY MOUTH EVERY MORNING   multivitamin w/minerals (MULTI-VITAMIN) tablet 11/7/2020 at AM  Yes Yes   Sig: Take 1 tablet by mouth daily   pantoprazole (PROTONIX) 40 MG EC tablet 11/7/2020 at HS  No Yes   Sig: TAKE ONE TABLET BY MOUTH AT BEDTIME   sertraline (ZOLOFT) 100 MG tablet 11/8/2020 at AM  No Yes   Sig: TAKE TWO TABLETS BY MOUTH ONCE DAILY   simvastatin (ZOCOR) 20 MG tablet 11/6/2020 at HS  No Yes   Sig: TAKE ONE TABLET BY MOUTH AT BEDTIME   zolpidem (AMBIEN) 10 MG tablet 11/7/2020 at HS  No Yes    Sig: Take 1 tablet (10 mg) by mouth At Bedtime      Facility-Administered Medications: None        Allergies    Allergies   Allergen Reactions     Codeine Rash     Penicillins Rash     Pt has tolerated cephalosporins and penems.   Pt tolerated Zosyn 7/6/2019

## 2020-11-08 NOTE — ED TRIAGE NOTES
Pt arrives via EMS for cough, fatigue, fever, and SOB. Pt took tylenol for temp of 100.1 1 hour PTA. For EMS Pt was 85% on RA, improved to 95% on 3L. Pt was not on oxygen from EMS upon entering the ER, room air sat at 75%, pt recovered to 95% with 4L via nasal cannula.

## 2020-11-09 LAB
ANION GAP SERPL CALCULATED.3IONS-SCNC: 7 MMOL/L (ref 3–14)
BUN SERPL-MCNC: 19 MG/DL (ref 7–30)
CALCIUM SERPL-MCNC: 8.7 MG/DL (ref 8.5–10.1)
CHLORIDE SERPL-SCNC: 106 MMOL/L (ref 94–109)
CO2 SERPL-SCNC: 27 MMOL/L (ref 20–32)
CREAT SERPL-MCNC: 0.7 MG/DL (ref 0.52–1.04)
ERYTHROCYTE [DISTWIDTH] IN BLOOD BY AUTOMATED COUNT: 16 % (ref 10–15)
GFR SERPL CREATININE-BSD FRML MDRD: >90 ML/MIN/{1.73_M2}
GLUCOSE BLDC GLUCOMTR-MCNC: 105 MG/DL (ref 70–99)
GLUCOSE BLDC GLUCOMTR-MCNC: 118 MG/DL (ref 70–99)
GLUCOSE BLDC GLUCOMTR-MCNC: 134 MG/DL (ref 70–99)
GLUCOSE BLDC GLUCOMTR-MCNC: 148 MG/DL (ref 70–99)
GLUCOSE SERPL-MCNC: 146 MG/DL (ref 70–99)
HCT VFR BLD AUTO: 39.1 % (ref 35–47)
HGB BLD-MCNC: 11.6 G/DL (ref 11.7–15.7)
INTERPRETATION ECG - MUSE: NORMAL
MCH RBC QN AUTO: 24.7 PG (ref 26.5–33)
MCHC RBC AUTO-ENTMCNC: 29.7 G/DL (ref 31.5–36.5)
MCV RBC AUTO: 83 FL (ref 78–100)
PLATELET # BLD AUTO: 275 10E9/L (ref 150–450)
POTASSIUM SERPL-SCNC: 4.1 MMOL/L (ref 3.4–5.3)
RBC # BLD AUTO: 4.69 10E12/L (ref 3.8–5.2)
SARS-COV-2 RNA SPEC QL NAA+PROBE: NORMAL
SODIUM SERPL-SCNC: 140 MMOL/L (ref 133–144)
SPECIMEN SOURCE: NORMAL
WBC # BLD AUTO: 22.1 10E9/L (ref 4–11)

## 2020-11-09 PROCEDURE — 36415 COLL VENOUS BLD VENIPUNCTURE: CPT | Performed by: INTERNAL MEDICINE

## 2020-11-09 PROCEDURE — 250N000013 HC RX MED GY IP 250 OP 250 PS 637: Performed by: INTERNAL MEDICINE

## 2020-11-09 PROCEDURE — 120N000001 HC R&B MED SURG/OB

## 2020-11-09 PROCEDURE — U0003 INFECTIOUS AGENT DETECTION BY NUCLEIC ACID (DNA OR RNA); SEVERE ACUTE RESPIRATORY SYNDROME CORONAVIRUS 2 (SARS-COV-2) (CORONAVIRUS DISEASE [COVID-19]), AMPLIFIED PROBE TECHNIQUE, MAKING USE OF HIGH THROUGHPUT TECHNOLOGIES AS DESCRIBED BY CMS-2020-01-R: HCPCS | Performed by: INTERNAL MEDICINE

## 2020-11-09 PROCEDURE — 250N000011 HC RX IP 250 OP 636: Performed by: INTERNAL MEDICINE

## 2020-11-09 PROCEDURE — 99232 SBSQ HOSP IP/OBS MODERATE 35: CPT | Performed by: INTERNAL MEDICINE

## 2020-11-09 PROCEDURE — 999N001017 HC STATISTIC GLUCOSE BY METER IP

## 2020-11-09 PROCEDURE — 85027 COMPLETE CBC AUTOMATED: CPT | Performed by: INTERNAL MEDICINE

## 2020-11-09 PROCEDURE — 80048 BASIC METABOLIC PNL TOTAL CA: CPT | Performed by: INTERNAL MEDICINE

## 2020-11-09 RX ADMIN — LOSARTAN POTASSIUM 100 MG: 100 TABLET, FILM COATED ORAL at 09:24

## 2020-11-09 RX ADMIN — PANTOPRAZOLE SODIUM 40 MG: 40 TABLET, DELAYED RELEASE ORAL at 22:09

## 2020-11-09 RX ADMIN — ASPIRIN 81 MG: 81 TABLET, COATED ORAL at 19:51

## 2020-11-09 RX ADMIN — SERTRALINE HYDROCHLORIDE 200 MG: 100 TABLET ORAL at 09:24

## 2020-11-09 RX ADMIN — INSULIN ASPART 1 UNITS: 100 INJECTION, SOLUTION INTRAVENOUS; SUBCUTANEOUS at 09:28

## 2020-11-09 RX ADMIN — ENOXAPARIN SODIUM 40 MG: 40 INJECTION SUBCUTANEOUS at 09:24

## 2020-11-09 RX ADMIN — CEFTRIAXONE 1 G: 1 INJECTION, POWDER, FOR SOLUTION INTRAMUSCULAR; INTRAVENOUS at 15:44

## 2020-11-09 RX ADMIN — GABAPENTIN 900 MG: 300 CAPSULE ORAL at 22:09

## 2020-11-09 RX ADMIN — SIMVASTATIN 20 MG: 20 TABLET, FILM COATED ORAL at 22:09

## 2020-11-09 RX ADMIN — ZOLPIDEM TARTRATE 10 MG: 5 TABLET, COATED ORAL at 22:09

## 2020-11-09 RX ADMIN — AZITHROMYCIN MONOHYDRATE 250 MG: 250 TABLET ORAL at 09:24

## 2020-11-09 RX ADMIN — ENOXAPARIN SODIUM 40 MG: 40 INJECTION SUBCUTANEOUS at 19:51

## 2020-11-09 ASSESSMENT — ACTIVITIES OF DAILY LIVING (ADL)
ADLS_ACUITY_SCORE: 17

## 2020-11-09 NOTE — PLAN OF CARE
End of Shift Summary  For vital signs and complete assessments, please see documentation flowsheets.     Pertinent assessments: Afebrile. Continues to require 3L oxygen in mid 90s. LS diminished; denies feeling SOB but is KHALIL. BS hypoactive. Pt denies pain and nausea. PIV - SL. SBA to ambulate. Regular diet. . PIV - SL. A&O. Slept well.   Major Shift Events: Weaned oxygen from 4L to 3L   Treatment Plan: Zithromax, Rocephin, Lovenox, Decadron

## 2020-11-09 NOTE — PROVIDER NOTIFICATION
Dr Worthy paged: Covid test negative, do you want to discontinue precautions?     Dr Thompson called and said he is going to decide this morning after seeing the pt.

## 2020-11-09 NOTE — PLAN OF CARE
Pertinent assessments: VSS on 4L NC. T max: 100.0. Recheck: 98.7. Tylenol given x1. LS diminished. Dry cough. Tolerating a regular diet. Up with SBA, gait belt, and walker. K replaced up to 3.6.  Major Shift Events: new admit  Treatment Plan: PO Zithromax, IV Rocephin, IV Decadron, Lovenox, BG monitoring, Regular diet, O2, and continuous pulse ox.  Bedside Nurse: Angel Barton RN

## 2020-11-09 NOTE — PROGRESS NOTES
[facility]    Medicine Progress Note - Hospitalist Service       Date of Admission:  11/8/2020  Length of stay: 1 days    Assessment & Plan   Nicole Reyes is a 65 year old female with a history of recurrent pneumonias attributed to aspiration related to hiatal hernia, chronic respiratory failure on nocturnal O2, CRISTÓBAL, insomnia, hypertension, and diabetes on metformin, who is admitted to the hospitalist service for respiratory failure and community-acquired pneumonia.    Today  - WBC count basically stable at 22  - continue ceftriaxone and azithro  - COVID 19 swab negative. Repeat the test today--moderately high suspicion due to patchy bilateral opacities seen on CXR  - Remains on 2-3 lpm supplemental O2     Acute hypoxic respiratory failure  Community-acquired pneumonia  Rule out COVID-19 infection   - Patient presented with 2 days of fever and cough.  Apparently had been around family members 1 week ago.  Not known to have any COVID-19 positive exposures.  History of recurrent pneumonias is noted.  - On initial vitals was hypoxic and required 4L supplemental oxygen to maintain O2 sats. WBC count 22.5. Chest x-ray showed bilateral opacities.  - Patient received ceftriaxone and azithromycin in the ED.  - Initial COVID swab negative     Chronic medical conditions  Morbid obesity, BMI over 50: Noted and complicates cares.  CRISTÓBAL: She can continue nocturnal CPAP despite being under investigation for COVID-19.  Insomnia: Continue home medications.  DM2: Hold metformin, use sliding scale insulin instead.  Depression/anxiety: Continue clonazepam and sertraline.  HTN: Continue losartan.    Diet: Regular  DVT Prophylaxis: Enoxaparin (Lovenox) SQ  Malnutrition: No  Suarez Catheter: No  Code Status: Full Code     Disposition Plan   Expected discharge:       Mandeep Worthy MD  Hospitalist Service  [facility]  ______________________________________________________________________    Interval  "History   Feels about the same this morning.  Continues to be short of breath with a dry cough.  No fevers overnight.  Feels also very fatigued.    Data reviewed today: I reviewed all medications, new labs and imaging results over the last 24 hours.     Physical Exam   /49 (BP Location: Right arm)   Pulse 74   Temp 98.7  F (37.1  C) (Oral)   Resp 18   Ht 1.473 m (4' 10\")   Wt 114.1 kg (251 lb 9.6 oz)   LMP 09/15/2007   SpO2 95%   BMI 52.58 kg/m    251 lbs 9.6 oz       General: Ill-appearing but not in acute distress.    HEENT: No scleral icterus. Oropharynx moist.     Neck: Supple. Normal range of motion.     Pulmonary: Normal work of breathing. Clear to auscultation bilaterally.  Limited due to body habitus.    Cardiovascular: Regular rate and rhythm without murmur or extra heart sounds.    Abdomen: Soft and non-tender.    Extremities: No peripheral edema. No clubbing or cyanosis.     Neurologic: Awake, alert, appropriate.    Skin: Warm and dry.    Psychiatric: Normal affect and mood.     Data    Recent Labs   Lab 11/09/20  0739 11/08/20  2215 11/08/20  1238   WBC 22.1*  --  22.5*   HGB 11.6*  --  12.1   MCV 83  --  83     --  277     --  140   POTASSIUM 4.1 3.6 3.3*   CHLORIDE 106  --  104   CO2 27  --  28   BUN 19  --  14   CR 0.70  --  0.70   ANIONGAP 7  --  8   GRECIA 8.7  --  8.8   *  --  159*   ALBUMIN  --   --  2.8*   PROTTOTAL  --   --  7.1   BILITOTAL  --   --  0.5   ALKPHOS  --   --  94   ALT  --   --  29   AST  --   --  26     Recent Results (from the past 24 hour(s))   XR Chest Port 1 View    Narrative    CHEST ONE VIEW PORTABLE    11/8/2020 1:27 PM     HISTORY: Fever    COMPARISON: Chest x-ray 8/3/2020.      Impression    IMPRESSION: Portable chest. Patchy airspace opacities are noted in the  mid and lower lungs, possibly infectious. Heart is enlarged,  unchanged. No pneumothorax. No definite pleural effusions. Small  hiatal hernia is noted.    JOHN COELHO MD "       Medications      Current Facility-Administered Medications   Medication Dose Route Frequency     aspirin  81 mg Oral QPM     azithromycin  250 mg Oral Daily     cefTRIAXone  1 g Intravenous Q24H     [Held by provider] dexamethasone  6 mg Intravenous Q24H     enoxaparin ANTICOAGULANT  40 mg Subcutaneous Q12H     gabapentin  900 mg Oral At Bedtime     insulin aspart  1-7 Units Subcutaneous TID AC     insulin aspart  1-5 Units Subcutaneous At Bedtime     losartan  100 mg Oral Daily     pantoprazole  40 mg Oral At Bedtime     polyethylene glycol  17 g Oral Daily     senna-docusate  1 tablet Oral BID    Or     senna-docusate  2 tablet Oral BID     sertraline  200 mg Oral Daily     simvastatin  20 mg Oral At Bedtime     sodium chloride (PF)  3 mL Intracatheter Q8H     sodium chloride (PF)  3 mL Intracatheter Q8H     zolpidem  10 mg Oral At Bedtime

## 2020-11-10 LAB
ERYTHROCYTE [DISTWIDTH] IN BLOOD BY AUTOMATED COUNT: 16.2 % (ref 10–15)
GLUCOSE BLDC GLUCOMTR-MCNC: 102 MG/DL (ref 70–99)
GLUCOSE BLDC GLUCOMTR-MCNC: 122 MG/DL (ref 70–99)
GLUCOSE BLDC GLUCOMTR-MCNC: 126 MG/DL (ref 70–99)
GLUCOSE BLDC GLUCOMTR-MCNC: 132 MG/DL (ref 70–99)
HCT VFR BLD AUTO: 33.6 % (ref 35–47)
HGB BLD-MCNC: 10 G/DL (ref 11.7–15.7)
LABORATORY COMMENT REPORT: NORMAL
MCH RBC QN AUTO: 24.7 PG (ref 26.5–33)
MCHC RBC AUTO-ENTMCNC: 29.8 G/DL (ref 31.5–36.5)
MCV RBC AUTO: 83 FL (ref 78–100)
PLATELET # BLD AUTO: 247 10E9/L (ref 150–450)
RBC # BLD AUTO: 4.05 10E12/L (ref 3.8–5.2)
SARS-COV-2 RNA SPEC QL NAA+PROBE: NEGATIVE
SPECIMEN SOURCE: NORMAL
WBC # BLD AUTO: 13.3 10E9/L (ref 4–11)

## 2020-11-10 PROCEDURE — 250N000013 HC RX MED GY IP 250 OP 250 PS 637: Performed by: INTERNAL MEDICINE

## 2020-11-10 PROCEDURE — 999N000157 HC STATISTIC RCP TIME EA 10 MIN

## 2020-11-10 PROCEDURE — 120N000001 HC R&B MED SURG/OB

## 2020-11-10 PROCEDURE — 36415 COLL VENOUS BLD VENIPUNCTURE: CPT | Performed by: INTERNAL MEDICINE

## 2020-11-10 PROCEDURE — 85027 COMPLETE CBC AUTOMATED: CPT | Performed by: INTERNAL MEDICINE

## 2020-11-10 PROCEDURE — 99232 SBSQ HOSP IP/OBS MODERATE 35: CPT | Performed by: INTERNAL MEDICINE

## 2020-11-10 PROCEDURE — 250N000011 HC RX IP 250 OP 636: Performed by: INTERNAL MEDICINE

## 2020-11-10 PROCEDURE — 999N001017 HC STATISTIC GLUCOSE BY METER IP

## 2020-11-10 PROCEDURE — 999N000162

## 2020-11-10 RX ADMIN — ACETAMINOPHEN 650 MG: 325 TABLET, FILM COATED ORAL at 14:36

## 2020-11-10 RX ADMIN — LOSARTAN POTASSIUM 100 MG: 100 TABLET, FILM COATED ORAL at 09:16

## 2020-11-10 RX ADMIN — ENOXAPARIN SODIUM 40 MG: 40 INJECTION SUBCUTANEOUS at 19:59

## 2020-11-10 RX ADMIN — GABAPENTIN 900 MG: 300 CAPSULE ORAL at 22:27

## 2020-11-10 RX ADMIN — AZITHROMYCIN MONOHYDRATE 250 MG: 250 TABLET ORAL at 09:16

## 2020-11-10 RX ADMIN — CEFTRIAXONE 1 G: 1 INJECTION, POWDER, FOR SOLUTION INTRAMUSCULAR; INTRAVENOUS at 14:36

## 2020-11-10 RX ADMIN — SERTRALINE HYDROCHLORIDE 200 MG: 100 TABLET ORAL at 09:15

## 2020-11-10 RX ADMIN — ZOLPIDEM TARTRATE 10 MG: 5 TABLET, COATED ORAL at 22:28

## 2020-11-10 RX ADMIN — SIMVASTATIN 20 MG: 20 TABLET, FILM COATED ORAL at 22:27

## 2020-11-10 RX ADMIN — ASPIRIN 81 MG: 81 TABLET, COATED ORAL at 19:59

## 2020-11-10 RX ADMIN — ENOXAPARIN SODIUM 40 MG: 40 INJECTION SUBCUTANEOUS at 09:15

## 2020-11-10 RX ADMIN — PANTOPRAZOLE SODIUM 40 MG: 40 TABLET, DELAYED RELEASE ORAL at 22:28

## 2020-11-10 ASSESSMENT — ACTIVITIES OF DAILY LIVING (ADL)
ADLS_ACUITY_SCORE: 17

## 2020-11-10 NOTE — PLAN OF CARE
Pertinent assessments: A&O. 3L O2 weaned to 1L. Dyspneic on exertion. LS ex wheeze. SBA to ambulate. Regular diet. , 105. Showered.   Major Shift Events: Weaned oxygen from 3L to 1L. Reswabbed after negative result, precautions remain in place.  Treatment Plan: Zithromax, Rocephin, Lovenox

## 2020-11-10 NOTE — PLAN OF CARE
[id number].  End of Shift Summary  For vital signs and complete assessments, please see documentation flowsheets.     Pertinent assessments: Pt continues to require 1 L oxygen in mid 90s. LS diminished; pt feels slightly SOB and is KHALIL. BS x4. Denies pain and nausea. Regular diet, good appetite. PIV - SL. Independently ambulating.  Skin intact. . A&O.   Major Shift Events: none  Treatment Plan: Zithromax, Rocephin, Lovenox, Await results of second COVID test

## 2020-11-10 NOTE — PLAN OF CARE
End of Shift Summary  For vital signs and complete assessments, please see documentation flowsheets.     Pertinent assessments: 2L oxygen with sleep as per baseline, lung sounds diminished, bowel sounds active,  Denies pain and nausea. Regular diet, Independently ambulating in room, Skin intact.   Major Shift Events: none  Treatment Plan: Zithromax, Rocephin, Lovenox, Await results of second COVID test  Bedside Nurse: Marcela Deluca RN

## 2020-11-10 NOTE — PROGRESS NOTES
[facility]    Medicine Progress Note - Hospitalist Service       Date of Admission:  11/8/2020  Length of stay: 2 days    Assessment & Plan   Nicole Reyes is a 65 year old female with a history of recurrent pneumonias attributed to aspiration related to hiatal hernia, chronic respiratory failure on nocturnal 2 lpm O2, CRISTÓBAL, insomnia, hypertension, and diabetes on metformin, who is admitted to the hospitalist service for respiratory failure and community-acquired pneumonia.    Overall patient has been gradually improving since hospital admission.  She has tested negative for COVID-19 x2.  White count was 22 on admission, today is 13.3.  Oxygen needs are decreasing as well.  Patient saturating 91% on 2 L.  She is feeling much better.  Add flutter valve today to help clear secretions.  Continue ceftriaxone/azithromycin.  If continues to do well and able to wean from oxygen, could discharge to home as soon as tomorrow.    Acute hypoxic respiratory failure  Community-acquired pneumonia  Rule out COVID-19 infection   - Patient presented with 2 days of fever and cough.  Apparently had been around family members 1 week ago.  Not known to have any COVID-19 positive exposures.  History of recurrent pneumonias is noted.  - On initial vitals was hypoxic and required 4L supplemental oxygen to maintain O2 sats. WBC count 22.5. Chest x-ray showed bilateral opacities.  - Patient received ceftriaxone and azithromycin in the ED.  - COVID-19 negative x2  - Continue CTX and azithromycin     Chronic medical conditions  Morbid obesity, BMI over 50: Noted and complicates cares.  CRISTÓBAL: Continue home CPAP.  Insomnia: Continue home medications.  DM2: Hold metformin, use sliding scale insulin instead.  Resume Metformin when discharged.  Depression/anxiety: Continue clonazepam and sertraline.  HTN: Continue losartan.    Diet: Regular  DVT Prophylaxis: Enoxaparin (Lovenox) SQ  Malnutrition: No  Suarez Catheter: No  Code Status:  "Full Code     Disposition Plan   Expected discharge:   Anticipate discharge to home tomorrow.    Mandeep Worthy MD  Hospitalist Service  [facility]  ______________________________________________________________________    Interval History   Patient is feeling a little bit better but not back to baseline today.  Still coughing but having a little bit of trouble bringing up sputum.  Moving around the room okay.  Eating well.    Data reviewed today: I reviewed all medications, new labs and imaging results over the last 24 hours.     Physical Exam   /60 (BP Location: Right arm)   Pulse 65   Temp 98.2  F (36.8  C) (Oral)   Resp 18   Ht 1.473 m (4' 10\")   Wt 114.1 kg (251 lb 9.6 oz)   LMP 09/15/2007   SpO2 91%   BMI 52.58 kg/m    251 lbs 9.6 oz       General: No acute distress.    HEENT: No scleral icterus. Oropharynx moist.     Neck: Supple. Normal range of motion.     Pulmonary: Normal work of breathing. Clear to auscultation bilaterally.  Limited due to body habitus.    Cardiovascular: Regular rate and rhythm without murmur or extra heart sounds.    Abdomen: Soft and non-tender.    Extremities: No peripheral edema. No clubbing or cyanosis.     Neurologic: Awake, alert, appropriate.    Skin: Warm and dry.    Psychiatric: Normal affect and mood.     Data    Recent Labs   Lab 11/10/20  0721 11/09/20  0739 11/08/20  2215 11/08/20  1238   WBC 13.3* 22.1*  --  22.5*   HGB 10.0* 11.6*  --  12.1   MCV 83 83  --  83    275  --  277   NA  --  140  --  140   POTASSIUM  --  4.1 3.6 3.3*   CHLORIDE  --  106  --  104   CO2  --  27  --  28   BUN  --  19  --  14   CR  --  0.70  --  0.70   ANIONGAP  --  7  --  8   GRECIA  --  8.7  --  8.8   GLC  --  146*  --  159*   ALBUMIN  --   --   --  2.8*   PROTTOTAL  --   --   --  7.1   BILITOTAL  --   --   --  0.5   ALKPHOS  --   --   --  94   ALT  --   --   --  29   AST  --   --   --  26     No results found for this or any previous visit (from the past " 24 hour(s)).    Medications      Current Facility-Administered Medications   Medication Dose Route Frequency     aspirin  81 mg Oral QPM     azithromycin  250 mg Oral Daily     cefTRIAXone  1 g Intravenous Q24H     [Held by provider] dexamethasone  6 mg Intravenous Q24H     enoxaparin ANTICOAGULANT  40 mg Subcutaneous Q12H     gabapentin  900 mg Oral At Bedtime     insulin aspart  1-7 Units Subcutaneous TID AC     insulin aspart  1-5 Units Subcutaneous At Bedtime     losartan  100 mg Oral Daily     pantoprazole  40 mg Oral At Bedtime     polyethylene glycol  17 g Oral Daily     senna-docusate  1 tablet Oral BID    Or     senna-docusate  2 tablet Oral BID     sertraline  200 mg Oral Daily     simvastatin  20 mg Oral At Bedtime     sodium chloride (PF)  3 mL Intracatheter Q8H     sodium chloride (PF)  3 mL Intracatheter Q8H     zolpidem  10 mg Oral At Bedtime

## 2020-11-11 ENCOUNTER — DOCUMENTATION ONLY (OUTPATIENT)
Dept: OTHER | Facility: CLINIC | Age: 65
End: 2020-11-11

## 2020-11-11 VITALS
SYSTOLIC BLOOD PRESSURE: 139 MMHG | TEMPERATURE: 97.2 F | BODY MASS INDEX: 52.6 KG/M2 | OXYGEN SATURATION: 92 % | WEIGHT: 250.6 LBS | HEART RATE: 74 BPM | RESPIRATION RATE: 18 BRPM | HEIGHT: 58 IN | DIASTOLIC BLOOD PRESSURE: 63 MMHG

## 2020-11-11 LAB
CREAT SERPL-MCNC: 0.76 MG/DL (ref 0.52–1.04)
ERYTHROCYTE [DISTWIDTH] IN BLOOD BY AUTOMATED COUNT: 15.9 % (ref 10–15)
GFR SERPL CREATININE-BSD FRML MDRD: 83 ML/MIN/{1.73_M2}
GLUCOSE BLDC GLUCOMTR-MCNC: 105 MG/DL (ref 70–99)
GLUCOSE BLDC GLUCOMTR-MCNC: 154 MG/DL (ref 70–99)
GLUCOSE BLDC GLUCOMTR-MCNC: 94 MG/DL (ref 70–99)
HCT VFR BLD AUTO: 36.3 % (ref 35–47)
HGB BLD-MCNC: 10.8 G/DL (ref 11.7–15.7)
MCH RBC QN AUTO: 24.9 PG (ref 26.5–33)
MCHC RBC AUTO-ENTMCNC: 29.8 G/DL (ref 31.5–36.5)
MCV RBC AUTO: 84 FL (ref 78–100)
PLATELET # BLD AUTO: 299 10E9/L (ref 150–450)
RBC # BLD AUTO: 4.33 10E12/L (ref 3.8–5.2)
WBC # BLD AUTO: 10.3 10E9/L (ref 4–11)

## 2020-11-11 PROCEDURE — 999N001017 HC STATISTIC GLUCOSE BY METER IP

## 2020-11-11 PROCEDURE — 250N000011 HC RX IP 250 OP 636: Performed by: INTERNAL MEDICINE

## 2020-11-11 PROCEDURE — 250N000013 HC RX MED GY IP 250 OP 250 PS 637: Performed by: INTERNAL MEDICINE

## 2020-11-11 PROCEDURE — 99239 HOSP IP/OBS DSCHRG MGMT >30: CPT | Performed by: INTERNAL MEDICINE

## 2020-11-11 PROCEDURE — 36415 COLL VENOUS BLD VENIPUNCTURE: CPT | Performed by: INTERNAL MEDICINE

## 2020-11-11 PROCEDURE — 85027 COMPLETE CBC AUTOMATED: CPT | Performed by: INTERNAL MEDICINE

## 2020-11-11 PROCEDURE — 82565 ASSAY OF CREATININE: CPT | Performed by: INTERNAL MEDICINE

## 2020-11-11 RX ORDER — CEFDINIR 300 MG/1
300 CAPSULE ORAL 2 TIMES DAILY
Qty: 12 CAPSULE | Refills: 0 | Status: SHIPPED | OUTPATIENT
Start: 2020-11-12 | End: 2020-11-17

## 2020-11-11 RX ORDER — AZITHROMYCIN 250 MG/1
250 TABLET, FILM COATED ORAL DAILY
Qty: 1 TABLET | Refills: 0 | Status: SHIPPED | OUTPATIENT
Start: 2020-11-12 | End: 2020-11-13

## 2020-11-11 RX ORDER — CEFTRIAXONE 2 G/1
2 INJECTION, POWDER, FOR SOLUTION INTRAMUSCULAR; INTRAVENOUS ONCE
Status: COMPLETED | OUTPATIENT
Start: 2020-11-11 | End: 2020-11-11

## 2020-11-11 RX ADMIN — ENOXAPARIN SODIUM 40 MG: 40 INJECTION SUBCUTANEOUS at 08:47

## 2020-11-11 RX ADMIN — LOSARTAN POTASSIUM 100 MG: 100 TABLET, FILM COATED ORAL at 08:47

## 2020-11-11 RX ADMIN — SERTRALINE HYDROCHLORIDE 200 MG: 100 TABLET ORAL at 08:47

## 2020-11-11 RX ADMIN — CEFTRIAXONE 2 G: 2 INJECTION, POWDER, FOR SOLUTION INTRAMUSCULAR; INTRAVENOUS at 13:40

## 2020-11-11 RX ADMIN — AZITHROMYCIN MONOHYDRATE 250 MG: 250 TABLET ORAL at 08:47

## 2020-11-11 ASSESSMENT — ACTIVITIES OF DAILY LIVING (ADL)
ADLS_ACUITY_SCORE: 17

## 2020-11-11 ASSESSMENT — MIFFLIN-ST. JEOR: SCORE: 1571.46

## 2020-11-11 NOTE — PROGRESS NOTES
Patient requires re certification for home oxygen as patient now has Medicare for primary insurance. To complete this we will need to remove range from walk test as well as MD face to face note (. [id number]) with diagnosis of acute on chronic respiratory failure with hypoxia j96.20 - as previously documented. Please feel free to call with questions regarding this [phone]     Thank You- [facility]

## 2020-11-11 NOTE — PLAN OF CARE
AVS reviewed, no further questions at this time. Understands discharge medications and follow up instructions. IV removed CDI. All belongings returned to pt. Daughter brought portable oxygen tank for pt.

## 2020-11-11 NOTE — PROGRESS NOTES
Nov17 Telephone Visit with Pari Wiley MD  Tuesday Nov 17, 2020 1:00 PM  [facility]  Note: this is not an onsite visit; there is no need to come to the facility.  Please have a list of all current medications available for appointment.  [place] [address]   [facility] [id number]  [phone]     I scheduled Pt for f/u with Dr Wiley.    Discharge avs updated and Pt agreeable.     [facility] DME requesting [id number] in progress note . MD simeon Jordan RN BSN   Inpatient Care Coordination  [facility]  [phone]

## 2020-11-11 NOTE — DISCHARGE SUMMARY
[facility]  Discharge Summary  Name: Nicole Reyes    MRN: [id number]  YOB: 1955    Age: 65 year old  Date of Discharge:  11/11/2020  3:19 PM  Date of Admission: 11/8/2020  Primary Care Provider: Pari Wiley  Discharge Physician:  Mack López MD  Discharging Service:  Hospitalist      Discharge Diagnoses:  Acute on chronic hypoxemic respiratory failure  CAP  CRISTÓBAL  Type II DM  Morbid obesity  MDD  Anxiety  HTN  Insomnia     Hospital Course:  Summary of stay:  Nicole Reyes is a 65-year-old female with history of recurrent pneumonias likely from aspiration related to her hiatal hernia, chronic hypoxemic respiratory failure on 2 L oxygen at night and sometimes with exertion, CRISTÓBAL on CPAP, HTN, type II DM, insomnia, and morbid obesity who was admitted on 11/8/2020 after presenting with cough, weakness, progressive shortness of breath.  Found to be hypoxic in the ER requiring continuous supplemental oxygen at 2-4 L.  Also found of leukocytosis at 22 and temperature 100.0  F initially.  CRP elevated at 170.  Chest x-ray showed patchy airspace opacities in bilateral mid and lower lungs.  Started empirically on IV ceftriaxone and azithromycin.  COVID-19 PCR checked twice and both were negative.  Leukocytosis resolved.  Oxygen weaned to 2 L with exertion and at nighttime which is prescribed at discharge.  Overall slowly improving.  Finished course of oral azithromycin and cefdinir on discharge.  Follow-up with primary provider in 1 week if symptoms not improving.    Assessment/plan:  Acute on chronic hypoxemic respiratory failure: Chronically on 2 L oxygen at night with her CPAP and as needed with exertion.  Initially more hypoxic requiring 2 to 4 L continuous secondary to pneumonia.  Slow clinical improvement.  Now back on baseline oxygen which is prescribed on discharge.    CAP: Presenting with cough, low-grade fevers, leukocytosis, elevated CRP, and bilateral infiltrate on chest  x-ray.  Suspicious for COVID-19, however negative PCR testing x2.  Leukocytosis resolved and clinically improved on ceftriaxone and azithromycin.  Possible recurrent aspiration pneumonia in setting of hiatal hernia.  Denies any dysphagia symptoms.  -Finish 5-day course of oral azithromycin on discharge and oral cefdinir 3 mg daily to complete 10 days of cephalosporins  -Follow-up primary care doctor in 1 week if symptoms fail to improve    CRISTÓBAL: Resume home CPAP at bedtime with 2 L oxygen at night.    Morbid obesity: BMI 52.3.    Type II DM: PTA on Metformin.  A1c is 6.6.  Resume Metformin at discharge.    HTN: Resumed PTA losartan.    MDD, anxiety: Resume PTA clonazepam and sertraline.       Discharge Disposition:  Discharged to home     Allergies:  Allergies   Allergen Reactions     Codeine Rash     Penicillins Rash     Pt has tolerated cephalosporins and penems.   Pt tolerated Zosyn 7/6/2019        Discharge Medications:   Discharge Medication List as of 11/11/2020  1:56 PM      START taking these medications    Details   azithromycin (ZITHROMAX) 250 MG tablet Take 1 tablet (250 mg) by mouth daily for 1 day, Disp-1 tablet, R-0, E-Prescribe      cefdinir (OMNICEF) 300 MG capsule Take 1 capsule (300 mg) by mouth 2 times daily for 6 days, Disp-12 capsule, R-0, E-Prescribe         CONTINUE these medications which have NOT CHANGED    Details   albuterol (PROAIR HFA/PROVENTIL HFA/VENTOLIN HFA) 108 (90 Base) MCG/ACT inhaler Inhale 1-2 puffs into the lungs every 6 hours as needed for shortness of breath / dyspnea or wheezing, Disp-1 Inhaler, R-0, E-PrescribePlease also dispense a spacer      aspirin 81 MG EC tablet Take 81 mg by mouth every evening , Historical      blood glucose (ACCU-CHEK BYRON PLUS) test strip USE TO TEST BLOOD SUGAR TWO TIMES A DAY OR AS DIRECTED, Disp-100 each,R-3, E-Prescribe      clonazePAM (KLONOPIN) 1 MG tablet Take 1 tablet (1 mg) by mouth 2 times daily as needed for anxiety, Disp-60 tablet,  "R-1, E-Prescribe      gabapentin (NEURONTIN) 300 MG capsule Take 3 capsules (900 mg) by mouth daily, Disp-270 capsule,R-3, E-Prescribe      losartan (COZAAR) 100 MG tablet TAKE ONE TABLET BY MOUTH ONCE DAILY, Disp-90 tablet,R-0, E-Prescribe      metFORMIN (GLUCOPHAGE-XR) 500 MG 24 hr tablet TAKE TWO TABLETS BY MOUTH EVERY MORNING, Disp-180 tablet,R-0, E-Prescribe      multivitamin w/minerals (MULTI-VITAMIN) tablet Take 1 tablet by mouth daily, Historical      pantoprazole (PROTONIX) 40 MG EC tablet TAKE ONE TABLET BY MOUTH AT BEDTIME, Disp-90 tablet,R-2, E-Prescribe      sertraline (ZOLOFT) 100 MG tablet TAKE TWO TABLETS BY MOUTH ONCE DAILY, Disp-180 tablet,R-3, E-Prescribe      simvastatin (ZOCOR) 20 MG tablet TAKE ONE TABLET BY MOUTH AT BEDTIME, Disp-90 tablet,R-0, E-Prescribe      zolpidem (AMBIEN) 10 MG tablet Take 1 tablet (10 mg) by mouth At Bedtime, Disp-90 tablet, R-1, E-Prescribe              Condition on Discharge:  Discharge condition: Stable   Discharge vitals: Blood pressure 139/63, pulse 74, temperature 97.2  F (36.2  C), temperature source Oral, resp. rate 18, height 1.473 m (4' 10\"), weight 113.7 kg (250 lb 9.6 oz), last menstrual period 09/15/2007, SpO2 92 %, not currently breastfeeding.   Code status on discharge: Full Code     History of Illness:  See detailed admission note for full details.    Physical Exam:  Blood pressure 139/63, pulse 74, temperature 97.2  F (36.2  C), temperature source Oral, resp. rate 18, height 1.473 m (4' 10\"), weight 113.7 kg (250 lb 9.6 oz), last menstrual period 09/15/2007, SpO2 92 %, not currently breastfeeding.  Wt Readings from Last 1 Encounters:   11/11/20 113.7 kg (250 lb 9.6 oz)     Constitutional: Awake, NAD   Eyes: sclera white   HEENT:  MMM  Respiratory: On room air, no respiratory distress, bibasilar crackles, no wheeze  Cardiovascular: RRR.  No murmur   GI: Obese, non-tender, not distended, bowel sounds present  Skin: no rash   Musculoskeletal/extremities:  " No edema  Neurologic: A&O  Psychiatric: calm, cooperative    Procedures other than Imaging:  None     Imaging:  Results for orders placed or performed during the hospital encounter of 11/08/20   XR Chest Port 1 View    Narrative    CHEST ONE VIEW PORTABLE    11/8/2020 1:27 PM     HISTORY: Fever    COMPARISON: Chest x-ray 8/3/2020.      Impression    IMPRESSION: Portable chest. Patchy airspace opacities are noted in the  mid and lower lungs, possibly infectious. Heart is enlarged,  unchanged. No pneumothorax. No definite pleural effusions. Small  hiatal hernia is noted.    JOHN COELHO MD        Consultations:  No consultations were requested during this admission.       Recent Lab Results:  Recent Labs   Lab 11/11/20  0743 11/10/20  0721 11/09/20  0739   WBC 10.3 13.3* 22.1*   HGB 10.8* 10.0* 11.6*   HCT 36.3 33.6* 39.1   MCV 84 83 83    247 275     Recent Labs   Lab 11/11/20  0743 11/09/20  0739 11/08/20  2215 11/08/20  1238   NA  --  140  --  140   POTASSIUM  --  4.1 3.6 3.3*   CHLORIDE  --  106  --  104   CO2  --  27  --  28   ANIONGAP  --  7  --  8   GLC  --  146*  --  159*   BUN  --  19  --  14   CR 0.76 0.70  --  0.70   GFRESTIMATED 83 >90  --  >90   GFRESTBLACK >90 >90  --  >90   GRECIA  --  8.7  --  8.8     Recent Labs   Lab 11/08/20  1318 11/08/20  1238   CULT No growth after 3 days No growth after 3 days     COVID-19 PCR negative x2     Pending Results:    Unresulted Labs Ordered in the Past 30 Days of this Admission     Date and Time Order Name Status Description    11/8/2020 1244 Blood culture Preliminary     11/8/2020 1244 Blood culture Preliminary          These results will be followed up by patient's primary care provider.    Discharge Instructions and Follow-Up:   Discharge Procedure Orders   Reason for your hospital stay   Order Comments: You were hospitalized for pneumonia that improved with IV antibiotics.  Your COVID-19 testing was negative.  You will finish a course of oral azithromycin and  cefdinir for her pneumonia.  Your weakness and fatigue should slowly improve and headache should resolve for the next few days.  For now you may need 2 L of oxygen with exertion in addition to at night, but your shortness of breath should also improve over time.     Follow-up and recommended labs and tests    Order Comments: Follow up with primary care provider, Pari Wiley, within 7 days for hospital follow- up if symptoms fail to resolve.     Activity   Order Comments: Your activity upon discharge: activity as tolerated     Order Specific Question Answer Comments   Is discharge order? Yes      Full Code     Order Specific Question Answer Comments   Code status determined by: Discussion with patient/ legal decision maker      Oxygen Adult/Peds   Order Comments: Oxygen Documentation:   I certify that this patient, Nicole Reyes has been under my care (or a nurse practitioner or physican's assistant working with me). This is the face-to-face encounter for oxygen medical necessity.      Nicole Reyes is now in a chronic stable state and continues to require supplemental oxygen. Patient has continued oxygen desaturation due to acute on chronic respiratory failure with hypoxia.    Alternative treatment(s) tried or considered and deemed clinically infective for treatment of acute on chronic respiratory failure with hypoxia: Inhalers  If portability is ordered, is the patient mobile within the home? yes    **Patients who qualify for home O2 coverage under the CMS guidelines require ABG tests or O2 sat readings obtained closest to, but no earlier than 2 days prior to the discharge, as evidence of the need for home oxygen therapy. Testing must be performed while patient is in the chronic stable state. See notes for O2 sats.**     Order Specific Question Answer Comments   DME Provider: [facility]    Type: Resume    Did the patient have SpO2 (sat) testing (only needed for new oxgyen or liter flow changes)? No     Length of Need: Lifetime    Frequency of Use: With Activity    Frequency of Use: Nocturnal    Mode of Delivery - With Activity Nasal Cannula    Liter Flow - With Activity (LPM): 2    Mode of Delivery - Nocturnal Nasal Cannula    Liter Flow - Nocturnal (LPM): 2    Need for Portability: Yes    Evaluate for Conserving Device: Yes    Maintain Sats >= 90%    The face to face evaluation was performed on: 11/11/2020      Diet   Order Comments: Follow this diet upon discharge: Orders Placed This Encounter      Combination Diet Regular Diet Adult     Order Specific Question Answer Comments   Is discharge order? Yes          I, Mack López MD, personally saw the patient today and spent greater than 30 minutes discharging this patient.    Mack López MD

## 2020-11-11 NOTE — PROGRESS NOTES
Full note to follow.  Assumed care today.  Clinically improved.  Still feels weaker than baseline and has a cough she is having difficulty bringing up sputum.  She is overall doing much better with no fever, leukocytosis resolved today, and currently on room air at rest.  Anticipate will need 2 L with exertion.  She is not sure if she feels ready to go home.  After breakfast we will have her walk and see how her energy and oxygen levels do with this.  If doing well may be able to discharge home this afternoon.  Will reassess around lunch.

## 2020-11-11 NOTE — PLAN OF CARE
Pertinent assessments: A&O. Ambulating independently. 2L O2, uses 2L with activity and while sleeping at BL. Lung sounds dim. Tylenol given for a headache with relief. Regular diet, , 102.    Major Shift Events: 2nd covid swab (-), precautions discontinued   Treatment Plan: Zithromax, Rocephin, Lovenox

## 2020-11-11 NOTE — PROGRESS NOTES
I certify that this patient, Nicole Reyes has been under my care (or a nurse practitioner or physican's assistant working with me). This is the face-to-face encounter for oxygen medical necessity.      Nicole Reyes is now in a chronic stable state and continues to require supplemental oxygen. Patient has continued oxygen desaturation due to acute on chronic respiratory failure with hypoxia.    Alternative treatment(s) tried or considered and deemed clinically infective for treatment of acute on chronic respiratory failure with hypoxia include inhalers.  If portability is ordered, is the patient mobile within the home? yes    **Patients who qualify for home O2 coverage under the CMS guidelines require ABG tests or O2 sat readings obtained closest to, but no earlier than 2 days prior to the discharge, as evidence of the need for home oxygen therapy. Testing must be performed while patient is in the chronic stable state. See notes for O2 sats.**     No

## 2020-11-11 NOTE — PROGRESS NOTES
Pt ambulated the halls on room air, maintained 90-91% for two hallway lengths, after that began to feel winded and was satting 85-89%. 2L O2 applied and recovered quickly to 90-93%.     Patient has been assessed for Home Oxygen needs.  Oxygen readings:   *   RA - at rest  Pulse oximetry SPO2 92%  *   RA - during activity/with exercise SPO2 85%  *   O2 at  2 liters/minute (at rest) ...SPO2 95%  *   O2 at  2 liters/minute (during activity/with exercise) ...SPO2 90%

## 2020-11-11 NOTE — PLAN OF CARE
End of Shift Summary  For vital signs and complete assessments, please see documentation flowsheets.     Pertinent assessments: 2L oxygen with sleep as per baseline, lung sounds diminished, bowel sounds active,  Denies pain and nausea. Regular diet, Independently ambulating in room, Skin intact.   Major Shift Events: none  Treatment Plan: Zithromax, Rocephin, Lovenox  Bedside Nurse: Marcela Deluca RN

## 2020-11-12 ENCOUNTER — TELEPHONE (OUTPATIENT)
Dept: INTERNAL MEDICINE | Facility: CLINIC | Age: 65
End: 2020-11-12

## 2020-11-12 NOTE — TELEPHONE ENCOUNTER
"IP F/U    Date: 11/11/20  Diagnosis: Cough, Marko (Obstructive Sleep Apnea)  Is patient active in care coordination? No  Was patient in TCU? No      ED for acute condition Discharge Protocol    \"Hi, my name is Jordyn Perez RN, a registered nurse, and I am calling from [facility]  I am calling to follow up and see how things are going for you after your recent emergency visit.\"    Tell me how you are doing now that you are home?\" feeling like I am improving      Discharge Instructions    \"Let's review your discharge instructions.  What is/are the follow-up recommendations?  Pt. Response: yes    \"Has an appointment with your primary care provider been scheduled?\"  Yes. (confirm and remind to bring meds)  Telephone visit  Medications    \"Tell me what changed about your medicines when you discharged?\"    no    \"What questions do you have about your medications?\"   None        Call Summary    \"What questions or concerns do you have about your recent visit and your follow-up care?\"     since coming home from hospital, she has been very itchy.  pt denied any hives or any allergic reactions. . Advice given: discussed moisturizing skin, took benadryl.      \"If you have questions or things don't continue to improve, we encourage you contact us through the main clinic number (give number).  Even if the clinic is not open, triage nurses are available 24/7 to help you.     We would like you to know that our clinic has extended hours (provide information).  We also have urgent care (provide details on closest location and hours/contact info)\"    \"Thank you for your time and take care!\"                    "

## 2020-11-17 ENCOUNTER — VIRTUAL VISIT (OUTPATIENT)
Dept: INTERNAL MEDICINE | Facility: CLINIC | Age: 65
End: 2020-11-17
Payer: MEDICARE

## 2020-11-17 DIAGNOSIS — J69.0 RECURRENT ASPIRATION PNEUMONIA (H): Primary | ICD-10-CM

## 2020-11-17 PROCEDURE — 99442 PR PHYSICIAN TELEPHONE EVALUATION 11-20 MIN: CPT | Mod: 95 | Performed by: INTERNAL MEDICINE

## 2020-11-17 NOTE — PROGRESS NOTES
"Nicole Reyes is a 65 year old female who is being evaluated via a billable telephone visit.      The patient has been notified of following:     \"This telephone visit will be conducted via a call between you and your physician/provider. We have found that certain health care needs can be provided without the need for an in-person physical exam.  This service lets us provide the care you need with a telephone conversation.  If a prescription is necessary we can send it directly to your pharmacy.  If lab work is needed we can place an order for that and you can then stop by our lab to have the test done at a later time.    Telephone visits are billed at different rates depending on your insurance coverage.  Please reach out to your insurance provider with any questions.    If during the course of the call the physician/provider feels a telephone visit is not appropriate, you will not be charged for this service.\"    Patient has given verbal consent for telephone visit? Yes  How would you like to obtain your AVS? MyChart    Subjective     Nicole Reyes is a 65 year old female who presents today via video visit for the following health issues:    [facility]          Hospital Follow-up Visit:    Hospital/Nursing Home/IP Rehab Facility: [facility]  Date of Admission: 11/08/2020  Date of Discharge: 11/11/2020  Reason(s) for Admission: pneumonia and hypoxia       Was your hospitalization related to COVID-19? No   Problems taking medications regularly:  None  Medication changes since discharge: None  Problems adhering to non-medication therapy:  None    Summary of hospitalization:  [facility] discharge summary reviewed  Diagnostic Tests/Treatments reviewed.  Follow up needed: none  Other Healthcare Providers Involved in Patient s Care:         Specialist appointment - GI, pulmonary  Update since discharge: improved.   This is like her other aspiration pneumonias. She continues to feel very tired, " decreased energy and endurance. Her breathing is improving. She is on oxygen most of the day still but will take it off occasionally when sitting. She has oximeter at home.     No new concerns.   She is waiting for GI to decide about surgery, likely will be after new years.         Post Discharge Medication Reconciliation: discharge medications reconciled, continue medications without change.  Plan of care communicated with patient          Patient Active Problem List   Diagnosis     Essential hypertension, benign     Restless leg syndrome     CRISTÓBAL (obstructive sleep apnea)     Advanced directives, counseling/discussion     GENERALIZED ANXIETY DIS     Mild episode of recurrent major depressive disorder (H)     Health Care Home     GERD (gastroesophageal reflux disease)     Hyperlipidemia LDL goal <100     Eczema     Type 2 diabetes mellitus without complication, without long-term current use of insulin (H)     Midline low back pain with left-sided sciatica     Morbid obesity (HCC)     Insomnia, unspecified type     Dyspnea, unspecified type     Controlled substance agreement signed     Pain of both shoulder joints     Recurrent aspiration pneumonia (H)     Cough     Hypoxia     Pneumonia of both lungs due to infectious organism, unspecified part of lung     Current Outpatient Medications   Medication Sig Dispense Refill     albuterol (PROAIR HFA/PROVENTIL HFA/VENTOLIN HFA) 108 (90 Base) MCG/ACT inhaler Inhale 1-2 puffs into the lungs every 6 hours as needed for shortness of breath / dyspnea or wheezing 1 Inhaler 0     aspirin 81 MG EC tablet Take 81 mg by mouth every evening        blood glucose (ACCU-CHEK BYRON PLUS) test strip USE TO TEST BLOOD SUGAR TWO TIMES A DAY OR AS DIRECTED 100 each 3     clonazePAM (KLONOPIN) 1 MG tablet Take 1 tablet (1 mg) by mouth 2 times daily as needed for anxiety 60 tablet 1     gabapentin (NEURONTIN) 300 MG capsule Take 3 capsules (900 mg) by mouth daily 270 capsule 3     losartan  (COZAAR) 100 MG tablet TAKE ONE TABLET BY MOUTH ONCE DAILY 90 tablet 0     metFORMIN (GLUCOPHAGE-XR) 500 MG 24 hr tablet TAKE TWO TABLETS BY MOUTH EVERY MORNING 180 tablet 0     multivitamin w/minerals (MULTI-VITAMIN) tablet Take 1 tablet by mouth daily       pantoprazole (PROTONIX) 40 MG EC tablet TAKE ONE TABLET BY MOUTH AT BEDTIME 90 tablet 2     sertraline (ZOLOFT) 100 MG tablet TAKE TWO TABLETS BY MOUTH ONCE DAILY 180 tablet 3     simvastatin (ZOCOR) 20 MG tablet TAKE ONE TABLET BY MOUTH AT BEDTIME 90 tablet 0     zolpidem (AMBIEN) 10 MG tablet Take 1 tablet (10 mg) by mouth At Bedtime 90 tablet 1      Social History     Tobacco Use     Smoking status: Former Smoker     Quit date: 3/18/1979     Years since quittin.6     Smokeless tobacco: Never Used     Tobacco comment: quit    Substance Use Topics     Alcohol use: Yes     Comment: Twice a month     Drug use: No          Review of Systems   No fever, chills, still some malaise, dyspnea, mild cough      Objective           Vitals:  No vitals were obtained today due to virtual visit.    Physical Exam     Breathing: not labored, no wheezes              Assessment & Plan     Recurrent aspiration pneumonia (H)  Improving with improving oxygen levels, no further fever. Complete antibiotics as planned and follow up with specialists.               No follow-ups on file.    Pari Wiley MD  [facility]    Telephone visit duration: 11 minutes

## 2020-11-23 PROBLEM — J18.9 PNEUMONIA OF BOTH LUNGS DUE TO INFECTIOUS ORGANISM, UNSPECIFIED PART OF LUNG: Status: RESOLVED | Noted: 2020-11-08 | Resolved: 2020-11-23

## 2020-11-27 ASSESSMENT — MIFFLIN-ST. JEOR: SCORE: 1540.39

## 2020-11-30 DIAGNOSIS — Z11.59 ENCOUNTER FOR SCREENING FOR OTHER VIRAL DISEASES: ICD-10-CM

## 2020-11-30 PROCEDURE — U0003 INFECTIOUS AGENT DETECTION BY NUCLEIC ACID (DNA OR RNA); SEVERE ACUTE RESPIRATORY SYNDROME CORONAVIRUS 2 (SARS-COV-2) (CORONAVIRUS DISEASE [COVID-19]), AMPLIFIED PROBE TECHNIQUE, MAKING USE OF HIGH THROUGHPUT TECHNOLOGIES AS DESCRIBED BY CMS-2020-01-R: HCPCS | Performed by: INTERNAL MEDICINE

## 2020-12-01 LAB
SARS-COV-2 RNA SPEC QL NAA+PROBE: NOT DETECTED
SPECIMEN SOURCE: NORMAL

## 2020-12-03 ENCOUNTER — HOSPITAL ENCOUNTER (OUTPATIENT)
Facility: CLINIC | Age: 65
Discharge: HOME | End: 2020-12-03
Attending: INTERNAL MEDICINE | Admitting: INTERNAL MEDICINE
Payer: MEDICARE

## 2020-12-03 ENCOUNTER — ANESTHESIA (OUTPATIENT)
Dept: SURGERY | Facility: CLINIC | Age: 65
End: 2020-12-03
Payer: MEDICARE

## 2020-12-03 ENCOUNTER — ANESTHESIA EVENT (OUTPATIENT)
Dept: SURGERY | Facility: CLINIC | Age: 65
End: 2020-12-03
Payer: MEDICARE

## 2020-12-03 VITALS
HEART RATE: 60 BPM | HEIGHT: 59 IN | BODY MASS INDEX: 49.19 KG/M2 | DIASTOLIC BLOOD PRESSURE: 63 MMHG | TEMPERATURE: 97.5 F | OXYGEN SATURATION: 98 % | SYSTOLIC BLOOD PRESSURE: 126 MMHG | RESPIRATION RATE: 16 BRPM | WEIGHT: 244 LBS

## 2020-12-03 DIAGNOSIS — E66.01 MORBID OBESITY (H): Primary | ICD-10-CM

## 2020-12-03 LAB
COLONOSCOPY: NORMAL
GLUCOSE BLDC GLUCOMTR-MCNC: 111 MG/DL (ref 70–99)

## 2020-12-03 PROCEDURE — 272N000001 HC OR GENERAL SUPPLY STERILE: Performed by: INTERNAL MEDICINE

## 2020-12-03 PROCEDURE — 250N000009 HC RX 250: Performed by: INTERNAL MEDICINE

## 2020-12-03 PROCEDURE — 999N000036 HC STATISTIC COLONOSCOPY (OR PROCEDURE): Performed by: INTERNAL MEDICINE

## 2020-12-03 PROCEDURE — 999N000136 HC STATISTIC PRE PROC ASSESS II: Performed by: INTERNAL MEDICINE

## 2020-12-03 PROCEDURE — 258N000003 HC RX IP 258 OP 636: Performed by: ANESTHESIOLOGY

## 2020-12-03 PROCEDURE — 360N000014 HC SURGERY LEVEL 2 1ST 30 MIN: Performed by: INTERNAL MEDICINE

## 2020-12-03 PROCEDURE — 761N000007 HC RECOVERY PHASE 2 EACH 15 MINS: Performed by: INTERNAL MEDICINE

## 2020-12-03 PROCEDURE — 88305 TISSUE EXAM BY PATHOLOGIST: CPT | Mod: 26 | Performed by: PATHOLOGY

## 2020-12-03 PROCEDURE — 250N000011 HC RX IP 250 OP 636: Performed by: NURSE ANESTHETIST, CERTIFIED REGISTERED

## 2020-12-03 PROCEDURE — 370N000001 HC ANESTHESIA TECHNICAL FEE, 1ST 30 MIN: Performed by: INTERNAL MEDICINE

## 2020-12-03 PROCEDURE — 999N001017 HC STATISTIC GLUCOSE BY METER IP

## 2020-12-03 PROCEDURE — 250N000011 HC RX IP 250 OP 636: Performed by: ANESTHESIOLOGY

## 2020-12-03 PROCEDURE — 88305 TISSUE EXAM BY PATHOLOGIST: CPT | Mod: TC | Performed by: INTERNAL MEDICINE

## 2020-12-03 PROCEDURE — 250N000009 HC RX 250: Performed by: NURSE ANESTHETIST, CERTIFIED REGISTERED

## 2020-12-03 RX ORDER — NALOXONE HYDROCHLORIDE 0.4 MG/ML
0.2 INJECTION, SOLUTION INTRAMUSCULAR; INTRAVENOUS; SUBCUTANEOUS
Status: DISCONTINUED | OUTPATIENT
Start: 2020-12-03 | End: 2020-12-03 | Stop reason: HOSPADM

## 2020-12-03 RX ORDER — PROCHLORPERAZINE MALEATE 5 MG
5 TABLET ORAL EVERY 6 HOURS PRN
Status: DISCONTINUED | OUTPATIENT
Start: 2020-12-03 | End: 2020-12-03 | Stop reason: HOSPADM

## 2020-12-03 RX ORDER — NALOXONE HYDROCHLORIDE 0.4 MG/ML
0.4 INJECTION, SOLUTION INTRAMUSCULAR; INTRAVENOUS; SUBCUTANEOUS
Status: DISCONTINUED | OUTPATIENT
Start: 2020-12-03 | End: 2020-12-03 | Stop reason: HOSPADM

## 2020-12-03 RX ORDER — SODIUM CHLORIDE, SODIUM LACTATE, POTASSIUM CHLORIDE, CALCIUM CHLORIDE 600; 310; 30; 20 MG/100ML; MG/100ML; MG/100ML; MG/100ML
INJECTION, SOLUTION INTRAVENOUS CONTINUOUS
Status: DISCONTINUED | OUTPATIENT
Start: 2020-12-03 | End: 2020-12-03 | Stop reason: HOSPADM

## 2020-12-03 RX ORDER — ONDANSETRON 4 MG/1
4 TABLET, ORALLY DISINTEGRATING ORAL EVERY 30 MIN PRN
Status: DISCONTINUED | OUTPATIENT
Start: 2020-12-03 | End: 2020-12-03 | Stop reason: HOSPADM

## 2020-12-03 RX ORDER — ONDANSETRON 4 MG/1
4 TABLET, ORALLY DISINTEGRATING ORAL EVERY 6 HOURS PRN
Status: DISCONTINUED | OUTPATIENT
Start: 2020-12-03 | End: 2020-12-03 | Stop reason: HOSPADM

## 2020-12-03 RX ORDER — FENTANYL CITRATE 50 UG/ML
25-50 INJECTION, SOLUTION INTRAMUSCULAR; INTRAVENOUS
Status: DISCONTINUED | OUTPATIENT
Start: 2020-12-03 | End: 2020-12-03 | Stop reason: HOSPADM

## 2020-12-03 RX ORDER — ONDANSETRON 2 MG/ML
4 INJECTION INTRAMUSCULAR; INTRAVENOUS EVERY 30 MIN PRN
Status: DISCONTINUED | OUTPATIENT
Start: 2020-12-03 | End: 2020-12-03 | Stop reason: HOSPADM

## 2020-12-03 RX ORDER — ACETAMINOPHEN 325 MG/1
975 TABLET ORAL ONCE
Status: DISCONTINUED | OUTPATIENT
Start: 2020-12-03 | End: 2020-12-03 | Stop reason: HOSPADM

## 2020-12-03 RX ORDER — LABETALOL HYDROCHLORIDE 5 MG/ML
10 INJECTION, SOLUTION INTRAVENOUS
Status: DISCONTINUED | OUTPATIENT
Start: 2020-12-03 | End: 2020-12-03 | Stop reason: HOSPADM

## 2020-12-03 RX ORDER — FLUMAZENIL 0.1 MG/ML
0.2 INJECTION, SOLUTION INTRAVENOUS
Status: DISCONTINUED | OUTPATIENT
Start: 2020-12-03 | End: 2020-12-03 | Stop reason: HOSPADM

## 2020-12-03 RX ORDER — MEPERIDINE HYDROCHLORIDE 25 MG/ML
12.5 INJECTION INTRAMUSCULAR; INTRAVENOUS; SUBCUTANEOUS
Status: DISCONTINUED | OUTPATIENT
Start: 2020-12-03 | End: 2020-12-03 | Stop reason: HOSPADM

## 2020-12-03 RX ORDER — LIDOCAINE 40 MG/G
CREAM TOPICAL
Status: DISCONTINUED | OUTPATIENT
Start: 2020-12-03 | End: 2020-12-03 | Stop reason: HOSPADM

## 2020-12-03 RX ORDER — PROPOFOL 10 MG/ML
INJECTION, EMULSION INTRAVENOUS PRN
Status: DISCONTINUED | OUTPATIENT
Start: 2020-12-03 | End: 2020-12-03

## 2020-12-03 RX ORDER — ONDANSETRON 2 MG/ML
4 INJECTION INTRAMUSCULAR; INTRAVENOUS EVERY 6 HOURS PRN
Status: DISCONTINUED | OUTPATIENT
Start: 2020-12-03 | End: 2020-12-03 | Stop reason: HOSPADM

## 2020-12-03 RX ORDER — GLYCOPYRROLATE 0.2 MG/ML
INJECTION, SOLUTION INTRAMUSCULAR; INTRAVENOUS PRN
Status: DISCONTINUED | OUTPATIENT
Start: 2020-12-03 | End: 2020-12-03

## 2020-12-03 RX ADMIN — PROPOFOL 30 MG: 10 INJECTION, EMULSION INTRAVENOUS at 12:14

## 2020-12-03 RX ADMIN — PROPOFOL 30 MG: 10 INJECTION, EMULSION INTRAVENOUS at 12:12

## 2020-12-03 RX ADMIN — GLYCOPYRROLATE 0.1 MG: 0.2 INJECTION, SOLUTION INTRAMUSCULAR; INTRAVENOUS at 12:05

## 2020-12-03 RX ADMIN — PROPOFOL 20 MG: 10 INJECTION, EMULSION INTRAVENOUS at 12:22

## 2020-12-03 RX ADMIN — LIDOCAINE HYDROCHLORIDE 30 MG: 10 INJECTION, SOLUTION EPIDURAL; INFILTRATION; INTRACAUDAL; PERINEURAL at 12:08

## 2020-12-03 RX ADMIN — PROPOFOL 20 MG: 10 INJECTION, EMULSION INTRAVENOUS at 12:18

## 2020-12-03 RX ADMIN — PROPOFOL 20 MG: 10 INJECTION, EMULSION INTRAVENOUS at 12:10

## 2020-12-03 RX ADMIN — SODIUM CHLORIDE, POTASSIUM CHLORIDE, SODIUM LACTATE AND CALCIUM CHLORIDE: 600; 310; 30; 20 INJECTION, SOLUTION INTRAVENOUS at 11:08

## 2020-12-03 RX ADMIN — FENTANYL CITRATE 50 MCG: 50 INJECTION, SOLUTION INTRAMUSCULAR; INTRAVENOUS at 12:43

## 2020-12-03 SDOH — HEALTH STABILITY: MENTAL HEALTH: CURRENT SMOKER: 0

## 2020-12-03 ASSESSMENT — MIFFLIN-ST. JEOR: SCORE: 1557.41

## 2020-12-03 NOTE — LETTER
November 12, 2020      Nicole Reyes  [address]  [place] [address]        Dear Nicole,     Thank you for choosing [facility]. You are scheduled for the following service(s).   Please be aware that coverage of these services is subject to the terms and limitations of your health insurance plan.  Call member services at your health plan with any benefit or coverage questions.    You will need to have a History and Physical Exam done within 30 days of this scheduled procedure. Please arrange this with your primary care physician.    Date:  12/3/2020    Procedure:   COLONOSCOPY    Doctor:  Dr. Obinna Gutierrez                      Arrival Time:  10:30 am   *check in at the Surgery Center desk*     Procedure Time: 12:00 pm      Location:   [facility]      Surgery Center (on the south side of the Hospitals in [place])       [address] [place], [place] [address]              MIRALAX -GATORADE  PREP  Colonoscopy is the most accurate test to detect colon polyps and colon cancer; and the only test where polyps can be removed. During this procedure, a doctor examines the lining of your large intestine and rectum through a flexible tube.     Transportation  You must arrange for a ride for the day of your procedure with a responsible adult. A taxi , Uber, etc, is not an option unless you are accompanied by a responsible adult. If you fail to arrange transportation with a responsible adult, your procedure will be cancelled and rescheduled.      Purchase the  following supplies at your local pharmacy:  - 2 (two) bisacodyl tablets: each tablet contains 5 mg.  (Dulcolax  laxative NOT Dulcolax  stool softener)   - 1 (one) 8.3 oz bottle of Polyethylene Glycol (PEG) 3350 Powder   (MiraLAX , Smooth LAX , ClearLAX  or equivalent)  - 64 oz Gatorade    Regular Gatorade, Gatorade G2 , Powerade , Powerade Zero  or Pedialyte  is acceptable. Red colored flavors are not allowed; all  other colors (yellow, green, orange, purple and blue) are okay. It is also okay to buy two 2.12 oz packets of powdered Gatorade that can be mixed with water to a total volume of 64 oz of liquid.  - 1 (one) 10 oz bottle of Magnesium Citrate (Red colored flavors are not allowed)  It is also okay for you to use a 0.5 oz package of powdered magnesium citrate (17 g) mixed with 10 oz of water.      PREPARATION FOR COLONOSCOPY    7 days before:    Discontinue fiber supplements and medications containing iron. This includes Metamucil  and Fibercon ; and multivitamins with iron.  3 days before:    Begin a low-fiber diet. A low-fiber diet helps making the cleanout more effective.     Examples of a low-fiber diet include (but are not limited to): white bread, white rice, pasta, crackers, fish, chicken, eggs, ground beef, creamy peanut butter, cooked/steamed/boiled vegetables, canned fruit, bananas, melons, milk, plain yogurt cheese, salad dressing and other condiments.     The following are not allowed on a low-fiber diet: seeds, nuts, popcorn, bran, whole wheat, corn, quinoa, raw fruits and vegetables, berries and dried fruit, beans and lentils.    For additional details on low-fiber diet, please refer to the table on the last page.  2 days before:    Continue the low-fiber diet.     Drink at least 8 glasses of water throughout the day.     Stop eating solid foods at 11:45 pm.  1 day before:    In the morning: begin a clear liquid diet (liquids you can see through).     Examples of a clear liquid diet include: water, clear broth or bouillon, Gatorade, Pedialyte or Powerade, carbonated and non-carbonated soft drinks (Sprite , 7-Up , ginger ale), strained fruit juices without pulp (apple, white grape, white cranberry), Jell-O  and popsicles.     The following are not allowed on a clear liquid diet: red liquids, alcoholic beverages,  dairy products (milk, creamer, and yogurt), protein shakes,  juice with pulp and chewing  tobacco.    At noon: take 2 (two) bisacodyl tablets     At 4 (and no later than 6pm): start drinking the Miralax-Gatorade preparation (8.3 oz of Miralax mixed with 64 oz of Gatorade in a large pitcher). Drink 1(one) 8 oz glass every 15 minutes thereafter, until the mixture is gone.              COLON CLEANSING TIPS: drink adequate amounts of fluids before and after your colon cleansing to prevent dehydration. Stay near a toilet because you will have diarrhea. Even if you are sitting on the toilet, continue to drink the cleansing solution every 15 minutes. If you feel nauseous or vomit, rinse your mouth with water, take a 15 to 30-minute-break and then continue drinking the solution. You will be uncomfortable until the stool has flushed from your colon (in about 2 to 4 hours). You may feel chilled.    Day of your procedure  You may take all of your morning medications including blood pressure medications, blood thinners (if you have not been instructed to stop these by our office), methadone, anti-seizure medications with sips of water 3 hours prior to your procedure or earlier. Do not take insulin or vitamins prior to your procedure. Continue the clear liquid diet.   4 hours prior: drink 10 oz of magnesium citrate. It may be easier to drink it with a straw.    STOP consuming all liquids after that.     Do not take anything by mouth during this time.     Allow extra time to travel to your procedure as you may need to stop and use a restroom along the way.  You are ready for the procedure, if you followed all instructions and your stool is no longer formed, but clear or yellow liquid. If you are unsure whether your colon is clean, please call our office at [phone] before you leave for your appointment.    Bring the following to your procedure:  - Insurance Card/Photo ID.   - List of current medications including over-the-counter medications and supplements.   - Your rescue inhaler if you currently use one to  control asthma.    Canceling or rescheduling your appointment:   If you must cancel or reschedule your appointment, please call [phone] as soon as possible.        COLONOSCOPY PRE-PROCEDURE CHECKLIST  If you have diabetes, ask your regular doctor for diet and medication restrictions.  If you take an anticoagulant or anti-platelet medication (such as Coumadin , Lovenox , Pradaxa , Xarelto , Eliquis , etc.), please call your primary doctor for advice on holding this medication.  If you take aspirin you may continue to do so.  If you are or may be pregnant, please discuss the risks and benefits of this procedure with your doctor.    What happens during a colonoscopy?  Plan to spend up to two hours, starting at registration time, at the endoscopy center the day of your procedure. The colonoscopy takes an average of 15 to 30 minutes. Recovery time is about 30 minutes.    Before the exam:    You will change into a gown.    Your medical history and medication list will be reviewed with you, unless that has been done over the phone prior to the procedure.     A nurse will insert an intravenous (IV) line into your hand or arm.    The doctor will meet with you and will give you a consent form to sign.    During the exam:     Medicine will be given through the IV line to help you relax.     Your heart rate and oxygen levels will be monitored. If your blood pressure is low, you may be given fluids through the IV line.     The doctor will insert a flexible hollow tube, called a colonoscope, into your rectum. The scope will be advanced slowly through the large intestine (colon).    You may have a feeling of fullness or pressure.     If an abnormal tissue or a polyp is found, the doctor may remove it through the endoscope for closer examination, or biopsy. Tissue removal is painless    After the exam:           Any tissue samples removed during the exam will be sent to a lab for evaluation. It may take 5-7 working days for  you to be notified of the results.     A nurse will provide you with complete discharge instructions before you leave the endoscopy center. Be sure to ask the nurse for specific instructions if you take blood thinners such as Aspirin, Coumadin or Plavix.     The doctor will prepare a full report for you and for the physician who referred you for the procedure.     Your doctor will talk with you about the initial results of your exam.      Medication given during the exam will prohibit you from driving for the rest of the day.     Following the exam, you may resume your normal diet. Your first meal should be light, no greasy foods. Avoid alcohol until the next day.     You may resume your regular activities the day after the procedure.     LOW-FIBER DIET  Foods RECOMMENDED Foods to AVOID   Breads, Cereal, Rice and Pasta:   White bread, rolls, biscuits, croissant and abimael toast.   Waffles, Ugandan toast and pancakes.   White rice, noodles, pasta, macaroni and peeled cooked potatoes.   Plain crackers and saltines.   Cooked cereals: farina, cream of rice.   Cold cereals: Puffed Rice , Rice Krispies , Corn Flakes  and Special K    Breads, Cereal, Rice and Pasta:   Breads or rolls with nuts, seeds or fruit.   Whole wheat, pumpernickel, rye breads and cornbread.   Potatoes with skin, brown or wild rice, and kasha (buckwheat).     Vegetables:   Tender cooked and canned vegetables without seeds: carrots, asparagus tips, green or wax beans, pumpkin, spinach, lima beans. Vegetables:   Raw or steamed vegetables.   Vegetables with seeds.   Sauerkraut.   Winter squash, peas, broccoli, Brussel sprouts, cabbage, onions, cauliflower, baked beans, peas and corn.   Fruits:   Strained fruit juice.   Canned fruit, except pineapple.   Ripe bananas and melon. Fruits:   Prunes and prune juice.   Raw fruits.   Dried fruits: figs, dates and raisins.   Milk/Dairy:   Milk: plain or flavored.   Yogurt, custard and ice cream.   Cheese and  cottage cheese Milk/Dairy:     Meat and other proteins:   ground, well-cooked tender beef, lamb, ham, veal, pork, fish, poultry and organ meats.   Eggs.   Peanut butter without nuts. Meat and other proteins:   Tough, fibrous meats with gristle.   Dry beans, peas and lentils.   Peanut butter with nuts.   Tofu.   Fats, Snack, Sweets, Condiments and Beverages:   Margarine, butter, oils, mayonnaise, sour cream and salad dressing, plain gravy.   Sugar, hard candy, clear jelly, honey and syrup.   Spices, cooked herbs, bouillon, broth and soups made with allowed vegetable, ketchup and mustard.   Coffee, tea and carbonated drinks.   Plain cakes, cookies and pretzels.   Gelatin, plain puddings, custard, ice cream, sherbet and popsicles. Fats, Snack, Sweets, Condiments and Beverages:   Nuts, seeds and coconut.   Jam, marmalade and preserves.   Pickles, olives, relish and horseradish.   All desserts containing nuts, seeds, dried fruit and coconut; or made from whole grains or bran.   Candy made with nuts or seeds.   Popcorn.         DIRECTIONS TO THE SURGERY CENTER    From the north ([facility][1])  Take [address] south, exit on [address] [address]. Get into the left hand tiffanie, turn left (east), go one-half mile to [address] and turn left. Go north to the first stoplight, take a right on [address] and follow it to the Surgery Center entrance.    From the south ([facility])  Take 35N to the 35E split and exit on [address] [address]. On [address] [address], turn left (west) to [address]. Turn right (north) on [address]. Go north to the first stoplight, take a right on [address] and follow it to the Surgery Center entrance.    From the east via 35E ([facility])  Take [address]E south to [address] [address] exit. Turn right on [address] [address]. Go west to [address]. Turn right (north) on [address]. Go to the first stoplight, take a right and follow on [address] to the Surgery Center  entrance.    From the east via [address] ([place])  Take [address] west to [address]. Turn left (south) on [address] to [address]. Turn left (east) on [address] and follow it to the Surgery Center entrance.    From the west via [address] ([place], [place])  Take [address] east to [address]. Turn right (south) on [address] to [address]. Turn left (east) on [address] and follow it to the Surgery Center entrance.

## 2020-12-03 NOTE — ANESTHESIA POSTPROCEDURE EVALUATION
Patient: Nicole Reyes    Procedure(s):  COLONOSCOPY (fv)    Diagnosis:Screen for colon cancer [Z12.11]  Diagnosis Additional Information: No value filed.    Anesthesia Type:  MAC    Note:  Anesthesia Post Evaluation    Patient location during evaluation: PACU  Patient participation: Able to fully participate in evaluation  Level of consciousness: awake and alert  Pain management: adequate  multimodal analgesia used between 6 hours prior to anesthesia start to PACU dischargeAirway patency: patent  Cardiovascular status: acceptable  Respiratory status: acceptable  two or more mitigation strategies used for obstructive sleep apneaHydration status: acceptable  PONV: none     Anesthetic complications: None          Last vitals:  Vitals:    12/03/20 1330 12/03/20 1345 12/03/20 1420   BP: 131/68 136/63 126/63   Pulse: 58 56 60   Resp: 16 18 16   Temp:  97.2  F (36.2  C) 97.5  F (36.4  C)   SpO2:  97% 98%         Electronically Signed By: Juanpablo Mari MD  December 3, 2020  4:37 PM

## 2020-12-03 NOTE — ANESTHESIA CARE TRANSFER NOTE
Patient: Nicole Reyes    Procedure(s):  COLONOSCOPY (fv)    Diagnosis: Screen for colon cancer [Z12.11]  Diagnosis Additional Information: No value filed.    Anesthesia Type:   MAC     Note:  Airway :Room Air  Patient transferred to:Phase II  Comments: Pt's VSS, A/O x3 resting comfortably post procedure, care continued by RN. Handoff Report: Identifed the Patient, Identified the Reponsible Provider, Reviewed the pertinent medical history, Discussed the surgical course, Reviewed Intra-OP anesthesia mangement and issues during anesthesia, Set expectations for post-procedure period and Allowed opportunity for questions and acknowledgement of understanding      Vitals: (Last set prior to Anesthesia Care Transfer)    CRNA VITALS  12/3/2020 1158 - 12/3/2020 1233      12/3/2020             NIBP:  99/45    Pulse:  63                Electronically Signed By: LEONCIO Garcia CRNA  December 3, 2020  12:33 PM

## 2020-12-03 NOTE — H&P
Pre-Endoscopy History and Physical     Nicole Reyes MRN# [id number]   YOB: 1955 Age: 65 year old     Date of Procedure: 12/3/2020  Primary care provider: Pari Wiley  Type of Endoscopy: Colonoscopy with possible biopsy, possible polypectomy  Reason for Procedure: screen  Type of Anesthesia Anticipated: Conscious Sedation    HPI:    Nicole is a 65 year old female who will be undergoing the above procedure.      A history and physical has been performed. The patient's medications and allergies have been reviewed. The risks and benefits of the procedure and the sedation options and risks were discussed with the patient.  All questions were answered and informed consent was obtained.      She denies a personal or family history of anesthesia complications or bleeding disorders.     Patient Active Problem List   Diagnosis     Essential hypertension, benign     Restless leg syndrome     CRISTÓBAL (obstructive sleep apnea)     Advanced directives, counseling/discussion     GENERALIZED ANXIETY DIS     Mild episode of recurrent major depressive disorder (H)     Health Care Home     GERD (gastroesophageal reflux disease)     Hyperlipidemia LDL goal <100     Eczema     Type 2 diabetes mellitus without complication, without long-term current use of insulin (H)     Midline low back pain with left-sided sciatica     Morbid obesity (HCC)     Insomnia, unspecified type     Dyspnea, unspecified type     Controlled substance agreement signed     Pain of both shoulder joints     Recurrent aspiration pneumonia (H)     Cough     Hypoxia        Past Medical History:   Diagnosis Date     Arthritis     lt shoulder, rt. knee     Blood transfusion      CKD (chronic kidney disease) stage 3, GFR 30-59 ml/min      Essential hypertension, benign      Gastroesophageal reflux disease      Generalized anxiety disorder      Hernia, abdominal      Hiatal hernia      History of blood transfusion 2011    with a hernia repair      Hypertension      Insomnia, unspecified      Iron deficiency anemia      Major depression     sees a psychiatrist     Menopause     age 53     Obesity, unspecified      CRISTÓBAL (obstructive sleep apnea)     bipap     Other chronic pain     chronic pain lt arm from tendonidits     Oxygen dependent     2 LPM at home-uses at noc or extended walking     Pneumonia     due to aspiration from hiatal hernia-     Postoperative seroma 10/11    drained several times     Pure hypercholesterolemia      RLS (restless legs syndrome)      Type II or unspecified type diabetes mellitus without mention of complication, not stated as uncontrolled         Past Surgical History:   Procedure Laterality Date     BREAST BIOPSY, RT/LT      2 times; benign      SECTION        SECTION        SECTION       DILATION AND CURETTAGE, HYSTEROSCOPY DIAGNOSTIC, COMBINED  3/22/2013    Procedure: COMBINED DILATION AND CURETTAGE, HYSTEROSCOPY DIAGNOSTIC;  DILATION AND CURETTAGE, HYSTEROSCOPY DIAGNOSTIC   Converted to a general;  Surgeon: Robi Edwards MD;  Location:  OR     ESOPHAGOSCOPY, GASTROSCOPY, DUODENOSCOPY (EGD), COMBINED N/A 2015    Procedure: COMBINED ESOPHAGOSCOPY, GASTROSCOPY, DUODENOSCOPY (EGD);  Surgeon: Obinna Gutierrez MD;  Location:  GI     ESOPHAGOSCOPY, GASTROSCOPY, DUODENOSCOPY (EGD), COMBINED N/A 2015    Procedure: COMBINED ENDOSCOPIC ULTRASOUND, ESOPHAGOSCOPY, GASTROSCOPY, DUODENOSCOPY (EGD), FINE NEEDLE ASPIRATE/BIOPSY;  Surgeon: Sabine Olivares MD;  Location:  GI     ESOPHAGOSCOPY, GASTROSCOPY, DUODENOSCOPY (EGD), COMBINED N/A 1/3/2020    Procedure: ESOPHAGOGASTRODUODENOSCOPY;  Surgeon: Ascencion Stauffer MD;  Location: [facility] OR     HERNIORRHAPHY INCISIONAL (LOCATION)  10/8/2011     incarcerated hernia repair with mesh;     HERNIORRHAPHY INCISIONAL (LOCATION)  10/16/2011     repair incisional hernia with mesh, and removal of current implanted mesh;     [facility]  NONSPECIFIC PROCEDURE      Hernia repair      ZZC NONSPECIFIC PROCEDURE      Lymph node biopsy in neck (benign)        Social History     Tobacco Use     Smoking status: Former Smoker     Quit date: 3/18/1979     Years since quittin.7     Smokeless tobacco: Never Used     Tobacco comment: quit    Substance Use Topics     Alcohol use: Yes     Comment: Twice a month       Family History   Problem Relation Age of Onset     Cardiovascular Mother         CHF     Hypertension Mother      C.A.D. Mother         50s     Lipids Mother      Cancer Father         Hodgkin's, Leukemia     Diabetes Sister      Connective Tissue Disorder Sister         Lupus     Lipids Sister      Hypertension Brother      Hypertension Sister      Hypertension Sister      Cancer Brother         Hodgkin's     C.A.D. Brother 61     Hypertension Sister      C.A.D. Brother      Basal cell carcinoma Daughter 38        top of head       Prior to Admission medications    Medication Sig Start Date End Date Taking? Authorizing Provider   albuterol (PROAIR HFA/PROVENTIL HFA/VENTOLIN HFA) 108 (90 Base) MCG/ACT inhaler Inhale 1-2 puffs into the lungs every 6 hours as needed for shortness of breath / dyspnea or wheezing 19  Yes Jose L Weaver MD   aspirin 81 MG EC tablet Take 81 mg by mouth every evening    Yes Unknown, Entered By History   clonazePAM (KLONOPIN) 1 MG tablet Take 1 tablet (1 mg) by mouth 2 times daily as needed for anxiety 20  Yes Pari Wiley MD   gabapentin (NEURONTIN) 300 MG capsule Take 3 capsules (900 mg) by mouth daily 3/31/20  Yes Pari Wiley MD   losartan (COZAAR) 100 MG tablet TAKE ONE TABLET BY MOUTH ONCE DAILY 20  Yes Pari Wiley MD   metFORMIN (GLUCOPHAGE-XR) 500 MG 24 hr tablet TAKE TWO TABLETS BY MOUTH EVERY MORNING 20  Yes Pari Wiley MD   multivitamin w/minerals (MULTI-VITAMIN) tablet Take 1 tablet by mouth daily   Yes Reported, Patient   pantoprazole (PROTONIX) 40 MG EC tablet TAKE ONE TABLET  "BY MOUTH AT BEDTIME 6/26/20  Yes Pari Wiley MD   sertraline (ZOLOFT) 100 MG tablet TAKE TWO TABLETS BY MOUTH ONCE DAILY 4/14/20  Yes Pari Wiley MD   simvastatin (ZOCOR) 20 MG tablet TAKE ONE TABLET BY MOUTH AT BEDTIME 9/22/20  Yes Pari Wiley MD   zolpidem (AMBIEN) 10 MG tablet Take 1 tablet (10 mg) by mouth At Bedtime 11/5/20  Yes Pari Wiley MD   blood glucose (ACCU-CHEK BYRON PLUS) test strip USE TO TEST BLOOD SUGAR TWO TIMES A DAY OR AS DIRECTED 7/10/20   Pari Wiley MD       Allergies   Allergen Reactions     Codeine Rash     Penicillins Rash     Pt has tolerated cephalosporins and penems.   Pt tolerated Zosyn 7/6/2019        REVIEW OF SYSTEMS:   5 point ROS negative except as noted above in HPI, including Gen., Resp., CV, GI &  system review.    PHYSICAL EXAM:   BP (!) 140/75   Temp 97.8  F (36.6  C) (Temporal)   Resp 18   Ht 1.499 m (4' 11\")   Wt 110.7 kg (244 lb)   LMP 09/15/2007   SpO2 94%   BMI 49.28 kg/m   Estimated body mass index is 49.28 kg/m  as calculated from the following:    Height as of this encounter: 1.499 m (4' 11\").    Weight as of this encounter: 110.7 kg (244 lb).   GENERAL APPEARANCE: alert, and oriented  MENTAL STATUS: alert  AIRWAY EXAM: Mallampatti Class I (visualization of the soft palate, fauces, uvula, anterior and posterior pillars)  RESP: lungs clear to auscultation - no rales, rhonchi or wheezes  CV: regular rates and rhythm  DIAGNOSTICS:    Not indicated    IMPRESSION   ASA Class 2 - Mild systemic disease    PLAN:   Plan for Colonoscopy with possible biopsy, possible polypectomy. We discussed the risks, benefits and alternatives and the patient wished to proceed.    The above has been forwarded to the consulting provider.      Signed Electronically by: Obinna Gutierrez MD  December 3, 2020          "

## 2020-12-03 NOTE — ANESTHESIA PREPROCEDURE EVALUATION
Anesthesia Pre-Procedure Evaluation    Patient: Nicole Reyes   MRN: [id number] : 1955          Preoperative Diagnosis: Screen for colon cancer [Z12.11]    Procedure(s):  COLONOSCOPY (fv)    Past Medical History:   Diagnosis Date     Arthritis     lt shoulder, rt. knee     Blood transfusion      CKD (chronic kidney disease) stage 3, GFR 30-59 ml/min      Essential hypertension, benign      Gastroesophageal reflux disease      Generalized anxiety disorder      Hernia, abdominal      Hiatal hernia      History of blood transfusion     with a hernia repair     Hypertension      Insomnia, unspecified      Iron deficiency anemia      Major depression     sees a psychiatrist     Menopause     age 53     Obesity, unspecified      CRISTÓBAL (obstructive sleep apnea)     bipap     Other chronic pain     chronic pain lt arm from tendonidits     Oxygen dependent     2 LPM at home-uses at noc or extended walking     Pneumonia     due to aspiration from hiatal hernia-     Postoperative seroma 10/11    drained several times     Pure hypercholesterolemia      RLS (restless legs syndrome)      Type II or unspecified type diabetes mellitus without mention of complication, not stated as uncontrolled      Past Surgical History:   Procedure Laterality Date     BREAST BIOPSY, RT/LT      2 times; benign      SECTION        SECTION        SECTION       DILATION AND CURETTAGE, HYSTEROSCOPY DIAGNOSTIC, COMBINED  3/22/2013    Procedure: COMBINED DILATION AND CURETTAGE, HYSTEROSCOPY DIAGNOSTIC;  DILATION AND CURETTAGE, HYSTEROSCOPY DIAGNOSTIC   Converted to a general;  Surgeon: Robi Edwards MD;  Location:  OR     ESOPHAGOSCOPY, GASTROSCOPY, DUODENOSCOPY (EGD), COMBINED N/A 2015    Procedure: COMBINED ESOPHAGOSCOPY, GASTROSCOPY, DUODENOSCOPY (EGD);  Surgeon: Obinna Gutierrez MD;  Location:  GI     ESOPHAGOSCOPY, GASTROSCOPY, DUODENOSCOPY (EGD), COMBINED N/A 2015     Procedure: COMBINED ENDOSCOPIC ULTRASOUND, ESOPHAGOSCOPY, GASTROSCOPY, DUODENOSCOPY (EGD), FINE NEEDLE ASPIRATE/BIOPSY;  Surgeon: Sabine Olivares MD;  Location:  GI     ESOPHAGOSCOPY, GASTROSCOPY, DUODENOSCOPY (EGD), COMBINED N/A 1/3/2020    Procedure: ESOPHAGOGASTRODUODENOSCOPY;  Surgeon: Ascencion Stauffer MD;  Location: [facility] OR     HERNIORRHAPHY INCISIONAL (LOCATION)  10/8/2011     incarcerated hernia repair with mesh;     HERNIORRHAPHY INCISIONAL (LOCATION)  10/16/2011     repair incisional hernia with mesh, and removal of current implanted mesh;     Z NONSPECIFIC PROCEDURE      Hernia repair      [facility] NONSPECIFIC PROCEDURE      Lymph node biopsy in neck (benign)      Anesthesia Evaluation     . Pt has had prior anesthetic. Type: General    No history of anesthetic complications          ROS/MED HX    ENT/Pulmonary:     (+)sleep apnea, uses CPAP , . .    Neurologic:  - neg neurologic ROS     Cardiovascular:     (+) hypertension----. : . . . :. .       METS/Exercise Tolerance:     Hematologic:  - neg hematologic  ROS       Musculoskeletal:   (+) arthritis,  -       GI/Hepatic:     (+) hiatal hernia,       Renal/Genitourinary:     (+) chronic renal disease, type: CRI,       Endo:     (+) type II DM Obesity, .      Psychiatric:  - neg psychiatric ROS       Infectious Disease:  - neg infectious disease ROS       Malignancy:         Other:    - neg other ROS                      Physical Exam  Normal systems: cardiovascular, pulmonary and dental    Airway   Mallampati: II  TM distance: >3 FB  Neck ROM: full    Dental     Cardiovascular       Pulmonary             Lab Results   Component Value Date    WBC 10.3 11/11/2020    HGB 10.8 (L) 11/11/2020    HCT 36.3 11/11/2020     11/11/2020    .0 (H) 11/08/2020    SED 18 10/02/2017     11/09/2020    POTASSIUM 4.1 11/09/2020    CHLORIDE 106 11/09/2020    CO2 27 11/09/2020    BUN 19 11/09/2020    CR 0.76 11/11/2020     (H) 11/09/2020     "GRECIA 8.7 11/09/2020    PHOS 2.9 10/19/2011    MAG 1.6 04/13/2020    ALBUMIN 2.8 (L) 11/08/2020    PROTTOTAL 7.1 11/08/2020    ALT 29 11/08/2020    AST 26 11/08/2020    ALKPHOS 94 11/08/2020    BILITOTAL 0.5 11/08/2020    PTT 36 07/06/2019    INR 1.07 07/06/2019    TSH 2.31 06/21/2019    T4 1.12 03/22/2016       Preop Vitals  BP Readings from Last 3 Encounters:   12/03/20 (!) 140/75   11/11/20 139/63   10/23/20 (!) 172/85    Pulse Readings from Last 3 Encounters:   11/11/20 74   10/23/20 87   08/03/20 68      Resp Readings from Last 3 Encounters:   12/03/20 18   11/11/20 18   10/23/20 20    SpO2 Readings from Last 3 Encounters:   12/03/20 94%   11/11/20 92%   10/23/20 94%      Temp Readings from Last 1 Encounters:   12/03/20 97.8  F (36.6  C) (Temporal)    Ht Readings from Last 1 Encounters:   12/03/20 1.499 m (4' 11\")      Wt Readings from Last 1 Encounters:   12/03/20 110.7 kg (244 lb)    Estimated body mass index is 49.28 kg/m  as calculated from the following:    Height as of this encounter: 1.499 m (4' 11\").    Weight as of this encounter: 110.7 kg (244 lb).       Anesthesia Plan      History & Physical Review      ASA Status:  3 .    NPO Status:  > 8 hours    Plan for MAC with Intravenous induction. Reason for MAC:  Deep or markedly invasive procedure (G8)  PONV prophylaxis:  Ondansetron (or other 5HT-3) and Dexamethasone or Solumedrol    The patient is not a current smoker      Postoperative Care  Postoperative pain management:  Oral pain medications and Multi-modal analgesia.      Consents  Anesthetic plan, risks, benefits and alternatives discussed with:  Patient..                 Juanpablo Mair MD                    .  "

## 2020-12-03 NOTE — DISCHARGE INSTRUCTIONS
SEDATION ADULT DISCHARGE INSTRUCTIONS   SPECIAL PRECAUTIONS FOR 24 HOURS AFTER SURGERY    IT IS NOT UNUSUAL TO FEEL LIGHT-HEADED OR FAINT, UP TO 24 HOURS AFTER SURGERY OR WHILE TAKING PAIN MEDICATION.  IF YOU HAVE THESE SYMPTOMS; SIT FOR A FEW MINUTES BEFORE STANDING AND HAVE SOMEONE ASSIST YOU WHEN YOU GET UP TO WALK OR USE THE BATHROOM.    YOU SHOULD REST AND RELAX FOR THE NEXT 24 HOURS AND YOU MUST MAKE ARRANGEMENTS TO HAVE SOMEONE STAY WITH YOU FOR AT LEAST 24 HOURS AFTER YOUR DISCHARGE.  AVOID HAZARDOUS AND STRENUOUS ACTIVITIES.  DO NOT MAKE IMPORTANT DECISIONS FOR 24 HOURS.    DO NOT DRIVE ANY VEHICLE OR OPERATE MECHANICAL EQUIPMENT FOR 24 HOURS FOLLOWING THE END OF YOUR SURGERY.  EVEN THOUGH YOU MAY FEEL NORMAL, YOUR REACTIONS MAY BE AFFECTED BY THE MEDICATION YOU HAVE RECEIVED.    DO NOT DRINK ALCOHOLIC BEVERAGES FOR 24 HOURS FOLLOWING YOUR SURGERY.    DRINK CLEAR LIQUIDS (APPLE JUICE, GINGER ALE, 7-UP, BROTH, ETC.).  PROGRESS TO YOUR REGULAR DIET AS YOU FEEL ABLE.    YOU MAY HAVE A DRY MOUTH, A SORE THROAT, MUSCLES ACHES OR TROUBLE SLEEPING.  THESE SHOULD GO AWAY AFTER 24 HOURS.    CALL YOUR DOCTOR FOR ANY OF THE FOLLOWING:  SIGNS OF INFECTION (FEVER, GROWING TENDERNESS AT THE SURGERY SITE, A LARGE AMOUNT OF DRAINAGE OR BLEEDING, SEVERE PAIN, FOUL-SMELLING DRAINAGE, REDNESS OR SWELLING.    IT HAS BEEN OVER 8 TO 10 HOURS SINCE SURGERY AND YOU ARE STILL NOT ABLE TO URINATE (PASS WATER).     COLONOSCOPY DISCHARGE INSTRUCTIONS    You may not drive, use heavy equipment or consume alcohol for 24 hours because the drugs you were given may cause dizziness, drowsiness, forgetfulness and slower reaction time.    You may resume your regular diet and medications.    If you had a biopsy or polypectomy done, do not take aspirin, aleve (naproxen) or ibuprofen products for the next 10 days.  Tylenol (acetaminophen) is safe to take.    What to watch for:    Problems rarely occur after the exam.  It is important for you to  be aware of the early signs of a possible complication.  Call your doctor immediately if you notice any of the followin.  Unusual or persistent abdominal pain (pain that does not move around like a gas pain).    2.  Passing bright red blood from your rectum.    3.  Black or bloody stools.    4.  Temperature above 100.6 degrees F (37.5 degrees C)    If you feel this has become a medical emergency please call 911.

## 2020-12-04 LAB — COPATH REPORT: NORMAL

## 2020-12-07 ENCOUNTER — MYC REFILL (OUTPATIENT)
Dept: INTERNAL MEDICINE | Facility: CLINIC | Age: 65
End: 2020-12-07

## 2020-12-07 DIAGNOSIS — G47.00 PERSISTENT INSOMNIA: ICD-10-CM

## 2020-12-07 DIAGNOSIS — E11.22 TYPE 2 DIABETES MELLITUS WITH STAGE 3 CHRONIC KIDNEY DISEASE, WITHOUT LONG-TERM CURRENT USE OF INSULIN (H): ICD-10-CM

## 2020-12-07 DIAGNOSIS — G25.81 RESTLESS LEGS SYNDROME (RLS): ICD-10-CM

## 2020-12-07 DIAGNOSIS — E78.5 HYPERLIPIDEMIA LDL GOAL <100: ICD-10-CM

## 2020-12-07 DIAGNOSIS — I10 ESSENTIAL HYPERTENSION, BENIGN: ICD-10-CM

## 2020-12-07 DIAGNOSIS — F33.1 MAJOR DEPRESSIVE DISORDER, RECURRENT EPISODE, MODERATE (H): ICD-10-CM

## 2020-12-07 DIAGNOSIS — F41.1 GENERALIZED ANXIETY DISORDER: ICD-10-CM

## 2020-12-07 DIAGNOSIS — K21.9 GASTROESOPHAGEAL REFLUX DISEASE WITHOUT ESOPHAGITIS: ICD-10-CM

## 2020-12-07 DIAGNOSIS — N18.30 TYPE 2 DIABETES MELLITUS WITH STAGE 3 CHRONIC KIDNEY DISEASE, WITHOUT LONG-TERM CURRENT USE OF INSULIN (H): ICD-10-CM

## 2020-12-09 ENCOUNTER — MYC MEDICAL ADVICE (OUTPATIENT)
Dept: INTERNAL MEDICINE | Facility: CLINIC | Age: 65
End: 2020-12-09

## 2020-12-09 DIAGNOSIS — G47.00 PERSISTENT INSOMNIA: ICD-10-CM

## 2020-12-09 DIAGNOSIS — G25.81 RESTLESS LEGS SYNDROME (RLS): ICD-10-CM

## 2020-12-09 DIAGNOSIS — F33.1 MAJOR DEPRESSIVE DISORDER, RECURRENT EPISODE, MODERATE (H): ICD-10-CM

## 2020-12-09 DIAGNOSIS — F41.1 GENERALIZED ANXIETY DISORDER: ICD-10-CM

## 2020-12-09 RX ORDER — GABAPENTIN 300 MG/1
900 CAPSULE ORAL DAILY
Qty: 270 CAPSULE | Refills: 3 | OUTPATIENT
Start: 2020-12-09

## 2020-12-09 RX ORDER — LOSARTAN POTASSIUM 100 MG/1
100 TABLET ORAL DAILY
Qty: 90 TABLET | Refills: 1 | Status: SHIPPED | OUTPATIENT
Start: 2020-12-09 | End: 2021-06-08

## 2020-12-09 RX ORDER — SERTRALINE HYDROCHLORIDE 100 MG/1
TABLET, FILM COATED ORAL
Qty: 180 TABLET | Refills: 3 | OUTPATIENT
Start: 2020-12-09

## 2020-12-09 RX ORDER — SIMVASTATIN 20 MG
20 TABLET ORAL AT BEDTIME
Qty: 90 TABLET | Refills: 1 | Status: SHIPPED | OUTPATIENT
Start: 2020-12-09 | End: 2021-06-10

## 2020-12-09 RX ORDER — PANTOPRAZOLE SODIUM 40 MG/1
40 TABLET, DELAYED RELEASE ORAL
Qty: 90 TABLET | Refills: 2 | OUTPATIENT
Start: 2020-12-09

## 2020-12-09 RX ORDER — METFORMIN HCL 500 MG
1000 TABLET, EXTENDED RELEASE 24 HR ORAL
Qty: 180 TABLET | Refills: 1 | Status: SHIPPED | OUTPATIENT
Start: 2020-12-09 | End: 2021-06-10

## 2020-12-09 NOTE — TELEPHONE ENCOUNTER
"Prescription approved per [facility] Refill Protocol.    Medication refused, neurontin refilled on 3/31/20 for a 90 day supply with 3 refills. protonix was refilled on 6/26/20 for a 90 day supply with 3 refills.  Sertraline was refilled on 4/14/20 for a 90 day supply with 3 refills.      [employer] message sent to patient.      sertraline (ZOLOFT) 100 MG tablet 180 tablet 3       SSRIs Protocol Passed - 12/7/2020  6:07 PM        Passed - PHQ-9 score less than 5 in past 6 months     Please review last PHQ-9 score.           Passed - Medication is active on med list        Passed - Patient is age 18 or older        Passed - No active pregnancy on record        Passed - No positive pregnancy test in last 12 months        Passed - Recent (6 mo) or future (30 days) visit within the authorizing provider's specialty     Patient had office visit in the last 6 months or has a visit in the next 30 days with authorizing provider or within the authorizing provider's specialty.  See \"Patient Info\" tab in inbasket, or \"Choose Columns\" in Meds & Orders section of the refill encounter.               pantoprazole (PROTONIX) 40 MG EC tablet 90 tablet 2     Sig: Take 1 tablet (40 mg) by mouth       PPI Protocol Passed - 12/7/2020  6:07 PM        Passed - Not on Clopidogrel (unless Pantoprazole ordered)        Passed - No diagnosis of osteoporosis on record        Passed - Recent (12 mo) or future (30 days) visit within the authorizing provider's specialty     Patient has had an office visit with the authorizing provider or a provider within the authorizing providers department within the previous 12 mos or has a future within next 30 days. See \"Patient Info\" tab in inbasket, or \"Choose Columns\" in Meds & Orders section of the refill encounter.              Passed - Medication is active on med list        Passed - Patient is age 18 or older        Passed - No active pregnacy on record        Passed - No positive pregnancy test in past 12 months    " "       simvastatin (ZOCOR) 20 MG tablet 90 tablet 0     Sig: Take 1 tablet (20 mg) by mouth       Statins Protocol Passed - 12/7/2020  6:07 PM        Passed - LDL on file in past 12 months     Recent Labs   Lab Test 10/23/20  1527   *             Passed - No abnormal creatine kinase in past 12 months     No lab results found.             Passed - Recent (12 mo) or future (30 days) visit within the authorizing provider's specialty     Patient has had an office visit with the authorizing provider or a provider within the authorizing providers department within the previous 12 mos or has a future within next 30 days. See \"Patient Info\" tab in inbasket, or \"Choose Columns\" in Meds & Orders section of the refill encounter.              Passed - Medication is active on med list        Passed - Patient is age 18 or older        Passed - No active pregnancy on record        Passed - No positive pregnancy test in past 12 months           metFORMIN (GLUCOPHAGE-XR) 500 MG 24 hr tablet 180 tablet 0       Biguanide Agents Passed - 12/7/2020  6:07 PM        Passed - Patient is age 10 or older        Passed - Patient has documented A1c within the specified period of time.     If HgbA1C is 8 or greater, it needs to be on file within the past 3 months.  If less than 8, must be on file within the past 6 months.     Recent Labs   Lab Test 10/23/20  1527   A1C 6.6*             Passed - Patient's CR is NOT>1.4 OR Patient's EGFR is NOT<45 within past 12 mos.     Recent Labs   Lab Test 11/11/20  0743   GFRESTIMATED 83   GFRESTBLACK >90       Recent Labs   Lab Test 11/11/20  0743   CR 0.76             Passed - Patient does NOT have a diagnosis of CHF.        Passed - Medication is active on med list        Passed - Patient is not pregnant        Passed - Patient has not had a positive pregnancy test within the past 12 mos.         Passed - Recent (6 mo) or future (30 days) visit within the authorizing provider's specialty     " "Patient had office visit in the last 6 months or has a visit in the next 30 days with authorizing provider or within the authorizing provider's specialty.  See \"Patient Info\" tab in inbasket, or \"Choose Columns\" in Meds & Orders section of the refill encounter.               losartan (COZAAR) 100 MG tablet 90 tablet 0     Sig: Take 1 tablet (100 mg) by mouth daily       Angiotensin-II Receptors Passed - 12/7/2020  6:07 PM        Passed - Last blood pressure under 140/90 in past 12 months     BP Readings from Last 3 Encounters:   12/03/20 126/63   11/11/20 139/63   10/23/20 (!) 172/85                 Passed - Recent (12 mo) or future (30 days) visit within the authorizing provider's specialty     Patient has had an office visit with the authorizing provider or a provider within the authorizing providers department within the previous 12 mos or has a future within next 30 days. See \"Patient Info\" tab in [employer], or \"Choose Columns\" in Meds & Orders section of the refill encounter.              Passed - Medication is active on med list        Passed - Patient is age 18 or older        Passed - No active pregnancy on record        Passed - Normal serum creatinine on file in past 12 months     Recent Labs   Lab Test 11/11/20  0743   CR 0.76       Ok to refill medication if creatinine is low          Passed - Normal serum potassium on file in past 12 months     Recent Labs   Lab Test 11/09/20  0739   POTASSIUM 4.1                    Passed - No positive pregnancy test in past 12 months             "

## 2020-12-10 RX ORDER — GABAPENTIN 300 MG/1
900 CAPSULE ORAL DAILY
Qty: 270 CAPSULE | Refills: 1 | Status: SHIPPED | OUTPATIENT
Start: 2020-12-10 | End: 2021-06-10

## 2020-12-10 RX ORDER — PANTOPRAZOLE SODIUM 40 MG/1
TABLET, DELAYED RELEASE ORAL
Qty: 90 TABLET | Refills: 2 | Status: SHIPPED | OUTPATIENT
Start: 2020-12-10 | End: 2021-10-08

## 2020-12-10 RX ORDER — SERTRALINE HYDROCHLORIDE 100 MG/1
TABLET, FILM COATED ORAL
Qty: 180 TABLET | Refills: 1 | Status: SHIPPED | OUTPATIENT
Start: 2020-12-10 | End: 2021-06-10

## 2020-12-10 NOTE — TELEPHONE ENCOUNTER
Per patient's [employer] message 12/9/20, change in pharmacy, prescriptions refused below have been approved for pharmacy change.

## 2020-12-11 ENCOUNTER — TELEPHONE (OUTPATIENT)
Dept: INTERNAL MEDICINE | Facility: CLINIC | Age: 65
End: 2020-12-11

## 2020-12-11 DIAGNOSIS — E11.22 TYPE 2 DIABETES MELLITUS WITH STAGE 3 CHRONIC KIDNEY DISEASE, WITHOUT LONG-TERM CURRENT USE OF INSULIN (H): ICD-10-CM

## 2020-12-11 DIAGNOSIS — N18.30 TYPE 2 DIABETES MELLITUS WITH STAGE 3 CHRONIC KIDNEY DISEASE, WITHOUT LONG-TERM CURRENT USE OF INSULIN (H): ICD-10-CM

## 2020-12-11 NOTE — TELEPHONE ENCOUNTER
Patient states her Accu Chek Stephany meter quit working. Please send replacement and supplies to [facility] pharmacy

## 2020-12-15 RX ORDER — BLOOD SUGAR DIAGNOSTIC
STRIP MISCELLANEOUS
Qty: 100 EACH | Refills: 1 | Status: SHIPPED | OUTPATIENT
Start: 2020-12-15 | End: 2022-02-24

## 2020-12-15 NOTE — TELEPHONE ENCOUNTER
Prescription approved per [facility], [facility] or [facility] refill protocol.  Any COOK - Registered Nurse  [facility]  Acute and Diagnostic Services       Consent Type: Consent 1 (Standard)

## 2020-12-17 NOTE — TELEPHONE ENCOUNTER
Please see notes below.  Patient also needs a RX for a new meter because her Stephany Plus is broken.

## 2020-12-20 ENCOUNTER — MYC MEDICAL ADVICE (OUTPATIENT)
Dept: INTERNAL MEDICINE | Facility: CLINIC | Age: 65
End: 2020-12-20

## 2020-12-20 DIAGNOSIS — G47.00 INSOMNIA, UNSPECIFIED TYPE: Primary | ICD-10-CM

## 2020-12-21 RX ORDER — TEMAZEPAM 15 MG/1
15 CAPSULE ORAL
Qty: 30 CAPSULE | Refills: 1 | Status: SHIPPED | OUTPATIENT
Start: 2020-12-21 | End: 2021-02-25

## 2020-12-21 NOTE — TELEPHONE ENCOUNTER
Please see [employer] message. Patient is taking Klonopin and Ambien for insomnia, has long term history. Please advise if patient needs appointment, evisit, or referral? Patient requesting prescription, pharmacy pended. Please advise,      Thank you.

## 2021-01-05 ENCOUNTER — TRANSFERRED RECORDS (OUTPATIENT)
Dept: HEALTH INFORMATION MANAGEMENT | Facility: CLINIC | Age: 66
End: 2021-01-05

## 2021-01-05 DIAGNOSIS — G47.00 INSOMNIA, UNSPECIFIED TYPE: ICD-10-CM

## 2021-01-06 RX ORDER — CLONAZEPAM 1 MG/1
TABLET ORAL
Qty: 60 TABLET | Refills: 0 | Status: SHIPPED | OUTPATIENT
Start: 2021-01-06 | End: 2021-02-22

## 2021-01-07 ENCOUNTER — TELEPHONE (OUTPATIENT)
Dept: INTERNAL MEDICINE | Facility: CLINIC | Age: 66
End: 2021-01-07

## 2021-01-07 NOTE — TELEPHONE ENCOUNTER
Reason for Call:  Other call back    Detailed comments: Pharmacy called needing clarification on clonazepam    Phone Number Patient can be reached at: Other phone number:  [phone]    Best Time: Anytime    Can we leave a detailed message on this number? Not Applicable    Call taken on 1/7/2021 at 3:50 PM by Bhavna Romero

## 2021-01-08 NOTE — TELEPHONE ENCOUNTER
Call to pharmacy, pharmacy states that insurance has a hard stop on Klonopin due to patient being prescribed Restoril as well, insurance is considering this as a duplication of therapy. Pharmacy states they can override this but would need a reason as to why patient is using both. Per [employer] message sent 12/20/20, patient is taking trial of Restoril for sleep.  Please advise,     Thank you

## 2021-01-12 NOTE — TELEPHONE ENCOUNTER
Call to pharmacy. Patient told pharmacy she is holding the Clonazepam at night while she tries Restoril but still takes Clonazepam in the morning. Please advise.

## 2021-01-12 NOTE — TELEPHONE ENCOUNTER
Yes, that is the recommendation.  I am sorry I did not make it clear but I really only meant that she was not taking clonazepam and Restoril at night.

## 2021-01-19 ENCOUNTER — MYC MEDICAL ADVICE (OUTPATIENT)
Dept: INTERNAL MEDICINE | Facility: CLINIC | Age: 66
End: 2021-01-19

## 2021-01-19 ENCOUNTER — TELEPHONE (OUTPATIENT)
Dept: INTERNAL MEDICINE | Facility: CLINIC | Age: 66
End: 2021-01-19

## 2021-02-14 NOTE — TELEPHONE ENCOUNTER
Also says she will be due for refill of Clonazepam on Friday and it is a long weekend.  Last written 4/26/17 #45 with 0 refills.  Last OV 5/11/17.   Strong peripheral pulses/Capillary refill less/equal to 2 seconds

## 2021-02-22 ENCOUNTER — TELEPHONE (OUTPATIENT)
Dept: INTERNAL MEDICINE | Facility: CLINIC | Age: 66
End: 2021-02-22

## 2021-02-22 DIAGNOSIS — G47.00 INSOMNIA, UNSPECIFIED TYPE: ICD-10-CM

## 2021-02-22 RX ORDER — ZOLPIDEM TARTRATE 10 MG/1
10 TABLET ORAL AT BEDTIME
Qty: 90 TABLET | Refills: 1 | Status: CANCELLED | OUTPATIENT
Start: 2021-02-22

## 2021-02-22 NOTE — TELEPHONE ENCOUNTER
[facility] in [place] calling  Patient is on 3 sedative and they need confirmation from her primary provider that its ok to fill. Patient is on   temazepam (RESTORIL) 15 MG capsule  Clonazepam & Zolpidem    Please call ASAP to pharmacy at [phone]

## 2021-02-22 NOTE — TELEPHONE ENCOUNTER
Patient now calling wanting her medications filled as ordered by Dr Wiley. Ok to call and suzi [phone]

## 2021-02-23 NOTE — TELEPHONE ENCOUNTER
I am not sure if I clarified that she should not be taking the Ambien and Restoril together.  If she is taking them both together, she should stop the Ambien and use just Restoril at night.  If she has tried that and cannot sleep, then I need to send her to the sleep clinic.   The clonazepam is just in the morning, no longer taking it twice a day.

## 2021-02-24 NOTE — TELEPHONE ENCOUNTER
[facility] calling because they have not heard from us. Insurance is blocking from filling the restoril and they do need an ok to fill this from us.  Pharmacy states last filled ambien 11/6/20 and 2/2/21 for 90 pills.  restoril 30 pills on 12/21 and 1/21 so she is using about 15 pills a month  Clonazepam filled on 1/12/21 for 60 pills and prior to that 12/5/20 for 60 pills.

## 2021-02-24 NOTE — TELEPHONE ENCOUNTER
Call to pharmacy, updated that we are waiting for call back from patient to provide providers message.

## 2021-02-25 RX ORDER — TEMAZEPAM 15 MG/1
15 CAPSULE ORAL
Qty: 30 CAPSULE | Refills: 1 | Status: SHIPPED | OUTPATIENT
Start: 2021-02-25 | End: 2021-08-26

## 2021-02-25 RX ORDER — CLONAZEPAM 1 MG/1
TABLET ORAL
Qty: 60 TABLET | Refills: 0 | Status: SHIPPED | OUTPATIENT
Start: 2021-02-25 | End: 2021-03-30

## 2021-02-25 NOTE — TELEPHONE ENCOUNTER
Call to patient, informed of provider's message, patient states is not taking both the Ambien and Restoril together. Will just use the Restoril. Call to pharmacy to inform, pharmacy message states that they are closed and will open back up at 2pm. Please call pharmacy to inform that it is okay to fill Restoril.

## 2021-03-30 ENCOUNTER — MYC REFILL (OUTPATIENT)
Dept: INTERNAL MEDICINE | Facility: CLINIC | Age: 66
End: 2021-03-30

## 2021-03-30 ENCOUNTER — MYC MEDICAL ADVICE (OUTPATIENT)
Dept: INTERNAL MEDICINE | Facility: CLINIC | Age: 66
End: 2021-03-30

## 2021-03-30 DIAGNOSIS — G47.00 INSOMNIA, UNSPECIFIED TYPE: ICD-10-CM

## 2021-04-01 RX ORDER — CLONAZEPAM 1 MG/1
TABLET ORAL
Qty: 60 TABLET | Refills: 0 | Status: SHIPPED | OUTPATIENT
Start: 2021-04-01 | End: 2021-05-01

## 2021-04-01 NOTE — TELEPHONE ENCOUNTER
Controlled Substance Refill Request for Clonazepam  Problem List Complete:  No     PROVIDER TO CONSIDER COMPLETION OF PROBLEM LIST AND OVERVIEW/CONTROLLED SUBSTANCE AGREEMENT    Last Written Prescription Date:  2/25/21  Last Fill Quantity: 60,   # refills: 0    Last Office Visit with [facility] primary care provider: 11/17/20    Future Office visit:     Controlled substance agreement:   Encounter-Level CSA - 10/05/2017:    Controlled Substance Agreement - Scan on 10/25/2017  6:56 AM: CONTROLLED SUBSTANCE AGREEMENT     Patient-Level CSA:    There are no patient-level csa.         Last Urine Drug Screen: No results found for: CDAUT, No results found for: COMDAT, No results found for: THC13, PCP13, COC13, MAMP13, OPI13, AMP13, BZO13, TCA13, MTD13, BAR13, OXY13, PPX13, BUP13     [place] reviewed:  reviewed- no concerns  [place]:  [email or web address]

## 2021-05-01 ENCOUNTER — MYC REFILL (OUTPATIENT)
Dept: INTERNAL MEDICINE | Facility: CLINIC | Age: 66
End: 2021-05-01

## 2021-05-01 DIAGNOSIS — E11.9 TYPE 2 DIABETES MELLITUS WITHOUT COMPLICATION, WITHOUT LONG-TERM CURRENT USE OF INSULIN (H): Primary | ICD-10-CM

## 2021-05-01 DIAGNOSIS — G47.00 INSOMNIA, UNSPECIFIED TYPE: ICD-10-CM

## 2021-05-01 DIAGNOSIS — E78.5 HYPERLIPIDEMIA LDL GOAL <100: ICD-10-CM

## 2021-05-03 ENCOUNTER — MYC MEDICAL ADVICE (OUTPATIENT)
Dept: INTERNAL MEDICINE | Facility: CLINIC | Age: 66
End: 2021-05-03

## 2021-05-03 ENCOUNTER — MYC REFILL (OUTPATIENT)
Dept: INTERNAL MEDICINE | Facility: CLINIC | Age: 66
End: 2021-05-03

## 2021-05-03 DIAGNOSIS — G47.00 INSOMNIA, UNSPECIFIED TYPE: ICD-10-CM

## 2021-05-03 RX ORDER — CLONAZEPAM 1 MG/1
TABLET ORAL
Qty: 60 TABLET | Refills: 0 | Status: CANCELLED | OUTPATIENT
Start: 2021-05-03

## 2021-05-04 RX ORDER — ZOLPIDEM TARTRATE 10 MG/1
TABLET ORAL
Qty: 90 TABLET | Refills: 0 | Status: SHIPPED | OUTPATIENT
Start: 2021-05-04 | End: 2021-08-02

## 2021-05-04 RX ORDER — CLONAZEPAM 1 MG/1
TABLET ORAL
Qty: 60 TABLET | Refills: 0 | Status: SHIPPED | OUTPATIENT
Start: 2021-05-04 | End: 2021-06-10

## 2021-05-04 NOTE — TELEPHONE ENCOUNTER
Last kb 4/1/21.   Last visit 11/20.    checked, no concerns.   Done, advise patient will need appointment in clinic prior to next refill. She will also need diabetes labs done prior.

## 2021-05-04 NOTE — TELEPHONE ENCOUNTER
Duplicate Request.  Se 5/1/2021 request  Any Ramirez Registered Nurse  [facility]  Acute and Diagnostic Services

## 2021-05-14 ENCOUNTER — MYC MEDICAL ADVICE (OUTPATIENT)
Dept: INTERNAL MEDICINE | Facility: CLINIC | Age: 66
End: 2021-05-14

## 2021-05-16 ENCOUNTER — HEALTH MAINTENANCE LETTER (OUTPATIENT)
Age: 66
End: 2021-05-16

## 2021-05-28 DIAGNOSIS — E11.9 TYPE 2 DIABETES MELLITUS WITHOUT COMPLICATION, WITHOUT LONG-TERM CURRENT USE OF INSULIN (H): ICD-10-CM

## 2021-05-28 DIAGNOSIS — E78.5 HYPERLIPIDEMIA LDL GOAL <100: ICD-10-CM

## 2021-05-28 LAB
ANION GAP SERPL CALCULATED.3IONS-SCNC: 5 MMOL/L (ref 3–14)
BUN SERPL-MCNC: 10 MG/DL (ref 7–30)
CALCIUM SERPL-MCNC: 8.3 MG/DL (ref 8.5–10.1)
CHLORIDE SERPL-SCNC: 108 MMOL/L (ref 94–109)
CHOLEST SERPL-MCNC: 191 MG/DL
CO2 SERPL-SCNC: 28 MMOL/L (ref 20–32)
CREAT SERPL-MCNC: 0.72 MG/DL (ref 0.52–1.04)
GFR SERPL CREATININE-BSD FRML MDRD: 88 ML/MIN/{1.73_M2}
GLUCOSE SERPL-MCNC: 109 MG/DL (ref 70–99)
HBA1C MFR BLD: 6.6 % (ref 0–5.6)
HDLC SERPL-MCNC: 41 MG/DL
LDLC SERPL CALC-MCNC: 126 MG/DL
NONHDLC SERPL-MCNC: 150 MG/DL
POTASSIUM SERPL-SCNC: 3.7 MMOL/L (ref 3.4–5.3)
SODIUM SERPL-SCNC: 141 MMOL/L (ref 133–144)
TRIGL SERPL-MCNC: 119 MG/DL

## 2021-05-28 PROCEDURE — 83036 HEMOGLOBIN GLYCOSYLATED A1C: CPT | Performed by: INTERNAL MEDICINE

## 2021-05-28 PROCEDURE — 80048 BASIC METABOLIC PNL TOTAL CA: CPT | Performed by: INTERNAL MEDICINE

## 2021-05-28 PROCEDURE — 80061 LIPID PANEL: CPT | Performed by: INTERNAL MEDICINE

## 2021-05-28 PROCEDURE — 36415 COLL VENOUS BLD VENIPUNCTURE: CPT | Performed by: INTERNAL MEDICINE

## 2021-06-01 ENCOUNTER — HOSPITAL ENCOUNTER (INPATIENT)
Facility: CLINIC | Age: 66
LOS: 3 days | Discharge: HOME | DRG: 177 | End: 2021-06-04
Attending: EMERGENCY MEDICINE | Admitting: INTERNAL MEDICINE
Payer: MEDICARE

## 2021-06-01 ENCOUNTER — APPOINTMENT (OUTPATIENT)
Dept: GENERAL RADIOLOGY | Facility: CLINIC | Age: 66
DRG: 177 | End: 2021-06-01
Attending: EMERGENCY MEDICINE
Payer: MEDICARE

## 2021-06-01 DIAGNOSIS — J18.9 PNEUMONIA DUE TO INFECTIOUS ORGANISM, UNSPECIFIED LATERALITY, UNSPECIFIED PART OF LUNG: ICD-10-CM

## 2021-06-01 DIAGNOSIS — G47.33 OSA (OBSTRUCTIVE SLEEP APNEA): Primary | ICD-10-CM

## 2021-06-01 DIAGNOSIS — R09.02 HYPOXIA: ICD-10-CM

## 2021-06-01 LAB
ANION GAP SERPL CALCULATED.3IONS-SCNC: 2 MMOL/L (ref 3–14)
BASOPHILS # BLD AUTO: 0 10E9/L (ref 0–0.2)
BASOPHILS NFR BLD AUTO: 0.2 %
BUN SERPL-MCNC: 9 MG/DL (ref 7–30)
CALCIUM SERPL-MCNC: 10.8 MG/DL (ref 8.5–10.1)
CHLORIDE SERPL-SCNC: 105 MMOL/L (ref 94–109)
CO2 SERPL-SCNC: 34 MMOL/L (ref 20–32)
CREAT SERPL-MCNC: 0.79 MG/DL (ref 0.52–1.04)
DIFFERENTIAL METHOD BLD: ABNORMAL
EOSINOPHIL # BLD AUTO: 0.3 10E9/L (ref 0–0.7)
EOSINOPHIL NFR BLD AUTO: 1.6 %
ERYTHROCYTE [DISTWIDTH] IN BLOOD BY AUTOMATED COUNT: 16.2 % (ref 10–15)
GFR SERPL CREATININE-BSD FRML MDRD: 79 ML/MIN/{1.73_M2}
GLUCOSE BLDC GLUCOMTR-MCNC: 109 MG/DL (ref 70–99)
GLUCOSE BLDC GLUCOMTR-MCNC: 148 MG/DL (ref 70–99)
GLUCOSE SERPL-MCNC: 120 MG/DL (ref 70–99)
HCT VFR BLD AUTO: 43.1 % (ref 35–47)
HGB BLD-MCNC: 13.2 G/DL (ref 11.7–15.7)
IMM GRANULOCYTES # BLD: 0.1 10E9/L (ref 0–0.4)
IMM GRANULOCYTES NFR BLD: 0.3 %
LABORATORY COMMENT REPORT: NORMAL
LACTATE BLD-SCNC: 1 MMOL/L (ref 0.7–2)
LYMPHOCYTES # BLD AUTO: 1.6 10E9/L (ref 0.8–5.3)
LYMPHOCYTES NFR BLD AUTO: 9.7 %
MCH RBC QN AUTO: 24.7 PG (ref 26.5–33)
MCHC RBC AUTO-ENTMCNC: 30.6 G/DL (ref 31.5–36.5)
MCV RBC AUTO: 81 FL (ref 78–100)
MONOCYTES # BLD AUTO: 0.5 10E9/L (ref 0–1.3)
MONOCYTES NFR BLD AUTO: 3.2 %
NEUTROPHILS # BLD AUTO: 14 10E9/L (ref 1.6–8.3)
NEUTROPHILS NFR BLD AUTO: 85 %
NRBC # BLD AUTO: 0 10*3/UL
NRBC BLD AUTO-RTO: 0 /100
NT-PROBNP SERPL-MCNC: 653 PG/ML (ref 0–900)
PLATELET # BLD AUTO: 290 10E9/L (ref 150–450)
POTASSIUM SERPL-SCNC: 3.2 MMOL/L (ref 3.4–5.3)
POTASSIUM SERPL-SCNC: 3.6 MMOL/L (ref 3.4–5.3)
PROCALCITONIN SERPL-MCNC: <0.05 NG/ML
RBC # BLD AUTO: 5.35 10E12/L (ref 3.8–5.2)
SARS-COV-2 RNA RESP QL NAA+PROBE: NEGATIVE
SODIUM SERPL-SCNC: 141 MMOL/L (ref 133–144)
SPECIMEN SOURCE: NORMAL
TROPONIN I SERPL-MCNC: <0.015 UG/L (ref 0–0.04)
WBC # BLD AUTO: 16.4 10E9/L (ref 4–11)

## 2021-06-01 PROCEDURE — 93005 ELECTROCARDIOGRAM TRACING: CPT

## 2021-06-01 PROCEDURE — 84145 PROCALCITONIN (PCT): CPT | Performed by: EMERGENCY MEDICINE

## 2021-06-01 PROCEDURE — 250N000011 HC RX IP 250 OP 636: Performed by: EMERGENCY MEDICINE

## 2021-06-01 PROCEDURE — 999N000157 HC STATISTIC RCP TIME EA 10 MIN

## 2021-06-01 PROCEDURE — 36415 COLL VENOUS BLD VENIPUNCTURE: CPT | Performed by: INTERNAL MEDICINE

## 2021-06-01 PROCEDURE — 83880 ASSAY OF NATRIURETIC PEPTIDE: CPT | Performed by: EMERGENCY MEDICINE

## 2021-06-01 PROCEDURE — 96367 TX/PROPH/DG ADDL SEQ IV INF: CPT

## 2021-06-01 PROCEDURE — 71045 X-RAY EXAM CHEST 1 VIEW: CPT

## 2021-06-01 PROCEDURE — 80048 BASIC METABOLIC PNL TOTAL CA: CPT | Performed by: EMERGENCY MEDICINE

## 2021-06-01 PROCEDURE — 87040 BLOOD CULTURE FOR BACTERIA: CPT | Performed by: EMERGENCY MEDICINE

## 2021-06-01 PROCEDURE — 83605 ASSAY OF LACTIC ACID: CPT | Performed by: INTERNAL MEDICINE

## 2021-06-01 PROCEDURE — 250N000013 HC RX MED GY IP 250 OP 250 PS 637: Performed by: EMERGENCY MEDICINE

## 2021-06-01 PROCEDURE — 999N001017 HC STATISTIC GLUCOSE BY METER IP

## 2021-06-01 PROCEDURE — 5A09357 ASSISTANCE WITH RESPIRATORY VENTILATION, LESS THAN 24 CONSECUTIVE HOURS, CONTINUOUS POSITIVE AIRWAY PRESSURE: ICD-10-PCS | Performed by: INTERNAL MEDICINE

## 2021-06-01 PROCEDURE — 84484 ASSAY OF TROPONIN QUANT: CPT | Performed by: EMERGENCY MEDICINE

## 2021-06-01 PROCEDURE — 120N000001 HC R&B MED SURG/OB

## 2021-06-01 PROCEDURE — 96365 THER/PROPH/DIAG IV INF INIT: CPT

## 2021-06-01 PROCEDURE — 99285 EMERGENCY DEPT VISIT HI MDM: CPT | Mod: 25

## 2021-06-01 PROCEDURE — 85025 COMPLETE CBC W/AUTO DIFF WBC: CPT | Performed by: EMERGENCY MEDICINE

## 2021-06-01 PROCEDURE — 250N000013 HC RX MED GY IP 250 OP 250 PS 637: Performed by: INTERNAL MEDICINE

## 2021-06-01 PROCEDURE — 99223 1ST HOSP IP/OBS HIGH 75: CPT | Mod: AI | Performed by: INTERNAL MEDICINE

## 2021-06-01 PROCEDURE — 84132 ASSAY OF SERUM POTASSIUM: CPT | Performed by: INTERNAL MEDICINE

## 2021-06-01 PROCEDURE — 250N000013 HC RX MED GY IP 250 OP 250 PS 637: Performed by: HOSPITALIST

## 2021-06-01 PROCEDURE — 36415 COLL VENOUS BLD VENIPUNCTURE: CPT | Performed by: EMERGENCY MEDICINE

## 2021-06-01 PROCEDURE — 87635 SARS-COV-2 COVID-19 AMP PRB: CPT | Performed by: EMERGENCY MEDICINE

## 2021-06-01 PROCEDURE — C9803 HOPD COVID-19 SPEC COLLECT: HCPCS

## 2021-06-01 RX ORDER — ONDANSETRON 2 MG/ML
4 INJECTION INTRAMUSCULAR; INTRAVENOUS EVERY 6 HOURS PRN
Status: DISCONTINUED | OUTPATIENT
Start: 2021-06-01 | End: 2021-06-04 | Stop reason: HOSPADM

## 2021-06-01 RX ORDER — SERTRALINE HYDROCHLORIDE 100 MG/1
200 TABLET, FILM COATED ORAL DAILY
Status: DISCONTINUED | OUTPATIENT
Start: 2021-06-01 | End: 2021-06-04 | Stop reason: HOSPADM

## 2021-06-01 RX ORDER — NICOTINE POLACRILEX 4 MG
15-30 LOZENGE BUCCAL
Status: DISCONTINUED | OUTPATIENT
Start: 2021-06-01 | End: 2021-06-04 | Stop reason: HOSPADM

## 2021-06-01 RX ORDER — METFORMIN HCL 500 MG
1000 TABLET, EXTENDED RELEASE 24 HR ORAL
Status: DISCONTINUED | OUTPATIENT
Start: 2021-06-02 | End: 2021-06-04 | Stop reason: HOSPADM

## 2021-06-01 RX ORDER — CEFTRIAXONE 2 G/1
2 INJECTION, POWDER, FOR SOLUTION INTRAMUSCULAR; INTRAVENOUS ONCE
Status: COMPLETED | OUTPATIENT
Start: 2021-06-01 | End: 2021-06-01

## 2021-06-01 RX ORDER — ACETAMINOPHEN 325 MG/1
650 TABLET ORAL EVERY 4 HOURS PRN
Status: DISCONTINUED | OUTPATIENT
Start: 2021-06-01 | End: 2021-06-04 | Stop reason: HOSPADM

## 2021-06-01 RX ORDER — AZITHROMYCIN 500 MG/5ML
500 INJECTION, POWDER, LYOPHILIZED, FOR SOLUTION INTRAVENOUS EVERY 24 HOURS
Status: DISCONTINUED | OUTPATIENT
Start: 2021-06-02 | End: 2021-06-04 | Stop reason: HOSPADM

## 2021-06-01 RX ORDER — ASPIRIN 81 MG/1
81 TABLET ORAL EVERY EVENING
Status: DISCONTINUED | OUTPATIENT
Start: 2021-06-01 | End: 2021-06-04 | Stop reason: HOSPADM

## 2021-06-01 RX ORDER — ONDANSETRON 4 MG/1
4 TABLET, ORALLY DISINTEGRATING ORAL EVERY 6 HOURS PRN
Status: DISCONTINUED | OUTPATIENT
Start: 2021-06-01 | End: 2021-06-04 | Stop reason: HOSPADM

## 2021-06-01 RX ORDER — GABAPENTIN 300 MG/1
900 CAPSULE ORAL DAILY
Status: DISCONTINUED | OUTPATIENT
Start: 2021-06-01 | End: 2021-06-04 | Stop reason: HOSPADM

## 2021-06-01 RX ORDER — MULTIPLE VITAMINS W/ MINERALS TAB 9MG-400MCG
1 TAB ORAL DAILY
Status: DISCONTINUED | OUTPATIENT
Start: 2021-06-01 | End: 2021-06-04 | Stop reason: HOSPADM

## 2021-06-01 RX ORDER — ACETAMINOPHEN 500 MG
1000 TABLET ORAL ONCE
Status: COMPLETED | OUTPATIENT
Start: 2021-06-01 | End: 2021-06-01

## 2021-06-01 RX ORDER — PANTOPRAZOLE SODIUM 40 MG/1
40 TABLET, DELAYED RELEASE ORAL AT BEDTIME
Status: DISCONTINUED | OUTPATIENT
Start: 2021-06-01 | End: 2021-06-04 | Stop reason: HOSPADM

## 2021-06-01 RX ORDER — NYSTATIN 100000 U/G
CREAM TOPICAL 2 TIMES DAILY
Status: DISCONTINUED | OUTPATIENT
Start: 2021-06-01 | End: 2021-06-04 | Stop reason: HOSPADM

## 2021-06-01 RX ORDER — LOSARTAN POTASSIUM 100 MG/1
100 TABLET ORAL DAILY
Status: DISCONTINUED | OUTPATIENT
Start: 2021-06-01 | End: 2021-06-04 | Stop reason: HOSPADM

## 2021-06-01 RX ORDER — ZOLPIDEM TARTRATE 5 MG/1
10 TABLET ORAL AT BEDTIME
Status: DISCONTINUED | OUTPATIENT
Start: 2021-06-01 | End: 2021-06-04 | Stop reason: HOSPADM

## 2021-06-01 RX ORDER — ALBUTEROL SULFATE 90 UG/1
1-2 AEROSOL, METERED RESPIRATORY (INHALATION) EVERY 6 HOURS PRN
Status: DISCONTINUED | OUTPATIENT
Start: 2021-06-01 | End: 2021-06-04 | Stop reason: HOSPADM

## 2021-06-01 RX ORDER — AZITHROMYCIN 500 MG/5ML
500 INJECTION, POWDER, LYOPHILIZED, FOR SOLUTION INTRAVENOUS ONCE
Status: COMPLETED | OUTPATIENT
Start: 2021-06-01 | End: 2021-06-01

## 2021-06-01 RX ORDER — SIMVASTATIN 20 MG
20 TABLET ORAL AT BEDTIME
Status: DISCONTINUED | OUTPATIENT
Start: 2021-06-01 | End: 2021-06-04 | Stop reason: HOSPADM

## 2021-06-01 RX ORDER — DEXTROSE MONOHYDRATE 25 G/50ML
25-50 INJECTION, SOLUTION INTRAVENOUS
Status: DISCONTINUED | OUTPATIENT
Start: 2021-06-01 | End: 2021-06-04 | Stop reason: HOSPADM

## 2021-06-01 RX ORDER — CEFTRIAXONE 1 G/1
1 INJECTION, POWDER, FOR SOLUTION INTRAMUSCULAR; INTRAVENOUS EVERY 24 HOURS
Status: DISCONTINUED | OUTPATIENT
Start: 2021-06-02 | End: 2021-06-04 | Stop reason: HOSPADM

## 2021-06-01 RX ORDER — CLONAZEPAM 0.5 MG/1
1 TABLET ORAL 2 TIMES DAILY PRN
Status: DISCONTINUED | OUTPATIENT
Start: 2021-06-01 | End: 2021-06-04 | Stop reason: HOSPADM

## 2021-06-01 RX ORDER — LIDOCAINE 40 MG/G
CREAM TOPICAL
Status: DISCONTINUED | OUTPATIENT
Start: 2021-06-01 | End: 2021-06-04 | Stop reason: HOSPADM

## 2021-06-01 RX ADMIN — ACETAMINOPHEN 650 MG: 325 TABLET, FILM COATED ORAL at 20:56

## 2021-06-01 RX ADMIN — SIMVASTATIN 20 MG: 20 TABLET, FILM COATED ORAL at 22:37

## 2021-06-01 RX ADMIN — CEFTRIAXONE 2 G: 2 INJECTION, POWDER, FOR SOLUTION INTRAMUSCULAR; INTRAVENOUS at 15:15

## 2021-06-01 RX ADMIN — MULTIPLE VITAMINS W/ MINERALS TAB 1 TABLET: TAB at 17:52

## 2021-06-01 RX ADMIN — ACETAMINOPHEN 1000 MG: 500 TABLET, FILM COATED ORAL at 14:21

## 2021-06-01 RX ADMIN — PANTOPRAZOLE SODIUM 40 MG: 40 TABLET, DELAYED RELEASE ORAL at 22:37

## 2021-06-01 RX ADMIN — NYSTATIN: 100000 CREAM TOPICAL at 20:56

## 2021-06-01 RX ADMIN — LOSARTAN POTASSIUM 100 MG: 100 TABLET, FILM COATED ORAL at 17:52

## 2021-06-01 RX ADMIN — SERTRALINE HYDROCHLORIDE 200 MG: 100 TABLET ORAL at 17:52

## 2021-06-01 RX ADMIN — Medication 500 MG: at 15:52

## 2021-06-01 RX ADMIN — ZOLPIDEM TARTRATE 10 MG: 5 TABLET ORAL at 22:37

## 2021-06-01 RX ADMIN — GABAPENTIN 900 MG: 300 CAPSULE ORAL at 17:51

## 2021-06-01 RX ADMIN — CLONAZEPAM 1 MG: 0.5 TABLET ORAL at 22:37

## 2021-06-01 RX ADMIN — ASPIRIN 81 MG: 81 TABLET ORAL at 20:56

## 2021-06-01 ASSESSMENT — ACTIVITIES OF DAILY LIVING (ADL): ADLS_ACUITY_SCORE: 17

## 2021-06-01 ASSESSMENT — MIFFLIN-ST. JEOR: SCORE: 1594.15

## 2021-06-01 NOTE — PHARMACY-ADMISSION MEDICATION HISTORY
Admission medication history interview status for this patient is complete. See [facility] admission navigator for allergy information, prior to admission medications and immunization status.     Medication history interview done, indicate source(s): Patient  Medication history resources (including written lists, pill bottles, clinic record): Rx refill hx.  Pharmacy:  [facility] [place]    Changes made to PTA medication list:  Added:  None  Deleted:  None  Changed:  None    Actions taken by pharmacist (provider contacted, etc):None     Additional medication history information:None    Medication reconciliation/reorder completed by provider prior to medication history?  No     Prior to Admission medications    Medication Sig Last Dose Taking? Auth Provider   aspirin 81 MG EC tablet Take 81 mg by mouth every evening  5/31/2021 at pm Yes Unknown, Entered By History   clonazePAM (KLONOPIN) 1 MG tablet Take 1 tablet by mouth twice daily as needed for anxiety prn Yes Pari Wiley MD   gabapentin (NEURONTIN) 300 MG capsule Take 3 capsules (900 mg) by mouth daily 5/31/2021 at pm Yes Pari Wiley MD   losartan (COZAAR) 100 MG tablet Take 1 tablet (100 mg) by mouth daily Past Week at Unknown time Yes Pari Wiley MD   metFORMIN (GLUCOPHAGE-XR) 500 MG 24 hr tablet Take 2 tablets (1,000 mg) by mouth daily (with breakfast) 5/31/2021 at pm Yes Pari Wiley MD   multivitamin w/minerals (MULTI-VITAMIN) tablet Take 1 tablet by mouth daily 5/31/2021 at am Yes Reported, Patient   pantoprazole (PROTONIX) 40 MG EC tablet TAKE ONE TABLET BY MOUTH AT BEDTIME 5/30/2021 Yes Pari Wiley MD   sertraline (ZOLOFT) 100 MG tablet TAKE TWO TABLETS BY MOUTH ONCE DAILY 5/31/2021 at am Yes Pari Wiley MD   simvastatin (ZOCOR) 20 MG tablet Take 1 tablet (20 mg) by mouth At Bedtime 5/31/2021 at pm Yes Pari Wiley MD   temazepam (RESTORIL) 15 MG capsule Take 1 capsule (15 mg) by mouth nightly as needed for sleep prn Yes Pari Wiley MD   zolpidem  (AMBIEN) 10 MG tablet TAKE 1 TABLET BY MOUTH AT BEDTIME 5/31/2021 at pm Yes Pari Wiley MD   albuterol (PROAIR HFA/PROVENTIL HFA/VENTOLIN HFA) 108 (90 Base) MCG/ACT inhaler Inhale 1-2 puffs into the lungs every 6 hours as needed for shortness of breath / dyspnea or wheezing prn  Jose L Weaver MD   blood glucose (ACCU-CHEK BYRON PLUS) test strip USE TO TEST BLOOD SUGAR TWO TIMES A DAY OR AS DIRECTED   Pari Wiley MD

## 2021-06-01 NOTE — H&P
[facility]    History and Physical  Hospitalist       Date of Admission:  6/1/2021  Date of Service (when I saw the patient): 06/01/21    Assessment & Plan   Nicole Reyes is a 65 year old female patient with past medical history of hypertension, obstructive sleep apnea on CPAP at night with 2 L oxygen, iron deficiency anemia, recurrent aspiration pneumonia, GERD, hypertension, CKD, major depression, type 2 diabetes mellitus, obesity, restless leg syndrome who came to emergency room for evaluation for cough, shortness of breath and fever.  Patient states that she started to have cough 2 days ago which was followed by shortness of breath.  She states that her symptoms continued to worsen.  She stated that she checked her oxygen saturation this morning which was 78% on room air.  She states that she uses CPAP at night with 2 L of oxygen.  She has prior history of GERD and recurrent aspiration pneumonias.  She denies history of congestive heart failure.  Currently she is not on diuretic.  On arrival to emergency room, her vital signs showed temperature 96.7, pulse 68, blood pressure 161/87, oxygen saturation 92% on room air.  Laboratory work-up showed hypokalemia at 3.2.  Renal function is normal.  BNP is 653( normal).  WBC elevated at 16.4.  Hemoglobin is 13.2.  Chest x-ray showed cardiomegaly with central vascular congestion and mild pulmonary edema.  There is no focal consolidation or pleural effusion.  Patient was given IV ceftriaxone and azithromycin in the emergency room.  She was admitted to the hospital for further management.    1.  Acute hypoxic respiratory failure, possible pneumonia  Patient states that she has history of aspiration pneumonia related to hiatal hernia. Denies history of congestive heart failure. Chest x-ray showed cardiomegaly and vascular congestion and mild pulmonary edema.  Has no leg swelling.  BNP is normal.  White cell count elevated at 16.4.  Currently her  breathing is stable.  Patient thinks her current symptoms are very similar with her prior episodes of pneumonia.  We will continue oxygen supplement. Responded to treatment with Ceftriaxone and azithromycin during prior admission. Will continue Ceftriaxone and azithromycin.   Will hold off IV fluids at this moment.  Will check procalcitonin.  We will also check echocardiogram.  If breathing worsens, will try low-dose Lasix.    2. Hypokalemia: K 3.2. Will replace potassium per protocol.    3.  Obstructive sleep apnea: We will resume CPAP at home settings with 2 L oxygen.    4.  GERD: Resume pantoprazole    5.  Hypertension: Blood pressure stable at this moment.  Will resume losartan with hold parameters    6.  Diabetes mellitus type 2: We will resume Metformin.  We will also put her on insulin sliding scale    7.  Chronic kidney disease: Creatinine at baseline.  Creatinine 0.79 today    8.  Hyperlipidemia: We will resume simvastatin    9.  Major depression: We will resume Zoloft    We will admit patient to medical floor    DVT Prophylaxis: Pneumatic Compression Devices  Code Status: Full Code    Disposition: Expected discharge in 2 days if breathing improves    Zoe Avendano MD    Primary Care Physician   Pari Wiley    Chief Complaint   Shortness of breath, cough, fever    History is obtained from the patient    History of Present Illness   Nicole Reyes is a 65 year old female patient with past medical history of hypertension, obstructive sleep apnea on CPAP at night with 2 L oxygen, iron deficiency anemia, recurrent aspiration pneumonia, GERD, hypertension, CKD, major depression, type 2 diabetes mellitus, obesity, restless leg syndrome who came to emergency room for evaluation for cough, shortness of breath and fever.  Patient states that she started to have cough 2 days ago which was followed by shortness of breath.  She states that her symptoms continued to worsen.  She also stated that she had chills  and fever today.  She checked her oxygen saturation this morning and was 78% on room air.  She states that she had multiple episodes of aspiration pneumonia.  She thinks her current symptoms are very similar to her prior episodes of aspiration pneumonia.  She denies leg swelling.  She also denies nausea, vomiting, abdominal pain.  She has no dysuria, urgency or frequency.  Because of worsening of her symptoms, she came to emergency room for evaluation.  On arrival to emergency room, her vital signs were checked and showed temperature 96.7, pulse 68, blood pressure 161/87, oxygen saturation 92% on room air.  Laboratory work-up showed sodium 141, potassium 3.2, creatinine 0.79, , troponin less than 0.015.  WBC 16.4, hemoglobin 13.2.  Chest x-ray showed cardiomegaly with central vascular congestion and mild pulmonary edema.  There is no focal consolidation or pleural effusion.  In emergency room, she was given ceftriaxone and azithromycin for possible pneumonia.  Procalcitonin was ordered.  She was admitted to the hospital for further evaluation and management.      Past Medical History    I have reviewed this patient's medical history and updated it with pertinent information if needed.   Past Medical History:   Diagnosis Date     Arthritis     lt shoulder, rt. knee     Blood transfusion      CKD (chronic kidney disease) stage 3, GFR 30-59 ml/min      Essential hypertension, benign      Gastroesophageal reflux disease      Generalized anxiety disorder      Hernia, abdominal      Hiatal hernia      History of blood transfusion 2011    with a hernia repair     Hypertension      Insomnia, unspecified      Iron deficiency anemia 8/09     Major depression     sees a psychiatrist     Menopause     age 53     Obesity, unspecified      CRISTÓBAL (obstructive sleep apnea)     bipap     Other chronic pain     chronic pain lt arm from tendonidits     Oxygen dependent     2 LPM at home-uses at noc or extended walking      Pneumonia     due to aspiration from hiatal hernia-     Postoperative seroma 10/11    drained several times     Pure hypercholesterolemia      RLS (restless legs syndrome)      Type II or unspecified type diabetes mellitus without mention of complication, not stated as uncontrolled        Past Surgical History   I have reviewed this patient's surgical history and updated it with pertinent information if needed.  Past Surgical History:   Procedure Laterality Date     BREAST BIOPSY, RT/LT      2 times; benign      SECTION        SECTION        SECTION       COLONOSCOPY N/A 12/3/2020    Procedure: Colonoscopy with biopsy;  Surgeon: Obinna Gutierrez MD;  Location: [facility] OR     DILATION AND CURETTAGE, HYSTEROSCOPY DIAGNOSTIC, COMBINED  3/22/2013    Procedure: COMBINED DILATION AND CURETTAGE, HYSTEROSCOPY DIAGNOSTIC;  DILATION AND CURETTAGE, HYSTEROSCOPY DIAGNOSTIC   Converted to a general;  Surgeon: Robi Edwards MD;  Location:  OR     ESOPHAGOSCOPY, GASTROSCOPY, DUODENOSCOPY (EGD), COMBINED N/A 2015    Procedure: COMBINED ESOPHAGOSCOPY, GASTROSCOPY, DUODENOSCOPY (EGD);  Surgeon: Obinna Gutierrez MD;  Location:  GI     ESOPHAGOSCOPY, GASTROSCOPY, DUODENOSCOPY (EGD), COMBINED N/A 2015    Procedure: COMBINED ENDOSCOPIC ULTRASOUND, ESOPHAGOSCOPY, GASTROSCOPY, DUODENOSCOPY (EGD), FINE NEEDLE ASPIRATE/BIOPSY;  Surgeon: Sabine Olivares MD;  Location:  GI     ESOPHAGOSCOPY, GASTROSCOPY, DUODENOSCOPY (EGD), COMBINED N/A 1/3/2020    Procedure: ESOPHAGOGASTRODUODENOSCOPY;  Surgeon: Ascencion Stauffer MD;  Location: [facility] OR     HERNIORRHAPHY INCISIONAL (LOCATION)  10/8/2011     incarcerated hernia repair with mesh;     HERNIORRHAPHY INCISIONAL (LOCATION)  10/16/2011     repair incisional hernia with mesh, and removal of current implanted mesh;     ZZC NONSPECIFIC PROCEDURE      Hernia repair      ZZC NONSPECIFIC PROCEDURE      Lymph node biopsy in neck (benign)         Prior to Admission Medications   Prior to Admission Medications   Prescriptions Last Dose Informant Patient Reported? Taking?   albuterol (PROAIR HFA/PROVENTIL HFA/VENTOLIN HFA) 108 (90 Base) MCG/ACT inhaler prn  No No   Sig: Inhale 1-2 puffs into the lungs every 6 hours as needed for shortness of breath / dyspnea or wheezing   aspirin 81 MG EC tablet 5/31/2021 at pm  Yes Yes   Sig: Take 81 mg by mouth every evening    blood glucose (ACCU-CHEK BYRON PLUS) test strip   No No   Sig: USE TO TEST BLOOD SUGAR TWO TIMES A DAY OR AS DIRECTED   clonazePAM (KLONOPIN) 1 MG tablet prn  No Yes   Sig: Take 1 tablet by mouth twice daily as needed for anxiety   gabapentin (NEURONTIN) 300 MG capsule 5/31/2021 at pm  No Yes   Sig: Take 3 capsules (900 mg) by mouth daily   losartan (COZAAR) 100 MG tablet Past Week at Unknown time  No Yes   Sig: Take 1 tablet (100 mg) by mouth daily   metFORMIN (GLUCOPHAGE-XR) 500 MG 24 hr tablet 5/31/2021 at pm  No Yes   Sig: Take 2 tablets (1,000 mg) by mouth daily (with breakfast)   multivitamin w/minerals (MULTI-VITAMIN) tablet 5/31/2021 at am  Yes Yes   Sig: Take 1 tablet by mouth daily   pantoprazole (PROTONIX) 40 MG EC tablet 5/30/2021  No Yes   Sig: TAKE ONE TABLET BY MOUTH AT BEDTIME   sertraline (ZOLOFT) 100 MG tablet 5/31/2021 at am  No Yes   Sig: TAKE TWO TABLETS BY MOUTH ONCE DAILY   simvastatin (ZOCOR) 20 MG tablet 5/31/2021 at pm  No Yes   Sig: Take 1 tablet (20 mg) by mouth At Bedtime   temazepam (RESTORIL) 15 MG capsule prn  No Yes   Sig: Take 1 capsule (15 mg) by mouth nightly as needed for sleep   zolpidem (AMBIEN) 10 MG tablet 5/31/2021 at pm  No Yes   Sig: TAKE 1 TABLET BY MOUTH AT BEDTIME      Facility-Administered Medications: None     Allergies   Allergies   Allergen Reactions     Codeine Rash     Penicillins Rash     Pt has tolerated cephalosporins and penems.   Pt tolerated Zosyn 7/6/2019       Social History   I have reviewed this patient's social history and updated  it with pertinent information if needed. Nicole Reyes  reports that she quit smoking about 42 years ago. She has never used smokeless tobacco. She reports current alcohol use. She reports that she does not use drugs.    Family History   I have reviewed this patient's family history and updated it with pertinent information if needed.   Family History   Problem Relation Age of Onset     Cardiovascular Mother         CHF     Hypertension Mother      C.A.D. Mother         50s     Lipids Mother      Cancer Father         Hodgkin's, Leukemia     Diabetes Sister      Connective Tissue Disorder Sister         Lupus     Lipids Sister      Hypertension Brother      Hypertension Sister      Hypertension Sister      Cancer Brother         Hodgkin's     C.A.D. Brother 61     Hypertension Sister      C.A.D. Brother      Basal cell carcinoma Daughter 38        top of head       Review of Systems   The 10 point Review of Systems is negative other than noted in the HPI or here. Shortness of breath, cough, fever    Physical Exam   Temp: 99.7  F (37.6  C) Temp src: Oral BP: (!) 156/81 Pulse: 89   Resp: 20 SpO2: 99 % O2 Device: Nasal cannula Oxygen Delivery: 2 LPM  Vital Signs with Ranges  Temp:  [96.7  F (35.9  C)-99.7  F (37.6  C)] 99.7  F (37.6  C)  Pulse:  [68-89] 89  Resp:  [20] 20  BP: (153-182)/() 156/81  SpO2:  [86 %-99 %] 99 %  0 lbs 0 oz    GEN:  Alert, oriented x 3, appears comfortable, NAD.  HEENT:  Normocephalic/atraumatic, no scleral icterus, no nasal discharge, mouth moist.  CV:  Regular rate and rhythm, no murmur or JVD.  S1 + S2 noted, no S3 or S4.  LUNGS:  Clear to auscultation bilaterally without rales/rhonchi/wheezing/retractions.  Symmetric chest rise on inhalation noted.  ABD:  Active bowel sounds, soft, non-tender/non-distended.  No rebound/guarding/rigidity.  EXT:  No edema or cyanosis.  Hands/feet warm to touch with good signs of peripheral perfusion.  No joint synovitis noted.  SKIN:  Dry to  touch, no exanthems noted in the visualized areas.  NEURO:  Symmetric muscle strength, sensation to touch grossly intact.  No new focal deficits appreciated.    Data   Data reviewed today:  I personally reviewed  Recent Labs   Lab 06/01/21  1246 05/28/21  1502   WBC 16.4*  --    HGB 13.2  --    MCV 81  --      --     141   POTASSIUM 3.2* 3.7   CHLORIDE 105 108   CO2 34* 28   BUN 9 10   CR 0.79 0.72   ANIONGAP 2* 5   GRECIA 10.8* 8.3*   * 109*   TROPI <0.015  --        Recent Results (from the past 24 hour(s))   XR Chest Port 1 View    Narrative    XR CHEST PORT 1 VIEW 6/1/2021 12:52 PM    HISTORY: dyspnea    COMPARISON: Chest x-ray 11/8/2020      Impression    IMPRESSION: Cardiomegaly with central vascular congestion and mild  pulmonary edema. No focal pneumonic consolidation or pleural effusion.    NORTH GOODMAN MD

## 2021-06-01 NOTE — ED NOTES
[facility]  ED Nurse Handoff Report    Nicole Reyes is a 65 year old female   ED Chief complaint: Shortness of Breath  . ED Diagnosis:   Final diagnoses:   Hypoxia   Pneumonia due to infectious organism, unspecified laterality, unspecified part of lung     Allergies:   Allergies   Allergen Reactions     Codeine Rash     Penicillins Rash     Pt has tolerated cephalosporins and penems.   Pt tolerated Zosyn 7/6/2019       Code Status: Full Code  Activity level - Baseline/Home:  Independent. Activity Level - Current:   Assist X 1. Lift room needed: No. Bariatric: No   Needed: No   Isolation: No. Infection: Not Applicable.     Vital Signs:   Vitals:    06/01/21 1445 06/01/21 1500 06/01/21 1515 06/01/21 1530   BP: (!) 156/81 (!) 151/66 (!) 148/105    Pulse: 89 89 87    Resp:       Temp:       TempSrc:       SpO2: 99% 100% 98% 92%       Cardiac Rhythm:  ,      Pain level:    Patient confused: No. Patient Falls Risk: Yes.   Elimination Status: Has voided   Patient Report - Initial Complaint: SOB . Focused Assessment: Nicole Reyes is a 65 year old female with history of pneumonia who presents for evaluation of cough and malaise.  Patient states she has been working hard over the weekend to prepare home for her 's return, as he has been recently hospitalized for an ankle fracture.  Last night, the patient developed frequent coughing and malaise.  This morning she felt generally weak and her cough continued.  She called 911 and was found to have oxygen saturations in the high 80s on room air.  On presentation to the ED, she does note chills and body aches.  Of note, the patient had [employer] COVID-19 immunization in March 2021.  She denies chest pain, dyspnea, leg swelling, or any other concerns.     Review of Systems  Gen: + as per above  Resp: + as per above  All other systems reviewed and negative   Tests Performed: EKG, Labs, Imaging . Abnormal Results:   Labs Ordered and  Resulted from Time of ED Arrival Up to the Time of Departure from the ED   CBC WITH PLATELETS DIFFERENTIAL - Abnormal; Notable for the following components:       Result Value    WBC 16.4 (*)     RBC Count 5.35 (*)     MCH 24.7 (*)     MCHC 30.6 (*)     RDW 16.2 (*)     Absolute Neutrophil 14.0 (*)     All other components within normal limits   BASIC METABOLIC PANEL - Abnormal; Notable for the following components:    Potassium 3.2 (*)     Carbon Dioxide 34 (*)     Anion Gap 2 (*)     Glucose 120 (*)     Calcium 10.8 (*)     All other components within normal limits   SARS-COV-2 (COVID-19) VIRUS RT-PCR   NT PROBNP INPATIENT   TROPONIN I   PROCALCITONIN   BLOOD CULTURE   BLOOD CULTURE     XR Chest Port 1 View   Final Result   IMPRESSION: Cardiomegaly with central vascular congestion and mild   pulmonary edema. No focal pneumonic consolidation or pleural effusion.      NORTH GOODMAN MD        .   Treatments provided: See MAR   Family Comments: N/A  OBS brochure/video discussed/provided to patient:  No  ED Medications:   Medications   cefTRIAXone (ROCEPHIN) 2 g vial to attach to  ml bag for ADULTS or NS 50 ml bag for PEDS (2 g Intravenous New Bag 6/1/21 1515)   azithromycin 500 mg (ZITHROMAX) in 0.9% NaCl 250 mL intermittent infusion 500 mg (has no administration in time range)   acetaminophen (TYLENOL) tablet 1,000 mg (1,000 mg Oral Given 6/1/21 1421)     Drips infusing:  Yes  For the majority of the shift, the patient's behavior Green. Interventions performed were N/A.    Sepsis treatment initiated: No     Patient tested for COVID 19 prior to admission: YES    ED Nurse Name/Phone Number: Suzanne Iyer RN,   3:37 PM  RECEIVING UNIT ED HANDOFF REVIEW    Above ED Nurse Handoff Report was reviewed: Yes  Reviewed by: Angela Mari RN on June 1, 2021 at 3:52 PM

## 2021-06-01 NOTE — ED TRIAGE NOTES
A&O x4.  ABC's intact.      Pt arrives with c/o SOB and cough since yesterday concerned for pneumonia. History of pneumonia. Oxygen level low in triage applied 2 liters nasal cannula.

## 2021-06-01 NOTE — ED PROVIDER NOTES
History   Chief Complaint:  Shortness of Breath       The history is provided by the patient.      Nicole Reyes is a 65 year old female with history of pneumonia who presents for evaluation of cough and malaise.  Patient states she has been working hard over the weekend to prepare home for her 's return, as he has been recently hospitalized for an ankle fracture.  Last night, the patient developed frequent coughing and malaise.  This morning she felt generally weak and her cough continued.  She called 911 and was found to have oxygen saturations in the high 80s on room air.  On presentation to the ED, she does note chills and body aches.  Of note, the patient had [employer] COVID-19 immunization in 2021.  She denies chest pain, dyspnea, leg swelling, or any other concerns.    Review of Systems  Gen: + as per above  Resp: + as per above  All other systems reviewed and negative      Allergies:  Codeine  Penicillins    Medications:  Albuterol inhaler   Aspirin 81 mg   Clonazepam   Gabapentin   Losartan   Metformin   Protonix  Pantoprazole   Sertraline    Simvastatin   Ambien   Temazepam     Past Medical History:    Arthritis   Blood transfusion   CKD stage 3  Hypertension   GERD  RADHA  Abdominal hernia   Hiatal hernia   Depression   Obesity  CRISTÓBAL  Chronic pain   Oxygen dependent   Pneumonia   RLS  Type 2 diabetes  Midline low back pain with left-sided sciatica  Recurrent aspiration pneumoniaecurrent aspiration pneumonia    Past Surgical History:     section   Colonoscopy x3  Dilation and curettage   EGD x3  Hernia repair x3    Family History:    CHF, mother   Hypertension   CAD  Lipids   Cancer  Diabetes   Lupus     Social History:  Smoking status: former smoker  Alcohol use: yes  Drug use: no  PCP: Pari Wiley  Presents to the ED alone      Physical Exam     Patient Vitals for the past 24 hrs:   BP Temp Temp src Pulse Resp SpO2   21 1445 (!) 156/81 -- -- 89 -- 99 %   21  1430 -- -- -- -- -- 91 %   06/01/21 1415 (!) 169/85 -- -- 79 -- 97 %   06/01/21 1400 (!) 153/109 -- -- 76 -- 96 %   06/01/21 1345 (!) 164/79 -- -- 77 -- 94 %   06/01/21 1330 -- 99.7  F (37.6  C) Oral 73 -- 98 %   06/01/21 1320 -- -- -- 77 -- 95 %   06/01/21 1310 -- -- -- 72 -- 95 %   06/01/21 1300 (!) 165/82 -- -- 72 -- 92 %   06/01/21 1250 (!) 161/87 -- -- 68 -- 92 %   06/01/21 1240 -- -- -- -- -- 93 %   06/01/21 1223 (!) 182/100 96.7  F (35.9  C) Temporal 83 20 (!) 86 %       Physical Exam  Constitutional: Alert, attentive  HENT:    Nose: Nose normal.    Mouth/Throat: Oropharynx is clear, mucous membranes are moist   Eyes: EOM are normal.   CV: regular rate and rhythm; no murmurs, rubs or gallups  Chest: Effort normal and breath sounds normal.   GI:  There is no tenderness. No distension. Normal bowel sounds  MSK: Normal range of motion.   Neurological: Alert, attentive  Skin: Skin is warm and dry.        Emergency Department Course     ECG:  ECG taken at 1237, ECG read at 1239  Normal sinus rhythm  Normal ECG  No significant change as compared to prior, dated 11/8/20.   Rate 70 bpm. AL interval 186 ms. QRS duration 84 ms. QT/QTc 400/432 ms. P-R-T axes 70 24 79.    Imaging:  Chest  XR PORT:  Cardiomegaly with central vascular congestion and mild pulmonary edema. No focal pneumonic consolidation or pleural effusion.     Reading per radiology     Laboratory:  CBC: WBC 16.4(H), HGB 13.2,    BMP: Glucose 120(H), Potassium 3.2(L), carbon dioxide 34(L), anion gap 2 (L), calcium 10.8(H), o/w WNL (Creatinine: 0.79)    Troponin (Collected 1246): <0.015  BNP: 653  procalcitonin pending  Blood Cultures x2: Pending  Symptomatic Influenza A/B antigen & COVID-19 PCR: negative    Emergency Department Course:  Reviewed:  I reviewed the patient's nursing notes, vitals, past medical records, Care Everywhere.     I reviewed her 2019 TTE:  The visual ejection fraction is estimated at 55-60%.  The left atrium is mild to  moderately dilated.  The study was technically difficult. Optison contrast was used without  apparent complications.    Assessments:  1244 I performed an assessment and examination of the patient as noted above.      1445 Findings and plan explained to the Patient. Patient discharged home with instructions regarding supportive care, medications, and reasons to return. The importance of close follow-up was reviewed.     Consults:  1439  I spoke to Dr. Avendano of the hospitalist service who accepts the patient for admission.     Interventions:  1421 Acetaminophen, 1000 mg, PO  Rocephin, 1 g, IV injection  Azithromycin, 500 mg, IV    Disposition:  The patient was admitted to the hospital under the care of Dr. Avendano.       Impression & Plan   Medical Decision Making:  Nicole Reyes is a 65 year old female with history of pneumonia who presents for evaluation of concerns for the same with incidental hypoxia. Here the patient does note chills and has leukocytosis with cough but is afebrile. Chest x-ray shows possible pulmonary edema without other clinical or radiographic evidence of CHF, and BNP is essentially normal. Covid testing is negative. URI with mild CHF pulmonary edema is considered. Given the above symptoms, will plan for empiric treatment of community-acquired pneumonia and admit for echocardiogram. She is hemodynamically stable and safe for admission at this time.      Covid-19  Nicole Reyes was evaluated during a global COVID-19 pandemic, which necessitated consideration that the patient might be at risk for infection with the SARS-CoV-2 virus that causes COVID-19.   Applicable protocols for evaluation were followed during the patient's care.   COVID-19 was considered as part of the patient's evaluation. The plan for testing is:  a test was obtained during this visit.    Diagnosis:    ICD-10-CM    1. Hypoxia  R09.02 Blood culture     Blood culture     Symptomatic SARS-CoV-2 COVID-19 Virus  (Coronavirus) by PCR   2. Pneumonia due to infectious organism, unspecified laterality, unspecified part of lung  J18.9        Scribe Disclosure:  I, Eva Rodríguez, am serving as a scribe at 12:44 PM on 6/1/2021 to document services personally performed by Bhavesh Sanchez MD based on my observations and the provider's statements to me.        Bhavesh Sanchez MD  06/01/21 1547

## 2021-06-02 ENCOUNTER — APPOINTMENT (OUTPATIENT)
Dept: CARDIOLOGY | Facility: CLINIC | Age: 66
DRG: 177 | End: 2021-06-02
Attending: INTERNAL MEDICINE
Payer: MEDICARE

## 2021-06-02 LAB
ANION GAP SERPL CALCULATED.3IONS-SCNC: <1 MMOL/L (ref 3–14)
BASOPHILS # BLD AUTO: 0 10E9/L (ref 0–0.2)
BASOPHILS NFR BLD AUTO: 0.2 %
BUN SERPL-MCNC: 11 MG/DL (ref 7–30)
CALCIUM SERPL-MCNC: 9.1 MG/DL (ref 8.5–10.1)
CHLORIDE SERPL-SCNC: 103 MMOL/L (ref 94–109)
CO2 SERPL-SCNC: 37 MMOL/L (ref 20–32)
CREAT SERPL-MCNC: 0.85 MG/DL (ref 0.52–1.04)
DIFFERENTIAL METHOD BLD: ABNORMAL
EOSINOPHIL # BLD AUTO: 0.3 10E9/L (ref 0–0.7)
EOSINOPHIL NFR BLD AUTO: 1.4 %
ERYTHROCYTE [DISTWIDTH] IN BLOOD BY AUTOMATED COUNT: 16.1 % (ref 10–15)
GFR SERPL CREATININE-BSD FRML MDRD: 72 ML/MIN/{1.73_M2}
GLUCOSE BLDC GLUCOMTR-MCNC: 107 MG/DL (ref 70–99)
GLUCOSE BLDC GLUCOMTR-MCNC: 110 MG/DL (ref 70–99)
GLUCOSE BLDC GLUCOMTR-MCNC: 113 MG/DL (ref 70–99)
GLUCOSE BLDC GLUCOMTR-MCNC: 118 MG/DL (ref 70–99)
GLUCOSE BLDC GLUCOMTR-MCNC: 127 MG/DL (ref 70–99)
GLUCOSE BLDC GLUCOMTR-MCNC: 130 MG/DL (ref 70–99)
GLUCOSE SERPL-MCNC: 128 MG/DL (ref 70–99)
HCT VFR BLD AUTO: 35.5 % (ref 35–47)
HGB BLD-MCNC: 10.9 G/DL (ref 11.7–15.7)
IMM GRANULOCYTES # BLD: 0.1 10E9/L (ref 0–0.4)
IMM GRANULOCYTES NFR BLD: 0.6 %
INTERPRETATION ECG - MUSE: NORMAL
LYMPHOCYTES # BLD AUTO: 1.5 10E9/L (ref 0.8–5.3)
LYMPHOCYTES NFR BLD AUTO: 8.2 %
MCH RBC QN AUTO: 24.6 PG (ref 26.5–33)
MCHC RBC AUTO-ENTMCNC: 30.7 G/DL (ref 31.5–36.5)
MCV RBC AUTO: 80 FL (ref 78–100)
MONOCYTES # BLD AUTO: 0.9 10E9/L (ref 0–1.3)
MONOCYTES NFR BLD AUTO: 5.2 %
NEUTROPHILS # BLD AUTO: 15.4 10E9/L (ref 1.6–8.3)
NEUTROPHILS NFR BLD AUTO: 84.4 %
NRBC # BLD AUTO: 0 10*3/UL
NRBC BLD AUTO-RTO: 0 /100
PLATELET # BLD AUTO: 228 10E9/L (ref 150–450)
POTASSIUM SERPL-SCNC: 4 MMOL/L (ref 3.4–5.3)
RBC # BLD AUTO: 4.43 10E12/L (ref 3.8–5.2)
SODIUM SERPL-SCNC: 139 MMOL/L (ref 133–144)
WBC # BLD AUTO: 18.2 10E9/L (ref 4–11)

## 2021-06-02 PROCEDURE — 258N000003 HC RX IP 258 OP 636: Performed by: INTERNAL MEDICINE

## 2021-06-02 PROCEDURE — 250N000013 HC RX MED GY IP 250 OP 250 PS 637: Performed by: INTERNAL MEDICINE

## 2021-06-02 PROCEDURE — 255N000002 HC RX 255 OP 636: Performed by: INTERNAL MEDICINE

## 2021-06-02 PROCEDURE — 999N001017 HC STATISTIC GLUCOSE BY METER IP

## 2021-06-02 PROCEDURE — 93306 TTE W/DOPPLER COMPLETE: CPT | Mod: 26 | Performed by: INTERNAL MEDICINE

## 2021-06-02 PROCEDURE — 120N000001 HC R&B MED SURG/OB

## 2021-06-02 PROCEDURE — 99233 SBSQ HOSP IP/OBS HIGH 50: CPT | Performed by: INTERNAL MEDICINE

## 2021-06-02 PROCEDURE — 250N000011 HC RX IP 250 OP 636: Performed by: INTERNAL MEDICINE

## 2021-06-02 PROCEDURE — 999N000208 ECHOCARDIOGRAM COMPLETE

## 2021-06-02 PROCEDURE — 250N000013 HC RX MED GY IP 250 OP 250 PS 637: Performed by: HOSPITALIST

## 2021-06-02 PROCEDURE — 85025 COMPLETE CBC W/AUTO DIFF WBC: CPT | Performed by: INTERNAL MEDICINE

## 2021-06-02 PROCEDURE — 36415 COLL VENOUS BLD VENIPUNCTURE: CPT | Performed by: INTERNAL MEDICINE

## 2021-06-02 PROCEDURE — 80048 BASIC METABOLIC PNL TOTAL CA: CPT | Performed by: INTERNAL MEDICINE

## 2021-06-02 RX ORDER — TEMAZEPAM 15 MG/1
15 CAPSULE ORAL
Status: DISCONTINUED | OUTPATIENT
Start: 2021-06-02 | End: 2021-06-02

## 2021-06-02 RX ORDER — IBUPROFEN 600 MG/1
600 TABLET, FILM COATED ORAL 3 TIMES DAILY PRN
Status: DISCONTINUED | OUTPATIENT
Start: 2021-06-02 | End: 2021-06-04 | Stop reason: HOSPADM

## 2021-06-02 RX ADMIN — GABAPENTIN 900 MG: 300 CAPSULE ORAL at 09:41

## 2021-06-02 RX ADMIN — CLONAZEPAM 1 MG: 0.5 TABLET ORAL at 23:42

## 2021-06-02 RX ADMIN — NYSTATIN: 100000 CREAM TOPICAL at 21:49

## 2021-06-02 RX ADMIN — LOSARTAN POTASSIUM 100 MG: 100 TABLET, FILM COATED ORAL at 09:41

## 2021-06-02 RX ADMIN — HUMAN ALBUMIN MICROSPHERES AND PERFLUTREN 3 ML: 10; .22 INJECTION, SOLUTION INTRAVENOUS at 10:10

## 2021-06-02 RX ADMIN — SIMVASTATIN 20 MG: 20 TABLET, FILM COATED ORAL at 21:46

## 2021-06-02 RX ADMIN — METFORMIN ER 500 MG 1000 MG: 500 TABLET ORAL at 09:41

## 2021-06-02 RX ADMIN — SERTRALINE HYDROCHLORIDE 200 MG: 100 TABLET ORAL at 09:41

## 2021-06-02 RX ADMIN — PANTOPRAZOLE SODIUM 40 MG: 40 TABLET, DELAYED RELEASE ORAL at 21:46

## 2021-06-02 RX ADMIN — ZOLPIDEM TARTRATE 10 MG: 5 TABLET ORAL at 23:42

## 2021-06-02 RX ADMIN — IBUPROFEN 600 MG: 600 TABLET, FILM COATED ORAL at 11:27

## 2021-06-02 RX ADMIN — ENOXAPARIN SODIUM 40 MG: 40 INJECTION SUBCUTANEOUS at 15:01

## 2021-06-02 RX ADMIN — ASPIRIN 81 MG: 81 TABLET ORAL at 21:47

## 2021-06-02 RX ADMIN — CEFTRIAXONE 1 G: 1 INJECTION, POWDER, FOR SOLUTION INTRAMUSCULAR; INTRAVENOUS at 14:09

## 2021-06-02 RX ADMIN — MULTIPLE VITAMINS W/ MINERALS TAB 1 TABLET: TAB at 09:41

## 2021-06-02 RX ADMIN — ACETAMINOPHEN 650 MG: 325 TABLET, FILM COATED ORAL at 01:23

## 2021-06-02 RX ADMIN — AZITHROMYCIN MONOHYDRATE 500 MG: 500 INJECTION, POWDER, LYOPHILIZED, FOR SOLUTION INTRAVENOUS at 15:01

## 2021-06-02 RX ADMIN — NYSTATIN: 100000 CREAM TOPICAL at 09:46

## 2021-06-02 ASSESSMENT — ACTIVITIES OF DAILY LIVING (ADL)
ADLS_ACUITY_SCORE: 17
ADLS_ACUITY_SCORE: 15
ADLS_ACUITY_SCORE: 17

## 2021-06-02 NOTE — PROGRESS NOTES
[facility]  Hospitalist Progress Note  Sma Novak MD 06/02/2021    Reason for Stay (Diagnosis): Acute hypoxic respiratory failure.         Assessment and Plan:      Summary of Stay: Nicole Reyes is a 65 year old female with PMH of HTN, CRISTÓBAL on CPAP at night with 2 L oxygen, iron deficiency anemia, recurrent aspiration pneumonia, GERD,  CKD, MDD, DM II, obesity, she  came to emergency room with a complaint of cough, shortness of breath and fever of 2 days duration and admitted on 6/1/2021.      Problem List:   1.  Acute hypoxic respiratory failure, secondary to suspected aspiration pneumonia.  -She has history of aspiration pneumonia elated to hiatal hernia.  -Chest x-ray showed cardiomegaly and vascular congestion and mild pulmonary edema.    -No sign of congestive heart failure .  -This is likely due to aspiration pneumonia .  -Continue close monitoring, oxygen supplementation, .  -Continue IV antibiotics Zithromax and Rocephin .  -Procalcitonin is normal.  -Echocardiogram is showed normal ejection fraction 60 to 65% .     2. Hypokalemia.  -Replace per protocol potassium is 4.     3.  Obstructive sleep apnea:   -Patient will have her on CPAP from home today .  -She used her facility CPAP last night .  -She normally uses 2 L of oxygen with CPAP.      4.  Diabetes mellitus type 2:   -Continue Metformin .  -Continue sliding scale insulin .      DVT Prophylaxis: Enoxaparin (Lovenox) SQ  Code Status: Full Code  Discharge Dispo: Home  Estimated Disch Date / # of Days until Disch: 2 days if continues to improve.  I discussed with patient plan of care.  She does not appear to have any questions today.      Interval History (Subjective):      Patient seen and examined, assumed care today, no new overnight issues, stated she is tired and sleepy this morning, no nausea or vomiting, no chest pain.                  Physical Exam:      Last Vital Signs:  /47 (BP Location: Right arm)   Pulse  "67   Temp 98.1  F (36.7  C) (Oral)   Resp 18   Ht 1.499 m (4' 11\")   Wt 114.4 kg (252 lb 1.6 oz)   LMP 09/15/2007   SpO2 92%   BMI 50.92 kg/m      I/O last 3 completed shifts:  In: 240 [P.O.:240]  Out: -   Vitals:    06/01/21 1747   Weight: 114.4 kg (252 lb 1.6 oz)     Current Facility-Administered Medications   Medication     acetaminophen (TYLENOL) tablet 650 mg     albuterol (PROAIR HFA/PROVENTIL HFA/VENTOLIN HFA) 108 (90 Base) MCG/ACT inhaler 1-2 puff     aspirin EC tablet 81 mg     azithromycin 500 mg (ZITHROMAX) in 0.9% NaCl 250 mL intermittent infusion 500 mg     cefTRIAXone (ROCEPHIN) 1 g vial to attach to  mL bag for ADULTS or NS 50 mL bag for PEDS     clonazePAM (klonoPIN) tablet 1 mg     glucose gel 15-30 g    Or     dextrose 50 % injection 25-50 mL    Or     glucagon injection 1 mg     gabapentin (NEURONTIN) capsule 900 mg     ibuprofen (ADVIL/MOTRIN) tablet 600 mg     insulin aspart (NovoLOG) injection (RAPID ACTING)     insulin aspart (NovoLOG) injection (RAPID ACTING)     lidocaine (LMX4) cream     lidocaine 1 % 0.1-1 mL     losartan (COZAAR) tablet 100 mg     melatonin tablet 1 mg     metFORMIN (GLUCOPHAGE-XR) 24 hr tablet 1,000 mg     multivitamin w/minerals (THERA-VIT-M) tablet 1 tablet     nystatin (MYCOSTATIN) cream     ondansetron (ZOFRAN-ODT) ODT tab 4 mg    Or     ondansetron (ZOFRAN) injection 4 mg     pantoprazole (PROTONIX) EC tablet 40 mg     sertraline (ZOLOFT) tablet 200 mg     simvastatin (ZOCOR) tablet 20 mg     sodium chloride (PF) 0.9% PF flush 3 mL     sodium chloride (PF) 0.9% PF flush 3 mL     zolpidem (AMBIEN) tablet 10 mg       Constitutional: Awake, alert, cooperative, no apparent distress   Respiratory: Good air entry anteriorly, scattered crackles bilaterally posterior basal, no wheezing.   Cardiovascular: Regular rate and rhythm, normal S1 and S2, and no murmur noted   Abdomen: Normal bowel sounds, soft, non-distended, non-tender   Skin: No rashes, no cyanosis, " dry to touch   Neuro: Alert and oriented x3, no weakness, numbness, memory basal posterior   Extremities:  Trace edema, normal range of motion   Other(s):HEENT  Pink, nonicteric, moist oral mucosa.       All other systems: Negative          Medications:      All current medications were reviewed with changes reflected in problem list.         Data:      All new lab and imaging data was reviewed.   Labs:  Recent Labs   Lab 21  1259 21  1246   CULT No growth after 14 hours No growth after 14 hours     Recent Labs   Lab 21  0626 21  1716 21  1246 21  1502     --  141 141   POTASSIUM 4.0 3.6 3.2* 3.7   CHLORIDE 103  --  105 108   CO2 37*  --  34* 28   ANIONGAP <1*  --  2* 5   *  --  120* 109*   BUN 11  --  9 10   CR 0.85  --  0.79 0.72   GFRESTIMATED 72  --  79 88   GFRESTBLACK 83  --  >90 >90   GRECIA 9.1  --  10.8* 8.3*     Recent Labs   Lab 21  0626 21  1246   WBC 18.2* 16.4*   HGB 10.9* 13.2   HCT 35.5 43.1   MCV 80 81    290     Recent Labs   Lab 21  0806 21  0626 21  0136 21  2141 21  1838 21  1246 21  1502   GLC  --  128*  --   --   --  120* 109*   *  --  107* 148* 109*  --   --       Imaging:   Results for orders placed or performed during the hospital encounter of 21   XR Chest Port 1 View    Narrative    XR CHEST PORT 1 VIEW 2021 12:52 PM    HISTORY: dyspnea    COMPARISON: Chest x-ray 2020      Impression    IMPRESSION: Cardiomegaly with central vascular congestion and mild  pulmonary edema. No focal pneumonic consolidation or pleural effusion.    NORTH GOODMAN MD   Echocardiogram Complete    Narrative    [id number]  [id number]  [id number]  [id number]^TOMASVILMAA^LISSETTE^[facility]  Echocardiography Laboratory  [address] [place] [address]     Name: BOBBI PATELLYN M  MRN: [id number]  : 1955  Study Date: 2021 09:46 AM  Age: 65  yrs  Gender: Female  Patient Location: [facility]  Reason For Study: SOB  Ordering Physician: LISSETTE COLON  Referring Physician: Pari Wiley MD  Performed By: Sarah Dias NEHAL     BSA: 2.0 m2  Height: 59 in  Weight: 252 lb  HR: 75  BP: 163/66 mmHg  ______________________________________________________________________________  Procedure  Complete Portable Echo Adult. Optison (NDC #2885-7137) given intravenously.  ______________________________________________________________________________  Interpretation Summary     The visual ejection fraction is estimated at 60-65%.  Left ventricular systolic function is normal.  The study was technically difficult. The study was technically limited.  ______________________________________________________________________________  Left Ventricle  The left ventricle is normal in size. There is mild to moderate concentric  left ventricular hypertrophy. The visual ejection fraction is estimated at 60-  65%. Diastolic Doppler findings (E/E' ratio and/or other parameters) suggest  left ventricular filling pressures are indeterminate. Left ventricular  systolic function is normal. Septal motion is consistent with conduction  abnormality.     Right Ventricle  The right ventricle is normal in size and function.     Atria  The left atrium is mildly dilated. The right atrium is mildly dilated. There  is no color Doppler evidence of an atrial shunt.     Mitral Valve  There is trace mitral regurgitation.     Tricuspid Valve  There is mild (1+) tricuspid regurgitation. The right ventricular systolic  pressure is approximated at 23.7 mmHg plus the right atrial pressure.     Aortic Valve  The aortic valve is not well visualized. No aortic regurgitation is present.  The peak AoV pressure gradient is 18.0 mmHg. The mean AoV pressure gradient is  11.0 mmHg. The calculated aortic valve are is 1.9 cm^2. Mild valvular aortic  stenosis.     Pulmonic Valve  The pulmonic valve is not well  visualized. There is trace pulmonic valvular  regurgitation.     Vessels  The aortic root is normal size. Normal size ascending aorta. IVC diameter >2.1  cm collapsing <50% with sniff suggests a high RA pressure estimated at 15 mmHg  or greater.     Pericardium  There is no pericardial effusion.     Rhythm  Sinus rhythm was noted.  ______________________________________________________________________________  MMode/2D Measurements & Calculations  IVSd: 1.3 cm     LVIDd: 3.9 cm  LVIDs: 2.3 cm  LVPWd: 1.5 cm  FS: 40.3 %  LV mass(C)d: 203.4 grams  LV mass(C)dI: 100.0 grams/m2  Ao root diam: 2.6 cm  LA dimension: 3.6 cm  asc Aorta Diam: 2.9 cm  LA/Ao: 1.4  LVOT diam: 1.9 cm  LVOT area: 2.9 cm2  LA Volume (BP): 75.0 ml  LA Volume Index (BP): 36.9 ml/m2  RWT: 0.76     Doppler Measurements & Calculations  MV E max nic: 119.0 cm/sec  MV A max nic: 110.2 cm/sec  MV E/A: 1.1  MV dec time: 0.14 sec  Ao V2 max: 213.1 cm/sec  Ao max P.0 mmHg  Ao V2 mean: 158.2 cm/sec  Ao mean P.0 mmHg  Ao V2 VTI: 48.5 cm  DEB(I,D): 1.9 cm2  DEB(V,D): 1.8 cm2  LV V1 max P.2 mmHg  LV V1 max: 134.4 cm/sec  LV V1 VTI: 32.3 cm  SV(LVOT): 93.8 ml  SI(LVOT): 46.1 ml/m2  TR max nic: 243.4 cm/sec  TR max P.7 mmHg  AV Nic Ratio (DI): 0.63  DEB Index (cm2/m2): 0.95  E/E' av.4  Lateral E/e': 10.3  Medial E/e': 12.6     ______________________________________________________________________________  Report approved by: Ramon Saul 2021 12:47 PM           *Note: Due to a large number of results and/or encounters for the requested time period, some results have not been displayed. A complete set of results can be found in Results Review.

## 2021-06-02 NOTE — PROGRESS NOTES
Patient wore CPAP +5 @ 40% for about couple of hours overnight and refused to keep it on. Pt said that she wasn't comfortable with hospital CPAP and that a family member will bring the home CPAP later today.       Lul Keller, RT

## 2021-06-02 NOTE — PLAN OF CARE
To Do:  End of Shift Summary  For vital signs and complete assessments, please see documentation flowsheets.     Pertinent assessments: Patient Aox4. Headache, given PRN tylenol x1. Has redness  to L breast fold, bilateral abdominal folds, and groin, nystatin cream applied. Patient on 2 L NC satting in mid 90s. Complains of KHALIL and SOB, lungs dim. Mod CHO diet.  and 148, no correction needed. K protocol. CPAP at night. SBA with ambulation.    Major Shift Events: admitted to floor    Treatment Plan: K protocol, monitor VS, IV abx    Bedside Nurse: Angela Mari RN

## 2021-06-02 NOTE — PLAN OF CARE
"Pt slept this AM, stating \"I haven't got much sleep lately.\"  More awake this afternoon.  SBA to BR, voiding.  Placed hat for more accurate I/Os. Continued 02 at 2L, IV ABX.  Skin assessment done, pt has pink/red areas under left breast, ABD folds and right groin.  Miconazole cream applied.  Vital signs are stable, will continue to monitor.  "

## 2021-06-02 NOTE — PLAN OF CARE
End of Shift Summary  For vital signs and complete assessments, please see documentation flowsheets.     Pertinent assessments: A&O x4. Reports a headache from CPAP straps, tylenol given. R shoulder pain, heat therapy in use. LS dim, KHALIL, O2 2lpm in use.   Major Shift Events: None.   Treatment Plan: Rocephin & Zithromax. Supportive cares. Plan Echo this AM.

## 2021-06-03 LAB
ERYTHROCYTE [DISTWIDTH] IN BLOOD BY AUTOMATED COUNT: 15.9 % (ref 10–15)
GLUCOSE BLDC GLUCOMTR-MCNC: 100 MG/DL (ref 70–99)
GLUCOSE BLDC GLUCOMTR-MCNC: 117 MG/DL (ref 70–99)
GLUCOSE BLDC GLUCOMTR-MCNC: 128 MG/DL (ref 70–99)
GLUCOSE BLDC GLUCOMTR-MCNC: 130 MG/DL (ref 70–99)
GLUCOSE BLDC GLUCOMTR-MCNC: 86 MG/DL (ref 70–99)
HCT VFR BLD AUTO: 35.5 % (ref 35–47)
HGB BLD-MCNC: 10.6 G/DL (ref 11.7–15.7)
MCH RBC QN AUTO: 24.7 PG (ref 26.5–33)
MCHC RBC AUTO-ENTMCNC: 29.9 G/DL (ref 31.5–36.5)
MCV RBC AUTO: 83 FL (ref 78–100)
PLATELET # BLD AUTO: 222 10E9/L (ref 150–450)
POTASSIUM SERPL-SCNC: 3.5 MMOL/L (ref 3.4–5.3)
RBC # BLD AUTO: 4.29 10E12/L (ref 3.8–5.2)
WBC # BLD AUTO: 11 10E9/L (ref 4–11)

## 2021-06-03 PROCEDURE — 99232 SBSQ HOSP IP/OBS MODERATE 35: CPT | Performed by: INTERNAL MEDICINE

## 2021-06-03 PROCEDURE — 85027 COMPLETE CBC AUTOMATED: CPT | Performed by: INTERNAL MEDICINE

## 2021-06-03 PROCEDURE — 250N000013 HC RX MED GY IP 250 OP 250 PS 637: Performed by: INTERNAL MEDICINE

## 2021-06-03 PROCEDURE — 84132 ASSAY OF SERUM POTASSIUM: CPT | Performed by: INTERNAL MEDICINE

## 2021-06-03 PROCEDURE — 120N000001 HC R&B MED SURG/OB

## 2021-06-03 PROCEDURE — 36415 COLL VENOUS BLD VENIPUNCTURE: CPT | Performed by: INTERNAL MEDICINE

## 2021-06-03 PROCEDURE — 258N000003 HC RX IP 258 OP 636: Performed by: INTERNAL MEDICINE

## 2021-06-03 PROCEDURE — 999N000157 HC STATISTIC RCP TIME EA 10 MIN

## 2021-06-03 PROCEDURE — 999N001017 HC STATISTIC GLUCOSE BY METER IP

## 2021-06-03 PROCEDURE — 250N000011 HC RX IP 250 OP 636: Performed by: INTERNAL MEDICINE

## 2021-06-03 RX ADMIN — PANTOPRAZOLE SODIUM 40 MG: 40 TABLET, DELAYED RELEASE ORAL at 22:11

## 2021-06-03 RX ADMIN — METFORMIN ER 500 MG 1000 MG: 500 TABLET ORAL at 09:46

## 2021-06-03 RX ADMIN — NYSTATIN: 100000 CREAM TOPICAL at 20:57

## 2021-06-03 RX ADMIN — ASPIRIN 81 MG: 81 TABLET ORAL at 20:57

## 2021-06-03 RX ADMIN — GABAPENTIN 900 MG: 300 CAPSULE ORAL at 09:46

## 2021-06-03 RX ADMIN — CEFTRIAXONE 1 G: 1 INJECTION, POWDER, FOR SOLUTION INTRAMUSCULAR; INTRAVENOUS at 13:14

## 2021-06-03 RX ADMIN — LOSARTAN POTASSIUM 100 MG: 100 TABLET, FILM COATED ORAL at 09:46

## 2021-06-03 RX ADMIN — SERTRALINE HYDROCHLORIDE 200 MG: 100 TABLET ORAL at 09:46

## 2021-06-03 RX ADMIN — NYSTATIN: 100000 CREAM TOPICAL at 09:47

## 2021-06-03 RX ADMIN — SIMVASTATIN 20 MG: 20 TABLET, FILM COATED ORAL at 22:11

## 2021-06-03 RX ADMIN — AZITHROMYCIN MONOHYDRATE 500 MG: 500 INJECTION, POWDER, LYOPHILIZED, FOR SOLUTION INTRAVENOUS at 14:33

## 2021-06-03 RX ADMIN — IBUPROFEN 600 MG: 600 TABLET, FILM COATED ORAL at 09:46

## 2021-06-03 RX ADMIN — MULTIPLE VITAMINS W/ MINERALS TAB 1 TABLET: TAB at 09:46

## 2021-06-03 RX ADMIN — ENOXAPARIN SODIUM 40 MG: 40 INJECTION SUBCUTANEOUS at 13:14

## 2021-06-03 RX ADMIN — ZOLPIDEM TARTRATE 10 MG: 5 TABLET ORAL at 22:11

## 2021-06-03 ASSESSMENT — ACTIVITIES OF DAILY LIVING (ADL)
ADLS_ACUITY_SCORE: 15

## 2021-06-03 ASSESSMENT — MIFFLIN-ST. JEOR: SCORE: 1600.04

## 2021-06-03 NOTE — PLAN OF CARE
A&OX4. Up with SBA. VSS. On RA. On CPAP at night with2L NC. C/O right shoulder pain, declined pain medications, prefer to use heating pad. Voids well. Moderate carb diet.  and 100. Redness on her Left breast, right groin and under abdominal folds, Nystatin cream applied. Pt. Is On IV Zithromax and Rocephin. Will continue to monitor.

## 2021-06-03 NOTE — PROGRESS NOTES
End of Shift Summary  For vital signs and complete assessments, please see documentation flowsheets.     Pertinent assessments: Pt A&O, c/o right shoulder pain, declined pain medications, using heating pad. Currently on 2L NC, wore home Cpap for a little while. Up to void x 1, BM this shift. BG checks 118, 130, Klonopin @ .     Major Shift Events: None.   Treatment Plan: Rocephin & Zithromax, monitor O2 status   Bedside Nurse: Yarelis Garces RN

## 2021-06-03 NOTE — PROGRESS NOTES
[facility]  Hospitalist Progress Note  Sam Novak MD 06/03/2021    Reason for Stay (Diagnosis): Acute hypoxic respiratory failure.         Assessment and Plan:      Summary of Stay: Nicole Reyes is a 65 year old female with PMH of HTN, CRISTÓBAL on CPAP at night with 2 L oxygen, iron deficiency anemia, recurrent aspiration pneumonia, GERD,  CKD, MDD, DM II, obesity, she  came to emergency room with a complaint of cough, shortness of breath and fever of 2 days duration and admitted on 6/1/2021.  Patient is showing significant improvement, currently on 2 L of oxygen.  She has no fever or chills since admission significant improvement in shortness of breath.      Problem List:   1.  Acute hypoxic respiratory failure, secondary to suspected aspiration pneumonia.  -She has history of aspiration pneumonia elated to hiatal hernia.  -Chest x-ray showed cardiomegaly and vascular congestion and mild pulmonary edema.    -No sign of congestive heart failure .  -This is likely due to early sign of aspiration pneumonia .  -Continue close monitoring, oxygen supplementation, .  -Continue IV antibiotics Zithromax and Rocephin .  -Procalcitonin is normal.  -Echocardiogram is showed normal ejection fraction 60 to 65% .  -Wean her off oxygen today  -Blood culture so far negative.    2. Hypokalemia.  -Replace per protocol potassium is 4.     3.  Obstructive sleep apnea:   -Patient will have her on CPAP from home today .  -She used her facility CPAP last night .  -She normally uses 2 L of oxygen with CPAP.      4.  Diabetes mellitus type 2:   -Continue Metformin .  -Continue sliding scale insulin .    DVT Prophylaxis: Enoxaparin (Lovenox) SQ  Code Status: Full Code  Discharge Dispo: Home  Estimated Disch Date / # of Days until Disch: 1 day if she continues to improve, and off oxygen.  I discussed with patient the plan of care, all her questions and concerns addressed and showed understanding.        Interval  "History (Subjective):      Patient seen and examined, no new overnight issues, feels better, still on oxygen 2 L, no significant shortness of breath today.  She is more awake and conversant.  No fever or chills no nausea or vomiting.                  Physical Exam:      Last Vital Signs:  /64 (BP Location: Right arm)   Pulse 65   Temp 98.1  F (36.7  C) (Oral)   Resp 20   Ht 1.499 m (4' 11\")   Wt 114.9 kg (253 lb 6.4 oz)   LMP 09/15/2007   SpO2 95%   BMI 51.18 kg/m      I/O last 3 completed shifts:  In: 480 [P.O.:480]  Out: -   Vitals:    06/01/21 1747 06/03/21 0708   Weight: 114.4 kg (252 lb 1.6 oz) 114.9 kg (253 lb 6.4 oz)     Current Facility-Administered Medications   Medication     acetaminophen (TYLENOL) tablet 650 mg     albuterol (PROAIR HFA/PROVENTIL HFA/VENTOLIN HFA) 108 (90 Base) MCG/ACT inhaler 1-2 puff     aspirin EC tablet 81 mg     azithromycin 500 mg (ZITHROMAX) in 0.9% NaCl 250 mL intermittent infusion 500 mg     cefTRIAXone (ROCEPHIN) 1 g vial to attach to  mL bag for ADULTS or NS 50 mL bag for PEDS     clonazePAM (klonoPIN) tablet 1 mg     glucose gel 15-30 g    Or     dextrose 50 % injection 25-50 mL    Or     glucagon injection 1 mg     enoxaparin ANTICOAGULANT (LOVENOX) injection 40 mg     gabapentin (NEURONTIN) capsule 900 mg     ibuprofen (ADVIL/MOTRIN) tablet 600 mg     insulin aspart (NovoLOG) injection (RAPID ACTING)     insulin aspart (NovoLOG) injection (RAPID ACTING)     lidocaine (LMX4) cream     lidocaine 1 % 0.1-1 mL     losartan (COZAAR) tablet 100 mg     melatonin tablet 1 mg     metFORMIN (GLUCOPHAGE-XR) 24 hr tablet 1,000 mg     multivitamin w/minerals (THERA-VIT-M) tablet 1 tablet     nystatin (MYCOSTATIN) cream     ondansetron (ZOFRAN-ODT) ODT tab 4 mg    Or     ondansetron (ZOFRAN) injection 4 mg     pantoprazole (PROTONIX) EC tablet 40 mg     sertraline (ZOLOFT) tablet 200 mg     simvastatin (ZOCOR) tablet 20 mg     sodium chloride (PF) 0.9% PF flush 3 mL "     sodium chloride (PF) 0.9% PF flush 3 mL     zolpidem (AMBIEN) tablet 10 mg       Constitutional: Awake, alert, cooperative, no apparent distress   Respiratory: Good air entry anteriorly, scattered crackles bilaterally basal no wheezing.   Cardiovascular: Regular rate and rhythm, normal S1 and S2, and no murmur noted   Abdomen: Normal bowel sounds, soft, non-distended, non-tender   Skin: No rashes, no cyanosis, dry to touch   Neuro: Alert and oriented x3, no weakness, numbness, memory basal posterior   Extremities: Trace edema, normal range of motion.   Other(s):HEENT Pink, nonicteric, moist oral mucosa.       All other systems: Negative          Medications:      All current medications were reviewed with changes reflected in problem list.         Data:      All new lab and imaging data was reviewed.   Labs:  Recent Labs   Lab 06/01/21  1259 06/01/21  1246   CULT No growth after 2 days No growth after 2 days     Recent Labs   Lab 06/02/21  0626 06/01/21  1716 06/01/21  1246 05/28/21  1502     --  141 141   POTASSIUM 4.0 3.6 3.2* 3.7   CHLORIDE 103  --  105 108   CO2 37*  --  34* 28   ANIONGAP <1*  --  2* 5   *  --  120* 109*   BUN 11  --  9 10   CR 0.85  --  0.79 0.72   GFRESTIMATED 72  --  79 88   GFRESTBLACK 83  --  >90 >90   GRECIA 9.1  --  10.8* 8.3*     Recent Labs   Lab 06/03/21  0852 06/02/21  0626 06/01/21  1246   WBC 11.0 18.2* 16.4*   HGB 10.6* 10.9* 13.2   HCT 35.5 35.5 43.1   MCV 83 80 81    228 290     Recent Labs   Lab 06/03/21  1121 06/03/21  0930 06/03/21  0112 06/02/21  2141 06/02/21  1800 06/02/21  0626 06/02/21  0626 06/01/21  1246 06/01/21  1246 05/28/21  1502   GLC  --   --   --   --   --   --  128*  --  120* 109*   * 117* 130* 118* 110*   < >  --    < >  --   --     < > = values in this interval not displayed.      Imaging:   Results for orders placed or performed during the hospital encounter of 06/01/21   XR Chest Port 1 View    Narrative    XR CHEST PORT 1 VIEW  2021 12:52 PM    HISTORY: dyspnea    COMPARISON: Chest x-ray 2020      Impression    IMPRESSION: Cardiomegaly with central vascular congestion and mild  pulmonary edema. No focal pneumonic consolidation or pleural effusion.    NORTH GOODMAN MD   Echocardiogram Complete    Narrative    [id number]  [id number]  [id number]  [id number]^ISAIAH^LISSETTE^CHAN     [facility]  Echocardiography Laboratory  [address] [place] [address]     Name: ASHANTI PATEL  MRN: [id number]  : 1955  Study Date: 2021 09:46 AM  Age: 65 yrs  Gender: Female  Patient Location: [facility]  Reason For Study: SOB  Ordering Physician: LISSETTE COLON  Referring Physician: Pari Wiley MD  Performed By: Sarah Dias [facility]     BSA: 2.0 m2  Height: 59 in  Weight: 252 lb  HR: 75  BP: 163/66 mmHg  ______________________________________________________________________________  Procedure  Complete Portable Echo Adult. Optison (NDC #[id number]) given intravenously.  ______________________________________________________________________________  Interpretation Summary     The visual ejection fraction is estimated at 60-65%.  Left ventricular systolic function is normal.  The study was technically difficult. The study was technically limited.  ______________________________________________________________________________  Left Ventricle  The left ventricle is normal in size. There is mild to moderate concentric  left ventricular hypertrophy. The visual ejection fraction is estimated at 60-  65%. Diastolic Doppler findings (E/E' ratio and/or other parameters) suggest  left ventricular filling pressures are indeterminate. Left ventricular  systolic function is normal. Septal motion is consistent with conduction  abnormality.     Right Ventricle  The right ventricle is normal in size and function.     Atria  The left atrium is mildly dilated. The right atrium is mildly dilated. There  is no color Doppler  evidence of an atrial shunt.     Mitral Valve  There is trace mitral regurgitation.     Tricuspid Valve  There is mild (1+) tricuspid regurgitation. The right ventricular systolic  pressure is approximated at 23.7 mmHg plus the right atrial pressure.     Aortic Valve  The aortic valve is not well visualized. No aortic regurgitation is present.  The peak AoV pressure gradient is 18.0 mmHg. The mean AoV pressure gradient is  11.0 mmHg. The calculated aortic valve are is 1.9 cm^2. Mild valvular aortic  stenosis.     Pulmonic Valve  The pulmonic valve is not well visualized. There is trace pulmonic valvular  regurgitation.     Vessels  The aortic root is normal size. Normal size ascending aorta. IVC diameter >2.1  cm collapsing <50% with sniff suggests a high RA pressure estimated at 15 mmHg  or greater.     Pericardium  There is no pericardial effusion.     Rhythm  Sinus rhythm was noted.  ______________________________________________________________________________  MMode/2D Measurements & Calculations  IVSd: 1.3 cm     LVIDd: 3.9 cm  LVIDs: 2.3 cm  LVPWd: 1.5 cm  FS: 40.3 %  LV mass(C)d: 203.4 grams  LV mass(C)dI: 100.0 grams/m2  Ao root diam: 2.6 cm  LA dimension: 3.6 cm  asc Aorta Diam: 2.9 cm  LA/Ao: 1.4  LVOT diam: 1.9 cm  LVOT area: 2.9 cm2  LA Volume (BP): 75.0 ml  LA Volume Index (BP): 36.9 ml/m2  RWT: 0.76     Doppler Measurements & Calculations  MV E max nic: 119.0 cm/sec  MV A max nic: 110.2 cm/sec  MV E/A: 1.1  MV dec time: 0.14 sec  Ao V2 max: 213.1 cm/sec  Ao max P.0 mmHg  Ao V2 mean: 158.2 cm/sec  Ao mean P.0 mmHg  Ao V2 VTI: 48.5 cm  DEB(I,D): 1.9 cm2  DEB(V,D): 1.8 cm2  LV V1 max P.2 mmHg  LV V1 max: 134.4 cm/sec  LV V1 VTI: 32.3 cm  SV(LVOT): 93.8 ml  SI(LVOT): 46.1 ml/m2  TR max nic: 243.4 cm/sec  TR max P.7 mmHg  AV Nic Ratio (DI): 0.63  DEB Index (cm2/m2): 0.95  E/E' av.4  Lateral E/e': 10.3  Medial E/e': 12.6      ______________________________________________________________________________  Report approved by: Ramon Saul 06/02/2021 12:47 PM           *Note: Due to a large number of results and/or encounters for the requested time period, some results have not been displayed. A complete set of results can be found in Results Review.

## 2021-06-03 NOTE — PROGRESS NOTES
A home NPPV w/ NM with 2L bleed in was applied to the pt via the mask for an Home setting.  Will continue to monitor and assess the pt's current respiratory status and needs.

## 2021-06-04 ENCOUNTER — MYC MEDICAL ADVICE (OUTPATIENT)
Dept: INTERNAL MEDICINE | Facility: CLINIC | Age: 66
End: 2021-06-04

## 2021-06-04 VITALS
TEMPERATURE: 98.4 F | DIASTOLIC BLOOD PRESSURE: 64 MMHG | OXYGEN SATURATION: 94 % | WEIGHT: 254.5 LBS | HEART RATE: 62 BPM | HEIGHT: 59 IN | RESPIRATION RATE: 16 BRPM | SYSTOLIC BLOOD PRESSURE: 142 MMHG | BODY MASS INDEX: 51.31 KG/M2

## 2021-06-04 LAB
GLUCOSE BLDC GLUCOMTR-MCNC: 101 MG/DL (ref 70–99)
GLUCOSE BLDC GLUCOMTR-MCNC: 104 MG/DL (ref 70–99)
GLUCOSE BLDC GLUCOMTR-MCNC: 127 MG/DL (ref 70–99)
POTASSIUM SERPL-SCNC: 3.3 MMOL/L (ref 3.4–5.3)

## 2021-06-04 PROCEDURE — 999N001017 HC STATISTIC GLUCOSE BY METER IP

## 2021-06-04 PROCEDURE — 36415 COLL VENOUS BLD VENIPUNCTURE: CPT | Performed by: INTERNAL MEDICINE

## 2021-06-04 PROCEDURE — 250N000013 HC RX MED GY IP 250 OP 250 PS 637: Performed by: INTERNAL MEDICINE

## 2021-06-04 PROCEDURE — 99239 HOSP IP/OBS DSCHRG MGMT >30: CPT | Performed by: INTERNAL MEDICINE

## 2021-06-04 PROCEDURE — 250N000011 HC RX IP 250 OP 636: Performed by: INTERNAL MEDICINE

## 2021-06-04 PROCEDURE — 84132 ASSAY OF SERUM POTASSIUM: CPT | Performed by: INTERNAL MEDICINE

## 2021-06-04 RX ORDER — AZITHROMYCIN 250 MG/1
TABLET, FILM COATED ORAL
Qty: 12 TABLET | Refills: 0 | Status: SHIPPED | OUTPATIENT
Start: 2021-06-04 | End: 2021-06-04

## 2021-06-04 RX ORDER — AZITHROMYCIN 250 MG/1
500 TABLET, FILM COATED ORAL DAILY
Qty: 8 TABLET | Refills: 0 | Status: SHIPPED | OUTPATIENT
Start: 2021-06-04 | End: 2021-06-08

## 2021-06-04 RX ORDER — POTASSIUM CHLORIDE 1500 MG/1
40 TABLET, EXTENDED RELEASE ORAL ONCE
Status: COMPLETED | OUTPATIENT
Start: 2021-06-04 | End: 2021-06-04

## 2021-06-04 RX ADMIN — ENOXAPARIN SODIUM 40 MG: 40 INJECTION SUBCUTANEOUS at 13:09

## 2021-06-04 RX ADMIN — MULTIPLE VITAMINS W/ MINERALS TAB 1 TABLET: TAB at 08:11

## 2021-06-04 RX ADMIN — GABAPENTIN 900 MG: 300 CAPSULE ORAL at 08:11

## 2021-06-04 RX ADMIN — CLONAZEPAM 1 MG: 0.5 TABLET ORAL at 00:58

## 2021-06-04 RX ADMIN — POTASSIUM CHLORIDE 40 MEQ: 1500 TABLET, EXTENDED RELEASE ORAL at 11:06

## 2021-06-04 RX ADMIN — METFORMIN ER 500 MG 1000 MG: 500 TABLET ORAL at 08:11

## 2021-06-04 RX ADMIN — LOSARTAN POTASSIUM 100 MG: 100 TABLET, FILM COATED ORAL at 08:11

## 2021-06-04 RX ADMIN — CEFTRIAXONE 1 G: 1 INJECTION, POWDER, FOR SOLUTION INTRAMUSCULAR; INTRAVENOUS at 13:09

## 2021-06-04 RX ADMIN — NYSTATIN: 100000 CREAM TOPICAL at 08:16

## 2021-06-04 RX ADMIN — POTASSIUM CHLORIDE 40 MEQ: 1500 TABLET, EXTENDED RELEASE ORAL at 08:11

## 2021-06-04 RX ADMIN — SERTRALINE HYDROCHLORIDE 200 MG: 100 TABLET ORAL at 08:11

## 2021-06-04 ASSESSMENT — MIFFLIN-ST. JEOR: SCORE: 1605.03

## 2021-06-04 ASSESSMENT — ACTIVITIES OF DAILY LIVING (ADL)
ADLS_ACUITY_SCORE: 15

## 2021-06-04 NOTE — PROGRESS NOTES
Care Management Follow Up    Length of Stay (days): 3    Expected Discharge Date: 06/04/21     Concerns to be Addressed:       Patient plan of care discussed at interdisciplinary rounds: Yes    Anticipated Discharge Disposition:  home     Anticipated Discharge Services:  home  Anticipated Discharge DME:  oxygen    Additional Information:  CTS notified pt will need oxygen at discharge.  Orders and walk test in Epic.  CTS made referral to   [facility] [phone].    Marzena Beach RN, BSN, PHN, CTS  Care Coordinator  [facility]  [phone]        Addendum [id number]  Spoke with  DME- pt already has home oxygen.  They will bring portable oxygen to bedside to transport pt home.  They anticipate oxygen delivery in the next hour or so.

## 2021-06-04 NOTE — PROGRESS NOTES
Pt to D/C to home.  Pt provided with d/c instructions, including new medications, when medications were last given, and when to take them again.  Pt also informed to f/u with PCP in 7 days.  Pt verbalized understanding of all d/c and f/u instructions.  All questions were answered at this time.  Copy of paperwork sent with pt.  Zithromax sent with pt.  Daughter to provide transport.  All personal belongings sent with pt.

## 2021-06-04 NOTE — PROGRESS NOTES
A home NPPV with NM @ 1L bleed in was applied to the pt via the mask for an home setting. Pt is tolerating it well. Will continue to monitor and assess the pt's current respiratory status and needs.

## 2021-06-04 NOTE — PROGRESS NOTES
Oxygen Documentation:   I certify that this patient, Nicole Reyes has been under my care (or a nurse practitioner or physican's assistant working with me). This is the face-to-face encounter for oxygen medical necessity.      Nicole Reyes is now in a chronic stable state and continues to require supplemental oxygen. Patient has continued oxygen desaturation due to Pulmonary Hypertension I27.20 and Recurrent pneumonia. Patient is already on HOME OXYGEN at night with CPAP. New order is oxygen with [employer]IVITY.    Alternative treatment(s) tried or considered and deemed clinically infective for treatment of Pulmonary Hypertension I27.20 include inhalers and pulmonary toileting.  If portability is ordered, is the patient mobile within the home? yes    **Patients who qualify for home O2 coverage under the CMS guidelines require ABG tests or O2 sat readings obtained closest to, but no earlier than 2 days prior to the discharge, as evidence of the need for home oxygen therapy. Testing must be performed while patient is in the chronic stable state. See notes for O2 sats.

## 2021-06-04 NOTE — PROGRESS NOTES
O2 sats at rest on room air 97%    O2 sats with activity on room air 88%    O2 sats with activity on 2 liters of oxygen via NC 94%

## 2021-06-04 NOTE — PLAN OF CARE
Pertinent assessments:  Lungs clear, dry cough noted. No KHALIL. 90-92% at HS with RA. Not able to tolerate CPAP tonight, so wore 2L o2. 97% on 2L. Up with SBA to BR, soft stool, voiding well. Skin intact with red folds. A/O, calls appropriately.     Treatment Plan: Oxygen prn, CPAP with sleep, Pain meds prn.

## 2021-06-05 DIAGNOSIS — I10 ESSENTIAL HYPERTENSION, BENIGN: ICD-10-CM

## 2021-06-06 NOTE — DISCHARGE SUMMARY
[facility]  Discharge Summary  Name: Nicole Reyes    MRN: [id number]  YOB: 1955    Age: 65 year old  Date of Discharge:  6/4/2021  3:50 PM  Date of Admission: 6/1/2021  Primary Care Provider: Pari Wiley  Discharge Physician:  Sam Novak M.D  Discharging Service:  Hospitalist      Discharge Diagnosis:  Acute hypoxic respiratory failure suspected aspiration pneumonia  Hypokalemia  Obstructive sleep apnea, on CPAP  Diabetes mellitus type 2     Other Diagnosis:  Anemia  CKD  MDD  Diabetes mellitus type 2  Obesity  GERD     Discharge Disposition:  Discharged to home     Allergies:  Allergies   Allergen Reactions     Codeine Rash     Penicillins Rash     Pt has tolerated cephalosporins and penems.   Pt tolerated Zosyn 7/6/2019        Discharge Medications:   Discharge Medication List as of 6/4/2021  3:02 PM      START taking these medications    Details   azithromycin (ZITHROMAX) 250 MG tablet Take 2 tablets (500 mg) by mouth daily for 4 days, Disp-8 tablet, R-0, Local Print         CONTINUE these medications which have NOT CHANGED    Details   aspirin 81 MG EC tablet Take 81 mg by mouth every evening , Historical      clonazePAM (KLONOPIN) 1 MG tablet Take 1 tablet by mouth twice daily as needed for anxiety, Disp-60 tablet, R-0, E-Prescribe      gabapentin (NEURONTIN) 300 MG capsule Take 3 capsules (900 mg) by mouth daily, Disp-270 capsule, R-1, E-Prescribe      losartan (COZAAR) 100 MG tablet Take 1 tablet (100 mg) by mouth daily, Disp-90 tablet, R-1, E-Prescribe      metFORMIN (GLUCOPHAGE-XR) 500 MG 24 hr tablet Take 2 tablets (1,000 mg) by mouth daily (with breakfast), Disp-180 tablet, R-1, E-Prescribe      multivitamin w/minerals (MULTI-VITAMIN) tablet Take 1 tablet by mouth daily, Historical      pantoprazole (PROTONIX) 40 MG EC tablet TAKE ONE TABLET BY MOUTH AT BEDTIME, Disp-90 tablet, R-2, E-Prescribe      sertraline (ZOLOFT) 100 MG tablet TAKE TWO TABLETS BY MOUTH ONCE  "DAILY, Disp-180 tablet, R-1, E-Prescribe      simvastatin (ZOCOR) 20 MG tablet Take 1 tablet (20 mg) by mouth At Bedtime, Disp-90 tablet, R-1, E-Prescribe      temazepam (RESTORIL) 15 MG capsule Take 1 capsule (15 mg) by mouth nightly as needed for sleep, Disp-30 capsule, R-1, E-Prescribe      zolpidem (AMBIEN) 10 MG tablet TAKE 1 TABLET BY MOUTH AT BEDTIME, Disp-90 tablet, R-0, E-Prescribe      albuterol (PROAIR HFA/PROVENTIL HFA/VENTOLIN HFA) 108 (90 Base) MCG/ACT inhaler Inhale 1-2 puffs into the lungs every 6 hours as needed for shortness of breath / dyspnea or wheezing, Disp-1 Inhaler, R-0, E-PrescribePlease also dispense a spacer      blood glucose (ACCU-CHEK BYRON PLUS) test strip USE TO TEST BLOOD SUGAR TWO TIMES A DAY OR AS DIRECTED, Disp-100 each, R-1, E-Prescribe              Condition on Discharge:  Discharge condition: Stable   Discharge vitals: Blood pressure (!) 142/64, pulse 62, temperature 98.4  F (36.9  C), temperature source Oral, resp. rate 16, height 1.499 m (4' 11\"), weight 115.4 kg (254 lb 8 oz), last menstrual period 09/15/2007, SpO2 94 %, not currently breastfeeding.   Code status on discharge: Full Code     History of Illness:  See detailed admission note for full details.    Significant Physical Exam Findings:  Constitutional: Awake, alert, cooperative, no apparent distress   Respiratory: Good air entry anteriorly, scattered crackles bilaterally basal no wheezing.   Cardiovascular: Regular rate and rhythm, normal S1 and S2, and no murmur noted   Abdomen: Normal bowel sounds, soft, non-distended, non-tender   Skin: No rashes, no cyanosis, dry to touch   Neuro: Alert and oriented x3, no weakness, numbness, memory basal posterior   Extremities: Trace edema, normal range of motion.   Other(s):HEENT Pink, nonicteric, moist oral mucosa.         All other systems: Negative     Procedures other than Imaging:  None     Imaging:  Results for orders placed or performed during the hospital encounter " of 21   XR Chest Port 1 View    Narrative    XR CHEST PORT 1 VIEW 2021 12:52 PM    HISTORY: dyspnea    COMPARISON: Chest x-ray 2020      Impression    IMPRESSION: Cardiomegaly with central vascular congestion and mild  pulmonary edema. No focal pneumonic consolidation or pleural effusion.    NORTH GOODMAN MD   Echocardiogram Complete    Narrative    [id number]  [id number]  [id number]  [id number]^ISAIAH^LISSETTE^CHAN     [facility]  Echocardiography Laboratory  [address] [place] [address]     Name: ASHANTI PATEL  MRN: [id number]  : 1955  Study Date: 2021 09:46 AM  Age: 65 yrs  Gender: Female  Patient Location: [facility]  Reason For Study: SOB  Ordering Physician: LISSETTE COLON  Referring Physician: Pari Wiley MD  Performed By: Sarah Dias [facility]     BSA: 2.0 m2  Height: 59 in  Weight: 252 lb  HR: 75  BP: 163/66 mmHg  ______________________________________________________________________________  Procedure  Complete Portable Echo Adult. Optison (NDC #[id number]) given intravenously.  ______________________________________________________________________________  Interpretation Summary     The visual ejection fraction is estimated at 60-65%.  Left ventricular systolic function is normal.  The study was technically difficult. The study was technically limited.  ______________________________________________________________________________  Left Ventricle  The left ventricle is normal in size. There is mild to moderate concentric  left ventricular hypertrophy. The visual ejection fraction is estimated at 60-  65%. Diastolic Doppler findings (E/E' ratio and/or other parameters) suggest  left ventricular filling pressures are indeterminate. Left ventricular  systolic function is normal. Septal motion is consistent with conduction  abnormality.     Right Ventricle  The right ventricle is normal in size and function.     Atria  The left atrium is mildly  dilated. The right atrium is mildly dilated. There  is no color Doppler evidence of an atrial shunt.     Mitral Valve  There is trace mitral regurgitation.     Tricuspid Valve  There is mild (1+) tricuspid regurgitation. The right ventricular systolic  pressure is approximated at 23.7 mmHg plus the right atrial pressure.     Aortic Valve  The aortic valve is not well visualized. No aortic regurgitation is present.  The peak AoV pressure gradient is 18.0 mmHg. The mean AoV pressure gradient is  11.0 mmHg. The calculated aortic valve are is 1.9 cm^2. Mild valvular aortic  stenosis.     Pulmonic Valve  The pulmonic valve is not well visualized. There is trace pulmonic valvular  regurgitation.     Vessels  The aortic root is normal size. Normal size ascending aorta. IVC diameter >2.1  cm collapsing <50% with sniff suggests a high RA pressure estimated at 15 mmHg  or greater.     Pericardium  There is no pericardial effusion.     Rhythm  Sinus rhythm was noted.  ______________________________________________________________________________  MMode/2D Measurements & Calculations  IVSd: 1.3 cm     LVIDd: 3.9 cm  LVIDs: 2.3 cm  LVPWd: 1.5 cm  FS: 40.3 %  LV mass(C)d: 203.4 grams  LV mass(C)dI: 100.0 grams/m2  Ao root diam: 2.6 cm  LA dimension: 3.6 cm  asc Aorta Diam: 2.9 cm  LA/Ao: 1.4  LVOT diam: 1.9 cm  LVOT area: 2.9 cm2  LA Volume (BP): 75.0 ml  LA Volume Index (BP): 36.9 ml/m2  RWT: 0.76     Doppler Measurements & Calculations  MV E max nic: 119.0 cm/sec  MV A max nic: 110.2 cm/sec  MV E/A: 1.1  MV dec time: 0.14 sec  Ao V2 max: 213.1 cm/sec  Ao max P.0 mmHg  Ao V2 mean: 158.2 cm/sec  Ao mean P.0 mmHg  Ao V2 VTI: 48.5 cm  DEB(I,D): 1.9 cm2  DEB(V,D): 1.8 cm2  LV V1 max P.2 mmHg  LV V1 max: 134.4 cm/sec  LV V1 VTI: 32.3 cm  SV(LVOT): 93.8 ml  SI(LVOT): 46.1 ml/m2  TR max nic: 243.4 cm/sec  TR max P.7 mmHg  AV Nic Ratio (DI): 0.63  DEB Index (cm2/m2): 0.95  E/E' av.4  Lateral E/e': 10.3  Medial  E/e': 12.6     ______________________________________________________________________________  Report approved by: Ramon Saul 06/02/2021 12:47 PM           *Note: Due to a large number of results and/or encounters for the requested time period, some results have not been displayed. A complete set of results can be found in Results Review.        Consultations:  No consultations were requested during this admission.     Recent Lab Results:  Recent Labs   Lab 06/03/21  0852 06/02/21 0626 06/01/21  1246   WBC 11.0 18.2* 16.4*   HGB 10.6* 10.9* 13.2   HCT 35.5 35.5 43.1   MCV 83 80 81    228 290     Recent Labs   Lab 06/04/21  0639 06/03/21  1619 06/02/21  0626 06/01/21  1246 06/01/21  1246   NA  --   --  139  --  141   POTASSIUM 3.3* 3.5 4.0   < > 3.2*   CHLORIDE  --   --  103  --  105   CO2  --   --  37*  --  34*   ANIONGAP  --   --  <1*  --  2*   GLC  --   --  128*  --  120*   BUN  --   --  11  --  9   CR  --   --  0.85  --  0.79   GFRESTIMATED  --   --  72  --  79   GFRESTBLACK  --   --  83  --  >90   GRECIA  --   --  9.1  --  10.8*    < > = values in this interval not displayed.     Recent Labs   Lab 06/04/21  1305 06/04/21  0821 06/04/21  0205 06/03/21  2210 06/03/21  1715 06/02/21  0626 06/02/21  0626 06/01/21  1246 06/01/21  1246   GLC  --   --   --   --   --   --  128*  --  120*   * 104* 101* 128* 86   < >  --    < >  --     < > = values in this interval not displayed.          Pending Results:    Unresulted Labs Ordered in the Past 30 Days of this Admission     Date and Time Order Name Status Description    6/1/2021 1238 Blood culture Preliminary     6/1/2021 1238 Blood culture Preliminary               Reason for your hospital stay    Suspected Pneumonia, acute respiratory failure     Follow-up and recommended labs and tests     Follow up with primary care provider, Pari Wiley, within 7 days for hospital follow- up.  The following labs/tests are recommended: BMP, CBC 45 days, result  to PCP.     Activity    Your activity upon discharge: activity as tolerated     Full Code     Incentive spirometry instruct     Oxygen Adult/Peds    Oxygen Documentation:   I certify that this patient, Nicole Reyes has been under my care (or a nurse practitioner or physican's assistant working with me). This is the face-to-face encounter for oxygen medical necessity.      Nicole Reyes is now in a chronic stable state and continues to require supplemental oxygen. Patient has continued oxygen desaturation due to Pulmonary Hypertension I27.20  Recurrent pneumonia..    Alternative treatment(s) tried or considered and deemed clinically infective for treatment of Pulmonary Hypertension I27.20 include inhalers and pulmonary toileting.  If portability is ordered, is the patient mobile within the home? yes    **Patients who qualify for home O2 coverage under the CMS guidelines require ABG tests or O2 sat readings obtained closest to, but no earlier than 2 days prior to the discharge, as evidence of the need for home oxygen therapy. Testing must be performed while patient is in the chronic stable state. See notes for O2 sats.     Diet    Follow this diet upon discharge: Orders Placed This Encounter      Moderate Consistent CHO Diet       Hospital Course:  Nicole Reyes is a 65 year old female with PMH of HTN, CRISTÓBAL on 2 L of oxygen with CPAP at night, iron deficiency anemia, recurrent aspiration pneumonia, GERD,  CKD, MDD, DM II, obesity.  She presented to the emergency room with a complaint of cough, shortness of breath and fever of 2 days duration and admitted on 6/1/2021.  After admission she quickly improved, her oxygen requirement decreased, but continued to need oxygen with activity.      Problem List:   1.  Acute hypoxic respiratory failure, secondary to suspected aspiration pneumonia. She has history of aspiration pneumonia elated to hiatal hernia. Chest x-ray showed cardiomegaly and vascular congestion  and mild pulmonary edema. No sign of congestive heart failure. This is likely due to early sign of aspiration pneumonia.  She was closely monitored, continue to require oxygen with activity but able to wean her off at rest.  She was treated with IV antibiotic Zithromax and Rocephin.  She will complete course of antibiotic upon discharge as ordered. Procalcitonin is normal. Echocardiogram is showed normal ejection fraction 60 to 65%. Blood culture remained negative. She required 2 L of oxygen with activity.  She is normally on oxygen at night, reordered for daytime with activity.  She does not need oxygen at rest.     2. Hypokalemia. Replace per protocol potassium is 4.     3.  Obstructive sleep apnea:  Patient used her CPAP from home, remained stable normally she use it with 2 L of oxygen at night.      4.  Diabetes mellitus type 2:  Continue Metformin upon discharge.     I discussed with her the plan of discharge and follow-up, all her questions and concerns addressed.  Patient showed understanding.     Total time spent in face to face contact with the patient and coordinating discharge was: >30 Minutes.

## 2021-06-07 ENCOUNTER — MYC MEDICAL ADVICE (OUTPATIENT)
Dept: INTERNAL MEDICINE | Facility: CLINIC | Age: 66
End: 2021-06-07

## 2021-06-07 DIAGNOSIS — E78.5 HYPERLIPIDEMIA LDL GOAL <100: ICD-10-CM

## 2021-06-07 DIAGNOSIS — F33.1 MAJOR DEPRESSIVE DISORDER, RECURRENT EPISODE, MODERATE (H): ICD-10-CM

## 2021-06-07 DIAGNOSIS — E11.22 TYPE 2 DIABETES MELLITUS WITH STAGE 3 CHRONIC KIDNEY DISEASE, WITHOUT LONG-TERM CURRENT USE OF INSULIN (H): ICD-10-CM

## 2021-06-07 DIAGNOSIS — G47.00 PERSISTENT INSOMNIA: ICD-10-CM

## 2021-06-07 DIAGNOSIS — G47.00 INSOMNIA, UNSPECIFIED TYPE: ICD-10-CM

## 2021-06-07 DIAGNOSIS — F41.1 GENERALIZED ANXIETY DISORDER: ICD-10-CM

## 2021-06-07 DIAGNOSIS — N18.30 TYPE 2 DIABETES MELLITUS WITH STAGE 3 CHRONIC KIDNEY DISEASE, WITHOUT LONG-TERM CURRENT USE OF INSULIN (H): ICD-10-CM

## 2021-06-07 DIAGNOSIS — G25.81 RESTLESS LEGS SYNDROME (RLS): ICD-10-CM

## 2021-06-07 LAB
BACTERIA SPEC CULT: NO GROWTH
BACTERIA SPEC CULT: NO GROWTH
Lab: NORMAL
SPECIMEN SOURCE: NORMAL
SPECIMEN SOURCE: NORMAL

## 2021-06-08 RX ORDER — LOSARTAN POTASSIUM 100 MG/1
TABLET ORAL
Qty: 90 TABLET | Refills: 0 | Status: SHIPPED | OUTPATIENT
Start: 2021-06-08 | End: 2021-09-05

## 2021-06-08 NOTE — TELEPHONE ENCOUNTER
Last appointment was a virtual visit on 11/17/20  Advised in a separate encounter to schedule an office visit.  DEYSI Magdaleno R.N.

## 2021-06-08 NOTE — TELEPHONE ENCOUNTER
Routing refill request to provider for review/approval because:  Patient needs to be seen because:  Due for appt and labs    MyChart reminder sent to pt

## 2021-06-08 NOTE — TELEPHONE ENCOUNTER
"Patient is out of medications.  Requested Prescriptions   Pending Prescriptions Disp Refills     clonazePAM (KLONOPIN) 1 MG tablet [Pharmacy Med Name: clonazePAM 1 MG Oral Tablet] 60 tablet 0     Sig: Take 1 tablet by mouth twice daily as needed for anxiety       There is no refill protocol information for this order        gabapentin (NEURONTIN) 300 MG capsule [Pharmacy Med Name: Gabapentin 300 MG Oral Capsule] 270 capsule 0     Sig: TAKE 3 CAPSULES BY MOUTH DAILY       There is no refill protocol information for this order        metFORMIN (GLUCOPHAGE-XR) 500 MG 24 hr tablet [Pharmacy Med Name: metFORMIN HCl  MG Oral Tablet Extended Release 24 Hour] 180 tablet 0     Sig: TAKE 2 TABLETS BY MOUTH ONCE DAILY WITH BREAKFAST       Biguanide Agents Failed - 6/7/2021  5:14 PM        Failed - Recent (6 mo) or future (30 days) visit within the authorizing provider's specialty     Patient had office visit in the last 6 months or has a visit in the next 30 days with authorizing provider or within the authorizing provider's specialty.  See \"Patient Info\" tab in inbasket, or \"Choose Columns\" in Meds & Orders section of the refill encounter.            Passed - Patient is age 10 or older        Passed - Patient has documented A1c within the specified period of time.     If HgbA1C is 8 or greater, it needs to be on file within the past 3 months.  If less than 8, must be on file within the past 6 months.     Recent Labs   Lab Test 05/28/21  1502   A1C 6.6*             Passed - Patient's CR is NOT>1.4 OR Patient's EGFR is NOT<45 within past 12 mos.     Recent Labs   Lab Test 06/02/21  0626   GFRESTIMATED 72   GFRESTBLACK 83       Recent Labs   Lab Test 06/02/21  0626   CR 0.85             Passed - Patient does NOT have a diagnosis of CHF.        Passed - Medication is active on med list        Passed - Patient is not pregnant        Passed - Patient has not had a positive pregnancy test within the past 12 mos.            " "sertraline (ZOLOFT) 100 MG tablet [Pharmacy Med Name: Sertraline HCl 100 MG Oral Tablet] 180 tablet 0     Sig: Take 2 tablets by mouth once daily       SSRIs Protocol Failed - 6/7/2021  5:15 PM        Failed - PHQ-9 score less than 5 in past 6 months     Please review last PHQ-9 score.           Failed - Recent (6 mo) or future (30 days) visit within the authorizing provider's specialty     Patient had office visit in the last 6 months or has a visit in the next 30 days with authorizing provider or within the authorizing provider's specialty.  See \"Patient Info\" tab in inbasket, or \"Choose Columns\" in Meds & Orders section of the refill encounter.            Passed - Medication is active on med list        Passed - Patient is age 18 or older        Passed - No active pregnancy on record        Passed - No positive pregnancy test in last 12 months           simvastatin (ZOCOR) 20 MG tablet [Pharmacy Med Name: Simvastatin 20 MG Oral Tablet] 90 tablet 0     Sig: TAKE 1 TABLET BY MOUTH AT BEDTIME       Statins Protocol Passed - 6/7/2021  5:15 PM        Passed - LDL on file in past 12 months     Recent Labs   Lab Test 05/28/21  1502   *             Passed - No abnormal creatine kinase in past 12 months     No lab results found.             Passed - Recent (12 mo) or future (30 days) visit within the authorizing provider's specialty     Patient has had an office visit with the authorizing provider or a provider within the authorizing providers department within the previous 12 mos or has a future within next 30 days. See \"Patient Info\" tab in inbasket, or \"Choose Columns\" in Meds & Orders section of the refill encounter.              Passed - Medication is active on med list        Passed - Patient is age 18 or older        Passed - No active pregnancy on record        Passed - No positive pregnancy test in past 12 months             "

## 2021-06-10 RX ORDER — SERTRALINE HYDROCHLORIDE 100 MG/1
TABLET, FILM COATED ORAL
Qty: 180 TABLET | Refills: 0 | Status: SHIPPED | OUTPATIENT
Start: 2021-06-10 | End: 2021-09-05

## 2021-06-10 RX ORDER — METFORMIN HCL 500 MG
TABLET, EXTENDED RELEASE 24 HR ORAL
Qty: 180 TABLET | Refills: 0 | Status: SHIPPED | OUTPATIENT
Start: 2021-06-10 | End: 2021-09-05

## 2021-06-10 RX ORDER — CLONAZEPAM 1 MG/1
TABLET ORAL
Qty: 60 TABLET | Refills: 0 | Status: SHIPPED | OUTPATIENT
Start: 2021-06-10 | End: 2021-07-02

## 2021-06-10 RX ORDER — GABAPENTIN 300 MG/1
CAPSULE ORAL
Qty: 270 CAPSULE | Refills: 0 | Status: SHIPPED | OUTPATIENT
Start: 2021-06-10 | End: 2021-09-05

## 2021-06-10 RX ORDER — SIMVASTATIN 20 MG
TABLET ORAL
Qty: 90 TABLET | Refills: 1 | Status: SHIPPED | OUTPATIENT
Start: 2021-06-10 | End: 2021-11-22

## 2021-06-10 NOTE — TELEPHONE ENCOUNTER
Simvastatin  Prescription approved per [facility] Refill Protocol.    Sertraline, Metformin  Medication is being filled for 1 time refill only due to:  patient is over due for an appointment   [employer]hart message sent in another encounter  PHQ-9 score:    PHQ 10/27/2020   PHQ-9 Total Score 4   Q9: Thoughts of better off dead/self-harm past 2 weeks Not at all       Gabapentin, Clonazepam  Routing refill request to provider for review/approval because:  Drug not on the [facility] refill protocol

## 2021-07-02 ENCOUNTER — OFFICE VISIT (OUTPATIENT)
Dept: INTERNAL MEDICINE | Facility: CLINIC | Age: 66
End: 2021-07-02
Payer: MEDICARE

## 2021-07-02 VITALS
HEIGHT: 59 IN | BODY MASS INDEX: 47.58 KG/M2 | HEART RATE: 115 BPM | OXYGEN SATURATION: 92 % | TEMPERATURE: 98.3 F | DIASTOLIC BLOOD PRESSURE: 66 MMHG | SYSTOLIC BLOOD PRESSURE: 104 MMHG | WEIGHT: 236 LBS

## 2021-07-02 DIAGNOSIS — J69.0 RECURRENT ASPIRATION PNEUMONIA (H): Primary | ICD-10-CM

## 2021-07-02 DIAGNOSIS — G47.00 INSOMNIA, UNSPECIFIED TYPE: ICD-10-CM

## 2021-07-02 DIAGNOSIS — E66.01 MORBID OBESITY (H): ICD-10-CM

## 2021-07-02 DIAGNOSIS — E11.9 TYPE 2 DIABETES MELLITUS WITHOUT COMPLICATION, WITHOUT LONG-TERM CURRENT USE OF INSULIN (H): ICD-10-CM

## 2021-07-02 DIAGNOSIS — F33.0 MILD EPISODE OF RECURRENT MAJOR DEPRESSIVE DISORDER (H): ICD-10-CM

## 2021-07-02 DIAGNOSIS — F41.1 GENERALIZED ANXIETY DISORDER: ICD-10-CM

## 2021-07-02 DIAGNOSIS — I10 BENIGN ESSENTIAL HYPERTENSION: ICD-10-CM

## 2021-07-02 PROCEDURE — 99214 OFFICE O/P EST MOD 30 MIN: CPT | Performed by: INTERNAL MEDICINE

## 2021-07-02 RX ORDER — MIRTAZAPINE 15 MG/1
15 TABLET, FILM COATED ORAL AT BEDTIME
Qty: 30 TABLET | Refills: 1 | Status: SHIPPED | OUTPATIENT
Start: 2021-07-02 | End: 2021-10-08

## 2021-07-02 RX ORDER — LOSARTAN POTASSIUM 100 MG/1
100 TABLET ORAL DAILY
Qty: 90 TABLET | Refills: 0 | Status: CANCELLED | OUTPATIENT
Start: 2021-07-02

## 2021-07-02 ASSESSMENT — MIFFLIN-ST. JEOR: SCORE: 1521.12

## 2021-07-02 ASSESSMENT — PATIENT HEALTH QUESTIONNAIRE - PHQ9: SUM OF ALL RESPONSES TO PHQ QUESTIONS 1-9: 3

## 2021-07-02 NOTE — PATIENT INSTRUCTIONS
Take the mirtazapine 1/2 tablet at night for several nights, if tolerating can increase to 1 tablet. If that does not work well after 10-14 days, send me a message, we can try to decrease sertraline and increase this medication.

## 2021-07-03 PROBLEM — R05.9 COUGH: Status: RESOLVED | Noted: 2020-11-08 | Resolved: 2021-07-03

## 2021-07-03 PROBLEM — J18.9 PNEUMONIA DUE TO INFECTIOUS ORGANISM, UNSPECIFIED LATERALITY, UNSPECIFIED PART OF LUNG: Status: RESOLVED | Noted: 2021-06-01 | Resolved: 2021-07-03

## 2021-07-03 RX ORDER — CLONAZEPAM 1 MG/1
1 TABLET ORAL 2 TIMES DAILY PRN
Qty: 60 TABLET | Refills: 5 | Status: SHIPPED | OUTPATIENT
Start: 2021-07-10 | End: 2022-01-12

## 2021-07-19 ENCOUNTER — HOSPITAL ENCOUNTER (EMERGENCY)
Facility: CLINIC | Age: 66
Discharge: HOME | End: 2021-07-19
Attending: EMERGENCY MEDICINE | Admitting: EMERGENCY MEDICINE
Payer: MEDICARE

## 2021-07-19 ENCOUNTER — APPOINTMENT (OUTPATIENT)
Dept: GENERAL RADIOLOGY | Facility: CLINIC | Age: 66
End: 2021-07-19
Attending: EMERGENCY MEDICINE
Payer: MEDICARE

## 2021-07-19 VITALS
HEART RATE: 81 BPM | OXYGEN SATURATION: 98 % | SYSTOLIC BLOOD PRESSURE: 134 MMHG | DIASTOLIC BLOOD PRESSURE: 87 MMHG | RESPIRATION RATE: 16 BRPM | TEMPERATURE: 98.9 F

## 2021-07-19 DIAGNOSIS — J18.9 COMMUNITY ACQUIRED PNEUMONIA, UNSPECIFIED LATERALITY: ICD-10-CM

## 2021-07-19 DIAGNOSIS — R05.9 COUGH: ICD-10-CM

## 2021-07-19 LAB
ANION GAP SERPL CALCULATED.3IONS-SCNC: 8 MMOL/L (ref 3–14)
BASOPHILS # BLD AUTO: 0.1 10E3/UL (ref 0–0.2)
BASOPHILS NFR BLD AUTO: 0 %
BUN SERPL-MCNC: 9 MG/DL (ref 7–30)
CALCIUM SERPL-MCNC: 9.4 MG/DL (ref 8.5–10.1)
CHLORIDE BLD-SCNC: 106 MMOL/L (ref 94–109)
CO2 SERPL-SCNC: 28 MMOL/L (ref 20–32)
CREAT SERPL-MCNC: 0.84 MG/DL (ref 0.52–1.04)
EOSINOPHIL # BLD AUTO: 0.3 10E3/UL (ref 0–0.7)
EOSINOPHIL NFR BLD AUTO: 2 %
ERYTHROCYTE [DISTWIDTH] IN BLOOD BY AUTOMATED COUNT: 15.9 % (ref 10–15)
GFR SERPL CREATININE-BSD FRML MDRD: 73 ML/MIN/1.73M2
GLUCOSE BLD-MCNC: 124 MG/DL (ref 70–99)
HCO3 BLDV-SCNC: 32 MMOL/L (ref 21–28)
HCT VFR BLD AUTO: 43.3 % (ref 35–47)
HGB BLD-MCNC: 12.8 G/DL (ref 11.7–15.7)
HOLD SPECIMEN: NORMAL
IMM GRANULOCYTES # BLD: 0.1 10E3/UL
IMM GRANULOCYTES NFR BLD: 1 %
LACTATE BLD-SCNC: 1.6 MMOL/L
LYMPHOCYTES # BLD AUTO: 1.1 10E3/UL (ref 0.8–5.3)
LYMPHOCYTES NFR BLD AUTO: 5 %
MCH RBC QN AUTO: 24.5 PG (ref 26.5–33)
MCHC RBC AUTO-ENTMCNC: 29.6 G/DL (ref 31.5–36.5)
MCV RBC AUTO: 83 FL (ref 78–100)
MONOCYTES # BLD AUTO: 0.9 10E3/UL (ref 0–1.3)
MONOCYTES NFR BLD AUTO: 5 %
NEUTROPHILS # BLD AUTO: 17.6 10E3/UL (ref 1.6–8.3)
NEUTROPHILS NFR BLD AUTO: 87 %
NRBC # BLD AUTO: 0 10E3/UL
NRBC BLD AUTO-RTO: 0 /100
PCO2 BLDV: 60 MM HG (ref 40–50)
PH BLDV: 7.34 [PH] (ref 7.32–7.43)
PLATELET # BLD AUTO: 262 10E3/UL (ref 150–450)
PO2 BLDV: 23 MM HG (ref 25–47)
POTASSIUM BLD-SCNC: 3.3 MMOL/L (ref 3.4–5.3)
RBC # BLD AUTO: 5.23 10E6/UL (ref 3.8–5.2)
SAO2 % BLDV: 35 % (ref 94–100)
SARS-COV-2 RNA RESP QL NAA+PROBE: NEGATIVE
SODIUM SERPL-SCNC: 142 MMOL/L (ref 133–144)
TROPONIN I SERPL-MCNC: <0.015 UG/L (ref 0–0.04)
WBC # BLD AUTO: 20.1 10E3/UL (ref 4–11)

## 2021-07-19 PROCEDURE — 80048 BASIC METABOLIC PNL TOTAL CA: CPT | Performed by: EMERGENCY MEDICINE

## 2021-07-19 PROCEDURE — 87040 BLOOD CULTURE FOR BACTERIA: CPT | Performed by: EMERGENCY MEDICINE

## 2021-07-19 PROCEDURE — C9803 HOPD COVID-19 SPEC COLLECT: HCPCS

## 2021-07-19 PROCEDURE — 87635 SARS-COV-2 COVID-19 AMP PRB: CPT | Performed by: EMERGENCY MEDICINE

## 2021-07-19 PROCEDURE — 84484 ASSAY OF TROPONIN QUANT: CPT | Performed by: EMERGENCY MEDICINE

## 2021-07-19 PROCEDURE — 82803 BLOOD GASES ANY COMBINATION: CPT

## 2021-07-19 PROCEDURE — 36592 COLLECT BLOOD FROM PICC: CPT | Performed by: EMERGENCY MEDICINE

## 2021-07-19 PROCEDURE — 71045 X-RAY EXAM CHEST 1 VIEW: CPT

## 2021-07-19 PROCEDURE — 99285 EMERGENCY DEPT VISIT HI MDM: CPT | Mod: 25

## 2021-07-19 PROCEDURE — 36415 COLL VENOUS BLD VENIPUNCTURE: CPT | Performed by: EMERGENCY MEDICINE

## 2021-07-19 PROCEDURE — 85025 COMPLETE CBC W/AUTO DIFF WBC: CPT | Performed by: EMERGENCY MEDICINE

## 2021-07-19 PROCEDURE — 93005 ELECTROCARDIOGRAM TRACING: CPT

## 2021-07-19 RX ORDER — DOXYCYCLINE 100 MG/1
100 CAPSULE ORAL 2 TIMES DAILY
Qty: 14 CAPSULE | Refills: 0 | Status: SHIPPED | OUTPATIENT
Start: 2021-07-19 | End: 2021-07-26

## 2021-07-19 RX ORDER — CEFPODOXIME PROXETIL 200 MG/1
200 TABLET, FILM COATED ORAL 2 TIMES DAILY
Qty: 14 TABLET | Refills: 0 | Status: SHIPPED | OUTPATIENT
Start: 2021-07-19 | End: 2021-07-26

## 2021-07-19 RX ORDER — BENZONATATE 200 MG/1
200 CAPSULE ORAL 3 TIMES DAILY PRN
Qty: 21 CAPSULE | Refills: 0 | Status: SHIPPED | OUTPATIENT
Start: 2021-07-19 | End: 2021-11-22

## 2021-07-19 ASSESSMENT — ENCOUNTER SYMPTOMS
BACK PAIN: 1
VOMITING: 0
COUGH: 1
DIARRHEA: 0
ABDOMINAL PAIN: 0
SHORTNESS OF BREATH: 1
FEVER: 0
NAUSEA: 0

## 2021-07-19 NOTE — ED PROVIDER NOTES
History   Chief Complaint:  Cough     HPI   Nicole Reyes is a 65 year old female with history of hypertension, hyperlipidemia, and type II diabetes who presents for evaluation of a cough. Approximately two days ago the patient started to develop a frequent dry cough with some associated shortness of breath. Since then she has developed some pain in her chest and back when coughing. Due to her persistent symptoms today she came into the ED with primary concern that she could have pneumonia, as she reports a history of recurrent pneumonia in the past. She denies any history of asthma or COPD. Otherwise, she denies any recent fever, abdominal pain, nausea, vomiting, and diarrhea. She did receive the Vidal and Vidal COVID-19 vaccine.     Review of Systems   Constitutional: Negative for fever.   Respiratory: Positive for cough and shortness of breath.    Cardiovascular: Positive for chest pain.   Gastrointestinal: Negative for abdominal pain, diarrhea, nausea and vomiting.   Musculoskeletal: Positive for back pain.   All other systems reviewed and are negative.    Allergies:  Codeine  Penicillins     Medications:  Albuterol inhaler   Aspirin   Klonopin  Gabapentin  Losartan  Metformin   Mirtazapine   Protonix  Zoloft  Simvastatin  Temazepam  Ambien     Past Medical History:    Arthritis  Chronic kidney disease  Hypertension  GERD   Anxiety  Hiatal hernia   Hypertension   Insomnia   Iron deficiency anemia  Depression   Obesity   Obstructive sleep apnea  Chronic pain   Restless legs syndrome   Hyperlipidemia  Diabetes mellitus, type II   Recurrent aspiration pneumonia      Past Surgical History:    Breast biopsy   section  Colonoscopy   Dilation and curettage, hysteroscopy diagnostic, combined   EGD, combined   Incisional herniorrhaphy   Lymph node biopsy     Family History:    CHF - Mother   Hypertension - Mother, brother, and sister   CAD - Mother and brother   Lipids - Mother   Cancer - Father    Diabetes - Sister   Lupus - Sister     Social History:  The patient presents to the ED alone.   The patient received the Vidal and Vidal COVID-19 vaccine.     Physical Exam     Patient Vitals for the past 24 hrs:   BP Temp Pulse Resp SpO2   07/19/21 2008 -- -- -- 16 --   07/19/21 2000 134/87 -- 81 -- 98 %   07/19/21 1900 (!) 156/93 -- 88 -- 96 %   07/19/21 1845 (!) 140/76 -- 79 -- 98 %   07/19/21 1818 -- -- -- -- (!) 89 %   07/19/21 1817 -- -- -- -- 91 %   07/19/21 1816 -- -- -- -- 94 %   07/19/21 1815 (!) 140/84 -- 88 -- 90 %   07/19/21 1814 -- -- -- -- 90 %   07/19/21 1813 -- -- -- -- (!) 89 %   07/19/21 1812 -- -- -- -- (!) 88 %   07/19/21 1811 -- -- -- -- 93 %   07/19/21 1809 135/84 -- 86 -- --   07/19/21 1808 -- -- -- -- 92 %   07/19/21 1807 -- -- -- -- 92 %   07/19/21 1806 -- -- -- -- 91 %   07/19/21 1429 (!) 170/90 98.9  F (37.2  C) 97 18 95 %       Physical Exam  General: Alert, appears well-developed and well-nourished. Cooperative.     In mild distress  HEENT:  Head:  Atraumatic  Ears:  External ears are normal  Mouth/Throat:  Oropharynx is without erythema or exudate and mucous membranes are moist.   Eyes:   Conjunctivae normal and EOM are normal. No scleral icterus.  CV:  Normal rate, regular rhythm, normal heart sounds and radial pulses are 2+ and symmetric.  No murmur.  Resp:  Breath sounds are clear bilaterally, no wheezing.  Dry cough.    Non-labored, no retractions or accessory muscle use  GI:  Obese.  Abdomen is soft, no distension, no tenderness. No rebound or guarding.  No CVA tenderness bilaterally  MS:  Normal range of motion. No edema.    Normal strength in all 4 extremities.     Back atraumatic.    No midline cervical, thoracic, or lumbar tenderness  Skin:  Warm and dry.  No rash or lesions noted.  Neuro: Alert. Normal strength.  GCS: 15  Psych:  Normal mood and affect.    Emergency Department Course   ECG  ECG taken at 17:54:06, ECG read at 1756  Normal sinus rhythm, Possible anterior  infarct age undetermined, Abnormal ECG    No significant change as compared to prior, dated 6/1/2021.  Rate 91 bpm. UT interval 190 ms. QRS duration 80 ms. QT/QTc 362/445 ms. P-R-T axes 46 -14 86.     Imaging:  XR Chest Port:  IMPRESSION: Heart size mildly enlarged. Similar-appearing mild edema and pulmonary venous hypertension.   No acute new findings.  Per radiology.     Laboratory:   CBC: WBC 20.1 high, HGB 12.8,    BMP: Potassium 3.3 low, Glucose 124 high, o/w WNL (Creatinine 0.84)   ISTAT gases lactate gema POCT: Lactic acid 1.6, Bicarbonate 32 high, O2 sat 35 low, pCO2 60 high, pH 7.34, pO2 23 low  Troponin (Collected 1739): <0.015   Symptomatic SARS-CoV2 (COVID19) Virus PCR: Negative       Emergency Department Course:  Reviewed:  I reviewed nursing notes, vitals and past medical history    Assessments:  1718: I obtained history and examined the patient as noted above.     1958: I updated and reassessed the patient.      Disposition:  The patient was discharged to home.      Impression & Plan   Medical Decision Making:  Nicole Reyes is a 65 year old female with a complex past medical history pertinent for recurrent admission due to aspiration pneumonia, CRISTÓBAL, hypertensin, type II diabetes, and hyperlipidemia who presents with a cough and shortness of breath since Friday. The patient is well-appearing on my initial examination. It does appear that at night time she uses 2LPM oxygen by nasal cannula due to history of obesity, CRISTÓBAL,. The patient was placed on 2LPM NC oxygen, but thankfully hasno increased requirements throughout her course in the ED. We obtained a chest X-ray and blood work. Chest X-ray thankfully shows no evidence of an acute infiltrate. There is mild edema versus pulmonary venous hypertension seen on chest X-ray tonight. Blood work is relatively unremarkable except for a nonspecific leukocytosis of 20.1.  COVID is negative. The patient's EKG shows no concerning ischemic changes and  her troponin is undetectable.  Low concern for ACS.  I do have concern whether her presentation may represent community acquired pneumonia versus recurrent aspiration pneumonia. Ultimately with chest X-ray being unremarkable, and her oxygen requirements being unchanged from her baseline, I would start her on antibiotics for suspected community acquired pneumonia. She will be discharged with a cefpodoxime and Doxycycline for the next seven days with close follow up with her primary care provider for recheck prior to the weekend. Return sooner to the emergency department with increased oxygen needs, worsening shortness of breath, or fever development. After all questions answered and return precautions understood, discharged home.      Covid-19  Nicole Reyes was evaluated during a global COVID-19 pandemic, which necessitated consideration that the patient might be at risk for infection with the SARS-CoV-2 virus that causes COVID-19.   Applicable protocols for evaluation were followed during the patient's care.   COVID-19 was considered as part of the patient's evaluation. The plan for testing is:  a test was obtained during this visit.     Diagnosis:    ICD-10-CM   1. Cough  R05   2. Community acquired pneumonia, unspecified laterality  J18.9       Discharge Medications:   Details   benzonatate (TESSALON) 200 MG capsule Take 1 capsule (200 mg) by mouth 3 times daily as needed for cough, Disp-21 capsule, R-0, Local Print      cefpodoxime (VANTIN) 200 MG tablet Take 1 tablet (200 mg) by mouth 2 times daily for 7 days, Disp-14 tablet, R-0, Local Print      doxycycline hyclate (VIBRAMYCIN) 100 MG capsule Take 1 capsule (100 mg) by mouth 2 times daily for 7 days, Disp-14 capsule, R-0, Local Print             Scribe Disclosure:  I, Lowell Bender, am serving as a scribe at 5:18 PM on 7/19/2021 to document services personally performed by Thompson Moon MD based on my observations and the provider's statements to me.          Thompson Moon MD  07/20/21 1126

## 2021-07-19 NOTE — ED NOTES
Pt dipped into the mid 80's on room air, MD notified and O2 applied. PT reports that she wears O2 at night time

## 2021-07-20 LAB
ATRIAL RATE - MUSE: 91 BPM
DIASTOLIC BLOOD PRESSURE - MUSE: NORMAL MMHG
INTERPRETATION ECG - MUSE: NORMAL
P AXIS - MUSE: 46 DEGREES
PR INTERVAL - MUSE: 190 MS
QRS DURATION - MUSE: 80 MS
QT - MUSE: 362 MS
QTC - MUSE: 445 MS
R AXIS - MUSE: -14 DEGREES
SYSTOLIC BLOOD PRESSURE - MUSE: NORMAL MMHG
T AXIS - MUSE: 86 DEGREES
VENTRICULAR RATE- MUSE: 91 BPM

## 2021-07-24 LAB
BACTERIA BLD CULT: NO GROWTH
BACTERIA BLD CULT: NO GROWTH

## 2021-08-09 ENCOUNTER — TRANSFERRED RECORDS (OUTPATIENT)
Dept: HEALTH INFORMATION MANAGEMENT | Facility: CLINIC | Age: 66
End: 2021-08-09

## 2021-08-24 ENCOUNTER — MYC MEDICAL ADVICE (OUTPATIENT)
Dept: INTERNAL MEDICINE | Facility: CLINIC | Age: 66
End: 2021-08-24

## 2021-08-25 NOTE — TELEPHONE ENCOUNTER
Called patient and left message to call the clinic back and schedule an appointment with Dr. Guzman.     Myc message sent to patient.

## 2021-08-26 ENCOUNTER — OFFICE VISIT (OUTPATIENT)
Dept: INTERNAL MEDICINE | Facility: CLINIC | Age: 66
End: 2021-08-26
Payer: MEDICARE

## 2021-08-26 VITALS
HEIGHT: 59 IN | WEIGHT: 249 LBS | OXYGEN SATURATION: 94 % | HEART RATE: 89 BPM | TEMPERATURE: 97.9 F | SYSTOLIC BLOOD PRESSURE: 128 MMHG | RESPIRATION RATE: 16 BRPM | BODY MASS INDEX: 50.2 KG/M2 | DIASTOLIC BLOOD PRESSURE: 74 MMHG

## 2021-08-26 DIAGNOSIS — J69.0 RECURRENT ASPIRATION PNEUMONIA (H): ICD-10-CM

## 2021-08-26 DIAGNOSIS — R05.9 COUGH: Primary | ICD-10-CM

## 2021-08-26 LAB
BASOPHILS # BLD AUTO: 0 10E3/UL (ref 0–0.2)
BASOPHILS NFR BLD AUTO: 0 %
EOSINOPHIL # BLD AUTO: 0.3 10E3/UL (ref 0–0.7)
EOSINOPHIL NFR BLD AUTO: 3 %
ERYTHROCYTE [DISTWIDTH] IN BLOOD BY AUTOMATED COUNT: 16.2 % (ref 10–15)
HCT VFR BLD AUTO: 39.4 % (ref 35–47)
HGB BLD-MCNC: 11.9 G/DL (ref 11.7–15.7)
LYMPHOCYTES # BLD AUTO: 2.1 10E3/UL (ref 0.8–5.3)
LYMPHOCYTES NFR BLD AUTO: 24 %
MCH RBC QN AUTO: 24.2 PG (ref 26.5–33)
MCHC RBC AUTO-ENTMCNC: 30.2 G/DL (ref 31.5–36.5)
MCV RBC AUTO: 80 FL (ref 78–100)
MONOCYTES # BLD AUTO: 0.5 10E3/UL (ref 0–1.3)
MONOCYTES NFR BLD AUTO: 5 %
NEUTROPHILS # BLD AUTO: 6.1 10E3/UL (ref 1.6–8.3)
NEUTROPHILS NFR BLD AUTO: 67 %
PLATELET # BLD AUTO: 284 10E3/UL (ref 150–450)
RBC # BLD AUTO: 4.91 10E6/UL (ref 3.8–5.2)
WBC # BLD AUTO: 9 10E3/UL (ref 4–11)

## 2021-08-26 PROCEDURE — 99213 OFFICE O/P EST LOW 20 MIN: CPT | Performed by: FAMILY MEDICINE

## 2021-08-26 PROCEDURE — 85025 COMPLETE CBC W/AUTO DIFF WBC: CPT | Performed by: FAMILY MEDICINE

## 2021-08-26 PROCEDURE — 36415 COLL VENOUS BLD VENIPUNCTURE: CPT | Performed by: FAMILY MEDICINE

## 2021-08-26 RX ORDER — DOXYCYCLINE 100 MG/1
100 CAPSULE ORAL 2 TIMES DAILY
Qty: 20 CAPSULE | Refills: 0 | Status: SHIPPED | OUTPATIENT
Start: 2021-08-26 | End: 2021-11-09

## 2021-08-26 RX ORDER — CEFPODOXIME PROXETIL 200 MG/1
200 TABLET, FILM COATED ORAL 2 TIMES DAILY
Qty: 20 TABLET | Refills: 0 | Status: SHIPPED | OUTPATIENT
Start: 2021-08-26 | End: 2021-11-09

## 2021-08-26 RX ORDER — PREDNISONE 10 MG/1
TABLET ORAL
Qty: 15 TABLET | Refills: 0 | Status: SHIPPED | OUTPATIENT
Start: 2021-08-26 | End: 2021-11-09

## 2021-08-26 ASSESSMENT — MIFFLIN-ST. JEOR: SCORE: 1580.09

## 2021-09-05 ENCOUNTER — HEALTH MAINTENANCE LETTER (OUTPATIENT)
Age: 66
End: 2021-09-05

## 2021-09-05 ENCOUNTER — MYC REFILL (OUTPATIENT)
Dept: INTERNAL MEDICINE | Facility: CLINIC | Age: 66
End: 2021-09-05

## 2021-09-05 DIAGNOSIS — G25.81 RESTLESS LEGS SYNDROME (RLS): ICD-10-CM

## 2021-09-05 DIAGNOSIS — E11.9 TYPE 2 DIABETES MELLITUS WITHOUT COMPLICATION, WITHOUT LONG-TERM CURRENT USE OF INSULIN (H): Primary | ICD-10-CM

## 2021-09-05 DIAGNOSIS — F41.1 GENERALIZED ANXIETY DISORDER: ICD-10-CM

## 2021-09-05 DIAGNOSIS — F33.1 MAJOR DEPRESSIVE DISORDER, RECURRENT EPISODE, MODERATE (H): ICD-10-CM

## 2021-09-05 DIAGNOSIS — N18.30 TYPE 2 DIABETES MELLITUS WITH STAGE 3 CHRONIC KIDNEY DISEASE, WITHOUT LONG-TERM CURRENT USE OF INSULIN (H): ICD-10-CM

## 2021-09-05 DIAGNOSIS — G47.00 PERSISTENT INSOMNIA: ICD-10-CM

## 2021-09-05 DIAGNOSIS — I10 ESSENTIAL HYPERTENSION, BENIGN: ICD-10-CM

## 2021-09-05 DIAGNOSIS — E11.22 TYPE 2 DIABETES MELLITUS WITH STAGE 3 CHRONIC KIDNEY DISEASE, WITHOUT LONG-TERM CURRENT USE OF INSULIN (H): ICD-10-CM

## 2021-09-08 NOTE — TELEPHONE ENCOUNTER
Pending Prescriptions:                       Disp   Refills    losartan (COZAAR) 100 MG tablet            90 tab*0        Sig: Take 1 tablet (100 mg) by mouth daily    gabapentin (NEURONTIN) 300 MG capsule      270 ca*0          metFORMIN (GLUCOPHAGE-XR) 500 MG 24 hr tab*180 ta*0          sertraline (ZOLOFT) 100 MG tablet          180 ta*0        Sig: Take 2 tablets by mouth once daily  Routing refill request to provider for review/approval because:  Fails protocol

## 2021-09-09 RX ORDER — GABAPENTIN 300 MG/1
900 CAPSULE ORAL DAILY
Qty: 270 CAPSULE | Refills: 3 | Status: SHIPPED | OUTPATIENT
Start: 2021-09-09 | End: 2022-06-22

## 2021-09-09 RX ORDER — LOSARTAN POTASSIUM 100 MG/1
100 TABLET ORAL DAILY
Qty: 90 TABLET | Refills: 3 | Status: SHIPPED | OUTPATIENT
Start: 2021-09-09 | End: 2022-08-11

## 2021-09-09 RX ORDER — SERTRALINE HYDROCHLORIDE 100 MG/1
TABLET, FILM COATED ORAL
Qty: 180 TABLET | Refills: 3 | Status: SHIPPED | OUTPATIENT
Start: 2021-09-09 | End: 2022-07-26

## 2021-09-09 RX ORDER — METFORMIN HCL 500 MG
1000 TABLET, EXTENDED RELEASE 24 HR ORAL
Qty: 180 TABLET | Refills: 3 | Status: SHIPPED | OUTPATIENT
Start: 2021-09-09 | End: 2022-08-21

## 2021-11-08 ENCOUNTER — APPOINTMENT (OUTPATIENT)
Dept: GENERAL RADIOLOGY | Facility: CLINIC | Age: 66
DRG: 177 | End: 2021-11-08
Attending: EMERGENCY MEDICINE
Payer: MEDICARE

## 2021-11-08 ENCOUNTER — HOSPITAL ENCOUNTER (INPATIENT)
Facility: CLINIC | Age: 66
LOS: 3 days | Discharge: HOME | DRG: 177 | End: 2021-11-11
Attending: EMERGENCY MEDICINE | Admitting: INTERNAL MEDICINE
Payer: MEDICARE

## 2021-11-08 ENCOUNTER — APPOINTMENT (OUTPATIENT)
Dept: CT IMAGING | Facility: CLINIC | Age: 66
DRG: 177 | End: 2021-11-08
Attending: EMERGENCY MEDICINE
Payer: MEDICARE

## 2021-11-08 DIAGNOSIS — J12.82 PNEUMONIA DUE TO 2019 NOVEL CORONAVIRUS: ICD-10-CM

## 2021-11-08 DIAGNOSIS — J96.01 ACUTE RESPIRATORY FAILURE WITH HYPOXIA (H): ICD-10-CM

## 2021-11-08 DIAGNOSIS — U07.1 PNEUMONIA DUE TO 2019 NOVEL CORONAVIRUS: ICD-10-CM

## 2021-11-08 LAB
ANION GAP SERPL CALCULATED.3IONS-SCNC: 9 MMOL/L (ref 3–14)
BUN SERPL-MCNC: 10 MG/DL (ref 7–30)
CALCIUM SERPL-MCNC: 8.8 MG/DL (ref 8.5–10.1)
CHLORIDE BLD-SCNC: 106 MMOL/L (ref 94–109)
CO2 SERPL-SCNC: 28 MMOL/L (ref 20–32)
CREAT SERPL-MCNC: 0.86 MG/DL (ref 0.52–1.04)
D DIMER PPP FEU-MCNC: 1.23 UG/ML FEU (ref 0–0.5)
ERYTHROCYTE [DISTWIDTH] IN BLOOD BY AUTOMATED COUNT: 17.1 % (ref 10–15)
GFR SERPL CREATININE-BSD FRML MDRD: 71 ML/MIN/1.73M2
GLUCOSE BLD-MCNC: 120 MG/DL (ref 70–99)
GLUCOSE BLDC GLUCOMTR-MCNC: 125 MG/DL (ref 70–99)
HCT VFR BLD AUTO: 45.2 % (ref 35–47)
HGB BLD-MCNC: 13.6 G/DL (ref 11.7–15.7)
HOLD SPECIMEN: NORMAL
HOLD SPECIMEN: NORMAL
MAGNESIUM SERPL-MCNC: 1.6 MG/DL (ref 1.6–2.3)
MCH RBC QN AUTO: 23.8 PG (ref 26.5–33)
MCHC RBC AUTO-ENTMCNC: 30.1 G/DL (ref 31.5–36.5)
MCV RBC AUTO: 79 FL (ref 78–100)
PLATELET # BLD AUTO: 304 10E3/UL (ref 150–450)
POTASSIUM BLD-SCNC: 3.3 MMOL/L (ref 3.4–5.3)
RBC # BLD AUTO: 5.72 10E6/UL (ref 3.8–5.2)
SODIUM SERPL-SCNC: 143 MMOL/L (ref 133–144)
TROPONIN I SERPL-MCNC: <0.015 UG/L (ref 0–0.04)
WBC # BLD AUTO: 10 10E3/UL (ref 4–11)

## 2021-11-08 PROCEDURE — 85027 COMPLETE CBC AUTOMATED: CPT | Performed by: EMERGENCY MEDICINE

## 2021-11-08 PROCEDURE — 93005 ELECTROCARDIOGRAM TRACING: CPT

## 2021-11-08 PROCEDURE — 85379 FIBRIN DEGRADATION QUANT: CPT | Performed by: EMERGENCY MEDICINE

## 2021-11-08 PROCEDURE — 258N000003 HC RX IP 258 OP 636: Performed by: EMERGENCY MEDICINE

## 2021-11-08 PROCEDURE — 80048 BASIC METABOLIC PNL TOTAL CA: CPT | Performed by: EMERGENCY MEDICINE

## 2021-11-08 PROCEDURE — 71045 X-RAY EXAM CHEST 1 VIEW: CPT

## 2021-11-08 PROCEDURE — 36415 COLL VENOUS BLD VENIPUNCTURE: CPT | Performed by: EMERGENCY MEDICINE

## 2021-11-08 PROCEDURE — 96374 THER/PROPH/DIAG INJ IV PUSH: CPT | Mod: 59

## 2021-11-08 PROCEDURE — 99223 1ST HOSP IP/OBS HIGH 75: CPT | Mod: AI | Performed by: INTERNAL MEDICINE

## 2021-11-08 PROCEDURE — 120N000001 HC R&B MED SURG/OB

## 2021-11-08 PROCEDURE — 99285 EMERGENCY DEPT VISIT HI MDM: CPT | Mod: 25

## 2021-11-08 PROCEDURE — 84484 ASSAY OF TROPONIN QUANT: CPT | Performed by: EMERGENCY MEDICINE

## 2021-11-08 PROCEDURE — 71275 CT ANGIOGRAPHY CHEST: CPT | Mod: MC

## 2021-11-08 PROCEDURE — 83735 ASSAY OF MAGNESIUM: CPT | Performed by: INTERNAL MEDICINE

## 2021-11-08 PROCEDURE — 250N000011 HC RX IP 250 OP 636: Performed by: EMERGENCY MEDICINE

## 2021-11-08 PROCEDURE — 83036 HEMOGLOBIN GLYCOSYLATED A1C: CPT | Performed by: INTERNAL MEDICINE

## 2021-11-08 RX ORDER — CLONAZEPAM 1 MG/1
1 TABLET ORAL 2 TIMES DAILY PRN
Status: DISCONTINUED | OUTPATIENT
Start: 2021-11-08 | End: 2021-11-11 | Stop reason: HOSPADM

## 2021-11-08 RX ORDER — DEXAMETHASONE SODIUM PHOSPHATE 10 MG/ML
6 INJECTION, SOLUTION INTRAMUSCULAR; INTRAVENOUS ONCE
Status: COMPLETED | OUTPATIENT
Start: 2021-11-08 | End: 2021-11-08

## 2021-11-08 RX ORDER — ASPIRIN 81 MG/1
81 TABLET ORAL EVERY EVENING
Status: DISCONTINUED | OUTPATIENT
Start: 2021-11-08 | End: 2021-11-11 | Stop reason: HOSPADM

## 2021-11-08 RX ORDER — IOPAMIDOL 755 MG/ML
500 INJECTION, SOLUTION INTRAVASCULAR ONCE
Status: COMPLETED | OUTPATIENT
Start: 2021-11-08 | End: 2021-11-08

## 2021-11-08 RX ADMIN — IOPAMIDOL 73 ML: 755 INJECTION, SOLUTION INTRAVENOUS at 22:55

## 2021-11-08 RX ADMIN — SODIUM CHLORIDE 90 ML: 9 INJECTION, SOLUTION INTRAVENOUS at 22:55

## 2021-11-08 RX ADMIN — DEXAMETHASONE SODIUM PHOSPHATE 6 MG: 10 INJECTION INTRAMUSCULAR; INTRAVENOUS at 22:29

## 2021-11-08 ASSESSMENT — ACTIVITIES OF DAILY LIVING (ADL): ADLS_ACUITY_SCORE: 14

## 2021-11-08 NOTE — ED TRIAGE NOTES
Pt screens positive for depression.    Pt had positive rapid covid test. At urgent care pt was told that her oxygen levels were 83%. Here in the ED, pt O2 levels are 98% after walking from triage desk into triage room. Pt was instructed to come to the ED for workup. ABC intact.

## 2021-11-09 LAB
ATRIAL RATE - MUSE: 73 BPM
DIASTOLIC BLOOD PRESSURE - MUSE: NORMAL MMHG
GLUCOSE BLDC GLUCOMTR-MCNC: 106 MG/DL (ref 70–99)
GLUCOSE BLDC GLUCOMTR-MCNC: 106 MG/DL (ref 70–99)
GLUCOSE BLDC GLUCOMTR-MCNC: 125 MG/DL (ref 70–99)
HBA1C MFR BLD: 6.8 % (ref 0–5.6)
INTERPRETATION ECG - MUSE: NORMAL
P AXIS - MUSE: 56 DEGREES
PR INTERVAL - MUSE: 190 MS
QRS DURATION - MUSE: 86 MS
QT - MUSE: 426 MS
QTC - MUSE: 469 MS
R AXIS - MUSE: 24 DEGREES
SYSTOLIC BLOOD PRESSURE - MUSE: NORMAL MMHG
T AXIS - MUSE: 75 DEGREES
VENTRICULAR RATE- MUSE: 73 BPM

## 2021-11-09 PROCEDURE — 120N000001 HC R&B MED SURG/OB

## 2021-11-09 PROCEDURE — 83735 ASSAY OF MAGNESIUM: CPT | Performed by: INTERNAL MEDICINE

## 2021-11-09 PROCEDURE — 99233 SBSQ HOSP IP/OBS HIGH 50: CPT | Performed by: INTERNAL MEDICINE

## 2021-11-09 PROCEDURE — 250N000012 HC RX MED GY IP 250 OP 636 PS 637: Performed by: INTERNAL MEDICINE

## 2021-11-09 PROCEDURE — 250N000011 HC RX IP 250 OP 636: Performed by: INTERNAL MEDICINE

## 2021-11-09 PROCEDURE — 36415 COLL VENOUS BLD VENIPUNCTURE: CPT | Performed by: INTERNAL MEDICINE

## 2021-11-09 PROCEDURE — 80048 BASIC METABOLIC PNL TOTAL CA: CPT | Performed by: INTERNAL MEDICINE

## 2021-11-09 PROCEDURE — 250N000013 HC RX MED GY IP 250 OP 250 PS 637: Performed by: INTERNAL MEDICINE

## 2021-11-09 RX ORDER — AMOXICILLIN 250 MG
1 CAPSULE ORAL 2 TIMES DAILY PRN
Status: DISCONTINUED | OUTPATIENT
Start: 2021-11-09 | End: 2021-11-11 | Stop reason: HOSPADM

## 2021-11-09 RX ORDER — ACETAMINOPHEN 650 MG/1
650 SUPPOSITORY RECTAL EVERY 6 HOURS PRN
Status: DISCONTINUED | OUTPATIENT
Start: 2021-11-09 | End: 2021-11-11 | Stop reason: HOSPADM

## 2021-11-09 RX ORDER — GABAPENTIN 300 MG/1
300 CAPSULE ORAL DAILY
Status: DISCONTINUED | OUTPATIENT
Start: 2021-11-09 | End: 2021-11-11 | Stop reason: HOSPADM

## 2021-11-09 RX ORDER — ONDANSETRON 4 MG/1
4 TABLET, ORALLY DISINTEGRATING ORAL EVERY 6 HOURS PRN
Status: DISCONTINUED | OUTPATIENT
Start: 2021-11-09 | End: 2021-11-11 | Stop reason: HOSPADM

## 2021-11-09 RX ORDER — MAGNESIUM SULFATE HEPTAHYDRATE 40 MG/ML
2 INJECTION, SOLUTION INTRAVENOUS ONCE
Status: DISCONTINUED | OUTPATIENT
Start: 2021-11-09 | End: 2021-11-11 | Stop reason: HOSPADM

## 2021-11-09 RX ORDER — POTASSIUM CHLORIDE 1500 MG/1
40 TABLET, EXTENDED RELEASE ORAL ONCE
Status: COMPLETED | OUTPATIENT
Start: 2021-11-09 | End: 2021-11-09

## 2021-11-09 RX ORDER — ONDANSETRON 2 MG/ML
4 INJECTION INTRAMUSCULAR; INTRAVENOUS EVERY 6 HOURS PRN
Status: DISCONTINUED | OUTPATIENT
Start: 2021-11-09 | End: 2021-11-11 | Stop reason: HOSPADM

## 2021-11-09 RX ORDER — ZOLPIDEM TARTRATE 5 MG/1
5 TABLET ORAL
Status: DISCONTINUED | OUTPATIENT
Start: 2021-11-09 | End: 2021-11-11 | Stop reason: HOSPADM

## 2021-11-09 RX ORDER — AMOXICILLIN 250 MG
1 CAPSULE ORAL 2 TIMES DAILY PRN
Status: DISCONTINUED | OUTPATIENT
Start: 2021-11-09 | End: 2021-11-09

## 2021-11-09 RX ORDER — GABAPENTIN 300 MG/1
600 CAPSULE ORAL EVERY EVENING
Status: DISCONTINUED | OUTPATIENT
Start: 2021-11-09 | End: 2021-11-11 | Stop reason: HOSPADM

## 2021-11-09 RX ORDER — ACETAMINOPHEN 325 MG/1
650 TABLET ORAL EVERY 6 HOURS PRN
Status: DISCONTINUED | OUTPATIENT
Start: 2021-11-09 | End: 2021-11-11 | Stop reason: HOSPADM

## 2021-11-09 RX ORDER — AMOXICILLIN 250 MG
2 CAPSULE ORAL 2 TIMES DAILY PRN
Status: DISCONTINUED | OUTPATIENT
Start: 2021-11-09 | End: 2021-11-09

## 2021-11-09 RX ORDER — DEXTROSE MONOHYDRATE 25 G/50ML
25-50 INJECTION, SOLUTION INTRAVENOUS
Status: DISCONTINUED | OUTPATIENT
Start: 2021-11-09 | End: 2021-11-11 | Stop reason: HOSPADM

## 2021-11-09 RX ORDER — LIDOCAINE 40 MG/G
CREAM TOPICAL
Status: DISCONTINUED | OUTPATIENT
Start: 2021-11-09 | End: 2021-11-11 | Stop reason: HOSPADM

## 2021-11-09 RX ORDER — LOSARTAN POTASSIUM 100 MG/1
100 TABLET ORAL DAILY
Status: DISCONTINUED | OUTPATIENT
Start: 2021-11-09 | End: 2021-11-11 | Stop reason: HOSPADM

## 2021-11-09 RX ORDER — IBUPROFEN 200 MG
400 TABLET ORAL EVERY 6 HOURS PRN
Status: ON HOLD | COMMUNITY
End: 2021-11-11

## 2021-11-09 RX ORDER — PANTOPRAZOLE SODIUM 40 MG/1
40 TABLET, DELAYED RELEASE ORAL
Status: DISCONTINUED | OUTPATIENT
Start: 2021-11-09 | End: 2021-11-11 | Stop reason: HOSPADM

## 2021-11-09 RX ORDER — AMOXICILLIN 250 MG
2 CAPSULE ORAL 2 TIMES DAILY PRN
Status: DISCONTINUED | OUTPATIENT
Start: 2021-11-09 | End: 2021-11-11 | Stop reason: HOSPADM

## 2021-11-09 RX ORDER — NICOTINE POLACRILEX 4 MG
15-30 LOZENGE BUCCAL
Status: DISCONTINUED | OUTPATIENT
Start: 2021-11-09 | End: 2021-11-11 | Stop reason: HOSPADM

## 2021-11-09 RX ORDER — ALBUTEROL SULFATE 90 UG/1
1-2 AEROSOL, METERED RESPIRATORY (INHALATION) EVERY 6 HOURS PRN
Status: DISCONTINUED | OUTPATIENT
Start: 2021-11-09 | End: 2021-11-11 | Stop reason: HOSPADM

## 2021-11-09 RX ADMIN — ASPIRIN 81 MG: 81 TABLET, COATED ORAL at 01:24

## 2021-11-09 RX ADMIN — CLONAZEPAM 1 MG: 0.5 TABLET ORAL at 20:53

## 2021-11-09 RX ADMIN — LOSARTAN POTASSIUM 100 MG: 100 TABLET, FILM COATED ORAL at 09:33

## 2021-11-09 RX ADMIN — CLONAZEPAM 1 MG: 0.5 TABLET ORAL at 01:24

## 2021-11-09 RX ADMIN — GABAPENTIN 600 MG: 300 CAPSULE ORAL at 19:28

## 2021-11-09 RX ADMIN — GABAPENTIN 300 MG: 300 CAPSULE ORAL at 09:07

## 2021-11-09 RX ADMIN — ZOLPIDEM TARTRATE 5 MG: 5 TABLET ORAL at 22:43

## 2021-11-09 RX ADMIN — PANTOPRAZOLE SODIUM 40 MG: 40 TABLET, DELAYED RELEASE ORAL at 09:06

## 2021-11-09 RX ADMIN — DEXAMETHASONE 6 MG: 2 TABLET ORAL at 17:41

## 2021-11-09 RX ADMIN — ENOXAPARIN SODIUM 40 MG: 40 INJECTION SUBCUTANEOUS at 01:24

## 2021-11-09 RX ADMIN — ASPIRIN 81 MG: 81 TABLET, COATED ORAL at 19:28

## 2021-11-09 RX ADMIN — POTASSIUM CHLORIDE 40 MEQ: 1500 TABLET, EXTENDED RELEASE ORAL at 17:41

## 2021-11-09 RX ADMIN — ENOXAPARIN SODIUM 40 MG: 40 INJECTION SUBCUTANEOUS at 11:50

## 2021-11-09 RX ADMIN — ACETAMINOPHEN 650 MG: 325 TABLET, FILM COATED ORAL at 17:48

## 2021-11-09 ASSESSMENT — ACTIVITIES OF DAILY LIVING (ADL)
ADLS_ACUITY_SCORE: 14
ADLS_ACUITY_SCORE: 18
ADLS_ACUITY_SCORE: 14
ADLS_ACUITY_SCORE: 14
ADLS_ACUITY_SCORE: 18
ADLS_ACUITY_SCORE: 14
ADLS_ACUITY_SCORE: 14
ADLS_ACUITY_SCORE: 18
ADLS_ACUITY_SCORE: 14
ADLS_ACUITY_SCORE: 18
ADLS_ACUITY_SCORE: 18
ADLS_ACUITY_SCORE: 14
ADLS_ACUITY_SCORE: 18
ADLS_ACUITY_SCORE: 14
ADLS_ACUITY_SCORE: 14
ADLS_ACUITY_SCORE: 18
ADLS_ACUITY_SCORE: 14

## 2021-11-09 ASSESSMENT — MIFFLIN-ST. JEOR: SCORE: 1563.74

## 2021-11-09 NOTE — H&P
Admitted: 11/08/2021    CHIEF COMPLAINT:  Shortness of breath, hypoxia and COVID infection.    HISTORY OF PRESENT ILLNESS:  Nicole Reyes is a 66-year-old female with a past medical history significant for hypertension, obstructive sleep apnea on CPAP at night with 2 liters of oxygen, aspiration pneumonia, gastroesophageal reflux disease, major depression, diabetes mellitus type 2, obesity, among others, who came in today to the Emergency Room with shortness of breath, hypoxia and COVID positive test.  The patient has been symptomatic for about 10 days.  She has been vaccinated with Vidal and Vidal.  She developed cold symptoms, which she thought it was allergic at the beginning, but her symptoms progressed.  Her , who is not vaccinated, had severe COVID pneumonia and admitted to this hospital.  The patient felt more short of breath after her test came back positive yesterday, and today with exertion she became more hypoxic, and also at rest her oxygen saturation was in the low 90s.  She was seen in an urgent care center, where her oxygen level was low, and she was referred to the Emergency Room.  She denied any chest pain, no palpitation.  She denied any runny nose or sore throat.  She denied any fever or chills.  The patient has spinal stenosis, and she had an epidural steroid injection over a month ago.  In the Emergency Room, she was evaluated and discussed with Dr. Sanchez.  She had blood work done.  Potassium was 3.3, otherwise not significant.  COVID test was done as an outpatient, which was reported as positive.  D-dimer was elevated at 1.23, and CT scan of the chest PE protocol is pending.  The patient got a dose of dexamethasone and being admitted to the hospital.    PAST MEDICAL HISTORY:    1.  Essential hypertension.  2.  Gastroesophageal reflux disease.  3.  Hypertension.  4.  Depression.  5.  Obstructive sleep apnea, on CPAP and oxygen at night.  6.  Hypercholesterolemia.  7.  Restless  leg syndrome.  8.  Diabetes mellitus type 2.    PAST SURGICAL HISTORY:  As in electronic medical record.    FAMILY HISTORY:  Reviewed and noncontributory to her current condition.  Mother with cerebrovascular accident, hypertension and coronary artery disease.  Sister with hypertension and hyperlipidemia and lupus.    SOCIAL HISTORY:  The patient lives with her .  She does not smoke.  She does not drink alcohol.  She does not use illicit drugs.    REVIEW OF SYSTEMS:  Reviewed 10 points.  All are negative except those mentioned in History of Present Illness.    PHYSICAL EXAMINATION:    GENERAL:  The patient is awake, alert, oriented, comfortable, not in distress.    VITAL SIGNS:  She is on 2 liters of oxygen, saturating 97%.  Blood pressure 150/90, pulse rate 66, temperature 96.5.  HEENT:  Pink and anicteric.  Extraocular muscle movement intact.  NECK:  Supple.  No JVD, no thyromegaly.  CHEST:  Good air entry bilaterally.  No wheezing, crackles or rales.  CARDIOVASCULAR SYSTEM:  S1 and S2 well heard.  No gallop or murmur.  ABDOMEN:  Soft, obese, nontender, nondistended.  EXTREMITIES:  No edema, cyanosis or clubbing.  NEUROLOGIC:  No focal neurologic deficit.  Cranial nerves grossly intact.  PSYCHIATRIC:  Normal mood and affect.  Keeps eye contact, responds to question appropriately.    DIAGNOSTIC TESTS OF INTEREST:  Electrolytes are normal, except potassium is 3.3.  Creatinine is 10.  BUN is 0.8.  Glucose is 120.  Blood gas showed pH venous 7.34, pCO2 of 60, pO2 of 35.  Hemoglobin 13.6, WBC 10, platelets 304.  COVID-19 test done as an outpatient was reported positive.    ASSESSMENT AND PLAN:  Nicole Drake is a 66-year-old female with past medical history of hypertension, obstructive sleep apnea on CPAP at night with 2 liters of oxygen, aspiration pneumonia, hypertension, chronic kidney disease, diabetes mellitus type 2, obesity, restless leg syndrome, who came in today with positive COVID infection,  shortness of breath and hypoxia and being admitted to the hospital for further evaluation.    1.  Acute hypoxic respiratory failure secondary to COVID-19 infection/pneumonia:  The patient is requiring 2 liters of oxygen.  Normally she is on 2 liters of oxygen only at night with CPAP.  Continue oxygen.  She is on Decadron.  The patient states that she does not want to take remdesivir, and she declined it.  We will continue on 2 liters of oxygen.  She is on bronchodilators.  The patient is immunized with Vidal and Vidal.  She states that she is not planning to get any booster in the future.  Followup pending -- CT scan of the chest PE protocol.  For now, she is on DVT prophylaxis, Lovenox 40 mg subcutaneous twice a day.  2.  Hypokalemia:  Replace potassium per protocol.  3.  Obstructive sleep apnea:  The patient is on CPAP at night with 2 liters of oxygen, which will be continued.  4.  Diabetes mellitus type 2:  Baseline patient is on metformin.  We will hold it, as she will be subjected to contrast dye for now and cover her with sliding scale insulin.  5.  Gastroesophageal reflux disease:  Continue pantoprazole.    I discussed with the patient at length the plan of care, all her questions and concerns addressed, showed understanding.  I also discussed with the ED physician, Dr. Sanchez.    She will be admitted as an inpatient.     CODE STATUS:  In the event of cardiorespiratory arrest, she is full code.    Sam Novak MD        D: 2021   T: 2021   MT: RANCHOMT    Name:     ASHANTI PATEL  MRN:      [id number]        Account:     [id number]   :      1955           Admitted:    2021       Document: [id number]

## 2021-11-09 NOTE — ED NOTES
Pt pushed call light to request a heat pack for right shoulder soreness. Gave pt warm blanket and placed over right shoulder. Pt refused tylenol.

## 2021-11-09 NOTE — PROGRESS NOTES
[facility] [place]    Medicine Progress Note - Hospitalist Service       Date of Admission:  11/8/2021    Assessment & Plan            Nicole Reyes is a 66-year-old female with a past medical history significant for hypertension, obstructive sleep apnea on CPAP at night with 2 liters of oxygen, aspiration pneumonia, gastroesophageal reflux disease, major depression, diabetes mellitus type 2, obesity, among others, who came in today to the Emergency Room with shortness of breath, hypoxia and COVID positive test.  The patient has been symptomatic for about 10 days.  She has been vaccinated with Vidal and Vidal.  She developed cold symptoms, which she thought it was allergic at the beginning, but her symptoms progressed.  Her , who is not vaccinated, had severe COVID pneumonia and admitted to this hospital.  The patient felt more short of breath after her test came back positive yesterday, and today with exertion she became more hypoxic, and also at rest her oxygen saturation was in the low 90s.  She was seen in an urgent care center, where her oxygen level was low, and she was referred to the Emergency Room.  She denied any chest pain, no palpitation.  She denied any runny nose or sore throat.  She denied any fever or chills.  The patient has spinal stenosis, and she had an epidural steroid injection over a month ago.  In the Emergency Room, she was evaluated and discussed with Dr. Sanchez.  She had blood work done.  Potassium was 3.3, otherwise not significant.  COVID test was done as an outpatient, which was reported as positive.  D-dimer was elevated at 1.23, and CT scan of the chest PE protocol is pending.  The patient got a dose of dexamethasone and being admitted to the hospital.    1.  Acute hypoxic respiratory failure secondary to COVID-19 infection/pneumonia:  The patient is requiring 2 liters of oxygen.  Normally she is on 2 liters of oxygen only at night with CPAP.  Continue  oxygen.  She is on Decadron.  The patient states that she does not want to take remdesivir, and she declined it.  We will continue on 2 liters of oxygen.  She is on bronchodilators.  The patient is immunized with Vidal and Vidal.  She states that she is not planning to get any booster in the future. For now, she is on DVT prophylaxis, Lovenox 40 mg subcutaneous twice a day. CT chest no PE.  2.  Hypokalemia:  Replace potassium per protocol.  3.  Obstructive sleep apnea:  The patient is on CPAP at night with 2 liters of oxygen, which will be continued.  4.  Diabetes mellitus type 2:  Baseline patient is on metformin.  We will hold it, as she will be subjected to contrast dye for now and cover her with sliding scale insulin.  5.  Gastroesophageal reflux disease:  Continue pantoprazole.       Diet: Combination Diet Regular Diet Adult; High Consistent Carb (75 g CHO per Meal) Diet    DVT Prophylaxis: Enoxaparin (Lovenox) SQ  Suarez Catheter: Not present  Central Lines: None  Code Status: Full Code      Disposition Plan   Expected discharge: 2-4 days recommended to prior living arrangement once O2 use less than 0 liters/minute.     The patient's care was discussed with the Patient.    Syd Vincent MD  Hospitalist Service  [facility]  Securely message with the [employer] Web Console (learn more here)  Text page via [employer] Paging/Directory        Clinically Significant Risk Factors Present on Admission              # Platelet Defect: home medication list includes an antiplatelet medication      ______________________________________________________________________    Interval History     No fevers/chills. + LBP.    Data reviewed today: I reviewed all medications, new labs and imaging results over the last 24 hours. I personally reviewed no images or EKG's today.    Physical Exam   Vital Signs: Temp: 98.1  F (36.7  C) Temp src: Oral BP: (!) 162/89 Pulse: 65   Resp: 20 SpO2: 96 % O2 Device: Nasal  cannula Oxygen Delivery: 2 LPM  Weight: 0 lbs 0 oz    HEENT:  Pink and anicteric.  Extraocular muscle movement intact.  NECK:  Supple.  No JVD, no thyromegaly.  CHEST:  Good air entry bilaterally.  No wheezing, crackles or rales.  CARDIOVASCULAR SYSTEM:  S1 and S2 well heard.  No gallop or murmur.  ABDOMEN:  Soft, obese, nontender, nondistended.  EXTREMITIES:  No edema, cyanosis or clubbing.  NEUROLOGIC:  No focal neurologic deficit.  Cranial nerves grossly intact.  PSYCHIATRIC:  Normal mood and affect.  Keeps eye contact, responds to question appropriately.    Data   Recent Labs   Lab 11/09/21  0846 11/08/21  2353 11/08/21  1853   WBC  --   --  10.0   HGB  --   --  13.6   MCV  --   --  79   PLT  --   --  304   NA  --   --  143   POTASSIUM  --   --  3.3*   CHLORIDE  --   --  106   CO2  --   --  28   BUN  --   --  10   CR  --   --  0.86   ANIONGAP  --   --  9   GRECIA  --   --  8.8   * 125* 120*   TROPONIN  --   --  <0.015     Recent Results (from the past 24 hour(s))   XR Chest Port 1 View    Narrative    EXAM: XR CHEST PORT 1 VIEW  LOCATION: [facility]  DATE/TIME: 11/8/2021 9:47 PM    INDICATION: COVID +SOB  COMPARISON: 07/19/2021      Impression    IMPRESSION: Cardiac enlargement. Minimal atelectasis or infiltrate right lung base. No vascular congestion or pleural effusion.   CT Chest Pulmonary Embolism w Contrast    Narrative    EXAM: CT CHEST PULMONARY EMBOLISM W CONTRAST  LOCATION: [facility]  DATE/TIME: 11/8/2021 10:53 PM    INDICATION: PE suspected, low/intermediate prob, positive D-dimer  COMPARISON: CTA chest exam 05/09/2020  TECHNIQUE: CT chest pulmonary angiogram during arterial phase injection of IV contrast. Multiplanar reformats and MIP reconstructions were performed. Dose reduction techniques were used.   CONTRAST: 73mL Isovue-370    FINDINGS:  ANGIOGRAM CHEST: Pulmonary arteries are normal caliber and negative for pulmonary emboli. Thoracic aorta is  negative for dissection. No CT evidence of right heart strain.    LUNGS AND PLEURA: Scattered groundglass opacities both upper and lower lobes. No effusions.    MEDIASTINUM/AXILLAE: Slightly enlarged mediastinal and hilar lymph nodes. Moderate size esophageal hiatal hernia.    CORONARY ARTERY CALCIFICATION: None.    UPPER ABDOMEN: 2.5 cm stone in the gallbladder.    MUSCULOSKELETAL: Hypertrophic changes thoracic spine.      Impression    IMPRESSION:  1.  No evidence for pulmonary emboli.  2.  Groundglass opacities both lungs characteristic of Covid 19 pneumonia.  3.  Slightly enlarged mediastinal and hilar lymph nodes are likely reactive.  4.  Esophageal hiatal hernia.  5.  Gallstone.    Commonly reported imaging features of (COVID-19) pneumonia are present. Influenza pneumonia and organizing pneumonia as can be seen in the setting of drug toxicity and connective tissue disease can cause a similar imaging pattern.     Medications       aspirin  81 mg Oral QPM     dexamethasone  6 mg Oral Daily     enoxaparin ANTICOAGULANT  40 mg Subcutaneous Q12H     gabapentin  300 mg Oral Daily     gabapentin  600 mg Oral QPM     insulin aspart  1-7 Units Subcutaneous TID AC     insulin aspart  1-5 Units Subcutaneous At Bedtime     losartan  100 mg Oral Daily     pantoprazole  40 mg Oral QAM AC     sodium chloride (PF)  3 mL Intracatheter Q8H

## 2021-11-09 NOTE — ED NOTES
[facility]  ED Nurse Handoff Report    Nicole Reyes is a 66 year old female   ED Chief complaint: Covid Concern  . ED Diagnosis:   Final diagnoses:   Pneumonia due to 2019 novel coronavirus   Acute respiratory failure with hypoxia (H)     Allergies:   Allergies   Allergen Reactions     Codeine Rash     Penicillins Rash     Pt has tolerated cephalosporins and penems.   Pt tolerated Zosyn 7/6/2019       Code Status: Full Code  Activity level - Baseline/Home:  Independent. Activity Level - Current:   Stand by Assist. Lift room needed: No. Bariatric: No   Needed: No   Isolation: Yes. Infection: Not Applicable  COVID r/o and special precautions.     Vital Signs:   Vitals:    11/09/21 0915 11/09/21 0945 11/09/21 1000 11/09/21 1015   BP: (!) 162/89      Pulse:       Resp: 20      Temp:       TempSrc:       SpO2: 94% 95% 92% 96%       Cardiac Rhythm:  ,   Cardiac  Cardiac Rhythm: Normal sinus rhythm  Pain level:    Patient confused: No. Patient Falls Risk: Yes.   Elimination Status: Has voided   Patient Report - Initial Complaint: Low O2 saturation. Focused Assessment:   22:33 Cardiac Cardiac - Cardiac WDL: WDL; all   Cardiac Monitoring - EKG Monitoring: Yes  Cardiac Regularity: Regular  Cardiac Rhythm: NSR   Cardiac Care - Chest Pain Intervention: cardiac biomarkers drawn; cardiac monitoring continued; 12-lead ECG obtained; cardiac monitor placed        22:33 Gastrointestinal Gastrointestinal - Gastrointestinal WDL: WDL       22:33 Genitourinary Genitourinary - Genitourinary WDL: WDL  Genitalia (Female) WDL: WDL       22:33 Respiratory Respiratory - Respiratory WDL: WDL; all  Breath Sounds: All Fields   Head To Toe Assessment - All Lung Fields Breath Sounds: clear; equal bilaterally  SH     22:33 Musculoskeletal Musculoskeletal - Musculoskeletal WDL: WDL       22:33 Neurological [place]racken Coma Scale - Best Eye Response: 4-->(E4) spontaneous  Best Motor Response: 6-->(M6) obeys commands   Best Verbal Response: 5-->(V5) oriented  Noel Coma Scale Score: 15           Tests Performed: labs, imaging. Abnormal Results:   Labs Ordered and Resulted from Time of ED Arrival to Time of ED Departure   CBC WITH PLATELETS - Abnormal       Result Value    WBC Count 10.0      RBC Count 5.72 (*)     Hemoglobin 13.6      Hematocrit 45.2      MCV 79      MCH 23.8 (*)     MCHC 30.1 (*)     RDW 17.1 (*)     Platelet Count 304     BASIC METABOLIC PANEL - Abnormal    Sodium 143      Potassium 3.3 (*)     Chloride 106      Carbon Dioxide (CO2) 28      Anion Gap 9      Urea Nitrogen 10      Creatinine 0.86      Calcium 8.8      Glucose 120 (*)     GFR Estimate 71     D DIMER QUANTITATIVE - Abnormal    D-Dimer Quantitative 1.23 (*)    HEMOGLOBIN A1C - Abnormal    Hemoglobin A1C 6.8 (*)    GLUCOSE BY METER - Abnormal    GLUCOSE BY METER POCT 125 (*)    GLUCOSE BY METER - Abnormal    GLUCOSE BY METER POCT 125 (*)    TROPONIN I - Normal    Troponin I <0.015     MAGNESIUM - Normal    Magnesium 1.6     GLUCOSE MONITOR NURSING POCT   GLUCOSE MONITOR NURSING POCT   GLUCOSE MONITOR NURSING POCT   GLUCOSE MONITOR NURSING POCT   GLUCOSE MONITOR NURSING POCT     CT Chest Pulmonary Embolism w Contrast   Final Result   IMPRESSION:   1.  No evidence for pulmonary emboli.   2.  Groundglass opacities both lungs characteristic of Covid 19 pneumonia.   3.  Slightly enlarged mediastinal and hilar lymph nodes are likely reactive.   4.  Esophageal hiatal hernia.   5.  Gallstone.      Commonly reported imaging features of (COVID-19) pneumonia are present. Influenza pneumonia and organizing pneumonia as can be seen in the setting of drug toxicity and connective tissue disease can cause a similar imaging pattern.      XR Chest Port 1 View   Final Result   IMPRESSION: Cardiac enlargement. Minimal atelectasis or infiltrate right lung base. No vascular congestion or pleural effusion.        .   Treatments provided: decadron  Family Comments:  N/A  OBS brochure/video discussed/provided to patient:  Yes  ED Medications:   Medications   albuterol (PROAIR HFA/PROVENTIL HFA/VENTOLIN HFA) 108 (90 Base) MCG/ACT inhaler 1-2 puff (has no administration in time range)   aspirin EC tablet 81 mg (81 mg Oral Given 11/9/21 0124)   clonazePAM (klonoPIN) tablet 1 mg (1 mg Oral Given 11/9/21 0124)   gabapentin (NEURONTIN) capsule 300 mg (300 mg Oral Given 11/9/21 0907)   losartan (COZAAR) tablet 100 mg (100 mg Oral Given 11/9/21 0933)   pantoprazole (PROTONIX) EC tablet 40 mg (40 mg Oral Given 11/9/21 0906)   lidocaine 1 % 0.1-1 mL (has no administration in time range)   lidocaine (LMX4) cream (has no administration in time range)   sodium chloride (PF) 0.9% PF flush 3 mL (3 mLs Intracatheter Given 11/9/21 0910)   sodium chloride (PF) 0.9% PF flush 3 mL (has no administration in time range)   melatonin tablet 1 mg (has no administration in time range)   acetaminophen (TYLENOL) tablet 650 mg (has no administration in time range)     Or   acetaminophen (TYLENOL) Suppository 650 mg (has no administration in time range)   senna-docusate (SENOKOT-S/PERICOLACE) 8.6-50 MG per tablet 1 tablet (has no administration in time range)     Or   senna-docusate (SENOKOT-S/PERICOLACE) 8.6-50 MG per tablet 2 tablet (has no administration in time range)   senna-docusate (SENOKOT-S/PERICOLACE) 8.6-50 MG per tablet 1 tablet (has no administration in time range)     Or   senna-docusate (SENOKOT-S/PERICOLACE) 8.6-50 MG per tablet 2 tablet (has no administration in time range)   ondansetron (ZOFRAN-ODT) ODT tab 4 mg (has no administration in time range)     Or   ondansetron (ZOFRAN) injection 4 mg (has no administration in time range)   dexamethasone (DECADRON) tablet 6 mg (has no administration in time range)   enoxaparin ANTICOAGULANT (LOVENOX) injection 40 mg (40 mg Subcutaneous Given 11/9/21 0124)   glucose gel 15-30 g (has no administration in time range)     Or   dextrose 50 % injection  25-50 mL (has no administration in time range)     Or   glucagon injection 1 mg (has no administration in time range)   insulin aspart (NovoLOG) injection (RAPID ACTING) (1 Units Subcutaneous Not Given 11/9/21 0848)   insulin aspart (NovoLOG) injection (RAPID ACTING) (1 Units Subcutaneous Not Given 11/8/21 8994)   gabapentin (NEURONTIN) capsule 600 mg (has no administration in time range)   dexamethasone PF (DECADRON) injection 6 mg (6 mg Intravenous Given 11/8/21 2229)   0.9% sodium chloride BOLUS (0 mLs Intravenous Stopped 11/8/21 2307)   iopamidol (ISOVUE-370) solution 500 mL (73 mLs Intravenous Given 11/8/21 2255)     Drips infusing:  No  For the majority of the shift, the patient's behavior Green. Interventions performed were N/A.    Sepsis treatment initiated: No     Patient tested for COVID 19 prior to admission: YES    ED Nurse Name/Phone Number: Clayton Rios RN,   10:45 PM    RECEIVING UNIT ED HANDOFF REVIEW    Above ED Nurse Handoff Report was reviewed: Yes  Reviewed by: Alexia Mckeon RN on November 9, 2021 at 11:48 AM

## 2021-11-09 NOTE — ED NOTES
Patient's SpO2 was 87% after changing into hospital gown and getting into bed. Patient denied extreme shortness of breath. SpO2 recovered to 90-91% when resting in bed.

## 2021-11-09 NOTE — PHARMACY-ADMISSION MEDICATION HISTORY
Admission medication history interview status for this patient is complete. See [facility] admission navigator for allergy information, prior to admission medications and immunization status.     Medication history interview source(s):Patient via phone  Medication history resources (including written lists, pill bottles, clinic record):[facility] list, [facility]  Primary pharmacy:[facility]    Changes made to PTA medication list:  Added: ibuprofen  Deleted: vantin 200mg bid, doxycycline 100mg bid, prednisone taper from 8/2021  TAKING DIFFERENTLY:  gaabapentin 1 cap in am and 2 in pm per pt, remeron at bedtime prn, protonix in the am  Changed: -----    Actions taken by pharmacist (provider contacted, etc):None     Additional medication history information:None    Medication reconciliation/reorder completed by provider prior to medication history? Yes, sticky note left for MD and MD paged          Prior to Admission medications    Medication Sig Last Dose Taking? Auth Provider   albuterol (PROAIR HFA/PROVENTIL HFA/VENTOLIN HFA) 108 (90 Base) MCG/ACT inhaler Inhale 1-2 puffs into the lungs every 6 hours as needed for shortness of breath / dyspnea or wheezing  at no recent usage Yes Jose L Weaver MD   aspirin 81 MG EC tablet Take 81 mg by mouth daily  11/8/2021 at am Yes Unknown, Entered By History   benzonatate (TESSALON) 200 MG capsule Take 1 capsule (200 mg) by mouth 3 times daily as needed for cough  at no recent usage Yes Thompson Moon MD   clonazePAM (KLONOPIN) 1 MG tablet Take 1 tablet (1 mg) by mouth 2 times daily as needed for anxiety May fill on 7/10/21, 60 to last 60 days Past Week at Unknown time Yes Pari Wiley MD   gabapentin (NEURONTIN) 300 MG capsule Take 3 capsules (900 mg) by mouth daily  Patient taking differently: Take 900 mg by mouth daily 300mg po daily in the morning and 600mg po daily in the evening 11/8/2021 at Unknown time Yes Pari Wiley MD   ibuprofen (ADVIL/MOTRIN) 200 MG tablet Take  400 mg by mouth every 6 hours as needed for mild pain  Yes Unknown, Entered By History   losartan (COZAAR) 100 MG tablet Take 1 tablet (100 mg) by mouth daily 11/8/2021 at Unknown time Yes Pari Wiley MD   metFORMIN (GLUCOPHAGE-XR) 500 MG 24 hr tablet Take 2 tablets (1,000 mg) by mouth daily (with breakfast) 11/8/2021 at Unknown time Yes Pari Wiley MD   mirtazapine (REMERON) 15 MG tablet TAKE 1 TABLET BY MOUTH AT BEDTIME  Patient taking differently: Take 15 mg by mouth nightly as needed  Past Month at Unknown time Yes Pari Wiley MD   multivitamin w/minerals (MULTI-VITAMIN) tablet Take 1 tablet by mouth daily 11/8/2021 at Unknown time Yes Reported, Patient   Omega-3 Fatty Acids (FISH OIL PO) Take 2 capsules by mouth daily  11/8/2021 at Unknown time Yes Reported, Patient   pantoprazole (PROTONIX) 40 MG EC tablet TAKE 1 TABLET BY MOUTH AT BEDTIME  Patient taking differently: Take 40 mg by mouth daily TAKE 1 TABLET BY MOUTH AT BEDTIME 11/8/2021 at am Yes Pari Wiley MD   sertraline (ZOLOFT) 100 MG tablet Take 2 tablets by mouth once daily 11/8/2021 at am Yes Pari Wiley MD   simvastatin (ZOCOR) 20 MG tablet TAKE 1 TABLET BY MOUTH AT BEDTIME 11/8/2021 at am Yes Pari Wiley MD   zolpidem (AMBIEN) 10 MG tablet Take 1 tablet (10 mg) by mouth At Bedtime 11/7/2021 Yes Pari Wiley MD

## 2021-11-09 NOTE — ED NOTES
Pt's O2 sat. At 88% on room air. Pt placed on 2 liters nasal cannula. Now at 95%. Will continue to monitor.

## 2021-11-09 NOTE — ED PROVIDER NOTES
History     Chief Complaint:    Covid Concern      HPI   Nicole Reyes is a 66 year old female who presents with shortness of breath.    Patient is a 66-year-old female who has been vaccinated for Covid.  Week ago patient developed a cold thought she had allergies.  Symptoms have progressed.  Patient's  is admitted for severe Covid pneumonia.  Patient felt more short of breath with exertion.  Patient was seen in urgent care and her oxygen levels were low and referred to the emergency room for reassessment.  Patient denies chest pain leg pain or swelling.  She has had no vomiting or diarrhea.    Review of Systems  For shortness of breath with exertion negative for vomiting or diarrhea positive for known Covid positive for history of Covid vaccination all the systems negative except as above    Allergies:    Codeine  Penicillins      Medications:      albuterol (PROAIR HFA/PROVENTIL HFA/VENTOLIN HFA) 108 (90 Base) MCG/ACT inhaler  aspirin 81 MG EC tablet  benzonatate (TESSALON) 200 MG capsule  blood glucose (ACCU-CHEK BYRON PLUS) test strip  cefpodoxime (VANTIN) 200 MG tablet  clonazePAM (KLONOPIN) 1 MG tablet  doxycycline hyclate (VIBRAMYCIN) 100 MG capsule  gabapentin (NEURONTIN) 300 MG capsule  losartan (COZAAR) 100 MG tablet  metFORMIN (GLUCOPHAGE-XR) 500 MG 24 hr tablet  mirtazapine (REMERON) 15 MG tablet  multivitamin w/minerals (MULTI-VITAMIN) tablet  Omega-3 Fatty Acids (FISH OIL PO)  pantoprazole (PROTONIX) 40 MG EC tablet  predniSONE (DELTASONE) 10 MG tablet  sertraline (ZOLOFT) 100 MG tablet  simvastatin (ZOCOR) 20 MG tablet  zolpidem (AMBIEN) 10 MG tablet        Past Medical History:      Past Medical History:   Diagnosis Date     Arthritis      Blood transfusion      CKD (chronic kidney disease) stage 3, GFR 30-59 ml/min      Essential hypertension, benign      Gastroesophageal reflux disease      Generalized anxiety disorder      Hernia, abdominal      Hiatal hernia      History of blood  transfusion 2011     Hypertension      Insomnia, unspecified      Iron deficiency anemia 8/09     Major depression      Menopause      Obesity, unspecified      CRISTÓBAL (obstructive sleep apnea)      Other chronic pain      Oxygen dependent      Pneumonia      Postoperative seroma 10/11     Pure hypercholesterolemia      RLS (restless legs syndrome)      Type II or unspecified type diabetes mellitus without mention of complication, not stated as uncontrolled      Patient Active Problem List    Diagnosis Date Noted     Hypoxia 11/08/2020     Priority: Medium     Recurrent aspiration pneumonia (H) 05/09/2020     Priority: Medium     Pain of both shoulder joints 03/12/2020     Priority: Medium     Controlled substance agreement signed 12/04/2018     Priority: Medium     Dyspnea, unspecified type 05/31/2017     Priority: Medium     Insomnia, unspecified type 05/23/2016     Priority: Medium     Morbid obesity (HCC) 10/15/2015     Priority: Medium     Midline low back pain with left-sided sciatica 09/10/2015     Priority: Medium     Type 2 diabetes mellitus without complication, without long-term current use of insulin (H) 09/08/2015     Priority: Medium     Eczema 02/16/2014     Priority: Medium     Hyperlipidemia LDL goal <100 11/01/2012     Priority: Medium     GERD (gastroesophageal reflux disease) 09/03/2012     Priority: Medium     Health Care Home 11/28/2011     Priority: Medium       DX V65.8 REPLACED WITH 45209 HEALTH CARE HOME (04/08/2013)         Generalized anxiety disorder 11/18/2011     Priority: Medium     Mild episode of recurrent major depressive disorder (H) 11/18/2011     Priority: Medium     Advanced directives, counseling/discussion 07/14/2011     Priority: Medium     Discussed Advance Directive planning with patient; information given to patient to review.       Restless leg syndrome 12/31/2010     Priority: Medium     CRISTÓBAL (obstructive sleep apnea)      Priority: Medium     Benign essential hypertension  2003     Priority: Medium        Past Surgical History:      Past Surgical History:   Procedure Laterality Date     BREAST BIOPSY, RT/LT      2 times; benign      SECTION        SECTION        SECTION       COLONOSCOPY N/A 12/3/2020    Procedure: Colonoscopy with biopsy;  Surgeon: Obinna Gutierrez MD;  Location: [facility] OR     DILATION AND CURETTAGE, HYSTEROSCOPY DIAGNOSTIC, COMBINED  3/22/2013    Procedure: COMBINED DILATION AND CURETTAGE, HYSTEROSCOPY DIAGNOSTIC;  DILATION AND CURETTAGE, HYSTEROSCOPY DIAGNOSTIC   Converted to a general;  Surgeon: Robi Edwards MD;  Location: [facility] OR     ESOPHAGOSCOPY, GASTROSCOPY, DUODENOSCOPY (EGD), COMBINED N/A 2015    Procedure: COMBINED ESOPHAGOSCOPY, GASTROSCOPY, DUODENOSCOPY (EGD);  Surgeon: Obinna Gutierrez MD;  Location:  GI     ESOPHAGOSCOPY, GASTROSCOPY, DUODENOSCOPY (EGD), COMBINED N/A 2015    Procedure: COMBINED ENDOSCOPIC ULTRASOUND, ESOPHAGOSCOPY, GASTROSCOPY, DUODENOSCOPY (EGD), FINE NEEDLE ASPIRATE/BIOPSY;  Surgeon: Sabine Olivares MD;  Location:  GI     ESOPHAGOSCOPY, GASTROSCOPY, DUODENOSCOPY (EGD), COMBINED N/A 1/3/2020    Procedure: ESOPHAGOGASTRODUODENOSCOPY;  Surgeon: Ascencion Stauffer MD;  Location: [facility] OR     HERNIORRHAPHY INCISIONAL (LOCATION)  10/8/2011     incarcerated hernia repair with mesh;     HERNIORRHAPHY INCISIONAL (LOCATION)  10/16/2011     repair incisional hernia with mesh, and removal of current implanted mesh;     [facility] NONSPECIFIC PROCEDURE      Hernia repair      Z NONSPECIFIC PROCEDURE      Lymph node biopsy in neck (benign)        Family History:      Family History   Problem Relation Age of Onset     Cardiovascular Mother         CHF     Hypertension Mother      C.A.D. Mother         50s     Lipids Mother      Cancer Father         Hodgkin's, Leukemia     Diabetes Sister      Connective Tissue Disorder Sister         Lupus     Lipids Sister      Hypertension Brother       Hypertension Sister      Hypertension Sister      Cancer Brother         Hodgkin's     C.A.D. Brother 61     Hypertension Sister      C.A.D. Brother      Basal cell carcinoma Daughter 38        top of head       Social History:  No smoking or drug use.   is in the hospital with COVID-19.    Physical Exam     Patient Vitals for the past 24 hrs:   BP Temp Temp src Pulse Resp SpO2   11/08/21 2145 129/77 -- -- 69 -- 95 %   11/08/21 2127 -- -- -- -- -- 90 %   11/08/21 2126 -- -- -- -- -- 90 %   11/08/21 2120 -- -- -- 76 -- 91 %   11/08/21 2115 (!) 164/79 -- -- -- -- --   11/08/21 1704 (!) 158/100 (!) 96.5  F (35.8  C) Temporal 92 16 97 %       Physical Exam  Vitals reviewed.   Constitutional:       Appearance: She is obese.   HENT:      Head: Normocephalic.      Nose: Nose normal.   Eyes:      Pupils: Pupils are equal, round, and reactive to light.   Cardiovascular:      Rate and Rhythm: Normal rate and regular rhythm.   Pulmonary:      Effort: Pulmonary effort is normal.      Breath sounds: Normal breath sounds.   Musculoskeletal:         General: Normal range of motion.   Skin:     General: Skin is warm.      Capillary Refill: Capillary refill takes less than 2 seconds.   Neurological:      General: No focal deficit present.      Mental Status: She is alert.   Psychiatric:         Mood and Affect: Mood normal.           Emergency Department Course       Imaging:  CT Chest Pulmonary Embolism w Contrast   Final Result   IMPRESSION:   1.  No evidence for pulmonary emboli.   2.  Groundglass opacities both lungs characteristic of Covid 19 pneumonia.   3.  Slightly enlarged mediastinal and hilar lymph nodes are likely reactive.   4.  Esophageal hiatal hernia.   5.  Gallstone.      Commonly reported imaging features of (COVID-19) pneumonia are present. Influenza pneumonia and organizing pneumonia as can be seen in the setting of drug toxicity and connective tissue disease can cause a similar imaging pattern.      XR  Chest Port 1 View   Final Result   IMPRESSION: Cardiac enlargement. Minimal atelectasis or infiltrate right lung base. No vascular congestion or pleural effusion.          Laboratory:  Labs Ordered and Resulted from Time of ED Arrival to Time of ED Departure   CBC WITH PLATELETS - Abnormal       Result Value    WBC Count 10.0      RBC Count 5.72 (*)     Hemoglobin 13.6      Hematocrit 45.2      MCV 79      MCH 23.8 (*)     MCHC 30.1 (*)     RDW 17.1 (*)     Platelet Count 304     BASIC METABOLIC PANEL - Abnormal    Sodium 143      Potassium 3.3 (*)     Chloride 106      Carbon Dioxide (CO2) 28      Anion Gap 9      Urea Nitrogen 10      Creatinine 0.86      Calcium 8.8      Glucose 120 (*)     GFR Estimate 71     TROPONIN I - Normal    Troponin I <0.015     D DIMER QUANTITATIVE         Emergency Department Course:    Reviewed:    I reviewed nursing notes and vitals    Assessments:  1945 I obtained history and examined the patient as noted above.   2040 I rechecked the patient and explained findings.       Consults:            Interventions:    Medications - No data to display    Disposition:  The patient was admitted to the hospital under the care of Dr. Novak.    Impression & Plan        Medical Decision Making:  Patient presents with shortness of breath and concerns for Covid pneumonia.  Oxygen saturations in urgent care were stated to be 83% patient's work of breathing is minimal and has normal respiratory rate but resting oxygen saturations do drift below 90 to 88% was placed on 2 L.  X-ray confirms bilateral interstitial infiltrates suspect Covid pneumonia dexamethasone was given due to oxygen needs recommended admission CT of the chest is performed due to significantly elevated D-dimer this is pending but was admitted to Dr. Bates in stable condition require oxygen for COVID-19 pneumonia.    Covid-19  Nicole Reyes was evaluated during a global COVID-19 pandemic, which necessitated consideration that  the patient might be at risk for infection with the SARS-CoV-2 virus that causes COVID-19.   Applicable protocols for evaluation were followed during the patient's care.   COVID-19 was considered as part of the patient's evaluation. a test was obtained at a previous visit and reviewed & considered today.    Diagnosis:    ICD-10-CM    1. Pneumonia due to 2019 novel coronavirus  U07.1 dexamethasone (DECADRON) 6 MG tablet    J12.82 COVID-19 [facility] Referral     apixaban ANTICOAGULANT (ELIQUIS) 2.5 MG tablet   2. Acute respiratory failure with hypoxia (H)  J96.01        Discharge Medications:  Discharge Medication List as of 11/11/2021  2:38 PM      START taking these medications    Details   apixaban ANTICOAGULANT (ELIQUIS) 2.5 MG tablet Take 1 tablet (2.5 mg) by mouth 2 times daily for 15 days, Disp-30 tablet, R-0, E-Prescribe      dexamethasone (DECADRON) 6 MG tablet Take 1 tablet (6 mg) by mouth daily, Disp-5 tablet, R-0, E-Prescribe               Scribe Disclosure:  I, Tal Sanchez MD, am serving as a scribe at 9:52 PM on 11/8/2021 to document services personally performed by Tal Sanchez MD  based on my observations and the provider's statements to me.      Tal Sanchez MD  11/11/21 2036

## 2021-11-10 LAB
ANION GAP SERPL CALCULATED.3IONS-SCNC: 6 MMOL/L (ref 3–14)
ANION GAP SERPL CALCULATED.3IONS-SCNC: 6 MMOL/L (ref 3–14)
BUN SERPL-MCNC: 13 MG/DL (ref 7–30)
BUN SERPL-MCNC: 14 MG/DL (ref 7–30)
CALCIUM SERPL-MCNC: 9 MG/DL (ref 8.5–10.1)
CALCIUM SERPL-MCNC: 9.3 MG/DL (ref 8.5–10.1)
CHLORIDE BLD-SCNC: 104 MMOL/L (ref 94–109)
CHLORIDE BLD-SCNC: 105 MMOL/L (ref 94–109)
CO2 SERPL-SCNC: 29 MMOL/L (ref 20–32)
CO2 SERPL-SCNC: 30 MMOL/L (ref 20–32)
CREAT SERPL-MCNC: 0.83 MG/DL (ref 0.52–1.04)
CREAT SERPL-MCNC: 0.86 MG/DL (ref 0.52–1.04)
GFR SERPL CREATININE-BSD FRML MDRD: 71 ML/MIN/1.73M2
GFR SERPL CREATININE-BSD FRML MDRD: 74 ML/MIN/1.73M2
GLUCOSE BLD-MCNC: 133 MG/DL (ref 70–99)
GLUCOSE BLD-MCNC: 170 MG/DL (ref 70–99)
GLUCOSE BLD-MCNC: 185 MG/DL (ref 70–99)
GLUCOSE BLDC GLUCOMTR-MCNC: 113 MG/DL (ref 70–99)
GLUCOSE BLDC GLUCOMTR-MCNC: 155 MG/DL (ref 70–99)
GLUCOSE BLDC GLUCOMTR-MCNC: 160 MG/DL (ref 70–99)
MAGNESIUM SERPL-MCNC: 2.1 MG/DL (ref 1.6–2.3)
MAGNESIUM SERPL-MCNC: 2.1 MG/DL (ref 1.6–2.3)
POTASSIUM BLD-SCNC: 3 MMOL/L (ref 3.4–5.3)
POTASSIUM BLD-SCNC: 3.1 MMOL/L (ref 3.4–5.3)
POTASSIUM BLD-SCNC: 3.4 MMOL/L (ref 3.4–5.3)
POTASSIUM BLD-SCNC: 3.4 MMOL/L (ref 3.4–5.3)
POTASSIUM BLD-SCNC: 4.1 MMOL/L (ref 3.4–5.3)
SODIUM SERPL-SCNC: 140 MMOL/L (ref 133–144)
SODIUM SERPL-SCNC: 140 MMOL/L (ref 133–144)

## 2021-11-10 PROCEDURE — 82565 ASSAY OF CREATININE: CPT | Performed by: INTERNAL MEDICINE

## 2021-11-10 PROCEDURE — 250N000013 HC RX MED GY IP 250 OP 250 PS 637: Performed by: INTERNAL MEDICINE

## 2021-11-10 PROCEDURE — 82947 ASSAY GLUCOSE BLOOD QUANT: CPT | Performed by: INTERNAL MEDICINE

## 2021-11-10 PROCEDURE — 36415 COLL VENOUS BLD VENIPUNCTURE: CPT | Performed by: INTERNAL MEDICINE

## 2021-11-10 PROCEDURE — 80048 BASIC METABOLIC PNL TOTAL CA: CPT | Performed by: INTERNAL MEDICINE

## 2021-11-10 PROCEDURE — 250N000012 HC RX MED GY IP 250 OP 636 PS 637: Performed by: INTERNAL MEDICINE

## 2021-11-10 PROCEDURE — 250N000011 HC RX IP 250 OP 636: Performed by: INTERNAL MEDICINE

## 2021-11-10 PROCEDURE — 84132 ASSAY OF SERUM POTASSIUM: CPT | Performed by: INTERNAL MEDICINE

## 2021-11-10 PROCEDURE — 83735 ASSAY OF MAGNESIUM: CPT | Performed by: INTERNAL MEDICINE

## 2021-11-10 PROCEDURE — 99233 SBSQ HOSP IP/OBS HIGH 50: CPT | Performed by: INTERNAL MEDICINE

## 2021-11-10 PROCEDURE — 120N000001 HC R&B MED SURG/OB

## 2021-11-10 RX ORDER — POTASSIUM CHLORIDE 1500 MG/1
40 TABLET, EXTENDED RELEASE ORAL ONCE
Status: COMPLETED | OUTPATIENT
Start: 2021-11-10 | End: 2021-11-10

## 2021-11-10 RX ADMIN — POTASSIUM CHLORIDE 40 MEQ: 1500 TABLET, EXTENDED RELEASE ORAL at 18:38

## 2021-11-10 RX ADMIN — ENOXAPARIN SODIUM 40 MG: 40 INJECTION SUBCUTANEOUS at 13:51

## 2021-11-10 RX ADMIN — GABAPENTIN 600 MG: 300 CAPSULE ORAL at 20:05

## 2021-11-10 RX ADMIN — ACETAMINOPHEN 650 MG: 325 TABLET, FILM COATED ORAL at 23:07

## 2021-11-10 RX ADMIN — ASPIRIN 81 MG: 81 TABLET, COATED ORAL at 20:05

## 2021-11-10 RX ADMIN — POTASSIUM CHLORIDE 40 MEQ: 1500 TABLET, EXTENDED RELEASE ORAL at 13:51

## 2021-11-10 RX ADMIN — POTASSIUM CHLORIDE 40 MEQ: 1500 TABLET, EXTENDED RELEASE ORAL at 01:01

## 2021-11-10 RX ADMIN — ENOXAPARIN SODIUM 40 MG: 40 INJECTION SUBCUTANEOUS at 00:48

## 2021-11-10 RX ADMIN — LOSARTAN POTASSIUM 100 MG: 100 TABLET, FILM COATED ORAL at 09:12

## 2021-11-10 RX ADMIN — ACETAMINOPHEN 650 MG: 325 TABLET, FILM COATED ORAL at 18:37

## 2021-11-10 RX ADMIN — CLONAZEPAM 1 MG: 0.5 TABLET ORAL at 23:07

## 2021-11-10 RX ADMIN — GABAPENTIN 300 MG: 300 CAPSULE ORAL at 09:12

## 2021-11-10 RX ADMIN — DEXAMETHASONE 6 MG: 2 TABLET ORAL at 13:51

## 2021-11-10 RX ADMIN — PANTOPRAZOLE SODIUM 40 MG: 40 TABLET, DELAYED RELEASE ORAL at 09:11

## 2021-11-10 ASSESSMENT — ACTIVITIES OF DAILY LIVING (ADL)
ADLS_ACUITY_SCORE: 6
ADLS_ACUITY_SCORE: 18
ADLS_ACUITY_SCORE: 18
ADLS_ACUITY_SCORE: 6
ADLS_ACUITY_SCORE: 16
ADLS_ACUITY_SCORE: 6
ADLS_ACUITY_SCORE: 16
ADLS_ACUITY_SCORE: 16
ADLS_ACUITY_SCORE: 6
ADLS_ACUITY_SCORE: 16
ADLS_ACUITY_SCORE: 18
ADLS_ACUITY_SCORE: 6
ADLS_ACUITY_SCORE: 18
ADLS_ACUITY_SCORE: 18
ADLS_ACUITY_SCORE: 16
ADLS_ACUITY_SCORE: 18
ADLS_ACUITY_SCORE: 16
ADLS_ACUITY_SCORE: 16
ADLS_ACUITY_SCORE: 6
ADLS_ACUITY_SCORE: 16
ADLS_ACUITY_SCORE: 18
ADLS_ACUITY_SCORE: 6

## 2021-11-10 ASSESSMENT — MIFFLIN-ST. JEOR: SCORE: 1546.51

## 2021-11-10 NOTE — PLAN OF CARE
Pt is up independently in her room.  Pt denies pain.  Pt states she has a infrequent cough which is productive at times.  Pt is on room air and sats are 93-94%.  Pt ambulated in the room. Pt stated she felt a little winded.  Sats  when placed were 90%, 88% 87% then up to 94%.  Pt is on 2L with her Cpap at night  baseline.  Pts potassium was 3.4. pt was given replacement  and a recheck will be at 18:00. Pt is planning to discharge to home tomorrow.

## 2021-11-10 NOTE — PLAN OF CARE
A/O, O2 Increased to 3LNC dyspneic with exertion up indp in room voiding. On decadron and lovenox  discontinue 2-4 days when on RA

## 2021-11-10 NOTE — PROGRESS NOTES
[facility]    Medicine Progress Note - Hospitalist Service   Date of Admission:  11/8/2021  Date of Service: 11/10/2021      Assessment & Plan            Nicole Reyes is a 66-year-old female with a past medical history significant for hypertension, obstructive sleep apnea on CPAP at night with 2 liters of oxygen, aspiration pneumonia, gastroesophageal reflux disease, major depression, diabetes mellitus type 2, obesity, among others, presented to the Emergency Room with shortness of breath, hypoxia and recent COVID positive test.  At the time of presentation she had been symptomatic for about 10 days.  She was vaccinated with [employer] but has not gotten a booster.   Lab work-up was relatively unremarkable aside from D-dimer which was elevated at 1.23, and CT scan of the chest PE protocol showed no PE but did display typical COVID-19 pneumonia findings.    Started on Decadron, declined remdesivir and was admitted for further care.  Since that time her oxygen requirement has gradually improved but she is not quite ready to discharge home.    1.  Acute hypoxic respiratory failure secondary to COVID-19 infection/pneumonia:   --Wean oxygen as able  --Continue Decadron  --She declines remdesivir  -- DVT prophylaxis, Lovenox 40 mg subcutaneous twice a day.   --CT chest no PE.    2.  Hypokalemia:  Replace potassium per protocol.    3.  Obstructive sleep apnea:  The patient is on CPAP at night with 2 liters of oxygen, which will be continued.    4.  Diabetes mellitus type 2:  Baseline patient is on metformin.  We will hold it in recent contrast exposure.  Likely can restart in the next day.    5.  Gastroesophageal reflux disease:  Continue pantoprazole.       Diet: Combination Diet Regular Diet Adult; High Consistent Carb (75 g CHO per Meal) Diet    DVT Prophylaxis: Enoxaparin (Lovenox) SQ  Suarez Catheter: Not present  Central Lines: None  Code Status: Full Code      Disposition Plan    Expected discharge: 1 or 2 days more.  [facility].     The patient's care was discussed with the Patient.    Juanpablo Cifuentes MD  Hospitalist Service  [facility]  Securely message with the Vocera Web Console (learn more here)  Text page via [facility] Paging/Directory      ______________________________________________________________________    Interval History     Weaning oxygen, intermittently on room air but requiring some oxygen with exertion.  Feels like she is not quite ready to discharge home  Continuing Decadron    Data reviewed today: I reviewed all medications, new labs and imaging results over the last 24 hours. I personally reviewed no images or EKG's today.    Physical Exam   Vital Signs: Temp: (!) 96.2  F (35.7  C) Temp src: Temporal BP: (!) 165/83 Pulse: 60   Resp: 12 SpO2: 95 % O2 Device: Nasal cannula Oxygen Delivery: 2 LPM  Weight: 246 lbs 3.2 oz    HEENT:  Pink and anicteric.  Extraocular muscle movement intact.  NECK:  Supple.  No JVD, no thyromegaly.  CHEST:  Good air entry bilaterally.  No wheezing, crackles or rales.  CARDIOVASCULAR SYSTEM:  S1 and S2 well heard.  No gallop or murmur.  ABDOMEN:  Soft, obese, nontender, nondistended.  EXTREMITIES:  No edema, cyanosis or clubbing.  NEUROLOGIC:  No focal neurologic deficit.  Cranial nerves grossly intact.  PSYCHIATRIC:  Normal mood and affect.  Keeps eye contact, responds to question appropriately.    Data   Recent Labs   Lab 11/10/21  0904 11/10/21  0614 11/10/21  0019 11/09/21  2227 11/08/21  2353 11/08/21  1853   WBC  --   --   --   --   --  10.0   HGB  --   --   --   --   --  13.6   MCV  --   --   --   --   --  79   PLT  --   --   --   --   --  304   NA  --   --  140 140  --  143   POTASSIUM  --  3.4 3.1* 3.0*  --  3.3*   CHLORIDE  --   --  105 104  --  106   CO2  --   --  29 30  --  28   BUN  --   --  13 14  --  10   CR  --   --  0.83 0.86  --  0.86   ANIONGAP  --   --  6 6  --  9   GRECIA  --   --  9.0 9.3  --   8.8   * 133* 170* 185*   < > 120*   TROPONIN  --   --   --   --   --  <0.015    < > = values in this interval not displayed.     No results found for this or any previous visit (from the past 24 hour(s)).  Medications       aspirin  81 mg Oral QPM     dexamethasone  6 mg Oral Daily     enoxaparin ANTICOAGULANT  40 mg Subcutaneous Q12H     gabapentin  300 mg Oral Daily     gabapentin  600 mg Oral QPM     insulin aspart  1-7 Units Subcutaneous TID AC     insulin aspart  1-5 Units Subcutaneous At Bedtime     losartan  100 mg Oral Daily     magnesium sulfate  2 g Intravenous Once     pantoprazole  40 mg Oral QAM AC     sodium chloride (PF)  3 mL Intracatheter Q8H

## 2021-11-10 NOTE — PLAN OF CARE
A&Ox4. VSS ex on 2 LPM. Tolerating diet. Denies pain. Minimal cough, non productive. Blood sugars monitored. Potassium and magnesium replacement.

## 2021-11-11 VITALS
DIASTOLIC BLOOD PRESSURE: 73 MMHG | WEIGHT: 243.98 LBS | RESPIRATION RATE: 16 BRPM | HEART RATE: 60 BPM | SYSTOLIC BLOOD PRESSURE: 144 MMHG | HEIGHT: 58 IN | TEMPERATURE: 96 F | BODY MASS INDEX: 51.21 KG/M2 | OXYGEN SATURATION: 91 %

## 2021-11-11 LAB
CREAT SERPL-MCNC: 0.81 MG/DL (ref 0.52–1.04)
GFR SERPL CREATININE-BSD FRML MDRD: 76 ML/MIN/1.73M2
GLUCOSE BLDC GLUCOMTR-MCNC: 102 MG/DL (ref 70–99)
GLUCOSE BLDC GLUCOMTR-MCNC: 119 MG/DL (ref 70–99)
GLUCOSE BLDC GLUCOMTR-MCNC: 121 MG/DL (ref 70–99)
MAGNESIUM SERPL-MCNC: 1.8 MG/DL (ref 1.6–2.3)
PLATELET # BLD AUTO: 257 10E3/UL (ref 150–450)
POTASSIUM BLD-SCNC: 3.8 MMOL/L (ref 3.4–5.3)

## 2021-11-11 PROCEDURE — 250N000012 HC RX MED GY IP 250 OP 636 PS 637: Performed by: INTERNAL MEDICINE

## 2021-11-11 PROCEDURE — 83735 ASSAY OF MAGNESIUM: CPT | Performed by: INTERNAL MEDICINE

## 2021-11-11 PROCEDURE — 36415 COLL VENOUS BLD VENIPUNCTURE: CPT | Performed by: INTERNAL MEDICINE

## 2021-11-11 PROCEDURE — 84132 ASSAY OF SERUM POTASSIUM: CPT | Performed by: INTERNAL MEDICINE

## 2021-11-11 PROCEDURE — 85049 AUTOMATED PLATELET COUNT: CPT | Performed by: INTERNAL MEDICINE

## 2021-11-11 PROCEDURE — 250N000013 HC RX MED GY IP 250 OP 250 PS 637: Performed by: INTERNAL MEDICINE

## 2021-11-11 PROCEDURE — 250N000011 HC RX IP 250 OP 636: Performed by: INTERNAL MEDICINE

## 2021-11-11 PROCEDURE — 99239 HOSP IP/OBS DSCHRG MGMT >30: CPT | Performed by: INTERNAL MEDICINE

## 2021-11-11 RX ORDER — DEXAMETHASONE 6 MG/1
6 TABLET ORAL DAILY
Qty: 5 TABLET | Refills: 0 | Status: SHIPPED | OUTPATIENT
Start: 2021-11-11 | End: 2021-11-22

## 2021-11-11 RX ADMIN — LOSARTAN POTASSIUM 100 MG: 100 TABLET, FILM COATED ORAL at 10:26

## 2021-11-11 RX ADMIN — GABAPENTIN 300 MG: 300 CAPSULE ORAL at 10:25

## 2021-11-11 RX ADMIN — ZOLPIDEM TARTRATE 5 MG: 5 TABLET ORAL at 00:30

## 2021-11-11 RX ADMIN — ENOXAPARIN SODIUM 40 MG: 40 INJECTION SUBCUTANEOUS at 13:54

## 2021-11-11 RX ADMIN — PANTOPRAZOLE SODIUM 40 MG: 40 TABLET, DELAYED RELEASE ORAL at 10:26

## 2021-11-11 RX ADMIN — ENOXAPARIN SODIUM 40 MG: 40 INJECTION SUBCUTANEOUS at 00:30

## 2021-11-11 RX ADMIN — DEXAMETHASONE 6 MG: 2 TABLET ORAL at 13:54

## 2021-11-11 ASSESSMENT — ACTIVITIES OF DAILY LIVING (ADL)
ADLS_ACUITY_SCORE: 6

## 2021-11-11 ASSESSMENT — MIFFLIN-ST. JEOR: SCORE: 1536.45

## 2021-11-11 NOTE — PLAN OF CARE
Vital signs stable, pt sats on room air and with activity 86-88%.  On O2 at 2 lpm nasal cannula with continuous pulse oximetry, sats 90's.  Tylenol taken for lower back pain with relief.  Potassium recheck 3.4, replacement with 40 meq oral potassium given, recheck @22.45.  Pt encouraged out of bed, sat up in chair, using inspirometer hourly.  Blood glucose monitoring.  Special isolation precautions.

## 2021-11-11 NOTE — PLAN OF CARE
Reviewed discharge instructions and  meds with pt, no further questions.  Pt will discharge on the pm shift.

## 2021-11-11 NOTE — PLAN OF CARE
A/O, on 3L NC stats 93%.  Ambulating indp in room.  Short of breath with exertion.  Plans t ogo home at discharge.

## 2021-11-11 NOTE — DISCHARGE SUMMARY
[facility]  Discharge Summary  Name: Nicole Reyes    MRN: [id number]  YOB: 1955    Age: 66 year old  Date of Discharge:  11/11/2021  Date of Admission: 11/8/2021  Primary Care Provider: Pari Wiley  Discharge Physician:  Delfino Bettencourt MD  Discharging Service:  Hospitalist      Discharge Diagnosis:  Acute hypoxic respiratory failure secondary to bilateral COVID-19 pneumonia  Obstructive sleep apnea on appliance at home  Hypokalemia  GERD  Type 2 diabetes mellitusNot on insulin therapy     Other Diagnosis:  Past Medical History:   Diagnosis Date     Arthritis     lt shoulder, rt. knee     Blood transfusion      CKD (chronic kidney disease) stage 3, GFR 30-59 ml/min      Essential hypertension, benign      Gastroesophageal reflux disease      Generalized anxiety disorder      Hernia, abdominal      Hiatal hernia      History of blood transfusion 2011    with a hernia repair     Hypertension      Insomnia, unspecified      Iron deficiency anemia 8/09     Major depression     sees a psychiatrist     Menopause     age 53     Obesity, unspecified      CRISTÓBAL (obstructive sleep apnea)     bipap     Other chronic pain     chronic pain lt arm from tendonidits     Oxygen dependent     2 LPM at home-uses at noc or extended walking     Pneumonia     due to aspiration from hiatal hernia-11/8     Postoperative seroma 10/11    drained several times     Pure hypercholesterolemia      RLS (restless legs syndrome)      Type II or unspecified type diabetes mellitus without mention of complication, not stated as uncontrolled           Discharge Disposition:  Discharged to home     Allergies:  Allergies   Allergen Reactions     Codeine Rash     Penicillins Rash     Pt has tolerated cephalosporins and penems.   Pt tolerated Zosyn 7/6/2019        Discharge Medications:   Current Discharge Medication List      START taking these medications    Details   apixaban ANTICOAGULANT (ELIQUIS) 2.5 MG tablet Take 1  tablet (2.5 mg) by mouth 2 times daily for 15 days  Qty: 30 tablet, Refills: 0    Associated Diagnoses: Pneumonia due to 2019 novel coronavirus      dexamethasone (DECADRON) 6 MG tablet Take 1 tablet (6 mg) by mouth daily  Qty: 5 tablet, Refills: 0    Associated Diagnoses: Pneumonia due to 2019 novel coronavirus         CONTINUE these medications which have NOT CHANGED    Details   albuterol (PROAIR HFA/PROVENTIL HFA/VENTOLIN HFA) 108 (90 Base) MCG/ACT inhaler Inhale 1-2 puffs into the lungs every 6 hours as needed for shortness of breath / dyspnea or wheezing  Qty: 1 Inhaler, Refills: 0    Comments: Please also dispense a spacer  Associated Diagnoses: Community acquired bacterial pneumonia      aspirin 81 MG EC tablet Take 81 mg by mouth daily       benzonatate (TESSALON) 200 MG capsule Take 1 capsule (200 mg) by mouth 3 times daily as needed for cough  Qty: 21 capsule, Refills: 0      clonazePAM (KLONOPIN) 1 MG tablet Take 1 tablet (1 mg) by mouth 2 times daily as needed for anxiety May fill on 7/10/21, 60 to last 60 days  Qty: 60 tablet, Refills: 5    Associated Diagnoses: Insomnia, unspecified type; Generalized anxiety disorder      gabapentin (NEURONTIN) 300 MG capsule Take 3 capsules (900 mg) by mouth daily  Qty: 270 capsule, Refills: 3    Associated Diagnoses: Restless legs syndrome (RLS); Persistent insomnia      losartan (COZAAR) 100 MG tablet Take 1 tablet (100 mg) by mouth daily  Qty: 90 tablet, Refills: 3    Associated Diagnoses: Essential hypertension, benign      metFORMIN (GLUCOPHAGE-XR) 500 MG 24 hr tablet Take 2 tablets (1,000 mg) by mouth daily (with breakfast)  Qty: 180 tablet, Refills: 3    Associated Diagnoses: Type 2 diabetes mellitus without complication, without long-term current use of insulin (H)      mirtazapine (REMERON) 15 MG tablet TAKE 1 TABLET BY MOUTH AT BEDTIME  Qty: 30 tablet, Refills: 1    Associated Diagnoses: Mild episode of recurrent major depressive disorder (H); Insomnia,  "unspecified type; Generalized anxiety disorder      multivitamin w/minerals (MULTI-VITAMIN) tablet Take 1 tablet by mouth daily      Omega-3 Fatty Acids (FISH OIL PO) Take 2 capsules by mouth daily       pantoprazole (PROTONIX) 40 MG EC tablet TAKE 1 TABLET BY MOUTH AT BEDTIME  Qty: 90 tablet, Refills: 1    Associated Diagnoses: Gastroesophageal reflux disease without esophagitis      sertraline (ZOLOFT) 100 MG tablet Take 2 tablets by mouth once daily  Qty: 180 tablet, Refills: 3    Associated Diagnoses: Generalized anxiety disorder; Major depressive disorder, recurrent episode, moderate (H)      simvastatin (ZOCOR) 20 MG tablet TAKE 1 TABLET BY MOUTH AT BEDTIME  Qty: 90 tablet, Refills: 1    Associated Diagnoses: Hyperlipidemia LDL goal <100      zolpidem (AMBIEN) 10 MG tablet Take 1 tablet (10 mg) by mouth At Bedtime  Qty: 90 tablet, Refills: 1    Associated Diagnoses: Insomnia, unspecified type      blood glucose (ACCU-CHEK BYRON PLUS) test strip USE TO TEST BLOOD SUGAR TWO TIMES A DAY OR AS DIRECTED  Qty: 100 each, Refills: 1    Associated Diagnoses: Type 2 diabetes mellitus with stage 3 chronic kidney disease, without long-term current use of insulin (H)         STOP taking these medications       ibuprofen (ADVIL/MOTRIN) 200 MG tablet Comments:   Reason for Stopping:                Condition on Discharge:  Discharge condition: Stable   Discharge vitals: Blood pressure (!) 144/73, pulse 60, temperature (!) 96  F (35.6  C), temperature source Temporal, resp. rate 16, height 1.473 m (4' 10\"), weight 110.7 kg (243 lb 15.7 oz), last menstrual period 09/15/2007, SpO2 91 %, not currently breastfeeding.   Code status on discharge: Full Code     History of Present Illness:  See detailed admission note for full details.        Significant Physical Exam Findings Day of Discharge:  HEENT; Atraumatic, normocephalic, pinkish conjuctiva, pupils bilateral reactive   Skin: warm and moist, no rashes  Lungs: equal chest " expansion, fair air entry, scant wheezes  Heart: normal rate, normal rhythm, no rubs or gallops.   Abdomen: normal bowel sounds, no tenderness, no peritoneal signs, no guarding  Extremities: no deformities, no edema   Neuro; follow commands, alert and oriented x3, spontaneous speech, coherent, moves all extremities spontaneously  Psych; no hallucination, euthymic mood, not agitated        Procedures other than Imaging:  None     Imaging:  Results for orders placed or performed during the hospital encounter of 11/08/21   XR Chest Port 1 View    Narrative    EXAM: XR CHEST PORT 1 VIEW  LOCATION: [facility]  DATE/TIME: 11/8/2021 9:47 PM    INDICATION: COVID +SOB  COMPARISON: 07/19/2021      Impression    IMPRESSION: Cardiac enlargement. Minimal atelectasis or infiltrate right lung base. No vascular congestion or pleural effusion.   CT Chest Pulmonary Embolism w Contrast    Narrative    EXAM: CT CHEST PULMONARY EMBOLISM W CONTRAST  LOCATION: [facility]  DATE/TIME: 11/8/2021 10:53 PM    INDICATION: PE suspected, low/intermediate prob, positive D-dimer  COMPARISON: CTA chest exam 05/09/2020  TECHNIQUE: CT chest pulmonary angiogram during arterial phase injection of IV contrast. Multiplanar reformats and MIP reconstructions were performed. Dose reduction techniques were used.   CONTRAST: 73mL Isovue-370    FINDINGS:  ANGIOGRAM CHEST: Pulmonary arteries are normal caliber and negative for pulmonary emboli. Thoracic aorta is negative for dissection. No CT evidence of right heart strain.    LUNGS AND PLEURA: Scattered groundglass opacities both upper and lower lobes. No effusions.    MEDIASTINUM/AXILLAE: Slightly enlarged mediastinal and hilar lymph nodes. Moderate size esophageal hiatal hernia.    CORONARY ARTERY CALCIFICATION: None.    UPPER ABDOMEN: 2.5 cm stone in the gallbladder.    MUSCULOSKELETAL: Hypertrophic changes thoracic spine.      Impression    IMPRESSION:  1.  No  evidence for pulmonary emboli.  2.  Groundglass opacities both lungs characteristic of Covid 19 pneumonia.  3.  Slightly enlarged mediastinal and hilar lymph nodes are likely reactive.  4.  Esophageal hiatal hernia.  5.  Gallstone.    Commonly reported imaging features of (COVID-19) pneumonia are present. Influenza pneumonia and organizing pneumonia as can be seen in the setting of drug toxicity and connective tissue disease can cause a similar imaging pattern.     *Note: Due to a large number of results and/or encounters for the requested time period, some results have not been displayed. A complete set of results can be found in Results Review.        Consultations:  No consultations were requested during this admission.     Recent Lab Results:  Recent Labs   Lab 11/11/21  0608 11/08/21  1853   WBC  --  10.0   HGB  --  13.6   HCT  --  45.2   MCV  --  79    304     No results for input(s): CULT in the last 168 hours.  Recent Labs   Lab 11/11/21  0947 11/11/21  0608 11/11/21  0217 11/10/21  2231 11/10/21  2157 11/10/21  1816 11/10/21  1800 11/10/21  0614 11/10/21  0019 11/09/21  2227 11/08/21  2353 11/08/21  1853   NA  --   --   --   --   --   --   --   --  140 140  --  143   POTASSIUM  --  3.8  --  4.1  --   --  3.4   < > 3.1* 3.0*  --  3.3*   CHLORIDE  --   --   --   --   --   --   --   --  105 104  --  106   CO2  --   --   --   --   --   --   --   --  29 30  --  28   ANIONGAP  --   --   --   --   --   --   --   --  6 6  --  9   *  --  121*  --  160*   < >  --    < > 170* 185*   < > 120*   BUN  --   --   --   --   --   --   --   --  13 14  --  10   CR  --   --   --  0.81  --   --   --   --  0.83 0.86  --  0.86   GFRESTIMATED  --   --   --  76  --   --   --   --  74 71  --  71   GRECIA  --   --   --   --   --   --   --   --  9.0 9.3  --  8.8   MAG  --  1.8  --   --   --   --   --   --  2.1 2.1  --  1.6    < > = values in this interval not displayed.     Recent Labs   Lab 11/11/21  0947 11/11/21  0217  11/10/21  2157 11/10/21  1816 11/10/21  0904   * 121* 160* 155* 113*     No results for input(s): LACT in the last 168 hours.  Recent Labs   Lab 11/08/21  1853   TROPONIN <0.015     No results for input(s): COLOR, APPEARANCE, URINEGLC, URINEBILI, URINEKETONE, SG, UBLD, URINEPH, PROTEIN, UROBILINOGEN, NITRITE, LEUKEST, RBCU, WBCU in the last 168 hours.       Pending Results:    Unresulted Labs Ordered in the Past 30 Days of this Admission     No orders found from 10/9/2021 to 11/9/2021.           Discharge Instructions and Follow-Up:   Discharge diet: Orders Placed This Encounter      Combination Diet Regular Diet Adult; High Consistent Carb (75 g CHO per Meal) Diet      Diet     Discharge activity: Activity as tolerated   Discharge follow-up: 1 week with PCP   Outpatient therapy: None    Other instructions:  COVID-19 instructions, quarantining, things to monitor watch out for lighted     Hospital Course:  I assume service care for this a very pleasant 66-year-old  lady who came in here due to increasing shortness of breath found with hypoxia and COVID-19 pneumonia infection. Her  is also sick and currently admitted in the hospital for COVID-19 as well.  She did well and currently off oxygen support. Received Decadron here. Did not receive remdesivir as she declined this management plan.  She was ruled out for PE. Currently tolerating oral diet, no reported nausea or vomiting. She mentioned that she was able to ambulate inside her room while off oxygen support and has no ongoing issues of any lightheadedness, palpitations, dizziness, chest pain or worsening shortness of breath. She is hopeful and requesting for home discharge today. She has history of morbid obesity, sleep apnea and she also mentioned that she has a standby oxygen support system at home given her prior needs opiate when she had bouts of aspiration pneumonia in the past. She also knows how to use her oximeter and will put  herself with oxygen if needed.  -We decided and discussed the she will still be going home with at least 5 more days of her Decadron and at least 2 weeks of oral anticoagulants for DVT prophylaxis given her severe COVID-19 infection and pneumonia with multiple underlying significant comorbidities as well.         Total time spent in face to face contact with the patient and coordinating discharge was:  > 30 Minutes.

## 2021-11-11 NOTE — PHARMACY - DISCHARGE MEDICATION RECONCILIATION AND EDUCATION
Patient was educated via phone on the following discharge medication: Eliquis   - Pathophysiology of disease requiring treatment  - Correct dose and duration of treatment  - Compliance  - The importance of follow-up monitoring  - The potential for drug and/or diet interactions  - Adverse Effects  - Storage    Left written materials and instructions (Clinical notes from [facility]) for new medications: No    OUTCOMES: Patient verbalized understanding    Time spent: 15 mins

## 2021-11-12 ENCOUNTER — PATIENT OUTREACH (OUTPATIENT)
Dept: CARE COORDINATION | Facility: CLINIC | Age: 66
End: 2021-11-12
Payer: MEDICARE

## 2021-11-12 DIAGNOSIS — Z71.89 OTHER SPECIFIED COUNSELING: ICD-10-CM

## 2021-11-12 NOTE — PROGRESS NOTES
Clinic Care Coordination Contact  [facility]/Voicemail       Clinical Data: Care Coordinator Outreach  Outreach attempted x 1. Unable to leave message  on patient's voicemail with call back information.  Plan:  Care Coordinator will try to reach patient again in 1-2 business days.    Meghan Hicks  Community Health Worker  [facility]  Ph:[phone]

## 2021-11-13 NOTE — PROGRESS NOTES
Clinic Care Coordination Contact  [facility]/Voicemail       Clinical Data: Care Coordinator Outreach  Outreach attempted x 2. Unable to leave a message on patient's voicemail with call back information and requested return call.  Plan:  Care Coordinator will do no further outreaches at this time.    Juanita MANRIQUEZ Community Health Worker  Clinic Care Coordination  [facility]  Phone: [phone]

## 2021-11-22 ENCOUNTER — VIRTUAL VISIT (OUTPATIENT)
Dept: INTERNAL MEDICINE | Facility: CLINIC | Age: 66
End: 2021-11-22
Payer: MEDICARE

## 2021-11-22 DIAGNOSIS — E78.5 HYPERLIPIDEMIA LDL GOAL <100: ICD-10-CM

## 2021-11-22 DIAGNOSIS — U07.1 PNEUMONIA DUE TO 2019 NOVEL CORONAVIRUS: Primary | ICD-10-CM

## 2021-11-22 DIAGNOSIS — J12.82 PNEUMONIA DUE TO 2019 NOVEL CORONAVIRUS: Primary | ICD-10-CM

## 2021-11-22 DIAGNOSIS — E11.9 TYPE 2 DIABETES MELLITUS WITHOUT COMPLICATION, WITHOUT LONG-TERM CURRENT USE OF INSULIN (H): ICD-10-CM

## 2021-11-22 PROCEDURE — 99495 TRANSJ CARE MGMT MOD F2F 14D: CPT | Mod: 95 | Performed by: INTERNAL MEDICINE

## 2021-11-22 RX ORDER — SIMVASTATIN 20 MG
20 TABLET ORAL AT BEDTIME
Qty: 90 TABLET | Refills: 3 | Status: SHIPPED | OUTPATIENT
Start: 2021-11-22 | End: 2022-01-04

## 2021-11-22 RX ORDER — BENZONATATE 200 MG/1
200 CAPSULE ORAL 3 TIMES DAILY PRN
Qty: 30 CAPSULE | Refills: 3 | Status: SHIPPED | OUTPATIENT
Start: 2021-11-22 | End: 2024-01-25

## 2021-11-22 NOTE — PROGRESS NOTES
Shayy is a 66 year old who is being evaluated via a billable video visit.      How would you like to obtain your AVS? MyChart  If the video visit is dropped, the invitation should be resent by: Text to cell phone: [phone]  Will anyone else be joining your video visit? No    Video Start Time: 4:53    Assessment & Plan     Pneumonia due to 2019 novel coronavirus  Resolving, oxygenation improving    Type 2 diabetes mellitus without complication, without long-term current use of insulin (H)  Stable, recheck 6 months.     Hyperlipidemia LDL goal <100  Stable   - simvastatin (ZOCOR) 20 MG tablet; Take 1 tablet (20 mg) by mouth At Bedtime        Return in about 6 months (around 5/22/2022) for Diabetes, see me a week after lab.    Pari Wiley MD  [facility]   Shayy is a 66 year old who presents for the following health issues     HPI       Hospital Follow-up Visit:    Hospital/Nursing Home/IP Rehab Facility: [facility]  Date of Admission: 11/08/2021  Date of Discharge: 11/11/2021  Reason(s) for Admission: Pneumonia due to 2019 novel coronavirus       Was your hospitalization related to COVID-19? YES   How are you feeling today? Better  In the past 24 hours have you had shortness of breath when speaking, walking, or climbing stairs? I don't have breathing problems  Do you have a cough? I don't have a cough  When is the last time you had a fever greater than 100? 11/03/2021  Are you having any other symptoms? Loss of sense of taste or smell, Fatigue, Body aches and Headaches   Do you have any other stressors you would like to discuss with your provider? No         Was the patient in the ICU or did the patient experience delirium during hospitalization?  No    Problems taking medications regularly:  None  Medication changes since discharge: None  Problems adhering to non-medication therapy:  None    Summary of hospitalization:  [facility]  discharge summary reviewed  Diagnostic Tests/Treatments reviewed.  Follow up needed: none  Other Healthcare Providers Involved in Patient s Care:         None  Update since discharge: improved.     She has had oxygen levels at rest 94% or above  She has some fatigue yet.     Post Discharge Medication Reconciliation: discharge medications reconciled, continue medications without change.  Plan of care communicated with patient        Other problems:   Diabetes: sugars are stable. Her hemoglobin a1c was 6.8 in the hospital.          Patient Active Problem List   Diagnosis     Benign essential hypertension     Restless leg syndrome     CRISTÓBAL (obstructive sleep apnea)     Advanced directives, counseling/discussion     Generalized anxiety disorder     Mild episode of recurrent major depressive disorder (H)     Health Care Home     GERD (gastroesophageal reflux disease)     Hyperlipidemia LDL goal <100     Eczema     Type 2 diabetes mellitus without complication, without long-term current use of insulin (H)     Midline low back pain with left-sided sciatica     Morbid obesity (HCC)     Insomnia, unspecified type     Dyspnea, unspecified type     Controlled substance agreement signed     Pain of both shoulder joints     Recurrent aspiration pneumonia (H)     Hypoxia     Acute respiratory failure with hypoxia (H)     Pneumonia due to 2019 novel coronavirus     Current Outpatient Medications   Medication Sig Dispense Refill     benzonatate (TESSALON) 200 MG capsule Take 1 capsule (200 mg) by mouth 3 times daily as needed for cough 30 capsule 3     blood glucose (ACCU-CHEK BYRON PLUS) test strip USE TO TEST BLOOD SUGAR TWO TIMES A DAY OR AS DIRECTED 100 each 1     clonazePAM (KLONOPIN) 1 MG tablet Take 1 tablet (1 mg) by mouth 2 times daily as needed for anxiety May fill on 7/10/21, 60 to last 60 days 60 tablet 5     gabapentin (NEURONTIN) 300 MG capsule Take 3 capsules (900 mg) by mouth daily (Patient taking differently: Take  900 mg by mouth daily 300mg po daily in the morning and 600mg po daily in the evening) 270 capsule 3     losartan (COZAAR) 100 MG tablet Take 1 tablet (100 mg) by mouth daily 90 tablet 3     metFORMIN (GLUCOPHAGE-XR) 500 MG 24 hr tablet Take 2 tablets (1,000 mg) by mouth daily (with breakfast) 180 tablet 3     multivitamin w/minerals (MULTI-VITAMIN) tablet Take 1 tablet by mouth daily       Omega-3 Fatty Acids (FISH OIL PO) Take 2 capsules by mouth daily        sertraline (ZOLOFT) 100 MG tablet Take 2 tablets by mouth once daily 180 tablet 3     simvastatin (ZOCOR) 20 MG tablet Take 1 tablet (20 mg) by mouth At Bedtime 90 tablet 3     zolpidem (AMBIEN) 10 MG tablet Take 1 tablet (10 mg) by mouth At Bedtime 90 tablet 1     aspirin 81 MG EC tablet Take 81 mg by mouth daily  (Patient not taking: Reported on 11/22/2021)          Review of Systems   No fever, chills, chest pain, stable dyspnea on exertion, no palpitations       Objective           Vitals:  No vitals were obtained today due to virtual visit.    Physical Exam   GENERAL: Healthy, alert and no distress  EYES: Eyes grossly normal to inspection.  No discharge or erythema, or obvious scleral/conjunctival abnormalities.  RESP: No audible wheeze, cough, or visible cyanosis.  No visible retractions or increased work of breathing.    SKIN: Visible skin clear. No significant rash, abnormal pigmentation or lesions.  NEURO: Cranial nerves grossly intact.  Mentation and speech appropriate for age.  PSYCH: Mentation appears normal, affect normal/bright, judgement and insight intact, normal speech and appearance well-groomed.    Lab Results   Component Value Date    A1C 6.8 11/08/2021    A1C 6.6 05/28/2021                  Video-Visit Details    Type of service:  Video Visit    Video End Time:5:06    Originating Location (pt. Location): Home    Distant Location (provider location):  [facility]     Platform used for Video Visit: Lavante        ADDENDUM: she has CRISTÓBAL and continues on CPAP which helps control daytime hypersomnolence.

## 2021-11-28 PROBLEM — U07.1 PNEUMONIA DUE TO 2019 NOVEL CORONAVIRUS: Status: RESOLVED | Noted: 2021-11-08 | Resolved: 2021-11-28

## 2021-11-28 PROBLEM — R06.00 DYSPNEA, UNSPECIFIED TYPE: Status: RESOLVED | Noted: 2017-05-31 | Resolved: 2021-11-28

## 2021-11-28 PROBLEM — J96.01 ACUTE RESPIRATORY FAILURE WITH HYPOXIA (H): Status: RESOLVED | Noted: 2021-11-08 | Resolved: 2021-11-28

## 2021-11-28 PROBLEM — J12.82 PNEUMONIA DUE TO 2019 NOVEL CORONAVIRUS: Status: RESOLVED | Noted: 2021-11-08 | Resolved: 2021-11-28

## 2021-12-03 ENCOUNTER — TELEPHONE (OUTPATIENT)
Dept: INTERNAL MEDICINE | Facility: CLINIC | Age: 66
End: 2021-12-03
Payer: MEDICARE

## 2021-12-05 ENCOUNTER — MYC MEDICAL ADVICE (OUTPATIENT)
Dept: INTERNAL MEDICINE | Facility: CLINIC | Age: 66
End: 2021-12-05
Payer: MEDICARE

## 2021-12-05 DIAGNOSIS — G47.00 INSOMNIA, UNSPECIFIED TYPE: ICD-10-CM

## 2021-12-05 DIAGNOSIS — F41.1 GENERALIZED ANXIETY DISORDER: ICD-10-CM

## 2021-12-05 DIAGNOSIS — F33.0 MILD EPISODE OF RECURRENT MAJOR DEPRESSIVE DISORDER (H): ICD-10-CM

## 2021-12-07 ENCOUNTER — TELEPHONE (OUTPATIENT)
Dept: INTERNAL MEDICINE | Facility: CLINIC | Age: 66
End: 2021-12-07
Payer: MEDICARE

## 2021-12-07 RX ORDER — MIRTAZAPINE 15 MG/1
15 TABLET, FILM COATED ORAL AT BEDTIME
Qty: 90 TABLET | Refills: 3 | Status: SHIPPED | OUTPATIENT
Start: 2021-12-07 | End: 2022-08-11

## 2021-12-07 NOTE — TELEPHONE ENCOUNTER
Fax received from [facility] for review and signature.  Put in Dr. Wiley's in basket.

## 2021-12-09 ENCOUNTER — MEDICAL CORRESPONDENCE (OUTPATIENT)
Dept: HEALTH INFORMATION MANAGEMENT | Facility: CLINIC | Age: 66
End: 2021-12-09
Payer: MEDICARE

## 2021-12-13 ENCOUNTER — TRANSFERRED RECORDS (OUTPATIENT)
Dept: HEALTH INFORMATION MANAGEMENT | Facility: CLINIC | Age: 66
End: 2021-12-13
Payer: MEDICARE

## 2021-12-25 ENCOUNTER — HEALTH MAINTENANCE LETTER (OUTPATIENT)
Age: 66
End: 2021-12-25

## 2022-01-03 ENCOUNTER — MYC MEDICAL ADVICE (OUTPATIENT)
Dept: INTERNAL MEDICINE | Facility: CLINIC | Age: 67
End: 2022-01-03
Payer: MEDICARE

## 2022-01-03 DIAGNOSIS — G47.00 INSOMNIA, UNSPECIFIED TYPE: ICD-10-CM

## 2022-01-03 DIAGNOSIS — E78.5 HYPERLIPIDEMIA LDL GOAL <100: ICD-10-CM

## 2022-01-04 RX ORDER — ZOLPIDEM TARTRATE 10 MG/1
10 TABLET ORAL AT BEDTIME
Qty: 90 TABLET | Refills: 1 | Status: SHIPPED | OUTPATIENT
Start: 2022-01-04 | End: 2022-08-09

## 2022-01-04 RX ORDER — SIMVASTATIN 20 MG
20 TABLET ORAL AT BEDTIME
Qty: 90 TABLET | Refills: 3 | Status: SHIPPED | OUTPATIENT
Start: 2022-01-04 | End: 2022-08-11

## 2022-01-04 NOTE — TELEPHONE ENCOUNTER
Pt has new pharmacy that she uses.   She needs refills on 2 medications.     Simvastatin   Zolpidem     Recent Labs   Lab Test 05/28/21  1502 10/23/20  1527 03/22/16  1418 03/26/15  1439 02/14/14  1520   CHOL 191 197   < > 130 161   HDL 41* 52   < > 44* 39*   * 123*   < > 64 95   TRIG 119 109   < > 109 133   CHOLHDLRATIO  --   --   --  3.0 4.1    < > = values in this interval not displayed.

## 2022-01-10 ENCOUNTER — MYC MEDICAL ADVICE (OUTPATIENT)
Dept: INTERNAL MEDICINE | Facility: CLINIC | Age: 67
End: 2022-01-10
Payer: MEDICARE

## 2022-01-10 DIAGNOSIS — F41.1 GENERALIZED ANXIETY DISORDER: ICD-10-CM

## 2022-01-10 DIAGNOSIS — G47.00 INSOMNIA, UNSPECIFIED TYPE: ICD-10-CM

## 2022-01-11 ENCOUNTER — E-VISIT (OUTPATIENT)
Dept: INTERNAL MEDICINE | Facility: CLINIC | Age: 67
End: 2022-01-11
Payer: MEDICARE

## 2022-01-11 DIAGNOSIS — R35.0 URINARY FREQUENCY: ICD-10-CM

## 2022-01-11 DIAGNOSIS — R30.0 DYSURIA: Primary | ICD-10-CM

## 2022-01-11 PROCEDURE — 99207 PR NON-BILLABLE SERV PER CHARTING: CPT | Performed by: INTERNAL MEDICINE

## 2022-01-11 NOTE — TELEPHONE ENCOUNTER
Pending Prescriptions:                       Disp   Refills    clonazePAM (KLONOPIN) 1 MG tablet         60 tab*5            Sig: Take 1 tablet (1 mg) by mouth 2 times daily as           needed for anxiety May fill on 7/10/21, 60 to           last 60 days    Routing refill request to provider for review/approval because:  Drug not on the [facility] refill protocol

## 2022-01-12 RX ORDER — CLONAZEPAM 1 MG/1
1 TABLET ORAL 2 TIMES DAILY PRN
Qty: 60 TABLET | Refills: 5 | Status: SHIPPED | OUTPATIENT
Start: 2022-01-12 | End: 2022-07-26

## 2022-02-03 ENCOUNTER — MYC MEDICAL ADVICE (OUTPATIENT)
Dept: INTERNAL MEDICINE | Facility: CLINIC | Age: 67
End: 2022-02-03
Payer: MEDICARE

## 2022-02-03 DIAGNOSIS — G47.33 OSA (OBSTRUCTIVE SLEEP APNEA): Primary | ICD-10-CM

## 2022-02-03 NOTE — TELEPHONE ENCOUNTER
See her Repair Report message. Sent her message for more information.   Will route to Dr Wiley in case she receives the form in the next day or two.

## 2022-02-16 NOTE — TELEPHONE ENCOUNTER
Patient called back and said [facility] would need supporting office notes for this. Writer informed patient Dr Wiley said she will need another sleep study. Please place a referral for this.

## 2022-02-22 ENCOUNTER — MYC MEDICAL ADVICE (OUTPATIENT)
Dept: INTERNAL MEDICINE | Facility: CLINIC | Age: 67
End: 2022-02-22
Payer: MEDICARE

## 2022-02-22 DIAGNOSIS — K21.9 GASTROESOPHAGEAL REFLUX DISEASE WITHOUT ESOPHAGITIS: ICD-10-CM

## 2022-02-22 RX ORDER — PANTOPRAZOLE SODIUM 40 MG/1
TABLET, DELAYED RELEASE ORAL
Qty: 90 TABLET | Refills: 3 | Status: SHIPPED | OUTPATIENT
Start: 2022-02-22 | End: 2022-08-11

## 2022-02-22 RX ORDER — PANTOPRAZOLE SODIUM 40 MG/1
TABLET, DELAYED RELEASE ORAL
Qty: 90 TABLET | Refills: 1 | COMMUNITY
Start: 2022-02-22 | End: 2022-02-22

## 2022-02-23 DIAGNOSIS — N18.30 TYPE 2 DIABETES MELLITUS WITH STAGE 3 CHRONIC KIDNEY DISEASE, WITHOUT LONG-TERM CURRENT USE OF INSULIN (H): ICD-10-CM

## 2022-02-23 DIAGNOSIS — E11.22 TYPE 2 DIABETES MELLITUS WITH STAGE 3 CHRONIC KIDNEY DISEASE, WITHOUT LONG-TERM CURRENT USE OF INSULIN (H): ICD-10-CM

## 2022-02-24 RX ORDER — BLOOD SUGAR DIAGNOSTIC
STRIP MISCELLANEOUS
Qty: 100 STRIP | Refills: 2 | Status: SHIPPED | OUTPATIENT
Start: 2022-02-24 | End: 2022-03-02

## 2022-02-28 ENCOUNTER — MYC MEDICAL ADVICE (OUTPATIENT)
Dept: INTERNAL MEDICINE | Facility: CLINIC | Age: 67
End: 2022-02-28
Payer: MEDICARE

## 2022-02-28 DIAGNOSIS — L98.9 SKIN LESION: Primary | ICD-10-CM

## 2022-03-01 DIAGNOSIS — N18.30 TYPE 2 DIABETES MELLITUS WITH STAGE 3 CHRONIC KIDNEY DISEASE, WITHOUT LONG-TERM CURRENT USE OF INSULIN (H): ICD-10-CM

## 2022-03-01 DIAGNOSIS — E11.22 TYPE 2 DIABETES MELLITUS WITH STAGE 3 CHRONIC KIDNEY DISEASE, WITHOUT LONG-TERM CURRENT USE OF INSULIN (H): ICD-10-CM

## 2022-03-02 RX ORDER — BLOOD SUGAR DIAGNOSTIC
STRIP MISCELLANEOUS
Qty: 100 STRIP | Refills: 2 | Status: SHIPPED | OUTPATIENT
Start: 2022-03-02 | End: 2022-09-28

## 2022-03-02 NOTE — TELEPHONE ENCOUNTER
Fax received from [facility].  Insurance will only cover 1 test strip/day due to not being insulin dependent.  Please send updated prescription with updated directions to bill insurance appropriately.      Changed prescription and resent to pharmacy

## 2022-03-22 ENCOUNTER — HOSPITAL ENCOUNTER (OUTPATIENT)
Dept: MAMMOGRAPHY | Facility: CLINIC | Age: 67
Discharge: HOME | End: 2022-03-22
Attending: INTERNAL MEDICINE | Admitting: INTERNAL MEDICINE
Payer: MEDICARE

## 2022-03-22 DIAGNOSIS — Z12.31 VISIT FOR SCREENING MAMMOGRAM: ICD-10-CM

## 2022-03-22 PROCEDURE — 77067 SCR MAMMO BI INCL CAD: CPT

## 2022-05-05 ENCOUNTER — VIRTUAL VISIT (OUTPATIENT)
Dept: SLEEP MEDICINE | Facility: CLINIC | Age: 67
End: 2022-05-05
Attending: INTERNAL MEDICINE
Payer: MEDICARE

## 2022-05-05 ENCOUNTER — CARE COORDINATION (OUTPATIENT)
Dept: SLEEP MEDICINE | Facility: CLINIC | Age: 67
End: 2022-05-05

## 2022-05-05 VITALS — BODY MASS INDEX: 52.9 KG/M2 | WEIGHT: 252 LBS | HEIGHT: 58 IN

## 2022-05-05 DIAGNOSIS — G47.21 DELAYED SLEEP PHASE SYNDROME: ICD-10-CM

## 2022-05-05 DIAGNOSIS — R09.02 HYPOXIA: ICD-10-CM

## 2022-05-05 DIAGNOSIS — G47.33 OSA (OBSTRUCTIVE SLEEP APNEA): Primary | ICD-10-CM

## 2022-05-05 DIAGNOSIS — G25.81 RESTLESS LEG SYNDROME: ICD-10-CM

## 2022-05-05 DIAGNOSIS — G47.00 INSOMNIA, UNSPECIFIED TYPE: ICD-10-CM

## 2022-05-05 PROCEDURE — 99205 OFFICE O/P NEW HI 60 MIN: CPT | Mod: 95 | Performed by: PHYSICIAN ASSISTANT

## 2022-05-05 ASSESSMENT — PAIN SCALES - GENERAL: PAINLEVEL: NO PAIN (0)

## 2022-05-05 ASSESSMENT — SLEEP AND FATIGUE QUESTIONNAIRES
HOW LIKELY ARE YOU TO NOD OFF OR FALL ASLEEP WHILE SITTING AND TALKING TO SOMEONE: WOULD NEVER DOZE
HOW LIKELY ARE YOU TO NOD OFF OR FALL ASLEEP WHILE SITTING QUIETLY AFTER LUNCH WITHOUT ALCOHOL: WOULD NEVER DOZE
HOW LIKELY ARE YOU TO NOD OFF OR FALL ASLEEP WHILE SITTING INACTIVE IN A PUBLIC PLACE: WOULD NEVER DOZE
HOW LIKELY ARE YOU TO NOD OFF OR FALL ASLEEP WHILE WATCHING TV: SLIGHT CHANCE OF DOZING
HOW LIKELY ARE YOU TO NOD OFF OR FALL ASLEEP IN A CAR, WHILE STOPPED FOR A FEW MINUTES IN TRAFFIC: WOULD NEVER DOZE
HOW LIKELY ARE YOU TO NOD OFF OR FALL ASLEEP WHEN YOU ARE A PASSENGER IN A CAR FOR AN HOUR WITHOUT A BREAK: SLIGHT CHANCE OF DOZING
HOW LIKELY ARE YOU TO NOD OFF OR FALL ASLEEP WHILE LYING DOWN TO REST IN THE AFTERNOON WHEN CIRCUMSTANCES PERMIT: MODERATE CHANCE OF DOZING
HOW LIKELY ARE YOU TO NOD OFF OR FALL ASLEEP WHILE SITTING AND READING: SLIGHT CHANCE OF DOZING

## 2022-05-05 NOTE — PATIENT INSTRUCTIONS
Instructions for treating Delayed Sleep Phase Syndrome:    Delayed Sleep Phase Syndrome (DSPS) means that your body's internal timing is set late compared to the 24 hour day. Therefore, it is often difficult to get up on time for work in the morning and sometimes difficult to fall asleep on time, in order to get enough sleep. People with DSPS often tend to like to stay up late on weekends and sleep in until between 10 AM and noon, sometimes even later.This is actually a bad habit that will perpetuate the problem. It reinforces your body's tendency to be on that later schedule.    You should go to bed when you are sleepy and ready to sleep. I would suggest limiting your time in bed to between 1 or 2 AM and 9 AM. During this entire process, you should not engage in activities that may make it worse, such as watching TV in bed, leaving the TV on all night, drinking any caffeine 6 hours before bed or exercising 1-2 hours before bed.     Start taking Melatonin, 1 mg tablet 3-5 hours before the time that you fall asleep on average (not your desired bedtime or time that you get in bed, but the time you normally fall asleep on your own).     Upon awakening, get exposure to sun-light for about 30-45 minutes. You do not need to look at the sun, in fact, this is dangerous. Reading the paper with the sun shining on you is adequate.  Alternatively, you may use a Seasonal Affective Disorder Lamp (intensity 10,000 Lux) instead of the sun. The lamp should be positioned 1-2 arms lengths away from you. They lamps are sold at Home Medical Companies such as [employer] or [employer]. A prescription can be written to get insurance coverage in some cases. They are also sold on [employer].    Using the light and melatonin should help march your internal clock (known as your circadian rhythms) gradually earlier. As your bedtime advances, remember to take your melatonin earlier, keeping it 5 hours before your fall asleep  time.    Avoid naps and sleeping in because sleeping during the day will delay your body's clock and you will have to start from scratch.     More information about light therapy:  If the cost of any light box is too much, you can also purchase a compact fluorescent all spectrum light bulb at a local hardware store for about $8.  The most commonly available bulb is 1400 lumen.  You would need two of these positioned within 1 meter of yourself to be equivalent to 2,500 lux.  The bulbs can be placed in a standard light fixture.  Additionally, they can be placed in a mountable fixture that is used in [employer].  Mountable fixtures are also available at hardware stores for about $9.  Do not look directly at the light.  If you have any concerns regarding the safety of bright light therapy for you, it is recommended that you consult an ophthalmologist before using a light box.  If you have a condition that makes your eyes very sensitive to light, macular degeneration, a family history of such problems, or diabetic changes to your eyes, consult an ophthalmologist before using a light box. If you have anxiety disorder and have an increase in anxiety discontinue use.

## 2022-05-05 NOTE — PROGRESS NOTES
Outpatient Sleep Medicine Consultation:      Name: Nicole Reyes MRN# [id number]   Age: 66 year old YOB: 1955     Date of Consultation: May 4, 2022  Consultation is requested by: Pari Wiley MD  [address]  [place] [address] Pari Wiley  Primary care provider: Pari Wiley       Reason for Sleep Consult:     Nicole Reyes is sent by Pari Wiley for a sleep consultation regarding CRISTÓBAL.    Patient s Reason for visit  Nicole eRyes main reason for visit:  Establish care for CRISTÓBAL  Patient states problem(s) started:  2009  Nicole Reyes's goals for this visit:  Discuss her treatment of CRISTÓBAL           Assessment and Plan:     Summary Sleep Diagnoses and Recommendations:  (G47.33) CRISTÓBAL (obstructive sleep apnea)  (primary encounter diagnosis)  Comment: Shayy was diagnosed with severe CRISTÓBAL at [facility] in 2009, AHI 86/h. CPAP was titrated laterally to 15 cm at that time with mild residual apnea. She is currently on BiPAP 9/4 cm plus 2 lpm supplemental O2 due to recurrent aspiration pneumonia. I believe she was switched to BiPAP and the pressures set empirically due to difficulty tolerating CPAP. I do not have a download today, but she tells me her machine reports her AHI is under 4/hr routinely. There may be some observed snoring with it on though. She does feel the pressure is a little high. Her weight is down about 15 pounds compared to the time of her studies. She was told she would be due for a replacement machine but [facility], but it seems to be functioning appropriately. Oximetry in 2019 on BiPAP and O2 showed normal oxygenation. Her CO2 on metabolic panel has ranged 28-30. She has had periodically elevated CO2 on VBG but when she was acutely ill with pneumonia. Given her pressures were set empirically, I recommend a titration PSG to verify there is no residual hypoventilation and assess need for supplemental O2 when not acutely ill.  Plan: Comprehensive DME        In  lab titration with TCM. Start with BiPAP 9/4 cm without supplemental O2 and titrate per protocol. COVID PCR ordered for prior to the study. She was encouraged to bring her normal sleeping medication.    (G25.81) Restless leg syndrome  Comment: symptoms mostly controlled with 1 mg clonazepam and 600 mg gabapentin around 10:30-11 PM. Last ferritin was 19 in 5/2017.  Plan: Will reassess ferritin after her study.      (G47.21) Delayed sleep phase syndrome, (G47.00) Insomnia, unspecified type  Comment: Shayy has had difficulty falling asleep for many years. She also states she has been a night owl her entire life. She used to work evenings. She gets into bed 10:30-11 PM but does not fall asleep until 12:30 AM with 1 mg clonazepam, 600 mg gabapentin and often 10 mg zolpidem or 15 mg mirtazapine. She wakes around 9 AM but does not get out of bed until 10:30 AM.  Plan:  We discussed getting 30 minutes of bright light exposure in the morning, either with the sun or a SAD Lamp of 10,000 lux intensity. Avoid bright light, including electronics, in the hour before bedtime. Take 1 mg melatonin 3-5 hours prior to natural sleep onset. Keep a consistent sleep schedule, avoiding naps and sleeping in.  Try limiting time in bed to 1 or 2 AM to 9 AM and avoid sleep outside of that.       Comorbid Diagnoses:  recurrent pneumonias likely from aspiration related to her hiatal hernia, chronic hypoxemic respiratory failure on 2 L oxygen at night and sometimes with exertion, CRISTÓBAL on CPAP, HTN, type II DM, insomnia, and morbid obesity. RLS, depression/RADHA, BMI 52      Summary Counseling:    Sleep Testing Reviewed  Obstructive Sleep Apnea Reviewed  Complications of Untreated Sleep Apnea Reviewed  Delayed sleep phase syndrome reviewed    Patient will follow up 2 weeks after sleep study.  Bennett Goltz, PA-C     Total time spent reviewing medical records, history and physical examination, review of previous testing and interpretation as well as  "documentation on this date:85 min    CC: Pari Wiley          History of Present Illness:     Shayy presents to establish care for previously diagnosed severe CRISTÓBAL. She has a CPAP machine. She is on 2 lpm O2 bled in to CPAP, for recurrent pneumonia. She has not needed the oxygen in the day.  She feels she is due for a new machine but required a repeat visit. Notes suggest she has traditionally struggled with PAP but states she has been doing better lately. She switched masks from a large nasal mask with a forehead brace to an N20 mask and notes improved claustrophobia. She has a ReMed BiPAP with pressures 9/4 cm. It seems to be working well. She has been on BiPAP for 5-7 years. Her DME is [facility]. They told her she is due for a new machine. Her  does observe some snoring with BiPAP on. She states her machine tells her she has an AHI <4/hr when she uses it. She occasionally has mask leak and if she can't fix it, she replaces the cushion (about every other month or sooner). She feels the pressure is \"significant\" when she first puts it on. She does not use the ramp. She occasionally gets dry mouth, but not often. She is not aware of mouth leak. She does not use the humidifier as it is too much in conjunction with the humidifier on the O2.  A download is not available today. She feels she has to wait until she is really tired to put the mask on. Her  notices she is starting to doze and will prompt her to put her mask on and that will wake her more.   Aside from insomnia, she does not have concerns about her sleep. She has had insomnia for many years. She thinks it may have started after her mother passed. It would come and go then. When she went through menopause, it seemed to become more consistent.     Past Sleep Evaluations:   Diagnostic PSG at [facility] on 2/15/2009. AHI 85.9/hr, RDI 96.5/hr. O2 nadr 59%. Wt: 266#   CPAP titration PSG at [facility] on 3/12/2009: Pt had very little supine or REM sleep. At " CPAP 15 cm, her AHI was 5.7/hr and RDI 10.5/hr (no REM observed). REM was observed on CPAP 14 cm and she had a cluster of events at the end of that period, but she had no significant apnea for most of that REM period.     She had oximetry on 11/16/2019 on BiPAP and O2. It showed a 4% MINA of 7.1/hr, O2 mookie of 84%, but only 2 min 8 sec below 90% SpO2.     SLEEP-WAKE SCHEDULE:     Work/School Days: Patient goes to school/work:     Usually gets into bed at 10:30-11 PM, meds at that time. She takes 600 mg gabapentin nightly. She takes clonazepam 1 mg as needed if feeling anxious (usually about sleep), which is often 4 days per week. She feels it takes up to 45-60 minutes for those medications to kick in. She takes 10 mg zolpidem 3-5 nights per week if she continues to have trouble falling asleep after taking gabapentin and clonazepam. She takes mirtazapine 15 mg less than weekly. She feels it helps if she uses that infrequently. She will take it if she is close to falling asleep with the gabapentin and clonazepam.   Has trouble falling asleep 7  nights per week. If she takes zolpidem, it may take another hour to fall asleep.  Wakes up in the middle of the night 0  times.  Patient is usually up at 10:30 AM. She will often wake around 8:30-9 AM (sometimes sooner, 7:30-9:30 AM depending on when she falls asleep) and stay in bed and read.   Uses alarm:  No  She has always been a night owl.    Weekends/Non-work Days/All Other Days:  Her sleep is the same on weekends.    Sleep Need  Patient gets  5-7 hours (usually closer to 5)  sleep on average   Patient thinks she needs about 5  sleep    Nicole Reyes prefers to sleep in this position(s):  Left  Patient states they do the following activities in bed:  Read, TV.  prefers quiet, so he usually turns everything off (he gets to bed shortly after her). She also uses her phone in bed.  She denies having a racing mind at night, unless worrying about if she is getting  tired enough to sleep.      Naps  Patient takes a purposeful nap 0  times a week  She feels better after a nap:  yes  She dozes off unintentionally 0-2  days per week. She says she may fall asleep if she lays down or if watching hours of TV in the evening. She has to lay to stretch for her spinal stenosis.  Patient has had a driving accident or near-miss due to sleepiness/drowsiness:  no      SLEEP DISRUPTIONS:    Breathing/Snoring  Patient snores: some with BiPAP  Other people complain about her snoring:  Not now  Patient has been told she stops breathing in her sleep:  Not with BiPAP, yes without BiPAP  She has issues with the following:  Morning headaches (when she started gabapentin), no nasal congestion. She does get reflux at night due to hiatal hernia. She is on treatment. It is pretty well controlled.    Movement:  Patient gets pain, discomfort, with an urge to move:   Rare restlessness in her legs. Gabapentin and clonazepam help.  It happens when she is resting:   yes  It happens more at night:   yes  Patient has been told she kicks her legs at night:  Yes, but  has not complained lately      Behaviors in Sleep:  Nicole CLARKE Eric has experienced the following behaviors while sleeping:    Pt denies bruxism, sleep talking, sleep walking, and dream enactment behavior. Pt denies sleep paralysis, hypnagogue and cataplexy.       Is there anything else you would like your sleep provider to know:      no    CAFFEINE AND OTHER SUBSTANCES:    Patient consumes caffeinated beverages per day:   12 oz soda  Last caffeine use is usually:  afternoon  List of any prescribed or over the counter stimulants that patient takes:  none  List of any prescribed or over the counter sleep medication patient takes:  Mirtazapine 15 mg, clonazepam 1 mg, Zolpidem 10 mg. She is on gabapentin 300 mg in the morning and 600 mg in the evening   List of previous sleep medications that patient has tried:  Amitriptyline-did not work.  She thinks trazodone too, but does not recall how she responded.  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 She worked evenings.  She lives with her .      SCALES:    EPWORTH SLEEPINESS SCALE      [place] Sleepiness Scale ( CAIN Sharpe  1990-1997[facility] [place]/English - Final version - 21 Nov 07 - [facility] [place].) 5/5/2022   Sitting and reading Slight chance of dozing   Watching TV Slight chance of dozing   Sitting, inactive in a public place (e.g. a theatre or a meeting) Would never doze   As a passenger in a car for an hour without a break Slight chance of dozing   Lying down to rest in the afternoon when circumstances permit Moderate chance of dozing   Sitting and talking to someone Would never doze   Sitting quietly after a lunch without alcohol Would never doze   In a car, while stopped for a few minutes in traffic Would never doze   [place] Score (MC) 0   [place] Score (Sleep) 5         INSOMNIA SEVERITY INDEX (DORA)      Insomnia Severity Index (DROA) 5/5/2022   Difficulty falling asleep 3   Difficulty staying asleep 3   Problems waking up too early 0   How SATISFIED/DISSATISFIED are you with your CURRENT sleep pattern? 2   How NOTICEABLE to others do you think your sleep problem is in terms of impairing the quality of your life? 1   How WORRIED/DISTRESSED are you about your current sleep problem? 3   To what extent do you consider your sleep problem to INTERFERE with your daily functioning (e.g. daytime fatigue, mood, ability to function at work/daily chores, concentration, memory, mood, etc.) CURRENTLY? 1   DORA Total Score 13       Guidelines for Scoring/Interpretation:  Total score categories:  0-7 = No clinically significant  insomnia   8-14 = Subthreshold insomnia   15-21 = Clinical insomnia (moderate severity)  22-28 = Clinical insomnia (severe)  Used via courtesy of www.myhealth.va.gov with permission from Lowell Daugherty PhD., [facility]      STOP BANG     STOP BANG Questionnaire (  2008, the American Society of Anesthesiologists, Inc. [employer] 5/5/2022   B/P Clinic: -   BMI Clinic: 52.67         GAD7    RADHA-7  10/27/2020   1. Feeling nervous, anxious, or on edge 1   2. Not being able to stop or control worrying 1   3. Worrying too much about different things 1   4. Trouble relaxing 2   5. Being so restless that it is hard to sit still 0   6. Becoming easily annoyed or irritable 1   7. Feeling afraid, as if something awful might happen 1   RADHA-7 Total Score 7   If you checked any problems, how difficult have they made it for you to do your work, take care of things at home, or get along with other people? -           PATIENT HEALTH QUESTIONNAIRE-9 (PHQ - 9)    PHQ-9 ([employer]) 7/2/2021   No Interest In Doing Things -   Feeling Depressed -   Trouble Sleeping -   Tired / No Energy -   No appetite or Over-Eating -   Feeling Bad about Self -   Trouble Concentrating -   Moving Slow or Restless -   Suicidal Thoughts -   Total Score -   1.  Little interest or pleasure in doing things 0   2.  Feeling down, depressed, or hopeless 0   3.  Trouble falling or staying asleep, or sleeping too much 1   4.  Feeling tired or having little energy 1   5.  Poor appetite or overeating 1   6.  Feeling bad about yourself 0   7.  Trouble concentrating 0   8.  Moving slowly or restless 0   9.  Suicidal or self-harm thoughts 0   PHQ-9 Total Score 3   Difficulty at work, home, or with people -   1.  Little interest or pleasure in doing things -   2.  Feeling down, depressed, or hopeless -   3.  Trouble falling or staying asleep, or sleeping too much -   4.  Feeling tired or having little energy -   5.  Poor appetite or  overeating -   6.  Feeling bad about yourself -   7.  Trouble concentrating -   8.  Moving slowly or restless -   9.  Suicidal or self-harm thoughts -   PHQ-9 via [facility] TOTAL SCORE-----> -       Developed by Kvng Bailey, Amirah Ramos, Guanako Gay and colleagues, with an educational charlette from [employer] No permission required to reproduce, translate, display or distribute.        Allergies:    Allergies   Allergen Reactions     Codeine Rash     Penicillins Rash     Pt has tolerated cephalosporins and penems.   Pt tolerated Zosyn 7/6/2019       Medications:    Current Outpatient Medications   Medication Sig Dispense Refill     aspirin 81 MG EC tablet Take 81 mg by mouth daily       benzonatate (TESSALON) 200 MG capsule Take 1 capsule (200 mg) by mouth 3 times daily as needed for cough 30 capsule 3     blood glucose (ACCU-CHEK BYRON PLUS) test strip USE TO TEST BLOOD SUGAR ONE TIME A  strip 2     clonazePAM (KLONOPIN) 1 MG tablet Take 1 tablet (1 mg) by mouth 2 times daily as needed for anxiety 60 to last 60 days 60 tablet 5     gabapentin (NEURONTIN) 300 MG capsule Take 3 capsules (900 mg) by mouth daily (Patient taking differently: Take 900 mg by mouth daily 300mg po daily in the morning and 600mg po daily in the evening) 270 capsule 3     losartan (COZAAR) 100 MG tablet Take 1 tablet (100 mg) by mouth daily 90 tablet 3     metFORMIN (GLUCOPHAGE-XR) 500 MG 24 hr tablet Take 2 tablets (1,000 mg) by mouth daily (with breakfast) 180 tablet 3     mirtazapine (REMERON) 15 MG tablet Take 1 tablet (15 mg) by mouth At Bedtime 90 tablet 3     multivitamin w/minerals (THERA-VIT-M) tablet Take 1 tablet by mouth daily       Omega-3 Fatty Acids (FISH OIL PO) Take 2 capsules by mouth daily        pantoprazole (PROTONIX) 40 MG EC tablet TAKE 1 TABLET BY MOUTH AT BEDTIME 90 tablet 3     sertraline (ZOLOFT) 100 MG tablet Take 2 tablets by mouth once daily 180 tablet 3     simvastatin (ZOCOR) 20 MG tablet  Take 1 tablet (20 mg) by mouth At Bedtime 90 tablet 3     zolpidem (AMBIEN) 10 MG tablet Take 1 tablet (10 mg) by mouth At Bedtime 90 tablet 1       Problem List:  Patient Active Problem List    Diagnosis Date Noted     Hypoxia 11/08/2020     Priority: Medium     Recurrent aspiration pneumonia (H) 05/09/2020     Priority: Medium     Pain of both shoulder joints 03/12/2020     Priority: Medium     Controlled substance agreement signed 12/04/2018     Priority: Medium     Insomnia, unspecified type 05/23/2016     Priority: Medium     Morbid obesity (HCC) 10/15/2015     Priority: Medium     Midline low back pain with left-sided sciatica 09/10/2015     Priority: Medium     Type 2 diabetes mellitus without complication, without long-term current use of insulin (H) 09/08/2015     Priority: Medium     Eczema 02/16/2014     Priority: Medium     Hyperlipidemia LDL goal <100 11/01/2012     Priority: Medium     GERD (gastroesophageal reflux disease) 09/03/2012     Priority: Medium     Health Care Home 11/28/2011     Priority: Medium       DX V65.8 REPLACED WITH 11358 HEALTH CARE HOME (04/08/2013)         Generalized anxiety disorder 11/18/2011     Priority: Medium     Mild episode of recurrent major depressive disorder (H) 11/18/2011     Priority: Medium     Advanced directives, counseling/discussion 07/14/2011     Priority: Medium     Discussed Advance Directive planning with patient; information given to patient to review.       Restless leg syndrome 12/31/2010     Priority: Medium     CRISTÓBAL (obstructive sleep apnea)      Priority: Medium     Benign essential hypertension 07/08/2003     Priority: Medium        Past Medical/Surgical History:  Past Medical History:   Diagnosis Date     Arthritis     lt shoulder, rt. knee     Blood transfusion      CKD (chronic kidney disease) stage 3, GFR 30-59 ml/min (H)      Essential hypertension, benign      Gastroesophageal reflux disease      Generalized anxiety disorder      Hernia,  abdominal      Hiatal hernia      History of blood transfusion     with a hernia repair     Hypertension      Insomnia, unspecified      Iron deficiency anemia      Major depression     sees a psychiatrist     Menopause     age 53     Obesity, unspecified      CRISTÓBAL (obstructive sleep apnea)     bipap     Other chronic pain     chronic pain lt arm from tendonidits     Oxygen dependent     2 LPM at home-uses at noc or extended walking     Pneumonia     due to aspiration from hiatal hernia-     Pneumonia due to 2019 novel coronavirus 2021     Postoperative seroma 10/11    drained several times     Pure hypercholesterolemia      RLS (restless legs syndrome)      Type II or unspecified type diabetes mellitus without mention of complication, not stated as uncontrolled      Past Surgical History:   Procedure Laterality Date     BREAST BIOPSY, RT/LT      2 times; benign      SECTION        SECTION        SECTION       COLONOSCOPY N/A 12/3/2020    Procedure: Colonoscopy with biopsy;  Surgeon: Obinna Gutierrez MD;  Location:  OR     DILATION AND CURETTAGE, HYSTEROSCOPY DIAGNOSTIC, COMBINED  3/22/2013    Procedure: COMBINED DILATION AND CURETTAGE, HYSTEROSCOPY DIAGNOSTIC;  DILATION AND CURETTAGE, HYSTEROSCOPY DIAGNOSTIC   Converted to a general;  Surgeon: Robi Edwards MD;  Location:  OR     ESOPHAGOSCOPY, GASTROSCOPY, DUODENOSCOPY (EGD), COMBINED N/A 2015    Procedure: COMBINED ESOPHAGOSCOPY, GASTROSCOPY, DUODENOSCOPY (EGD);  Surgeon: Obinna Gutierrez MD;  Location:  GI     ESOPHAGOSCOPY, GASTROSCOPY, DUODENOSCOPY (EGD), COMBINED N/A 2015    Procedure: COMBINED ENDOSCOPIC ULTRASOUND, ESOPHAGOSCOPY, GASTROSCOPY, DUODENOSCOPY (EGD), FINE NEEDLE ASPIRATE/BIOPSY;  Surgeon: Sabine Olivares MD;  Location:  GI     ESOPHAGOSCOPY, GASTROSCOPY, DUODENOSCOPY (EGD), COMBINED N/A 1/3/2020    Procedure: ESOPHAGOGASTRODUODENOSCOPY;  Surgeon: Ascencion Stauffer  MD ANTONI;  Location: [facility] OR     HERNIORRHAPHY INCISIONAL (LOCATION)  10/8/2011     incarcerated hernia repair with mesh;     HERNIORRHAPHY INCISIONAL (LOCATION)  10/16/2011     repair incisional hernia with mesh, and removal of current implanted mesh;     ZZC NONSPECIFIC PROCEDURE      Hernia repair      ZZC NONSPECIFIC PROCEDURE      Lymph node biopsy in neck (benign)        Social History:  Social History     Socioeconomic History     Marital status:      Spouse name: Not on file     Number of children: 3     Years of education: Not on file     Highest education level: Not on file   Occupational History     Occupation: Unit Clerk     Employer: [employer]     Comment: Labor and Delivery   Tobacco Use     Smoking status: Former Smoker     Packs/day: 1.00     Years: 5.00     Pack years: 5.00     Quit date: 3/18/1979     Years since quittin.1     Smokeless tobacco: Never Used     Tobacco comment: quit    Vaping Use     Vaping Use: Never used   Substance and Sexual Activity     Alcohol use: Yes     Comment: 0-1 time a month, 1 drink     Drug use: No     Sexual activity: Never     Comment: menopausal   Other Topics Concern     Parent/sibling w/ CABG, MI or angioplasty before 65F 55M? Not Asked      Service Not Asked     Blood Transfusions Not Asked     Caffeine Concern Not Asked     Occupational Exposure Not Asked     Hobby Hazards Not Asked     Sleep Concern Not Asked     Stress Concern Not Asked     Weight Concern Not Asked     Special Diet Not Asked     Back Care Not Asked     Exercise Not Asked     Bike Helmet Not Asked     Seat Belt Yes     Self-Exams Yes   Social History Narrative     Not on file     Social Determinants of Health     Financial Resource Strain: Not on file   Food Insecurity: No Food Insecurity     Worried About Running Out of Food in the Last Year: Never true     Ran Out of Food in the Last Year: Never true   Transportation Needs: No Transportation Needs     Lack  "of Transportation (Medical): No     Lack of Transportation (Non-Medical): No   Physical Activity: Not on file   Stress: Not on file   Social Connections: Not on file   Intimate Partner Violence: Not At Risk     Fear of Current or Ex-Partner: No     Emotionally Abused: No     Physically Abused: No     Sexually Abused: No   Housing Stability: Not on file       Family History:  Family History   Problem Relation Age of Onset     Cardiovascular Mother         CHF     Hypertension Mother      C.A.D. Mother         50s     Lipids Mother      Cancer Father         Hodgkin's, Leukemia     Diabetes Sister      Connective Tissue Disorder Sister         Lupus     Lipids Sister      Hypertension Brother      Hypertension Sister      Hypertension Sister      Cancer Brother         Hodgkin's     C.A.D. Brother 61     Hypertension Sister      C.A.D. Brother      Basal cell carcinoma Daughter 38        top of head       Review of Systems:  A complete review of systems reviewed by me is negative with the exeption of what has been mentioned in the history of present illness.    Omitted for time    Physical Examination:  Vitals: Ht 1.473 m (4' 10\")   Wt 114.3 kg (252 lb)   LMP 09/15/2007   BMI 52.67 kg/m             GENERAL APPEARANCE: healthy, alert, no distress and cooperative  EYES: Eyes grossly normal to inspection and wearing glasses  HENT: tongue base enlarged, could not see posterior oropharynx due to poor lighting. Permanent bottom dentures, removes upper dentures at night  NECK: no asymmetry, masses, or scars  RESP: no respiratory distress, cough or wheeze           Data: All pertinent previous laboratory data reviewed     Recent Labs   Lab Test 11/11/21  1407 11/11/21  0947 11/11/21  0608 11/11/21  0217 11/10/21  2231 11/10/21  0614 11/10/21  0019 11/09/21  2227   NA  --   --   --   --   --   --  140 140   POTASSIUM  --   --  3.8  --  4.1   < > 3.1* 3.0*   CHLORIDE  --   --   --   --   --   --  105 104   CO2  --   --   --  "  --   --   --  29 30   ANIONGAP  --   --   --   --   --   --  6 6   * 102*  --    < >  --    < > 170* 185*   BUN  --   --   --   --   --   --  13 14   CR  --   --   --   --  0.81  --  0.83 0.86   GRECIA  --   --   --   --   --   --  9.0 9.3    < > = values in this interval not displayed.       Recent Labs   Lab Test 11/11/21  0608 11/08/21  1853   WBC  --  10.0   RBC  --  5.72*   HGB  --  13.6   HCT  --  45.2   MCV  --  79   MCH  --  23.8*   MCHC  --  30.1*   RDW  --  17.1*    304       Recent Labs   Lab Test 11/08/20  1238   PROTTOTAL 7.1   ALBUMIN 2.8*   BILITOTAL 0.5   ALKPHOS 94   AST 26   ALT 29       TSH (mU/L)   Date Value   06/21/2019 2.31   04/11/2019 2.87       No results found for: UAMP, UBARB, BENZODIAZEUR, UCANN, UCOC, OPIT, UPCP    Iron Saturation Index   Date/Time Value Ref Range Status   11/27/2017 01:52 PM 24 15 - 46 % Final     Ferritin   Date/Time Value Ref Range Status   05/11/2017 03:45 PM 19 8 - 252 ng/mL Final       pH Arterial (pH)   Date Value   11/14/2019 7.39   05/03/2015     Charge credited   Unsatisfactory specimen - clotted       pH Venous POCT (no units)   Date Value   07/19/2021 7.34     pO2 Arterial (mm Hg)   Date Value   11/14/2019 72 (L)   05/03/2015     Charge credited   Unsatisfactory specimen - clotted       pCO2 Arterial (mm Hg)   Date Value   11/14/2019 47 (H)   05/03/2015     Charge credited   Unsatisfactory specimen - clotted       Bicarbonate Arterial (mmol/L)   Date Value   11/14/2019 28   05/03/2015     Charge credited   Unsatisfactory specimen - clotted       Base Excess Art (mmol/L)   Date Value   11/14/2019 2.8   05/03/2015     Charge credited   Unsatisfactory specimen - clotted         @LABRCNTIPR(phv:4,pco2v:4,po2v:4,hco3v:4,patience:4,o2per:4)@    Echocardiology: No results found for this or any previous visit (from the past 4320 hour(s)).    Chest x-ray: No results found for this or any previous visit from the past 365 days.      Chest CT: CT Chest Pulmonary  Embolism w Contrast 11/08/2021    Narrative  EXAM: CT CHEST PULMONARY EMBOLISM W CONTRAST  LOCATION: [facility]  DATE/TIME: 11/8/2021 10:53 PM    INDICATION: PE suspected, low/intermediate prob, positive D-dimer  COMPARISON: CTA chest exam 05/09/2020  TECHNIQUE: CT chest pulmonary angiogram during arterial phase injection of IV contrast. Multiplanar reformats and MIP reconstructions were performed. Dose reduction techniques were used.  CONTRAST: 73mL Isovue-370    FINDINGS:  ANGIOGRAM CHEST: Pulmonary arteries are normal caliber and negative for pulmonary emboli. Thoracic aorta is negative for dissection. No CT evidence of right heart strain.    LUNGS AND PLEURA: Scattered groundglass opacities both upper and lower lobes. No effusions.    MEDIASTINUM/AXILLAE: Slightly enlarged mediastinal and hilar lymph nodes. Moderate size esophageal hiatal hernia.    CORONARY ARTERY CALCIFICATION: None.    UPPER ABDOMEN: 2.5 cm stone in the gallbladder.    MUSCULOSKELETAL: Hypertrophic changes thoracic spine.    Impression  IMPRESSION:  1.  No evidence for pulmonary emboli.  2.  Groundglass opacities both lungs characteristic of Covid 19 pneumonia.  3.  Slightly enlarged mediastinal and hilar lymph nodes are likely reactive.  4.  Esophageal hiatal hernia.  5.  Gallstone.    Commonly reported imaging features of (COVID-19) pneumonia are present. Influenza pneumonia and organizing pneumonia as can be seen in the setting of drug toxicity and connective tissue disease can cause a similar imaging pattern.      PFT: Most Recent [facility] Pulmonary Function Testing    FVC-Pred   Date Value Ref Range Status   07/11/2017 2.54 L      FVC-Pre   Date Value Ref Range Status   07/11/2017 2.37 L      FVC-%Pred-Pre   Date Value Ref Range Status   07/11/2017 93 %      FEV1-Pre   Date Value Ref Range Status   07/11/2017 1.92 L      FEV1-%Pred-Pre   Date Value Ref Range Status   07/11/2017 94 %      FEV1FVC-Pred   Date Value Ref  Range Status   07/11/2017 80 %      FEV1FVC-Pre   Date Value Ref Range Status   07/11/2017 81 %      No results found for: 20029  FEFMax-Pred   Date Value Ref Range Status   07/11/2017 5.39 L/sec      FEFMax-Pre   Date Value Ref Range Status   07/11/2017 5.64 L/sec      FEFMax-%Pred-Pre   Date Value Ref Range Status   07/11/2017 104 %      ExpTime-Pre   Date Value Ref Range Status   07/11/2017 8.19 sec      FIFMax-Pre   Date Value Ref Range Status   07/11/2017 4.32 L/sec      FEV1FEV6-Pred   Date Value Ref Range Status   07/11/2017 80 %      FEV1FEV6-Pre   Date Value Ref Range Status   07/11/2017 82 %      No results found for: 20055      Bennett Ezra Goltz, PA-C, PAJamesC 5/4/2022

## 2022-05-05 NOTE — PROGRESS NOTES
Shayy is a 66 year old who is being evaluated via a billable video visit.      How would you like to obtain your AVS? MyChart  If the video visit is dropped, the invitation should be resent by: Text to cell phone: [phone]   Will anyone else be joining your video visit? No      Video Start Time: 12:39 PM  Video-Visit Details    Type of service:  Video Visit    Video End Time:1:43 PM    Originating Location (pt. Location): Home    Distant Location (provider location):  [facility]     Platform used for Video Visit: Shellcatch       Medication and allergies have been reviewed.       Anmol Aranda, VF

## 2022-05-05 NOTE — PROGRESS NOTES
Multiple Sleep Studies from [facility] arrived via fax. Scanned into Procedures. Reema Calderon, RANCHO

## 2022-05-16 NOTE — NURSING NOTE
Attempted to call patient to schedule sleep study and follow-up with provider.  There was no answer, so LM on  with clinic contact information in case patient would like to call back and schedule. DME order and supporting records was faxed to [facility] at [phone]. Reema Calderon, [facility]

## 2022-06-02 ENCOUNTER — LAB (OUTPATIENT)
Dept: LAB | Facility: CLINIC | Age: 67
End: 2022-06-02
Attending: PHYSICIAN ASSISTANT
Payer: MEDICARE

## 2022-06-02 ENCOUNTER — MYC MEDICAL ADVICE (OUTPATIENT)
Dept: INTERNAL MEDICINE | Facility: CLINIC | Age: 67
End: 2022-06-02

## 2022-06-02 DIAGNOSIS — G47.33 OSA (OBSTRUCTIVE SLEEP APNEA): ICD-10-CM

## 2022-06-02 DIAGNOSIS — E78.5 HYPERLIPIDEMIA LDL GOAL <100: ICD-10-CM

## 2022-06-02 DIAGNOSIS — R09.02 HYPOXIA: ICD-10-CM

## 2022-06-02 DIAGNOSIS — E11.9 TYPE 2 DIABETES MELLITUS WITHOUT COMPLICATION, WITHOUT LONG-TERM CURRENT USE OF INSULIN (H): ICD-10-CM

## 2022-06-02 LAB
CREAT UR-MCNC: 197 MG/DL
HBA1C MFR BLD: 6.9 % (ref 0–5.6)
MICROALBUMIN UR-MCNC: 27 MG/L
MICROALBUMIN/CREAT UR: 13.71 MG/G CR (ref 0–25)

## 2022-06-02 PROCEDURE — 83036 HEMOGLOBIN GLYCOSYLATED A1C: CPT

## 2022-06-02 PROCEDURE — 80048 BASIC METABOLIC PNL TOTAL CA: CPT

## 2022-06-02 PROCEDURE — 36415 COLL VENOUS BLD VENIPUNCTURE: CPT

## 2022-06-02 PROCEDURE — 82043 UR ALBUMIN QUANTITATIVE: CPT

## 2022-06-02 PROCEDURE — 80061 LIPID PANEL: CPT

## 2022-06-03 LAB
ANION GAP SERPL CALCULATED.3IONS-SCNC: 6 MMOL/L (ref 3–14)
BUN SERPL-MCNC: 16 MG/DL (ref 7–30)
CALCIUM SERPL-MCNC: 8.9 MG/DL (ref 8.5–10.1)
CHLORIDE BLD-SCNC: 109 MMOL/L (ref 94–109)
CHOLEST SERPL-MCNC: 210 MG/DL
CO2 SERPL-SCNC: 27 MMOL/L (ref 20–32)
CREAT SERPL-MCNC: 0.89 MG/DL (ref 0.52–1.04)
FASTING STATUS PATIENT QL REPORTED: YES
GFR SERPL CREATININE-BSD FRML MDRD: 71 ML/MIN/1.73M2
GLUCOSE BLD-MCNC: 122 MG/DL (ref 70–99)
HDLC SERPL-MCNC: 44 MG/DL
LDLC SERPL CALC-MCNC: 126 MG/DL
NONHDLC SERPL-MCNC: 166 MG/DL
POTASSIUM BLD-SCNC: 3.4 MMOL/L (ref 3.4–5.3)
SODIUM SERPL-SCNC: 142 MMOL/L (ref 133–144)
TRIGL SERPL-MCNC: 199 MG/DL

## 2022-06-08 ENCOUNTER — MYC MEDICAL ADVICE (OUTPATIENT)
Dept: INTERNAL MEDICINE | Facility: CLINIC | Age: 67
End: 2022-06-08
Payer: MEDICARE

## 2022-06-08 DIAGNOSIS — R60.0 LOCALIZED EDEMA: Primary | ICD-10-CM

## 2022-06-09 RX ORDER — FUROSEMIDE 20 MG
20 TABLET ORAL DAILY PRN
Qty: 30 TABLET | Refills: 1 | Status: SHIPPED | OUTPATIENT
Start: 2022-06-09 | End: 2022-07-07

## 2022-06-10 ENCOUNTER — OFFICE VISIT (OUTPATIENT)
Dept: DERMATOLOGY | Facility: CLINIC | Age: 67
End: 2022-06-10
Attending: INTERNAL MEDICINE
Payer: MEDICARE

## 2022-06-10 DIAGNOSIS — L30.4 INTERTRIGO: Primary | ICD-10-CM

## 2022-06-10 DIAGNOSIS — L81.4 LENTIGO: ICD-10-CM

## 2022-06-10 DIAGNOSIS — D18.01 ANGIOMA OF SKIN: ICD-10-CM

## 2022-06-10 DIAGNOSIS — L82.0 INFLAMED SEBORRHEIC KERATOSIS: ICD-10-CM

## 2022-06-10 DIAGNOSIS — D22.9 NEVUS: ICD-10-CM

## 2022-06-10 DIAGNOSIS — L82.1 SEBORRHEIC KERATOSIS: ICD-10-CM

## 2022-06-10 PROCEDURE — 99204 OFFICE O/P NEW MOD 45 MIN: CPT | Mod: 25 | Performed by: PHYSICIAN ASSISTANT

## 2022-06-10 PROCEDURE — 17110 DESTRUCTION B9 LES UP TO 14: CPT | Performed by: PHYSICIAN ASSISTANT

## 2022-06-10 RX ORDER — KETOCONAZOLE 20 MG/G
CREAM TOPICAL
Qty: 60 G | Refills: 5 | Status: SHIPPED | OUTPATIENT
Start: 2022-06-10 | End: 2022-12-14

## 2022-06-10 NOTE — LETTER
6/10/2022         RE: Nicole Reyes  [address]  [place] [address]        Dear Colleague,    Thank you for referring your patient, Nicole Reyes, to the [facility]. Please see a copy of my visit note below.    HPI:   Chief complaints: Nicole Reyes is a pleasant 66 year old female who presents for Full skin cancer screening to rule out skin cancer   Last Skin Exam: n/a      1st Baseline: yes  Personal HX of Skin Cancer: no   Personal HX of Malignant Melanoma: no   Family HX of Skin Cancer / Malignant Melanoma: Yes daughter with BCC  Personal HX of Atypical Moles:   no  Risk factors: history of sun exposure and burns  New / Changing lesions:no  Social History: has 3 grandchildren  On review of systems, there are no further skin complaints, patient is feeling otherwise well.   ROS of the following were done and are negative: Constitutional, Eyes, Ears, Nose,   Mouth, Throat, Cardiovascular, Respiratory, GI, Genitourinary, Musculoskeletal,   Psychiatric, Endocrine, Allergic/Immunologic.    PHYSICAL EXAM:   LMP 09/15/2007   Breastfeeding No   Skin exam performed as follows: Type 2 skin. Mood appropriate  Alert and Oriented X 3. Well developed, well nourished in no distress.  General appearance: Normal  Head including face: Normal  Eyes: conjunctiva and lids: Normal  Mouth: Lips, teeth, gums: Normal  Neck: Normal  Chest-breast/axillae: Normal  Back: Normal  Spleen and liver: Normal  Cardiovascular: Exam of peripheral vascular system by observation for swelling, varicosities, edema: Normal  Genitalia: groin, buttocks: Normal  Extremities: digits/nails (clubbing): Normal  Eccrine and Apocrine glands: Normal  Right upper extremity: Normal  Left upper extremity: Normal  Right lower extremity: Normal  Left lower extremity: Normal  Skin: Scalp and body hair: See below    Pt deferred exam of breasts, groin, buttocks: No    Other physical findings:  1. Multiple  pigmented macules on extremities and trunk  2. Multiple pigmented macules on face, trunk and extremities  3. Multiple vascular papules on trunk, arms and legs  4. Multiple scattered keratotic plaques  5. Erythema and maceration beneath breasts  6. Inflamed keratotic papule on the right lateral cheek x 1         Except as noted above, no other signs of skin cancer or melanoma.     ASSESSMENT/PLAN:   Benign Full skin cancer screening today. . Patient with history of none  Advised on monthly self exams and 1 year  Patient Education: Appropriate brochures given.    1. Multiple benign appearing melanocytic nevi on arms, legs and trunk. Discussed ABCDEs of melanoma and sunscreen.   2. Multiple lentigos on arms, legs and trunk. Advised benign, no treatment needed.  3. Multiple scattered angiomas. Advised benign, no treatment needed.   4. Seborrheic keratosis on arms, legs and trunk. Advised benign, no treatment needed.  5. Intertrigo beneath breasts - start ketoconazole cream BID PRN  6. Inflamed seborrheic keratosis on the right lateral cheek x 1. As physically tender cryosurgery performed. Advised on post op care.               Follow-up: FSE every 1-2 years/PRN sooner    1.) Patient was asked about new and changing moles. YES  2.) Patient received a complete physical skin examination: YES  3.) Patient was counseled to perform a monthly self skin examination: YES  Scribed By: Autumn Lopez, MS, PAJUSTIN          Again, thank you for allowing me to participate in the care of your patient.        Sincerely,        Autumn Lopez PA-C

## 2022-06-10 NOTE — PROGRESS NOTES
HPI:   Chief complaints: Nicole Reyes is a pleasant 66 year old female who presents for Full skin cancer screening to rule out skin cancer   Last Skin Exam: n/a      1st Baseline: yes  Personal HX of Skin Cancer: no   Personal HX of Malignant Melanoma: no   Family HX of Skin Cancer / Malignant Melanoma: Yes daughter with BCC  Personal HX of Atypical Moles:   no  Risk factors: history of sun exposure and burns  New / Changing lesions:no  Social History: has 3 grandchildren  On review of systems, there are no further skin complaints, patient is feeling otherwise well.   ROS of the following were done and are negative: Constitutional, Eyes, Ears, Nose,   Mouth, Throat, Cardiovascular, Respiratory, GI, Genitourinary, Musculoskeletal,   Psychiatric, Endocrine, Allergic/Immunologic.    PHYSICAL EXAM:   LMP 09/15/2007   Breastfeeding No   Skin exam performed as follows: Type 2 skin. Mood appropriate  Alert and Oriented X 3. Well developed, well nourished in no distress.  General appearance: Normal  Head including face: Normal  Eyes: conjunctiva and lids: Normal  Mouth: Lips, teeth, gums: Normal  Neck: Normal  Chest-breast/axillae: Normal  Back: Normal  Spleen and liver: Normal  Cardiovascular: Exam of peripheral vascular system by observation for swelling, varicosities, edema: Normal  Genitalia: groin, buttocks: Normal  Extremities: digits/nails (clubbing): Normal  Eccrine and Apocrine glands: Normal  Right upper extremity: Normal  Left upper extremity: Normal  Right lower extremity: Normal  Left lower extremity: Normal  Skin: Scalp and body hair: See below    Pt deferred exam of breasts, groin, buttocks: No    Other physical findings:  1. Multiple pigmented macules on extremities and trunk  2. Multiple pigmented macules on face, trunk and extremities  3. Multiple vascular papules on trunk, arms and legs  4. Multiple scattered keratotic plaques  5. Erythema and maceration beneath breasts  6. Inflamed keratotic  papule on the right lateral cheek x 1         Except as noted above, no other signs of skin cancer or melanoma.     ASSESSMENT/PLAN:   Benign Full skin cancer screening today. . Patient with history of none  Advised on monthly self exams and 1 year  Patient Education: Appropriate brochures given.    1. Multiple benign appearing melanocytic nevi on arms, legs and trunk. Discussed ABCDEs of melanoma and sunscreen.   2. Multiple lentigos on arms, legs and trunk. Advised benign, no treatment needed.  3. Multiple scattered angiomas. Advised benign, no treatment needed.   4. Seborrheic keratosis on arms, legs and trunk. Advised benign, no treatment needed.  5. Intertrigo beneath breasts - start ketoconazole cream BID PRN  6. Inflamed seborrheic keratosis on the right lateral cheek x 1. As physically tender cryosurgery performed. Advised on post op care.               Follow-up: FSE every 1-2 years/PRN sooner    1.) Patient was asked about new and changing moles. YES  2.) Patient received a complete physical skin examination: YES  3.) Patient was counseled to perform a monthly self skin examination: YES  Scribed By: Autumn Lopez, MS, PA-C

## 2022-06-20 DIAGNOSIS — I10 ESSENTIAL HYPERTENSION, BENIGN: ICD-10-CM

## 2022-06-20 DIAGNOSIS — F41.1 GENERALIZED ANXIETY DISORDER: ICD-10-CM

## 2022-06-20 DIAGNOSIS — G47.00 PERSISTENT INSOMNIA: ICD-10-CM

## 2022-06-20 DIAGNOSIS — G25.81 RESTLESS LEGS SYNDROME (RLS): ICD-10-CM

## 2022-06-20 DIAGNOSIS — F33.1 MAJOR DEPRESSIVE DISORDER, RECURRENT EPISODE, MODERATE (H): ICD-10-CM

## 2022-06-22 RX ORDER — LOSARTAN POTASSIUM 100 MG/1
TABLET ORAL
Qty: 90 TABLET | Refills: 1 | OUTPATIENT
Start: 2022-06-22

## 2022-06-22 RX ORDER — GABAPENTIN 300 MG/1
CAPSULE ORAL
Qty: 270 CAPSULE | Refills: 3 | Status: SHIPPED | OUTPATIENT
Start: 2022-06-22 | End: 2022-09-27

## 2022-06-22 RX ORDER — SERTRALINE HYDROCHLORIDE 100 MG/1
TABLET, FILM COATED ORAL
Qty: 180 TABLET | Refills: 0 | OUTPATIENT
Start: 2022-06-22

## 2022-06-22 NOTE — TELEPHONE ENCOUNTER
Sertraline and Losartan:   Refills not needed. Last ordered 9/9/21 with refills for one year. Patient should have enough refills to last until future appointment.  Appointments in Next Year    Aug 09, 2022  3:00 PM  (Arrive by 2:40 PM)  MEDICARE ANNUAL WELLNESS VISIT with Pari Wiley MD  [facility] ([facility] ) [phone]         Gabapentin:   Routing refill request to provider for review/approval because:  Drug not on the [facility] refill protocol     Camille Allen RN  [facility]

## 2022-07-02 DIAGNOSIS — R60.0 LOCALIZED EDEMA: ICD-10-CM

## 2022-07-06 NOTE — TELEPHONE ENCOUNTER
Pending Prescriptions:                       Disp   Refills    furosemide (LASIX) 20 MG tablet [Pharmacy *30 tab*1        Sig: Take 1 tablet (20 mg) by mouth daily as needed           (edema)    Routing refill request to provider for review/approval because:  BP Readings from Last 3 Encounters:   11/11/21 (!) 144/73   08/26/21 128/74   07/19/21 134/87

## 2022-07-07 ENCOUNTER — E-VISIT (OUTPATIENT)
Dept: INTERNAL MEDICINE | Facility: CLINIC | Age: 67
End: 2022-07-07
Payer: MEDICARE

## 2022-07-07 DIAGNOSIS — M62.830 BACK MUSCLE SPASM: Primary | ICD-10-CM

## 2022-07-07 PROCEDURE — 99421 OL DIG E/M SVC 5-10 MIN: CPT | Performed by: INTERNAL MEDICINE

## 2022-07-07 RX ORDER — FUROSEMIDE 20 MG
20 TABLET ORAL DAILY PRN
Qty: 30 TABLET | Refills: 1 | Status: SHIPPED | OUTPATIENT
Start: 2022-07-07 | End: 2022-08-02

## 2022-07-22 DIAGNOSIS — F41.1 GENERALIZED ANXIETY DISORDER: ICD-10-CM

## 2022-07-22 DIAGNOSIS — G47.00 INSOMNIA, UNSPECIFIED TYPE: ICD-10-CM

## 2022-07-22 DIAGNOSIS — F33.1 MAJOR DEPRESSIVE DISORDER, RECURRENT EPISODE, MODERATE (H): ICD-10-CM

## 2022-07-26 RX ORDER — CLONAZEPAM 1 MG/1
1 TABLET ORAL 2 TIMES DAILY PRN
Qty: 60 TABLET | Refills: 5 | Status: SHIPPED | OUTPATIENT
Start: 2022-07-26 | End: 2023-01-27

## 2022-07-26 RX ORDER — SERTRALINE HYDROCHLORIDE 100 MG/1
TABLET, FILM COATED ORAL
Qty: 180 TABLET | Refills: 3 | Status: SHIPPED | OUTPATIENT
Start: 2022-07-26 | End: 2024-01-25

## 2022-07-26 RX ORDER — SERTRALINE HYDROCHLORIDE 100 MG/1
TABLET, FILM COATED ORAL
Qty: 180 TABLET | Refills: 1 | OUTPATIENT
Start: 2022-07-26

## 2022-07-26 NOTE — TELEPHONE ENCOUNTER
Zoloft - Requested too soon.     Klonopin - Routing refill request to provider for review/approval because:  Drug not on the [facility] refill protocol     Jeremias WHEELER RN

## 2022-07-30 DIAGNOSIS — R60.0 LOCALIZED EDEMA: ICD-10-CM

## 2022-08-01 NOTE — TELEPHONE ENCOUNTER
Pending Prescriptions:                       Disp   Refills    furosemide (LASIX) 20 MG tablet [Pharmacy *30 tab*1        Sig: TAKE 1 TABLET (20 MG) BY MOUTH DAILY AS NEEDED           (EDEMA)    BP Readings from Last 3 Encounters:   11/11/21 (!) 144/73   08/26/21 128/74   07/19/21 134/87       Routing refill request to provider for review/approval because:  Most recent blood pressure is outside protocol parameters.    Please advise, thanks.

## 2022-08-02 RX ORDER — FUROSEMIDE 20 MG
20 TABLET ORAL DAILY PRN
Qty: 30 TABLET | Refills: 1 | Status: SHIPPED | OUTPATIENT
Start: 2022-08-02 | End: 2022-09-01

## 2022-08-07 ASSESSMENT — ENCOUNTER SYMPTOMS
DIARRHEA: 1
NAUSEA: 1
FEVER: 0
PALPITATIONS: 0
SORE THROAT: 0
HEMATURIA: 0
ARTHRALGIAS: 1
DIZZINESS: 0
HEADACHES: 0
SHORTNESS OF BREATH: 1
HEARTBURN: 1
PARESTHESIAS: 0
FREQUENCY: 0
BREAST MASS: 0
CONSTIPATION: 0
MYALGIAS: 1
ABDOMINAL PAIN: 0
DYSURIA: 0
HEMATOCHEZIA: 0
EYE PAIN: 0
NERVOUS/ANXIOUS: 1
CHILLS: 0
WEAKNESS: 1
COUGH: 0
JOINT SWELLING: 1

## 2022-08-07 ASSESSMENT — ACTIVITIES OF DAILY LIVING (ADL): CURRENT_FUNCTION: LAUNDRY REQUIRES ASSISTANCE

## 2022-08-09 ENCOUNTER — OFFICE VISIT (OUTPATIENT)
Dept: INTERNAL MEDICINE | Facility: CLINIC | Age: 67
End: 2022-08-09
Payer: MEDICARE

## 2022-08-09 VITALS
DIASTOLIC BLOOD PRESSURE: 86 MMHG | TEMPERATURE: 97.4 F | BODY MASS INDEX: 50.72 KG/M2 | HEIGHT: 59 IN | OXYGEN SATURATION: 90 % | HEART RATE: 101 BPM | SYSTOLIC BLOOD PRESSURE: 147 MMHG | WEIGHT: 251.6 LBS | RESPIRATION RATE: 14 BRPM

## 2022-08-09 DIAGNOSIS — F33.0 MILD EPISODE OF RECURRENT MAJOR DEPRESSIVE DISORDER (H): ICD-10-CM

## 2022-08-09 DIAGNOSIS — K21.9 GASTROESOPHAGEAL REFLUX DISEASE WITHOUT ESOPHAGITIS: ICD-10-CM

## 2022-08-09 DIAGNOSIS — F41.1 GENERALIZED ANXIETY DISORDER: ICD-10-CM

## 2022-08-09 DIAGNOSIS — E78.5 HYPERLIPIDEMIA LDL GOAL <100: ICD-10-CM

## 2022-08-09 DIAGNOSIS — J69.0 RECURRENT ASPIRATION PNEUMONIA (H): ICD-10-CM

## 2022-08-09 DIAGNOSIS — I10 BENIGN ESSENTIAL HYPERTENSION: ICD-10-CM

## 2022-08-09 DIAGNOSIS — G47.00 INSOMNIA, UNSPECIFIED TYPE: ICD-10-CM

## 2022-08-09 DIAGNOSIS — E66.01 MORBID OBESITY (H): ICD-10-CM

## 2022-08-09 DIAGNOSIS — Z00.00 ENCOUNTER FOR ANNUAL WELLNESS EXAM IN MEDICARE PATIENT: Primary | ICD-10-CM

## 2022-08-09 DIAGNOSIS — E11.9 TYPE 2 DIABETES MELLITUS WITHOUT COMPLICATION, WITHOUT LONG-TERM CURRENT USE OF INSULIN (H): ICD-10-CM

## 2022-08-09 PROCEDURE — 99207 PR FOOT EXAM NO CHARGE: CPT | Mod: 25 | Performed by: INTERNAL MEDICINE

## 2022-08-09 PROCEDURE — G0438 PPPS, INITIAL VISIT: HCPCS | Performed by: INTERNAL MEDICINE

## 2022-08-09 PROCEDURE — 99214 OFFICE O/P EST MOD 30 MIN: CPT | Mod: 25 | Performed by: INTERNAL MEDICINE

## 2022-08-09 RX ORDER — ZOLPIDEM TARTRATE 10 MG/1
10 TABLET ORAL AT BEDTIME
Qty: 90 TABLET | Refills: 1 | Status: SHIPPED | OUTPATIENT
Start: 2022-08-09 | End: 2023-01-27

## 2022-08-09 ASSESSMENT — ENCOUNTER SYMPTOMS
DYSURIA: 0
CHILLS: 0
WEAKNESS: 1
DIZZINESS: 0
CONSTIPATION: 0
FREQUENCY: 0
HEMATOCHEZIA: 0
SORE THROAT: 0
DIARRHEA: 1
BREAST MASS: 0
COUGH: 0
MYALGIAS: 1
NAUSEA: 1
HEARTBURN: 1
HEMATURIA: 0
NERVOUS/ANXIOUS: 1
HEADACHES: 0
FEVER: 0
EYE PAIN: 0
PALPITATIONS: 0
JOINT SWELLING: 1
SHORTNESS OF BREATH: 1
ARTHRALGIAS: 1
ABDOMINAL PAIN: 0
PARESTHESIAS: 0

## 2022-08-09 ASSESSMENT — PATIENT HEALTH QUESTIONNAIRE - PHQ9
SUM OF ALL RESPONSES TO PHQ QUESTIONS 1-9: 7
SUM OF ALL RESPONSES TO PHQ QUESTIONS 1-9: 7
10. IF YOU CHECKED OFF ANY PROBLEMS, HOW DIFFICULT HAVE THESE PROBLEMS MADE IT FOR YOU TO DO YOUR WORK, TAKE CARE OF THINGS AT HOME, OR GET ALONG WITH OTHER PEOPLE: SOMEWHAT DIFFICULT

## 2022-08-09 ASSESSMENT — ACTIVITIES OF DAILY LIVING (ADL): CURRENT_FUNCTION: LAUNDRY REQUIRES ASSISTANCE

## 2022-08-09 NOTE — PROGRESS NOTES
"SUBJECTIVE:   Nicole Reyes is a 66 year old female who presents for Preventive Visit.      Patient has been advised of split billing requirements and indicates understanding: Yes  Are you in the first 12 months of your Medicare coverage?  No    Healthy Habits:     In general, how would you rate your overall health?  Fair    Frequency of exercise:  1 day/week    Duration of exercise:  Less than 15 minutes    Do you usually eat at least 4 servings of fruit and vegetables a day, include whole grains    & fiber and avoid regularly eating high fat or \"junk\" foods?  No    Taking medications regularly:  Yes    Ability to successfully perform activities of daily living:  Laundry requires assistance    Home Safety:  No safety concerns identified    Hearing Impairment:  Difficulty following a conversation in a noisy restaurant or crowded room    In the past 6 months, have you been bothered by leaking of urine?  No    In general, how would you rate your overall mental or emotional health?  Good      PHQ-2 Total Score: 1    Additional concerns today:  Yes    Problems:  1.  Type 2 diabetes: Labs were done in June, sugars still well controlled.  She has had diarrhea from the metformin, has some questions regarding Jardiance.  2.  Major depression and anxiety: Mood has been up and down, clonazepam is helping with her anxiety.  Her  needs a kidney transplant and is working with [facility] so that is a big stress.  Her sleep is still up-and-down, does not feel she wants to make any other changes in medication at this time though.  3.  Hypertension: She has not been checking blood pressures regularly.  4.  Recurrent aspiration pneumonias: She is going to be scheduling with the surgeon, was referred to Dr. Giorgio Steward to do the Nissen procedure.  5.  Hyperlipidemia: LDL was elevated on the last check  6.  Back pain: Been told she has severe spinal stenosis, cannot stand or walk very long.  Continues to see orthopedics for " this, her knees, shoulders.  She is also been having some left hand symptoms.      Patient Active Problem List   Diagnosis     Benign essential hypertension     Restless leg syndrome     CRISTÓBAL (obstructive sleep apnea)     Advanced directives, counseling/discussion     Generalized anxiety disorder     Mild episode of recurrent major depressive disorder (H)     Health Care Home     GERD (gastroesophageal reflux disease)     Hyperlipidemia LDL goal <100     Eczema     Type 2 diabetes mellitus without complication, without long-term current use of insulin (H)     Midline low back pain with left-sided sciatica     Morbid obesity (HCC)     Insomnia, unspecified type     Controlled substance agreement signed     Pain of both shoulder joints     Recurrent aspiration pneumonia (H)     Hypoxia     Current Outpatient Medications   Medication Sig Dispense Refill     aspirin 81 MG EC tablet Take 81 mg by mouth daily       benzonatate (TESSALON) 200 MG capsule Take 1 capsule (200 mg) by mouth 3 times daily as needed for cough 30 capsule 3     blood glucose (ACCU-CHEK BYRON PLUS) test strip USE TO TEST BLOOD SUGAR ONE TIME A  strip 2     clonazePAM (KLONOPIN) 1 MG tablet TAKE 1 TABLET (1 MG) BY MOUTH 2 TIMES DAILY AS NEEDED FOR ANXIETY 60 TO LAST 60 DAYS 60 tablet 5     furosemide (LASIX) 20 MG tablet TAKE 1 TABLET (20 MG) BY MOUTH DAILY AS NEEDED (EDEMA) 30 tablet 1     gabapentin (NEURONTIN) 300 MG capsule TAKE 3 CAPSULES BY MOUTH ONCE DAILY 270 capsule 3     ketoconazole (NIZORAL) 2 % external cream Apply to fold areas BID PRN 60 g 5     losartan (COZAAR) 100 MG tablet Take 1 tablet (100 mg) by mouth daily 90 tablet 3     metFORMIN (GLUCOPHAGE-XR) 500 MG 24 hr tablet Take 2 tablets (1,000 mg) by mouth daily (with breakfast) 180 tablet 3     mirtazapine (REMERON) 15 MG tablet Take 1 tablet (15 mg) by mouth At Bedtime 90 tablet 3     multivitamin w/minerals (THERA-VIT-M) tablet Take 1 tablet by mouth daily       Omega-3  Fatty Acids (FISH OIL PO) Take 2 capsules by mouth daily        pantoprazole (PROTONIX) 40 MG EC tablet TAKE 1 TABLET BY MOUTH AT BEDTIME 90 tablet 3     sertraline (ZOLOFT) 100 MG tablet Take 2 tablets by mouth once daily 180 tablet 3     simvastatin (ZOCOR) 20 MG tablet Take 1 tablet (20 mg) by mouth At Bedtime 90 tablet 3     tiZANidine (ZANAFLEX) 4 MG tablet Take 1-2 tablets (4-8 mg) by mouth 3 times daily as needed for muscle spasms 40 tablet 1     zolpidem (AMBIEN) 10 MG tablet Take 1 tablet (10 mg) by mouth At Bedtime 90 tablet 1      Do you feel safe in your environment? Yes    Have you ever done Advance Care Planning? (For example, a Health Directive, POLST, or a discussion with a medical provider or your loved ones about your wishes): No, advance care planning information given to patient to review.  Patient plans to discuss their wishes with loved ones or provider.         Fall risk  Fallen 2 or more times in the past year?: No  Any fall with injury in the past year?: No  click delete button to remove this line now  Cognitive Screening   1) Repeat 3 items (Leader, Season, Table)    2) Clock draw: NORMAL  3) 3 item recall: Recalls 3 objects  Results: 3 items recalled: COGNITIVE IMPAIRMENT LESS LIKELY    Mini-CogTM Copyright DECLAN Acevedo. Licensed by the author for use in [facility]; reprinted with permission (domenica@.[facility]). All rights reserved.      Do you have sleep apnea, excessive snoring or daytime drowsiness?: yes    Reviewed and updated as needed this visit by clinical staff                    Reviewed and updated as needed this visit by Provider                   Social History     Tobacco Use     Smoking status: Former Smoker     Packs/day: 1.00     Years: 5.00     Pack years: 5.00     Quit date: 3/18/1979     Years since quittin.4     Smokeless tobacco: Never Used     Tobacco comment: quit    Substance Use Topics     Alcohol use: Yes     Comment: 0-1 time a month, 1 drink     If  you drink alcohol do you typically have >3 drinks per day or >7 drinks per week? No    Alcohol Use 8/7/2022   Prescreen: >3 drinks/day or >7 drinks/week? No   Prescreen: >3 drinks/day or >7 drinks/week? -   No flowsheet data found.            Current providers sharing in care for this patient include:   Patient Care Team:  Pari Wiley MD as PCP - General  Pari Wiley MD as Assigned PCP  Autumn Lopez PA-C as Physician Assistant (Dermatology)  Pari Wiley MD as Referring Physician (Internal Medicine)  Goltz, Bennett Ezra, PA-C as Assigned Neuroscience Provider  Autumn Lopez PA-C as Assigned Surgical Provider    The following health maintenance items are reviewed in Epic and correct as of today:  Health Maintenance Due   Topic Date Due     EYE EXAM  10/15/2020     ZOSTER IMMUNIZATION (2 of 2) 12/18/2020     COVID-19 Vaccine (2 - Booster for Maite series) 05/03/2021     MEDICARE ANNUAL WELLNESS VISIT  10/23/2021     DIABETIC FOOT EXAM  10/27/2021     ANNUAL REVIEW OF HM ORDERS  07/03/2022           FHS-7:   Breast CA Risk Assessment (FHS-7) 3/22/2022 8/7/2022   Did any of your first-degree relatives have breast or ovarian cancer? No No   Did any of your relatives have bilateral breast cancer? No No   Did any man in your family have breast cancer? No No   Did any woman in your family have breast and ovarian cancer? No No   Did any woman in your family have breast cancer before age 50 y? Unknown No   Do you have 2 or more relatives with breast and/or ovarian cancer? Yes Yes   Do you have 2 or more relatives with breast and/or bowel cancer? Yes Yes     click delete button to remove this line now  Mammogram Screening: Recommended mammography every 1-2 years with patient discussion and risk factor consideration  Pertinent mammograms are reviewed under the imaging tab.    Review of Systems   Constitutional: Negative for chills and fever.   HENT: Positive for hearing loss. Negative for congestion, ear pain  "and sore throat.    Eyes: Positive for visual disturbance. Negative for pain.   Respiratory: Positive for shortness of breath. Negative for cough.    Cardiovascular: Positive for peripheral edema. Negative for chest pain and palpitations.   Gastrointestinal: Positive for diarrhea, heartburn and nausea. Negative for abdominal pain, constipation and hematochezia.   Breasts:  Negative for breast mass and discharge.   Genitourinary: Negative for dysuria, frequency, genital sores, hematuria, pelvic pain, urgency and vaginal bleeding.   Musculoskeletal: Positive for arthralgias, joint swelling and myalgias.   Skin: Negative for rash.   Neurological: Positive for weakness. Negative for dizziness, headaches and paresthesias.   Psychiatric/Behavioral: Positive for mood changes. The patient is nervous/anxious.      Shortness of breath is overall stable  Edema stable    OBJECTIVE:   BP (!) 147/86 (BP Location: Right arm, Patient Position: Sitting, Cuff Size: Adult Large)   Pulse 101   Temp 97.4  F (36.3  C) (Oral)   Resp 14   Ht 1.499 m (4' 11\")   Wt 114.1 kg (251 lb 9.6 oz)   LMP 09/15/2007   SpO2 90%   BMI 50.82 kg/m   Estimated body mass index is 50.82 kg/m  as calculated from the following:    Height as of this encounter: 1.499 m (4' 11\").    Weight as of this encounter: 114.1 kg (251 lb 9.6 oz).  Physical Exam    HEENT: extraocular movements are intact, pupils equal and reactive to light and accommodation, TMs clear  NECK: Neck supple. No adenopathy. Thyroid symmetric, normal size,, Carotids without bruits.  PULMONARY: clear to auscultation  CARDIAC: regular rate and rhythm and no murmurs, clicks, or gallops  PULSES: 2/2 throughout  BACK: no spinal or CVAT  ABDOMINAL: Soft, nontender.  Normal bowel sounds.  No hepatosplenomegaly or abnormal masses  Feet: Normal pulses, nails normal, light touch normal     Lab Results   Component Value Date    A1C 6.9 06/02/2022    A1C 6.8 11/08/2021    A1C 6.6 05/28/2021    A1C " 6.6 10/23/2020    A1C 6.9 05/12/2020    A1C 6.5 01/16/2020    A1C 6.7 06/21/2019        Lab Results   Component Value Date    HDL 44 06/02/2022    HDL 41 05/28/2021     06/02/2022     05/28/2021    CHOL 210 06/02/2022    CHOL 191 05/28/2021    TRIG 199 06/02/2022    TRIG 119 05/28/2021        PHQ 10/27/2020 7/2/2021 8/9/2022   PHQ-9 Total Score 4 3 7   Q9: Thoughts of better off dead/self-harm past 2 weeks Not at all Not at all Not at all     RADHA-7 SCORE 12/1/2017 4/7/2019 10/27/2020   Total Score - - -   Total Score 7 6 7           ASSESSMENT / PLAN:   (Z00.00) Encounter for annual wellness exam in Medicare patient  (primary encounter diagnosis)  Comment: Declines COVID booster, is aware of shingles vaccine  Plan:     (E11.9) Type 2 diabetes mellitus without complication, without long-term current use of insulin (H)  Comment: Well-controlled, can look into possibility of coverage for Jardiance if the diarrhea becomes too bothersome  Plan: FOOT EXAM        Recommend schedule eye exam    (F33.0) Mild episode of recurrent major depressive disorder (H)  Comment: Some mild increase in symptoms but stress related.  No need to change medications  Plan: mirtazapine (REMERON) 15 MG tablet            (J69.0) Recurrent aspiration pneumonia (H)  Comment: Follow through with the surgeon  Plan:     (E66.01) Morbid obesity (H)  Comment: She has been advised that morbid obesity is associated with health risks and losing weight could help improve her health including improving health problems including diabetes, hypertension, hyperlipidemia, recurrent aspiration pneumonia.    Plan:     (I10) Benign essential hypertension  Comment: Elevated levels, recommend she do some outside checks her nurse blood pressure check, may need to adjust medication  Plan: losartan (COZAAR) 100 MG tablet            (E78.5) Hyperlipidemia LDL goal <100  Comment: Stable, does not tolerated higher doses of statins, will continue on current  "dose for now  Plan: simvastatin (ZOCOR) 20 MG tablet            (F41.1) Generalized anxiety disorder  Comment: Some mild increase symptoms, continue medication  Plan: mirtazapine (REMERON) 15 MG tablet            (K21.9) Gastroesophageal reflux disease without esophagitis  Comment: Continue medication, follow-up with the surgeon  Plan: pantoprazole (PROTONIX) 40 MG EC tablet            (G47.00) Insomnia, unspecified type  Comment:   Plan: mirtazapine (REMERON) 15 MG tablet, zolpidem         (AMBIEN) 10 MG tablet              Patient has been advised of split billing requirements and indicates understanding: Yes    COUNSELING:  Reviewed preventive health counseling, as reflected in patient instructions    Estimated body mass index is 52.67 kg/m  as calculated from the following:    Height as of 5/5/22: 1.473 m (4' 10\").    Weight as of 5/5/22: 114.3 kg (252 lb).    Weight management plan: Discussed healthy diet and exercise guidelines    She reports that she quit smoking about 43 years ago. She has a 5.00 pack-year smoking history. She has never used smokeless tobacco.      Appropriate preventive services were discussed with this patient, including applicable screening as appropriate for cardiovascular disease, diabetes, osteopenia/osteoporosis, and glaucoma.  As appropriate for age/gender, discussed screening for colorectal cancer, prostate cancer, breast cancer, and cervical cancer. Checklist reviewing preventive services available has been given to the patient.    Reviewed patients plan of care and provided an AVS. The Basic Care Plan (routine screening as documented in Health Maintenance) for Nicole meets the Care Plan requirement. This Care Plan has been established and reviewed with the Patient.    Counseling Resources:  ATP IV Guidelines  Pooled Cohorts Equation Calculator  Breast Cancer Risk Calculator  Breast Cancer: Medication to Reduce Risk  FRAX Risk Assessment  ICSI Preventive Guidelines  Dietary " Guidelines for Americans, 2010  USDA's MyPlate  ASA Prophylaxis  Lung CA Screening    Pari Wiley MD  [facility]    Identified Health Risks:  Answers for HPI/ROS submitted by the patient on 8/9/2022  If you checked off any problems, how difficult have these problems made it for you to do your work, take care of things at home, or get along with other people?: Somewhat difficult  PHQ9 TOTAL SCORE: 7

## 2022-08-09 NOTE — NURSING NOTE
"  BP (!) 147/86 (BP Location: Right arm, Patient Position: Sitting, Cuff Size: Adult Large)   Pulse 101   Temp 97.4  F (36.3  C) (Oral)   Resp 14   Ht 1.499 m (4' 11\")   Wt 114.1 kg (251 lb 9.6 oz)   LMP 09/15/2007   SpO2 90%   BMI 50.82 kg/m      "

## 2022-08-11 RX ORDER — MIRTAZAPINE 15 MG/1
15 TABLET, FILM COATED ORAL AT BEDTIME
Qty: 90 TABLET | Refills: 3 | Status: SHIPPED | OUTPATIENT
Start: 2022-08-11 | End: 2022-10-03

## 2022-08-11 RX ORDER — PANTOPRAZOLE SODIUM 40 MG/1
TABLET, DELAYED RELEASE ORAL
Qty: 90 TABLET | Refills: 3 | Status: SHIPPED | OUTPATIENT
Start: 2022-08-11 | End: 2023-07-05

## 2022-08-11 RX ORDER — SIMVASTATIN 20 MG
20 TABLET ORAL AT BEDTIME
Qty: 90 TABLET | Refills: 3 | Status: SHIPPED | OUTPATIENT
Start: 2022-08-11 | End: 2024-01-25

## 2022-08-11 RX ORDER — LOSARTAN POTASSIUM 100 MG/1
100 TABLET ORAL DAILY
Qty: 90 TABLET | Refills: 3 | Status: SHIPPED | OUTPATIENT
Start: 2022-08-11 | End: 2024-01-25

## 2022-08-18 DIAGNOSIS — E11.9 TYPE 2 DIABETES MELLITUS WITHOUT COMPLICATION, WITHOUT LONG-TERM CURRENT USE OF INSULIN (H): ICD-10-CM

## 2022-08-21 RX ORDER — METFORMIN HCL 500 MG
TABLET, EXTENDED RELEASE 24 HR ORAL
Qty: 180 TABLET | Refills: 0 | Status: SHIPPED | OUTPATIENT
Start: 2022-08-21 | End: 2022-09-13 | Stop reason: SINTOL

## 2022-08-21 NOTE — TELEPHONE ENCOUNTER
Pending Prescriptions:                       Disp   Refills    metFORMIN (GLUCOPHAGE XR) 500 MG 24 hr ta*180 ta*0            Sig: TAKE 2 TABLETS BY MOUTH ONCE DAILY WITH BREAKFAST    Prescription approved per [facility] Refill Protocol.

## 2022-08-26 ENCOUNTER — MYC MEDICAL ADVICE (OUTPATIENT)
Dept: INTERNAL MEDICINE | Facility: CLINIC | Age: 67
End: 2022-08-26

## 2022-08-26 DIAGNOSIS — E11.9 TYPE 2 DIABETES MELLITUS WITHOUT COMPLICATION, WITHOUT LONG-TERM CURRENT USE OF INSULIN (H): Primary | ICD-10-CM

## 2022-08-29 NOTE — TELEPHONE ENCOUNTER
Please see patient's mychart message below.  Stopped Metformin for 2 wks ago due to loose stools.    Reports 10 days ago, started medication again at 1 tab daily.  Within the first few days started having loose stools again.      Switch medication?    Please advise, thanks.  Routed to covering provider(s).

## 2022-08-29 NOTE — TELEPHONE ENCOUNTER
Per Dr Wiley OV, can switch to Jardiance, not sure of insurance coverage.eRx sent f/u PCP in 1 month  Chelsea Cuello CNP

## 2022-08-31 DIAGNOSIS — R60.0 LOCALIZED EDEMA: ICD-10-CM

## 2022-08-31 NOTE — TELEPHONE ENCOUNTER
See my chart message.    Mandy Perez [facility] [place]  [phone]

## 2022-08-31 NOTE — TELEPHONE ENCOUNTER
Message sent via [employer].      Mandy Perez CMA  [facility]  [place]

## 2022-08-31 NOTE — TELEPHONE ENCOUNTER
Please have pt find out which diabetes meds are on formulary and call back.  Have ortho assess back pain and needs  Chelsea Cuello CNP

## 2022-09-01 RX ORDER — FUROSEMIDE 20 MG
20 TABLET ORAL DAILY PRN
Qty: 30 TABLET | Refills: 1 | Status: SHIPPED | OUTPATIENT
Start: 2022-09-01 | End: 2022-09-16

## 2022-09-08 ENCOUNTER — TRANSFERRED RECORDS (OUTPATIENT)
Dept: HEALTH INFORMATION MANAGEMENT | Facility: CLINIC | Age: 67
End: 2022-09-08

## 2022-09-12 ENCOUNTER — MYC MEDICAL ADVICE (OUTPATIENT)
Dept: INTERNAL MEDICINE | Facility: CLINIC | Age: 67
End: 2022-09-12

## 2022-09-15 DIAGNOSIS — R60.0 LOCALIZED EDEMA: ICD-10-CM

## 2022-09-16 ENCOUNTER — TRANSFERRED RECORDS (OUTPATIENT)
Dept: HEALTH INFORMATION MANAGEMENT | Facility: CLINIC | Age: 67
End: 2022-09-16

## 2022-09-16 RX ORDER — FUROSEMIDE 20 MG
20 TABLET ORAL DAILY PRN
Qty: 90 TABLET | Refills: 1 | Status: SHIPPED | OUTPATIENT
Start: 2022-09-16 | End: 2023-01-27

## 2022-09-16 NOTE — TELEPHONE ENCOUNTER
Routing refill request to provider for review/approval because:  Pharmacy requesting 90 day supply - please deny or approve if appropriate    Sara COOK RN

## 2022-09-26 ENCOUNTER — MYC MEDICAL ADVICE (OUTPATIENT)
Dept: INTERNAL MEDICINE | Facility: CLINIC | Age: 67
End: 2022-09-26

## 2022-09-26 DIAGNOSIS — G25.81 RESTLESS LEGS SYNDROME (RLS): ICD-10-CM

## 2022-09-26 DIAGNOSIS — G47.00 PERSISTENT INSOMNIA: ICD-10-CM

## 2022-09-27 DIAGNOSIS — E11.22 TYPE 2 DIABETES MELLITUS WITH STAGE 3 CHRONIC KIDNEY DISEASE, WITHOUT LONG-TERM CURRENT USE OF INSULIN (H): ICD-10-CM

## 2022-09-27 DIAGNOSIS — N18.30 TYPE 2 DIABETES MELLITUS WITH STAGE 3 CHRONIC KIDNEY DISEASE, WITHOUT LONG-TERM CURRENT USE OF INSULIN (H): ICD-10-CM

## 2022-09-27 RX ORDER — GABAPENTIN 300 MG/1
CAPSULE ORAL
Qty: 630 CAPSULE | Refills: 3 | Status: SHIPPED | OUTPATIENT
Start: 2022-09-27 | End: 2023-06-22

## 2022-09-28 RX ORDER — BLOOD SUGAR DIAGNOSTIC
STRIP MISCELLANEOUS
Qty: 100 STRIP | Refills: 3 | Status: SHIPPED | OUTPATIENT
Start: 2022-09-28 | End: 2024-01-25

## 2022-10-03 ENCOUNTER — VIRTUAL VISIT (OUTPATIENT)
Dept: PHARMACY | Facility: CLINIC | Age: 67
End: 2022-10-03
Payer: COMMERCIAL

## 2022-10-03 DIAGNOSIS — F41.1 GENERALIZED ANXIETY DISORDER: ICD-10-CM

## 2022-10-03 DIAGNOSIS — M54.42 MIDLINE LOW BACK PAIN WITH LEFT-SIDED SCIATICA, UNSPECIFIED CHRONICITY: ICD-10-CM

## 2022-10-03 DIAGNOSIS — E78.5 HYPERLIPIDEMIA LDL GOAL <100: ICD-10-CM

## 2022-10-03 DIAGNOSIS — Z71.85 VACCINE COUNSELING: ICD-10-CM

## 2022-10-03 DIAGNOSIS — K21.9 GASTROESOPHAGEAL REFLUX DISEASE WITHOUT ESOPHAGITIS: ICD-10-CM

## 2022-10-03 DIAGNOSIS — G47.00 INSOMNIA, UNSPECIFIED TYPE: ICD-10-CM

## 2022-10-03 DIAGNOSIS — I10 BENIGN ESSENTIAL HYPERTENSION: ICD-10-CM

## 2022-10-03 DIAGNOSIS — E11.9 TYPE 2 DIABETES MELLITUS WITHOUT COMPLICATION, WITHOUT LONG-TERM CURRENT USE OF INSULIN (H): Primary | ICD-10-CM

## 2022-10-03 PROCEDURE — 99605 MTMS BY PHARM NP 15 MIN: CPT | Performed by: PHARMACIST

## 2022-10-03 PROCEDURE — 99607 MTMS BY PHARM ADDL 15 MIN: CPT | Performed by: PHARMACIST

## 2022-10-03 RX ORDER — METFORMIN HCL 500 MG
500 TABLET, EXTENDED RELEASE 24 HR ORAL
COMMUNITY
End: 2023-03-16 | Stop reason: DRUGHIGH

## 2022-10-03 RX ORDER — DAPAGLIFLOZIN 5 MG/1
5 TABLET, FILM COATED ORAL DAILY
Qty: 30 TABLET | Refills: 1 | Status: SHIPPED | OUTPATIENT
Start: 2022-10-03 | End: 2022-10-10

## 2022-10-03 NOTE — PATIENT INSTRUCTIONS
"Recommendations from today's MTM visit:                                                    MTM (medication therapy management) is a service provided by a clinical pharmacist designed to help you get the most of out of your medicines.   Today we reviewed what your medicines are for, how to know if they are working, that your medicines are safe and how to make your medicine regimen as easy as possible.      1.  I will connect with our pharmacy to see if other medications like Jardiance would be less expensive and let you know what I find.  2. Check blood pressure at home daily and follow-up with readings next week    Follow-up: Return in about 1 week (around 10/10/2022) for Medication Therapy Management.    It was great speaking with you today.  I value your experience and would be very thankful for your time in providing feedback in our clinic survey. In the next few days, you may receive an email or text message from [employer] with a link to a survey related to your  clinical pharmacist.\"     To schedule another MTM appointment, please call the clinic directly or you may call the MTM scheduling line at [phone] or toll-free at [phone].     My Clinical Pharmacist's contact information:                                                      Please feel free to contact me with any questions or concerns you have.      Elizabeth Olivo , Pharm D  [phone] (phone)  Medication Therapy Management Pharmacist       "

## 2022-10-03 NOTE — PROGRESS NOTES
Medication Therapy Management (MTM) Encounter    ASSESSMENT:                            Medication Adherence/Access: No issues identified    Type 2 Diabetes:  A1c at goal.  Still on metformin one tablet daily but would prefer to change to SGLT2 or GLP1 if affordable due to metformin side effects and the additional benefits of the newer agents.       Hyperlipidemia: LDL remains above goal <100.  Could consider changing to high-intensity statin if lipids remain above goal.    Hypertension: blood pressure elevated during the visit today, previous home readings well within goal.  Will continue to check at home and report to the clinic.      Anxiety: patient requiring daily use of benzo which is concerning for her age.  Will discuss in more detail at follow-up.  May benefit from SNRI that would also help with back pain.    Insomnia: continue to minimize use of zolpidem as able    Back Pain: stable    GERD: stable    Vaccine: Not interested in the flu or COVID booster.      PLAN:                            1.  Prescriptions for Invokana and Farxiga sent to  Pharmacy for price check.  If not affordable will send to Diabetes Liaison team to see if she qualifies for assistance.    2. Check blood pressure at home daily and follow-up with readings next week    Future consideration  1.  High intensity statin  2.  Reduce use of benzo    Follow-up: Return in about 1 week (around 10/10/2022) for Medication Therapy Management.    SUBJECTIVE/OBJECTIVE:                          Nicole Reyes is a 66 year old female called for an initial visit. She was referred to me from Dr. Wiley.      Reason for visit: cost of diabetes medications    Allergies/ADRs: Reviewed in chart  Tobacco: She reports that she quit smoking about 43 years ago. She has a 5.00 pack-year smoking history. She has never used smokeless tobacco.  Alcohol: Less than 1 beverages / week      Medication Adherence/Access: [facility] medication insurance - cost of  Jardiance       Type 2 Diabetes:  Currently taking metformin  mg once daily.  Higher doses cause GI upset.  Jardiance was recommend but cost was too high.  Jardiance - cost is $400 per month (insurance paid $200)  Past medications - metformin (GI upset)    Blood sugar monitoring:  Post-Prandial- 140-160.  Had cortisone shot about 2 weeks ago and her readings went larry-high as expected.  Her numbers were better on the higher dose of the metformin.  Symptoms of low blood sugar? none  Symptoms of high blood sugar? none  Eye exam: due  Foot exam: up to date  Aspirin: none  Statin: Yes:    ACEi/ARB: Yes:   Urine Albumin:   Lab Results   Component Value Date    UMALCR 13.71 06/02/2022      Lab Results   Component Value Date    A1C 6.9 06/02/2022    A1C 6.8 11/08/2021    A1C 6.6 05/28/2021    A1C 6.6 10/23/2020    A1C 6.9 05/12/2020    A1C 6.5 01/16/2020    A1C 6.7 06/21/2019          Hyperlipidemia: Current therapy includes simvastatin 20 mg daily.  Patient reports no significant myalgias or other side effects.    Recent Labs   Lab Test 06/02/22  1525 05/28/21  1502 03/22/16  1418 03/26/15  1439   CHOL 210* 191   < > 130   HDL 44* 41*   < > 44*   * 126*   < > 64   TRIG 199* 119   < > 109   CHOLHDLRATIO  --   --   --  3.0    < > = values in this interval not displayed.       Hypertension: Current medications include losartan 100 mg daily, furosemide 20 mg as needed (edema).  Patient does self-monitor blood pressure. Home -130/70; while on the phone her blood pressure was 176/90 - she is in a lot of pain today.  Patient reports no current medication side effects.  BP Readings from Last 3 Encounters:   08/09/22 (!) 147/86   11/11/21 (!) 144/73   08/26/21 128/74     Anxiety: Taking clonazepam as needed (generally needed once daily), mirtazapine 15 mg at bedtime and sertraline 200 mg daily.    Currently not taking mirtazapine right now since she is taking the gabapentin and muscle relaxer at bedtime; may  restart it if she is able to back off on the muscle relaxers.      RADHA-7 SCORE 12/1/2017 4/7/2019 10/27/2020   Total Score - - -   Total Score 7 6 7       Insomnia: Current medications include: zolpidem. Patient reports no issues at this time.     Back Pain: Taking tizanidine at bedtime, gabapentin 600 mg every morning, 600 mg every afternoon and 900 mg at night.  Recently increased dose of gabapentin which has been helpful.    Walking limited to 10 minutes with walker.   Being assessed for spinal stimulator.          GERD: Current medications include: Protonix (pantoprazole) 40 mg once daily. Patient reports no current symptoms.  Patient feels that current regimen is effective.      Today's Vitals: LMP 09/15/2007   ----------------      I spent 40 minutes with this patient today. All changes were made via collaborative practice agreement with Pari Wiley MD. A copy of the visit note was provided to the patient's provider(s).    The patient was sent via [employer] a summary of these recommendations.     Elizabeth Olivo , Pharm D  [phone] (phone)  Medication Therapy Management Pharmacist     Telemedicine Visit Details  Type of service:  Telephone visit  Start Time: 235pm  End Time: 315pm  Originating Location (patient location): Home  Distant Location (provider location):  [facility]     Medication Therapy Recommendations  Type 2 diabetes mellitus without complication, without long-term current use of insulin (H)    Rationale: Cannot afford medication product - Cost - Adherence   Recommendation: Referral to Service    Status: Patient Agreed - Adherence/Education         Vaccine counseling    Rationale: Preventive therapy - Needs additional medication therapy - Indication   Recommendation: Start Medication - INFLUENZA VAC HIGH-DOSE QUAD   Status: Declined per Patient

## 2022-10-10 ENCOUNTER — TELEPHONE (OUTPATIENT)
Dept: INTERNAL MEDICINE | Facility: CLINIC | Age: 67
End: 2022-10-10

## 2022-10-10 DIAGNOSIS — E11.9 TYPE 2 DIABETES MELLITUS WITHOUT COMPLICATION, WITHOUT LONG-TERM CURRENT USE OF INSULIN (H): ICD-10-CM

## 2022-10-10 NOTE — PROGRESS NOTES
Per retail pharmacy, her copays for Farxiga 30 days is $431.12 and invokana 30 days of medication is $312.74.    Will connect with the Diabetes Liaison team to see if she qualifies for assistance with the cost of Jardiance.

## 2022-10-11 NOTE — TELEPHONE ENCOUNTER
Received completed provider potion of the form via email from [facility]. Faxed to [facility] at [phone]

## 2022-10-11 NOTE — PROGRESS NOTES
Assessment & Plan     Recurrent aspiration pneumonia (H)  Resolving, follow up with surgery.    Mild episode of recurrent major depressive disorder (H)  Controlled mood but still sleep., trial mirtazapine, will consider decreasing sertraline later  - mirtazapine (REMERON) 15 MG tablet; Take 1 tablet (15 mg) by mouth At Bedtime    Type 2 diabetes mellitus without complication, without long-term current use of insulin (H)  Well controlled, continue med, follow up end November    Morbid obesity (H)  Has lost some weight, continue working on diet    Benign essential hypertension  Well controlled, continue meds    Insomnia, unspecified type  Trial mirtazapine  - mirtazapine (REMERON) 15 MG tablet; Take 1 tablet (15 mg) by mouth At Bedtime  - clonazePAM (KLONOPIN) 1 MG tablet; Take 1 tablet (1 mg) by mouth 2 times daily as needed for anxiety May fill on 7/10/21, 60 to last 60 days    Generalized anxiety disorder  As above  - mirtazapine (REMERON) 15 MG tablet; Take 1 tablet (15 mg) by mouth At Bedtime  - clonazePAM (KLONOPIN) 1 MG tablet; Take 1 tablet (1 mg) by mouth 2 times daily as needed for anxiety May fill on 7/10/21, 60 to last 60 days                 Return in about 5 months (around 11/25/2021) for Lab with blood and urine, Diabetes, see me a week after lab.    Pari Wiley MD  [facility] Lcuas is a 65 year old who presents for the following health issues     HPI       Hospital Follow-up Visit:    Hospital/Nursing Home/ Rehab Facility: [facility]  Date of Admission: 06/01/21  Date of Discharge: 06/04/21  Reason(s) for Admission: recurrent aspiration pneumonia.       Was your hospitalization related to COVID-19? No   Problems taking medications regularly:  None  Medication changes since discharge: None  Problems adhering to non-medication therapy:  None    Summary of hospitalization:  [facility] discharge summary reviewed  Diagnostic  Tests/Treatments reviewed.  Follow up needed: none  Other Healthcare Providers Involved in Patient s Care:         None  Update since discharge: improved.       This was a typical episode but she went in earlier than usual. Still uses oxygent at night, with activity but not at rest. She has had to delay surgery due to  having significant ankle surgery.     Post Discharge Medication Reconciliation: discharge medications reconciled, continue medications without change.  Plan of care communicated with patient              Other problems;   1. Type 2 diabetes: she had to miss appointment after last lab as admitted. Good control   2. Htn: well controlled.   3. Depression, anxiety and insomnia: mood is overall doing well but continues to have severe insomnia issues. She will take ambien sometimes but it does not last long enough. Took temazepam instead for a while last winter, helps a little at first but stops working.     She is seeing [facility] for hand numbness, had injection but has not helped.     Patient Active Problem List   Diagnosis     Benign essential hypertension     Restless leg syndrome     CRISTÓBAL (obstructive sleep apnea)     Advanced directives, counseling/discussion     Generalized anxiety disorder     Mild episode of recurrent major depressive disorder (H)     Health Care Home     GERD (gastroesophageal reflux disease)     Hyperlipidemia LDL goal <100     Eczema     Type 2 diabetes mellitus without complication, without long-term current use of insulin (H)     Midline low back pain with left-sided sciatica     Morbid obesity (HCC)     Insomnia, unspecified type     Dyspnea, unspecified type     Controlled substance agreement signed     Pain of both shoulder joints     Recurrent aspiration pneumonia (H)     Hypoxia     Current Outpatient Medications   Medication Sig Dispense Refill     albuterol (PROAIR HFA/PROVENTIL HFA/VENTOLIN HFA) 108 (90 Base) MCG/ACT inhaler Inhale 1-2 puffs into the lungs every 6  "hours as needed for shortness of breath / dyspnea or wheezing 1 Inhaler 0     aspirin 81 MG EC tablet Take 81 mg by mouth every evening        blood glucose (ACCU-CHEK BYRON PLUS) test strip USE TO TEST BLOOD SUGAR TWO TIMES A DAY OR AS DIRECTED 100 each 1     [START ON 7/10/2021] clonazePAM (KLONOPIN) 1 MG tablet Take 1 tablet (1 mg) by mouth 2 times daily as needed for anxiety May fill on 7/10/21, 60 to last 60 days 60 tablet 5     gabapentin (NEURONTIN) 300 MG capsule TAKE 3 CAPSULES BY MOUTH DAILY 270 capsule 0     losartan (COZAAR) 100 MG tablet Take 1 tablet by mouth once daily 90 tablet 0     metFORMIN (GLUCOPHAGE-XR) 500 MG 24 hr tablet TAKE 2 TABLETS BY MOUTH ONCE DAILY WITH BREAKFAST 180 tablet 0     mirtazapine (REMERON) 15 MG tablet Take 1 tablet (15 mg) by mouth At Bedtime 30 tablet 1     multivitamin w/minerals (MULTI-VITAMIN) tablet Take 1 tablet by mouth daily       pantoprazole (PROTONIX) 40 MG EC tablet TAKE ONE TABLET BY MOUTH AT BEDTIME 90 tablet 2     sertraline (ZOLOFT) 100 MG tablet Take 2 tablets by mouth once daily 180 tablet 0     simvastatin (ZOCOR) 20 MG tablet TAKE 1 TABLET BY MOUTH AT BEDTIME 90 tablet 1     zolpidem (AMBIEN) 10 MG tablet TAKE 1 TABLET BY MOUTH AT BEDTIME 90 tablet 0     temazepam (RESTORIL) 15 MG capsule Take 1 capsule (15 mg) by mouth nightly as needed for sleep (Patient not taking: Reported on 7/2/2021) 30 capsule 1        Review of Systems   No fever, chills, chest pains, cough, improved dyspnea on exertion      Objective    /66 (BP Location: Left arm, Patient Position: Sitting, Cuff Size: Adult Large)   Pulse 115   Temp 98.3  F (36.8  C) (Oral)   Ht 1.499 m (4' 11\")   Wt 107 kg (236 lb)   LMP 09/15/2007   SpO2 92%   BMI 47.67 kg/m    Body mass index is 47.67 kg/m .  Physical Exam   Lungs: clear    Lab Results   Component Value Date    A1C 6.6 05/28/2021    A1C 6.6 10/23/2020    A1C 6.9 05/12/2020    A1C 6.5 01/16/2020    A1C 6.7 06/21/2019    A1C 6.5 " 04/02/2019    A1C 7.1 06/28/2018    A1C 6.2 11/27/2017        Lab Results   Component Value Date    HDL 41 05/28/2021     05/28/2021    CHOL 191 05/28/2021    TRIG 119 05/28/2021                 Flank pain

## 2022-10-17 ENCOUNTER — TELEPHONE (OUTPATIENT)
Dept: PHARMACY | Facility: CLINIC | Age: 67
End: 2022-10-17

## 2022-10-17 NOTE — TELEPHONE ENCOUNTER
Patient sent in home blood pressure readings in [facility] 10/11. She has been taking losartan 100 mg daily and furosemide 20 mg as needed.  Will send readings to Dr. Wiley to review and see if she would like to adjust her therapy.    140/92  138/86  123/86  149/80  141/92  141/99  121/101  127/69

## 2022-10-22 ENCOUNTER — HEALTH MAINTENANCE LETTER (OUTPATIENT)
Age: 67
End: 2022-10-22

## 2022-10-25 ENCOUNTER — MYC MEDICAL ADVICE (OUTPATIENT)
Dept: INTERNAL MEDICINE | Facility: CLINIC | Age: 67
End: 2022-10-25

## 2022-10-25 DIAGNOSIS — B02.9 HERPES ZOSTER WITHOUT COMPLICATION: Primary | ICD-10-CM

## 2022-10-25 RX ORDER — VALACYCLOVIR HYDROCHLORIDE 1 G/1
1000 TABLET, FILM COATED ORAL 3 TIMES DAILY
Qty: 21 TABLET | Refills: 0 | Status: SHIPPED | OUTPATIENT
Start: 2022-10-25 | End: 2023-03-16

## 2022-10-27 NOTE — TELEPHONE ENCOUNTER
Was able to get through to a representative at the [facility] in regards to Shayy's pap for Jardiance. I was informed that they have received the provider's portion of the form however they have yet to receive the patient portion. Sent [facility] message patient for follow up.

## 2022-11-03 ENCOUNTER — MYC MEDICAL ADVICE (OUTPATIENT)
Dept: INTERNAL MEDICINE | Facility: CLINIC | Age: 67
End: 2022-11-03

## 2022-11-18 ENCOUNTER — MYC MEDICAL ADVICE (OUTPATIENT)
Dept: INTERNAL MEDICINE | Facility: CLINIC | Age: 67
End: 2022-11-18

## 2022-11-18 DIAGNOSIS — I10 BENIGN ESSENTIAL HYPERTENSION: Primary | ICD-10-CM

## 2022-11-23 RX ORDER — DILTIAZEM HYDROCHLORIDE 360 MG/1
360 CAPSULE, EXTENDED RELEASE ORAL DAILY
Qty: 30 CAPSULE | Refills: 1 | Status: SHIPPED | OUTPATIENT
Start: 2022-11-23 | End: 2022-12-20

## 2022-11-28 ENCOUNTER — MYC MEDICAL ADVICE (OUTPATIENT)
Dept: INTERNAL MEDICINE | Facility: CLINIC | Age: 67
End: 2022-11-28

## 2022-12-06 ENCOUNTER — TELEPHONE (OUTPATIENT)
Dept: PHARMACY | Facility: CLINIC | Age: 67
End: 2022-12-06

## 2022-12-06 NOTE — TELEPHONE ENCOUNTER
Called Shayy to follow-up - did she get Elizabeth's message about [employer] needing her income information for the Jardiance application?  How have her home blood pressures been with changes to diltiazem dose?

## 2022-12-10 ENCOUNTER — HEALTH MAINTENANCE LETTER (OUTPATIENT)
Age: 67
End: 2022-12-10

## 2022-12-14 DIAGNOSIS — L30.4 INTERTRIGO: ICD-10-CM

## 2022-12-14 RX ORDER — KETOCONAZOLE 20 MG/G
CREAM TOPICAL
Qty: 60 G | Refills: 5 | Status: SHIPPED | OUTPATIENT
Start: 2022-12-14

## 2022-12-14 NOTE — TELEPHONE ENCOUNTER
Med refilled per [facility] protocol.     Abril LE RN  Dermatology   [phone]

## 2022-12-14 NOTE — TELEPHONE ENCOUNTER
Requested Prescriptions   Pending Prescriptions Disp Refills     ketoconazole (NIZORAL) 2 % external cream 60 g 5     Sig: Apply to fold areas BID PRN       There is no refill protocol information for this order        Last Written Prescription Date:  06/10/2022  Last Fill Quantity: 60 g,  # refills: 5   Last office visit: 6/10/2022 with prescribing provider:  Autumn Lopez    Future Office Visit:      Thank you   Gayla Pringle [facility]

## 2022-12-15 ENCOUNTER — TRANSFERRED RECORDS (OUTPATIENT)
Dept: MULTI SPECIALTY CLINIC | Facility: CLINIC | Age: 67
End: 2022-12-15

## 2022-12-15 LAB — RETINOPATHY: NEGATIVE

## 2022-12-20 DIAGNOSIS — I10 BENIGN ESSENTIAL HYPERTENSION: ICD-10-CM

## 2022-12-20 RX ORDER — DILTIAZEM HYDROCHLORIDE 360 MG/1
CAPSULE, EXTENDED RELEASE ORAL
Qty: 90 CAPSULE | Refills: 1 | Status: SHIPPED | OUTPATIENT
Start: 2022-12-20 | End: 2023-07-05

## 2022-12-20 NOTE — TELEPHONE ENCOUNTER
Routing refill request to provider for review/approval because:  BP Readings from Last 3 Encounters:   08/09/22 (!) 147/86   11/11/21 (!) 144/73   08/26/21 128/74   LFT's     Charity Cardenas RN

## 2022-12-21 NOTE — TELEPHONE ENCOUNTER
Received message from RIGO Johnson requesting that I reach out to patient as she had questions about what [employer] would accept as a proof of income. Also noticed that Jardiance is not listed as an active rx in the patient's chart and is listed as discontinued. Requested that [facility] has Jardiance listed as an active Rx in med list.

## 2022-12-27 ENCOUNTER — TELEPHONE (OUTPATIENT)
Dept: PHARMACY | Facility: CLINIC | Age: 67
End: 2022-12-27

## 2023-01-16 ENCOUNTER — TELEPHONE (OUTPATIENT)
Dept: PHARMACY | Facility: CLINIC | Age: 68
End: 2023-01-16
Payer: MEDICARE

## 2023-01-16 NOTE — TELEPHONE ENCOUNTER
Patient has not submitted patient portion for the Jardiance assistance program, still waiting on one proof of income that she will get at the end of the month.  She is wondering if she needs updated paperwork for the new year.    I will ask Elizabeth Houser to confirm if any updated paperwork is needed.

## 2023-01-26 DIAGNOSIS — F41.1 GENERALIZED ANXIETY DISORDER: ICD-10-CM

## 2023-01-26 DIAGNOSIS — G47.00 INSOMNIA, UNSPECIFIED TYPE: ICD-10-CM

## 2023-01-26 DIAGNOSIS — R60.0 LOCALIZED EDEMA: ICD-10-CM

## 2023-01-27 RX ORDER — FUROSEMIDE 20 MG
20 TABLET ORAL DAILY PRN
Qty: 90 TABLET | Refills: 3 | Status: SHIPPED | OUTPATIENT
Start: 2023-01-27 | End: 2023-06-06

## 2023-01-27 RX ORDER — ZOLPIDEM TARTRATE 10 MG/1
TABLET ORAL
Qty: 90 TABLET | Refills: 1 | Status: SHIPPED | OUTPATIENT
Start: 2023-01-27 | End: 2023-07-27

## 2023-01-27 RX ORDER — CLONAZEPAM 1 MG/1
1 TABLET ORAL 2 TIMES DAILY PRN
Qty: 60 TABLET | Refills: 5 | Status: SHIPPED | OUTPATIENT
Start: 2023-01-27 | End: 2023-03-02

## 2023-02-17 NOTE — TELEPHONE ENCOUNTER
Sent provider portion of [employer] form to [place] via email. Spoke with patient and she would like the patient portion of the form to be mailed to her home address listed on file. She is aware that all PAP forms are mailed out on Friday's -pt stated understanding & is okay with this.    PROCEDURES:  Robot-assisted laparoscopic myomectomy of 1 to 4 myomas 17-Feb-2023 16:59:21  Sylvia Reyez

## 2023-03-02 ENCOUNTER — TELEPHONE (OUTPATIENT)
Dept: INTERNAL MEDICINE | Facility: CLINIC | Age: 68
End: 2023-03-02
Payer: MEDICARE

## 2023-03-02 DIAGNOSIS — F41.1 GENERALIZED ANXIETY DISORDER: ICD-10-CM

## 2023-03-02 DIAGNOSIS — G47.00 INSOMNIA, UNSPECIFIED TYPE: ICD-10-CM

## 2023-03-02 RX ORDER — CLONAZEPAM 1 MG/1
1 TABLET ORAL 2 TIMES DAILY PRN
Qty: 60 TABLET | Refills: 5 | Status: SHIPPED | OUTPATIENT
Start: 2023-03-02 | End: 2023-09-08

## 2023-03-02 NOTE — TELEPHONE ENCOUNTER
Patient calling  She takes 1 tablet in the morning and 1 tablet at bed time of clonazePAM (KLONOPIN) 1 MG tablet  60 tablets only last her 30 days. Patient requesting a new rx. Last rx stated it should last her 60 days please advise  Ok to call and suzi [phone]

## 2023-03-14 ENCOUNTER — TELEPHONE (OUTPATIENT)
Dept: PHARMACY | Facility: CLINIC | Age: 68
End: 2023-03-14
Payer: MEDICARE

## 2023-03-14 NOTE — TELEPHONE ENCOUNTER
Left message to schedule follow-up MTM visit.     Liz Wheatley, PharmD  Medication Therapy Management Resident

## 2023-03-16 ENCOUNTER — VIRTUAL VISIT (OUTPATIENT)
Dept: PHARMACY | Facility: CLINIC | Age: 68
End: 2023-03-16
Payer: COMMERCIAL

## 2023-03-16 DIAGNOSIS — M25.562 CHRONIC PAIN OF LEFT KNEE: ICD-10-CM

## 2023-03-16 DIAGNOSIS — G89.29 CHRONIC PAIN OF LEFT KNEE: ICD-10-CM

## 2023-03-16 DIAGNOSIS — F41.1 GENERALIZED ANXIETY DISORDER: ICD-10-CM

## 2023-03-16 DIAGNOSIS — G47.00 INSOMNIA, UNSPECIFIED TYPE: ICD-10-CM

## 2023-03-16 DIAGNOSIS — E11.9 TYPE 2 DIABETES MELLITUS WITHOUT COMPLICATION, WITHOUT LONG-TERM CURRENT USE OF INSULIN (H): Primary | ICD-10-CM

## 2023-03-16 DIAGNOSIS — E78.5 HYPERLIPIDEMIA LDL GOAL <100: ICD-10-CM

## 2023-03-16 DIAGNOSIS — M54.42 MIDLINE LOW BACK PAIN WITH LEFT-SIDED SCIATICA, UNSPECIFIED CHRONICITY: ICD-10-CM

## 2023-03-16 DIAGNOSIS — I10 BENIGN ESSENTIAL HYPERTENSION: ICD-10-CM

## 2023-03-16 DIAGNOSIS — K21.9 GASTROESOPHAGEAL REFLUX DISEASE WITHOUT ESOPHAGITIS: ICD-10-CM

## 2023-03-16 DIAGNOSIS — F33.0 MILD EPISODE OF RECURRENT MAJOR DEPRESSIVE DISORDER (H): ICD-10-CM

## 2023-03-16 DIAGNOSIS — Z78.9 TAKES DIETARY SUPPLEMENTS: ICD-10-CM

## 2023-03-16 PROCEDURE — 99207 PR NO CHARGE LOS: CPT | Performed by: PHARMACIST

## 2023-03-16 RX ORDER — GLIPIZIDE 5 MG/1
5 TABLET, FILM COATED, EXTENDED RELEASE ORAL DAILY
Qty: 90 TABLET | Refills: 1 | Status: SHIPPED | OUTPATIENT
Start: 2023-03-16 | End: 2023-06-06 | Stop reason: DRUGHIGH

## 2023-03-16 RX ORDER — METFORMIN HCL 500 MG
TABLET, EXTENDED RELEASE 24 HR ORAL
Start: 2023-03-16 | End: 2023-03-16

## 2023-03-16 RX ORDER — METFORMIN HCL 500 MG
500 TABLET, EXTENDED RELEASE 24 HR ORAL
Qty: 90 TABLET | Refills: 1 | Status: SHIPPED | OUTPATIENT
Start: 2023-03-16 | End: 2023-06-06

## 2023-03-16 NOTE — PATIENT INSTRUCTIONS
"Recommendations from today's MTM visit:                                                    MTM (medication therapy management) is a service provided by a clinical pharmacist designed to help you get the most of out of your medicines.   Today we reviewed what your medicines are for, how to know if they are working, that your medicines are safe and how to make your medicine regimen as easy as possible.      1.  I will follow-up on the Jardiance application.  2.  Start Glipizide ER 5 mg once daily  3.  Stay on metformin 500 mg once daily  4.  Restart losartan 100 mg daily for blood pressure, take this with the Tiadylt ER every day.  Take on both medication for the next couple of weeks and then send readings to Dr. Wiley to review.  5.  I did confirm with your new [facility] pharmacy that they have the updated gabapentin prescription onfile for you.    Follow-up: Return in about 4 weeks (around 4/13/2023) for Medication Therapy Management.    It was great speaking with you today.  I value your experience and would be very thankful for your time in providing feedback in our clinic survey. In the next few days, you may receive an email or text message from [employer] with a link to a survey related to your  clinical pharmacist.\"     To schedule another MTM appointment, please call the clinic directly or you may call the MTM scheduling line at [phone] or toll-free at [phone].     My Clinical Pharmacist's contact information:                                                      Please feel free to contact me with any questions or concerns you have.      Elizabeth Olivo , Pharm D  [phone] (phone)  Medication Therapy Management Pharmacist     "

## 2023-03-16 NOTE — PROGRESS NOTES
Medication Therapy Management (MTM) Encounter    ASSESSMENT:                            Medication Adherence/Access: See below for considerations    Type 2 Diabetes:  A1c at goal but due for recheck.  She does not like metformin and wants to try alternative therapy.  Unclear status on Jardiance PAP application, will follow-up with that team.  Start glipizide once daily and continue metformin once daily as tolerated.    Hyperlipidemia: LDL not at goal.  Consider increasing dose if still elevated with repeat labs.    Hypertension: reported blood pressure at home above goal.  She misunderstood the directions and stopped losartan when Tiadylt was started.  Will restart losartan and continue home blood pressure checks.    Anxiety/Depression: patient reports stable.  Discussed the risks of daily, long-term use of benzo.  She wants to continue current regimen.    Insomnia: stable on current regimen and sleeping through the night with concern    Back/Knee Pain: confirmed with new pharmacy that they have updated prescription for gabapentin.  Continue current regimen.    GERD: stable    Supplements: stable    PLAN:                            1.  I will follow-up on the Jardiance application.  2.  Start Glipizide ER 5 mg once daily  3.  Stay on metformin 500 mg once daily  4.  Restart losartan 100 mg daily for blood pressure, take this with the Tiadylt ER every day.  Take on both medication for the next couple of weeks and then send readings to Dr. Wiley to review.    Follow-up: Return in about 4 weeks (around 4/13/2023) for Medication Therapy Management.    SUBJECTIVE/OBJECTIVE:                          Nicole Reyes is a 67 year old female called for a follow-up visit.  Today's visit is a follow-up MTM visit from 10/3/22.     Reason for visit: medication review    Allergies/ADRs: Reviewed in chart  Tobacco: She reports that she quit smoking about 44 years ago. She has a 5.00 pack-year smoking history. She has never used  smokeless tobacco.  Alcohol: not currently using    Medication Adherence/Access: recently changed [facility]ies due to a location closing, her prescription for gabapentin was not transferred correctly per patient.      Type 2 Diabetes:  Currently taking metformin  mg once daily if she leaves the house and two tablets daily when she is home.  Higher doses cause GI upset.    Jardiance was recommend but cost was too high.  Jardiance - cost is $400 per month (insurance paid $200). She did apply for PAP but has never heard back.    Past medications - metformin (GI upset)    Blood sugar monitoring:  Not checking recently; were running in the 130-140s  Symptoms of low blood sugar? none  Symptoms of high blood sugar? none  Eye exam: January 3, 2023  Foot exam: up to date  Aspirin: none  Statin: Yes:    ACEi/ARB: Yes:   Urine Albumin:   Lab Results   Component Value Date    UMALCR 13.71 06/02/2022      Lab Results   Component Value Date    A1C 6.9 06/02/2022    A1C 6.8 11/08/2021    A1C 6.6 05/28/2021    A1C 6.6 10/23/2020    A1C 6.9 05/12/2020    A1C 6.5 01/16/2020    A1C 6.7 06/21/2019       Hyperlipidemia: Current therapy includes simvastatin 20 mg daily.  Patient reports no significant myalgias or other side effects.    Recent Labs   Lab Test 06/02/22  1525 05/28/21  1502 03/22/16  1418 03/26/15  1439   CHOL 210* 191   < > 130   HDL 44* 41*   < > 44*   * 126*   < > 64   TRIG 199* 119   < > 109   CHOLHDLRATIO  --   --   --  3.0    < > = values in this interval not displayed.       Hypertension: Current medications include Tiadylt  mg daily (started in late Dec), losartan 100 mg daily, furosemide 20 mg as needed (needing daily).    She stopped the losartan when the Tiadylt was started in Dec; she assumed the new medication replaced the losartan.    Patient does self-monitor blood pressure. Home /84.  Patient reports no current medication side effects.  BP Readings from Last 3 Encounters:    08/09/22 (!) 147/86   11/11/21 (!) 144/73   08/26/21 128/74       Anxiety/Depression: Taking clonazepam 1 mg twice daily and sertraline 200 mg daily.    The sertraline has been effective depression but not anxiety.  She doesn't want to change the clonazepam and 'rock the boat'.  No concerns with side effects.   Not currently working with psychiatry.    RADHA-7 SCORE 12/1/2017 4/7/2019 10/27/2020   Total Score - - -   Total Score 7 6 7     PHQ 10/27/2020 7/2/2021 8/9/2022   PHQ-9 Total Score 4 3 7   Q9: Thoughts of better off dead/self-harm past 2 weeks Not at all Not at all Not at all       Insomnia: Current medications include: zolpidem 10 mg at bedtime; sometimes able to go without. Patient reports no issues at this time.     Back/Knee Pain: Taking tizanidine at bedtime as needed (not using often), gabapentin 600 mg every morning, 600 mg every afternoon and 900 mg at night.  Often skipped the afternoon dose and may add a tablet in the evening.  Doing okay with this regimen. No side effects.    Has been assessed for stimulator but still contemplating if she wants this.  She is getting cortisone injections in her knee; helps for about a month max.    GERD: Current medications include: Protonix (pantoprazole) 40 mg once daily at bedtime. Patient reports no current symptoms.  Patient feels that current regimen is effective.    Supplements: Currently taking multivitamin daily, fish oil daily, Bailey Revive (inflammation - magnesium, curcumin. Boswella, Piperine). No reported issues at this time. .      Today's Vitals: LMP 09/15/2007   ----------------      I spent 40 minutes with this patient today. All changes were made via collaborative practice agreement with Pari Wiley MD. A copy of the visit note was provided to the patient's provider(s).    A summary of these recommendations was sent via [employer].    Elizabeth Olivo , Pharm D  [phone] (phone)  Medication Therapy Management Pharmacist     Telemedicine Visit  Details  Type of service:  Telephone visit  Start Time: 300pm  End Time: 340pm     Medication Therapy Recommendations  Benign essential hypertension    Current Medication: losartan (COZAAR) 100 MG tablet   Rationale: Does not understand instructions - Adherence - Adherence   Recommendation: Provide Adherence Intervention   Status: Patient Agreed - Adherence/Education         Type 2 diabetes mellitus without complication, without long-term current use of insulin (H)    Current Medication: metFORMIN (GLUCOPHAGE XR) 500 MG 24 hr tablet   Rationale: Undesirable effect - Adverse medication event - Safety   Recommendation: Change Medication   Status: Accepted per CPA

## 2023-03-20 ENCOUNTER — TRANSFERRED RECORDS (OUTPATIENT)
Dept: MULTI SPECIALTY CLINIC | Facility: CLINIC | Age: 68
End: 2023-03-20

## 2023-03-20 LAB — RETINOPATHY: NORMAL

## 2023-03-28 NOTE — LETTER
Key Recommendations:  Pt is admitted with recurrent PNA.  COURTNEY ELEVATED.  Pt said she has been worked up as an outpatient with Pulmonary at [facility] with no findings.  Then she went to [facility] with Dr Juan and he felt that her hiatal hernia is causing her issues.  Pt does have CRISTÓBAL.  She is compliant with her CPAP.  Pt has DM.  She does check her blood glucose daily and is compliant with her metformin.  Pt would benefit from care coordination in the clinic with her recurrent PNA.      Suzanne Jordan RN    AVS/Discharge Summary is the source of truth; this is a helpful guide for improved communication of patient story           155

## 2023-04-13 ENCOUNTER — VIRTUAL VISIT (OUTPATIENT)
Dept: PHARMACY | Facility: CLINIC | Age: 68
End: 2023-04-13
Payer: COMMERCIAL

## 2023-04-13 ENCOUNTER — TELEPHONE (OUTPATIENT)
Dept: PHARMACY | Facility: CLINIC | Age: 68
End: 2023-04-13
Payer: MEDICARE

## 2023-04-13 DIAGNOSIS — I10 BENIGN ESSENTIAL HYPERTENSION: ICD-10-CM

## 2023-04-13 DIAGNOSIS — E11.9 TYPE 2 DIABETES MELLITUS WITHOUT COMPLICATION, WITHOUT LONG-TERM CURRENT USE OF INSULIN (H): Primary | ICD-10-CM

## 2023-04-13 PROCEDURE — 99207 PR NO CHARGE LOS: CPT | Performed by: PHARMACIST

## 2023-04-13 NOTE — PROGRESS NOTES
Medication Therapy Management (MTM) Encounter    ASSESSMENT:                            Medication Adherence/Access: No issues identified    Type 2 Diabetes: Patient is meeting A1c goal of < 7%. Fasting home readings at goal. She would really prefer to be on Jardiance vs glipizde, will re-apply for PAP.     Hypertension: Patient is not meeting blood pressure goal of < 140/90mmHg. Patient would benefit from the following changes - continued blood pressure monitoring and watching diet, increasing exercise and weight loss.  Will move losartan to the evening for better all day coverage with therapies.  She has inperson follow-up with primary care provider in June for recheck.    PLAN:                            1.  Try moving losartan to evening   2.  Will reapply for Jardiance PAP in 2023    Follow-up: Return in about 3 months (around 7/13/2023) for Medication Therapy Management.    SUBJECTIVE/OBJECTIVE:                          Nicole Reyes is a 67 year old female coming in for a follow-up visit.  Today's visit is a follow-up MTM visit from 3/16/23.     Reason for visit: medication review    Allergies/ADRs: Reviewed in chart  Tobacco: She reports that she quit smoking about 44 years ago. She has a 5.00 pack-year smoking history. She has never used smokeless tobacco.  Alcohol: not currently using    Medication Adherence/Access: no issues reported; taking meds in the afternoon (not a morning person)    Type 2 Diabetes:  Currently taking glipizide ER 5 mg daily and metformin  mg once daily (one daily if she leaves the house and two tablets daily when she is home).    Higher doses of metformin cause GI upset.      Jardiance was recommend but cost was too high.  Jardiance - cost is $400 per month (insurance paid $200). She did apply for PAP - diabetes liaison team followed-up on application and patient's income information was missing. Shayy was notified of this.   Past medications - metformin (GI  upset)    Blood sugar monitorin-140s  Symptoms of low blood sugar? none  Symptoms of high blood sugar? none  Eye exam: January 3, 2023  Foot exam: up to date  Aspirin: none  Statin: Yes:    ACEi/ARB: Yes:   Urine Albumin:   Lab Results   Component Value Date    UMALCR 13.71 2022      Lab Results   Component Value Date    A1C 6.9 2022    A1C 6.8 2021    A1C 6.6 2021    A1C 6.6 10/23/2020    A1C 6.9 2020    A1C 6.5 2020    A1C 6.7 2019       Hypertension: Current medications include Tiadylt  mg daily (started in late Dec), losartan 100 mg daily, furosemide 20 mg as needed (needing daily).    Patient does self-monitor blood pressure. 147/71 and pulse 78  Sitting most of the time due to spinal stenosis.      BP Readings from Last 3 Encounters:   22 (!) 147/86   21 (!) 144/73   21 128/74         Today's Vitals: LMP 09/15/2007   ----------------      I spent 30 minutes with this patient today. All changes were made via collaborative practice agreement with Pari Wiley MD. A copy of the visit note was provided to the patient's provider(s).    A summary of these recommendations was sent via [employer].    Elizabeth Olivo , Pharm D  [phone] (phone)  Medication Therapy Management Pharmacist     Telemedicine Visit Details  Type of service:  Telephone visit  Start Time: 300pm  End Time: 330pm     Medication Therapy Recommendations  Benign essential hypertension    Current Medication: losartan (COZAAR) 100 MG tablet   Rationale: Incorrect administration - Dosage too low - Effectiveness   Recommendation: Change Administration Time   Status: Patient Agreed - Adherence/Education

## 2023-04-14 NOTE — PATIENT INSTRUCTIONS
"Recommendations from today's MTM visit:                                                         1.  Try moving losartan to evening   2.  Will reapply for Jardiance PAP in 2023    Follow-up: Return in about 3 months (around 7/13/2023) for Medication Therapy Management.    It was great speaking with you today.  I value your experience and would be very thankful for your time in providing feedback in our clinic survey. In the next few days, you may receive an email or text message from [employer] with a link to a survey related to your  clinical pharmacist.\"     To schedule another MTM appointment, please call the clinic directly or you may call the MTM scheduling line at [phone] or toll-free at [phone].     My Clinical Pharmacist's contact information:                                                      Please feel free to contact me with any questions or concerns you have.      Elizabeth Olivo , Pharm D  [phone] (phone)  Medication Therapy Management Pharmacist     "

## 2023-06-01 ENCOUNTER — HEALTH MAINTENANCE LETTER (OUTPATIENT)
Age: 68
End: 2023-06-01

## 2023-06-02 ASSESSMENT — ANXIETY QUESTIONNAIRES
7. FEELING AFRAID AS IF SOMETHING AWFUL MIGHT HAPPEN: SEVERAL DAYS
1. FEELING NERVOUS, ANXIOUS, OR ON EDGE: SEVERAL DAYS
4. TROUBLE RELAXING: MORE THAN HALF THE DAYS
8. IF YOU CHECKED OFF ANY PROBLEMS, HOW DIFFICULT HAVE THESE MADE IT FOR YOU TO DO YOUR WORK, TAKE CARE OF THINGS AT HOME, OR GET ALONG WITH OTHER PEOPLE?: SOMEWHAT DIFFICULT
IF YOU CHECKED OFF ANY PROBLEMS ON THIS QUESTIONNAIRE, HOW DIFFICULT HAVE THESE PROBLEMS MADE IT FOR YOU TO DO YOUR WORK, TAKE CARE OF THINGS AT HOME, OR GET ALONG WITH OTHER PEOPLE: SOMEWHAT DIFFICULT
5. BEING SO RESTLESS THAT IT IS HARD TO SIT STILL: SEVERAL DAYS
3. WORRYING TOO MUCH ABOUT DIFFERENT THINGS: SEVERAL DAYS
7. FEELING AFRAID AS IF SOMETHING AWFUL MIGHT HAPPEN: SEVERAL DAYS
GAD7 TOTAL SCORE: 8
6. BECOMING EASILY ANNOYED OR IRRITABLE: SEVERAL DAYS
2. NOT BEING ABLE TO STOP OR CONTROL WORRYING: SEVERAL DAYS
GAD7 TOTAL SCORE: 8

## 2023-06-05 ENCOUNTER — LAB (OUTPATIENT)
Dept: LAB | Facility: CLINIC | Age: 68
End: 2023-06-05
Payer: MEDICARE

## 2023-06-05 DIAGNOSIS — E11.9 TYPE 2 DIABETES MELLITUS WITHOUT COMPLICATION, WITHOUT LONG-TERM CURRENT USE OF INSULIN (H): ICD-10-CM

## 2023-06-05 DIAGNOSIS — R60.0 LOCALIZED EDEMA: ICD-10-CM

## 2023-06-05 DIAGNOSIS — I10 BENIGN ESSENTIAL HYPERTENSION: ICD-10-CM

## 2023-06-05 LAB
ANION GAP SERPL CALCULATED.3IONS-SCNC: 14 MMOL/L (ref 7–15)
BUN SERPL-MCNC: 9.9 MG/DL (ref 8–23)
CALCIUM SERPL-MCNC: 9.3 MG/DL (ref 8.8–10.2)
CHLORIDE SERPL-SCNC: 103 MMOL/L (ref 98–107)
CHOLEST SERPL-MCNC: 177 MG/DL
CREAT SERPL-MCNC: 0.98 MG/DL (ref 0.51–0.95)
DEPRECATED HCO3 PLAS-SCNC: 28 MMOL/L (ref 22–29)
GFR SERPL CREATININE-BSD FRML MDRD: 63 ML/MIN/1.73M2
GLUCOSE SERPL-MCNC: 146 MG/DL (ref 70–99)
HBA1C MFR BLD: 7.5 % (ref 0–5.6)
HDLC SERPL-MCNC: 40 MG/DL
LDLC SERPL CALC-MCNC: 104 MG/DL
NONHDLC SERPL-MCNC: 137 MG/DL
POTASSIUM SERPL-SCNC: 3.7 MMOL/L (ref 3.4–5.3)
SODIUM SERPL-SCNC: 145 MMOL/L (ref 136–145)
TRIGL SERPL-MCNC: 164 MG/DL

## 2023-06-05 PROCEDURE — 80048 BASIC METABOLIC PNL TOTAL CA: CPT

## 2023-06-05 PROCEDURE — 80061 LIPID PANEL: CPT

## 2023-06-05 PROCEDURE — 83036 HEMOGLOBIN GLYCOSYLATED A1C: CPT

## 2023-06-05 PROCEDURE — 36415 COLL VENOUS BLD VENIPUNCTURE: CPT

## 2023-06-05 RX ORDER — FUROSEMIDE 20 MG
20 TABLET ORAL DAILY PRN
Qty: 90 TABLET | Refills: 3 | Status: CANCELLED | OUTPATIENT
Start: 2023-06-05

## 2023-06-06 ENCOUNTER — HOSPITAL ENCOUNTER (OUTPATIENT)
Dept: MAMMOGRAPHY | Facility: CLINIC | Age: 68
Discharge: HOME | End: 2023-06-06
Attending: INTERNAL MEDICINE | Admitting: INTERNAL MEDICINE
Payer: MEDICARE

## 2023-06-06 ENCOUNTER — OFFICE VISIT (OUTPATIENT)
Dept: INTERNAL MEDICINE | Facility: CLINIC | Age: 68
End: 2023-06-06
Payer: MEDICARE

## 2023-06-06 VITALS
RESPIRATION RATE: 16 BRPM | BODY MASS INDEX: 51.15 KG/M2 | TEMPERATURE: 98.2 F | SYSTOLIC BLOOD PRESSURE: 138 MMHG | HEART RATE: 98 BPM | WEIGHT: 253.7 LBS | HEIGHT: 59 IN | DIASTOLIC BLOOD PRESSURE: 82 MMHG | OXYGEN SATURATION: 96 %

## 2023-06-06 DIAGNOSIS — E78.5 HYPERLIPIDEMIA LDL GOAL <100: ICD-10-CM

## 2023-06-06 DIAGNOSIS — F33.0 MILD EPISODE OF RECURRENT MAJOR DEPRESSIVE DISORDER (H): ICD-10-CM

## 2023-06-06 DIAGNOSIS — R60.0 LOCALIZED EDEMA: ICD-10-CM

## 2023-06-06 DIAGNOSIS — J69.0 RECURRENT ASPIRATION PNEUMONIA (H): ICD-10-CM

## 2023-06-06 DIAGNOSIS — Z12.31 VISIT FOR SCREENING MAMMOGRAM: ICD-10-CM

## 2023-06-06 DIAGNOSIS — E11.9 TYPE 2 DIABETES MELLITUS WITHOUT COMPLICATION, WITHOUT LONG-TERM CURRENT USE OF INSULIN (H): Primary | ICD-10-CM

## 2023-06-06 DIAGNOSIS — E66.01 MORBID OBESITY (H): ICD-10-CM

## 2023-06-06 DIAGNOSIS — I10 BENIGN ESSENTIAL HYPERTENSION: ICD-10-CM

## 2023-06-06 DIAGNOSIS — F41.1 GENERALIZED ANXIETY DISORDER: ICD-10-CM

## 2023-06-06 PROCEDURE — 82570 ASSAY OF URINE CREATININE: CPT

## 2023-06-06 PROCEDURE — 77067 SCR MAMMO BI INCL CAD: CPT

## 2023-06-06 PROCEDURE — 99214 OFFICE O/P EST MOD 30 MIN: CPT | Performed by: INTERNAL MEDICINE

## 2023-06-06 PROCEDURE — 82043 UR ALBUMIN QUANTITATIVE: CPT

## 2023-06-06 PROCEDURE — 96127 BRIEF EMOTIONAL/BEHAV ASSMT: CPT | Performed by: INTERNAL MEDICINE

## 2023-06-06 RX ORDER — FUROSEMIDE 20 MG
20 TABLET ORAL DAILY PRN
Qty: 90 TABLET | Refills: 3 | Status: SHIPPED | OUTPATIENT
Start: 2023-06-06 | End: 2023-06-07

## 2023-06-06 RX ORDER — GLIPIZIDE 10 MG/1
10 TABLET, FILM COATED, EXTENDED RELEASE ORAL DAILY
Qty: 90 TABLET | Refills: 1 | Status: SHIPPED | OUTPATIENT
Start: 2023-06-06 | End: 2023-11-08

## 2023-06-06 ASSESSMENT — ANXIETY QUESTIONNAIRES: GAD7 TOTAL SCORE: 8

## 2023-06-06 ASSESSMENT — PATIENT HEALTH QUESTIONNAIRE - PHQ9
SUM OF ALL RESPONSES TO PHQ QUESTIONS 1-9: 2
SUM OF ALL RESPONSES TO PHQ QUESTIONS 1-9: 2
10. IF YOU CHECKED OFF ANY PROBLEMS, HOW DIFFICULT HAVE THESE PROBLEMS MADE IT FOR YOU TO DO YOUR WORK, TAKE CARE OF THINGS AT HOME, OR GET ALONG WITH OTHER PEOPLE: SOMEWHAT DIFFICULT

## 2023-06-06 ASSESSMENT — PAIN SCALES - GENERAL: PAINLEVEL: NO PAIN (0)

## 2023-06-06 NOTE — Clinical Note
Please abstract the following data from this visit with this patient into the appropriate field in Epic:  Tests that can be patient reported without a hard copy:  Eye exam with ophthalmology on this date: 1/15/23 Exam Location: [facility] eye care

## 2023-06-06 NOTE — PROGRESS NOTES
"  Assessment & Plan     Type 2 diabetes mellitus without complication, without long-term current use of insulin (H)  Her hemoglobin A1c has gone up a little bit, likely related to stopping the metformin.  Recommend increase the glipizide to 10 mg for now, follow-up with Pharm.D. regarding other medications.  - glipiZIDE (GLUCOTROL XL) 10 MG 24 hr tablet; Take 1 tablet (10 mg) by mouth daily    Mild episode of recurrent major depressive disorder (H)  Symptoms are reasonably well controlled.  - [facility] BEHAV ASSMT W/SCORE & DOCD/STAND INSTRUMENT    Morbid obesity (HCC)  Stable weight. advised that morbid obesity is associated with health risks and losing weight could help improve  health including improving health problems including diabetes, hyperlipidemia, hypertension, back pain.   .    Recurrent aspiration pneumonia (H)  She has done well without any episodes for 1.5-year.  Continue working on controlling acid reflux, weight loss.  Consider following up with the surgeon      Benign essential hypertension   well-controlled, continue medication    Hyperlipidemia LDL goal <100  Improved control, continue medication    Localized edema  Advised this is likely related to decreased activity, try to elevate legs, can use the Lasix every day    Generalized anxiety disorder  Overall stable, continue medication  - NH BEHAV ASSMT W/SCORE & DOCD/STAND INSTRUMENT      BMI:   Estimated body mass index is 51.24 kg/m  as calculated from the following:    Height as of this encounter: 1.499 m (4' 11\").    Weight as of this encounter: 115.1 kg (253 lb 11.2 oz).   Weight management plan: Discussed healthy diet and exercise guidelines        Pari Wiley MD  [facility]    Hi Lucas is a 67 year old, presenting for the following health issues:  RECHECK (Follow-up )        6/6/2023     4:07 PM   Additional Questions   Roomed by Ryan     History of Present Illness       Mental Health Follow-up:  Patient " presents to follow-up on Depression & Anxiety.Patient's depression since last visit has been:  Good  The patient is not having other symptoms associated with depression.  Patient's anxiety since last visit has been:  Medium  The patient is having other symptoms associated with anxiety.  Any significant life events: financial concerns and health concerns  Patient is feeling anxious or having panic attacks.  Patient has no concerns about alcohol or drug use.    Diabetes:   She presents for follow up of diabetes.  She is checking home blood glucose a few times a month. She checks blood glucose before meals.  Blood glucose is never over 200 and never under 70. When her blood glucose is low, the patient is asymptomatic for confusion, blurred vision, lethargy and reports not feeling dizzy, shaky, or weak.  She is concerned about other. She is not experiencing numbness or burning in feet, excessive thirst, blurry vision, weight changes or redness, sores or blisters on feet. The patient has had a diabetic eye exam in the last 12 months. Eye exam performed on March 202;. Location of last eye exam [facility].        Hypertension: She presents for follow up of hypertension.  She does check blood pressure  regularly outside of the clinic. Outside blood pressures have been over 140/90. She does not follow a low salt diet.     She eats 2-3 servings of fruits and vegetables daily.She consumes 2 sweetened beverage(s) daily.She exercises with enough effort to increase her heart rate 9 or less minutes per day.  She exercises with enough effort to increase her heart rate 3 or less days per week. She is missing 2 dose(s) of medications per week.  She is not taking prescribed medications regularly due to cost of medication.    Today's PHQ-9         PHQ-9 Total Score: 2    PHQ-9 Q9 Thoughts of better off dead/self-harm past 2 weeks :   Not at all    How difficult have these problems made it for you to do your work, take care of  things at home, or get along with other people: Somewhat difficult  Today's RADHA-7 Score: 8       Diabetes:   She had continued GI distress on the metformin and finally stopped it completely in April with resolution of the GI problems.  She is on glipizide 5 mg and tolerating this.  She is working with Pharm.D. and is going to reapply to try to get approval for Jardiance again.  Currently here sugars in the morning are running 137- 1:47 AM, afternoon are running 120s to 130s, they have always been fairly close.  She did get her eye check done in January.    Hypertension: She reports her home blood pressure readings are Better than it is here, usually in the 120s/70-80.      Hyperlipidemia Follow-Up      Are you regularly taking any medication or supplement to lower your cholesterol?   Yes- Zocor    Are you having muscle aches or other side effects that you think could be caused by your cholesterol lowering medication?  Yes       Depression and anxiety: Symptoms are fairly well controlled, no concerns about her medication  Other problems:       Chronic back pain: She is not currently having any sciatic issues but she has known severe spinal stenosis and they will not do surgery.  This has been limiting her walking significantly, using a walker.    Recurrent aspiration pneumonias: She has not had a pneumonia for a year and a half.  She has been working very hard on reducing portions, avoiding eating close to bedtime and that has reduced her reflux.  Joint the surgeon was planning on doing repair of hiatal hernia as well as gastric sleeve but she has not been able to follow through due to family issues, her  has had a lot of health problems and is on dialysis.    Morbid obesity: Weight is overall stable      Current concerns:   She is complaining of some increased edema.  This has been for 6 to 8 months.  She wonders if it may be related to being more sedentary, has always been a little bit more on the left than  the right.  She has been using Lasix 5 out of 7 days for the edema, she did get some support stockings and that seems to help a little bit.    Patient Active Problem List   Diagnosis     Benign essential hypertension     Restless leg syndrome     CRISTÓBAL (obstructive sleep apnea)     Advanced directives, counseling/discussion     Generalized anxiety disorder     Mild episode of recurrent major depressive disorder (H)     Health Care Home     GERD (gastroesophageal reflux disease)     Hyperlipidemia LDL goal <100     Eczema     Type 2 diabetes mellitus without complication, without long-term current use of insulin (H)     Midline low back pain with left-sided sciatica     Morbid obesity (HCC)     Insomnia, unspecified type     Controlled substance agreement signed     Pain of both shoulder joints     Recurrent aspiration pneumonia (H)       Current Outpatient Medications   Medication Sig Dispense Refill     ACCU-CHEK BYRON PLUS test strip USE TO TEST BLOOD SUGAR ONE TIME A  strip 3     benzonatate (TESSALON) 200 MG capsule Take 1 capsule (200 mg) by mouth 3 times daily as needed for cough 30 capsule 3     clonazePAM (KLONOPIN) 1 MG tablet Take 1 tablet (1 mg) by mouth 2 times daily as needed for anxiety 60 to last 30 days 60 tablet 5     gabapentin (NEURONTIN) 300 MG capsule 600 mg in am, 600 mg in afternoon and 900 mg at bedtime 630 capsule 3     glipiZIDE (GLUCOTROL XL) 10 MG 24 hr tablet Take 1 tablet (10 mg) by mouth daily 90 tablet 1     ketoconazole (NIZORAL) 2 % external cream Apply to fold areas BID PRN 60 g 5     losartan (COZAAR) 100 MG tablet Take 1 tablet (100 mg) by mouth daily 90 tablet 3     multivitamin w/minerals (THERA-VIT-M) tablet Take 1 tablet by mouth daily       NONFORMULARY Bailey Revive 1 table daily       Omega-3 Fatty Acids (FISH OIL PO) Take 2 capsules by mouth daily        pantoprazole (PROTONIX) 40 MG EC tablet TAKE 1 TABLET BY MOUTH AT BEDTIME 90 tablet 3     sertraline (ZOLOFT) 100  "MG tablet Take 2 tablets by mouth once daily 180 tablet 3     simvastatin (ZOCOR) 20 MG tablet Take 1 tablet (20 mg) by mouth At Bedtime 90 tablet 3     TIADYLT  MG 24 hr ER beaded capsule TAKE 1 CAPSULE BY MOUTH EVERY DAY 90 capsule 1     tiZANidine (ZANAFLEX) 4 MG tablet TAKE 1-2 TABLETS (4-8 MG) BY MOUTH 3 TIMES DAILY AS NEEDED FOR MUSCLE SPASMS 40 tablet 11     zolpidem (AMBIEN) 10 MG tablet TAKE 1 TABLET BY MOUTH EVERYDAY AT BEDTIME 90 tablet 1     furosemide (LASIX) 20 MG tablet Take 1 tablet (20 mg) by mouth daily as needed (edema) 90 tablet 3       Social History     Tobacco Use     Smoking status: Former     Packs/day: 1.00     Years: 5.00     Pack years: 5.00     Types: Cigarettes     Quit date: 3/18/1979     Years since quittin.2     Smokeless tobacco: Never     Tobacco comments:     quit    Vaping Use     Vaping status: Never Used   Substance Use Topics     Alcohol use: Yes     Comment: 0-1 time a month, 1 drink     Drug use: No        ROS:  General: no fever, chills  Weight: stable  Vision:negative. Last eye exam .  ENT: negative  Respiratory stable dyspnea on exertion .  Cardiac: no chest pain or pressure  Abdominal: no nausea, vomiting, abdominal pain, bowel changes  Vascular no complaints of claudication  Neurologic:no complaints of neuropathy  Feet no lesions, in grown nails, edema   : no polyuria, hematuria, dysuria    Objective:  Patient alert in NAD  /82   Pulse 98   Temp 98.2  F (36.8  C) (Tympanic)   Resp 16   Ht 1.499 m (4' 11\")   Wt 115.1 kg (253 lb 11.2 oz)   LMP 09/15/2007   SpO2 96%   BMI 51.24 kg/m         Wt Readings from Last 4 Encounters:   23 115.1 kg (253 lb 11.2 oz)   22 114.1 kg (251 lb 9.6 oz)   22 114.3 kg (252 lb)   21 110.7 kg (243 lb 15.7 oz)       CV: CV: normal S1, S2 without murmur, S3 or S4.  Carotid pulses: full  LUNGS: clear      Lab Results   Component Value Date    A1C 7.5 2023    A1C 6.9 2022    " A1C 6.8 11/08/2021    A1C 6.6 05/28/2021    A1C 6.6 10/23/2020    A1C 6.9 05/12/2020    A1C 6.5 01/16/2020    A1C 6.7 06/21/2019       Lab Results   Component Value Date    CHOL 177 06/05/2023    CHOL 191 05/28/2021    HDL 40 06/05/2023    HDL 41 05/28/2021     06/05/2023     05/28/2021    TRIG 164 06/05/2023    TRIG 119 05/28/2021    CHOLHDLRATIO 3.0 03/26/2015 7/2/2021     2:17 PM 8/9/2022     2:44 PM 6/6/2023     9:10 AM   PHQ   PHQ-9 Total Score 3 7 2   Q9: Thoughts of better off dead/self-harm past 2 weeks Not at all Not at all Not at all         4/7/2019    10:09 AM 10/27/2020     7:59 AM 6/2/2023     8:17 AM   RADHA-7 SCORE   Total Score   8 (mild anxiety)   Total Score 6 7 8

## 2023-06-07 ENCOUNTER — TELEPHONE (OUTPATIENT)
Dept: INTERNAL MEDICINE | Facility: CLINIC | Age: 68
End: 2023-06-07
Payer: MEDICARE

## 2023-06-07 DIAGNOSIS — R60.0 LOCALIZED EDEMA: ICD-10-CM

## 2023-06-07 PROBLEM — R09.02 HYPOXIA: Status: RESOLVED | Noted: 2020-11-08 | Resolved: 2023-06-07

## 2023-06-07 LAB
CREAT UR-MCNC: 250 MG/DL
MICROALBUMIN UR-MCNC: <12 MG/L
MICROALBUMIN/CREAT UR: NORMAL MG/G{CREAT}

## 2023-06-07 RX ORDER — FUROSEMIDE 20 MG
20 TABLET ORAL DAILY PRN
Qty: 90 TABLET | Refills: 3 | Status: ON HOLD | OUTPATIENT
Start: 2023-06-07 | End: 2024-06-11

## 2023-06-07 NOTE — TELEPHONE ENCOUNTER
Fax received from [facility] in [place] requesting a new prescription for Lasix due to an computer issue with pharmacy that couldn't be resolved     Prescription resent

## 2023-06-09 ENCOUNTER — TELEPHONE (OUTPATIENT)
Dept: INTERNAL MEDICINE | Facility: CLINIC | Age: 68
End: 2023-06-09
Payer: MEDICARE

## 2023-06-09 NOTE — TELEPHONE ENCOUNTER
Please start process for Jardiance PAP.  Patient started application last year but never finished. She would like to start over for 2023.

## 2023-07-01 DIAGNOSIS — K21.9 GASTROESOPHAGEAL REFLUX DISEASE WITHOUT ESOPHAGITIS: ICD-10-CM

## 2023-07-01 DIAGNOSIS — I10 BENIGN ESSENTIAL HYPERTENSION: ICD-10-CM

## 2023-07-04 NOTE — TELEPHONE ENCOUNTER
Pending Prescriptions:                       Disp   Refills    pantoprazole (PROTONIX) 40 MG EC tablet [P*90 tab*3        Sig: TAKE 1 TABLET BY MOUTH EVERYDAY AT BEDTIME    TIADYLT  MG 24 hr ER beaded capsule *90 cap*1        Sig: TAKE 1 CAPSULE BY MOUTH EVERY DAY    Routing refill request to provider for review/approval because:  Needs provider review

## 2023-07-05 RX ORDER — PANTOPRAZOLE SODIUM 40 MG/1
TABLET, DELAYED RELEASE ORAL
Qty: 90 TABLET | Refills: 3 | Status: SHIPPED | OUTPATIENT
Start: 2023-07-05 | End: 2024-06-24

## 2023-07-05 RX ORDER — DILTIAZEM HYDROCHLORIDE 360 MG/1
CAPSULE, EXTENDED RELEASE ORAL
Qty: 90 CAPSULE | Refills: 1 | Status: SHIPPED | OUTPATIENT
Start: 2023-07-05 | End: 2024-01-16

## 2023-07-10 ENCOUNTER — PATIENT OUTREACH (OUTPATIENT)
Dept: CARE COORDINATION | Facility: CLINIC | Age: 68
End: 2023-07-10
Payer: MEDICARE

## 2023-07-24 ENCOUNTER — PATIENT OUTREACH (OUTPATIENT)
Dept: CARE COORDINATION | Facility: CLINIC | Age: 68
End: 2023-07-24
Payer: MEDICARE

## 2023-07-26 DIAGNOSIS — G47.00 INSOMNIA, UNSPECIFIED TYPE: ICD-10-CM

## 2023-07-27 RX ORDER — ZOLPIDEM TARTRATE 10 MG/1
TABLET ORAL
Qty: 90 TABLET | Refills: 1 | Status: SHIPPED | OUTPATIENT
Start: 2023-07-27 | End: 2024-01-22

## 2023-08-14 ENCOUNTER — E-VISIT (OUTPATIENT)
Dept: INTERNAL MEDICINE | Facility: CLINIC | Age: 68
End: 2023-08-14
Payer: MEDICARE

## 2023-08-14 DIAGNOSIS — G89.29 CHRONIC MIDLINE LOW BACK PAIN WITH LEFT-SIDED SCIATICA: Primary | ICD-10-CM

## 2023-08-14 DIAGNOSIS — M54.42 CHRONIC MIDLINE LOW BACK PAIN WITH LEFT-SIDED SCIATICA: Primary | ICD-10-CM

## 2023-08-14 PROCEDURE — 99422 OL DIG E/M SVC 11-20 MIN: CPT | Performed by: INTERNAL MEDICINE

## 2023-08-16 RX ORDER — GABAPENTIN 300 MG/1
CAPSULE ORAL
Qty: 270 CAPSULE | Refills: 3 | COMMUNITY
Start: 2023-08-16 | End: 2023-11-03

## 2023-08-18 ENCOUNTER — TELEPHONE (OUTPATIENT)
Dept: INTERNAL MEDICINE | Facility: CLINIC | Age: 68
End: 2023-08-18
Payer: MEDICARE

## 2023-08-18 NOTE — TELEPHONE ENCOUNTER
Patient calls to ask to be put on your schedule for her medicare annual wellness and another appointment for her back pain ? She has been using [employer] to communicate with you about her back pain    Can we book with you?     Patient call back number [phone]

## 2023-08-18 NOTE — TELEPHONE ENCOUNTER
Scheduled Patient with Dr. Wiley as approved below for her back Pain on 08/22.  Patient will discuss with PCP if she is able to also see her for her Annual Wellness Visit Prior to her [facility].

## 2023-08-22 ENCOUNTER — OFFICE VISIT (OUTPATIENT)
Dept: INTERNAL MEDICINE | Facility: CLINIC | Age: 68
End: 2023-08-22
Payer: MEDICARE

## 2023-08-22 VITALS
HEIGHT: 59 IN | DIASTOLIC BLOOD PRESSURE: 80 MMHG | OXYGEN SATURATION: 94 % | WEIGHT: 240.5 LBS | SYSTOLIC BLOOD PRESSURE: 128 MMHG | HEART RATE: 94 BPM | TEMPERATURE: 98.4 F | BODY MASS INDEX: 48.48 KG/M2 | RESPIRATION RATE: 16 BRPM

## 2023-08-22 DIAGNOSIS — G89.29 CHRONIC BILATERAL LOW BACK PAIN WITHOUT SCIATICA: Primary | ICD-10-CM

## 2023-08-22 DIAGNOSIS — M54.50 CHRONIC BILATERAL LOW BACK PAIN WITHOUT SCIATICA: Primary | ICD-10-CM

## 2023-08-22 PROCEDURE — 99214 OFFICE O/P EST MOD 30 MIN: CPT | Performed by: INTERNAL MEDICINE

## 2023-08-22 RX ORDER — TRAMADOL HYDROCHLORIDE 50 MG/1
50 TABLET ORAL DAILY
Qty: 30 TABLET | Refills: 0 | Status: SHIPPED | OUTPATIENT
Start: 2023-08-22 | End: 2023-10-20

## 2023-08-22 ASSESSMENT — PAIN SCALES - GENERAL: PAINLEVEL: WORST PAIN (10)

## 2023-08-22 NOTE — PROGRESS NOTES
Assessment & Plan     Chronic bilateral low back pain without sciatica  Advised that the only other options for pain are narcotics and can have issues with dependency but also the biggest concern is tolerance and they stopped working.  We discussed the fact that it might be helpful to consider using Ozempic or Mounjaro for her diabetes to help her with weight loss which might make surgical treatment possibility.  For now will provide a small prescription of tramadol to take only when pain is severe, probably mostly at night to help her sleep pending her follow-up with the specialist.  - traMADol (ULTRAM) 50 MG tablet; Take 1 tablet (50 mg) by mouth daily      33 minutes spent by me on the date of the encounter doing chart review, history and exam, documentation and further activities per the note           Pari Wiley MD  [facility]    Hi Lucas is a 67 year old, presenting for the following health issues:  Back Pain        8/22/2023     2:58 PM   Additional Questions   Roomed by Ryan       History of Present Illness       Reason for visit:  Back pain    She eats 2-3 servings of fruits and vegetables daily.She consumes 2 sweetened beverage(s) daily.She exercises with enough effort to increase her heart rate 9 or less minutes per day.  She exercises with enough effort to increase her heart rate 3 or less days per week. She is missing 1 dose(s) of medications per week.    She is here to discuss her chronic back pain.  She has been seeing specialists, the orthopedist does not want to consider surgery due to her weight.  She is going to see another group called [facility] next month to see if there may be any procedures for her.    She has spinal stenosis, cannot walk very long without significant pain, even standing is painful.  She also cannot sit for very long when the pain bothers her.  She is on gabapentin high dose already.  She has muscle relaxers, not sure it always  helps much for the chronic pain.  She is trying to minimize use of NSAIDs as she know it may affect her kidneys and blood pressure.  Tylenol is not helping much.  She is wondering about other pain medication.  She has used topical agents without much help.          Patient Active Problem List   Diagnosis    Benign essential hypertension    Restless leg syndrome    CRISTÓBAL (obstructive sleep apnea)    Advanced directives, counseling/discussion    Generalized anxiety disorder    Mild episode of recurrent major depressive disorder (H)    Health Care Home    GERD (gastroesophageal reflux disease)    Hyperlipidemia LDL goal <100    Eczema    Type 2 diabetes mellitus without complication, without long-term current use of insulin (H)    Chronic bilateral low back pain without sciatica    Morbid obesity (HCC)    Insomnia, unspecified type    Controlled substance agreement signed    Pain of both shoulder joints    Recurrent aspiration pneumonia (H)     Current Outpatient Medications   Medication Sig Dispense Refill    ACCU-CHEK BYRON PLUS test strip USE TO TEST BLOOD SUGAR ONE TIME A  strip 3    furosemide (LASIX) 20 MG tablet Take 1 tablet (20 mg) by mouth daily as needed (edema) 90 tablet 3    gabapentin (NEURONTIN) 300 MG capsule 3 capsules every am, 2 capsules midday and 3 caps at night 270 capsule 3    glipiZIDE (GLUCOTROL XL) 10 MG 24 hr tablet Take 1 tablet (10 mg) by mouth daily 90 tablet 1    losartan (COZAAR) 100 MG tablet Take 1 tablet (100 mg) by mouth daily 90 tablet 3    multivitamin w/minerals (THERA-VIT-M) tablet Take 1 tablet by mouth daily      Omega-3 Fatty Acids (FISH OIL PO) Take 2 capsules by mouth daily       pantoprazole (PROTONIX) 40 MG EC tablet TAKE 1 TABLET BY MOUTH EVERYDAY AT BEDTIME 90 tablet 3    sertraline (ZOLOFT) 100 MG tablet Take 2 tablets by mouth once daily 180 tablet 3    simvastatin (ZOCOR) 20 MG tablet Take 1 tablet (20 mg) by mouth At Bedtime 90 tablet 3    TIADYLT  MG 24  "hr ER beaded capsule TAKE 1 CAPSULE BY MOUTH EVERY DAY 90 capsule 1    tiZANidine (ZANAFLEX) 4 MG tablet TAKE 1-2 TABLETS (4-8 MG) BY MOUTH 3 TIMES DAILY AS NEEDED FOR MUSCLE SPASMS 40 tablet 11    zolpidem (AMBIEN) 10 MG tablet TAKE 1 TABLET BY MOUTH EVERYDAY AT BEDTIME 90 tablet 1    benzonatate (TESSALON) 200 MG capsule Take 1 capsule (200 mg) by mouth 3 times daily as needed for cough 30 capsule 3    clonazePAM (KLONOPIN) 1 MG tablet TAKE 1 TABLET (1 MG) BY MOUTH 2 TIMES DAILY AS NEEDED FOR ANXIETY 60 TO LAST 30 DAYS 60 tablet 5    ketoconazole (NIZORAL) 2 % external cream Apply to fold areas BID PRN 60 g 5    NONFORMULARY Bailey Revive 1 table daily          ROS:   No fever, chills, sudden loss of motor function, loss of control bowel or bladder             Objective    /80   Pulse 94   Temp 98.4  F (36.9  C) (Tympanic)   Resp 16   Ht 1.499 m (4' 11\")   Wt 109.1 kg (240 lb 8 oz)   LMP 09/15/2007   SpO2 94%   BMI 48.58 kg/m    Body mass index is 48.58 kg/m .  Physical Exam       Not examined                  "

## 2023-09-05 ENCOUNTER — TRANSFERRED RECORDS (OUTPATIENT)
Dept: HEALTH INFORMATION MANAGEMENT | Facility: CLINIC | Age: 68
End: 2023-09-05
Payer: MEDICARE

## 2023-09-06 DIAGNOSIS — F41.1 GENERALIZED ANXIETY DISORDER: ICD-10-CM

## 2023-09-06 DIAGNOSIS — G47.00 INSOMNIA, UNSPECIFIED TYPE: ICD-10-CM

## 2023-09-08 RX ORDER — CLONAZEPAM 1 MG/1
1 TABLET ORAL 2 TIMES DAILY PRN
Qty: 60 TABLET | Refills: 5 | Status: SHIPPED | OUTPATIENT
Start: 2023-09-08 | End: 2024-01-25

## 2023-09-11 ENCOUNTER — TRANSCRIBE ORDERS (OUTPATIENT)
Dept: OTHER | Age: 68
End: 2023-09-11

## 2023-09-11 DIAGNOSIS — M43.16 SPONDYLOLISTHESIS, LUMBAR REGION: ICD-10-CM

## 2023-09-11 DIAGNOSIS — M51.16 INTERVERTEBRAL DISC DISORDERS WITH RADICULOPATHY, LUMBAR REGION: ICD-10-CM

## 2023-09-11 DIAGNOSIS — M54.50 LOW BACK PAIN, UNSPECIFIED: Primary | ICD-10-CM

## 2023-09-11 DIAGNOSIS — M48.062 SPINAL STENOSIS, LUMBAR REGION, WITH NEUROGENIC CLAUDICATION: ICD-10-CM

## 2023-09-11 DIAGNOSIS — M47.896 OTHER SPONDYLOSIS, LUMBAR REGION: ICD-10-CM

## 2023-09-14 ENCOUNTER — THERAPY VISIT (OUTPATIENT)
Dept: PHYSICAL THERAPY | Facility: CLINIC | Age: 68
End: 2023-09-14
Payer: MEDICARE

## 2023-09-14 DIAGNOSIS — M48.062 SPINAL STENOSIS OF LUMBAR REGION WITH NEUROGENIC CLAUDICATION: ICD-10-CM

## 2023-09-14 DIAGNOSIS — M54.50 LOW BACK PAIN, UNSPECIFIED: ICD-10-CM

## 2023-09-14 DIAGNOSIS — M48.062 SPINAL STENOSIS, LUMBAR REGION, WITH NEUROGENIC CLAUDICATION: ICD-10-CM

## 2023-09-14 DIAGNOSIS — M51.16 INTERVERTEBRAL DISC DISORDERS WITH RADICULOPATHY, LUMBAR REGION: ICD-10-CM

## 2023-09-14 DIAGNOSIS — M54.50 CHRONIC BILATERAL LOW BACK PAIN WITHOUT SCIATICA: Primary | ICD-10-CM

## 2023-09-14 DIAGNOSIS — M43.16 SPONDYLOLISTHESIS, LUMBAR REGION: ICD-10-CM

## 2023-09-14 DIAGNOSIS — M47.896 OTHER SPONDYLOSIS, LUMBAR REGION: ICD-10-CM

## 2023-09-14 DIAGNOSIS — M43.16 SPONDYLOLISTHESIS OF LUMBAR REGION: ICD-10-CM

## 2023-09-14 DIAGNOSIS — G89.29 CHRONIC BILATERAL LOW BACK PAIN WITHOUT SCIATICA: Primary | ICD-10-CM

## 2023-09-14 PROCEDURE — 97530 THERAPEUTIC ACTIVITIES: CPT | Mod: GP | Performed by: PHYSICAL THERAPIST

## 2023-09-14 PROCEDURE — 97161 PT EVAL LOW COMPLEX 20 MIN: CPT | Mod: GP | Performed by: PHYSICAL THERAPIST

## 2023-09-14 PROCEDURE — 97110 THERAPEUTIC EXERCISES: CPT | Mod: GP | Performed by: PHYSICAL THERAPIST

## 2023-09-14 NOTE — PROGRESS NOTES
PHYSICAL THERAPY EVALUATION  Type of Visit: Evaluation    See electronic medical record for Abuse and Falls Screening details.    Subjective     Pt c/o chronic LBP x years.  MD order date 9/7/2023.  Past PT with moderate success.  Last round of PT spring 2022 (pool therapy-cont to do at home).  To have nerve blocks and Lx brace in near future.  Imaging per pt showed Lx stenosis.  PMH: 2 hernia surgeries, R knee OA and L shoulder dysfunction.        Presenting condition or subjective complaint: Severe spinal stenosis also arthritis  Date of onset: 09/07/23 (MD order date)    Relevant medical history: Arthritis; Depression; Diabetes; High blood pressure; Kidney disease; Overweight; Sleep disorder like apnea   Dates & types of surgery: 3 c/sections. 2 hernia repair    Prior diagnostic imaging/testing results: MRI; CT scan; X-ray; Bone scan     Prior therapy history for the same diagnosis, illness or injury: Yes Spring of 2022 water therapy    Prior Level of Function  Transfers: Independent, Assistive equipment  Ambulation: Independent, Assistive equipment  ADL: Independent, Assistive equipment  IADL: Driving, Finances, Housekeeping, Laundry, Meal preparation, Medication management    Living Environment  Social support: With a significant other or spouse   Type of home: House   Stairs to enter the home: Yes 3 Is there a railing: No   Ramp: Yes   Stairs inside the home: Yes 10 Is there a railing: Yes   Help at home: Other  Equipment owned: Straight Cane; Walker with wheels; Power wheelchair or scooter; Bath bench     Employment: No    Hobbies/Interests: Reading,gardening, swimming in summer, playing games boatd and cards    Patient goals for therapy: Stand, walk without pain. Go up and down stairs, have better balance, do daily chores, enjoy family.    Pain assessment: Pain present     Objective   LUMBAR SPINE EVALUATION  PAIN: Pain Level at Rest: 3/10  Pain Level with Use: 8/10  Pain Location: lumbar spine and into B  L/E to knee level  Pain Quality: Aching, Sharp, Shooting, and Stabbing  Pain Frequency: constant  Pain is Worst: daytime  Pain is Exacerbated By: walking, standing, bending, lifting, twisting, transfers  Pain is Relieved By: heat, NSAIDs, otc medications, rest, and pain meds  Pain Progression: Unchanged  INTEGUMENTARY (edema, incisions):   POSTURE:   GAIT:   Weightbearing Status: WBAT  Assistive Device(s): Walker (four wheeled)  Gait Deviations: Stride length decreased  Yuliet decreased  Trunk flexion  BALANCE/PROPRIOCEPTION: Single Leg Stance Eyes Open (seconds): unable needs support  WEIGHTBEARING ALIGNMENT:   NON-WEIGHTBEARING ALIGNMENT:    ROM:   (Degrees) Left AROM Left PROM  Right AROM Right PROM   Hip Flexion       Hip Extension       Hip Abduction       Hip Adduction       Hip Internal Rotation       Hip External Rotation       Knee Flexion       Knee Extension       Lumbar Side glide     Lumbar Flexion Mod loss +/-.  Seated flex NE during/increased ROM after   Lumbar Extension Max loss +     Lx ROM: B SB min/mod loss -  Pain:   End feel:   PELVIC/SI SCREEN:   STRENGTH:  poor core stab recruitment     MYOTOMES:  B l/E grossly 4/5  DTR S:   CORD SIGNS:   DERMATOMES:   NEURAL TENSION:  (-) SLR/slump  FLEXIBILITY:   LUMBAR/HIP Special Tests:    PELVIS/SI SPECIAL TESTS:   FUNCTIONAL TESTS:   PALPATION:  general TTP Lx paraspinals  SPINAL SEGMENTAL CONCLUSIONS:  N/A      Assessment & Plan   CLINICAL IMPRESSIONS  Medical Diagnosis: Spinal stenosis, lumbar region, with neurogenic claudication    Treatment Diagnosis: LBP   Impression/Assessment: Patient is a 67 year old female with LBP complaints.  The following significant findings have been identified: Pain, Decreased ROM/flexibility, Decreased strength, Decreased proprioception, Impaired gait, Impaired muscle performance, Decreased activity tolerance, and Impaired posture. These impairments interfere with their ability to perform self care tasks, recreational  activities, household chores, driving , household mobility, and community mobility as compared to previous level of function.     Clinical Decision Making (Complexity):  Clinical Presentation: Stable/Uncomplicated  Clinical Presentation Rationale: based on medical and personal factors listed in PT evaluation  Clinical Decision Making (Complexity): Low complexity    PLAN OF CARE  Treatment Interventions:  Interventions: Gait Training, Neuromuscular Re-education, Therapeutic Activity, Therapeutic Exercise    Long Term Goals     PT Goal 1  Goal Identifier: Ambulation at IE <10' with FWW with upto 8/10 pain  Goal Description: Be able to ambulate 20 minutes with FWW with 2/10 pain  Rationale:  (for safe household ambulation;for safe community ambulation;to maintain proper body mechanics/posture while ambulating to avoid additional compensatory injury due to improper gait mechanics;to promote a healthy and active lifestyle)  Target Date: 11/09/23      Frequency of Treatment: 2x/week  Duration of Treatment: 8    Recommended Referrals to Other Professionals:   Education Assessment:   Learner/Method: Patient;Demonstration;Pictures/Video    Risks and benefits of evaluation/treatment have been explained.   Patient/Family/caregiver agrees with Plan of Care.     Evaluation Time:     PT Eval, Low Complexity Minutes (51441): 15       Signing Clinician: Giovanni Walton, PT      [facility]                                                                                   OUTPATIENT PHYSICAL THERAPY      PLAN OF TREATMENT FOR OUTPATIENT REHABILITATION   Patient's Last Name, First Name, Nicole Pink YOB: 1955   Provider's Name   [facility]   Medical Record No.  [id number]     Onset Date: 09/07/23 (MD order date)  Start of Care Date: 09/14/23     Medical Diagnosis:  Spinal stenosis, lumbar region, with neurogenic claudication      PT Treatment  Diagnosis:  LBP Plan of Treatment  Frequency/Duration: 2x/week/ 8    Certification date from 09/14/23 to 11/09/23         See note for plan of treatment details and functional goals     Giovanni Walton, PT                         I CERTIFY THE NEED FOR THESE SERVICES FURNISHED UNDER        THIS PLAN OF TREATMENT AND WHILE UNDER MY CARE .             Physician Signature               Date    X_____________________________________________________                    Referring Provider:  Deneen Carrera      Initial Assessment  See Epic Evaluation- Start of Care Date: 09/14/23

## 2023-09-21 ENCOUNTER — THERAPY VISIT (OUTPATIENT)
Dept: PHYSICAL THERAPY | Facility: CLINIC | Age: 68
End: 2023-09-21
Payer: MEDICARE

## 2023-09-21 DIAGNOSIS — M48.062 SPINAL STENOSIS OF LUMBAR REGION WITH NEUROGENIC CLAUDICATION: ICD-10-CM

## 2023-09-21 DIAGNOSIS — M54.50 CHRONIC BILATERAL LOW BACK PAIN WITHOUT SCIATICA: Primary | ICD-10-CM

## 2023-09-21 DIAGNOSIS — G89.29 CHRONIC BILATERAL LOW BACK PAIN WITHOUT SCIATICA: Primary | ICD-10-CM

## 2023-09-21 DIAGNOSIS — M43.16 SPONDYLOLISTHESIS OF LUMBAR REGION: ICD-10-CM

## 2023-09-21 PROCEDURE — 97110 THERAPEUTIC EXERCISES: CPT | Mod: GP | Performed by: PHYSICAL THERAPIST

## 2023-10-02 NOTE — PLAN OF CARE
End of Shift Summary  For vital signs and complete assessments, please see documentation flowsheets.     Pertinent assessments: Temp max 99.0.  A&O.  On 2L O2.  She has been on and off O2, may need O2 test before she discharges because she currently only has O2 for sleep through her cpap at home.   Having dyspnea on exertion.  Denies pain and nausea.   Tolerating new antibiotic started this evening.      Major Shift events: n/a    Treatment Plan: Oral Augmentin, and supplemental oxygen.     Discharge Readiness: Medically active  Expected Discharge Date: Today  Discharge Disposition: Home with Self care  Barriers/Criteria for discharge: None           4

## 2023-10-03 ENCOUNTER — THERAPY VISIT (OUTPATIENT)
Dept: PHYSICAL THERAPY | Facility: CLINIC | Age: 68
End: 2023-10-03
Payer: MEDICARE

## 2023-10-03 ENCOUNTER — MYC MEDICAL ADVICE (OUTPATIENT)
Dept: INTERNAL MEDICINE | Facility: CLINIC | Age: 68
End: 2023-10-03

## 2023-10-03 DIAGNOSIS — E11.9 TYPE 2 DIABETES MELLITUS WITHOUT COMPLICATION, WITHOUT LONG-TERM CURRENT USE OF INSULIN (H): Primary | ICD-10-CM

## 2023-10-03 DIAGNOSIS — M48.062 SPINAL STENOSIS OF LUMBAR REGION WITH NEUROGENIC CLAUDICATION: ICD-10-CM

## 2023-10-03 DIAGNOSIS — M54.50 CHRONIC BILATERAL LOW BACK PAIN WITHOUT SCIATICA: Primary | ICD-10-CM

## 2023-10-03 DIAGNOSIS — M43.16 SPONDYLOLISTHESIS OF LUMBAR REGION: ICD-10-CM

## 2023-10-03 DIAGNOSIS — G89.29 CHRONIC BILATERAL LOW BACK PAIN WITHOUT SCIATICA: Primary | ICD-10-CM

## 2023-10-03 PROCEDURE — 97110 THERAPEUTIC EXERCISES: CPT | Mod: GP | Performed by: PHYSICAL THERAPIST

## 2023-10-03 NOTE — TELEPHONE ENCOUNTER
See [employer] message. Please advise.     Lab Results   Component Value Date    A1C 7.5 06/05/2023    A1C 6.9 06/02/2022    A1C 6.8 11/08/2021    A1C 6.6 05/28/2021    A1C 6.6 10/23/2020    A1C 6.9 05/12/2020    A1C 6.5 01/16/2020    A1C 6.7 06/21/2019

## 2023-10-05 ENCOUNTER — THERAPY VISIT (OUTPATIENT)
Dept: PHYSICAL THERAPY | Facility: CLINIC | Age: 68
End: 2023-10-05
Payer: MEDICARE

## 2023-10-05 DIAGNOSIS — M43.16 SPONDYLOLISTHESIS OF LUMBAR REGION: ICD-10-CM

## 2023-10-05 DIAGNOSIS — M54.50 CHRONIC BILATERAL LOW BACK PAIN WITHOUT SCIATICA: Primary | ICD-10-CM

## 2023-10-05 DIAGNOSIS — M48.062 SPINAL STENOSIS OF LUMBAR REGION WITH NEUROGENIC CLAUDICATION: ICD-10-CM

## 2023-10-05 DIAGNOSIS — G89.29 CHRONIC BILATERAL LOW BACK PAIN WITHOUT SCIATICA: Primary | ICD-10-CM

## 2023-10-05 PROCEDURE — 97112 NEUROMUSCULAR REEDUCATION: CPT | Mod: GP | Performed by: PHYSICAL THERAPIST

## 2023-10-05 PROCEDURE — 97110 THERAPEUTIC EXERCISES: CPT | Mod: GP | Performed by: PHYSICAL THERAPIST

## 2023-10-10 ENCOUNTER — THERAPY VISIT (OUTPATIENT)
Dept: PHYSICAL THERAPY | Facility: CLINIC | Age: 68
End: 2023-10-10
Payer: MEDICARE

## 2023-10-10 DIAGNOSIS — M48.062 SPINAL STENOSIS OF LUMBAR REGION WITH NEUROGENIC CLAUDICATION: ICD-10-CM

## 2023-10-10 DIAGNOSIS — M43.16 SPONDYLOLISTHESIS OF LUMBAR REGION: ICD-10-CM

## 2023-10-10 DIAGNOSIS — G89.29 CHRONIC BILATERAL LOW BACK PAIN WITHOUT SCIATICA: Primary | ICD-10-CM

## 2023-10-10 DIAGNOSIS — M54.50 CHRONIC BILATERAL LOW BACK PAIN WITHOUT SCIATICA: Primary | ICD-10-CM

## 2023-10-10 PROCEDURE — 97110 THERAPEUTIC EXERCISES: CPT | Mod: GP | Performed by: PHYSICAL THERAPIST

## 2023-10-10 PROCEDURE — 97112 NEUROMUSCULAR REEDUCATION: CPT | Mod: GP | Performed by: PHYSICAL THERAPIST

## 2023-10-12 ENCOUNTER — THERAPY VISIT (OUTPATIENT)
Dept: PHYSICAL THERAPY | Facility: CLINIC | Age: 68
End: 2023-10-12
Payer: MEDICARE

## 2023-10-12 DIAGNOSIS — G89.29 CHRONIC BILATERAL LOW BACK PAIN WITHOUT SCIATICA: Primary | ICD-10-CM

## 2023-10-12 DIAGNOSIS — M48.062 SPINAL STENOSIS OF LUMBAR REGION WITH NEUROGENIC CLAUDICATION: ICD-10-CM

## 2023-10-12 DIAGNOSIS — M54.50 CHRONIC BILATERAL LOW BACK PAIN WITHOUT SCIATICA: Primary | ICD-10-CM

## 2023-10-12 DIAGNOSIS — M43.16 SPONDYLOLISTHESIS OF LUMBAR REGION: ICD-10-CM

## 2023-10-12 PROCEDURE — 97112 NEUROMUSCULAR REEDUCATION: CPT | Mod: GP | Performed by: PHYSICAL THERAPIST

## 2023-10-12 PROCEDURE — 97110 THERAPEUTIC EXERCISES: CPT | Mod: GP | Performed by: PHYSICAL THERAPIST

## 2023-10-17 DIAGNOSIS — G89.29 CHRONIC BILATERAL LOW BACK PAIN WITHOUT SCIATICA: ICD-10-CM

## 2023-10-17 DIAGNOSIS — M54.50 CHRONIC BILATERAL LOW BACK PAIN WITHOUT SCIATICA: ICD-10-CM

## 2023-10-19 ENCOUNTER — THERAPY VISIT (OUTPATIENT)
Dept: PHYSICAL THERAPY | Facility: CLINIC | Age: 68
End: 2023-10-19
Payer: MEDICARE

## 2023-10-19 DIAGNOSIS — M43.16 SPONDYLOLISTHESIS OF LUMBAR REGION: ICD-10-CM

## 2023-10-19 DIAGNOSIS — M48.062 SPINAL STENOSIS OF LUMBAR REGION WITH NEUROGENIC CLAUDICATION: ICD-10-CM

## 2023-10-19 DIAGNOSIS — G89.29 CHRONIC BILATERAL LOW BACK PAIN WITHOUT SCIATICA: Primary | ICD-10-CM

## 2023-10-19 DIAGNOSIS — M54.50 CHRONIC BILATERAL LOW BACK PAIN WITHOUT SCIATICA: Primary | ICD-10-CM

## 2023-10-19 PROCEDURE — 97112 NEUROMUSCULAR REEDUCATION: CPT | Mod: GP | Performed by: PHYSICAL THERAPIST

## 2023-10-19 PROCEDURE — 97110 THERAPEUTIC EXERCISES: CPT | Mod: GP | Performed by: PHYSICAL THERAPIST

## 2023-10-20 RX ORDER — TRAMADOL HYDROCHLORIDE 50 MG/1
50 TABLET ORAL DAILY
Qty: 30 TABLET | Refills: 0 | Status: SHIPPED | OUTPATIENT
Start: 2023-10-20 | End: 2024-01-25

## 2023-10-20 NOTE — TELEPHONE ENCOUNTER
Tramadol refill request. Pt has been waiting for refill since 10/17     Last refill on 8/22/23 for #30    Routing refill request to provider for review/approval because:  Drug not on the [facility] refill protocol

## 2023-10-23 ENCOUNTER — THERAPY VISIT (OUTPATIENT)
Dept: PHYSICAL THERAPY | Facility: CLINIC | Age: 68
End: 2023-10-23
Payer: MEDICARE

## 2023-10-23 DIAGNOSIS — G89.29 CHRONIC BILATERAL LOW BACK PAIN WITHOUT SCIATICA: Primary | ICD-10-CM

## 2023-10-23 DIAGNOSIS — M43.16 SPONDYLOLISTHESIS OF LUMBAR REGION: ICD-10-CM

## 2023-10-23 DIAGNOSIS — M48.062 SPINAL STENOSIS OF LUMBAR REGION WITH NEUROGENIC CLAUDICATION: ICD-10-CM

## 2023-10-23 DIAGNOSIS — M54.50 CHRONIC BILATERAL LOW BACK PAIN WITHOUT SCIATICA: Primary | ICD-10-CM

## 2023-10-23 PROCEDURE — 97110 THERAPEUTIC EXERCISES: CPT | Mod: GP | Performed by: PHYSICAL THERAPIST

## 2023-10-23 PROCEDURE — 97112 NEUROMUSCULAR REEDUCATION: CPT | Mod: GP | Performed by: PHYSICAL THERAPIST

## 2023-10-24 ENCOUNTER — TRANSFERRED RECORDS (OUTPATIENT)
Dept: HEALTH INFORMATION MANAGEMENT | Facility: CLINIC | Age: 68
End: 2023-10-24
Payer: MEDICARE

## 2023-10-26 ENCOUNTER — THERAPY VISIT (OUTPATIENT)
Dept: PHYSICAL THERAPY | Facility: CLINIC | Age: 68
End: 2023-10-26
Payer: MEDICARE

## 2023-10-26 DIAGNOSIS — M48.062 SPINAL STENOSIS OF LUMBAR REGION WITH NEUROGENIC CLAUDICATION: ICD-10-CM

## 2023-10-26 DIAGNOSIS — G89.29 CHRONIC BILATERAL LOW BACK PAIN WITHOUT SCIATICA: Primary | ICD-10-CM

## 2023-10-26 DIAGNOSIS — M54.50 CHRONIC BILATERAL LOW BACK PAIN WITHOUT SCIATICA: Primary | ICD-10-CM

## 2023-10-26 DIAGNOSIS — M43.16 SPONDYLOLISTHESIS OF LUMBAR REGION: ICD-10-CM

## 2023-10-26 PROCEDURE — 97112 NEUROMUSCULAR REEDUCATION: CPT | Mod: GP | Performed by: PHYSICAL THERAPIST

## 2023-10-26 PROCEDURE — 97110 THERAPEUTIC EXERCISES: CPT | Mod: GP | Performed by: PHYSICAL THERAPIST

## 2023-11-02 ENCOUNTER — THERAPY VISIT (OUTPATIENT)
Dept: PHYSICAL THERAPY | Facility: CLINIC | Age: 68
End: 2023-11-02
Payer: MEDICARE

## 2023-11-02 DIAGNOSIS — M54.50 CHRONIC BILATERAL LOW BACK PAIN WITHOUT SCIATICA: Primary | ICD-10-CM

## 2023-11-02 DIAGNOSIS — M43.16 SPONDYLOLISTHESIS OF LUMBAR REGION: ICD-10-CM

## 2023-11-02 DIAGNOSIS — G89.29 CHRONIC BILATERAL LOW BACK PAIN WITHOUT SCIATICA: Primary | ICD-10-CM

## 2023-11-02 DIAGNOSIS — M48.062 SPINAL STENOSIS OF LUMBAR REGION WITH NEUROGENIC CLAUDICATION: ICD-10-CM

## 2023-11-02 PROCEDURE — 97112 NEUROMUSCULAR REEDUCATION: CPT | Mod: GP | Performed by: PHYSICAL THERAPIST

## 2023-11-02 PROCEDURE — 97110 THERAPEUTIC EXERCISES: CPT | Mod: GP | Performed by: PHYSICAL THERAPIST

## 2023-11-03 ENCOUNTER — MYC REFILL (OUTPATIENT)
Dept: INTERNAL MEDICINE | Facility: CLINIC | Age: 68
End: 2023-11-03
Payer: MEDICARE

## 2023-11-03 DIAGNOSIS — G89.29 CHRONIC BILATERAL LOW BACK PAIN WITHOUT SCIATICA: Primary | ICD-10-CM

## 2023-11-03 DIAGNOSIS — M54.50 CHRONIC BILATERAL LOW BACK PAIN WITHOUT SCIATICA: Primary | ICD-10-CM

## 2023-11-05 ENCOUNTER — HEALTH MAINTENANCE LETTER (OUTPATIENT)
Age: 68
End: 2023-11-05

## 2023-11-07 ENCOUNTER — THERAPY VISIT (OUTPATIENT)
Dept: PHYSICAL THERAPY | Facility: CLINIC | Age: 68
End: 2023-11-07
Payer: MEDICARE

## 2023-11-07 DIAGNOSIS — M48.062 SPINAL STENOSIS OF LUMBAR REGION WITH NEUROGENIC CLAUDICATION: ICD-10-CM

## 2023-11-07 DIAGNOSIS — G89.29 CHRONIC BILATERAL LOW BACK PAIN WITHOUT SCIATICA: Primary | ICD-10-CM

## 2023-11-07 DIAGNOSIS — M54.50 CHRONIC BILATERAL LOW BACK PAIN WITHOUT SCIATICA: Primary | ICD-10-CM

## 2023-11-07 DIAGNOSIS — M43.16 SPONDYLOLISTHESIS OF LUMBAR REGION: ICD-10-CM

## 2023-11-07 PROCEDURE — 97110 THERAPEUTIC EXERCISES: CPT | Mod: GP | Performed by: PHYSICAL THERAPIST

## 2023-11-07 PROCEDURE — 97112 NEUROMUSCULAR REEDUCATION: CPT | Mod: GP | Performed by: PHYSICAL THERAPIST

## 2023-11-07 RX ORDER — GABAPENTIN 300 MG/1
CAPSULE ORAL
Qty: 240 CAPSULE | Refills: 0 | Status: SHIPPED | OUTPATIENT
Start: 2023-11-07 | End: 2024-01-25

## 2023-11-07 NOTE — PROGRESS NOTES
[facility]                                                                                   OUTPATIENT PHYSICAL THERAPY    PLAN OF TREATMENT FOR OUTPATIENT REHABILITATION   Patient's Last Name, First Name, Nicole Pink YOB: 1955   Provider's Name   [facility]   Medical Record No.  [id number]     Onset Date: 09/07/23 (MD order date)  Start of Care Date: 09/14/23     Medical Diagnosis:  Spinal stenosis, lumbar region, with neurogenic claudication      PT Treatment Diagnosis:  LBP Plan of Treatment  Frequency/Duration: (P) 1x/month/ (P) 1 month    Certification date from 11/10/23 to 12/07/23         See note for plan of treatment details and functional goals     Giovanni Walton, PT                         I CERTIFY THE NEED FOR THESE SERVICES FURNISHED UNDER        THIS PLAN OF TREATMENT AND WHILE UNDER MY CARE .             Physician Signature               Date    X_____________________________________________________                    Referring Provider:  Deneen Carrera      Initial Assessment  See Epic Evaluation- Start of Care Date: 09/14/23 11/07/23 0500   Appointment Info   Signing clinician's name / credentials Giovanni Walton, PT   Total/Authorized Visits 12   Visits Used 11   Medical Diagnosis Spinal stenosis, lumbar region, with neurogenic claudication   PT Tx Diagnosis LBP   Precautions/Limitations R knee OA and L shoulder RC dysfunction   Quick Adds Certification   Progress Note/Certification   Start of Care Date 09/14/23   Onset of illness/injury or Date of Surgery 09/07/23  (MD order date)   Therapy Frequency 1x/month   Predicted Duration 1 month   Certification date from 11/10/23   Certification date to 12/07/23   Progress Note Completed Date 09/14/23   PT Goal 1   Goal Identifier Ambulation at IE <10' with FWW with upto 8/10 pain   Goal Description Be able to ambulate 20 minutes with FWW with  2/10 pain   Rationale   (for safe household ambulation;for safe community ambulation;to maintain proper body mechanics/posture while ambulating to avoid additional compensatory injury due to improper gait mechanics;to promote a healthy and active lifestyle)   Goal Progress Walking 15' FWW with 4/10 pain, 5 minutes home setting no AD 4-5/10 pain   Target Date 12/07/23   Subjective Report   Subjective Report Pt has had some cancel of appts over course of PT due to transportation and other medical issues. Overall better for strength/activity level.  Currently sore after walking at [employer] craft show over the weekend.  Pain up/down in legs, but still noting pain across Lx.  HEP helpful   Objective Measures   Objective Measures Objective Measure 1;Objective Measure 2   Objective Measure 1   Objective Measure Pain upto  8/10 at IE   Details pain 4-5/10   Objective Measure 2   Objective Measure Lx ROM: flex min/mod loss +/-.  rep seated flex NE during/decreased pain after. Lx ext max loss +/-. B SB min loss +/-.   Details Improved but still poor/fair core stab recruitment with low endurance.  SLS 4-5 sec unsupported, 20-25 sec supported   Treatment Interventions (PT)   Interventions Therapeutic Procedure/Exercise;Therapeutic Activity;Neuromuscular Re-education   Therapeutic Procedure/Exercise   Therapeutic Procedures: strength, endurance, ROM, flexibillity minutes (33694) 20   Ther Proc 1 nu step sh4 lv 5   Ther Proc 1 - Details 8'   PTRx Ther Proc 1 Supine Lumbar Hip Roll   PTRx Ther Proc 1 - Details rev   PTRx Ther Proc 2 Roll Ins Hooklying   PTRx Ther Proc 2 - Details rev   PTRx Ther Proc 3 Supine Abdominal Exercise #3 (Marching)   PTRx Ther Proc 3 - Details rev   PTRx Ther Proc 4 Roll Ins   PTRx Ther Proc 4 - Details 5x 5 sec hold min/mod postural cuing   PTRx Ther Proc 5 Sitting Flexion   PTRx Ther Proc 5 - Details 5x 5 sec hold (can use hands to walk self up knees to assist)   PTRx Ther Proc 6 Seated Hip Flexion    PTRx Ther Proc 6 - Details x10 cueing for core engagement x10 with GTB assist for HS stretch 5-10 sec hold   PTRx Ther Proc 7 Roll Outs   PTRx Ther Proc 7 - Details 10x GTB mod postural cuing   PTRx Ther Proc 8 Hip AROM Standing Abduction   PTRx Ther Proc 8 - Details 5x B supported   PTRx Ther Proc 9 Hip AROM Standing Extension   PTRx Ther Proc 9 - Details 5x B supported   PTRx Ther Proc 10 Short Arc Knee Extension   PTRx Ther Proc 10 - Details 10x B AG strength and nerve floss   Therapeutic Activity   Ther Act 1 Discussed Dx/anatomy, POC, pt goals and self control measures (brief body mechanics)   Neuromuscular Re-education   Neuromuscular re-ed of mvmt, balance, coord, kinesthetic sense, posture, proprioception minutes (40943) 15   PTRx Neuro Re-ed 1 Shoulder Theraband Rows   PTRx Neuro Re-ed 1 - Details 20x GTB sitting   PTRx Neuro Re-ed 2 Shoulder Theraband Low Row/Pulldown   PTRx Neuro Re-ed 2 - Details 20x GTB sitting   PTRx Neuro Re-ed 3 Balance Single Leg Stance Supported and Unsupported   PTRx Neuro Re-ed 3 - Details 20-25 sec x 3 B full B hand support   Education   Learner/Method Patient;Demonstration;Pictures/Video   Plan   Home program See PTRx

## 2023-11-07 NOTE — TELEPHONE ENCOUNTER
Patient calls to check the status of her Gabapentin refill that was put in on 11/03 and not yet addressed.  Please refill today if possible.

## 2023-11-07 NOTE — TELEPHONE ENCOUNTER
I started covering for Dr. Wiley from 11/6/2023 onwards.  Med refilled for 1 month, patient has appointment with Karla Kerns to establish care

## 2023-11-09 ENCOUNTER — TRANSFERRED RECORDS (OUTPATIENT)
Dept: HEALTH INFORMATION MANAGEMENT | Facility: CLINIC | Age: 68
End: 2023-11-09
Payer: MEDICARE

## 2023-11-15 DIAGNOSIS — E11.9 TYPE 2 DIABETES MELLITUS WITHOUT COMPLICATION, WITHOUT LONG-TERM CURRENT USE OF INSULIN (H): ICD-10-CM

## 2023-11-20 RX ORDER — SEMAGLUTIDE 0.68 MG/ML
INJECTION, SOLUTION SUBCUTANEOUS
Qty: 3 ML | Refills: 1 | Status: SHIPPED | OUTPATIENT
Start: 2023-11-20 | End: 2024-01-25

## 2023-11-28 ENCOUNTER — THERAPY VISIT (OUTPATIENT)
Dept: PHYSICAL THERAPY | Facility: CLINIC | Age: 68
End: 2023-11-28
Payer: MEDICARE

## 2023-11-28 DIAGNOSIS — M48.062 SPINAL STENOSIS OF LUMBAR REGION WITH NEUROGENIC CLAUDICATION: ICD-10-CM

## 2023-11-28 DIAGNOSIS — G89.29 CHRONIC BILATERAL LOW BACK PAIN WITHOUT SCIATICA: Primary | ICD-10-CM

## 2023-11-28 DIAGNOSIS — M54.50 CHRONIC BILATERAL LOW BACK PAIN WITHOUT SCIATICA: Primary | ICD-10-CM

## 2023-11-28 DIAGNOSIS — M43.16 SPONDYLOLISTHESIS OF LUMBAR REGION: ICD-10-CM

## 2023-11-28 PROCEDURE — 97112 NEUROMUSCULAR REEDUCATION: CPT | Mod: GP | Performed by: PHYSICAL THERAPIST

## 2023-11-28 PROCEDURE — 97110 THERAPEUTIC EXERCISES: CPT | Mod: GP | Performed by: PHYSICAL THERAPIST

## 2023-11-28 NOTE — PROGRESS NOTES
DISCHARGE  Reason for Discharge: No further expectation of progress.    Equipment Issued: none    Discharge Plan: Patient to continue home program.    Referring Provider:  Deneen Carrera     11/28/23 0500   Appointment Info   Signing clinician's name / credentials Giovanni Walton PT   Total/Authorized Visits 12   Visits Used 12   Medical Diagnosis Spinal stenosis, lumbar region, with neurogenic claudication   PT Tx Diagnosis LBP   Precautions/Limitations R knee OA and L shoulder RC dysfunction   Quick Adds Certification   Progress Note/Certification   Start of Care Date 09/14/23   Onset of illness/injury or Date of Surgery 09/07/23  (MD order date)   Therapy Frequency 1x/month   Predicted Duration 1 month   Certification date from 11/10/23   Certification date to 12/07/23   Progress Note Completed Date 11/07/23   PT Goal 1   Goal Identifier Ambulation at IE <10' with FWW with upto 8/10 pain   Goal Description Be able to ambulate 20 minutes with FWW with 2/10 pain   Rationale   (for safe household ambulation;for safe community ambulation;to maintain proper body mechanics/posture while ambulating to avoid additional compensatory injury due to improper gait mechanics;to promote a healthy and active lifestyle)   Goal Progress Walking 15' FWW with 4/10 pain, 5 minutes home setting no AD 4-5/10 pain   Target Date 12/07/23   Subjective Report   Subjective Report Overall doing ok. HEP helpful but still pain and fatigues.   Objective Measures   Objective Measures Objective Measure 1;Objective Measure 2   Objective Measure 1   Objective Measure Pain upto  8/10 at IE   Details pain 4-5/10   Objective Measure 2   Objective Measure Lx ROM: flex min loss +/-.  rep seated flex NE during/decreased pain after. Lx ext max loss +/-. B SB min loss +/-.   Details Improved but still poor/fair core stab recruitment with low endurance.  SLS 4-5 sec unsupported, 20-25 sec supported   Treatment Interventions (PT)   Interventions  Therapeutic Procedure/Exercise;Therapeutic Activity;Neuromuscular Re-education   Therapeutic Procedure/Exercise   Therapeutic Procedures: strength, endurance, ROM, flexibillity minutes (14491) 20   Ther Proc 1 nu step sh4 lv 5   Ther Proc 1 - Details 8'   PTRx Ther Proc 1 Supine Lumbar Hip Roll   PTRx Ther Proc 1 - Details rev   PTRx Ther Proc 2 Roll Ins Hooklying   PTRx Ther Proc 2 - Details rev   PTRx Ther Proc 3 Supine Abdominal Exercise #3 (Marching)   PTRx Ther Proc 3 - Details rev   PTRx Ther Proc 4 Roll Ins   PTRx Ther Proc 4 - Details 5x 5 sec hold min/mod postural cuing   PTRx Ther Proc 5 Sitting Flexion   PTRx Ther Proc 5 - Details 5x 5 sec hold (can use hands to walk self up knees to assist)   PTRx Ther Proc 6 Seated Hip Flexion   PTRx Ther Proc 6 - Details x10 cueing for core engagement x10 with GTB assist for HS stretch 5-10 sec hold   PTRx Ther Proc 7 Roll Outs   PTRx Ther Proc 7 - Details 10x GTB mod postural cuing   PTRx Ther Proc 8 Hip AROM Standing Abduction   PTRx Ther Proc 8 - Details 5x B supported   PTRx Ther Proc 9 Hip AROM Standing Extension   PTRx Ther Proc 9 - Details 5x B supported   PTRx Ther Proc 10 Short Arc Knee Extension   PTRx Ther Proc 10 - Details 10x B AG strength and nerve floss   Therapeutic Activity   Ther Act 1 Discussed Dx/anatomy, POC, pt goals and self control measures (brief body mechanics)   Neuromuscular Re-education   Neuromuscular re-ed of mvmt, balance, coord, kinesthetic sense, posture, proprioception minutes (55785) 15   PTRx Neuro Re-ed 1 Shoulder Theraband Rows   PTRx Neuro Re-ed 1 - Details 20x GTB sitting   PTRx Neuro Re-ed 2 Shoulder Theraband Low Row/Pulldown   PTRx Neuro Re-ed 2 - Details 20x GTB sitting   PTRx Neuro Re-ed 3 Balance Single Leg Stance Supported and Unsupported   PTRx Neuro Re-ed 3 - Details 20-25 sec x 3 B full B hand support   Education   Learner/Method Patient;Demonstration;Pictures/Video   Plan   Home program See PTRx   Total Session Time    Timed Code Treatment Minutes 35   Total Treatment Time (sum of timed and untimed services) 35

## 2024-01-12 ENCOUNTER — TELEPHONE (OUTPATIENT)
Dept: INTERNAL MEDICINE | Facility: CLINIC | Age: 69
End: 2024-01-12
Payer: MEDICARE

## 2024-01-12 DIAGNOSIS — I10 BENIGN ESSENTIAL HYPERTENSION: ICD-10-CM

## 2024-01-14 ENCOUNTER — HEALTH MAINTENANCE LETTER (OUTPATIENT)
Age: 69
End: 2024-01-14

## 2024-01-16 RX ORDER — DILTIAZEM HYDROCHLORIDE 360 MG/1
CAPSULE, EXTENDED RELEASE ORAL
Qty: 90 CAPSULE | Refills: 0 | Status: SHIPPED | OUTPATIENT
Start: 2024-01-16 | End: 2024-01-25

## 2024-01-20 ENCOUNTER — MYC MEDICAL ADVICE (OUTPATIENT)
Dept: INTERNAL MEDICINE | Facility: CLINIC | Age: 69
End: 2024-01-20
Payer: MEDICARE

## 2024-01-20 DIAGNOSIS — G47.00 INSOMNIA, UNSPECIFIED TYPE: ICD-10-CM

## 2024-01-20 ASSESSMENT — ENCOUNTER SYMPTOMS
FEVER: 0
WEAKNESS: 1
HEMATOCHEZIA: 0
FREQUENCY: 0
BREAST MASS: 0
NERVOUS/ANXIOUS: 1
ARTHRALGIAS: 1
DYSURIA: 0
ABDOMINAL PAIN: 0
EYE PAIN: 0
CONSTIPATION: 0
JOINT SWELLING: 1
SHORTNESS OF BREATH: 0
HEADACHES: 0
SORE THROAT: 0
COUGH: 0
DIZZINESS: 0
CHILLS: 0
HEMATURIA: 0
MYALGIAS: 1
PARESTHESIAS: 0
HEARTBURN: 1
NAUSEA: 0
PALPITATIONS: 0
DIARRHEA: 0

## 2024-01-20 ASSESSMENT — ACTIVITIES OF DAILY LIVING (ADL)
CURRENT_FUNCTION: LAUNDRY REQUIRES ASSISTANCE
CURRENT_FUNCTION: HOUSEWORK REQUIRES ASSISTANCE

## 2024-01-22 RX ORDER — ZOLPIDEM TARTRATE 10 MG/1
10 TABLET ORAL AT BEDTIME
Qty: 90 TABLET | Refills: 1 | Status: SHIPPED | OUTPATIENT
Start: 2024-01-22 | End: 2024-01-25

## 2024-01-22 NOTE — TELEPHONE ENCOUNTER
Please see patient's mychart message below.      Please advise, thanks.    Solis Adams, Triage RN [place] [facility]  8:20 AM 1/22/2024

## 2024-01-22 NOTE — TELEPHONE ENCOUNTER
Sent [employer] message to patient.    Solis Adams, Triage RN Uday Weinstein  1:06 PM 1/22/2024

## 2024-01-25 ENCOUNTER — OFFICE VISIT (OUTPATIENT)
Dept: INTERNAL MEDICINE | Facility: CLINIC | Age: 69
End: 2024-01-25
Payer: MEDICARE

## 2024-01-25 VITALS
SYSTOLIC BLOOD PRESSURE: 120 MMHG | DIASTOLIC BLOOD PRESSURE: 78 MMHG | BODY MASS INDEX: 47.98 KG/M2 | RESPIRATION RATE: 16 BRPM | HEART RATE: 94 BPM | OXYGEN SATURATION: 96 % | WEIGHT: 238 LBS | TEMPERATURE: 97.8 F | HEIGHT: 59 IN

## 2024-01-25 DIAGNOSIS — E11.22 TYPE 2 DIABETES MELLITUS WITH STAGE 3A CHRONIC KIDNEY DISEASE, WITHOUT LONG-TERM CURRENT USE OF INSULIN (H): ICD-10-CM

## 2024-01-25 DIAGNOSIS — Z00.00 ROUTINE GENERAL MEDICAL EXAMINATION AT A HEALTH CARE FACILITY: Primary | ICD-10-CM

## 2024-01-25 DIAGNOSIS — F33.1 MAJOR DEPRESSIVE DISORDER, RECURRENT EPISODE, MODERATE (H): ICD-10-CM

## 2024-01-25 DIAGNOSIS — G47.00 INSOMNIA, UNSPECIFIED TYPE: ICD-10-CM

## 2024-01-25 DIAGNOSIS — M54.50 CHRONIC BILATERAL LOW BACK PAIN WITHOUT SCIATICA: ICD-10-CM

## 2024-01-25 DIAGNOSIS — E11.9 TYPE 2 DIABETES MELLITUS WITHOUT COMPLICATION, WITHOUT LONG-TERM CURRENT USE OF INSULIN (H): ICD-10-CM

## 2024-01-25 DIAGNOSIS — Z00.00 ENCOUNTER FOR MEDICARE ANNUAL WELLNESS EXAM: ICD-10-CM

## 2024-01-25 DIAGNOSIS — Z78.0 ASYMPTOMATIC POSTMENOPAUSAL STATUS: ICD-10-CM

## 2024-01-25 DIAGNOSIS — E66.01 MORBID OBESITY (H): ICD-10-CM

## 2024-01-25 DIAGNOSIS — Z12.31 ENCOUNTER FOR SCREENING MAMMOGRAM FOR BREAST CANCER: ICD-10-CM

## 2024-01-25 DIAGNOSIS — F33.0 MILD EPISODE OF RECURRENT MAJOR DEPRESSIVE DISORDER (H): ICD-10-CM

## 2024-01-25 DIAGNOSIS — F41.1 GENERALIZED ANXIETY DISORDER: ICD-10-CM

## 2024-01-25 DIAGNOSIS — N18.31 TYPE 2 DIABETES MELLITUS WITH STAGE 3A CHRONIC KIDNEY DISEASE, WITHOUT LONG-TERM CURRENT USE OF INSULIN (H): ICD-10-CM

## 2024-01-25 DIAGNOSIS — I10 BENIGN ESSENTIAL HYPERTENSION: ICD-10-CM

## 2024-01-25 DIAGNOSIS — G89.29 CHRONIC BILATERAL LOW BACK PAIN WITHOUT SCIATICA: ICD-10-CM

## 2024-01-25 DIAGNOSIS — Z79.899 CONTROLLED SUBSTANCE AGREEMENT SIGNED: ICD-10-CM

## 2024-01-25 LAB — HBA1C MFR BLD: 6.3 % (ref 0–5.6)

## 2024-01-25 PROCEDURE — 80061 LIPID PANEL: CPT | Performed by: NURSE PRACTITIONER

## 2024-01-25 PROCEDURE — 84443 ASSAY THYROID STIM HORMONE: CPT | Performed by: NURSE PRACTITIONER

## 2024-01-25 PROCEDURE — G0439 PPPS, SUBSEQ VISIT: HCPCS | Performed by: NURSE PRACTITIONER

## 2024-01-25 PROCEDURE — 36415 COLL VENOUS BLD VENIPUNCTURE: CPT | Performed by: NURSE PRACTITIONER

## 2024-01-25 PROCEDURE — 99214 OFFICE O/P EST MOD 30 MIN: CPT | Mod: 25 | Performed by: NURSE PRACTITIONER

## 2024-01-25 PROCEDURE — 83036 HEMOGLOBIN GLYCOSYLATED A1C: CPT | Performed by: NURSE PRACTITIONER

## 2024-01-25 PROCEDURE — 80053 COMPREHEN METABOLIC PANEL: CPT | Performed by: NURSE PRACTITIONER

## 2024-01-25 RX ORDER — ZOLPIDEM TARTRATE 10 MG/1
10 TABLET ORAL AT BEDTIME
Qty: 90 TABLET | Refills: 1 | Status: SHIPPED | OUTPATIENT
Start: 2024-01-30 | End: 2024-06-11

## 2024-01-25 RX ORDER — GABAPENTIN 300 MG/1
CAPSULE ORAL
Qty: 240 CAPSULE | Refills: 3 | Status: SHIPPED | OUTPATIENT
Start: 2024-01-25 | End: 2024-06-20

## 2024-01-25 RX ORDER — LOSARTAN POTASSIUM 100 MG/1
100 TABLET ORAL DAILY
Qty: 90 TABLET | Refills: 3 | Status: SHIPPED | OUTPATIENT
Start: 2024-01-25 | End: 2024-07-22

## 2024-01-25 RX ORDER — DILTIAZEM HYDROCHLORIDE 360 MG/1
360 CAPSULE, EXTENDED RELEASE ORAL DAILY
Qty: 90 CAPSULE | Refills: 3 | Status: ON HOLD | OUTPATIENT
Start: 2024-01-25 | End: 2024-06-11

## 2024-01-25 RX ORDER — RESPIRATORY SYNCYTIAL VIRUS VACCINE 120MCG/0.5
0.5 KIT INTRAMUSCULAR ONCE
Qty: 1 EACH | Refills: 0 | Status: CANCELLED | OUTPATIENT
Start: 2024-01-25 | End: 2024-01-25

## 2024-01-25 RX ORDER — SERTRALINE HYDROCHLORIDE 100 MG/1
TABLET, FILM COATED ORAL
Qty: 180 TABLET | Refills: 3 | Status: SHIPPED | OUTPATIENT
Start: 2024-01-25

## 2024-01-25 RX ORDER — CLONAZEPAM 1 MG/1
1 TABLET ORAL 2 TIMES DAILY PRN
Qty: 60 TABLET | Refills: 5 | Status: SHIPPED | OUTPATIENT
Start: 2024-01-25 | End: 2024-06-11

## 2024-01-25 RX ORDER — GLIPIZIDE 10 MG/1
10 TABLET, FILM COATED, EXTENDED RELEASE ORAL DAILY
Qty: 90 TABLET | Refills: 3 | Status: SHIPPED | OUTPATIENT
Start: 2024-01-25

## 2024-01-25 RX ORDER — BLOOD SUGAR DIAGNOSTIC
STRIP MISCELLANEOUS
Qty: 100 STRIP | Refills: 11 | Status: SHIPPED | OUTPATIENT
Start: 2024-01-25 | End: 2024-05-30

## 2024-01-25 ASSESSMENT — ENCOUNTER SYMPTOMS
JOINT SWELLING: 1
HEMATURIA: 0
HEADACHES: 0
EYE PAIN: 0
NAUSEA: 0
SORE THROAT: 0
NERVOUS/ANXIOUS: 1
PALPITATIONS: 0
CHILLS: 0
DIARRHEA: 0
WEAKNESS: 1
SHORTNESS OF BREATH: 0
DYSURIA: 0
ABDOMINAL PAIN: 0
COUGH: 0
CONSTIPATION: 0
DIZZINESS: 0
FEVER: 0
MYALGIAS: 1
FREQUENCY: 0
ARTHRALGIAS: 1

## 2024-01-25 ASSESSMENT — ACTIVITIES OF DAILY LIVING (ADL)
CURRENT_FUNCTION: LAUNDRY REQUIRES ASSISTANCE
CURRENT_FUNCTION: HOUSEWORK REQUIRES ASSISTANCE

## 2024-01-25 ASSESSMENT — PATIENT HEALTH QUESTIONNAIRE - PHQ9
10. IF YOU CHECKED OFF ANY PROBLEMS, HOW DIFFICULT HAVE THESE PROBLEMS MADE IT FOR YOU TO DO YOUR WORK, TAKE CARE OF THINGS AT HOME, OR GET ALONG WITH OTHER PEOPLE: VERY DIFFICULT
SUM OF ALL RESPONSES TO PHQ QUESTIONS 1-9: 6
SUM OF ALL RESPONSES TO PHQ QUESTIONS 1-9: 6

## 2024-01-25 NOTE — PATIENT INSTRUCTIONS
Lab in [address]    Medication refilled   Patient Education   Personalized Prevention Plan  You are due for the preventive services outlined below.  Your care team is available to assist you in scheduling these services.  If you have already completed any of these items, please share that information with your care team to update in your medical record.  Health Maintenance Due   Topic Date Due     RSV VACCINE (Pregnancy & 60+) (1 - 1-dose 60+ series) Never done     Annual Wellness Visit  08/09/2023     Diabetic Foot Exam  08/09/2023     Flu Vaccine (1) 09/01/2023     A1C Lab  12/05/2023     Osteoporosis Screening  01/12/2024     Your Health Risk Assessment indicates you feel you are not in good health    A healthy lifestyle helps keep the body fit and the mind alert. It helps protect you from disease, helps you fight disease, and helps prevent chronic disease (disease that doesn't go away) from getting worse. This is important as you get older and begin to notice twinges in muscles and joints and a decline in the strength and stamina you once took for granted. A healthy lifestyle includes good healthcare, good nutrition, weight control, recreation, and regular exercise. Avoid harmful substances and do what you can to keep safe. Another part of a healthy lifestyle is stay mentally active and socially involved.    Good healthcare   Have a wellness visit every year.   If you have new symptoms, let us know right away. Don't wait until the next checkup.   Take medicines exactly as prescribed and keep your medicines in a safe place. Tell us if your medicine causes problems.   Healthy diet and weight control   Eat 3 or 4 small, nutritious, low-fat, high-fiber meals a day. Include a variety of fruits, vegetables, and whole-grain foods.   Make sure you get enough calcium in your diet. Calcium, vitamin D, and exercise help prevent osteoporosis (bone thinning).   If you live alone, try eating with others when you can. That way  "you get a good meal and have company while you eat it.   Try to keep a healthy weight. If you eat more calories than your body uses for energy, it will be stored as fat and you will gain weight.     Recreation   Recreation is not limited to sports and team events. It includes any activity that provides relaxation, interest, enjoyment, and exercise. Recreation provides an outlet for physical, mental, and social energy. It can give a sense of worth and achievement. It can help you stay healthy.    Mental Exercise and Social Involvement  Mental and emotional health is as important as physical health. Keep in touch with friends and family. Stay as active as possible. Continue to learn and challenge yourself.   Things you can do to stay mentally active are:  Learn something new, like a foreign language or musical instrument.   Play SCRABBLE or do crossword puzzles. If you cannot find people to play these games with you at home, you can play them with others on your computer through the Internet.   Join a games club--anything from card games to chess or checkers or lawn bowling.   Start a new hobby.   Go back to school.   Volunteer.   Read.   Keep up with world events.  Learning About Being Physically Active  What is physical activity?     Being physically active means doing any kind of activity that gets your body moving.  The types of physical activity that can help you get fit and stay healthy include:  Aerobic or \"cardio\" activities. These make your heart beat faster and make you breathe harder, such as brisk walking, riding a bike, or running. They strengthen your heart and lungs and build up your endurance.  Strength training activities. These make your muscles work against, or \"resist,\" something. Examples include lifting weights or doing push-ups. These activities help tone and strengthen your muscles and bones.  Stretches. These let you move your joints and muscles through their full range of motion. Stretching " helps you be more flexible.  Reaching a balance between these three types of physical activity is important because each one contributes to your overall fitness.  What are the benefits of being active?  Being active is one of the best things you can do for your health. It helps you to:  Feel stronger and have more energy to do all the things you like to do.  Focus better at school or work.  Feel, think, and sleep better.  Reach and stay at a healthy weight.  Lose fat and build lean muscle.  Lower your risk for serious health problems, including diabetes, heart attack, high blood pressure, and some cancers.  Keep your heart, lungs, bones, muscles, and joints strong and healthy.  How can you make being active part of your life?  Start slowly. Make it your long-term goal to get at least 30 minutes of exercise on most days of the week. Walking is a good choice. You also may want to do other activities, such as running, swimming, cycling, or playing tennis or team sports.  Pick activities that you like--ones that make your heart beat faster, your muscles stronger, and your muscles and joints more flexible. If you find more than one thing you like doing, do them all. You don't have to do the same thing every day.  Get your heart pumping every day. Any activity that makes your heart beat faster and keeps it at that rate for a while counts.  Here are some great ways to get your heart beating faster:  Go for a brisk walk, run, or hike.  Go for a swim or bike ride.  Take an online exercise class or dance.  Play a game of touch football, basketball, or soccer.  Play tennis, pickleball, or racquetball.  Climb stairs.  Even some household chores can be aerobic. Just do them at a faster pace. Raking or mowing the lawn, sweeping the garage, and vacuuming and cleaning your home all can help get your heart rate up.  Strengthen your muscles during the week. You don't have to lift heavy weights or grow big, bulky muscles to get  "stronger. Doing a few simple activities that make your muscles work against, or \"resist,\" something can help you get stronger. Aim for at least twice a week.  For example, you can:  Do push-ups or sit-ups, which use your own body weight as resistance.  Lift weights or dumbbells or use stretch bands at home or in a gym or community center.  Stretch your muscles often. Stretching will help you as you become more active. It can help you stay flexible and loosen tight muscles. It can also help improve your balance and posture and can be a great way to relax.  Be sure to stretch the muscles you'll be using when you work out. It's best to warm your muscles slightly before you stretch them. Walk or do some other light aerobic activity for a few minutes. Then start stretching.  When you stretch your muscles:  Do it slowly. Stretching is not about going fast or making sudden movements.  Don't push or bounce during a stretch.  Hold each stretch for at least 15 to 30 seconds, if you can. You should feel a stretch in the muscle, but not pain.  Breathe out as you do the stretch. Then breathe in as you hold the stretch. Don't hold your breath.  If you're worried about how more activity might affect your health, have a checkup before you start. Follow any special advice your doctor gives you for getting a smart start.  Where can you learn more?  Go to https://www.[employer].net/patiented  Enter W332 in the search box to learn more about \"Learning About Being Physically Active.\"  Current as of: June 6, 2023               Content Version: 13.8    0161-9961 [employer].   Care instructions adapted under license by your healthcare professional. If you have questions about a medical condition or this instruction, always ask your healthcare professional. [employer] disclaims any warranty or liability for your use of this information.      Learning About Dietary Guidelines  What are the Dietary Guidelines for " Americans?     Dietary Guidelines for Americans provide tips for eating well and staying healthy. This helps reduce the risk for long-term (chronic) diseases.  These guidelines recommend that you:  Eat and drink the right amount for you. The [place] government's food guide is called MyPlate. It can help you make your own well-balanced eating plan.  Try to balance your eating with your activity. This helps you stay at a healthy weight.  Drink alcohol in moderation, if at all.  Limit foods high in salt, saturated fat, trans fat, and added sugar.  These guidelines are from the [place] Department of Agriculture and the [place] Department of Health and Human Services. They are updated every 5 years.  What is MyPlate?  MyPlate is the [place] government's food guide. It can help you make your own well-balanced eating plan. A balanced eating plan means that you eat enough, but not too much, and that your food gives you the nutrients you need to stay healthy.  MyPlate focuses on eating plenty of whole grains, fruits, and vegetables, and on limiting fat and sugar. It is available online at www.ChooseMyPlate.gov.  How can you get started?  If you're trying to eat healthier, you can slowly change your eating habits over time. You don't have to make big changes all at once. Start by adding one or two healthy foods to your meals each day.  Grains  Choose whole-grain breads and cereals and whole-wheat pasta and whole-grain crackers.  Vegetables  Eat a variety of vegetables every day. They have lots of nutrients and are part of a heart-healthy diet.  Fruits  Eat a variety of fruits every day. Fruits contain lots of nutrients. Choose fresh fruit instead of fruit juice.  Protein foods  Choose fish and lean poultry more often. Eat red meat and fried meats less often. Dried beans, tofu, and nuts are also good sources of protein.  Dairy  Choose low-fat or fat-free products from this food group. If you have problems digesting milk, try eating cheese  "or yogurt instead.  Fats and oils  Limit fats and oils if you're trying to cut calories. Choose healthy fats when you cook. These include canola oil and olive oil.  Where can you learn more?  Go to https://www.[employer].net/patiented  Enter D676 in the search box to learn more about \"Learning About Dietary Guidelines.\"  Current as of: February 28, 2023               Content Version: 13.8    3158-3592 [address].   Care instructions adapted under license by your healthcare professional. If you have questions about a medical condition or this instruction, always ask your healthcare professional. [address] disclaims any warranty or liability for your use of this information.      Activities of Daily Living    Your Health Risk Assessment indicates you have difficulties with activities of daily living such as housework, bathing, preparing meals, taking medication, etc. Please make a follow up appointment for us to address this issue in more detail.  Hearing Loss: Care Instructions  Overview     Hearing loss is a sudden or slow decrease in how well you hear. It can range from slight to profound. Permanent hearing loss can occur with aging. It also can happen when you are exposed long-term to loud noise. Examples include listening to loud music, riding motorcycles, or being around other loud machines.  Hearing loss can affect your work and home life. It can make you feel lonely or depressed. You may feel that you have lost your independence. But hearing aids and other devices can help you hear better and feel connected to others.  Follow-up care is a key part of your treatment and safety. Be sure to make and go to all appointments, and call your doctor if you are having problems. It's also a good idea to know your test results and keep a list of the medicines you take.  How can you care for yourself at home?  Avoid loud noises whenever possible. This helps keep your hearing from getting " "worse.  Always wear hearing protection around loud noises.  Wear a hearing aid as directed.  A professional can help you pick a hearing aid that will work best for you.  You can also get hearing aids over the counter for mild to moderate hearing loss.  Have hearing tests as your doctor suggests. They can show whether your hearing has changed. Your hearing aid may need to be adjusted.  Use other devices as needed. These may include:  Telephone amplifiers and hearing aids that can connect to a television, stereo, radio, or microphone.  Devices that use lights or vibrations. These alert you to the doorbell, a ringing telephone, or a baby monitor.  Television closed-captioning. This shows the words at the bottom of the screen. Most new TVs can do this.  TTY (text telephone). This lets you type messages back and forth on the telephone instead of talking or listening. These devices are also called TDD. When messages are typed on the keyboard, they are sent over the phone line to a receiving TTY. The message is shown on a monitor.  Use text messaging, social media, and email if it is hard for you to communicate by telephone.  Try to learn a listening technique called speechreading. It is not lipreading. You pay attention to people's gestures, expressions, posture, and tone of voice. These clues can help you understand what a person is saying. Face the person you are talking to, and have them face you. Make sure the lighting is good. You need to see the other person's face clearly.  Think about counseling if you need help to adjust to your hearing loss.  When should you call for help?  Watch closely for changes in your health, and be sure to contact your doctor if:    You think your hearing is getting worse.     You have new symptoms, such as dizziness or nausea.   Where can you learn more?  Go to https://www.healthwise.net/patiented  Enter R798 in the search box to learn more about \"Hearing Loss: Care " "Instructions.\"  Current as of: February 28, 2023               Content Version: 13.8    6288-8943 [employer].   Care instructions adapted under license by your healthcare professional. If you have questions about a medical condition or this instruction, always ask your healthcare professional. [employer] disclaims any warranty or liability for your use of this information.      Your Health Risk Assessment indicates you feel you are not in good emotional health.    Recreation   Recreation is not limited to sports and team events. It includes any activity that provides relaxation, interest, enjoyment, and exercise. Recreation provides an outlet for physical, mental, and social energy. It can give a sense of worth and achievement. It can help you stay healthy.    Mental Exercise and Social Involvement  Mental and emotional health is as important as physical health. Keep in touch with friends and family. Stay as active as possible. Continue to learn and challenge yourself.   Things you can do to stay mentally active are:  Learn something new, like a foreign language or musical instrument.   Play SCRABBLE or do crossword puzzles. If you cannot find people to play these games with you at home, you can play them with others on your computer through the Internet.   Join a games club--anything from card games to chess or checkers or lawn bowling.   Start a new hobby.   Go back to school.   Volunteer.   Read.   Keep up with world events.  Learning About Depression Screening  What is depression screening?  Depression screening is a way to see if you have depression symptoms. It may be done by a doctor or counselor. It's often part of a routine checkup. That's because your mental health is just as important as your physical health.  Depression is a mental health condition that affects how you feel, think, and act. You may:  Have less energy.  Lose interest in your daily activities.  Feel sad and " "grouchy for a long time.  Depression is very common. It affects people of all ages.  Many things can lead to depression. Some people become depressed after they have a stroke or find out they have a major illness like cancer or heart disease. The death of a loved one or a breakup may lead to depression. It can run in families. Most experts believe that a combination of inherited genes and stressful life events can cause it.  What happens during screening?  You may be asked to fill out a form about your depression symptoms. You and the doctor will discuss your answers. The doctor may ask you more questions to learn more about how you think, act, and feel.  What happens after screening?  If you have symptoms of depression, your doctor will talk to you about your options.  Doctors usually treat depression with medicines or counseling. Often, combining the two works best. Many people don't get help because they think that they'll get over the depression on their own. But people with depression may not get better unless they get treatment.  The cause of depression is not well understood. There may be many factors involved. But if you have depression, it's not your fault.  A serious symptom of depression is thinking about death or suicide. If you or someone you care about talks about this or about feeling hopeless, get help right away.  It's important to know that depression can be treated. Medicine, counseling, and self-care may help.  Where can you learn more?  Go to https://www.[employer].net/patiented  Enter T185 in the search box to learn more about \"Learning About Depression Screening.\"  Current as of: June 25, 2023               Content Version: 13.8    0689-2255 [email or web address].   Care instructions adapted under license by your healthcare professional. If you have questions about a medical condition or this instruction, always ask your healthcare professional. [employer] disclaims any " warranty or liability for your use of this information.

## 2024-01-25 NOTE — NURSING NOTE
"Chief Complaint   Patient presents with    Medicare Visit     fasting     initial /78   Pulse 94   Temp 97.8  F (36.6  C) (Oral)   Resp 16   Ht 1.499 m (4' 11\")   Wt 108 kg (238 lb)   LMP 09/15/2007   SpO2 96%   BMI 48.07 kg/m   Estimated body mass index is 48.07 kg/m  as calculated from the following:    Height as of this encounter: 1.499 m (4' 11\").    Weight as of this encounter: 108 kg (238 lb)..  bp completed using cuff size large  NEEMA WALSH LPN  "

## 2024-01-25 NOTE — LETTER
[facility]  01/25/24  Patient: Nicole Reyes  YOB: 1955  Medical Record Number: [id number]                                                                                  Non-Opioid Controlled Substance Agreement    This is an agreement between you and your provider regarding safe and appropriate use of controlled substances prescribed by your care team. Controlled substances are?medicines that can cause physical and mental dependence (abuse).     There are strict laws about having and using these medicines. We here at [facility] are  committed to working with you in your efforts to get better. To support you in this work, we'll help you schedule regular office appointments for medicine refills. If we must cancel or change your appointment for any reason, we'll make sure you have enough medicine to last until your next appointment.     As a Provider, I will:   Listen carefully to your concerns while treating you with respect.   Recommend a treatment plan that I believe is in your best interest and may involve therapies other than medicine.    Talk with you often about the possible benefits and the risk of harm of any medicine that we prescribe for you.  Assess the safety of this medicine and check how well it works.    Provide a plan on how to taper (discontinue or go off) using this medicine if the decision is made to stop its use.      ::  As a Patient, I understand controlled substances:     Are prescribed by my care provider to help me function or work and manage my condition(s).?  Are strong medicines and can cause serious side effects.     Need to be taken exactly as prescribed.?Combining controlled substances with certain medicines or chemicals (such as illegal drugs, alcohol, sedatives, sleeping pills, and benzodiazepines) can be dangerous or even fatal.? If I stop taking my medicines suddenly, I may have severe withdrawal symptoms.     The risks,  benefits, and side effects of these medicine(s) were explained to me. I agree that:    I will take part in other treatments as advised by my care team. This may be psychiatry or counseling, physical therapy, behavioral therapy, group treatment or a referral to specialist.    I will keep all my appointments and understand this is part of the monitoring of controlled substances.?My care team may require an office visit for EVERY controlled substance refill. If I miss appointments or don t follow instructions, my care team may stop my medicine    I will take my medicines as prescribed. I will not change the dose or schedule unless my care team tells me to. There will be no refills if I run out early.      I may be asked to come to the clinic and complete a urine drug test or complete a pill count. If I don t give a urine sample or participate in a pill count, the care team may stop my medicine.    I will only receive controlled substance prescriptions from this clinic. If I am treated by another provider, I will tell them that I am taking controlled substances and that I have a treatment agreement with this provider. I will inform my [facility] care team within one business day if I am given a prescription for any controlled substance by another healthcare provider. My [facility] care team can contact other providers and pharmacists about my use of any medicines.    It is up to me to make sure that I don't run out of my medicines on weekends or holidays.?If my care team is willing to refill my prescription without a visit, I must request refills only during office hours. Refills may take up to 3 business days to process. I will use one pharmacy to fill all my controlled substance prescriptions. I will notify the clinic about any changes to my insurance or medicine availability.    I am responsible for my prescriptions. If the medicine/prescription is lost, stolen or destroyed, it will not be replaced.?I  also agree not to share controlled substance medicines with anyone.     I am aware I should not use any illegal or recreational drugs. I agree not to drink alcohol unless my care team says I can.     If I enroll in the [place] Medical Cannabis program, I will tell my care team before my next refill.    I will tell my care team right away if I become pregnant, have a new medical problem treated outside of my regular clinic, or have a change in my medicines.     I understand that this medicine can affect my thinking, judgment and reaction time.? Alcohol and drugs affect the brain and body, which can affect the safety of my driving. Being under the influence of alcohol or drugs can affect my decision-making, behaviors, personal safety and the safety of others. Driving while impaired (DWI) can occur if a person is driving, operating or in physical control of a car, motorcycle, boat, snowmobile, ATV, motorbike, off-road vehicle or any other motor vehicle ([place] Statute 169A.20). I understand the risk if I choose to drive or operate any vehicle or machinery.    I understand that if I do not follow any of the conditions above, my prescriptions or treatment may be stopped or changed.   I agree that my provider, clinic care team and pharmacy may work with any city, state or federal law enforcement agency that investigates the misuse, sale or other diversion of my controlled medicine. I will allow my provider to discuss my care with, or share a copy of, this agreement with any other treating provider, pharmacy or emergency room where I receive care.     I have read this agreement and have asked questions about anything I did not understand.    ________________________________________________________  Patient Signature - Nicole Reyes     ___________________                   Date     ________________________________________________________  Provider Signature - LEONCIO Casillas CNP       ___________________                    Date     ________________________________________________________  Witness Signature (required if provider not present while patient signing)          ___________________                   Date

## 2024-01-25 NOTE — PROGRESS NOTES
"Preventive Care Visit  [facility]  LEONCIO Casillas CNP, Internal Medicine  Jan 25, 2024      SUBJECTIVE:   Shayy is a 68 year old, presenting for the following:  Medicare Visit (fasting)        1/25/2024     2:09 PM   Additional Questions   Roomed by Angela LYNNE     Are you in the first 12 months of your Medicare coverage?  No    Healthy Habits:     In general, how would you rate your overall health?  Fair    Frequency of exercise:  None    Do you usually eat at least 4 servings of fruit and vegetables a day, include whole grains    & fiber and avoid regularly eating high fat or \"junk\" foods?  No    Taking medications regularly:  Yes    Medication side effects:  Muscle aches    Ability to successfully perform activities of daily living:  Housework requires assistance and laundry requires assistance    Home Safety:  No safety concerns identified    Hearing Impairment:  Need to ask people to speak up or repeat themselves    In the past 6 months, have you been bothered by leaking of urine?  No    In general, how would you rate your overall mental or emotional health?  Poor    Additional concerns today:  Yes    Today's PHQ-9 Score:       1/25/2024     2:10 PM   PHQ-9 SCORE   PHQ-9 Total Score MyChart 6 (Mild depression)   PHQ-9 Total Score 6         Have you ever done Advance Care Planning? (For example, a Health Directive, POLST, or a discussion with a medical provider or your loved ones about your wishes): No, advance care planning information given to patient to review.  Patient declined advance care planning discussion at this time.       Fall risk  Fallen 2 or more times in the past year?: Yes  Any fall with injury in the past year?: No    Cognitive Screening   1) Repeat 3 items (Leader, Season, Table)    2) Clock draw: NORMAL  3) 3 item recall: Recalls 3 objects  Results: 3 items recalled: COGNITIVE IMPAIRMENT LESS LIKELY    Mini-CogTM Copyright S Curtis. Licensed by the author " for use in [facility]; reprinted with permission (domenica@[facility]). All rights reserved.      Do you have sleep apnea, excessive snoring or daytime drowsiness? : yes  Pt uses a CPAP   Reviewed and updated as needed this visit by clinical staff   Tobacco  Allergies  Meds  Problems  Med Hx  Surg Hx  Fam Hx          Reviewed and updated as needed this visit by Provider                  Social History     Tobacco Use    Smoking status: Former     Packs/day: 1.50     Years: 10.00     Additional pack years: 0.00     Total pack years: 15.00     Types: Cigarettes     Start date: 1974     Quit date: 3/18/1979     Years since quittin.8    Smokeless tobacco: Never    Tobacco comments:     quit    Substance Use Topics    Alcohol use: Yes     Comment: occasional             2024     3:43 AM   Alcohol Use   Prescreen: >3 drinks/day or >7 drinks/week? No     Do you have a current opioid prescription? yes  Do you use any other controlled substances or medications that are not prescribed by a provider? None            Current providers sharing in care for this patient include:   Patient Care Team:  Karla Hurst APRN CNP as PCP - General (Internal Medicine)  Autumn Lopez PA-C as Physician Assistant (Dermatology)  Pari Wiley MD as Referring Physician (Internal Medicine)  Elizabeth Olivo [facility] as Assigned MTM Pharmacist  [facility] as Assigned PCP    The following health maintenance items are reviewed in Epic and correct as of today:  Health Maintenance   Topic Date Due    RSV VACCINE (Pregnancy & 60+) (1 - 1-dose 60+ series) Never done    MEDICARE ANNUAL WELLNESS VISIT  2023    DIABETIC FOOT EXAM  2023    INFLUENZA VACCINE (1) 2023    A1C  2023    DEXA  2024    ZOSTER IMMUNIZATION (2 of 2) 2024 (Originally 2020)    EYE EXAM  2024    BMP  2024    LIPID  2024    MICROALBUMIN  2024    MAMMO  SCREENING  06/06/2024    PHQ-9  07/25/2024    ANNUAL REVIEW OF HM ORDERS  08/22/2024    FALL RISK ASSESSMENT  01/25/2025    COLORECTAL CANCER SCREENING  12/03/2025    DTAP/TDAP/TD IMMUNIZATION (3 - Td or Tdap) 08/15/2028    ADVANCE CARE PLANNING  01/25/2029    HEPATITIS C SCREENING  Completed    DEPRESSION ACTION PLAN  Completed    Pneumococcal Vaccine: 65+ Years  Completed    IPV IMMUNIZATION  Aged Out    HPV IMMUNIZATION  Aged Out    MENINGITIS IMMUNIZATION  Aged Out    RSV MONOCLONAL ANTIBODY  Aged Out    PAP  Discontinued    COVID-19 Vaccine  Discontinued           FHS-7:       3/22/2022     3:37 PM 8/7/2022     5:23 PM 6/6/2023     2:51 PM 11/29/2023    12:35 PM 1/20/2024     3:47 AM   Breast CA Risk Assessment (FHS-7)   Did any of your first-degree relatives have breast or ovarian cancer? No No No No No   Did any of your relatives have bilateral breast cancer? No No No No No   Did any man in your family have breast cancer? No No No No No   Did any woman in your family have breast and ovarian cancer? No No No No Yes   Did any woman in your family have breast cancer before age 50 y? Unknown No No No No   Do you have 2 or more relatives with breast and/or ovarian cancer? Yes Yes Yes Yes Yes   Do you have 2 or more relatives with breast and/or bowel cancer? Yes Yes No No Yes         Pertinent mammograms are reviewed under the imaging tab.  Review of Systems   Constitutional:  Negative for chills and fever.   HENT:  Negative for congestion, ear pain, hearing loss and sore throat.    Eyes:  Negative for pain and visual disturbance.   Respiratory:  Negative for cough and shortness of breath.    Cardiovascular:  Negative for chest pain and palpitations.   Gastrointestinal:  Negative for abdominal pain, constipation, diarrhea and nausea.   Genitourinary:  Negative for dysuria, frequency, genital sores, hematuria, pelvic pain, urgency, vaginal bleeding and vaginal discharge.   Musculoskeletal:  Positive for arthralgias,  "joint swelling and myalgias.   Skin:  Negative for rash.   Neurological:  Positive for weakness. Negative for dizziness and headaches.   Psychiatric/Behavioral:  The patient is nervous/anxious.         2023- service this weekend         2023     9:10 AM 2023     4:13 PM 2024     2:10 PM   PHQ   PHQ-9 Total Score 2 2    2 6   Q9: Thoughts of better off dead/self-harm past 2 weeks Not at all Not at all Not at all         2019    10:09 AM 10/27/2020     7:59 AM 2023     8:17 AM   RADHA-7 SCORE   Total Score   8 (mild anxiety)   Total Score 6 7 8       Cough - family has been present a lot   Has had a cough for a few days - rested and ran a temp 100 for one day   Cough and away and back after weekend and sore throat   No exposure to covid     Glucose - 160 max after eating    Started on ozempic   And changed metformin to glipizide     Stopped cholesterol re aches and pains zocor   Started omega and Krill   Willing to try crestor     Taking clonazepam for anxiety   It works some for her     She does not take tramadol     Does take ambien      Signed CSA 2024    Discussed I do not like clonazepam as an prescription - discussed if long term may refer to psych for refills   She just lost  so I will not take away  She had tramadol on her list and I said I would not do both together and she said she no longer uses tramadol     Colon  due     OBJECTIVE:   /78   Pulse 94   Temp 97.8  F (36.6  C) (Oral)   Resp 16   Ht 1.499 m (4' 11\")   Wt 108 kg (238 lb)   LMP 09/15/2007   SpO2 96%   BMI 48.07 kg/m     Estimated body mass index is 48.07 kg/m  as calculated from the following:    Height as of this encounter: 1.499 m (4' 11\").    Weight as of this encounter: 108 kg (238 lb).  Physical Exam  GENERAL: alert and no distress- using walker for mobility - daughter in room   NECK: no adenopathy, no asymmetry, masses, or scars  RESP: lungs clear to auscultation " - no rales, rhonchi or wheezes- even with forced exhalation   CV: regular rate and rhythm  ABDOMEN: soft, nontender, exam limited by body habitus and bowel sounds normal  MS: no gross musculoskeletal defects noted, no edema  NEURO: Normal strength and tone, mentation intact and speech normal  PSYCH: mentation appears normal, affect normal/bright    Diagnostic Test Results:  Labs reviewed in [facility]  Lab     ASSESSMENT / PLAN:   Routine general medical examination at a health care facility    - HEMOGLOBIN A1C; Future  - Comprehensive metabolic panel (BMP + Alb, Alk Phos, ALT, AST, Total. Bili, TP); Future  - TSH with free T4 reflex; Future  - HEMOGLOBIN A1C  - Comprehensive metabolic panel (BMP + Alb, Alk Phos, ALT, AST, Total. Bili, TP)  - TSH with free T4 reflex    Type 2 diabetes mellitus without complication, without long-term current use of insulin (H)  In good range   Recheck today   Increasing ozempic dose   Lab Results   Component Value Date    A1C 7.5 06/05/2023    A1C 6.9 06/02/2022    A1C 6.8 11/08/2021    A1C 6.6 05/28/2021    A1C 6.6 10/23/2020    A1C 6.9 05/12/2020    A1C 6.5 01/16/2020    A1C 6.7 06/21/2019       - HEMOGLOBIN A1C; Future  - Lipid panel reflex to direct LDL Fasting; Future  - glipiZIDE (GLUCOTROL XL) 10 MG 24 hr tablet; Take 1 tablet (10 mg) by mouth daily  - semaglutide (OZEMPIC) 2 MG/3ML pen; Inject 0.5 mg Subcutaneous every 7 days May increase dose in a month if tolerated  - HEMOGLOBIN A1C  - Lipid panel reflex to direct LDL Fasting    Mild episode of recurrent major depressive disorder (H24)  Stable - lost  in December and service this weekend - he had kidney disease     Morbid obesity (H)  Discussed - dose increase   - semaglutide (OZEMPIC) 2 MG/3ML pen; Inject 0.5 mg Subcutaneous every 7 days May increase dose in a month if tolerated    Encounter for screening mammogram for breast cancer    - *MA Screening Digital Bilateral; Future    Type 2 diabetes mellitus with stage 3a  "chronic kidney disease, without long-term current use of insulin (H)    - blood glucose (ACCU-CHEK BYRON PLUS) test strip; USE TO TEST BLOOD SUGAR ONE TIME A DAY    Insomnia, unspecified type  Ambien effective     Generalized anxiety disorder  Stable   - sertraline (ZOLOFT) 100 MG tablet; Take 2 tablets by mouth once daily    Benign essential hypertension  In good range   - diltiazem ER (TIADYLT ER) 360 MG 24 hr ER beaded capsule; Take 1 capsule (360 mg) by mouth daily  - losartan (COZAAR) 100 MG tablet; Take 1 tablet (100 mg) by mouth daily    Chronic bilateral low back pain without sciatica  Work for pain   - gabapentin (NEURONTIN) 300 MG capsule; 3 capsules every am, 2 capsules midday and 3 caps at night    GENERALIZED ANXIETY DIS    - sertraline (ZOLOFT) 100 MG tablet; Take 2 tablets by mouth once daily    Major depressive disorder, recurrent episode, moderate (H)  - sertraline (ZOLOFT) 100 MG tablet; Take 2 tablets by mouth once daily    Asymptomatic postmenopausal status    - DEXA HIP/PELVIS/SPINE - Future; Future    Encounter for Medicare annual wellness exam      Controlled substance agreement signed            Counseling  Reviewed preventive health counseling, as reflected in patient instructions       Regular exercise       Healthy diet/nutrition       Immunizations  Declined            Osteoporosis prevention/bone health      BMI  Estimated body mass index is 48.07 kg/m  as calculated from the following:    Height as of this encounter: 1.499 m (4' 11\").    Weight as of this encounter: 108 kg (238 lb).   Weight management plan: on ozempic for diabetes and weight       She reports that she quit smoking about 44 years ago. Her smoking use included cigarettes. She started smoking about 50 years ago. She has a 15 pack-year smoking history. She has never used smokeless tobacco.      Appropriate preventive services were discussed with this patient, including applicable screening as appropriate for fall " prevention, nutrition, physical activity, Tobacco-use cessation, weight loss and cognition.  Checklist reviewing preventive services available has been given to the patient.    Reviewed patients plan of care and provided an AVS. The Basic Care Plan (routine screening as documented in Health Maintenance) for Nicole meets the Care Plan requirement. This Care Plan has been established and reviewed with the Patient and daughter.        Signed Electronically by: LEONCIO Casillas CNP    Identified Health Risks    Answers submitted by the patient for this visit:  Patient Health Questionnaire (Submitted on 1/25/2024)  If you checked off any problems, how difficult have these problems made it for you to do your work, take care of things at home, or get along with other people?: Very difficult  PHQ9 TOTAL SCORE: 6  Annual Preventive Visit (Submitted on 1/20/2024)  Chief Complaint: Annual Exam:  Blood in stool: No  heartburn: Yes  peripheral edema: Yes  mood changes: No  Skin sensation changes: No  tenderness: No  breast mass: No  breast discharge: No  The patient was provided with suggestions to help her develop a healthy physical lifestyle.  She is at risk for lack of exercise and has been provided with information to increase physical activity for the benefit of her well-being.  The patient was counseled and encouraged to consider modifying their diet and eating habits. She was provided with information on recommended healthy diet options.  The patient reports that she has difficulty with activities of daily living. I have asked that the patient make a follow up appointment in  weeks where this issue will be further evaluated and addressed.  The patient was provided with written information regarding signs of hearing loss.  The patient was provided with suggestions to help her develop a healthy emotional lifestyle.  The patient's PHQ-9 score is consistent with mild depression. She was provided with information  regarding depression and was advised to schedule a follow up appointment in  weeks to further address this issue.

## 2024-01-26 ENCOUNTER — TELEPHONE (OUTPATIENT)
Dept: INTERNAL MEDICINE | Facility: CLINIC | Age: 69
End: 2024-01-26
Payer: MEDICARE

## 2024-01-26 DIAGNOSIS — E87.6 LOW SERUM POTASSIUM: Primary | ICD-10-CM

## 2024-01-26 DIAGNOSIS — E78.00 HYPERCHOLESTEREMIA: Primary | ICD-10-CM

## 2024-01-26 LAB
ALBUMIN SERPL BCG-MCNC: 3.8 G/DL (ref 3.5–5.2)
ALP SERPL-CCNC: 77 U/L (ref 40–150)
ALT SERPL W P-5'-P-CCNC: 10 U/L (ref 0–50)
ANION GAP SERPL CALCULATED.3IONS-SCNC: 10 MMOL/L (ref 7–15)
AST SERPL W P-5'-P-CCNC: 15 U/L (ref 0–45)
BILIRUB SERPL-MCNC: 0.5 MG/DL
BUN SERPL-MCNC: 9.4 MG/DL (ref 8–23)
CALCIUM SERPL-MCNC: 8.8 MG/DL (ref 8.8–10.2)
CHLORIDE SERPL-SCNC: 98 MMOL/L (ref 98–107)
CHOLEST SERPL-MCNC: 262 MG/DL
CREAT SERPL-MCNC: 0.79 MG/DL (ref 0.51–0.95)
DEPRECATED HCO3 PLAS-SCNC: 34 MMOL/L (ref 22–29)
EGFRCR SERPLBLD CKD-EPI 2021: 81 ML/MIN/1.73M2
FASTING STATUS PATIENT QL REPORTED: YES
GLUCOSE SERPL-MCNC: 129 MG/DL (ref 70–99)
HDLC SERPL-MCNC: 39 MG/DL
LDLC SERPL CALC-MCNC: 197 MG/DL
NONHDLC SERPL-MCNC: 223 MG/DL
POTASSIUM SERPL-SCNC: 2.9 MMOL/L (ref 3.4–5.3)
PROT SERPL-MCNC: 6.8 G/DL (ref 6.4–8.3)
SODIUM SERPL-SCNC: 142 MMOL/L (ref 135–145)
TRIGL SERPL-MCNC: 128 MG/DL
TSH SERPL DL<=0.005 MIU/L-ACNC: 3.71 UIU/ML (ref 0.3–4.2)

## 2024-01-26 RX ORDER — ROSUVASTATIN CALCIUM 10 MG/1
10 TABLET, COATED ORAL DAILY
Qty: 90 TABLET | Refills: 3 | Status: SHIPPED | OUTPATIENT
Start: 2024-01-26

## 2024-01-26 RX ORDER — POTASSIUM CHLORIDE 1500 MG/1
TABLET, EXTENDED RELEASE ORAL
Qty: 45 TABLET | Refills: 1 | Status: SHIPPED | OUTPATIENT
Start: 2024-01-26 | End: 2024-02-01

## 2024-01-26 NOTE — TELEPHONE ENCOUNTER
"Attempted to contact patient. Left voice message to call back regarding lab results. Per Karla Natali's result note:   \"Potassium 2.9  She needs to start on potassium supplement and recheck potassium next week     Take potassium 40 meq daily for 3 days and then 20 meq daily  How often is she taking the lasix ?       A1c excellent\"    Camille Allen RN  [facility]    "

## 2024-01-26 NOTE — TELEPHONE ENCOUNTER
Patient returning call.  Advised of message below.  Lab appointment scheduled 1/31/24.    She has not taken Lasix since last week - only takes as needed for edema.  Hasn't needed it since.    2.  She was asking for chol results.  Given to patient.  She is asking if she needs to be on a chol lowering medication.  Reports muscle aches with Simvastatin.    Please advise, thanks.

## 2024-01-31 ENCOUNTER — LAB (OUTPATIENT)
Dept: LAB | Facility: CLINIC | Age: 69
End: 2024-01-31
Payer: MEDICARE

## 2024-01-31 DIAGNOSIS — E87.6 LOW SERUM POTASSIUM: ICD-10-CM

## 2024-01-31 PROCEDURE — 36415 COLL VENOUS BLD VENIPUNCTURE: CPT

## 2024-01-31 PROCEDURE — 80048 BASIC METABOLIC PNL TOTAL CA: CPT

## 2024-02-01 DIAGNOSIS — E87.6 LOW SERUM POTASSIUM: ICD-10-CM

## 2024-02-01 LAB
ANION GAP SERPL CALCULATED.3IONS-SCNC: 12 MMOL/L (ref 7–15)
BUN SERPL-MCNC: 11.3 MG/DL (ref 8–23)
CALCIUM SERPL-MCNC: 9 MG/DL (ref 8.8–10.2)
CHLORIDE SERPL-SCNC: 97 MMOL/L (ref 98–107)
CREAT SERPL-MCNC: 0.91 MG/DL (ref 0.51–0.95)
DEPRECATED HCO3 PLAS-SCNC: 33 MMOL/L (ref 22–29)
EGFRCR SERPLBLD CKD-EPI 2021: 68 ML/MIN/1.73M2
GLUCOSE SERPL-MCNC: 135 MG/DL (ref 70–99)
POTASSIUM SERPL-SCNC: 3.1 MMOL/L (ref 3.4–5.3)
SODIUM SERPL-SCNC: 142 MMOL/L (ref 135–145)

## 2024-02-01 RX ORDER — POTASSIUM CHLORIDE 1500 MG/1
TABLET, EXTENDED RELEASE ORAL
Qty: 180 TABLET | Refills: 3 | Status: SHIPPED | OUTPATIENT
Start: 2024-02-01 | End: 2024-02-01

## 2024-02-01 RX ORDER — POTASSIUM CHLORIDE 1500 MG/1
TABLET, EXTENDED RELEASE ORAL
Qty: 180 TABLET | Refills: 3 | Status: SHIPPED | OUTPATIENT
Start: 2024-02-01 | End: 2024-05-16

## 2024-02-14 ENCOUNTER — MYC MEDICAL ADVICE (OUTPATIENT)
Dept: INTERNAL MEDICINE | Facility: CLINIC | Age: 69
End: 2024-02-14
Payer: MEDICARE

## 2024-02-14 DIAGNOSIS — E87.6 LOW BLOOD POTASSIUM: Primary | ICD-10-CM

## 2024-03-27 ENCOUNTER — APPOINTMENT (OUTPATIENT)
Dept: CT IMAGING | Facility: CLINIC | Age: 69
DRG: 193 | End: 2024-03-27
Attending: EMERGENCY MEDICINE
Payer: MEDICARE

## 2024-03-27 ENCOUNTER — HOSPITAL ENCOUNTER (INPATIENT)
Facility: CLINIC | Age: 69
LOS: 2 days | Discharge: HOME | DRG: 193 | End: 2024-03-30
Attending: EMERGENCY MEDICINE | Admitting: STUDENT IN AN ORGANIZED HEALTH CARE EDUCATION/TRAINING PROGRAM
Payer: MEDICARE

## 2024-03-27 DIAGNOSIS — D72.829 LEUKOCYTOSIS, UNSPECIFIED TYPE: ICD-10-CM

## 2024-03-27 DIAGNOSIS — J18.9 MULTIFOCAL PNEUMONIA: ICD-10-CM

## 2024-03-27 DIAGNOSIS — J96.01 ACUTE RESPIRATORY FAILURE WITH HYPOXIA (H): ICD-10-CM

## 2024-03-27 LAB
ANION GAP SERPL CALCULATED.3IONS-SCNC: 10 MMOL/L (ref 7–15)
BASOPHILS # BLD AUTO: 0.1 10E3/UL (ref 0–0.2)
BASOPHILS NFR BLD AUTO: 0 %
BUN SERPL-MCNC: 13.6 MG/DL (ref 8–23)
CALCIUM SERPL-MCNC: 9 MG/DL (ref 8.8–10.2)
CHLORIDE SERPL-SCNC: 102 MMOL/L (ref 98–107)
CREAT SERPL-MCNC: 0.91 MG/DL (ref 0.51–0.95)
CRP SERPL-MCNC: 27.12 MG/L
D DIMER PPP FEU-MCNC: 0.8 UG/ML FEU (ref 0–0.5)
DEPRECATED HCO3 PLAS-SCNC: 29 MMOL/L (ref 22–29)
EGFRCR SERPLBLD CKD-EPI 2021: 68 ML/MIN/1.73M2
EOSINOPHIL # BLD AUTO: 0.3 10E3/UL (ref 0–0.7)
EOSINOPHIL NFR BLD AUTO: 2 %
ERYTHROCYTE [DISTWIDTH] IN BLOOD BY AUTOMATED COUNT: 16.9 % (ref 10–15)
FLUAV RNA SPEC QL NAA+PROBE: NEGATIVE
FLUBV RNA RESP QL NAA+PROBE: NEGATIVE
GLUCOSE SERPL-MCNC: 159 MG/DL (ref 70–99)
HCT VFR BLD AUTO: 43.5 % (ref 35–47)
HGB BLD-MCNC: 13.5 G/DL (ref 11.7–15.7)
HOLD SPECIMEN: NORMAL
IMM GRANULOCYTES # BLD: 0.1 10E3/UL
IMM GRANULOCYTES NFR BLD: 1 %
LACTATE SERPL-SCNC: 1 MMOL/L (ref 0.7–2)
LYMPHOCYTES # BLD AUTO: 2 10E3/UL (ref 0.8–5.3)
LYMPHOCYTES NFR BLD AUTO: 15 %
MCH RBC QN AUTO: 25.8 PG (ref 26.5–33)
MCHC RBC AUTO-ENTMCNC: 31 G/DL (ref 31.5–36.5)
MCV RBC AUTO: 83 FL (ref 78–100)
MONOCYTES # BLD AUTO: 0.5 10E3/UL (ref 0–1.3)
MONOCYTES NFR BLD AUTO: 4 %
MRSA DNA SPEC QL NAA+PROBE: NEGATIVE
NEUTROPHILS # BLD AUTO: 10.5 10E3/UL (ref 1.6–8.3)
NEUTROPHILS NFR BLD AUTO: 78 %
NRBC # BLD AUTO: 0 10E3/UL
NRBC BLD AUTO-RTO: 0 /100
NT-PROBNP SERPL-MCNC: 94 PG/ML (ref 0–900)
PLATELET # BLD AUTO: 256 10E3/UL (ref 150–450)
POTASSIUM SERPL-SCNC: 4.3 MMOL/L (ref 3.4–5.3)
PROCALCITONIN SERPL IA-MCNC: 0.08 NG/ML
PROCALCITONIN SERPL IA-MCNC: 0.1 NG/ML
RBC # BLD AUTO: 5.23 10E6/UL (ref 3.8–5.2)
RSV RNA SPEC NAA+PROBE: NEGATIVE
SA TARGET DNA: POSITIVE
SARS-COV-2 RNA RESP QL NAA+PROBE: NEGATIVE
SODIUM SERPL-SCNC: 141 MMOL/L (ref 135–145)
TROPONIN T SERPL HS-MCNC: 11 NG/L
WBC # BLD AUTO: 13.4 10E3/UL (ref 4–11)

## 2024-03-27 PROCEDURE — 84145 PROCALCITONIN (PCT): CPT | Performed by: STUDENT IN AN ORGANIZED HEALTH CARE EDUCATION/TRAINING PROGRAM

## 2024-03-27 PROCEDURE — 84145 PROCALCITONIN (PCT): CPT | Performed by: EMERGENCY MEDICINE

## 2024-03-27 PROCEDURE — 250N000009 HC RX 250: Performed by: EMERGENCY MEDICINE

## 2024-03-27 PROCEDURE — 99285 EMERGENCY DEPT VISIT HI MDM: CPT | Mod: 25

## 2024-03-27 PROCEDURE — 87040 BLOOD CULTURE FOR BACTERIA: CPT | Performed by: EMERGENCY MEDICINE

## 2024-03-27 PROCEDURE — 99291 CRITICAL CARE FIRST HOUR: CPT | Mod: 25

## 2024-03-27 PROCEDURE — 250N000011 HC RX IP 250 OP 636: Performed by: EMERGENCY MEDICINE

## 2024-03-27 PROCEDURE — 87149 DNA/RNA DIRECT PROBE: CPT | Performed by: EMERGENCY MEDICINE

## 2024-03-27 PROCEDURE — 36415 COLL VENOUS BLD VENIPUNCTURE: CPT | Performed by: STUDENT IN AN ORGANIZED HEALTH CARE EDUCATION/TRAINING PROGRAM

## 2024-03-27 PROCEDURE — 99222 1ST HOSP IP/OBS MODERATE 55: CPT | Mod: AI | Performed by: STUDENT IN AN ORGANIZED HEALTH CARE EDUCATION/TRAINING PROGRAM

## 2024-03-27 PROCEDURE — G0378 HOSPITAL OBSERVATION PER HR: HCPCS

## 2024-03-27 PROCEDURE — 250N000011 HC RX IP 250 OP 636: Mod: JZ | Performed by: EMERGENCY MEDICINE

## 2024-03-27 PROCEDURE — 85025 COMPLETE CBC W/AUTO DIFF WBC: CPT | Performed by: EMERGENCY MEDICINE

## 2024-03-27 PROCEDURE — 84484 ASSAY OF TROPONIN QUANT: CPT | Performed by: EMERGENCY MEDICINE

## 2024-03-27 PROCEDURE — 87641 MR-STAPH DNA AMP PROBE: CPT | Performed by: STUDENT IN AN ORGANIZED HEALTH CARE EDUCATION/TRAINING PROGRAM

## 2024-03-27 PROCEDURE — 93005 ELECTROCARDIOGRAM TRACING: CPT

## 2024-03-27 PROCEDURE — 96365 THER/PROPH/DIAG IV INF INIT: CPT | Mod: 59

## 2024-03-27 PROCEDURE — 83880 ASSAY OF NATRIURETIC PEPTIDE: CPT | Performed by: EMERGENCY MEDICINE

## 2024-03-27 PROCEDURE — 87899 AGENT NOS ASSAY W/OPTIC: CPT | Performed by: STUDENT IN AN ORGANIZED HEALTH CARE EDUCATION/TRAINING PROGRAM

## 2024-03-27 PROCEDURE — 85379 FIBRIN DEGRADATION QUANT: CPT | Performed by: EMERGENCY MEDICINE

## 2024-03-27 PROCEDURE — 71275 CT ANGIOGRAPHY CHEST: CPT | Mod: MA

## 2024-03-27 PROCEDURE — 258N000003 HC RX IP 258 OP 636: Performed by: EMERGENCY MEDICINE

## 2024-03-27 PROCEDURE — 80048 BASIC METABOLIC PNL TOTAL CA: CPT | Performed by: EMERGENCY MEDICINE

## 2024-03-27 PROCEDURE — 36415 COLL VENOUS BLD VENIPUNCTURE: CPT | Performed by: EMERGENCY MEDICINE

## 2024-03-27 PROCEDURE — 250N000013 HC RX MED GY IP 250 OP 250 PS 637: Performed by: EMERGENCY MEDICINE

## 2024-03-27 PROCEDURE — 250N000013 HC RX MED GY IP 250 OP 250 PS 637: Performed by: STUDENT IN AN ORGANIZED HEALTH CARE EDUCATION/TRAINING PROGRAM

## 2024-03-27 PROCEDURE — 87637 SARSCOV2&INF A&B&RSV AMP PRB: CPT | Performed by: EMERGENCY MEDICINE

## 2024-03-27 PROCEDURE — 86140 C-REACTIVE PROTEIN: CPT | Performed by: STUDENT IN AN ORGANIZED HEALTH CARE EDUCATION/TRAINING PROGRAM

## 2024-03-27 PROCEDURE — 83605 ASSAY OF LACTIC ACID: CPT | Performed by: STUDENT IN AN ORGANIZED HEALTH CARE EDUCATION/TRAINING PROGRAM

## 2024-03-27 PROCEDURE — 96375 TX/PRO/DX INJ NEW DRUG ADDON: CPT

## 2024-03-27 RX ORDER — GABAPENTIN 300 MG/1
600 CAPSULE ORAL
Status: DISCONTINUED | OUTPATIENT
Start: 2024-03-28 | End: 2024-03-30 | Stop reason: HOSPADM

## 2024-03-27 RX ORDER — CEFTRIAXONE 2 G/1
2 INJECTION, POWDER, FOR SOLUTION INTRAMUSCULAR; INTRAVENOUS EVERY 24 HOURS
Status: DISCONTINUED | OUTPATIENT
Start: 2024-03-28 | End: 2024-03-27

## 2024-03-27 RX ORDER — LOSARTAN POTASSIUM 100 MG/1
100 TABLET ORAL DAILY
Status: DISCONTINUED | OUTPATIENT
Start: 2024-03-27 | End: 2024-03-30 | Stop reason: HOSPADM

## 2024-03-27 RX ORDER — AMOXICILLIN 250 MG
2 CAPSULE ORAL 2 TIMES DAILY PRN
Status: DISCONTINUED | OUTPATIENT
Start: 2024-03-27 | End: 2024-03-30 | Stop reason: HOSPADM

## 2024-03-27 RX ORDER — AZITHROMYCIN 500 MG/5ML
500 INJECTION, POWDER, LYOPHILIZED, FOR SOLUTION INTRAVENOUS EVERY 24 HOURS
Status: DISCONTINUED | OUTPATIENT
Start: 2024-03-28 | End: 2024-03-29

## 2024-03-27 RX ORDER — GLIPIZIDE 10 MG/1
10 TABLET, FILM COATED, EXTENDED RELEASE ORAL DAILY
Status: DISCONTINUED | OUTPATIENT
Start: 2024-03-28 | End: 2024-03-30 | Stop reason: HOSPADM

## 2024-03-27 RX ORDER — GABAPENTIN 300 MG/1
600 CAPSULE ORAL
Status: ON HOLD | COMMUNITY
End: 2024-06-11

## 2024-03-27 RX ORDER — PANTOPRAZOLE SODIUM 40 MG/1
40 TABLET, DELAYED RELEASE ORAL AT BEDTIME
Status: DISCONTINUED | OUTPATIENT
Start: 2024-03-27 | End: 2024-03-30 | Stop reason: HOSPADM

## 2024-03-27 RX ORDER — AZITHROMYCIN 500 MG/5ML
500 INJECTION, POWDER, LYOPHILIZED, FOR SOLUTION INTRAVENOUS ONCE
Status: COMPLETED | OUTPATIENT
Start: 2024-03-27 | End: 2024-03-27

## 2024-03-27 RX ORDER — ACETAMINOPHEN 500 MG
1000 TABLET ORAL ONCE
Status: COMPLETED | OUTPATIENT
Start: 2024-03-27 | End: 2024-03-27

## 2024-03-27 RX ORDER — GABAPENTIN 300 MG/1
900 CAPSULE ORAL AT BEDTIME
Status: DISCONTINUED | OUTPATIENT
Start: 2024-03-27 | End: 2024-03-30 | Stop reason: HOSPADM

## 2024-03-27 RX ORDER — DILTIAZEM HYDROCHLORIDE 360 MG/1
360 CAPSULE, EXTENDED RELEASE ORAL DAILY
Status: DISCONTINUED | OUTPATIENT
Start: 2024-03-27 | End: 2024-03-29

## 2024-03-27 RX ORDER — CLONAZEPAM 0.5 MG/1
1 TABLET ORAL 2 TIMES DAILY PRN
Status: DISCONTINUED | OUTPATIENT
Start: 2024-03-27 | End: 2024-03-30 | Stop reason: HOSPADM

## 2024-03-27 RX ORDER — DEXTROSE MONOHYDRATE 25 G/50ML
25-50 INJECTION, SOLUTION INTRAVENOUS
Status: DISCONTINUED | OUTPATIENT
Start: 2024-03-27 | End: 2024-03-30 | Stop reason: HOSPADM

## 2024-03-27 RX ORDER — GABAPENTIN 300 MG/1
900 CAPSULE ORAL EVERY MORNING
Status: DISCONTINUED | OUTPATIENT
Start: 2024-03-28 | End: 2024-03-30 | Stop reason: HOSPADM

## 2024-03-27 RX ORDER — NICOTINE POLACRILEX 4 MG
15-30 LOZENGE BUCCAL
Status: DISCONTINUED | OUTPATIENT
Start: 2024-03-27 | End: 2024-03-30 | Stop reason: HOSPADM

## 2024-03-27 RX ORDER — SERTRALINE HYDROCHLORIDE 100 MG/1
200 TABLET, FILM COATED ORAL DAILY
Status: DISCONTINUED | OUTPATIENT
Start: 2024-03-27 | End: 2024-03-30 | Stop reason: HOSPADM

## 2024-03-27 RX ORDER — AZITHROMYCIN 500 MG/5ML
500 INJECTION, POWDER, LYOPHILIZED, FOR SOLUTION INTRAVENOUS EVERY 24 HOURS
Status: DISCONTINUED | OUTPATIENT
Start: 2024-03-28 | End: 2024-03-27

## 2024-03-27 RX ORDER — CEFTRIAXONE 2 G/1
2 INJECTION, POWDER, FOR SOLUTION INTRAMUSCULAR; INTRAVENOUS EVERY 24 HOURS
Qty: 80 ML | Refills: 0 | Status: DISCONTINUED | OUTPATIENT
Start: 2024-03-28 | End: 2024-03-30 | Stop reason: HOSPADM

## 2024-03-27 RX ORDER — CEFTRIAXONE 2 G/1
2 INJECTION, POWDER, FOR SOLUTION INTRAMUSCULAR; INTRAVENOUS ONCE
Status: COMPLETED | OUTPATIENT
Start: 2024-03-27 | End: 2024-03-27

## 2024-03-27 RX ORDER — ONDANSETRON 4 MG/1
4 TABLET, ORALLY DISINTEGRATING ORAL EVERY 6 HOURS PRN
Status: DISCONTINUED | OUTPATIENT
Start: 2024-03-27 | End: 2024-03-30 | Stop reason: HOSPADM

## 2024-03-27 RX ORDER — ZOLPIDEM TARTRATE 5 MG/1
10 TABLET ORAL AT BEDTIME
Status: DISCONTINUED | OUTPATIENT
Start: 2024-03-27 | End: 2024-03-30 | Stop reason: HOSPADM

## 2024-03-27 RX ORDER — AMOXICILLIN 250 MG
1 CAPSULE ORAL 2 TIMES DAILY PRN
Status: DISCONTINUED | OUTPATIENT
Start: 2024-03-27 | End: 2024-03-30 | Stop reason: HOSPADM

## 2024-03-27 RX ORDER — IOPAMIDOL 755 MG/ML
86 INJECTION, SOLUTION INTRAVASCULAR ONCE
Status: COMPLETED | OUTPATIENT
Start: 2024-03-27 | End: 2024-03-27

## 2024-03-27 RX ORDER — ONDANSETRON 2 MG/ML
4 INJECTION INTRAMUSCULAR; INTRAVENOUS EVERY 6 HOURS PRN
Status: DISCONTINUED | OUTPATIENT
Start: 2024-03-27 | End: 2024-03-30 | Stop reason: HOSPADM

## 2024-03-27 RX ADMIN — PANTOPRAZOLE SODIUM 40 MG: 40 TABLET, DELAYED RELEASE ORAL at 21:00

## 2024-03-27 RX ADMIN — CEFTRIAXONE 2 G: 2 INJECTION, POWDER, FOR SOLUTION INTRAMUSCULAR; INTRAVENOUS at 18:17

## 2024-03-27 RX ADMIN — IOPAMIDOL 86 ML: 755 INJECTION, SOLUTION INTRAVENOUS at 16:11

## 2024-03-27 RX ADMIN — ACETAMINOPHEN 1000 MG: 500 TABLET, FILM COATED ORAL at 16:34

## 2024-03-27 RX ADMIN — AZITHROMYCIN MONOHYDRATE 500 MG: 500 INJECTION, POWDER, LYOPHILIZED, FOR SOLUTION INTRAVENOUS at 18:57

## 2024-03-27 RX ADMIN — ZOLPIDEM TARTRATE 10 MG: 5 TABLET ORAL at 21:00

## 2024-03-27 RX ADMIN — SERTRALINE HYDROCHLORIDE 200 MG: 100 TABLET ORAL at 21:00

## 2024-03-27 RX ADMIN — GABAPENTIN 900 MG: 300 CAPSULE ORAL at 21:00

## 2024-03-27 RX ADMIN — SODIUM CHLORIDE 100 ML: 9 INJECTION, SOLUTION INTRAVENOUS at 16:12

## 2024-03-27 ASSESSMENT — ACTIVITIES OF DAILY LIVING (ADL)
ADLS_ACUITY_SCORE: 37
ADLS_ACUITY_SCORE: 37
ADLS_ACUITY_SCORE: 41
ADLS_ACUITY_SCORE: 37
ADLS_ACUITY_SCORE: 37
ADLS_ACUITY_SCORE: 39
ADLS_ACUITY_SCORE: 39
ADLS_ACUITY_SCORE: 41
ADLS_ACUITY_SCORE: 39
ADLS_ACUITY_SCORE: 39
ADLS_ACUITY_SCORE: 37

## 2024-03-27 ASSESSMENT — COLUMBIA-SUICIDE SEVERITY RATING SCALE - C-SSRS
1. IN THE PAST MONTH, HAVE YOU WISHED YOU WERE DEAD OR WISHED YOU COULD GO TO SLEEP AND NOT WAKE UP?: NO
6. HAVE YOU EVER DONE ANYTHING, STARTED TO DO ANYTHING, OR PREPARED TO DO ANYTHING TO END YOUR LIFE?: NO
2. HAVE YOU ACTUALLY HAD ANY THOUGHTS OF KILLING YOURSELF IN THE PAST MONTH?: NO

## 2024-03-27 NOTE — PHARMACY-ADMISSION MEDICATION HISTORY
Pharmacist Admission Medication History    Admission medication history is complete. The information provided in this note is only as accurate as the sources available at the time of the update.    Information Source(s): Patient and CareEverywhere/SureScripts via in-person    Changes made to PTA medication list:  Added: melatonin  Deleted: duplicate fish oil; marked ozempic as not taking  Changed: clonazepam from PRN to scheduled     Allergies reviewed with patient and updates made in EHR: yes    Medication History Completed By: Cherry Yun [facility] 3/27/2024 6:12 PM    PTA Med List   Medication Sig Last Dose    clonazePAM (KLONOPIN) 1 MG tablet Take 1 tablet (1 mg) by mouth 2 times daily as needed for anxiety 60 to last 30 days (Patient taking differently: Take 1 mg by mouth 2 times daily 60 to last 30 days) 3/26/2024 at pm    diltiazem ER (TIADYLT ER) 360 MG 24 hr ER beaded capsule Take 1 capsule (360 mg) by mouth daily 3/26/2024 at am    Fish Oil-Krill Oil (KRILL & FISH OIL BLEND PO) Take 1 capsule by mouth daily 3/26/2024    furosemide (LASIX) 20 MG tablet Take 1 tablet (20 mg) by mouth daily as needed (edema) Past Week    gabapentin (NEURONTIN) 300 MG capsule Take 600 mg by mouth daily (with lunch) 3 capsules every am, 2 capsules midday and 3 caps at night 3/26/2024 at afternoon    gabapentin (NEURONTIN) 300 MG capsule 3 capsules every am, 2 capsules midday and 3 caps at night (Patient taking differently: Take 900 mg by mouth 2 times daily 3 capsules every am, 2 capsules midday and 3 caps at night) 3/26/2024 at hs    glipiZIDE (GLUCOTROL XL) 10 MG 24 hr tablet Take 1 tablet (10 mg) by mouth daily 3/26/2024 at am    ketoconazole (NIZORAL) 2 % external cream Apply to fold areas BID PRN Past Week    losartan (COZAAR) 100 MG tablet Take 1 tablet (100 mg) by mouth daily 3/26/2024 at am    melatonin 5 MG tablet Take 5 mg by mouth nightly as needed for sleep Past Month    multivitamin w/minerals (THERA-VIT-M) tablet  Take 1 tablet by mouth daily 3/26/2024 at am    pantoprazole (PROTONIX) 40 MG EC tablet TAKE 1 TABLET BY MOUTH EVERYDAY AT BEDTIME 3/26/2024 at pm    potassium chloride ER (K-TAB) 20 MEQ CR tablet Take 40 meq daily 3/26/2024 at am    rosuvastatin (CRESTOR) 10 MG tablet Take 1 tablet (10 mg) by mouth daily 3/26/2024 at pm    sertraline (ZOLOFT) 100 MG tablet Take 2 tablets by mouth once daily 3/26/2024 at am    zolpidem (AMBIEN) 10 MG tablet Take 1 tablet (10 mg) by mouth at bedtime 3/26/2024 at pm

## 2024-03-27 NOTE — ED PROVIDER NOTES
History     Chief Complaint:  Cough       The history is provided by the patient and a relative.      Nicole Reyes is a 68 year old female with history of hypertension presenting with her daughter with concern for pneumonia.  She has had mild persistent cough since her diagnosis of pneumonia 2/10/2024 but is otherwise feeling well.  However, during the night last night she developed chest pain, worse with cough or deep inspiration, described as a tightness across her anterior chest.  She has had cough productive of yellow to light green sputum and shortness of breath.  She has not had measured fever.  She does not have leg pain or swelling.  Her daughter ciara Lucas has had similar sudden onset presentations of pneumonia in the past.    Independent Historian:   As above    Review of External Notes:   Primary care provider visit 2024.  [facility] visit 2/10/2024 for pneumonia treated with doxycycline.    Medications:    Klonopin  Lasix  Neurontin  Cozaar  Protonix  Zoloft  Ambien  Crestor  Ozempic  Klonopin  Glucotrol    Past Medical History:    Arthritis  Blood transfusion  CKD  Depressive disorder  Essential hypertension  GERD  RADHA  Hernia  Insomnia  Iron deficiency anemia  Major depression  Menopause  Obesity  CRISTÓBAL  Chronic pain  Oxygen dependence  Pneumonia  Postoperative seroma  Hypercholesterolemia  Restless leg syndrome  Type 2 diabetes    Past Surgical History:    Breast biopsy   section x3  D&C  Hernia repair     Physical Exam   Patient Vitals for the past 24 hrs:   BP Temp Temp src Pulse Resp SpO2 Height Weight   24 1634 -- -- -- 79 -- 97 % -- --   24 1633 -- -- -- 77 -- (!) 86 % -- --   24 1632 -- -- -- 80 -- (!) 87 % -- --   24 1631 -- -- -- 80 -- (!) 88 % -- --   24 1534 -- -- -- -- -- 93 % -- --   24 1533 -- -- -- 72 -- 94 % -- --   24 1532 -- -- -- 73 -- 91 % -- --   24 1531 -- -- -- 72 -- 92 % -- --   24 1530 -- -- -- 73  "-- 93 % -- --   03/27/24 1529 -- -- -- 70 -- 90 % -- --   03/27/24 1528 -- -- -- 72 -- 92 % -- --   03/27/24 1527 -- -- -- 73 -- 93 % -- --   03/27/24 1526 -- -- -- 69 -- 93 % -- --   03/27/24 1525 -- -- -- 70 -- 92 % -- --   03/27/24 1524 -- -- -- 73 -- 93 % -- --   03/27/24 1523 -- -- -- 71 -- 91 % -- --   03/27/24 1230 (!) 142/84 97  F (36.1  C) Temporal 85 18 92 % 1.473 m (4' 10\") 114.3 kg (252 lb)        Physical Exam  General: Well-developed and well-nourished. Well appearing elderly  woman. Cooperative.  Head:  Atraumatic.  Eyes:  Conjunctivae, lids, and sclerae are normal.  ENT:    Normal nose. Moist mucous membranes.  Neck:  Supple. Normal range of motion.  CV:  Regular rate and rhythm. Normal heart sounds with no murmurs, rubs, or gallops detected.  Resp:  No respiratory distress. Clear to auscultation bilaterally without decreased breath sounds, wheezing, rales, or rhonchi.  Cough with deep inspiration.  GI:  Soft. Non-distended. Non-tender.    MS:  Normal ROM. No bilateral lower extremity edema.  Skin:  Warm. Non-diaphoretic. No pallor.  Neuro:  Awake. A&Ox3. Normal strength.  Psych: Normal mood and affect. Normal speech.  Vitals reviewed.    Emergency Department Course   EKG  Indication: dyspnea  Time: 1343  Rate 72 bpm. AK interval 212. QRS duration 80. QT/QTc 420/459.   Sinus rhythm with first-degree AV block  Inferior infarct, age undetermined  Abnormal ECG  No acute ST changes.  No significant change as compared to prior, dated 11/8/21.    Imaging:  CT Chest Pulmonary Embolism w Contrast   Final Result   IMPRESSION:   1.  Multifocal airspace disease, most pronounced in the left lower   lobe, suspicious for pneumonia.   2.  No pulmonary embolus.      JAIRO BAPTISTE MD            SYSTEM ID:  [id number]         Laboratory:  Labs Ordered and Resulted from Time of ED Arrival to Time of ED Departure   D DIMER QUANTITATIVE - Abnormal       Result Value    D-Dimer Quantitative 0.80 (*)    BASIC " METABOLIC PANEL - Abnormal    Sodium 141      Potassium 4.3      Chloride 102      Carbon Dioxide (CO2) 29      Anion Gap 10      Urea Nitrogen 13.6      Creatinine 0.91      GFR Estimate 68      Calcium 9.0      Glucose 159 (*)    CBC WITH PLATELETS AND DIFFERENTIAL - Abnormal    WBC Count 13.4 (*)     RBC Count 5.23 (*)     Hemoglobin 13.5      Hematocrit 43.5      MCV 83      MCH 25.8 (*)     MCHC 31.0 (*)     RDW 16.9 (*)     Platelet Count 256      % Neutrophils 78      % Lymphocytes 15      % Monocytes 4      % Eosinophils 2      % Basophils 0      % Immature Granulocytes 1      NRBCs per 100 WBC 0      Absolute Neutrophils 10.5 (*)     Absolute Lymphocytes 2.0      Absolute Monocytes 0.5      Absolute Eosinophils 0.3      Absolute Basophils 0.1      Absolute Immature Granulocytes 0.1      Absolute NRBCs 0.0     TROPONIN T, HIGH SENSITIVITY - Normal    Troponin T, High Sensitivity 11     NT PROBNP INPATIENT - Normal    N terminal Pro BNP Inpatient 94     INFLUENZA A/B, RSV, & SARS-COV2 PCR - Normal    Influenza A PCR Negative      Influenza B PCR Negative      RSV PCR Negative      SARS CoV2 PCR Negative     PROCALCITONIN - Normal    Procalcitonin 0.08     BLOOD CULTURE      Emergency Department Course & Assessments:    Interventions:  Medications   acetaminophen (TYLENOL) tablet 1,000 mg (1,000 mg Oral $Given 3/27/24 1634)   cefTRIAXone (ROCEPHIN) 2 g vial to attach to  ml bag for ADULTS or NS 50 ml bag for PEDS (0 g Intravenous Stopped 3/27/24 1900)   azithromycin 500 mg (ZITHROMAX) in 0.9% NaCl 250 mL intermittent infusion 500 mg (500 mg Intravenous $New Bag 3/27/24 1857)      Assessments:  1630 I was notified the patient came hypoxic to 86% when we attempted to wean oxygen.  1645 I updated the patient on findings and plan for admission.  She verbalizes understanding.    Independent Interpretation (X-rays, CTs, rhythm strip):  I independently interpreted the CT PE and see no pulmonary  embolism.    Consultations/Discussion of Management or Tests:  1712 I consulted with Dr. Spain, hospitalist, who accepts admission.     Social Determinants of Health affecting care:   Supportive daughter    Disposition:  The patient was admitted to the hospital under the care of Dr. Spain.     Impression & Plan    MIPS (If applicable):  CT for PE was ordered because the patient had an abnormal d-dimer.     Medical Decision Making:  Nicole is a 68 year old who developed worsening cough, pleuritic chest pain, and shortness of breath last night similar to when she has had pneumonia in the past.  On exam she is overall well-appearing, coughing with deep inspiration.  She is initially placed on 4 L nasal cannula oxygen for room air saturation of 92%.  We weaned her off this and she became hypoxic to 86% requiring resumption of nasal cannula oxygen.    EKG is reassuring without acute ST changes or arrhythmias.  Troponin is normal.  D-dimer is mildly elevated so she was sent for CT PE study.  Fortunately, there is no PE although there is multifocal airspace disease, maximal in the left lower lobe, suspicious for pneumonia.  She does have a leukocytosis to 13.4 with very low procalcitonin is 0.08.  Blood cultures were obtained and she was empirically treated with Rocephin and azithromycin.  Laboratory studies are otherwise reassuring.    Given her associated hypoxia with multifocal pneumonia, she requires hospitalization.  She is comfortable with this plan.  I discussed the patient's case with Dr. Spain, hospitalist, who accepts admission and has no further orders.    Diagnosis:    ICD-10-CM    1. Acute respiratory failure with hypoxia (H)  J96.01       2. Multifocal pneumonia  J18.9       3. Leukocytosis, unspecified type  D72.829            Scribe Disclosure:  I, Hugo Wolfe, am serving as a scribe at 12:44 PM on 3/27/2024 to document services personally performed by Tameka Bueno MD based on my observations and the  provider's statements to me.     3/27/2024   Tameka Bueno MD Dixson, Kylie S, MD  03/28/24 0827

## 2024-03-27 NOTE — ED NOTES
[facility]  ED Nurse Handoff Report    ED Chief complaint: Cough  . ED Diagnosis:   Final diagnoses:   Acute respiratory failure with hypoxia (H)   Multifocal pneumonia   Leukocytosis, unspecified type       Allergies:   Allergies   Allergen Reactions    Metformin GI Disturbance     High dose    Morphine And Related Rash    Penicillins Rash     Pt has tolerated cephalosporins and penems.   Pt tolerated Zosyn 7/6/2019       Code Status: Full Code    Activity level - Baseline/Home:  independent.  Activity Level - Current:   standby.   Lift room needed: No.   Bariatric: No   Needed: No   Isolation: No.   Infection: Not Applicable.     Respiratory status: Nasal cannula    Vital Signs (within 30 minutes):   Vitals:    03/27/24 1631 03/27/24 1632 03/27/24 1633 03/27/24 1634   BP:       Pulse: 80 80 77 79   Resp:       Temp:       TempSrc:       SpO2: (!) 88% (!) 87% (!) 86% 97%   Weight:       Height:           Cardiac Rhythm:  ,      Pain level:    Patient confused: No.   Patient Falls Risk: nonskid shoes/slippers when out of bed and arm band in place.   Elimination Status:  due to void      Patient Report - Initial Complaint: cough, SOB.   Focused Assessment: 68 year old female with history of hypertension presenting with her daughter with concern for pneumonia.  She has had mild persistent cough since her diagnosis of pneumonia 2/10/2024 but is otherwise feeling well.  However, during the night last night she developed chest pain, worse with cough or deep inspiration, described as a tightness across her anterior chest.  She has had cough productive of yellow to light green sputum and shortness of breath.  She has not had measured fever.  She does not have leg pain or swelling.  Her daughter comments Shayy has had similar sudden onset presentations of pneumonia in the past.      Abnormal Results:   Labs Ordered and Resulted from Time of ED Arrival to Time of ED Departure   D DIMER  QUANTITATIVE - Abnormal       Result Value    D-Dimer Quantitative 0.80 (*)    BASIC METABOLIC PANEL - Abnormal    Sodium 141      Potassium 4.3      Chloride 102      Carbon Dioxide (CO2) 29      Anion Gap 10      Urea Nitrogen 13.6      Creatinine 0.91      GFR Estimate 68      Calcium 9.0      Glucose 159 (*)    CBC WITH PLATELETS AND DIFFERENTIAL - Abnormal    WBC Count 13.4 (*)     RBC Count 5.23 (*)     Hemoglobin 13.5      Hematocrit 43.5      MCV 83      MCH 25.8 (*)     MCHC 31.0 (*)     RDW 16.9 (*)     Platelet Count 256      % Neutrophils 78      % Lymphocytes 15      % Monocytes 4      % Eosinophils 2      % Basophils 0      % Immature Granulocytes 1      NRBCs per 100 WBC 0      Absolute Neutrophils 10.5 (*)     Absolute Lymphocytes 2.0      Absolute Monocytes 0.5      Absolute Eosinophils 0.3      Absolute Basophils 0.1      Absolute Immature Granulocytes 0.1      Absolute NRBCs 0.0     TROPONIN T, HIGH SENSITIVITY - Normal    Troponin T, High Sensitivity 11     NT PROBNP INPATIENT - Normal    N terminal Pro BNP Inpatient 94     INFLUENZA A/B, RSV, & SARS-COV2 PCR - Normal    Influenza A PCR Negative      Influenza B PCR Negative      RSV PCR Negative      SARS CoV2 PCR Negative     PROCALCITONIN   BLOOD CULTURE   BLOOD CULTURE        CT Chest Pulmonary Embolism w Contrast   Final Result   IMPRESSION:   1.  Multifocal airspace disease, most pronounced in the left lower   lobe, suspicious for pneumonia.   2.  No pulmonary embolus.      JAIRO BAPTISTE MD            SYSTEM ID:  [id number]          Treatments provided: see MAR  Family Comments: involved and supportive  OBS brochure/video discussed/provided to patient:  Yes  ED Medications:   Medications   cefTRIAXone (ROCEPHIN) 2 g vial to attach to  ml bag for ADULTS or NS 50 ml bag for PEDS (has no administration in time range)   azithromycin 500 mg (ZITHROMAX) in 0.9% NaCl 250 mL intermittent infusion 500 mg (has no administration in time range)    iopamidol (ISOVUE-370) solution 86 mL (86 mLs Intravenous $Given 3/27/24 1611)   sodium chloride 0.9 % bag 500mL for CT scan flush use (100 mLs Intravenous $Given 3/27/24 1612)   acetaminophen (TYLENOL) tablet 1,000 mg (1,000 mg Oral $Given 3/27/24 1634)       Drips infusing:  No  For the majority of the shift this patient was Green.   Interventions performed were NA.    Sepsis treatment initiated: No    Cares/treatment/interventions/medications to be completed following ED care: see inpatient orders     ED Nurse Name: Emilie Beasley RN  5:11 PM  RECEIVING UNIT ED HANDOFF REVIEW    Above ED Nurse Handoff Report was reviewed: Yes  Reviewed by: Tonya Covington RN on March 27, 2024 at 7:51 PM   ANDIE Arias called the ED to inform them the note was read: Yes

## 2024-03-27 NOTE — ED TRIAGE NOTES
"    Pt comes in from home. \"I think I have PNA\". + cough, difficulty taking deep breath. Increased cough after eating and drinking.   "

## 2024-03-28 LAB
ANION GAP SERPL CALCULATED.3IONS-SCNC: 9 MMOL/L (ref 7–15)
BUN SERPL-MCNC: 14.9 MG/DL (ref 8–23)
CALCIUM SERPL-MCNC: 8.8 MG/DL (ref 8.8–10.2)
CHLORIDE SERPL-SCNC: 101 MMOL/L (ref 98–107)
CREAT SERPL-MCNC: 0.95 MG/DL (ref 0.51–0.95)
DEPRECATED HCO3 PLAS-SCNC: 28 MMOL/L (ref 22–29)
EGFRCR SERPLBLD CKD-EPI 2021: 65 ML/MIN/1.73M2
ERYTHROCYTE [DISTWIDTH] IN BLOOD BY AUTOMATED COUNT: 17.1 % (ref 10–15)
GLUCOSE BLDC GLUCOMTR-MCNC: 119 MG/DL (ref 70–99)
GLUCOSE BLDC GLUCOMTR-MCNC: 135 MG/DL (ref 70–99)
GLUCOSE BLDC GLUCOMTR-MCNC: 174 MG/DL (ref 70–99)
GLUCOSE BLDC GLUCOMTR-MCNC: 76 MG/DL (ref 70–99)
GLUCOSE BLDC GLUCOMTR-MCNC: 86 MG/DL (ref 70–99)
GLUCOSE SERPL-MCNC: 151 MG/DL (ref 70–99)
HCT VFR BLD AUTO: 37.9 % (ref 35–47)
HGB BLD-MCNC: 11.8 G/DL (ref 11.7–15.7)
L PNEUMO1 AG UR QL IA: NEGATIVE
MCH RBC QN AUTO: 25.8 PG (ref 26.5–33)
MCHC RBC AUTO-ENTMCNC: 31.1 G/DL (ref 31.5–36.5)
MCV RBC AUTO: 83 FL (ref 78–100)
PLATELET # BLD AUTO: 218 10E3/UL (ref 150–450)
POTASSIUM SERPL-SCNC: 4.2 MMOL/L (ref 3.4–5.3)
RBC # BLD AUTO: 4.58 10E6/UL (ref 3.8–5.2)
S PNEUM AG SPEC QL: NEGATIVE
SODIUM SERPL-SCNC: 138 MMOL/L (ref 135–145)
WBC # BLD AUTO: 18.1 10E3/UL (ref 4–11)

## 2024-03-28 PROCEDURE — 82962 GLUCOSE BLOOD TEST: CPT

## 2024-03-28 PROCEDURE — 99233 SBSQ HOSP IP/OBS HIGH 50: CPT | Performed by: PHYSICIAN ASSISTANT

## 2024-03-28 PROCEDURE — 80048 BASIC METABOLIC PNL TOTAL CA: CPT | Performed by: STUDENT IN AN ORGANIZED HEALTH CARE EDUCATION/TRAINING PROGRAM

## 2024-03-28 PROCEDURE — 120N000001 HC R&B MED SURG/OB

## 2024-03-28 PROCEDURE — 250N000013 HC RX MED GY IP 250 OP 250 PS 637: Performed by: STUDENT IN AN ORGANIZED HEALTH CARE EDUCATION/TRAINING PROGRAM

## 2024-03-28 PROCEDURE — 250N000011 HC RX IP 250 OP 636: Performed by: PHYSICIAN ASSISTANT

## 2024-03-28 PROCEDURE — 250N000013 HC RX MED GY IP 250 OP 250 PS 637: Performed by: PHYSICIAN ASSISTANT

## 2024-03-28 PROCEDURE — 36415 COLL VENOUS BLD VENIPUNCTURE: CPT | Performed by: STUDENT IN AN ORGANIZED HEALTH CARE EDUCATION/TRAINING PROGRAM

## 2024-03-28 PROCEDURE — 250N000011 HC RX IP 250 OP 636: Mod: JZ | Performed by: STUDENT IN AN ORGANIZED HEALTH CARE EDUCATION/TRAINING PROGRAM

## 2024-03-28 PROCEDURE — 258N000003 HC RX IP 258 OP 636: Performed by: STUDENT IN AN ORGANIZED HEALTH CARE EDUCATION/TRAINING PROGRAM

## 2024-03-28 PROCEDURE — G0378 HOSPITAL OBSERVATION PER HR: HCPCS

## 2024-03-28 PROCEDURE — 96376 TX/PRO/DX INJ SAME DRUG ADON: CPT

## 2024-03-28 PROCEDURE — 85027 COMPLETE CBC AUTOMATED: CPT | Performed by: STUDENT IN AN ORGANIZED HEALTH CARE EDUCATION/TRAINING PROGRAM

## 2024-03-28 RX ORDER — GUAIFENESIN 600 MG/1
600 TABLET, EXTENDED RELEASE ORAL 2 TIMES DAILY
Status: DISCONTINUED | OUTPATIENT
Start: 2024-03-28 | End: 2024-03-30 | Stop reason: HOSPADM

## 2024-03-28 RX ORDER — ENOXAPARIN SODIUM 100 MG/ML
40 INJECTION SUBCUTANEOUS EVERY 24 HOURS
Status: DISCONTINUED | OUTPATIENT
Start: 2024-03-28 | End: 2024-03-29

## 2024-03-28 RX ORDER — DEXTROMETHORPHAN POLISTIREX 30 MG/5ML
15 SUSPENSION ORAL EVERY 12 HOURS SCHEDULED
Status: DISCONTINUED | OUTPATIENT
Start: 2024-03-28 | End: 2024-03-30 | Stop reason: HOSPADM

## 2024-03-28 RX ORDER — ACETAMINOPHEN 325 MG/1
650 TABLET ORAL EVERY 4 HOURS PRN
Status: DISCONTINUED | OUTPATIENT
Start: 2024-03-28 | End: 2024-03-30 | Stop reason: HOSPADM

## 2024-03-28 RX ADMIN — DEXTROMETHORPHAN POLISTIREX 15 MG: 30 SUSPENSION ORAL at 19:26

## 2024-03-28 RX ADMIN — AZITHROMYCIN MONOHYDRATE 500 MG: 500 INJECTION, POWDER, LYOPHILIZED, FOR SOLUTION INTRAVENOUS at 11:37

## 2024-03-28 RX ADMIN — GUAIFENESIN 600 MG: 600 TABLET, EXTENDED RELEASE ORAL at 12:01

## 2024-03-28 RX ADMIN — CEFTRIAXONE SODIUM 2 G: 2 INJECTION, POWDER, FOR SOLUTION INTRAMUSCULAR; INTRAVENOUS at 11:00

## 2024-03-28 RX ADMIN — GUAIFENESIN 600 MG: 600 TABLET, EXTENDED RELEASE ORAL at 19:26

## 2024-03-28 RX ADMIN — ZOLPIDEM TARTRATE 10 MG: 5 TABLET ORAL at 22:26

## 2024-03-28 RX ADMIN — PANTOPRAZOLE SODIUM 40 MG: 40 TABLET, DELAYED RELEASE ORAL at 22:26

## 2024-03-28 RX ADMIN — ENOXAPARIN SODIUM 40 MG: 40 INJECTION SUBCUTANEOUS at 12:01

## 2024-03-28 RX ADMIN — GLIPIZIDE 10 MG: 10 TABLET, EXTENDED RELEASE ORAL at 08:07

## 2024-03-28 RX ADMIN — GABAPENTIN 900 MG: 300 CAPSULE ORAL at 08:05

## 2024-03-28 RX ADMIN — SERTRALINE HYDROCHLORIDE 200 MG: 100 TABLET ORAL at 08:07

## 2024-03-28 RX ADMIN — GABAPENTIN 900 MG: 300 CAPSULE ORAL at 22:25

## 2024-03-28 RX ADMIN — GABAPENTIN 600 MG: 300 CAPSULE ORAL at 12:01

## 2024-03-28 ASSESSMENT — ACTIVITIES OF DAILY LIVING (ADL)
ADLS_ACUITY_SCORE: 38
ADLS_ACUITY_SCORE: 37
ADLS_ACUITY_SCORE: 38
ADLS_ACUITY_SCORE: 37
ADLS_ACUITY_SCORE: 38
ADLS_ACUITY_SCORE: 37
ADLS_ACUITY_SCORE: 38

## 2024-03-28 NOTE — PLAN OF CARE
ROOM # 204-2     Living Situation (if not independent, order SW consult): Lives at Home with Daughter  Facility name:  : [facility] (Daughter)   [phone] (Mobile)     Activity level at baseline: Independent with walker  Activity level on admit: Assist x1 with walker and gait belt    Who will be transporting you at discharge: TBD    Patient registered to observation; given Patient Bill of Rights; given the opportunity to ask questions about observation status and their plan of care.  Patient has been oriented to the observation room, bathroom and call light is in place.    Discussed discharge goals and expectations with patient/family.

## 2024-03-28 NOTE — PLAN OF CARE
"Dx: Acute respiratory failure with hypoxia    /66 (BP Location: Right arm)   Pulse 89   Temp 99  F (37.2  C) (Oral)   Resp 20   Ht 1.473 m (4' 10\")   Wt 108.8 kg (239 lb 12.8 oz)   LMP 09/15/2007   SpO2 91%   BMI 50.12 kg/m       A&O x4. Assist x1 with walker. Moderate carb diet tolerated. BS 46. Reporting pain 4/10 when coughing. O2 at 2.5L. Bed alarm on and call light within reach.   "

## 2024-03-28 NOTE — PROGRESS NOTES
[facility]    ED Boarding Nurse Handoff Addendum Report:    Date/time: 3/27/2024, 1815 PM    Activity Level: standby    Fall Risk: No    Active Infusions: azithromycin     Current Meds Due: see MAR    Current care needs: IV antibiotics    Oxygen requirements (liters/min and/or FiO2): 2L    Respiratory status: Nasal cannula    Vital signs (within last 30 minutes):    Vitals:    03/27/24 1700 03/27/24 1734 03/27/24 1744 03/27/24 1906   BP: 135/76 131/79  120/75   Pulse: 80 84 89 94   Resp:       Temp:       TempSrc:       SpO2: 92% 91% 95% 92%   Weight:       Height:           Focused assessment within last 30 minutes:    A&Ox4, denies pain. SOB with exertion. 93% on 2 L. Wears CPAP at HS. Infrequent congested cough noted. Lung bases diminished bilaterally.     ED Boarding Nurse name: Sarah Butler RN

## 2024-03-28 NOTE — PROGRESS NOTES
Pt visited about CPAP @ noc order, pt has not been wearing her CPAP and says she does ok on oxygen. Pt is aware she can request a CPAP from us to wear if she desires/changes her mind.     Kalie Oscar, RT

## 2024-03-28 NOTE — PLAN OF CARE
"Dx: Acute Respiratory Failure with hypoxia    /62 (BP Location: Right arm)   Pulse 85   Temp 99.2  F (37.3  C) (Oral)   Resp 18   Ht 1.473 m (4' 10\")   Wt 108.8 kg (239 lb 12.8 oz)   LMP 09/15/2007   SpO2 95%   BMI 50.12 kg/m       A&O x4. Assist x 1 with walker. Moderate carb diet tolerated. . Reporting pain 5/10 when coughing. O2 at 2L. Bed alarm on and call light within reach.   "

## 2024-03-28 NOTE — PLAN OF CARE
PRIMARY DIAGNOSIS: ACUTE HYPOXIC RESPIRATORY FAILURE/ PNEUMONIA  OUTPATIENT/OBSERVATION GOALS TO BE MET BEFORE DISCHARGE:  1. Vital signs stable: Yes    2. Improvement of peak flow to greater than 70% sustained off nebulizer for 4 hours:  N/A    3. Dyspnea improved and O2 sats >88% at RA or at prior home O2 therapy level: Yes      SpO2: 92 %, O2 Device: Nasal cannula    4. Short term supplemental O2 needed for use with activity at home: tbd    5. Tolerating adequate PO diet and medications: Yes    6. Return to near baseline physical activity: Yes    Discharge Planner Nurse   Safe discharge environment identified: No  Barriers to discharge: Yes       Entered by: Tonya Covington RN 2024 5:02 AM   Pt is alert and oriented x4. Up with SBA with gait belt and walker. Triggered sepsis upon arrival at the unit. Lactic acid 1.0 VSS. Observed  with activity but recovered quickly. Redness noted under left breast and perinieal folds. Areas were cleansed and dried. Pt stated she uses ketoconazole  cream at home, not verified yet. Refused CPAP overnight, used 2LPM Nasal cannula instead.   Please review provider order for any additional goals.   Nurse to notify provider when observation goals have been met and patient is ready for discharge.      Plan of Care Reviewed With: patient, child        Problem: Adult Inpatient Plan of Care  Goal: Plan of Care Review  Description: The Plan of Care Review/Shift note should be completed every shift.  The Outcome Evaluation is a brief statement about your assessment that the patient is improving, declining, or no change.  This information will be displayed automatically on your shift  note.  3/28/2024 0159 by Tonya Covington, RN  Outcome: Progressing  Flowsheets (Taken 3/28/2024 0159)  Plan of Care Reviewed With:   patient   child  3/27/2024 2146 by Tonya Covington, RN  Outcome: Progressing  Goal: Patient-Specific Goal (Individualized)  Description: You can add care plan  "individualizations to a care plan. Examples of Individualization might be:  \"Parent requests to be called daily at 9am for status\", \"I have a hard time hearing out of my right ear\", or \"Do not touch me to wake me up as it startles  me\".  3/28/2024 0159 by Tonya Covington RN  Outcome: Progressing  3/27/2024 2146 by Tonya Covington RN  Outcome: Progressing  Goal: Absence of Hospital-Acquired Illness or Injury  3/28/2024 0159 by Tonya Covington RN  Outcome: Progressing  3/27/2024 2146 by Tonya Covington RN  Outcome: Progressing  Goal: Optimal Comfort and Wellbeing  3/28/2024 0159 by Tonya Covington RN  Outcome: Progressing  3/27/2024 2146 by Tonya Covington RN  Outcome: Progressing  Goal: Readiness for Transition of Care  3/28/2024 0159 by Tonya Covington RN  Outcome: Progressing  3/27/2024 2146 by Tonya Covington, RN  Outcome: Progressing              "

## 2024-03-28 NOTE — PLAN OF CARE
PRIMARY DIAGNOSIS: ACUTE HYPOXIC RESPIRATORY FAILURE/ PNEUMONIA  OUTPATIENT/OBSERVATION GOALS TO BE MET BEFORE DISCHARGE:  1. Vital signs stable: Yes    2. Improvement of peak flow to greater than 70% sustained off nebulizer for 4 hours:  N/A    3. Dyspnea improved and O2 sats >88% at RA or at prior home O2 therapy level: Yes      SpO2: 92 %, O2 Device: Nasal cannula    4. Short term supplemental O2 needed for use with activity at home: tbd    5. Tolerating adequate PO diet and medications: Yes    6. Return to near baseline physical activity: Yes    Discharge Planner Nurse   Safe discharge environment identified: No  Barriers to discharge: Yes       Entered by: Tonya Covington RN 2024 2:01 AM   Pt is alert and oriented x4. Up with SBA with gait belt and walker. Triggered sepsis upon arrival at the unit. Lactic acid 1.0 VSS. Observed  with activity but recovered quickly. Redness noted under left breast and perinieal folds. Areas were cleansed and dried. Pt stated she uses ketoconazole  cream at home, not verified yet.   Please review provider order for any additional goals.   Nurse to notify provider when observation goals have been met and patient is ready for discharge.      Plan of Care Reviewed With: patient, child        Problem: Adult Inpatient Plan of Care  Goal: Plan of Care Review  Description: The Plan of Care Review/Shift note should be completed every shift.  The Outcome Evaluation is a brief statement about your assessment that the patient is improving, declining, or no change.  This information will be displayed automatically on your shift  note.  3/28/2024 0159 by Tonya Covington, RN  Outcome: Progressing  Flowsheets (Taken 3/28/2024 0159)  Plan of Care Reviewed With:   patient   child  3/27/2024 2146 by Tonya Covington, RN  Outcome: Progressing  Goal: Patient-Specific Goal (Individualized)  Description: You can add care plan individualizations to a care plan. Examples of Individualization  "might be:  \"Parent requests to be called daily at 9am for status\", \"I have a hard time hearing out of my right ear\", or \"Do not touch me to wake me up as it startles  me\".  3/28/2024 0159 by Tonya Covington RN  Outcome: Progressing  3/27/2024 2146 by Tonya Covington RN  Outcome: Progressing  Goal: Absence of Hospital-Acquired Illness or Injury  3/28/2024 0159 by Tonya Covington RN  Outcome: Progressing  3/27/2024 2146 by Tonya Covington RN  Outcome: Progressing  Goal: Optimal Comfort and Wellbeing  3/28/2024 0159 by Tonya Covington RN  Outcome: Progressing  3/27/2024 2146 by Tonya Covington RN  Outcome: Progressing  Goal: Readiness for Transition of Care  3/28/2024 0159 by Tonya Covington RN  Outcome: Progressing  3/27/2024 2146 by Tonya Covington RN  Outcome: Progressing              "

## 2024-03-28 NOTE — H&P
[facility]    History and Physical - Hospitalist Service       Date of Admission:  3/27/2024    Assessment & Plan      Nicole Reyes is a 68 year old female with a past medical history significant for hypertension, obstructive sleep apnea on CPAP as well as 2 L of oxygen, history of recurrent aspiration pneumonia, GERD, hiatal hernia, CKD, MDD, type II DM, obesity who presented to the ER with complaint of feeling unwell and was found to have acute hypoxic respiratory failure secondary to community-acquired pneumonia.    Community-acquired pneumonia  Acute hypoxic respiratory failure secondary to above  History of recurrent aspiration pneumonia in the setting of hiatal hernia  Recently treated with a 7-day course of doxycycline outpatient in 2/24.  After that patient reported feeling completely fine.  Started feeling unwell since last night.  Reported symptoms of cough, shortness of breath, chills.  Mild leukocytosis upon admission and required 2 L nasal cannula.  Imaging significant for multifocal pneumonia worse on the left side.  COVID, RSV, influenza A/B are all negative.  Patient also has history of recurrent aspiration pneumonia requiring hospitalization, last one was in 2021.  After that she was referred to see a general surgery to discuss management of hiatal hernia as it was thought to be contributing in aspiration pneumonia however patient did not pursue it further as she stopped having episodes of pneumonia until now.    -Continue IV ceftriaxone and azithromycin  - Follow-up blood cultures  - Check strep pneumo, Legionella, MRSA nares, Pro-Carlitos and CRP    Obstructive sleep apnea with sleep associated hypoxia on nocturnal 2 L nasal cannula at baseline  Patient reported that she used to use CPAP religiously when her  was alive.  Unfortunately her  passed few months ago and since then she has not been using CPAP as much.  She also uses 2 L nasal cannula at night with  "CPAP.  - CPAP ordered    Type II DM, well-controlled  -Last A1c 6.3 on 1/2024  -Resume glipizide 10 mg daily  -Patient was also on semaglutide prior to admission, will hold it for now  -POCT twice daily, patient is not on insulin prior to admission  - Hypoglycemia protocol    Hypertension  - Hold prior to admission diltiazem and losartan for now in the setting of active infection, resume as able    Chronic lower back pain  -Resume home gabapentin    GERD  Hiatal hernia  - Resume prior to admission pantoprazole at bedtime    Obesity  Complicates overall cares    CKD  -Creatinine stable      MDD/anxiety  -Resume prior to admission sertraline      Insomnia  -Resume prior to admission Ambien 10 mg tablet at bedtime           Observation Goals: -diagnostic tests and consults completed and resulted, -vital signs normal or at patient baseline, -tolerating oral antibiotics or has plans for home infusion setup, -infection is improving, -dyspnea improved and O2 sats greater than 88% on room air or prior home oxygen levels, Nurse to notify provider when observation goals have been met and patient is ready for discharge.  Diet: Moderate Consistent Carb (60 g CHO per Meal) Diet    DVT Prophylaxis: Pneumatic Compression Devices  Suarez Catheter: Not present  Lines: None     Cardiac Monitoring: None  Code Status: Full Code      Clinically Significant Risk Factors Present on Admission                  # Hypertension: Noted on problem list   # Acute Respiratory Failure: Documented O2 saturation < 91%.  Continue supplemental oxygen as needed     # Severe Obesity: Estimated body mass index is 52.67 kg/m  as calculated from the following:    Height as of this encounter: 1.473 m (4' 10\").    Weight as of this encounter: 114.3 kg (252 lb).              Disposition Plan      Expected Discharge Date: 03/28/2024                  Meagan Spain MD  Hospitalist Service  [facility]  Securely message with [employer] (more " info)  Text page via [facility] Paging/Directory     ______________________________________________________________________    Chief Complaint   Feeling unwell    History is obtained from the patient    History of Present Illness   Nicole Reyes is a 68 year old female with a past medical history significant for hypertension, obstructive sleep apnea on CPAP as well as 2 L of oxygen, history of recurrent aspiration pneumonia, GERD, hiatal hernia, CKD, MDD, type II DM, obesity who presented to the ER with complaint of feeling unwell and was found to have acute hypoxic respiratory failure secondary to community-acquired pneumonia.    Patient has history of recurrent aspiration pneumonia in the setting of hiatal hernia.  Last episode of aspiration pneumonia requiring hospitalization was in 2021.  After that she seen a general surgery to discuss the surgical option for her hiatal hernia.  She was advised to lose weight prior to surgery.  Since she did not have any further episodes so she did not pursue the surgery.  She has been feeling fine until now.  In February she started having URI symptoms.  She presented to the urgent room for evaluation.  She was prescribed a course of doxycycline.  Patient completed the course of doxycycline and after that she felt significantly better.  She continues to feel well until last night when suddenly she started feeling unwell.  She also develop sudden acute coughing and chest pain and difficulty breathing associated with episodes of coughing.  She did not significantly feel short of breath on room air.  She noted that her oxygen levels were low.  She denied any fever but has been feeling cold.  Her cough is productive of yellow to green sputum.  She states that her symptoms are very similar to prior episodes of pneumonia.      Upon presentation to the ED patient was afebrile, pulse 85, blood pressure 142/84, on 2 L nasal cannula..  Workup revealed mild leukocytosis at 13.4.  D-dimer  elevated.  Patient underwent CT chest angiogram which did not show any pulmonary embolus.  This showed multifocal pneumonia with bilateral groundglass opacities and consolidation worse on the left side.  Patient was a started on IV ceftriaxone and azithromycin and admitted to the observation for further management.    Past Medical History    Past Medical History:   Diagnosis Date    Arthritis     lt shoulder, rt. knee    Blood transfusion     CKD (chronic kidney disease) stage 3, GFR 30-59 ml/min (H)     Depressive disorder     Essential hypertension, benign     Gastroesophageal reflux disease     Generalized anxiety disorder     Hernia, abdominal     Hiatal hernia     History of blood transfusion     with a hernia repair    Hypertension     Insomnia, unspecified     Iron deficiency anemia 2009    Major depression     sees a psychiatrist    Menopause     age 53    Obesity, unspecified     CRISTÓBAL (obstructive sleep apnea)     bipap    Other chronic pain     chronic pain lt arm from tendonidits    Oxygen dependent     2 LPM at home-uses at noc or extended walking    Pneumonia     due to aspiration from hiatal hernia-    Pneumonia due to 2019 novel coronavirus 2021    Postoperative seroma 10/2011    drained several times    Pure hypercholesterolemia     RLS (restless legs syndrome)     Type II or unspecified type diabetes mellitus without mention of complication, not stated as uncontrolled        Past Surgical History   Past Surgical History:   Procedure Laterality Date    BREAST BIOPSY, RT/LT      2 times; benign    BREAST SURGERY  ?    Biopsy x 2 Benign     SECTION       SECTION       SECTION      COLONOSCOPY N/A 2020    Procedure: Colonoscopy with biopsy;  Surgeon: Obinna Gutierrez MD;  Location: [facility] OR    DILATION AND CURETTAGE, HYSTEROSCOPY DIAGNOSTIC, COMBINED  2013    Procedure: COMBINED DILATION AND CURETTAGE, HYSTEROSCOPY DIAGNOSTIC;  DILATION AND CURETTAGE,  HYSTEROSCOPY DIAGNOSTIC   Converted to a general;  Surgeon: Robi Edwards MD;  Location: [facility] OR    ESOPHAGOSCOPY, GASTROSCOPY, DUODENOSCOPY (EGD), COMBINED N/A 12/08/2015    Procedure: COMBINED ESOPHAGOSCOPY, GASTROSCOPY, DUODENOSCOPY (EGD);  Surgeon: Obinna Gutierrez MD;  Location:  GI    ESOPHAGOSCOPY, GASTROSCOPY, DUODENOSCOPY (EGD), COMBINED N/A 12/22/2015    Procedure: COMBINED ENDOSCOPIC ULTRASOUND, ESOPHAGOSCOPY, GASTROSCOPY, DUODENOSCOPY (EGD), FINE NEEDLE ASPIRATE/BIOPSY;  Surgeon: Sabine Olivares MD;  Location:  GI    ESOPHAGOSCOPY, GASTROSCOPY, DUODENOSCOPY (EGD), COMBINED N/A 01/03/2020    Procedure: ESOPHAGOGASTRODUODENOSCOPY;  Surgeon: Ascencion Stauffer MD;  Location: [facility] OR    HERNIORRHAPHY INCISIONAL (LOCATION)  10/08/2011     incarcerated hernia repair with mesh;    HERNIORRHAPHY INCISIONAL (LOCATION)  10/16/2011     repair incisional hernia with mesh, and removal of current implanted mesh;    [facility] NONSPECIFIC PROCEDURE      Hernia repair     [facility] NONSPECIFIC PROCEDURE      Lymph node biopsy in neck (benign)        Prior to Admission Medications   Prior to Admission Medications   Prescriptions Last Dose Informant Patient Reported? Taking?   Fish Oil-Krill Oil (KRILL & FISH OIL BLEND PO) 3/26/2024  Yes Yes   Sig: Take 1 capsule by mouth daily   blood glucose (ACCU-CHEK BYRON PLUS) test strip   No No   Sig: USE TO TEST BLOOD SUGAR ONE TIME A DAY   clonazePAM (KLONOPIN) 1 MG tablet 3/26/2024 at pm  No Yes   Sig: Take 1 tablet (1 mg) by mouth 2 times daily as needed for anxiety 60 to last 30 days   Patient taking differently: Take 1 mg by mouth 2 times daily 60 to last 30 days   diltiazem ER (TIADYLT ER) 360 MG 24 hr ER beaded capsule 3/26/2024 at am  No Yes   Sig: Take 1 capsule (360 mg) by mouth daily   furosemide (LASIX) 20 MG tablet Past Week  No Yes   Sig: Take 1 tablet (20 mg) by mouth daily as needed (edema)   gabapentin (NEURONTIN) 300 MG capsule 3/26/2024 at hs  No Yes    Sig: 3 capsules every am, 2 capsules midday and 3 caps at night   Patient taking differently: Take 900 mg by mouth 2 times daily 3 capsules every am, 2 capsules midday and 3 caps at night   gabapentin (NEURONTIN) 300 MG capsule 3/26/2024 at afternoon  Yes Yes   Sig: Take 600 mg by mouth daily (with lunch) 3 capsules every am, 2 capsules midday and 3 caps at night   glipiZIDE (GLUCOTROL XL) 10 MG 24 hr tablet 3/26/2024 at am  No Yes   Sig: Take 1 tablet (10 mg) by mouth daily   ketoconazole (NIZORAL) 2 % external cream Past Week  No Yes   Sig: Apply to fold areas BID PRN   losartan (COZAAR) 100 MG tablet 3/26/2024 at am  No Yes   Sig: Take 1 tablet (100 mg) by mouth daily   melatonin 5 MG tablet Past Month  Yes Yes   Sig: Take 5 mg by mouth nightly as needed for sleep   multivitamin w/minerals (THERA-VIT-M) tablet 3/26/2024 at am  Yes Yes   Sig: Take 1 tablet by mouth daily   pantoprazole (PROTONIX) 40 MG EC tablet 3/26/2024 at pm  No Yes   Sig: TAKE 1 TABLET BY MOUTH EVERYDAY AT BEDTIME   potassium chloride ER (K-TAB) 20 MEQ CR tablet 3/26/2024 at am  No Yes   Sig: Take 40 meq daily   rosuvastatin (CRESTOR) 10 MG tablet 3/26/2024 at pm  No Yes   Sig: Take 1 tablet (10 mg) by mouth daily   semaglutide (OZEMPIC) 2 MG/3ML pen Not Taking  No No   Sig: Inject 0.5 mg Subcutaneous every 7 days May increase dose in a month if tolerated   Patient not taking: Reported on 3/27/2024   sertraline (ZOLOFT) 100 MG tablet 3/26/2024 at am  No Yes   Sig: Take 2 tablets by mouth once daily   zolpidem (AMBIEN) 10 MG tablet 3/26/2024 at pm  No Yes   Sig: Take 1 tablet (10 mg) by mouth at bedtime      Facility-Administered Medications: None        Review of Systems    The 10 point Review of Systems is negative other than noted in the HPI or here.      Physical Exam   Vital Signs: Temp: 97  F (36.1  C) Temp src: Temporal BP: 120/75 Pulse: 94   Resp: 18 SpO2: 92 % O2 Device: Nasal cannula Oxygen Delivery: 2 LPM  Weight: 252 lbs 0  oz    Constitutional: awake, alert, cooperative, no apparent distress, and appears stated age  Eyes: Anicteric sclera  ENT: normocephalic, without obvious abnormality  Respiratory: On 2 L nasal cannula, equal air entry bilaterally, no wheezing or crackles  Cardiovascular: Normal rate, regular rhythm, no murmur  GI: Obese, soft, nontender, nondistended  Skin: no bruising or bleeding  Musculoskeletal: no lower extremity pitting edema present  Neurologic: Awake, alert, oriented to name, place and time.   Neuropsychiatric: Appropriate mood and affect    Medical Decision Making       60 MINUTES SPENT BY ME on the date of service doing chart review, history, exam, documentation & further activities per the note.      Data   ------------------------- PAST 24 HR DATA REVIEWED -----------------------------------------------

## 2024-03-28 NOTE — PROGRESS NOTES
[facility]    Medicine Progress Note - Hospitalist Service    Date of Admission:  3/27/2024    Assessment & Plan      Nicole Reyes is a 68 year old female with a past medical history significant for hypertension, obstructive sleep apnea on CPAP as well as 2 L of oxygen, history of recurrent aspiration pneumonia, GERD, hiatal hernia, CKD, MDD, type II DM, obesity who presented to the ER with complaint of feeling unwell and was found to have acute hypoxic respiratory failure secondary to community-acquired pneumonia.    Community-acquired pneumonia  Acute hypoxic respiratory failure secondary to above  History of recurrent aspiration pneumonia in the setting of hiatal hernia  Recently treated with a 7-day course of doxycycline outpatient on 2/24, sx completely resolved.  Developed cough, shortness of breath, and chills on the evening of 3/26.     Imaging significant for multifocal pneumonia worse on the left side. COVID, RSV, influenza A/B are all negative.  Mild leukocytosis upon admission, 13.4, increased to 18.1 today.   Requiring 2 L nasal cannula.    Patient also has history of recurrent aspiration pneumonia requiring hospitalization, last one was in 2021.  After that she was referred to see a general surgery to discuss management of hiatal hernia as it was thought to be contributing in aspiration pneumonia however patient did not pursue it further as she stopped having episodes of pneumonia until now.  Lung sounds clear, no wheezing appreciated today  - given ongoing hypoxia and worsening leukocytosis, admit to inpatient  - Continue IV ceftriaxone and azithromycin  - Blood cultures negative to date  - Strep pneumo and Legionella negative, MRSA negative in the nares  - Procalcitonin is low risk, CRP 27.12. consider short course of antibiotics  - continue wean O2 as able  - add incentive spirometry  - add Mucinex to help with clearing phlegm.     Obstructive sleep apnea with sleep  "associated hypoxia on nocturnal 2 L nasal cannula at baseline  Patient reported that she used to use CPAP religiously when her  was alive.  Unfortunately her  passed few months ago and since then she has not been using CPAP as much.  She also uses 2 L nasal cannula at night with CPAP.  - CPAP ordered  - continue O2 at night    Type II DM, well-controlled  Last A1c 6.3 on 1/2024  - continue home glipizide 10 mg daily  - Patient was also on semaglutide prior to admission, will hold it for now  - check glucose, switch to BID and HS  - hold off on sliding scale insulin for now  - continue hypoglycemia protocol    Hypertension  BPs within normal limits with meds on hold  - continue to hold prior to admission diltiazem and losartan in the setting of active infection, resume as needed    Chronic lower back pain  - continue home gabapentin    GERD  Hiatal hernia  - continue home PPI    Obesity  Complicates overall cares    CKD, stage II  - Creatinine stable      MDD/anxiety  - continue home sertraline    Insomnia  - continue home Ambien 10 mg tablet at bedtime          Diet: Moderate Consistent Carb (60 g CHO per Meal) Diet    DVT Prophylaxis: Pneumatic Compression Devices, start subcutaneous Lovenox  Suarez Catheter: Not present  Lines: None     Cardiac Monitoring: None  Code Status: Full Code      Clinically Significant Risk Factors Present on Admission                  # Hypertension: Noted on problem list   # Acute Respiratory Failure: Documented O2 saturation < 91%.  Continue supplemental oxygen as needed     # Severe Obesity: Estimated body mass index is 50.12 kg/m  as calculated from the following:    Height as of this encounter: 1.473 m (4' 10\").    Weight as of this encounter: 108.8 kg (239 lb 12.8 oz).              Disposition Plan      Expected Discharge Date: 03/30/2024                  The patient's care was discussed with the Bedside Nurse and Patient.    Candi Vo PA-C  Hospitalist " [facility]  Securely message with [employer] (more info)  Text page via [facility] Paging/Directory   ______________________________________________________________________    Interval History   Feels improved, continues to have a deep cough, non productive but feels like there is stuff to cough up. Noted SOB with ambulation to the bathroom but felt like she recovered quickly with rest.    Physical Exam   Vital Signs: Temp: 99.2  F (37.3  C) Temp src: Oral BP: 115/62 Pulse: 85   Resp: 18 SpO2: 95 % O2 Device: Nasal cannula Oxygen Delivery: 2 LPM  Weight: 239 lbs 12.8 oz    GENERAL:  Comfortable.  PSYCH: pleasant, oriented, No acute distress.  HEART:  Normal S1, S2 with no murmur, no pericardial rub, gallops or S3 or S4.  LUNGS:  Clear to auscultation, normal Respiratory effort. No wheezing, rales or ronchi.  GI:  Soft, normal bowel sounds. Non-tender, non distended.   EXTREMITIES:  No pedal edema, +2 pulses bilateral and equal.  SKIN:  Dry to touch, No rash, wound or ulcerations.  NEUROLOGIC:  grossly intact    Medical Decision Making       60 MINUTES SPENT BY ME on the date of service doing chart review, history, exam, documentation & further activities per the note.      Data     I have personally reviewed the following data over the past 24 hrs:    18.1 (H)  \   11.8   / 218     138 101 14.9 /  174 (H)   4.2 28 0.95 \     Trop: 11 BNP: 94     Procal: 0.10 CRP: 27.12 (H) Lactic Acid: 1.0       INR:  N/A PTT:  N/A   D-dimer:  0.80 (H) Fibrinogen:  N/A       Imaging results reviewed over the past 24 hrs:   Recent Results (from the past 24 hour(s))   CT Chest Pulmonary Embolism w Contrast    Narrative    CT CHEST PULMONARY EMBOLISM W CONTRAST 3/27/2024 4:25 PM    CLINICAL HISTORY: dyspnea, cough, pleuritic CP, + dimer  TECHNIQUE: CT angiogram chest during arterial phase injection IV  contrast. 2D and 3D MIP reconstructions were performed by the CT  technologist. Dose reduction techniques were  used.     CONTRAST: 86 mL [id number]    COMPARISON: CT 11/8/2021    FINDINGS:  ANGIOGRAM CHEST: Pulmonary arteries are normal caliber and negative  for pulmonary emboli. Thoracic aorta is not well opacified and is  indeterminate for dissection. No CT evidence of right heart strain.    LUNGS AND PLEURA: There are bilateral groundglass opacities and  consolidations, most pronounced in the left lower lobe, suspicious for  pneumonia. No pleural effusion or pneumothorax.    MEDIASTINUM/AXILLAE: Moderate hiatal hernia. Mildly enlarged bilateral  perihilar lymph nodes, similar to prior exam favoring benign etiology,  no specific follow-up recommended.    CORONARY ARTERY CALCIFICATION: None.    UPPER ABDOMEN: Cholelithiasis without evidence of acute cholecystitis.  No acute bony abnormality.    MUSCULOSKELETAL: No acute bony abnormality.      Impression    IMPRESSION:  1.  Multifocal airspace disease, most pronounced in the left lower  lobe, suspicious for pneumonia.  2.  No pulmonary embolus.    JAIRO BAPTISTE MD         SYSTEM ID:  [id number]

## 2024-03-29 LAB
ENTEROCOCCUS FAECALIS: NOT DETECTED
ENTEROCOCCUS FAECIUM: NOT DETECTED
GLUCOSE BLDC GLUCOMTR-MCNC: 114 MG/DL (ref 70–99)
GLUCOSE BLDC GLUCOMTR-MCNC: 145 MG/DL (ref 70–99)
GLUCOSE BLDC GLUCOMTR-MCNC: 85 MG/DL (ref 70–99)
GLUCOSE BLDC GLUCOMTR-MCNC: 88 MG/DL (ref 70–99)
LISTERIA SPECIES (DETECTED/NOT DETECTED): NOT DETECTED
STAPHYLOCOCCUS AUREUS: NOT DETECTED
STAPHYLOCOCCUS EPIDERMIDIS: NOT DETECTED
STAPHYLOCOCCUS LUGDUNENSIS: NOT DETECTED
STAPHYLOCOCCUS SPECIES: NOT DETECTED
STREPTOCOCCUS AGALACTIAE: NOT DETECTED
STREPTOCOCCUS ANGINOSUS GROUP: NOT DETECTED
STREPTOCOCCUS PNEUMONIAE: NOT DETECTED
STREPTOCOCCUS PYOGENES: NOT DETECTED
STREPTOCOCCUS SPECIES: NOT DETECTED

## 2024-03-29 PROCEDURE — 258N000003 HC RX IP 258 OP 636: Performed by: STUDENT IN AN ORGANIZED HEALTH CARE EDUCATION/TRAINING PROGRAM

## 2024-03-29 PROCEDURE — 250N000009 HC RX 250: Performed by: PHYSICIAN ASSISTANT

## 2024-03-29 PROCEDURE — 120N000001 HC R&B MED SURG/OB

## 2024-03-29 PROCEDURE — 250N000013 HC RX MED GY IP 250 OP 250 PS 637: Performed by: STUDENT IN AN ORGANIZED HEALTH CARE EDUCATION/TRAINING PROGRAM

## 2024-03-29 PROCEDURE — 250N000011 HC RX IP 250 OP 636: Performed by: PHYSICIAN ASSISTANT

## 2024-03-29 PROCEDURE — 94640 AIRWAY INHALATION TREATMENT: CPT

## 2024-03-29 PROCEDURE — 5A09357 ASSISTANCE WITH RESPIRATORY VENTILATION, LESS THAN 24 CONSECUTIVE HOURS, CONTINUOUS POSITIVE AIRWAY PRESSURE: ICD-10-PCS | Performed by: STUDENT IN AN ORGANIZED HEALTH CARE EDUCATION/TRAINING PROGRAM

## 2024-03-29 PROCEDURE — 99233 SBSQ HOSP IP/OBS HIGH 50: CPT | Performed by: PHYSICIAN ASSISTANT

## 2024-03-29 PROCEDURE — 999N000157 HC STATISTIC RCP TIME EA 10 MIN

## 2024-03-29 PROCEDURE — 250N000013 HC RX MED GY IP 250 OP 250 PS 637: Performed by: PHYSICIAN ASSISTANT

## 2024-03-29 PROCEDURE — 99418 PROLNG IP/OBS E/M EA 15 MIN: CPT | Performed by: PHYSICIAN ASSISTANT

## 2024-03-29 PROCEDURE — 250N000011 HC RX IP 250 OP 636: Performed by: STUDENT IN AN ORGANIZED HEALTH CARE EDUCATION/TRAINING PROGRAM

## 2024-03-29 RX ORDER — DILTIAZEM HYDROCHLORIDE 180 MG/1
360 CAPSULE, COATED, EXTENDED RELEASE ORAL DAILY
Status: DISCONTINUED | OUTPATIENT
Start: 2024-03-29 | End: 2024-03-30 | Stop reason: HOSPADM

## 2024-03-29 RX ORDER — ENOXAPARIN SODIUM 100 MG/ML
40 INJECTION SUBCUTANEOUS EVERY 12 HOURS
Status: DISCONTINUED | OUTPATIENT
Start: 2024-03-29 | End: 2024-03-30 | Stop reason: HOSPADM

## 2024-03-29 RX ORDER — ALBUTEROL SULFATE 5 MG/ML
2.5 SOLUTION RESPIRATORY (INHALATION) EVERY 6 HOURS PRN
Status: DISCONTINUED | OUTPATIENT
Start: 2024-03-29 | End: 2024-03-30

## 2024-03-29 RX ORDER — AZITHROMYCIN 250 MG/1
250 TABLET, FILM COATED ORAL DAILY
Status: DISCONTINUED | OUTPATIENT
Start: 2024-03-29 | End: 2024-03-29

## 2024-03-29 RX ORDER — DILTIAZEM HYDROCHLORIDE 360 MG/1
360 CAPSULE, EXTENDED RELEASE ORAL DAILY
Status: DISCONTINUED | OUTPATIENT
Start: 2024-03-29 | End: 2024-03-29

## 2024-03-29 RX ORDER — ALBUTEROL SULFATE 0.83 MG/ML
2.5 SOLUTION RESPIRATORY (INHALATION) ONCE
Status: COMPLETED | OUTPATIENT
Start: 2024-03-29 | End: 2024-03-29

## 2024-03-29 RX ADMIN — DILTIAZEM HYDROCHLORIDE 360 MG: 180 CAPSULE, COATED, EXTENDED RELEASE ORAL at 12:55

## 2024-03-29 RX ADMIN — ACETAMINOPHEN 650 MG: 325 TABLET, FILM COATED ORAL at 08:45

## 2024-03-29 RX ADMIN — GUAIFENESIN 600 MG: 600 TABLET, EXTENDED RELEASE ORAL at 08:47

## 2024-03-29 RX ADMIN — PANTOPRAZOLE SODIUM 40 MG: 40 TABLET, DELAYED RELEASE ORAL at 21:20

## 2024-03-29 RX ADMIN — GLIPIZIDE 10 MG: 10 TABLET, EXTENDED RELEASE ORAL at 08:47

## 2024-03-29 RX ADMIN — AZITHROMYCIN MONOHYDRATE 500 MG: 500 INJECTION, POWDER, LYOPHILIZED, FOR SOLUTION INTRAVENOUS at 10:23

## 2024-03-29 RX ADMIN — GABAPENTIN 600 MG: 300 CAPSULE ORAL at 12:06

## 2024-03-29 RX ADMIN — ALBUTEROL SULFATE 2.5 MG: 2.5 SOLUTION RESPIRATORY (INHALATION) at 11:07

## 2024-03-29 RX ADMIN — ENOXAPARIN SODIUM 40 MG: 40 INJECTION SUBCUTANEOUS at 20:46

## 2024-03-29 RX ADMIN — DEXTROMETHORPHAN POLISTIREX 15 MG: 30 SUSPENSION ORAL at 08:50

## 2024-03-29 RX ADMIN — ZOLPIDEM TARTRATE 10 MG: 5 TABLET ORAL at 21:19

## 2024-03-29 RX ADMIN — CEFTRIAXONE SODIUM 2 G: 2 INJECTION, POWDER, FOR SOLUTION INTRAMUSCULAR; INTRAVENOUS at 09:46

## 2024-03-29 RX ADMIN — ENOXAPARIN SODIUM 40 MG: 40 INJECTION SUBCUTANEOUS at 12:06

## 2024-03-29 RX ADMIN — GABAPENTIN 900 MG: 300 CAPSULE ORAL at 21:19

## 2024-03-29 RX ADMIN — SERTRALINE HYDROCHLORIDE 200 MG: 100 TABLET ORAL at 08:46

## 2024-03-29 RX ADMIN — GUAIFENESIN 600 MG: 600 TABLET, EXTENDED RELEASE ORAL at 20:46

## 2024-03-29 RX ADMIN — GABAPENTIN 900 MG: 300 CAPSULE ORAL at 08:46

## 2024-03-29 RX ADMIN — DEXTROMETHORPHAN POLISTIREX 15 MG: 30 SUSPENSION ORAL at 20:49

## 2024-03-29 ASSESSMENT — ACTIVITIES OF DAILY LIVING (ADL)
ADLS_ACUITY_SCORE: 38
ADLS_ACUITY_SCORE: 42
ADLS_ACUITY_SCORE: 38

## 2024-03-29 NOTE — PLAN OF CARE
DATE/TIME OF CALL RECEIVED FROM LAB:  03/29/24 at 2:06 AM   LAB TEST:  Blood cultures  LAB VALUE:  gram positive cocci in clusters  PROVIDER NOTIFIED?: Yes  PROVIDER NAME: Bhavesh Paiz  DATE/TIME LAB VALUE REPORTED TO PROVIDER: 3/29/2024 0212  MECHANISM OF PROVIDER NOTIFICATION: Page  PROVIDER RESPONSE: TBD

## 2024-03-29 NOTE — PLAN OF CARE
"[id number]    Inpatient Progress Note: Cough/Pneumonia    /59 (BP Location: Left arm)   Pulse 73   Temp 98.5  F (36.9  C) (Oral)   Resp 20   Ht 1.473 m (4' 10\")   Wt 108.8 kg (239 lb 12.8 oz)   LMP 09/15/2007   SpO2 92%   BMI 50.12 kg/m         Orientation: A&O x4  Pain status: Denies pain  Activity: Ax1 walker GB  Resp: Denies SOB-cough, diminished in lower lungs. 2.5 L oxygen NC. Refuses CPAP at night.  Cardiac: WDL, denies chest pain  GI: WDL  :  WDL, voids spontaneously   Skin: Rash/redness noted under breast area.  Infusions: SL- abx (Zithromax and Rocephin)  Pertinent Labs: BS checks, Blood cultures came back gram positive cocci in clusters.  Diet: Mod carb  Consults: Respiratory   Discharge Plan: TBD    Will continue to monitor and provide cares.     Elsa Shirley RN       Problem: Adult Inpatient Plan of Care  Goal: Plan of Care Review  Description: The Plan of Care Review/Shift note should be completed every shift.  The Outcome Evaluation is a brief statement about your assessment that the patient is improving, declining, or no change.  This information will be displayed automatically on your shift  note.  Outcome: Progressing  Flowsheets (Taken 3/29/2024 0409)  Outcome Evaluation: Pt on 2.5 L oxygen during day. Refuses CPAP at night- 3L oxygen. Blood culture came back gram postive cocci in clusters.  Plan of Care Reviewed With: patient  Overall Patient Progress: no change  Goal: Patient-Specific Goal (Individualized)  Description: You can add care plan individualizations to a care plan. Examples of Individualization might be:  \"Parent requests to be called daily at 9am for status\", \"I have a hard time hearing out of my right ear\", or \"Do not touch me to wake me up as it startles  me\".  Outcome: Progressing  Goal: Absence of Hospital-Acquired Illness or Injury  Outcome: Progressing  Intervention: Prevent Skin Injury  Recent Flowsheet Documentation  Taken 3/29/2024 0200 by Elsa Shirley, " RN  Body Position: position changed independently  Intervention: Prevent and Manage VTE (Venous Thromboembolism) Risk  Recent Flowsheet Documentation  Taken 3/29/2024 0200 by Elsa Shirley RN  VTE Prevention/Management: SCDs (sequential compression devices) on  Goal: Optimal Comfort and Wellbeing  Outcome: Progressing  Goal: Readiness for Transition of Care  Outcome: Progressing     Problem: Comorbidity Management  Goal: Blood Glucose Levels Within Targeted Range  Outcome: Progressing     Problem: Comorbidity Management  Goal: Maintenance of COPD Symptom Control  Outcome: Not Progressing     Problem: Gas Exchange Impaired  Goal: Optimal Gas Exchange  Outcome: Not Progressing  Intervention: Optimize Oxygenation and Ventilation  Recent Flowsheet Documentation  Taken 3/29/2024 0200 by Elsa Shirley RN  Head of Bed (HOB) Positioning: HOB at 30-45 degrees   Goal Outcome Evaluation:      Plan of Care Reviewed With: patient    Overall Patient Progress: no changeOverall Patient Progress: no change    Outcome Evaluation: Pt on 2.5 L oxygen during day. Refuses CPAP at night- 3L oxygen. Blood culture came back gram postive cocci in clusters.

## 2024-03-29 NOTE — PROGRESS NOTES
[facility]    Medicine Progress Note - Hospitalist Service    Date of Admission:  3/27/2024    Assessment & Plan Nicole Reyes is a 68 year old female with a past medical history significant for hypertension, obstructive sleep apnea on CPAP as well as 2 L of oxygen, history of recurrent aspiration pneumonia, GERD, hiatal hernia, CKD, MDD, type II DM, obesity who presented to the ER with complaint of feeling unwell and was found to have acute hypoxic respiratory failure secondary to community-acquired pneumonia.     Community-acquired pneumonia  Acute hypoxic respiratory failure secondary to above  History of recurrent aspiration pneumonia in the setting of hiatal hernia  Recently treated with a 7-day course of doxycycline outpatient on 2/24, sx completely resolved.  Developed cough, shortness of breath, and chills on the evening of 3/26.     Imaging significant for multifocal pneumonia worse on the left side. COVID, RSV, influenza A/B are all negative.  Mild leukocytosis upon admission, 13.4, increased to 18.1 today.   Requiring 2 L nasal cannula.    Patient also has history of recurrent aspiration pneumonia requiring hospitalization, last one was in 2021.  After that she was referred to see a general surgery to discuss management of hiatal hernia as it was thought to be contributing in aspiration pneumonia however patient did not pursue it further as she stopped having episodes of pneumonia until now.  Lung sounds clear, no wheezing appreciated today  - given ongoing hypoxia and worsening leukocytosis, admit to inpatient  - Continue IV ceftriaxone, discontinue further azithromycin as expanded coverage not indicated for atypical's with negative strep and legionella   - Blood cultures negative to date  - Strep pneumo and Legionella negative, MRSA negative in the nares  - Procalcitonin is low risk, CRP 27.12. consider short course of antibiotics  - continue wean O2 as able  - add incentive  "spirometry  - add Mucinex to help with clearing phlegm.   - encourage IS and evaluate for need of home oxygen  - trying 1x albuterol neb PRN    Positive Blood Cultures  Suspected contaminant.   1/2 Bcx positive for gram positive cocci in clusters from 3/27   -follow speciation  -repeat Bcx if febrile     Obstructive sleep apnea with sleep associated hypoxia on nocturnal 2 L nasal cannula at baseline  Patient reported that she used to use CPAP religiously when her  was alive.  Unfortunately her  passed few months ago and since then she has not been using CPAP as much.  She also uses 2 L nasal cannula at night with CPAP.  - CPAP ordered  - continue O2 at night     Type II DM, well-controlled  Last A1c 6.3 on 1/2024  - continue home glipizide 10 mg daily  - Patient was also on semaglutide prior to admission, will hold it for now  - check glucose, switch to BID and HS  - hold off on sliding scale insulin for now  - continue hypoglycemia protocol     Hypertension  BPs within normal limits with meds on hold  - continue to hold prior to admission diltiazem and losartan in the setting of active infection, resume with hold parameters      Chronic lower back pain  - continue home gabapentin     GERD  Hiatal hernia  - continue home PPI     Obesity  Complicates overall cares     CKD, stage II  - Creatinine stable        MDD/anxiety  - continue home sertraline     Insomnia  - continue home Ambien 10 mg tablet at bedtime       Diet: Moderate Consistent Carb (60 g CHO per Meal) Diet    DVT Prophylaxis: Enoxaparin (Lovenox) SQ  Suarez Catheter: Not present  Lines: None     Cardiac Monitoring: None  Code Status: Full Code      Clinically Significant Risk Factors                  # Hypertension: Noted on problem list        # Severe Obesity: Estimated body mass index is 50.12 kg/m  as calculated from the following:    Height as of this encounter: 1.473 m (4' 10\").    Weight as of this encounter: 108.8 kg (239 lb 12.8 " oz)., PRESENT ON ADMISSION            Disposition Plan      Expected Discharge Date: 03/30/2024                    Loreto Christensen PA-C  Hospitalist Service  [facility]  Securely message with [employer] (more info)  Text page via [email or web address] Paging/Directory   ______________________________________________________________________    Interval History   Assumed care.  This morning on 3 L/min nasal cannula.  Denies shortness of breath at rest.  Does have a degree of chronic shortness of breath.  Mildly increased shortness of breath with activity such as going to the bathroom.  Cough is productive of phlegm.  No hemoptysis.  No chest pain. Recovered with rest.   Refused CPAP overnight.  No history of RAD or home oxygen, nebulizer use.     Physical Exam   Vital Signs: Temp: 97.9  F (36.6  C) Temp src: Oral BP: 133/75 Pulse: 80   Resp: 18 SpO2: 90 % O2 Device: Nasal cannula Oxygen Delivery: 2.5 LPM  Weight: 239 lbs 12.8 oz    Constitutional: Awake, alert, no apparent distress  Respiratory:  Normal work of breathing. Poor air exchange. Diminished at left lower lobe suspicious for pneumonia.   Cardiovascular: Regular rate and rhythm, normal S1 and S2, and no murmur appreciated.   GI: Bowel sounds present. soft, non-distended, non-tender.   Skin/Integument: Warm, dry. No pedal edema.   Neuro: No focal deficits. Moving all extremities with normal strength. Coordination and sensation grossly intact. Speech clear. No focal deficits.   Psych: Appropriate affect.      Medical Decision Making       65 MINUTES SPENT BY ME on the date of service doing chart review, history, exam, documentation & further activities per the note.      Data   ------------------------- PAST 24 HR DATA REVIEWED -----------------------------------------------[id number]}

## 2024-03-29 NOTE — PROGRESS NOTES
Inpatient Progress Note: 7 to 15    Pt tolerating neb treatments today and feels it is improving her breathing. Pt sats drop to 85 % on room air at rest. Pt up walking in halls on 1 liter nc with sats 89- 93 %. Pt cough improving with po cough medicine. Pt up to bathroom with stand by assist. Up for meals in chair.

## 2024-03-29 NOTE — PLAN OF CARE
"INPATIENT NOTE: 1500 - 2300     PRIMARY PROBLEM: Cough/PNA     Vital Signs: Stable       Orientation: A/O x 4  Neuro: Intact  Pain status: Denies  Resp: Lung sounds crackles, denies any SOB on 2LPM, 94%  Cardiac: WDL  GI: Bowel sounds active. Last BM 3/27  : Continent, voiding adequately  Skin: Intact  LDA: Peripheral IV SL  Pertinent Labs/Imaging: PNA; B & 119  Diet: Mod Carb  Activity: Assist x 1 with walker  Alarms/Safety: All alarms on and audible   Consults: Respiratory  Discharge Plan: Continue with the two abx as prescribed for PNA  Discharge Date: TBD     Will continue to monitor and provide cares.     Savi Romero RN Problem: Adult Inpatient Plan of Care  Goal: Plan of Care Review  Description: The Plan of Care Review/Shift note should be completed every shift.  The Outcome Evaluation is a brief statement about your assessment that the patient is improving, declining, or no change.  This information will be displayed automatically on your shift  note.  Outcome: Progressing  Goal: Patient-Specific Goal (Individualized)  Description: You can add care plan individualizations to a care plan. Examples of Individualization might be:  \"Parent requests to be called daily at 9am for status\", \"I have a hard time hearing out of my right ear\", or \"Do not touch me to wake me up as it startles  me\".  Outcome: Progressing  Goal: Absence of Hospital-Acquired Illness or Injury  Outcome: Progressing  Intervention: Identify and Manage Fall Risk  Recent Flowsheet Documentation  Taken 3/28/2024 1649 by aSvi Romero RN  Safety Promotion/Fall Prevention:   activity supervised   clutter free environment maintained   lighting adjusted   mobility aid in reach   room near nurse's station  Intervention: Prevent Skin Injury  Recent Flowsheet Documentation  Taken 3/28/2024 164 by Savi Romero RN  Body Position: position maintained  Intervention: Prevent and Manage VTE (Venous Thromboembolism) Risk  Recent Flowsheet " Documentation  Taken 3/28/2024 8579 by Savi Romero, RN  VTE Prevention/Management: SCDs (sequential compression devices) on  Goal: Optimal Comfort and Wellbeing  Outcome: Progressing  Goal: Readiness for Transition of Care  Outcome: Progressing   Goal Outcome Evaluation:

## 2024-03-29 NOTE — PROGRESS NOTES
Patient initially declined using CPAP, told writer to come back later when she'll be about to sleep. Pt noted playing with the phone at this time and not ready to sleep. Education on the importance of using CPAP was offered. Patient on agreement to give it a try. Respiratory was paged to bring CPAP. Awaiting for the call back from respiratory.

## 2024-03-30 VITALS
HEART RATE: 62 BPM | OXYGEN SATURATION: 93 % | DIASTOLIC BLOOD PRESSURE: 69 MMHG | TEMPERATURE: 97.8 F | WEIGHT: 239.8 LBS | BODY MASS INDEX: 50.34 KG/M2 | HEIGHT: 58 IN | RESPIRATION RATE: 18 BRPM | SYSTOLIC BLOOD PRESSURE: 109 MMHG

## 2024-03-30 LAB
ANION GAP SERPL CALCULATED.3IONS-SCNC: 10 MMOL/L (ref 7–15)
BUN SERPL-MCNC: 11.7 MG/DL (ref 8–23)
CALCIUM SERPL-MCNC: 8.8 MG/DL (ref 8.8–10.2)
CHLORIDE SERPL-SCNC: 100 MMOL/L (ref 98–107)
CREAT SERPL-MCNC: 0.85 MG/DL (ref 0.51–0.95)
DEPRECATED HCO3 PLAS-SCNC: 29 MMOL/L (ref 22–29)
EGFRCR SERPLBLD CKD-EPI 2021: 74 ML/MIN/1.73M2
ERYTHROCYTE [DISTWIDTH] IN BLOOD BY AUTOMATED COUNT: 16.8 % (ref 10–15)
GLUCOSE BLDC GLUCOMTR-MCNC: 110 MG/DL (ref 70–99)
GLUCOSE BLDC GLUCOMTR-MCNC: 76 MG/DL (ref 70–99)
GLUCOSE BLDC GLUCOMTR-MCNC: 91 MG/DL (ref 70–99)
GLUCOSE SERPL-MCNC: 104 MG/DL (ref 70–99)
HCT VFR BLD AUTO: 33.1 % (ref 35–47)
HGB BLD-MCNC: 10.5 G/DL (ref 11.7–15.7)
MCH RBC QN AUTO: 26.3 PG (ref 26.5–33)
MCHC RBC AUTO-ENTMCNC: 31.7 G/DL (ref 31.5–36.5)
MCV RBC AUTO: 83 FL (ref 78–100)
PLATELET # BLD AUTO: 192 10E3/UL (ref 150–450)
POTASSIUM SERPL-SCNC: 3.5 MMOL/L (ref 3.4–5.3)
RBC # BLD AUTO: 4 10E6/UL (ref 3.8–5.2)
SODIUM SERPL-SCNC: 139 MMOL/L (ref 135–145)
WBC # BLD AUTO: 9.3 10E3/UL (ref 4–11)

## 2024-03-30 PROCEDURE — 36415 COLL VENOUS BLD VENIPUNCTURE: CPT | Performed by: PHYSICIAN ASSISTANT

## 2024-03-30 PROCEDURE — 99239 HOSP IP/OBS DSCHRG MGMT >30: CPT | Performed by: PHYSICIAN ASSISTANT

## 2024-03-30 PROCEDURE — 80048 BASIC METABOLIC PNL TOTAL CA: CPT | Performed by: PHYSICIAN ASSISTANT

## 2024-03-30 PROCEDURE — 85027 COMPLETE CBC AUTOMATED: CPT | Performed by: PHYSICIAN ASSISTANT

## 2024-03-30 PROCEDURE — 250N000013 HC RX MED GY IP 250 OP 250 PS 637: Performed by: PHYSICIAN ASSISTANT

## 2024-03-30 PROCEDURE — 94660 CPAP INITIATION&MGMT: CPT

## 2024-03-30 PROCEDURE — 250N000013 HC RX MED GY IP 250 OP 250 PS 637: Performed by: STUDENT IN AN ORGANIZED HEALTH CARE EDUCATION/TRAINING PROGRAM

## 2024-03-30 PROCEDURE — 250N000011 HC RX IP 250 OP 636: Performed by: PHYSICIAN ASSISTANT

## 2024-03-30 PROCEDURE — 250N000009 HC RX 250: Performed by: PHYSICIAN ASSISTANT

## 2024-03-30 PROCEDURE — 999N000157 HC STATISTIC RCP TIME EA 10 MIN

## 2024-03-30 PROCEDURE — 250N000011 HC RX IP 250 OP 636: Performed by: STUDENT IN AN ORGANIZED HEALTH CARE EDUCATION/TRAINING PROGRAM

## 2024-03-30 RX ORDER — ALBUTEROL SULFATE 5 MG/ML
2.5 SOLUTION RESPIRATORY (INHALATION) 2 TIMES DAILY
Qty: 7 ML | Refills: 0 | Status: SHIPPED | OUTPATIENT
Start: 2024-03-30 | End: 2024-05-30

## 2024-03-30 RX ORDER — ALBUTEROL SULFATE 5 MG/ML
2.5 SOLUTION RESPIRATORY (INHALATION) 2 TIMES DAILY
Status: DISCONTINUED | OUTPATIENT
Start: 2024-03-30 | End: 2024-03-30 | Stop reason: HOSPADM

## 2024-03-30 RX ORDER — GUAIFENESIN/DEXTROMETHORPHAN 100-10MG/5
10 SYRUP ORAL EVERY 4 HOURS PRN
Status: SHIPPED
Start: 2024-03-30 | End: 2024-04-05

## 2024-03-30 RX ADMIN — ENOXAPARIN SODIUM 40 MG: 40 INJECTION SUBCUTANEOUS at 07:52

## 2024-03-30 RX ADMIN — GABAPENTIN 600 MG: 300 CAPSULE ORAL at 11:45

## 2024-03-30 RX ADMIN — GABAPENTIN 900 MG: 300 CAPSULE ORAL at 07:52

## 2024-03-30 RX ADMIN — GUAIFENESIN 600 MG: 600 TABLET, EXTENDED RELEASE ORAL at 07:52

## 2024-03-30 RX ADMIN — SERTRALINE HYDROCHLORIDE 200 MG: 100 TABLET ORAL at 07:52

## 2024-03-30 RX ADMIN — DEXTROMETHORPHAN POLISTIREX 15 MG: 30 SUSPENSION ORAL at 07:53

## 2024-03-30 RX ADMIN — ALBUTEROL SULFATE 2.5 MG: 2.5 SOLUTION RESPIRATORY (INHALATION) at 11:45

## 2024-03-30 RX ADMIN — LOSARTAN POTASSIUM 100 MG: 100 TABLET, FILM COATED ORAL at 07:52

## 2024-03-30 RX ADMIN — GLIPIZIDE 10 MG: 10 TABLET, EXTENDED RELEASE ORAL at 07:52

## 2024-03-30 RX ADMIN — DILTIAZEM HYDROCHLORIDE 360 MG: 180 CAPSULE, COATED, EXTENDED RELEASE ORAL at 07:51

## 2024-03-30 RX ADMIN — CEFTRIAXONE SODIUM 2 G: 2 INJECTION, POWDER, FOR SOLUTION INTRAMUSCULAR; INTRAVENOUS at 10:16

## 2024-03-30 ASSESSMENT — ACTIVITIES OF DAILY LIVING (ADL)
ADLS_ACUITY_SCORE: 39

## 2024-03-30 NOTE — PLAN OF CARE
"   NPATIENT NOTE: 1500 - 2300     PRIMARY PROBLEM: Cough/PNA     Vital Signs: Stable       Orientation: A/O x 4  Neuro: Intact  Pain status: Denies  Resp: Lung sounds CTA, denies any SOB on 2LPM, 94%. Respiratory will bring CPAP, pt is ready for it now  Cardiac: WDL  GI: Bowel sounds active. Last BM 3/27  : Continent, voiding adequately  Skin: Intact  LDA: Peripheral IV SL  Pertinent Labs/Imaging: PNA  Diet: Mod Carb  Activity: Assist x 1 with walker  Alarms/Safety: All alarms on and audible   Consults: Respiratory  Discharge Plan: Continue with the abx as prescribed for PNA  Discharge Date: TBD     Will continue to monitor and provide cares.        Problem: Adult Inpatient Plan of Care  Goal: Plan of Care Review  Description: The Plan of Care Review/Shift note should be completed every shift.  The Outcome Evaluation is a brief statement about your assessment that the patient is improving, declining, or no change.  This information will be displayed automatically on your shift  note.  Outcome: Progressing  Goal: Patient-Specific Goal (Individualized)  Description: You can add care plan individualizations to a care plan. Examples of Individualization might be:  \"Parent requests to be called daily at 9am for status\", \"I have a hard time hearing out of my right ear\", or \"Do not touch me to wake me up as it startles  me\".  Outcome: Progressing  Goal: Absence of Hospital-Acquired Illness or Injury  Outcome: Progressing  Goal: Optimal Comfort and Wellbeing  Outcome: Progressing  Goal: Readiness for Transition of Care  Outcome: Progressing     Problem: Comorbidity Management  Goal: Maintenance of COPD Symptom Control  Outcome: Progressing  Goal: Blood Glucose Levels Within Targeted Range  Outcome: Progressing     Problem: Gas Exchange Impaired  Goal: Optimal Gas Exchange  Outcome: Progressing     Problem: Adult Inpatient Plan of Care  Goal: Plan of Care Review  Description: The Plan of Care Review/Shift note should be " "completed every shift.  The Outcome Evaluation is a brief statement about your assessment that the patient is improving, declining, or no change.  This information will be displayed automatically on your shift  note.  Outcome: Progressing  Goal: Patient-Specific Goal (Individualized)  Description: You can add care plan individualizations to a care plan. Examples of Individualization might be:  \"Parent requests to be called daily at 9am for status\", \"I have a hard time hearing out of my right ear\", or \"Do not touch me to wake me up as it startles  me\".  Outcome: Progressing  Goal: Absence of Hospital-Acquired Illness or Injury  Outcome: Progressing  Goal: Optimal Comfort and Wellbeing  Outcome: Progressing  Goal: Readiness for Transition of Care  Outcome: Progressing     Problem: Comorbidity Management  Goal: Maintenance of COPD Symptom Control  Outcome: Progressing  Goal: Blood Glucose Levels Within Targeted Range  Outcome: Progressing     Problem: Gas Exchange Impaired  Goal: Optimal Gas Exchange  Outcome: Progressing   Goal Outcome Evaluation:      "

## 2024-03-30 NOTE — PLAN OF CARE
"  Problem: Adult Inpatient Plan of Care  Goal: Plan of Care Review  Description: The Plan of Care Review/Shift note should be completed every shift.  The Outcome Evaluation is a brief statement about your assessment that the patient is improving, declining, or no change.  This information will be displayed automatically on your shift  note.  Outcome: Met  Flowsheets (Taken 3/30/2024 1752)  Plan of Care Reviewed With: patient  Overall Patient Progress: improving  Goal: Patient-Specific Goal (Individualized)  Description: You can add care plan individualizations to a care plan. Examples of Individualization might be:  \"Parent requests to be called daily at 9am for status\", \"I have a hard time hearing out of my right ear\", or \"Do not touch me to wake me up as it startles  me\".  Outcome: Met  Goal: Absence of Hospital-Acquired Illness or Injury  Outcome: Met  Intervention: Prevent Skin Injury  Recent Flowsheet Documentation  Taken 3/30/2024 1000 by Trista Rosario RN  Body Position: position changed independently  Goal: Optimal Comfort and Wellbeing  Outcome: Met  Goal: Readiness for Transition of Care  Outcome: Met  Flowsheets (Taken 3/30/2024 1752)  Concerns to be Addressed:   all concerns addressed in this encounter   no discharge needs identified   denies needs/concerns at this time   discharge planning  Intervention: Mutually Develop Transition Plan  Recent Flowsheet Documentation  Taken 3/30/2024 1752 by Trista Rosario RN  Concerns to be Addressed:   all concerns addressed in this encounter   no discharge needs identified   denies needs/concerns at this time   discharge planning     PRIMARY DIAGNOSIS: Pneumonia    1. Pain Status: Pain free.    2. Return to near baseline physical activity: Yes    3. Cleared for discharge by consultants (if involved): Yes    Discharge Planner Nurse   Safe discharge environment identified: Yes  Barriers to discharge: no       Entered by: Trista Rosario RN 03/30/2024 5:54 " PM     Please review provider order for any additional goals.   Nurse to notify provider when observation goals have been met and patient is ready for discharge.      Goal Outcome Evaluation:      Plan of Care Reviewed With: patient    Overall Patient Progress: improvingOverall Patient Progress: improving

## 2024-03-30 NOTE — DISCHARGE SUMMARY
[facility]  Hospitalist Discharge Summary      Date of Admission:  3/27/2024  Date of Discharge:  3/30/2024  6:28 PM  Discharging Provider: Loreto Christensen PA-C  Discharge Service: Hospitalist Service    Discharge Diagnoses   Community-acquired pneumonia  Acute hypoxic respiratory failure   History of recurrent aspiration pneumonia in the setting of hiatal hernia    Follow-ups Needed After Discharge   Follow-up Appointments     Follow-up and recommended labs and tests       Follow up with primary care provider, Karla Hurst, within 7-14   days to evaluate medication change and for hospital follow- up.  The   following labs/tests are recommended: check cbc, bmp.  Discuss home oxygen use and Pulmonology follow up. Recommend repeat CXR in   6-8 weeks to ensure resolution            Unresulted Labs Ordered in the Past 30 Days of this Admission       Date and Time Order Name Status Description    3/27/2024  5:15 PM Blood Culture Arm, Right Preliminary     3/27/2024  4:44 PM Blood Culture Peripheral Blood Preliminary         These results will be followed up by hospitalist.     Discharge Disposition   Discharged to home  Condition at discharge: Stable    Hospital Course Nicole Reyes is a 68 year old female with a past medical history significant for hypertension, obstructive sleep apnea on CPAP as well as 2 L of oxygen, history of recurrent aspiration pneumonia, GERD, hiatal hernia, CKD, MDD, type II DM, obesity who presented to the ER with complaint of feeling unwell and was found to have acute hypoxic respiratory failure secondary to community-acquired pneumonia.     Community-acquired pneumonia  Acute hypoxic respiratory failure secondary to above  History of recurrent aspiration pneumonia in the setting of hiatal hernia  Recently treated with a 7-day course of doxycycline outpatient on 2/24, sx completely resolved.  Developed cough, shortness of breath, and chills on the evening of  3/26.     Imaging significant for multifocal pneumonia worse on the left side. COVID, RSV, influenza A/B are all negative.  - Strep pneumo and Legionella negative, MRSA negative in the nares  - Abx: treated with Rocephin, azithromycin discontinued with negative testing for atypical's. Transitioned to course of Augmentin to complete therapy.   - Assessed for home oxygen, will require at discharge. Previously using nocturnally only bled into cpap.  - Patient also has history of recurrent aspiration pneumonia requiring hospitalization, last one was in 2021.  After that she was referred to see a general surgery to discuss management of hiatal hernia as it was thought to be contributing in aspiration pneumonia however patient did not pursue it further as she stopped having episodes of pneumonia until this admission. Consider revisiting.          Positive Blood Culture Suspected contaminant. No clinical bacteremia.   1/2 Bcx positive for gram positive cocci in clusters from 3/27   -follow speciation  -repeat Bcx if febrile       Consultations This Hospital Stay   None    Code Status   Full Code    Time Spent on this Encounter   I, Loreto Christensen PA-C, personally saw the patient today and spent greater than 30 minutes discharging this patient.       Loreto Christensen PA-C  ______________________________________________________________________    Interval History  Afebrile. Cough reducing in frequency. Shortness of breath improving continuously. Able to walk in the halls multiple times today. Would like to return home.     Physical Exam   Vital Signs: Temp: 97.9  F (36.6  C) Temp src: Oral BP: 129/65 Pulse: 70   Resp: 16 SpO2: 92 % O2 Device: Nasal cannula Oxygen Delivery: 2 LPM  Weight: 239 lbs 12.8 oz    Constitutional: Awake, alert, no apparent distress  Respiratory:  Normal work of breathing. Lungs clear to auscultation bilaterally, no crackles or wheezing. Slightly diminished LLB.  Cardiovascular: Regular rate and  rhythm, normal S1 and S2, and no murmur appreciated.   GI: Bowel sounds present. soft, non-distended, non-tender.   Skin/Integument: Warm, dry. no peripheral edema.  Neuro: No focal deficits. Moving all extremities with normal strength. Coordination and sensation grossly intact. Speech clear. No focal deficits.   Psych: Appropriate affect.         Primary Care Physician   Karla Hurst    Discharge Orders      Reason for your hospital stay    Pneumonia     Follow-up and recommended labs and tests     Follow up with primary care provider, Karla Hurst, within 7-14 days to evaluate medication change and for hospital follow- up.  The following labs/tests are recommended: check cbc, bmp.  Discuss home oxygen use and Pulmonology follow up. Recommend repeat CXR in 6-8 weeks to ensure resolution     Activity    Your activity upon discharge: activity as tolerated     Oxygen Adult/Peds    Oxygen Documentation  I certify that this patient, Nicole Reyes has been under my care (or a nurse practitioner or physician's assistant working with me). This is the face-to-face encounter for oxygen medical necessity.      At the time of this encounter, I have reviewed the qualifying testing and have determined that supplemental oxygen is reasonable and necessary and is expected to improve the patient's condition in a home setting.       Patient has continued oxygen desaturation due to Pneumonia J18.9.    If portability is ordered, is the patient mobile within the home? yes     Nebulizer and Nebulizer Supplies    Nebulizer Documentation  I attest that I have seen and evaluated Nicole Reyes. She has a diagnosis of J18.9 - Pneumonia, unspecified organism and a nebulizer machine is needed to administer medication to improve breathing passages.     I, the undersigned, certify that the above prescribed supplies are medically necessary for this patient and is both reasonable and necessary in reference to accepted  standards of medical and necessary in reference to accepted standards of medical practice in the treatment of this patient's condition and is not prescribed as a convenience.     Diet    Follow this diet upon discharge: Regular Adult diet, increase PO fluid intake       Significant Results and Procedures   Results for orders placed or performed during the hospital encounter of 03/27/24   CT Chest Pulmonary Embolism w Contrast    Narrative    CT CHEST PULMONARY EMBOLISM W CONTRAST 3/27/2024 4:25 PM    CLINICAL HISTORY: dyspnea, cough, pleuritic CP, + dimer  TECHNIQUE: CT angiogram chest during arterial phase injection IV  contrast. 2D and 3D MIP reconstructions were performed by the CT  technologist. Dose reduction techniques were used.     CONTRAST: 86 mL [id number]    COMPARISON: CT 11/8/2021    FINDINGS:  ANGIOGRAM CHEST: Pulmonary arteries are normal caliber and negative  for pulmonary emboli. Thoracic aorta is not well opacified and is  indeterminate for dissection. No CT evidence of right heart strain.    LUNGS AND PLEURA: There are bilateral groundglass opacities and  consolidations, most pronounced in the left lower lobe, suspicious for  pneumonia. No pleural effusion or pneumothorax.    MEDIASTINUM/AXILLAE: Moderate hiatal hernia. Mildly enlarged bilateral  perihilar lymph nodes, similar to prior exam favoring benign etiology,  no specific follow-up recommended.    CORONARY ARTERY CALCIFICATION: None.    UPPER ABDOMEN: Cholelithiasis without evidence of acute cholecystitis.  No acute bony abnormality.    MUSCULOSKELETAL: No acute bony abnormality.      Impression    IMPRESSION:  1.  Multifocal airspace disease, most pronounced in the left lower  lobe, suspicious for pneumonia.  2.  No pulmonary embolus.    JAIRO BAPTISTE MD         SYSTEM ID:  [id number]     *Note: Due to a large number of results and/or encounters for the requested time period, some results have not been displayed. A complete set of results  can be found in Results Review.       Discharge Medications   Current Discharge Medication List        START taking these medications    Details   albuterol (PROVENTIL) (5 MG/ML) 0.5% neb solution Take 0.5 mLs (2.5 mg) by nebulization 2 times daily for 7 days  Qty: 7 mL, Refills: 0    Associated Diagnoses: Multifocal pneumonia      amoxicillin-clavulanate (AUGMENTIN) 875-125 MG tablet Take 1 tablet by mouth 2 times daily for 3 days  Qty: 6 tablet, Refills: 0    Associated Diagnoses: Multifocal pneumonia      guaiFENesin-dextromethorphan (ROBITUSSIN DM) 100-10 MG/5ML syrup Take 10 mLs by mouth every 4 hours as needed for cough    Associated Diagnoses: Multifocal pneumonia           CONTINUE these medications which have NOT CHANGED    Details   clonazePAM (KLONOPIN) 1 MG tablet Take 1 tablet (1 mg) by mouth 2 times daily as needed for anxiety 60 to last 30 days  Qty: 60 tablet, Refills: 5    Associated Diagnoses: Insomnia, unspecified type; Generalized anxiety disorder      diltiazem ER (TIADYLT ER) 360 MG 24 hr ER beaded capsule Take 1 capsule (360 mg) by mouth daily  Qty: 90 capsule, Refills: 3    Associated Diagnoses: Benign essential hypertension      Fish Oil-Krill Oil (KRILL & FISH OIL BLEND PO) Take 1 capsule by mouth daily      furosemide (LASIX) 20 MG tablet Take 1 tablet (20 mg) by mouth daily as needed (edema)  Qty: 90 tablet, Refills: 3    Associated Diagnoses: Localized edema      !! gabapentin (NEURONTIN) 300 MG capsule Take 600 mg by mouth daily (with lunch) 3 capsules every am, 2 capsules midday and 3 caps at night      !! gabapentin (NEURONTIN) 300 MG capsule 3 capsules every am, 2 capsules midday and 3 caps at night  Qty: 240 capsule, Refills: 3    Associated Diagnoses: Chronic bilateral low back pain without sciatica      glipiZIDE (GLUCOTROL XL) 10 MG 24 hr tablet Take 1 tablet (10 mg) by mouth daily  Qty: 90 tablet, Refills: 3    Associated Diagnoses: Type 2 diabetes mellitus without complication,  without long-term current use of insulin (H)      ketoconazole (NIZORAL) 2 % external cream Apply to fold areas BID PRN  Qty: 60 g, Refills: 5    Associated Diagnoses: Intertrigo      losartan (COZAAR) 100 MG tablet Take 1 tablet (100 mg) by mouth daily  Qty: 90 tablet, Refills: 3    Associated Diagnoses: Benign essential hypertension      melatonin 5 MG tablet Take 5 mg by mouth nightly as needed for sleep      multivitamin w/minerals (THERA-VIT-M) tablet Take 1 tablet by mouth daily      pantoprazole (PROTONIX) 40 MG EC tablet TAKE 1 TABLET BY MOUTH EVERYDAY AT BEDTIME  Qty: 90 tablet, Refills: 3    Associated Diagnoses: Gastroesophageal reflux disease without esophagitis      potassium chloride ER (K-TAB) 20 MEQ CR tablet Take 40 meq daily  Qty: 180 tablet, Refills: 3    Associated Diagnoses: Low serum potassium      rosuvastatin (CRESTOR) 10 MG tablet Take 1 tablet (10 mg) by mouth daily  Qty: 90 tablet, Refills: 3    Associated Diagnoses: Hypercholesteremia      sertraline (ZOLOFT) 100 MG tablet Take 2 tablets by mouth once daily  Qty: 180 tablet, Refills: 3    Associated Diagnoses: Generalized anxiety disorder; Major depressive disorder, recurrent episode, moderate (H)      zolpidem (AMBIEN) 10 MG tablet Take 1 tablet (10 mg) by mouth at bedtime  Qty: 90 tablet, Refills: 1    Associated Diagnoses: Insomnia, unspecified type      blood glucose (ACCU-CHEK BYRON PLUS) test strip USE TO TEST BLOOD SUGAR ONE TIME A DAY  Qty: 100 strip, Refills: 11    Associated Diagnoses: Type 2 diabetes mellitus with stage 3a chronic kidney disease, without long-term current use of insulin (H)      semaglutide (OZEMPIC) 2 MG/3ML pen Inject 0.5 mg Subcutaneous every 7 days May increase dose in a month if tolerated  Qty: 3 mL, Refills: 0    Associated Diagnoses: Type 2 diabetes mellitus without complication, without long-term current use of insulin (H); Morbid obesity (H)       !! - Potential duplicate medications found. Please  discuss with provider.        Allergies   Allergies   Allergen Reactions    Metformin GI Disturbance     High dose    Morphine And Related Rash    Penicillins Rash     Pt has tolerated cephalosporins and penems.   Pt tolerated Zosyn 7/6/2019

## 2024-03-30 NOTE — PROGRESS NOTES
A CPAP of  8 @ 30% was applied to the pt via the mask for NOC use.  The bridge of the nose looks good and remains intact. Pt is tolerating it well. Will continue to monitor and assess the pt's current respiratory status and needs.     Bhavesh Garza RT on 3/30/2024 at 12:14 AM

## 2024-03-30 NOTE — CARE PLAN
Patient has been assessed for Home Oxygen needs. Oxygen readings:    *Pulse oximetry (SpO2) = 89% on room air at rest while awake.    *SpO2 improved to 93% on 2liters/minute at rest.    *SpO2 = 87% on room air during activity/with exercise.    *SpO2 improved to 93% on 2liters/minute during activity/with exercise.

## 2024-03-30 NOTE — PLAN OF CARE
"Temp: 98.3  F (36.8  C) Temp src: Oral BP: 133/75 Pulse: 65   Resp: 16 SpO2: 93 % O2 Device: BiPAP/CPAP Oxygen Delivery: 2 LPM cpap placed by respiratory for overnight, changed pulse oximeter sticker as it was falling off/ also not getting accurate readings     A&Ox4. Up Ax1 with walker. Denies pain. Mild sob with activity. Nonproductive cough. Plan- rocephin daily, wean O2 as able, encourage IS, albuterol neb prn, blood cultures pending.       Problem: Adult Inpatient Plan of Care  Goal: Plan of Care Review  Description: The Plan of Care Review/Shift note should be completed every shift.  The Outcome Evaluation is a brief statement about your assessment that the patient is improving, declining, or no change.  This information will be displayed automatically on your shift  note.  Outcome: Progressing  Goal: Patient-Specific Goal (Individualized)  Description: You can add care plan individualizations to a care plan. Examples of Individualization might be:  \"Parent requests to be called daily at 9am for status\", \"I have a hard time hearing out of my right ear\", or \"Do not touch me to wake me up as it startles  me\".  Outcome: Progressing  Goal: Absence of Hospital-Acquired Illness or Injury  Outcome: Progressing  Intervention: Identify and Manage Fall Risk  Recent Flowsheet Documentation  Taken 3/29/2024 2347 by Reanna Yepez, RN  Safety Promotion/Fall Prevention: safety round/check completed  Intervention: Prevent Skin Injury  Recent Flowsheet Documentation  Taken 3/29/2024 2347 by Reanna Yepez RN  Body Position: position changed independently  Goal: Optimal Comfort and Wellbeing  Outcome: Progressing  Intervention: Monitor Pain and Promote Comfort  Recent Flowsheet Documentation  Taken 3/29/2024 2344 by Reanna Yepez, RN  Pain Management Interventions: medication offered but refused  Goal: Readiness for Transition of Care  Outcome: Progressing                           "

## 2024-03-31 LAB
BACTERIA BLD CULT: ABNORMAL
BACTERIA BLD CULT: ABNORMAL

## 2024-04-01 ENCOUNTER — PATIENT OUTREACH (OUTPATIENT)
Dept: CARE COORDINATION | Facility: CLINIC | Age: 69
End: 2024-04-01
Payer: MEDICARE

## 2024-04-01 ENCOUNTER — TELEPHONE (OUTPATIENT)
Dept: INTERNAL MEDICINE | Facility: CLINIC | Age: 69
End: 2024-04-01
Payer: MEDICARE

## 2024-04-01 LAB
ATRIAL RATE - MUSE: 72 BPM
BACTERIA BLD CULT: NO GROWTH
DIASTOLIC BLOOD PRESSURE - MUSE: NORMAL MMHG
INTERPRETATION ECG - MUSE: NORMAL
P AXIS - MUSE: 11 DEGREES
PR INTERVAL - MUSE: 212 MS
QRS DURATION - MUSE: 80 MS
QT - MUSE: 420 MS
QTC - MUSE: 459 MS
R AXIS - MUSE: -20 DEGREES
SYSTOLIC BLOOD PRESSURE - MUSE: NORMAL MMHG
T AXIS - MUSE: 70 DEGREES
VENTRICULAR RATE- MUSE: 72 BPM

## 2024-04-01 NOTE — TELEPHONE ENCOUNTER
Reason for Call:  Appointment Request    Patient requesting this type of appt:  Hospital/ED Follow-Up     Requested provider: Karla Hurst    Reason patient unable to be scheduled: Not within requested timeframe    When does patient want to be seen/preferred time: 1-2 weeks    Comments: no appt available until 04/26/24, please check and advise    Could we send this information to you in [facility] or would you prefer to receive a phone call?:   Patient would prefer a phone call   Okay to leave a detailed message?: Yes at Cell number on file:    Telephone Information:   Mobile [phone]       Call taken on 4/1/2024 at 12:18 PM by Ignacia Gayle

## 2024-04-01 NOTE — TELEPHONE ENCOUNTER
Attempted to call patient but voice mail is full. I will send her a my chart message. We can see if Karla has any same day spots or offer anther provider.

## 2024-04-01 NOTE — PROGRESS NOTES
[facility]: [facility]: Post-Discharge Note  SITUATION                                                      Admission:    Admission Date: 03/27/24   Reason for Admission: 1. Acute respiratory failure with hypoxia (H)  J96.01       2. Multifocal pneumonia  J18.9       3. Leukocytosis, unspecified type  D72.829  Discharge:   Discharge Date: 03/30/24  Discharge Diagnosis: Acute respiratory failure with hypoxia (H)    Leukocytosis, unspecified type    Multifocal pneumonia    BACKGROUND                                                      Per hospital discharge summary and inpatient provider notes:    Nicole Reyes is a 68 year old female with history of hypertension presenting with her daughter with concern for pneumonia.  She has had mild persistent cough since her diagnosis of pneumonia 2/10/2024 but is otherwise feeling well.  However, during the night last night she developed chest pain, worse with cough or deep inspiration, described as a tightness across her anterior chest.  She has had cough productive of yellow to light green sputum and shortness of breath.  She has not had measured fever.  She does not have leg pain or swelling.  Her daughter ciara Lucas has had similar sudden onset presentations of pneumonia in the past.    ASSESSMENT           Discharge Assessment  How are you doing now that you are home?: It is getting better, some discomfort from pneumonia and coughing.  Deep breathing is getting better. Using home oxygen currently.  Is finishing the antibiotics at home.  Overall feeling better.  How are your symptoms? (Red Flag symptoms escalate to triage hotline per guidelines): Improved  Do you feel your condition is stable enough to be safe at home until your provider visit?: Yes  Does the patient have their discharge instructions? : Yes  Does the patient have questions regarding their discharge instructions? : No  Were you started on any new medications or were  there changes to any of your previous medications? : Yes  Does the patient have all of their medications?: Yes  Do you have questions regarding any of your medications? : No  Do you have all of your needed medical supplies or equipment (DME)?  (i.e. oxygen tank, CPAP, cane, etc.): No - What equipment or supplies are needed? (Patient did not receive the nebulizer, patient does not feel this is necessary. States she does not feel a need for it. RN discussed how to receive these items if she decides she wants them.)  Discharge follow-up appointment scheduled within 14 calendar days? : No  Is patient agreeable to assistance with scheduling? : Yes         Post-op (Clinicians Only)  Did the patient have surgery or a procedure: No    Patient Declined  Care Coordination.     PLAN                                                      Outpatient Plan:  Follow up with primary care provider, Karla Hurst, within 7-14 days to evaluate medication change and for hospital  follow- up. The following labs/tests are recommended: check cbc, bmp.  Discuss home oxygen use and Pulmonology follow up. Recommend repeat CXR in 6-8 weeks to ensure resolution        No future appointments.      For any urgent concerns, please contact our 24 hour nurse triage line: [phone] ([phone])         Radha Recinos RN

## 2024-04-03 NOTE — TELEPHONE ENCOUNTER
Patient was able to get hospital follow up for 04/05/2024    Appointments in Next Year      Apr 05, 2024  2:00 PM  (Arrive by 1:45 PM)  ED/Hospital Follow Up with LEONCIO Montes De Oca CNP  [facility] [place] ) [phone]          Thank you,  Solis Adams, Triage RN [facility]  9:30 AM 4/3/2024

## 2024-04-04 ASSESSMENT — PATIENT HEALTH QUESTIONNAIRE - PHQ9
10. IF YOU CHECKED OFF ANY PROBLEMS, HOW DIFFICULT HAVE THESE PROBLEMS MADE IT FOR YOU TO DO YOUR WORK, TAKE CARE OF THINGS AT HOME, OR GET ALONG WITH OTHER PEOPLE: NOT DIFFICULT AT ALL
SUM OF ALL RESPONSES TO PHQ QUESTIONS 1-9: 2
SUM OF ALL RESPONSES TO PHQ QUESTIONS 1-9: 2

## 2024-04-05 ENCOUNTER — OFFICE VISIT (OUTPATIENT)
Dept: INTERNAL MEDICINE | Facility: CLINIC | Age: 69
End: 2024-04-05
Payer: MEDICARE

## 2024-04-05 VITALS
OXYGEN SATURATION: 94 % | TEMPERATURE: 97.9 F | HEIGHT: 58 IN | HEART RATE: 77 BPM | BODY MASS INDEX: 50.42 KG/M2 | RESPIRATION RATE: 19 BRPM | SYSTOLIC BLOOD PRESSURE: 122 MMHG | DIASTOLIC BLOOD PRESSURE: 78 MMHG | WEIGHT: 240.2 LBS

## 2024-04-05 DIAGNOSIS — Z29.11 NEED FOR VACCINATION AGAINST RESPIRATORY SYNCYTIAL VIRUS: ICD-10-CM

## 2024-04-05 DIAGNOSIS — E11.9 TYPE 2 DIABETES MELLITUS WITHOUT COMPLICATION, WITHOUT LONG-TERM CURRENT USE OF INSULIN (H): ICD-10-CM

## 2024-04-05 DIAGNOSIS — J69.0 RECURRENT ASPIRATION PNEUMONIA (H): ICD-10-CM

## 2024-04-05 DIAGNOSIS — J96.01 ACUTE RESPIRATORY FAILURE WITH HYPOXIA (H): Primary | ICD-10-CM

## 2024-04-05 LAB
BASOPHILS # BLD AUTO: 0 10E3/UL (ref 0–0.2)
BASOPHILS NFR BLD AUTO: 0 %
EOSINOPHIL # BLD AUTO: 0.5 10E3/UL (ref 0–0.7)
EOSINOPHIL NFR BLD AUTO: 4 %
ERYTHROCYTE [DISTWIDTH] IN BLOOD BY AUTOMATED COUNT: 16.1 % (ref 10–15)
HCT VFR BLD AUTO: 41 % (ref 35–47)
HGB BLD-MCNC: 12.9 G/DL (ref 11.7–15.7)
IMM GRANULOCYTES # BLD: 0 10E3/UL
IMM GRANULOCYTES NFR BLD: 0 %
LYMPHOCYTES # BLD AUTO: 2.9 10E3/UL (ref 0.8–5.3)
LYMPHOCYTES NFR BLD AUTO: 28 %
MCH RBC QN AUTO: 25.7 PG (ref 26.5–33)
MCHC RBC AUTO-ENTMCNC: 31.5 G/DL (ref 31.5–36.5)
MCV RBC AUTO: 82 FL (ref 78–100)
MONOCYTES # BLD AUTO: 0.7 10E3/UL (ref 0–1.3)
MONOCYTES NFR BLD AUTO: 6 %
NEUTROPHILS # BLD AUTO: 6.3 10E3/UL (ref 1.6–8.3)
NEUTROPHILS NFR BLD AUTO: 61 %
PLATELET # BLD AUTO: 288 10E3/UL (ref 150–450)
RBC # BLD AUTO: 5.02 10E6/UL (ref 3.8–5.2)
WBC # BLD AUTO: 10.4 10E3/UL (ref 4–11)

## 2024-04-05 PROCEDURE — 85025 COMPLETE CBC W/AUTO DIFF WBC: CPT

## 2024-04-05 PROCEDURE — 80048 BASIC METABOLIC PNL TOTAL CA: CPT

## 2024-04-05 PROCEDURE — 99495 TRANSJ CARE MGMT MOD F2F 14D: CPT

## 2024-04-05 PROCEDURE — 36415 COLL VENOUS BLD VENIPUNCTURE: CPT

## 2024-04-05 RX ORDER — RESPIRATORY SYNCYTIAL VIRUS VACCINE 120MCG/0.5
0.5 KIT INTRAMUSCULAR ONCE
Qty: 1 EACH | Refills: 0 | Status: CANCELLED | OUTPATIENT
Start: 2024-04-05 | End: 2024-04-05

## 2024-04-05 ASSESSMENT — PAIN SCALES - GENERAL: PAINLEVEL: NO PAIN (0)

## 2024-04-05 NOTE — PATIENT INSTRUCTIONS
[place] the thought of the hiatal hernia repair. Cardiac monitoring prior to the procedure to ensure good cardiac health for reassurance. Try to not eat 3 hours prior to bedtime and if you do elevate head of bed 30-45 degrees to reduce potential of aspiration. Remember to wear the bipap at bedtime. Regular deep breathing and check oxygenation

## 2024-04-05 NOTE — PROGRESS NOTES
Assessment & Plan     (J96.01) Acute respiratory failure with hypoxia (H)  (primary encounter diagnosis)  Comment: most recent ED visit was 3/27 for acute respiratory failure with hypoxia.   Plan: XR Chest 2 Views, Adult Pulmonary Medicine          Referral, CBC with platelets and         differential, Basic metabolic panel  (Ca, Cl,         CO2, Creat, Gluc, K, Na, BUN)        Placed an order for a nebulizer, Discussed with pt using an incentive spirometer regularly to facilitate full pulmonary expansion and to promote pulmonary expulsion of any pulmonary secretions that may accumulate. Also to remember to wear her Bipap at NOC.    (J69.0) Recurrent aspiration pneumonia (H)  Comment: due to a hiatal hernia, Pt admits to eating right before bed. Discussed the importance of either not eating 3 hours prior to bed or ensure the HOB up is at least 30-45 degrees to reduce the chance of gastric aspiration.  Plan: Discussed with pt the plan to follow through with her communication with the [facility] to do a hiatal repair with gastric sleeve. Loss of her  12/21/2023 forced her to put the surgery on a temporary hold.    (E11.9) Type 2 diabetes mellitus without complication, without long-term current use of insulin (H)  Comment: 2/2024 A1C 6.3  Plan: Continue with the glipizide, maybe consider restarting the use of the Ozempic that she stopped taking and is hesitant about using      50 minutes spent by me on the date of the encounter doing chart review, review of outside records, review of test results, interpretation of tests, patient visit, documentation, and ordering diagnostic tests and follow-up appointments, and pt education      MED REC REQUIRED  Post Medication Reconciliation Status:       MEDICATIONS:  Continue current medications without change    Hi Lucas is a 68 year old, presenting for the following health issues: PT visited ED for CAP in 2/2024 and then the most  recent visit was 3/27 for acute respiratory failure with hypoxia. Pt states that her breathing is improved. Pt was sent home with O2 from the ED and she admits to using it as the day progresses. She states that she has the nebulizer solution but no nebulizer machine. One was ordered for her today. Pt has a personal oximeter and states that her oxygenation dips to 91% as the day progresses. If she is more purposeful towards deep breathing then her oxygenation raises 97%. Pt has a BIPAP that she admits to sometimes forget to put it on at [facility]. Pt has SOB with exertion which is her baseline. Denies coughing. Denies chest pains. Discussed with pt using an incentive spirometer regularly to facilitate full pulmonary expansion and to promote pulmonary expulsion of any pulmonary secretions that may accumulate.    Pt discussed the hiatal hernia repair with gastric sleeve and she stated that she has the information to call in [facility] that she will revisit the subject on her own. She discussed many concerns about the procedure. Pt stopped checking into the surgery when her   2023. Pt states that most of her respiratory episodes happen in the NOC. Pt admits to eating right before bed. Discussed the importance of either not eating 3 hours prior to bed or ensure the HOB up is at least 30-45 degrees to reduce the chance of gastric aspiration.  Hospital F/U (Pneumonia )      2024     1:55 PM   Additional Questions   Roomed by Shoshana Castañeda MA   Accompanied by Self         2024     1:55 PM   Patient Reported Additional Medications   Patient reports taking the following new medications None     HPI         2024    12:07 PM   Post Discharge Outreach   Admission Date 3/27/2024   Reason for Admission 1. Acute respiratory failure with hypoxia (H)  J96.01       2. Multifocal pneumonia  J18.9       3. Leukocytosis, unspecified type  D72.829   Discharge Date 3/30/2024   Discharge Diagnosis Acute  respiratory failure with hypoxia (H)    Leukocytosis, unspecified type    Multifocal pneumonia   How are you doing now that you are home? It is getting better, some discomfort from pneumonia and coughing.  Deep breathing is getting better. Using home oxygen currently.  Is finishing the antibiotics at home.  Overall feeling better.   How are your symptoms? (Red Flag symptoms escalate to triage hotline per guidelines) Improved   Do you feel your condition is stable enough to be safe at home until your provider visit? Yes   Does the patient have their discharge instructions?  Yes   Does the patient have questions regarding their discharge instructions?  No   Were you started on any new medications or were there changes to any of your previous medications?  Yes   Does the patient have all of their medications? Yes   Do you have questions regarding any of your medications?  No   Do you have all of your needed medical supplies or equipment (DME)?  (i.e. oxygen tank, CPAP, cane, etc.) No - What equipment or supplies are needed?   Discharge follow-up appointment scheduled within 14 calendar days?  No     Hospital Follow-up Visit:    Hospital/Nursing Home/ Rehab Facility: [facility]  Date of Admission: 3/27/2024  Date of Discharge: 3/30/2024  Reason(s) for Admission: respiratory failure with hypoxia    Was your hospitalization related to COVID-19? No   Problems taking medications regularly:  None  Medication changes since discharge: pt completed Augmentin and no longer using Robitussin  Problems adhering to non-medication therapy:  None    Summary of hospitalization:  [facility] discharge summary reviewed  Diagnostic Tests/Treatments reviewed.  Follow up needed: Pulmonology and Chest X-ray  Other Healthcare Providers Involved in Patient s Care:          pulmonologist, PCP and potentially surgical clinic for hiatal hernia repair  Update since discharge: improved.         Plan of care  "communicated with patient                 Review of Systems  Constitutional, neuro, ENT, endocrine, pulmonary, cardiac, gastrointestinal, genitourinary, musculoskeletal, integument and psychiatric systems are negative, except as otherwise noted.      Objective    /78 (BP Location: Right arm, Patient Position: Sitting, Cuff Size: Adult Large)   Pulse 77   Temp 97.9  F (36.6  C) (Oral)   Resp 19   Ht 1.473 m (4' 10\")   Wt 109 kg (240 lb 3.2 oz)   LMP 09/15/2007   SpO2 94%   Breastfeeding No   BMI 50.20 kg/m    Body mass index is 50.2 kg/m .  Physical Exam   GENERAL: alert and no distress  EYES: Eyes grossly normal to inspection, PERRL and conjunctivae and sclerae normal  NECK: no adenopathy, no asymmetry, masses, or scars  RESP: lungs clear to auscultation - no rales, rhonchi or wheezes, tactile fremitus equal bilaterally and bronchophony is equal  CV: regular rate and rhythm, normal S1 S2, no S3 or S4, no murmur, click or rub, no peripheral edema   ABDOMEN: soft, nontender, no hepatosplenomegaly, no masses and bowel sounds normal  MS: no gross musculoskeletal defects noted, no edema  NEURO: Normal strength and tone, sensory deficit paresthesia to first 3 fingers on the left hand, and mentation intact  PSYCH: mentation appears normal, affect normal/bright            Signed Electronically by: LEONCIO Livingston CNP    Answers submitted by the patient for this visit:  Patient Health Questionnaire (Submitted on 4/4/2024)  If you checked off any problems, how difficult have these problems made it for you to do your work, take care of things at home, or get along with other people?: Not difficult at all  PHQ9 TOTAL SCORE: 2    "

## 2024-04-06 LAB
ANION GAP SERPL CALCULATED.3IONS-SCNC: 13 MMOL/L (ref 7–15)
BUN SERPL-MCNC: 9.7 MG/DL (ref 8–23)
CALCIUM SERPL-MCNC: 9.4 MG/DL (ref 8.8–10.2)
CHLORIDE SERPL-SCNC: 102 MMOL/L (ref 98–107)
CREAT SERPL-MCNC: 0.94 MG/DL (ref 0.51–0.95)
DEPRECATED HCO3 PLAS-SCNC: 26 MMOL/L (ref 22–29)
EGFRCR SERPLBLD CKD-EPI 2021: 66 ML/MIN/1.73M2
GLUCOSE SERPL-MCNC: 133 MG/DL (ref 70–99)
POTASSIUM SERPL-SCNC: 4.4 MMOL/L (ref 3.4–5.3)
SODIUM SERPL-SCNC: 141 MMOL/L (ref 135–145)

## 2024-04-10 DIAGNOSIS — E11.9 TYPE 2 DIABETES MELLITUS WITHOUT COMPLICATION, WITHOUT LONG-TERM CURRENT USE OF INSULIN (H): ICD-10-CM

## 2024-04-10 DIAGNOSIS — E66.01 MORBID OBESITY (H): ICD-10-CM

## 2024-04-11 NOTE — TELEPHONE ENCOUNTER
We could stay at current dose, but increased doses help with diabetes control and help more with weight loss     Current dose will not help as much as dose    Let me know if she is willing to go up

## 2024-04-11 NOTE — TELEPHONE ENCOUNTER
Call to patient who states she is at 0.25mg currently and patient is tolerating it well. Patient states that she would like to stay at her current dose and she doesn't see a reason to increase it.     Provider: Please advise on patient's request to stay at 0.25mg. Patient will need updated prescription to state that she is on 0.25mg only.    Thank you,  Solis Adams, Triage RN [facility]  11:42 AM 4/11/2024

## 2024-04-11 NOTE — TELEPHONE ENCOUNTER
Pending Prescriptions:                       Disp   Refills    OZEMPIC (0.25 OR 0.5 MG/DOSE) 2 MG/3ML pe*                    Sig: INJECT 0.5 MG SUBCUTANEOUS EVERY 7 DAYS MAY           INCREASE DOSE IN A MONTH IF TOLERATED    Please call patient to find out how much Ozempic she is taking.  Then forward to primary care provider to authorize new prescription with updated directions.

## 2024-04-12 NOTE — TELEPHONE ENCOUNTER
Spoke with patient and she approves stepping up to the next dose.    Please write prescription and send to pharmacy

## 2024-04-15 RX ORDER — SEMAGLUTIDE 0.68 MG/ML
INJECTION, SOLUTION SUBCUTANEOUS
Qty: 2 ML | Refills: 0 | Status: SHIPPED | OUTPATIENT
Start: 2024-04-15 | End: 2024-05-09

## 2024-05-07 ENCOUNTER — PATIENT OUTREACH (OUTPATIENT)
Dept: CARE COORDINATION | Facility: CLINIC | Age: 69
End: 2024-05-07
Payer: MEDICARE

## 2024-05-08 DIAGNOSIS — K21.9 GASTROESOPHAGEAL REFLUX DISEASE WITHOUT ESOPHAGITIS: ICD-10-CM

## 2024-05-08 DIAGNOSIS — E66.01 MORBID OBESITY (H): ICD-10-CM

## 2024-05-08 DIAGNOSIS — E11.9 TYPE 2 DIABETES MELLITUS WITHOUT COMPLICATION, WITHOUT LONG-TERM CURRENT USE OF INSULIN (H): ICD-10-CM

## 2024-05-09 RX ORDER — SEMAGLUTIDE 0.68 MG/ML
INJECTION, SOLUTION SUBCUTANEOUS
Qty: 3 ML | Refills: 0 | Status: SHIPPED | OUTPATIENT
Start: 2024-05-09 | End: 2024-06-12

## 2024-05-09 RX ORDER — PANTOPRAZOLE SODIUM 40 MG/1
TABLET, DELAYED RELEASE ORAL
Qty: 90 TABLET | Refills: 3 | OUTPATIENT
Start: 2024-05-09

## 2024-05-16 ENCOUNTER — MYC REFILL (OUTPATIENT)
Dept: INTERNAL MEDICINE | Facility: CLINIC | Age: 69
End: 2024-05-16
Payer: MEDICARE

## 2024-05-16 DIAGNOSIS — E87.6 LOW SERUM POTASSIUM: ICD-10-CM

## 2024-05-16 RX ORDER — POTASSIUM CHLORIDE 1500 MG/1
TABLET, EXTENDED RELEASE ORAL
Qty: 180 TABLET | Refills: 1 | Status: SHIPPED | OUTPATIENT
Start: 2024-05-16 | End: 2024-10-07

## 2024-05-24 ENCOUNTER — TRANSFERRED RECORDS (OUTPATIENT)
Dept: HEALTH INFORMATION MANAGEMENT | Facility: CLINIC | Age: 69
End: 2024-05-24
Payer: MEDICARE

## 2024-05-30 ENCOUNTER — APPOINTMENT (OUTPATIENT)
Dept: CT IMAGING | Facility: CLINIC | Age: 69
DRG: 178 | End: 2024-05-30
Attending: STUDENT IN AN ORGANIZED HEALTH CARE EDUCATION/TRAINING PROGRAM
Payer: MEDICARE

## 2024-05-30 ENCOUNTER — HOSPITAL ENCOUNTER (INPATIENT)
Facility: CLINIC | Age: 69
LOS: 4 days | Discharge: HOME HEALTH | DRG: 178 | End: 2024-06-03
Attending: STUDENT IN AN ORGANIZED HEALTH CARE EDUCATION/TRAINING PROGRAM | Admitting: INTERNAL MEDICINE
Payer: MEDICARE

## 2024-05-30 DIAGNOSIS — J18.9 COMMUNITY ACQUIRED PNEUMONIA OF LEFT LOWER LOBE OF LUNG: ICD-10-CM

## 2024-05-30 DIAGNOSIS — J69.0 RECURRENT ASPIRATION PNEUMONIA (H): Primary | ICD-10-CM

## 2024-05-30 DIAGNOSIS — R06.89 RESPIRATORY INSUFFICIENCY: ICD-10-CM

## 2024-05-30 DIAGNOSIS — E66.01 MORBID OBESITY (H): ICD-10-CM

## 2024-05-30 LAB
ANION GAP SERPL CALCULATED.3IONS-SCNC: 12 MMOL/L (ref 7–15)
ATRIAL RATE - MUSE: 89 BPM
BASE EXCESS BLDV CALC-SCNC: 5.5 MMOL/L (ref -3–3)
BASOPHILS # BLD AUTO: 0 10E3/UL (ref 0–0.2)
BASOPHILS NFR BLD AUTO: 0 %
BUN SERPL-MCNC: 10.7 MG/DL (ref 8–23)
CALCIUM SERPL-MCNC: 9.4 MG/DL (ref 8.8–10.2)
CHLORIDE SERPL-SCNC: 103 MMOL/L (ref 98–107)
CREAT SERPL-MCNC: 0.88 MG/DL (ref 0.51–0.95)
D DIMER PPP FEU-MCNC: 1.76 UG/ML FEU (ref 0–0.5)
DEPRECATED HCO3 PLAS-SCNC: 28 MMOL/L (ref 22–29)
DIASTOLIC BLOOD PRESSURE - MUSE: NORMAL MMHG
EGFRCR SERPLBLD CKD-EPI 2021: 71 ML/MIN/1.73M2
EOSINOPHIL # BLD AUTO: 0.2 10E3/UL (ref 0–0.7)
EOSINOPHIL NFR BLD AUTO: 1 %
ERYTHROCYTE [DISTWIDTH] IN BLOOD BY AUTOMATED COUNT: 15.2 % (ref 10–15)
FLUAV RNA SPEC QL NAA+PROBE: NEGATIVE
FLUBV RNA RESP QL NAA+PROBE: NEGATIVE
GLUCOSE BLDC GLUCOMTR-MCNC: 123 MG/DL (ref 70–99)
GLUCOSE BLDC GLUCOMTR-MCNC: 218 MG/DL (ref 70–99)
GLUCOSE SERPL-MCNC: 168 MG/DL (ref 70–99)
HBA1C MFR BLD: 6.9 %
HCO3 BLDV-SCNC: 34 MMOL/L (ref 21–28)
HCT VFR BLD AUTO: 41.9 % (ref 35–47)
HGB BLD-MCNC: 12.9 G/DL (ref 11.7–15.7)
IMM GRANULOCYTES # BLD: 0.1 10E3/UL
IMM GRANULOCYTES NFR BLD: 0 %
INTERPRETATION ECG - MUSE: NORMAL
LACTATE SERPL-SCNC: 1.5 MMOL/L (ref 0.7–2)
LYMPHOCYTES # BLD AUTO: 1.1 10E3/UL (ref 0.8–5.3)
LYMPHOCYTES NFR BLD AUTO: 8 %
MCH RBC QN AUTO: 25.6 PG (ref 26.5–33)
MCHC RBC AUTO-ENTMCNC: 30.8 G/DL (ref 31.5–36.5)
MCV RBC AUTO: 83 FL (ref 78–100)
MONOCYTES # BLD AUTO: 0.6 10E3/UL (ref 0–1.3)
MONOCYTES NFR BLD AUTO: 4 %
NEUTROPHILS # BLD AUTO: 12 10E3/UL (ref 1.6–8.3)
NEUTROPHILS NFR BLD AUTO: 87 %
NRBC # BLD AUTO: 0 10E3/UL
NRBC BLD AUTO-RTO: 0 /100
NT-PROBNP SERPL-MCNC: 532 PG/ML (ref 0–900)
O2/TOTAL GAS SETTING VFR VENT: 36 %
OXYHGB MFR BLDV: 42 % (ref 70–75)
P AXIS - MUSE: 39 DEGREES
PCO2 BLDV: 64 MM HG (ref 40–50)
PH BLDV: 7.33 [PH] (ref 7.32–7.43)
PLATELET # BLD AUTO: 238 10E3/UL (ref 150–450)
PO2 BLDV: 28 MM HG (ref 25–47)
POTASSIUM SERPL-SCNC: 3.7 MMOL/L (ref 3.4–5.3)
PR INTERVAL - MUSE: 192 MS
PROCALCITONIN SERPL IA-MCNC: 0.08 NG/ML
QRS DURATION - MUSE: 82 MS
QT - MUSE: 372 MS
QTC - MUSE: 452 MS
R AXIS - MUSE: -4 DEGREES
RBC # BLD AUTO: 5.03 10E6/UL (ref 3.8–5.2)
RSV RNA SPEC NAA+PROBE: NEGATIVE
SAO2 % BLDV: 42.8 % (ref 70–75)
SARS-COV-2 RNA RESP QL NAA+PROBE: NEGATIVE
SODIUM SERPL-SCNC: 143 MMOL/L (ref 135–145)
SYSTOLIC BLOOD PRESSURE - MUSE: NORMAL MMHG
T AXIS - MUSE: 72 DEGREES
TROPONIN T SERPL HS-MCNC: 14 NG/L
VENTRICULAR RATE- MUSE: 89 BPM
WBC # BLD AUTO: 13.9 10E3/UL (ref 4–11)

## 2024-05-30 PROCEDURE — 36415 COLL VENOUS BLD VENIPUNCTURE: CPT | Performed by: STUDENT IN AN ORGANIZED HEALTH CARE EDUCATION/TRAINING PROGRAM

## 2024-05-30 PROCEDURE — 83880 ASSAY OF NATRIURETIC PEPTIDE: CPT | Performed by: STUDENT IN AN ORGANIZED HEALTH CARE EDUCATION/TRAINING PROGRAM

## 2024-05-30 PROCEDURE — 71275 CT ANGIOGRAPHY CHEST: CPT | Mod: MA

## 2024-05-30 PROCEDURE — 250N000011 HC RX IP 250 OP 636: Performed by: STUDENT IN AN ORGANIZED HEALTH CARE EDUCATION/TRAINING PROGRAM

## 2024-05-30 PROCEDURE — 99285 EMERGENCY DEPT VISIT HI MDM: CPT | Mod: 25

## 2024-05-30 PROCEDURE — 120N000001 HC R&B MED SURG/OB

## 2024-05-30 PROCEDURE — 87637 SARSCOV2&INF A&B&RSV AMP PRB: CPT | Performed by: STUDENT IN AN ORGANIZED HEALTH CARE EDUCATION/TRAINING PROGRAM

## 2024-05-30 PROCEDURE — 80048 BASIC METABOLIC PNL TOTAL CA: CPT | Performed by: STUDENT IN AN ORGANIZED HEALTH CARE EDUCATION/TRAINING PROGRAM

## 2024-05-30 PROCEDURE — 250N000013 HC RX MED GY IP 250 OP 250 PS 637: Performed by: INTERNAL MEDICINE

## 2024-05-30 PROCEDURE — 83605 ASSAY OF LACTIC ACID: CPT | Performed by: STUDENT IN AN ORGANIZED HEALTH CARE EDUCATION/TRAINING PROGRAM

## 2024-05-30 PROCEDURE — 250N000011 HC RX IP 250 OP 636: Performed by: INTERNAL MEDICINE

## 2024-05-30 PROCEDURE — 96365 THER/PROPH/DIAG IV INF INIT: CPT | Mod: 59

## 2024-05-30 PROCEDURE — 84145 PROCALCITONIN (PCT): CPT | Performed by: STUDENT IN AN ORGANIZED HEALTH CARE EDUCATION/TRAINING PROGRAM

## 2024-05-30 PROCEDURE — 83036 HEMOGLOBIN GLYCOSYLATED A1C: CPT | Performed by: INTERNAL MEDICINE

## 2024-05-30 PROCEDURE — 85025 COMPLETE CBC W/AUTO DIFF WBC: CPT | Performed by: STUDENT IN AN ORGANIZED HEALTH CARE EDUCATION/TRAINING PROGRAM

## 2024-05-30 PROCEDURE — 82962 GLUCOSE BLOOD TEST: CPT

## 2024-05-30 PROCEDURE — 94640 AIRWAY INHALATION TREATMENT: CPT

## 2024-05-30 PROCEDURE — 82805 BLOOD GASES W/O2 SATURATION: CPT | Performed by: STUDENT IN AN ORGANIZED HEALTH CARE EDUCATION/TRAINING PROGRAM

## 2024-05-30 PROCEDURE — 250N000009 HC RX 250: Performed by: STUDENT IN AN ORGANIZED HEALTH CARE EDUCATION/TRAINING PROGRAM

## 2024-05-30 PROCEDURE — 87040 BLOOD CULTURE FOR BACTERIA: CPT | Performed by: STUDENT IN AN ORGANIZED HEALTH CARE EDUCATION/TRAINING PROGRAM

## 2024-05-30 PROCEDURE — 99223 1ST HOSP IP/OBS HIGH 75: CPT | Mod: AI | Performed by: INTERNAL MEDICINE

## 2024-05-30 PROCEDURE — 85379 FIBRIN DEGRADATION QUANT: CPT | Performed by: STUDENT IN AN ORGANIZED HEALTH CARE EDUCATION/TRAINING PROGRAM

## 2024-05-30 PROCEDURE — 84484 ASSAY OF TROPONIN QUANT: CPT | Performed by: STUDENT IN AN ORGANIZED HEALTH CARE EDUCATION/TRAINING PROGRAM

## 2024-05-30 PROCEDURE — 258N000003 HC RX IP 258 OP 636: Performed by: STUDENT IN AN ORGANIZED HEALTH CARE EDUCATION/TRAINING PROGRAM

## 2024-05-30 PROCEDURE — 96367 TX/PROPH/DG ADDL SEQ IV INF: CPT

## 2024-05-30 PROCEDURE — 93005 ELECTROCARDIOGRAM TRACING: CPT

## 2024-05-30 RX ORDER — IPRATROPIUM BROMIDE AND ALBUTEROL SULFATE 2.5; .5 MG/3ML; MG/3ML
3 SOLUTION RESPIRATORY (INHALATION) ONCE
Status: COMPLETED | OUTPATIENT
Start: 2024-05-30 | End: 2024-05-30

## 2024-05-30 RX ORDER — LIDOCAINE 40 MG/G
CREAM TOPICAL
Status: DISCONTINUED | OUTPATIENT
Start: 2024-05-30 | End: 2024-06-03 | Stop reason: HOSPADM

## 2024-05-30 RX ORDER — FUROSEMIDE 20 MG
20 TABLET ORAL DAILY PRN
Status: DISCONTINUED | OUTPATIENT
Start: 2024-05-30 | End: 2024-06-03 | Stop reason: HOSPADM

## 2024-05-30 RX ORDER — AMOXICILLIN 250 MG
1 CAPSULE ORAL 2 TIMES DAILY PRN
Status: DISCONTINUED | OUTPATIENT
Start: 2024-05-30 | End: 2024-06-03 | Stop reason: HOSPADM

## 2024-05-30 RX ORDER — DEXTROSE MONOHYDRATE 25 G/50ML
25-50 INJECTION, SOLUTION INTRAVENOUS
Status: DISCONTINUED | OUTPATIENT
Start: 2024-05-30 | End: 2024-06-03 | Stop reason: HOSPADM

## 2024-05-30 RX ORDER — ZOLPIDEM TARTRATE 5 MG/1
10 TABLET ORAL
Status: DISCONTINUED | OUTPATIENT
Start: 2024-05-30 | End: 2024-06-03 | Stop reason: HOSPADM

## 2024-05-30 RX ORDER — ONDANSETRON 2 MG/ML
4 INJECTION INTRAMUSCULAR; INTRAVENOUS EVERY 6 HOURS PRN
Status: DISCONTINUED | OUTPATIENT
Start: 2024-05-30 | End: 2024-06-03 | Stop reason: HOSPADM

## 2024-05-30 RX ORDER — PIPERACILLIN SODIUM, TAZOBACTAM SODIUM 3; .375 G/15ML; G/15ML
3.38 INJECTION, POWDER, LYOPHILIZED, FOR SOLUTION INTRAVENOUS EVERY 6 HOURS
Status: DISCONTINUED | OUTPATIENT
Start: 2024-05-30 | End: 2024-06-03

## 2024-05-30 RX ORDER — IOPAMIDOL 755 MG/ML
73 INJECTION, SOLUTION INTRAVASCULAR ONCE
Status: COMPLETED | OUTPATIENT
Start: 2024-05-30 | End: 2024-05-30

## 2024-05-30 RX ORDER — NYSTATIN 100000/ML
500000 SUSPENSION, ORAL (FINAL DOSE FORM) ORAL 4 TIMES DAILY
Status: DISCONTINUED | OUTPATIENT
Start: 2024-05-30 | End: 2024-06-03 | Stop reason: HOSPADM

## 2024-05-30 RX ORDER — AZITHROMYCIN 500 MG/5ML
500 INJECTION, POWDER, LYOPHILIZED, FOR SOLUTION INTRAVENOUS ONCE
Status: COMPLETED | OUTPATIENT
Start: 2024-05-30 | End: 2024-05-30

## 2024-05-30 RX ORDER — ONDANSETRON 4 MG/1
4 TABLET, ORALLY DISINTEGRATING ORAL EVERY 6 HOURS PRN
Status: DISCONTINUED | OUTPATIENT
Start: 2024-05-30 | End: 2024-06-03 | Stop reason: HOSPADM

## 2024-05-30 RX ORDER — CLONAZEPAM 0.5 MG/1
2 TABLET ORAL
Status: DISCONTINUED | OUTPATIENT
Start: 2024-05-30 | End: 2024-06-03 | Stop reason: HOSPADM

## 2024-05-30 RX ORDER — CEFTRIAXONE 2 G/1
2 INJECTION, POWDER, FOR SOLUTION INTRAMUSCULAR; INTRAVENOUS ONCE
Status: COMPLETED | OUTPATIENT
Start: 2024-05-30 | End: 2024-05-30

## 2024-05-30 RX ORDER — CALCIUM CARBONATE 500 MG/1
1000 TABLET, CHEWABLE ORAL 4 TIMES DAILY PRN
Status: DISCONTINUED | OUTPATIENT
Start: 2024-05-30 | End: 2024-06-03 | Stop reason: HOSPADM

## 2024-05-30 RX ORDER — MULTIPLE VITAMINS W/ MINERALS TAB 9MG-400MCG
1 TAB ORAL DAILY
Status: DISCONTINUED | OUTPATIENT
Start: 2024-05-30 | End: 2024-06-03 | Stop reason: HOSPADM

## 2024-05-30 RX ORDER — ROSUVASTATIN CALCIUM 10 MG/1
10 TABLET, COATED ORAL DAILY
Status: DISCONTINUED | OUTPATIENT
Start: 2024-05-30 | End: 2024-06-03 | Stop reason: HOSPADM

## 2024-05-30 RX ORDER — GABAPENTIN 300 MG/1
900 CAPSULE ORAL 2 TIMES DAILY
Status: DISCONTINUED | OUTPATIENT
Start: 2024-05-30 | End: 2024-06-03 | Stop reason: HOSPADM

## 2024-05-30 RX ORDER — AMOXICILLIN 250 MG
2 CAPSULE ORAL 2 TIMES DAILY PRN
Status: DISCONTINUED | OUTPATIENT
Start: 2024-05-30 | End: 2024-06-03 | Stop reason: HOSPADM

## 2024-05-30 RX ORDER — PANTOPRAZOLE SODIUM 40 MG/1
40 TABLET, DELAYED RELEASE ORAL AT BEDTIME
Status: DISCONTINUED | OUTPATIENT
Start: 2024-05-30 | End: 2024-06-03 | Stop reason: HOSPADM

## 2024-05-30 RX ORDER — ACETAMINOPHEN 325 MG/1
975 TABLET ORAL EVERY 8 HOURS PRN
Status: DISCONTINUED | OUTPATIENT
Start: 2024-05-30 | End: 2024-06-03 | Stop reason: HOSPADM

## 2024-05-30 RX ORDER — NICOTINE POLACRILEX 4 MG
15-30 LOZENGE BUCCAL
Status: DISCONTINUED | OUTPATIENT
Start: 2024-05-30 | End: 2024-06-03 | Stop reason: HOSPADM

## 2024-05-30 RX ORDER — SERTRALINE HYDROCHLORIDE 100 MG/1
200 TABLET, FILM COATED ORAL DAILY
Status: DISCONTINUED | OUTPATIENT
Start: 2024-05-30 | End: 2024-06-03 | Stop reason: HOSPADM

## 2024-05-30 RX ORDER — LOSARTAN POTASSIUM 100 MG/1
100 TABLET ORAL DAILY
Status: DISCONTINUED | OUTPATIENT
Start: 2024-05-30 | End: 2024-06-03 | Stop reason: HOSPADM

## 2024-05-30 RX ORDER — DILTIAZEM HYDROCHLORIDE 180 MG/1
360 CAPSULE, COATED, EXTENDED RELEASE ORAL DAILY
Status: DISCONTINUED | OUTPATIENT
Start: 2024-05-30 | End: 2024-06-03 | Stop reason: HOSPADM

## 2024-05-30 RX ORDER — GABAPENTIN 300 MG/1
600 CAPSULE ORAL
Status: DISCONTINUED | OUTPATIENT
Start: 2024-05-31 | End: 2024-06-03 | Stop reason: HOSPADM

## 2024-05-30 RX ORDER — GLIPIZIDE 10 MG/1
10 TABLET, FILM COATED, EXTENDED RELEASE ORAL DAILY
Status: DISCONTINUED | OUTPATIENT
Start: 2024-05-30 | End: 2024-06-03 | Stop reason: HOSPADM

## 2024-05-30 RX ORDER — KETOCONAZOLE 20 MG/G
CREAM TOPICAL DAILY
Status: DISCONTINUED | OUTPATIENT
Start: 2024-05-30 | End: 2024-06-03 | Stop reason: HOSPADM

## 2024-05-30 RX ORDER — IBUPROFEN 600 MG/1
600 TABLET, FILM COATED ORAL EVERY 6 HOURS PRN
Status: DISCONTINUED | OUTPATIENT
Start: 2024-05-30 | End: 2024-06-03 | Stop reason: HOSPADM

## 2024-05-30 RX ADMIN — GLIPIZIDE 10 MG: 10 TABLET, EXTENDED RELEASE ORAL at 19:42

## 2024-05-30 RX ADMIN — LOSARTAN POTASSIUM 100 MG: 100 TABLET, FILM COATED ORAL at 19:41

## 2024-05-30 RX ADMIN — PIPERACILLIN AND TAZOBACTAM 3.38 G: 3; .375 INJECTION, POWDER, FOR SOLUTION INTRAVENOUS at 19:43

## 2024-05-30 RX ADMIN — ROSUVASTATIN CALCIUM 10 MG: 10 TABLET, FILM COATED ORAL at 19:41

## 2024-05-30 RX ADMIN — Medication 1 TABLET: at 19:41

## 2024-05-30 RX ADMIN — PANTOPRAZOLE SODIUM 40 MG: 40 TABLET, DELAYED RELEASE ORAL at 21:52

## 2024-05-30 RX ADMIN — ACETAMINOPHEN 975 MG: 325 TABLET, FILM COATED ORAL at 19:40

## 2024-05-30 RX ADMIN — DILTIAZEM HYDROCHLORIDE 360 MG: 180 CAPSULE, COATED, EXTENDED RELEASE ORAL at 19:40

## 2024-05-30 RX ADMIN — SODIUM CHLORIDE 90 ML: 9 INJECTION, SOLUTION INTRAVENOUS at 16:30

## 2024-05-30 RX ADMIN — AZITHROMYCIN MONOHYDRATE 500 MG: 500 INJECTION, POWDER, LYOPHILIZED, FOR SOLUTION INTRAVENOUS at 17:26

## 2024-05-30 RX ADMIN — IPRATROPIUM BROMIDE AND ALBUTEROL SULFATE 3 ML: .5; 3 SOLUTION RESPIRATORY (INHALATION) at 14:39

## 2024-05-30 RX ADMIN — IOPAMIDOL 73 ML: 755 INJECTION, SOLUTION INTRAVENOUS at 16:30

## 2024-05-30 RX ADMIN — SERTRALINE HYDROCHLORIDE 200 MG: 100 TABLET ORAL at 19:41

## 2024-05-30 RX ADMIN — CEFTRIAXONE 2 G: 2 INJECTION, POWDER, FOR SOLUTION INTRAMUSCULAR; INTRAVENOUS at 17:00

## 2024-05-30 RX ADMIN — GABAPENTIN 900 MG: 300 CAPSULE ORAL at 20:13

## 2024-05-30 RX ADMIN — NYSTATIN 500000 UNITS: 100000 SUSPENSION ORAL at 20:59

## 2024-05-30 ASSESSMENT — ACTIVITIES OF DAILY LIVING (ADL)
ADLS_ACUITY_SCORE: 40
ADLS_ACUITY_SCORE: 32
ADLS_ACUITY_SCORE: 32
ADLS_ACUITY_SCORE: 40
ADLS_ACUITY_SCORE: 49
ADLS_ACUITY_SCORE: 40
ADLS_ACUITY_SCORE: 36
ADLS_ACUITY_SCORE: 40
ADLS_ACUITY_SCORE: 40
ADLS_ACUITY_SCORE: 49

## 2024-05-30 NOTE — H&P
[facility] History and Physical    Nicole Reyes MRN# [id number]   Age: 68 year old YOB: 1955     Date of Admission:  5/30/2024    Home clinic: [facility]  Primary care provider: Karla Hurst          Assessment and Plan:   Assessment:   Nicole Reyes is a 68 year old woman who came to attention today due to increased Chest pain, SOB and cough that started today.     At baseline, the pt is on supplemental O2 at night (she reportedly does not tolerate CPAP).  She wakened from sleep abruptly last night with cough and SOB and when she checked her O2, it was 88%.     Other PMH includes HTN, severe obesity causing limited mobility, hypoventilation, hiatal hernia with GERD, MDD/RADHA and CKD stage 2.    On presentation to the ED, VS: /84, HR 97, RR 16 with oxygen saturation 88% breathing room air.  She was placed on 2 L oxygen by nasal cannula and oxygen came up to the low 90s.  Initial temperature 97.8 but after couple hours temperature went up to 101.4.  History that I obtained confirms what has been reported to us.  Patient indicates that she sleeps on her left side.  When she is in the hospital, she does not have any problems with aspiration events.    Examination: Patient is alert, comfortable and coherent.  There are coarse breath sounds on the left but no increased work of breathing.  No wheeze and no egophony.  No remarkable edema appreciated.  Labs: Creatinine 0.88 with normal electrolytes.  Initial glucose 168. Lactic acid 1.5, N-terminal proBNP 532, procalcitonin less than 0.08 and troponin T 14; all normal values.  VBG pH 7.33 pCO2 64.  WBC 13.9 with left shift hemoglobin 12.9, .  D-dimer 1.76.  Influenza A/B, COVID and RSV PCR is negative.  Blood cultures were obtained.  Imaging: Left sided pneumonia involving both upper and lower lobes.    DX:  1.  Left sided pneumonia. History of aspiration, abrupt onset of cough and SOB waking  her from sleep and the appearance of the infiltrate (mostly central and sparing the periphery) are suggestive of aspiration.   2.  Severe obesity, limited mobility partially due to this but also related to the spinal stenosis.   3.  Hiatal hernia.   4.  HTN.  5.  NIDDM.  6.  Spinal stenosis.  Patient follows with a local spine clinic and currently uses gabapentin.  7.  CRISTÓBAL.      Plan:   1.  Admit to inpatient.  2.  Continue antibiotics.  I switched from ceftriaxone and azithromycin to Zosyn.  I note the patient has a history of penicillin allergy but documentation indicates she is tolerated Zosyn in the past.  3.  I requested social work to see the patient and help determine whether she would be able to have her medical insurance cover a medical bed.             Chief Complaint:     Wakened abruptly with cough and SOB.     History is obtained from the patient, electronic health record, and emergency department physician     Nicole Reyes was feeling well when she went to bed last night, but wakened abruptly in the middle of the night with cough and shortness of breath.  She called her daughter this am  to bring her to the ED and she is present with the pt at the time of my interview. The pt has had multiple admissions for aspiration pneumonia and she acknowledges that she probably had another one.     The pt denies F/S/C.  She does sleep on her left side, and frequently has severe reflux when she eats before going to bed. Last night, she only had a little bit to eat before bed, but did seem to have an event anyway.     No N/V/D/C.  The pt is very limited in her activity due to spinal stenosis in addition to her obesity. She is under the care of a spine specialist and is working toward a nerve root ablation procedure, she reports. She needs a walker to get around.     No current complaint of significant chest pain.         Past Medical History:     Past Medical History:   Diagnosis Date    Arthritis     lt  shoulder, rt. knee    Blood transfusion     CKD (chronic kidney disease) stage 3, GFR 30-59 ml/min (H)     Depressive disorder     Essential hypertension, benign     Gastroesophageal reflux disease     Generalized anxiety disorder     Hernia, abdominal     Hiatal hernia     History of blood transfusion     with a hernia repair    Hypertension     Insomnia, unspecified     Iron deficiency anemia 2009    Major depression     sees a psychiatrist    Menopause     age 53    Obesity, unspecified     CRISTÓBAL (obstructive sleep apnea)     bipap    Other chronic pain     chronic pain lt arm from tendonidits    Oxygen dependent     2 LPM at home-uses at noc or extended walking    Pneumonia     due to aspiration from hiatal hernia-    Pneumonia due to 2019 novel coronavirus 2021    Postoperative seroma 10/2011    drained several times    Pure hypercholesterolemia     RLS (restless legs syndrome)     Type II or unspecified type diabetes mellitus without mention of complication, not stated as uncontrolled              Past Surgical History:      Past Surgical History:   Procedure Laterality Date    BREAST BIOPSY, RT/LT      2 times; benign    BREAST SURGERY  ?    Biopsy x 2 Benign     SECTION       SECTION       SECTION      COLONOSCOPY N/A 2020    Procedure: Colonoscopy with biopsy;  Surgeon: Obinna Gutierrez MD;  Location:  OR    DILATION AND CURETTAGE, HYSTEROSCOPY DIAGNOSTIC, COMBINED  2013    Procedure: COMBINED DILATION AND CURETTAGE, HYSTEROSCOPY DIAGNOSTIC;  DILATION AND CURETTAGE, HYSTEROSCOPY DIAGNOSTIC   Converted to a general;  Surgeon: Robi Edwards MD;  Location:  OR    ESOPHAGOSCOPY, GASTROSCOPY, DUODENOSCOPY (EGD), COMBINED N/A 2015    Procedure: COMBINED ESOPHAGOSCOPY, GASTROSCOPY, DUODENOSCOPY (EGD);  Surgeon: Obinna Gutierrez MD;  Location:  GI    ESOPHAGOSCOPY, GASTROSCOPY, DUODENOSCOPY (EGD), COMBINED N/A 2015    Procedure:  COMBINED ENDOSCOPIC ULTRASOUND, ESOPHAGOSCOPY, GASTROSCOPY, DUODENOSCOPY (EGD), FINE NEEDLE ASPIRATE/BIOPSY;  Surgeon: Sabine Olivares MD;  Location:  GI    ESOPHAGOSCOPY, GASTROSCOPY, DUODENOSCOPY (EGD), COMBINED N/A 2020    Procedure: ESOPHAGOGASTRODUODENOSCOPY;  Surgeon: Ascencion Stauffer MD;  Location: [facility] OR    HERNIORRHAPHY INCISIONAL (LOCATION)  10/08/2011     incarcerated hernia repair with mesh;    HERNIORRHAPHY INCISIONAL (LOCATION)  10/16/2011     repair incisional hernia with mesh, and removal of current implanted mesh;    [facility] NONSPECIFIC PROCEDURE      Hernia repair     [facility] NONSPECIFIC PROCEDURE      Lymph node biopsy in neck (benign)              Social History:     Social History     Tobacco Use    Smoking status: Former     Current packs/day: 0.00     Average packs/day: 1.5 packs/day for 10.0 years (15.0 ttl pk-yrs)     Types: Cigarettes     Start date: 1974     Quit date: 3/18/1979     Years since quittin.2    Smokeless tobacco: Never    Tobacco comments:     quit    Substance Use Topics    Alcohol use: Yes     Comment: occasional             Family History:     Family History   Problem Relation Age of Onset    Cardiovascular Mother         CHF    Hypertension Mother     C.A.D. Mother         50s    Lipids Mother     Coronary Artery Disease Mother     Depression Mother     Anxiety Disorder Mother     Cancer Father         Hodgkin's, Leukemia    Other Cancer Father         Hodgkins    Hypertension Brother     Diabetes Brother     Coronary Artery Disease Brother     Other Cancer Brother         Hodgkins    Cancer Brother         Hodgkin's    Coronary Artery Disease Brother     Hypertension Brother     Anxiety Disorder Brother     C.A.D. Brother 61    C.A.D. Brother     Sleep Apnea Sister     Diabetes Sister     Coronary Artery Disease Sister     Hypertension Sister     Depression Sister     Anxiety Disorder Sister     Obesity Sister     Connective Tissue Disorder Sister          Lupus    Diabetes Sister     Other Cancer Sister         Leukemia    Depression Sister     Anxiety Disorder Sister     Lipids Sister     Hypertension Sister     Hypertension Sister     Hypertension Sister     Basal cell carcinoma Daughter 38        top of head    Mental Illness Maternal Grandfather     Hypertension Daughter     Depression Daughter     Anxiety Disorder Daughter     Obesity Daughter     Obesity Son     Obesity Daughter      Family history reviewed          Immunizations:     Immunization History   Administered Date(s) Administered    COVID-19 Vaccine (Maite) 03/08/2021    Influenza (intradermal) 09/27/2011    Influenza Vaccine 18-64 (Flublok) 11/15/2019    Influenza Vaccine 65+ (Fluzone HD) 10/23/2020    Influenza Vaccine >6 months,quad, PF 09/22/2017, 10/16/2018    Pneumo Conj 13-V (2010&after) 03/29/2016    Pneumococcal 23 valent 12/03/2009, 10/23/2020    TDAP Vaccine (Adacel) 11/19/2007, 08/15/2018             Allergies:     Allergies   Allergen Reactions    Metformin GI Disturbance     High dose    Morphine And Codeine Rash    Penicillins Rash     Pt has tolerated cephalosporins and penems.   Pt tolerated Zosyn 7/6/2019             Medications:     Medications Prior to Admission   Medication Sig Dispense Refill Last Dose    clonazePAM (KLONOPIN) 1 MG tablet Take 1 tablet (1 mg) by mouth 2 times daily as needed for anxiety 60 to last 30 days 60 tablet 5 prn    diltiazem ER (TIADYLT ER) 360 MG 24 hr ER beaded capsule Take 1 capsule (360 mg) by mouth daily 90 capsule 3 5/29/2024    Fish Oil-Krill Oil (KRILL & FISH OIL BLEND PO) Take 1 capsule by mouth daily   5/29/2024    furosemide (LASIX) 20 MG tablet Take 1 tablet (20 mg) by mouth daily as needed (edema) 90 tablet 3 5/29/2024    gabapentin (NEURONTIN) 300 MG capsule Take 600 mg by mouth daily (with lunch) 3 capsules every am, 2 capsules midday and 3 caps at night   5/29/2024 at 600mg    gabapentin (NEURONTIN) 300 MG capsule 3 capsules  every am, 2 capsules midday and 3 caps at night (Patient taking differently: Take 900 mg by mouth 2 times daily 3 capsules every am, 2 capsules midday and 3 caps at night) 240 capsule 3 5/29/2024    glipiZIDE (GLUCOTROL XL) 10 MG 24 hr tablet Take 1 tablet (10 mg) by mouth daily 90 tablet 3 5/29/2024    ketoconazole (NIZORAL) 2 % external cream Apply to fold areas BID PRN 60 g 5 Past Month    losartan (COZAAR) 100 MG tablet Take 1 tablet (100 mg) by mouth daily 90 tablet 3 5/29/2024    melatonin 5 MG tablet Take 5 mg by mouth nightly as needed for sleep   prn    multivitamin w/minerals (THERA-VIT-M) tablet Take 1 tablet by mouth daily   5/29/2024    pantoprazole (PROTONIX) 40 MG EC tablet TAKE 1 TABLET BY MOUTH EVERYDAY AT BEDTIME 90 tablet 3 5/29/2024    potassium chloride ER (K-TAB) 20 MEQ CR tablet Take 40 meq daily 180 tablet 1 5/29/2024    rosuvastatin (CRESTOR) 10 MG tablet Take 1 tablet (10 mg) by mouth daily 90 tablet 3 5/29/2024    sertraline (ZOLOFT) 100 MG tablet Take 2 tablets by mouth once daily 180 tablet 3 5/29/2024    zolpidem (AMBIEN) 10 MG tablet Take 1 tablet (10 mg) by mouth at bedtime 90 tablet 1 Past Week    semaglutide (OZEMPIC, 0.25 OR 0.5 MG/DOSE,) 2 MG/3ML pen INJECT 0.5 MG SUBCUTANEOUS EVERY 7 DAYS MAY INCREASE DOSE IN A MONTH IF TOLERATED 3 mL 0 on hold at on hold             Review of Systems:     A comprehensive review of systems was performed and found to be negative except as described in this note           Physical Exam:   Vitals were reviewed  Temp: (!) 101.4  F (38.6  C) Temp src: Axillary BP: (!) 148/85 Pulse: 90   Resp: 16 SpO2: 96 % O2 Device: Nasal cannula Oxygen Delivery: 2 LPM  Constitutional: Awake, alert, cooperative, no apparent distress, and appears stated age.  Markedly obese.   Eyes: Lids and lashes normal, pupils equal, round and reactive to light, extra ocular muscles intact, sclera clear, conjunctiva normal.  ENT: Normocephalic, without obvious abnormality,  atraumatic.  Neck: Supple, symmetrical, trachea midline, no adenopathy, thyroid symmetric, not enlarged and no tenderness, skin normal.  Hematologic / Lymphatic: No cervical lymphadenopathy and no supraclavicular lymphadenopathy.  Lungs: No increased work of breathing, good air exchange.  SOme increased breath sounds on the left posteriorly, but right sounds clear.   Cardiovascular: Regular rate and rhythm, normal S1 and S2, no S3 or S4, and no murmur noted.  Abdomen: Normal bowel sounds, soft, non-distended, non-tender, no masses palpated, no hepatosplenomegaly.  Musculoskeletal: No redness, warmth, or swelling of the joints.    Tone is normal.  Neurologic: Awake, alert, oriented to name, place and time.  Cranial nerves II-XII are grossly intact.    Neuropsychiatric: Normal affect, mood, orientation, memory and insight.  Skin: No rashes, erythema, pallor, petechia or purpura.          Data:     Results for orders placed or performed during the hospital encounter of 05/30/24 (from the past 24 hour(s))   EKG 12-lead, tracing only   Result Value Ref Range    Systolic Blood Pressure  mmHg    Diastolic Blood Pressure  mmHg    Ventricular Rate 89 BPM    Atrial Rate 89 BPM    MI Interval 192 ms    QRS Duration 82 ms     ms    QTc 452 ms    P Axis 39 degrees    R AXIS -4 degrees    T Axis 72 degrees    Interpretation ECG       Sinus rhythm  Possible Anterior infarct , age undetermined  Abnormal ECG  When compared with ECG of 27-MAR-2024 13:43,  No significant change was found  Unconfirmed report - interpretation of this ECG is computer generated - see medical record for final interpretation  Confirmed by - EMERGENCY ROOM, PHYSICIAN ([id number]),  Ej Sanford ([id number]) on 5/30/2024 3:26:51 PM     CBC with platelets differential    Narrative    The following orders were created for panel order CBC with platelets differential.  Procedure                               Abnormality         Status                      ---------                               -----------         ------                     CBC with platelets and d...[[id number]]  Abnormal            Final result                 Please view results for these tests on the individual orders.   D dimer quantitative   Result Value Ref Range    D-Dimer Quantitative 1.76 (H) 0.00 - 0.50 ug/mL FEU    Narrative    This D-dimer assay is intended for use in conjunction with a clinical pretest probability assessment model to exclude pulmonary embolism (PE) and deep venous thrombosis (DVT) in outpatients suspected of PE or DVT. The cut-off value is 0.50 ug/mL FEU.    For patients 50 years of age or older, the application of age-adjusted cut-off values for D-Dimer may increase the specificity without significant effect on sensitivity. The literature suggested calculation age adjusted cut-off in ug/L = age in years x 10 ug/L. The results in this laboratory are reported as ug/mL rather than ug/L. The calculation for age adjusted cut off in ug/mL= age in years x 0.01 ug/mL. For example, the cut off for a 76 year old male is 76 x 0.01 ug/mL = 0.76 ug/mL (760 ug/L).    M Fernanda et al. Age adjusted D-dimer cut-off levels to rule out pulmonary embolism: The ADJUST-PE Study. CHELSY 2014;311:2414-1504.; HJ Eliana et al. Diagnostic accuracy of conventional or age adjusted D-dimer cutoff values in older patients with suspected venous thromboembolism. Systemic review and meta-analysis. BMJ 2013:346:f2492.   Troponin T, High Sensitivity   Result Value Ref Range    Troponin T, High Sensitivity 14 <=14 ng/L   Nt probnp inpatient (BNP)   Result Value Ref Range    N terminal Pro BNP Inpatient 532 0 - 900 pg/mL   Basic metabolic panel   Result Value Ref Range    Sodium 143 135 - 145 mmol/L    Potassium 3.7 3.4 - 5.3 mmol/L    Chloride 103 98 - 107 mmol/L    Carbon Dioxide (CO2) 28 22 - 29 mmol/L    Anion Gap 12 7 - 15 mmol/L    Urea Nitrogen 10.7 8.0 - 23.0 mg/dL    Creatinine 0.88 0.51 - 0.95  mg/dL    GFR Estimate 71 >60 mL/min/1.73m2    Calcium 9.4 8.8 - 10.2 mg/dL    Glucose 168 (H) 70 - 99 mg/dL   Procalcitonin   Result Value Ref Range    Procalcitonin 0.08 <0.50 ng/mL   CBC with platelets and differential   Result Value Ref Range    WBC Count 13.9 (H) 4.0 - 11.0 10e3/uL    RBC Count 5.03 3.80 - 5.20 10e6/uL    Hemoglobin 12.9 11.7 - 15.7 g/dL    Hematocrit 41.9 35.0 - 47.0 %    MCV 83 78 - 100 fL    MCH 25.6 (L) 26.5 - 33.0 pg    MCHC 30.8 (L) 31.5 - 36.5 g/dL    RDW 15.2 (H) 10.0 - 15.0 %    Platelet Count 238 150 - 450 10e3/uL    % Neutrophils 87 %    % Lymphocytes 8 %    % Monocytes 4 %    % Eosinophils 1 %    % Basophils 0 %    % Immature Granulocytes 0 %    NRBCs per 100 WBC 0 <1 /100    Absolute Neutrophils 12.0 (H) 1.6 - 8.3 10e3/uL    Absolute Lymphocytes 1.1 0.8 - 5.3 10e3/uL    Absolute Monocytes 0.6 0.0 - 1.3 10e3/uL    Absolute Eosinophils 0.2 0.0 - 0.7 10e3/uL    Absolute Basophils 0.0 0.0 - 0.2 10e3/uL    Absolute Immature Granulocytes 0.1 <=0.4 10e3/uL    Absolute NRBCs 0.0 10e3/uL   Lactic acid whole blood with 1x repeat in 2 hr when >2   Result Value Ref Range    Lactic Acid, Initial 1.5 0.7 - 2.0 mmol/L   Blood gas venous   Result Value Ref Range    pH Venous 7.33 7.32 - 7.43    pCO2 Venous 64 (H) 40 - 50 mm Hg    pO2 Venous 28 25 - 47 mm Hg    Bicarbonate Venous 34 (H) 21 - 28 mmol/L    Base Excess/Deficit Venous 5.5 (H) -3.0 - 3.0 mmol/L    FIO2 36     Oxyhemoglobin Venous 42 (L) 70 - 75 %    O2 Sat, Venous 42.8 (L) 70.0 - 75.0 %    Narrative    In healthy individuals, oxyhemoglobin (O2Hb) and oxygen saturation (SO2) are approximately equal. In the presence of dyshemoglobins, oxyhemoglobin can be considerably lower than oxygen saturation.   Symptomatic Influenza A/B, RSV, & SARS-CoV2 PCR (COVID-19) Nasopharyngeal    Specimen: Nasopharyngeal; Swab   Result Value Ref Range    Influenza A PCR Negative Negative    Influenza B PCR Negative Negative    RSV PCR Negative Negative    SARS  CoV2 PCR Negative Negative    Narrative    Testing was performed using the Xpert Xpress CoV2/Flu/RSV Assay on the [employer] GeneXpert Instrument. This test should be ordered for the detection of SARS-CoV-2, influenza, and RSV viruses in individuals who meet clinical and/or epidemiological criteria. Test performance is unknown in asymptomatic patients. This test is for in vitro diagnostic use under the FDA EUA for laboratories certified under CLIA to perform high or moderate complexity testing. This test has not been FDA cleared or approved. A negative result does not rule out the presence of PCR inhibitors in the specimen or target RNA in concentration below the limit of detection for the assay. If only one viral target is positive but coinfection with multiple targets is suspected, the sample should be re-tested with another FDA cleared, approved, or authorized test, if coinfection would change clinical management. This test was validated by the [facility]. These laboratories are certified under the Clinical Laboratory Improvement Amendments of 1988 (CLIA-88) as qualified to perform high complexity laboratory testing.   CT Chest Pulmonary Embolism w Contrast    Narrative    EXAM: CT CHEST PULMONARY EMBOLISM W CONTRAST  LOCATION: [facility]  DATE: 5/30/2024    INDICATION: SOB, hypoxia  COMPARISON: 3/27/2024  TECHNIQUE: CT chest pulmonary angiogram during arterial phase injection of IV contrast. Multiplanar reformats and MIP reconstructions were performed. Dose reduction techniques were used.   CONTRAST: 73 mL Isovue 370    FINDINGS:  ANGIOGRAM CHEST: Pulmonary arteries are normal caliber and negative for pulmonary emboli. Thoracic aorta is negative for dissection. No CT evidence of right heart strain.    LUNGS AND PLEURA: Extensive consolidative airspace infiltrate persists within right upper and lower lobes similar to previous exam though may be minimally worse. Findings  suggest recurrent left lung pneumonia. No pleural effusion. Right lung clear.    MEDIASTINUM/AXILLAE: Moderate hiatal hernia unchanged. Small left hilar lymph nodes likely reactive. Borderline cardiomegaly.    CORONARY ARTERY CALCIFICATION: None.    UPPER ABDOMEN: Cholelithiasis unchanged.    MUSCULOSKELETAL: No suspicious bony lesion.      Impression    IMPRESSION:  1.  No pulmonary emboli identified.  2.  Extensive airspace infiltrates left lung very similar to prior study, may be slightly worse. Findings suggest recurrent pneumonia.     *Note: Due to a large number of results and/or encounters for the requested time period, some results have not been displayed. A complete set of results can be found in Results Review.      All cardiac studies reviewed by me.   All imaging studies reviewed by me.      Attestation:  I have reviewed today's vital signs, notes, medications, labs and imaging.     Uli Cannon MD

## 2024-05-30 NOTE — ED PROVIDER NOTES
Emergency Department Note      History of Present Illness     Chief Complaint  Shortness of Breath and Chest Pain    HPI  Nicole Reyes is a 68 year old female with history of silent aspiration pneumonia who presents for evaluation of shortness of breath and chest pain. The patient states onset came on suddenly last night. She woke up with a cough, nonproductive. She then checked her oxygen saturation  which was at 88%. She then used her nasal canula in response. She states that she uses her nasal canula every night before going to sleep except for the night of onset. This morning she had her daughter bring her to the ER to be checked. She notes that she has chronic leg swelling due to her sitting a lot. She is on a water pill. She denies fever and history of asthma or COPD. She notes that she was a smoker 50 years ago. She reports taking blood pressure medicine every morning but didn't this morning.    Independent Historian  None    Review of External Notes  I reviewed the discharge summary from 3/30/24, patient was admitted to the hospital for multi focal pneumonia and respiratory failure  Past Medical History   Medical History and Problem List  Past Medical History:   Diagnosis Date    Arthritis     Blood transfusion     CKD (chronic kidney disease) stage 3, GFR 30-59 ml/min (H)     Depressive disorder     Essential hypertension, benign     Gastroesophageal reflux disease     Generalized anxiety disorder     Hernia, abdominal     Hiatal hernia     History of blood transfusion 2011    Hypertension     Insomnia, unspecified     Iron deficiency anemia 08/2009    Major depression     Menopause     Obesity, unspecified     CRISTÓBAL (obstructive sleep apnea)     Other chronic pain     Oxygen dependent     Pneumonia     Pneumonia due to 2019 novel coronavirus 11/08/2021    Postoperative seroma 10/2011    Pure hypercholesterolemia     RLS (restless legs syndrome)     Type II or unspecified type diabetes mellitus  "without mention of complication, not stated as uncontrolled        Medications  No current outpatient medications on file.      Surgical History   Past Surgical History:   Procedure Laterality Date    BREAST BIOPSY, RT/LT      2 times; benign    BREAST SURGERY  ?    Biopsy x 2 Benign     SECTION       SECTION       SECTION      COLONOSCOPY N/A 2020    Procedure: Colonoscopy with biopsy;  Surgeon: Obinna Gutierrez MD;  Location: [facility] OR    DILATION AND CURETTAGE, HYSTEROSCOPY DIAGNOSTIC, COMBINED  2013    Procedure: COMBINED DILATION AND CURETTAGE, HYSTEROSCOPY DIAGNOSTIC;  DILATION AND CURETTAGE, HYSTEROSCOPY DIAGNOSTIC   Converted to a general;  Surgeon: Robi Edwards MD;  Location: [facility] OR    ESOPHAGOSCOPY, GASTROSCOPY, DUODENOSCOPY (EGD), COMBINED N/A 2015    Procedure: COMBINED ESOPHAGOSCOPY, GASTROSCOPY, DUODENOSCOPY (EGD);  Surgeon: Obinna Gutierrez MD;  Location:  GI    ESOPHAGOSCOPY, GASTROSCOPY, DUODENOSCOPY (EGD), COMBINED N/A 2015    Procedure: COMBINED ENDOSCOPIC ULTRASOUND, ESOPHAGOSCOPY, GASTROSCOPY, DUODENOSCOPY (EGD), FINE NEEDLE ASPIRATE/BIOPSY;  Surgeon: Sabine Olivares MD;  Location:  GI    ESOPHAGOSCOPY, GASTROSCOPY, DUODENOSCOPY (EGD), COMBINED N/A 2020    Procedure: ESOPHAGOGASTRODUODENOSCOPY;  Surgeon: Ascencion Stauffer MD;  Location: [facility] OR    HERNIORRHAPHY INCISIONAL (LOCATION)  10/08/2011     incarcerated hernia repair with mesh;    HERNIORRHAPHY INCISIONAL (LOCATION)  10/16/2011     repair incisional hernia with mesh, and removal of current implanted mesh;    ZZC NONSPECIFIC PROCEDURE      Hernia repair     Z NONSPECIFIC PROCEDURE      Lymph node biopsy in neck (benign)      Physical Exam   Patient Vitals for the past 24 hrs:   BP Temp Temp src Pulse Resp SpO2 Height Weight   24 2033 132/59 99  F (37.2  C) Temporal 88 -- 95 % 1.486 m (4' 10.5\") 111.4 kg (245 lb 8 oz)   24 1815 (!) 148/85 (!) 101.4 " " F (38.6  C) Axillary 90 18 96 % -- --   05/30/24 1727 -- -- -- -- -- 91 % -- --   05/30/24 1724 -- -- -- -- -- 96 % -- --   05/30/24 1715 -- -- -- -- -- 92 % -- --   05/30/24 1657 (!) 150/73 -- -- 92 -- 96 % -- --   05/30/24 1431 (!) 145/80 -- -- 81 -- -- -- --   05/30/24 1416 -- -- -- -- -- 95 % -- --   05/30/24 1341 (!) 191/84 97.8  F (36.6  C) Temporal 97 16 (!) 88 % 1.473 m (4' 10\") 112.5 kg (248 lb 0.3 oz)     Physical Exam  General: Awake, alert, in no acute distress   HEENT: Atraumatic   EOM normal   External ears normal   Trachea midline  Neck: Supple, normal ROM  CV: Regular rate, regular rhythm   No lower extremity edema  2+ radial and DP pulses  PULM: Coarse breath sounds in bases, left worse than right  Expiratory wheezing bilaterally  Conversational dyspnea  Seen on 2L NC  ABD: Soft, non-tender, non-distended  Normal bowel sounds   No rebound or guarding   MSK: No gross deformities  NEURO: Alert, no focal deficits  Skin: Warm, dry and intact    Diagnostics   Lab Results   Labs Ordered and Resulted from Time of ED Arrival to Time of ED Departure   D DIMER QUANTITATIVE - Abnormal       Result Value    D-Dimer Quantitative 1.76 (*)    BASIC METABOLIC PANEL - Abnormal    Sodium 143      Potassium 3.7      Chloride 103      Carbon Dioxide (CO2) 28      Anion Gap 12      Urea Nitrogen 10.7      Creatinine 0.88      GFR Estimate 71      Calcium 9.4      Glucose 168 (*)    CBC WITH PLATELETS AND DIFFERENTIAL - Abnormal    WBC Count 13.9 (*)     RBC Count 5.03      Hemoglobin 12.9      Hematocrit 41.9      MCV 83      MCH 25.6 (*)     MCHC 30.8 (*)     RDW 15.2 (*)     Platelet Count 238      % Neutrophils 87      % Lymphocytes 8      % Monocytes 4      % Eosinophils 1      % Basophils 0      % Immature Granulocytes 0      NRBCs per 100 WBC 0      Absolute Neutrophils 12.0 (*)     Absolute Lymphocytes 1.1      Absolute Monocytes 0.6      Absolute Eosinophils 0.2      Absolute Basophils 0.0      Absolute " Immature Granulocytes 0.1      Absolute NRBCs 0.0     BLOOD GAS VENOUS - Abnormal    pH Venous 7.33      pCO2 Venous 64 (*)     pO2 Venous 28      Bicarbonate Venous 34 (*)     Base Excess/Deficit Venous 5.5 (*)     FIO2 36      Oxyhemoglobin Venous 42 (*)     O2 Sat, Venous 42.8 (*)    HEMOGLOBIN A1C - Abnormal    Hemoglobin A1C 6.9 (*)    GLUCOSE BY METER - Abnormal    GLUCOSE BY METER POCT 123 (*)    LACTIC ACID WHOLE BLOOD WITH 1X REPEAT IN 2 HR WHEN >2 - Normal    Lactic Acid, Initial 1.5     TROPONIN T, HIGH SENSITIVITY - Normal    Troponin T, High Sensitivity 14     NT PROBNP INPATIENT - Normal    N terminal Pro BNP Inpatient 532     INFLUENZA A/B, RSV, & SARS-COV2 PCR - Normal    Influenza A PCR Negative      Influenza B PCR Negative      RSV PCR Negative      SARS CoV2 PCR Negative     PROCALCITONIN - Normal    Procalcitonin 0.08     GLUCOSE MONITOR NURSING POCT   GLUCOSE MONITOR NURSING POCT   BLOOD CULTURE   BLOOD CULTURE       Imaging  CT Chest Pulmonary Embolism w Contrast   Final Result   IMPRESSION:   1.  No pulmonary emboli identified.   2.  Extensive airspace infiltrates left lung very similar to prior study, may be slightly worse. Findings suggest recurrent pneumonia.          EKG   ECG taken at 1352, ECG read at 1355  Normal sinus rhythm  Possible anterior infarct, age undetermined   Rate 89 bpm. WA interval 192 ms. QRS duration 82 ms. QT/QTc 372/452 ms. P-R-T axes 39 -4 72.    Independent Interpretation  None  ED Course    Medications Administered  Medications   clonazePAM (klonoPIN) tablet 2 mg (has no administration in time range)   diltiazem ER COATED BEADS (CARDIZEM CD/CARTIA XT) 24 hr capsule 360 mg (360 mg Oral $Given 5/30/24 1940)   gabapentin (NEURONTIN) capsule 900 mg (900 mg Oral $Given 5/30/24 2013)   gabapentin (NEURONTIN) capsule 600 mg (has no administration in time range)   ketoconazole (NIZORAL) 2 % cream (has no administration in time range)   losartan (COZAAR) tablet 100 mg (100  mg Oral $Given 5/30/24 1941)   multivitamin w/minerals (THERA-VIT-M) tablet 1 tablet (1 tablet Oral $Given 5/30/24 1941)   pantoprazole (PROTONIX) EC tablet 40 mg (has no administration in time range)   rosuvastatin (CRESTOR) tablet 10 mg (10 mg Oral $Given 5/30/24 1941)   sertraline (ZOLOFT) tablet 200 mg (200 mg Oral $Given 5/30/24 1941)   lidocaine 1 % 0.1-1 mL (has no administration in time range)   lidocaine (LMX4) cream (has no administration in time range)   sodium chloride (PF) 0.9% PF flush 3 mL (3 mLs Intracatheter $Given 5/30/24 1854)   sodium chloride (PF) 0.9% PF flush 3 mL (has no administration in time range)   senna-docusate (SENOKOT-S/PERICOLACE) 8.6-50 MG per tablet 1 tablet (has no administration in time range)     Or   senna-docusate (SENOKOT-S/PERICOLACE) 8.6-50 MG per tablet 2 tablet (has no administration in time range)   calcium carbonate (TUMS) chewable tablet 1,000 mg (has no administration in time range)   zolpidem (AMBIEN) tablet 10 mg (has no administration in time range)   furosemide (LASIX) tablet 20 mg (has no administration in time range)   nystatin (MYCOSTATIN) suspension 500,000 Units (has no administration in time range)   glipiZIDE (GLUCOTROL XL) 24 hr tablet 10 mg (10 mg Oral $Given 5/30/24 1942)   acetaminophen (TYLENOL) tablet 975 mg (975 mg Oral $Given 5/30/24 1940)   ondansetron (ZOFRAN ODT) ODT tab 4 mg (has no administration in time range)     Or   ondansetron (ZOFRAN) injection 4 mg (has no administration in time range)   glucose gel 15-30 g (has no administration in time range)     Or   dextrose 50 % injection 25-50 mL (has no administration in time range)     Or   glucagon injection 1 mg (has no administration in time range)   insulin aspart (NovoLOG) injection (RAPID ACTING) ( Subcutaneous Not Given 5/30/24 2014)   insulin aspart (NovoLOG) injection (RAPID ACTING) (has no administration in time range)   piperacillin-tazobactam (ZOSYN) 3.375 g vial to attach to   mL bag (3.375 g Intravenous $New Bag 5/30/24 1943)   ibuprofen (ADVIL/MOTRIN) tablet 600 mg (has no administration in time range)   ipratropium - albuterol 0.5 mg/2.5 mg/3 mL (DUONEB) neb solution 3 mL (3 mLs Nebulization $Given 5/30/24 1439)   iopamidol (ISOVUE-370) solution 73 mL (73 mLs Intravenous $Given 5/30/24 1630)   sodium chloride 0.9 % bag 500mL for CT scan flush use (90 mLs Intravenous $Given 5/30/24 1630)   cefTRIAXone (ROCEPHIN) 2 g vial to attach to  ml bag for ADULTS or NS 50 ml bag for PEDS (0 g Intravenous Stopped 5/30/24 1726)   azithromycin 500 mg (ZITHROMAX) in 0.9% NaCl 250 mL intermittent infusion 500 mg (500 mg Intravenous $New Bag 5/30/24 1726)       Procedures  Procedures     Discussion of Management  Admitting Hospitalist, Dr. Cannon    Social Determinants of Health adding to complexity of care  None    ED Course  ED Course as of 05/30/24 2046   Thu May 30, 2024   1354 I obtained history and performed physical exam as noted above.    1730 Recheck. Pt agreeable to admission    1751 Spoke with Dr. Cannon [facility] who accepts     Medical Decision Making / Diagnosis   CMS Diagnoses: None    MIPS  MIPS: CT for PE was ordered because the patient had an abnormal d-dimer.  None    [facility]  Nicole Reyes is a 68 year old female who presents to the ER for evaluation of 1 day of chest pain and shortness of breath.  Consider broad differential including heart failure, atypical ACS, pneumonia, COPD or hypoventilation syndrome.  Is hypoxic to 88% on room air.  Uses nasal cannula oxygen at home as needed and primarily at night as she does not tolerate CPAP.    Workup shows slight elevation in white blood cell count.  Her lactic is WNL.  Blood cultures obtained.  D-dimer is elevated greater than 1.  PE study does not show evidence of pulmonary embolism but does show significant left upper and lower lobe pneumonia.  Suspicion for aspiration and she has long history of this.  I cannot  identify her last swallow study.  Patient did endorse to eating in bed which she knows she is not supposed to do.  Her EKG is nonischemic.  Troponin and BNP are WNL.  Breathing is improved after nebulizer.    Will cover with ceftriaxone and azithromycin.  Requiring hospital admission due to new oxygen requirement, white count, pneumonia on workup.  Patient is amenable to hospitalization.  All questions answered.     Disposition  The patient was admitted to the hospital.     ICD-10 Codes:    ICD-10-CM    1. Community acquired pneumonia of left lower lobe of lung  J18.9       2. Respiratory insufficiency  R06.89                  Scribe Disclosure:  Lana CHAVES, am serving as a scribe at 2:11 PM on 5/30/2024 to document services personally performed by Kathrine Brown DO based on my observations and the provider's statements to me.        Kathrine Brown DO  05/30/24 2046

## 2024-05-30 NOTE — PROGRESS NOTES
[facility]    ED Boarding Nurse Handoff Addendum Report:    Date/time: 5/30/2024, 6:30 PM    Activity Level: standby    Fall Risk: Yes:  bed/chair alarm on, nonskid shoes/slippers when out of bed, arm band in place, patient and family education, assistive device/personal items within reach, activity supervised, and mobility aid in reach    Active Infusions: Respiratory and cardiac monitoring.     Current Meds Due: diltiazem ER, gabapentin, glipizide, ketoconazole cream, losartan, multivitamin w/ minerals, Zosyn and nystatin suspension. Meds to be verified by pharmacy    Current care needs: Respiratory, Cardiac and glucose monitoring. Pain management.    Oxygen requirements (liters/min and/or FiO2): 2 LPM    Respiratory status: Nasal cannula    Vital signs (within last 30 minutes):    Vitals:    05/30/24 1657 05/30/24 1715 05/30/24 1724 05/30/24 1727   BP: (!) 150/73      Pulse: 92      Resp:       Temp:       TempSrc:       SpO2: 96% 92% 96% 91%   Weight:       Height:           Focused assessment within last 30 minutes:    Pt A&O x 4. VSS. BP slightly elevated. KHALIL. On tele. BG ACHS.     ED Boarding Nurse name: Yennifer Lackey RN

## 2024-05-30 NOTE — PHARMACY-ADMISSION MEDICATION HISTORY
Pharmacist Admission Medication History    Admission medication history is complete. The information provided in this note is only as accurate as the sources available at the time of the update.    Information Source(s): Patient via in-person    Pertinent Information: none    Changes made to PTA medication list:  Added: None  Deleted: albuterol neb  Changed: ozempic on hold    Allergies reviewed with patient and updates made in EHR: yes    Medication History Completed By: Bhavesh Awad [facility] 5/30/2024 6:42 PM    PTA Med List   Medication Sig Last Dose    clonazePAM (KLONOPIN) 1 MG tablet Take 1 tablet (1 mg) by mouth 2 times daily as needed for anxiety 60 to last 30 days prn    diltiazem ER (TIADYLT ER) 360 MG 24 hr ER beaded capsule Take 1 capsule (360 mg) by mouth daily 5/29/2024    Fish Oil-Krill Oil (KRILL & FISH OIL BLEND PO) Take 1 capsule by mouth daily 5/29/2024    furosemide (LASIX) 20 MG tablet Take 1 tablet (20 mg) by mouth daily as needed (edema) 5/29/2024    gabapentin (NEURONTIN) 300 MG capsule Take 600 mg by mouth daily (with lunch) 3 capsules every am, 2 capsules midday and 3 caps at night 5/29/2024 at 600mg    gabapentin (NEURONTIN) 300 MG capsule 3 capsules every am, 2 capsules midday and 3 caps at night (Patient taking differently: Take 900 mg by mouth 2 times daily 3 capsules every am, 2 capsules midday and 3 caps at night) 5/29/2024    glipiZIDE (GLUCOTROL XL) 10 MG 24 hr tablet Take 1 tablet (10 mg) by mouth daily 5/29/2024    ketoconazole (NIZORAL) 2 % external cream Apply to fold areas BID PRN Past Month    losartan (COZAAR) 100 MG tablet Take 1 tablet (100 mg) by mouth daily 5/29/2024    melatonin 5 MG tablet Take 5 mg by mouth nightly as needed for sleep prn    multivitamin w/minerals (THERA-VIT-M) tablet Take 1 tablet by mouth daily 5/29/2024    pantoprazole (PROTONIX) 40 MG EC tablet TAKE 1 TABLET BY MOUTH EVERYDAY AT BEDTIME 5/29/2024    potassium chloride ER (K-TAB) 20 MEQ CR tablet Take 40  meq daily 5/29/2024    rosuvastatin (CRESTOR) 10 MG tablet Take 1 tablet (10 mg) by mouth daily 5/29/2024    sertraline (ZOLOFT) 100 MG tablet Take 2 tablets by mouth once daily 5/29/2024    zolpidem (AMBIEN) 10 MG tablet Take 1 tablet (10 mg) by mouth at bedtime Past Week

## 2024-05-30 NOTE — ED NOTES
[facility]  ED Nurse Handoff Report    ED Chief complaint: Shortness of Breath and Chest Pain  . ED Diagnosis:   Final diagnoses:   Community acquired pneumonia of left lower lobe of lung   Respiratory insufficiency       Allergies:   Allergies   Allergen Reactions    Metformin GI Disturbance     High dose    Morphine And Codeine Rash    Penicillins Rash     Pt has tolerated cephalosporins and penems.   Pt tolerated Zosyn 7/6/2019       Code Status: Full Code    Activity level - Baseline/Home:  assist of 1.  Activity Level - Current:   assist of 1.   Lift room needed: No.   Bariatric: No   Needed: No   Isolation: no  Infection: Not Applicable.     Respiratory status: Nasal cannula    Vital Signs (within 30 minutes):   Vitals:    05/30/24 1431 05/30/24 1657 05/30/24 1715 05/30/24 1727   BP: (!) 145/80 (!) 150/73     Pulse: 81 92     Resp:       Temp:       TempSrc:       SpO2:  96% 92% 91%   Weight:       Height:           Cardiac Rhythm:  ,      Pain level:    Patient confused: No.   Patient Falls Risk: arm band in place, patient and family education, and activity supervised.   Elimination Status: Has voided     Patient Report - Initial Complaint: short of breath.   Focused Assessment: respiratory/cardiac work up     Abnormal Results:   Labs Ordered and Resulted from Time of ED Arrival to Time of ED Departure   D DIMER QUANTITATIVE - Abnormal       Result Value    D-Dimer Quantitative 1.76 (*)    BASIC METABOLIC PANEL - Abnormal    Sodium 143      Potassium 3.7      Chloride 103      Carbon Dioxide (CO2) 28      Anion Gap 12      Urea Nitrogen 10.7      Creatinine 0.88      GFR Estimate 71      Calcium 9.4      Glucose 168 (*)    CBC WITH PLATELETS AND DIFFERENTIAL - Abnormal    WBC Count 13.9 (*)     RBC Count 5.03      Hemoglobin 12.9      Hematocrit 41.9      MCV 83      MCH 25.6 (*)     MCHC 30.8 (*)     RDW 15.2 (*)     Platelet Count 238      % Neutrophils 87      %  Lymphocytes 8      % Monocytes 4      % Eosinophils 1      % Basophils 0      % Immature Granulocytes 0      NRBCs per 100 WBC 0      Absolute Neutrophils 12.0 (*)     Absolute Lymphocytes 1.1      Absolute Monocytes 0.6      Absolute Eosinophils 0.2      Absolute Basophils 0.0      Absolute Immature Granulocytes 0.1      Absolute NRBCs 0.0     BLOOD GAS VENOUS - Abnormal    pH Venous 7.33      pCO2 Venous 64 (*)     pO2 Venous 28      Bicarbonate Venous 34 (*)     Base Excess/Deficit Venous 5.5 (*)     FIO2 36      Oxyhemoglobin Venous 42 (*)     O2 Sat, Venous 42.8 (*)    LACTIC ACID WHOLE BLOOD WITH 1X REPEAT IN 2 HR WHEN >2 - Normal    Lactic Acid, Initial 1.5     TROPONIN T, HIGH SENSITIVITY - Normal    Troponin T, High Sensitivity 14     NT PROBNP INPATIENT - Normal    N terminal Pro BNP Inpatient 532     INFLUENZA A/B, RSV, & SARS-COV2 PCR - Normal    Influenza A PCR Negative      Influenza B PCR Negative      RSV PCR Negative      SARS CoV2 PCR Negative     PROCALCITONIN - Normal    Procalcitonin 0.08     BLOOD CULTURE   BLOOD CULTURE        CT Chest Pulmonary Embolism w Contrast    (Results Pending)       Treatments provided: abx  Family Comments: dtr at side  OBS brochure/video discussed/provided to patient:  N/A  ED Medications:   Medications   azithromycin 500 mg (ZITHROMAX) in 0.9% NaCl 250 mL intermittent infusion 500 mg (500 mg Intravenous $New Bag 5/30/24 1726)   ipratropium - albuterol 0.5 mg/2.5 mg/3 mL (DUONEB) neb solution 3 mL (3 mLs Nebulization $Given 5/30/24 1439)   iopamidol (ISOVUE-370) solution 73 mL (73 mLs Intravenous $Given 5/30/24 1630)   sodium chloride 0.9 % bag 500mL for CT scan flush use (90 mLs Intravenous $Given 5/30/24 1630)   cefTRIAXone (ROCEPHIN) 2 g vial to attach to  ml bag for ADULTS or NS 50 ml bag for PEDS (0 g Intravenous Stopped 5/30/24 1726)       Drips infusing:  No  For the majority of the shift this patient was Green.   Interventions performed were  na.    Sepsis treatment initiated: No    Cares/treatment/interventions/medications to be completed following ED care: Pneumonia orders. See orders    ED Nurse Name: Tianna STEVENS Son, RN  5:52 PM     RECEIVING UNIT ED HANDOFF REVIEW    Above ED Nurse Handoff Report was reviewed: Yes  Reviewed by: LUIS CARLOS RAWLS RN on May 30, 2024 at 8:08 PM   ANDIE Arias called the ED to inform them the note was read: Yes

## 2024-05-31 ENCOUNTER — APPOINTMENT (OUTPATIENT)
Dept: PHYSICAL THERAPY | Facility: CLINIC | Age: 69
DRG: 178 | End: 2024-05-31
Attending: INTERNAL MEDICINE
Payer: MEDICARE

## 2024-05-31 ENCOUNTER — TRANSCRIBE ORDERS (OUTPATIENT)
Dept: OTHER | Age: 69
End: 2024-05-31

## 2024-05-31 DIAGNOSIS — M47.816 LUMBAR SPONDYLOSIS: ICD-10-CM

## 2024-05-31 DIAGNOSIS — M47.896 OTHER SPONDYLOSIS, LUMBAR REGION: Primary | ICD-10-CM

## 2024-05-31 LAB
ANION GAP SERPL CALCULATED.3IONS-SCNC: 12 MMOL/L (ref 7–15)
BUN SERPL-MCNC: 14.3 MG/DL (ref 8–23)
CALCIUM SERPL-MCNC: 8.5 MG/DL (ref 8.8–10.2)
CHLORIDE SERPL-SCNC: 106 MMOL/L (ref 98–107)
CREAT SERPL-MCNC: 0.96 MG/DL (ref 0.51–0.95)
DEPRECATED HCO3 PLAS-SCNC: 27 MMOL/L (ref 22–29)
EGFRCR SERPLBLD CKD-EPI 2021: 64 ML/MIN/1.73M2
ERYTHROCYTE [DISTWIDTH] IN BLOOD BY AUTOMATED COUNT: 15.2 % (ref 10–15)
GLUCOSE BLDC GLUCOMTR-MCNC: 100 MG/DL (ref 70–99)
GLUCOSE BLDC GLUCOMTR-MCNC: 104 MG/DL (ref 70–99)
GLUCOSE BLDC GLUCOMTR-MCNC: 115 MG/DL (ref 70–99)
GLUCOSE BLDC GLUCOMTR-MCNC: 116 MG/DL (ref 70–99)
GLUCOSE BLDC GLUCOMTR-MCNC: 173 MG/DL (ref 70–99)
GLUCOSE BLDC GLUCOMTR-MCNC: 205 MG/DL (ref 70–99)
GLUCOSE BLDC GLUCOMTR-MCNC: 53 MG/DL (ref 70–99)
GLUCOSE BLDC GLUCOMTR-MCNC: 57 MG/DL (ref 70–99)
GLUCOSE BLDC GLUCOMTR-MCNC: 71 MG/DL (ref 70–99)
GLUCOSE BLDC GLUCOMTR-MCNC: 73 MG/DL (ref 70–99)
GLUCOSE BLDC GLUCOMTR-MCNC: 74 MG/DL (ref 70–99)
GLUCOSE BLDC GLUCOMTR-MCNC: 74 MG/DL (ref 70–99)
GLUCOSE BLDC GLUCOMTR-MCNC: 83 MG/DL (ref 70–99)
GLUCOSE SERPL-MCNC: 82 MG/DL (ref 70–99)
GLUCOSE SERPL-MCNC: 82 MG/DL (ref 70–99)
HCT VFR BLD AUTO: 35.1 % (ref 35–47)
HGB BLD-MCNC: 10.7 G/DL (ref 11.7–15.7)
LACTATE SERPL-SCNC: 1 MMOL/L (ref 0.7–2)
MAGNESIUM SERPL-MCNC: 1.6 MG/DL (ref 1.7–2.3)
MCH RBC QN AUTO: 25.5 PG (ref 26.5–33)
MCHC RBC AUTO-ENTMCNC: 30.5 G/DL (ref 31.5–36.5)
MCV RBC AUTO: 84 FL (ref 78–100)
PLATELET # BLD AUTO: 193 10E3/UL (ref 150–450)
POTASSIUM SERPL-SCNC: 3.8 MMOL/L (ref 3.4–5.3)
RBC # BLD AUTO: 4.2 10E6/UL (ref 3.8–5.2)
SODIUM SERPL-SCNC: 145 MMOL/L (ref 135–145)
WBC # BLD AUTO: 15.5 10E3/UL (ref 4–11)

## 2024-05-31 PROCEDURE — 258N000003 HC RX IP 258 OP 636: Performed by: INTERNAL MEDICINE

## 2024-05-31 PROCEDURE — 83735 ASSAY OF MAGNESIUM: CPT | Performed by: INTERNAL MEDICINE

## 2024-05-31 PROCEDURE — 82374 ASSAY BLOOD CARBON DIOXIDE: CPT | Performed by: INTERNAL MEDICINE

## 2024-05-31 PROCEDURE — 999N000157 HC STATISTIC RCP TIME EA 10 MIN

## 2024-05-31 PROCEDURE — 97116 GAIT TRAINING THERAPY: CPT | Mod: GP

## 2024-05-31 PROCEDURE — 83605 ASSAY OF LACTIC ACID: CPT | Performed by: STUDENT IN AN ORGANIZED HEALTH CARE EDUCATION/TRAINING PROGRAM

## 2024-05-31 PROCEDURE — 250N000013 HC RX MED GY IP 250 OP 250 PS 637: Performed by: INTERNAL MEDICINE

## 2024-05-31 PROCEDURE — 36415 COLL VENOUS BLD VENIPUNCTURE: CPT | Performed by: STUDENT IN AN ORGANIZED HEALTH CARE EDUCATION/TRAINING PROGRAM

## 2024-05-31 PROCEDURE — 85027 COMPLETE CBC AUTOMATED: CPT | Performed by: INTERNAL MEDICINE

## 2024-05-31 PROCEDURE — 250N000011 HC RX IP 250 OP 636: Performed by: INTERNAL MEDICINE

## 2024-05-31 PROCEDURE — 94799 UNLISTED PULMONARY SVC/PX: CPT

## 2024-05-31 PROCEDURE — 272N000202 HC AEROBIKA WITH MANOMETER

## 2024-05-31 PROCEDURE — 82947 ASSAY GLUCOSE BLOOD QUANT: CPT | Performed by: INTERNAL MEDICINE

## 2024-05-31 PROCEDURE — 99232 SBSQ HOSP IP/OBS MODERATE 35: CPT | Performed by: STUDENT IN AN ORGANIZED HEALTH CARE EDUCATION/TRAINING PROGRAM

## 2024-05-31 PROCEDURE — 120N000001 HC R&B MED SURG/OB

## 2024-05-31 PROCEDURE — 97161 PT EVAL LOW COMPLEX 20 MIN: CPT | Mod: GP

## 2024-05-31 PROCEDURE — 36415 COLL VENOUS BLD VENIPUNCTURE: CPT | Performed by: INTERNAL MEDICINE

## 2024-05-31 RX ORDER — GUAIFENESIN 600 MG/1
600 TABLET, EXTENDED RELEASE ORAL 2 TIMES DAILY
Status: DISCONTINUED | OUTPATIENT
Start: 2024-05-31 | End: 2024-06-03 | Stop reason: HOSPADM

## 2024-05-31 RX ORDER — GUAIFENESIN 600 MG/1
600 TABLET, EXTENDED RELEASE ORAL 2 TIMES DAILY
Status: DISCONTINUED | OUTPATIENT
Start: 2024-05-31 | End: 2024-05-31

## 2024-05-31 RX ORDER — DEXTROSE MONOHYDRATE AND SODIUM CHLORIDE 5; .9 G/100ML; G/100ML
INJECTION, SOLUTION INTRAVENOUS CONTINUOUS
Status: DISCONTINUED | OUTPATIENT
Start: 2024-05-31 | End: 2024-06-03 | Stop reason: HOSPADM

## 2024-05-31 RX ADMIN — GLIPIZIDE 10 MG: 10 TABLET, EXTENDED RELEASE ORAL at 08:00

## 2024-05-31 RX ADMIN — ACETAMINOPHEN 975 MG: 325 TABLET, FILM COATED ORAL at 06:54

## 2024-05-31 RX ADMIN — IBUPROFEN 600 MG: 600 TABLET, FILM COATED ORAL at 20:29

## 2024-05-31 RX ADMIN — ROSUVASTATIN CALCIUM 10 MG: 10 TABLET, FILM COATED ORAL at 07:58

## 2024-05-31 RX ADMIN — NYSTATIN 500000 UNITS: 100000 SUSPENSION ORAL at 12:55

## 2024-05-31 RX ADMIN — PIPERACILLIN AND TAZOBACTAM 3.38 G: 3; .375 INJECTION, POWDER, FOR SOLUTION INTRAVENOUS at 01:06

## 2024-05-31 RX ADMIN — GUAIFENESIN 600 MG: 600 TABLET, EXTENDED RELEASE ORAL at 20:29

## 2024-05-31 RX ADMIN — NYSTATIN 500000 UNITS: 100000 SUSPENSION ORAL at 15:46

## 2024-05-31 RX ADMIN — GABAPENTIN 600 MG: 300 CAPSULE ORAL at 12:55

## 2024-05-31 RX ADMIN — PIPERACILLIN AND TAZOBACTAM 3.38 G: 3; .375 INJECTION, POWDER, FOR SOLUTION INTRAVENOUS at 06:56

## 2024-05-31 RX ADMIN — DEXTROSE 15 G: 15 GEL ORAL at 17:34

## 2024-05-31 RX ADMIN — NYSTATIN 500000 UNITS: 100000 SUSPENSION ORAL at 20:28

## 2024-05-31 RX ADMIN — PIPERACILLIN AND TAZOBACTAM 3.38 G: 3; .375 INJECTION, POWDER, FOR SOLUTION INTRAVENOUS at 13:02

## 2024-05-31 RX ADMIN — NYSTATIN 500000 UNITS: 100000 SUSPENSION ORAL at 07:58

## 2024-05-31 RX ADMIN — GABAPENTIN 900 MG: 300 CAPSULE ORAL at 07:57

## 2024-05-31 RX ADMIN — DEXTROSE 15 G: 15 GEL ORAL at 17:56

## 2024-05-31 RX ADMIN — Medication 1 TABLET: at 07:58

## 2024-05-31 RX ADMIN — PANTOPRAZOLE SODIUM 40 MG: 40 TABLET, DELAYED RELEASE ORAL at 21:42

## 2024-05-31 RX ADMIN — DEXTROSE 15 G: 15 GEL ORAL at 18:27

## 2024-05-31 RX ADMIN — GABAPENTIN 900 MG: 300 CAPSULE ORAL at 20:28

## 2024-05-31 RX ADMIN — PIPERACILLIN AND TAZOBACTAM 3.38 G: 3; .375 INJECTION, POWDER, FOR SOLUTION INTRAVENOUS at 18:38

## 2024-05-31 RX ADMIN — GUAIFENESIN 600 MG: 600 TABLET, EXTENDED RELEASE ORAL at 12:55

## 2024-05-31 RX ADMIN — SERTRALINE HYDROCHLORIDE 200 MG: 100 TABLET ORAL at 07:58

## 2024-05-31 ASSESSMENT — ACTIVITIES OF DAILY LIVING (ADL)
ADLS_ACUITY_SCORE: 32
ADLS_ACUITY_SCORE: 36
ADLS_ACUITY_SCORE: 32
ADLS_ACUITY_SCORE: 36
DEPENDENT_IADLS:: LAUNDRY;COOKING

## 2024-05-31 NOTE — PROGRESS NOTES
[facility]    Medicine Progress Note - Hospitalist Service    Date of Admission:  5/30/2024    Assessment & Plan   Summary: Nicole Reyes is a 68 year old female with PMHx of HTN, severe obesity limiting mobility, hypoventilation, hiatal hernia, GERD, MDD/RADHA, CKD2, T2DM, who was admitted on 5/30/2024 with L sided PNA, suspect aspiration.     L sided PNA, likely aspiration  Hx of aspiration  Presenting with 1 day of increased chest pain, dyspnea, and cough. She woke up from sleep abruptly the night of admission with cough and dyspnea and checked her O2 saturation, which was 88%. Reports she sleeps on her side and frequently has severe reflux. Does not have issues with aspiration when in the hospital.  On presentation to the ED, VS: /84, HR 97, RR 16 with oxygen saturation 88% breathing room air.  She was placed on 2 L oxygen by nasal cannula and oxygen came up to the low 90s.  Initial temperature 97.8 but after couple hours temperature went up to 101.4.   Labs: Creatinine 0.88 with normal electrolytes.  Initial glucose 168. Lactic acid 1.5, N-terminal proBNP 532, procalcitonin less than 0.08 and troponin T 14; all normal values.  VBG pH 7.33 pCO2 64.  WBC 13.9 with left shift hemoglobin 12.9, .  D-dimer 1.76.  Influenza A/B, COVID and RSV PCR is negative.  Blood cultures were obtained.  Imaging: Left sided pneumonia involving both upper and lower lobes.  - continue zosyn  -  consult to explore insurance options for medical bed on discharge    Severe obesity  Spinal stenosis  Limited mobility 2/2 above. Follows with local spine clinic.  - PTA gabapentin    Hiatal hernia  GERD  - PTA pantoprazole    HTN - PTA losartan and diltiazem   T2DM - PTA glipizide + MDSSI  CRISTÓBAL - does not tolerate CPAP so wears supplemental O2 at night  HLD - PTA rosuvastatin  MDD/ARDHA - PTA sertraline and PRN clonazepam        Diet: Consistent Carbohydrate Diet Moderate Consistent Carb (60 g CHO per  "Meal) Diet    DVT Prophylaxis: Pneumatic Compression Devices  Suarez Catheter: Not present  Lines: None     Cardiac Monitoring: None  Code Status: Full Code      Clinically Significant Risk Factors Present on Admission            # Hypomagnesemia: Lowest Mg = 1.6 mg/dL in last 2 days, will replace as needed       # Hypertension: Noted on problem list     # DMII: A1C = 6.9 % (Ref range: <5.7 %) within past 6 months    # Severe Obesity: Estimated body mass index is 50.44 kg/m  as calculated from the following:    Height as of this encounter: 1.486 m (4' 10.5\").    Weight as of this encounter: 111.4 kg (245 lb 8 oz).              Disposition Plan     Medically Ready for Discharge: Anticipated in 2-4 Days             Tracy Santoyo DO  Hospitalist Service  [facility]  Securely message with WebKite (more info)  Text page via [facility] Paging/Directory   ______________________________________________________________________    Interval History   No acute overnight events. Very tired today as she could not sleep last night in ER. Dyspnea and cough about the same. Feels like she needs to but cannot cough anything up. Added mucinex. Admitting doc asked her about LE swelling, and she was worried about HF as her sisters have it. She had an echo a couple years ago that was normal. No current evidence of HF on exam or imaging. Reassured patient that it is unlikely that she has it.     Physical Exam   Vital Signs: Temp: 97.8  F (36.6  C) Temp src: Temporal BP: 107/43 Pulse: 65   Resp: 16 SpO2: 97 % O2 Device: Nasal cannula Oxygen Delivery: 2 LPM  Weight: 245 lbs 8 oz    Constitutional: Awake, alert, no distress, and cooperative  Cardiovascular: Regular rate and rhythm, normal S1 and S2, no S3 or S4, and no murmur noted  Respiratory: No increased work of breathing, good air exchange, slightly diminished L base, no crackles or wheezing  Gastrointestinal: Abdomen soft, non-tender, non-distended. BS normal. No " masses, organomegaly     Medical Decision Making       45 MINUTES SPENT BY ME on the date of service doing chart review, history, exam, documentation & further activities per the note.      Data     I have personally reviewed the following data over the past 24 hrs:    15.5 (H)  \   10.7 (L)   / 193     145 106 14.3 /  74   3.8 27 0.96 (H) \     Trop: 14 BNP: 532     TSH: N/A T4: N/A A1C: 6.9 (H)     Procal: 0.08 CRP: N/A Lactic Acid: 1.0       INR:  N/A PTT:  N/A   D-dimer:  1.76 (H) Fibrinogen:  N/A

## 2024-05-31 NOTE — PROGRESS NOTES
"   05/31/24 1000   Appointment Info   Signing Clinician's Name / Credentials (PT) Tracy Julien DPT   Living Environment   People in Home child(mariama), adult   Current Living Arrangements house   Home Accessibility stairs within home   Number of Stairs, Within Home, Primary one;greater than 10 stairs   Transportation Anticipated family or friend will provide;car, drives self   Living Environment Comments Lives with dtr who works from home. 1 stair on main level to sunken living room, full flight to basement though pt does not need to access basement at initial discharge.   Self-Care   Usual Activity Tolerance moderate   Current Activity Tolerance fair   Equipment Currently Used at Home walker, rolling;cane, straight;tub bench   Fall history within last six months no   Activity/Exercise/Self-Care Comment Uses FWW for most mobility, uses cane sometimes when out and about. IND for ADLs. Dtr completes laundry and cooking. Had been planning to start up OP PT d/t chronic back pain   General Information   Onset of Illness/Injury or Date of Surgery 05/30/24   Referring Physician Uli Cannon MD   Patient/Family Therapy Goals Statement (PT) to return home   Pertinent History of Current Problem (include personal factors and/or comorbidities that impact the POC) \"Nicole Reyes is a 68 year old woman who came to attention today due to increased Chest pain, SOB and cough that started today.      At baseline, the pt is on supplemental O2 at night (she reportedly does not tolerate CPAP).  She wakened from sleep abruptly last night with cough and SOB and when she checked her O2, it was 88%.      Other PMH includes HTN, severe obesity causing limited mobility, hypoventilation, hiatal hernia with GERD, MDD/RADHA and CKD stage 2.  \" Found to have L sided PNA   Existing Precautions/Restrictions fall;oxygen therapy device and L/min  (resting on 2 L O2 via NC, typically only wears O2 at night)   Cognition   Affect/Mental Status " (Cognition) WFL   Orientation Status (Cognition) oriented x 3   Follows Commands (Cognition) WFL   Pain Assessment   Patient Currently in Pain Yes, see Vital Sign flowsheet  (5/10 back pain and muscular pain due to coughing)   Posture    Posture Forward head position;Protracted shoulders   Range of Motion (ROM)   Range of Motion ROM is WFL   Strength (Manual Muscle Testing)   Strength Comments decreased functional LE strength and activity tolerance   Bed Mobility   Comment, (Bed Mobility) SBA sup>sit with HOB elevated   Transfers   Comment, (Transfers) CGA sit>Stand to FWW   Gait/Stairs (Locomotion)   Comment, (Gait/Stairs) Pt amb 5' ft with FWW and CGA. Forward flexed posture, decreased gait speed, steady   Balance   Balance Comments benefits from AD for UE support with upright mobility   Sensory Examination   Sensory Perception Comments numbness/tingling in L hand due to carpal tunnel   Clinical Impression   Criteria for Skilled Therapeutic Intervention Yes, treatment indicated   PT Diagnosis (PT) impaired IND with functional mobility   Influenced by the following impairments impaired functional strength, balance, activity tolerance, pain   Functional limitations due to impairments impaired bed mobility, transfers, ambulation, stairs   Clinical Presentation (PT Evaluation Complexity) stable   Clinical Presentation Rationale Based on current presentation, PMH, social support   Clinical Decision Making (Complexity) low complexity   Planned Therapy Interventions (PT) balance training;bed mobility training;gait training;home exercise program;patient/family education;stair training;strengthening;transfer training;progressive activity/exercise;home program guidelines   Risk & Benefits of therapy have been explained evaluation/treatment results reviewed;care plan/treatment goals reviewed;risks/benefits reviewed;current/potential barriers reviewed;participants voiced agreement with care plan;participants included;patient    PT Total Evaluation Time   PT Eval, Low Complexity Minutes (59393) 10   Physical Therapy Goals   PT Frequency Daily   PT Predicted Duration/Target Date for Goal Attainment 06/05/24   PT Goals Bed Mobility;Transfers;Gait;Stairs;Aerobic Activity   PT: Bed Mobility Modified independent;Supine to/from sit   PT: Transfers Modified independent;Sit to/from stand;Bed to/from chair;Assistive device   PT: Gait Modified independent;Assistive device;Rolling walker;150 feet   PT: Stairs Supervision/stand-by assist;1 stair;Assistive device   PT: Perform aerobic activity with stable cardiovascular response intermittent activity;10 minutes;ambulation   Interventions   Interventions Quick Adds Gait Training;Therapeutic Activity   Therapeutic Activity   Therapeutic Activities: dynamic activities to improve functional performance Minutes (36309) 5   Treatment Detail/Skilled Intervention Pt able to don socks with SBA. Pt completes sit<>Stands following eval with SBA to FWW. On 2 L 2 throughout session, O2 sats stable, 93% or greater throughout entire session. SBA for sup>sit. Discussed importance of OOB mobility, pt planning to sit up in chair for lunch, encouraged that she ambulate again today with nursing staff. Remained supinew with alarm armed and needs in reach.   Gait Training   Gait Training Minutes (14161) 12   Symptoms Noted During/After Treatment (Gait Training) fatigue;shortness of breath   Treatment Detail/Skilled Intervention Pt cued for gait with FWW, CGA to SBA. Pt with decreased GS, forward flexed posture, fatigues with distance. Walker height noted to be too tall for pt, pt declines to trial a different walker, notes this feels the same as her walker at home. Cued for upright posture, pt able to correct minimally due to chronic back pain. Cued for pacing and PLB>   Distance in Feet 120'   PT Discharge Planning   PT Plan progress gait (bring youth FWW), monitor O2 sats, activity tolerance tasks   PT Discharge  Recommendation (DC Rec) home with assist   PT Rationale for DC Rec Pt currently slightly below baseline, able to ambulate with CGA-SBA but limited by decreased activity tolerance and SOB. Pt appropriate to discharge home with dtr. Had been planning to start up OP PT due to chronic back pain, may benefit from consideration of HHPT instead pending progression throughout hospital stay.   PT Brief overview of current status Ax1 FWW, 2 L O2   Total Session Time   Timed Code Treatment Minutes 17   Total Session Time (sum of timed and untimed services) 27

## 2024-05-31 NOTE — CONSULTS
Care Management Initial Consult    General Information  Assessment completed with: Patient, VM-chart review,    Type of CM/SW Visit: Offer D/C Planning    Primary Care Provider verified and updated as needed: Yes   Readmission within the last 30 days: no previous admission in last 30 days      Reason for Consult: discharge planning  Advance Care Planning:            Communication Assessment  Patient's communication style: spoken language (English or Bilingual)    Hearing Difficulty or Deaf: no   Wear Glasses or Blind: yes    Cognitive  Cognitive/Neuro/Behavioral: WDL                      Living Environment:   People in home: child(mariama), adult     Current living Arrangements: house      Able to return to prior arrangements: yes       Family/Social Support:  Care provided by: self, child(mariama)  Provides care for: no one    Current Resources:   Patient receiving home care services: No     Community Resources:    Equipment currently used at home: walker, rolling, cane, straight, tub bench  Supplies currently used at home: Diabetic Supplies, Oxygen Tubing/Supplies, Other    Lifestyle & Psychosocial Needs:  Social Determinants of Health     Food Insecurity: Low Risk  (1/20/2024)    Food Insecurity     Within the past 12 months, did you worry that your food would run out before you got money to buy more?: No     Within the past 12 months, did the food you bought just not last and you didn t have money to get more?: No   Depression: Not at risk (4/5/2024)    PHQ-2     PHQ-2 Score: 0   Housing Stability: Low Risk  (1/20/2024)    Housing Stability     Do you have housing? : Yes     Are you worried about losing your housing?: No   Tobacco Use: Medium Risk (4/5/2024)    Patient History     Smoking Tobacco Use: Former     Smokeless Tobacco Use: Never     Passive Exposure: Not on file   Financial Resource Strain: Low Risk  (1/20/2024)    Financial Resource Strain     Within the past 12 months, have you or your family members you  live with been unable to get utilities (heat, electricity) when it was really needed?: No   Recent Concern: Financial Resource Strain - High Risk (11/29/2023)    Financial Resource Strain     Within the past 12 months, have you or your family members you live with been unable to get utilities (heat, electricity) when it was really needed?: Yes   Alcohol Use: Not on file   Transportation Needs: Low Risk  (1/20/2024)    Transportation Needs     Within the past 12 months, has lack of transportation kept you from medical appointments, getting your medicines, non-medical meetings or appointments, work, or from getting things that you need?: No   Physical Activity: Not on file   Interpersonal Safety: Low Risk  (4/5/2024)    Interpersonal Safety     Do you feel physically and emotionally safe where you currently live?: Yes     Within the past 12 months, have you been hit, slapped, kicked or otherwise physically hurt by someone?: No     Within the past 12 months, have you been humiliated or emotionally abused in other ways by your partner or ex-partner?: No   Stress: Stress Concern Present (8/10/2020)    [employer] [place] [employer] - Occupational Stress Questionnaire     Feeling of Stress : To some extent   Social Connections: Unknown (8/10/2020)    Social Connection and Isolation Panel [NHANES]     Frequency of Communication with Friends and Family: More than three times a week     Frequency of Social Gatherings with Friends and Family: Not on file     Attends Oriental orthodox Services: Not on file     Active Member of Clubs or Organizations: Not on file     Attends Club or Organization Meetings: Not on file     Marital Status: Not on file   Health Literacy: Not on file       Functional Status:  Prior to admission patient needed assistance:   Dependent ADLs:: Ambulation-walker, Ambulation-cane  Dependent IADLs:: Laundry, Cooking      Additional Information:  SW consulted for a hospital bed due to aspirations.  PT met  "with pt and recommends PT OP vs HC, see how pt progresses in hospital.  Sw met with pt and discussed above.    Pt lives at home with her adult daughter.  Daughter assists with cooking and laundry.  Pt uses a FWW and cane.    Per PT recs, \"Pt appropriate to discharge home with dtr. Had been planning to start up OP PT due to chronic back pain, may benefit from consideration of HHPT instead pending progression throughout hospital stay.\" Sw discussed, at this point pt prefers home care [facility], but also understands she may progress for OP. SW also shared pt may need additional services, such as RN.  We will wait to send referral until closer to discharge.       SW discussed hospital bed and Medicare insurance as primary.  SW discussed barriers to DME companies accepting Medicare and the length of time for DME companies to work get approval from insurance.  Discussed private pay until to start with.  Pt stated she needs to move her current bed out and MD in ED shared she can use her recliner in the meantime.  Pt shared how may purchase a bed if insurance doesn't cover it. SW provided resources for DME. Sw also shared [facility] may assist pt in OP as well, SW ordered [facility] and requested help with bed.    SW requested MD to add hospital bed order, SW will provide pt a copy of it, and also informed her the order would be in this medical record.    Update: [facility] called and will just accept pt for PT/OT/RN, contact them with changes.    J CARLOS Manley, LICSW  Inpatient Care Coordination  [facility]  [phone]    KENNEY HINES, RAJ      "

## 2024-05-31 NOTE — PLAN OF CARE
"Goal Outcome Evaluation:      Plan of Care Reviewed With: patient    Overall Patient Progress: improving    Outcome Evaluation: patient is A/O X 4. on oxygen 2 L. on IV Abx.    Patient is A/O X4. On 2 L oxygen. No chest pain. Tolerated diet. Assist x 1 using W/GB. On K, and Mg protocol Rechecks in AM. Discharge plan home.    Problem: Adult Inpatient Plan of Care  Goal: Plan of Care Review  Description: The Plan of Care Review/Shift note should be completed every shift.  The Outcome Evaluation is a brief statement about your assessment that the patient is improving, declining, or no change.  This information will be displayed automatically on your shift  note.  Outcome: Progressing  Flowsheets (Taken 5/31/2024 1314)  Outcome Evaluation: patient is A/O X 4. on oxygen 2 L. on IV Abx.  Plan of Care Reviewed With: patient  Overall Patient Progress: improving  Goal: Patient-Specific Goal (Individualized)  Description: You can add care plan individualizations to a care plan. Examples of Individualization might be:  \"Parent requests to be called daily at 9am for status\", \"I have a hard time hearing out of my right ear\", or \"Do not touch me to wake me up as it startles  me\".  Outcome: Progressing  Goal: Absence of Hospital-Acquired Illness or Injury  Outcome: Progressing  Intervention: Identify and Manage Fall Risk  Recent Flowsheet Documentation  Taken 5/31/2024 0849 by Fannie Dalal, RN  Safety Promotion/Fall Prevention:   activity supervised   assistive device/personal items within reach   clutter free environment maintained   nonskid shoes/slippers when out of bed   safety round/check completed   mobility aid in reach   lighting adjusted   room near nurse's station   supervised activity  Intervention: Prevent Skin Injury  Recent Flowsheet Documentation  Taken 5/31/2024 0849 by Fannie Dalal, RN  Body Position: supine, head elevated  Intervention: Prevent and Manage VTE (Venous Thromboembolism) Risk  Recent Flowsheet " Documentation  Taken 5/31/2024 0849 by Fannie Dalal RN  VTE Prevention/Management: SCDs (sequential compression devices) off  Intervention: Prevent Infection  Recent Flowsheet Documentation  Taken 5/31/2024 0849 by Fannie Dalal RN  Infection Prevention:   hand hygiene promoted   single patient room provided  Goal: Optimal Comfort and Wellbeing  Outcome: Progressing  Intervention: Monitor Pain and Promote Comfort  Recent Flowsheet Documentation  Taken 5/31/2024 0756 by Fannie Dalal RN  Pain Management Interventions: declines  Goal: Readiness for Transition of Care  Outcome: Progressing     Problem: Pneumonia  Goal: Fluid Balance  Outcome: Progressing  Intervention: Monitor and Manage Fluid Balance  Recent Flowsheet Documentation  Taken 5/31/2024 0849 by Fannie Dalal RN  Fluid/Electrolyte Management: fluids provided  Goal: Resolution of Infection Signs and Symptoms  Outcome: Progressing  Goal: Effective Oxygenation and Ventilation  Outcome: Progressing  Intervention: Promote Airway Secretion Clearance  Recent Flowsheet Documentation  Taken 5/31/2024 0849 by Fannie Dalal RN  Cough And Deep Breathing: done independently per patient  Intervention: Optimize Oxygenation and Ventilation  Recent Flowsheet Documentation  Taken 5/31/2024 0849 by Fannie Dalal RN  Airway/Ventilation Management: airway patency maintained  Head of Bed (HOB) Positioning: HOB at 30-45 degrees     Problem: Comorbidity Management  Goal: Blood Glucose Levels Within Targeted Range  Outcome: Progressing  Intervention: Monitor and Manage Glycemia  Recent Flowsheet Documentation  Taken 5/31/2024 0849 by Fannie Dalal RN  Glycemic Management: blood glucose monitored  Medication Review/Management: medications reviewed  Goal: Blood Pressure in Desired Range  Outcome: Progressing  Intervention: Maintain Blood Pressure Management  Recent Flowsheet Documentation  Taken 5/31/2024 0849 by Fannie Dalal RN  Medication Review/Management:  medications reviewed

## 2024-05-31 NOTE — PLAN OF CARE
"Goal Outcome Evaluation:    Plan of Care Reviewed With: patient    Overall Patient Progress: improving    Outcome Evaluation: Received from ED around 2030 with complaints of SOB and chest pain. Labs and scans showed pneumonia. O2 started at 2LPM via NC but increased to 3L while asleep. PIV saline locked. Zosyn given as ordered. Made comfortable in bed and oriented to call light system. Dinner given. Admission profile completed.    Problem: Adult Inpatient Plan of Care  Goal: Plan of Care Review  Description: The Plan of Care Review/Shift note should be completed every shift.  The Outcome Evaluation is a brief statement about your assessment that the patient is improving, declining, or no change.  This information will be displayed automatically on your shift  note.  Outcome: Progressing  Flowsheets (Taken 5/31/2024 4505)  Outcome Evaluation: Received from ED around 2030 with complaints of SOB and chest pain. Labs and scans showed pneumonia. O2 started at 2LPM via NC but increased to 3L while asleep. PIV saline locked. Zosyn given as ordered. Made comfortable in bed and oriented to call light system. Dinner given. Admission profile completed.  Plan of Care Reviewed With: patient  Overall Patient Progress: improving  Goal: Patient-Specific Goal (Individualized)  Description: You can add care plan individualizations to a care plan. Examples of Individualization might be:  \"Parent requests to be called daily at 9am for status\", \"I have a hard time hearing out of my right ear\", or \"Do not touch me to wake me up as it startles  me\".  Outcome: Progressing  Goal: Absence of Hospital-Acquired Illness or Injury  Outcome: Progressing  Intervention: Identify and Manage Fall Risk  Recent Flowsheet Documentation  Taken 5/30/2024 2930 by Myke Acosta RN  Safety Promotion/Fall Prevention:   activity supervised   assistive device/personal items within reach   clutter free environment maintained   nonskid shoes/slippers when " out of bed   safety round/check completed  Intervention: Prevent Skin Injury  Recent Flowsheet Documentation  Taken 5/30/2024 2302 by Myke Acosta RN  Body Position: supine, head elevated  Intervention: Prevent and Manage VTE (Venous Thromboembolism) Risk  Recent Flowsheet Documentation  Taken 5/30/2024 2302 by Myke Acosta RN  VTE Prevention/Management: SCDs (sequential compression devices) off  Intervention: Prevent Infection  Recent Flowsheet Documentation  Taken 5/30/2024 2302 by Myke Acosta RN  Infection Prevention:   hand hygiene promoted   single patient room provided  Goal: Optimal Comfort and Wellbeing  Outcome: Progressing  Goal: Readiness for Transition of Care  Outcome: Progressing  Intervention: Mutually Develop Transition Plan  Recent Flowsheet Documentation  Taken 5/30/2024 2042 by Myke Acosta RN  Equipment Currently Used at Home:   walker, rolling   cane, straight

## 2024-05-31 NOTE — PROGRESS NOTES
Pt is alert and orientated, pleasant. On 2 lpm NC K+ Mg+ protocols. Pt had Temp 101.4 gave Tylenol. Mod Carb diet. ABX Zithro and Zosyn given, PIV S/L. Pt has been up to void, assist of 1.

## 2024-06-01 ENCOUNTER — APPOINTMENT (OUTPATIENT)
Dept: PHYSICAL THERAPY | Facility: CLINIC | Age: 69
DRG: 178 | End: 2024-06-01
Payer: MEDICARE

## 2024-06-01 LAB
ERYTHROCYTE [DISTWIDTH] IN BLOOD BY AUTOMATED COUNT: 15.3 % (ref 10–15)
GLUCOSE BLDC GLUCOMTR-MCNC: 110 MG/DL (ref 70–99)
GLUCOSE BLDC GLUCOMTR-MCNC: 119 MG/DL (ref 70–99)
GLUCOSE BLDC GLUCOMTR-MCNC: 147 MG/DL (ref 70–99)
GLUCOSE BLDC GLUCOMTR-MCNC: 154 MG/DL (ref 70–99)
GLUCOSE BLDC GLUCOMTR-MCNC: 165 MG/DL (ref 70–99)
HCT VFR BLD AUTO: 33 % (ref 35–47)
HGB BLD-MCNC: 10.4 G/DL (ref 11.7–15.7)
MAGNESIUM SERPL-MCNC: 1.6 MG/DL (ref 1.7–2.3)
MCH RBC QN AUTO: 25.9 PG (ref 26.5–33)
MCHC RBC AUTO-ENTMCNC: 31.5 G/DL (ref 31.5–36.5)
MCV RBC AUTO: 82 FL (ref 78–100)
PLATELET # BLD AUTO: 179 10E3/UL (ref 150–450)
POTASSIUM SERPL-SCNC: 3.8 MMOL/L (ref 3.4–5.3)
RBC # BLD AUTO: 4.01 10E6/UL (ref 3.8–5.2)
WBC # BLD AUTO: 9.7 10E3/UL (ref 4–11)

## 2024-06-01 PROCEDURE — 97530 THERAPEUTIC ACTIVITIES: CPT | Mod: GP

## 2024-06-01 PROCEDURE — 97116 GAIT TRAINING THERAPY: CPT | Mod: GP

## 2024-06-01 PROCEDURE — 120N000001 HC R&B MED SURG/OB

## 2024-06-01 PROCEDURE — 99232 SBSQ HOSP IP/OBS MODERATE 35: CPT | Performed by: STUDENT IN AN ORGANIZED HEALTH CARE EDUCATION/TRAINING PROGRAM

## 2024-06-01 PROCEDURE — 250N000011 HC RX IP 250 OP 636: Performed by: INTERNAL MEDICINE

## 2024-06-01 PROCEDURE — 85027 COMPLETE CBC AUTOMATED: CPT | Performed by: STUDENT IN AN ORGANIZED HEALTH CARE EDUCATION/TRAINING PROGRAM

## 2024-06-01 PROCEDURE — 36415 COLL VENOUS BLD VENIPUNCTURE: CPT | Performed by: STUDENT IN AN ORGANIZED HEALTH CARE EDUCATION/TRAINING PROGRAM

## 2024-06-01 PROCEDURE — 258N000003 HC RX IP 258 OP 636: Performed by: INTERNAL MEDICINE

## 2024-06-01 PROCEDURE — 84132 ASSAY OF SERUM POTASSIUM: CPT | Performed by: STUDENT IN AN ORGANIZED HEALTH CARE EDUCATION/TRAINING PROGRAM

## 2024-06-01 PROCEDURE — 250N000013 HC RX MED GY IP 250 OP 250 PS 637: Performed by: INTERNAL MEDICINE

## 2024-06-01 PROCEDURE — 83735 ASSAY OF MAGNESIUM: CPT | Performed by: STUDENT IN AN ORGANIZED HEALTH CARE EDUCATION/TRAINING PROGRAM

## 2024-06-01 RX ADMIN — KETOCONAZOLE: 20 CREAM TOPICAL at 14:09

## 2024-06-01 RX ADMIN — GABAPENTIN 600 MG: 300 CAPSULE ORAL at 12:40

## 2024-06-01 RX ADMIN — SODIUM CHLORIDE 500 ML: 9 INJECTION, SOLUTION INTRAVENOUS at 00:33

## 2024-06-01 RX ADMIN — Medication 1 TABLET: at 09:19

## 2024-06-01 RX ADMIN — PIPERACILLIN AND TAZOBACTAM 3.38 G: 3; .375 INJECTION, POWDER, FOR SOLUTION INTRAVENOUS at 01:44

## 2024-06-01 RX ADMIN — NYSTATIN 500000 UNITS: 100000 SUSPENSION ORAL at 16:31

## 2024-06-01 RX ADMIN — NYSTATIN 500000 UNITS: 100000 SUSPENSION ORAL at 12:40

## 2024-06-01 RX ADMIN — SERTRALINE HYDROCHLORIDE 200 MG: 100 TABLET ORAL at 09:19

## 2024-06-01 RX ADMIN — NYSTATIN 500000 UNITS: 100000 SUSPENSION ORAL at 09:19

## 2024-06-01 RX ADMIN — PANTOPRAZOLE SODIUM 40 MG: 40 TABLET, DELAYED RELEASE ORAL at 21:55

## 2024-06-01 RX ADMIN — ROSUVASTATIN CALCIUM 10 MG: 10 TABLET, FILM COATED ORAL at 09:19

## 2024-06-01 RX ADMIN — NYSTATIN 500000 UNITS: 100000 SUSPENSION ORAL at 19:42

## 2024-06-01 RX ADMIN — PIPERACILLIN AND TAZOBACTAM 3.38 G: 3; .375 INJECTION, POWDER, FOR SOLUTION INTRAVENOUS at 21:55

## 2024-06-01 RX ADMIN — GABAPENTIN 900 MG: 300 CAPSULE ORAL at 09:19

## 2024-06-01 RX ADMIN — GUAIFENESIN 600 MG: 600 TABLET, EXTENDED RELEASE ORAL at 09:19

## 2024-06-01 RX ADMIN — PIPERACILLIN AND TAZOBACTAM 3.38 G: 3; .375 INJECTION, POWDER, FOR SOLUTION INTRAVENOUS at 09:22

## 2024-06-01 RX ADMIN — GABAPENTIN 900 MG: 300 CAPSULE ORAL at 19:42

## 2024-06-01 RX ADMIN — PIPERACILLIN AND TAZOBACTAM 3.38 G: 3; .375 INJECTION, POWDER, FOR SOLUTION INTRAVENOUS at 16:30

## 2024-06-01 RX ADMIN — GUAIFENESIN 600 MG: 600 TABLET, EXTENDED RELEASE ORAL at 19:42

## 2024-06-01 RX ADMIN — IBUPROFEN 600 MG: 600 TABLET, FILM COATED ORAL at 09:31

## 2024-06-01 ASSESSMENT — ACTIVITIES OF DAILY LIVING (ADL)
ADLS_ACUITY_SCORE: 33
ADLS_ACUITY_SCORE: 36
ADLS_ACUITY_SCORE: 35
ADLS_ACUITY_SCORE: 35
ADLS_ACUITY_SCORE: 36
ADLS_ACUITY_SCORE: 35
ADLS_ACUITY_SCORE: 36
ADLS_ACUITY_SCORE: 33
ADLS_ACUITY_SCORE: 35
ADLS_ACUITY_SCORE: 36
ADLS_ACUITY_SCORE: 33
ADLS_ACUITY_SCORE: 36
ADLS_ACUITY_SCORE: 36
ADLS_ACUITY_SCORE: 35
ADLS_ACUITY_SCORE: 36
ADLS_ACUITY_SCORE: 35
ADLS_ACUITY_SCORE: 35

## 2024-06-01 NOTE — PLAN OF CARE
"Goal Outcome Evaluation:      Plan of Care Reviewed With: patient          Outcome Evaluation: BP improving - home meds held today - Blood glucose also improved with glucotrol held. Remain on basline O2 - 2L - Maintaining adequate O2 saturation and denying SOB. Afebrile. Up with 1 and walker. Voiding adequately x2 BM. Plan to continues antibiotics and monitoring. Eventual discharge back to home with home care when ready. Possibly tomorrow.      Problem: Adult Inpatient Plan of Care  Goal: Plan of Care Review  Description: The Plan of Care Review/Shift note should be completed every shift.  The Outcome Evaluation is a brief statement about your assessment that the patient is improving, declining, or no change.  This information will be displayed automatically on your shift  note.  Outcome: Progressing  Flowsheets (Taken 6/1/2024 1342)  Outcome Evaluation: BP improving - home meds held today - Blood glucose also improved with glucotrol held. Remain on basline O2 - 2L - Maintaining adequate O2 saturation and denying SOB. Afebrile. Up with 1 and walker. Voiding adequately x2 BM. Plan to continues antibiotics and monitoring. Eventual discharge back to home with home care when ready. Possibly tomorrow.  Plan of Care Reviewed With: patient  Goal: Patient-Specific Goal (Individualized)  Description: You can add care plan individualizations to a care plan. Examples of Individualization might be:  \"Parent requests to be called daily at 9am for status\", \"I have a hard time hearing out of my right ear\", or \"Do not touch me to wake me up as it startles  me\".  Outcome: Progressing  Goal: Absence of Hospital-Acquired Illness or Injury  Outcome: Progressing  Intervention: Identify and Manage Fall Risk  Recent Flowsheet Documentation  Taken 6/1/2024 0900 by Aly Carbajal, RN  Safety Promotion/Fall Prevention:   increase visualization of patient   nonskid shoes/slippers when out of bed   safety round/check completed  Goal: " Optimal Comfort and Wellbeing  Outcome: Progressing  Intervention: Monitor Pain and Promote Comfort  Recent Flowsheet Documentation  Taken 6/1/2024 0931 by Aly Carbajal, RN  Pain Management Interventions: medication (see MAR)  Goal: Readiness for Transition of Care  Outcome: Progressing

## 2024-06-01 NOTE — PLAN OF CARE
"Pt is alert and oriented x 4. Pt denies any pain. Diminished air entry and pt on 2LNC.    IV Abx Zosyn administered. BS: 147.     Pt ambulates 1GB+W to the bathroom. Ongoing monitoring.    Plan: Discharge TBD.    /50 (BP Location: Right arm)   Pulse 68   Temp 97.1  F (36.2  C) (Temporal)   Resp 16   Ht 1.486 m (4' 10.5\")   Wt 111.4 kg (245 lb 8 oz)   LMP 09/15/2007   SpO2 95%   BMI 50.44 kg/m       Problem: Adult Inpatient Plan of Care  Goal: Plan of Care Review  Description: The Plan of Care Review/Shift note should be completed every shift.  The Outcome Evaluation is a brief statement about your assessment that the patient is improving, declining, or no change.  This information will be displayed automatically on your shift  note.  Outcome: Progressing  Flowsheets (Taken 6/1/2024 5965)  Outcome Evaluation: Pt on 2LNC. IV Abx Zosyn administered.  Plan of Care Reviewed With: patient  Overall Patient Progress: improving  Goal: Patient-Specific Goal (Individualized)  Description: You can add care plan individualizations to a care plan. Examples of Individualization might be:  \"Parent requests to be called daily at 9am for status\", \"I have a hard time hearing out of my right ear\", or \"Do not touch me to wake me up as it startles  me\".  Outcome: Progressing  Goal: Absence of Hospital-Acquired Illness or Injury  Outcome: Progressing  Goal: Optimal Comfort and Wellbeing  Outcome: Progressing  Goal: Readiness for Transition of Care  Outcome: Progressing     Problem: Pneumonia  Goal: Fluid Balance  Outcome: Progressing  Goal: Resolution of Infection Signs and Symptoms  Outcome: Progressing  Goal: Effective Oxygenation and Ventilation  Outcome: Progressing     Problem: Comorbidity Management  Goal: Blood Glucose Levels Within Targeted Range  Outcome: Progressing  Goal: Blood Pressure in Desired Range  Outcome: Progressing   Goal Outcome Evaluation:      Plan of Care Reviewed With: patient    Overall Patient " Progress: improvingOverall Patient Progress: improving    Outcome Evaluation: Pt on 2LNC. IV Abx Zosyn administered.

## 2024-06-01 NOTE — PROGRESS NOTES
[facility]    Medicine Progress Note - Hospitalist Service    Date of Admission:  5/30/2024    Assessment & Plan   Summary: Nicole Reyes is a 68 year old female with PMHx of HTN, severe obesity limiting mobility, hypoventilation, hiatal hernia, GERD, MDD/RADHA, CKD2, T2DM, who was admitted on 5/30/2024 with L sided PNA, suspect aspiration.     L sided PNA, likely aspiration  Hx of aspiration  Presenting with 1 day of increased chest pain, dyspnea, and cough. She woke up from sleep abruptly the night of admission with cough and dyspnea and checked her O2 saturation, which was 88%. Reports she sleeps on her side and frequently has severe reflux. Does not have issues with aspiration when in the hospital.  On presentation to the ED, VS: /84, HR 97, RR 16 with oxygen saturation 88% breathing room air.  She was placed on 2 L oxygen by nasal cannula and oxygen came up to the low 90s.  Initial temperature 97.8 but after couple hours temperature went up to 101.4.   Labs: Creatinine 0.88 with normal electrolytes.  Initial glucose 168. Lactic acid 1.5, N-terminal proBNP 532, procalcitonin less than 0.08 and troponin T 14; all normal values.  VBG pH 7.33 pCO2 64.  WBC 13.9 with left shift hemoglobin 12.9, .  D-dimer 1.76.  Influenza A/B, COVID and RSV PCR is negative.  Blood cultures were obtained.  Imaging: Left sided pneumonia involving both upper and lower lobes.  - continue zosyn  - DME order for medical bed placed    T2DM   Hypoglycemia   Had symptomatic episode of hypoglycemia to 53 on 5/31. Suspect related to glipizide use and infection.   - Hold PTA glipizide   - LDSSI    Severe obesity  Spinal stenosis  Limited mobility 2/2 above. Follows with local spine clinic.  - PTA gabapentin    Hiatal hernia  GERD  - PTA pantoprazole    HTN - PTA losartan and diltiazem with hold parameters    CRISTÓBAL - does not tolerate CPAP so wears supplemental O2 at night  HLD - PTA rosuvastatin  MDD/RADHA -  "PTA sertraline and PRN clonazepam          Diet: Consistent Carbohydrate Diet Moderate Consistent Carb (60 g CHO per Meal) Diet    DVT Prophylaxis: Pneumatic Compression Devices  Suarez Catheter: Not present  Lines: None     Cardiac Monitoring: None  Code Status: Full Code      Clinically Significant Risk Factors            # Hypomagnesemia: Lowest Mg = 1.6 mg/dL in last 2 days, will replace as needed       # Hypertension: Noted on problem list       # DMII: A1C = 6.9 % (Ref range: <5.7 %) within past 6 months, PRESENT ON ADMISSION  # Severe Obesity: Estimated body mass index is 50.44 kg/m  as calculated from the following:    Height as of this encounter: 1.486 m (4' 10.5\").    Weight as of this encounter: 111.4 kg (245 lb 8 oz)., PRESENT ON ADMISSION            Disposition Plan     Medically Ready for Discharge: Anticipated in 2-4 Days             Tracy Santoyo DO  Hospitalist Service  [facility]  Securely message with [employer] (more info)  Text page via [employer] Paging/Directory   ______________________________________________________________________    Interval History   No acute overnight events. Tired today, but feeling a little better. Has been using aerobika, which seems to be helping cough. Remains on O2 NC, but she has been sleeping and wears O2 for CRISTÓBAL. Was symptomatic when hypoglycemic last night, but asymptomatic with soft BP last night.     Physical Exam   Vital Signs: Temp: 97.1  F (36.2  C) Temp src: Temporal BP: 104/50 Pulse: 68   Resp: 16 SpO2: 95 % O2 Device: Nasal cannula Oxygen Delivery: 2 LPM  Weight: 245 lbs 8 oz    Constitutional: Awake, alert, no distress, and cooperative  Cardiovascular: Regular rate and rhythm, normal S1 and S2, no S3 or S4, and no murmur noted  Respiratory: No increased work of breathing, good air exchange, clear to auscultation bilaterally, no crackles or wheezing  Gastrointestinal: Abdomen soft, non-tender, non-distended. BS normal. No masses, " organomegaly     Medical Decision Making       45 MINUTES SPENT BY ME on the date of service doing chart review, history, exam, documentation & further activities per the note.      Data     I have personally reviewed the following data over the past 24 hrs:    9.7  \   10.4 (L)   / 179     N/A N/A N/A /  165 (H)   3.8 N/A N/A \

## 2024-06-01 NOTE — PROGRESS NOTES
Notified of asymptomatic soft BP at 100/33.  Here with suspected aspiration pneumonia.  Currently afebrile.  Heart rate in the 60s.  Did not receive her diltiazem or losartan today due to lower blood pressure.  -Give 500 mL NS bolus  -Placed hold order on her losartan

## 2024-06-01 NOTE — PLAN OF CARE
Goal Outcome Evaluation:    A&O x4  VSS on 2 LPM NC  Ax1 GB & walker   Mod carb diet   Pain managed with ibuprofen & gabapentin   IV Zosyn given   Aerobika IS used, tolerated well   K & mag protocol AM recheck   Pt BG 53 at 1730, required 3 15 gram bottles of glucose gel to maintain blood sugar above 80 along with apple juice x2 & food. Provider notified, placed orders for D5NS IV, BG recheck 173, fluids stopped. Glucose monitored & corrected with Novolog as needed.        Problem: Adult Inpatient Plan of Care  Goal: Absence of Hospital-Acquired Illness or Injury  Intervention: Identify and Manage Fall Risk  Recent Flowsheet Documentation  Taken 5/31/2024 1533 by Angela Duarte RN  Safety Promotion/Fall Prevention: activity supervised  Intervention: Prevent Skin Injury  Recent Flowsheet Documentation  Taken 5/31/2024 1533 by Angela Duarte RN  Body Position: weight shifting  Skin Protection: adhesive use limited  Device Skin Pressure Protection: adhesive use limited  Intervention: Prevent and Manage VTE (Venous Thromboembolism) Risk  Recent Flowsheet Documentation  Taken 5/31/2024 1533 by Angela Duarte RN  VTE Prevention/Management: (pt in chair) SCDs (sequential compression devices) off  Intervention: Prevent Infection  Recent Flowsheet Documentation  Taken 5/31/2024 1533 by Angela Duarte RN  Infection Prevention: hand hygiene promoted  Goal: Optimal Comfort and Wellbeing  Intervention: Monitor Pain and Promote Comfort  Recent Flowsheet Documentation  Taken 5/31/2024 2028 by Angela Duarte RN  Pain Management Interventions:   medication (see MAR)   care clustered

## 2024-06-01 NOTE — PROVIDER NOTIFICATION
JESSE Pt's BP @ 100/33, pt denies any symptoms. Thank you kindly.    Addendum: NS bolus ordered, same administered.

## 2024-06-02 ENCOUNTER — APPOINTMENT (OUTPATIENT)
Dept: PHYSICAL THERAPY | Facility: CLINIC | Age: 69
DRG: 178 | End: 2024-06-02
Payer: MEDICARE

## 2024-06-02 LAB
ANION GAP SERPL CALCULATED.3IONS-SCNC: 10 MMOL/L (ref 7–15)
BUN SERPL-MCNC: 10.9 MG/DL (ref 8–23)
CALCIUM SERPL-MCNC: 8.3 MG/DL (ref 8.8–10.2)
CHLORIDE SERPL-SCNC: 107 MMOL/L (ref 98–107)
CREAT SERPL-MCNC: 0.79 MG/DL (ref 0.51–0.95)
DEPRECATED HCO3 PLAS-SCNC: 26 MMOL/L (ref 22–29)
EGFRCR SERPLBLD CKD-EPI 2021: 81 ML/MIN/1.73M2
ERYTHROCYTE [DISTWIDTH] IN BLOOD BY AUTOMATED COUNT: 15 % (ref 10–15)
GLUCOSE BLDC GLUCOMTR-MCNC: 110 MG/DL (ref 70–99)
GLUCOSE BLDC GLUCOMTR-MCNC: 113 MG/DL (ref 70–99)
GLUCOSE BLDC GLUCOMTR-MCNC: 113 MG/DL (ref 70–99)
GLUCOSE BLDC GLUCOMTR-MCNC: 114 MG/DL (ref 70–99)
GLUCOSE BLDC GLUCOMTR-MCNC: 137 MG/DL (ref 70–99)
GLUCOSE SERPL-MCNC: 118 MG/DL (ref 70–99)
HCT VFR BLD AUTO: 30.9 % (ref 35–47)
HGB BLD-MCNC: 9.7 G/DL (ref 11.7–15.7)
MAGNESIUM SERPL-MCNC: 1.6 MG/DL (ref 1.7–2.3)
MCH RBC QN AUTO: 26.1 PG (ref 26.5–33)
MCHC RBC AUTO-ENTMCNC: 31.4 G/DL (ref 31.5–36.5)
MCV RBC AUTO: 83 FL (ref 78–100)
PLATELET # BLD AUTO: 175 10E3/UL (ref 150–450)
POTASSIUM SERPL-SCNC: 3.9 MMOL/L (ref 3.4–5.3)
RBC # BLD AUTO: 3.71 10E6/UL (ref 3.8–5.2)
SODIUM SERPL-SCNC: 143 MMOL/L (ref 135–145)
WBC # BLD AUTO: 8.5 10E3/UL (ref 4–11)

## 2024-06-02 PROCEDURE — 99232 SBSQ HOSP IP/OBS MODERATE 35: CPT | Performed by: STUDENT IN AN ORGANIZED HEALTH CARE EDUCATION/TRAINING PROGRAM

## 2024-06-02 PROCEDURE — 250N000011 HC RX IP 250 OP 636: Performed by: INTERNAL MEDICINE

## 2024-06-02 PROCEDURE — 120N000001 HC R&B MED SURG/OB

## 2024-06-02 PROCEDURE — 97530 THERAPEUTIC ACTIVITIES: CPT | Mod: GP

## 2024-06-02 PROCEDURE — 99207 PR NO BILLABLE SERVICE THIS VISIT: CPT | Performed by: STUDENT IN AN ORGANIZED HEALTH CARE EDUCATION/TRAINING PROGRAM

## 2024-06-02 PROCEDURE — 250N000013 HC RX MED GY IP 250 OP 250 PS 637: Performed by: INTERNAL MEDICINE

## 2024-06-02 PROCEDURE — 83735 ASSAY OF MAGNESIUM: CPT | Performed by: STUDENT IN AN ORGANIZED HEALTH CARE EDUCATION/TRAINING PROGRAM

## 2024-06-02 PROCEDURE — 250N000013 HC RX MED GY IP 250 OP 250 PS 637: Performed by: STUDENT IN AN ORGANIZED HEALTH CARE EDUCATION/TRAINING PROGRAM

## 2024-06-02 PROCEDURE — 85027 COMPLETE CBC AUTOMATED: CPT | Performed by: STUDENT IN AN ORGANIZED HEALTH CARE EDUCATION/TRAINING PROGRAM

## 2024-06-02 PROCEDURE — 36415 COLL VENOUS BLD VENIPUNCTURE: CPT | Performed by: STUDENT IN AN ORGANIZED HEALTH CARE EDUCATION/TRAINING PROGRAM

## 2024-06-02 PROCEDURE — 80048 BASIC METABOLIC PNL TOTAL CA: CPT | Performed by: STUDENT IN AN ORGANIZED HEALTH CARE EDUCATION/TRAINING PROGRAM

## 2024-06-02 RX ORDER — LIDOCAINE 4 G/G
1 PATCH TOPICAL
Status: DISCONTINUED | OUTPATIENT
Start: 2024-06-02 | End: 2024-06-03 | Stop reason: HOSPADM

## 2024-06-02 RX ORDER — CYCLOBENZAPRINE HCL 10 MG
10 TABLET ORAL ONCE
Status: COMPLETED | OUTPATIENT
Start: 2024-06-02 | End: 2024-06-02

## 2024-06-02 RX ADMIN — SERTRALINE HYDROCHLORIDE 200 MG: 100 TABLET ORAL at 08:46

## 2024-06-02 RX ADMIN — CYCLOBENZAPRINE 10 MG: 10 TABLET, FILM COATED ORAL at 21:53

## 2024-06-02 RX ADMIN — GABAPENTIN 900 MG: 300 CAPSULE ORAL at 19:26

## 2024-06-02 RX ADMIN — ACETAMINOPHEN 975 MG: 325 TABLET, FILM COATED ORAL at 20:48

## 2024-06-02 RX ADMIN — NYSTATIN 500000 UNITS: 100000 SUSPENSION ORAL at 16:19

## 2024-06-02 RX ADMIN — DILTIAZEM HYDROCHLORIDE 360 MG: 180 CAPSULE, COATED, EXTENDED RELEASE ORAL at 08:45

## 2024-06-02 RX ADMIN — ROSUVASTATIN CALCIUM 10 MG: 10 TABLET, FILM COATED ORAL at 08:46

## 2024-06-02 RX ADMIN — PANTOPRAZOLE SODIUM 40 MG: 40 TABLET, DELAYED RELEASE ORAL at 21:54

## 2024-06-02 RX ADMIN — LOSARTAN POTASSIUM 100 MG: 100 TABLET, FILM COATED ORAL at 08:46

## 2024-06-02 RX ADMIN — ZOLPIDEM TARTRATE 10 MG: 5 TABLET ORAL at 01:22

## 2024-06-02 RX ADMIN — LIDOCAINE 1 PATCH: 4 PATCH TOPICAL at 21:53

## 2024-06-02 RX ADMIN — NYSTATIN 500000 UNITS: 100000 SUSPENSION ORAL at 08:46

## 2024-06-02 RX ADMIN — PIPERACILLIN AND TAZOBACTAM 3.38 G: 3; .375 INJECTION, POWDER, FOR SOLUTION INTRAVENOUS at 03:30

## 2024-06-02 RX ADMIN — NYSTATIN 500000 UNITS: 100000 SUSPENSION ORAL at 19:26

## 2024-06-02 RX ADMIN — GABAPENTIN 600 MG: 300 CAPSULE ORAL at 12:32

## 2024-06-02 RX ADMIN — PIPERACILLIN AND TAZOBACTAM 3.38 G: 3; .375 INJECTION, POWDER, FOR SOLUTION INTRAVENOUS at 22:22

## 2024-06-02 RX ADMIN — GUAIFENESIN 600 MG: 600 TABLET, EXTENDED RELEASE ORAL at 08:46

## 2024-06-02 RX ADMIN — DICLOFENAC SODIUM 2 G: 10 GEL TOPICAL at 22:20

## 2024-06-02 RX ADMIN — GABAPENTIN 900 MG: 300 CAPSULE ORAL at 08:45

## 2024-06-02 RX ADMIN — PIPERACILLIN AND TAZOBACTAM 3.38 G: 3; .375 INJECTION, POWDER, FOR SOLUTION INTRAVENOUS at 09:48

## 2024-06-02 RX ADMIN — NYSTATIN 500000 UNITS: 100000 SUSPENSION ORAL at 12:32

## 2024-06-02 RX ADMIN — GUAIFENESIN 600 MG: 600 TABLET, EXTENDED RELEASE ORAL at 19:26

## 2024-06-02 RX ADMIN — Medication 1 TABLET: at 08:45

## 2024-06-02 ASSESSMENT — ACTIVITIES OF DAILY LIVING (ADL)
ADLS_ACUITY_SCORE: 32
ADLS_ACUITY_SCORE: 33
ADLS_ACUITY_SCORE: 32
ADLS_ACUITY_SCORE: 33
ADLS_ACUITY_SCORE: 33
ADLS_ACUITY_SCORE: 32
ADLS_ACUITY_SCORE: 33
ADLS_ACUITY_SCORE: 32
ADLS_ACUITY_SCORE: 32
ADLS_ACUITY_SCORE: 33
ADLS_ACUITY_SCORE: 32
ADLS_ACUITY_SCORE: 33
ADLS_ACUITY_SCORE: 33

## 2024-06-02 NOTE — PLAN OF CARE
"Pt alert and oriented x4. Denies pain. VSS. Ambulates with assist of 1 with a walker and a belt. Pt on 2 liters of oxygen through nasal cannula. Continues on Zosyn for pneumonia. One episode of diarrhea noted.  Problem: Adult Inpatient Plan of Care  Goal: Plan of Care Review  Description: The Plan of Care Review/Shift note should be completed every shift.  The Outcome Evaluation is a brief statement about your assessment that the patient is improving, declining, or no change.  This information will be displayed automatically on your shift  note.  Outcome: Progressing  Goal: Patient-Specific Goal (Individualized)  Description: You can add care plan individualizations to a care plan. Examples of Individualization might be:  \"Parent requests to be called daily at 9am for status\", \"I have a hard time hearing out of my right ear\", or \"Do not touch me to wake me up as it startles  me\".  Outcome: Progressing  Goal: Absence of Hospital-Acquired Illness or Injury  Outcome: Progressing  Goal: Optimal Comfort and Wellbeing  Outcome: Progressing  Goal: Readiness for Transition of Care  Outcome: Progressing     Problem: Pneumonia  Goal: Fluid Balance  Outcome: Progressing  Goal: Resolution of Infection Signs and Symptoms  Outcome: Progressing  Goal: Effective Oxygenation and Ventilation  Outcome: Progressing     Problem: Comorbidity Management  Goal: Blood Glucose Levels Within Targeted Range  Outcome: Progressing  Goal: Blood Pressure in Desired Range  Outcome: Progressing     Problem: Infection  Goal: Absence of Infection Signs and Symptoms  Outcome: Progressing     Problem: Fall Injury Risk  Goal: Absence of Fall and Fall-Related Injury  Outcome: Progressing   Goal Outcome Evaluation:                        "

## 2024-06-02 NOTE — PLAN OF CARE
"Goal Outcome Evaluation:       A&O x4   VSS on 2 LPM NC   Blood glucose monitored and corrected as needed   Mod carb diet   Ax1 Gb & walker  Incentive spirometer used   K & mag protocol AM recheck   Pain managed with gabapentin   Discharge plans pending depending on glucose & BP control     IV zosyn given     Problem: Adult Inpatient Plan of Care  Goal: Plan of Care Review  Description: The Plan of Care Review/Shift note should be completed every shift.  The Outcome Evaluation is a brief statement about your assessment that the patient is improving, declining, or no change.  This information will be displayed automatically on your shift  note.  Outcome: Progressing  Goal: Patient-Specific Goal (Individualized)  Description: You can add care plan individualizations to a care plan. Examples of Individualization might be:  \"Parent requests to be called daily at 9am for status\", \"I have a hard time hearing out of my right ear\", or \"Do not touch me to wake me up as it startles  me\".  Outcome: Progressing  Goal: Absence of Hospital-Acquired Illness or Injury  Outcome: Progressing  Intervention: Identify and Manage Fall Risk  Recent Flowsheet Documentation  Taken 6/1/2024 1637 by Angela Duarte RN  Safety Promotion/Fall Prevention: activity supervised  Intervention: Prevent Skin Injury  Recent Flowsheet Documentation  Taken 6/1/2024 1637 by Angela Duarte RN  Body Position: position changed independently  Skin Protection: adhesive use limited  Device Skin Pressure Protection: adhesive use limited  Intervention: Prevent and Manage VTE (Venous Thromboembolism) Risk  Recent Flowsheet Documentation  Taken 6/1/2024 1637 by Angela Duarte RN  VTE Prevention/Management: SCDs (sequential compression devices) off  Intervention: Prevent Infection  Recent Flowsheet Documentation  Taken 6/1/2024 1637 by Angela Duarte RN  Infection Prevention: hand hygiene promoted  Goal: Optimal Comfort and Wellbeing  Outcome: " Progressing  Goal: Readiness for Transition of Care  Outcome: Progressing

## 2024-06-02 NOTE — PLAN OF CARE
"Goal Outcome Evaluation:      Plan of Care Reviewed With: patient    Overall Patient Progress: improvingOverall Patient Progress: improving    Outcome Evaluation: Remains on baseline O2, productive cough and SOB/KHALIL noted, monitor BPs    /70 (BP Location: Right arm)   Pulse 63   Temp 97  F (36.1  C) (Temporal)   Resp 18   Ht 1.486 m (4' 10.5\")   Wt 111.4 kg (245 lb 8 oz)   LMP 09/15/2007   SpO2 96%   BMI 50.44 kg/m      On 2L via NC.     Pertinent Assessments: A/Ox4. 5-6/10 baseline pain to sciatic region that radiates down legs. Up 1A gb/w. Voiding via BR. Ls wheezes, productive cough noted. C/O SOB and KHALIL. Pt concerned about Bps as previously meds were held.   Major Shift Events: None  Treatment Plan: PT following. IV abx. Anticipate discharge tomorrow home. Wean oxygen as able.                                 Problem: Adult Inpatient Plan of Care  Goal: Plan of Care Review  Description: The Plan of Care Review/Shift note should be completed every shift.  The Outcome Evaluation is a brief statement about your assessment that the patient is improving, declining, or no change.  This information will be displayed automatically on your shift  note.  Outcome: Progressing  Flowsheets (Taken 6/2/2024 1243)  Outcome Evaluation: Remains on baseline O2, productive cough and SOB/KHALIL noted, monitor BPs  Plan of Care Reviewed With: patient  Overall Patient Progress: improving  Goal: Patient-Specific Goal (Individualized)  Description: You can add care plan individualizations to a care plan. Examples of Individualization might be:  \"Parent requests to be called daily at 9am for status\", \"I have a hard time hearing out of my right ear\", or \"Do not touch me to wake me up as it startles  me\".  Outcome: Progressing  Goal: Absence of Hospital-Acquired Illness or Injury  Outcome: Progressing  Intervention: Identify and Manage Fall Risk  Recent Flowsheet Documentation  Taken 6/2/2024 0842 by Bhavani Madrigal, " RN  Safety Promotion/Fall Prevention:   activity supervised   clutter free environment maintained   nonskid shoes/slippers when out of bed   patient and family education   safety round/check completed  Intervention: Prevent Skin Injury  Recent Flowsheet Documentation  Taken 6/2/2024 0842 by Bhavani Madrigal RN  Body Position: position changed independently  Intervention: Prevent Infection  Recent Flowsheet Documentation  Taken 6/2/2024 0842 by Bhavani Madrigal RN  Infection Prevention:   single patient room provided   rest/sleep promoted  Goal: Optimal Comfort and Wellbeing  Outcome: Progressing  Intervention: Monitor Pain and Promote Comfort  Recent Flowsheet Documentation  Taken 6/2/2024 1232 by Bhavani Madrigal RN  Pain Management Interventions: medication (see MAR)  Goal: Readiness for Transition of Care  Outcome: Progressing     Problem: Pneumonia  Goal: Fluid Balance  Outcome: Progressing  Goal: Resolution of Infection Signs and Symptoms  Outcome: Progressing  Goal: Effective Oxygenation and Ventilation  Outcome: Progressing  Intervention: Promote Airway Secretion Clearance  Recent Flowsheet Documentation  Taken 6/2/2024 0842 by Bhavani Madrigal RN  Cough And Deep Breathing: done independently per patient     Problem: Comorbidity Management  Goal: Blood Glucose Levels Within Targeted Range  Outcome: Progressing  Intervention: Monitor and Manage Glycemia  Recent Flowsheet Documentation  Taken 6/2/2024 0842 by Bhavani Madrigal RN  Medication Review/Management: medications reviewed  Goal: Blood Pressure in Desired Range  Outcome: Progressing  Intervention: Maintain Blood Pressure Management  Recent Flowsheet Documentation  Taken 6/2/2024 0842 by Bhavani Madrigal RN  Medication Review/Management: medications reviewed     Problem: Infection  Goal: Absence of Infection Signs and Symptoms  Outcome: Progressing     Problem: Fall Injury Risk  Goal: Absence of Fall and Fall-Related  Injury  Outcome: Progressing  Intervention: Identify and Manage Contributors  Recent Flowsheet Documentation  Taken 6/2/2024 0842 by Bhavani Madrigal, RN  Medication Review/Management: medications reviewed  Intervention: Promote Injury-Free Environment  Recent Flowsheet Documentation  Taken 6/2/2024 0842 by Bhavani Madrigal, RN  Safety Promotion/Fall Prevention:   activity supervised   clutter free environment maintained   nonskid shoes/slippers when out of bed   patient and family education   safety round/check completed

## 2024-06-02 NOTE — PROGRESS NOTES
[facility]    Medicine Progress Note - Hospitalist Service    Date of Admission:  5/30/2024    Assessment & Plan   Summary: Nicole Reyes is a 68 year old female with PMHx of HTN, severe obesity limiting mobility, hypoventilation, hiatal hernia, GERD, MDD/RADHA, CKD2, T2DM, who was admitted on 5/30/2024 with L sided PNA, suspect aspiration.     L sided PNA, likely aspiration  Hx of aspiration  Presenting with 1 day of increased chest pain, dyspnea, and cough. She woke up from sleep abruptly the night of admission with cough and dyspnea and checked her O2 saturation, which was 88%. Reports she sleeps on her side and frequently has severe reflux. Does not have issues with aspiration when in the hospital.  On presentation to the ED, VS: /84, HR 97, RR 16 with oxygen saturation 88% breathing room air.  She was placed on 2 L oxygen by nasal cannula and oxygen came up to the low 90s.  Initial temperature 97.8 but after couple hours temperature went up to 101.4.   Labs: Creatinine 0.88 with normal electrolytes.  Initial glucose 168. Lactic acid 1.5, N-terminal proBNP 532, procalcitonin less than 0.08 and troponin T 14; all normal values.  VBG pH 7.33 pCO2 64.  WBC 13.9 with left shift hemoglobin 12.9, .  D-dimer 1.76.  Influenza A/B, COVID and RSV PCR is negative.  Blood cultures were obtained. Imaging: Left sided pneumonia involving both upper and lower lobes.  Remains on oxygen. Chronically wears oxygen at night only. Did require oxygen after last hospitalization for PNA on 3/24. May require oxygen on discharge if no improvement.  - Wean oxygen as able  - continue zosyn  - [facility] order for medical bed placed    T2DM   Hypoglycemia   Had symptomatic episode of hypoglycemia to 53 on 5/31. Suspect related to glipizide use and infection.   - Hold PTA glipizide   - LDSSI    Severe obesity  Spinal stenosis  Limited mobility 2/2 above. Follows with local spine clinic.  - PTA  "gabapentin    Hiatal hernia  GERD  - PTA pantoprazole    HTN - PTA losartan and diltiazem with hold parameters    CRISTÓBAL - does not tolerate CPAP so wears supplemental O2 at night  HLD - PTA rosuvastatin  MDD/RADHA - PTA sertraline and PRN clonazepam          Diet: Consistent Carbohydrate Diet Moderate Consistent Carb (60 g CHO per Meal) Diet    DVT Prophylaxis: Pneumatic Compression Devices  Suarez Catheter: Not present  Lines: None     Cardiac Monitoring: None  Code Status: Full Code      Clinically Significant Risk Factors            # Hypomagnesemia: Lowest Mg = 1.6 mg/dL in last 2 days, will replace as needed       # Hypertension: Noted on problem list       # DMII: A1C = 6.9 % (Ref range: <5.7 %) within past 6 months, PRESENT ON ADMISSION  # Severe Obesity: Estimated body mass index is 50.44 kg/m  as calculated from the following:    Height as of this encounter: 1.486 m (4' 10.5\").    Weight as of this encounter: 111.4 kg (245 lb 8 oz)., PRESENT ON ADMISSION            Disposition Plan     Medically Ready for Discharge: Anticipated Tomorrow pending oxygen needs             Tracy Santoyo DO  Hospitalist Service  [facility]  Securely message with [employer] (more info)  Text page via [employer] Paging/Directory   ______________________________________________________________________    Interval History   No acute overnight events. Doing ok today. Still fatigued. Had a difficult time walking the halls yesterday. Shortness of breath seems to be better but still significant cough. Using aerobika.     Physical Exam   Vital Signs: Temp: 97  F (36.1  C) Temp src: Temporal BP: 126/70 Pulse: 63   Resp: 18 SpO2: 96 % O2 Device: Nasal cannula Oxygen Delivery: 2 LPM  Weight: 245 lbs 8 oz    Constitutional: Awake, alert, no distress, and cooperative  Cardiovascular: Regular rate and rhythm, normal S1 and S2, no S3 or S4, and no murmur noted  Respiratory: No increased work of breathing, good air exchange, " clear to auscultation bilaterally, no crackles or wheezing  Gastrointestinal: Abdomen soft, non-tender, non-distended. BS normal. No masses, organomegaly     Medical Decision Making       45 MINUTES SPENT BY ME on the date of service doing chart review, history, exam, documentation & further activities per the note.      Data     I have personally reviewed the following data over the past 24 hrs:    8.5  \   9.7 (L)   / 175     143 107 10.9 /  114 (H)   3.9 26 0.79 \

## 2024-06-03 ENCOUNTER — TELEPHONE (OUTPATIENT)
Dept: INTERNAL MEDICINE | Facility: CLINIC | Age: 69
End: 2024-06-03
Payer: MEDICARE

## 2024-06-03 VITALS
BODY MASS INDEX: 49.49 KG/M2 | HEART RATE: 55 BPM | DIASTOLIC BLOOD PRESSURE: 52 MMHG | WEIGHT: 245.5 LBS | RESPIRATION RATE: 18 BRPM | OXYGEN SATURATION: 91 % | TEMPERATURE: 96.8 F | HEIGHT: 59 IN | SYSTOLIC BLOOD PRESSURE: 108 MMHG

## 2024-06-03 LAB
ANION GAP SERPL CALCULATED.3IONS-SCNC: 10 MMOL/L (ref 7–15)
BUN SERPL-MCNC: 8.4 MG/DL (ref 8–23)
CALCIUM SERPL-MCNC: 8.6 MG/DL (ref 8.8–10.2)
CHLORIDE SERPL-SCNC: 104 MMOL/L (ref 98–107)
CREAT SERPL-MCNC: 0.77 MG/DL (ref 0.51–0.95)
DEPRECATED HCO3 PLAS-SCNC: 26 MMOL/L (ref 22–29)
EGFRCR SERPLBLD CKD-EPI 2021: 84 ML/MIN/1.73M2
ERYTHROCYTE [DISTWIDTH] IN BLOOD BY AUTOMATED COUNT: 15.1 % (ref 10–15)
GLUCOSE BLDC GLUCOMTR-MCNC: 103 MG/DL (ref 70–99)
GLUCOSE BLDC GLUCOMTR-MCNC: 110 MG/DL (ref 70–99)
GLUCOSE BLDC GLUCOMTR-MCNC: 148 MG/DL (ref 70–99)
GLUCOSE SERPL-MCNC: 114 MG/DL (ref 70–99)
HCT VFR BLD AUTO: 31.1 % (ref 35–47)
HGB BLD-MCNC: 9.7 G/DL (ref 11.7–15.7)
MAGNESIUM SERPL-MCNC: 1.7 MG/DL (ref 1.7–2.3)
MCH RBC QN AUTO: 25.9 PG (ref 26.5–33)
MCHC RBC AUTO-ENTMCNC: 31.2 G/DL (ref 31.5–36.5)
MCV RBC AUTO: 83 FL (ref 78–100)
PLATELET # BLD AUTO: 199 10E3/UL (ref 150–450)
POTASSIUM SERPL-SCNC: 3.6 MMOL/L (ref 3.4–5.3)
RBC # BLD AUTO: 3.75 10E6/UL (ref 3.8–5.2)
SODIUM SERPL-SCNC: 140 MMOL/L (ref 135–145)
WBC # BLD AUTO: 7.6 10E3/UL (ref 4–11)

## 2024-06-03 PROCEDURE — 85027 COMPLETE CBC AUTOMATED: CPT | Performed by: STUDENT IN AN ORGANIZED HEALTH CARE EDUCATION/TRAINING PROGRAM

## 2024-06-03 PROCEDURE — 80048 BASIC METABOLIC PNL TOTAL CA: CPT | Performed by: STUDENT IN AN ORGANIZED HEALTH CARE EDUCATION/TRAINING PROGRAM

## 2024-06-03 PROCEDURE — 250N000013 HC RX MED GY IP 250 OP 250 PS 637: Performed by: STUDENT IN AN ORGANIZED HEALTH CARE EDUCATION/TRAINING PROGRAM

## 2024-06-03 PROCEDURE — 36415 COLL VENOUS BLD VENIPUNCTURE: CPT | Performed by: STUDENT IN AN ORGANIZED HEALTH CARE EDUCATION/TRAINING PROGRAM

## 2024-06-03 PROCEDURE — 250N000013 HC RX MED GY IP 250 OP 250 PS 637: Performed by: INTERNAL MEDICINE

## 2024-06-03 PROCEDURE — 83735 ASSAY OF MAGNESIUM: CPT | Performed by: STUDENT IN AN ORGANIZED HEALTH CARE EDUCATION/TRAINING PROGRAM

## 2024-06-03 PROCEDURE — 99239 HOSP IP/OBS DSCHRG MGMT >30: CPT | Performed by: STUDENT IN AN ORGANIZED HEALTH CARE EDUCATION/TRAINING PROGRAM

## 2024-06-03 RX ORDER — CYCLOBENZAPRINE HCL 5 MG
5-10 TABLET ORAL 3 TIMES DAILY PRN
Qty: 20 TABLET | Refills: 0 | Status: SHIPPED | OUTPATIENT
Start: 2024-06-03 | End: 2024-06-20

## 2024-06-03 RX ADMIN — LOSARTAN POTASSIUM 100 MG: 100 TABLET, FILM COATED ORAL at 08:35

## 2024-06-03 RX ADMIN — ROSUVASTATIN CALCIUM 10 MG: 10 TABLET, FILM COATED ORAL at 08:35

## 2024-06-03 RX ADMIN — GUAIFENESIN 600 MG: 600 TABLET, EXTENDED RELEASE ORAL at 08:35

## 2024-06-03 RX ADMIN — KETOCONAZOLE: 20 CREAM TOPICAL at 08:38

## 2024-06-03 RX ADMIN — GABAPENTIN 600 MG: 300 CAPSULE ORAL at 12:00

## 2024-06-03 RX ADMIN — GABAPENTIN 900 MG: 300 CAPSULE ORAL at 08:30

## 2024-06-03 RX ADMIN — DICLOFENAC SODIUM 2 G: 10 GEL TOPICAL at 08:59

## 2024-06-03 RX ADMIN — AMOXICILLIN AND CLAVULANATE POTASSIUM 1 TABLET: 875; 125 TABLET, FILM COATED ORAL at 08:59

## 2024-06-03 RX ADMIN — NYSTATIN 500000 UNITS: 100000 SUSPENSION ORAL at 08:29

## 2024-06-03 RX ADMIN — DILTIAZEM HYDROCHLORIDE 360 MG: 180 CAPSULE, COATED, EXTENDED RELEASE ORAL at 08:36

## 2024-06-03 RX ADMIN — SERTRALINE HYDROCHLORIDE 200 MG: 100 TABLET ORAL at 08:36

## 2024-06-03 RX ADMIN — NYSTATIN 500000 UNITS: 100000 SUSPENSION ORAL at 12:01

## 2024-06-03 RX ADMIN — Medication 1 TABLET: at 08:35

## 2024-06-03 ASSESSMENT — ACTIVITIES OF DAILY LIVING (ADL)
ADLS_ACUITY_SCORE: 31
ADLS_ACUITY_SCORE: 32
ADLS_ACUITY_SCORE: 31

## 2024-06-03 NOTE — PROGRESS NOTES
Care Management Discharge Note    Discharge Date: 06/03/2024       Discharge Disposition: Home Care    Discharge Services:      Discharge DME:      Discharge Transportation: family or friend will provide    Private pay costs discussed: Not applicable    Persons Notified of Discharge Plans: patient and home care  Patient/Family in Agreement with the Plan: yes    Additional Information:  Pt discharge home with [facility] home care PT/OT/RN. Orders sent.  Daughter is transporting.    Provided pt with a copy of the hospital bed order. Pt inquired about PCA services, Sw provided [facility] PCA info.  Pt inquired about services being covered like a family member's. SW discussed [place] [employer] assessment qualifications, pt reports being over assets for this.    J CARLOS Grewal, [facility] CenterSW  Inpatient Care Coordination  [facility]  [phone]      RAJ GREWAL

## 2024-06-03 NOTE — DISCHARGE SUMMARY
"[facility]  Hospitalist Discharge Summary      Date of Admission:  5/30/2024  Date of Discharge:  6/3/2024  3:00 PM  Discharging Provider: Tracy Santoyo DO  Discharge Service: Hospitalist Service    Discharge Diagnoses   L sided PNA, likely aspiration  Hx of aspiration  T2DM   Hypoglycemia   Severe obesity  Spinal stenosis  Hiatal hernia  GERD  HTN   CRISTÓBAL   HLD   MDD/RADHA    Clinically Significant Risk Factors     # DMII: A1C = 6.9 % (Ref range: <5.7 %) within past 6 months  # Severe Obesity: Estimated body mass index is 50.44 kg/m  as calculated from the following:    Height as of this encounter: 1.486 m (4' 10.5\").    Weight as of this encounter: 111.4 kg (245 lb 8 oz).       Follow-ups Needed After Discharge   Follow-up Appointments     Follow-up and recommended labs and tests       Follow up with primary care provider, Karla Hurts, within 7 days   for hospital follow- up.            Discharge Disposition   Discharged to home  Condition at discharge: Stable    Hospital Course   Summary: Nicole Reyes is a 68 year old female with PMHx of HTN, severe obesity limiting mobility, hypoventilation, hiatal hernia, GERD, MDD/RADHA, CKD2, T2DM, who was admitted on 5/30/2024 with L sided PNA, suspect aspiration.     L sided PNA, likely aspiration  Hx of aspiration  Presenting with 1 day of increased chest pain, dyspnea, and cough. She woke up from sleep abruptly the night of admission with cough and dyspnea and checked her O2 saturation, which was 88%. Reports she sleeps on her side and frequently has severe reflux. Does not have issues with aspiration when in the hospital.  On presentation to the ED, VS: /84, HR 97, RR 16 with oxygen saturation 88% breathing room air.  She was placed on 2 L oxygen by nasal cannula and oxygen came up to the low 90s.  Initial temperature 97.8 but after couple hours temperature went up to 101.4.   Labs: Creatinine 0.88 with normal electrolytes.  " Initial glucose 168. Lactic acid 1.5, N-terminal proBNP 532, procalcitonin less than 0.08 and troponin T 14; all normal values.  VBG pH 7.33 pCO2 64.  WBC 13.9 with left shift hemoglobin 12.9, .  D-dimer 1.76.  Influenza A/B, COVID and RSV PCR is negative.  Blood cultures were obtained. Imaging: Left sided pneumonia involving both upper and lower lobes.  Weaned to home O2 at night.   - Augmentin to complete course of antibiotics  - [facility] order for medical bed placed    T2DM   Hypoglycemia, resolved  Had symptomatic episode of hypoglycemia to 53 on 5/31. Suspect related to glipizide use and infection. Held glipizide and no further episodes of hypoglycemia.  - Resume PTA glipizide on discharge    Severe obesity  Spinal stenosis  Limited mobility 2/2 above. Follows with local spine clinic.  - PTA gabapentin    Hiatal hernia  GERD  - PTA pantoprazole    HTN - PTA losartan and diltiazem   CRISTÓBAL - does not tolerate CPAP so wears supplemental O2 at night  HLD - PTA rosuvastatin  MDD/RADHA - PTA sertraline and PRN clonazepam    Consultations This Hospital Stay   SOCIAL WORK IP CONSULT  PHYSICAL THERAPY ADULT IP CONSULT  CARE MANAGEMENT / SOCIAL WORK IP CONSULT    Code Status   Prior    Time Spent on this Encounter   I, Tracy Santoyo DO, personally saw the patient today and spent greater than 30 minutes discharging this patient.       Tracy Santoyo DO  [facility]  [address] [place] [address]  Phone: [phone]  Fax: [phone]  ______________________________________________________________________    Physical Exam   Vital Signs:                    Weight: 245 lbs 8 oz  Constitutional: Awake, alert, no distress, and cooperative  Cardiovascular: Regular rate and rhythm, normal S1 and S2, no S3 or S4, and no murmur noted  Respiratory: No increased work of breathing, good air exchange, clear to auscultation bilaterally, no crackles or wheezing  Gastrointestinal:  "Abdomen soft, non-tender, non-distended. BS normal. No masses, organomegaly        Primary Care Physician   Karla Hurst    Discharge Orders      Primary Care - Care Coordination Referral      Home Care Referral      Reason for your hospital stay    You were in the hospital for aspiration pneumonia. You will need to complete a course of antibiotics with augmentin.     Follow-up and recommended labs and tests     Follow up with primary care provider, Karla Hurst, within 7 days for hospital follow- up.     Activity    Your activity upon discharge: activity as tolerated     Hospital Bed Order    Hospital Bed Documentation:   Hospital bed is required for body positioning, to allow for safe transfers to wheelchair and standing and frequent changes in body position, not feasible in an ordinary bed     NOTE: Patient must have a \"Yes\" in one of the four following questions to qualify for a hospital bed.    1. Does the patient require positioning of the body in ways not feasible with an ordinary bed due to a medical condition that is expected to last at least 1 month? Yes (Please explain): recurrent aspiration while laying flat, resulting in PNA     2. Does the patient require, for the alleviation of pain, positioning of the body in ways not feasible with an ordinary bed? No     3. Does the patient require the head of the bed to be elevated more than 30 degrees most of the time due to congestive heart failure, chronic pulmonary disease, or aspiration? Yes (Please explain): recurrent aspiration while laying flat, resulting in PNA     4. Does the patient require traction that can only be attached to a hospital bed? No    Additional Criteria:    Does the patient require frequent changes in body position and/or have an immediate need for change in body position? No - Does the patient require a bed height different than a fixed height hospital bed to permit transfers to chair, wheelchair, or standing position?  Yes - " Patient qualifies for Variable Height Manual Bed     Trapeze Criteria:  (Patient must meet standard hospital bed criteria also)   1. Does patient need this device to sit up because of a respiratory condition, for change in body position for other medical reasons, or to get in or out of bed? Yes (Please explain): recurrent aspiration while laying flat, resulting in PNA       I, the undersigned, certify that the above prescribed supplies are medically necessary for this patient and is both reasonable and necessary in reference to accepted standards of medical and necessary in reference to accepted standards of medical practice in the treatment of this patient's condition and is not prescribed as a convenience.     Diet    Follow this diet upon discharge:       Consistent Carbohydrate Diet Moderate Consistent Carb (60 g CHO per Meal) Diet       Significant Results and Procedures   Results for orders placed or performed during the hospital encounter of 05/30/24   CT Chest Pulmonary Embolism w Contrast    Narrative    EXAM: CT CHEST PULMONARY EMBOLISM W CONTRAST  LOCATION: [facility]  DATE: 5/30/2024    INDICATION: SOB, hypoxia  COMPARISON: 3/27/2024  TECHNIQUE: CT chest pulmonary angiogram during arterial phase injection of IV contrast. Multiplanar reformats and MIP reconstructions were performed. Dose reduction techniques were used.   CONTRAST: 73 mL Isovue 370    FINDINGS:  ANGIOGRAM CHEST: Pulmonary arteries are normal caliber and negative for pulmonary emboli. Thoracic aorta is negative for dissection. No CT evidence of right heart strain.    LUNGS AND PLEURA: Extensive consolidative airspace infiltrate persists within right upper and lower lobes similar to previous exam though may be minimally worse. Findings suggest recurrent left lung pneumonia. No pleural effusion. Right lung clear.    MEDIASTINUM/AXILLAE: Moderate hiatal hernia unchanged. Small left hilar lymph nodes likely reactive.  Borderline cardiomegaly.    CORONARY ARTERY CALCIFICATION: None.    UPPER ABDOMEN: Cholelithiasis unchanged.    MUSCULOSKELETAL: No suspicious bony lesion.      Impression    IMPRESSION:  1.  No pulmonary emboli identified.  2.  Extensive airspace infiltrates left lung very similar to prior study, may be slightly worse. Findings suggest recurrent pneumonia.     *Note: Due to a large number of results and/or encounters for the requested time period, some results have not been displayed. A complete set of results can be found in Results Review.       Discharge Medications   Discharge Medication List as of 6/3/2024  2:24 PM        START taking these medications    Details   amoxicillin-clavulanate (AUGMENTIN) 875-125 MG tablet Take 1 tablet by mouth every 12 hours, Disp-6 tablet, R-0, E-Prescribe      cyclobenzaprine (FLEXERIL) 5 MG tablet Take 1-2 tablets (5-10 mg) by mouth 3 times daily as needed for muscle spasms, Disp-20 tablet, R-0, E-Prescribe           CONTINUE these medications which have NOT CHANGED    Details   clonazePAM (KLONOPIN) 1 MG tablet Take 1 tablet (1 mg) by mouth 2 times daily as needed for anxiety 60 to last 30 days, Disp-60 tablet, R-5, E-Prescribe      diltiazem ER (TIADYLT ER) 360 MG 24 hr ER beaded capsule Take 1 capsule (360 mg) by mouth daily, Disp-90 capsule, R-3, E-Prescribe      Fish Oil-Krill Oil (KRILL & FISH OIL BLEND PO) Take 1 capsule by mouth daily, Historical      furosemide (LASIX) 20 MG tablet Take 1 tablet (20 mg) by mouth daily as needed (edema), Disp-90 tablet, R-3, E-Prescribe      !! gabapentin (NEURONTIN) 300 MG capsule Take 600 mg by mouth daily (with lunch) 3 capsules every am, 2 capsules midday and 3 caps at night, Historical      !! gabapentin (NEURONTIN) 300 MG capsule 3 capsules every am, 2 capsules midday and 3 caps at night, Disp-240 capsule, R-3, E-Prescribe      glipiZIDE (GLUCOTROL XL) 10 MG 24 hr tablet Take 1 tablet (10 mg) by mouth daily, Disp-90 tablet, R-3,  E-Prescribe      ketoconazole (NIZORAL) 2 % external cream Apply to fold areas BID PRNDisp-60 g, [id number]      losartan (COZAAR) 100 MG tablet Take 1 tablet (100 mg) by mouth daily, Disp-90 tablet, R-3, E-Prescribe      melatonin 5 MG tablet Take 5 mg by mouth nightly as needed for sleep, Historical      multivitamin w/minerals (THERA-VIT-M) tablet Take 1 tablet by mouth daily, Historical      pantoprazole (PROTONIX) 40 MG EC tablet TAKE 1 TABLET BY MOUTH EVERYDAY AT BEDTIME, Disp-90 tablet, R-3, E-Prescribe      potassium chloride ER (K-TAB) 20 MEQ CR tablet Take 40 meq daily, Disp-180 tablet, R-1, E-Prescribe      rosuvastatin (CRESTOR) 10 MG tablet Take 1 tablet (10 mg) by mouth daily, Disp-90 tablet, R-3, E-Prescribe      sertraline (ZOLOFT) 100 MG tablet Take 2 tablets by mouth once daily, Disp-180 tablet, R-3, E-Prescribe      zolpidem (AMBIEN) 10 MG tablet Take 1 tablet (10 mg) by mouth at bedtime, Disp-90 tablet, R-1, E-Prescribe      semaglutide (OZEMPIC, 0.25 OR 0.5 MG/DOSE,) 2 MG/3ML pen INJECT 0.5 MG SUBCUTANEOUS EVERY 7 DAYS MAY INCREASE DOSE IN A MONTH IF TOLERATED, Disp-3 mL, R-0, E-Prescribe       !! - Potential duplicate medications found. Please discuss with provider.        Allergies   Allergies   Allergen Reactions    Metformin GI Disturbance     High dose    Morphine And Codeine Rash    Penicillins Rash     Pt has tolerated cephalosporins and penems.   Pt tolerated Zosyn 7/6/2019

## 2024-06-03 NOTE — TELEPHONE ENCOUNTER
Thank you noted. Branch updated.  Thank you,  Yaquelin Blas LPN  [facility]

## 2024-06-03 NOTE — PROGRESS NOTES
I was requested by RN to order pain medication for patient's upper back pain which is about 8 out of 10.  Patient was seen and examined at bedside.  She said that it feels like a muscular pain which is present right in between her shoulder blades.  Pain does not radiate down to her lower back or legs.  Pain does not radiate through the chest.  Upon examination no muscle tenderness or muscle spasm was noted.  Patient is in the hospital since 5/30 and is spending most of her time in bed so likely positional/muscular.  Patient tried Tylenol, heat, massage without any help.  Will order cyclobenzaprine 10 mg once and diclofenac gel topical 4 times daily.

## 2024-06-03 NOTE — PLAN OF CARE
"Goal Outcome Evaluation:         Plan of Care Reviewed With: patient     Overall Patient Progress: improvingOverall Patient Progress: improving     Outcome Evaluation: Plan to discharge home today     Pain managed with Volteren Gel/Lido patch/Flexeril/Tylenol. She did declined the Voltaren and patch this afternoon.  PO Amoxicillin started. Unable to wean off O2. Remains 1L NC.  Daughter is taking pt home. Discharge medication Amoxicillin given to pt. Discharge instructions given to pt. Questions answered and all belongings taken with pt.  MD added Muscle relaxant medication order at discharge. The script was E-scribed to [facility] in [place]  Problem: Adult Inpatient Plan of Care  Goal: Plan of Care Review  Description: The Plan of Care Review/Shift note should be completed every shift.  The Outcome Evaluation is a brief statement about your assessment that the patient is improving, declining, or no change.  This information will be displayed automatically on your shift  note.  Outcome: Met  Flowsheets (Taken 6/3/2024 1347)  Outcome Evaluation: pt discharging home with daugther about 1530. Discharge Amoxillin medications given to pt.  Plan of Care Reviewed With: patient  Overall Patient Progress: improving  Goal: Patient-Specific Goal (Individualized)  Description: You can add care plan individualizations to a care plan. Examples of Individualization might be:  \"Parent requests to be called daily at 9am for status\", \"I have a hard time hearing out of my right ear\", or \"Do not touch me to wake me up as it startles  me\".  Outcome: Met  Goal: Absence of Hospital-Acquired Illness or Injury  Outcome: Met  Intervention: Identify and Manage Fall Risk  Recent Flowsheet Documentation  Taken 6/3/2024 0830 by Jessy Cobos RN  Safety Promotion/Fall Prevention:   activity supervised   assistive device/personal items within reach   clutter free environment maintained   lighting adjusted   mobility aid in reach   " nonskid shoes/slippers when out of bed   patient and family education   safety round/check completed   supervised activity  Intervention: Prevent Skin Injury  Recent Flowsheet Documentation  Taken 6/3/2024 0830 by Jessy Cobos RN  Body Position: supine, head elevated  Skin Protection:   adhesive use limited   pulse oximeter probe site changed   transparent dressing maintained  Device Skin Pressure Protection:   absorbent pad utilized/changed   adhesive use limited   tubing/devices free from skin contact  Intervention: Prevent and Manage VTE (Venous Thromboembolism) Risk  Recent Flowsheet Documentation  Taken 6/3/2024 0830 by Jessy oCbos RN  VTE Prevention/Management: SCDs (sequential compression devices) on  Intervention: Prevent Infection  Recent Flowsheet Documentation  Taken 6/3/2024 0830 by Jessy Cobos RN  Infection Prevention:   environmental surveillance performed   hand hygiene promoted   single patient room provided  Goal: Optimal Comfort and Wellbeing  Outcome: Met  Intervention: Monitor Pain and Promote Comfort  Recent Flowsheet Documentation  Taken 6/3/2024 1200 by Jessy Cobos RN  Pain Management Interventions: medication (see MAR)  Taken 6/3/2024 0830 by Jessy Cobso RN  Pain Management Interventions: heat applied  Goal: Readiness for Transition of Care  Outcome: Met     Problem: Pneumonia  Goal: Fluid Balance  Outcome: Met  Intervention: Monitor and Manage Fluid Balance  Recent Flowsheet Documentation  Taken 6/3/2024 0830 by Jessy Cobos RN  Fluid/Electrolyte Management: fluids provided  Goal: Resolution of Infection Signs and Symptoms  Outcome: Met  Intervention: Prevent Infection Progression  Recent Flowsheet Documentation  Taken 6/3/2024 0830 by Jessy Cobos RN  Infection Management: aseptic technique maintained  Goal: Effective Oxygenation and Ventilation  Outcome: Met  Intervention: Promote Airway Secretion Clearance  Recent Flowsheet  Documentation  Taken 6/3/2024 0830 by Jessy Cobos RN  Breathing Techniques/Airway Clearance: deep/controlled cough encouraged  Cough And Deep Breathing: done independently per patient  Intervention: Optimize Oxygenation and Ventilation  Recent Flowsheet Documentation  Taken 6/3/2024 0830 by Jessy Cobos RN  Airway/Ventilation Management:   airway patency maintained   pulmonary hygiene promoted  Head of Bed (HOB) Positioning: HOB at 20-30 degrees     Problem: Comorbidity Management  Goal: Blood Glucose Levels Within Targeted Range  Outcome: Met  Intervention: Monitor and Manage Glycemia  Recent Flowsheet Documentation  Taken 6/3/2024 0830 by Jessy Cobos RN  Glycemic Management: blood glucose monitored  Medication Review/Management: medications reviewed  Goal: Blood Pressure in Desired Range  Outcome: Met  Intervention: Maintain Blood Pressure Management  Recent Flowsheet Documentation  Taken 6/3/2024 0830 by Jessy Cobos RN  Medication Review/Management: medications reviewed     Problem: Infection  Goal: Absence of Infection Signs and Symptoms  Outcome: Met  Intervention: Prevent or Manage Infection  Recent Flowsheet Documentation  Taken 6/3/2024 0830 by Jessy Cobos RN  Infection Management: aseptic technique maintained     Problem: Fall Injury Risk  Goal: Absence of Fall and Fall-Related Injury  Outcome: Met  Intervention: Identify and Manage Contributors  Recent Flowsheet Documentation  Taken 6/3/2024 0830 by Jessy Cobos RN  Self-Care Promotion:   independence encouraged   BADL personal objects within reach   adaptive equipment use encouraged  Medication Review/Management: medications reviewed  Intervention: Promote Injury-Free Environment  Recent Flowsheet Documentation  Taken 6/3/2024 0830 by Jessy Cobos RN  Safety Promotion/Fall Prevention:   activity supervised   assistive device/personal items within reach   clutter free environment maintained   lighting  adjusted   mobility aid in reach   nonskid shoes/slippers when out of bed   patient and family education   safety round/check completed   supervised activity

## 2024-06-03 NOTE — TELEPHONE ENCOUNTER
Please approve the delay start of care, start of care to begin 6/6.  Thank you,  Yaquelin Blas LPN  [facility]  [phone]

## 2024-06-03 NOTE — PLAN OF CARE
"Physical Therapy Discharge Summary    Reason for therapy discharge:    Discharged to home with home therapy.    Progress towards therapy goal(s). See goals on Care Plan [facility] record for goal details.  Goals not met.  Barriers to achieving goals:   discharge from facility.    Therapy recommendation(s):    Continued therapy is recommended.  Rationale/Recommendations:  Per prior PT recommendation, \"Pt currently slightly below baseline, able to ambulate with SBA but limited by decreased activity tolerance and SOB. Pt appropriate to discharge home with dtr. Had been planning to start up OP PT due to chronic back pain, may benefit from consideration of HHPT instead pending progression throughout hospital stay\".      "

## 2024-06-03 NOTE — PLAN OF CARE
"Goal Outcome Evaluation:    A&O x4   VSS weaned to 1 LPM tolerating well   Ambulated with PT in hallway, tolerated well & maintained 02 saturations   Pain managed with gabapentin, tylenol, heating pad, massage, flexeril, lidocaine patch   IV Zosyn given   Mod carb/CC diet maintained  Ax1 GB & walker for ambulation   Blood glucose monitored & corrected as needed   IS utilized tolerating well   Skin barrier cream applied in abd skin folds   K& mag protocol AM recheck   IV infiltrated with last dose of Zosyn given 2230, removed, iced, elevated, circumference/edema measured    Problem: Adult Inpatient Plan of Care  Goal: Plan of Care Review  Description: The Plan of Care Review/Shift note should be completed every shift.  The Outcome Evaluation is a brief statement about your assessment that the patient is improving, declining, or no change.  This information will be displayed automatically on your shift  note.  6/2/2024 2246 by Angela Duarte, RN  Outcome: Progressing  Flowsheets (Taken 6/2/2024 2246)  Plan of Care Reviewed With: patient  Overall Patient Progress: improving  6/2/2024 1726 by Angela Duarte RN  Outcome: Progressing  Flowsheets (Taken 6/2/2024 1726)  Plan of Care Reviewed With: patient  Overall Patient Progress: improving  Goal: Patient-Specific Goal (Individualized)  Description: You can add care plan individualizations to a care plan. Examples of Individualization might be:  \"Parent requests to be called daily at 9am for status\", \"I have a hard time hearing out of my right ear\", or \"Do not touch me to wake me up as it startles  me\".  6/2/2024 2246 by Angela Duarte, RN  Outcome: Progressing  6/2/2024 1726 by Angela Duarte, RN  Outcome: Progressing  Goal: Absence of Hospital-Acquired Illness or Injury  6/2/2024 2246 by Angela Duarte RN  Outcome: Progressing  6/2/2024 1726 by Angela Duarte, RN  Outcome: Progressing  Intervention: Identify and Manage Fall Risk  Recent " Flowsheet Documentation  Taken 6/2/2024 1607 by Angela Duarte RN  Safety Promotion/Fall Prevention: activity supervised  Intervention: Prevent Skin Injury  Recent Flowsheet Documentation  Taken 6/2/2024 1607 by Angela Duarte RN  Body Position: position changed independently  Skin Protection: adhesive use limited  Device Skin Pressure Protection: absorbent pad utilized/changed  Intervention: Prevent and Manage VTE (Venous Thromboembolism) Risk  Recent Flowsheet Documentation  Taken 6/2/2024 1607 by Angela Duarte RN  VTE Prevention/Management: SCDs (sequential compression devices) on  Intervention: Prevent Infection  Recent Flowsheet Documentation  Taken 6/2/2024 1607 by Angela Duarte RN  Infection Prevention:   hand hygiene promoted   single patient room provided  Goal: Optimal Comfort and Wellbeing  6/2/2024 2246 by Angela Duarte RN  Outcome: Progressing  6/2/2024 1726 by Angela Duarte RN  Outcome: Progressing  Intervention: Monitor Pain and Promote Comfort  Recent Flowsheet Documentation  Taken 6/2/2024 2048 by Angela Duarte RN  Pain Management Interventions:   medication (see MAR)   back rub   heat applied  Goal: Readiness for Transition of Care  6/2/2024 2246 by Angela Duarte RN  Outcome: Progressing  Flowsheets (Taken 6/2/2024 2246)  Anticipated Changes Related to Illness: none  Transportation Anticipated: family or friend will provide  6/2/2024 1726 by Angela Duarte RN  Outcome: Progressing  Intervention: Mutually Develop Transition Plan  Recent Flowsheet Documentation  Taken 6/2/2024 2246 by Angela Duarte RN  Anticipated Changes Related to Illness: none  Transportation Anticipated: family or friend will provide         Plan of Care Reviewed With: patient    Overall Patient Progress: improvingOverall Patient Progress: improving

## 2024-06-03 NOTE — PLAN OF CARE
"Goal Outcome Evaluation:      Plan of Care Reviewed With: patient    Overall Patient Progress: improvingOverall Patient Progress: improving    Outcome Evaluation: Plan to discharge home today    Pain managed with Volteren Gel/Lido patch/Flexeril/Tylenol. Voiding. No iv access-pt did not want another one started. Paged out to [facility] for Oral Antibiotic-told to follow-up with Day shift Hospitalist. Remains on baseline Oxygen.         Problem: Adult Inpatient Plan of Care  Goal: Plan of Care Review  Description: The Plan of Care Review/Shift note should be completed every shift.  The Outcome Evaluation is a brief statement about your assessment that the patient is improving, declining, or no change.  This information will be displayed automatically on your shift  note.  Outcome: Progressing  Flowsheets (Taken 6/3/2024 0638)  Outcome Evaluation: Plan to discharge home today  Plan of Care Reviewed With: patient  Overall Patient Progress: improving  Goal: Patient-Specific Goal (Individualized)  Description: You can add care plan individualizations to a care plan. Examples of Individualization might be:  \"Parent requests to be called daily at 9am for status\", \"I have a hard time hearing out of my right ear\", or \"Do not touch me to wake me up as it startles  me\".  Outcome: Progressing  Goal: Absence of Hospital-Acquired Illness or Injury  Outcome: Progressing  Intervention: Identify and Manage Fall Risk  Recent Flowsheet Documentation  Taken 6/3/2024 0015 by Ely Sagastume, RN  Safety Promotion/Fall Prevention:   activity supervised   assistive device/personal items within reach   clutter free environment maintained   nonskid shoes/slippers when out of bed  Intervention: Prevent Skin Injury  Recent Flowsheet Documentation  Taken 6/3/2024 0015 by Ely Sagastume, RN  Body Position: position changed independently  Intervention: Prevent and Manage VTE (Venous Thromboembolism) Risk  Recent Flowsheet Documentation  Taken " 6/3/2024 0015 by Ely Sagastume RN  VTE Prevention/Management: SCDs (sequential compression devices) on  Intervention: Prevent Infection  Recent Flowsheet Documentation  Taken 6/3/2024 0015 by Ely Sagastume RN  Infection Prevention:   hand hygiene promoted   rest/sleep promoted   single patient room provided  Goal: Optimal Comfort and Wellbeing  Outcome: Progressing  Intervention: Monitor Pain and Promote Comfort  Recent Flowsheet Documentation  Taken 6/3/2024 0015 by Ely Sagastume RN  Pain Management Interventions: declines  Goal: Readiness for Transition of Care  Outcome: Progressing     Problem: Pneumonia  Goal: Fluid Balance  Outcome: Progressing  Goal: Resolution of Infection Signs and Symptoms  Outcome: Progressing  Goal: Effective Oxygenation and Ventilation  Outcome: Progressing  Intervention: Promote Airway Secretion Clearance  Recent Flowsheet Documentation  Taken 6/3/2024 0015 by Ely Sagastume RN  Breathing Techniques/Airway Clearance: deep/controlled cough encouraged  Cough And Deep Breathing: done independently per patient  Intervention: Optimize Oxygenation and Ventilation  Recent Flowsheet Documentation  Taken 6/3/2024 0015 by Ely Sagastume RN  Airway/Ventilation Management:   airway patency maintained   calming measures promoted   pulmonary hygiene promoted  Head of Bed (HOB) Positioning: HOB at 20-30 degrees     Problem: Comorbidity Management  Goal: Blood Glucose Levels Within Targeted Range  Outcome: Progressing  Intervention: Monitor and Manage Glycemia  Recent Flowsheet Documentation  Taken 6/3/2024 0015 by Ely Sagastume RN  Glycemic Management: blood glucose monitored  Medication Review/Management: medications reviewed  Goal: Blood Pressure in Desired Range  Outcome: Progressing  Intervention: Maintain Blood Pressure Management  Recent Flowsheet Documentation  Taken 6/3/2024 0015 by Ely Sagastume RN  Medication Review/Management: medications reviewed     Problem:  Infection  Goal: Absence of Infection Signs and Symptoms  Outcome: Progressing     Problem: Fall Injury Risk  Goal: Absence of Fall and Fall-Related Injury  Outcome: Progressing  Intervention: Identify and Manage Contributors  Recent Flowsheet Documentation  Taken 6/3/2024 0015 by Ely Sagastume, RN  Self-Care Promotion: independence encouraged  Medication Review/Management: medications reviewed  Intervention: Promote Injury-Free Environment  Recent Flowsheet Documentation  Taken 6/3/2024 0015 by Ely Sagastume, RN  Safety Promotion/Fall Prevention:   activity supervised   assistive device/personal items within reach   clutter free environment maintained   nonskid shoes/slippers when out of bed

## 2024-06-04 ENCOUNTER — PATIENT OUTREACH (OUTPATIENT)
Dept: CARE COORDINATION | Facility: CLINIC | Age: 69
End: 2024-06-04
Payer: MEDICARE

## 2024-06-04 LAB
BACTERIA BLD CULT: NO GROWTH
BACTERIA BLD CULT: NO GROWTH

## 2024-06-04 NOTE — PROGRESS NOTES
Clinic Care Coordination Contact  Transitions of Care Outreach  Chief Complaint   Patient presents with    Clinic Care Coordination - Initial    Clinic Care Coordination - Post Hospital     Most Recent Admission Date: 5/30/2024   Most Recent Admission Diagnosis: Respiratory insufficiency - R06.89  Community acquired pneumonia of left lower lobe of lung - J18.9     Most Recent Discharge Date: 6/3/2024   Most Recent Discharge Diagnosis: Community acquired pneumonia of left lower lobe of lung - J18.9  Respiratory insufficiency - R06.89  Recurrent aspiration pneumonia (H) - J69.0  Morbid obesity (H) - E66.01     Transitions of Care Assessment    Discharge Assessment  How are you doing now that you are home?: Doing pretty good, tired. Feeling better.  How are your symptoms? (Red Flag symptoms escalate to triage hotline per guidelines): Improved  Do you know how to contact your clinic care team if you have future questions or changes to your health status? : Yes  Does the patient have their discharge instructions? : Yes  Does the patient have questions regarding their discharge instructions? : No  Were you started on any new medications or were there changes to any of your previous medications? : Yes  Does the patient have all of their medications?: Yes  Do you have questions regarding any of your medications? : No  Do you have all of your needed medical supplies or equipment (DME)?  (i.e. oxygen tank, CPAP, cane, etc.): Yes    Post-op ([facility] Only)  If the patient had a surgery or procedure, do they have any questions for a nurse?: No    Post-op (Clinicians Only)  Did the patient have surgery or a procedure: No  Fever: No  Chills: No  Eating & Drinking: eating and drinking without complaints/concerns  PO Intake:  (Moderate carb diet)  Bowel Function: loose stools  Date of last BM: 06/03/24  Urinary Status: voiding without complaint/concerns    Follow up Plan     Discharge Follow-Up  Discharge follow up appointment  scheduled in alignment with recommended follow up timeframe or Transitions of Risk Category? (Low = within 30 days; Moderate= within 14 days; High= within 7 days): No  Patient's follow up appointment not scheduled: Patient declined scheduling support. Education on the importance of transitions of care follow up. Provided scheduling phone number.    No future appointments. Discussed with patient. She will call to schedule follow up appointment.     RN CC contacted patient for post-hospital follow up and to introduce Care Coordination. Full assessment not indicated as patient declined to have Care Coordination support at this time. She was agreeable to receiving an introduction letter with additional information on Care Coordination via [employer]. Verified patient has access to [employer].     RN CC will send patient introduction letter via [employer].     Outpatient Plan as outlined on AVS reviewed with patient.    For any urgent concerns, please contact our 24 hour nurse triage line: [phone] ([phone])       Yanet Negron RN, BSN, CPHN, CCM  [facility] Ambulatory Care Management  [facility]  Phone: [phone]  Email: [email or web address]

## 2024-06-04 NOTE — LETTER
[facility] CARE COORDINATION  [address]  [facility] [address]    June 4, 2024    Nicole Reyes  [address]  [place] [address]      Dear Nicole,    I am a clinic care coordinator who works with LEONCIO Casillas CNP with the [facility]. I wanted to introduce myself and provide you with my contact information for you to be able to call me with any questions or concerns. I wanted to thank you for spending the time to talk with me.  Below is a description of clinic care coordination and how I can further assist you.       The clinic care coordination team is made up of a registered nurse, , financial resource worker and community health worker who understand the health care system. The goal of clinic care coordination is to help you manage your health and improve access to the health care system. Our team works alongside your provider to assist you in determining your health and social needs. We can help you obtain health care and community resources, providing you with necessary information and education. We can work with you through any barriers and develop a care plan that helps coordinate and strengthen the communication between you and your care team.  Our services are voluntary and are offered without charge to you personally.    Please feel free to contact me with any questions or concerns regarding care coordination and what we can offer.      We are focused on providing you with the highest-quality healthcare experience possible.    Sincerely,     Yanet Negron RN, BSN, CPHN, [facility] Ambulatory Care Management  [facility]  Phone: [phone]  Email: [email or web address]    Enclosed: Additional information on Care Coordination.     WHAT IS CARE COORDINATION?      [facility] Care Coordination supports patients and families dealing with chronic or complex health conditions,  developmental issues, and social service needs. This service is available to patients of all ages, from babies to seniors. When you re facing a difficult decision about caring for yourself or someone you love, we can help you understand your options. We identify and refer you to community resources that help with financial, legal, mental health, transportation, and other issues. We also help with your medical and related education needs.     IS CARE COORDINATION RIGHT FOR ME? Discuss a referral to Care Coordination with your primary care provider or care team member.     HOW CAN I CONNECT WITH CARE COORDINATION?  Contact your clinic.  Speak with your doctor or clinic staff.  Discuss care coordination with hospital staff before discharge.     MEET YOUR CARE COORDINATION TEAM     Registered Nurse Care Coordinator  Provides education on medications, disease management, and new diagnoses.  Addresses concerns about medical conditions and connects you to resources.  Develops patient-centered goals and communicates with patient s care team.     Social Work Care Coordinator  Provides education on emotional wellbeing.  Connects you to a variety of community-based resources.  Communicates with care team and community partners.     Community Health Worker  Identifies health and social barriers and connects you with community resources.  Develops nonclinical patient and family centered goals.  Enhances communication between patients and care teams.     Financial Resource Worker  Assists patients with applying for health [place] ([place], [employer], [employer]).  Helps patients with applying for county benefits.  Connects patients with [facility].

## 2024-06-05 ENCOUNTER — APPOINTMENT (OUTPATIENT)
Dept: GENERAL RADIOLOGY | Facility: CLINIC | Age: 69
DRG: 291 | End: 2024-06-05
Payer: MEDICARE

## 2024-06-05 ENCOUNTER — HOSPITAL ENCOUNTER (INPATIENT)
Facility: CLINIC | Age: 69
LOS: 3 days | Discharge: NURSING, REHAB OR LONG-TERM CARE | DRG: 291 | End: 2024-06-11
Admitting: INTERNAL MEDICINE
Payer: MEDICARE

## 2024-06-05 DIAGNOSIS — J69.0 RECURRENT ASPIRATION PNEUMONIA (H): ICD-10-CM

## 2024-06-05 DIAGNOSIS — F41.1 GENERALIZED ANXIETY DISORDER: ICD-10-CM

## 2024-06-05 DIAGNOSIS — G47.00 INSOMNIA, UNSPECIFIED TYPE: ICD-10-CM

## 2024-06-05 DIAGNOSIS — R60.0 LOCALIZED EDEMA: ICD-10-CM

## 2024-06-05 DIAGNOSIS — J96.01 ACUTE RESPIRATORY FAILURE WITH HYPOXIA (H): ICD-10-CM

## 2024-06-05 DIAGNOSIS — Z79.899 ON DEEP VEIN THROMBOSIS (DVT) PROPHYLAXIS: ICD-10-CM

## 2024-06-05 DIAGNOSIS — I50.22 HEART FAILURE WITH MILDLY REDUCED EJECTION FRACTION (HFMREF) (H): Primary | ICD-10-CM

## 2024-06-05 DIAGNOSIS — R79.89 ELEVATED BRAIN NATRIURETIC PEPTIDE (BNP) LEVEL: ICD-10-CM

## 2024-06-05 DIAGNOSIS — K59.00 CONSTIPATION, UNSPECIFIED CONSTIPATION TYPE: ICD-10-CM

## 2024-06-05 LAB
ANION GAP SERPL CALCULATED.3IONS-SCNC: 12 MMOL/L (ref 7–15)
ATRIAL RATE - MUSE: 77 BPM
BASOPHILS # BLD AUTO: 0.1 10E3/UL (ref 0–0.2)
BASOPHILS NFR BLD AUTO: 1 %
BUN SERPL-MCNC: 6.1 MG/DL (ref 8–23)
CALCIUM SERPL-MCNC: 8.8 MG/DL (ref 8.8–10.2)
CHLORIDE SERPL-SCNC: 106 MMOL/L (ref 98–107)
CREAT SERPL-MCNC: 0.71 MG/DL (ref 0.51–0.95)
CREAT SERPL-MCNC: 0.75 MG/DL (ref 0.51–0.95)
DEPRECATED HCO3 PLAS-SCNC: 26 MMOL/L (ref 22–29)
DIASTOLIC BLOOD PRESSURE - MUSE: NORMAL MMHG
EGFRCR SERPLBLD CKD-EPI 2021: 86 ML/MIN/1.73M2
EGFRCR SERPLBLD CKD-EPI 2021: >90 ML/MIN/1.73M2
EOSINOPHIL # BLD AUTO: 0.2 10E3/UL (ref 0–0.7)
EOSINOPHIL NFR BLD AUTO: 3 %
ERYTHROCYTE [DISTWIDTH] IN BLOOD BY AUTOMATED COUNT: 15 % (ref 10–15)
GLUCOSE BLDC GLUCOMTR-MCNC: 139 MG/DL (ref 70–99)
GLUCOSE SERPL-MCNC: 155 MG/DL (ref 70–99)
HCO3 BLDV-SCNC: 34 MMOL/L (ref 21–28)
HCT VFR BLD AUTO: 33.8 % (ref 35–47)
HGB BLD-MCNC: 10.8 G/DL (ref 11.7–15.7)
HOLD SPECIMEN: NORMAL
IMM GRANULOCYTES # BLD: 0.1 10E3/UL
IMM GRANULOCYTES NFR BLD: 1 %
INTERPRETATION ECG - MUSE: NORMAL
LACTATE BLD-SCNC: 0.4 MMOL/L
LYMPHOCYTES # BLD AUTO: 1.4 10E3/UL (ref 0.8–5.3)
LYMPHOCYTES NFR BLD AUTO: 18 %
MCH RBC QN AUTO: 26.1 PG (ref 26.5–33)
MCHC RBC AUTO-ENTMCNC: 32 G/DL (ref 31.5–36.5)
MCV RBC AUTO: 82 FL (ref 78–100)
MONOCYTES # BLD AUTO: 0.4 10E3/UL (ref 0–1.3)
MONOCYTES NFR BLD AUTO: 5 %
NEUTROPHILS # BLD AUTO: 5.8 10E3/UL (ref 1.6–8.3)
NEUTROPHILS NFR BLD AUTO: 72 %
NRBC # BLD AUTO: 0 10E3/UL
NRBC BLD AUTO-RTO: 0 /100
NT-PROBNP SERPL-MCNC: 4143 PG/ML (ref 0–900)
P AXIS - MUSE: 42 DEGREES
PCO2 BLDV: 53 MM HG (ref 40–50)
PH BLDV: 7.41 [PH] (ref 7.32–7.43)
PLATELET # BLD AUTO: 203 10E3/UL (ref 150–450)
PO2 BLDV: 25 MM HG (ref 25–47)
POTASSIUM SERPL-SCNC: 3.6 MMOL/L (ref 3.4–5.3)
PR INTERVAL - MUSE: 202 MS
QRS DURATION - MUSE: 78 MS
QT - MUSE: 386 MS
QTC - MUSE: 436 MS
R AXIS - MUSE: 28 DEGREES
RBC # BLD AUTO: 4.14 10E6/UL (ref 3.8–5.2)
SAO2 % BLDV: 43 % (ref 70–75)
SODIUM SERPL-SCNC: 144 MMOL/L (ref 135–145)
SYSTOLIC BLOOD PRESSURE - MUSE: NORMAL MMHG
T AXIS - MUSE: 47 DEGREES
TROPONIN T SERPL HS-MCNC: 12 NG/L
VENTRICULAR RATE- MUSE: 77 BPM
WBC # BLD AUTO: 8 10E3/UL (ref 4–11)

## 2024-06-05 PROCEDURE — 83880 ASSAY OF NATRIURETIC PEPTIDE: CPT

## 2024-06-05 PROCEDURE — 96372 THER/PROPH/DIAG INJ SC/IM: CPT | Performed by: INTERNAL MEDICINE

## 2024-06-05 PROCEDURE — 80048 BASIC METABOLIC PNL TOTAL CA: CPT | Performed by: EMERGENCY MEDICINE

## 2024-06-05 PROCEDURE — 71046 X-RAY EXAM CHEST 2 VIEWS: CPT

## 2024-06-05 PROCEDURE — 85025 COMPLETE CBC W/AUTO DIFF WBC: CPT | Performed by: EMERGENCY MEDICINE

## 2024-06-05 PROCEDURE — 36415 COLL VENOUS BLD VENIPUNCTURE: CPT | Performed by: EMERGENCY MEDICINE

## 2024-06-05 PROCEDURE — 82565 ASSAY OF CREATININE: CPT | Performed by: INTERNAL MEDICINE

## 2024-06-05 PROCEDURE — 36415 COLL VENOUS BLD VENIPUNCTURE: CPT

## 2024-06-05 PROCEDURE — 250N000013 HC RX MED GY IP 250 OP 250 PS 637: Performed by: INTERNAL MEDICINE

## 2024-06-05 PROCEDURE — 96376 TX/PRO/DX INJ SAME DRUG ADON: CPT

## 2024-06-05 PROCEDURE — G0378 HOSPITAL OBSERVATION PER HR: HCPCS

## 2024-06-05 PROCEDURE — 36415 COLL VENOUS BLD VENIPUNCTURE: CPT | Performed by: INTERNAL MEDICINE

## 2024-06-05 PROCEDURE — 87040 BLOOD CULTURE FOR BACTERIA: CPT

## 2024-06-05 PROCEDURE — 99285 EMERGENCY DEPT VISIT HI MDM: CPT | Mod: 25

## 2024-06-05 PROCEDURE — 96374 THER/PROPH/DIAG INJ IV PUSH: CPT | Mod: 59

## 2024-06-05 PROCEDURE — 250N000011 HC RX IP 250 OP 636

## 2024-06-05 PROCEDURE — 84484 ASSAY OF TROPONIN QUANT: CPT | Performed by: EMERGENCY MEDICINE

## 2024-06-05 PROCEDURE — 96372 THER/PROPH/DIAG INJ SC/IM: CPT

## 2024-06-05 PROCEDURE — 99222 1ST HOSP IP/OBS MODERATE 55: CPT | Mod: AI | Performed by: INTERNAL MEDICINE

## 2024-06-05 PROCEDURE — 82803 BLOOD GASES ANY COMBINATION: CPT

## 2024-06-05 PROCEDURE — 250N000011 HC RX IP 250 OP 636: Performed by: INTERNAL MEDICINE

## 2024-06-05 PROCEDURE — 93005 ELECTROCARDIOGRAM TRACING: CPT

## 2024-06-05 RX ORDER — GLIPIZIDE 10 MG/1
10 TABLET, FILM COATED, EXTENDED RELEASE ORAL DAILY
Status: DISCONTINUED | OUTPATIENT
Start: 2024-06-06 | End: 2024-06-11 | Stop reason: HOSPADM

## 2024-06-05 RX ORDER — PIPERACILLIN SODIUM, TAZOBACTAM SODIUM 4; .5 G/20ML; G/20ML
4.5 INJECTION, POWDER, LYOPHILIZED, FOR SOLUTION INTRAVENOUS EVERY 6 HOURS
Status: DISCONTINUED | OUTPATIENT
Start: 2024-06-05 | End: 2024-06-06

## 2024-06-05 RX ORDER — GABAPENTIN 300 MG/1
600 CAPSULE ORAL
Status: DISCONTINUED | OUTPATIENT
Start: 2024-06-06 | End: 2024-06-11 | Stop reason: HOSPADM

## 2024-06-05 RX ORDER — BISACODYL 10 MG
10 SUPPOSITORY, RECTAL RECTAL DAILY PRN
Status: DISCONTINUED | OUTPATIENT
Start: 2024-06-05 | End: 2024-06-11 | Stop reason: HOSPADM

## 2024-06-05 RX ORDER — ACETAMINOPHEN 650 MG/1
650 SUPPOSITORY RECTAL EVERY 4 HOURS PRN
Status: DISCONTINUED | OUTPATIENT
Start: 2024-06-05 | End: 2024-06-11 | Stop reason: HOSPADM

## 2024-06-05 RX ORDER — ZOLPIDEM TARTRATE 5 MG/1
10 TABLET ORAL AT BEDTIME
Status: DISCONTINUED | OUTPATIENT
Start: 2024-06-05 | End: 2024-06-11 | Stop reason: HOSPADM

## 2024-06-05 RX ORDER — DILTIAZEM HYDROCHLORIDE 180 MG/1
360 CAPSULE, COATED, EXTENDED RELEASE ORAL DAILY
Status: DISCONTINUED | OUTPATIENT
Start: 2024-06-06 | End: 2024-06-07

## 2024-06-05 RX ORDER — ENOXAPARIN SODIUM 100 MG/ML
40 INJECTION SUBCUTANEOUS EVERY 24 HOURS
Status: DISCONTINUED | OUTPATIENT
Start: 2024-06-05 | End: 2024-06-05

## 2024-06-05 RX ORDER — PIPERACILLIN SODIUM, TAZOBACTAM SODIUM 4; .5 G/20ML; G/20ML
4.5 INJECTION, POWDER, LYOPHILIZED, FOR SOLUTION INTRAVENOUS ONCE
Status: COMPLETED | OUTPATIENT
Start: 2024-06-05 | End: 2024-06-05

## 2024-06-05 RX ORDER — NICOTINE POLACRILEX 4 MG
15-30 LOZENGE BUCCAL
Status: DISCONTINUED | OUTPATIENT
Start: 2024-06-05 | End: 2024-06-11 | Stop reason: HOSPADM

## 2024-06-05 RX ORDER — CLONAZEPAM 0.5 MG/1
1 TABLET ORAL 2 TIMES DAILY PRN
Status: DISCONTINUED | OUTPATIENT
Start: 2024-06-05 | End: 2024-06-11 | Stop reason: HOSPADM

## 2024-06-05 RX ORDER — ONDANSETRON 2 MG/ML
4 INJECTION INTRAMUSCULAR; INTRAVENOUS EVERY 6 HOURS PRN
Status: DISCONTINUED | OUTPATIENT
Start: 2024-06-05 | End: 2024-06-11 | Stop reason: HOSPADM

## 2024-06-05 RX ORDER — ENOXAPARIN SODIUM 100 MG/ML
40 INJECTION SUBCUTANEOUS EVERY 12 HOURS
Status: DISCONTINUED | OUTPATIENT
Start: 2024-06-05 | End: 2024-06-11 | Stop reason: HOSPADM

## 2024-06-05 RX ORDER — DEXTROSE MONOHYDRATE 25 G/50ML
25-50 INJECTION, SOLUTION INTRAVENOUS
Status: DISCONTINUED | OUTPATIENT
Start: 2024-06-05 | End: 2024-06-11 | Stop reason: HOSPADM

## 2024-06-05 RX ORDER — ROSUVASTATIN CALCIUM 10 MG/1
10 TABLET, COATED ORAL DAILY
Status: DISCONTINUED | OUTPATIENT
Start: 2024-06-05 | End: 2024-06-07

## 2024-06-05 RX ORDER — PIPERACILLIN SODIUM, TAZOBACTAM SODIUM 3; .375 G/15ML; G/15ML
3.38 INJECTION, POWDER, LYOPHILIZED, FOR SOLUTION INTRAVENOUS EVERY 6 HOURS
Status: DISCONTINUED | OUTPATIENT
Start: 2024-06-05 | End: 2024-06-05

## 2024-06-05 RX ORDER — SERTRALINE HYDROCHLORIDE 100 MG/1
200 TABLET, FILM COATED ORAL DAILY
Status: DISCONTINUED | OUTPATIENT
Start: 2024-06-06 | End: 2024-06-11 | Stop reason: HOSPADM

## 2024-06-05 RX ORDER — GABAPENTIN 300 MG/1
900 CAPSULE ORAL 2 TIMES DAILY
Status: DISCONTINUED | OUTPATIENT
Start: 2024-06-05 | End: 2024-06-11 | Stop reason: HOSPADM

## 2024-06-05 RX ORDER — FUROSEMIDE 10 MG/ML
40 INJECTION INTRAMUSCULAR; INTRAVENOUS
Status: DISCONTINUED | OUTPATIENT
Start: 2024-06-05 | End: 2024-06-07

## 2024-06-05 RX ORDER — ACETAMINOPHEN 325 MG/1
650 TABLET ORAL EVERY 4 HOURS PRN
Status: DISCONTINUED | OUTPATIENT
Start: 2024-06-05 | End: 2024-06-11 | Stop reason: HOSPADM

## 2024-06-05 RX ORDER — LOSARTAN POTASSIUM 100 MG/1
100 TABLET ORAL DAILY
Status: DISCONTINUED | OUTPATIENT
Start: 2024-06-06 | End: 2024-06-11 | Stop reason: HOSPADM

## 2024-06-05 RX ORDER — POTASSIUM CHLORIDE 750 MG/1
40 TABLET, EXTENDED RELEASE ORAL DAILY
Status: DISCONTINUED | OUTPATIENT
Start: 2024-06-06 | End: 2024-06-11 | Stop reason: HOSPADM

## 2024-06-05 RX ORDER — CYCLOBENZAPRINE HCL 5 MG
5-10 TABLET ORAL 3 TIMES DAILY PRN
Status: DISCONTINUED | OUTPATIENT
Start: 2024-06-05 | End: 2024-06-11 | Stop reason: HOSPADM

## 2024-06-05 RX ORDER — PANTOPRAZOLE SODIUM 40 MG/1
40 TABLET, DELAYED RELEASE ORAL EVERY EVENING
Status: DISCONTINUED | OUTPATIENT
Start: 2024-06-05 | End: 2024-06-11 | Stop reason: HOSPADM

## 2024-06-05 RX ORDER — POLYETHYLENE GLYCOL 3350 17 G/17G
17 POWDER, FOR SOLUTION ORAL 2 TIMES DAILY PRN
Status: DISCONTINUED | OUTPATIENT
Start: 2024-06-05 | End: 2024-06-11 | Stop reason: HOSPADM

## 2024-06-05 RX ORDER — AMOXICILLIN 250 MG
2 CAPSULE ORAL 2 TIMES DAILY PRN
Status: DISCONTINUED | OUTPATIENT
Start: 2024-06-05 | End: 2024-06-11 | Stop reason: HOSPADM

## 2024-06-05 RX ORDER — AMOXICILLIN 250 MG
1 CAPSULE ORAL 2 TIMES DAILY PRN
Status: DISCONTINUED | OUTPATIENT
Start: 2024-06-05 | End: 2024-06-11 | Stop reason: HOSPADM

## 2024-06-05 RX ORDER — ONDANSETRON 4 MG/1
4 TABLET, ORALLY DISINTEGRATING ORAL EVERY 6 HOURS PRN
Status: DISCONTINUED | OUTPATIENT
Start: 2024-06-05 | End: 2024-06-11 | Stop reason: HOSPADM

## 2024-06-05 RX ADMIN — ENOXAPARIN SODIUM 40 MG: 40 INJECTION SUBCUTANEOUS at 21:01

## 2024-06-05 RX ADMIN — PIPERACILLIN AND TAZOBACTAM 4.5 G: 4; .5 INJECTION, POWDER, FOR SOLUTION INTRAVENOUS at 23:10

## 2024-06-05 RX ADMIN — PANTOPRAZOLE SODIUM 40 MG: 40 TABLET, DELAYED RELEASE ORAL at 21:00

## 2024-06-05 RX ADMIN — GABAPENTIN 900 MG: 300 CAPSULE ORAL at 21:00

## 2024-06-05 RX ADMIN — ZOLPIDEM TARTRATE 10 MG: 5 TABLET ORAL at 23:09

## 2024-06-05 RX ADMIN — PIPERACILLIN AND TAZOBACTAM 4.5 G: 4; .5 INJECTION, POWDER, FOR SOLUTION INTRAVENOUS at 16:54

## 2024-06-05 ASSESSMENT — ACTIVITIES OF DAILY LIVING (ADL)
ADLS_ACUITY_SCORE: 40
ADLS_ACUITY_SCORE: 27
ADLS_ACUITY_SCORE: 40
ADLS_ACUITY_SCORE: 27
ADLS_ACUITY_SCORE: 40
ADLS_ACUITY_SCORE: 40
ADLS_ACUITY_SCORE: 27
ADLS_ACUITY_SCORE: 40
ADLS_ACUITY_SCORE: 27

## 2024-06-05 ASSESSMENT — COLUMBIA-SUICIDE SEVERITY RATING SCALE - C-SSRS
6. HAVE YOU EVER DONE ANYTHING, STARTED TO DO ANYTHING, OR PREPARED TO DO ANYTHING TO END YOUR LIFE?: NO
1. IN THE PAST MONTH, HAVE YOU WISHED YOU WERE DEAD OR WISHED YOU COULD GO TO SLEEP AND NOT WAKE UP?: NO
2. HAVE YOU ACTUALLY HAD ANY THOUGHTS OF KILLING YOURSELF IN THE PAST MONTH?: NO

## 2024-06-05 NOTE — ED TRIAGE NOTES
Pt arrives via EMS from home, Pt was up walking and noted that her O2 was at 70%. Pt reports recent diagnosis of pneumonia and was told to wear night time O2 during the day. Pt placed on 3 L NC per EMS due to pt being in the 70's upon arrival. Pt VSS and ABC's intact

## 2024-06-05 NOTE — H&P
[facility]    History and Physical - Hospitalist Service       Date of Admission:  6/5/2024    Assessment & Plan      Nicole Reyes is a 68 year old female admitted on 6/5/2024 with acute hypoxic respiratory failure likely due to congestive heart failure. She has history of type 2 diabetes, hypertension, hypercholesterolemia, chronic kidney disease stage III, obesity, obstructive sleep apnea, GERD, anxiety, depression, chronic respiratory failure on supplemental oxygen at 2 L/min at night and with walking, and restless leg syndrome.  She was recently hospitalized here from 5/30/2024 through 6/4/2024 with left-sided pneumonia, possibly due to aspiration.  She was treated with Zosyn while in the hospital and discharged home on Augmentin.  Over the last 2 days she had been okay.  Today she noted increased fatigue and hypoxia with oxygen saturations of 70% when walking.  He called paramedics and was brought in by ambulance.  She denied chest pain.  She had a little bit of cough.  She has not had fever.    Emergency department evaluation showed oxygen saturations of 99% on 3 L/min.  Other vital signs were unremarkable.  There was no documented hypoxia in the emergency department.  Laboratory evaluation showed unremarkable BMP.  BNP was elevated at 4143.  Troponin was normal at 12.  Lactic acid was normal.  CBC showed hemoglobin of 10.8 but was otherwise unremarkable.  Venous blood gas showed pH 7.41, pCO2 53, pO2 25, and bicarb 34.  Chest x-ray suggested left lower lobe infiltrate.  Was no obvious congestive heart failure.  Patient was given Zosyn with concern of ongoing pneumonia.  I was asked to admit her for further cares.    Problem list:    Hypoxia at home  Suspected diastolic congestive heart failure  Recent left lower lung pneumonia with persistence on chest x-ray  -Patient does not seem to be getting more ill due to worsening pneumonia.  For instance, she has not had fevers or  "worsening cough or felt systemically ill.  She has not used her as needed Lasix recently.  I suspect she got some IV fluid during her recent hospital stay.  She has no history of congestive heart failure.  I am suspicious of hypoxia due to diastolic congestive heart failure.  -Admit to observation for now.  She does have increased oxygen need from baseline but does not have pulmonary edema on chest x-ray.  She was hypoxic at home but we have not documented hypoxia here.  I suspect that she may improve quite quickly with aggressive diuresis.  If hospital stay becomes prolonged for further evaluation of heart failure or ongoing diuresis consider changing to inpatient.  -Lasix 40 mg IV twice daily  -Echocardiogram tomorrow  -Daily weights and intake/output  -Daily BMP  -Continue Zosyn  -Reassess tomorrow    Hypertension  Hypercholesterolemia  -Resume prior to admission diltiazem, losartan, and Crestor    Type 2 diabetes  Chronic kidney disease stage III  -Resume prior to admission Glucotrol  -Medium resistance NovoLog insulin by sliding scale as needed    Depression  Anxiety  -Resume prior to admission Zoloft    Obesity  Obstructive sleep apnea  -Uses supplemental oxygen at night            Diet: Moderate Consistent Carb (60 g CHO per Meal) Diet    DVT Prophylaxis: Enoxaparin (Lovenox) SQ  Suarez Catheter: Not present  Lines: None     Cardiac Monitoring: ACTIVE order. Indication: Acute decompensated heart failure (48 hours)  Code Status: Full Code      Clinically Significant Risk Factors Present on Admission                  # Hypertension: Noted on problem list    # Anemia: based on hgb <11          # DMII: A1C = 6.9 % (Ref range: <5.7 %) within past 6 months    # Severe Obesity: Estimated body mass index is 50.44 kg/m  as calculated from the following:    Height as of 5/30/24: 1.486 m (4' 10.5\").    Weight as of 5/30/24: 111.4 kg (245 lb 8 oz).              Disposition Plan     Medically Ready for Discharge: " Anticipated Tomorrow or Friday           Bg Rao MD  Hospitalist Service  [facility]  Securely message with [employer] (more info)  Text page via [facility] Paging/Directory     ______________________________________________________________________    Chief Complaint   Hypoxia with activity    History is obtained from the patient, her daughter, ED provider, and the medical record    History of Present Illness   Nicole Reyes is a 68 year old female admitted on 6/5/2024 with acute hypoxic respiratory failure likely due to congestive heart failure. She has history of type 2 diabetes, hypertension, hypercholesterolemia, chronic kidney disease stage III, obesity, obstructive sleep apnea, GERD, anxiety, depression, chronic respiratory failure on supplemental oxygen at 2 L/min at night and with walking, and restless leg syndrome.  She was recently hospitalized here from 5/30/2024 through 6/4/2024 with left-sided pneumonia, possibly due to aspiration.  She was treated with Zosyn while in the hospital and discharged home on Augmentin.  Over the last 2 days she had been okay.  Today she noted increased fatigue and hypoxia with oxygen saturations of 70% when walking.  He called paramedics and was brought in by ambulance.  She denied chest pain.  She had a little bit of cough.  She has not had fever.    Emergency department evaluation showed oxygen saturations of 99% on 3 L/min.  Other vital signs were unremarkable.  There was no documented hypoxia in the emergency department.  Laboratory evaluation showed unremarkable BMP.  BNP was elevated at 4143.  Troponin was normal at 12.  Lactic acid was normal.  CBC showed hemoglobin of 10.8 but was otherwise unremarkable.  Venous blood gas showed pH 7.41, pCO2 53, pO2 25, and bicarb 34.  Chest x-ray suggested left lower lobe infiltrate.  Was no obvious congestive heart failure.  Patient was given Zosyn with concern of ongoing pneumonia.  I was asked to  admit her for further cares.      Past Medical History    Past Medical History:   Diagnosis Date    Arthritis     lt shoulder, rt. knee    Blood transfusion     CKD (chronic kidney disease) stage 3, GFR 30-59 ml/min (H)     Depressive disorder     Essential hypertension, benign     Gastroesophageal reflux disease     Generalized anxiety disorder     Hernia, abdominal     Hiatal hernia     History of blood transfusion     with a hernia repair    Hypertension     Insomnia, unspecified     Iron deficiency anemia 2009    Major depression     sees a psychiatrist    Menopause     age 53    Obesity, unspecified     CRISTÓBAL (obstructive sleep apnea)     bipap    Other chronic pain     chronic pain lt arm from tendonidits    Oxygen dependent     2 LPM at home-uses at noc or extended walking    Pneumonia     due to aspiration from hiatal hernia-    Pneumonia due to 2019 novel coronavirus 2021    Postoperative seroma 10/2011    drained several times    Pure hypercholesterolemia     RLS (restless legs syndrome)     Type II or unspecified type diabetes mellitus without mention of complication, not stated as uncontrolled        Past Surgical History   Past Surgical History:   Procedure Laterality Date    BREAST BIOPSY, RT/LT      2 times; benign    BREAST SURGERY  ?    Biopsy x 2 Benign     SECTION       SECTION       SECTION      COLONOSCOPY N/A 2020    Procedure: Colonoscopy with biopsy;  Surgeon: Obinna Gutierrez MD;  Location: [facility] OR    DILATION AND CURETTAGE, HYSTEROSCOPY DIAGNOSTIC, COMBINED  2013    Procedure: COMBINED DILATION AND CURETTAGE, HYSTEROSCOPY DIAGNOSTIC;  DILATION AND CURETTAGE, HYSTEROSCOPY DIAGNOSTIC   Converted to a general;  Surgeon: Robi Edwards MD;  Location: [facility] OR    ESOPHAGOSCOPY, GASTROSCOPY, DUODENOSCOPY (EGD), COMBINED N/A 2015    Procedure: COMBINED ESOPHAGOSCOPY, GASTROSCOPY, DUODENOSCOPY (EGD);  Surgeon: Obinna Gutierrez MD;   Location:  GI    ESOPHAGOSCOPY, GASTROSCOPY, DUODENOSCOPY (EGD), COMBINED N/A 12/22/2015    Procedure: COMBINED ENDOSCOPIC ULTRASOUND, ESOPHAGOSCOPY, GASTROSCOPY, DUODENOSCOPY (EGD), FINE NEEDLE ASPIRATE/BIOPSY;  Surgeon: Sabine Olivaers MD;  Location:  GI    ESOPHAGOSCOPY, GASTROSCOPY, DUODENOSCOPY (EGD), COMBINED N/A 01/03/2020    Procedure: ESOPHAGOGASTRODUODENOSCOPY;  Surgeon: Ascencion Stauffer MD;  Location: [facility] OR    HERNIORRHAPHY INCISIONAL (LOCATION)  10/08/2011     incarcerated hernia repair with mesh;    HERNIORRHAPHY INCISIONAL (LOCATION)  10/16/2011     repair incisional hernia with mesh, and removal of current implanted mesh;    ZZC NONSPECIFIC PROCEDURE      Hernia repair     [facility] NONSPECIFIC PROCEDURE      Lymph node biopsy in neck (benign)        Prior to Admission Medications   Prior to Admission Medications   Prescriptions Last Dose Informant Patient Reported? Taking?   Fish Oil-Krill Oil (KRILL & FISH OIL BLEND PO) Past Week  Yes Yes   Sig: Take 1 capsule by mouth daily   amoxicillin-clavulanate (AUGMENTIN) 875-125 MG tablet 6/4/2024  No Yes   Sig: Take 1 tablet by mouth every 12 hours   clonazePAM (KLONOPIN) 1 MG tablet prn  No Yes   Sig: Take 1 tablet (1 mg) by mouth 2 times daily as needed for anxiety 60 to last 30 days   cyclobenzaprine (FLEXERIL) 5 MG tablet prn  No Yes   Sig: Take 1-2 tablets (5-10 mg) by mouth 3 times daily as needed for muscle spasms   diltiazem ER (TIADYLT ER) 360 MG 24 hr ER beaded capsule 6/4/2024  No Yes   Sig: Take 1 capsule (360 mg) by mouth daily   furosemide (LASIX) 20 MG tablet prn  No Yes   Sig: Take 1 tablet (20 mg) by mouth daily as needed (edema)   gabapentin (NEURONTIN) 300 MG capsule 6/4/2024  No Yes   Sig: 3 capsules every am, 2 capsules midday and 3 caps at night   Patient taking differently: Take 900 mg by mouth 2 times daily 3 capsules every am, 2 capsules midday and 3 caps at night   gabapentin (NEURONTIN) 300 MG capsule 6/4/2024  Yes Yes    Sig: Take 600 mg by mouth daily (with lunch) 3 capsules every am, 2 capsules midday and 3 caps at night   glipiZIDE (GLUCOTROL XL) 10 MG 24 hr tablet 2024  No Yes   Sig: Take 1 tablet (10 mg) by mouth daily   ketoconazole (NIZORAL) 2 % external cream prn  No Yes   Sig: Apply to fold areas BID PRN   losartan (COZAAR) 100 MG tablet 2024  No Yes   Sig: Take 1 tablet (100 mg) by mouth daily   melatonin 5 MG tablet prn  Yes Yes   Sig: Take 5 mg by mouth nightly as needed for sleep   multivitamin w/minerals (THERA-VIT-M) tablet Past Week  Yes Yes   Sig: Take 1 tablet by mouth daily   pantoprazole (PROTONIX) 40 MG EC tablet 2024  No Yes   Sig: TAKE 1 TABLET BY MOUTH EVERYDAY AT BEDTIME   potassium chloride ER (K-TAB) 20 MEQ CR tablet 2024  No Yes   Sig: Take 40 meq daily   rosuvastatin (CRESTOR) 10 MG tablet 2024  No Yes   Sig: Take 1 tablet (10 mg) by mouth daily   semaglutide (OZEMPIC, 0.25 OR 0.5 MG/DOSE,) 2 MG/3ML pen Past Month  No Yes   Sig: INJECT 0.5 MG SUBCUTANEOUS EVERY 7 DAYS MAY INCREASE DOSE IN A MONTH IF TOLERATED   sertraline (ZOLOFT) 100 MG tablet 2024  No Yes   Sig: Take 2 tablets by mouth once daily   zolpidem (AMBIEN) 10 MG tablet 2024  No Yes   Sig: Take 1 tablet (10 mg) by mouth at bedtime      Facility-Administered Medications: None        Review of Systems    The 10 point Review of Systems is negative other than noted in the HPI or here.     Social History   I have reviewed this patient's social history and updated it with pertinent information if needed.  Social History     Tobacco Use    Smoking status: Former     Current packs/day: 0.00     Average packs/day: 1.5 packs/day for 10.0 years (15.0 ttl pk-yrs)     Types: Cigarettes     Start date: 1974     Quit date: 3/18/1979     Years since quittin.2    Smokeless tobacco: Never    Tobacco comments:     quit    Vaping Use    Vaping status: Never Used   Substance Use Topics    Alcohol use: Yes     Comment:  occasional    Drug use: No         Family History   I have reviewed this patient's family history and updated it with pertinent information if needed.  Family History   Problem Relation Age of Onset    Cardiovascular Mother         CHF    Hypertension Mother     C.A.D. Mother         50s    Lipids Mother     Coronary Artery Disease Mother     Depression Mother     Anxiety Disorder Mother     Cancer Father         Hodgkin's, Leukemia    Other Cancer Father         Hodgkins    Hypertension Brother     Diabetes Brother     Coronary Artery Disease Brother     Other Cancer Brother         Hodgkins    Cancer Brother         Hodgkin's    Coronary Artery Disease Brother     Hypertension Brother     Anxiety Disorder Brother     C.A.D. Brother 61    C.A.D. Brother     Sleep Apnea Sister     Diabetes Sister     Coronary Artery Disease Sister     Hypertension Sister     Depression Sister     Anxiety Disorder Sister     Obesity Sister     Connective Tissue Disorder Sister         Lupus    Diabetes Sister     Other Cancer Sister         Leukemia    Depression Sister     Anxiety Disorder Sister     Lipids Sister     Hypertension Sister     Hypertension Sister     Hypertension Sister     Basal cell carcinoma Daughter 38        top of head    Mental Illness Maternal Grandfather     Hypertension Daughter     Depression Daughter     Anxiety Disorder Daughter     Obesity Daughter     Obesity Son     Obesity Daughter          Allergies   Allergies   Allergen Reactions    Metformin GI Disturbance     High dose    Morphine And Codeine Rash    Penicillins Rash     Pt has tolerated cephalosporins and penems.   Pt tolerated Zosyn 7/6/2019        Physical Exam   Vital Signs: Temp: 98.5  F (36.9  C) Temp src: Oral BP: (!) 150/81 Pulse: 76   Resp: 11 SpO2: 94 % O2 Device: Nasal cannula Oxygen Delivery: 3 LPM  Weight: 0 lbs 0 oz    GENERAL: Pleasant and cooperative. No acute distress.  EYES: Pupils equal and round. No scleral erythema or  icterus.  ENT: External ears are normal without deformity. Posterior oropharynx is without erythem, swelling, or exudate.  NECK: Supple. No masses or swelling. No tenderness. Thyroid is normal without mass or tenderness.  CHEST: Clear to auscultation. Normal breath sounds. No retractions.   CV: Regular rate and rhythm. No JVD. Pulses normal.  ABDOMEN: Bowel sounds present. No tenderness. No masses or hernia.  EXTREMETIES: No clubbing, cyanosis, or ischemia.  SKIN: Warm and dry to touch. No wounds or rashes.  NEUROLOGIC: Strength and sensation are normal. Deep tendon reflexes are normal. Cranial nerves are normal.      Medical Decision Making       65 MINUTES SPENT BY ME on the date of service doing chart review, history, exam, documentation & further activities per the note.      Data     I have personally reviewed the following data over the past 24 hrs:    8.0  \   10.8 (L)   / 203     144 106 6.1 (L) /  155 (H)   3.6 26 0.71 \     Trop: 12 BNP: 4,143 (H)     Procal: N/A CRP: N/A Lactic Acid: 0.4         Imaging results reviewed over the past 24 hrs:   Recent Results (from the past 24 hour(s))   XR Chest 2 Views    Narrative    CHEST TWO VIEWS 6/5/2024 2:34 PM     HISTORY: Evaluation of ongoing hypoxia, history of recurrent  aspiration pneumonia.    COMPARISON: November 8, 2021       Impression    IMPRESSION: Lower lobe infiltrate suggested on lateral view. Mid and  upper lungs clear. The cardiac silhouette is not enlarged. Pulmonary  vasculature is unremarkable.    KELLEY LOPEZ MD         SYSTEM ID:  [id number]     Recent Labs   Lab 06/05/24  1246 06/03/24  1148 06/03/24  0751 06/03/24  0745 06/02/24  0830 06/02/24  0619   WBC 8.0  --   --  7.6  --  8.5   HGB 10.8*  --   --  9.7*  --  9.7*   MCV 82  --   --  83  --  83     --   --  199  --  175     --   --  140  --  143   POTASSIUM 3.6  --   --  3.6  --  3.9   CHLORIDE 106  --   --  104  --  107   CO2 26  --   --  26  --  26   BUN 6.1*  --   --  8.4   --  10.9   CR 0.71  --   --  0.77  --  0.79   ANIONGAP 12  --   --  10  --  10   GRECIA 8.8  --   --  8.6*  --  8.3*   * 148* 110* 114*   < > 118*    < > = values in this interval not displayed.

## 2024-06-05 NOTE — ED PROVIDER NOTES
Emergency Department Note      History of Present Illness     Chief Complaint  Shortness of Breath    HPI  Nicole Reyes is a 68 year old female with history of hypertension, diabetes, recurrent aspiration pneumonia, and respiratory failure who presents for evaluation of tachycardia and shortness of breath.  The patient states that she was discharged from the hospital 2 days ago and since then has had ongoing shortness of breath.  She states that she normally wears her oxygen at night, but has been checking her oxygen during the day which has been consistently in the 80s.  She is wearing her oxygen during the day without significant improvement of her symptoms.  Yesterday, she notes that she had sustained tachycardia in the 120-130s along with PVCs.  She is having significantly increased fatigue with walking short distances.  Given the worsening shortness of breath and tachycardia she returned to the emergency department for further evaluation.  The patient was just in the hospital for recurrent aspiration pneumonia and has been taking her antibiotics as directed.  She denies any new fever, chest pain, headache, abdominal pain, vomiting, or diarrhea.      Independent Historian  None    Review of External Notes  5/30/24-6/3/24: Patient admitted for left-sided pneumonia, likely aspiration, symptomatic episode of hypoglycemia.  Past Medical History   Medical History and Problem List  Past Medical History:   Diagnosis Date    Arthritis     Blood transfusion     CKD (chronic kidney disease) stage 3, GFR 30-59 ml/min (H)     Depressive disorder     Essential hypertension, benign     Gastroesophageal reflux disease     Generalized anxiety disorder     Hernia, abdominal     Hiatal hernia     History of blood transfusion 2011    Hypertension     Insomnia, unspecified     Iron deficiency anemia 08/2009    Major depression     Menopause     Obesity, unspecified     CRISTÓBAL (obstructive sleep apnea)     Other chronic pain      Oxygen dependent     Pneumonia     Pneumonia due to 2019 novel coronavirus 2021    Postoperative seroma 10/2011    Pure hypercholesterolemia     RLS (restless legs syndrome)     Type II or unspecified type diabetes mellitus without mention of complication, not stated as uncontrolled        Medications  amoxicillin-clavulanate (AUGMENTIN) 875-125 MG tablet  clonazePAM (KLONOPIN) 1 MG tablet  cyclobenzaprine (FLEXERIL) 5 MG tablet  diltiazem ER (TIADYLT ER) 360 MG 24 hr ER beaded capsule  Fish Oil-Krill Oil (KRILL & FISH OIL BLEND PO)  furosemide (LASIX) 20 MG tablet  gabapentin (NEURONTIN) 300 MG capsule  gabapentin (NEURONTIN) 300 MG capsule  glipiZIDE (GLUCOTROL XL) 10 MG 24 hr tablet  ketoconazole (NIZORAL) 2 % external cream  losartan (COZAAR) 100 MG tablet  melatonin 5 MG tablet  multivitamin w/minerals (THERA-VIT-M) tablet  pantoprazole (PROTONIX) 40 MG EC tablet  potassium chloride ER (K-TAB) 20 MEQ CR tablet  rosuvastatin (CRESTOR) 10 MG tablet  semaglutide (OZEMPIC, 0.25 OR 0.5 MG/DOSE,) 2 MG/3ML pen  sertraline (ZOLOFT) 100 MG tablet  zolpidem (AMBIEN) 10 MG tablet        Surgical History   Past Surgical History:   Procedure Laterality Date    BREAST BIOPSY, RT/LT      2 times; benign    BREAST SURGERY  ?    Biopsy x 2 Benign     SECTION       SECTION       SECTION      COLONOSCOPY N/A 2020    Procedure: Colonoscopy with biopsy;  Surgeon: Obinna Gutierrez MD;  Location:  OR    DILATION AND CURETTAGE, HYSTEROSCOPY DIAGNOSTIC, COMBINED  2013    Procedure: COMBINED DILATION AND CURETTAGE, HYSTEROSCOPY DIAGNOSTIC;  DILATION AND CURETTAGE, HYSTEROSCOPY DIAGNOSTIC   Converted to a general;  Surgeon: Robi Edwards MD;  Location:  OR    ESOPHAGOSCOPY, GASTROSCOPY, DUODENOSCOPY (EGD), COMBINED N/A 2015    Procedure: COMBINED ESOPHAGOSCOPY, GASTROSCOPY, DUODENOSCOPY (EGD);  Surgeon: Obinna Gutierrez MD;  Location:  GI    ESOPHAGOSCOPY,  GASTROSCOPY, DUODENOSCOPY (EGD), COMBINED N/A 12/22/2015    Procedure: COMBINED ENDOSCOPIC ULTRASOUND, ESOPHAGOSCOPY, GASTROSCOPY, DUODENOSCOPY (EGD), FINE NEEDLE ASPIRATE/BIOPSY;  Surgeon: Sabine Olivares MD;  Location:  GI    ESOPHAGOSCOPY, GASTROSCOPY, DUODENOSCOPY (EGD), COMBINED N/A 01/03/2020    Procedure: ESOPHAGOGASTRODUODENOSCOPY;  Surgeon: Ascencion Stauffer MD;  Location: [facility] OR    HERNIORRHAPHY INCISIONAL (LOCATION)  10/08/2011     incarcerated hernia repair with mesh;    HERNIORRHAPHY INCISIONAL (LOCATION)  10/16/2011     repair incisional hernia with mesh, and removal of current implanted mesh;    ZZC NONSPECIFIC PROCEDURE      Hernia repair     ZZC NONSPECIFIC PROCEDURE      Lymph node biopsy in neck (benign)      Physical Exam   Patient Vitals for the past 24 hrs:   BP Temp Temp src Pulse Resp SpO2   06/05/24 1443 135/87 -- -- 74 14 97 %   06/05/24 1235 132/83 98.5  F (36.9  C) Oral 78 22 99 %     Physical Exam  General: Alert, well developed, well nourished. Cooperative.     In moderate distress  HEENT:  Head:  Atraumatic  Ears:  External ears are normal  Eyes:   Conjunctivae normal and EOM are normal. No scleral icterus.    Pupils are equal, round, and reactive to light.   Neck:   Normal range of motion. Neck supple.  CV:  Normal rate, regular rhythm, normal heart sounds and radial pulses are 2+ and symmetric.  No murmur.  Resp:  Breath sounds are clear bilaterally    Mildly labored, no retractions or accessory muscle use  GI:  Abdomen is soft, no distension, no tenderness. No rebound or guarding.    MS:  Normal range of motion. No pitting edema.    Back atraumatic.    No midline cervical, thoracic, or lumbar tenderness  Skin:  Warm and dry.  No rash or lesions noted.  Neuro:   Alert. Normal strength.    Psych: Normal mood and affect.    Diagnostics   Lab Results   Labs Ordered and Resulted from Time of ED Arrival to Time of ED Departure   BASIC METABOLIC PANEL - Abnormal       Result  Value    Sodium 144      Potassium 3.6      Chloride 106      Carbon Dioxide (CO2) 26      Anion Gap 12      Urea Nitrogen 6.1 (*)     Creatinine 0.71      GFR Estimate >90      Calcium 8.8      Glucose 155 (*)    CBC WITH PLATELETS AND DIFFERENTIAL - Abnormal    WBC Count 8.0      RBC Count 4.14      Hemoglobin 10.8 (*)     Hematocrit 33.8 (*)     MCV 82      MCH 26.1 (*)     MCHC 32.0      RDW 15.0      Platelet Count 203      % Neutrophils 72      % Lymphocytes 18      % Monocytes 5      % Eosinophils 3      % Basophils 1      % Immature Granulocytes 1      NRBCs per 100 WBC 0      Absolute Neutrophils 5.8      Absolute Lymphocytes 1.4      Absolute Monocytes 0.4      Absolute Eosinophils 0.2      Absolute Basophils 0.1      Absolute Immature Granulocytes 0.1      Absolute NRBCs 0.0     NT PROBNP INPATIENT - Abnormal    N terminal Pro BNP Inpatient 4,143 (*)    ISTAT GASES LACTATE VENOUS POCT - Abnormal    Lactic Acid POCT 0.4      Bicarbonate Venous POCT 34 (*)     O2 Sat, Venous POCT 43 (*)     pCO2 Venous POCT 53 (*)     pH Venous POCT 7.41      pO2 Venous POCT 25     TROPONIN T, HIGH SENSITIVITY - Normal    Troponin T, High Sensitivity 12     BLOOD CULTURE   BLOOD CULTURE     Imaging  XR Chest 2 Views   Final Result   IMPRESSION: Lower lobe infiltrate suggested on lateral view. Mid and   upper lungs clear. The cardiac silhouette is not enlarged. Pulmonary   vasculature is unremarkable.      KELLEY LOPEZ MD            SYSTEM ID:  [id number]          EKG   ECG results from 06/05/24   EKG 12-lead, tracing only     Value    Systolic Blood Pressure     Diastolic Blood Pressure     Ventricular Rate 77    Atrial Rate 77    ME Interval 202    QRS Duration 78        QTc 436    P Axis 42    R AXIS 28    T Axis 47    Interpretation ECG      Sinus rhythm  Low voltage QRS  Cannot rule out Anterior infarct (cited on or before 19-JUL-2021)  Abnormal ECG  When compared with ECG of 30-MAY-2024 13:52,  No significant  change was found  Unconfirmed report - interpretation of this ECG is computer generated - see medical record for final interpretation  Confirmed by - EMERGENCY ROOM, PHYSICIAN ([id number]),  Ej Sanford ([id number]) on 6/5/2024 12:54:42 PM  Interpreted by Dr. Sanchez.      *Note: Due to a large number of results and/or encounters for the requested time period, some results have not been displayed. A complete set of results can be found in Results Review.     Independent Interpretation  None  ED Course    Medications Administered  Medications   piperacillin-tazobactam (ZOSYN) 4.5 g vial to attach to  mL bag (4.5 g Intravenous $New Bag 6/5/24 2872)       Procedures  Procedures     Discussion of Management  Patient was seen in conjunction with Dr. Sanchez.  Discussed the case with Dr. Rao who agreed to accept the patient for admission.     Social Determinants of Health adding to complexity of care  None    ED Course  I performed the initial evaluation of the patient.  Medical Decision Making / Diagnosis   CMS Diagnoses: None    MIPS  None    MDM  Nicole Reyes is a 68 year old female with history of hypertension, diabetes, recurrent aspiration pneumonia, and respiratory failure who presents for evaluation of tachycardia and shortness of breath.  On exam, the patient had labored breathing with clear breath sounds and regular rate and rhythm.  Patient is obese, but did not have significant lower extremity edema.  Vital signs are within normal limits, but patient is on 3 L of oxygen.  During ED course, she appeared to decrease oxygenation to approximately 92% while still on nasal cannula in the emergency department during reassessment.  Blood work is notable for significantly elevated BNP at 4000, which was within normal limits within the past week.  Patient has chronic anemia but otherwise there is no evidence of leukocytosis or other electrolyte abnormalities.  Initial troponin is negative.  Lactate is  within normal limits.  EKG shows no evidence of arrhythmia or ischemia.  Chest x-ray shows a lower lobe infiltrate which is consistent with the infiltrate noted on CT of the chest that was obtained within the last week.  Given worsening shortness of breath requiring increased oxygen use at home along with tachycardia with elevated BNP, we recommended she be readmitted for IV antibiotics and possible diuresis.  We discussed the case with Dr. Rao, hospitalist, who agreed to accept the patient for admission.  These findings and this plan were reviewed with the patient as well who is also in agreement and all questions were answered.    Disposition  The patient was admitted to the hospital.     ICD-10 Codes:    ICD-10-CM    1. Acute respiratory failure with hypoxia (H)  J96.01       2. Elevated brain natriuretic peptide (BNP) level  R79.89       3. Recurrent aspiration pneumonia (H)  J69.0            JAMILAH Ng Carley J, PA-C  06/06/24 1808

## 2024-06-05 NOTE — PHARMACY-ADMISSION MEDICATION HISTORY
Pharmacist Admission Medication History    Admission medication history is complete. The information provided in this note is only as accurate as the sources available at the time of the update.    Information Source(s): Patient via in-person    Pertinent Information: recent discharged (6/3/24)    Changes made to PTA medication list:  Added: None  Deleted: None  Changed: None    Allergies reviewed with patient and updates made in EHR: yes    Medication History Completed By: Bhavesh Awad [facility] 6/5/2024 5:07 PM    PTA Med List   Medication Sig Last Dose    amoxicillin-clavulanate (AUGMENTIN) 875-125 MG tablet Take 1 tablet by mouth every 12 hours 6/4/2024    clonazePAM (KLONOPIN) 1 MG tablet Take 1 tablet (1 mg) by mouth 2 times daily as needed for anxiety 60 to last 30 days prn    cyclobenzaprine (FLEXERIL) 5 MG tablet Take 1-2 tablets (5-10 mg) by mouth 3 times daily as needed for muscle spasms prn    diltiazem ER (TIADYLT ER) 360 MG 24 hr ER beaded capsule Take 1 capsule (360 mg) by mouth daily 6/4/2024    Fish Oil-Krill Oil (KRILL & FISH OIL BLEND PO) Take 1 capsule by mouth daily Past Week    furosemide (LASIX) 20 MG tablet Take 1 tablet (20 mg) by mouth daily as needed (edema) prn    gabapentin (NEURONTIN) 300 MG capsule Take 600 mg by mouth daily (with lunch) 3 capsules every am, 2 capsules midday and 3 caps at night 6/4/2024    gabapentin (NEURONTIN) 300 MG capsule 3 capsules every am, 2 capsules midday and 3 caps at night (Patient taking differently: Take 900 mg by mouth 2 times daily 3 capsules every am, 2 capsules midday and 3 caps at night) 6/4/2024    glipiZIDE (GLUCOTROL XL) 10 MG 24 hr tablet Take 1 tablet (10 mg) by mouth daily 6/4/2024    ketoconazole (NIZORAL) 2 % external cream Apply to fold areas BID PRN prn    losartan (COZAAR) 100 MG tablet Take 1 tablet (100 mg) by mouth daily 6/4/2024    melatonin 5 MG tablet Take 5 mg by mouth nightly as needed for sleep prn    multivitamin w/minerals (THERA-VIT-M)  tablet Take 1 tablet by mouth daily Past Week    pantoprazole (PROTONIX) 40 MG EC tablet TAKE 1 TABLET BY MOUTH EVERYDAY AT BEDTIME 6/4/2024    potassium chloride ER (K-TAB) 20 MEQ CR tablet Take 40 meq daily 6/4/2024    rosuvastatin (CRESTOR) 10 MG tablet Take 1 tablet (10 mg) by mouth daily 6/4/2024    semaglutide (OZEMPIC, 0.25 OR 0.5 MG/DOSE,) 2 MG/3ML pen INJECT 0.5 MG SUBCUTANEOUS EVERY 7 DAYS MAY INCREASE DOSE IN A MONTH IF TOLERATED Past Month    sertraline (ZOLOFT) 100 MG tablet Take 2 tablets by mouth once daily 6/4/2024    zolpidem (AMBIEN) 10 MG tablet Take 1 tablet (10 mg) by mouth at bedtime 6/4/2024

## 2024-06-05 NOTE — ED NOTES
[facility]  ED Nurse Handoff Report    ED Chief complaint: Shortness of Breath  . ED Diagnosis:   Final diagnoses:   Acute respiratory failure with hypoxia (H)   Elevated brain natriuretic peptide (BNP) level   Recurrent aspiration pneumonia (H)       Allergies:   Allergies   Allergen Reactions    Metformin GI Disturbance     High dose    Morphine And Codeine Rash    Penicillins Rash     Pt has tolerated cephalosporins and penems.   Pt tolerated Zosyn 7/6/2019       Code Status: Full Code    Activity level - Baseline/Home:  standby.  Activity Level - Current:   assist of 1.   Lift room needed: No.   Bariatric: No   Needed: No   Isolation: No.   Infection: Not Applicable.     Respiratory status: Nasal cannula    Vital Signs (within 30 minutes):   Vitals:    06/05/24 1235 06/05/24 1443   BP: 132/83 135/87   Pulse: 78 74   Resp: 22 14   Temp: 98.5  F (36.9  C)    TempSrc: Oral    SpO2: 99% 97%       Cardiac Rhythm:  ,      Pain level:    Patient confused: No.   Patient Falls Risk: patient and family education and activity supervised.   Elimination Status:  Has not needed to void up to this point.       Patient Report - Per provider note: Nicole Reyes is a 68 year old female with history of hypertension, diabetes, recurrent aspiration pneumonia, and respiratory failure who presents for evaluation of tachycardia and shortness of breath.  The patient states that she was discharged from the hospital 2 days ago and since then has had ongoing shortness of breath.  She states that she normally wears her oxygen at night, but has been checking her oxygen during the day which has been consistently in the 80s.  She is wearing her oxygen during the day without significant improvement of her symptoms.  Yesterday, she notes that she had sustained tachycardia in the 120-130s along with PVCs.  She is having significantly increased fatigue with walking short distances.  Given the worsening  shortness of breath and tachycardia she return to the emergency department for further evaluation.  The patient was just in the hospital for recurrent aspiration pneumonia and has been taking her antibiotics as directed.  She denies any new fever, chest pain, headache, abdominal pain, vomiting, or diarrhea. .   Focused Assessment:      Abnormal Results:   Labs Ordered and Resulted from Time of ED Arrival to Time of ED Departure   BASIC METABOLIC PANEL - Abnormal       Result Value    Sodium 144      Potassium 3.6      Chloride 106      Carbon Dioxide (CO2) 26      Anion Gap 12      Urea Nitrogen 6.1 (*)     Creatinine 0.71      GFR Estimate >90      Calcium 8.8      Glucose 155 (*)    CBC WITH PLATELETS AND DIFFERENTIAL - Abnormal    WBC Count 8.0      RBC Count 4.14      Hemoglobin 10.8 (*)     Hematocrit 33.8 (*)     MCV 82      MCH 26.1 (*)     MCHC 32.0      RDW 15.0      Platelet Count 203      % Neutrophils 72      % Lymphocytes 18      % Monocytes 5      % Eosinophils 3      % Basophils 1      % Immature Granulocytes 1      NRBCs per 100 WBC 0      Absolute Neutrophils 5.8      Absolute Lymphocytes 1.4      Absolute Monocytes 0.4      Absolute Eosinophils 0.2      Absolute Basophils 0.1      Absolute Immature Granulocytes 0.1      Absolute NRBCs 0.0     NT PROBNP INPATIENT - Abnormal    N terminal Pro BNP Inpatient 4,143 (*)    ISTAT GASES LACTATE VENOUS POCT - Abnormal    Lactic Acid POCT 0.4      Bicarbonate Venous POCT 34 (*)     O2 Sat, Venous POCT 43 (*)     pCO2 Venous POCT 53 (*)     pH Venous POCT 7.41      pO2 Venous POCT 25     TROPONIN T, HIGH SENSITIVITY - Normal    Troponin T, High Sensitivity 12     LACTIC ACID WHOLE BLOOD   BLOOD CULTURE   BLOOD CULTURE        XR Chest 2 Views   Final Result   IMPRESSION: Lower lobe infiltrate suggested on lateral view. Mid and   upper lungs clear. The cardiac silhouette is not enlarged. Pulmonary   vasculature is unremarkable.      KELLEY LOPEZ MD             SYSTEM ID:  [id number]          Treatments provided:   Family Comments: Daughter at bedside.   OBS brochure/video discussed/provided to patient:  No  ED Medications:   Medications   piperacillin-tazobactam (ZOSYN) 4.5 g vial to attach to  mL bag (has no administration in time range)       Drips infusing:  No  For the majority of the shift this patient was Green.   Interventions performed were .    Sepsis treatment initiated: No    Cares/treatment/interventions/medications to be completed following ED care:     ED Nurse Name: Abdirashid Presley RN  4:35 PM     RECEIVING UNIT ED HANDOFF REVIEW    Above ED Nurse Handoff Report was reviewed: Yes  Reviewed by: Marii Phelps RN on June 5, 2024 at 5:18 PM   ANDIE Arias called the ED to inform them the note was read: Yes

## 2024-06-06 ENCOUNTER — APPOINTMENT (OUTPATIENT)
Dept: CARDIOLOGY | Facility: CLINIC | Age: 69
DRG: 291 | End: 2024-06-06
Attending: INTERNAL MEDICINE
Payer: MEDICARE

## 2024-06-06 LAB
ANION GAP SERPL CALCULATED.3IONS-SCNC: 11 MMOL/L (ref 7–15)
BUN SERPL-MCNC: 6.9 MG/DL (ref 8–23)
CALCIUM SERPL-MCNC: 8.8 MG/DL (ref 8.8–10.2)
CHLORIDE SERPL-SCNC: 105 MMOL/L (ref 98–107)
CREAT SERPL-MCNC: 0.83 MG/DL (ref 0.51–0.95)
DEPRECATED HCO3 PLAS-SCNC: 28 MMOL/L (ref 22–29)
EGFRCR SERPLBLD CKD-EPI 2021: 76 ML/MIN/1.73M2
ERYTHROCYTE [DISTWIDTH] IN BLOOD BY AUTOMATED COUNT: 15.3 % (ref 10–15)
GLUCOSE BLDC GLUCOMTR-MCNC: 110 MG/DL (ref 70–99)
GLUCOSE BLDC GLUCOMTR-MCNC: 115 MG/DL (ref 70–99)
GLUCOSE BLDC GLUCOMTR-MCNC: 117 MG/DL (ref 70–99)
GLUCOSE BLDC GLUCOMTR-MCNC: 131 MG/DL (ref 70–99)
GLUCOSE BLDC GLUCOMTR-MCNC: 78 MG/DL (ref 70–99)
GLUCOSE BLDC GLUCOMTR-MCNC: 82 MG/DL (ref 70–99)
GLUCOSE BLDC GLUCOMTR-MCNC: 91 MG/DL (ref 70–99)
GLUCOSE SERPL-MCNC: 122 MG/DL (ref 70–99)
HCT VFR BLD AUTO: 31.3 % (ref 35–47)
HGB BLD-MCNC: 9.8 G/DL (ref 11.7–15.7)
LVEF ECHO: NORMAL
MCH RBC QN AUTO: 25.7 PG (ref 26.5–33)
MCHC RBC AUTO-ENTMCNC: 31.3 G/DL (ref 31.5–36.5)
MCV RBC AUTO: 82 FL (ref 78–100)
PLATELET # BLD AUTO: 182 10E3/UL (ref 150–450)
POTASSIUM SERPL-SCNC: 3.5 MMOL/L (ref 3.4–5.3)
RBC # BLD AUTO: 3.81 10E6/UL (ref 3.8–5.2)
SODIUM SERPL-SCNC: 144 MMOL/L (ref 135–145)
WBC # BLD AUTO: 7.9 10E3/UL (ref 4–11)

## 2024-06-06 PROCEDURE — 96372 THER/PROPH/DIAG INJ SC/IM: CPT | Performed by: INTERNAL MEDICINE

## 2024-06-06 PROCEDURE — 82962 GLUCOSE BLOOD TEST: CPT

## 2024-06-06 PROCEDURE — 250N000013 HC RX MED GY IP 250 OP 250 PS 637: Performed by: PHYSICIAN ASSISTANT

## 2024-06-06 PROCEDURE — C8929 TTE W OR WO FOL WCON,DOPPLER: HCPCS

## 2024-06-06 PROCEDURE — 96375 TX/PRO/DX INJ NEW DRUG ADDON: CPT

## 2024-06-06 PROCEDURE — G0378 HOSPITAL OBSERVATION PER HR: HCPCS

## 2024-06-06 PROCEDURE — 255N000002 HC RX 255 OP 636: Performed by: INTERNAL MEDICINE

## 2024-06-06 PROCEDURE — 85027 COMPLETE CBC AUTOMATED: CPT | Performed by: INTERNAL MEDICINE

## 2024-06-06 PROCEDURE — 250N000011 HC RX IP 250 OP 636: Mod: JZ | Performed by: INTERNAL MEDICINE

## 2024-06-06 PROCEDURE — 999N000157 HC STATISTIC RCP TIME EA 10 MIN

## 2024-06-06 PROCEDURE — 93306 TTE W/DOPPLER COMPLETE: CPT | Mod: 26 | Performed by: INTERNAL MEDICINE

## 2024-06-06 PROCEDURE — 80048 BASIC METABOLIC PNL TOTAL CA: CPT | Performed by: INTERNAL MEDICINE

## 2024-06-06 PROCEDURE — 36415 COLL VENOUS BLD VENIPUNCTURE: CPT | Performed by: INTERNAL MEDICINE

## 2024-06-06 PROCEDURE — 99233 SBSQ HOSP IP/OBS HIGH 50: CPT | Performed by: PHYSICIAN ASSISTANT

## 2024-06-06 PROCEDURE — 96376 TX/PRO/DX INJ SAME DRUG ADON: CPT

## 2024-06-06 PROCEDURE — 250N000013 HC RX MED GY IP 250 OP 250 PS 637: Performed by: INTERNAL MEDICINE

## 2024-06-06 RX ADMIN — ENOXAPARIN SODIUM 40 MG: 40 INJECTION SUBCUTANEOUS at 20:32

## 2024-06-06 RX ADMIN — PIPERACILLIN AND TAZOBACTAM 4.5 G: 4; .5 INJECTION, POWDER, FOR SOLUTION INTRAVENOUS at 17:16

## 2024-06-06 RX ADMIN — FUROSEMIDE 40 MG: 10 INJECTION, SOLUTION INTRAMUSCULAR; INTRAVENOUS at 17:13

## 2024-06-06 RX ADMIN — GABAPENTIN 900 MG: 300 CAPSULE ORAL at 09:16

## 2024-06-06 RX ADMIN — ROSUVASTATIN 10 MG: 10 TABLET, FILM COATED ORAL at 09:17

## 2024-06-06 RX ADMIN — AMOXICILLIN AND CLAVULANATE POTASSIUM 1 TABLET: 875; 125 TABLET, FILM COATED ORAL at 20:33

## 2024-06-06 RX ADMIN — PANTOPRAZOLE SODIUM 40 MG: 40 TABLET, DELAYED RELEASE ORAL at 20:33

## 2024-06-06 RX ADMIN — LOSARTAN POTASSIUM 100 MG: 100 TABLET, FILM COATED ORAL at 09:17

## 2024-06-06 RX ADMIN — ZOLPIDEM TARTRATE 10 MG: 5 TABLET ORAL at 23:40

## 2024-06-06 RX ADMIN — GABAPENTIN 600 MG: 300 CAPSULE ORAL at 12:56

## 2024-06-06 RX ADMIN — ACETAMINOPHEN 650 MG: 325 TABLET, FILM COATED ORAL at 10:12

## 2024-06-06 RX ADMIN — SERTRALINE 200 MG: 100 TABLET, FILM COATED ORAL at 09:17

## 2024-06-06 RX ADMIN — PIPERACILLIN AND TAZOBACTAM 4.5 G: 4; .5 INJECTION, POWDER, FOR SOLUTION INTRAVENOUS at 10:59

## 2024-06-06 RX ADMIN — PIPERACILLIN AND TAZOBACTAM 4.5 G: 4; .5 INJECTION, POWDER, FOR SOLUTION INTRAVENOUS at 05:29

## 2024-06-06 RX ADMIN — FUROSEMIDE 40 MG: 10 INJECTION, SOLUTION INTRAMUSCULAR; INTRAVENOUS at 09:58

## 2024-06-06 RX ADMIN — GLIPIZIDE 10 MG: 10 TABLET, EXTENDED RELEASE ORAL at 09:17

## 2024-06-06 RX ADMIN — POTASSIUM CHLORIDE 40 MEQ: 750 TABLET, FILM COATED, EXTENDED RELEASE ORAL at 09:18

## 2024-06-06 RX ADMIN — HUMAN ALBUMIN MICROSPHERES AND PERFLUTREN 4 ML: 10; .22 INJECTION, SOLUTION INTRAVENOUS at 09:57

## 2024-06-06 RX ADMIN — GABAPENTIN 900 MG: 300 CAPSULE ORAL at 20:33

## 2024-06-06 RX ADMIN — ENOXAPARIN SODIUM 40 MG: 40 INJECTION SUBCUTANEOUS at 09:53

## 2024-06-06 RX ADMIN — DILTIAZEM HYDROCHLORIDE 360 MG: 180 CAPSULE, COATED, EXTENDED RELEASE ORAL at 09:17

## 2024-06-06 ASSESSMENT — ACTIVITIES OF DAILY LIVING (ADL)
ADLS_ACUITY_SCORE: 35
ADLS_ACUITY_SCORE: 34
ADLS_ACUITY_SCORE: 36
ADLS_ACUITY_SCORE: 29
ADLS_ACUITY_SCORE: 35
ADLS_ACUITY_SCORE: 35
ADLS_ACUITY_SCORE: 27
ADLS_ACUITY_SCORE: 27
ADLS_ACUITY_SCORE: 29
ADLS_ACUITY_SCORE: 29
ADLS_ACUITY_SCORE: 34
ADLS_ACUITY_SCORE: 31
ADLS_ACUITY_SCORE: 35
ADLS_ACUITY_SCORE: 27
ADLS_ACUITY_SCORE: 29
ADLS_ACUITY_SCORE: 34
ADLS_ACUITY_SCORE: 27
ADLS_ACUITY_SCORE: 35
ADLS_ACUITY_SCORE: 35
ADLS_ACUITY_SCORE: 29
ADLS_ACUITY_SCORE: 35

## 2024-06-06 NOTE — PROGRESS NOTES
[facility]  Hospitalist Progress Note  Lorena Rincon PA-C 06/06/2024           Assessment and Plan:      Nicole Reyes is a 68 year old female admitted on 6/5/2024 with acute hypoxic respiratory failure likely due to congestive heart failure. She has history of type 2 diabetes, hypertension, hypercholesterolemia, chronic kidney disease stage III, obesity, obstructive sleep apnea, GERD, anxiety, depression, chronic respiratory failure on supplemental oxygen at 2 L/min at night and with walking, and restless leg syndrome.  She was recently hospitalized here from 5/30/2024 through 6/4/2024 with left-sided pneumonia, possibly due to aspiration.  She was treated with Zosyn while in the hospital and discharged home on Augmentin.      Over the last 2 days she had been okay.  Today she noted increased fatigue and hypoxia with oxygen saturations of 70% when walking.  He called paramedics and was brought in by ambulance.  She denied chest pain.  She had a little bit of cough.  She has not had fever.     Emergency department evaluation showed oxygen saturations of 99% on 3 L/min.  Other vital signs were unremarkable.  There was no documented hypoxia in the emergency department.  Laboratory evaluation showed unremarkable BMP.  BNP was elevated at 4143.   Chest x-ray suggested left lower lobe infiltrate.  no obvious congestive heart failure.       Acute on chronic Hypoxia  Suspected diastolic congestive heart failure  Recent left lower lung pneumonia with persistence on chest x-ray  -Patient does not seem to be getting more ill due to worsening pneumonia. She has not had fevers or worsening cough or felt systemically ill.   She has no history of congestive heart failure but  I am suspicious of hypoxia due to diastolic congestive heart failure.  - I suspect that she may improve quite quickly with aggressive diuresis.    - Cont Lasix 40 mg IV twice daily  -Echocardiogram pending  -Daily weights and  "intake/output  -Daily BMP  -discharge zosyn, resume augmentin   - D/w dtr      Hypertension  Hypercholesterolemia  -Resume prior to admission diltiazem, losartan, and Crestor     Type 2 diabetes  Chronic kidney disease stage III  -Resume prior to admission Glucotrol  -Medium resistance NovoLog insulin by sliding scale as needed     Depression  Anxiety  -Resume prior to admission Zoloft     Obesity  Obstructive sleep apnea  -Uses supplemental oxygen at night    Diet: Moderate Consistent Carb (60 g CHO per Meal) Diet    DVT Prophylaxis: Enoxaparin (Lovenox) SQ  Suarez Catheter: Not present  Lines: None     Cardiac Monitoring: ACTIVE order. Indication: Acute decompensated heart failure (48 hours)  Code Status: Full Code          Interval History (Subjective):      Feels about the same   Now back to 2L o2 which is baseline                  Physical Exam:      Last Vital Signs:  /76   Pulse 63   Temp 98.3  F (36.8  C) (Oral)   Resp 19   Ht 1.473 m (4' 10\")   Wt 112.7 kg (248 lb 8 oz)   LMP 09/15/2007   SpO2 92%   BMI 51.94 kg/m        Intake/Output Summary (Last 24 hours) at 6/7/2024 0557  Last data filed at 6/7/2024 0148  Gross per 24 hour   Intake 1085 ml   Output 5450 ml   Net -4365 ml       Constitutional: Awake, alert, cooperative, no apparent distress, obese      Respiratory: Clear to auscultation bilaterally, no crackles or wheezing   Cardiovascular: Regular rate and rhythm, normal S1 and S2, and no murmur noted   Abdomen: Normal bowel sounds, soft, non-distended, non-tender   Skin: No rashes, no cyanosis, dry to touch   Neuro: Alert and oriented x3, no weakness, numbness, memory loss   Extremities: No edema, normal range of motion   Other(s):        All other systems: Negative          Medications:      All current medications were reviewed with changes reflected in problem list.         Data:      All new lab and imaging data was reviewed.   Labs:       Lab Results   Component Value Date     " 06/06/2024     06/05/2024     06/03/2024     06/02/2021     06/01/2021     05/28/2021    Lab Results   Component Value Date    CHLORIDE 105 06/06/2024    CHLORIDE 106 06/05/2024    CHLORIDE 104 06/03/2024    CHLORIDE 109 06/02/2022    CHLORIDE 105 11/10/2021    CHLORIDE 104 11/09/2021    CHLORIDE 103 06/02/2021    CHLORIDE 105 06/01/2021    CHLORIDE 108 05/28/2021    Lab Results   Component Value Date    BUN 6.9 06/06/2024    BUN 6.1 06/05/2024    BUN 8.4 06/03/2024    BUN 16 06/02/2022    BUN 13 11/10/2021    BUN 14 11/09/2021    BUN 11 06/02/2021    BUN 9 06/01/2021    BUN 10 05/28/2021      Lab Results   Component Value Date    POTASSIUM 3.5 06/06/2024    POTASSIUM 3.6 06/05/2024    POTASSIUM 3.6 06/03/2024    POTASSIUM 3.4 06/02/2022    POTASSIUM 3.8 11/11/2021    POTASSIUM 4.1 11/10/2021    POTASSIUM 3.3 06/04/2021    POTASSIUM 3.5 06/03/2021    POTASSIUM 4.0 06/02/2021    Lab Results   Component Value Date    CO2 28 06/06/2024    CO2 26 06/05/2024    CO2 26 06/03/2024    CO2 27 06/02/2022    CO2 29 11/10/2021    CO2 30 11/09/2021    CO2 37 06/02/2021    CO2 34 06/01/2021    CO2 28 05/28/2021    Lab Results   Component Value Date    CR 0.83 06/06/2024    CR 0.75 06/05/2024    CR 0.71 06/05/2024    CR 0.85 06/02/2021    CR 0.79 06/01/2021    CR 0.72 05/28/2021        Recent Labs   Lab 06/07/24  0528 06/06/24  0614 06/05/24  1246   WBC 9.0 7.9 8.0   HGB 11.2* 9.8* 10.8*   HCT 35.2 31.3* 33.8*   MCV 82 82 82    182 203     Recent Labs   Lab 06/07/24  0421 06/06/24  2338 06/06/24  2029 06/06/24  1735 06/06/24  1325   * 91 117* 78 82      Imaging:   Results for orders placed or performed during the hospital encounter of 06/05/24   XR Chest 2 Views    Narrative    CHEST TWO VIEWS 6/5/2024 2:34 PM     HISTORY: Evaluation of ongoing hypoxia, history of recurrent  aspiration pneumonia.    COMPARISON: November 8, 2021       Impression    IMPRESSION: Lower lobe infiltrate  suggested on lateral view. Mid and  upper lungs clear. The cardiac silhouette is not enlarged. Pulmonary  vasculature is unremarkable.    KELLEY LOPEZ MD         SYSTEM ID:  [id number]   Echocardiogram Complete     Value    LVEF  45-50%    Narrative    [id number]  [id number]  [id number]  [id number]^PHONG^KASIA^ALICE     [facility]  Echocardiography Laboratory  [address] [place] [address]     Name: ASHANTI PATEL  MRN: [id number]  : 1955  Study Date: 2024 09:27 AM  Age: 68 yrs  Gender: Female  Patient Location: [facility]  Reason For Study: CHF  Ordering Physician: KASIA DARLING  Referring Physician: Karla Hurst  Performed By: Vanessa Negron     BSA: 2.0 m2  Height: 58 in  Weight: 248 lb  HR: 68  BP: 150/81 mmHg  ______________________________________________________________________________  Procedure  Complete Portable Echo Adult. Optison (NDC #2715-5329) given intravenously.  Technically difficult study.  ______________________________________________________________________________  Interpretation Summary     The visual ejection fraction is 45-50%. Appears lower compared with most  recent stiudy.  There is mild global hypokinesia of the left ventricle.  There is mild-moderate biatrial enlargement.  The study was technically difficult. Contrast was used without apparent  complications.  ______________________________________________________________________________  Left Ventricle  The left ventricle is normal in size. There is normal left ventricular wall  thickness. The visual ejection fraction is 45-50%. Grade II or moderate  diastolic dysfunction. There is mild global hypokinesia of the left ventricle.     Right Ventricle  The right ventricle is normal in structure, function and size.     Atria  There is mild-moderate biatrial enlargement.     Mitral Valve  The mitral valve leaflets appear normal. There is no evidence of stenosis,  fluttering, or prolapse.      Tricuspid Valve  The tricuspid valve is not well visualized, but is grossly normal. Right  ventricular systolic pressure could not be approximated due to inadequate  tricuspid regurgitation.     Aortic Valve  The aortic valve is not well visualized.     Pulmonic Valve  The pulmonic valve is not well visualized.     Vessels  Normal size descending aorta. Dilation of the inferior vena cava is present  with normal respiratory variation in diameter.     Pericardium  There is no pericardial effusion.     Rhythm  Sinus rhythm was noted.  ______________________________________________________________________________  MMode/2D Measurements & Calculations  IVSd: 0.90 cm     LVIDd: 4.5 cm  LVIDs: 2.5 cm  LVPWd: 1.1 cm  IVC diam: 2.7 cm  FS: 43.2 %  LV mass(C)d: 149.9 grams  LV mass(C)dI: 75.1 grams/m2  Ao root diam: 2.7 cm  asc Aorta Diam: 3.2 cm  LVOT diam: 2.0 cm  LVOT area: 3.1 cm2  Ao root diam index Ht(cm/m): 1.8  Ao root diam index BSA (cm/m2): 1.3  Asc Ao diam index BSA (cm/m2): 1.6  Asc Ao diam index Ht(cm/m): 2.2  LA Volume (BP): 67.0 ml     LA Volume Index (BP): 33.5 ml/m2  RWT: 0.48  TAPSE: 2.5 cm     Doppler Measurements & Calculations  MV E max nic: 113.0 cm/sec  MV A max nic: 113.9 cm/sec  MV E/A: 0.99  MV max P.3 mmHg  MV mean PG: 3.2 mmHg  MV V2 VTI: 39.2 cm  MVA(VTI): 2.0 cm2  MV dec slope: 703.9 cm/sec2  MV dec time: 0.16 sec  Ao V2 max: 113.0 cm/sec  Ao max P.0 mmHg  Ao V2 mean: 79.8 cm/sec  Ao mean PG: 3.0 mmHg  Ao V2 VTI: 28.4 cm  DEB(I,D): 2.8 cm2  DEB(V,D): 2.6 cm2  LV V1 max PG: 3.7 mmHg  LV V1 max: 96.7 cm/sec  LV V1 VTI: 25.5 cm  SV(LVOT): 78.9 ml  SI(LVOT): 39.5 ml/m2  PA V2 max: 97.5 cm/sec  PA max PG: 3.8 mmHg  PA acc time: 0.16 sec  AV Nic Ratio (DI): 0.86  DEB Index (cm2/m2): 1.4  E/E' avg: 15.8     Lateral E/e': 13.0  Medial E/e': 18.5  RV S Nic: 11.2 cm/sec     ______________________________________________________________________________  Report approved by: Ramon Jennings  06/06/2024 11:16 AM           *Note: Due to a large number of results and/or encounters for the requested time period, some results have not been displayed. A complete set of results can be found in Results Review.

## 2024-06-06 NOTE — PROGRESS NOTES
PRIMARY DIAGNOSIS: PNEUMONIA/ACUTE RESPIRATORY FAILURE   OUTPATIENT/OBSERVATION GOALS TO BE MET BEFORE DISCHARGE:  Dyspnea improved and O2 sats >88% on RA or back to baseline O2 levels: Yes   SpO2: 92 %, O2 Device: Nasal cannula    Tolerating oral abx or appropriate plans made outpatient infusion: Yes    Vitals signs normal or return to baseline: Yes    Short term supplemental O2 needed with activity at home: Yes    Tolerate oral intake to maintain hydration: Yes    Return to near baseline physical activity: Yes    Discharge Planner Nurse   Safe discharge environment identified: No  Barriers to discharge: Yes       Entered by: Yennifer Lackey RN 06/06/2024 1:13 PM   Pt A&O x 4. VSS. O2 Sat 93% on oxygen 2.5 LPM. LS Anterior clear. Posterior has some expiratory wheezes. On IV Lasix 40 mg BID. Obese. Abdomen distended, BS+. C/o headache. PRN Tylenol given and was effective. Had one loose stool. Voiding adequate amount of urine.  Please review provider order for any additional goals.   Nurse to notify provider when observation goals have been met and patient is ready for discharge.

## 2024-06-06 NOTE — PLAN OF CARE
ROOM # 23  1    Living Situation (if not independent, order SW consult):  Facility name:  : ROCHELLE PATEL (Daughter)      Activity level at baseline: Independent  Activity level on admit: A x 1    Who will be transporting you at discharge: ROCHELLE PATEL (Daughter)      Patient registered to observation; given Patient Bill of Rights; given the opportunity to ask questions about observation status and their plan of care.  Patient has been oriented to the observation room, bathroom and call light is in place.    Discussed discharge goals and expectations with patient/family.

## 2024-06-06 NOTE — UTILIZATION REVIEW
Concurrent stay review; Secondary Review Determination    Under the authority of the Utilization Management Committee, the utilization review process indicated a secondary review on the above patient. The review outcome is based on review of the medical records, discussions with staff, and applying clinical experience noted on the date of the review.    (x) Observation Status Appropriate - Concurrent stay review        RATIONALE FOR DETERMINATION: Complex 68-year-old female with history of chronic respiratory failure on home oxygen 2 L at night as well as walking, type 2 diabetes, hypertension, chronic kidney disease stage III, obesity, obstructive sleep apnea who was recently hospitalized with a left-sided pneumonia possibly due to aspiration treated with Zosyn and discharged on Augmentin on 6/3/2024.  Patient presents the following day and she noted to be more hypoxic when walking.  While hospitalized patient's O2 sats were 91-95% on 1 L of nasal cannula.  With patient's relative worsening hypoxemia presently on 2 L nasal cannula with O2 sats 92-95% without significant tachypnea but with a N-terminal proBNP level of 4100 which is significantly elevated from her admission level last week of 532 though imaging reveals unremarkable pulmonary vasculature.  Patient was felt to have mild heart failure possibly related to the intravenous antibiotics exacerbating patient's underlying chronic hypoxemic respiratory failure and recent pneumonia.  Patient otherwise relatively stable with observation care appropriate for adding diuresis.    Patient is clinically improving and there is no clear indication to change patient's status to inpatient. The severity of illness, intensity of service provided, expected LOS and risk for adverse outcome make the care appropriate for observation.    This document was produced using voice recognition software    The information on this document is developed by the utilization review team in  order for the business office to ensure compliance. This only denotes the appropriateness of proper admission status and does not reflect the quality of care rendered.    The definitions of Inpatient Status and Observation Status used in making the determination above are those provided in the CMS Coverage Manual, Chapter 1 and Chapter 6, section 70.4.    Sincerely,    Chris Medellin MD  Utilization Review  Physician Advisor  [facility].

## 2024-06-06 NOTE — PLAN OF CARE
PRIMARY DIAGNOSIS: Acute resp failure with hypoxia  OUTPATIENT/OBSERVATION GOALS TO BE MET BEFORE DISCHARGE:  ADLs back to baseline: No    Activity and level of assistance: A1 with walker, gb    Pain status: noted minimal pain, no meds required    Return to near baseline physical activity: No     Discharge Planner Nurse   Safe discharge environment identified: Yes  Barriers to discharge: Yes       Entered by: Jessica Boone RN 06/06/2024 7:56 AM  Pt AO x4. VSS on 3L O2. VSS on RA, LS clear, BS active. Pt up with A1 with walker GB. Pt noted pain due to coughing. PIV SL, zosyn q6. Pt tolerating mod carb diet. Pt refused IV lasix that was scheduled for 1830 due to not wanting to be up all night. Plan for IV lasix BID, DW, strict I/O, IV Zosyn. Will continue to monitor and provide supportive cares.   Please review provider order for any additional goals.   Nurse to notify provider when observation goals have been met and patient is ready for discharge.  Goal Outcome Evaluation:      Plan of Care Reviewed With: patient    Overall Patient Progress: no changeOverall Patient Progress: no change    Outcome Evaluation: Pt receiving zosyn q6, on 3L O2, noted minimal pain due to coughing

## 2024-06-06 NOTE — PROGRESS NOTES
I was notified that patient refused her Lasix dose tonight, which was her main reason for being admitted. Evidently, she didn't want to be up overnight urinating.

## 2024-06-06 NOTE — PLAN OF CARE
PRIMARY DIAGNOSIS: Acute resp failure with hypoxia  OUTPATIENT/OBSERVATION GOALS TO BE MET BEFORE DISCHARGE:  ADLs back to baseline: No    Activity and level of assistance: A1 with walker, gb    Pain status: noted minimal pain, no meds required    Return to near baseline physical activity: No     Discharge Planner Nurse   Safe discharge environment identified: Yes  Barriers to discharge: Yes       Entered by: Jessica Boone RN 06/06/2024 7:52 AM  Pt AO x4. VSS on 3L O2. VSS on RA, LS clear, BS active. Pt up with A1 with walker GB. Pt noted pain due to coughing. PIV SL, zosyn q6. Pt tolerating mod carb diet. Plan for IV lasix BID, DW, strict I/O, IV Zosyn. Will continue to monitor and provide supportive cares.   Please review provider order for any additional goals.   Nurse to notify provider when observation goals have been met and patient is ready for discharge.  Goal Outcome Evaluation:      Plan of Care Reviewed With: patient    Overall Patient Progress: no changeOverall Patient Progress: no change    Outcome Evaluation: Pt receiving zosyn q6, on 3L O2, noted minimal pain due to coughing

## 2024-06-06 NOTE — PROGRESS NOTES
PRIMARY DIAGNOSIS: PNEUMONIA/ACUTE RESPIRATORY FAILURE   OUTPATIENT/OBSERVATION GOALS TO BE MET BEFORE DISCHARGE:  Dyspnea improved and O2 sats >88% on RA or back to baseline O2 levels: Yes   SpO2: 92 %, O2 Device: Nasal cannula    Tolerating oral abx or appropriate plans made outpatient infusion: Yes    Vitals signs normal or return to baseline: Yes    Short term supplemental O2 needed with activity at home: Yes    Tolerate oral intake to maintain hydration: Yes    Return to near baseline physical activity: Yes    Discharge Planner Nurse   Safe discharge environment identified: No  Barriers to discharge: Yes       Entered by: Yennifer Lackey RN 06/06/2024 1:11 PM     Please review provider order for any additional goals.   Nurse to notify provider when observation goals have been met and patient is ready for discharge.

## 2024-06-07 ENCOUNTER — APPOINTMENT (OUTPATIENT)
Dept: CARDIOLOGY | Facility: CLINIC | Age: 69
DRG: 291 | End: 2024-06-07
Attending: PHYSICIAN ASSISTANT
Payer: MEDICARE

## 2024-06-07 ENCOUNTER — APPOINTMENT (OUTPATIENT)
Dept: NUCLEAR MEDICINE | Facility: CLINIC | Age: 69
DRG: 291 | End: 2024-06-07
Attending: PHYSICIAN ASSISTANT
Payer: MEDICARE

## 2024-06-07 DIAGNOSIS — E11.9 TYPE 2 DIABETES MELLITUS WITHOUT COMPLICATION, WITHOUT LONG-TERM CURRENT USE OF INSULIN (H): ICD-10-CM

## 2024-06-07 DIAGNOSIS — E66.01 MORBID OBESITY (H): ICD-10-CM

## 2024-06-07 LAB
ANION GAP SERPL CALCULATED.3IONS-SCNC: 11 MMOL/L (ref 7–15)
BUN SERPL-MCNC: 10.8 MG/DL (ref 8–23)
CALCIUM SERPL-MCNC: 8.9 MG/DL (ref 8.8–10.2)
CHLORIDE SERPL-SCNC: 100 MMOL/L (ref 98–107)
CREAT SERPL-MCNC: 0.93 MG/DL (ref 0.51–0.95)
CV STRESS MAX HR HE: 89
DEPRECATED HCO3 PLAS-SCNC: 30 MMOL/L (ref 22–29)
EGFRCR SERPLBLD CKD-EPI 2021: 67 ML/MIN/1.73M2
ERYTHROCYTE [DISTWIDTH] IN BLOOD BY AUTOMATED COUNT: 15.2 % (ref 10–15)
GLUCOSE BLDC GLUCOMTR-MCNC: 113 MG/DL (ref 70–99)
GLUCOSE BLDC GLUCOMTR-MCNC: 144 MG/DL (ref 70–99)
GLUCOSE BLDC GLUCOMTR-MCNC: 177 MG/DL (ref 70–99)
GLUCOSE BLDC GLUCOMTR-MCNC: 72 MG/DL (ref 70–99)
GLUCOSE BLDC GLUCOMTR-MCNC: 85 MG/DL (ref 70–99)
GLUCOSE SERPL-MCNC: 126 MG/DL (ref 70–99)
HCT VFR BLD AUTO: 35.2 % (ref 35–47)
HGB BLD-MCNC: 11.2 G/DL (ref 11.7–15.7)
MCH RBC QN AUTO: 26 PG (ref 26.5–33)
MCHC RBC AUTO-ENTMCNC: 31.8 G/DL (ref 31.5–36.5)
MCV RBC AUTO: 82 FL (ref 78–100)
PLATELET # BLD AUTO: 227 10E3/UL (ref 150–450)
POTASSIUM SERPL-SCNC: 3.1 MMOL/L (ref 3.4–5.3)
RATE PRESSURE PRODUCT: NORMAL
RBC # BLD AUTO: 4.31 10E6/UL (ref 3.8–5.2)
SODIUM SERPL-SCNC: 141 MMOL/L (ref 135–145)
STRESS ECHO BASELINE DIASTOLIC HE: 66
STRESS ECHO BASELINE HR: 70 BPM
STRESS ECHO BASELINE SYSTOLIC BP: 113
STRESS ECHO CALCULATED PERCENT HR: 59 %
STRESS ECHO LAST STRESS DIASTOLIC BP: 63
STRESS ECHO LAST STRESS SYSTOLIC BP: 124
STRESS ECHO TARGET HR: 152
STRESS/REST PERFUSION RATIO: 1.18
WBC # BLD AUTO: 9 10E3/UL (ref 4–11)

## 2024-06-07 PROCEDURE — 78452 HT MUSCLE IMAGE SPECT MULT: CPT | Mod: 26 | Performed by: INTERNAL MEDICINE

## 2024-06-07 PROCEDURE — 36415 COLL VENOUS BLD VENIPUNCTURE: CPT | Performed by: PHYSICIAN ASSISTANT

## 2024-06-07 PROCEDURE — 250N000011 HC RX IP 250 OP 636: Mod: JZ | Performed by: INTERNAL MEDICINE

## 2024-06-07 PROCEDURE — 343N000001 HC RX 343: Performed by: INTERNAL MEDICINE

## 2024-06-07 PROCEDURE — 96376 TX/PRO/DX INJ SAME DRUG ADON: CPT

## 2024-06-07 PROCEDURE — 250N000013 HC RX MED GY IP 250 OP 250 PS 637: Performed by: PHYSICIAN ASSISTANT

## 2024-06-07 PROCEDURE — A9500 TC99M SESTAMIBI: HCPCS | Performed by: INTERNAL MEDICINE

## 2024-06-07 PROCEDURE — 96372 THER/PROPH/DIAG INJ SC/IM: CPT | Performed by: INTERNAL MEDICINE

## 2024-06-07 PROCEDURE — G0378 HOSPITAL OBSERVATION PER HR: HCPCS

## 2024-06-07 PROCEDURE — 93016 CV STRESS TEST SUPVJ ONLY: CPT | Performed by: INTERNAL MEDICINE

## 2024-06-07 PROCEDURE — 85027 COMPLETE CBC AUTOMATED: CPT | Performed by: PHYSICIAN ASSISTANT

## 2024-06-07 PROCEDURE — 78452 HT MUSCLE IMAGE SPECT MULT: CPT | Mod: MG

## 2024-06-07 PROCEDURE — 93018 CV STRESS TEST I&R ONLY: CPT | Performed by: INTERNAL MEDICINE

## 2024-06-07 PROCEDURE — 94762 N-INVAS EAR/PLS OXIMTRY CONT: CPT

## 2024-06-07 PROCEDURE — 999N000157 HC STATISTIC RCP TIME EA 10 MIN

## 2024-06-07 PROCEDURE — 93017 CV STRESS TEST TRACING ONLY: CPT

## 2024-06-07 PROCEDURE — 99233 SBSQ HOSP IP/OBS HIGH 50: CPT | Performed by: PHYSICIAN ASSISTANT

## 2024-06-07 PROCEDURE — G1010 CDSM STANSON: HCPCS | Performed by: INTERNAL MEDICINE

## 2024-06-07 PROCEDURE — 82374 ASSAY BLOOD CARBON DIOXIDE: CPT | Performed by: PHYSICIAN ASSISTANT

## 2024-06-07 PROCEDURE — 250N000013 HC RX MED GY IP 250 OP 250 PS 637: Performed by: INTERNAL MEDICINE

## 2024-06-07 PROCEDURE — 82962 GLUCOSE BLOOD TEST: CPT

## 2024-06-07 PROCEDURE — 99222 1ST HOSP IP/OBS MODERATE 55: CPT | Mod: 25 | Performed by: PHYSICIAN ASSISTANT

## 2024-06-07 RX ORDER — SPIRONOLACTONE 25 MG/1
25 TABLET ORAL DAILY
Status: DISCONTINUED | OUTPATIENT
Start: 2024-06-07 | End: 2024-06-11 | Stop reason: HOSPADM

## 2024-06-07 RX ORDER — AMINOPHYLLINE 25 MG/ML
INJECTION, SOLUTION INTRAVENOUS
Status: DISCONTINUED
Start: 2024-06-07 | End: 2024-06-07 | Stop reason: WASHOUT

## 2024-06-07 RX ORDER — FUROSEMIDE 20 MG
20 TABLET ORAL DAILY
Status: DISCONTINUED | OUTPATIENT
Start: 2024-06-08 | End: 2024-06-11 | Stop reason: HOSPADM

## 2024-06-07 RX ORDER — CARVEDILOL 12.5 MG/1
12.5 TABLET ORAL 2 TIMES DAILY WITH MEALS
Status: DISCONTINUED | OUTPATIENT
Start: 2024-06-08 | End: 2024-06-11 | Stop reason: HOSPADM

## 2024-06-07 RX ORDER — ROSUVASTATIN CALCIUM 10 MG/1
20 TABLET, COATED ORAL DAILY
Status: DISCONTINUED | OUTPATIENT
Start: 2024-06-08 | End: 2024-06-11 | Stop reason: HOSPADM

## 2024-06-07 RX ORDER — REGADENOSON 0.08 MG/ML
INJECTION, SOLUTION INTRAVENOUS
Status: COMPLETED
Start: 2024-06-07 | End: 2024-06-07

## 2024-06-07 RX ADMIN — CLONAZEPAM 1 MG: 0.5 TABLET ORAL at 01:47

## 2024-06-07 RX ADMIN — AMOXICILLIN AND CLAVULANATE POTASSIUM 1 TABLET: 875; 125 TABLET, FILM COATED ORAL at 20:32

## 2024-06-07 RX ADMIN — GABAPENTIN 900 MG: 300 CAPSULE ORAL at 11:44

## 2024-06-07 RX ADMIN — ENOXAPARIN SODIUM 40 MG: 40 INJECTION SUBCUTANEOUS at 11:45

## 2024-06-07 RX ADMIN — Medication 32.9 MILLICURIE: at 09:14

## 2024-06-07 RX ADMIN — CLONAZEPAM 1 MG: 0.5 TABLET ORAL at 23:35

## 2024-06-07 RX ADMIN — FUROSEMIDE 40 MG: 10 INJECTION, SOLUTION INTRAMUSCULAR; INTRAVENOUS at 11:44

## 2024-06-07 RX ADMIN — SPIRONOLACTONE 25 MG: 25 TABLET ORAL at 17:24

## 2024-06-07 RX ADMIN — AMOXICILLIN AND CLAVULANATE POTASSIUM 1 TABLET: 875; 125 TABLET, FILM COATED ORAL at 11:44

## 2024-06-07 RX ADMIN — LOSARTAN POTASSIUM 100 MG: 100 TABLET, FILM COATED ORAL at 11:44

## 2024-06-07 RX ADMIN — DILTIAZEM HYDROCHLORIDE 360 MG: 180 CAPSULE, COATED, EXTENDED RELEASE ORAL at 11:43

## 2024-06-07 RX ADMIN — SERTRALINE 200 MG: 100 TABLET, FILM COATED ORAL at 11:43

## 2024-06-07 RX ADMIN — ACETAMINOPHEN 650 MG: 325 TABLET, FILM COATED ORAL at 14:20

## 2024-06-07 RX ADMIN — Medication 11 MILLICURIE: at 07:20

## 2024-06-07 RX ADMIN — ENOXAPARIN SODIUM 40 MG: 40 INJECTION SUBCUTANEOUS at 20:32

## 2024-06-07 RX ADMIN — ZOLPIDEM TARTRATE 10 MG: 5 TABLET ORAL at 23:35

## 2024-06-07 RX ADMIN — POTASSIUM CHLORIDE 40 MEQ: 750 TABLET, FILM COATED, EXTENDED RELEASE ORAL at 11:44

## 2024-06-07 RX ADMIN — ACETAMINOPHEN 650 MG: 325 TABLET, FILM COATED ORAL at 09:34

## 2024-06-07 RX ADMIN — REGADENOSON 0.4 MG: 0.08 INJECTION, SOLUTION INTRAVENOUS at 09:00

## 2024-06-07 RX ADMIN — GABAPENTIN 900 MG: 300 CAPSULE ORAL at 20:32

## 2024-06-07 RX ADMIN — ROSUVASTATIN 10 MG: 10 TABLET, FILM COATED ORAL at 11:44

## 2024-06-07 RX ADMIN — PANTOPRAZOLE SODIUM 40 MG: 40 TABLET, DELAYED RELEASE ORAL at 20:32

## 2024-06-07 RX ADMIN — GLIPIZIDE 10 MG: 10 TABLET, EXTENDED RELEASE ORAL at 11:43

## 2024-06-07 ASSESSMENT — ACTIVITIES OF DAILY LIVING (ADL)
ADLS_ACUITY_SCORE: 36
ADLS_ACUITY_SCORE: 37
ADLS_ACUITY_SCORE: 36

## 2024-06-07 NOTE — CONSULTS
Patient has Medicare D through [employer].    Jardiance/Farxiga  --$11/mo.  --lf/when total drug costs exceed $5,030, price will increase to a 25% coinsurance, equivalent to $146/mo.    Entresto  --$164/mo.    Sienna Rockwell  Pharmacy Technician/Liaison, Discharge Pharmacy   [phone] (voice or text)  [email or web address]

## 2024-06-07 NOTE — PLAN OF CARE
PRIMARY DIAGNOSIS: PNEUMONIA  OUTPATIENT/OBSERVATION GOALS TO BE MET BEFORE DISCHARGE:  Dyspnea improved and O2 sats >88% on RA or back to baseline O2 levels: No   SpO2: 91 %, O2 Device: Nasal cannula    Tolerating oral abx or appropriate plans made outpatient infusion: Yes    Vitals signs normal or return to baseline: Yes    Short term supplemental O2 needed with activity at home:  TBD    Tolerate oral intake to maintain hydration: Yes    Return to near baseline physical activity: Yes    Discharge Planner Nurse   Safe discharge environment identified: Yes  Barriers to discharge: Yes       Entered by: Cade Lowe RN 06/07/2024 4:34 AM     Please review provider order for any additional goals.   Nurse to notify provider when observation goals have been met and patient is ready for discharge.

## 2024-06-07 NOTE — PROGRESS NOTES
[facility]  Hospitalist Progress Note  Lorena Rincon PA-C 06/07/2024         Assessment and Plan:      Nicole Reyes is a 68 year old female admitted on 6/5/2024 with acute hypoxic respiratory failure likely due to congestive heart failure. She has history of type 2 diabetes, hypertension, hypercholesterolemia, chronic kidney disease stage III, obesity, obstructive sleep apnea, GERD, anxiety, depression, chronic respiratory failure on supplemental oxygen at 2 L/min at night and with walking, and restless leg syndrome.  She was recently hospitalized here from 5/30/2024 through 6/4/2024 with left-sided pneumonia, possibly due to aspiration.  She was treated with Zosyn while in the hospital and discharged home on Augmentin.      Emergency department evaluation showed oxygen saturations of 99% on 3 L/min.  Other vital signs were unremarkable.  There was no documented hypoxia in the emergency department.  Laboratory evaluation showed unremarkable BMP.  BNP was elevated at 4143.   Chest x-ray suggested left lower lobe infiltrate.  no obvious congestive heart failure.       Acute on chronic Hypoxia, requiring additional o2  Suspected diastolic congestive heart failure  Recent left lower lung pneumonia with persistence on chest x-ray  Initially felt ok after discharge but then noticed increased fatigue and hypoxia with oxygen saturations of 70% when walking.   -Patient does NOT seem to be getting more ill due to worsening pneumonia. She has not had fevers or worsening cough or felt systemically ill.    - She has no history of congestive heart failure but  I am suspicious of hypoxia due to diastolic congestive heart failure given elevated BNP   - Overall clinically improving with Lasix 40 mg IV twice daily  - Transition to po lasix 6/7 pm   - Echocardiogram shows new cardiomyopathy with EF of 45%  - Cont Augmentin for recent PNA   - WEAN OFF O2 DURING THE DAY BACK TO JUST HOME o2 AT NIGHT AND WITH  "ACTIVITY    #New Cardiomyopathy  ECHO this admission shows new dec EF at 45%  Lexiscan obtained shows small area of nontransmural infarction in the basal to mid inferior lateral segment of the left ventricle with no associated with first ischemia  - Appreciate Cards eval for medication optimization  - Added Po lasix, coreg, aldactone and statin  - Diltiazem discontinued   - Will ultimately need ischemic work up and ZioPatch for c/o palpitations at discharge      Hypertension  Hypercholesterolemia  -See above, medication changes per Cardiology     Type 2 diabetes  Chronic kidney disease stage III  -Resume prior to admission Glucotrol  -Medium resistance NovoLog insulin by sliding scale as needed     Depression  Anxiety  -Resume prior to admission Zoloft     Obesity  Obstructive sleep apnea  -Uses supplemental oxygen at night     Diet: Moderate Consistent Carb (60 g CHO per Meal) Diet    DVT Prophylaxis: Enoxaparin (Lovenox) SQ  Suarez Catheter: Not present  Lines: None     Cardiac Monitoring: ACTIVE order. Indication: Acute decompensated heart failure (48 hours)  Code Status: Full Code    Medically Ready for Discharge: Anticipated Today/tomorrow            Interval History (Subjective):      Feeling better with dec SOB.   Continues to diuresis  No complaints. Await Cards input                  Physical Exam:      Last Vital Signs:  /72 (BP Location: Right arm)   Pulse 62   Temp 97.6  F (36.4  C) (Oral)   Resp 18   Ht 1.473 m (4' 10\")   Wt 112.7 kg (248 lb 8 oz)   LMP 09/15/2007   SpO2 95%   BMI 51.94 kg/m        Intake/Output Summary (Last 24 hours) at 6/7/2024 1630  Last data filed at 6/7/2024 1434  Gross per 24 hour   Intake 1310 ml   Output 4250 ml   Net -2940 ml       Constitutional: Awake, alert, cooperative, no apparent distress, obese      Respiratory: Rales at bases    Cardiovascular: Regular rate and rhythm, normal S1 and S2, and no murmur noted   Abdomen: Normal bowel sounds, soft, " non-distended, non-tender   Skin: No rashes, no cyanosis, dry to touch   Neuro: Alert and oriented x3, no weakness, numbness, memory loss   Extremities: No edema, normal range of motion   Other(s):        All other systems: Negative          Medications:      All current medications were reviewed with changes reflected in problem list.         Data:      All new lab and imaging data was reviewed.   Labs:       Lab Results   Component Value Date     06/07/2024     06/06/2024     06/05/2024     06/02/2021     06/01/2021     05/28/2021    Lab Results   Component Value Date    CHLORIDE 100 06/07/2024    CHLORIDE 105 06/06/2024    CHLORIDE 106 06/05/2024    CHLORIDE 109 06/02/2022    CHLORIDE 105 11/10/2021    CHLORIDE 104 11/09/2021    CHLORIDE 103 06/02/2021    CHLORIDE 105 06/01/2021    CHLORIDE 108 05/28/2021    Lab Results   Component Value Date    BUN 10.8 06/07/2024    BUN 6.9 06/06/2024    BUN 6.1 06/05/2024    BUN 16 06/02/2022    BUN 13 11/10/2021    BUN 14 11/09/2021    BUN 11 06/02/2021    BUN 9 06/01/2021    BUN 10 05/28/2021      Lab Results   Component Value Date    POTASSIUM 3.1 06/07/2024    POTASSIUM 3.5 06/06/2024    POTASSIUM 3.6 06/05/2024    POTASSIUM 3.4 06/02/2022    POTASSIUM 3.8 11/11/2021    POTASSIUM 4.1 11/10/2021    POTASSIUM 3.3 06/04/2021    POTASSIUM 3.5 06/03/2021    POTASSIUM 4.0 06/02/2021    Lab Results   Component Value Date    CO2 30 06/07/2024    CO2 28 06/06/2024    CO2 26 06/05/2024    CO2 27 06/02/2022    CO2 29 11/10/2021    CO2 30 11/09/2021    CO2 37 06/02/2021    CO2 34 06/01/2021    CO2 28 05/28/2021    Lab Results   Component Value Date    CR 0.93 06/07/2024    CR 0.83 06/06/2024    CR 0.75 06/05/2024    CR 0.85 06/02/2021    CR 0.79 06/01/2021    CR 0.72 05/28/2021        Recent Labs   Lab 06/07/24  0528 06/06/24  0614 06/05/24  1246   WBC 9.0 7.9 8.0   HGB 11.2* 9.8* 10.8*   HCT 35.2 31.3* 33.8*   MCV 82 82 82    182 203       Imaging:   Results for orders placed or performed during the hospital encounter of 24   XR Chest 2 Views    Narrative    CHEST TWO VIEWS 2024 2:34 PM     HISTORY: Evaluation of ongoing hypoxia, history of recurrent  aspiration pneumonia.    COMPARISON: 2021       Impression    IMPRESSION: Lower lobe infiltrate suggested on lateral view. Mid and  upper lungs clear. The cardiac silhouette is not enlarged. Pulmonary  vasculature is unremarkable.    KELLEY LOPEZ MD         SYSTEM ID:  [id number]   Echocardiogram Complete     Value    LVEF  45-50%    Narrative    [id number]  [facility]  [id number]  [id number]^PHONG^KASIA^ALICE     [facility]  Echocardiography Laboratory  [address] [place] [address]     Name: ASHANTI PATEL  MRN: [id number]  : 1955  Study Date: 2024 09:27 AM  Age: 68 yrs  Gender: Female  Patient Location: [facility]  Reason For Study: CHF  Ordering Physician: KASIA DARLING  Referring Physician: Karla Hurst  Performed By: Vanessa Negron     BSA: 2.0 m2  Height: 58 in  Weight: 248 lb  HR: 68  BP: 150/81 mmHg  ______________________________________________________________________________  Procedure  Complete Portable Echo Adult. Optison (NDC #[id number]) given intravenously.  Technically difficult study.  ______________________________________________________________________________  Interpretation Summary     The visual ejection fraction is 45-50%. Appears lower compared with most  recent stiudy.  There is mild global hypokinesia of the left ventricle.  There is mild-moderate biatrial enlargement.  The study was technically difficult. Contrast was used without apparent  complications.  ______________________________________________________________________________  Left Ventricle  The left ventricle is normal in size. There is normal left ventricular wall  thickness. The visual ejection fraction is 45-50%. Grade II or  moderate  diastolic dysfunction. There is mild global hypokinesia of the left ventricle.     Right Ventricle  The right ventricle is normal in structure, function and size.     Atria  There is mild-moderate biatrial enlargement.     Mitral Valve  The mitral valve leaflets appear normal. There is no evidence of stenosis,  fluttering, or prolapse.     Tricuspid Valve  The tricuspid valve is not well visualized, but is grossly normal. Right  ventricular systolic pressure could not be approximated due to inadequate  tricuspid regurgitation.     Aortic Valve  The aortic valve is not well visualized.     Pulmonic Valve  The pulmonic valve is not well visualized.     Vessels  Normal size descending aorta. Dilation of the inferior vena cava is present  with normal respiratory variation in diameter.     Pericardium  There is no pericardial effusion.     Rhythm  Sinus rhythm was noted.  ______________________________________________________________________________  MMode/2D Measurements & Calculations  IVSd: 0.90 cm     LVIDd: 4.5 cm  LVIDs: 2.5 cm  LVPWd: 1.1 cm  IVC diam: 2.7 cm  FS: 43.2 %  LV mass(C)d: 149.9 grams  LV mass(C)dI: 75.1 grams/m2  Ao root diam: 2.7 cm  asc Aorta Diam: 3.2 cm  LVOT diam: 2.0 cm  LVOT area: 3.1 cm2  Ao root diam index Ht(cm/m): 1.8  Ao root diam index BSA (cm/m2): 1.3  Asc Ao diam index BSA (cm/m2): 1.6  Asc Ao diam index Ht(cm/m): 2.2  LA Volume (BP): 67.0 ml     LA Volume Index (BP): 33.5 ml/m2  RWT: 0.48  TAPSE: 2.5 cm     Doppler Measurements & Calculations  MV E max mallory: 113.0 cm/sec  MV A max mallory: 113.9 cm/sec  MV E/A: 0.99  MV max P.3 mmHg  MV mean PG: 3.2 mmHg  MV V2 VTI: 39.2 cm  MVA(VTI): 2.0 cm2  MV dec slope: 703.9 cm/sec2  MV dec time: 0.16 sec  Ao V2 max: 113.0 cm/sec  Ao max P.0 mmHg  Ao V2 mean: 79.8 cm/sec  Ao mean PG: 3.0 mmHg  Ao V2 VTI: 28.4 cm  DEB(I,D): 2.8 cm2  DEB(V,D): 2.6 cm2  LV V1 max PG: 3.7 mmHg  LV V1 max: 96.7 cm/sec  LV V1 VTI: 25.5 cm  SV(LVOT): 78.9  ml  SI(LVOT): 39.5 ml/m2  PA V2 max: 97.5 cm/sec  PA max PG: 3.8 mmHg  PA acc time: 0.16 sec  AV Nic Ratio (DI): 0.86  DEB Index (cm2/m2): 1.4  E/E' avg: 15.8     Lateral E/e': 13.0  Medial E/e': 18.5  RV S Nic: 11.2 cm/sec     ______________________________________________________________________________  Report approved by: Ramon Jennings 06/06/2024 11:16 AM         NM MPI w Lexiscan     Value    Target     Baseline Systolic     Baseline Diastolic BP 66    Last Stress Systolic     Last Stress Diastolic BP 63    Baseline HR 70    Max HR  89    Max Predicted HR  59    Rate Pressure Product 11,036.0    Stress/rest perfusion ratio 1.18    Narrative      Technically challenging study due to patient body habitus.    The nuclear stress test is abnormal.    There is a small area of nontransmural infarction in the basal to mid   inferolateral and apical lateral segment(s) of the left ventricle. No   signficant associated reversible ischemia.    Left ventricular function is hyperdynamic.    The left ventricular ejection fraction at rest is greater than 70%. The   left ventricular ejection fraction at stress is greater than 70%.    Small left ventricular cavity size is noted.    Stress to rest cavity ratio is 1.18.  TID is absent.    There is reduced radiotracer uptake at the apex on both stress and rest   images, with preserved wall motion, most consistent with apical thinning   artifact, though a small area of non-transmural infarction cannot be   excluded.    A prior study was conducted on 8/11/2017.  This study has changes noted   when compared with the prior study.       *Note: Due to a large number of results and/or encounters for the requested time period, some results have not been displayed. A complete set of results can be found in Results Review.

## 2024-06-07 NOTE — PLAN OF CARE
Goal Outcome Evaluation:      Plan of Care Reviewed With: patient    Overall Patient Progress: improvingOverall Patient Progress: improving    Outcome Evaluation: A&Ox4, VSS on 2L O2, SBA. Tele - SR. Mod carb diet. BG ACHS with sliding scale insulin. Pain controlled with tylenol x2. PIV SL. Denies dizziness and nausea. KHALIL. Crackles to bilateral lower lobes (L>R).  Diarrhea, occurs when on abx per pt. PO augmentin for pneumonia. Diuresing with IV lasix. Up frequently to restroom. PIV SL. Tele- SR. Lexiscan completed this morning. Cardiology consult suggesting CTA outpatient. po augmenting for pneumonia.      Problem: Adult Inpatient Plan of Care  Goal: Plan of Care Review  Description: The Plan of Care Review/Shift note should be completed every shift.  The Outcome Evaluation is a brief statement about your assessment that the patient is improving, declining, or no change.  This information will be displayed automatically on your shift  note.  Flowsheets (Taken 6/7/2024 1521)  Outcome Evaluation: A&Ox4, VSS on 2L O2, SBA. Tele - SR. Mod carb diet. BG ACHS with sliding scale insulin. Pain controlled with tylenol x2. PIV SL. Denies dizziness and nausea. KHALIL. Crackles to bilateral lower lobes (L>R).  Diarrhea, occurs when on abx per pt. PO augmentin for pneumonia. Diuresing with IV lasix. Up frequently to restroom. PIV SL. Tele- SR. Lexiscan completed this morning. Cardiology consult suggesting CTA outpatient. po augmenting for pneumonia.  Plan of Care Reviewed With: patient  Overall Patient Progress: improving  Goal: Absence of Hospital-Acquired Illness or Injury  Intervention: Identify and Manage Fall Risk  Recent Flowsheet Documentation  Taken 6/7/2024 0902 by Crystal Fierro, RN  Safety Promotion/Fall Prevention:   activity supervised   assistive device/personal items within reach   clutter free environment maintained   room organization consistent   safety round/check completed  Intervention: Prevent Skin  Injury  Recent Flowsheet Documentation  Taken 6/7/2024 1035 by Crystal Fierro, RN  Body Position: position changed independently  Taken 6/7/2024 0934 by Crystal Fierro RN  Body Position:   position changed independently   supine, head elevated  Intervention: Prevent Infection  Recent Flowsheet Documentation  Taken 6/7/2024 0934 by Crystal Fierro, RN  Infection Prevention:   hand hygiene promoted   single patient room provided  Goal: Optimal Comfort and Wellbeing  Intervention: Monitor Pain and Promote Comfort  Recent Flowsheet Documentation  Taken 6/7/2024 0934 by Crystal Fierro RN  Pain Management Interventions: medication (see MAR)

## 2024-06-07 NOTE — PLAN OF CARE
PRIMARY DIAGNOSIS: PNEUMONIA  OUTPATIENT/OBSERVATION GOALS TO BE MET BEFORE DISCHARGE:  Dyspnea improved and O2 sats >88% on RA or back to baseline O2 levels: No   SpO2: 91 %, O2 Device: Nasal cannula    Tolerating oral abx or appropriate plans made outpatient infusion: Yes    Vitals signs normal or return to baseline: Yes    Short term supplemental O2 needed with activity at home:  TBD    Tolerate oral intake to maintain hydration: Yes    Return to near baseline physical activity: Yes    Discharge Planner Nurse   Safe discharge environment identified: Yes  Barriers to discharge: Yes       Entered by: Cade Lowe RN 06/07/2024 4:39 AM     Please review provider order for any additional goals.   Nurse to notify provider when observation goals have been met and patient is ready for discharge.    Vitals are Temp: 98.3  F (36.8  C) Temp src: Oral BP: 132/86 Pulse: 60   Resp: 18 SpO2: 91 %.    Patient is Alert and Oriented x4. SBA. On mod carb diet. BG q4h with sliding scale insulin. Denies pain. PIV SL. Denies dizziness and nausea. KHALIL. Crackles to bilateral lower lobes (L>R).  On 2 L of O2. Had pulse oximetry study overnight. Diarrhea, occurs when on abx per pt. Diuresing with IV lasix. Up frequently to restroom. PIV SL. Tele- SR per tele tech. Lexiscan ordered for today, however pt wants to complete it at a later time. Pt also asked about having a cardiology consult while here. Will relay to morning team. Continuing po augmenting for pneumonia. Prn klonopin given for sleep. Strict I&O. DW.

## 2024-06-07 NOTE — PLAN OF CARE
PRIMARY DIAGNOSIS: PNEUMONIA  OUTPATIENT/OBSERVATION GOALS TO BE MET BEFORE DISCHARGE:  Dyspnea improved and O2 sats >88% on RA or back to baseline O2 levels: No   SpO2: 91 %, O2 Device: Nasal cannula    Tolerating oral abx or appropriate plans made outpatient infusion: Yes    Vitals signs normal or return to baseline: Yes    Short term supplemental O2 needed with activity at home:  TBD    Tolerate oral intake to maintain hydration: Yes    Return to near baseline physical activity: Yes    Discharge Planner Nurse   Safe discharge environment identified: Yes  Barriers to discharge: Yes       Entered by: Cade Lowe RN 06/07/2024 4:35 AM     Please review provider order for any additional goals.   Nurse to notify provider when observation goals have been met and patient is ready for discharge.    Vitals are Temp: 98.3  F (36.8  C) Temp src: Oral BP: 132/86 Pulse: 60   Resp: 18 SpO2: 91 %.    Patient is Alert and Oriented x4. SBA. On mod carb diet. BG q4h with sliding scale insulin. Denies pain. PIV SL. Denies dizziness and nausea. KHALIL. Crackles to bilateral lower lobes (L>R).  On 2 L of O2. Sleep study completed. Diarrhea, occurs when on abx per pt. Diuresing with IV lasix. Up frequently to restroom. PIV SL. Tele- SR per tele tech. Lexiscan ordered for today, however pt want to complete it at a later time. Pt also asked about having a cardiology consult while here. Will relay to morning providers. Continuing po augmenting for pneumonia. Prn ambien given for sleep. Strict I&O. DW.

## 2024-06-07 NOTE — PROGRESS NOTES
"Hosp short note   Full note to follow     Just discharged from [facility] 6/4 for possible aspiration PNA   Came back for SOB, increased O2 needs and hypervolemia   Placed on IV diuresis - feels a bit better   ECHO today shows new cardiomyopathy  D/w pt, will plan for Lexiscan tomorrow to r/o ischemic heart dz   Has had increased \"palpitations' sensation   Cont Tele, and likely need cardiac monitor at discharge   Cont IV diuresis for now   Follow labs   Cont ambulation   Possible Cards consult pending Lexiscan results  Last angio cath 2021 did show 80% distal LAD stenosis and 60% RCA etc       "

## 2024-06-07 NOTE — PROGRESS NOTES
Overnight oximetry study completed on 2L NC as per the order. Results scanned into pt's epic chart.     Kalie Oscar, RT

## 2024-06-07 NOTE — CONSULTS
[facility]    Cardiology Consultation     Date of Admission:  6/5/2024    Assessment & Plan   HFmrEF  - Echo 6/6/24 EF 45-50% (6/2021: EF 60-65%), no significant valve disease  - NT pro BNP 4143  - Trop T WNL   - Uncertain etiology:    * TSH 1/2024 was WNL  * Alcohol use is very minimal  * BP appears controlled  * She does report history of PVCs and that the palpitations have increased. Will send home with (will be mailed out) José Miguel to get PVC burden and assess for other possible arrhythmias.   * Susan 6/7/24:  small area of nontransmural infarction in the basal to mid inferolateral and apical lateral segment(s) of the left ventricle. No signficant associated reversible ischemia.  She does have multiple CAD risk factors: HTN, dyslipidemia, DM2.  She does have family history of CAD - mom had MI in her early 50s and again in her 70s. 2 brothers have stents.  She smoked a remote history of smoking, quit in 1978.   No chest pain recently or this admission  Will arrange for outpatient CT cor angio  - GDMT:    * PTA on losartan 100 mg, continue.  Entresto is almost $150 per month, so too costly.      * BB. Will start Coreg 12.5 mg BID tomorrow   * MRA.  Will start spironolactone 25 mg daily today   * SGLT2i. Will consider in follow up. Note she is on Ozempic and glipizide already for DM, so may need adjustment.   - Volume:   * IV Lasix 40 mg BID and appears near euvolemic.    * BMP stable    * Weight 248 lbs on admit. No weight today.    * Switch to PO Lasix 20 mg daily tomorrow, along with the spironolactone 25 mg  - Daily weights, I/Os  - Low sodium diet      HTN  - PTA meds: dilt  mg, losartan 100 mg  - Switching dilt to Coreg 12.5 mg BID, and adding spironolactone 25 mg  - Follow      Dyslipidemia  - PTA on Crestor 10 mg  - LDL 1/2024 was 197  - Increase to 20 mg      Palpitations  Reported history of PVCs  - Home with José Miguel       CRISTÓBAL  - CPAP      PNA  - Continue abx per primary  service      DM2  - 1/2024 A1C 6.3  - PTA on Ozempic and glipizide        Zio to be mailed out  I will arrange outpatient CT cor angio, and follow up in heart clinci with BMP, NT pro BNP      Elizabeth Weaver PA-C    Primary Care Physician   Karla Hurst    [facility] Consult   [facility]    History of Present Illness   Nicole Reyes is a 68 year old woman with history of type 2 diabetes, hypertension, hypercholesterolemia, chronic kidney disease stage III, obesity, obstructive sleep apnea, GERD, anxiety, depression, spinal stenosis, chronic respiratory failure on supplemental oxygen at 2 L/min at night and with walking, and restless leg syndrome. She reports a history of PVCs.     She was recently admitted 5/30/24-6/3/24 with left-sided pneumonia, possibly due to aspiration. She was treated with Zosyn while admitted and discharged with Augmentin.     She did ok at home, but she reports she was wearing her O2 in the day.     She is now readmitted on 6/5/2024 with acute hypoxic respiratory failure. Though she reports what brought her in was more palpitations. She had O2 sats of 70% with ambulation, so called EMS and was brought to the ER by ambulance.   No fever.   No chest pain.     In the ER, she was 99% on 3 L.    NT pro BNP 4143.   CXR suggested left lower lobe infiltrate, no obvious CHF.     Echo 6/6/24 shows LVEF 45-50%, global HK. LV normal thickness. There is mild to moderate ASUNCION. No sig valve disease.     Susan 6/7/24:  small area of nontransmural infarction in the basal to mid inferolateral and apical lateral segment(s) of the left ventricle. No signficant associated reversible ischemia.    She reports some increased LE edema over the past year, L>R. She has Lasix 20 mg PRN. She takes this about 2 times a week.           Past Medical History   Past Medical History:   Diagnosis Date    Arthritis     lt shoulder, rt. knee    Blood transfusion     CKD (chronic kidney disease) stage 3, GFR 30-59  ml/min (H)     Depressive disorder     Essential hypertension, benign     Gastroesophageal reflux disease     Generalized anxiety disorder     Hernia, abdominal     Hiatal hernia     History of blood transfusion     with a hernia repair    Hypertension     Insomnia, unspecified     Iron deficiency anemia 2009    Major depression     sees a psychiatrist    Menopause     age 53    Obesity, unspecified     CRISTÓBAL (obstructive sleep apnea)     bipap    Other chronic pain     chronic pain lt arm from tendonidits    Oxygen dependent     2 LPM at home-uses at noc or extended walking    Pneumonia     due to aspiration from hiatal hernia-    Pneumonia due to 2019 novel coronavirus 2021    Postoperative seroma 10/2011    drained several times    Pure hypercholesterolemia     RLS (restless legs syndrome)     Type II or unspecified type diabetes mellitus without mention of complication, not stated as uncontrolled        Past Surgical History   Past Surgical History:   Procedure Laterality Date    BREAST BIOPSY, RT/LT      2 times; benign    BREAST SURGERY  ?    Biopsy x 2 Benign     SECTION       SECTION       SECTION      COLONOSCOPY N/A 2020    Procedure: Colonoscopy with biopsy;  Surgeon: Obinna Gutierrez MD;  Location:  OR    DILATION AND CURETTAGE, HYSTEROSCOPY DIAGNOSTIC, COMBINED  2013    Procedure: COMBINED DILATION AND CURETTAGE, HYSTEROSCOPY DIAGNOSTIC;  DILATION AND CURETTAGE, HYSTEROSCOPY DIAGNOSTIC   Converted to a general;  Surgeon: Robi Edwards MD;  Location:  OR    ESOPHAGOSCOPY, GASTROSCOPY, DUODENOSCOPY (EGD), COMBINED N/A 2015    Procedure: COMBINED ESOPHAGOSCOPY, GASTROSCOPY, DUODENOSCOPY (EGD);  Surgeon: Obinna Gutierrez MD;  Location:  GI    ESOPHAGOSCOPY, GASTROSCOPY, DUODENOSCOPY (EGD), COMBINED N/A 2015    Procedure: COMBINED ENDOSCOPIC ULTRASOUND, ESOPHAGOSCOPY, GASTROSCOPY, DUODENOSCOPY (EGD), FINE NEEDLE  ASPIRATE/BIOPSY;  Surgeon: Sabine Olivares MD;  Location:  GI    ESOPHAGOSCOPY, GASTROSCOPY, DUODENOSCOPY (EGD), COMBINED N/A 01/03/2020    Procedure: ESOPHAGOGASTRODUODENOSCOPY;  Surgeon: Ascencion Stauffer MD;  Location: [facility] OR    HERNIORRHAPHY INCISIONAL (LOCATION)  10/08/2011     incarcerated hernia repair with mesh;    HERNIORRHAPHY INCISIONAL (LOCATION)  10/16/2011     repair incisional hernia with mesh, and removal of current implanted mesh;    [facility] NONSPECIFIC PROCEDURE      Hernia repair     [facility] NONSPECIFIC PROCEDURE      Lymph node biopsy in neck (benign)        Prior to Admission Medications   Prior to Admission Medications   Prescriptions Last Dose Informant Patient Reported? Taking?   Fish Oil-Krill Oil (KRILL & FISH OIL BLEND PO) Past Week  Yes Yes   Sig: Take 1 capsule by mouth daily   amoxicillin-clavulanate (AUGMENTIN) 875-125 MG tablet 6/4/2024  No Yes   Sig: Take 1 tablet by mouth every 12 hours   clonazePAM (KLONOPIN) 1 MG tablet prn  No Yes   Sig: Take 1 tablet (1 mg) by mouth 2 times daily as needed for anxiety 60 to last 30 days   cyclobenzaprine (FLEXERIL) 5 MG tablet prn  No Yes   Sig: Take 1-2 tablets (5-10 mg) by mouth 3 times daily as needed for muscle spasms   diltiazem ER (TIADYLT ER) 360 MG 24 hr ER beaded capsule 6/4/2024  No Yes   Sig: Take 1 capsule (360 mg) by mouth daily   furosemide (LASIX) 20 MG tablet prn  No Yes   Sig: Take 1 tablet (20 mg) by mouth daily as needed (edema)   gabapentin (NEURONTIN) 300 MG capsule 6/4/2024  No Yes   Sig: 3 capsules every am, 2 capsules midday and 3 caps at night   Patient taking differently: Take 900 mg by mouth 2 times daily 3 capsules every am, 2 capsules midday and 3 caps at night   gabapentin (NEURONTIN) 300 MG capsule 6/4/2024  Yes Yes   Sig: Take 600 mg by mouth daily (with lunch) 3 capsules every am, 2 capsules midday and 3 caps at night   glipiZIDE (GLUCOTROL XL) 10 MG 24 hr tablet 6/4/2024  No Yes   Sig: Take 1 tablet (10 mg)  by mouth daily   ketoconazole (NIZORAL) 2 % external cream prn  No Yes   Sig: Apply to fold areas BID PRN   losartan (COZAAR) 100 MG tablet 6/4/2024  No Yes   Sig: Take 1 tablet (100 mg) by mouth daily   melatonin 5 MG tablet prn  Yes Yes   Sig: Take 5 mg by mouth nightly as needed for sleep   multivitamin w/minerals (THERA-VIT-M) tablet Past Week  Yes Yes   Sig: Take 1 tablet by mouth daily   pantoprazole (PROTONIX) 40 MG EC tablet 6/4/2024  No Yes   Sig: TAKE 1 TABLET BY MOUTH EVERYDAY AT BEDTIME   potassium chloride ER (K-TAB) 20 MEQ CR tablet 6/4/2024  No Yes   Sig: Take 40 meq daily   rosuvastatin (CRESTOR) 10 MG tablet 6/4/2024  No Yes   Sig: Take 1 tablet (10 mg) by mouth daily   semaglutide (OZEMPIC, 0.25 OR 0.5 MG/DOSE,) 2 MG/3ML pen Past Month  No Yes   Sig: INJECT 0.5 MG SUBCUTANEOUS EVERY 7 DAYS MAY INCREASE DOSE IN A MONTH IF TOLERATED   sertraline (ZOLOFT) 100 MG tablet 6/4/2024  No Yes   Sig: Take 2 tablets by mouth once daily   zolpidem (AMBIEN) 10 MG tablet 6/4/2024  No Yes   Sig: Take 1 tablet (10 mg) by mouth at bedtime      Facility-Administered Medications: None     Current Facility-Administered Medications   Medication Dose Route Frequency Provider Last Rate Last Admin    acetaminophen (TYLENOL) tablet 650 mg  650 mg Oral Q4H PRN Bg Rao MD   650 mg at 06/07/24 0934    Or    acetaminophen (TYLENOL) Suppository 650 mg  650 mg Rectal Q4H PRN Bg Rao MD        aminophylline 25 MG/ML             amoxicillin-clavulanate (AUGMENTIN) 875-125 MG per tablet 1 tablet  1 tablet Oral Q12H [facility] (08/20) Lorena Rincon PA-C   1 tablet at 06/07/24 1144    bisacodyl (DULCOLAX) suppository 10 mg  10 mg Rectal Daily PRN Bg Rao MD        clonazePAM (klonoPIN) tablet 1 mg  1 mg Oral BID PRN Bg Rao MD   1 mg at 06/07/24 0147    cyclobenzaprine (FLEXERIL) tablet 5-10 mg  5-10 mg Oral TID PRN gB Rao MD        glucose gel 15-30 g  15-30 g Oral Q15 Min PRN  Bg Rao MD        Or    dextrose 50 % injection 25-50 mL  25-50 mL Intravenous Q15 Min PRN Bg Rao MD        Or    glucagon injection 1 mg  1 mg Subcutaneous Q15 Min PRN Bg Rao MD        diltiazem ER COATED BEADS (CARDIZEM CD/CARTIA XT) 24 hr capsule 360 mg  360 mg Oral Daily Bg Rao MD   360 mg at 06/07/24 1143    enoxaparin ANTICOAGULANT (LOVENOX) injection 40 mg  40 mg Subcutaneous Q12H Bg Rao MD   40 mg at 06/07/24 1145    furosemide (LASIX) injection 40 mg  40 mg Intravenous BID Bg Rao MD   40 mg at 06/07/24 1144    gabapentin (NEURONTIN) capsule 600 mg  600 mg Oral Daily with lunch Bg Rao MD   600 mg at 06/06/24 1256    gabapentin (NEURONTIN) capsule 900 mg  900 mg Oral BID Bg Rao MD   900 mg at 06/07/24 1144    glipiZIDE (GLUCOTROL XL) 24 hr tablet 10 mg  10 mg Oral Daily Bg Rao MD   10 mg at 06/07/24 1143    insulin aspart (NovoLOG) injection (RAPID ACTING)  1-7 Units Subcutaneous Q4H Bg Rao MD        losartan (COZAAR) tablet 100 mg  100 mg Oral Daily Bg Rao MD   100 mg at 06/07/24 1144    melatonin tablet 1 mg  1 mg Oral At Bedtime PRN Bg Rao MD        ondansetron (ZOFRAN ODT) ODT tab 4 mg  4 mg Oral Q6H PRN Bg Rao MD        Or    ondansetron (ZOFRAN) injection 4 mg  4 mg Intravenous Q6H PRN Bg Rao MD        pantoprazole (PROTONIX) EC tablet 40 mg  40 mg Oral QPM Bg Rao MD   40 mg at 06/06/24 2033    polyethylene glycol (MIRALAX) Packet 17 g  17 g Oral BID PRN Bg Rao MD        potassium chloride ER (K-TAB/KLOR-CON) CR tablet 40 mEq  40 mEq Oral Daily Bg Rao MD   40 mEq at 06/07/24 1144    rosuvastatin (CRESTOR) tablet 10 mg  10 mg Oral Daily Bg Rao MD   10 mg at 06/07/24 1144    senna-docusate (SENOKOT-S/PERICOLACE) 8.6-50 MG per tablet 1 tablet  1 tablet Oral BID PRN Bg Rao MD         Or    senna-docusate (SENOKOT-S/PERICOLACE) 8.6-50 MG per tablet 2 tablet  2 tablet Oral BID PRN Bg Roa MD        sertraline (ZOLOFT) tablet 200 mg  200 mg Oral Daily Bg Rao MD   200 mg at 06/07/24 1143    zolpidem (AMBIEN) tablet 10 mg  10 mg Oral At Bedtime Bg Rao MD   10 mg at 06/06/24 2340     Current Facility-Administered Medications   Medication Dose Route Frequency Provider Last Rate Last Admin    acetaminophen (TYLENOL) tablet 650 mg  650 mg Oral Q4H PRN Bg Rao MD   650 mg at 06/07/24 0934    Or    acetaminophen (TYLENOL) Suppository 650 mg  650 mg Rectal Q4H PRN Bg Rao MD        aminophylline 25 MG/ML             amoxicillin-clavulanate (AUGMENTIN) 875-125 MG per tablet 1 tablet  1 tablet Oral Q12H [facility] (08/20) Lorena Rincon PA-C   1 tablet at 06/07/24 1144    bisacodyl (DULCOLAX) suppository 10 mg  10 mg Rectal Daily PRN Bg Rao MD        clonazePAM (klonoPIN) tablet 1 mg  1 mg Oral BID PRN Bg Rao MD   1 mg at 06/07/24 0147    cyclobenzaprine (FLEXERIL) tablet 5-10 mg  5-10 mg Oral TID PRN Bg Rao MD        glucose gel 15-30 g  15-30 g Oral Q15 Min PRN Bg Rao MD        Or    dextrose 50 % injection 25-50 mL  25-50 mL Intravenous Q15 Min PRN Bg Rao MD        Or    glucagon injection 1 mg  1 mg Subcutaneous Q15 Min PRN Bg Rao MD        diltiazem ER COATED BEADS (CARDIZEM CD/CARTIA XT) 24 hr capsule 360 mg  360 mg Oral Daily Bg Rao MD   360 mg at 06/07/24 1143    enoxaparin ANTICOAGULANT (LOVENOX) injection 40 mg  40 mg Subcutaneous Q12H Bg Rao MD   40 mg at 06/07/24 1145    furosemide (LASIX) injection 40 mg  40 mg Intravenous BID Bg Rao MD   40 mg at 06/07/24 1144    gabapentin (NEURONTIN) capsule 600 mg  600 mg Oral Daily with lunch Bg Rao MD   600 mg at 06/06/24 1256    gabapentin (NEURONTIN) capsule 900 mg  900 mg Oral BID  Bg Rao MD   900 mg at 06/07/24 1144    glipiZIDE (GLUCOTROL XL) 24 hr tablet 10 mg  10 mg Oral Daily Bg Rao MD   10 mg at 06/07/24 1143    insulin aspart (NovoLOG) injection (RAPID ACTING)  1-7 Units Subcutaneous Q4H Bg Rao MD        losartan (COZAAR) tablet 100 mg  100 mg Oral Daily Bg Rao MD   100 mg at 06/07/24 1144    melatonin tablet 1 mg  1 mg Oral At Bedtime PRN Bg Rao MD        ondansetron (ZOFRAN ODT) ODT tab 4 mg  4 mg Oral Q6H PRN Bg Rao MD        Or    ondansetron (ZOFRAN) injection 4 mg  4 mg Intravenous Q6H PRN Bg Rao MD        pantoprazole (PROTONIX) EC tablet 40 mg  40 mg Oral QPM Bg Rao MD   40 mg at 06/06/24 2033    polyethylene glycol (MIRALAX) Packet 17 g  17 g Oral BID PRN Bg Rao MD        potassium chloride ER (K-TAB/KLOR-CON) CR tablet 40 mEq  40 mEq Oral Daily Bg Rao MD   40 mEq at 06/07/24 1144    rosuvastatin (CRESTOR) tablet 10 mg  10 mg Oral Daily Bg Rao MD   10 mg at 06/07/24 1144    senna-docusate (SENOKOT-S/PERICOLACE) 8.6-50 MG per tablet 1 tablet  1 tablet Oral BID PRN Bg Rao MD        Or    senna-docusate (SENOKOT-S/PERICOLACE) 8.6-50 MG per tablet 2 tablet  2 tablet Oral BID PRN Bg Rao MD        sertraline (ZOLOFT) tablet 200 mg  200 mg Oral Daily Bg Rao MD   200 mg at 06/07/24 1143    zolpidem (AMBIEN) tablet 10 mg  10 mg Oral At Bedtime Bg Rao MD   10 mg at 06/06/24 2340     Allergies   Allergies   Allergen Reactions    Metformin GI Disturbance     High dose    Morphine And Codeine Rash    Penicillins Rash     Pt has tolerated cephalosporins and penems.   Pt tolerated Zosyn 7/6/2019       Social History    reports that she quit smoking about 45 years ago. Her smoking use included cigarettes. She started smoking about 50 years ago. She has a 15 pack-year smoking history. She has never used smokeless  tobacco. She reports current alcohol use. She reports that she does not use drugs.      Family History   I have reviewed this patient's family history and updated it with pertinent information if needed.  Family History   Problem Relation Age of Onset    Cardiovascular Mother         CHF    Hypertension Mother     C.A.D. Mother         50s    Lipids Mother     Coronary Artery Disease Mother     Depression Mother     Anxiety Disorder Mother     Cancer Father         Hodgkin's, Leukemia    Other Cancer Father         Hodgkins    Hypertension Brother     Diabetes Brother     Coronary Artery Disease Brother     Other Cancer Brother         Hodgkins    Cancer Brother         Hodgkin's    Coronary Artery Disease Brother     Hypertension Brother     Anxiety Disorder Brother     C.A.D. Brother 61    C.A.D. Brother     Sleep Apnea Sister     Diabetes Sister     Coronary Artery Disease Sister     Hypertension Sister     Depression Sister     Anxiety Disorder Sister     Obesity Sister     Connective Tissue Disorder Sister         Lupus    Diabetes Sister     Other Cancer Sister         Leukemia    Depression Sister     Anxiety Disorder Sister     Lipids Sister     Hypertension Sister     Hypertension Sister     Hypertension Sister     Basal cell carcinoma Daughter 38        top of head    Mental Illness Maternal Grandfather     Hypertension Daughter     Depression Daughter     Anxiety Disorder Daughter     Obesity Daughter     Obesity Son     Obesity Daughter           Review of Systems   A comprehensive review of system was performed and is negative other than that noted in the HPI or here.     Physical Exam   Vital Signs with Ranges  Temp:  [97.6  F (36.4  C)-98.7  F (37.1  C)] 97.6  F (36.4  C)  Pulse:  [60-71] 62  Resp:  [18-19] 18  BP: (107-132)/(54-86) 130/72  SpO2:  [90 %-98 %] 95 %  Wt Readings from Last 4 Encounters:   06/06/24 112.7 kg (248 lb 8 oz)   05/30/24 111.4 kg (245 lb 8 oz)   04/05/24 109 kg (240 lb 3.2 oz)  "  03/27/24 108.8 kg (239 lb 12.8 oz)     I/O last 3 completed shifts:  In: 1310 [P.O.:1310]  Out: 5150 [Urine:5150]      Vitals: /72 (BP Location: Right arm)   Pulse 62   Temp 97.6  F (36.4  C) (Oral)   Resp 18   Ht 1.473 m (4' 10\")   Wt 112.7 kg (248 lb 8 oz)   LMP 09/15/2007   SpO2 95%   BMI 51.94 kg/m      Physical Exam:   General - Alert and oriented to time place and person in no acute distress  Eyes - No scleral icterus  HEENT - Neck supple, moist mucous membranes  Cardiovascular - RRR, S1S2, no sig murmur, no JVD, mild edema  Extremities - There is mild edema  Respiratory - LLL rales, otherwise clear. On supplemental O2  Skin - No pallor or cyanosis  Gastrointestinal - Obese. Non tender and non distended without rebound or guarding  Psych - Appropriate affect   Neurological - No gross motor neurological focal deficits    No lab results found in last 7 days.    Invalid input(s): \"TROPONINIES\"    Recent Labs   Lab 06/07/24  1140 06/07/24  0528 06/07/24  0421 06/06/24  0910 06/06/24  0614 06/05/24 2027 06/05/24 2012 06/05/24  1246   WBC  --  9.0  --   --  7.9  --   --  8.0   HGB  --  11.2*  --   --  9.8*  --   --  10.8*   MCV  --  82  --   --  82  --   --  82   PLT  --  227  --   --  182  --   --  203   NA  --  141  --   --  144  --   --  144   POTASSIUM  --  3.1*  --   --  3.5  --   --  3.6   CHLORIDE  --  100  --   --  105  --   --  106   CO2  --  30*  --   --  28  --   --  26   BUN  --  10.8  --   --  6.9*  --   --  6.1*   CR  --  0.93  --   --  0.83  --  0.75 0.71   GFRESTIMATED  --  67  --   --  76  --  86 >90   ANIONGAP  --  11  --   --  11  --   --  12   GRECIA  --  8.9  --   --  8.8  --   --  8.8   * 126* 177*   < > 122*   < >  --  155*    < > = values in this interval not displayed.     Recent Labs   Lab Test 01/25/24  1519 06/05/23  1408   CHOL 262* 177   HDL 39* 40*   * 104*   TRIG 128 164*     Recent Labs   Lab 06/07/24  0528 06/06/24  0614 06/05/24  1246   WBC 9.0 7.9 8.0 " "  HGB 11.2* 9.8* 10.8*   HCT 35.2 31.3* 33.8*   MCV 82 82 82    182 203     Recent Labs   Lab 24  1624   PHV 7.41   PO2V 25   PCO2V 53*   HCO3V 34*     Recent Labs   Lab 24  1246   NTBNPI 4,143*     No results for input(s): \"DD\" in the last 168 hours.  No results for input(s): \"SED\", \"CRP\" in the last 168 hours.  Recent Labs   Lab 24  0528 24  0614 24  1246    182 203     No results for input(s): \"TSH\" in the last 168 hours.  No results for input(s): \"COLOR\", \"APPEARANCE\", \"URINEGLC\", \"URINEBILI\", \"URINEKETONE\", \"SG\", \"UBLD\", \"URINEPH\", \"PROTEIN\", \"UROBILINOGEN\", \"NITRITE\", \"LEUKEST\", \"RBCU\", \"WBCU\" in the last 168 hours.    Imaging:  Recent Results (from the past 48 hour(s))   XR Chest 2 Views    Narrative    CHEST TWO VIEWS 2024 2:34 PM     HISTORY: Evaluation of ongoing hypoxia, history of recurrent  aspiration pneumonia.    COMPARISON: 2021       Impression    IMPRESSION: Lower lobe infiltrate suggested on lateral view. Mid and  upper lungs clear. The cardiac silhouette is not enlarged. Pulmonary  vasculature is unremarkable.    KELLEY LOPEZ MD         SYSTEM ID:  [id number]   Echocardiogram Complete   Result Value    LVEF  45-50%    Narrative    [id number]  [id number]  [id number]  [id number]^PHONG^KASIA^ALICE     [facility]  Echocardiography Laboratory  [address] [place] [address]     Name: ASHANTI PATEL  MRN: [id number]  : 1955  Study Date: 2024 09:27 AM  Age: 68 yrs  Gender: Female  Patient Location: [facility]  Reason For Study: CHF  Ordering Physician: KASIA DARLING  Referring Physician: Karla Hurst  Performed By: Vanessa Negron     BSA: 2.0 m2  Height: 58 in  Weight: 248 lb  HR: 68  BP: 150/81 mmHg  ______________________________________________________________________________  Procedure  Complete Portable Echo Adult. Optison (NDC #6653-7628) given intravenously.  Technically difficult " study.  ______________________________________________________________________________  Interpretation Summary     The visual ejection fraction is 45-50%. Appears lower compared with most  recent stiudy.  There is mild global hypokinesia of the left ventricle.  There is mild-moderate biatrial enlargement.  The study was technically difficult. Contrast was used without apparent  complications.  ______________________________________________________________________________  Left Ventricle  The left ventricle is normal in size. There is normal left ventricular wall  thickness. The visual ejection fraction is 45-50%. Grade II or moderate  diastolic dysfunction. There is mild global hypokinesia of the left ventricle.     Right Ventricle  The right ventricle is normal in structure, function and size.     Atria  There is mild-moderate biatrial enlargement.     Mitral Valve  The mitral valve leaflets appear normal. There is no evidence of stenosis,  fluttering, or prolapse.     Tricuspid Valve  The tricuspid valve is not well visualized, but is grossly normal. Right  ventricular systolic pressure could not be approximated due to inadequate  tricuspid regurgitation.     Aortic Valve  The aortic valve is not well visualized.     Pulmonic Valve  The pulmonic valve is not well visualized.     Vessels  Normal size descending aorta. Dilation of the inferior vena cava is present  with normal respiratory variation in diameter.     Pericardium  There is no pericardial effusion.     Rhythm  Sinus rhythm was noted.  ______________________________________________________________________________  MMode/2D Measurements & Calculations  IVSd: 0.90 cm     LVIDd: 4.5 cm  LVIDs: 2.5 cm  LVPWd: 1.1 cm  IVC diam: 2.7 cm  FS: 43.2 %  LV mass(C)d: 149.9 grams  LV mass(C)dI: 75.1 grams/m2  Ao root diam: 2.7 cm  asc Aorta Diam: 3.2 cm  LVOT diam: 2.0 cm  LVOT area: 3.1 cm2  Ao root diam index Ht(cm/m): 1.8  Ao root diam index BSA (cm/m2): 1.3  Asc  Ao diam index BSA (cm/m2): 1.6  Asc Ao diam index Ht(cm/m): 2.2  LA Volume (BP): 67.0 ml     LA Volume Index (BP): 33.5 ml/m2  RWT: 0.48  TAPSE: 2.5 cm     Doppler Measurements & Calculations  MV E max nic: 113.0 cm/sec  MV A max nic: 113.9 cm/sec  MV E/A: 0.99  MV max P.3 mmHg  MV mean PG: 3.2 mmHg  MV V2 VTI: 39.2 cm  MVA(VTI): 2.0 cm2  MV dec slope: 703.9 cm/sec2  MV dec time: 0.16 sec  Ao V2 max: 113.0 cm/sec  Ao max P.0 mmHg  Ao V2 mean: 79.8 cm/sec  Ao mean PG: 3.0 mmHg  Ao V2 VTI: 28.4 cm  DEB(I,D): 2.8 cm2  DEB(V,D): 2.6 cm2  LV V1 max PG: 3.7 mmHg  LV V1 max: 96.7 cm/sec  LV V1 VTI: 25.5 cm  SV(LVOT): 78.9 ml  SI(LVOT): 39.5 ml/m2  PA V2 max: 97.5 cm/sec  PA max PG: 3.8 mmHg  PA acc time: 0.16 sec  AV Nic Ratio (DI): 0.86  DEB Index (cm2/m2): 1.4  E/E' avg: 15.8     Lateral E/e': 13.0  Medial E/e': 18.5  RV S Nic: 11.2 cm/sec     ______________________________________________________________________________  Report approved by: Ramon Jennings 2024 11:16 AM         NM MPI w Lexiscan   Result Value    Target     Baseline Systolic     Baseline Diastolic BP 66    Last Stress Systolic     Last Stress Diastolic BP 63    Baseline HR 70    Max HR  89    Max Predicted HR  59    Rate Pressure Product 11,036.0    Stress/rest perfusion ratio 1.18    Narrative      Technically challenging study due to patient body habitus.    The nuclear stress test is abnormal.    There is a small area of nontransmural infarction in the basal to mid   inferolateral and apical lateral segment(s) of the left ventricle. No   signficant associated reversible ischemia.    Left ventricular function is hyperdynamic.    The left ventricular ejection fraction at rest is greater than 70%. The   left ventricular ejection fraction at stress is greater than 70%.    Small left ventricular cavity size is noted.    Stress to rest cavity ratio is 1.18.  TID is absent.    There is reduced radiotracer uptake at the apex on  "both stress and rest   images, with preserved wall motion, most consistent with apical thinning   artifact, though a small area of non-transmural infarction cannot be   excluded.    A prior study was conducted on 8/11/2017.  This study has changes noted   when compared with the prior study.           Clinically Significant Risk Factors Present on Admission        # Hypokalemia: Lowest K = 3.1 mmol/L in last 2 days, will replace as needed           # Hypertension: Noted on problem list            # DMII: A1C = 6.9 % (Ref range: <5.7 %) within past 6 months    # Severe Obesity: Estimated body mass index is 51.94 kg/m  as calculated from the following:    Height as of this encounter: 1.473 m (4' 10\").    Weight as of this encounter: 112.7 kg (248 lb 8 oz).           Cardiomyopathy    Fluid overload, unspecified    Pneumonia               "

## 2024-06-08 ENCOUNTER — APPOINTMENT (OUTPATIENT)
Dept: PHYSICAL THERAPY | Facility: CLINIC | Age: 69
DRG: 291 | End: 2024-06-08
Attending: PHYSICIAN ASSISTANT
Payer: MEDICARE

## 2024-06-08 PROBLEM — I50.22 HEART FAILURE WITH MILDLY REDUCED EJECTION FRACTION (HFMREF) (H): Status: ACTIVE | Noted: 2024-06-08

## 2024-06-08 LAB
CREAT SERPL-MCNC: 0.84 MG/DL (ref 0.51–0.95)
EGFRCR SERPLBLD CKD-EPI 2021: 75 ML/MIN/1.73M2
GLUCOSE BLDC GLUCOMTR-MCNC: 106 MG/DL (ref 70–99)
GLUCOSE BLDC GLUCOMTR-MCNC: 129 MG/DL (ref 70–99)
GLUCOSE BLDC GLUCOMTR-MCNC: 133 MG/DL (ref 70–99)
GLUCOSE BLDC GLUCOMTR-MCNC: 134 MG/DL (ref 70–99)
GLUCOSE BLDC GLUCOMTR-MCNC: 205 MG/DL (ref 70–99)
GLUCOSE BLDC GLUCOMTR-MCNC: 236 MG/DL (ref 70–99)
GLUCOSE BLDC GLUCOMTR-MCNC: 71 MG/DL (ref 70–99)
PLATELET # BLD AUTO: 245 10E3/UL (ref 150–450)

## 2024-06-08 PROCEDURE — 97161 PT EVAL LOW COMPLEX 20 MIN: CPT | Mod: GP

## 2024-06-08 PROCEDURE — 82962 GLUCOSE BLOOD TEST: CPT

## 2024-06-08 PROCEDURE — 250N000013 HC RX MED GY IP 250 OP 250 PS 637: Performed by: INTERNAL MEDICINE

## 2024-06-08 PROCEDURE — 120N000001 HC R&B MED SURG/OB

## 2024-06-08 PROCEDURE — 36415 COLL VENOUS BLD VENIPUNCTURE: CPT | Performed by: INTERNAL MEDICINE

## 2024-06-08 PROCEDURE — 85049 AUTOMATED PLATELET COUNT: CPT | Performed by: INTERNAL MEDICINE

## 2024-06-08 PROCEDURE — 250N000012 HC RX MED GY IP 250 OP 636 PS 637: Performed by: INTERNAL MEDICINE

## 2024-06-08 PROCEDURE — G0378 HOSPITAL OBSERVATION PER HR: HCPCS

## 2024-06-08 PROCEDURE — 99233 SBSQ HOSP IP/OBS HIGH 50: CPT | Performed by: PHYSICIAN ASSISTANT

## 2024-06-08 PROCEDURE — 96372 THER/PROPH/DIAG INJ SC/IM: CPT | Performed by: INTERNAL MEDICINE

## 2024-06-08 PROCEDURE — 82565 ASSAY OF CREATININE: CPT | Performed by: INTERNAL MEDICINE

## 2024-06-08 PROCEDURE — 97530 THERAPEUTIC ACTIVITIES: CPT | Mod: GP

## 2024-06-08 PROCEDURE — 250N000013 HC RX MED GY IP 250 OP 250 PS 637: Performed by: PHYSICIAN ASSISTANT

## 2024-06-08 PROCEDURE — 250N000011 HC RX IP 250 OP 636: Mod: JZ | Performed by: INTERNAL MEDICINE

## 2024-06-08 RX ADMIN — GABAPENTIN 900 MG: 300 CAPSULE ORAL at 10:08

## 2024-06-08 RX ADMIN — LOSARTAN POTASSIUM 100 MG: 100 TABLET, FILM COATED ORAL at 10:09

## 2024-06-08 RX ADMIN — CLONAZEPAM 1 MG: 0.5 TABLET ORAL at 23:34

## 2024-06-08 RX ADMIN — FUROSEMIDE 20 MG: 20 TABLET ORAL at 10:07

## 2024-06-08 RX ADMIN — SERTRALINE 200 MG: 100 TABLET, FILM COATED ORAL at 10:07

## 2024-06-08 RX ADMIN — ACETAMINOPHEN 650 MG: 325 TABLET, FILM COATED ORAL at 10:17

## 2024-06-08 RX ADMIN — AMOXICILLIN AND CLAVULANATE POTASSIUM 1 TABLET: 875; 125 TABLET, FILM COATED ORAL at 20:48

## 2024-06-08 RX ADMIN — GABAPENTIN 900 MG: 300 CAPSULE ORAL at 20:48

## 2024-06-08 RX ADMIN — ROSUVASTATIN CALCIUM 20 MG: 10 TABLET, FILM COATED ORAL at 10:07

## 2024-06-08 RX ADMIN — ENOXAPARIN SODIUM 40 MG: 40 INJECTION SUBCUTANEOUS at 20:47

## 2024-06-08 RX ADMIN — PANTOPRAZOLE SODIUM 40 MG: 40 TABLET, DELAYED RELEASE ORAL at 20:48

## 2024-06-08 RX ADMIN — GABAPENTIN 600 MG: 300 CAPSULE ORAL at 12:40

## 2024-06-08 RX ADMIN — ZOLPIDEM TARTRATE 10 MG: 5 TABLET ORAL at 23:34

## 2024-06-08 RX ADMIN — GLIPIZIDE 10 MG: 10 TABLET, EXTENDED RELEASE ORAL at 10:07

## 2024-06-08 RX ADMIN — ENOXAPARIN SODIUM 40 MG: 40 INJECTION SUBCUTANEOUS at 10:11

## 2024-06-08 RX ADMIN — AMOXICILLIN AND CLAVULANATE POTASSIUM 1 TABLET: 875; 125 TABLET, FILM COATED ORAL at 10:09

## 2024-06-08 RX ADMIN — INSULIN ASPART 2 UNITS: 100 INJECTION, SOLUTION INTRAVENOUS; SUBCUTANEOUS at 12:46

## 2024-06-08 RX ADMIN — SPIRONOLACTONE 25 MG: 25 TABLET ORAL at 10:09

## 2024-06-08 RX ADMIN — CARVEDILOL 12.5 MG: 12.5 TABLET, FILM COATED ORAL at 17:42

## 2024-06-08 RX ADMIN — POTASSIUM CHLORIDE 40 MEQ: 750 TABLET, FILM COATED, EXTENDED RELEASE ORAL at 10:06

## 2024-06-08 RX ADMIN — CARVEDILOL 12.5 MG: 12.5 TABLET, FILM COATED ORAL at 10:09

## 2024-06-08 ASSESSMENT — ACTIVITIES OF DAILY LIVING (ADL)
ADLS_ACUITY_SCORE: 36
DEPENDENT_IADLS:: LAUNDRY;COOKING
ADLS_ACUITY_SCORE: 33
ADLS_ACUITY_SCORE: 32
ADLS_ACUITY_SCORE: 36
ADLS_ACUITY_SCORE: 32
ADLS_ACUITY_SCORE: 36
ADLS_ACUITY_SCORE: 32
ADLS_ACUITY_SCORE: 33
ADLS_ACUITY_SCORE: 33
ADLS_ACUITY_SCORE: 36
ADLS_ACUITY_SCORE: 36
ADLS_ACUITY_SCORE: 32
ADLS_ACUITY_SCORE: 33
ADLS_ACUITY_SCORE: 32
ADLS_ACUITY_SCORE: 33
ADLS_ACUITY_SCORE: 32
ADLS_ACUITY_SCORE: 33
ADLS_ACUITY_SCORE: 31
ADLS_ACUITY_SCORE: 32
ADLS_ACUITY_SCORE: 33
ADLS_ACUITY_SCORE: 33
ADLS_ACUITY_SCORE: 32
ADLS_ACUITY_SCORE: 32

## 2024-06-08 NOTE — UTILIZATION REVIEW
Admission Status; Secondary Review Determination    Under the authority of the Utilization Management Committee, the utilization review process indicated a secondary review on the above patient. The review outcome is based on review of the medical records, discussions with staff, and applying clinical experience noted on the date of the review.    (x) Inpatient Status Appropriate - This patient's medical care is consistent with medical management for inpatient care and reasonable inpatient medical practice.    RATIONALE FOR DETERMINATION:  Complex 68-year-old female with history of chronic respiratory failure on home oxygen 2 L at night as well as walking, type 2 diabetes, hypertension, chronic kidney disease stage III, obesity, obstructive sleep apnea who was recently hospitalized with a left-sided pneumonia possibly due to aspiration treated with Zosyn and discharged on Augmentin on 6/3/2024.  Patient presents the following day and she noted to be more hypoxic when walking.  While hospitalized patient's O2 sats were 91-95% on 1 L of nasal cannula.  With patient's relative worsening hypoxemia presently on 2 L nasal cannula with O2 sats 92-95% without significant tachypnea but with a N-terminal proBNP level of 4100 which is significantly elevated from her admission level last week of 532 though imaging reveals unremarkable pulmonary vasculature.  Patient was felt to have mild heart failure possibly related to the intravenous antibiotics exacerbating patient's underlying chronic hypoxemic respiratory failure and recent pneumonia. With the patient's new hypoxemia at rest and echocardiogram revealing new cardiomyopathy with ejection fraction down to 45-50% patient is requiring a more extended hospital stay with IV diuretics into hospital day 3 appropriate for inpatient care.    At the time of admission with the information available to the attending physician more than 2 nights Hospital complex care was  anticipated, based on patient risk of adverse outcome if treated as outpatient and complex care required. Inpatient admission is appropriate based on the Medicare guidelines.    This document was produced using voice recognition software    The information on this document is developed by the utilization review team in order for the business office to ensure compliance. This only denotes the appropriateness of proper admission status and does not reflect the quality of care rendered.    The definitions of Inpatient Status and Observation Status used in making the determination above are those provided in the CMS Coverage Manual, Chapter 1 and Chapter 6, section 70.4.    Sincerely,    Chris Medellin MD  Utilization Review  Physician Advisor  [facility].

## 2024-06-08 NOTE — PROGRESS NOTES
PRIMARY DIAGNOSIS: RECURRENT ASPIRATION PNEUMONIA/ACUTE RESPIRATORY FAILURE WITH HYPOXIA  OUTPATIENT/OBSERVATION GOALS TO BE MET BEFORE DISCHARGE:  Dyspnea improved and O2 sats >88% on RA or back to baseline O2 levels: Yes   SpO2: 97 %, O2 Device: Nasal cannula 1 LPM    Tolerating oral abx or appropriate plans made outpatient infusion: Yes    Vitals signs normal or return to baseline: Yes    Short term supplemental O2 needed with activity at home:  NA    Tolerate oral intake to maintain hydration: Yes    Return to near baseline physical activity: Yes    Discharge Planner Nurse   Safe discharge environment identified: Yes  Barriers to discharge: Yes       Entered by: Yennifer Lackey RN 06/08/2024 5:51 PM     Please review provider order for any additional goals.   Nurse to notify provider when observation goals have been met and patient is ready for discharge.

## 2024-06-08 NOTE — PROGRESS NOTES
06/08/24 1206   Appointment Info   Signing Clinician's Name / Credentials (PT) Mattie Bell DPT   Living Environment   People in Home child(mariama), adult   Current Living Arrangements house   Home Accessibility stairs within home   Number of Stairs, Within Home, Primary one;greater than 10 stairs   Transportation Anticipated family or friend will provide   Living Environment Comments Lives with daughter but pt frequently home alone, 1 stair on main level to sunken living room, full flight to basement for laundry but reports does not need to access (daughter will assist with laundry).   Self-Care   Usual Activity Tolerance fair   Current Activity Tolerance fair   Equipment Currently Used at Home walker, rolling;cane, straight;tub bench   Fall history within last six months no   Activity/Exercise/Self-Care Comment Uses 4WW for mobility at baseline   General Information   Onset of Illness/Injury or Date of Surgery 06/05/24   Referring Physician Lorena Rincon PA-C   Patient/Family Therapy Goals Statement (PT) return home   Pertinent History of Current Problem (include personal factors and/or comorbidities that impact the POC) Nicole Reyes is a 68 year old female admitted on 6/5/2024 with acute hypoxic respiratory failure likely due to congestive heart failure. She has history of type 2 diabetes, hypertension, hypercholesterolemia, chronic kidney disease stage III, obesity, obstructive sleep apnea, GERD, anxiety, depression, chronic respiratory failure on supplemental oxygen at 2 L/min at night and with walking, and restless leg syndrome.  She was recently hospitalized here from 5/30/2024 through 6/4/2024 with left-sided pneumonia, possibly due to aspiration.   Existing Precautions/Restrictions fall;oxygen therapy device and L/min  (2LO2)   Cognition   Affect/Mental Status (Cognition) WFL   Orientation Status (Cognition) oriented x 3   Follows Commands (Cognition) WFL   Pain Assessment   Patient  Currently in Pain Yes, see Vital Sign flowsheet  (4/10 back pain)   Integumentary/Edema   Integumentary/Edema Comments normal aging changes   Posture    Posture Forward head position;Protracted shoulders   Range of Motion (ROM)   Range of Motion ROM is WFL   Strength (Manual Muscle Testing)   Strength Comments decreased functional strength and activity tolerance   Bed Mobility   Comment, (Bed Mobility) not assessed at eval   Transfers   Comment, (Transfers) CGA sit<>stand with FWW   Gait/Stairs (Locomotion)   Distance in Feet (Gait) 10' for eval   Comment, (Gait/Stairs) SBA with FWW   Balance   Balance Comments good sitting balance, fair standing balance with FWW for UE support   Sensory Examination   Sensory Perception patient reports no sensory changes   Clinical Impression   Criteria for Skilled Therapeutic Intervention Yes, treatment indicated   PT Diagnosis (PT) impaired mobility   Influenced by the following impairments decreased activity tolerance, impaired balance, impaired functional strength, pain   Functional limitations due to impairments impaired bed mobility, transfers, ambulation, stairs   Clinical Presentation (PT Evaluation Complexity) stable   Clinical Presentation Rationale clinical judgement, chart review   Clinical Decision Making (Complexity) low complexity   Planned Therapy Interventions (PT) balance training;bed mobility training;gait training;home exercise program;patient/family education;stair training;strengthening;transfer training;progressive activity/exercise;home program guidelines   Risk & Benefits of therapy have been explained evaluation/treatment results reviewed;care plan/treatment goals reviewed;risks/benefits reviewed;current/potential barriers reviewed;participants voiced agreement with care plan;participants included;patient   PT Total Evaluation Time   PT Eval, Low Complexity Minutes (91129) 6   Physical Therapy Goals   PT Frequency Daily   PT Predicted Duration/Target Date  for Goal Attainment 06/09/24   PT Goals Bed Mobility;Transfers;Gait;Stairs;Aerobic Activity   PT: Bed Mobility Modified independent;Supine to/from sit   PT: Transfers Modified independent;Sit to/from stand;Bed to/from chair;Assistive device   PT: Gait Modified independent;Assistive device;Rolling walker;150 feet   PT: Stairs Supervision/stand-by assist;1 stair;Assistive device   PT: Perform aerobic activity with stable cardiovascular response intermittent activity;10 minutes;ambulation   Therapeutic Activity   Therapeutic Activities: dynamic activities to improve functional performance Minutes (31374) 24   Symptoms Noted During/After Treatment Fatigue;Shortness of breath   PT Discharge Planning   PT Plan progress ambulation distance, monitor SpO2 and SOB with mobility   PT Discharge Recommendation (DC Rec) Transitional Care Facility;home with assist;home with home care physical therapy   PT Rationale for DC Rec Pt below baseline level of mobility, currently requiring Ax1 with FWW, supplemental O2, and fatigues quickly with activity. Pt would benefit from TCU to progress activity tolerance and independence with mobility. If pt were to return home, would require supplemental O2, HHPT/nursing to continue to progress activity tolerance and functional strength.   PT Brief overview of current status Ax1 FWW   Total Session Time   Timed Code Treatment Minutes 24   Total Session Time (sum of timed and untimed services) 30

## 2024-06-08 NOTE — PLAN OF CARE
PRIMARY DIAGNOSIS: Respiratory Failure   OUTPATIENT/OBSERVATION GOALS TO BE MET BEFORE DISCHARGE:  ADLs back to baseline: No    Activity and level of assistance: Up with standby assistance.    Pain status: Pain free.    Return to near baseline physical activity: No     Discharge Planner Nurse   Safe discharge environment identified: Yes  Barriers to discharge: Yes       Entered by: Reanna Yepez RN 06/07/2024 7:18 PM     Please review provider order for any additional goals.   Nurse to notify provider when observation goals have been met and patient is ready for discharge.    Temp: 98  F (36.7  C) Temp src: Oral BP: (!) 144/70 Pulse: 61   Resp: 18 SpO2: 96 % O2 Device: Nasal cannula Oxygen Delivery: 2.5 LPM     A&Ox4. Up SBA with walker and gait belt. Per pt she is sob with activity for longer distances at baseline. Pt complaining of sob at rest and with activity. Complained of chronic back pain, pt refused needing prn pain meds. Plan- daily weight, strict I&Os, monitor on tele, will transition to oral lasix 6/8, cardiology following, continue augmentin bid, discharge with zio-patch.

## 2024-06-08 NOTE — PLAN OF CARE
"Goal Outcome Evaluation:      Plan of Care Reviewed With: patient    Overall Patient Progress: improvingOverall Patient Progress: improving    Outcome Evaluation: Pt A&O x 4. VSS. Assist x 1 with walker. Has steady gait. Unable to wean off oxygen. Requiring 1 L of oxygen at rest. LS coarse and RUL posterior expiratory wheezes. KHALIL. Tele discontinued today. C/o back pain. PRN Tylenol given. Plan to discharge home tomorrow.      Problem: Adult Inpatient Plan of Care  Goal: Plan of Care Review  Description: The Plan of Care Review/Shift note should be completed every shift.  The Outcome Evaluation is a brief statement about your assessment that the patient is improving, declining, or no change.  This information will be displayed automatically on your shift  note.  Outcome: Progressing  Flowsheets (Taken 6/8/2024 9436)  Outcome Evaluation: Pt A&O x 4. VSS. Assist x 1 with walker. Has steady gait. Unable to wean off oxygen. Requiring 1 L of oxygen at rest. LS coarse and RUL posterior expiratory wheezes. KHALIL. Tele discontinued today. C/o back pain. PRN Tylenol given. Plan to discharge home tomorrow.  Plan of Care Reviewed With: patient  Overall Patient Progress: improving  Goal: Patient-Specific Goal (Individualized)  Description: You can add care plan individualizations to a care plan. Examples of Individualization might be:  \"Parent requests to be called daily at 9am for status\", \"I have a hard time hearing out of my right ear\", or \"Do not touch me to wake me up as it startles  me\".  Outcome: Progressing  Goal: Absence of Hospital-Acquired Illness or Injury  Outcome: Progressing  Intervention: Identify and Manage Fall Risk  Recent Flowsheet Documentation  Taken 6/8/2024 6935 by Yennifer Lackey, RN  Safety Promotion/Fall Prevention:   activity supervised   assistive device/personal items within reach   clutter free environment maintained   nonskid shoes/slippers when out of bed   safety round/check " completed  Intervention: Prevent Skin Injury  Recent Flowsheet Documentation  Taken 6/8/2024 0909 by Yennifer Lackey, RN  Body Position: position changed independently  Device Skin Pressure Protection: absorbent pad utilized/changed  Intervention: Prevent Infection  Recent Flowsheet Documentation  Taken 6/8/2024 0830 by Yennifer Lackey, RN  Infection Prevention:   hand hygiene promoted   single patient room provided  Goal: Optimal Comfort and Wellbeing  Outcome: Progressing  Intervention: Monitor Pain and Promote Comfort  Recent Flowsheet Documentation  Taken 6/8/2024 1630 by Yennifer Lackey, RN  Pain Management Interventions: declines  Taken 6/8/2024 0909 by Yennifer Lackey, RN  Pain Management Interventions: medication (see MAR)  Goal: Readiness for Transition of Care  Outcome: Progressing

## 2024-06-08 NOTE — CONSULTS
Care Management Initial Consult    General Information  Assessment completed with: Patient,    Type of CM/SW Visit: Initial Assessment    Primary Care Provider verified and updated as needed: Yes   Readmission within the last 30 days: current reason for admission unrelated to previous admission      Reason for Consult: discharge planning    Communication Assessment  Patient's communication style: spoken language (English or Bilingual)    Hearing Difficulty or Deaf: no   Wear Glasses or Blind: yes    Cognitive  Cognitive/Neuro/Behavioral: WDL                      Living Environment:   People in home: alone, child(mariama), adult     Current living Arrangements: house      Able to return to prior arrangements: yes     Family/Social Support:  Care provided by: self, child(mariama)  Provides care for: no one  Marital Status:   Children          Description of Support System: Supportive, Involved    Support Assessment: Adequate family and caregiver support, Adequate social supports    Current Resources:   Patient receiving home care services: No     Community Resources: DME  Equipment currently used at home: walker, rolling, cane, straight, tub bench  Supplies currently used at home: Diabetic Supplies, Oxygen Tubing/Supplies    Does the patient's insurance plan have a 3 day qualifying hospital stay waiver?  Yes     Which insurance plan 3 day waiver is available? [employer]    Will the waiver be used for post-acute placement? Undetermined at this time    Lifestyle & Psychosocial Needs:  Social Determinants of Health     Food Insecurity: Low Risk  (1/20/2024)    Food Insecurity     Within the past 12 months, did you worry that your food would run out before you got money to buy more?: No     Within the past 12 months, did the food you bought just not last and you didn t have money to get more?: No   Depression: Not at risk (4/5/2024)    PHQ-2     PHQ-2 Score: 0   Housing Stability: Low Risk  (1/20/2024)    Housing Stability      Do you have housing? : Yes     Are you worried about losing your housing?: No   Tobacco Use: Medium Risk (4/5/2024)    Patient History     Smoking Tobacco Use: Former     Smokeless Tobacco Use: Never     Passive Exposure: Not on file   Financial Resource Strain: Low Risk  (1/20/2024)    Financial Resource Strain     Within the past 12 months, have you or your family members you live with been unable to get utilities (heat, electricity) when it was really needed?: No   Recent Concern: Financial Resource Strain - High Risk (11/29/2023)    Financial Resource Strain     Within the past 12 months, have you or your family members you live with been unable to get utilities (heat, electricity) when it was really needed?: Yes   Alcohol Use: Not on file   Transportation Needs: Low Risk  (1/20/2024)    Transportation Needs     Within the past 12 months, has lack of transportation kept you from medical appointments, getting your medicines, non-medical meetings or appointments, work, or from getting things that you need?: No   Physical Activity: Not on file   Interpersonal Safety: Low Risk  (4/5/2024)    Interpersonal Safety     Do you feel physically and emotionally safe where you currently live?: Yes     Within the past 12 months, have you been hit, slapped, kicked or otherwise physically hurt by someone?: No     Within the past 12 months, have you been humiliated or emotionally abused in other ways by your partner or ex-partner?: No   Stress: Stress Concern Present (8/10/2020)    [place] [employer] [place] [employer] - Occupational Stress Questionnaire     Feeling of Stress : To some extent   Social Connections: Unknown (8/10/2020)    Social Connection and Isolation Panel [NHANES]     Frequency of Communication with Friends and Family: More than three times a week     Frequency of Social Gatherings with Friends and Family: Not on file     Attends Rastafari Services: Not on file     Active Member of Clubs or  Organizations: Not on file     Attends Club or Organization Meetings: Not on file     Marital Status: Not on file   Health Literacy: Not on file       Functional Status:  Prior to admission patient needed assistance:   Dependent ADLs:: Ambulation-walker, Ambulation-cane  Dependent IADLs:: Laundry, Cooking     Additional Information:  Care management consult for discharge planning/disposition. Patient admitted for recurrent aspiration pneumonia, acute respiratory failure, heart failure. She was admitted from 5/30-6/3 for pnemonia.   Met with patient at bedside. She lives with her daughter in a house but is often alone. She uses oxygen at night at baseline but has required it continuously here. PT evaluated patient and recommendation is TCU to progress activity tolerance and independence with mobility. Patient is agreeable with this but states if she improves enough before getting a placement she would go home with homecare. She was accepted for [facility] care by [facility] at last discharge however she was not home long enough for them to open her to home care. If she returns home she will need new orders sent to [facility]. If her oxygen needs remain continuous a new rx will be needed for her oxygen provider ([facility]) so she can get portability. She currently has a portable concentrater however would be unable to manage that with her walker.   Patient would like initial referrals sent to [facility], [facility] and [facility].   Referrals sent. Care management to monitor for discharge.     Beena Zhao RN  Care Coordinator  [facility]

## 2024-06-08 NOTE — PLAN OF CARE
PRIMARY DIAGNOSIS: Respiratory Failure   OUTPATIENT/OBSERVATION GOALS TO BE MET BEFORE DISCHARGE:  ADLs back to baseline: No     Activity and level of assistance: Up with standby assistance.     Pain status: Pain free.     Return to near baseline physical activity: No             Discharge Planner Nurse  Safe discharge environment identified: Yes  Barriers to discharge: Yes       Entered by: Reanna Yepez RN 06/07/2024 7:18 PM        Please review provider order for any additional goals.   Nurse to notify provider when observation goals have been met and patient is ready for discharge.     Temp: 98.1  F (36.7  C) Temp src: Oral BP: 125/61 Pulse: 65   Resp: 16 SpO2: 94 % O2 Device: None (Room air)      A&Ox4. Up SBA with walker and gait belt. Per pt she is sob with activity for longer distances at baseline. Pt complaining of sob at rest and with activity. Complained of chronic back pain, pt refused needing prn pain meds. Ambulated in hallway with support staff without oxygen- pt dropped to 88% but recovered quickly if she stopped to catch her breath. Plan- daily weight, strict I&Os, monitor on tele, will transition to oral lasix 6/8, cardiology following, continue augmentin bid, discharge with zio-patch.

## 2024-06-08 NOTE — PROGRESS NOTES
[facility]  Hospitalist Progress Note  Lorena Rincon PA-C 06/08/2024    Date of admission: 6/5/24         Assessment and Plan:      Nicole Reyes is a 68 year old female admitted on 6/5/2024 with acute hypoxic respiratory failure likely due to congestive heart failure. She has history of type 2 diabetes, hypertension, hypercholesterolemia, chronic kidney disease stage III, obesity, obstructive sleep apnea on NC only, GERD, anxiety, depression, chronic respiratory failure on supplemental oxygen at 2 L/min at night and with walking, and restless leg syndrome.  She was recently hospitalized here from 5/30/2024 through 6/4/2024 with left-sided pneumonia, possibly due to aspiration.  She was treated with Zosyn while in the hospital and discharged home on Augmentin.      Emergency department evaluation showed oxygen saturations of 99% on 3 L/min.  Other vital signs were unremarkable.  There was no documented hypoxia in the emergency department.  Laboratory evaluation showed unremarkable BMP.  BNP was elevated at 4143.   Chest x-ray suggested left lower lobe infiltrate.  no obvious congestive heart failure.      Since admission, she has required IV diuresis and cardiac work up shows new cardiomyopathy with reduced EF.      Acute on chronic Hypoxia, requiring additional daytime 02  Acute exacerbation of systolic/diastolic heart failure  Recent left lower lung pneumonia with persistence on chest x-ray  Initially felt ok after discharge but then noticed increased fatigue and hypoxia with oxygen saturations of 70% when walking.   - Patient did NOT seem to be getting more ill due to worsening pneumonia. She has not had fevers or worsening cough or felt systemically ill.    - Exam and labs points to likely CHF, ECHO performed and confirms new dx of Cardiomyopathy with reduced EF of 40-45% from previous 60%  - S/p Lasix 40 mg IV twice daily since admission and transitioned to PO Lasix 6/8  - Complete  Augmentin for recent PNA, 6/8 will be her last day.   - Trial-ed weaning off O2 during the day while pt is at rest,  has been spotty with dipping into the mid 80's with very slow recovery.   - Challenge with home O2, tubing, global weakness and dec stability, will have PT assess for possible TCU      #New Cardiomyopathy  ECHO this admission shows new dec EF at 45%  Lexiscan obtained shows small area of nontransmural infarction in the basal to mid inferior lateral segment of the left ventricle with no associated with first ischemia  - Appreciate Cards eval for medication optimization  - Added Po lasix, coreg, aldactone and statin  - Diltiazem discontinued  - Will ultimately need ischemic work up with outpt CT Angiogram and ZioPatch for c/o palpitations at discharge      Hypertension  Hypercholesterolemia  -See above, medication changes per Cardiology     Type 2 diabetes  Chronic kidney disease stage III  -Resume prior to admission Glucotrol  -Medium resistance NovoLog insulin by sliding scale as needed     Depression  Anxiety  -Resume prior to admission Zoloft     Obesity  Obstructive sleep apnea  -Uses supplemental oxygen at night  - Encouraged to get new sleep study after discharge to assess efficacy of NC vs need for cpap machine      Diet: Moderate Consistent Carb (60 g CHO per Meal) Diet    DVT Prophylaxis: Enoxaparin (Lovenox) SQ  Suarez Catheter: Not present  Lines: None     Cardiac Monitoring: discontinued  Code Status: Full Code    Medically Ready for Discharge: unclear, possibly home with HH vs TCU     HOSPITALIST ADDENDUM:  D/w Physical therapy, pt is below baseline and would benefit from TCU, initially pt refused but is now agreeable.   SW/CC updated and will ask for referrals.   Pt is now medically ready for discharge to TCU when bed is available            Interval History (Subjective):      Feels exhausted  Still quite winded and SOB with minimal movement   No fevers                  Physical Exam:     "  Last Vital Signs:  /59 (BP Location: Left arm)   Pulse 66   Temp 98  F (36.7  C) (Oral)   Resp 18   Ht 1.473 m (4' 10\")   Wt 112.7 kg (248 lb 8 oz)   LMP 09/15/2007   SpO2 93%   BMI 51.94 kg/m        Intake/Output Summary (Last 24 hours) at 6/8/2024 1303  Last data filed at 6/8/2024 0430  Gross per 24 hour   Intake 670 ml   Output 1000 ml   Net -330 ml       Constitutional: Awake, alert, cooperative, no apparent distress     Respiratory: Sl dec at bases but o/w CTA today,    Cardiovascular: Regular rate and rhythm, normal S1 and S2, and no murmur noted   Abdomen: Normal bowel sounds, soft, non-distended, non-tender   Skin: No rashes, no cyanosis, dry to touch   Neuro: Alert and oriented x3, no weakness, numbness, memory loss   Extremities: No edema, normal range of motion   Other(s):        All other systems: Negative          Medications:      All current medications were reviewed with changes reflected in problem list.         Data:      All new lab and imaging data was reviewed.   Labs:  Recent Labs   Lab 06/08/24  0528 06/08/24  0216 06/07/24  1140 06/07/24  0528   NA  --   --   --  141   POTASSIUM  --   --   --  3.1*   CHLORIDE  --   --   --  100   CO2  --   --   --  30*   ANIONGAP  --   --   --  11   GLC  --  106*   < > 126*   BUN  --   --   --  10.8   CR 0.84  --   --  0.93   GFRESTIMATED 75  --   --  67   GRECIA  --   --   --  8.9    < > = values in this interval not displayed.     Recent Labs   Lab 06/08/24  0528 06/07/24  0528 06/06/24  0614 06/05/24  1246   WBC  --  9.0 7.9 8.0   HGB  --  11.2* 9.8* 10.8*   HCT  --  35.2 31.3* 33.8*   MCV  --  82 82 82    227 182 203      Imaging:   Results for orders placed or performed during the hospital encounter of 06/05/24   XR Chest 2 Views    Narrative    CHEST TWO VIEWS 6/5/2024 2:34 PM     HISTORY: Evaluation of ongoing hypoxia, history of recurrent  aspiration pneumonia.    COMPARISON: November 8, 2021       Impression    IMPRESSION: Lower " lobe infiltrate suggested on lateral view. Mid and  upper lungs clear. The cardiac silhouette is not enlarged. Pulmonary  vasculature is unremarkable.    KELLEY LOPEZ MD         SYSTEM ID:  [id number]   Echocardiogram Complete     Value    LVEF  45-50%    Narrative    [id number]  [id number]  [id number]  [id number]^PHONG^KASIA^ALICE     [facility]  Echocardiography Laboratory  [address] [place] [address]     Name: ASHANTI PATEL  MRN: [id number]  : 1955  Study Date: 2024 09:27 AM  Age: 68 yrs  Gender: Female  Patient Location: [facility]  Reason For Study: CHF  Ordering Physician: KASIA DARLING  Referring Physician: Karla Hurst  Performed By: Vanessa Negron     BSA: 2.0 m2  Height: 58 in  Weight: 248 lb  HR: 68  BP: 150/81 mmHg  ______________________________________________________________________________  Procedure  Complete Portable Echo Adult. Optison (NDC #[id number]) given intravenously.  Technically difficult study.  ______________________________________________________________________________  Interpretation Summary     The visual ejection fraction is 45-50%. Appears lower compared with most  recent stiudy.  There is mild global hypokinesia of the left ventricle.  There is mild-moderate biatrial enlargement.  The study was technically difficult. Contrast was used without apparent  complications.  ______________________________________________________________________________  Left Ventricle  The left ventricle is normal in size. There is normal left ventricular wall  thickness. The visual ejection fraction is 45-50%. Grade II or moderate  diastolic dysfunction. There is mild global hypokinesia of the left ventricle.     Right Ventricle  The right ventricle is normal in structure, function and size.     Atria  There is mild-moderate biatrial enlargement.     Mitral Valve  The mitral valve leaflets appear normal. There is no evidence of stenosis,  fluttering, or  prolapse.     Tricuspid Valve  The tricuspid valve is not well visualized, but is grossly normal. Right  ventricular systolic pressure could not be approximated due to inadequate  tricuspid regurgitation.     Aortic Valve  The aortic valve is not well visualized.     Pulmonic Valve  The pulmonic valve is not well visualized.     Vessels  Normal size descending aorta. Dilation of the inferior vena cava is present  with normal respiratory variation in diameter.     Pericardium  There is no pericardial effusion.     Rhythm  Sinus rhythm was noted.  ______________________________________________________________________________  MMode/2D Measurements & Calculations  IVSd: 0.90 cm     LVIDd: 4.5 cm  LVIDs: 2.5 cm  LVPWd: 1.1 cm  IVC diam: 2.7 cm  FS: 43.2 %  LV mass(C)d: 149.9 grams  LV mass(C)dI: 75.1 grams/m2  Ao root diam: 2.7 cm  asc Aorta Diam: 3.2 cm  LVOT diam: 2.0 cm  LVOT area: 3.1 cm2  Ao root diam index Ht(cm/m): 1.8  Ao root diam index BSA (cm/m2): 1.3  Asc Ao diam index BSA (cm/m2): 1.6  Asc Ao diam index Ht(cm/m): 2.2  LA Volume (BP): 67.0 ml     LA Volume Index (BP): 33.5 ml/m2  RWT: 0.48  TAPSE: 2.5 cm     Doppler Measurements & Calculations  MV E max nic: 113.0 cm/sec  MV A max nic: 113.9 cm/sec  MV E/A: 0.99  MV max P.3 mmHg  MV mean PG: 3.2 mmHg  MV V2 VTI: 39.2 cm  MVA(VTI): 2.0 cm2  MV dec slope: 703.9 cm/sec2  MV dec time: 0.16 sec  Ao V2 max: 113.0 cm/sec  Ao max P.0 mmHg  Ao V2 mean: 79.8 cm/sec  Ao mean PG: 3.0 mmHg  Ao V2 VTI: 28.4 cm  DEB(I,D): 2.8 cm2  DEB(V,D): 2.6 cm2  LV V1 max PG: 3.7 mmHg  LV V1 max: 96.7 cm/sec  LV V1 VTI: 25.5 cm  SV(LVOT): 78.9 ml  SI(LVOT): 39.5 ml/m2  PA V2 max: 97.5 cm/sec  PA max PG: 3.8 mmHg  PA acc time: 0.16 sec  AV Nic Ratio (DI): 0.86  DEB Index (cm2/m2): 1.4  E/E' avg: 15.8     Lateral E/e': 13.0  Medial E/e': 18.5  RV S Nic: 11.2 cm/sec     ______________________________________________________________________________  Report approved by: Tal Paul  MDon 06/06/2024 11:16 AM         NM MPI w Lexiscan     Value    Target     Baseline Systolic     Baseline Diastolic BP 66    Last Stress Systolic     Last Stress Diastolic BP 63    Baseline HR 70    Max HR  89    Max Predicted HR  59    Rate Pressure Product 11,036.0    Stress/rest perfusion ratio 1.18    Narrative      Technically challenging study due to patient body habitus.    The nuclear stress test is abnormal.    There is a small area of nontransmural infarction in the basal to mid   inferolateral and apical lateral segment(s) of the left ventricle. No   signficant associated reversible ischemia.    Left ventricular function is hyperdynamic.    The left ventricular ejection fraction at rest is greater than 70%. The   left ventricular ejection fraction at stress is greater than 70%.    Small left ventricular cavity size is noted.    Stress to rest cavity ratio is 1.18.  TID is absent.    There is reduced radiotracer uptake at the apex on both stress and rest   images, with preserved wall motion, most consistent with apical thinning   artifact, though a small area of non-transmural infarction cannot be   excluded.    A prior study was conducted on 8/11/2017.  This study has changes noted   when compared with the prior study.       *Note: Due to a large number of results and/or encounters for the requested time period, some results have not been displayed. A complete set of results can be found in Results Review.

## 2024-06-08 NOTE — PLAN OF CARE
"Goal Outcome Evaluation:    Problem: Adult Inpatient Plan of Care  Goal: Plan of Care Review  Description:   Outcome: Progressing  Flowsheets (Taken 6/8/2024 0600)  Outcome Evaluation: Patient is A&OX4, denies pain  Patient On tele SR 60, PIV SL ,Ax1w/walker, voided normal. slept most of the night.  Plan of Care Reviewed With: patient  Overall Patient Progress: improving  Goal: Patient-Specific Goal (Individualized)  Description: You can add care plan individualizations to a care plan. Examples of Individualization might be:  \"Parent requests to be called daily at 9am for status\", \"I have a hard time hearing out of my right ear\", or \"Do not touch me to wake me up as it startles  me\".  Outcome: Progressing  Goal: Absence of Hospital-Acquired Illness or Injury  Outcome: Progressing  Intervention: Identify and Manage Fall Risk  Recent Flowsheet Documentation  Taken 6/7/2024 2332 by Palomo Banegas RN  Safety Promotion/Fall Prevention: safety round/check completed  Intervention: Prevent Skin Injury  Recent Flowsheet Documentation  Taken 6/7/2024 2332 by Palomo Banegas RN  Body Position: position changed independently  Skin Protection: adhesive use limited  Device Skin Pressure Protection: absorbent pad utilized/changed  Intervention: Prevent and Manage VTE (Venous Thromboembolism) Risk  Recent Flowsheet Documentation  Taken 6/7/2024 2332 by Palomo Banegas RN  VTE Prevention/Management: SCDs (sequential compression devices) off  Intervention: Prevent Infection  Recent Flowsheet Documentation  Taken 6/7/2024 2332 by Palomo Banegas RN  Infection Prevention: single patient room provided  Goal: Optimal Comfort and Wellbeing  Outcome: Progressing  Intervention: Monitor Pain and Promote Comfort  Recent Flowsheet Documentation  Taken 6/7/2024 2332 by Palomo Banegas RN  Pain Management Interventions: declines  Goal: Readiness for Transition of Care  Outcome: Progressing     Problem: Comorbidity Management  Goal: Maintenance " of Behavioral Health Symptom Control  Outcome: Progressing  Intervention: Maintain Behavioral Health Symptom Control  Recent Flowsheet Documentation  Taken 6/7/2024 2332 by Palomo Banegas RN  Medication Review/Management: medications reviewed  Goal: Blood Glucose Levels Within Targeted Range  Outcome: Progressing  Intervention: Monitor and Manage Glycemia  Recent Flowsheet Documentation  Taken 6/7/2024 2332 by Palomo Banegas RN  Medication Review/Management: medications reviewed  Goal: Maintenance of Heart Failure Symptom Control  Outcome: Progressing  Intervention: Maintain Heart Failure Management  Recent Flowsheet Documentation  Taken 6/7/2024 2332 by Palomo Banegas RN  Medication Review/Management: medications reviewed  Goal: Blood Pressure in Desired Range  Outcome: Progressing  Intervention: Maintain Blood Pressure Management  Recent Flowsheet Documentation  Taken 6/7/2024 2332 by Palomo Banegas RN  Medication Review/Management: medications reviewed  Goal: Bariatric Home Regimen Maintained  Outcome: Progressing  Intervention: Maintain and Manage Postbariatric Surgery Care  Recent Flowsheet Documentation  Taken 6/7/2024 2332 by Palomo Banegas RN  Medication Review/Management: medications reviewed     Problem: Skin Injury Risk Increased  Goal: Skin Health and Integrity  Outcome: Progressing  Intervention: Plan: Nurse Driven Intervention: Moisture Management  Recent Flowsheet Documentation  Taken 6/7/2024 2332 by Palomo Banegas RN  Moisture Interventions: Incontinence pad  Intervention: Optimize Skin Protection  Recent Flowsheet Documentation  Taken 6/7/2024 2332 by Palomo Banegas RN  Pressure Reduction Techniques: frequent weight shift encouraged  Skin Protection: adhesive use limited  Activity Management: activity adjusted per tolerance  Head of Bed (HOB) Positioning: HOB at 30 degrees     Problem: Pneumonia  Goal: Fluid Balance  Outcome: Progressing  Goal: Resolution of Infection Signs and  Symptoms  Outcome: Progressing  Intervention: Prevent Infection Progression  Recent Flowsheet Documentation  Taken 6/7/2024 2332 by Palomo Banegas RN  Infection Management: aseptic technique maintained  Goal: Effective Oxygenation and Ventilation  Outcome: Progressing  Intervention: Promote Airway Secretion Clearance  Recent Flowsheet Documentation  Taken 6/7/2024 2332 by Palomo Banegas RN  Cough And Deep Breathing: done independently per patient  Intervention: Optimize Oxygenation and Ventilation  Recent Flowsheet Documentation  Taken 6/7/2024 2332 by Palomo Banegas RN  Head of Bed (HOB) Positioning: HOB at 30 degrees       Plan of Care Reviewed With: patient    Overall Patient Progress: improvingOverall Patient Progress: improving    Outcome Evaluation: Patient is A&OX4, denies pain  Patient On tele SR 60, PIV SL ,Ax1w/walker, voided normal. slept most of the night.

## 2024-06-09 ENCOUNTER — APPOINTMENT (OUTPATIENT)
Dept: PHYSICAL THERAPY | Facility: CLINIC | Age: 69
DRG: 291 | End: 2024-06-09
Payer: MEDICARE

## 2024-06-09 LAB
GLUCOSE BLDC GLUCOMTR-MCNC: 122 MG/DL (ref 70–99)
GLUCOSE BLDC GLUCOMTR-MCNC: 123 MG/DL (ref 70–99)
GLUCOSE BLDC GLUCOMTR-MCNC: 123 MG/DL (ref 70–99)
GLUCOSE BLDC GLUCOMTR-MCNC: 124 MG/DL (ref 70–99)
GLUCOSE BLDC GLUCOMTR-MCNC: 134 MG/DL (ref 70–99)
GLUCOSE BLDC GLUCOMTR-MCNC: 156 MG/DL (ref 70–99)
GLUCOSE BLDC GLUCOMTR-MCNC: 199 MG/DL (ref 70–99)
GLUCOSE BLDC GLUCOMTR-MCNC: 61 MG/DL (ref 70–99)

## 2024-06-09 PROCEDURE — 99232 SBSQ HOSP IP/OBS MODERATE 35: CPT | Performed by: PHYSICIAN ASSISTANT

## 2024-06-09 PROCEDURE — 250N000013 HC RX MED GY IP 250 OP 250 PS 637: Performed by: INTERNAL MEDICINE

## 2024-06-09 PROCEDURE — 97530 THERAPEUTIC ACTIVITIES: CPT | Mod: GP

## 2024-06-09 PROCEDURE — 120N000001 HC R&B MED SURG/OB

## 2024-06-09 PROCEDURE — 250N000011 HC RX IP 250 OP 636: Mod: JZ | Performed by: INTERNAL MEDICINE

## 2024-06-09 PROCEDURE — 250N000013 HC RX MED GY IP 250 OP 250 PS 637: Performed by: PHYSICIAN ASSISTANT

## 2024-06-09 RX ADMIN — GABAPENTIN 900 MG: 300 CAPSULE ORAL at 19:48

## 2024-06-09 RX ADMIN — ZOLPIDEM TARTRATE 10 MG: 5 TABLET ORAL at 23:48

## 2024-06-09 RX ADMIN — SPIRONOLACTONE 25 MG: 25 TABLET ORAL at 09:45

## 2024-06-09 RX ADMIN — PANTOPRAZOLE SODIUM 40 MG: 40 TABLET, DELAYED RELEASE ORAL at 19:48

## 2024-06-09 RX ADMIN — FUROSEMIDE 20 MG: 20 TABLET ORAL at 09:45

## 2024-06-09 RX ADMIN — CARVEDILOL 12.5 MG: 12.5 TABLET, FILM COATED ORAL at 17:10

## 2024-06-09 RX ADMIN — GABAPENTIN 900 MG: 300 CAPSULE ORAL at 09:45

## 2024-06-09 RX ADMIN — GLIPIZIDE 10 MG: 10 TABLET, EXTENDED RELEASE ORAL at 09:45

## 2024-06-09 RX ADMIN — ENOXAPARIN SODIUM 40 MG: 40 INJECTION SUBCUTANEOUS at 09:49

## 2024-06-09 RX ADMIN — CARVEDILOL 12.5 MG: 12.5 TABLET, FILM COATED ORAL at 09:45

## 2024-06-09 RX ADMIN — LOSARTAN POTASSIUM 100 MG: 100 TABLET, FILM COATED ORAL at 09:44

## 2024-06-09 RX ADMIN — ACETAMINOPHEN 650 MG: 325 TABLET, FILM COATED ORAL at 17:10

## 2024-06-09 RX ADMIN — POTASSIUM CHLORIDE 40 MEQ: 750 TABLET, FILM COATED, EXTENDED RELEASE ORAL at 09:45

## 2024-06-09 RX ADMIN — INSULIN ASPART 1 UNITS: 100 INJECTION, SOLUTION INTRAVENOUS; SUBCUTANEOUS at 13:42

## 2024-06-09 RX ADMIN — ENOXAPARIN SODIUM 40 MG: 40 INJECTION SUBCUTANEOUS at 19:49

## 2024-06-09 RX ADMIN — INSULIN ASPART 2 UNITS: 100 INJECTION, SOLUTION INTRAVENOUS; SUBCUTANEOUS at 19:46

## 2024-06-09 RX ADMIN — CLONAZEPAM 1 MG: 0.5 TABLET ORAL at 23:48

## 2024-06-09 RX ADMIN — GABAPENTIN 600 MG: 300 CAPSULE ORAL at 12:28

## 2024-06-09 RX ADMIN — ROSUVASTATIN CALCIUM 20 MG: 10 TABLET, FILM COATED ORAL at 09:45

## 2024-06-09 RX ADMIN — SERTRALINE 200 MG: 100 TABLET, FILM COATED ORAL at 09:45

## 2024-06-09 ASSESSMENT — ACTIVITIES OF DAILY LIVING (ADL)
ADLS_ACUITY_SCORE: 33
ADLS_ACUITY_SCORE: 35
ADLS_ACUITY_SCORE: 29
ADLS_ACUITY_SCORE: 33
ADLS_ACUITY_SCORE: 29
ADLS_ACUITY_SCORE: 33
ADLS_ACUITY_SCORE: 35
ADLS_ACUITY_SCORE: 36
ADLS_ACUITY_SCORE: 33
ADLS_ACUITY_SCORE: 29
ADLS_ACUITY_SCORE: 35
ADLS_ACUITY_SCORE: 36
ADLS_ACUITY_SCORE: 36
ADLS_ACUITY_SCORE: 35
ADLS_ACUITY_SCORE: 29
ADLS_ACUITY_SCORE: 33
ADLS_ACUITY_SCORE: 35
ADLS_ACUITY_SCORE: 29
ADLS_ACUITY_SCORE: 29
ADLS_ACUITY_SCORE: 35
ADLS_ACUITY_SCORE: 33

## 2024-06-09 NOTE — PLAN OF CARE
"PRIMARY DIAGNOSIS: Respiratory Failure   OUTPATIENT/OBSERVATION GOALS TO BE MET BEFORE DISCHARGE:  ADLs back to baseline: No     Activity and level of assistance: Up with standby assistance.     Pain status: Pain free.     Return to near baseline physical activity: No             Discharge Planner Nurse  Safe discharge environment identified: Yes  Barriers to discharge: Yes       Entered by: Reanna Yepez RN 06/07/2024 7:18 PM        Please review provider order for any additional goals.   Nurse to notify provider when observation goals have been met and patient is ready for discharge.     Temp: 98  F (36.7  C) Temp src: Oral BP: 129/59 Pulse: 59   Resp: 18 SpO2: 96 % O2 Device: None (Room air) Oxygen Delivery: 1 LPM      A&Ox4. Up SBA with walker and gait belt. Per pt she is sob with activity for longer distances at baseline. Pt complaining of sob at rest and with activity- ambulated in hallway with support staff- pt stated her walk tonight went better than last night as she wasn't as sob. Denies pain. Plan- PT recommending TCU- SW following for discharge planning, daily weight, strict I&Os, transitioned to oral lasix 6/8, cardiology following, finished augmentin 6/8, discharge with zio-patch, wean O2 as able, encourage IS.        Problem: Adult Inpatient Plan of Care  Goal: Plan of Care Review  Description: The Plan of Care Review/Shift note should be completed every shift.  The Outcome Evaluation is a brief statement about your assessment that the patient is improving, declining, or no change.  This information will be displayed automatically on your shift  note.  Outcome: Progressing  Goal: Patient-Specific Goal (Individualized)  Description: You can add care plan individualizations to a care plan. Examples of Individualization might be:  \"Parent requests to be called daily at 9am for status\", \"I have a hard time hearing out of my right ear\", or \"Do not touch me to wake me up as it startles  me\".  Outcome: " Progressing  Goal: Absence of Hospital-Acquired Illness or Injury  Outcome: Progressing  Intervention: Identify and Manage Fall Risk  Recent Flowsheet Documentation  Taken 6/8/2024 2044 by Reanna Yepez RN  Safety Promotion/Fall Prevention:   safety round/check completed   nonskid shoes/slippers when out of bed  Intervention: Prevent Skin Injury  Recent Flowsheet Documentation  Taken 6/8/2024 2044 by Reanna Yepez RN  Body Position: position changed independently  Device Skin Pressure Protection: absorbent pad utilized/changed  Goal: Optimal Comfort and Wellbeing  Outcome: Progressing  Goal: Readiness for Transition of Care  Outcome: Progressing     Problem: Comorbidity Management  Goal: Maintenance of Behavioral Health Symptom Control  Outcome: Progressing  Intervention: Maintain Behavioral Health Symptom Control  Recent Flowsheet Documentation  Taken 6/8/2024 2044 by Reanna Yepez RN  Medication Review/Management: medications reviewed  Goal: Blood Glucose Levels Within Targeted Range  Outcome: Progressing  Intervention: Monitor and Manage Glycemia  Recent Flowsheet Documentation  Taken 6/8/2024 2044 by Reanna Yepez RN  Medication Review/Management: medications reviewed  Goal: Maintenance of Heart Failure Symptom Control  Outcome: Progressing  Intervention: Maintain Heart Failure Management  Recent Flowsheet Documentation  Taken 6/8/2024 2044 by Reanna Yepez RN  Medication Review/Management: medications reviewed  Goal: Blood Pressure in Desired Range  Outcome: Progressing  Intervention: Maintain Blood Pressure Management  Recent Flowsheet Documentation  Taken 6/8/2024 2044 by Reanna Yepez RN  Medication Review/Management: medications reviewed  Goal: Bariatric Home Regimen Maintained  Outcome: Progressing  Intervention: Maintain and Manage Postbariatric Surgery Care  Recent Flowsheet Documentation  Taken 6/8/2024 2044 by Reanna Yepez RN  Medication Review/Management: medications reviewed  Goal: Maintenance of  COPD Symptom Control  Outcome: Progressing  Intervention: Maintain COPD Symptom Control  Recent Flowsheet Documentation  Taken 6/8/2024 2044 by Reanna Yepez RN  Medication Review/Management: medications reviewed     Problem: Skin Injury Risk Increased  Goal: Skin Health and Integrity  Outcome: Progressing  Intervention: Plan: Nurse Driven Intervention: Moisture Management  Recent Flowsheet Documentation  Taken 6/8/2024 2044 by Reanna Yepez RN  Moisture Interventions:   Encourage regular toileting   Incontinence pad  Intervention: Optimize Skin Protection  Recent Flowsheet Documentation  Taken 6/8/2024 2044 by Reanna Yepez RN  Pressure Reduction Techniques: frequent weight shift encouraged  Activity Management:   activity adjusted per tolerance   ambulated outside room     Problem: Pneumonia  Goal: Fluid Balance  Outcome: Progressing  Intervention: Monitor and Manage Fluid Balance  Recent Flowsheet Documentation  Taken 6/8/2024 2044 by Reanna Yepez RN  Fluid/Electrolyte Management: fluids provided  Goal: Resolution of Infection Signs and Symptoms  Outcome: Progressing  Goal: Effective Oxygenation and Ventilation  Outcome: Progressing  Intervention: Promote Airway Secretion Clearance  Recent Flowsheet Documentation  Taken 6/8/2024 2044 by Reanna Yepez RN  Cough And Deep Breathing: done independently per patient     Problem: Pain Acute  Goal: Optimal Pain Control and Function  Outcome: Progressing  Intervention: Prevent or Manage Pain  Recent Flowsheet Documentation  Taken 6/8/2024 2044 by Reanna Yepez RN  Medication Review/Management: medications reviewed

## 2024-06-09 NOTE — PLAN OF CARE
"Goal Outcome Evaluation:      Plan of Care Reviewed With: patient    Overall Patient Progress: improvingOverall Patient Progress: improving    [id number]    Inpatient Progress Note: Aspiration pneumonia     Orientation  A&OX4,  Neuro: WDL  Pain status: denying pain,  Activity: AXIw/walker  Peripheral edema:   Resp: 1L O2 NC  Cardiac: WDL  GI:  loose stool x3, provider aware,  :  Incontinent   Skin: Bruise   LDA: PIV  Infusions: SL  Diet: REG  Consults: SW  Discharge Plan: Possible TCU    Problem: Adult Inpatient Plan of Care  Goal: Plan of Care Review  Description:   Outcome: Progressing  Flowsheets (Taken 6/9/2024 0550)  Outcome Evaluation: Patient is A&OX4, denying pain, endorses loose stool x3, provider aware, Patient on 1L o2 NC due to sleep abnea.  Plan of Care Reviewed With: patient  Overall Patient Progress: improving  Goal: Patient-Specific Goal (Individualized)  Description: You can add care plan individualizations to a care plan. Examples of Individualization might be:  \"Parent requests to be called daily at 9am for status\", \"I have a hard time hearing out of my right ear\", or \"Do not touch me to wake me up as it startles  me\".  Outcome: Progressing  Goal: Absence of Hospital-Acquired Illness or Injury  Outcome: Progressing  Intervention: Identify and Manage Fall Risk  Recent Flowsheet Documentation  Taken 6/9/2024 0000 by Palomo Banegas RN  Safety Promotion/Fall Prevention: safety round/check completed  Intervention: Prevent Skin Injury  Recent Flowsheet Documentation  Taken 6/9/2024 0000 by Palomo Banegas RN  Body Position: position changed independently  Skin Protection: adhesive use limited  Device Skin Pressure Protection: absorbent pad utilized/changed  Intervention: Prevent and Manage VTE (Venous Thromboembolism) Risk  Recent Flowsheet Documentation  Taken 6/9/2024 0000 by Palomo Banegas RN  VTE Prevention/Management: SCDs (sequential compression devices) off  Intervention: Prevent " Infection  Recent Flowsheet Documentation  Taken 6/9/2024 0000 by Palomo Banegas RN  Infection Prevention: single patient room provided  Goal: Optimal Comfort and Wellbeing  Outcome: Progressing  Goal: Readiness for Transition of Care  Outcome: Progressing     Problem: Comorbidity Management  Goal: Maintenance of Behavioral Health Symptom Control  Outcome: Progressing  Intervention: Maintain Behavioral Health Symptom Control  Recent Flowsheet Documentation  Taken 6/9/2024 0000 by Palomo Banegas RN  Medication Review/Management: medications reviewed  Goal: Blood Glucose Levels Within Targeted Range  Outcome: Progressing  Intervention: Monitor and Manage Glycemia  Recent Flowsheet Documentation  Taken 6/9/2024 0000 by Palomo Banegas RN  Glycemic Management: blood glucose monitored  Medication Review/Management: medications reviewed  Goal: Maintenance of Heart Failure Symptom Control  Outcome: Progressing  Intervention: Maintain Heart Failure Management  Recent Flowsheet Documentation  Taken 6/9/2024 0000 by Palomo Banegas RN  Medication Review/Management: medications reviewed  Goal: Blood Pressure in Desired Range  Outcome: Progressing  Intervention: Maintain Blood Pressure Management  Recent Flowsheet Documentation  Taken 6/9/2024 0000 by Palomo Banegas RN  Medication Review/Management: medications reviewed  Goal: Bariatric Home Regimen Maintained  Outcome: Progressing  Intervention: Maintain and Manage Postbariatric Surgery Care  Recent Flowsheet Documentation  Taken 6/9/2024 0000 by Palomo Banegas RN  Medication Review/Management: medications reviewed  Goal: Maintenance of COPD Symptom Control  Outcome: Progressing  Intervention: Maintain COPD Symptom Control  Recent Flowsheet Documentation  Taken 6/9/2024 0000 by Palomo Banegas RN  Supportive Measures: active listening utilized  Medication Review/Management: medications reviewed     Problem: Skin Injury Risk Increased  Goal: Skin Health and  Integrity  Outcome: Progressing  Intervention: Optimize Skin Protection  Recent Flowsheet Documentation  Taken 6/9/2024 0000 by Palomo Banegas RN  Pressure Reduction Techniques: frequent weight shift encouraged  Skin Protection: adhesive use limited  Activity Management: activity adjusted per tolerance  Head of Bed (HOB) Positioning: HOB at 30 degrees     Problem: Pneumonia  Goal: Fluid Balance  Outcome: Progressing  Intervention: Monitor and Manage Fluid Balance  Recent Flowsheet Documentation  Taken 6/9/2024 0000 by Palomo Banegas RN  Fluid/Electrolyte Management: fluids provided  Goal: Resolution of Infection Signs and Symptoms  Outcome: Progressing  Intervention: Prevent Infection Progression  Recent Flowsheet Documentation  Taken 6/9/2024 0000 by Palomo Banegas RN  Infection Management: aseptic technique maintained  Goal: Effective Oxygenation and Ventilation  Outcome: Progressing  Intervention: Promote Airway Secretion Clearance  Recent Flowsheet Documentation  Taken 6/9/2024 0000 by Palomo Banegas RN  Cough And Deep Breathing: done independently per patient  Intervention: Optimize Oxygenation and Ventilation  Recent Flowsheet Documentation  Taken 6/9/2024 0000 by Palomo Banegas RN  Airway/Ventilation Management: airway patency maintained  Head of Bed (HOB) Positioning: HOB at 30 degrees     Problem: Pain Acute  Goal: Optimal Pain Control and Function  Outcome: Progressing  Intervention: Prevent or Manage Pain  Recent Flowsheet Documentation  Taken 6/9/2024 0000 by Palomo Banegas RN  Sensory Stimulation Regulation: quiet environment promoted  Medication Review/Management: medications reviewed  Intervention: Optimize Psychosocial Wellbeing  Recent Flowsheet Documentation  Taken 6/9/2024 0000 by Palomo Banegas RN  Supportive Measures: active listening utilized     Problem: Infection  Goal: Absence of Infection Signs and Symptoms  Outcome: Progressing  Intervention: Prevent or Manage Infection  Recent  Flowsheet Documentation  Taken 6/9/2024 0000 by Palomo Banegas, RN  Infection Management: aseptic technique maintained

## 2024-06-09 NOTE — PROGRESS NOTES
Physician Attestation   I have reviewed and discussed with the advanced practice provider their history, physical and plan for Nicole Reyes.  I did not participate in a shared visit; this is an advanced practice provider only visit.      Wagner Wilson MD  Date of Service (when I saw the patient): I did not personally see this patient today.       [facility]    Medicine Progress Note - Hospitalist Service    Date of Admission:  6/5/2024    Assessment & Plan   Nicole Reyes is a 68 year old female admitted on 6/5/2024 with acute hypoxic respiratory failure likely due to congestive heart failure. She has history of type 2 diabetes, hypertension, hypercholesterolemia, chronic kidney disease stage III, obesity, obstructive sleep apnea on NC only, GERD, anxiety, depression, chronic respiratory failure on supplemental oxygen at 2 L/min at night and with walking, and restless leg syndrome.  She was recently hospitalized here from 5/30/2024 through 6/4/2024 with left-sided pneumonia, possibly due to aspiration.  She was treated with Zosyn while in the hospital and discharged home on Augmentin.      Emergency department evaluation showed oxygen saturations of 99% on 3 L/min.  Other vital signs were unremarkable.  There was no documented hypoxia in the emergency department.  Laboratory evaluation showed unremarkable BMP.  BNP was elevated at 4143.   Chest x-ray suggested left lower lobe infiltrate.      Since admission, she has required IV diuresis and cardiac work up shows new cardiomyopathy with reduced EF.      Acute on chronic Hypoxia, requiring additional daytime 02  Acute exacerbation of systolic/diastolic heart failure  Recent left lower lung pneumonia with persistence on chest x-ray  Initially felt ok after discharge but then noticed increased fatigue and hypoxia with oxygen saturations of 70% when walking.   - Patient did NOT seem to be getting more ill due to worsening  "pneumonia. She has not had fevers or worsening cough or felt systemically ill.    - Exam and labs points to likely CHF, ECHO performed and confirms new dx of Cardiomyopathy with reduced EF of 40-45% from previous 60%  - S/p Lasix 40 mg IV twice daily since admission and transitioned to PO Lasix on 6/8.  - Completed Augmentin on 6/8 for recent PNA.  - Okay to remain on 1 LPM nasal cannula during the day.     New Cardiomyopathy  ECHO this admission shows new dec EF at 45%  Lexiscan obtained shows small area of nontransmural infarction in the basal to mid inferior lateral segment of the left ventricle with no associated with first ischemia  - Appreciate Cards eval for medication optimization  - Added PO lasix, coreg, aldactone and statin  - Diltiazem discontinued  - Will ultimately need ischemic work up with outpt CT Angiogram and ZioPatch for c/o palpitations at discharge      Hypertension  Hypercholesterolemia  - See above, medication changes per Cardiology (see note from 6/7 with changes)     Type 2 diabetes  Chronic kidney disease stage III  - Continue PTA Glucotrol  - Medium resistance NovoLog insulin by sliding scale as needed  - Good glucose control.    Depression  Anxiety  - Continue PTA Zoloft     Obesity  Obstructive sleep apnea  Uses supplemental oxygen at night  - Encouraged to get new sleep study after discharge to assess efficacy of NC vs need for cpap machine.    Diet: Moderate Consistent Carb (60 g CHO per Meal) Diet    DVT Prophylaxis: Enoxaparin (Lovenox) SQ  Suarez Catheter: Not present  Lines: None     Cardiac Monitoring: None  Code Status: Full Code      Clinically Significant Risk Factors Present on Admission                  # Hypertension: Noted on problem list            # DMII: A1C = 6.9 % (Ref range: <5.7 %) within past 6 months    # Severe Obesity: Estimated body mass index is 51.94 kg/m  as calculated from the following:    Height as of this encounter: 1.473 m (4' 10\").    Weight as of this " encounter: 112.7 kg (248 lb 8 oz).              Disposition Plan   Medically Ready for Discharge: Anticipated Tomorrow to TCU       The patient's care was discussed with the Attending Physician, Dr. Wilson .    Vandana Lujan PA-C  Hospitalist Service  [facility]  Securely message with [employer] (more info)  Text page via [facility] Paging/Directory   ______________________________________________________________________    Interval History   Patient seen and examined. No acute events overnight. She denies fevers, chills. She is breathing much better. No N/V/D/C/abd pain. No CP, palpitations, SOB, lightheadedness, or dizziness. Questions welcomed and answered. POC discussed, awaiting TCU.     Physical Exam   Vital Signs: Temp: 98  F (36.7  C) Temp src: Oral BP: 108/62 Pulse: 62   Resp: 18 SpO2: 94 % O2 Device: Nasal cannula Oxygen Delivery: 1 LPM  Weight: 248 lbs 8 oz   General: Awake, alert, 67 yo female who appears stated age. Looks comfortable. No acute distress.  HEENT: Normocephalic, atraumatic  Respiratory: Clear to auscultation bilaterally, stable on 1 LPM nasal cannula  Cardiovascular: Regular rate and rhythm, No peripheral edema.   Gastrointestinal: Soft, non-tender, non-distended. Bowel sounds present.  Skin: Warm, dry. Dorsalis pedis pulses palpable bilaterally.  Musculoskeletal: Moves all extremities equally.  Neurologic: AAO x3, normal strength and sensation.  Psychiatric: Appropriate mood and affect.     Medical Decision Making   45 MINUTES SPENT BY ME on the date of service doing chart review, history, exam, documentation & further activities per the note.      Data   ------------------------- PAST 24 HR DATA REVIEWED -----------------------------------------------      Imaging results reviewed over the past 24 hrs:   No results found for this or any previous visit (from the past 24 hour(s)).

## 2024-06-09 NOTE — PROVIDER NOTIFICATION
Notified MD at 11:49 PM regarding. Patient is c/o loose watery stoolx3 previous shift and this shift x1 watery stool. Patient denies abdominal and has no fever.    Spoke with: Paged via [employer]     Orders were not obtained.    Comments:

## 2024-06-10 ENCOUNTER — APPOINTMENT (OUTPATIENT)
Dept: PHYSICAL THERAPY | Facility: CLINIC | Age: 69
DRG: 291 | End: 2024-06-10
Payer: MEDICARE

## 2024-06-10 LAB
BACTERIA BLD CULT: NO GROWTH
BACTERIA BLD CULT: NO GROWTH
GLUCOSE BLDC GLUCOMTR-MCNC: 121 MG/DL (ref 70–99)
GLUCOSE BLDC GLUCOMTR-MCNC: 141 MG/DL (ref 70–99)
GLUCOSE BLDC GLUCOMTR-MCNC: 149 MG/DL (ref 70–99)
GLUCOSE BLDC GLUCOMTR-MCNC: 195 MG/DL (ref 70–99)
GLUCOSE BLDC GLUCOMTR-MCNC: 83 MG/DL (ref 70–99)

## 2024-06-10 PROCEDURE — 99232 SBSQ HOSP IP/OBS MODERATE 35: CPT | Performed by: HOSPITALIST

## 2024-06-10 PROCEDURE — 120N000001 HC R&B MED SURG/OB

## 2024-06-10 PROCEDURE — 250N000011 HC RX IP 250 OP 636: Mod: JZ | Performed by: INTERNAL MEDICINE

## 2024-06-10 PROCEDURE — 250N000013 HC RX MED GY IP 250 OP 250 PS 637: Performed by: PHYSICIAN ASSISTANT

## 2024-06-10 PROCEDURE — 250N000013 HC RX MED GY IP 250 OP 250 PS 637: Performed by: INTERNAL MEDICINE

## 2024-06-10 PROCEDURE — 97530 THERAPEUTIC ACTIVITIES: CPT | Mod: GP | Performed by: PHYSICAL THERAPIST

## 2024-06-10 PROCEDURE — 97110 THERAPEUTIC EXERCISES: CPT | Mod: GP | Performed by: PHYSICAL THERAPIST

## 2024-06-10 RX ADMIN — GABAPENTIN 900 MG: 300 CAPSULE ORAL at 19:52

## 2024-06-10 RX ADMIN — LOSARTAN POTASSIUM 100 MG: 100 TABLET, FILM COATED ORAL at 09:35

## 2024-06-10 RX ADMIN — PANTOPRAZOLE SODIUM 40 MG: 40 TABLET, DELAYED RELEASE ORAL at 19:52

## 2024-06-10 RX ADMIN — FUROSEMIDE 20 MG: 20 TABLET ORAL at 09:34

## 2024-06-10 RX ADMIN — ENOXAPARIN SODIUM 40 MG: 40 INJECTION SUBCUTANEOUS at 09:33

## 2024-06-10 RX ADMIN — GABAPENTIN 900 MG: 300 CAPSULE ORAL at 09:34

## 2024-06-10 RX ADMIN — CARVEDILOL 12.5 MG: 12.5 TABLET, FILM COATED ORAL at 09:34

## 2024-06-10 RX ADMIN — POTASSIUM CHLORIDE 40 MEQ: 750 TABLET, FILM COATED, EXTENDED RELEASE ORAL at 09:34

## 2024-06-10 RX ADMIN — CLONAZEPAM 1 MG: 0.5 TABLET ORAL at 22:56

## 2024-06-10 RX ADMIN — SPIRONOLACTONE 25 MG: 25 TABLET ORAL at 09:34

## 2024-06-10 RX ADMIN — ZOLPIDEM TARTRATE 10 MG: 5 TABLET ORAL at 22:56

## 2024-06-10 RX ADMIN — ROSUVASTATIN CALCIUM 20 MG: 10 TABLET, FILM COATED ORAL at 09:34

## 2024-06-10 RX ADMIN — GABAPENTIN 600 MG: 300 CAPSULE ORAL at 12:27

## 2024-06-10 RX ADMIN — INSULIN ASPART 2 UNITS: 100 INJECTION, SOLUTION INTRAVENOUS; SUBCUTANEOUS at 12:27

## 2024-06-10 RX ADMIN — ACETAMINOPHEN 650 MG: 325 TABLET, FILM COATED ORAL at 17:41

## 2024-06-10 RX ADMIN — GLIPIZIDE 10 MG: 10 TABLET, EXTENDED RELEASE ORAL at 09:34

## 2024-06-10 RX ADMIN — CARVEDILOL 12.5 MG: 12.5 TABLET, FILM COATED ORAL at 17:41

## 2024-06-10 RX ADMIN — ENOXAPARIN SODIUM 40 MG: 40 INJECTION SUBCUTANEOUS at 19:52

## 2024-06-10 RX ADMIN — SERTRALINE 200 MG: 100 TABLET, FILM COATED ORAL at 09:34

## 2024-06-10 ASSESSMENT — ACTIVITIES OF DAILY LIVING (ADL)
ADLS_ACUITY_SCORE: 31
ADLS_ACUITY_SCORE: 30
ADLS_ACUITY_SCORE: 33
ADLS_ACUITY_SCORE: 31
ADLS_ACUITY_SCORE: 33
ADLS_ACUITY_SCORE: 33
ADLS_ACUITY_SCORE: 30
ADLS_ACUITY_SCORE: 31
ADLS_ACUITY_SCORE: 35
ADLS_ACUITY_SCORE: 31
ADLS_ACUITY_SCORE: 33
ADLS_ACUITY_SCORE: 30
ADLS_ACUITY_SCORE: 35
ADLS_ACUITY_SCORE: 35
ADLS_ACUITY_SCORE: 31
ADLS_ACUITY_SCORE: 33
ADLS_ACUITY_SCORE: 33
ADLS_ACUITY_SCORE: 31
ADLS_ACUITY_SCORE: 33
ADLS_ACUITY_SCORE: 33
ADLS_ACUITY_SCORE: 35
ADLS_ACUITY_SCORE: 30
ADLS_ACUITY_SCORE: 33

## 2024-06-10 NOTE — PLAN OF CARE
INPATIENT NOTE: CARES PROVIDED FROM [id number]  Diagnosis:  Pneumonia  Temp: 98.1  F (36.7  C) Temp src: Oral BP: 121/65 Pulse: 66   Resp: 17 SpO2: 93 % O2 Device: None (Room air) Oxygen Delivery: 1 LPM    Labs: B. 195. 83  Cardiac: WDL   Edema: +2 on lower extremities.   Resp: Diminished LS; On RA during day and with ambulation.   Neuro: A&Ox4  GI: Loose stools  : WDL  Skin: WDL  Activity: Up SBA with walker and GB  Diet: Mod Carb diet. Tolerating  Pain: PRN Tylenol for back pain; effective  Lines: PIV is SL    Plan: Plan for TCU . Continuous O2 management.      Plan of Care Reviewed With: patient  Overall Patient Progress: improving  Outcome Evaluation: A&Ox4, On RA during day and with ambulation. 2L @ night. Some loose stools. Plan for TCU     Problem: Adult Inpatient Plan of Care  Goal: Plan of Care Review  Outcome: Progressing  Flowsheets (Taken 6/10/2024 1830)  Outcome Evaluation: A&Ox4, On RA during day and with ambulation. 2L @ night. Some loose stools. Plan for TCU   Plan of Care Reviewed With: patient  Overall Patient Progress: improving  Goal: Patient-Specific Goal (Individualized)  Outcome: Progressing  Goal: Absence of Hospital-Acquired Illness or Injury  Outcome: Progressing  Intervention: Identify and Manage Fall Risk  Recent Flowsheet Documentation  Taken 6/10/2024 0939 by Deedee Torres RN  Safety Promotion/Fall Prevention:   activity supervised   assistive device/personal items within reach   clutter free environment maintained   nonskid shoes/slippers when out of bed   patient and family education   room near nurse's station   room organization consistent   safety round/check completed  Intervention: Prevent Skin Injury  Recent Flowsheet Documentation  Taken 6/10/2024 0945 by Deedee Torres RN  Body Position:   position changed independently   weight shifting  Taken 6/10/2024 0939 by Deedee Torres, RN  Body Position: position  changed independently  Device Skin Pressure Protection: absorbent pad utilized/changed  Intervention: Prevent and Manage VTE (Venous Thromboembolism) Risk  Recent Flowsheet Documentation  Taken 6/10/2024 0939 by Deedee Torres RN  VTE Prevention/Management: SCDs (sequential compression devices) off  Goal: Optimal Comfort and Wellbeing  Outcome: Progressing  Goal: Readiness for Transition of Care  Outcome: Progressing     Problem: Comorbidity Management  Goal: Maintenance of Behavioral Health Symptom Control  Outcome: Progressing  Intervention: Maintain Behavioral Health Symptom Control  Recent Flowsheet Documentation  Taken 6/10/2024 0939 by Deedee Torres RN  Medication Review/Management: medications reviewed  Goal: Blood Glucose Levels Within Targeted Range  Outcome: Progressing  Intervention: Monitor and Manage Glycemia  Recent Flowsheet Documentation  Taken 6/10/2024 0939 by Deedee Torres RN  Medication Review/Management: medications reviewed  Goal: Maintenance of Heart Failure Symptom Control  Outcome: Progressing  Intervention: Maintain Heart Failure Management  Recent Flowsheet Documentation  Taken 6/10/2024 0939 by Deedee Torres RN  Medication Review/Management: medications reviewed  Goal: Blood Pressure in Desired Range  Outcome: Progressing  Intervention: Maintain Blood Pressure Management  Recent Flowsheet Documentation  Taken 6/10/2024 0939 by Deedee Torres RN  Medication Review/Management: medications reviewed  Goal: Bariatric Home Regimen Maintained  Outcome: Progressing  Intervention: Maintain and Manage Postbariatric Surgery Care  Recent Flowsheet Documentation  Taken 6/10/2024 0939 by Deedee Torres RN  Medication Review/Management: medications reviewed  Goal: Maintenance of COPD Symptom Control  Outcome: Progressing  Intervention: Maintain COPD Symptom Control  Recent Flowsheet  Documentation  Taken 6/10/2024 0939 by Deedee Torres RN  Medication Review/Management: medications reviewed     Problem: Skin Injury Risk Increased  Goal: Skin Health and Integrity  Outcome: Progressing  Intervention: Plan: Nurse Driven Intervention: Moisture Management  Recent Flowsheet Documentation  Taken 6/10/2024 0939 by Deedee Torres RN  Moisture Interventions:   Encourage regular toileting   Incontinence pad   Body-worn absorptive product when OOB (brief, etc.)  Intervention: Optimize Skin Protection  Recent Flowsheet Documentation  Taken 6/10/2024 0945 by Deedee Torres RN  Activity Management: ambulated to bathroom  Taken 6/10/2024 0939 by Deedee Torres, RN  Activity Management:   activity adjusted per tolerance   activity encouraged     Problem: Pain Acute  Goal: Optimal Pain Control and Function  Outcome: Progressing  Intervention: Prevent or Manage Pain  Recent Flowsheet Documentation  Taken 6/10/2024 0939 by Deedee Torres RN  Medication Review/Management: medications reviewed     Problem: Infection  Goal: Absence of Infection Signs and Symptoms  Outcome: Progressing

## 2024-06-10 NOTE — PROGRESS NOTES
Care Management Follow Up    Length of Stay (days): 2    Expected Discharge Date: 06/10/2024     Concerns to be Addressed: discharge planning     Patient plan of care discussed at interdisciplinary rounds: Yes    Anticipated Discharge Disposition: Transitional Care     Anticipated Discharge Services:  none  Anticipated Discharge DME:  none    Patient/family educated on Medicare website which has current facility and service quality ratings: yes  Education Provided on the Discharge Plan:  yes  Patient/Family in Agreement with the Plan: yes    Referrals Placed by CM/SW: Post Acute Facilities  Private pay costs discussed: private room/amenity fees    Discharge Date: 6/11/24    Discharge Transportation: family or friend will provide    Handoff Referral Completed: Yes    Additional Information:  CM following for discharge planning needs for Transitional Care Unit. Patient has been accepted by [facility]itional [facility]. Reached out to Admissions who is looking into their bed availability and would like to know if private or shared room is preferred. Met with patient at bedside to discuss acceptance at [facility] and her preference on room availability. Discussed private room fee. She prefers a private room and is agreeable to the cost but does not want that to affect her acceptance. Also discussed option of starting in a shared room and moving to private when one opens up. She is agreeable to this plan. Updated [facility] who states they will have a shared room available tomorrow after 1300. Patient states her daughter will be transporting her tomorrow. MD updated. Will cont to follow.          Shellie Loredo RN BSN CM  Inpatient Care Coordination  [facility]  [phone]

## 2024-06-10 NOTE — PROGRESS NOTES
[facility]    Medicine Progress Note - Hospitalist Service    Date of Admission:  6/5/2024    Assessment & Plan   minda Reyes is a 68 year old female admitted on 6/5/2024 with acute hypoxic respiratory failure likely due to congestive heart failure. She has history of type 2 diabetes, hypertension, hypercholesterolemia, chronic kidney disease stage III, obesity, obstructive sleep apnea on NC only, GERD, anxiety, depression, chronic respiratory failure on supplemental oxygen at 2 L/min at night and with walking, and restless leg syndrome.  She was recently hospitalized here from 5/30/2024 through 6/4/2024 with left-sided pneumonia, possibly due to aspiration.  She was treated with Zosyn while in the hospital and discharged home on Augmentin.      Emergency department evaluation showed oxygen saturations of 99% on 3 L/min.  Other vital signs were unremarkable.  There was no documented hypoxia in the emergency department.  Laboratory evaluation showed unremarkable BMP.  BNP was elevated at 4143.   Chest x-ray suggested left lower lobe infiltrate.       Since admission, she has required IV diuresis and cardiac work up shows new cardiomyopathy with reduced EF.      Acute on chronic Hypoxia, requiring additional daytime 02  Acute exacerbation of systolic/diastolic heart failure  Recent left lower lung pneumonia with persistence on chest x-ray  Initially felt ok after discharge but then noticed increased fatigue and hypoxia with oxygen saturations of 70% when walking.   -Clinically better with radiology resolution lagging respect to symptoms.  Denies fevers, worsening cough or felt systemically ill.    - Exam and labs points to likely CHF, ECHO performed and confirms new dx of Cardiomyopathy with reduced EF of 40-45% from previous 60%  - S/p Lasix 40 mg IV twice daily since admission and transitioned to PO Lasix on 6/8.  - Completed Augmentin on 6/8 for recent PNA.  - Wean off O2 NC at rest.   "Use with activity as needed       New Cardiomyopathy  ECHO this admission shows new dec EF at 45%  Lexiscan obtained shows small area of nontransmural infarction in the basal to mid inferior lateral segment of the left ventricle with no associated with first ischemia  - Appreciate Cards eval for medication optimization  - Added PO lasix, coreg, aldactone and statin  - Diltiazem discontinued  - Will ultimately need ischemic work up with outpt CT Angiogram and ZioPatch for c/o palpitations at discharge      Hypertension  Hypercholesterolemia  - See above, medication changes per Cardiology (see note from 6/7 with changes)     Type 2 diabetes  Chronic kidney disease stage III  - Continue PTA Glucotrol  - Medium resistance NovoLog insulin by sliding scale as needed  - Good glucose control.     Depression  Anxiety  - Continue PTA Zoloft     Obesity  Obstructive sleep apnea  Uses supplemental oxygen at night  - Encouraged to get new sleep study after discharge to assess efficacy of NC vs need for cpap machine.          Diet: Moderate Consistent Carb (60 g CHO per Meal) Diet    DVT Prophylaxis: Enoxaparin (Lovenox) SQ  Suarez Catheter: Not present  Lines: None     Cardiac Monitoring: None  Code Status: Full Code      Clinically Significant Risk Factors                  # Hypertension: Noted on problem list             # DMII: A1C = 6.9 % (Ref range: <5.7 %) within past 6 months, PRESENT ON ADMISSION  # Severe Obesity: Estimated body mass index is 51.94 kg/m  as calculated from the following:    Height as of this encounter: 1.473 m (4' 10\").    Weight as of this encounter: 112.7 kg (248 lb 8 oz)., PRESENT ON ADMISSION            Disposition Plan     Medically Ready for Discharge: Anticipated Tomorrow to Win Wilson MD  Hospitalist Service  [facility]  Securely message with [employer] (more info)  Text page via [facility] Paging/Directory "   ______________________________________________________________________    Interval History   Better, on room oxygen and feeling no shortness of breath.  Able to walk using a walker, oxygen drops in the low 90s but recovers quickly, on her report.    Physical Exam   Vital Signs: Temp: 98  F (36.7  C) Temp src: Oral BP: (!) 141/72 Pulse: 69   Resp: 17 SpO2: 93 % O2 Device: None (Room air) Oxygen Delivery: 1 LPM  Weight: 248 lbs 8 oz    Constitutional:Obese. awake, alert, cooperative, no apparent distress, and appears stated age  Respiratory: No increased work of breathing, good air exchange, clear to auscultation bilaterally, no crackles or wheezing  Cardiovascular: Normal apical impulse, regular rate and rhythm, normal S1 and S2, no S3 or S4, and no murmur noted    Medical Decision Making       38 MINUTES SPENT BY ME on the date of service doing chart review, history, exam, documentation & further activities per the note.      Data         Imaging results reviewed over the past 24 hrs:   No results found for this or any previous visit (from the past 24 hour(s)).

## 2024-06-10 NOTE — PLAN OF CARE
"PRIMARY DIAGNOSIS: Respiratory Failure   OUTPATIENT/OBSERVATION GOALS TO BE MET BEFORE DISCHARGE:  ADLs back to baseline: Yes     Activity and level of assistance: Up Independently.      Pain status: prn tylenol given for chronic back pain.      Return to near baseline physical activity: Yes.             Discharge Planner Nurse  Safe discharge environment identified: Yes  Barriers to discharge: Yes       Entered by: Reanna Yepez RN 06/07/2024 7:18 PM        Please review provider order for any additional goals.   Nurse to notify provider when observation goals have been met and patient is ready for discharge.     Temp: 98  F (36.7  C) Temp src: Oral BP: 100/57 Pulse: 60   Resp: 16 SpO2: 90 % O2 Device: Nasal cannula Oxygen Delivery: 1 LPM      A&Ox4. Up Independently. Pt ambulated in hallway Independently- tolerated very well. Complained of chronic back pain, prn tylenol given x1. Plan- PT recommending TCU- SW following for discharge planning- also possibly discharge on home care, daily weight, strict I&Os, transitioned to oral lasix 6/8, cardiology following, finished augmentin 6/8, discharge with zio-patch, encourage IS.        Problem: Adult Inpatient Plan of Care  Goal: Plan of Care Review  Description: The Plan of Care Review/Shift note should be completed every shift.  The Outcome Evaluation is a brief statement about your assessment that the patient is improving, declining, or no change.  This information will be displayed automatically on your shift  note.  Outcome: Progressing  Goal: Patient-Specific Goal (Individualized)  Description: You can add care plan individualizations to a care plan. Examples of Individualization might be:  \"Parent requests to be called daily at 9am for status\", \"I have a hard time hearing out of my right ear\", or \"Do not touch me to wake me up as it startles  me\".  Outcome: Progressing  Goal: Absence of Hospital-Acquired Illness or Injury  Outcome: Progressing  Intervention: " Identify and Manage Fall Risk  Recent Flowsheet Documentation  Taken 6/9/2024 1707 by Reanna Yepez RN  Safety Promotion/Fall Prevention:   safety round/check completed   nonskid shoes/slippers when out of bed  Intervention: Prevent Skin Injury  Recent Flowsheet Documentation  Taken 6/9/2024 1707 by Reanna Yepez RN  Body Position: position changed independently  Goal: Optimal Comfort and Wellbeing  Outcome: Progressing  Intervention: Monitor Pain and Promote Comfort  Recent Flowsheet Documentation  Taken 6/9/2024 1949 by Reanna Yepez RN  Pain Management Interventions: declines  Taken 6/9/2024 1707 by Reanna Yepez RN  Pain Management Interventions: medication (see MAR)  Goal: Readiness for Transition of Care  Outcome: Progressing     Problem: Comorbidity Management  Goal: Maintenance of Behavioral Health Symptom Control  Outcome: Progressing  Intervention: Maintain Behavioral Health Symptom Control  Recent Flowsheet Documentation  Taken 6/9/2024 1707 by Reanna Yepez RN  Medication Review/Management: medications reviewed  Goal: Blood Glucose Levels Within Targeted Range  Outcome: Progressing  Intervention: Monitor and Manage Glycemia  Recent Flowsheet Documentation  Taken 6/9/2024 1707 by Reanna Yepez RN  Medication Review/Management: medications reviewed  Goal: Maintenance of Heart Failure Symptom Control  Outcome: Progressing  Intervention: Maintain Heart Failure Management  Recent Flowsheet Documentation  Taken 6/9/2024 1707 by Reanna Yepez RN  Medication Review/Management: medications reviewed  Goal: Blood Pressure in Desired Range  Outcome: Progressing  Intervention: Maintain Blood Pressure Management  Recent Flowsheet Documentation  Taken 6/9/2024 1707 by Reanna Yepez RN  Medication Review/Management: medications reviewed  Goal: Bariatric Home Regimen Maintained  Outcome: Progressing  Intervention: Maintain and Manage Postbariatric Surgery Care  Recent Flowsheet Documentation  Taken 6/9/2024 1707 by Lucho  Reanna LOVETT RN  Medication Review/Management: medications reviewed  Goal: Maintenance of COPD Symptom Control  Outcome: Progressing  Intervention: Maintain COPD Symptom Control  Recent Flowsheet Documentation  Taken 6/9/2024 1707 by Reanna Yepez RN  Medication Review/Management: medications reviewed     Problem: Skin Injury Risk Increased  Goal: Skin Health and Integrity  Outcome: Progressing  Intervention: Optimize Skin Protection  Recent Flowsheet Documentation  Taken 6/9/2024 1707 by Reanna Yepez RN  Activity Management: up ad aminata     Problem: Pneumonia  Goal: Fluid Balance  Outcome: Progressing  Goal: Resolution of Infection Signs and Symptoms  Outcome: Progressing  Goal: Effective Oxygenation and Ventilation  Outcome: Progressing  Intervention: Promote Airway Secretion Clearance  Recent Flowsheet Documentation  Taken 6/9/2024 1707 by Reanna Yepez RN  Cough And Deep Breathing: done independently per patient     Problem: Pain Acute  Goal: Optimal Pain Control and Function  Outcome: Progressing  Intervention: Develop Pain Management Plan  Recent Flowsheet Documentation  Taken 6/9/2024 1949 by Reanna Yepez RN  Pain Management Interventions: declines  Taken 6/9/2024 1707 by Reanna Yepez RN  Pain Management Interventions: medication (see MAR)  Intervention: Prevent or Manage Pain  Recent Flowsheet Documentation  Taken 6/9/2024 1707 by Reanna Yepez RN  Medication Review/Management: medications reviewed     Problem: Infection  Goal: Absence of Infection Signs and Symptoms  Outcome: Progressing

## 2024-06-10 NOTE — PLAN OF CARE
"Goal Outcome Evaluation:      Plan of Care Reviewed With: patient    Overall Patient Progress: improvingOverall Patient Progress: improving    Outcome Evaluation: Patient is A&OX4, denying  loose stool, Patient still on 1L o2 NC due to sleep apnea.      Problem: Adult Inpatient Plan of Care  Goal: Plan of Care Review  Description: The Plan of Care Review/Shift note should be completed every shift.  The Outcome Evaluation is a brief statement about your assessment that the patient is improving, declining, or no change.  This information will be displayed automatically on your shift  note.  Outcome: Progressing  Flowsheets (Taken 6/10/2024 0225)  Outcome Evaluation: Patient is A&OX4, denying  loose stool, Patient still on 1L o2 NC due to sleep apnea.  Plan of Care Reviewed With: patient  Overall Patient Progress: improving  Goal: Patient-Specific Goal (Individualized)  Description: You can add care plan individualizations to a care plan. Examples of Individualization might be:  \"Parent requests to be called daily at 9am for status\", \"I have a hard time hearing out of my right ear\", or \"Do not touch me to wake me up as it startles  me\".  Outcome: Progressing  Goal: Absence of Hospital-Acquired Illness or Injury  Outcome: Progressing  Intervention: Identify and Manage Fall Risk  Recent Flowsheet Documentation  Taken 6/10/2024 0000 by Palomo Banegas RN  Safety Promotion/Fall Prevention: safety round/check completed  Intervention: Prevent Skin Injury  Recent Flowsheet Documentation  Taken 6/10/2024 0000 by Palomo Banegas RN  Body Position: position changed independently  Skin Protection: adhesive use limited  Device Skin Pressure Protection: absorbent pad utilized/changed  Intervention: Prevent and Manage VTE (Venous Thromboembolism) Risk  Recent Flowsheet Documentation  Taken 6/10/2024 0000 by Palomo Banegas RN  VTE Prevention/Management: SCDs (sequential compression devices) off  Intervention: Prevent " Infection  Recent Flowsheet Documentation  Taken 6/10/2024 0000 by Palomo Banegas RN  Infection Prevention: single patient room provided  Goal: Optimal Comfort and Wellbeing  Outcome: Progressing  Goal: Readiness for Transition of Care  Outcome: Progressing     Problem: Comorbidity Management  Goal: Maintenance of Behavioral Health Symptom Control  Outcome: Progressing  Intervention: Maintain Behavioral Health Symptom Control  Recent Flowsheet Documentation  Taken 6/10/2024 0000 by Palomo Banegas RN  Medication Review/Management: medications reviewed  Goal: Blood Glucose Levels Within Targeted Range  Outcome: Progressing  Intervention: Monitor and Manage Glycemia  Recent Flowsheet Documentation  Taken 6/10/2024 0000 by Palomo Banegas RN  Glycemic Management: blood glucose monitored  Medication Review/Management: medications reviewed  Goal: Maintenance of Heart Failure Symptom Control  Outcome: Progressing  Intervention: Maintain Heart Failure Management  Recent Flowsheet Documentation  Taken 6/10/2024 0000 by Palomo Banegas RN  Medication Review/Management: medications reviewed  Goal: Blood Pressure in Desired Range  Outcome: Progressing  Intervention: Maintain Blood Pressure Management  Recent Flowsheet Documentation  Taken 6/10/2024 0000 by Palomo Banegas RN  Medication Review/Management: medications reviewed  Goal: Bariatric Home Regimen Maintained  Outcome: Progressing  Intervention: Maintain and Manage Postbariatric Surgery Care  Recent Flowsheet Documentation  Taken 6/10/2024 0000 by Palomo Banegas RN  Medication Review/Management: medications reviewed  Goal: Maintenance of COPD Symptom Control  Outcome: Progressing  Intervention: Maintain COPD Symptom Control  Recent Flowsheet Documentation  Taken 6/10/2024 0000 by Palomo Banegas RN  Supportive Measures: active listening utilized  Medication Review/Management: medications reviewed     Problem: Skin Injury Risk Increased  Goal: Skin Health and  Integrity  Outcome: Progressing  Intervention: Optimize Skin Protection  Recent Flowsheet Documentation  Taken 6/10/2024 0000 by Palomo Banegas RN  Pressure Reduction Techniques: frequent weight shift encouraged  Skin Protection: adhesive use limited  Activity Management: activity adjusted per tolerance  Head of Bed (HOB) Positioning: HOB at 30 degrees     Problem: Pneumonia  Goal: Fluid Balance  Outcome: Progressing  Intervention: Monitor and Manage Fluid Balance  Recent Flowsheet Documentation  Taken 6/10/2024 0000 by Palomo Banegas RN  Fluid/Electrolyte Management: fluids provided  Goal: Resolution of Infection Signs and Symptoms  Outcome: Progressing  Intervention: Prevent Infection Progression  Recent Flowsheet Documentation  Taken 6/10/2024 0000 by Palomo Banegas RN  Infection Management: aseptic technique maintained  Goal: Effective Oxygenation and Ventilation  Outcome: Progressing  Intervention: Promote Airway Secretion Clearance  Recent Flowsheet Documentation  Taken 6/10/2024 0000 by Palomo Banegas RN  Cough And Deep Breathing: done independently per patient  Intervention: Optimize Oxygenation and Ventilation  Recent Flowsheet Documentation  Taken 6/10/2024 0000 by Palomo Banegas RN  Airway/Ventilation Management: airway patency maintained  Head of Bed (HOB) Positioning: HOB at 30 degrees     Problem: Pain Acute  Goal: Optimal Pain Control and Function  Outcome: Progressing  Intervention: Prevent or Manage Pain  Recent Flowsheet Documentation  Taken 6/10/2024 0000 by Palomo Banegas RN  Sensory Stimulation Regulation: quiet environment promoted  Medication Review/Management: medications reviewed  Intervention: Optimize Psychosocial Wellbeing  Recent Flowsheet Documentation  Taken 6/10/2024 0000 by Palomo Banegas RN  Supportive Measures: active listening utilized     Problem: Infection  Goal: Absence of Infection Signs and Symptoms  Outcome: Progressing  Intervention: Prevent or Manage  Infection  Recent Flowsheet Documentation  Taken 6/10/2024 0000 by Palomo Banegas, RN  Infection Management: aseptic technique maintained

## 2024-06-11 VITALS
TEMPERATURE: 97.9 F | HEART RATE: 61 BPM | WEIGHT: 248.5 LBS | OXYGEN SATURATION: 96 % | SYSTOLIC BLOOD PRESSURE: 131 MMHG | HEIGHT: 58 IN | DIASTOLIC BLOOD PRESSURE: 65 MMHG | RESPIRATION RATE: 16 BRPM | BODY MASS INDEX: 52.16 KG/M2

## 2024-06-11 LAB
CREAT SERPL-MCNC: 0.81 MG/DL (ref 0.51–0.95)
EGFRCR SERPLBLD CKD-EPI 2021: 79 ML/MIN/1.73M2
GLUCOSE BLDC GLUCOMTR-MCNC: 126 MG/DL (ref 70–99)
GLUCOSE BLDC GLUCOMTR-MCNC: 126 MG/DL (ref 70–99)
PLATELET # BLD AUTO: 201 10E3/UL (ref 150–450)

## 2024-06-11 PROCEDURE — 85049 AUTOMATED PLATELET COUNT: CPT | Performed by: INTERNAL MEDICINE

## 2024-06-11 PROCEDURE — 99239 HOSP IP/OBS DSCHRG MGMT >30: CPT | Performed by: HOSPITALIST

## 2024-06-11 PROCEDURE — 82565 ASSAY OF CREATININE: CPT | Performed by: INTERNAL MEDICINE

## 2024-06-11 PROCEDURE — 250N000011 HC RX IP 250 OP 636: Mod: JZ | Performed by: INTERNAL MEDICINE

## 2024-06-11 PROCEDURE — 36415 COLL VENOUS BLD VENIPUNCTURE: CPT | Performed by: INTERNAL MEDICINE

## 2024-06-11 PROCEDURE — 250N000013 HC RX MED GY IP 250 OP 250 PS 637: Performed by: PHYSICIAN ASSISTANT

## 2024-06-11 PROCEDURE — 250N000013 HC RX MED GY IP 250 OP 250 PS 637: Performed by: INTERNAL MEDICINE

## 2024-06-11 RX ORDER — SEMAGLUTIDE 0.68 MG/ML
INJECTION, SOLUTION SUBCUTANEOUS
OUTPATIENT
Start: 2024-06-11

## 2024-06-11 RX ORDER — CLONAZEPAM 1 MG/1
1 TABLET ORAL 2 TIMES DAILY PRN
Qty: 15 TABLET | Refills: 0 | Status: SHIPPED | OUTPATIENT
Start: 2024-06-11 | End: 2024-08-05

## 2024-06-11 RX ORDER — ENOXAPARIN SODIUM 100 MG/ML
40 INJECTION SUBCUTANEOUS EVERY 12 HOURS
DISCHARGE
Start: 2024-06-11 | End: 2024-06-18

## 2024-06-11 RX ORDER — CARVEDILOL 12.5 MG/1
12.5 TABLET ORAL 2 TIMES DAILY WITH MEALS
DISCHARGE
Start: 2024-06-11 | End: 2024-06-27

## 2024-06-11 RX ORDER — ZOLPIDEM TARTRATE 10 MG/1
10 TABLET ORAL AT BEDTIME
Qty: 10 TABLET | Refills: 0 | Status: SHIPPED | OUTPATIENT
Start: 2024-06-11 | End: 2024-06-20

## 2024-06-11 RX ORDER — POLYETHYLENE GLYCOL 3350 17 G/17G
17 POWDER, FOR SOLUTION ORAL 2 TIMES DAILY PRN
DISCHARGE
Start: 2024-06-11 | End: 2024-06-27

## 2024-06-11 RX ORDER — AMOXICILLIN 250 MG
1 CAPSULE ORAL 2 TIMES DAILY PRN
DISCHARGE
Start: 2024-06-11 | End: 2024-06-27

## 2024-06-11 RX ORDER — SPIRONOLACTONE 25 MG/1
25 TABLET ORAL DAILY
DISCHARGE
Start: 2024-06-12 | End: 2024-08-21

## 2024-06-11 RX ORDER — FUROSEMIDE 20 MG
20 TABLET ORAL DAILY
DISCHARGE
Start: 2024-06-11 | End: 2024-06-24

## 2024-06-11 RX ADMIN — CARVEDILOL 12.5 MG: 12.5 TABLET, FILM COATED ORAL at 09:23

## 2024-06-11 RX ADMIN — GABAPENTIN 900 MG: 300 CAPSULE ORAL at 09:24

## 2024-06-11 RX ADMIN — GLIPIZIDE 10 MG: 10 TABLET, EXTENDED RELEASE ORAL at 09:24

## 2024-06-11 RX ADMIN — FUROSEMIDE 20 MG: 20 TABLET ORAL at 09:24

## 2024-06-11 RX ADMIN — ENOXAPARIN SODIUM 40 MG: 40 INJECTION SUBCUTANEOUS at 09:26

## 2024-06-11 RX ADMIN — ROSUVASTATIN CALCIUM 20 MG: 10 TABLET, FILM COATED ORAL at 09:24

## 2024-06-11 RX ADMIN — SPIRONOLACTONE 25 MG: 25 TABLET ORAL at 09:24

## 2024-06-11 RX ADMIN — LOSARTAN POTASSIUM 100 MG: 100 TABLET, FILM COATED ORAL at 09:23

## 2024-06-11 RX ADMIN — POTASSIUM CHLORIDE 40 MEQ: 750 TABLET, FILM COATED, EXTENDED RELEASE ORAL at 09:23

## 2024-06-11 RX ADMIN — INSULIN ASPART 1 UNITS: 100 INJECTION, SOLUTION INTRAVENOUS; SUBCUTANEOUS at 00:53

## 2024-06-11 RX ADMIN — SERTRALINE 200 MG: 100 TABLET, FILM COATED ORAL at 09:24

## 2024-06-11 RX ADMIN — GABAPENTIN 600 MG: 300 CAPSULE ORAL at 12:59

## 2024-06-11 ASSESSMENT — ACTIVITIES OF DAILY LIVING (ADL)
ADLS_ACUITY_SCORE: 35

## 2024-06-11 NOTE — PROGRESS NOTES
Patient's After Visit Summary was reviewed with patient and/or Pt going to TCU, packet was sent along with pt..   Patient verbalized understanding of After Visit Summary, recommended follow up and was given an opportunity to ask questions.   Discharge medications sent home with patient/family: YES, No, Not applicable   Discharged with daughter[facility]    VSS, afeb today. Pt.offered no c/o.Lungs clear,+ BS x 4 quads. UAL to BR.Did not require any sliding scale coverage before meals this shift.Daughter came to pick pt. Up and transport to [facility] this afternoon. Packet was printed up and was given to pt. To hand over to nurses @ TCU. Pt. Offered no c/o or questions,pt. was safely put into car and left hospital.

## 2024-06-11 NOTE — DISCHARGE SUMMARY
"[facility]  Hospitalist Discharge Summary      Date of Admission:  6/5/2024  Date of Discharge:  6/11/2024  Discharging Provider: Isac Osorio MD  Discharge Service: Hospitalist Service    Discharge Diagnoses   Acute on chronic Hypoxia  Acute exacerbation of systolic/diastolic heart failure  New Cardiomyopathy    Clinically Significant Risk Factors     # DMII: A1C = 6.9 % (Ref range: <5.7 %) within past 6 months  # Severe Obesity: Estimated body mass index is 51.94 kg/m  as calculated from the following:    Height as of this encounter: 1.473 m (4' 10\").    Weight as of this encounter: 112.7 kg (248 lb 8 oz).       Follow-ups Needed After Discharge   Follow-up Appointments     Follow Up and recommended labs and tests      Follow up with [facility] physician.  The following labs/tests are   recommended: chem 7 in 2-3 days.  Follow up with primary care provider in 2-3 weeks.  The following   labs/tests are recommended: chem 7.  Follow up with specialist, Cardiology as directed.          Unresulted Labs Ordered in the Past 30 Days of this Admission       No orders found from 5/6/2024 to 6/6/2024.        These results will be followed up by NA    Discharge Disposition   Discharged to short-term care facility  Condition at discharge: Stable    Hospital Course   Nicole Reyes is a 68 year old female admitted on 6/5/2024 with acute hypoxic respiratory failure likely due to congestive heart failure. She has history of type 2 diabetes, hypertension, hypercholesterolemia, chronic kidney disease stage III, obesity, obstructive sleep apnea, GERD, anxiety, depression, chronic respiratory failure on supplemental oxygen at 2 L/min at night and with walking, and restless leg syndrome.  She was recently hospitalized here from 5/30/2024 through 6/4/2024 with left-sided pneumonia, possibly due to aspiration.  She was treated with Zosyn while in the hospital and discharged home on Augmentin.  Over the " last 2 days she had been okay.  Today she noted increased fatigue and hypoxia with oxygen saturations of 70% when walking.  He called paramedics and was brought in by ambulance.  She denied chest pain.  She had a little bit of cough.  She has not had fever.     Emergency department evaluation showed oxygen saturations of 99% on 3 L/min.  Other vital signs were unremarkable.  There was no documented hypoxia in the emergency department.  Laboratory evaluation showed unremarkable BMP.  BNP was elevated at 4143.  Troponin was normal at 12.  Lactic acid was normal.  CBC showed hemoglobin of 10.8 but was otherwise unremarkable.  Venous blood gas showed pH 7.41, pCO2 53, pO2 25, and bicarb 34.  Chest x-ray suggested left lower lobe infiltrate.  Was no obvious congestive heart failure.  Patient was given Zosyn with concern of ongoing pneumonia.  I was asked to admit her for further cares.     Acute on chronic Hypoxia, requiring additional daytime 02  Acute exacerbation of systolic/diastolic heart failure  Recent left lower lung pneumonia with persistence on chest x-ray  Initially felt ok after discharge but then noticed increased fatigue and hypoxia with oxygen saturations of 70% when walking.   -Clinically better with radiology resolution lagging respect to symptoms.  Denies fevers, worsening cough or felt systemically ill.    - Exam and labs points to likely CHF, ECHO performed and confirms new dx of Cardiomyopathy with reduced EF of 40-45% from previous 60%  - S/p Lasix 40 mg IV twice daily since admission and transitioned to PO Lasix on 6/8.  - Completed Augmentin on 6/8 for recent PNA.  - Wean off O2 NC at rest.  Use with activity as needed        New Cardiomyopathy  ECHO this admission shows new dec EF at 45%  Lexiscan obtained shows small area of nontransmural infarction in the basal to mid inferior lateral segment of the left ventricle with no associated with first ischemia  - Appreciate Cards eval for medication  optimization  - Added PO lasix, coreg, aldactone and statin  - Diltiazem discontinued  - Will ultimately need ischemic work up with outpt CT Angiogram and ZioPatch for c/o palpitations at discharge      Hypertension  Hypercholesterolemia  - See above, medication changes per Cardiology (see note from 6/7 with changes)     Type 2 diabetes  Chronic kidney disease stage III  - Continue PTA Glucotrol  - Medium resistance NovoLog insulin by sliding scale as needed  - Good glucose control.     Depression  Anxiety  - Continue PTA Zoloft     Obesity  Obstructive sleep apnea  Uses supplemental oxygen at night  - Encouraged to get new sleep study after discharge to assess efficacy of NC vs need for cpap machine.    I assumed care of the patient today.  She has a bed at [facility].  She will discharge today.  Her Cardizem was stopped.  She has been started on Coreg and spironolactone.  She will need follow-up with cardiology as an outpatient.  She will have a outpatient CT angiogram and a Zio patch as well as follow-up with the heart failure clinic.  Patient is doing well today.  Her breathing is better.  No chest pain or shortness of breath.  She is eating and drinking.  She states she has had some loose stools which she attributes to the antibiotics she was on.    Consultations This Hospital Stay   CARDIOLOGY IP CONSULT  PHARMACY LIAISON FOR MEDICATION COVERAGE CONSULT  PHYSICAL THERAPY ADULT IP CONSULT  CARE MANAGEMENT / SOCIAL WORK IP CONSULT  PHYSICAL THERAPY ADULT IP CONSULT  OCCUPATIONAL THERAPY ADULT IP CONSULT    Code Status   Full Code    Time Spent on this Encounter   I, Isac Osorio MD, personally saw the patient today and spent greater than 30 minutes discharging this patient.       Isac Osorio MD  [facility] OBSERVATION DEPT  [address] [place] [address]  Phone: [phone]  ______________________________________________________________________    Physical Exam   Vital Signs:  Temp: 97.9  F (36.6  C) Temp src: Oral BP: 131/65 Pulse: 61   Resp: 16 SpO2: 96 % O2 Device: None (Room air)    Weight: 248 lbs 8 oz  Constitutional: awake, alert, cooperative, no apparent distress, and appears stated age  Eyes: Lids and lashes normal, pupils equal, round and reactive to light, extra ocular muscles intact, sclera clear, conjunctiva normal  ENT: Normocephalic, without obvious abnormality, atraumatic, sinuses nontender on palpation, external ears without lesions, oral pharynx with moist mucous membranes, tonsils without erythema or exudates, gums normal and good dentition.  Respiratory: No increased work of breathing, good air exchange, clear to auscultation bilaterally, no crackles or wheezing  Cardiovascular: Normal apical impulse, regular rate and rhythm, normal S1 and S2, no S3 or S4, and no murmur noted       Primary Care Physician   Karla Hurst    Discharge Orders      Basic metabolic panel     N terminal pro BNP outpatient     Follow-Up with Cardiology JOHAN      Primary Care - Care Coordination Referral      General info for SNF    Length of Stay Estimate: Short Term Care: Estimated # of Days 31-90  Condition at Discharge: Stable  Level of care:skilled   Rehabilitation Potential: Fair  Admission H&P remains valid and up-to-date: Yes  Recent Chemotherapy: N/A  Use Nursing Home Standing Orders: Yes     Mantoux instructions    Give two-step Mantoux (PPD) Per Facility Policy Yes     Follow Up and recommended labs and tests    Follow up with jail physician.  The following labs/tests are recommended: chem 7 in 2-3 days.  Follow up with primary care provider in 2-3 weeks.  The following labs/tests are recommended: chem 7.  Follow up with specialist, Cardiology as directed.     Reason for your hospital stay    Acute on chronic Hypoxia, requiring additional daytime 02  Acute exacerbation of systolic/diastolic heart failure  New cardiomyopathy     Daily weights    Call Provider for weight  gain of more than 2 pounds per day or 5 pounds per week.     Activity - Up with nursing assistance     Full Code     Physical Therapy Adult Consult    Evaluate and treat as clinically indicated.    Reason:  Weakness     Occupational Therapy Adult Consult    Evaluate and treat as clinically indicated.    Reason:  weakness     Fall precautions     Diet    Follow this diet upon discharge: Orders Placed This Encounter      Moderate Consistent Carb (60 g CHO per Meal) Diet     CTA Angiogram coronary artery       Significant Results and Procedures   Most Recent 3 CBC's:  Recent Labs   Lab Test 06/11/24  0658 06/08/24  0528 06/07/24  0528 06/06/24  0614 06/05/24  1246   WBC  --   --  9.0 7.9 8.0   HGB  --   --  11.2* 9.8* 10.8*   MCV  --   --  82 82 82    245 227 182 203     Most Recent 3 BMP's:  Recent Labs   Lab Test 06/11/24  0849 06/11/24  0658 06/10/24  2049 06/10/24  1743 06/08/24  0835 06/08/24 0528 06/07/24  1140 06/07/24  0528 06/06/24  0910 06/06/24  0614 06/05/24 2012 06/05/24  1246   NA  --   --   --   --   --   --   --  141  --  144  --  144   POTASSIUM  --   --   --   --   --   --   --  3.1*  --  3.5  --  3.6   CHLORIDE  --   --   --   --   --   --   --  100  --  105  --  106   CO2  --   --   --   --   --   --   --  30*  --  28  --  26   BUN  --   --   --   --   --   --   --  10.8  --  6.9*  --  6.1*   CR  --  0.81  --   --   --  0.84  --  0.93  --  0.83   < > 0.71   ANIONGAP  --   --   --   --   --   --   --  11  --  11  --  12   GRECIA  --   --   --   --   --   --   --  8.9  --  8.8  --  8.8   *  --  141* 83   < >  --    < > 126*   < > 122*   < > 155*    < > = values in this interval not displayed.     Most Recent 2 LFT's:  Recent Labs   Lab Test 01/25/24  1519 11/08/20  1238   AST 15 26   ALT 10 29   ALKPHOS 77 94   BILITOTAL 0.5 0.5   ,   Results for orders placed or performed during the hospital encounter of 06/05/24   XR Chest 2 Views    Narrative    CHEST TWO VIEWS 6/5/2024 2:34 PM      HISTORY: Evaluation of ongoing hypoxia, history of recurrent  aspiration pneumonia.    COMPARISON: 2021       Impression    IMPRESSION: Lower lobe infiltrate suggested on lateral view. Mid and  upper lungs clear. The cardiac silhouette is not enlarged. Pulmonary  vasculature is unremarkable.    KELLEY LOPEZ MD         SYSTEM ID:  [id number]   Echocardiogram Complete     Value    LVEF  45-50%    Narrative    [id number]  [id number]  [id number]  [id number]^PHONG^KASIA^ALICE     [facility]  Echocardiography Laboratory  [address] [place] [address]     Name: ASHANTI PATEL  MRN: [id number]  : 1955  Study Date: 2024 09:27 AM  Age: 68 yrs  Gender: Female  Patient Location: [facility]  Reason For Study: CHF  Ordering Physician: KASIA DARLING  Referring Physician: Karla Hurst  Performed By: Vanessa Negron     BSA: 2.0 m2  Height: 58 in  Weight: 248 lb  HR: 68  BP: 150/81 mmHg  ______________________________________________________________________________  Procedure  Complete Portable Echo Adult. Optison (NDC #[id number]) given intravenously.  Technically difficult study.  ______________________________________________________________________________  Interpretation Summary     The visual ejection fraction is 45-50%. Appears lower compared with most  recent stiudy.  There is mild global hypokinesia of the left ventricle.  There is mild-moderate biatrial enlargement.  The study was technically difficult. Contrast was used without apparent  complications.  ______________________________________________________________________________  Left Ventricle  The left ventricle is normal in size. There is normal left ventricular wall  thickness. The visual ejection fraction is 45-50%. Grade II or moderate  diastolic dysfunction. There is mild global hypokinesia of the left ventricle.     Right Ventricle  The right ventricle is normal in structure, function and size.      Atria  There is mild-moderate biatrial enlargement.     Mitral Valve  The mitral valve leaflets appear normal. There is no evidence of stenosis,  fluttering, or prolapse.     Tricuspid Valve  The tricuspid valve is not well visualized, but is grossly normal. Right  ventricular systolic pressure could not be approximated due to inadequate  tricuspid regurgitation.     Aortic Valve  The aortic valve is not well visualized.     Pulmonic Valve  The pulmonic valve is not well visualized.     Vessels  Normal size descending aorta. Dilation of the inferior vena cava is present  with normal respiratory variation in diameter.     Pericardium  There is no pericardial effusion.     Rhythm  Sinus rhythm was noted.  ______________________________________________________________________________  MMode/2D Measurements & Calculations  IVSd: 0.90 cm     LVIDd: 4.5 cm  LVIDs: 2.5 cm  LVPWd: 1.1 cm  IVC diam: 2.7 cm  FS: 43.2 %  LV mass(C)d: 149.9 grams  LV mass(C)dI: 75.1 grams/m2  Ao root diam: 2.7 cm  asc Aorta Diam: 3.2 cm  LVOT diam: 2.0 cm  LVOT area: 3.1 cm2  Ao root diam index Ht(cm/m): 1.8  Ao root diam index BSA (cm/m2): 1.3  Asc Ao diam index BSA (cm/m2): 1.6  Asc Ao diam index Ht(cm/m): 2.2  LA Volume (BP): 67.0 ml     LA Volume Index (BP): 33.5 ml/m2  RWT: 0.48  TAPSE: 2.5 cm     Doppler Measurements & Calculations  MV E max nic: 113.0 cm/sec  MV A max nic: 113.9 cm/sec  MV E/A: 0.99  MV max P.3 mmHg  MV mean PG: 3.2 mmHg  MV V2 VTI: 39.2 cm  MVA(VTI): 2.0 cm2  MV dec slope: 703.9 cm/sec2  MV dec time: 0.16 sec  Ao V2 max: 113.0 cm/sec  Ao max P.0 mmHg  Ao V2 mean: 79.8 cm/sec  Ao mean PG: 3.0 mmHg  Ao V2 VTI: 28.4 cm  DEB(I,D): 2.8 cm2  DEB(V,D): 2.6 cm2  LV V1 max PG: 3.7 mmHg  LV V1 max: 96.7 cm/sec  LV V1 VTI: 25.5 cm  SV(LVOT): 78.9 ml  SI(LVOT): 39.5 ml/m2  PA V2 max: 97.5 cm/sec  PA max PG: 3.8 mmHg  PA acc time: 0.16 sec  AV Nic Ratio (DI): 0.86  DEB Index (cm2/m2): 1.4  E/E' avg: 15.8     Lateral E/e':  13.0  Medial E/e': 18.5  RV S Nic: 11.2 cm/sec     ______________________________________________________________________________  Report approved by: Ramon Jennings 06/06/2024 11:16 AM         NM MPI w Lexiscan     Value    Target     Baseline Systolic     Baseline Diastolic BP 66    Last Stress Systolic     Last Stress Diastolic BP 63    Baseline HR 70    Max HR  89    Max Predicted HR  59    Rate Pressure Product 11,036.0    Stress/rest perfusion ratio 1.18    Narrative      Technically challenging study due to patient body habitus.    The nuclear stress test is abnormal.    There is a small area of nontransmural infarction in the basal to mid   inferolateral and apical lateral segment(s) of the left ventricle. No   signficant associated reversible ischemia.    Left ventricular function is hyperdynamic.    The left ventricular ejection fraction at rest is greater than 70%. The   left ventricular ejection fraction at stress is greater than 70%.    Small left ventricular cavity size is noted.    Stress to rest cavity ratio is 1.18.  TID is absent.    There is reduced radiotracer uptake at the apex on both stress and rest   images, with preserved wall motion, most consistent with apical thinning   artifact, though a small area of non-transmural infarction cannot be   excluded.    A prior study was conducted on 8/11/2017.  This study has changes noted   when compared with the prior study.       *Note: Due to a large number of results and/or encounters for the requested time period, some results have not been displayed. A complete set of results can be found in Results Review.       Discharge Medications   Current Discharge Medication List        START taking these medications    Details   carvedilol (COREG) 12.5 MG tablet Take 1 tablet (12.5 mg) by mouth 2 times daily (with meals)    Associated Diagnoses: Heart failure with mildly reduced ejection fraction (HFmrEF) (H)      enoxaparin ANTICOAGULANT  (LOVENOX) 40 MG/0.4ML syringe Inject 0.4 mLs (40 mg) Subcutaneous every 12 hours for 7 days DVT prophylaxis    Associated Diagnoses: On deep vein thrombosis (DVT) prophylaxis      polyethylene glycol (MIRALAX) 17 g packet Take 17 g by mouth 2 times daily as needed for constipation    Associated Diagnoses: Constipation, unspecified constipation type      senna-docusate (SENOKOT-S/PERICOLACE) 8.6-50 MG tablet Take 1 tablet by mouth 2 times daily as needed for constipation    Associated Diagnoses: Constipation, unspecified constipation type      spironolactone (ALDACTONE) 25 MG tablet Take 1 tablet (25 mg) by mouth daily    Associated Diagnoses: Heart failure with mildly reduced ejection fraction (HFmrEF) (H)           CONTINUE these medications which have CHANGED    Details   furosemide (LASIX) 20 MG tablet Take 1 tablet (20 mg) by mouth daily    Associated Diagnoses: Localized edema           CONTINUE these medications which have NOT CHANGED    Details   clonazePAM (KLONOPIN) 1 MG tablet Take 1 tablet (1 mg) by mouth 2 times daily as needed for anxiety 60 to last 30 days  Qty: 60 tablet, Refills: 5    Associated Diagnoses: Insomnia, unspecified type; Generalized anxiety disorder      cyclobenzaprine (FLEXERIL) 5 MG tablet Take 1-2 tablets (5-10 mg) by mouth 3 times daily as needed for muscle spasms  Qty: 20 tablet, Refills: 0    Associated Diagnoses: Morbid obesity (H)      Fish Oil-Krill Oil (KRILL & FISH OIL BLEND PO) Take 1 capsule by mouth daily      !! gabapentin (NEURONTIN) 300 MG capsule Take 600 mg by mouth daily (with lunch) 3 capsules every am, 2 capsules midday and 3 caps at night      !! gabapentin (NEURONTIN) 300 MG capsule 3 capsules every am, 2 capsules midday and 3 caps at night  Qty: 240 capsule, Refills: 3    Associated Diagnoses: Chronic bilateral low back pain without sciatica      glipiZIDE (GLUCOTROL XL) 10 MG 24 hr tablet Take 1 tablet (10 mg) by mouth daily  Qty: 90 tablet, Refills: 3     Associated Diagnoses: Type 2 diabetes mellitus without complication, without long-term current use of insulin (H)      ketoconazole (NIZORAL) 2 % external cream Apply to fold areas BID PRN  Qty: 60 g, Refills: 5    Associated Diagnoses: Intertrigo      losartan (COZAAR) 100 MG tablet Take 1 tablet (100 mg) by mouth daily  Qty: 90 tablet, Refills: 3    Associated Diagnoses: Benign essential hypertension      melatonin 5 MG tablet Take 5 mg by mouth nightly as needed for sleep      multivitamin w/minerals (THERA-VIT-M) tablet Take 1 tablet by mouth daily      pantoprazole (PROTONIX) 40 MG EC tablet TAKE 1 TABLET BY MOUTH EVERYDAY AT BEDTIME  Qty: 90 tablet, Refills: 3    Associated Diagnoses: Gastroesophageal reflux disease without esophagitis      potassium chloride ER (K-TAB) 20 MEQ CR tablet Take 40 meq daily  Qty: 180 tablet, Refills: 1    Associated Diagnoses: Low serum potassium      rosuvastatin (CRESTOR) 10 MG tablet Take 1 tablet (10 mg) by mouth daily  Qty: 90 tablet, Refills: 3    Associated Diagnoses: Hypercholesteremia      semaglutide (OZEMPIC, 0.25 OR 0.5 MG/DOSE,) 2 MG/3ML pen INJECT 0.5 MG SUBCUTANEOUS EVERY 7 DAYS MAY INCREASE DOSE IN A MONTH IF TOLERATED  Qty: 3 mL, Refills: 0    Associated Diagnoses: Type 2 diabetes mellitus without complication, without long-term current use of insulin (H); Morbid obesity (H)      sertraline (ZOLOFT) 100 MG tablet Take 2 tablets by mouth once daily  Qty: 180 tablet, Refills: 3    Associated Diagnoses: Generalized anxiety disorder; Major depressive disorder, recurrent episode, moderate (H)      zolpidem (AMBIEN) 10 MG tablet Take 1 tablet (10 mg) by mouth at bedtime  Qty: 90 tablet, Refills: 1    Associated Diagnoses: Insomnia, unspecified type       !! - Potential duplicate medications found. Please discuss with provider.        STOP taking these medications       amoxicillin-clavulanate (AUGMENTIN) 875-125 MG tablet Comments:   Reason for Stopping:          diltiazem ER (TIADYLT ER) 360 MG 24 hr ER beaded capsule Comments:   Reason for Stopping:             Allergies   Allergies   Allergen Reactions    Metformin GI Disturbance     High dose    Morphine And Codeine Rash    Penicillins Rash     Pt has tolerated cephalosporins and penems.   Pt tolerated Zosyn 7/6/2019

## 2024-06-11 NOTE — PLAN OF CARE
"Goal Outcome Evaluation:      Plan of Care Reviewed With: patient    Overall Patient Progress: improvingOverall Patient Progress: improving    [id number]    Inpatient Progress Note: Aspiration pneumonia     Orientation  A&OX4,  Neuro: WDL  Pain status: denying pain,  Activity: AXIw/walker  Peripheral edema: edema +2  Resp: 1L O2 NC  Cardiac: WDL  GI:  loose stool, provider aware,  :  Incontinent   Skin: Bruise   LDA: PIV  Infusions: SL  Diet: REG  Consults: SW  Discharge Plan: Possible TCU    Problem: Adult Inpatient Plan of Care  Goal: Plan of Care Review  Description:   Outcome: Progressing  Flowsheets (Taken 6/9/2024 0550)  Outcome Evaluation: Patient is A&OX4, denying pain, endorses loose stool x3, provider aware, Patient on 1L o2 NC due to sleep abnea.  Plan of Care Reviewed With: patient  Overall Patient Progress: improving  Goal: Patient-Specific Goal (Individualized)  Description: You can add care plan individualizations to a care plan. Examples of Individualization might be:  \"Parent requests to be called daily at 9am for status\", \"I have a hard time hearing out of my right ear\", or \"Do not touch me to wake me up as it startles  me\".  Outcome: Progressing  Goal: Absence of Hospital-Acquired Illness or Injury  Outcome: Progressing  Intervention: Identify and Manage Fall Risk  Recent Flowsheet Documentation  Taken 6/9/2024 0000 by Palomo Banegas RN  Safety Promotion/Fall Prevention: safety round/check completed  Intervention: Prevent Skin Injury  Recent Flowsheet Documentation  Taken 6/9/2024 0000 by Palomo Banegas RN  Body Position: position changed independently  Skin Protection: adhesive use limited  Device Skin Pressure Protection: absorbent pad utilized/changed  Intervention: Prevent and Manage VTE (Venous Thromboembolism) Risk  Recent Flowsheet Documentation  Taken 6/9/2024 0000 by Palomo Banegas RN  VTE Prevention/Management: SCDs (sequential compression devices) off  Intervention: Prevent " Infection  Recent Flowsheet Documentation  Taken 6/9/2024 0000 by Palomo Banegas RN  Infection Prevention: single patient room provided  Goal: Optimal Comfort and Wellbeing  Outcome: Progressing  Goal: Readiness for Transition of Care  Outcome: Progressing     Problem: Comorbidity Management  Goal: Maintenance of Behavioral Health Symptom Control  Outcome: Progressing  Intervention: Maintain Behavioral Health Symptom Control  Recent Flowsheet Documentation  Taken 6/9/2024 0000 by Palomo Banegas RN  Medication Review/Management: medications reviewed  Goal: Blood Glucose Levels Within Targeted Range  Outcome: Progressing  Intervention: Monitor and Manage Glycemia  Recent Flowsheet Documentation  Taken 6/9/2024 0000 by Palomo Banegas RN  Glycemic Management: blood glucose monitored  Medication Review/Management: medications reviewed  Goal: Maintenance of Heart Failure Symptom Control  Outcome: Progressing  Intervention: Maintain Heart Failure Management  Recent Flowsheet Documentation  Taken 6/9/2024 0000 by Palomo Banegas RN  Medication Review/Management: medications reviewed  Goal: Blood Pressure in Desired Range  Outcome: Progressing  Intervention: Maintain Blood Pressure Management  Recent Flowsheet Documentation  Taken 6/9/2024 0000 by Palomo Banegas RN  Medication Review/Management: medications reviewed  Goal: Bariatric Home Regimen Maintained  Outcome: Progressing  Intervention: Maintain and Manage Postbariatric Surgery Care  Recent Flowsheet Documentation  Taken 6/9/2024 0000 by Palomo Banegas RN  Medication Review/Management: medications reviewed  Goal: Maintenance of COPD Symptom Control  Outcome: Progressing  Intervention: Maintain COPD Symptom Control  Recent Flowsheet Documentation  Taken 6/9/2024 0000 by Palomo Banegas RN  Supportive Measures: active listening utilized  Medication Review/Management: medications reviewed     Problem: Skin Injury Risk Increased  Goal: Skin Health and  Integrity  Outcome: Progressing  Intervention: Optimize Skin Protection  Recent Flowsheet Documentation  Taken 6/9/2024 0000 by Palomo Banegas RN  Pressure Reduction Techniques: frequent weight shift encouraged  Skin Protection: adhesive use limited  Activity Management: activity adjusted per tolerance  Head of Bed (HOB) Positioning: HOB at 30 degrees     Problem: Pneumonia  Goal: Fluid Balance  Outcome: Progressing  Intervention: Monitor and Manage Fluid Balance  Recent Flowsheet Documentation  Taken 6/9/2024 0000 by Palomo Banegas RN  Fluid/Electrolyte Management: fluids provided  Goal: Resolution of Infection Signs and Symptoms  Outcome: Progressing  Intervention: Prevent Infection Progression  Recent Flowsheet Documentation  Taken 6/9/2024 0000 by Palomo Banegas RN  Infection Management: aseptic technique maintained  Goal: Effective Oxygenation and Ventilation  Outcome: Progressing  Intervention: Promote Airway Secretion Clearance  Recent Flowsheet Documentation  Taken 6/9/2024 0000 by Palomo Banegas RN  Cough And Deep Breathing: done independently per patient  Intervention: Optimize Oxygenation and Ventilation  Recent Flowsheet Documentation  Taken 6/9/2024 0000 by Palomo Banegas RN  Airway/Ventilation Management: airway patency maintained  Head of Bed (HOB) Positioning: HOB at 30 degrees     Problem: Pain Acute  Goal: Optimal Pain Control and Function  Outcome: Progressing  Intervention: Prevent or Manage Pain  Recent Flowsheet Documentation  Taken 6/9/2024 0000 by Palomo Banegas RN  Sensory Stimulation Regulation: quiet environment promoted  Medication Review/Management: medications reviewed  Intervention: Optimize Psychosocial Wellbeing  Recent Flowsheet Documentation  Taken 6/9/2024 0000 by Palomo Banegas RN  Supportive Measures: active listening utilized     Problem: Infection  Goal: Absence of Infection Signs and Symptoms  Outcome: Progressing  Intervention: Prevent or Manage Infection  Recent  Flowsheet Documentation  Taken 6/9/2024 0000 by Palomo Banegas, RN  Infection Management: aseptic technique maintained

## 2024-06-11 NOTE — PLAN OF CARE
"Goal Outcome Evaluation:      Plan of Care Reviewed With: patient    Overall Patient Progress: improvingOverall Patient Progress: improving    [id number]     Inpatient Progress Note: Aspiration pneumonia      Orientation  A&OX4,  Neuro: WDL  Pain status: denying pain,  Activity: AXIw/walker  Peripheral edema: edema +2  Resp: 1L O2 NC  Cardiac: WDL  GI:  loose stool x2  :  Incontinent   Skin: no open skin  LDA: PIV  Infusions: SL  Diet: REG  Consults: SW  Discharge Plan: Possible TCU    Problem: Adult Inpatient Plan of Care  Goal: Plan of Care Review  Description: The Plan of Care Review/Shift note should be completed every shift.  The Outcome Evaluation is a brief statement about your assessment that the patient is improving, declining, or no change.  This information will be displayed automatically on your shift  note.  6/11/2024 0351 by Palomo Banegas RN  Outcome: Progressing  Flowsheets (Taken 6/11/2024 0351)  Plan of Care Reviewed With: patient  Overall Patient Progress: improving  6/10/2024 1957 by Palomo Banegas RN  Outcome: Progressing  Flowsheets (Taken 6/10/2024 1957)  Plan of Care Reviewed With: patient  Overall Patient Progress: improving  Goal: Patient-Specific Goal (Individualized)  Description: You can add care plan individualizations to a care plan. Examples of Individualization might be:  \"Parent requests to be called daily at 9am for status\", \"I have a hard time hearing out of my right ear\", or \"Do not touch me to wake me up as it startles  me\".  6/11/2024 0351 by Palomo Banegas RN  Outcome: Progressing  6/10/2024 1957 by Palomo Banegas RN  Outcome: Progressing  Goal: Absence of Hospital-Acquired Illness or Injury  6/11/2024 0351 by Palomo Banegas RN  Outcome: Progressing  6/10/2024 1957 by Palomo Banegas RN  Outcome: Progressing  Intervention: Identify and Manage Fall Risk  Recent Flowsheet Documentation  Taken 6/11/2024 0000 by Palomo Banegas RN  Safety Promotion/Fall Prevention: safety " round/check completed  Taken 6/10/2024 1952 by Palomo Banegas RN  Safety Promotion/Fall Prevention: safety round/check completed  Intervention: Prevent Skin Injury  Recent Flowsheet Documentation  Taken 6/11/2024 0000 by Palomo Banegas RN  Body Position: position changed independently  Skin Protection: adhesive use limited  Device Skin Pressure Protection: absorbent pad utilized/changed  Taken 6/10/2024 1952 by Palomo Banegas RN  Body Position: position changed independently  Skin Protection: adhesive use limited  Device Skin Pressure Protection: absorbent pad utilized/changed  Intervention: Prevent and Manage VTE (Venous Thromboembolism) Risk  Recent Flowsheet Documentation  Taken 6/11/2024 0000 by Palomo Banegas RN  VTE Prevention/Management: SCDs (sequential compression devices) off  Taken 6/10/2024 1952 by Palomo Banegas RN  VTE Prevention/Management: SCDs (sequential compression devices) off  Intervention: Prevent Infection  Recent Flowsheet Documentation  Taken 6/11/2024 0000 by Palomo Banegas RN  Infection Prevention: single patient room provided  Taken 6/10/2024 1952 by Palomo Banegas RN  Infection Prevention: single patient room provided  Goal: Optimal Comfort and Wellbeing  6/11/2024 0351 by Palomo Banegas RN  Outcome: Progressing  6/10/2024 1957 by Palomo Banegas RN  Outcome: Progressing  Goal: Readiness for Transition of Care  6/11/2024 0351 by Palomo Banegas RN  Outcome: Progressing  6/10/2024 1957 by Palomo Banegas RN  Outcome: Progressing     Problem: Comorbidity Management  Goal: Maintenance of Behavioral Health Symptom Control  6/11/2024 0351 by Palomo Banegas RN  Outcome: Progressing  6/10/2024 1957 by Palomo Banegas RN  Outcome: Progressing  Intervention: Maintain Behavioral Health Symptom Control  Recent Flowsheet Documentation  Taken 6/11/2024 0000 by Palomo Banegas RN  Medication Review/Management: medications reviewed  Taken 6/10/2024 1952 by Palomo Banegas RN  Medication  Review/Management: medications reviewed  Goal: Blood Glucose Levels Within Targeted Range  6/11/2024 0351 by Palomo Banegas RN  Outcome: Progressing  6/10/2024 1957 by Palomo Banegas RN  Outcome: Progressing  Intervention: Monitor and Manage Glycemia  Recent Flowsheet Documentation  Taken 6/11/2024 0000 by Palomo Banegas RN  Glycemic Management: blood glucose monitored  Medication Review/Management: medications reviewed  Taken 6/10/2024 1952 by Palomo Banegas RN  Glycemic Management: blood glucose monitored  Medication Review/Management: medications reviewed  Goal: Maintenance of Heart Failure Symptom Control  6/11/2024 0351 by Palomo Banegas RN  Outcome: Progressing  6/10/2024 1957 by Palomo Banegas RN  Outcome: Progressing  Intervention: Maintain Heart Failure Management  Recent Flowsheet Documentation  Taken 6/11/2024 0000 by Palomo Banegas RN  Medication Review/Management: medications reviewed  Taken 6/10/2024 1952 by Palomo Banegas RN  Medication Review/Management: medications reviewed  Goal: Blood Pressure in Desired Range  6/11/2024 0351 by Palomo Banegas RN  Outcome: Progressing  6/10/2024 1957 by Palomo Banegas RN  Outcome: Progressing  Intervention: Maintain Blood Pressure Management  Recent Flowsheet Documentation  Taken 6/11/2024 0000 by Palomo Banegas RN  Medication Review/Management: medications reviewed  Taken 6/10/2024 1952 by Palomo Banegas RN  Medication Review/Management: medications reviewed  Goal: Bariatric Home Regimen Maintained  6/11/2024 0351 by Palomo Banegas RN  Outcome: Progressing  6/10/2024 1957 by Palomo Banegas RN  Outcome: Progressing  Intervention: Maintain and Manage Postbariatric Surgery Care  Recent Flowsheet Documentation  Taken 6/11/2024 0000 by Palomo Banegas RN  Medication Review/Management: medications reviewed  Taken 6/10/2024 1952 by Palomo Banegas RN  Medication Review/Management: medications reviewed  Goal: Maintenance of COPD Symptom Control  6/11/2024  0351 by Palomo Banegas RN  Outcome: Progressing  6/10/2024 1957 by Palomo Banegas RN  Outcome: Progressing  Intervention: Maintain COPD Symptom Control  Recent Flowsheet Documentation  Taken 6/11/2024 0000 by Palomo Banegas RN  Supportive Measures: active listening utilized  Medication Review/Management: medications reviewed  Taken 6/10/2024 1952 by Palomo Banegas RN  Supportive Measures: active listening utilized  Medication Review/Management: medications reviewed     Problem: Skin Injury Risk Increased  Goal: Skin Health and Integrity  6/11/2024 0351 by Palomo Banegas RN  Outcome: Progressing  6/10/2024 1957 by Palomo Banegas RN  Outcome: Progressing  Intervention: Plan: Nurse Driven Intervention: Moisture Management  Recent Flowsheet Documentation  Taken 6/10/2024 2000 by Palomo Banegas RN  Moisture Interventions: Encourage regular toileting  Bathing/Skin Care: dressed/undressed  Incontinence Protocol in Use: Active  Skin Appearance: Raw or denuded  Products: Skin Paste (barrier cream)  Frequency of Application: with each episode of incontinence  Plan: Moisture Management: encourage regular toileting  Intervention: Optimize Skin Protection  Recent Flowsheet Documentation  Taken 6/11/2024 0000 by Palomo Banegas RN  Pressure Reduction Techniques: frequent weight shift encouraged  Skin Protection: adhesive use limited  Activity Management: activity adjusted per tolerance  Head of Bed (HOB) Positioning: HOB at 30 degrees  Taken 6/10/2024 1952 by Palomo Banegas RN  Pressure Reduction Techniques: frequent weight shift encouraged  Skin Protection: adhesive use limited  Activity Management: activity adjusted per tolerance  Head of Bed (HOB) Positioning: HOB at 30 degrees     Problem: Pneumonia  Goal: Fluid Balance  6/11/2024 0351 by Palomo Banegas RN  Outcome: Progressing  6/10/2024 1957 by Palomo Banegas RN  Outcome: Progressing  Intervention: Monitor and Manage Fluid Balance  Recent Flowsheet  Documentation  Taken 6/11/2024 0000 by Palomo Banegas RN  Fluid/Electrolyte Management: fluids provided  Taken 6/10/2024 1952 by Palomo Banegas RN  Fluid/Electrolyte Management: fluids provided  Goal: Resolution of Infection Signs and Symptoms  6/11/2024 0351 by Palomo Banegas RN  Outcome: Progressing  6/10/2024 1957 by Palomo Banegas RN  Outcome: Progressing  Intervention: Prevent Infection Progression  Recent Flowsheet Documentation  Taken 6/11/2024 0000 by Paloom Banegas RN  Infection Management: aseptic technique maintained  Taken 6/10/2024 1952 by Palomo Banegas RN  Infection Management: aseptic technique maintained  Goal: Effective Oxygenation and Ventilation  6/11/2024 0351 by Palomo Banegas RN  Outcome: Progressing  6/10/2024 1957 by Palomo Banegas RN  Outcome: Progressing  Intervention: Promote Airway Secretion Clearance  Recent Flowsheet Documentation  Taken 6/11/2024 0000 by Palomo Banegas RN  Cough And Deep Breathing: done independently per patient  Taken 6/10/2024 1952 by Palomo Banegas RN  Cough And Deep Breathing: done independently per patient  Intervention: Optimize Oxygenation and Ventilation  Recent Flowsheet Documentation  Taken 6/11/2024 0000 by Palomo Banegas RN  Airway/Ventilation Management: airway patency maintained  Head of Bed (HOB) Positioning: HOB at 30 degrees  Taken 6/10/2024 1952 by Palomo Banegas RN  Airway/Ventilation Management: airway patency maintained  Head of Bed (HOB) Positioning: HOB at 30 degrees     Problem: Pain Acute  Goal: Optimal Pain Control and Function  6/11/2024 0351 by Palomo Banegas RN  Outcome: Progressing  6/10/2024 1957 by Palomo Banegas RN  Outcome: Progressing  Intervention: Prevent or Manage Pain  Recent Flowsheet Documentation  Taken 6/11/2024 0000 by Palomo Banegas RN  Sensory Stimulation Regulation: quiet environment promoted  Medication Review/Management: medications reviewed  Taken 6/10/2024 1952 by Palomo Banegas RN  Sensory Stimulation  Regulation: quiet environment promoted  Medication Review/Management: medications reviewed  Intervention: Optimize Psychosocial Wellbeing  Recent Flowsheet Documentation  Taken 6/11/2024 0000 by Palomo Banegas RN  Supportive Measures: active listening utilized  Taken 6/10/2024 1952 by Palomo Banegas RN  Supportive Measures: active listening utilized     Problem: Infection  Goal: Absence of Infection Signs and Symptoms  6/11/2024 0351 by Palomo Banegas RN  Outcome: Progressing  6/10/2024 1957 by Palomo Banegas RN  Outcome: Progressing  Intervention: Prevent or Manage Infection  Recent Flowsheet Documentation  Taken 6/11/2024 0000 by Palomo Banegas RN  Infection Management: aseptic technique maintained  Taken 6/10/2024 1952 by Palomo Banegas RN  Infection Management: aseptic technique maintained     Problem: Fall Injury Risk  Goal: Absence of Fall and Fall-Related Injury  Outcome: Progressing  Intervention: Identify and Manage Contributors  Recent Flowsheet Documentation  Taken 6/11/2024 0000 by Palomo Banegas RN  Medication Review/Management: medications reviewed  Intervention: Promote Injury-Free Environment  Recent Flowsheet Documentation  Taken 6/11/2024 0000 by Palomo Banegas RN  Safety Promotion/Fall Prevention: safety round/check completed

## 2024-06-11 NOTE — PROGRESS NOTES
Care Management Discharge Note    Discharge Date: 06/11/2024     Discharge Disposition: Transitional Care    Discharge Transportation: family or friend will provide    PAS Confirmation Code: [id number]  Patient/family educated on Medicare website which has current facility and service quality ratings: yes    Education Provided on the Discharge Plan:  Yes  Persons Notified of Discharge Plans: pt  Patient/Family in Agreement with the Plan: yes    Additional Information:  [facility]. Orders completed and sent, confirmed with TCU admissions. LM updating bedside RN.     Update 1205: TCU requesting slight delay to discharge time. Spoke with pt, will plan for discharge between 7988-7307. Updated TCU admissions and LM for bedside RN.     Deneen Nolen RN BSN   Inpatient Care Coordination  [facility]   Phone [phone]

## 2024-06-11 NOTE — PLAN OF CARE
Physical Therapy Discharge Summary     Reason for therapy discharge:    Discharged to transitional care facility.     Progress towards therapy goal(s). See goals on Care Plan in [facility] electronic health record for goal details.  Goals not met.  Barriers to achieving goals:   discharge from facility.     Therapy recommendation(s):    Continued therapy is recommended.  Rationale/Recommendations:  Patient would benefit from PT eval at TCU in order to increase strength, activity tolerance, balance and independence with mobility.    **Pt not seen by discharging therapist on this date, note written based on previous treating therapist's notes and recommendations

## 2024-06-12 ENCOUNTER — PATIENT OUTREACH (OUTPATIENT)
Dept: CARE COORDINATION | Facility: CLINIC | Age: 69
End: 2024-06-12
Payer: MEDICARE

## 2024-06-12 ENCOUNTER — DOCUMENTATION ONLY (OUTPATIENT)
Dept: GERIATRICS | Facility: CLINIC | Age: 69
End: 2024-06-12
Payer: MEDICARE

## 2024-06-12 PROCEDURE — 86481 TB AG RESPONSE T-CELL SUSP: CPT | Performed by: NURSE PRACTITIONER

## 2024-06-12 RX ORDER — SEMAGLUTIDE 0.68 MG/ML
0.25 INJECTION, SOLUTION SUBCUTANEOUS
Qty: 3 ML | Refills: 0 | Status: SHIPPED | OUTPATIENT
Start: 2024-06-12 | End: 2024-07-17

## 2024-06-12 NOTE — PROGRESS NOTES
Leonora Reyes  1955     Supplemental O2 2 L per NC at bedtime and with ambulation  Coreg hold parameters: SBP <120, HR<60  Clarify Gabapentin 900 mg BID (AM/PM) and 600 mg at noon  Clarify flexeril 5 mg TID PRN for muscle spasms  Hold Krill and fish oil in TCU  Hold ozempic in TCU      LEONCIO Christensen CNP on 6/12/2024 at 2:01 PM

## 2024-06-12 NOTE — TELEPHONE ENCOUNTER
Called and spoke to patient who states she is still taking the 0.25 mg dose.     Patient says she tried to go up to 0.5 mg, but didn't tolerate it well due to side effects. She would like to remain at 0.25 mg.    Med pending.    Thank you,  Solis Adams, Triage RN [facility]  10:31 AM 6/12/2024

## 2024-06-12 NOTE — PROGRESS NOTES
Clinic Care Coordination Contact  Care Coordination Transition Communication    Clinical Data: Patient was hospitalized at [facility] from 4281 to 64986 with diagnosis of Acute on chronic Hypoxia  Acute exacerbation of systolic/diastolic heart failure  New Cardiomyopathy.     Assessment: Patient has transitioned to TCU/ARU for short term rehabilitation:    Facility Name: [facility]   Transition Communication:  Notified facility of Primary Care- Care Coordination support via TCU hand-in e-mail.    Plan: Care Coordinator will await notification from facility staff informing of patient's discharge plans/needs. Care Coordinator will review chart and outreach to facility staff every 4 weeks and as needed.     Yanet Negron RN, BSN, CPHN, CCM  [facility]ulatory Care Management  [facility]  Phone: [phone]  Email: [email or web address]

## 2024-06-13 ENCOUNTER — LAB REQUISITION (OUTPATIENT)
Dept: LAB | Facility: CLINIC | Age: 69
End: 2024-06-13

## 2024-06-13 ENCOUNTER — TRANSITIONAL CARE UNIT VISIT (OUTPATIENT)
Dept: GERIATRICS | Facility: CLINIC | Age: 69
End: 2024-06-13
Payer: MEDICARE

## 2024-06-13 VITALS
RESPIRATION RATE: 18 BRPM | WEIGHT: 242 LBS | DIASTOLIC BLOOD PRESSURE: 54 MMHG | SYSTOLIC BLOOD PRESSURE: 106 MMHG | OXYGEN SATURATION: 99 % | HEIGHT: 58 IN | HEART RATE: 65 BPM | BODY MASS INDEX: 50.8 KG/M2 | TEMPERATURE: 98.5 F

## 2024-06-13 DIAGNOSIS — D64.9 ANEMIA, UNSPECIFIED: ICD-10-CM

## 2024-06-13 DIAGNOSIS — G47.33 OSA (OBSTRUCTIVE SLEEP APNEA): ICD-10-CM

## 2024-06-13 DIAGNOSIS — I50.41 ACUTE COMBINED SYSTOLIC AND DIASTOLIC CONGESTIVE HEART FAILURE (H): ICD-10-CM

## 2024-06-13 DIAGNOSIS — I10 ESSENTIAL HYPERTENSION: ICD-10-CM

## 2024-06-13 DIAGNOSIS — Z87.01 HISTORY OF ASPIRATION PNEUMONIA: ICD-10-CM

## 2024-06-13 DIAGNOSIS — I42.9 CARDIOMYOPATHY, UNSPECIFIED TYPE (H): ICD-10-CM

## 2024-06-13 DIAGNOSIS — N18.2 CKD (CHRONIC KIDNEY DISEASE) STAGE 2, GFR 60-89 ML/MIN: ICD-10-CM

## 2024-06-13 DIAGNOSIS — F33.0 MILD EPISODE OF RECURRENT MAJOR DEPRESSIVE DISORDER (H): ICD-10-CM

## 2024-06-13 DIAGNOSIS — E78.5 DYSLIPIDEMIA: ICD-10-CM

## 2024-06-13 DIAGNOSIS — E11.9 TYPE 2 DIABETES MELLITUS WITHOUT COMPLICATION, WITHOUT LONG-TERM CURRENT USE OF INSULIN (H): ICD-10-CM

## 2024-06-13 DIAGNOSIS — R19.7 DIARRHEA, UNSPECIFIED TYPE: ICD-10-CM

## 2024-06-13 DIAGNOSIS — E87.6 HYPOKALEMIA: ICD-10-CM

## 2024-06-13 DIAGNOSIS — J96.01 ACUTE RESPIRATORY FAILURE WITH HYPOXIA (H): Primary | ICD-10-CM

## 2024-06-13 DIAGNOSIS — E66.01 MORBID OBESITY (H): ICD-10-CM

## 2024-06-13 DIAGNOSIS — G89.29 CHRONIC BILATERAL LOW BACK PAIN WITH BILATERAL SCIATICA: ICD-10-CM

## 2024-06-13 DIAGNOSIS — R53.81 PHYSICAL DECONDITIONING: ICD-10-CM

## 2024-06-13 DIAGNOSIS — F41.1 GENERALIZED ANXIETY DISORDER: ICD-10-CM

## 2024-06-13 DIAGNOSIS — M54.41 CHRONIC BILATERAL LOW BACK PAIN WITH BILATERAL SCIATICA: ICD-10-CM

## 2024-06-13 DIAGNOSIS — D64.9 CHRONIC ANEMIA: ICD-10-CM

## 2024-06-13 DIAGNOSIS — M54.42 CHRONIC BILATERAL LOW BACK PAIN WITH BILATERAL SCIATICA: ICD-10-CM

## 2024-06-13 PROCEDURE — 99310 SBSQ NF CARE HIGH MDM 45: CPT

## 2024-06-13 NOTE — LETTER
6/13/2024      Nicole Reyes  [address]  [place] [address]        [facility] GERIATRICS    PRIMARY CARE PROVIDER AND CLINIC:  Karla Hurst, LEONCIO CNP, [address] [facility] / [facility] [id number]  Chief Complaint   Patient presents with     Hospital F/U      [place] [facility] Record Number:  [id number]  Place of Service where encounter took place:  [facility]  ([facility]) [[id number]]    Nicole Reyes  is a 68 year old  (1955) with DM II, HTN, dyslipidemia, CKD III, obesity, recent aspiration pneumonia, CRISTÓBAL, GERD, anxiety/depression, chronic respiratory failure on 2 L oxygen at  and with activity, RLS, admitted to the above facility from  [facility]. Hospital stay 6/5/24 through 6/11/24..     By chart review, patient was [facility] 6[facility]5-6/12/24 with acute heart failure. She had been hospitalized 5/30-6/3 with aspiration pneumonia, discharged to home initially doing well but developed progressively worsening shortness of breath. In ED, labs remarkable for WBC 10.8, VNP 4143, otherwise grossly normal. CXR revealed persistent LLL infiltrate. She was started on zosyn. ECHO demonstrated reduced EF 60>40-45% from previous. She received IV lasix and transitioned to oral. She was started on coreg, aldactone and a statin. Recommended ischemic workup with CT angiogram and ziopatch outpatient. Completed Augmentin 6/8. Respiratory symptoms improved and she was weaned to baseline O2 needs. She was recommended Tcu at discharge, admitted to current facility for ongoing medical management, rehab, nursing care.    HPI:    Seen today in her room in TCU sitting upright in bed, comfortable appearing on room air. She reports she is feeling better in regards to respiratory status. She does feel quite fatigued and weak. She reports appetite is fair. Having loose stools due to antibiotics but this seems to be improving. She reports she received loperamide last night.  She has chronic back pain which she was working up prior to recent hospitalizations, reports low back pain with bilateral lower extremity radiation. She is denying CP, SOB, changes in b/b habits, fever/chills.     CODE STATUS/ADVANCE DIRECTIVES DISCUSSION:  Full Code  CPR/Full code   ALLERGIES:   Allergies   Allergen Reactions     Metformin GI Disturbance     High dose     Morphine And Codeine Rash     Penicillins Rash     Pt has tolerated cephalosporins and penems.   Pt tolerated Zosyn 2019      PAST MEDICAL HISTORY:   Past Medical History:   Diagnosis Date     Arthritis     lt shoulder, rt. knee     Blood transfusion      CKD (chronic kidney disease) stage 3, GFR 30-59 ml/min (H)      Depressive disorder      Essential hypertension, benign      Gastroesophageal reflux disease      Generalized anxiety disorder      Hernia, abdominal      Hiatal hernia      History of blood transfusion     with a hernia repair     Hypertension      Insomnia, unspecified      Iron deficiency anemia 2009     Major depression     sees a psychiatrist     Menopause     age 53     Obesity, unspecified      CRISTÓBAL (obstructive sleep apnea)     bipap     Other chronic pain     chronic pain lt arm from tendonidits     Oxygen dependent     2 LPM at home-uses at noc or extended walking     Pneumonia     due to aspiration from hiatal hernia-     Pneumonia due to 2019 novel coronavirus 2021     Postoperative seroma 10/2011    drained several times     Pure hypercholesterolemia      RLS (restless legs syndrome)      Type II or unspecified type diabetes mellitus without mention of complication, not stated as uncontrolled       PAST SURGICAL HISTORY:   has a past surgical history that includes NONSPECIFIC PROCEDURE; NONSPECIFIC PROCEDURE; breast biopsy, rt/lt; Herniorrhaphy incisional (location) (10/08/2011); Herniorrhaphy incisional (location) (10/16/2011);  section;  section;  section; Dilation and  curettage, hysteroscopy diagnostic, combined (03/22/2013); Esophagoscopy, gastroscopy, duodenoscopy (EGD), combined (N/A, 12/08/2015); Esophagoscopy, gastroscopy, duodenoscopy (EGD), combined (N/A, 12/22/2015); Esophagoscopy, gastroscopy, duodenoscopy (EGD), combined (N/A, 01/03/2020); Colonoscopy (N/A, 12/03/2020); and Breast surgery (?).  FAMILY HISTORY: family history includes Anxiety Disorder in her brother, daughter, mother, sister, and sister; Basal cell carcinoma (age of onset: 38) in her daughter; C.A.D. in her brother and mother; C.A.D. (age of onset: 61) in her brother; Cancer in her brother and father; Cardiovascular in her mother; Connective Tissue Disorder in her sister; Coronary Artery Disease in her brother, brother, mother, and sister; Depression in her daughter, mother, sister, and sister; Diabetes in her brother, sister, and sister; Hypertension in her brother, brother, daughter, mother, sister, sister, sister, and sister; Lipids in her mother and sister; Mental Illness in her maternal grandfather; Obesity in her daughter, daughter, sister, and son; Other Cancer in her brother, father, and sister; Sleep Apnea in her sister.  SOCIAL HISTORY:   reports that she quit smoking about 45 years ago. Her smoking use included cigarettes. She started smoking about 50 years ago. She has a 15 pack-year smoking history. She has never used smokeless tobacco. She reports current alcohol use. She reports that she does not use drugs.  Patient's living condition: lives with family, adult children     Post Discharge Medication Reconciliation Status:   MED REC REQUIRED  Post Medication Reconciliation Status: discharge medications reconciled and changed, per note/orders       Current Outpatient Medications   Medication Sig Dispense Refill     carvedilol (COREG) 12.5 MG tablet Take 1 tablet (12.5 mg) by mouth 2 times daily (with meals)       clonazePAM (KLONOPIN) 1 MG tablet Take 1 tablet (1 mg) by mouth 2 times daily as  needed for anxiety 60 to last 30 days 15 tablet 0     cyclobenzaprine (FLEXERIL) 5 MG tablet Take 1-2 tablets (5-10 mg) by mouth 3 times daily as needed for muscle spasms (Patient taking differently: Take 5 mg by mouth 3 times daily as needed for muscle spasms) 20 tablet 0     enoxaparin ANTICOAGULANT (LOVENOX) 40 MG/0.4ML syringe Inject 0.4 mLs (40 mg) Subcutaneous every 12 hours for 7 days DVT prophylaxis       Fish Oil-Krill Oil (KRILL & FISH OIL BLEND PO) Take 1 capsule by mouth daily (Patient not taking: Reported on 6/13/2024)       furosemide (LASIX) 20 MG tablet Take 1 tablet (20 mg) by mouth daily       gabapentin (NEURONTIN) 300 MG capsule 3 capsules every am, 2 capsules midday and 3 caps at night (Patient taking differently: Take 900 mg by mouth 2 times daily 3 capsules every am, 2 capsules midday and 3 caps at night) 240 capsule 3     glipiZIDE (GLUCOTROL XL) 10 MG 24 hr tablet Take 1 tablet (10 mg) by mouth daily 90 tablet 3     ketoconazole (NIZORAL) 2 % external cream Apply to fold areas BID PRN 60 g 5     losartan (COZAAR) 100 MG tablet Take 1 tablet (100 mg) by mouth daily 90 tablet 3     melatonin 5 MG tablet Take 5 mg by mouth nightly as needed for sleep       multivitamin w/minerals (THERA-VIT-M) tablet Take 1 tablet by mouth daily       pantoprazole (PROTONIX) 40 MG EC tablet TAKE 1 TABLET BY MOUTH EVERYDAY AT BEDTIME 90 tablet 3     polyethylene glycol (MIRALAX) 17 g packet Take 17 g by mouth 2 times daily as needed for constipation       potassium chloride ER (K-TAB) 20 MEQ CR tablet Take 40 meq daily 180 tablet 1     rosuvastatin (CRESTOR) 10 MG tablet Take 1 tablet (10 mg) by mouth daily 90 tablet 3     semaglutide (OZEMPIC, 0.25 OR 0.5 MG/DOSE,) 2 MG/3ML pen Inject 0.25 mg Subcutaneous every 7 days 3 mL 0     senna-docusate (SENOKOT-S/PERICOLACE) 8.6-50 MG tablet Take 1 tablet by mouth 2 times daily as needed for constipation       sertraline (ZOLOFT) 100 MG tablet Take 2 tablets by mouth  "once daily 180 tablet 3     spironolactone (ALDACTONE) 25 MG tablet Take 1 tablet (25 mg) by mouth daily       zolpidem (AMBIEN) 10 MG tablet Take 1 tablet (10 mg) by mouth at bedtime 10 tablet 0     No current facility-administered medications for this visit.       ROS:  10 point ROS of systems including Constitutional, Eyes, Respiratory, Cardiovascular, Gastroenterology, Genitourinary, Integumentary, Musculoskeletal, Psychiatric were all negative except for pertinent positives noted in my HPI.    Vitals:  /54   Pulse 65   Temp 98.5  F (36.9  C)   Resp 18   Ht 1.473 m (4' 10\")   Wt 109.8 kg (242 lb)   LMP 09/15/2007   SpO2 99%   BMI 50.58 kg/m    Exam:  GENERAL APPEARANCE:  Alert, in no distress  RESP:  respiratory effort and palpation of chest normal, lungs clear to auscultation , no respiratory distress  CV:  regular rate and rhythm, no murmur, rub, or gallop, no edema  ABDOMEN:  normal bowel sounds, soft, nontender, no hepatosplenomegaly or other masses  M/S:   Gait and station abnormal transfers with assist  SKIN:  Inspection of skin and subcutaneous tissue baseline  NEURO:   robb freely, follows commands  PSYCH:  oriented X 3, affect and mood normal    Lab/Diagnostic data:  Labs done in SNF are in [facility]. Please refer to them using [facility] Everywhere. and Recent labs in [facility] reviewed by me today.     ASSESSMENT/PLAN:    (J96.01) Acute respiratory failure with hypoxia (H)  (primary encounter diagnosis)  (I50.41) Acute combined systolic and diastolic congestive heart failure (H)  Comment: acute on chronic, improving. She is on room air and uses chronic O2 at night. Appears euvolemic on exam.  Plan: monitor weights, exam, respiratory status. Continue supplemental O2 2 L at HS and PRN with activity. Continue aldactone 25 mg daily, lasix 20 mg daily, coreg 12.5 mg bid, semaglutide 0.25 mg weekly. Follow-up with cardiology as directed.    (Z87.01) History of aspiration pneumonia  Comment: " recent pneumonia, likely aspiration. Completed course of Augmentin.   Plan: Monitor respiratory status    (I42.9) Cardiomyopathy, unspecified type (H)  (E78.5) Dyslipidemia  Comment: By ECHO, EF 45%. Lexiscan showed small area of nontransmural infarct int he basal to mid inferior lateral segment on the LV without associated first ischemia. Medically optimized per cardiology and will need outpatient ischemic workup  Plan: continue lasix, coreg, aldactone, statin. Follow-up with cardiology as directed for CT angiogram, Onofre    (I10) Essential hypertension  Comment: chronic, fairly controlled  BP Readings from Last 3 Encounters:   06/13/24 106/54   06/11/24 131/65   06/03/24 108/52   Plan: continue lasix, coreg, aldactone, monitor BP per facility protocol    (G47.33) CRISTÓBAL (obstructive sleep apnea)  (E66.01) Morbid obesity (HCC)  Comment: chronic O2 dependence as above  Plan: follow-up with sleep medicine as recommended    (E11.9) Type 2 diabetes mellitus without complication, without long-term current use of insulin (H)  Comment: chronic,  fairly controlled.  Lab Results   Component Value Date    A1C 6.9 05/30/2024    A1C 6.3 01/25/2024    A1C 7.5 06/05/2023    A1C 6.9 06/02/2022    A1C 6.8 11/08/2021    A1C 6.6 05/28/2021    A1C 6.6 10/23/2020    A1C 6.9 05/12/2020    A1C 6.5 01/16/2020    A1C 6.7 06/21/2019   Plan: continue glucotrol, semaglutide, monitor BG daily    (D64.9) Chronic anemia  Comment: chronic, baseline hgb 9-10  Plan: monitor hgb periodically, and for s/sx bleeding, repeat CBC    (E87.6) Hypokalemia  Comment: noted inpatient  Plan: repeat BMP, monitor K+ periodically and replace as needed    (N18.2) CKD (chronic kidney disease) stage 2, GFR 60-89 ml/min  Comment: chornic, baseline Cr 0.8-0.9  Plan: Avoid nephrotoxins. Renally dose medications as appropriate. Monitor BMP, repeat     (M54.42,  M54.41,  G89.29) Chronic bilateral low back pain with bilateral sciatica  Comment: chronic, was pursuing  workup prior to hospitalizations. Pain is stable  Plan: continue flexeril 5 mg TID PRN, gabapentin 900 mg BID and 600 mg at noon, follow-up outpatient as directed    (R19.7) Diarrhea, unspecified type  Comment: acute, mild. Suspect secondary to recent antibiotic use, improving. Reports she received loperamide last night, will order, if ongoing consider ruling out C diff due to recent abx use.  Plan: loperamide PRN, continue bowel regimen, bowel habit monitoring per nursing.     (F33.0) Mild episode of recurrent major depressive disorder (H24)  (F41.1) Generalized anxiety disorder  Comment: chronic, mood okay today  Plan: monitor symptoms, mood. Continue zoloft, PRN clonazepam, zolpidem, melatonin, ACP PRN    (R53.81) Physical deconditioning  Comment: acute on chronic, related to acute and chronic conditions as above, recent hospitalizations.   Plan: PT/OT. SNF for assist with ADLs, medication management, meals, activities as needed      DVT PPX: discharged on lovenox x 14 days, continue for now    Orders:  CBC, BMP  Monitor BG daily  Daily weights notify provider of weight gain >2 lbs/day or >5 lbs/week    Total time spent with patient visit at the skilled nursing facility was 59 minutes including patient visit, review of past records, and review of management with nursing facility staff.         Electronically signed by:  LEONCIO Christensen CNP                   Sincerely,        LEONCIO Christensen CNP

## 2024-06-13 NOTE — PATIENT INSTRUCTIONS
Loki Reyes  1955     Loperamide 2 mg QID PRN for loose stools      LEONCIO Christensen CNP on 6/13/2024 at 2:20 PM

## 2024-06-13 NOTE — PROGRESS NOTES
[facility] GERIATRICS    PRIMARY CARE PROVIDER AND CLINIC:  Karla Hurst, LEONCIO CNP, [address] [facility] / [facility] [address]  Chief Complaint   Patient presents with    Hospital F/U      [facility] Medical Record Number:  [id number]  Place of Service where encounter took place:  [facility]  ([facility]) [[id number]]    Nicole Reyes  is a 68 year old  (1955) with DM II, HTN, dyslipidemia, CKD III, obesity, recent aspiration pneumonia, CRISTÓBAL, GERD, anxiety/depression, chronic respiratory failure on 2 L oxygen at HS and with activity, RLS, admitted to the above facility from  [facility]. Hospital stay 6/5/24 through 6/11/24..     By chart review, patient was admitted to [facility] 6/5-6/12/24 with acute heart failure. She had been hospitalized 5/30-6/3 with aspiration pneumonia, discharged to home initially doing well but developed progressively worsening shortness of breath. In ED, labs remarkable for WBC 10.8, VNP 4143, otherwise grossly normal. CXR revealed persistent LLL infiltrate. She was started on zosyn. ECHO demonstrated reduced EF 60>40-45% from previous. She received IV lasix and transitioned to oral. She was started on coreg, aldactone and a statin. Recommended ischemic workup with CT angiogram and ziopatch outpatient. Completed Augmentin 6/8. Respiratory symptoms improved and she was weaned to baseline O2 needs. She was recommended Tcu at discharge, admitted to current facility for ongoing medical management, rehab, nursing care.    HPI:    Seen today in her room in TCU sitting upright in bed, comfortable appearing on room air. She reports she is feeling better in regards to respiratory status. She does feel quite fatigued and weak. She reports appetite is fair. Having loose stools due to antibiotics but this seems to be improving. She reports she received loperamide last night. She has chronic back pain which she was working up prior to recent hospitalizations,  reports low back pain with bilateral lower extremity radiation. She is denying CP, SOB, changes in b/b habits, fever/chills.     CODE STATUS/ADVANCE DIRECTIVES DISCUSSION:  Full Code  CPR/Full code   ALLERGIES:   Allergies   Allergen Reactions    Metformin GI Disturbance     High dose    Morphine And Codeine Rash    Penicillins Rash     Pt has tolerated cephalosporins and penems.   Pt tolerated Zosyn 2019      PAST MEDICAL HISTORY:   Past Medical History:   Diagnosis Date    Arthritis     lt shoulder, rt. knee    Blood transfusion     CKD (chronic kidney disease) stage 3, GFR 30-59 ml/min (H)     Depressive disorder     Essential hypertension, benign     Gastroesophageal reflux disease     Generalized anxiety disorder     Hernia, abdominal     Hiatal hernia     History of blood transfusion     with a hernia repair    Hypertension     Insomnia, unspecified     Iron deficiency anemia 2009    Major depression     sees a psychiatrist    Menopause     age 53    Obesity, unspecified     CRISTÓBAL (obstructive sleep apnea)     bipap    Other chronic pain     chronic pain lt arm from tendonidits    Oxygen dependent     2 LPM at home-uses at noc or extended walking    Pneumonia     due to aspiration from hiatal hernia-    Pneumonia due to  novel coronavirus 2021    Postoperative seroma 10/2011    drained several times    Pure hypercholesterolemia     RLS (restless legs syndrome)     Type II or unspecified type diabetes mellitus without mention of complication, not stated as uncontrolled       PAST SURGICAL HISTORY:   has a past surgical history that includes NONSPECIFIC PROCEDURE; NONSPECIFIC PROCEDURE; breast biopsy, rt/lt; Herniorrhaphy incisional (location) (10/08/2011); Herniorrhaphy incisional (location) (10/16/2011);  section;  section;  section; Dilation and curettage, hysteroscopy diagnostic, combined (2013); Esophagoscopy, gastroscopy, duodenoscopy (EGD), combined  (N/A, 12/08/2015); Esophagoscopy, gastroscopy, duodenoscopy (EGD), combined (N/A, 12/22/2015); Esophagoscopy, gastroscopy, duodenoscopy (EGD), combined (N/A, 01/03/2020); Colonoscopy (N/A, 12/03/2020); and Breast surgery (?).  FAMILY HISTORY: family history includes Anxiety Disorder in her brother, daughter, mother, sister, and sister; Basal cell carcinoma (age of onset: 38) in her daughter; C.A.D. in her brother and mother; C.A.D. (age of onset: 61) in her brother; Cancer in her brother and father; Cardiovascular in her mother; Connective Tissue Disorder in her sister; Coronary Artery Disease in her brother, brother, mother, and sister; Depression in her daughter, mother, sister, and sister; Diabetes in her brother, sister, and sister; Hypertension in her brother, brother, daughter, mother, sister, sister, sister, and sister; Lipids in her mother and sister; Mental Illness in her maternal grandfather; Obesity in her daughter, daughter, sister, and son; Other Cancer in her brother, father, and sister; Sleep Apnea in her sister.  SOCIAL HISTORY:   reports that she quit smoking about 45 years ago. Her smoking use included cigarettes. She started smoking about 50 years ago. She has a 15 pack-year smoking history. She has never used smokeless tobacco. She reports current alcohol use. She reports that she does not use drugs.  Patient's living condition: lives with family, adult children     Post Discharge Medication Reconciliation Status:   MED REC REQUIRED  Post Medication Reconciliation Status: discharge medications reconciled and changed, per note/orders       Current Outpatient Medications   Medication Sig Dispense Refill    carvedilol (COREG) 12.5 MG tablet Take 1 tablet (12.5 mg) by mouth 2 times daily (with meals)      clonazePAM (KLONOPIN) 1 MG tablet Take 1 tablet (1 mg) by mouth 2 times daily as needed for anxiety 60 to last 30 days 15 tablet 0    cyclobenzaprine (FLEXERIL) 5 MG tablet Take 1-2 tablets (5-10  mg) by mouth 3 times daily as needed for muscle spasms (Patient taking differently: Take 5 mg by mouth 3 times daily as needed for muscle spasms) 20 tablet 0    enoxaparin ANTICOAGULANT (LOVENOX) 40 MG/0.4ML syringe Inject 0.4 mLs (40 mg) Subcutaneous every 12 hours for 7 days DVT prophylaxis      Fish Oil-Krill Oil (KRILL & FISH OIL BLEND PO) Take 1 capsule by mouth daily (Patient not taking: Reported on 6/13/2024)      furosemide (LASIX) 20 MG tablet Take 1 tablet (20 mg) by mouth daily      gabapentin (NEURONTIN) 300 MG capsule 3 capsules every am, 2 capsules midday and 3 caps at night (Patient taking differently: Take 900 mg by mouth 2 times daily 3 capsules every am, 2 capsules midday and 3 caps at night) 240 capsule 3    glipiZIDE (GLUCOTROL XL) 10 MG 24 hr tablet Take 1 tablet (10 mg) by mouth daily 90 tablet 3    ketoconazole (NIZORAL) 2 % external cream Apply to fold areas BID PRN 60 g 5    losartan (COZAAR) 100 MG tablet Take 1 tablet (100 mg) by mouth daily 90 tablet 3    melatonin 5 MG tablet Take 5 mg by mouth nightly as needed for sleep      multivitamin w/minerals (THERA-VIT-M) tablet Take 1 tablet by mouth daily      pantoprazole (PROTONIX) 40 MG EC tablet TAKE 1 TABLET BY MOUTH EVERYDAY AT BEDTIME 90 tablet 3    polyethylene glycol (MIRALAX) 17 g packet Take 17 g by mouth 2 times daily as needed for constipation      potassium chloride ER (K-TAB) 20 MEQ CR tablet Take 40 meq daily 180 tablet 1    rosuvastatin (CRESTOR) 10 MG tablet Take 1 tablet (10 mg) by mouth daily 90 tablet 3    semaglutide (OZEMPIC, 0.25 OR 0.5 MG/DOSE,) 2 MG/3ML pen Inject 0.25 mg Subcutaneous every 7 days 3 mL 0    senna-docusate (SENOKOT-S/PERICOLACE) 8.6-50 MG tablet Take 1 tablet by mouth 2 times daily as needed for constipation      sertraline (ZOLOFT) 100 MG tablet Take 2 tablets by mouth once daily 180 tablet 3    spironolactone (ALDACTONE) 25 MG tablet Take 1 tablet (25 mg) by mouth daily      zolpidem (AMBIEN) 10 MG  "tablet Take 1 tablet (10 mg) by mouth at bedtime 10 tablet 0     No current facility-administered medications for this visit.       ROS:  10 point ROS of systems including Constitutional, Eyes, Respiratory, Cardiovascular, Gastroenterology, Genitourinary, Integumentary, Musculoskeletal, Psychiatric were all negative except for pertinent positives noted in my HPI.    Vitals:  /54   Pulse 65   Temp 98.5  F (36.9  C)   Resp 18   Ht 1.473 m (4' 10\")   Wt 109.8 kg (242 lb)   LMP 09/15/2007   SpO2 99%   BMI 50.58 kg/m    Exam:  GENERAL APPEARANCE:  Alert, in no distress  RESP:  respiratory effort and palpation of chest normal, lungs clear to auscultation , no respiratory distress  CV:  regular rate and rhythm, no murmur, rub, or gallop, no edema  ABDOMEN:  normal bowel sounds, soft, nontender, no hepatosplenomegaly or other masses  M/S:   Gait and station abnormal transfers with assist  SKIN:  Inspection of skin and subcutaneous tissue baseline  NEURO:   robb freely, follows commands  PSYCH:  oriented X 3, affect and mood normal    Lab/Diagnostic data:  Labs done in SNF are in [facility]. Please refer to them using [facility] Everywhere. and Recent labs in [facility] reviewed by me today.     ASSESSMENT/PLAN:    (J96.01) Acute respiratory failure with hypoxia (H)  (primary encounter diagnosis)  (I50.41) Acute combined systolic and diastolic congestive heart failure (H)  Comment: acute on chronic, improving. She is on room air and uses chronic O2 at night. Appears euvolemic on exam.  Plan: monitor weights, exam, respiratory status. Continue supplemental O2 2 L at HS and PRN with activity. Continue aldactone 25 mg daily, lasix 20 mg daily, coreg 12.5 mg bid, semaglutide 0.25 mg weekly. Follow-up with cardiology as directed.    (Z87.01) History of aspiration pneumonia  Comment: recent pneumonia, likely aspiration. Completed course of Augmentin.   Plan: Monitor respiratory status    (I42.9) Cardiomyopathy, " unspecified type (H)  (E78.5) Dyslipidemia  Comment: By ECHO, EF 45%. Lexiscan showed small area of nontransmural infarct int he basal to mid inferior lateral segment on the LV without associated first ischemia. Medically optimized per cardiology and will need outpatient ischemic workup  Plan: continue lasix, coreg, aldactone, statin. Follow-up with cardiology as directed for CT angiogram, Onofre    (I10) Essential hypertension  Comment: chronic, fairly controlled  BP Readings from Last 3 Encounters:   06/13/24 106/54   06/11/24 131/65   06/03/24 108/52   Plan: continue lasix, coreg, aldactone, monitor BP per facility protocol    (G47.33) CRISTÓBAL (obstructive sleep apnea)  (E66.01) Morbid obesity (HCC)  Comment: chronic O2 dependence as above  Plan: follow-up with sleep medicine as recommended    (E11.9) Type 2 diabetes mellitus without complication, without long-term current use of insulin (H)  Comment: chronic,  fairly controlled.  Lab Results   Component Value Date    A1C 6.9 05/30/2024    A1C 6.3 01/25/2024    A1C 7.5 06/05/2023    A1C 6.9 06/02/2022    A1C 6.8 11/08/2021    A1C 6.6 05/28/2021    A1C 6.6 10/23/2020    A1C 6.9 05/12/2020    A1C 6.5 01/16/2020    A1C 6.7 06/21/2019   Plan: continue glucotrol, semaglutide, monitor BG daily    (D64.9) Chronic anemia  Comment: chronic, baseline hgb 9-10  Plan: monitor hgb periodically, and for s/sx bleeding, repeat CBC    (E87.6) Hypokalemia  Comment: noted inpatient  Plan: repeat BMP, monitor K+ periodically and replace as needed    (N18.2) CKD (chronic kidney disease) stage 2, GFR 60-89 ml/min  Comment: chornic, baseline Cr 0.8-0.9  Plan: Avoid nephrotoxins. Renally dose medications as appropriate. Monitor BMP, repeat     (M54.42,  M54.41,  G89.29) Chronic bilateral low back pain with bilateral sciatica  Comment: chronic, was pursuing workup prior to hospitalizations. Pain is stable  Plan: continue flexeril 5 mg TID PRN, gabapentin 900 mg BID and 600 mg at noon,  follow-up outpatient as directed    (R19.7) Diarrhea, unspecified type  Comment: acute, mild. Suspect secondary to recent antibiotic use, improving. Reports she received loperamide last night, will order, if ongoing consider ruling out C diff due to recent abx use.  Plan: loperamide PRN, continue bowel regimen, bowel habit monitoring per nursing.     (F33.0) Mild episode of recurrent major depressive disorder (H24)  (F41.1) Generalized anxiety disorder  Comment: chronic, mood okay today  Plan: monitor symptoms, mood. Continue zoloft, PRN clonazepam, zolpidem, melatonin, ACP PRN    (R53.81) Physical deconditioning  Comment: acute on chronic, related to acute and chronic conditions as above, recent hospitalizations.   Plan: PT/OT. SNF for assist with ADLs, medication management, meals, activities as needed      DVT PPX: discharged on lovenox x 14 days, continue for now    Orders:  CBC, BMP  Monitor BG daily  Daily weights notify provider of weight gain >2 lbs/day or >5 lbs/week    Total time spent with patient visit at the skilled nursing facility was 59 minutes including patient visit, review of past records, and review of management with nursing facility staff.         Electronically signed by:  LEONCIO Christensen CNP                  room air

## 2024-06-14 LAB
ANION GAP SERPL CALCULATED.3IONS-SCNC: 12 MMOL/L (ref 7–15)
BUN SERPL-MCNC: 24.7 MG/DL (ref 8–23)
CALCIUM SERPL-MCNC: 9.7 MG/DL (ref 8.8–10.2)
CHLORIDE SERPL-SCNC: 100 MMOL/L (ref 98–107)
CREAT SERPL-MCNC: 0.97 MG/DL (ref 0.51–0.95)
DEPRECATED HCO3 PLAS-SCNC: 28 MMOL/L (ref 22–29)
EGFRCR SERPLBLD CKD-EPI 2021: 63 ML/MIN/1.73M2
ERYTHROCYTE [DISTWIDTH] IN BLOOD BY AUTOMATED COUNT: 15.1 % (ref 10–15)
GLUCOSE SERPL-MCNC: 116 MG/DL (ref 70–99)
HCT VFR BLD AUTO: 36.6 % (ref 35–47)
HGB BLD-MCNC: 11.4 G/DL (ref 11.7–15.7)
MCH RBC QN AUTO: 25.8 PG (ref 26.5–33)
MCHC RBC AUTO-ENTMCNC: 31.1 G/DL (ref 31.5–36.5)
MCV RBC AUTO: 83 FL (ref 78–100)
PLATELET # BLD AUTO: 187 10E3/UL (ref 150–450)
POTASSIUM SERPL-SCNC: 4.8 MMOL/L (ref 3.4–5.3)
RBC # BLD AUTO: 4.42 10E6/UL (ref 3.8–5.2)
SODIUM SERPL-SCNC: 140 MMOL/L (ref 135–145)
WBC # BLD AUTO: 9.6 10E3/UL (ref 4–11)

## 2024-06-14 PROCEDURE — P9604 ONE-WAY ALLOW PRORATED TRIP: HCPCS | Performed by: NURSE PRACTITIONER

## 2024-06-14 PROCEDURE — 36415 COLL VENOUS BLD VENIPUNCTURE: CPT | Performed by: NURSE PRACTITIONER

## 2024-06-14 PROCEDURE — 85014 HEMATOCRIT: CPT | Performed by: NURSE PRACTITIONER

## 2024-06-14 PROCEDURE — 80048 BASIC METABOLIC PNL TOTAL CA: CPT | Performed by: NURSE PRACTITIONER

## 2024-06-14 RX ORDER — LOPERAMIDE HYDROCHLORIDE 2 MG/1
2 TABLET ORAL 4 TIMES DAILY PRN
COMMUNITY
End: 2024-06-27

## 2024-06-14 NOTE — PROGRESS NOTES
[place] [facility]  INITIAL VISIT NOTE  June 17, 2024    PRIMARY CARE PROVIDER AND CLINIC:  Karla Hurst E NICOLLET [facility] [id number]    CHIEF COMPLAINT:  Hospital follow-up/Initial visit    HPI:    Nicole Reyes is a 68 year old  (1955) female who was seen at [facility] on June 17, 2024 for an initial visit.     Medical history is notable for CHF, hypertension, dyslipidemia, DM type II, GERD, hiatal hernia, COPD/asthma, pneumonia, COVID-19 infection, chronic anemia, malignant hyperthermia, skin cancer, RLS, anxiety, depression, spinal stenosis, chronic low back pain, osteoarthritis, morbid obesity, CRISTÓBAL, and recent hospitalization from May 30 through Kelle 3, 2024 for left-sided pneumonia that was treated with piperacillin-tazobactam for possible aspiration and discharged on oral Augmentin.    Summary of hospital course:  Patient was rehospitalized at [facility] from June 5 through June 11, 2024 for acute hypoxia and acute CHF.  Patient originally presented with shortness of breath.  EKG was remarkable for normal sinus rhythm.  CT chest with contrast revealed extensive airspace infiltrates in the left lung, similar to prior study, and no pulmonary emboli.  Echocardiogram demonstrated grade 2 LV diastolic dysfunction, LVEF 45-50%, and mild global hypokinesia of the left ventricle.  Lexiscan stress test was abnormal showing a small area of nontransmural infarction in the basal to mid inferolateral and apical lateral segments of the left ventricle with no significant associated reversible ischemia, hyperdynamic LV function and LVEF greater than 70%.  She was diuresed with IV furosemide.  Carvedilol and spironolactone were added to her cardiac regimen.  Cardiology recommended ischemic workup as well as Zio patch as outpatient.  Notably she completed the course of Augmentin on June 8. TCU was recommended for rehab.    Patient is admitted to this  facility for medical management, nursing care, and subacute rehab.     Of note, history was obtained from patient, facility staff, and extensive review of the chart including hospital admission note, consult notes, and discharge summary.    Today's visit:  Patient was seen in her room, lying in bed.  She appears comfortable.  She feels good today.  She reports no fever, chills, cough, sputum, dyspnea, chest pain, palpitation, nausea, vomiting, abdominal pain, or urinary symptoms.  She had a BM yesterday.      CODE STATUS:   CPR/Full code     PAST MEDICAL HISTORY:   HF with mildly reduced EF (grade 2 LV diastolic dysfunction and LVEF 45-50%, per echo on June 6, 2024 but LVEF more than 70% per Lexiscan on June 7, 2024)  Hypertension  Dyslipidemia  DM type II  GERD  Hiatal hernia  COPD/asthma  Pneumonia   COVID-19 infection with pneumonia in November 2021  Chronic anemia, baseline hemoglobin around 11  Malignant hyperthermia  BCC/SCC of the skin  Melanoma  RLS  Anxiety  Depression  Spinal stenosis  Chronic low back pain  Osteoarthritis  Morbid obesity  CRISTÓBAL; on nocturnal O2 at 2 L/min    Past Medical History:   Diagnosis Date    Arthritis     lt shoulder, rt. knee    Blood transfusion     CKD (chronic kidney disease) stage 3, GFR 30-59 ml/min (H)     Depressive disorder     Essential hypertension, benign     Gastroesophageal reflux disease     Generalized anxiety disorder     Hernia, abdominal     Hiatal hernia     History of blood transfusion 2011    with a hernia repair    Hypertension     Insomnia, unspecified     Iron deficiency anemia 08/2009    Major depression     sees a psychiatrist    Menopause     age 53    Obesity, unspecified     CRISTÓBAL (obstructive sleep apnea)     bipap    Other chronic pain     chronic pain lt arm from tendonidits    Oxygen dependent     2 LPM at home-uses at noc or extended walking    Pneumonia     due to aspiration from hiatal hernia-11/8    Pneumonia due to 2019 novel coronavirus  2021    Postoperative seroma 10/2011    drained several times    Pure hypercholesterolemia     RLS (restless legs syndrome)     Type II or unspecified type diabetes mellitus without mention of complication, not stated as uncontrolled        PAST SURGICAL HISTORY:   Past Surgical History:   Procedure Laterality Date    BREAST BIOPSY, RT/LT      2 times; benign    BREAST SURGERY  ?    Biopsy x 2 Benign     SECTION       SECTION       SECTION      COLONOSCOPY N/A 2020    Procedure: Colonoscopy with biopsy;  Surgeon: Obinna Gutierrez MD;  Location:  OR    DILATION AND CURETTAGE, HYSTEROSCOPY DIAGNOSTIC, COMBINED  2013    Procedure: COMBINED DILATION AND CURETTAGE, HYSTEROSCOPY DIAGNOSTIC;  DILATION AND CURETTAGE, HYSTEROSCOPY DIAGNOSTIC   Converted to a general;  Surgeon: Robi Edwards MD;  Location:  OR    ESOPHAGOSCOPY, GASTROSCOPY, DUODENOSCOPY (EGD), COMBINED N/A 2015    Procedure: COMBINED ESOPHAGOSCOPY, GASTROSCOPY, DUODENOSCOPY (EGD);  Surgeon: Obinna Gutierrez MD;  Location:  GI    ESOPHAGOSCOPY, GASTROSCOPY, DUODENOSCOPY (EGD), COMBINED N/A 2015    Procedure: COMBINED ENDOSCOPIC ULTRASOUND, ESOPHAGOSCOPY, GASTROSCOPY, DUODENOSCOPY (EGD), FINE NEEDLE ASPIRATE/BIOPSY;  Surgeon: Sabine Olivares MD;  Location:  GI    ESOPHAGOSCOPY, GASTROSCOPY, DUODENOSCOPY (EGD), COMBINED N/A 2020    Procedure: ESOPHAGOGASTRODUODENOSCOPY;  Surgeon: Ascencion Stauffer MD;  Location: [facility] OR    HERNIORRHAPHY INCISIONAL (LOCATION)  10/08/2011     incarcerated hernia repair with mesh;    HERNIORRHAPHY INCISIONAL (LOCATION)  10/16/2011     repair incisional hernia with mesh, and removal of current implanted mesh;    ZZC NONSPECIFIC PROCEDURE      Hernia repair     ZZC NONSPECIFIC PROCEDURE      Lymph node biopsy in neck (benign)        FAMILY HISTORY:   Family History   Problem Relation Age of Onset    Cardiovascular Mother         CHF     Hypertension Mother     C.A.D. Mother         50s    Lipids Mother     Coronary Artery Disease Mother     Depression Mother     Anxiety Disorder Mother     Cancer Father         Hodgkin's, Leukemia    Other Cancer Father         Hodgkins    Hypertension Brother     Diabetes Brother     Coronary Artery Disease Brother     Other Cancer Brother         Hodgkins    Cancer Brother         Hodgkin's    Coronary Artery Disease Brother     Hypertension Brother     Anxiety Disorder Brother     C.A.D. Brother 61    C.A.D. Brother     Sleep Apnea Sister     Diabetes Sister     Coronary Artery Disease Sister     Hypertension Sister     Depression Sister     Anxiety Disorder Sister     Obesity Sister     Connective Tissue Disorder Sister         Lupus    Diabetes Sister     Other Cancer Sister         Leukemia    Depression Sister     Anxiety Disorder Sister     Lipids Sister     Hypertension Sister     Hypertension Sister     Hypertension Sister     Basal cell carcinoma Daughter 38        top of head    Mental Illness Maternal Grandfather     Hypertension Daughter     Depression Daughter     Anxiety Disorder Daughter     Obesity Daughter     Obesity Son     Obesity Daughter        SOCIAL HISTORY:  Social History     Tobacco Use    Smoking status: Former     Current packs/day: 0.00     Average packs/day: 1.5 packs/day for 10.0 years (15.0 ttl pk-yrs)     Types: Cigarettes     Start date: 1974     Quit date: 3/18/1979     Years since quittin.2    Smokeless tobacco: Never    Tobacco comments:     quit    Substance Use Topics    Alcohol use: Yes     Comment: occasional       MEDICATIONS:  Current Outpatient Medications   Medication Sig Dispense Refill    carvedilol (COREG) 12.5 MG tablet Take 1 tablet (12.5 mg) by mouth 2 times daily (with meals)      clonazePAM (KLONOPIN) 1 MG tablet Take 1 tablet (1 mg) by mouth 2 times daily as needed for anxiety 60 to last 30 days 15 tablet 0    cyclobenzaprine (FLEXERIL) 5 MG  tablet Take 1-2 tablets (5-10 mg) by mouth 3 times daily as needed for muscle spasms (Patient taking differently: Take 5 mg by mouth 3 times daily as needed for muscle spasms) 20 tablet 0    enoxaparin ANTICOAGULANT (LOVENOX) 40 MG/0.4ML syringe Inject 0.4 mLs (40 mg) Subcutaneous every 12 hours for 7 days DVT prophylaxis      Fish Oil-Krill Oil (KRILL & FISH OIL BLEND PO) Take 1 capsule by mouth daily (Patient not taking: Reported on 6/13/2024)      furosemide (LASIX) 20 MG tablet Take 1 tablet (20 mg) by mouth daily      gabapentin (NEURONTIN) 300 MG capsule 3 capsules every am, 2 capsules midday and 3 caps at night (Patient taking differently: Take 900 mg by mouth 2 times daily 3 capsules every am, 2 capsules midday and 3 caps at night) 240 capsule 3    glipiZIDE (GLUCOTROL XL) 10 MG 24 hr tablet Take 1 tablet (10 mg) by mouth daily 90 tablet 3    ketoconazole (NIZORAL) 2 % external cream Apply to fold areas BID PRN 60 g 5    losartan (COZAAR) 100 MG tablet Take 1 tablet (100 mg) by mouth daily 90 tablet 3    melatonin 5 MG tablet Take 5 mg by mouth nightly as needed for sleep      multivitamin w/minerals (THERA-VIT-M) tablet Take 1 tablet by mouth daily      pantoprazole (PROTONIX) 40 MG EC tablet TAKE 1 TABLET BY MOUTH EVERYDAY AT BEDTIME 90 tablet 3    polyethylene glycol (MIRALAX) 17 g packet Take 17 g by mouth 2 times daily as needed for constipation      potassium chloride ER (K-TAB) 20 MEQ CR tablet Take 40 meq daily 180 tablet 1    rosuvastatin (CRESTOR) 10 MG tablet Take 1 tablet (10 mg) by mouth daily 90 tablet 3    semaglutide (OZEMPIC, 0.25 OR 0.5 MG/DOSE,) 2 MG/3ML pen Inject 0.25 mg Subcutaneous every 7 days 3 mL 0    senna-docusate (SENOKOT-S/PERICOLACE) 8.6-50 MG tablet Take 1 tablet by mouth 2 times daily as needed for constipation      sertraline (ZOLOFT) 100 MG tablet Take 2 tablets by mouth once daily 180 tablet 3    spironolactone (ALDACTONE) 25 MG tablet Take 1 tablet (25 mg) by mouth daily   "    zolpidem (AMBIEN) 10 MG tablet Take 1 tablet (10 mg) by mouth at bedtime 10 tablet 0       MED REC REQUIRED  Post Medication Reconciliation Status: medication reconcilation previously completed during another office visit      ALLERGIES:  Allergies   Allergen Reactions    Metformin GI Disturbance     High dose    Morphine And Codeine Rash    Penicillins Rash     Pt has tolerated cephalosporins and penems.   Pt tolerated Zosyn 7/6/2019       ROS:  10 point ROS were negative other than the symptoms noted above in the HPI.    PHYSICAL EXAM:  Vital signs were reviewed in the chart.  Vital Signs: /65   Pulse 73   Temp 97.7  F (36.5  C)   Resp 18   Ht 1.473 m (4' 10\")   Wt 108.5 kg (239 lb 3.2 oz)   LMP 09/15/2007   SpO2 94%   BMI 49.99 kg/m     General: Comfortable and in no acute distress  HEENT: Mild conjunctival pallor, no scleral icterus or injection, moist oral mucosa  Cardiovascular: Normal S1, S2, RRR; no elevated JVP  Respiratory: Lungs clear to auscultation bilaterally; no wheezing, rhonchi, or crackles  GI: Abdomen soft, non-tender, non-distended, +BS  Extremities: No LE edema  Neuro: CX II-XII grossly intact; ROM in all four extremities grossly intact  Psych: Alert and oriented x3; normal affect  Skin: No acute rash    LABORATORY/IMAGING DATA:  All relevant labs and imaging data in [facility] and/or Care Everywhere were personally reviewed today.    Hematology profile (June 14, 2024): White count 9.6, hemoglobin 11.4, MCV 83, platelets 187,000    Chemistry profile (June 14, 2024): Sodium 140, potassium 4.8, BUN 24.7, creatinine 0.97, calcium 9.7, glucose 160    Most Recent 3 CBC's:  Recent Labs   Lab Test 06/11/24  0658 06/08/24  0528 06/07/24  0528 06/06/24  0614 06/05/24  1246   WBC  --   --  9.0 7.9 8.0   HGB  --   --  11.2* 9.8* 10.8*   MCV  --   --  82 82 82    245 227 182 203     Most Recent 3 BMP's:  Recent Labs   Lab Test 06/11/24  1253 06/11/24  0849 06/11/24  0658 06/10/24  2049 " 06/08/24  0835 06/08/24  0528 06/07/24  1140 06/07/24  0528 06/06/24  0910 06/06/24  0614 06/05/24 2012 06/05/24  1246   NA  --   --   --   --   --   --   --  141  --  144  --  144   POTASSIUM  --   --   --   --   --   --   --  3.1*  --  3.5  --  3.6   CHLORIDE  --   --   --   --   --   --   --  100  --  105  --  106   CO2  --   --   --   --   --   --   --  30*  --  28  --  26   BUN  --   --   --   --   --   --   --  10.8  --  6.9*  --  6.1*   CR  --   --  0.81  --   --  0.84  --  0.93  --  0.83   < > 0.71   ANIONGAP  --   --   --   --   --   --   --  11  --  11  --  12   GRECIA  --   --   --   --   --   --   --  8.9  --  8.8  --  8.8   * 126*  --  141*   < >  --    < > 126*   < > 122*   < > 155*    < > = values in this interval not displayed.     Most Recent 2 LFT's:  Recent Labs   Lab Test 01/25/24  1519 11/08/20  1238   AST 15 26   ALT 10 29   ALKPHOS 77 94   BILITOTAL 0.5 0.5         ASSESSMENT/PLAN:  Acute respiratory failure with hypoxia, resolved,  HF with mildly reduced EF (grade 2 LV diastolic dysfunction and LVEF 45-50%, per echo on June 6, 2024).  LVEF 60-65% back in June 2021.  Patient was diuresed with IV furosemide in the hospital.   Spironolactone and carvedilol were added to her cardiac regimen.  Lexiscan stress test on June 7, 2024 was abnormal showing a small area of nontransmural infarction in the basal to mid inferolateral and apical lateral segments of the left ventricle with no significant associated reversible ischemia, and hyperdynamic LV function with LVEF greater than 70%  Patient currently weighs 237.8 LBS and has no elevated JVP or peripheral edema.  Plan:  Continue furosemide 20 mg daily  Continue spironolactone 25 mg daily  Continue carvedilol 12.5 mg twice daily  Continue losartan 100 mg daily  CT coronary angio and Zio patch as outpatient to rule out significant CAD as well as tachyarrhythmia respectively as the cause of cardiomyopathy)   Monitor weight and volume  status  Follow-up with cardiology as directed    Hypokalemia.  Due to diuretic therapy.  Corrected.  Plan:  Continue potassium chloride 40 mEq daily  Repeat BMP on June 20     Recent history of left-sided pneumonia.  Completed the course of Augmentin on June 8.  Now stable from a respiratory standpoint.  Plan:  Monitor respiratory status    Hypertension.  Cardiac regimen was modified in the hospital (PTA diltiazem was discontinued, spironolactone and carvedilol were added).  Blood pressure is fairly controlled.   Plan:  Continue carvedilol 12.5 mg twice daily  Continue losartan 100 mg daily  Continue furosemide 20 mg daily  Continue spironolactone 25 mg daily  Monitor blood pressure    Dyslipidemia.  Plan:  Continue rosuvastatin 10 mg daily    DM type II.  Hemoglobin A1c 6.9% on May 30, 2024.  Blood glucose levels in the range of .  Plan:  Continue diabetic diet  Continue glipizide XL 10 mg daily  Continue semaglutide 0.5 mg subcu every 7 days   Monitor blood glucose daily    GERD,  Hiatal hernia.  Plan:  Continue pantoprazole 40 mg daily    COPD/asthma.  Not on inhalers.  Stable.  Plan:  Monitor respiratory status    Chronic anemia.  Baseline hemoglobin around 11.  Last hemoglobin 11.4 on June 14, 2024.  Plan:  Monitor hemoglobin periodically    Anxiety,  Depression,  Insomnia.  Plan:  Continue sertraline 200 mg daily  Continue clonazepam 1 mg twice daily as needed for anxiety  Continue zolpidem 10 mg at bedtime  Continue melatonin 5 mg nightly as needed  Monitor symptoms    Spinal stenosis,  Chronic low back pain.  Plan:  Continue gabapentin 900 mg in a.m., 600 mg at lunch, and 900 mg at night  Continue cyclobenzaprine 5 mg 3 times daily as needed for muscle spasms    Morbid obesity,  CRISTÓBAL.  BMI 50.58.  Noncompliant with CPAP due to feeling claustrophobic.  On nocturnal O2 at 2 L/min.  Plan:  Encourage weight loss  Continue nocturnal O2  Monitor respiratory status    DVT prophylaxis.  Discharged on 7-day  course of enoxaparin.  Plan:  Continue enoxaparin 40 mg SQ twice daily through June 18, 2024    Physical deconditioning.  Plan:  Continue PT/OT evaluation and therapy       Orders written by provider at facility:  [facility] on June 20, DX: CHF        Total time: 65 minutes including face to face time with the patient, reviewing the notes, labs, and imaging in EPIC and PCC, adjusting medications, ordering new lab, communicating the plan of care with NP, and documenting all information electronically.     Disclaimer: This note contains text created using speech-recognition software and may have unintended word substitutions.      Electronically signed by:  Stefan Verdin MD

## 2024-06-17 ENCOUNTER — TRANSITIONAL CARE UNIT VISIT (OUTPATIENT)
Dept: GERIATRICS | Facility: CLINIC | Age: 69
End: 2024-06-17
Payer: MEDICARE

## 2024-06-17 VITALS
DIASTOLIC BLOOD PRESSURE: 65 MMHG | BODY MASS INDEX: 50.21 KG/M2 | HEIGHT: 58 IN | RESPIRATION RATE: 18 BRPM | WEIGHT: 239.2 LBS | SYSTOLIC BLOOD PRESSURE: 102 MMHG | TEMPERATURE: 97.7 F | HEART RATE: 73 BPM | OXYGEN SATURATION: 94 %

## 2024-06-17 DIAGNOSIS — G47.00 INSOMNIA, UNSPECIFIED TYPE: ICD-10-CM

## 2024-06-17 DIAGNOSIS — G89.29 CHRONIC LOW BACK PAIN, UNSPECIFIED BACK PAIN LATERALITY, UNSPECIFIED WHETHER SCIATICA PRESENT: ICD-10-CM

## 2024-06-17 DIAGNOSIS — E78.5 DYSLIPIDEMIA: ICD-10-CM

## 2024-06-17 DIAGNOSIS — I50.9 CONGESTIVE HEART FAILURE, UNSPECIFIED HF CHRONICITY, UNSPECIFIED HEART FAILURE TYPE (H): ICD-10-CM

## 2024-06-17 DIAGNOSIS — F32.A DEPRESSION, UNSPECIFIED DEPRESSION TYPE: ICD-10-CM

## 2024-06-17 DIAGNOSIS — E11.69 TYPE 2 DIABETES MELLITUS WITH OTHER SPECIFIED COMPLICATION, WITHOUT LONG-TERM CURRENT USE OF INSULIN (H): ICD-10-CM

## 2024-06-17 DIAGNOSIS — R53.81 PHYSICAL DECONDITIONING: ICD-10-CM

## 2024-06-17 DIAGNOSIS — M54.50 CHRONIC LOW BACK PAIN, UNSPECIFIED BACK PAIN LATERALITY, UNSPECIFIED WHETHER SCIATICA PRESENT: ICD-10-CM

## 2024-06-17 DIAGNOSIS — E87.6 HYPOKALEMIA: ICD-10-CM

## 2024-06-17 DIAGNOSIS — E66.01 MORBID OBESITY (H): ICD-10-CM

## 2024-06-17 DIAGNOSIS — K21.9 GASTROESOPHAGEAL REFLUX DISEASE, UNSPECIFIED WHETHER ESOPHAGITIS PRESENT: ICD-10-CM

## 2024-06-17 DIAGNOSIS — F41.9 ANXIETY: ICD-10-CM

## 2024-06-17 DIAGNOSIS — J18.9 PNEUMONIA OF LEFT LUNG DUE TO INFECTIOUS ORGANISM, UNSPECIFIED PART OF LUNG: ICD-10-CM

## 2024-06-17 DIAGNOSIS — D64.9 CHRONIC ANEMIA: ICD-10-CM

## 2024-06-17 DIAGNOSIS — I10 ESSENTIAL HYPERTENSION: ICD-10-CM

## 2024-06-17 DIAGNOSIS — J45.909 UNCOMPLICATED ASTHMA, UNSPECIFIED ASTHMA SEVERITY, UNSPECIFIED WHETHER PERSISTENT: ICD-10-CM

## 2024-06-17 DIAGNOSIS — J96.01 ACUTE RESPIRATORY FAILURE WITH HYPOXIA (H): Primary | ICD-10-CM

## 2024-06-17 DIAGNOSIS — M48.00 SPINAL STENOSIS, UNSPECIFIED SPINAL REGION: ICD-10-CM

## 2024-06-17 DIAGNOSIS — J44.9 CHRONIC OBSTRUCTIVE PULMONARY DISEASE, UNSPECIFIED COPD TYPE (H): ICD-10-CM

## 2024-06-17 DIAGNOSIS — G47.33 OSA (OBSTRUCTIVE SLEEP APNEA): ICD-10-CM

## 2024-06-17 PROCEDURE — 99306 1ST NF CARE HIGH MDM 50: CPT | Performed by: INTERNAL MEDICINE

## 2024-06-17 NOTE — PATIENT INSTRUCTIONS
Orders for Nicole Reyes (1955), MR# [id number]:    1) BMP on June 20, DX: [facility]    Stefan Verdin MD  [facility]

## 2024-06-17 NOTE — LETTER
6/17/2024      Nicole Reyes  [address]  [place] [id number]        [facility]  INITIAL VISIT NOTE  June 17, 2024    PRIMARY CARE PROVIDER AND CLINIC:  Creagan, Lori Marie [address] [facility] [address]    CHIEF COMPLAINT:  Hospital follow-up/Initial visit    HPI:    Nicole Reyes is a 68 year old  (1955) female who was seen at [facility] on June 17, 2024 for an initial visit.     Medical history is notable for CHF, hypertension, dyslipidemia, DM type II, GERD, hiatal hernia, COPD/asthma, pneumonia, COVID-19 infection, chronic anemia, malignant hyperthermia, skin cancer, RLS, anxiety, depression, spinal stenosis, chronic low back pain, osteoarthritis, morbid obesity, CRISTÓBAL, and recent hospitalization from May 30 through Kelle 3, 2024 for left-sided pneumonia that was treated with piperacillin-tazobactam for possible aspiration and discharged on oral Augmentin.    Summary of hospital course:  Patient was rehospitalized at [facility] from June 5 through June 11, 2024 for acute hypoxia and acute CHF.  Patient originally presented with shortness of breath.  EKG was remarkable for normal sinus rhythm.  CT chest with contrast revealed extensive airspace infiltrates in the left lung, similar to prior study, and no pulmonary emboli.  Echocardiogram demonstrated grade 2 LV diastolic dysfunction, LVEF 45-50%, and mild global hypokinesia of the left ventricle.  Lexiscan stress test was abnormal showing a small area of nontransmural infarction in the basal to mid inferolateral and apical lateral segments of the left ventricle with no significant associated reversible ischemia, hyperdynamic LV function and LVEF greater than 70%.  She was diuresed with IV furosemide.  Carvedilol and spironolactone were added to her cardiac regimen.  Cardiology recommended ischemic workup as well as Zio patch as outpatient.  Notably she completed the course of  Augmentin on June 8. TCU was recommended for rehab.    Patient is admitted to this facility for medical management, nursing care, and subacute rehab.     Of note, history was obtained from patient, facility staff, and extensive review of the chart including hospital admission note, consult notes, and discharge summary.    Today's visit:  Patient was seen in her room, lying in bed.  She appears comfortable.  She feels good today.  She reports no fever, chills, cough, sputum, dyspnea, chest pain, palpitation, nausea, vomiting, abdominal pain, or urinary symptoms.  She had a BM yesterday.      CODE STATUS:   CPR/Full code     PAST MEDICAL HISTORY:   HF with mildly reduced EF (grade 2 LV diastolic dysfunction and LVEF 45-50%, per echo on June 6, 2024 but LVEF more than 70% per Lexiscan on June 7, 2024)  Hypertension  Dyslipidemia  DM type II  GERD  Hiatal hernia  COPD/asthma  Pneumonia   COVID-19 infection with pneumonia in November 2021  Chronic anemia, baseline hemoglobin around 11  Malignant hyperthermia  BCC/SCC of the skin  Melanoma  RLS  Anxiety  Depression  Spinal stenosis  Chronic low back pain  Osteoarthritis  Morbid obesity  CRISTÓBAL; on nocturnal O2 at 2 L/min    Past Medical History:   Diagnosis Date     Arthritis     lt shoulder, rt. knee     Blood transfusion      CKD (chronic kidney disease) stage 3, GFR 30-59 ml/min (H)      Depressive disorder      Essential hypertension, benign      Gastroesophageal reflux disease      Generalized anxiety disorder      Hernia, abdominal      Hiatal hernia      History of blood transfusion 2011    with a hernia repair     Hypertension      Insomnia, unspecified      Iron deficiency anemia 08/2009     Major depression     sees a psychiatrist     Menopause     age 53     Obesity, unspecified      CRISTÓBAL (obstructive sleep apnea)     bipap     Other chronic pain     chronic pain lt arm from tendonidits     Oxygen dependent     2 LPM at home-uses at noc or extended walking      Pneumonia     due to aspiration from hiatal hernia-     Pneumonia due to 2019 novel coronavirus 2021     Postoperative seroma 10/2011    drained several times     Pure hypercholesterolemia      RLS (restless legs syndrome)      Type II or unspecified type diabetes mellitus without mention of complication, not stated as uncontrolled        PAST SURGICAL HISTORY:   Past Surgical History:   Procedure Laterality Date     BREAST BIOPSY, RT/LT      2 times; benign     BREAST SURGERY  ?    Biopsy x 2 Benign      SECTION        SECTION        SECTION       COLONOSCOPY N/A 2020    Procedure: Colonoscopy with biopsy;  Surgeon: Obinna Gutierrez MD;  Location:  OR     DILATION AND CURETTAGE, HYSTEROSCOPY DIAGNOSTIC, COMBINED  2013    Procedure: COMBINED DILATION AND CURETTAGE, HYSTEROSCOPY DIAGNOSTIC;  DILATION AND CURETTAGE, HYSTEROSCOPY DIAGNOSTIC   Converted to a general;  Surgeon: Robi Edwards MD;  Location:  OR     ESOPHAGOSCOPY, GASTROSCOPY, DUODENOSCOPY (EGD), COMBINED N/A 2015    Procedure: COMBINED ESOPHAGOSCOPY, GASTROSCOPY, DUODENOSCOPY (EGD);  Surgeon: Obinna Gutierrez MD;  Location:  GI     ESOPHAGOSCOPY, GASTROSCOPY, DUODENOSCOPY (EGD), COMBINED N/A 2015    Procedure: COMBINED ENDOSCOPIC ULTRASOUND, ESOPHAGOSCOPY, GASTROSCOPY, DUODENOSCOPY (EGD), FINE NEEDLE ASPIRATE/BIOPSY;  Surgeon: Sabine Olivares MD;  Location:  GI     ESOPHAGOSCOPY, GASTROSCOPY, DUODENOSCOPY (EGD), COMBINED N/A 2020    Procedure: ESOPHAGOGASTRODUODENOSCOPY;  Surgeon: Ascencion Stauffer MD;  Location: [facility] OR     HERNIORRHAPHY INCISIONAL (LOCATION)  10/08/2011     incarcerated hernia repair with mesh;     HERNIORRHAPHY INCISIONAL (LOCATION)  10/16/2011     repair incisional hernia with mesh, and removal of current implanted mesh;     ZZC NONSPECIFIC PROCEDURE      Hernia repair      ZZC NONSPECIFIC PROCEDURE      Lymph node biopsy in neck (benign)         FAMILY HISTORY:   Family History   Problem Relation Age of Onset     Cardiovascular Mother         CHF     Hypertension Mother      C.A.D. Mother         50s     Lipids Mother      Coronary Artery Disease Mother      Depression Mother      Anxiety Disorder Mother      Cancer Father         Hodgkin's, Leukemia     Other Cancer Father         Hodgkins     Hypertension Brother      Diabetes Brother      Coronary Artery Disease Brother      Other Cancer Brother         Hodgkins     Cancer Brother         Hodgkin's     Coronary Artery Disease Brother      Hypertension Brother      Anxiety Disorder Brother      C.A.D. Brother 61     C.A.D. Brother      Sleep Apnea Sister      Diabetes Sister      Coronary Artery Disease Sister      Hypertension Sister      Depression Sister      Anxiety Disorder Sister      Obesity Sister      Connective Tissue Disorder Sister         Lupus     Diabetes Sister      Other Cancer Sister         Leukemia     Depression Sister      Anxiety Disorder Sister      Lipids Sister      Hypertension Sister      Hypertension Sister      Hypertension Sister      Basal cell carcinoma Daughter 38        top of head     Mental Illness Maternal Grandfather      Hypertension Daughter      Depression Daughter      Anxiety Disorder Daughter      Obesity Daughter      Obesity Son      Obesity Daughter        SOCIAL HISTORY:  Social History     Tobacco Use     Smoking status: Former     Current packs/day: 0.00     Average packs/day: 1.5 packs/day for 10.0 years (15.0 ttl pk-yrs)     Types: Cigarettes     Start date: 1974     Quit date: 3/18/1979     Years since quittin.2     Smokeless tobacco: Never     Tobacco comments:     quit    Substance Use Topics     Alcohol use: Yes     Comment: occasional       MEDICATIONS:  Current Outpatient Medications   Medication Sig Dispense Refill     carvedilol (COREG) 12.5 MG tablet Take 1 tablet (12.5 mg) by mouth 2 times daily (with meals)       clonazePAM  (KLONOPIN) 1 MG tablet Take 1 tablet (1 mg) by mouth 2 times daily as needed for anxiety 60 to last 30 days 15 tablet 0     cyclobenzaprine (FLEXERIL) 5 MG tablet Take 1-2 tablets (5-10 mg) by mouth 3 times daily as needed for muscle spasms (Patient taking differently: Take 5 mg by mouth 3 times daily as needed for muscle spasms) 20 tablet 0     enoxaparin ANTICOAGULANT (LOVENOX) 40 MG/0.4ML syringe Inject 0.4 mLs (40 mg) Subcutaneous every 12 hours for 7 days DVT prophylaxis       Fish Oil-Krill Oil (KRILL & FISH OIL BLEND PO) Take 1 capsule by mouth daily (Patient not taking: Reported on 6/13/2024)       furosemide (LASIX) 20 MG tablet Take 1 tablet (20 mg) by mouth daily       gabapentin (NEURONTIN) 300 MG capsule 3 capsules every am, 2 capsules midday and 3 caps at night (Patient taking differently: Take 900 mg by mouth 2 times daily 3 capsules every am, 2 capsules midday and 3 caps at night) 240 capsule 3     glipiZIDE (GLUCOTROL XL) 10 MG 24 hr tablet Take 1 tablet (10 mg) by mouth daily 90 tablet 3     ketoconazole (NIZORAL) 2 % external cream Apply to fold areas BID PRN 60 g 5     losartan (COZAAR) 100 MG tablet Take 1 tablet (100 mg) by mouth daily 90 tablet 3     melatonin 5 MG tablet Take 5 mg by mouth nightly as needed for sleep       multivitamin w/minerals (THERA-VIT-M) tablet Take 1 tablet by mouth daily       pantoprazole (PROTONIX) 40 MG EC tablet TAKE 1 TABLET BY MOUTH EVERYDAY AT BEDTIME 90 tablet 3     polyethylene glycol (MIRALAX) 17 g packet Take 17 g by mouth 2 times daily as needed for constipation       potassium chloride ER (K-TAB) 20 MEQ CR tablet Take 40 meq daily 180 tablet 1     rosuvastatin (CRESTOR) 10 MG tablet Take 1 tablet (10 mg) by mouth daily 90 tablet 3     semaglutide (OZEMPIC, 0.25 OR 0.5 MG/DOSE,) 2 MG/3ML pen Inject 0.25 mg Subcutaneous every 7 days 3 mL 0     senna-docusate (SENOKOT-S/PERICOLACE) 8.6-50 MG tablet Take 1 tablet by mouth 2 times daily as needed for  "constipation       sertraline (ZOLOFT) 100 MG tablet Take 2 tablets by mouth once daily 180 tablet 3     spironolactone (ALDACTONE) 25 MG tablet Take 1 tablet (25 mg) by mouth daily       zolpidem (AMBIEN) 10 MG tablet Take 1 tablet (10 mg) by mouth at bedtime 10 tablet 0       MED REC REQUIRED  Post Medication Reconciliation Status: medication reconcilation previously completed during another office visit      ALLERGIES:  Allergies   Allergen Reactions     Metformin GI Disturbance     High dose     Morphine And Codeine Rash     Penicillins Rash     Pt has tolerated cephalosporins and penems.   Pt tolerated Zosyn 7/6/2019       ROS:  10 point ROS were negative other than the symptoms noted above in the HPI.    PHYSICAL EXAM:  Vital signs were reviewed in the chart.  Vital Signs: /65   Pulse 73   Temp 97.7  F (36.5  C)   Resp 18   Ht 1.473 m (4' 10\")   Wt 108.5 kg (239 lb 3.2 oz)   LMP 09/15/2007   SpO2 94%   BMI 49.99 kg/m     General: Comfortable and in no acute distress  HEENT: Mild conjunctival pallor, no scleral icterus or injection, moist oral mucosa  Cardiovascular: Normal S1, S2, RRR; no elevated JVP  Respiratory: Lungs clear to auscultation bilaterally; no wheezing, rhonchi, or crackles  GI: Abdomen soft, non-tender, non-distended, +BS  Extremities: No LE edema  Neuro: CX II-XII grossly intact; ROM in all four extremities grossly intact  Psych: Alert and oriented x3; normal affect  Skin: No acute rash    LABORATORY/IMAGING DATA:  All relevant labs and imaging data in [facility] and/or Care Everywhere were personally reviewed today.    Hematology profile (June 14, 2024): White count 9.6, hemoglobin 11.4, MCV 83, platelets 187,000    Chemistry profile (June 14, 2024): Sodium 140, potassium 4.8, BUN 24.7, creatinine 0.97, calcium 9.7, glucose 160    Most Recent 3 CBC's:  Recent Labs   Lab Test 06/11/24  0658 06/08/24  0528 06/07/24  0528 06/06/24  0614 06/05/24  1246   WBC  --   --  9.0 7.9 8.0   HGB  " --   --  11.2* 9.8* 10.8*   MCV  --   --  82 82 82    245 227 182 203     Most Recent 3 BMP's:  Recent Labs   Lab Test 06/11/24  1253 06/11/24  0849 06/11/24  0658 06/10/24  2049 06/08/24  0835 06/08/24  0528 06/07/24  1140 06/07/24  0528 06/06/24  0910 06/06/24  0614 06/05/24 2012 06/05/24  1246   NA  --   --   --   --   --   --   --  141  --  144  --  144   POTASSIUM  --   --   --   --   --   --   --  3.1*  --  3.5  --  3.6   CHLORIDE  --   --   --   --   --   --   --  100  --  105  --  106   CO2  --   --   --   --   --   --   --  30*  --  28  --  26   BUN  --   --   --   --   --   --   --  10.8  --  6.9*  --  6.1*   CR  --   --  0.81  --   --  0.84  --  0.93  --  0.83   < > 0.71   ANIONGAP  --   --   --   --   --   --   --  11  --  11  --  12   GRECIA  --   --   --   --   --   --   --  8.9  --  8.8  --  8.8   * 126*  --  141*   < >  --    < > 126*   < > 122*   < > 155*    < > = values in this interval not displayed.     Most Recent 2 LFT's:  Recent Labs   Lab Test 01/25/24  1519 11/08/20  1238   AST 15 26   ALT 10 29   ALKPHOS 77 94   BILITOTAL 0.5 0.5         ASSESSMENT/PLAN:  Acute respiratory failure with hypoxia, resolved,  HF with mildly reduced EF (grade 2 LV diastolic dysfunction and LVEF 45-50%, per echo on June 6, 2024).  LVEF 60-65% back in June 2021.  Patient was diuresed with IV furosemide in the hospital.   Spironolactone and carvedilol were added to her cardiac regimen.  Lexiscan stress test on June 7, 2024 was abnormal showing a small area of nontransmural infarction in the basal to mid inferolateral and apical lateral segments of the left ventricle with no significant associated reversible ischemia, and hyperdynamic LV function with LVEF greater than 70%  Patient currently weighs 237.8 LBS and has no elevated JVP or peripheral edema.  Plan:  Continue furosemide 20 mg daily  Continue spironolactone 25 mg daily  Continue carvedilol 12.5 mg twice daily  Continue losartan 100 mg daily  CT  coronary angio and Zio patch as outpatient to rule out significant CAD as well as tachyarrhythmia respectively as the cause of cardiomyopathy)   Monitor weight and volume status  Follow-up with cardiology as directed    Hypokalemia.  Due to diuretic therapy.  Corrected.  Plan:  Continue potassium chloride 40 mEq daily  Repeat BMP on June 20     Recent history of left-sided pneumonia.  Completed the course of Augmentin on June 8.  Now stable from a respiratory standpoint.  Plan:  Monitor respiratory status    Hypertension.  Cardiac regimen was modified in the hospital (PTA diltiazem was discontinued, spironolactone and carvedilol were added).  Blood pressure is fairly controlled.   Plan:  Continue carvedilol 12.5 mg twice daily  Continue losartan 100 mg daily  Continue furosemide 20 mg daily  Continue spironolactone 25 mg daily  Monitor blood pressure    Dyslipidemia.  Plan:  Continue rosuvastatin 10 mg daily    DM type II.  Hemoglobin A1c 6.9% on May 30, 2024.  Blood glucose levels in the range of .  Plan:  Continue diabetic diet  Continue glipizide XL 10 mg daily  Continue semaglutide 0.5 mg subcu every 7 days   Monitor blood glucose daily    GERD,  Hiatal hernia.  Plan:  Continue pantoprazole 40 mg daily    COPD/asthma.  Not on inhalers.  Stable.  Plan:  Monitor respiratory status    Chronic anemia.  Baseline hemoglobin around 11.  Last hemoglobin 11.4 on June 14, 2024.  Plan:  Monitor hemoglobin periodically    Anxiety,  Depression,  Insomnia.  Plan:  Continue sertraline 200 mg daily  Continue clonazepam 1 mg twice daily as needed for anxiety  Continue zolpidem 10 mg at bedtime  Continue melatonin 5 mg nightly as needed  Monitor symptoms    Spinal stenosis,  Chronic low back pain.  Plan:  Continue gabapentin 900 mg in a.m., 600 mg at lunch, and 900 mg at night  Continue cyclobenzaprine 5 mg 3 times daily as needed for muscle spasms    Morbid obesity,  CRISTÓBAL.  BMI 50.58.  Noncompliant with CPAP due to feeling  claustrophobic.  On nocturnal O2 at 2 L/min.  Plan:  Encourage weight loss  Continue nocturnal O2  Monitor respiratory status    DVT prophylaxis.  Discharged on 7-day course of enoxaparin.  Plan:  Continue enoxaparin 40 mg SQ twice daily through June 18, 2024    Physical deconditioning.  Plan:  Continue PT/OT evaluation and therapy       Orders written by provider at facility:  [facility] on June 20, DX: CHF        Total time: 65 minutes including face to face time with the patient, reviewing the notes, labs, and imaging in [facility] and [facility], adjusting medications, ordering new lab, communicating the plan of care with NP, and documenting all information electronically.     Disclaimer: This note contains text created using speech-recognition software and may have unintended word substitutions.      Electronically signed by:  Stefan Verdin MD                       Sincerely,        Stefan Verdin MD

## 2024-06-18 ENCOUNTER — TELEPHONE (OUTPATIENT)
Dept: CARDIOLOGY | Facility: CLINIC | Age: 69
End: 2024-06-18
Payer: MEDICARE

## 2024-06-18 DIAGNOSIS — I49.3 PVC'S (PREMATURE VENTRICULAR CONTRACTIONS): Primary | ICD-10-CM

## 2024-06-18 NOTE — TELEPHONE ENCOUNTER
Elizabeth Weaver PA-C   to [facility] Heart Nursing Team   AS    6/18/24  2:24 PM   Hi team,    Can we get her a 3 day Zio mailed to her TCU?    Thanks,  Elizabeth KELLER. JAMILAH Weaver 6/18/2024 2:24 PM      Order placed for 3 day zio patch per Elizabeth. Order released, and call made to [facility]. Zio patch will be mailed to TCU.

## 2024-06-18 NOTE — TELEPHONE ENCOUNTER
[facility] with acute hypoxic respiratory failure likely due to congestive heart failure.    PMH: hx of PVC's, type 2 diabetes, hypertension, hypercholesterolemia, chronic kidney disease stage III, obesity, obstructive sleep apnea, GERD, anxiety, depression, spinal stenosis, chronic respiratory failure on supplemental oxygen at 2 L/min at night and with walking, and restless leg syndrome. Family history of CAD - mom had MI in her early 50's and again in her 70's. 2 brothers have stents, remote history of smoking, quit in 1978.     6/6/24: Echo showed EF of 45-50%, global HK. LV normal thickness. There is mild to moderate ASUNCION. No sig valve disease.      Susan 6/7/24:  small area of nontransmural infarction in the basal to mid inferolateral and apical lateral segment(s) of the left ventricle. No signficant associated reversible ischemia.    IV Lasix diuresed.    PVC's and palpitations have increased. Pt will send home with (will be mailed out) Zio to get PVC burden and assess for other possible arrhythmias.     Pt was started on Coreg, Lovenox and Aldactone. PTA Lasix was changed from PRN to daily. Diltiazem was discontinued at time of discharge.    Pt is scheduled on 7/18/24 for a CTA at 1300, scheduled for labs on 8/21/24 at 1230, followed with an OV with Dr. Ramos at 1310 both at our [place] Office. No order has been placed for Zio Patch monitor. Will route this note to JOHAN [facility] Weaver for further review. Will then call TCU.    Pt was discharged to [facility]. HARRY Uriostegui RN.

## 2024-06-18 NOTE — TELEPHONE ENCOUNTER
Contacted TCU to review. No answer. Left detailed message with upcoming appointments, and also information about zio patch monitor being mailed to facility for placement. Provided return phone number to call back with any questions.

## 2024-06-19 ENCOUNTER — LAB REQUISITION (OUTPATIENT)
Dept: LAB | Facility: CLINIC | Age: 69
End: 2024-06-19

## 2024-06-19 DIAGNOSIS — I50.42 CHRONIC COMBINED SYSTOLIC (CONGESTIVE) AND DIASTOLIC (CONGESTIVE) HEART FAILURE (H): ICD-10-CM

## 2024-06-20 ENCOUNTER — DISCHARGE SUMMARY NURSING HOME (OUTPATIENT)
Dept: GERIATRICS | Facility: CLINIC | Age: 69
End: 2024-06-20
Payer: MEDICARE

## 2024-06-20 VITALS
WEIGHT: 238 LBS | HEART RATE: 71 BPM | HEIGHT: 58 IN | SYSTOLIC BLOOD PRESSURE: 108 MMHG | RESPIRATION RATE: 18 BRPM | OXYGEN SATURATION: 92 % | TEMPERATURE: 98.1 F | BODY MASS INDEX: 49.96 KG/M2 | DIASTOLIC BLOOD PRESSURE: 66 MMHG

## 2024-06-20 DIAGNOSIS — K21.9 GASTROESOPHAGEAL REFLUX DISEASE, UNSPECIFIED WHETHER ESOPHAGITIS PRESENT: ICD-10-CM

## 2024-06-20 DIAGNOSIS — F41.9 ANXIETY: ICD-10-CM

## 2024-06-20 DIAGNOSIS — G89.29 CHRONIC LOW BACK PAIN, UNSPECIFIED BACK PAIN LATERALITY, UNSPECIFIED WHETHER SCIATICA PRESENT: ICD-10-CM

## 2024-06-20 DIAGNOSIS — E11.69 TYPE 2 DIABETES MELLITUS WITH OTHER SPECIFIED COMPLICATION, WITHOUT LONG-TERM CURRENT USE OF INSULIN (H): ICD-10-CM

## 2024-06-20 DIAGNOSIS — M54.50 CHRONIC LOW BACK PAIN, UNSPECIFIED BACK PAIN LATERALITY, UNSPECIFIED WHETHER SCIATICA PRESENT: ICD-10-CM

## 2024-06-20 DIAGNOSIS — M48.00 SPINAL STENOSIS, UNSPECIFIED SPINAL REGION: ICD-10-CM

## 2024-06-20 DIAGNOSIS — E66.01 MORBID OBESITY (H): ICD-10-CM

## 2024-06-20 DIAGNOSIS — F32.A DEPRESSION, UNSPECIFIED DEPRESSION TYPE: ICD-10-CM

## 2024-06-20 DIAGNOSIS — E78.5 DYSLIPIDEMIA: ICD-10-CM

## 2024-06-20 DIAGNOSIS — Z87.01 HISTORY OF RECENT PNEUMONIA: ICD-10-CM

## 2024-06-20 DIAGNOSIS — I10 ESSENTIAL HYPERTENSION: ICD-10-CM

## 2024-06-20 DIAGNOSIS — G47.00 INSOMNIA, UNSPECIFIED TYPE: ICD-10-CM

## 2024-06-20 DIAGNOSIS — G47.33 OSA (OBSTRUCTIVE SLEEP APNEA): ICD-10-CM

## 2024-06-20 DIAGNOSIS — E87.6 HYPOKALEMIA: ICD-10-CM

## 2024-06-20 DIAGNOSIS — J45.909 UNCOMPLICATED ASTHMA, UNSPECIFIED ASTHMA SEVERITY, UNSPECIFIED WHETHER PERSISTENT: ICD-10-CM

## 2024-06-20 DIAGNOSIS — I50.9 CONGESTIVE HEART FAILURE, UNSPECIFIED HF CHRONICITY, UNSPECIFIED HEART FAILURE TYPE (H): Primary | ICD-10-CM

## 2024-06-20 DIAGNOSIS — D64.9 CHRONIC ANEMIA: ICD-10-CM

## 2024-06-20 DIAGNOSIS — R53.81 PHYSICAL DECONDITIONING: ICD-10-CM

## 2024-06-20 DIAGNOSIS — J44.9 CHRONIC OBSTRUCTIVE PULMONARY DISEASE, UNSPECIFIED COPD TYPE (H): ICD-10-CM

## 2024-06-20 LAB
ANION GAP SERPL CALCULATED.3IONS-SCNC: 13 MMOL/L (ref 7–15)
BUN SERPL-MCNC: 24.2 MG/DL (ref 8–23)
CALCIUM SERPL-MCNC: 9.2 MG/DL (ref 8.8–10.2)
CHLORIDE SERPL-SCNC: 102 MMOL/L (ref 98–107)
CREAT SERPL-MCNC: 0.88 MG/DL (ref 0.51–0.95)
DEPRECATED HCO3 PLAS-SCNC: 24 MMOL/L (ref 22–29)
EGFRCR SERPLBLD CKD-EPI 2021: 71 ML/MIN/1.73M2
GLUCOSE SERPL-MCNC: 121 MG/DL (ref 70–99)
POTASSIUM SERPL-SCNC: 4.4 MMOL/L (ref 3.4–5.3)
SODIUM SERPL-SCNC: 139 MMOL/L (ref 135–145)

## 2024-06-20 PROCEDURE — 80048 BASIC METABOLIC PNL TOTAL CA: CPT | Performed by: NURSE PRACTITIONER

## 2024-06-20 PROCEDURE — 99316 NF DSCHRG MGMT 30 MIN+: CPT | Performed by: NURSE PRACTITIONER

## 2024-06-20 PROCEDURE — P9604 ONE-WAY ALLOW PRORATED TRIP: HCPCS | Performed by: NURSE PRACTITIONER

## 2024-06-20 PROCEDURE — 36415 COLL VENOUS BLD VENIPUNCTURE: CPT | Performed by: NURSE PRACTITIONER

## 2024-06-20 RX ORDER — ZOLPIDEM TARTRATE 10 MG/1
10 TABLET ORAL AT BEDTIME
Qty: 7 TABLET | Refills: 0 | Status: SHIPPED | OUTPATIENT
Start: 2024-06-20 | End: 2024-06-27

## 2024-06-20 RX ORDER — CYCLOBENZAPRINE HCL 5 MG
5 TABLET ORAL 3 TIMES DAILY PRN
Status: SHIPPED
Start: 2024-06-20

## 2024-06-20 RX ORDER — GABAPENTIN 300 MG/1
CAPSULE ORAL
Status: SHIPPED
Start: 2024-06-20 | End: 2024-08-14

## 2024-06-20 NOTE — LETTER
" 6/20/2024      Nicole Reyes  [address]  [place] [address]        [facility] GERIATRICS DISCHARGE SUMMARY  PATIENT'S NAME: iNcole Reyes  YOB: 1955  MEDICAL RECORD NUMBER:  [id number]  Place of Service where encounter took place:  [facility]  ([facility]) [[id number]]    PRIMARY CARE PROVIDER AND CLINIC RESPONSIBLE AFTER TRANSFER:   LEONCIO Casillas CNP, [address] / [place] [address]     Transferring providers: LEONCIO Tapia CNP, Stefan Verdin MD  Recent Hospitalization/ED:  Hospital  [facility] stay 6/5/24 to 6/11/24.  Date of SNF Admission: June 11, 2024  Date of SNF (anticipated) Discharge: June 22, 2024  Discharged to: previous independent home  Cognitive Scores: SLUMS 29/30  Physical Function: Standby assist for transfers and bed mobility, ambulating 150 feet with 4WW, moderately independent for eating and grooming/hygiene and upper and lower body dressing, toileting and shower      CODE STATUS/ADVANCE DIRECTIVES DISCUSSION:  Full Code   ALLERGIES: Metformin, Morphine and codeine, and Penicillins    NURSING FACILITY COURSE     Per previous provider, Stefan Verdin MD \"Medical history is notable for CHF, hypertension, dyslipidemia, DM type II, GERD, hiatal hernia, COPD/asthma, pneumonia, COVID-19 infection, chronic anemia, malignant hyperthermia, skin cancer, RLS, anxiety, depression, spinal stenosis, chronic low back pain, osteoarthritis, morbid obesity, CRISTÓBAL, and recent hospitalization from May 30 through Kelle 3, 2024 for left-sided pneumonia that was treated with piperacillin-tazobactam for possible aspiration and discharged on oral Augmentin.     Summary of hospital course:  Patient was rehospitalized at [facility] from June 5 through June 11, 2024 for acute hypoxia and acute CHF.  Patient originally presented with shortness of breath.  EKG was remarkable for normal sinus rhythm.  " "CT chest with contrast revealed extensive airspace infiltrates in the left lung, similar to prior study, and no pulmonary emboli.  Echocardiogram demonstrated grade 2 LV diastolic dysfunction, LVEF 45-50%, and mild global hypokinesia of the left ventricle.  Lexiscan stress test was abnormal showing a small area of nontransmural infarction in the basal to mid inferolateral and apical lateral segments of the left ventricle with no significant associated reversible ischemia, hyperdynamic LV function and LVEF greater than 70%.  She was diuresed with IV furosemide.  Carvedilol and spironolactone were added to her cardiac regimen.  Cardiology recommended ischemic workup as well as Zio patch as outpatient.  Notably she completed the course of Augmentin on June 8. TCU was recommended for rehab.\"    Summary of nursing facility stay:   Patient was seen today for discharge evaluation in the TCU.  She denies any shortness of breath, chest pain, dizziness/lightheadedness.  She reports that her bowels are moving regularly.  She has no leg swelling.  She tells me that she has not needed any oxygen for about 2 days.  She is looking forward to discharging back home.  We discussed recommended follow-up as below, As well as home care.    Specific problems addressed in the TCU:  Acute respiratory failure with hypoxia, resolved  Heart failure with mildly reduced EF, grade 2 LV diastolic dysfunction and LVEF 45 to 50% per echo on 6/6/2024  Essential hypertension  Lexiscan stress test on 6/7 was abnormal showing small area of nontransmural infarction in the basal to mid inferior lateral and apical lateral segments of the left ventricle with no significant associated reversible ischemia, hyperdynamic LV function with LVEF greater than 70%  Treated with diuretics inpatient  Started on spironolactone and carvedilol inpatient  Patient has no swelling, or shortness of breath, weight 237.6 lb today- trending down, SBP soft at times -denies " dizziness/lightheadedness  recently  Plan: Continue furosemide 20 mg daily, spironolactone 25 mg daily, carvedilol 12.5 mg twice daily, losartan 100 mg daily. Daily weights. Notify provider with weight gain >2 lbs in a day, >5 lbs in on week.  Follow-up with cardiology per recommendations.  CT coronary angio and Zio patch to be completed outpatient.    Hypokalemia  Secondary to diuretics, replaced inpatient  Potassium 4.4 on 6/20/2024  Plan: BMP PRN.     History of left-sided pneumonia  Completed course of Augmentin on 6/8  Respiratory status remained stable   plan: Monitor    Dyslipidemia  Plan: Continue rosuvastatin 10 mg daily    Type 2 diabetes  Hemoglobin A1c 6.9% on 5/30/2024  BS  since admission  Plan: Continue semaglutide injection every 7 days (of note did not receive here), glipizide XL 10 mg daily.  Increase BS checks to TID once restarting semaglutide- since BS are already controlled- discussed with patient today.  Carb controlled diet.    COPD  Asthma  Respiratory status is stable  Plan: Patient not on medical management.  Monitor respiratory status.    Chronic anemia  Baseline hemoglobin 11  Hemoglobin 11.4 on 6/14/2024  Plan: Monitor CBC as needed.    GERD  Plan: Continue pantoprazole 40 mg daily    Spinal stenosis  Chronic low back pain  Plan: Continue gabapentin 900 mg in AM, 600 mg at lunch, 900 mg at bedtime and cyclobenzaprine 5 mg 3 times daily as needed.  Monitor pain.    Anxiety  Depression  Insomnia  Plan: Continue sertraline 200 mg daily, clonazepam 1 mg twice daily as needed, zolpidem 10 mg at bedtime and melatonin 5 mg at bedtime as needed.  Monitor mood and symptoms.  Follow-up with PCP.    Morbid obesity, BMI 49.74  CRISTÓBAL  Complicates care, patient is noncompliant with CPAP  Plan: Encourage weight loss.  Monitor oxygen saturations as needed while sleeping.    DVT prophylaxis, completed  Completed 7-day course of enoxaparin on 6/18/2024    Physical deconditioning  Completed  course of therapy in the TCU  Plan: Continue therapy through home care        Discharge Medications:  MED REC REQUIRED  Post Medication Reconciliation Status:  Discharge medications reconciled and changed, see notes/orders     Current Outpatient Medications   Medication Sig Dispense Refill     carvedilol (COREG) 12.5 MG tablet Take 1 tablet (12.5 mg) by mouth 2 times daily (with meals)       clonazePAM (KLONOPIN) 1 MG tablet Take 1 tablet (1 mg) by mouth 2 times daily as needed for anxiety 60 to last 30 days 15 tablet 0     cyclobenzaprine (FLEXERIL) 5 MG tablet Take 1 tablet (5 mg) by mouth 3 times daily as needed for muscle spasms       Fish Oil-Krill Oil (KRILL & FISH OIL BLEND PO) Take 1 capsule by mouth daily       gabapentin (NEURONTIN) 300 MG capsule 3 capsules every am, 2 capsules midday and 3 caps at night       glipiZIDE (GLUCOTROL XL) 10 MG 24 hr tablet Take 1 tablet (10 mg) by mouth daily 90 tablet 3     ketoconazole (NIZORAL) 2 % external cream Apply to fold areas BID PRN 60 g 5     loperamide (IMODIUM A-D) 2 MG tablet Take 2 mg by mouth 4 times daily as needed for diarrhea       losartan (COZAAR) 100 MG tablet Take 1 tablet (100 mg) by mouth daily 90 tablet 3     melatonin 5 MG tablet Take 5 mg by mouth nightly as needed for sleep       multivitamin w/minerals (THERA-VIT-M) tablet Take 1 tablet by mouth daily       pantoprazole (PROTONIX) 40 MG EC tablet TAKE 1 TABLET BY MOUTH EVERYDAY AT BEDTIME 90 tablet 3     polyethylene glycol (MIRALAX) 17 g packet Take 17 g by mouth 2 times daily as needed for constipation       potassium chloride ER (K-TAB) 20 MEQ CR tablet Take 40 meq daily 180 tablet 1     rosuvastatin (CRESTOR) 10 MG tablet Take 1 tablet (10 mg) by mouth daily 90 tablet 3     semaglutide (OZEMPIC, 0.25 OR 0.5 MG/DOSE,) 2 MG/3ML pen Inject 0.25 mg Subcutaneous every 7 days 3 mL 0     senna-docusate (SENOKOT-S/PERICOLACE) 8.6-50 MG tablet Take 1 tablet by mouth 2 times daily as needed for  "constipation       sertraline (ZOLOFT) 100 MG tablet Take 2 tablets by mouth once daily 180 tablet 3     spironolactone (ALDACTONE) 25 MG tablet Take 1 tablet (25 mg) by mouth daily       zolpidem (AMBIEN) 10 MG tablet Take 1 tablet (10 mg) by mouth at bedtime 7 tablet 0     furosemide (LASIX) 20 MG tablet Take 1 tablet (20 mg) by mouth daily        Controlled medications:   Medication: zolpidem , 5 tabs given to patient at the time of discharge to take home     Past Medical History:   Past Medical History:   Diagnosis Date     Arthritis     lt shoulder, rt. knee     Blood transfusion      CKD (chronic kidney disease) stage 3, GFR 30-59 ml/min (H)      Depressive disorder      Essential hypertension, benign      Gastroesophageal reflux disease      Generalized anxiety disorder      Hernia, abdominal      Hiatal hernia      History of blood transfusion 2011    with a hernia repair     Hypertension      Insomnia, unspecified      Iron deficiency anemia 08/2009     Major depression     sees a psychiatrist     Menopause     age 53     Obesity, unspecified      CRISTÓBAL (obstructive sleep apnea)     bipap     Other chronic pain     chronic pain lt arm from tendonidits     Oxygen dependent     2 LPM at home-uses at noc or extended walking     Pneumonia     due to aspiration from hiatal hernia-11/8     Pneumonia due to 2019 novel coronavirus 11/08/2021     Postoperative seroma 10/2011    drained several times     Pure hypercholesterolemia      RLS (restless legs syndrome)      Type II or unspecified type diabetes mellitus without mention of complication, not stated as uncontrolled      Physical Exam:   Vitals: /66   Pulse 71   Temp 98.1  F (36.7  C)   Resp 18   Ht 1.473 m (4' 10\")   Wt 108 kg (238 lb)   LMP 09/15/2007   SpO2 92%   BMI 49.74 kg/m    BMI: Body mass index is 49.74 kg/m .  GENERAL APPEARANCE:  Alert, in NAD  HEENT: normocephalic, moist mucous membranes, nose without drainage or crusting  RESP:  " respiratory effort normal, no respiratory distress, Lung sounds clear, patient is on RA  CV: auscultation of heart done, rate and rhythm regular.   ABDOMEN: obese, + bowel sounds, soft, nontender, no grimacing or guarding with palpation.  M/S:  no lower extremity edema  PSYCH: affect and mood normal      SNF labs: Labs done in SNF are in [facility]. Please refer to them using [facility]/Care Everywhere. and Recent labs in [facility] reviewed by me today.     DISCHARGE PLAN:  Labs: [facility] 6/27 with results to PCP  Medical Follow Up:     Follow up with primary care provider in 1 week   Current [place] scheduled appointments:  Appointments in Next Year      Jul 18, 2024 1:00 PM  CTA ANGIOGRAM CORONARY ARTERY WITH NURSE with SCICT1, SH CV CT NURSE  [facility] ([facility] ) [phone]     Aug 21, 2024 12:30 PM  LAB with RU LAB  [facility] ([facility] ) [phone]     Aug 21, 2024 1:15 PM  (Arrive by 1:10 PM)  Return Cardiology with Nakul Ramos MD  [facility] ([facility] ) [phone]     Discharge Services: Home Care:  Occupational Therapy, Physical Therapy, Registered Nurse, Home Health Aide, and From:  [facility]  Discharge Instructions:   Continue to follow your diet:  Carbohydrate Controlled Diet, No added salt diet  Daily weights: call provider for weight gain of more than 2 pounds per day or 5 pounds per week.   Increase BS checks to TID once restarting semaglutide- since BS are already controlled. Keep a log and bring with to PCP office ar follow up   Check BP once daily and keep a record. Bring with to follow up with PCP    TOTAL DISCHARGE TIME:   Greater than 30 minutes  Electronically signed by:  LEONCIO Tapia CNP     Home care Face to Face documentation done in [facility] attached to Home care  orders for [facility].               Sincerely,        LEONCIO Tapia CNP

## 2024-06-20 NOTE — PATIENT INSTRUCTIONS
[facility] Discharge Orders    Name: Nicole Reyes  : 1955  Planned Discharge Date: 2024  Discharged to: home    MEDICAL FOLLOW UP  Follow up with primary care provider in 1 week   [facility] scheduled appointments:  Appointments in Next Year      2024 1:00 PM  CTA ANGIOGRAM CORONARY ARTERY WITH NURSE with SCICT1, SH CV CT NURSE  [facility] ([facility] ) [phone]     Aug 21, 2024 12:30 PM  LAB with RU LAB  [facility] ([facility] ) [phone]     Aug 21, 2024 1:15 PM  (Arrive by 1:10 PM)  Return Cardiology with Nakul Ramos MD  [facility] ([facility] ) [phone]       FUTURE LABS: [facility]  with results to PCP    DISCHARGE MEDICATIONS:  The patient s pharmacy is authorized to dispense a 30-day supply of medications. Refill requests should be directed to the primary provider, Karla Hurst  Medication: zolpidem , 5 tabs given to patient at the time of discharge to take home   Current Outpatient Medications   Medication Sig Dispense Refill    carvedilol (COREG) 12.5 MG tablet Take 1 tablet (12.5 mg) by mouth 2 times daily (with meals)      clonazePAM (KLONOPIN) 1 MG tablet Take 1 tablet (1 mg) by mouth 2 times daily as needed for anxiety 60 to last 30 days 15 tablet 0    cyclobenzaprine (FLEXERIL) 5 MG tablet Take 1 tablet (5 mg) by mouth 3 times daily as needed for muscle spasms      Fish Oil-Krill Oil (KRILL & FISH OIL BLEND PO) Take 1 capsule by mouth daily      gabapentin (NEURONTIN) 300 MG capsule 3 capsules every am, 2 capsules midday and 3 caps at night      glipiZIDE (GLUCOTROL XL) 10 MG 24 hr tablet Take 1 tablet (10 mg) by mouth daily 90 tablet 3    ketoconazole (NIZORAL) 2 % external cream Apply to fold areas BID PRN 60 g 5    loperamide  (IMODIUM A-D) 2 MG tablet Take 2 mg by mouth 4 times daily as needed for diarrhea      losartan (COZAAR) 100 MG tablet Take 1 tablet (100 mg) by mouth daily 90 tablet 3    melatonin 5 MG tablet Take 5 mg by mouth nightly as needed for sleep      multivitamin w/minerals (THERA-VIT-M) tablet Take 1 tablet by mouth daily      pantoprazole (PROTONIX) 40 MG EC tablet TAKE 1 TABLET BY MOUTH EVERYDAY AT BEDTIME 90 tablet 3    polyethylene glycol (MIRALAX) 17 g packet Take 17 g by mouth 2 times daily as needed for constipation      potassium chloride ER (K-TAB) 20 MEQ CR tablet Take 40 meq daily 180 tablet 1    rosuvastatin (CRESTOR) 10 MG tablet Take 1 tablet (10 mg) by mouth daily 90 tablet 3    semaglutide (OZEMPIC, 0.25 OR 0.5 MG/DOSE,) 2 MG/3ML pen Inject 0.25 mg Subcutaneous every 7 days 3 mL 0    senna-docusate (SENOKOT-S/PERICOLACE) 8.6-50 MG tablet Take 1 tablet by mouth 2 times daily as needed for constipation      sertraline (ZOLOFT) 100 MG tablet Take 2 tablets by mouth once daily 180 tablet 3    spironolactone (ALDACTONE) 25 MG tablet Take 1 tablet (25 mg) by mouth daily      zolpidem (AMBIEN) 10 MG tablet Take 1 tablet (10 mg) by mouth at bedtime 7 tablet 0    furosemide (LASIX) 20 MG tablet Take 1 tablet (20 mg) by mouth daily         SERVICES:  Home Care:  Occupational Therapy, Physical Therapy, Registered Nurse, Home Health Aide, and From:  [facility]    ADDITIONAL INSTRUCTIONS:  Continue to follow your diet:  Carbohydrate Controlled Diet, No added salt diet  Daily weights: call provider for weight gain of more than 2 pounds per day or 5 pounds per week.   Increase Blood sugar checks to TID once restarting semaglutide- since BS are already controlled. Keep a log and bring with to PCP office ar follow up   Check BP once daily and keep a record. Bring with to follow up with PCP    LEONCIO Tapia CNP  This document was electronically signed on June 20, 2024

## 2024-06-20 NOTE — PROGRESS NOTES
"[facility] GERIATRICS DISCHARGE SUMMARY  PATIENT'S NAME: Nicole Reyes  YOB: 1955  MEDICAL RECORD NUMBER:  [id number]  Place of Service where encounter took place:  [facility]) [[id number]]    PRIMARY CARE PROVIDER AND CLINIC RESPONSIBLE AFTER TRANSFER:   LEONCIO Casillas CNP, [address] [facility] [address]     Transferring providers: LEONCIO Tapia CNP, Stefan Verdin MD  Recent Hospitalization/ED:  Hospital  [facility] stay 6/5/24 to 6/11/24.  Date of SNF Admission: June 11, 2024  Date of SNF (anticipated) Discharge: June 22, 2024  Discharged to: previous independent home  Cognitive Scores: SLUMS 29/30  Physical Function: Standby assist for transfers and bed mobility, ambulating 150 feet with 4WW, moderately independent for eating and grooming/hygiene and upper and lower body dressing, toileting and shower      CODE STATUS/ADVANCE DIRECTIVES DISCUSSION:  Full Code   ALLERGIES: Metformin, Morphine and codeine, and Penicillins    NURSING FACILITY COURSE     Per previous provider, Stefan Verdin MD \"Medical history is notable for CHF, hypertension, dyslipidemia, DM type II, GERD, hiatal hernia, COPD/asthma, pneumonia, COVID-19 infection, chronic anemia, malignant hyperthermia, skin cancer, RLS, anxiety, depression, spinal stenosis, chronic low back pain, osteoarthritis, morbid obesity, CRISTÓBAL, and recent hospitalization from May 30 through Kelle 3, 2024 for left-sided pneumonia that was treated with piperacillin-tazobactam for possible aspiration and discharged on oral Augmentin.     Summary of hospital course:  Patient was rehospitalized at [facility] from June 5 through June 11, 2024 for acute hypoxia and acute CHF.  Patient originally presented with shortness of breath.  EKG was remarkable for normal sinus rhythm.  CT chest with contrast revealed extensive airspace infiltrates in the left lung, " "similar to prior study, and no pulmonary emboli.  Echocardiogram demonstrated grade 2 LV diastolic dysfunction, LVEF 45-50%, and mild global hypokinesia of the left ventricle.  Lexiscan stress test was abnormal showing a small area of nontransmural infarction in the basal to mid inferolateral and apical lateral segments of the left ventricle with no significant associated reversible ischemia, hyperdynamic LV function and LVEF greater than 70%.  She was diuresed with IV furosemide.  Carvedilol and spironolactone were added to her cardiac regimen.  Cardiology recommended ischemic workup as well as Zio patch as outpatient.  Notably she completed the course of Augmentin on June 8. TCU was recommended for rehab.\"    Summary of nursing facility stay:   Patient was seen today for discharge evaluation in the TCU.  She denies any shortness of breath, chest pain, dizziness/lightheadedness.  She reports that her bowels are moving regularly.  She has no leg swelling.  She tells me that she has not needed any oxygen for about 2 days.  She is looking forward to discharging back home.  We discussed recommended follow-up as below, As well as home care.    Specific problems addressed in the TCU:  Acute respiratory failure with hypoxia, resolved  Heart failure with mildly reduced EF, grade 2 LV diastolic dysfunction and LVEF 45 to 50% per echo on 6/6/2024  Essential hypertension  Lexiscan stress test on 6/7 was abnormal showing small area of nontransmural infarction in the basal to mid inferior lateral and apical lateral segments of the left ventricle with no significant associated reversible ischemia, hyperdynamic LV function with LVEF greater than 70%  Treated with diuretics inpatient  Started on spironolactone and carvedilol inpatient  Patient has no swelling, or shortness of breath, weight 237.6 lb today- trending down, SBP soft at times -denies dizziness/lightheadedness  recently  Plan: Continue furosemide 20 mg daily, " spironolactone 25 mg daily, carvedilol 12.5 mg twice daily, losartan 100 mg daily. Daily weights. Notify provider with weight gain >2 lbs in a day, >5 lbs in on week.  Follow-up with cardiology per recommendations.  CT coronary angio and Zio patch to be completed outpatient.    Hypokalemia  Secondary to diuretics, replaced inpatient  Potassium 4.4 on 6/20/2024  Plan: BMP PRN.     History of left-sided pneumonia  Completed course of Augmentin on 6/8  Respiratory status remained stable   plan: Monitor    Dyslipidemia  Plan: Continue rosuvastatin 10 mg daily    Type 2 diabetes  Hemoglobin A1c 6.9% on 5/30/2024  BS  since admission  Plan: Continue semaglutide injection every 7 days (of note did not receive here), glipizide XL 10 mg daily.  Increase BS checks to TID once restarting semaglutide- since BS are already controlled- discussed with patient today.  Carb controlled diet.    COPD  Asthma  Respiratory status is stable  Plan: Patient not on medical management.  Monitor respiratory status.    Chronic anemia  Baseline hemoglobin 11  Hemoglobin 11.4 on 6/14/2024  Plan: Monitor CBC as needed.    GERD  Plan: Continue pantoprazole 40 mg daily    Spinal stenosis  Chronic low back pain  Plan: Continue gabapentin 900 mg in AM, 600 mg at lunch, 900 mg at bedtime and cyclobenzaprine 5 mg 3 times daily as needed.  Monitor pain.    Anxiety  Depression  Insomnia  Plan: Continue sertraline 200 mg daily, clonazepam 1 mg twice daily as needed, zolpidem 10 mg at bedtime and melatonin 5 mg at bedtime as needed.  Monitor mood and symptoms.  Follow-up with PCP.    Morbid obesity, BMI 49.74  CRISTÓBAL  Complicates care, patient is noncompliant with CPAP  Plan: Encourage weight loss.  Monitor oxygen saturations as needed while sleeping.    DVT prophylaxis, completed  Completed 7-day course of enoxaparin on 6/18/2024    Physical deconditioning  Completed course of therapy in the TCU  Plan: Continue therapy through home  care        Discharge Medications:  MED REC REQUIRED  Post Medication Reconciliation Status:  Discharge medications reconciled and changed, see notes/orders     Current Outpatient Medications   Medication Sig Dispense Refill    carvedilol (COREG) 12.5 MG tablet Take 1 tablet (12.5 mg) by mouth 2 times daily (with meals)      clonazePAM (KLONOPIN) 1 MG tablet Take 1 tablet (1 mg) by mouth 2 times daily as needed for anxiety 60 to last 30 days 15 tablet 0    cyclobenzaprine (FLEXERIL) 5 MG tablet Take 1 tablet (5 mg) by mouth 3 times daily as needed for muscle spasms      Fish Oil-Krill Oil (KRILL & FISH OIL BLEND PO) Take 1 capsule by mouth daily      gabapentin (NEURONTIN) 300 MG capsule 3 capsules every am, 2 capsules midday and 3 caps at night      glipiZIDE (GLUCOTROL XL) 10 MG 24 hr tablet Take 1 tablet (10 mg) by mouth daily 90 tablet 3    ketoconazole (NIZORAL) 2 % external cream Apply to fold areas BID PRN 60 g 5    loperamide (IMODIUM A-D) 2 MG tablet Take 2 mg by mouth 4 times daily as needed for diarrhea      losartan (COZAAR) 100 MG tablet Take 1 tablet (100 mg) by mouth daily 90 tablet 3    melatonin 5 MG tablet Take 5 mg by mouth nightly as needed for sleep      multivitamin w/minerals (THERA-VIT-M) tablet Take 1 tablet by mouth daily      pantoprazole (PROTONIX) 40 MG EC tablet TAKE 1 TABLET BY MOUTH EVERYDAY AT BEDTIME 90 tablet 3    polyethylene glycol (MIRALAX) 17 g packet Take 17 g by mouth 2 times daily as needed for constipation      potassium chloride ER (K-TAB) 20 MEQ CR tablet Take 40 meq daily 180 tablet 1    rosuvastatin (CRESTOR) 10 MG tablet Take 1 tablet (10 mg) by mouth daily 90 tablet 3    semaglutide (OZEMPIC, 0.25 OR 0.5 MG/DOSE,) 2 MG/3ML pen Inject 0.25 mg Subcutaneous every 7 days 3 mL 0    senna-docusate (SENOKOT-S/PERICOLACE) 8.6-50 MG tablet Take 1 tablet by mouth 2 times daily as needed for constipation      sertraline (ZOLOFT) 100 MG tablet Take 2 tablets by mouth once daily  "180 tablet 3    spironolactone (ALDACTONE) 25 MG tablet Take 1 tablet (25 mg) by mouth daily      zolpidem (AMBIEN) 10 MG tablet Take 1 tablet (10 mg) by mouth at bedtime 7 tablet 0    furosemide (LASIX) 20 MG tablet Take 1 tablet (20 mg) by mouth daily        Controlled medications:   Medication: zolpidem , 5 tabs given to patient at the time of discharge to take home     Past Medical History:   Past Medical History:   Diagnosis Date    Arthritis     lt shoulder, rt. knee    Blood transfusion     CKD (chronic kidney disease) stage 3, GFR 30-59 ml/min (H)     Depressive disorder     Essential hypertension, benign     Gastroesophageal reflux disease     Generalized anxiety disorder     Hernia, abdominal     Hiatal hernia     History of blood transfusion 2011    with a hernia repair    Hypertension     Insomnia, unspecified     Iron deficiency anemia 08/2009    Major depression     sees a psychiatrist    Menopause     age 53    Obesity, unspecified     CRISTÓBAL (obstructive sleep apnea)     bipap    Other chronic pain     chronic pain lt arm from tendonidits    Oxygen dependent     2 LPM at home-uses at noc or extended walking    Pneumonia     due to aspiration from hiatal hernia-11/8    Pneumonia due to 2019 novel coronavirus 11/08/2021    Postoperative seroma 10/2011    drained several times    Pure hypercholesterolemia     RLS (restless legs syndrome)     Type II or unspecified type diabetes mellitus without mention of complication, not stated as uncontrolled      Physical Exam:   Vitals: /66   Pulse 71   Temp 98.1  F (36.7  C)   Resp 18   Ht 1.473 m (4' 10\")   Wt 108 kg (238 lb)   LMP 09/15/2007   SpO2 92%   BMI 49.74 kg/m    BMI: Body mass index is 49.74 kg/m .  GENERAL APPEARANCE:  Alert, in NAD  HEENT: normocephalic, moist mucous membranes, nose without drainage or crusting  RESP:  respiratory effort normal, no respiratory distress, Lung sounds clear, patient is on RA  CV: auscultation of heart done, " rate and rhythm regular.   ABDOMEN: obese, + bowel sounds, soft, nontender, no grimacing or guarding with palpation.  M/S:  no lower extremity edema  PSYCH: affect and mood normal      SNF labs: Labs done in SNF are in [facility]. Please refer to them using EPIC/Care Everywhere. and Recent labs in [facility] reviewed by me today.     DISCHARGE PLAN:  Labs: [facility] 6/27 with results to PCP  Medical Follow Up:     Follow up with primary care provider in 1 week   Current [place] scheduled appointments:  Appointments in Next Year      Jul 18, 2024 1:00 PM  CTA ANGIOGRAM CORONARY ARTERY WITH NURSE with SCICT1, SH CV CT NURSE  [facility] ([facility] ) [phone]     Aug 21, 2024 12:30 PM  LAB with RU LAB  [facility] ([facility] ) [phone]     Aug 21, 2024 1:15 PM  (Arrive by 1:10 PM)  Return Cardiology with Nakul Ramos MD  [facility] ([facility] ) [phone]     Discharge Services: Home Care:  Occupational Therapy, Physical Therapy, Registered Nurse, Home Health Aide, and From:  [place] [facility]  Discharge Instructions:   Continue to follow your diet:  Carbohydrate Controlled Diet, No added salt diet  Daily weights: call provider for weight gain of more than 2 pounds per day or 5 pounds per week.   Increase BS checks to TID once restarting semaglutide- since BS are already controlled. Keep a log and bring with to PCP office ar follow up   Check BP once daily and keep a record. Bring with to follow up with PCP    TOTAL DISCHARGE TIME:   Greater than 30 minutes  Electronically signed by:  LEONCIO Tapia CNP     Home care Face to Face documentation done in [facility] attached to Home care orders for [facility].

## 2024-06-21 ENCOUNTER — TELEPHONE (OUTPATIENT)
Dept: CARDIOLOGY | Facility: CLINIC | Age: 69
End: 2024-06-21
Payer: MEDICARE

## 2024-06-21 DIAGNOSIS — I49.3 PVC'S (PREMATURE VENTRICULAR CONTRACTIONS): Primary | ICD-10-CM

## 2024-06-21 DIAGNOSIS — I50.22 HEART FAILURE WITH MILDLY REDUCED EJECTION FRACTION (HFMREF) (H): ICD-10-CM

## 2024-06-21 DIAGNOSIS — R60.0 LOCALIZED EDEMA: ICD-10-CM

## 2024-06-21 DIAGNOSIS — K21.9 GASTROESOPHAGEAL REFLUX DISEASE WITHOUT ESOPHAGITIS: ICD-10-CM

## 2024-06-21 NOTE — TELEPHONE ENCOUNTER
3 day zio patch mailed out to pt's home.     Received a call from [facility] that Zio patch has not been received yet. Order was placed 6/18/24 for mail out.     Spoke with RN at [facility] that zio patch is scheduled to be received today. RN will call clinic if monitor not received yet.         Jessy RUSHING  [facility]

## 2024-06-24 RX ORDER — FUROSEMIDE 20 MG
TABLET ORAL
Qty: 90 TABLET | Refills: 3 | Status: SHIPPED | OUTPATIENT
Start: 2024-06-24

## 2024-06-24 RX ORDER — PANTOPRAZOLE SODIUM 40 MG/1
TABLET, DELAYED RELEASE ORAL
Qty: 90 TABLET | Refills: 2 | Status: SHIPPED | OUTPATIENT
Start: 2024-06-24

## 2024-06-25 ENCOUNTER — TRANSFERRED RECORDS (OUTPATIENT)
Dept: HEALTH INFORMATION MANAGEMENT | Facility: CLINIC | Age: 69
End: 2024-06-25
Payer: MEDICARE

## 2024-06-26 ENCOUNTER — TELEPHONE (OUTPATIENT)
Dept: INTERNAL MEDICINE | Facility: CLINIC | Age: 69
End: 2024-06-26
Payer: MEDICARE

## 2024-06-26 NOTE — TELEPHONE ENCOUNTER
Please disregard previous request as patient is in the hospital.  Thank you,  Yaquelin Blas LPN  [facility]

## 2024-06-26 NOTE — TELEPHONE ENCOUNTER
"Called patient to follow up on Zio patch. [facility] was advised that zio patch was in transit set to arrive 6/21/24. If monitor was not received to call cardiology clinic.     Patient states she never received the Zio patch prior to discharging TCU.     Pt wonders if she truly needs this zio patch still.     Per ED notes on 6/7/24 with Elizabeth ASKEW, \"She does report history of PVCs and that the palpitations have increased. Will send home with (will be mailed out) Zio to get PVC burden and assess for other possible arrhythmias.\"    EF on Echo 6/6/24 45-50% (6/2021: EF 60-65%).   Pt has Cor CTa scheduled for 7/18/24.   OV on 8/21/24 with .     Pt would like to know if  is still recommending Zio patch since she has not had increased palpitations.     Jessy RUSHING  [facility]    "

## 2024-06-26 NOTE — TELEPHONE ENCOUNTER
Please approve start of care to begin 6/27 instead of 6/28.  Thank you,  Yaquelin Blas LPN  [facility]

## 2024-06-26 NOTE — TELEPHONE ENCOUNTER
Please approve new start of care date. Start of care was to begin 6/28/24, now needing to be moved to 6/27/24.  Thank you,  Yaquelin Blas LPN  [facility]

## 2024-06-27 ENCOUNTER — TELEPHONE (OUTPATIENT)
Dept: INTERNAL MEDICINE | Facility: CLINIC | Age: 69
End: 2024-06-27

## 2024-06-27 ENCOUNTER — MEDICAL CORRESPONDENCE (OUTPATIENT)
Dept: HEALTH INFORMATION MANAGEMENT | Facility: CLINIC | Age: 69
End: 2024-06-27

## 2024-06-27 ENCOUNTER — MYC MEDICAL ADVICE (OUTPATIENT)
Dept: INTERNAL MEDICINE | Facility: CLINIC | Age: 69
End: 2024-06-27
Payer: MEDICARE

## 2024-06-27 ENCOUNTER — LAB REQUISITION (OUTPATIENT)
Dept: LAB | Facility: CLINIC | Age: 69
End: 2024-06-27
Payer: MEDICARE

## 2024-06-27 DIAGNOSIS — E87.6 HYPOKALEMIA: ICD-10-CM

## 2024-06-27 DIAGNOSIS — I50.22 HEART FAILURE WITH MILDLY REDUCED EJECTION FRACTION (HFMREF) (H): ICD-10-CM

## 2024-06-27 LAB
ANION GAP SERPL CALCULATED.3IONS-SCNC: 14 MMOL/L (ref 7–15)
BUN SERPL-MCNC: 11.4 MG/DL (ref 8–23)
CALCIUM SERPL-MCNC: 9.2 MG/DL (ref 8.8–10.2)
CHLORIDE SERPL-SCNC: 104 MMOL/L (ref 98–107)
CREAT SERPL-MCNC: 0.77 MG/DL (ref 0.51–0.95)
DEPRECATED HCO3 PLAS-SCNC: 22 MMOL/L (ref 22–29)
EGFRCR SERPLBLD CKD-EPI 2021: 84 ML/MIN/1.73M2
GLUCOSE SERPL-MCNC: 125 MG/DL (ref 70–99)
POTASSIUM SERPL-SCNC: 3.8 MMOL/L (ref 3.4–5.3)
SODIUM SERPL-SCNC: 140 MMOL/L (ref 135–145)

## 2024-06-27 PROCEDURE — 80048 BASIC METABOLIC PNL TOTAL CA: CPT | Mod: ORL | Performed by: NURSE PRACTITIONER

## 2024-06-27 RX ORDER — CARVEDILOL 12.5 MG/1
12.5 TABLET ORAL 2 TIMES DAILY WITH MEALS
Qty: 180 TABLET | Refills: 3 | Status: SHIPPED | OUTPATIENT
Start: 2024-06-27

## 2024-06-27 NOTE — TELEPHONE ENCOUNTER
Patient prescribed med by hospitalist while patient was in emergency room.     Last appointment with primary care provider was 01/25/2024. No upcoming appointment scheduled. No hospital follow up appointment done.    Please see patient's mychart message.     Please advise, thanks.    Solis Adams, Triage RN [facility]  3:04 PM 6/27/2024

## 2024-06-27 NOTE — TELEPHONE ENCOUNTER
Focus of Care: CHF   Disciplines: SN: 1WK4, 1 EVERY 2WK4, AND 1WK1  Need Evals for PT, ST, OT, and MSW    Medication: Patient states she is no longer taking melatonin, Ambien, and Senna.  Patient has the following medications in the home that she takes occasionally:  Tylenol 500mg 2 Tabs 4 times a day as needed PRN;   Ibuprofen 200mg 1 tab every 4 hours PRN  Patient is taking a multivitamin named Whole Food Multivitamin Plus with Iron; current med list says Thera vit m  Fish Oil Capsules are 500mg no dose is listed on med list  Please approve home care services and also be aware of medication. Any updates on medication please update me also give approval.  Thank you,  Yaquelin Blas LPN  [facility]

## 2024-06-27 NOTE — TELEPHONE ENCOUNTER
Contacted [facility] and they are returning the Zio to the company.  Sanjana Swann RN on 6/27/2024 at 10:10 AM

## 2024-06-27 NOTE — TELEPHONE ENCOUNTER
Sent [employer] message to patient.    Thank you,  Solis Admas, Triage RN [facility]  3:55 PM 6/27/2024

## 2024-06-27 NOTE — TELEPHONE ENCOUNTER
"Please see note from Karla. This writer also sent MyChart to patient today to set up hospital follow up. Patient responded \"will do\".    Thank you,  Solis Adams, Triage RN Bo[facility] Cadogan  5:46 PM 6/27/2024     "

## 2024-06-27 NOTE — TELEPHONE ENCOUNTER
Focus of Care: CHF   Disciplines: SN: 1WK4, 1 EVERY 2WK4, AND 1WK1  Need Evals for PT, ST, OT, and MSW  add 3 PRN SN visits to that list.    More disciplines added to original request.  Please approve.  Thank you,  Yaquelin Blas LPN  [facility]

## 2024-06-28 ENCOUNTER — TELEPHONE (OUTPATIENT)
Dept: INTERNAL MEDICINE | Facility: CLINIC | Age: 69
End: 2024-06-28
Payer: MEDICARE

## 2024-06-28 NOTE — TELEPHONE ENCOUNTER
Thank you, clinician updated. I also updated that patient has been updated on needing a hospital F/U.  Yaquelin Blas LPN  [facility]

## 2024-06-28 NOTE — TELEPHONE ENCOUNTER
Thank you, I will inform clinician of your update.  Yaquelin Blas LPN  [facility]

## 2024-06-28 NOTE — TELEPHONE ENCOUNTER
POTASSIUM CHLORIDE ER 20 MEQ TABLET,EXTENDED RELEASE INTERACTS WITH SPIRONOLACTONE 25 MG TABLET   MONOGRAPH TITLE    POTASSIUM SUPPLEMENTS/POTASSIUM SPARING DIURETICS  SEVERITY LEVEL    2-SEVERE INTERACTION  ACTION IS REQUIRED TO REDUCE THE RISK OF SEVERE ADVERSE INTERACTION.  Please see above warning our clinician received when completing work in patients chart.    Any changes or no changes needed.  Thank you,  Yaquelin Blas LPN  [facility]

## 2024-06-28 NOTE — TELEPHONE ENCOUNTER
Please see provider's message below.    Thank you,  Solis Adams, Triage RN [facility]  10:20 AM 6/28/2024

## 2024-07-01 ENCOUNTER — TRANSFERRED RECORDS (OUTPATIENT)
Dept: HEALTH INFORMATION MANAGEMENT | Facility: CLINIC | Age: 69
End: 2024-07-01
Payer: MEDICARE

## 2024-07-02 ENCOUNTER — TELEPHONE (OUTPATIENT)
Dept: INTERNAL MEDICINE | Facility: CLINIC | Age: 69
End: 2024-07-02
Payer: MEDICARE

## 2024-07-02 ENCOUNTER — TELEPHONE (OUTPATIENT)
Dept: INTERNAL MEDICINE | Facility: CLINIC | Age: 69
End: 2024-07-02

## 2024-07-02 NOTE — TELEPHONE ENCOUNTER
Orders are okay as long as face-to-face is in place but I have not seen her since for her hospital visit

## 2024-07-02 NOTE — TELEPHONE ENCOUNTER
This is not a new patient and there are not many encounters in this visit so I think this is a chart that needs to be merged    I cannot tell if she has been seen for a face-to-face post discharge to approve the orders because I think this is not the chart that she should be asking and

## 2024-07-02 NOTE — TELEPHONE ENCOUNTER
Adding on home health AIDE per pt request for duration of 8 wk, frequencies as follows:  HHA 1wk7  SN 1wk3, [id number], 1wk1  PT/OT/ST/MSW 1wk1    Please approve the above.  Thank you,  Yaquelin Blas LPN  [facility]

## 2024-07-02 NOTE — TELEPHONE ENCOUNTER
Chart marked for merge with this chart         Yaquelin from [facility] has been notified

## 2024-07-02 NOTE — TELEPHONE ENCOUNTER
PT services for 1x per week for 3 week then every other week for 3 visits for gait, transfers, balance, strength and aerobic capacity following hospitalization for pneumonia and CHF exacerbation.    Please approve the above.  Thank you,  Yaquelin Blas LPN  [facility]

## 2024-07-03 ENCOUNTER — TELEPHONE (OUTPATIENT)
Dept: INTERNAL MEDICINE | Facility: CLINIC | Age: 69
End: 2024-07-03
Payer: MEDICARE

## 2024-07-03 NOTE — TELEPHONE ENCOUNTER
PT services for 1x per week for 3 week then every other week for 3 visits for gait, transfers, balance, strength and aerobic capacity following hospitalization for pneumonia and CHF exacerbation.    Please approve the above.     Shayy had declined SLP referral at this time. Patient does not endorse any difficult in swallowing or speech, which was initially reported at start of care.    Please be aware of the above.    Update and request re-sent due to patients chart.  Thank you,  Yaquelin Blas LPN  [facility]

## 2024-07-03 NOTE — TELEPHONE ENCOUNTER
Thank you, I will update.  Clinic notified to reach out to patient to make a F/U  appt.    Yaquelin Blas LPN  [facility]

## 2024-07-03 NOTE — TELEPHONE ENCOUNTER
As long as face-to-face is in place, okay orders  I have not seen post her TCU.  So if face-to-face is required for us to approve her orders that is not in place  She does not have any upcoming appointments with primary care

## 2024-07-09 ENCOUNTER — PATIENT OUTREACH (OUTPATIENT)
Dept: CARE COORDINATION | Facility: CLINIC | Age: 69
End: 2024-07-09
Payer: MEDICARE

## 2024-07-09 NOTE — PROGRESS NOTES
Clinic Care Coordination Contact  Transitions of Care Outreach  Chief Complaint   Patient presents with    Clinic Care Coordination - Initial    Clinic Care Coordination - Homecare/TCU     Most Recent Admission Date: 6/5/2024   Most Recent Admission Diagnosis: Recurrent aspiration pneumonia (H) - J69.0  Elevated brain natriuretic peptide (BNP) level - R79.89  Acute respiratory failure with hypoxia (H) - J96.01     Most Recent Discharge Date: 6/11/2024   Most Recent Discharge Diagnosis: Acute respiratory failure with hypoxia (H) - J96.01  Elevated brain natriuretic peptide (BNP) level - R79.89  Recurrent aspiration pneumonia (H) - J69.0  Heart failure with mildly reduced ejection fraction (HFmrEF) (H) - I50.22  Localized edema - R60.0  Constipation, unspecified constipation type - K59.00  On deep vein thrombosis (DVT) prophylaxis - Z79.899  Insomnia, unspecified type - G47.00  Generalized anxiety disorder - F41.1     Transitions of Care Assessment    Discharge Assessment  How are you doing now that you are home?: Doing pretty good. Keeping up with recommendations. Has a RN/PT/SW visiting  How are your symptoms? (Red Flag symptoms escalate to triage hotline per guidelines): Improved  Do you know how to contact your clinic care team if you have future questions or changes to your health status? : Yes  Does the patient have their discharge instructions? : Yes  Does the patient have questions regarding their discharge instructions? : No  Were you started on any new medications or were there changes to any of your previous medications? : Yes  Does the patient have all of their medications?: Yes  Do you have questions regarding any of your medications? : No  Do you have all of your needed medical supplies or equipment (DME)?  (i.e. oxygen tank, CPAP, cane, etc.): Yes    Post-op (CHW CTA Only)  If the patient had a surgery or procedure, do they have any questions for a nurse?: No    Post-op (Clinicians Only)  Did the  patient have surgery or a procedure: No  Fever: No  Chills: No  Eating & Drinking: eating and drinking without complaints/concerns  PO Intake: regular diet  Bowel Function: normal  Date of last BM: 07/10/24  Urinary Status: voiding without complaint/concerns    Follow up Plan     Discharge Follow-Up  Discharge follow up appointment scheduled in alignment with recommended follow up timeframe or Transitions of Risk Category? (Low = within 30 days; Moderate= within 14 days; High= within 7 days): Yes  Discharge Follow Up Appointment Date: 07/18/24 (7/22/24 PCP)  Discharge Follow Up Appointment Scheduled with?: Specialty Care Provider (Cardiology)    RN CC contacted patient for post-hospital follow up and to introduce Care Coordination. Full assessment not indicated as patient declined to have Care Coordination support at this time. She was agreeable to receiving an introduction letter with additional information on Care Coordination via [employer]. Verified patient has access to [employer].     RN CC will send patient introduction letter via [employer].     Future Appointments   Date Time Provider Department Center   7/18/2024  1:00 PM SCICT1 SHCVCT [facility]   7/22/2024  2:30 PM Eli Andrea, APRN CNP [facility]   8/21/2024 12:30 PM RU LAB [facility] [place] [facility]   8/21/2024  1:15 PM Nakul Ramos MD [facility]     Outpatient Plan as outlined on AVS reviewed with patient.    For any urgent concerns, please contact our 24 hour nurse triage line: [phone] ([phone])       Yanet Negron RN, BSN, CPHN, CCM  [facility] Management    Phone: [phone]  Email: [email or web address]                  Clinic Care Coordination Contact  [facility]/Voicemail    Clinical Data: Care Coordinator Outreach    Outreach Documentation Number of Outreach Attempt   7/9/2024  10:22 AM 1     Unable to leave a message on patient's voicemail with call back  information and request return call as call did not roll to voicemail.     Plan: Care Coordinator will try to reach patient again in 1-2 business days.    Yanet Negron RN, BSN, CPHN, [facility] Ambulatory Care Management  [facility]  Phone: [phone]  Email: [email or web address]

## 2024-07-09 NOTE — LETTER
[facility] CARE COORDINATION  [address]  [facility] [address]    July 10, 2024    Nicole M Eric  [address]  [place] [address]      Dear Shayy,    I am a clinic care coordinator who works with LEONCIO Casillas CNP with the [facility]. I wanted to introduce myself and provide you with my contact information for you to be able to call me with any questions or concerns. I wanted to thank you for spending the time to talk with me.  Below is a description of clinic care coordination and how I can further assist you.       The clinic care coordination team is made up of a registered nurse, , financial resource worker and community health worker who understand the health care system. The goal of clinic care coordination is to help you manage your health and improve access to the health care system. Our team works alongside your provider to assist you in determining your health and social needs. We can help you obtain health care and community resources, providing you with necessary information and education. We can work with you through any barriers and develop a care plan that helps coordinate and strengthen the communication between you and your care team.  Our services are voluntary and are offered without charge to you personally.    Please feel free to contact me with any questions or concerns regarding care coordination and what we can offer.      We are focused on providing you with the highest-quality healthcare experience possible.    Sincerely,     Yanet Negron RN, BSN, CPHN, [facility]  [facility]  Phone: [phone]  Email: [email or web address]    Enclosed: Additional information on Care Coordination.     WHAT IS CARE COORDINATION?      [facility] Care Coordination supports patients and families dealing with chronic or complex health conditions,  developmental issues, and social service needs. This service is available to patients of all ages, from babies to seniors. When you re facing a difficult decision about caring for yourself or someone you love, we can help you understand your options. We identify and refer you to community resources that help with financial, legal, mental health, transportation, and other issues. We also help with your medical and related education needs.     IS CARE COORDINATION RIGHT FOR ME? Discuss a referral to Care Coordination with your primary care provider or care team member.     HOW CAN I CONNECT WITH CARE COORDINATION?  Contact your clinic.  Speak with your doctor or clinic staff.  Discuss care coordination with hospital staff before discharge.     MEET YOUR CARE COORDINATION TEAM     Registered Nurse Care Coordinator  Provides education on medications, disease management, and new diagnoses.  Addresses concerns about medical conditions and connects you to resources.  Develops patient-centered goals and communicates with patient s care team.     Social Work Care Coordinator  Provides education on emotional wellbeing.  Connects you to a variety of community-based resources.  Communicates with care team and community partners.     Community Health Worker  Identifies health and social barriers and connects you with community resources.  Develops nonclinical patient and family centered goals.  Enhances communication between patients and care teams.     Financial Resource Worker  Assists patients with applying for health [place] ([place], [employer], [employer]  Helps patients with applying for county benefits.  Connects patients with [facility].

## 2024-07-10 ENCOUNTER — TRANSFERRED RECORDS (OUTPATIENT)
Dept: HEALTH INFORMATION MANAGEMENT | Facility: CLINIC | Age: 69
End: 2024-07-10
Payer: MEDICARE

## 2024-07-11 ENCOUNTER — TELEPHONE (OUTPATIENT)
Dept: INTERNAL MEDICINE | Facility: CLINIC | Age: 69
End: 2024-07-11
Payer: MEDICARE

## 2024-07-11 NOTE — TELEPHONE ENCOUNTER
evaluation orders arrived via fax from [facility] # [id number] dated 7/10/2024.     Placed in provider mailbox for signature

## 2024-07-12 ENCOUNTER — MEDICAL CORRESPONDENCE (OUTPATIENT)
Dept: HEALTH INFORMATION MANAGEMENT | Facility: CLINIC | Age: 69
End: 2024-07-12

## 2024-07-17 DIAGNOSIS — E66.01 MORBID OBESITY (H): ICD-10-CM

## 2024-07-17 DIAGNOSIS — E11.9 TYPE 2 DIABETES MELLITUS WITHOUT COMPLICATION, WITHOUT LONG-TERM CURRENT USE OF INSULIN (H): ICD-10-CM

## 2024-07-17 RX ORDER — SEMAGLUTIDE 0.68 MG/ML
INJECTION, SOLUTION SUBCUTANEOUS
Qty: 3 ML | Refills: 2 | Status: SHIPPED | OUTPATIENT
Start: 2024-07-17

## 2024-07-17 NOTE — TELEPHONE ENCOUNTER
Prescription approved per [facility] Refill Protocol.  Valerie Brown, RN  [facility] Triage Nurse

## 2024-07-18 ENCOUNTER — HOSPITAL ENCOUNTER (OUTPATIENT)
Dept: CARDIOLOGY | Facility: CLINIC | Age: 69
Discharge: HOME | End: 2024-07-18
Attending: PHYSICIAN ASSISTANT | Admitting: PHYSICIAN ASSISTANT
Payer: MEDICARE

## 2024-07-18 VITALS — HEART RATE: 65 BPM | DIASTOLIC BLOOD PRESSURE: 93 MMHG | SYSTOLIC BLOOD PRESSURE: 149 MMHG

## 2024-07-18 DIAGNOSIS — I50.22 HEART FAILURE WITH MILDLY REDUCED EJECTION FRACTION (HFMREF) (H): ICD-10-CM

## 2024-07-18 PROCEDURE — 250N000011 HC RX IP 250 OP 636: Performed by: PHYSICIAN ASSISTANT

## 2024-07-18 PROCEDURE — 75574 CT ANGIO HRT W/3D IMAGE: CPT | Mod: 26 | Performed by: INTERNAL MEDICINE

## 2024-07-18 PROCEDURE — 999N000128 HC STATISTIC PERIPHERAL IV START W/O US GUIDANCE

## 2024-07-18 PROCEDURE — 75574 CT ANGIO HRT W/3D IMAGE: CPT | Mod: ME

## 2024-07-18 PROCEDURE — G1010 CDSM STANSON: HCPCS | Performed by: INTERNAL MEDICINE

## 2024-07-18 PROCEDURE — 250N000013 HC RX MED GY IP 250 OP 250 PS 637: Performed by: PHYSICIAN ASSISTANT

## 2024-07-18 PROCEDURE — 250N000009 HC RX 250: Performed by: PHYSICIAN ASSISTANT

## 2024-07-18 RX ORDER — METOPROLOL TARTRATE 25 MG/1
25-100 TABLET, FILM COATED ORAL
Status: COMPLETED | OUTPATIENT
Start: 2024-07-18 | End: 2024-07-18

## 2024-07-18 RX ORDER — IVABRADINE 5 MG/1
5-15 TABLET, FILM COATED ORAL
Status: COMPLETED | OUTPATIENT
Start: 2024-07-18 | End: 2024-07-18

## 2024-07-18 RX ORDER — METHYLPREDNISOLONE SODIUM SUCCINATE 125 MG/2ML
125 INJECTION, POWDER, LYOPHILIZED, FOR SOLUTION INTRAMUSCULAR; INTRAVENOUS
Status: DISCONTINUED | OUTPATIENT
Start: 2024-07-18 | End: 2024-07-19 | Stop reason: HOSPADM

## 2024-07-18 RX ORDER — IOPAMIDOL 755 MG/ML
500 INJECTION, SOLUTION INTRAVASCULAR ONCE
Status: COMPLETED | OUTPATIENT
Start: 2024-07-18 | End: 2024-07-18

## 2024-07-18 RX ORDER — DILTIAZEM HYDROCHLORIDE 5 MG/ML
10-15 INJECTION INTRAVENOUS
Status: DISCONTINUED | OUTPATIENT
Start: 2024-07-18 | End: 2024-07-19 | Stop reason: HOSPADM

## 2024-07-18 RX ORDER — ONDANSETRON 2 MG/ML
4 INJECTION INTRAMUSCULAR; INTRAVENOUS
Status: DISCONTINUED | OUTPATIENT
Start: 2024-07-18 | End: 2024-07-19 | Stop reason: HOSPADM

## 2024-07-18 RX ORDER — DIPHENHYDRAMINE HYDROCHLORIDE 50 MG/ML
25-50 INJECTION INTRAMUSCULAR; INTRAVENOUS
Status: DISCONTINUED | OUTPATIENT
Start: 2024-07-18 | End: 2024-07-19 | Stop reason: HOSPADM

## 2024-07-18 RX ORDER — NITROGLYCERIN 0.4 MG/1
0.4 TABLET SUBLINGUAL
Status: DISCONTINUED | OUTPATIENT
Start: 2024-07-18 | End: 2024-07-19 | Stop reason: HOSPADM

## 2024-07-18 RX ORDER — DILTIAZEM HCL 60 MG
120 TABLET ORAL
Status: DISCONTINUED | OUTPATIENT
Start: 2024-07-18 | End: 2024-07-19 | Stop reason: HOSPADM

## 2024-07-18 RX ORDER — METOPROLOL TARTRATE 1 MG/ML
5-15 INJECTION, SOLUTION INTRAVENOUS
Status: DISCONTINUED | OUTPATIENT
Start: 2024-07-18 | End: 2024-07-19 | Stop reason: HOSPADM

## 2024-07-18 RX ORDER — LIDOCAINE 40 MG/G
CREAM TOPICAL
OUTPATIENT
Start: 2024-07-18

## 2024-07-18 RX ORDER — DIPHENHYDRAMINE HCL 25 MG
25 CAPSULE ORAL
Status: DISCONTINUED | OUTPATIENT
Start: 2024-07-18 | End: 2024-07-19 | Stop reason: HOSPADM

## 2024-07-18 RX ADMIN — IOPAMIDOL 140 ML: 755 INJECTION, SOLUTION INTRAVENOUS at 15:01

## 2024-07-18 RX ADMIN — NITROGLYCERIN 0.4 MG: 0.4 TABLET SUBLINGUAL at 14:49

## 2024-07-18 RX ADMIN — IVABRADINE 10 MG: 5 TABLET, FILM COATED ORAL at 13:17

## 2024-07-18 RX ADMIN — METOPROLOL TARTRATE 10 MG: 5 INJECTION INTRAVENOUS at 14:23

## 2024-07-18 RX ADMIN — METOPROLOL TARTRATE 50 MG: 50 TABLET, FILM COATED ORAL at 13:17

## 2024-07-19 ENCOUNTER — TELEPHONE (OUTPATIENT)
Dept: INTERNAL MEDICINE | Facility: CLINIC | Age: 69
End: 2024-07-19
Payer: MEDICARE

## 2024-07-19 NOTE — TELEPHONE ENCOUNTER
7/19/24 SKILLED NURSING / PHYSICAL THERAPY / home health aide orders received via fax. Form in your mailbox to be signed.

## 2024-07-19 NOTE — TELEPHONE ENCOUNTER
Patient will discharge from agency 7/30 to start outpatient physical therapy 8/6.     New frequencies as follows: PT 1wk1, HHA 1wk2, SN 1wk2 and OT Eval will be canceled.    Please approve the above.  Thank you,  Yaquelin Blas LPN  [facility]

## 2024-07-22 ENCOUNTER — OFFICE VISIT (OUTPATIENT)
Dept: INTERNAL MEDICINE | Facility: CLINIC | Age: 69
End: 2024-07-22
Payer: MEDICARE

## 2024-07-22 VITALS
HEIGHT: 58 IN | WEIGHT: 245.3 LBS | OXYGEN SATURATION: 92 % | TEMPERATURE: 97.9 F | RESPIRATION RATE: 16 BRPM | BODY MASS INDEX: 51.49 KG/M2 | HEART RATE: 88 BPM | SYSTOLIC BLOOD PRESSURE: 113 MMHG | DIASTOLIC BLOOD PRESSURE: 67 MMHG

## 2024-07-22 DIAGNOSIS — I10 BENIGN ESSENTIAL HYPERTENSION: ICD-10-CM

## 2024-07-22 DIAGNOSIS — J96.01 ACUTE RESPIRATORY FAILURE WITH HYPOXIA (H): Primary | ICD-10-CM

## 2024-07-22 PROCEDURE — 99215 OFFICE O/P EST HI 40 MIN: CPT

## 2024-07-22 PROCEDURE — G2211 COMPLEX E/M VISIT ADD ON: HCPCS

## 2024-07-22 RX ORDER — LOSARTAN POTASSIUM 100 MG/1
50 TABLET ORAL DAILY
Qty: 90 TABLET | Refills: 1 | Status: SHIPPED | OUTPATIENT
Start: 2024-07-22

## 2024-07-22 NOTE — PROGRESS NOTES
"  Assessment & Plan     (J96.01) Acute respiratory failure with hypoxia (H)  (primary encounter diagnosis)  Comment: Patient has repetitive episodes of aspiration pneumonia and has problems with her CPAP and remembering to put it on and may need new settings established  Plan: Adult Sleep Eval & Management          Referral        Referral sent    (I10) Benign essential hypertension  Comment: Patient has had several days where she has not taken her losartan potassium because of hypotension  Plan: losartan (COZAAR) 100 MG tablet        Medication is now 50 mg. The parameter that was provided for her was to not take the losartan if her systolic blood pressure is less than 110.          MED REC REQUIRED  Post Medication Reconciliation Status:     BMI  Estimated body mass index is 51.27 kg/m  as calculated from the following:    Height as of this encounter: 1.473 m (4' 10\").    Weight as of this encounter: 111.3 kg (245 lb 4.8 oz).             Hi Lucas is a 68 year old, presenting for the following health issues: Pt presents as a Follow-up from hospitalization with pneumonia from 6/5/2024 to 6/11/2024. She has a history of hypertension, diabetes, recurrent aspiration pneumonia, and respiratory failure who presented for evaluation of tachycardia and shortness of breath.  She was discharged from the hospital 2 days ago for recurrent aspiration pneumonia and since then has had ongoing shortness of breath.  She states that she normally wears her oxygen at night, but has been checking her oxygen during the day which has been consistently in the 80s.  She is wearing her oxygen during the day without significant improvement of her symptoms. From the hospital Patient went to a TCU for rehabilitation, strengthening, and endurance until 6/20/2024.    Presently patient states that she has not been utilizing her losartan potassium 100 mg because her blood pressure has been too low.  It was suggested for patient " "to cut her losartan potassium in half and start taking 50 mg instead of 100 mg daily.  The parameter that was provided for her was to not take the losartan if her systolic blood pressure is less than 110.  Patient requested explanation to the parameter for furosemide as needed.  It was explained to patient to Take the furosemide if she gains more than 2 pounds in one day or 5 pounds in a week.  Hospital F/U    [facility]        Hospital Follow-up Visit:    Hospital/Nursing Home/ Rehab Facility: [facility]  Date of Admission: 6/5/2024  Date of Discharge: 6/11/2024  Reason(s) for Admission: Acute on chronic Hypoxia  Acute exacerbation of systolic/diastolic heart failure    Was the patient in the ICU or did the patient experience delirium during hospitalization?  No  Do you have any other stressors you would like to discuss with your provider? No    Problems taking medications regularly:  None  Medication changes since discharge: None  Problems adhering to non-medication therapy:  None    Summary of hospitalization:  [facility] discharge summary reviewed  Diagnostic Tests/Treatments reviewed.  Follow up needed: none  Other Healthcare Providers Involved in Patient s Care:         Homecare and Physical Therapy  Update since discharge: improved.         Plan of care communicated with patient                 Review of Systems  Constitutional, HEENT, cardiovascular, pulmonary, gi and gu systems are negative, except as otherwise noted.      Objective    /67 (BP Location: Right arm, Patient Position: Sitting, Cuff Size: Adult Regular)   Pulse 88   Temp 97.9  F (36.6  C) (Oral)   Resp 16   Ht 1.473 m (4' 10\")   Wt 111.3 kg (245 lb 4.8 oz)   LMP 09/15/2007   SpO2 92%   BMI 51.27 kg/m    Body mass index is 51.27 kg/m .  Physical Exam   GENERAL: alert and no distress  NECK: no adenopathy, no asymmetry, masses, or scars  RESP: lungs clear to auscultation - no rales, rhonchi or " wheezes, diminished breath sounds at the bases bilaterally, oxygenation is 92% on room air  CV: regular rate and rhythm, normal S1 S2, no S3 or S4, no murmur, click or rub, slight nonpitting edema up to the knees bilaterally  ABDOMEN: soft, nontender, no hepatosplenomegaly, no masses and bowel sounds normal  MS: no gross musculoskeletal defects noted, no edema  NEURO: Normal strength and tone, mentation intact and speech normal  PSYCH: mentation appears normal, affect normal/bright            Signed Electronically by: LEONCIO Livingston CNP

## 2024-07-30 DIAGNOSIS — G47.00 INSOMNIA, UNSPECIFIED TYPE: ICD-10-CM

## 2024-07-31 RX ORDER — ZOLPIDEM TARTRATE 10 MG/1
10 TABLET ORAL AT BEDTIME
Qty: 30 TABLET | Refills: 0 | Status: SHIPPED | OUTPATIENT
Start: 2024-07-31 | End: 2024-09-06

## 2024-08-04 DIAGNOSIS — F41.1 GENERALIZED ANXIETY DISORDER: ICD-10-CM

## 2024-08-04 DIAGNOSIS — G47.00 INSOMNIA, UNSPECIFIED TYPE: ICD-10-CM

## 2024-08-05 RX ORDER — CLONAZEPAM 1 MG/1
1 TABLET ORAL 2 TIMES DAILY PRN
Qty: 60 TABLET | Refills: 0 | Status: SHIPPED | OUTPATIENT
Start: 2024-08-05 | End: 2024-09-06

## 2024-08-05 NOTE — TELEPHONE ENCOUNTER
We discussed psych refill if this was going t be a long term prescription   Does she want referral to psych for ongoing refills?

## 2024-08-07 ENCOUNTER — MEDICAL CORRESPONDENCE (OUTPATIENT)
Dept: HEALTH INFORMATION MANAGEMENT | Facility: CLINIC | Age: 69
End: 2024-08-07

## 2024-08-07 DIAGNOSIS — Z53.9 DIAGNOSIS NOT YET DEFINED: Primary | ICD-10-CM

## 2024-08-07 PROCEDURE — G0180 MD CERTIFICATION HHA PATIENT: HCPCS | Performed by: NURSE PRACTITIONER

## 2024-08-09 ENCOUNTER — MYC REFILL (OUTPATIENT)
Dept: INTERNAL MEDICINE | Facility: CLINIC | Age: 69
End: 2024-08-09
Payer: MEDICARE

## 2024-08-11 ENCOUNTER — HEALTH MAINTENANCE LETTER (OUTPATIENT)
Age: 69
End: 2024-08-11

## 2024-08-12 RX ORDER — GABAPENTIN 300 MG/1
CAPSULE ORAL
OUTPATIENT
Start: 2024-08-12

## 2024-08-14 DIAGNOSIS — G89.29 CHRONIC BILATERAL LOW BACK PAIN WITHOUT SCIATICA: Primary | ICD-10-CM

## 2024-08-14 DIAGNOSIS — M54.50 CHRONIC BILATERAL LOW BACK PAIN WITHOUT SCIATICA: Primary | ICD-10-CM

## 2024-08-14 RX ORDER — GABAPENTIN 300 MG/1
CAPSULE ORAL
Qty: 240 CAPSULE | Refills: 0 | Status: SHIPPED | OUTPATIENT
Start: 2024-08-14

## 2024-08-15 ENCOUNTER — TRANSFERRED RECORDS (OUTPATIENT)
Dept: HEALTH INFORMATION MANAGEMENT | Facility: CLINIC | Age: 69
End: 2024-08-15

## 2024-08-20 NOTE — PROGRESS NOTES
CARDIOLOGY CONSULT    REASON FOR CONSULT: Cardiomyopathy    PRIMARY CARE PHYSICIAN:  Karla Hurst    HISTORY OF PRESENT ILLNESS:  68-year-old female seen for cardiomyopathy.  She has dyslipidemia, hypertension, spinal stenosis, history of substance use, diabetes type 2, sleep apnea.    June 2024 she was found to have cardiomyopathy with EF 45 to 50%.  Nuclear stress that showed fixed lateral defect.  She had a pneumonia shortly prior to this.    Coronary CTA July 2024 showed calcium score 15, 55th percentile, mild CAD.    She is now feeling quite well.  She typically walks with a walker and has some minimal dyspnea with exertion.  She will take furosemide about every 3 days for mild edema.  Blood pressure runs 120.  She denies any palpitations or chest pain.    PAST MEDICAL HISTORY:  Past Medical History:   Diagnosis Date    Arthritis     lt shoulder, rt. knee    Blood transfusion     CKD (chronic kidney disease) stage 3, GFR 30-59 ml/min (H)     Depressive disorder     Essential hypertension, benign     Gastroesophageal reflux disease     Generalized anxiety disorder     Hernia, abdominal     Hiatal hernia     History of blood transfusion 2011    with a hernia repair    Hypertension     Insomnia, unspecified     Iron deficiency anemia 08/2009    Major depression     sees a psychiatrist    Menopause     age 53    Obesity, unspecified     CRISTÓBAL (obstructive sleep apnea)     bipap    Other chronic pain     chronic pain lt arm from tendonidits    Oxygen dependent     2 LPM at home-uses at noc or extended walking    Pneumonia     due to aspiration from hiatal hernia-11/8    Pneumonia due to 2019 novel coronavirus 11/08/2021    Postoperative seroma 10/2011    drained several times    Pure hypercholesterolemia     RLS (restless legs syndrome)     Type II or unspecified type diabetes mellitus without mention of complication, not stated as uncontrolled        MEDICATIONS:  Current Outpatient Medications   Medication Sig  Dispense Refill    carvedilol (COREG) 12.5 MG tablet Take 1 tablet (12.5 mg) by mouth 2 times daily (with meals) 180 tablet 3    clonazePAM (KLONOPIN) 1 MG tablet TAKE 1 TABLET (1 MG) BY MOUTH 2 TIMES DAILY AS NEEDED FOR ANXIETY 60 TO LAST 30 DAYS 60 tablet 0    cyclobenzaprine (FLEXERIL) 5 MG tablet Take 1 tablet (5 mg) by mouth 3 times daily as needed for muscle spasms      Fish Oil-Krill Oil (KRILL & FISH OIL BLEND PO) Take 1 capsule by mouth daily      furosemide (LASIX) 20 MG tablet TAKE 1 TABLET (20 MG) BY MOUTH EVERY DAY AS NEEDED FOR EDEMA 90 tablet 3    gabapentin (NEURONTIN) 300 MG capsule 3 capsules every am, 2 capsules midday and 3 caps at night 240 capsule 0    glipiZIDE (GLUCOTROL XL) 10 MG 24 hr tablet Take 1 tablet (10 mg) by mouth daily 90 tablet 3    ketoconazole (NIZORAL) 2 % external cream Apply to fold areas BID PRN 60 g 5    losartan (COZAAR) 100 MG tablet Take 0.5 tablets (50 mg) by mouth daily 90 tablet 1    multivitamin w/minerals (THERA-VIT-M) tablet Take 1 tablet by mouth daily      pantoprazole (PROTONIX) 40 MG EC tablet TAKE 1 TABLET BY MOUTH EVERYDAY AT BEDTIME 90 tablet 2    potassium chloride ER (K-TAB) 20 MEQ CR tablet Take 40 meq daily 180 tablet 1    rosuvastatin (CRESTOR) 10 MG tablet Take 1 tablet (10 mg) by mouth daily 90 tablet 3    semaglutide (OZEMPIC, 0.25 OR 0.5 MG/DOSE,) 2 MG/3ML pen INJECT 0.25MG SUBCUTANEOUSLY EVERY 7 DAYS 3 mL 2    sertraline (ZOLOFT) 100 MG tablet Take 2 tablets by mouth once daily 180 tablet 3    spironolactone (ALDACTONE) 25 MG tablet Take 1 tablet (25 mg) by mouth daily      zolpidem (AMBIEN) 10 MG tablet TAKE 1 TABLET BY MOUTH EVERYDAY AT BEDTIME 30 tablet 0     No current facility-administered medications for this visit.       ALLERGIES:  Allergies   Allergen Reactions    Metformin GI Disturbance     High dose    Morphine And Codeine Rash    Penicillins Rash     Pt has tolerated cephalosporins and penems.   Pt tolerated Zosyn 7/6/2019       SOCIAL  HISTORY:  I have reviewed this patient's social history and updated it with pertinent information if needed. Nicole Reyes  reports that she quit smoking about 45 years ago. Her smoking use included cigarettes. She started smoking about 50 years ago. She has a 15 pack-year smoking history. She has never used smokeless tobacco. She reports current alcohol use. She reports that she does not use drugs.    FAMILY HISTORY:  I have reviewed this patient's family history and updated it with pertinent information if needed.   Family History   Problem Relation Age of Onset    Cardiovascular Mother         CHF    Hypertension Mother     C.A.D. Mother         50s    Lipids Mother     Coronary Artery Disease Mother     Depression Mother     Anxiety Disorder Mother     Cancer Father         Hodgkin's, Leukemia    Other Cancer Father         Hodgkins    Hypertension Brother     Diabetes Brother     Coronary Artery Disease Brother     Other Cancer Brother         Hodgkins    Cancer Brother         Hodgkin's    Coronary Artery Disease Brother     Hypertension Brother     Anxiety Disorder Brother     C.A.D. Brother 61    C.A.D. Brother     Sleep Apnea Sister     Diabetes Sister     Coronary Artery Disease Sister     Hypertension Sister     Depression Sister     Anxiety Disorder Sister     Obesity Sister     Connective Tissue Disorder Sister         Lupus    Diabetes Sister     Other Cancer Sister         Leukemia    Depression Sister     Anxiety Disorder Sister     Lipids Sister     Hypertension Sister     Hypertension Sister     Hypertension Sister     Basal cell carcinoma Daughter 38        top of head    Mental Illness Maternal Grandfather     Hypertension Daughter     Depression Daughter     Anxiety Disorder Daughter     Obesity Daughter     Obesity Son     Obesity Daughter        REVIEW OF SYSTEMS:  Constitutional:  No weight loss, fever, chills  HEENT:  Eyes:  No visual loss, blurred vision, double vision or yellow  "sclerae. No hearing loss, sneezing, congestion, runny nose or sore throat.  Skin:  No rash or itching.  Cardiovascular: per HPI  Respiratory: per HPI  GI:  No anorexia, nausea, vomiting or diarrhea. No abdominal pain or blood.  :  No dysurea, hematuria  Neurologic:  No headache, paralysis, ataxia, numbness or tingling in the extremities. No change in bowel or bladder control.  Musculoskeletal:  No muscle pain  Hematologic:  No bleeding or bruising.  Lymphatics:  No enlarged nodes. No history of splenectomy.  Endocrine:  No reports of sweating, cold or heat intolerance. No polyuria or polydipsia.  Allergies:  No history of asthma, hives, eczema or rhinitis.    PHYSICAL EXAM:  /80 (BP Location: Right arm, Patient Position: Sitting, Cuff Size: Adult Regular)   Pulse 78   Ht 1.473 m (4' 10\")   Wt 110.1 kg (242 lb 11.2 oz)   LMP 09/15/2007   SpO2 97%   BMI 50.72 kg/m    Constitutional: awake, alert, no distress  Eyes: PERRL, sclera nonicteric  ENT: trachea midline  Respiratory: Lungs clear  Cardiovascular: Regular rate and rhythm, no murmurs  GI: nondistended, nontender, bowel sounds present  Lymph/Hematologic: no lymphadenopathy  Skin: dry, no rash  Musculoskeletal: good muscle tone, strength 5/5 in upper and lower extremities  Neurologic: no focal deficits  Neuropsychiatric: appropriate affact    DATA:  Labs:     Recent Labs   Lab Test 01/25/24  1519 06/05/23  1408   CHOL 262* 177   HDL 39* 40*   * 104*   TRIG 128 164*   August 2024: Potassium 3.8, creatinine 0.9, NT proBNP 470    ASSESSMENT:  68-year-old female seen for mild nonischemic cardiomyopathy.  Etiology of cardiomyopathy is unclear, but could have been a stress component with her recent pneumonia.  CTA showed only mild CAD.  She is on appropriate medical therapy.  BNP is normal.  Echo will be repeated to follow-up on ejection fraction.    RECOMMENDATIONS:  1.  Mild nonischemic cardiomyopathy  -Continue current medications  -Limited " echo    Follow-up in about 3 months with JOHAN.    Nakul Ramos MD  Cardiology - [facility]  Pager:  [phone]  Text Page  August 21, 2024

## 2024-08-21 ENCOUNTER — LAB (OUTPATIENT)
Dept: LAB | Facility: CLINIC | Age: 69
End: 2024-08-21
Payer: MEDICARE

## 2024-08-21 ENCOUNTER — OFFICE VISIT (OUTPATIENT)
Dept: CARDIOLOGY | Facility: CLINIC | Age: 69
End: 2024-08-21
Attending: PHYSICIAN ASSISTANT
Payer: MEDICARE

## 2024-08-21 VITALS
DIASTOLIC BLOOD PRESSURE: 80 MMHG | BODY MASS INDEX: 50.95 KG/M2 | OXYGEN SATURATION: 97 % | SYSTOLIC BLOOD PRESSURE: 114 MMHG | HEART RATE: 78 BPM | HEIGHT: 58 IN | WEIGHT: 242.7 LBS

## 2024-08-21 DIAGNOSIS — I50.22 HEART FAILURE WITH MILDLY REDUCED EJECTION FRACTION (HFMREF) (H): ICD-10-CM

## 2024-08-21 LAB
ANION GAP SERPL CALCULATED.3IONS-SCNC: 15 MMOL/L (ref 7–15)
BUN SERPL-MCNC: 10.4 MG/DL (ref 8–23)
CALCIUM SERPL-MCNC: 8.9 MG/DL (ref 8.8–10.4)
CHLORIDE SERPL-SCNC: 96 MMOL/L (ref 98–107)
CREAT SERPL-MCNC: 0.93 MG/DL (ref 0.51–0.95)
EGFRCR SERPLBLD CKD-EPI 2021: 67 ML/MIN/1.73M2
GLUCOSE SERPL-MCNC: 164 MG/DL (ref 70–99)
HCO3 SERPL-SCNC: 29 MMOL/L (ref 22–29)
NT-PROBNP SERPL-MCNC: 479 PG/ML (ref 0–900)
POTASSIUM SERPL-SCNC: 3.8 MMOL/L (ref 3.4–5.3)
SODIUM SERPL-SCNC: 140 MMOL/L (ref 135–145)

## 2024-08-21 PROCEDURE — 83880 ASSAY OF NATRIURETIC PEPTIDE: CPT | Performed by: PHYSICIAN ASSISTANT

## 2024-08-21 PROCEDURE — 99214 OFFICE O/P EST MOD 30 MIN: CPT | Performed by: INTERNAL MEDICINE

## 2024-08-21 PROCEDURE — 36415 COLL VENOUS BLD VENIPUNCTURE: CPT | Performed by: PHYSICIAN ASSISTANT

## 2024-08-21 PROCEDURE — 80048 BASIC METABOLIC PNL TOTAL CA: CPT | Performed by: PHYSICIAN ASSISTANT

## 2024-08-21 RX ORDER — SPIRONOLACTONE 25 MG/1
25 TABLET ORAL DAILY
Qty: 90 TABLET | Refills: 3 | Status: SHIPPED | OUTPATIENT
Start: 2024-08-21

## 2024-08-21 NOTE — LETTER
8/21/2024    Karla Hurst, APRN CNP  [address] [facility] [address]    RE: Nicole Reyes       Dear Colleague,     I had the pleasure of seeing Nicole Reyes in the [facility].  CARDIOLOGY CONSULT    REASON FOR CONSULT: Cardiomyopathy    PRIMARY CARE PHYSICIAN:  Karla Hurst    HISTORY OF PRESENT ILLNESS:  68-year-old female seen for cardiomyopathy.  She has dyslipidemia, hypertension, spinal stenosis, history of substance use, diabetes type 2, sleep apnea.    June 2024 she was found to have cardiomyopathy with EF 45 to 50%.  Nuclear stress that showed fixed lateral defect.  She had a pneumonia shortly prior to this.    Coronary CTA July 2024 showed calcium score 15, 55th percentile, mild CAD.    She is now feeling quite well.  She typically walks with a walker and has some minimal dyspnea with exertion.  She will take furosemide about every 3 days for mild edema.  Blood pressure runs 120.  She denies any palpitations or chest pain.    PAST MEDICAL HISTORY:  Past Medical History:   Diagnosis Date     Arthritis     lt shoulder, rt. knee     Blood transfusion      CKD (chronic kidney disease) stage 3, GFR 30-59 ml/min (H)      Depressive disorder      Essential hypertension, benign      Gastroesophageal reflux disease      Generalized anxiety disorder      Hernia, abdominal      Hiatal hernia      History of blood transfusion 2011    with a hernia repair     Hypertension      Insomnia, unspecified      Iron deficiency anemia 08/2009     Major depression     sees a psychiatrist     Menopause     age 53     Obesity, unspecified      CRISTÓBAL (obstructive sleep apnea)     bipap     Other chronic pain     chronic pain lt arm from tendonidits     Oxygen dependent     2 LPM at home-uses at noc or extended walking     Pneumonia     due to aspiration from hiatal hernia-11/8     Pneumonia due to 2019 novel coronavirus 11/08/2021     Postoperative seroma 10/2011    drained several  times     Pure hypercholesterolemia      RLS (restless legs syndrome)      Type II or unspecified type diabetes mellitus without mention of complication, not stated as uncontrolled        MEDICATIONS:  Current Outpatient Medications   Medication Sig Dispense Refill     carvedilol (COREG) 12.5 MG tablet Take 1 tablet (12.5 mg) by mouth 2 times daily (with meals) 180 tablet 3     clonazePAM (KLONOPIN) 1 MG tablet TAKE 1 TABLET (1 MG) BY MOUTH 2 TIMES DAILY AS NEEDED FOR ANXIETY 60 TO LAST 30 DAYS 60 tablet 0     cyclobenzaprine (FLEXERIL) 5 MG tablet Take 1 tablet (5 mg) by mouth 3 times daily as needed for muscle spasms       Fish Oil-Krill Oil (KRILL & FISH OIL BLEND PO) Take 1 capsule by mouth daily       furosemide (LASIX) 20 MG tablet TAKE 1 TABLET (20 MG) BY MOUTH EVERY DAY AS NEEDED FOR EDEMA 90 tablet 3     gabapentin (NEURONTIN) 300 MG capsule 3 capsules every am, 2 capsules midday and 3 caps at night 240 capsule 0     glipiZIDE (GLUCOTROL XL) 10 MG 24 hr tablet Take 1 tablet (10 mg) by mouth daily 90 tablet 3     ketoconazole (NIZORAL) 2 % external cream Apply to fold areas BID PRN 60 g 5     losartan (COZAAR) 100 MG tablet Take 0.5 tablets (50 mg) by mouth daily 90 tablet 1     multivitamin w/minerals (THERA-VIT-M) tablet Take 1 tablet by mouth daily       pantoprazole (PROTONIX) 40 MG EC tablet TAKE 1 TABLET BY MOUTH EVERYDAY AT BEDTIME 90 tablet 2     potassium chloride ER (K-TAB) 20 MEQ CR tablet Take 40 meq daily 180 tablet 1     rosuvastatin (CRESTOR) 10 MG tablet Take 1 tablet (10 mg) by mouth daily 90 tablet 3     semaglutide (OZEMPIC, 0.25 OR 0.5 MG/DOSE,) 2 MG/3ML pen INJECT 0.25MG SUBCUTANEOUSLY EVERY 7 DAYS 3 mL 2     sertraline (ZOLOFT) 100 MG tablet Take 2 tablets by mouth once daily 180 tablet 3     spironolactone (ALDACTONE) 25 MG tablet Take 1 tablet (25 mg) by mouth daily       zolpidem (AMBIEN) 10 MG tablet TAKE 1 TABLET BY MOUTH EVERYDAY AT BEDTIME 30 tablet 0     No current  facility-administered medications for this visit.       ALLERGIES:  Allergies   Allergen Reactions     Metformin GI Disturbance     High dose     Morphine And Codeine Rash     Penicillins Rash     Pt has tolerated cephalosporins and penems.   Pt tolerated Zosyn 7/6/2019       SOCIAL HISTORY:  I have reviewed this patient's social history and updated it with pertinent information if needed. Nicole Reyes  reports that she quit smoking about 45 years ago. Her smoking use included cigarettes. She started smoking about 50 years ago. She has a 15 pack-year smoking history. She has never used smokeless tobacco. She reports current alcohol use. She reports that she does not use drugs.    FAMILY HISTORY:  I have reviewed this patient's family history and updated it with pertinent information if needed.   Family History   Problem Relation Age of Onset     Cardiovascular Mother         CHF     Hypertension Mother      C.A.D. Mother         50s     Lipids Mother      Coronary Artery Disease Mother      Depression Mother      Anxiety Disorder Mother      Cancer Father         Hodgkin's, Leukemia     Other Cancer Father         Hodgkins     Hypertension Brother      Diabetes Brother      Coronary Artery Disease Brother      Other Cancer Brother         Hodgkins     Cancer Brother         Hodgkin's     Coronary Artery Disease Brother      Hypertension Brother      Anxiety Disorder Brother      C.A.D. Brother 61     C.A.D. Brother      Sleep Apnea Sister      Diabetes Sister      Coronary Artery Disease Sister      Hypertension Sister      Depression Sister      Anxiety Disorder Sister      Obesity Sister      Connective Tissue Disorder Sister         Lupus     Diabetes Sister      Other Cancer Sister         Leukemia     Depression Sister      Anxiety Disorder Sister      Lipids Sister      Hypertension Sister      Hypertension Sister      Hypertension Sister      Basal cell carcinoma Daughter 38        top of head      "Mental Illness Maternal Grandfather      Hypertension Daughter      Depression Daughter      Anxiety Disorder Daughter      Obesity Daughter      Obesity Son      Obesity Daughter        REVIEW OF SYSTEMS:  Constitutional:  No weight loss, fever, chills  HEENT:  Eyes:  No visual loss, blurred vision, double vision or yellow sclerae. No hearing loss, sneezing, congestion, runny nose or sore throat.  Skin:  No rash or itching.  Cardiovascular: per HPI  Respiratory: per HPI  GI:  No anorexia, nausea, vomiting or diarrhea. No abdominal pain or blood.  :  No dysurea, hematuria  Neurologic:  No headache, paralysis, ataxia, numbness or tingling in the extremities. No change in bowel or bladder control.  Musculoskeletal:  No muscle pain  Hematologic:  No bleeding or bruising.  Lymphatics:  No enlarged nodes. No history of splenectomy.  Endocrine:  No reports of sweating, cold or heat intolerance. No polyuria or polydipsia.  Allergies:  No history of asthma, hives, eczema or rhinitis.    PHYSICAL EXAM:  /80 (BP Location: Right arm, Patient Position: Sitting, Cuff Size: Adult Regular)   Pulse 78   Ht 1.473 m (4' 10\")   Wt 110.1 kg (242 lb 11.2 oz)   LMP 09/15/2007   SpO2 97%   BMI 50.72 kg/m    Constitutional: awake, alert, no distress  Eyes: PERRL, sclera nonicteric  ENT: trachea midline  Respiratory: Lungs clear  Cardiovascular: Regular rate and rhythm, no murmurs  GI: nondistended, nontender, bowel sounds present  Lymph/Hematologic: no lymphadenopathy  Skin: dry, no rash  Musculoskeletal: good muscle tone, strength 5/5 in upper and lower extremities  Neurologic: no focal deficits  Neuropsychiatric: appropriate affact    DATA:  Labs:     Recent Labs   Lab Test 01/25/24  1519 06/05/23  1408   CHOL 262* 177   HDL 39* 40*   * 104*   TRIG 128 164*   August 2024: Potassium 3.8, creatinine 0.9, NT proBNP 470    ASSESSMENT:  68-year-old female seen for mild nonischemic cardiomyopathy.  Etiology of " cardiomyopathy is unclear, but could have been a stress component with her recent pneumonia.  CTA showed only mild CAD.  She is on appropriate medical therapy.  BNP is normal.  Echo will be repeated to follow-up on ejection fraction.    RECOMMENDATIONS:  1.  Mild nonischemic cardiomyopathy  -Continue current medications  -Limited echo    Follow-up in about 3 months with JOHAN.    Nakul Ramos MD  Cardiology - [facility]  Pager:  [phone]  Text Page  August 21, 2024        Thank you for allowing me to participate in the care of your patient.      Sincerely,     Nakul Ramos MD     [facility]  cc:   Elizabeth Weaver PA-C  [address] [place] [address]

## 2024-08-21 NOTE — PATIENT INSTRUCTIONS
Keep medications the same.    Will schedule an echo to look at the heart function.    Echocardiogram  Will schedule an echocardiogram, or ultrasound of the heart.  This looks at the size and function of the heart and valves.  It generally takes about 20-30 minutes.  This will tell if there is any reduced heart function or problem with any of the valves.    To speak to one of our cardiology nurses, call [phone].    To call to schedule any cardiology appointments call [phone].

## 2024-08-29 ENCOUNTER — TRANSFERRED RECORDS (OUTPATIENT)
Dept: HEALTH INFORMATION MANAGEMENT | Facility: CLINIC | Age: 69
End: 2024-08-29
Payer: MEDICARE

## 2024-08-30 ENCOUNTER — TRANSFERRED RECORDS (OUTPATIENT)
Dept: HEALTH INFORMATION MANAGEMENT | Facility: CLINIC | Age: 69
End: 2024-08-30
Payer: MEDICARE

## 2024-09-06 ENCOUNTER — MYC REFILL (OUTPATIENT)
Dept: INTERNAL MEDICINE | Facility: CLINIC | Age: 69
End: 2024-09-06
Payer: MEDICARE

## 2024-09-06 DIAGNOSIS — G47.00 INSOMNIA, UNSPECIFIED TYPE: ICD-10-CM

## 2024-09-06 DIAGNOSIS — F41.1 GENERALIZED ANXIETY DISORDER: ICD-10-CM

## 2024-09-07 RX ORDER — CLONAZEPAM 1 MG/1
1 TABLET ORAL 2 TIMES DAILY PRN
Qty: 60 TABLET | Refills: 0 | Status: SHIPPED | OUTPATIENT
Start: 2024-09-07

## 2024-09-07 RX ORDER — ZOLPIDEM TARTRATE 10 MG/1
10 TABLET ORAL AT BEDTIME
Qty: 30 TABLET | Refills: 0 | Status: SHIPPED | OUTPATIENT
Start: 2024-09-07

## 2024-09-30 ENCOUNTER — APPOINTMENT (OUTPATIENT)
Dept: CT IMAGING | Facility: CLINIC | Age: 69
End: 2024-09-30
Attending: EMERGENCY MEDICINE
Payer: MEDICARE

## 2024-09-30 ENCOUNTER — APPOINTMENT (OUTPATIENT)
Dept: GENERAL RADIOLOGY | Facility: CLINIC | Age: 69
End: 2024-09-30
Attending: STUDENT IN AN ORGANIZED HEALTH CARE EDUCATION/TRAINING PROGRAM
Payer: MEDICARE

## 2024-09-30 ENCOUNTER — HOSPITAL ENCOUNTER (EMERGENCY)
Facility: CLINIC | Age: 69
Discharge: HOME | End: 2024-09-30
Attending: EMERGENCY MEDICINE | Admitting: EMERGENCY MEDICINE
Payer: MEDICARE

## 2024-09-30 VITALS
RESPIRATION RATE: 20 BRPM | OXYGEN SATURATION: 93 % | TEMPERATURE: 97.6 F | BODY MASS INDEX: 52.85 KG/M2 | WEIGHT: 251.77 LBS | SYSTOLIC BLOOD PRESSURE: 144 MMHG | DIASTOLIC BLOOD PRESSURE: 71 MMHG | HEIGHT: 58 IN | HEART RATE: 87 BPM

## 2024-09-30 DIAGNOSIS — J69.0 ASPIRATION PNEUMONIA OF BOTH LUNGS, UNSPECIFIED ASPIRATION PNEUMONIA TYPE, UNSPECIFIED PART OF LUNG (H): ICD-10-CM

## 2024-09-30 DIAGNOSIS — R91.8 PULMONARY NODULES: ICD-10-CM

## 2024-09-30 LAB
ANION GAP SERPL CALCULATED.3IONS-SCNC: 14 MMOL/L (ref 7–15)
BASOPHILS # BLD AUTO: 0 10E3/UL (ref 0–0.2)
BASOPHILS NFR BLD AUTO: 0 %
BUN SERPL-MCNC: 7.4 MG/DL (ref 8–23)
CALCIUM SERPL-MCNC: 8.5 MG/DL (ref 8.8–10.4)
CHLORIDE SERPL-SCNC: 102 MMOL/L (ref 98–107)
CREAT SERPL-MCNC: 0.77 MG/DL (ref 0.51–0.95)
D DIMER PPP FEU-MCNC: 1.18 UG/ML FEU (ref 0–0.5)
EGFRCR SERPLBLD CKD-EPI 2021: 84 ML/MIN/1.73M2
EOSINOPHIL # BLD AUTO: 0.3 10E3/UL (ref 0–0.7)
EOSINOPHIL NFR BLD AUTO: 2 %
ERYTHROCYTE [DISTWIDTH] IN BLOOD BY AUTOMATED COUNT: 14.4 % (ref 10–15)
FLUAV RNA SPEC QL NAA+PROBE: NEGATIVE
FLUBV RNA RESP QL NAA+PROBE: NEGATIVE
GLUCOSE SERPL-MCNC: 124 MG/DL (ref 70–99)
HCO3 SERPL-SCNC: 27 MMOL/L (ref 22–29)
HCT VFR BLD AUTO: 38.7 % (ref 35–47)
HGB BLD-MCNC: 11.7 G/DL (ref 11.7–15.7)
IMM GRANULOCYTES # BLD: 0.1 10E3/UL
IMM GRANULOCYTES NFR BLD: 0 %
LACTATE SERPL-SCNC: 1.1 MMOL/L (ref 0.7–2)
LYMPHOCYTES # BLD AUTO: 1.6 10E3/UL (ref 0.8–5.3)
LYMPHOCYTES NFR BLD AUTO: 11 %
MCH RBC QN AUTO: 24.3 PG (ref 26.5–33)
MCHC RBC AUTO-ENTMCNC: 30.2 G/DL (ref 31.5–36.5)
MCV RBC AUTO: 81 FL (ref 78–100)
MONOCYTES # BLD AUTO: 0.8 10E3/UL (ref 0–1.3)
MONOCYTES NFR BLD AUTO: 6 %
NEUTROPHILS # BLD AUTO: 11.8 10E3/UL (ref 1.6–8.3)
NEUTROPHILS NFR BLD AUTO: 81 %
NRBC # BLD AUTO: 0 10E3/UL
NRBC BLD AUTO-RTO: 0 /100
NT-PROBNP SERPL-MCNC: 547 PG/ML (ref 0–900)
PLATELET # BLD AUTO: 231 10E3/UL (ref 150–450)
POTASSIUM SERPL-SCNC: 3.4 MMOL/L (ref 3.4–5.3)
RBC # BLD AUTO: 4.81 10E6/UL (ref 3.8–5.2)
RSV RNA SPEC NAA+PROBE: NEGATIVE
SARS-COV-2 RNA RESP QL NAA+PROBE: NEGATIVE
SODIUM SERPL-SCNC: 143 MMOL/L (ref 135–145)
TROPONIN T SERPL HS-MCNC: 11 NG/L
TROPONIN T SERPL HS-MCNC: 12 NG/L
WBC # BLD AUTO: 14.6 10E3/UL (ref 4–11)

## 2024-09-30 PROCEDURE — 83605 ASSAY OF LACTIC ACID: CPT | Performed by: EMERGENCY MEDICINE

## 2024-09-30 PROCEDURE — 99291 CRITICAL CARE FIRST HOUR: CPT | Mod: 25

## 2024-09-30 PROCEDURE — 80048 BASIC METABOLIC PNL TOTAL CA: CPT | Performed by: EMERGENCY MEDICINE

## 2024-09-30 PROCEDURE — 93005 ELECTROCARDIOGRAM TRACING: CPT

## 2024-09-30 PROCEDURE — 83880 ASSAY OF NATRIURETIC PEPTIDE: CPT | Performed by: EMERGENCY MEDICINE

## 2024-09-30 PROCEDURE — 84484 ASSAY OF TROPONIN QUANT: CPT | Performed by: EMERGENCY MEDICINE

## 2024-09-30 PROCEDURE — 250N000013 HC RX MED GY IP 250 OP 250 PS 637: Performed by: EMERGENCY MEDICINE

## 2024-09-30 PROCEDURE — 87637 SARSCOV2&INF A&B&RSV AMP PRB: CPT | Performed by: EMERGENCY MEDICINE

## 2024-09-30 PROCEDURE — 36415 COLL VENOUS BLD VENIPUNCTURE: CPT | Performed by: EMERGENCY MEDICINE

## 2024-09-30 PROCEDURE — 250N000009 HC RX 250: Performed by: EMERGENCY MEDICINE

## 2024-09-30 PROCEDURE — 85379 FIBRIN DEGRADATION QUANT: CPT | Performed by: EMERGENCY MEDICINE

## 2024-09-30 PROCEDURE — 94640 AIRWAY INHALATION TREATMENT: CPT

## 2024-09-30 PROCEDURE — 85025 COMPLETE CBC W/AUTO DIFF WBC: CPT | Performed by: EMERGENCY MEDICINE

## 2024-09-30 PROCEDURE — G1010 CDSM STANSON: HCPCS

## 2024-09-30 PROCEDURE — 71046 X-RAY EXAM CHEST 2 VIEWS: CPT

## 2024-09-30 PROCEDURE — 250N000011 HC RX IP 250 OP 636: Performed by: EMERGENCY MEDICINE

## 2024-09-30 RX ORDER — DOXYCYCLINE 100 MG/1
100 CAPSULE ORAL 2 TIMES DAILY
Qty: 14 CAPSULE | Refills: 0 | Status: SHIPPED | OUTPATIENT
Start: 2024-09-30 | End: 2024-10-01

## 2024-09-30 RX ORDER — CEFDINIR 300 MG/1
300 CAPSULE ORAL EVERY 12 HOURS SCHEDULED
Status: DISCONTINUED | OUTPATIENT
Start: 2024-09-30 | End: 2024-10-01 | Stop reason: HOSPADM

## 2024-09-30 RX ORDER — DOXYCYCLINE 100 MG/1
100 CAPSULE ORAL ONCE
Status: COMPLETED | OUTPATIENT
Start: 2024-09-30 | End: 2024-09-30

## 2024-09-30 RX ORDER — ALBUTEROL SULFATE 0.83 MG/ML
2.5 SOLUTION RESPIRATORY (INHALATION) ONCE
Status: COMPLETED | OUTPATIENT
Start: 2024-09-30 | End: 2024-09-30

## 2024-09-30 RX ORDER — IOPAMIDOL 755 MG/ML
90 INJECTION, SOLUTION INTRAVASCULAR ONCE
Status: COMPLETED | OUTPATIENT
Start: 2024-09-30 | End: 2024-09-30

## 2024-09-30 RX ORDER — CEFPODOXIME PROXETIL 200 MG/1
200 TABLET, FILM COATED ORAL 2 TIMES DAILY
Qty: 14 TABLET | Refills: 0 | Status: SHIPPED | OUTPATIENT
Start: 2024-09-30 | End: 2024-10-07

## 2024-09-30 RX ADMIN — CEFDINIR 300 MG: 300 CAPSULE ORAL at 22:26

## 2024-09-30 RX ADMIN — DOXYCYCLINE HYCLATE 100 MG: 100 CAPSULE ORAL at 22:26

## 2024-09-30 RX ADMIN — SODIUM CHLORIDE 100 ML: 9 INJECTION, SOLUTION INTRAVENOUS at 20:41

## 2024-09-30 RX ADMIN — ALBUTEROL SULFATE 2.5 MG: 2.5 SOLUTION RESPIRATORY (INHALATION) at 18:15

## 2024-09-30 RX ADMIN — IOPAMIDOL 90 ML: 755 INJECTION, SOLUTION INTRAVENOUS at 20:35

## 2024-09-30 ASSESSMENT — ACTIVITIES OF DAILY LIVING (ADL)
ADLS_ACUITY_SCORE: 38
ADLS_ACUITY_SCORE: 40
ADLS_ACUITY_SCORE: 40
ADLS_ACUITY_SCORE: 38
ADLS_ACUITY_SCORE: 40

## 2024-09-30 ASSESSMENT — COLUMBIA-SUICIDE SEVERITY RATING SCALE - C-SSRS
2. HAVE YOU ACTUALLY HAD ANY THOUGHTS OF KILLING YOURSELF IN THE PAST MONTH?: NO
1. IN THE PAST MONTH, HAVE YOU WISHED YOU WERE DEAD OR WISHED YOU COULD GO TO SLEEP AND NOT WAKE UP?: NO
6. HAVE YOU EVER DONE ANYTHING, STARTED TO DO ANYTHING, OR PREPARED TO DO ANYTHING TO END YOUR LIFE?: NO

## 2024-09-30 NOTE — ED TRIAGE NOTES
Pt states she thinks she has pneumonia.  Per pt she has a hx of aspiration pneumonia.  She states that starting yesterday she started feeling symptoms of dizziness, chest tightness, SOB and her normal pneumonia symptoms.  She states she has tried hot showers and this has not helped.  She states she uses oxygen at night but not during the day.       Triage Assessment (Adult)       Row Name 09/30/24 1556          Triage Assessment    Airway WDL WDL        Respiratory WDL    Respiratory WDL X;rhythm/pattern     Rhythm/Pattern, Respiratory shortness of breath

## 2024-09-30 NOTE — ED PROVIDER NOTES
Emergency Department Note      History of Present Illness     Chief Complaint   Shortness of Breath      HPI   Nicole Reyes is a 68 year old female presenting to the ER for evaluation of shortness of breath.  Patient reports beginning 4 days ago she developed symptoms of a cough, as well as tightness in her chest.  She notes that she is predisposed to aspiration pneumonia, and states her current symptoms feel similar to that.  She denies fevers.  Denies any known sick contacts.  Has not tested herself for COVID at home.  Denies any significant lower extremity edema or orthopnea.  She does wear oxygen at night, which she reports is for a diagnosis of sleep apnea as she was intolerant of a CPAP machine.  She reports compliance with her diuretic regimen.    Independent Historian   None    Review of External Notes   I reviewed a cardiology visit note from 8/21/2024 I learned patient has a history of cardiomyopathy with an EF of 45-50%.  Patient does have minimal dyspnea on exertion.    Past Medical History     Medical History and Problem List   Past Medical History:   Diagnosis Date    Arthritis     Blood transfusion     CKD (chronic kidney disease) stage 3, GFR 30-59 ml/min (H)     Depressive disorder     Essential hypertension, benign     Gastroesophageal reflux disease     Generalized anxiety disorder     Hernia, abdominal     Hiatal hernia     History of blood transfusion 2011    Hypertension     Insomnia, unspecified     Iron deficiency anemia 08/2009    Major depression     Menopause     Obesity, unspecified     CRISTÓBAL (obstructive sleep apnea)     Other chronic pain     Oxygen dependent     Pneumonia     Pneumonia due to 2019 novel coronavirus 11/08/2021    Postoperative seroma 10/2011    Pure hypercholesterolemia     RLS (restless legs syndrome)     Type II or unspecified type diabetes mellitus without mention of complication, not stated as uncontrolled        Medications   cefpodoxime (VANTIN) 200 MG  tablet  doxycycline hyclate (VIBRAMYCIN) 100 MG capsule  carvedilol (COREG) 12.5 MG tablet  clonazePAM (KLONOPIN) 1 MG tablet  cyclobenzaprine (FLEXERIL) 5 MG tablet  Fish Oil-Krill Oil (KRILL & FISH OIL BLEND PO)  furosemide (LASIX) 20 MG tablet  gabapentin (NEURONTIN) 300 MG capsule  glipiZIDE (GLUCOTROL XL) 10 MG 24 hr tablet  ketoconazole (NIZORAL) 2 % external cream  losartan (COZAAR) 100 MG tablet  multivitamin w/minerals (THERA-VIT-M) tablet  pantoprazole (PROTONIX) 40 MG EC tablet  potassium chloride ER (K-TAB) 20 MEQ CR tablet  rosuvastatin (CRESTOR) 10 MG tablet  semaglutide (OZEMPIC, 0.25 OR 0.5 MG/DOSE,) 2 MG/3ML pen  sertraline (ZOLOFT) 100 MG tablet  spironolactone (ALDACTONE) 25 MG tablet  zolpidem (AMBIEN) 10 MG tablet        Surgical History   Past Surgical History:   Procedure Laterality Date    BREAST BIOPSY, RT/LT      2 times; benign    BREAST SURGERY  ?    Biopsy x 2 Benign     SECTION       SECTION       SECTION      COLONOSCOPY N/A 2020    Procedure: Colonoscopy with biopsy;  Surgeon: Obinna Gutierrez MD;  Location:  OR    DILATION AND CURETTAGE, HYSTEROSCOPY DIAGNOSTIC, COMBINED  2013    Procedure: COMBINED DILATION AND CURETTAGE, HYSTEROSCOPY DIAGNOSTIC;  DILATION AND CURETTAGE, HYSTEROSCOPY DIAGNOSTIC   Converted to a general;  Surgeon: Robi Edwards MD;  Location:  OR    ESOPHAGOSCOPY, GASTROSCOPY, DUODENOSCOPY (EGD), COMBINED N/A 2015    Procedure: COMBINED ESOPHAGOSCOPY, GASTROSCOPY, DUODENOSCOPY (EGD);  Surgeon: Obinna Gutierrez MD;  Location:  GI    ESOPHAGOSCOPY, GASTROSCOPY, DUODENOSCOPY (EGD), COMBINED N/A 2015    Procedure: COMBINED ENDOSCOPIC ULTRASOUND, ESOPHAGOSCOPY, GASTROSCOPY, DUODENOSCOPY (EGD), FINE NEEDLE ASPIRATE/BIOPSY;  Surgeon: Sabine Olivares MD;  Location:  GI    ESOPHAGOSCOPY, GASTROSCOPY, DUODENOSCOPY (EGD), COMBINED N/A 2020    Procedure: ESOPHAGOGASTRODUODENOSCOPY;  Surgeon:  "Ascencion Stauffer MD;  Location: [facility] OR    HERNIORRHAPHY INCISIONAL (LOCATION)  10/08/2011     incarcerated hernia repair with mesh;    HERNIORRHAPHY INCISIONAL (LOCATION)  10/16/2011     repair incisional hernia with mesh, and removal of current implanted mesh;    ZZC NONSPECIFIC PROCEDURE      Hernia repair     [facility] NONSPECIFIC PROCEDURE      Lymph node biopsy in neck (benign)        Physical Exam     Patient Vitals for the past 24 hrs:   BP Temp Temp src Pulse Resp SpO2 Height Weight   09/30/24 2233 (!) 144/71 -- -- 87 20 93 % -- --   09/30/24 2207 (!) 147/91 -- -- -- -- 93 % -- --   09/30/24 1558 (!) 141/76 97.6  F (36.4  C) Temporal 109 22 94 % 1.473 m (4' 10\") 114.2 kg (251 lb 12.3 oz)     Physical Exam  General:   Well-nourished   Speaking in full sentences   Intermittent cough  Eyes:   Conjunctiva without injection or scleral icterus  ENT:   Moist mucous membranes   Nares patent   Pinnae normal  Neck:   Full ROM   No stiffness appreciated  Resp:   Faint wheeze to right lower base   Remainder of lung fields are CTA   No crackles  CV:    Normal rate, regular rhythm   S1 and S2 present   No murmur, gallop or rub  GI:   BS present   Abdomen soft without distention   Non-tender to light and deep palpation   No guarding or rebound tenderness  Skin:   Warm, dry, well perfused   No rashes or open wounds on exposed skin  MSK:   Moves all extremities   No focal deformities or swelling  Neuro:   Alert   Answers questions appropriately   Moves all extremities equally   Gait stable  Psych:   Normal affect, normal mood      Diagnostics     Lab Results   Labs Ordered and Resulted from Time of ED Arrival to Time of ED Departure   BASIC METABOLIC PANEL - Abnormal       Result Value    Sodium 143      Potassium 3.4      Chloride 102      Carbon Dioxide (CO2) 27      Anion Gap 14      Urea Nitrogen 7.4 (*)     Creatinine 0.77      GFR Estimate 84      Calcium 8.5 (*)     Glucose 124 (*)    D DIMER QUANTITATIVE - Abnormal    " D-Dimer Quantitative 1.18 (*)    CBC WITH PLATELETS AND DIFFERENTIAL - Abnormal    WBC Count 14.6 (*)     RBC Count 4.81      Hemoglobin 11.7      Hematocrit 38.7      MCV 81      MCH 24.3 (*)     MCHC 30.2 (*)     RDW 14.4      Platelet Count 231      % Neutrophils 81      % Lymphocytes 11      % Monocytes 6      % Eosinophils 2      % Basophils 0      % Immature Granulocytes 0      NRBCs per 100 WBC 0      Absolute Neutrophils 11.8 (*)     Absolute Lymphocytes 1.6      Absolute Monocytes 0.8      Absolute Eosinophils 0.3      Absolute Basophils 0.0      Absolute Immature Granulocytes 0.1      Absolute NRBCs 0.0     TROPONIN T, HIGH SENSITIVITY - Normal    Troponin T, High Sensitivity 12     LACTIC ACID WHOLE BLOOD WITH 1X REPEAT IN 2 HR WHEN >2 - Normal    Lactic Acid, Initial 1.1     INFLUENZA A/B, RSV, & SARS-COV2 PCR - Normal    Influenza A PCR Negative      Influenza B PCR Negative      RSV PCR Negative      SARS CoV2 PCR Negative     NT PROBNP INPATIENT - Normal    N terminal Pro BNP Inpatient 547     TROPONIN T, HIGH SENSITIVITY - Normal    Troponin T, High Sensitivity 11         Imaging   CT Chest Pulmonary Embolism w Contrast   Final Result   IMPRESSION:   1.  No pulmonary embolism.      2.  New geographic regions of groundglass infiltrate right lower lobe and medial segment right middle lobe compatible with inflammation. Persistent left lung central infiltrate with slight improvement left base. Again seen is left hilar lymphadenopathy    which is likely reactive. This is unchanged.      3.  Large calcified gallstone.      4.  Moderate-sized esophageal hiatal hernia.      Chest XR,  PA & LAT   Final Result   IMPRESSION: Moderate hiatal hernia. There are no acute infiltrates.   The cardiac silhouette is not enlarged. Pulmonary vasculature is   unremarkable.       KELLEY LOPEZ MD            SYSTEM ID:  [id number]          EKG   ECG results from 09/30/24   EKG 12 lead     Value    Systolic Blood Pressure      Diastolic Blood Pressure     Ventricular Rate 106    Atrial Rate 106    KS Interval 192    QRS Duration 76        QTc 446    P Axis 44    R AXIS -13    T Axis 56    Interpretation ECG      Sinus tachycardia  Low voltage QRS  Cannot rule out Anterior infarct (cited on or before 19-Jul-2021)  Abnormal ECG  When compared with ECG of 07-Jun-2024 08:57, (unconfirmed)  Vent. rate has increased by  36 bpm  QRS axis Shifted right  Confirmed by - EMERGENCY ROOM, PHYSICIAN ([id number]),  SVEN TUCKER ([id number]) on 10/1/2024 6:50:13 AM       *Note: Due to a large number of results and/or encounters for the requested time period, some results have not been displayed. A complete set of results can be found in Results Review.         Independent Interpretation   Chest x-ray reviewed and no acute infiltrate is appreciated.    ED Course      Medications Administered   Medications   albuterol (PROVENTIL) neb solution 2.5 mg (2.5 mg Nebulization $Given 9/30/24 1815)   iopamidol (ISOVUE-370) solution 90 mL (90 mLs Intravenous $Given 9/30/24 2035)   sodium chloride 0.9 % bag 500mL for CT scan flush use (100 mLs Intravenous $Given 9/30/24 2041)   doxycycline hyclate (VIBRAMYCIN) capsule 100 mg (100 mg Oral $Given 9/30/24 2226)       Procedures   Procedures     Discussion of Management   None    ED Course   ED Course as of 10/01/24 0837   Mon Sep 30, 2024   1837 Patient re-evaluated       Additional Documentation  None    Medical Decision Making / Diagnosis       MIPS   CT for PE was ordered because the patient had an abnormal d-dimer.    KAREN Reyes is a 68 year old female presenting to the ER for evaluation of shortness of breath.  VS on presentation reveal elevated BP, and mild tachycardia which improved on recheck.  History, exam, and ED course as noted above.  Based on the above workup, concern for underlying pneumonia, possibly aspiration in etiology.  Patient notes having this multiple times in the past.   Initial chest x-ray demonstrates moderate hiatal hernia, though no clear infiltrate.  Other etiologies were considered including pulmonary embolism.  D-dimer returned elevated prompting CT of the chest.  Fortunately, no evidence of acute PE or acute thoracic abnormality is identified.  Patient is however noted to have new groundglass infiltrate to the right lower lobe and medial segment of the right middle lobe.  She again has central lung infiltrate on the left with improvement on the left base with associated left hilar lymphadenopathy, likely reactive.  Patient will be started on antibiotics and given comorbid disease, we will proceed with dual antibiotic therapy.  She does acknowledge a penicillin allergy, though has tolerated cephalosporins in the past.  As such, will begin cefpodoxime and doxycycline.  Viral testing returned negative for COVID, influenza, or RSV.  Labs reveal leukocytosis with left shift.  EKG demonstrates sinus rhythm without acute ischemic changes compared with previous.  High-sensitivity troponin on initial and repeat check returned within normal limits and overall, feel this is unlikely to represent ACS.  BNP also normal, arguing against acute CHF.  Results and clinical impression discussed with patient.  She is without hypoxia, and feeling improved following bronchodilator therapy, suggesting a component of bronchospasm.  With reasonable clinical certainty I feel she can safely be discharged and begin antibiotic therapy as an outpatient.  She is to follow closely with primary care provider.  We reviewed the incidental finding of pulmonary nodule, which will require ongoing surveillance.  Patient understanding of this.  She is to return to the ER should she develop worsening cough, shortness of breath, fevers, chills or any other concerns.  Questions answered prior to discharge.    Disposition   The patient was discharged.     Diagnosis     ICD-10-CM    1. Aspiration pneumonia of both  lungs, unspecified aspiration pneumonia type, unspecified part of lung (H)  J69.0       2. Pulmonary nodules  R91.8            Discharge Medications   Discharge Medication List as of 9/30/2024 10:19 PM        START taking these medications    Details   cefpodoxime (VANTIN) 200 MG tablet Take 1 tablet (200 mg) by mouth 2 times daily for 7 days., Disp-14 tablet, R-0, E-Prescribe      doxycycline hyclate (VIBRAMYCIN) 100 MG capsule Take 1 capsule (100 mg) by mouth 2 times daily., Disp-14 capsule, R-0, Local Print               MD Maureen Taylor Brian Donald, MD  10/01/24 1522

## 2024-09-30 NOTE — Clinical Note
Nicole Reyes was seen and treated in our emergency department on 9/30/2024.  She may return to work on 10/01/2024.  May not lift with right arm until seen in follow-up with orthopedic surgery.     If you have any questions or concerns, please don't hesitate to call.      Tal Reddy MD

## 2024-10-01 LAB
ATRIAL RATE - MUSE: 106 BPM
DIASTOLIC BLOOD PRESSURE - MUSE: NORMAL MMHG
INTERPRETATION ECG - MUSE: NORMAL
P AXIS - MUSE: 44 DEGREES
PR INTERVAL - MUSE: 192 MS
QRS DURATION - MUSE: 76 MS
QT - MUSE: 336 MS
QTC - MUSE: 446 MS
R AXIS - MUSE: -13 DEGREES
SYSTOLIC BLOOD PRESSURE - MUSE: NORMAL MMHG
T AXIS - MUSE: 56 DEGREES
VENTRICULAR RATE- MUSE: 106 BPM

## 2024-10-01 RX ORDER — DOXYCYCLINE 100 MG/1
100 CAPSULE ORAL 2 TIMES DAILY
Qty: 14 CAPSULE | Refills: 0 | Status: SHIPPED | OUTPATIENT
Start: 2024-10-01

## 2024-10-01 NOTE — DISCHARGE INSTRUCTIONS
Please begin antibiotics as discussed.  Follow-up closely with your primary care provider in 2 to 3 days for recheck    Please follow-up with the incidentally noted lung nodule as we discussed    Return to the ER if you develop any new or troubling symptoms including worsening shortness of breath, fevers, chills or any other concerns.

## 2024-10-01 NOTE — ED NOTES
Attempted 18G, no success.   Has an IV but out of vein too far for CT scan. CT tech looked at IV.

## 2024-10-02 ENCOUNTER — PATIENT OUTREACH (OUTPATIENT)
Dept: CARE COORDINATION | Facility: CLINIC | Age: 69
End: 2024-10-02
Payer: MEDICARE

## 2024-10-02 NOTE — LETTER
Nicole Reyes  [address]  [place] [address]    Dear Nicole Reyes,      I am a team member within the [facility] with [facility]. I recently tried to reach you to ensure you were doing well following a recent visit within our health system. I also wanted to take this chance to introduce Clinic Care Coordination.     Below is a description of Clinic Care Coordination and how this team can further assist you:       The Clinic Care Coordination team is made up of a Registered Nurse, , Financial Resource Worker, and a Community Health Worker who understand and can help navigate the health care system. The goal of clinic care coordination is to help you manage your health, improve access to care, and achieve optimal health outcomes. They work alongside your provider to assist you in determining your health and social needs, obtain health care and community resources, and provide you with necessary information and education. Clinic Care Coordination can work with you through any barriers and develop a care plan that helps coordinate and strengthen the relationship between you and your care team.    If you wish to connect with the Clinic Care Coordination Team, please let your [facility] Primary Care Provider or Clinic Care Team know and they can place a referral. The Clinic Care Coordination team will then reach out by phone to further support you.    We are focused on providing you with the highest-quality healthcare experience possible.    Sincerely,   Savi TIMMONS  Community Health Worker    Connected Care Resources   [facility]

## 2024-10-02 NOTE — PROGRESS NOTES
[facility] Care [facility] Contact  [facility]/Voicemail     Clinical Data: Care Coordination ED-sourced Outreach-     Outreach attempted x 2.  Left message on patient's voicemail, providing [facility]'s 24/7 scheduling and nurse triage phone number [phone] ([phone]) for questions/concerns and/or to schedule an appt with an [facility] provider.      Care Coordination introduction letter with explanation of Clinic Care Coordination services sent to patient via Intrinsityt. Clinic Care Coordination services remain available via referral if needed.    Plan: [facility] will do no further outreaches at this time.       BLANCA Bassett  [facility] Resource [place], [facility]    *Connected Care Resource Team does NOT follow patient ongoing. Referrals are identified based on internal discharge reports and the outreach is to ensure patient has an understanding of their discharge instructions.

## 2024-10-03 DIAGNOSIS — E87.6 LOW SERUM POTASSIUM: ICD-10-CM

## 2024-10-07 RX ORDER — POTASSIUM CHLORIDE 1500 MG/1
TABLET, EXTENDED RELEASE ORAL
Qty: 180 TABLET | Refills: 1 | Status: SHIPPED | OUTPATIENT
Start: 2024-10-07

## 2024-10-09 ENCOUNTER — PATIENT OUTREACH (OUTPATIENT)
Dept: CARE COORDINATION | Facility: CLINIC | Age: 69
End: 2024-10-09
Payer: MEDICARE

## 2024-10-09 NOTE — PROGRESS NOTES
[facility]  Medicare ACO Reach Population - Proactive Outreach  Unable to Reach    Background: Patient outreach conducted proactively to support health maintenance initiatives within [facility]'s Medicare ACO Reach Population.     Outreach attempted x 1.  Left message on patient's voicemail briefly explaining this is a proactive outreach from [facility].. Patient encouraged to call the [facility] scheduling team at [phone] with any scheduling needs or questions, or they can conveniently schedule via Animail.    Plan:  Care Coordinator will try to reach patient again in 1-2 business days.    Radha Recinos RN  [facility]

## 2024-10-10 ENCOUNTER — TRANSFERRED RECORDS (OUTPATIENT)
Dept: HEALTH INFORMATION MANAGEMENT | Facility: CLINIC | Age: 69
End: 2024-10-10
Payer: MEDICARE

## 2024-10-14 DIAGNOSIS — G89.29 CHRONIC BILATERAL LOW BACK PAIN WITHOUT SCIATICA: ICD-10-CM

## 2024-10-14 DIAGNOSIS — G47.00 INSOMNIA, UNSPECIFIED TYPE: ICD-10-CM

## 2024-10-14 DIAGNOSIS — F41.1 GENERALIZED ANXIETY DISORDER: ICD-10-CM

## 2024-10-14 DIAGNOSIS — M54.50 CHRONIC BILATERAL LOW BACK PAIN WITHOUT SCIATICA: ICD-10-CM

## 2024-10-15 ENCOUNTER — HOSPITAL ENCOUNTER (OUTPATIENT)
Dept: CARDIOLOGY | Facility: CLINIC | Age: 69
Discharge: HOME | End: 2024-10-15
Attending: INTERNAL MEDICINE | Admitting: INTERNAL MEDICINE
Payer: MEDICARE

## 2024-10-15 DIAGNOSIS — I50.22 HEART FAILURE WITH MILDLY REDUCED EJECTION FRACTION (HFMREF) (H): ICD-10-CM

## 2024-10-15 LAB — LVEF ECHO: NORMAL

## 2024-10-15 PROCEDURE — 93321 DOPPLER ECHO F-UP/LMTD STD: CPT

## 2024-10-15 PROCEDURE — 93308 TTE F-UP OR LMTD: CPT | Mod: 26 | Performed by: INTERNAL MEDICINE

## 2024-10-15 PROCEDURE — 93325 DOPPLER ECHO COLOR FLOW MAPG: CPT | Mod: 26 | Performed by: INTERNAL MEDICINE

## 2024-10-15 PROCEDURE — 93325 DOPPLER ECHO COLOR FLOW MAPG: CPT

## 2024-10-15 PROCEDURE — 93321 DOPPLER ECHO F-UP/LMTD STD: CPT | Mod: 26 | Performed by: INTERNAL MEDICINE

## 2024-10-15 RX ORDER — ZOLPIDEM TARTRATE 10 MG/1
10 TABLET ORAL AT BEDTIME
Qty: 30 TABLET | Refills: 0 | Status: SHIPPED | OUTPATIENT
Start: 2024-10-15

## 2024-10-15 RX ORDER — GABAPENTIN 300 MG/1
CAPSULE ORAL
Qty: 240 CAPSULE | Refills: 0 | Status: SHIPPED | OUTPATIENT
Start: 2024-10-15

## 2024-10-15 RX ORDER — CLONAZEPAM 1 MG/1
1 TABLET ORAL 2 TIMES DAILY PRN
Qty: 60 TABLET | Refills: 0 | Status: SHIPPED | OUTPATIENT
Start: 2024-10-15

## 2024-10-22 ENCOUNTER — TRANSFERRED RECORDS (OUTPATIENT)
Dept: HEALTH INFORMATION MANAGEMENT | Facility: CLINIC | Age: 69
End: 2024-10-22
Payer: MEDICARE

## 2024-11-13 ENCOUNTER — MYC REFILL (OUTPATIENT)
Dept: INTERNAL MEDICINE | Facility: CLINIC | Age: 69
End: 2024-11-13
Payer: MEDICARE

## 2024-11-13 DIAGNOSIS — F41.1 GENERALIZED ANXIETY DISORDER: ICD-10-CM

## 2024-11-13 DIAGNOSIS — G47.00 INSOMNIA, UNSPECIFIED TYPE: ICD-10-CM

## 2024-11-17 ENCOUNTER — MYC REFILL (OUTPATIENT)
Dept: INTERNAL MEDICINE | Facility: CLINIC | Age: 69
End: 2024-11-17
Payer: MEDICARE

## 2024-11-17 DIAGNOSIS — G47.00 INSOMNIA, UNSPECIFIED TYPE: ICD-10-CM

## 2024-11-17 RX ORDER — ZOLPIDEM TARTRATE 10 MG/1
10 TABLET ORAL AT BEDTIME
Qty: 30 TABLET | Refills: 0 | Status: SHIPPED | OUTPATIENT
Start: 2024-11-17

## 2024-11-17 RX ORDER — CLONAZEPAM 1 MG/1
1 TABLET ORAL 2 TIMES DAILY PRN
Qty: 60 TABLET | Refills: 0 | Status: SHIPPED | OUTPATIENT
Start: 2024-11-17

## 2024-11-18 RX ORDER — ZOLPIDEM TARTRATE 10 MG/1
10 TABLET ORAL AT BEDTIME
Qty: 30 TABLET | Refills: 0 | OUTPATIENT
Start: 2024-11-18

## 2024-11-29 ENCOUNTER — APPOINTMENT (OUTPATIENT)
Dept: CT IMAGING | Facility: CLINIC | Age: 69
End: 2024-11-29
Attending: EMERGENCY MEDICINE
Payer: MEDICARE

## 2024-11-29 ENCOUNTER — HOSPITAL ENCOUNTER (INPATIENT)
Facility: CLINIC | Age: 69
LOS: 4 days | Discharge: HOME | End: 2024-12-03
Attending: EMERGENCY MEDICINE | Admitting: HOSPITALIST
Payer: MEDICARE

## 2024-11-29 DIAGNOSIS — R06.89 RESPIRATORY INSUFFICIENCY: ICD-10-CM

## 2024-11-29 DIAGNOSIS — Z79.899 CONTROLLED SUBSTANCE AGREEMENT SIGNED: ICD-10-CM

## 2024-11-29 DIAGNOSIS — K80.20 CALCULUS OF GALLBLADDER WITHOUT CHOLECYSTITIS WITHOUT OBSTRUCTION: ICD-10-CM

## 2024-11-29 DIAGNOSIS — I10 BENIGN ESSENTIAL HYPERTENSION: ICD-10-CM

## 2024-11-29 DIAGNOSIS — M43.16 SPONDYLOLISTHESIS OF LUMBAR REGION: ICD-10-CM

## 2024-11-29 DIAGNOSIS — D17.71 ANGIOMYOLIPOMA OF KIDNEY: ICD-10-CM

## 2024-11-29 DIAGNOSIS — M48.062 SPINAL STENOSIS, LUMBAR REGION, WITH NEUROGENIC CLAUDICATION: ICD-10-CM

## 2024-11-29 DIAGNOSIS — M47.896 OTHER SPONDYLOSIS, LUMBAR REGION: ICD-10-CM

## 2024-11-29 DIAGNOSIS — M25.512 PAIN OF BOTH SHOULDER JOINTS: ICD-10-CM

## 2024-11-29 DIAGNOSIS — M54.50 ACUTE LOW BACK PAIN, UNSPECIFIED BACK PAIN LATERALITY, UNSPECIFIED WHETHER SCIATICA PRESENT: ICD-10-CM

## 2024-11-29 DIAGNOSIS — G25.81 RESTLESS LEG SYNDROME: ICD-10-CM

## 2024-11-29 DIAGNOSIS — G47.00 INSOMNIA, UNSPECIFIED TYPE: ICD-10-CM

## 2024-11-29 DIAGNOSIS — G89.29 CHRONIC BILATERAL LOW BACK PAIN WITHOUT SCIATICA: ICD-10-CM

## 2024-11-29 DIAGNOSIS — K44.9 HIATAL HERNIA: ICD-10-CM

## 2024-11-29 DIAGNOSIS — E11.9 TYPE 2 DIABETES MELLITUS WITHOUT COMPLICATION, WITHOUT LONG-TERM CURRENT USE OF INSULIN (H): ICD-10-CM

## 2024-11-29 DIAGNOSIS — M48.062 SPINAL STENOSIS OF LUMBAR REGION WITH NEUROGENIC CLAUDICATION: ICD-10-CM

## 2024-11-29 DIAGNOSIS — J69.0 RECURRENT ASPIRATION PNEUMONIA (H): ICD-10-CM

## 2024-11-29 DIAGNOSIS — E83.42 HYPOMAGNESEMIA: ICD-10-CM

## 2024-11-29 DIAGNOSIS — R06.02 SOB (SHORTNESS OF BREATH): Primary | ICD-10-CM

## 2024-11-29 DIAGNOSIS — E87.20 LACTIC ACIDOSIS: ICD-10-CM

## 2024-11-29 DIAGNOSIS — E78.5 HYPERLIPIDEMIA LDL GOAL <100: ICD-10-CM

## 2024-11-29 DIAGNOSIS — E87.6 HYPOKALEMIA: ICD-10-CM

## 2024-11-29 DIAGNOSIS — D64.9 ANEMIA, UNSPECIFIED TYPE: ICD-10-CM

## 2024-11-29 DIAGNOSIS — R79.89 ELEVATED BRAIN NATRIURETIC PEPTIDE (BNP) LEVEL: ICD-10-CM

## 2024-11-29 DIAGNOSIS — J96.01 ACUTE RESPIRATORY FAILURE WITH HYPOXIA (H): ICD-10-CM

## 2024-11-29 DIAGNOSIS — M43.16 SPONDYLOLISTHESIS, LUMBAR REGION: ICD-10-CM

## 2024-11-29 DIAGNOSIS — J18.9 COMMUNITY ACQUIRED PNEUMONIA OF LEFT LOWER LOBE OF LUNG: ICD-10-CM

## 2024-11-29 DIAGNOSIS — J96.21 ACUTE ON CHRONIC RESPIRATORY FAILURE WITH HYPOXIA (H): ICD-10-CM

## 2024-11-29 DIAGNOSIS — G47.33 OSA (OBSTRUCTIVE SLEEP APNEA): ICD-10-CM

## 2024-11-29 DIAGNOSIS — E66.01 MORBID OBESITY (H): ICD-10-CM

## 2024-11-29 DIAGNOSIS — F33.0 MILD EPISODE OF RECURRENT MAJOR DEPRESSIVE DISORDER (H): ICD-10-CM

## 2024-11-29 DIAGNOSIS — M54.50 CHRONIC BILATERAL LOW BACK PAIN WITHOUT SCIATICA: ICD-10-CM

## 2024-11-29 DIAGNOSIS — I50.22 HEART FAILURE WITH MILDLY REDUCED EJECTION FRACTION (HFMREF) (H): ICD-10-CM

## 2024-11-29 DIAGNOSIS — D72.829 LEUKOCYTOSIS, UNSPECIFIED TYPE: ICD-10-CM

## 2024-11-29 DIAGNOSIS — M51.16 INTERVERTEBRAL DISC DISORDERS WITH RADICULOPATHY, LUMBAR REGION: ICD-10-CM

## 2024-11-29 DIAGNOSIS — M25.511 PAIN OF BOTH SHOULDER JOINTS: ICD-10-CM

## 2024-11-29 DIAGNOSIS — F41.1 GENERALIZED ANXIETY DISORDER: ICD-10-CM

## 2024-11-29 DIAGNOSIS — J18.9 MULTIFOCAL PNEUMONIA: ICD-10-CM

## 2024-11-29 DIAGNOSIS — R79.89 ELEVATED TROPONIN: ICD-10-CM

## 2024-11-29 PROBLEM — J96.91 HYPOXIC RESPIRATORY FAILURE (H): Status: ACTIVE | Noted: 2024-11-29

## 2024-11-29 LAB
ANION GAP SERPL CALCULATED.3IONS-SCNC: 14 MMOL/L (ref 7–15)
ATRIAL RATE - MUSE: 88 BPM
BASE EXCESS BLDV CALC-SCNC: 4.6 MMOL/L (ref -3–3)
BASOPHILS # BLD AUTO: 0 10E3/UL (ref 0–0.2)
BASOPHILS NFR BLD AUTO: 0 %
BUN SERPL-MCNC: 8.4 MG/DL (ref 8–23)
CALCIUM SERPL-MCNC: 8.5 MG/DL (ref 8.8–10.4)
CHLORIDE SERPL-SCNC: 102 MMOL/L (ref 98–107)
CREAT SERPL-MCNC: 0.84 MG/DL (ref 0.51–0.95)
D DIMER PPP FEU-MCNC: 0.93 UG/ML FEU (ref 0–0.5)
DIASTOLIC BLOOD PRESSURE - MUSE: NORMAL MMHG
EGFRCR SERPLBLD CKD-EPI 2021: 75 ML/MIN/1.73M2
EOSINOPHIL # BLD AUTO: 0.1 10E3/UL (ref 0–0.7)
EOSINOPHIL NFR BLD AUTO: 2 %
ERYTHROCYTE [DISTWIDTH] IN BLOOD BY AUTOMATED COUNT: 15.2 % (ref 10–15)
FLUAV RNA SPEC QL NAA+PROBE: NEGATIVE
FLUBV RNA RESP QL NAA+PROBE: NEGATIVE
GLUCOSE BLDC GLUCOMTR-MCNC: 134 MG/DL (ref 70–99)
GLUCOSE BLDC GLUCOMTR-MCNC: 150 MG/DL (ref 70–99)
GLUCOSE SERPL-MCNC: 211 MG/DL (ref 70–99)
HCO3 BLDV-SCNC: 31 MMOL/L (ref 21–28)
HCO3 BLDV-SCNC: 32 MMOL/L (ref 21–28)
HCO3 SERPL-SCNC: 26 MMOL/L (ref 22–29)
HCT VFR BLD AUTO: 39.9 % (ref 35–47)
HGB BLD-MCNC: 12.2 G/DL (ref 11.7–15.7)
HOLD SPECIMEN: NORMAL
IMM GRANULOCYTES # BLD: 0 10E3/UL
IMM GRANULOCYTES NFR BLD: 0 %
INTERPRETATION ECG - MUSE: NORMAL
LACTATE BLD-SCNC: 2.1 MMOL/L
LACTATE SERPL-SCNC: 1.7 MMOL/L (ref 0.7–2)
LYMPHOCYTES # BLD AUTO: 1.7 10E3/UL (ref 0.8–5.3)
LYMPHOCYTES NFR BLD AUTO: 22 %
MAGNESIUM SERPL-MCNC: 1.2 MG/DL (ref 1.7–2.3)
MCH RBC QN AUTO: 23.4 PG (ref 26.5–33)
MCHC RBC AUTO-ENTMCNC: 30.6 G/DL (ref 31.5–36.5)
MCV RBC AUTO: 77 FL (ref 78–100)
MONOCYTES # BLD AUTO: 0.3 10E3/UL (ref 0–1.3)
MONOCYTES NFR BLD AUTO: 4 %
NEUTROPHILS # BLD AUTO: 5.6 10E3/UL (ref 1.6–8.3)
NEUTROPHILS NFR BLD AUTO: 72 %
NRBC # BLD AUTO: 0 10E3/UL
NRBC BLD AUTO-RTO: 0 /100
NT-PROBNP SERPL-MCNC: 473 PG/ML (ref 0–900)
O2/TOTAL GAS SETTING VFR VENT: 98 %
OXYHGB MFR BLDV: 29 % (ref 70–75)
P AXIS - MUSE: 42 DEGREES
PCO2 BLDV: 63 MM HG (ref 40–50)
PCO2 BLDV: 63 MM HG (ref 40–50)
PH BLDV: 7.3 [PH] (ref 7.32–7.43)
PH BLDV: 7.32 [PH] (ref 7.32–7.43)
PLATELET # BLD AUTO: 258 10E3/UL (ref 150–450)
PO2 BLDV: 22 MM HG (ref 25–47)
PO2 BLDV: 23 MM HG (ref 25–47)
POTASSIUM SERPL-SCNC: 2.9 MMOL/L (ref 3.4–5.3)
PR INTERVAL - MUSE: 198 MS
QRS DURATION - MUSE: 80 MS
QT - MUSE: 390 MS
QTC - MUSE: 471 MS
R AXIS - MUSE: -36 DEGREES
RBC # BLD AUTO: 5.21 10E6/UL (ref 3.8–5.2)
RSV RNA SPEC NAA+PROBE: NEGATIVE
SAO2 % BLDV: 29.1 % (ref 70–75)
SAO2 % BLDV: 32 % (ref 70–75)
SARS-COV-2 RNA RESP QL NAA+PROBE: NEGATIVE
SODIUM SERPL-SCNC: 142 MMOL/L (ref 135–145)
SYSTOLIC BLOOD PRESSURE - MUSE: NORMAL MMHG
T AXIS - MUSE: 89 DEGREES
TROPONIN T SERPL HS-MCNC: 14 NG/L
TROPONIN T SERPL HS-MCNC: 15 NG/L
VENTRICULAR RATE- MUSE: 88 BPM
WBC # BLD AUTO: 7.7 10E3/UL (ref 4–11)

## 2024-11-29 PROCEDURE — 250N000013 HC RX MED GY IP 250 OP 250 PS 637: Performed by: HOSPITALIST

## 2024-11-29 PROCEDURE — 96367 TX/PROPH/DG ADDL SEQ IV INF: CPT

## 2024-11-29 PROCEDURE — 93005 ELECTROCARDIOGRAM TRACING: CPT

## 2024-11-29 PROCEDURE — 83880 ASSAY OF NATRIURETIC PEPTIDE: CPT | Performed by: EMERGENCY MEDICINE

## 2024-11-29 PROCEDURE — 250N000011 HC RX IP 250 OP 636: Performed by: EMERGENCY MEDICINE

## 2024-11-29 PROCEDURE — 82805 BLOOD GASES W/O2 SATURATION: CPT | Performed by: EMERGENCY MEDICINE

## 2024-11-29 PROCEDURE — 83605 ASSAY OF LACTIC ACID: CPT

## 2024-11-29 PROCEDURE — 96361 HYDRATE IV INFUSION ADD-ON: CPT

## 2024-11-29 PROCEDURE — 82565 ASSAY OF CREATININE: CPT | Performed by: EMERGENCY MEDICINE

## 2024-11-29 PROCEDURE — 82803 BLOOD GASES ANY COMBINATION: CPT

## 2024-11-29 PROCEDURE — 258N000003 HC RX IP 258 OP 636: Performed by: HOSPITALIST

## 2024-11-29 PROCEDURE — 96365 THER/PROPH/DIAG IV INF INIT: CPT | Mod: 59

## 2024-11-29 PROCEDURE — 85379 FIBRIN DEGRADATION QUANT: CPT | Performed by: EMERGENCY MEDICINE

## 2024-11-29 PROCEDURE — 250N000009 HC RX 250: Performed by: EMERGENCY MEDICINE

## 2024-11-29 PROCEDURE — 71275 CT ANGIOGRAPHY CHEST: CPT | Mod: MA

## 2024-11-29 PROCEDURE — 87637 SARSCOV2&INF A&B&RSV AMP PRB: CPT | Performed by: EMERGENCY MEDICINE

## 2024-11-29 PROCEDURE — 84484 ASSAY OF TROPONIN QUANT: CPT | Performed by: EMERGENCY MEDICINE

## 2024-11-29 PROCEDURE — 83735 ASSAY OF MAGNESIUM: CPT | Performed by: EMERGENCY MEDICINE

## 2024-11-29 PROCEDURE — 258N000003 HC RX IP 258 OP 636: Performed by: EMERGENCY MEDICINE

## 2024-11-29 PROCEDURE — 120N000001 HC R&B MED SURG/OB

## 2024-11-29 PROCEDURE — 250N000012 HC RX MED GY IP 250 OP 636 PS 637: Performed by: HOSPITALIST

## 2024-11-29 PROCEDURE — 99285 EMERGENCY DEPT VISIT HI MDM: CPT | Mod: 25

## 2024-11-29 PROCEDURE — 85025 COMPLETE CBC W/AUTO DIFF WBC: CPT | Performed by: EMERGENCY MEDICINE

## 2024-11-29 PROCEDURE — 250N000011 HC RX IP 250 OP 636: Performed by: HOSPITALIST

## 2024-11-29 PROCEDURE — 36415 COLL VENOUS BLD VENIPUNCTURE: CPT | Performed by: EMERGENCY MEDICINE

## 2024-11-29 PROCEDURE — 99223 1ST HOSP IP/OBS HIGH 75: CPT | Mod: AI | Performed by: HOSPITALIST

## 2024-11-29 PROCEDURE — 250N000013 HC RX MED GY IP 250 OP 250 PS 637: Performed by: EMERGENCY MEDICINE

## 2024-11-29 PROCEDURE — 80048 BASIC METABOLIC PNL TOTAL CA: CPT | Performed by: EMERGENCY MEDICINE

## 2024-11-29 PROCEDURE — 96375 TX/PRO/DX INJ NEW DRUG ADDON: CPT

## 2024-11-29 PROCEDURE — 87040 BLOOD CULTURE FOR BACTERIA: CPT | Performed by: EMERGENCY MEDICINE

## 2024-11-29 RX ORDER — ROSUVASTATIN CALCIUM 5 MG/1
10 TABLET, COATED ORAL AT BEDTIME
Status: DISCONTINUED | OUTPATIENT
Start: 2024-11-29 | End: 2024-12-03 | Stop reason: HOSPADM

## 2024-11-29 RX ORDER — LIDOCAINE 40 MG/G
CREAM TOPICAL
Status: DISCONTINUED | OUTPATIENT
Start: 2024-11-29 | End: 2024-12-03 | Stop reason: HOSPADM

## 2024-11-29 RX ORDER — CLONAZEPAM 0.5 MG/1
1 TABLET ORAL 2 TIMES DAILY PRN
Status: DISCONTINUED | OUTPATIENT
Start: 2024-11-29 | End: 2024-12-03 | Stop reason: HOSPADM

## 2024-11-29 RX ORDER — ACETAMINOPHEN 650 MG/1
650 SUPPOSITORY RECTAL EVERY 4 HOURS PRN
Status: DISCONTINUED | OUTPATIENT
Start: 2024-11-29 | End: 2024-12-03 | Stop reason: HOSPADM

## 2024-11-29 RX ORDER — CALCIUM CARBONATE 500 MG/1
1000 TABLET, CHEWABLE ORAL 4 TIMES DAILY PRN
Status: DISCONTINUED | OUTPATIENT
Start: 2024-11-29 | End: 2024-12-03 | Stop reason: HOSPADM

## 2024-11-29 RX ORDER — SERTRALINE HYDROCHLORIDE 100 MG/1
200 TABLET, FILM COATED ORAL DAILY
Status: DISCONTINUED | OUTPATIENT
Start: 2024-11-29 | End: 2024-12-03 | Stop reason: HOSPADM

## 2024-11-29 RX ORDER — CARVEDILOL 12.5 MG/1
12.5 TABLET ORAL 2 TIMES DAILY WITH MEALS
Status: DISCONTINUED | OUTPATIENT
Start: 2024-11-29 | End: 2024-12-03 | Stop reason: HOSPADM

## 2024-11-29 RX ORDER — CEFTRIAXONE 2 G/1
2 INJECTION, POWDER, FOR SOLUTION INTRAMUSCULAR; INTRAVENOUS EVERY 24 HOURS
Status: DISCONTINUED | OUTPATIENT
Start: 2024-11-29 | End: 2024-12-03

## 2024-11-29 RX ORDER — ONDANSETRON 2 MG/ML
4 INJECTION INTRAMUSCULAR; INTRAVENOUS EVERY 6 HOURS PRN
Status: DISCONTINUED | OUTPATIENT
Start: 2024-11-29 | End: 2024-12-03 | Stop reason: HOSPADM

## 2024-11-29 RX ORDER — IOPAMIDOL 755 MG/ML
500 INJECTION, SOLUTION INTRAVASCULAR ONCE
Status: COMPLETED | OUTPATIENT
Start: 2024-11-29 | End: 2024-11-29

## 2024-11-29 RX ORDER — POTASSIUM CHLORIDE 1500 MG/1
60 TABLET, EXTENDED RELEASE ORAL ONCE
Status: COMPLETED | OUTPATIENT
Start: 2024-11-29 | End: 2024-11-29

## 2024-11-29 RX ORDER — AMOXICILLIN 250 MG
2 CAPSULE ORAL 2 TIMES DAILY PRN
Status: DISCONTINUED | OUTPATIENT
Start: 2024-11-29 | End: 2024-12-03 | Stop reason: HOSPADM

## 2024-11-29 RX ORDER — ACETAMINOPHEN 325 MG/1
650 TABLET ORAL EVERY 4 HOURS PRN
Status: DISCONTINUED | OUTPATIENT
Start: 2024-11-29 | End: 2024-12-03 | Stop reason: HOSPADM

## 2024-11-29 RX ORDER — AZITHROMYCIN 500 MG/5ML
500 INJECTION, POWDER, LYOPHILIZED, FOR SOLUTION INTRAVENOUS EVERY 24 HOURS
Status: DISCONTINUED | OUTPATIENT
Start: 2024-11-29 | End: 2024-12-02

## 2024-11-29 RX ORDER — CEFEPIME HYDROCHLORIDE 2 G/1
2 INJECTION, POWDER, FOR SOLUTION INTRAVENOUS ONCE
Status: COMPLETED | OUTPATIENT
Start: 2024-11-29 | End: 2024-11-29

## 2024-11-29 RX ORDER — PANTOPRAZOLE SODIUM 40 MG/1
40 TABLET, DELAYED RELEASE ORAL
Status: DISCONTINUED | OUTPATIENT
Start: 2024-11-30 | End: 2024-12-03 | Stop reason: HOSPADM

## 2024-11-29 RX ORDER — GABAPENTIN 300 MG/1
600 CAPSULE ORAL DAILY
Status: DISCONTINUED | OUTPATIENT
Start: 2024-11-30 | End: 2024-12-03 | Stop reason: HOSPADM

## 2024-11-29 RX ORDER — NICOTINE POLACRILEX 4 MG
15-30 LOZENGE BUCCAL
Status: DISCONTINUED | OUTPATIENT
Start: 2024-11-29 | End: 2024-12-03 | Stop reason: HOSPADM

## 2024-11-29 RX ORDER — QUETIAPINE FUMARATE 100 MG/1
100 TABLET, FILM COATED ORAL EVERY EVENING
COMMUNITY

## 2024-11-29 RX ORDER — AMOXICILLIN 250 MG
1 CAPSULE ORAL 2 TIMES DAILY PRN
Status: DISCONTINUED | OUTPATIENT
Start: 2024-11-29 | End: 2024-12-03 | Stop reason: HOSPADM

## 2024-11-29 RX ORDER — GABAPENTIN 300 MG/1
900 CAPSULE ORAL 2 TIMES DAILY
Status: DISCONTINUED | OUTPATIENT
Start: 2024-11-29 | End: 2024-12-03 | Stop reason: HOSPADM

## 2024-11-29 RX ORDER — ENOXAPARIN SODIUM 100 MG/ML
40 INJECTION SUBCUTANEOUS EVERY 12 HOURS
Status: DISCONTINUED | OUTPATIENT
Start: 2024-11-29 | End: 2024-12-01

## 2024-11-29 RX ORDER — DILTIAZEM HYDROCHLORIDE 180 MG/1
360 CAPSULE, COATED, EXTENDED RELEASE ORAL DAILY
Status: DISCONTINUED | OUTPATIENT
Start: 2024-11-29 | End: 2024-12-01

## 2024-11-29 RX ORDER — ONDANSETRON 4 MG/1
4 TABLET, ORALLY DISINTEGRATING ORAL EVERY 6 HOURS PRN
Status: DISCONTINUED | OUTPATIENT
Start: 2024-11-29 | End: 2024-12-03 | Stop reason: HOSPADM

## 2024-11-29 RX ORDER — QUETIAPINE FUMARATE 50 MG/1
100 TABLET, FILM COATED ORAL EVERY EVENING
Status: DISCONTINUED | OUTPATIENT
Start: 2024-11-29 | End: 2024-12-03 | Stop reason: HOSPADM

## 2024-11-29 RX ORDER — MAGNESIUM SULFATE HEPTAHYDRATE 40 MG/ML
2 INJECTION, SOLUTION INTRAVENOUS ONCE
Status: COMPLETED | OUTPATIENT
Start: 2024-11-29 | End: 2024-11-29

## 2024-11-29 RX ORDER — DILTIAZEM HYDROCHLORIDE 360 MG/1
360 CAPSULE, EXTENDED RELEASE ORAL DAILY
Status: ON HOLD | COMMUNITY
End: 2024-12-03

## 2024-11-29 RX ORDER — DEXTROSE MONOHYDRATE 25 G/50ML
25-50 INJECTION, SOLUTION INTRAVENOUS
Status: DISCONTINUED | OUTPATIENT
Start: 2024-11-29 | End: 2024-12-03 | Stop reason: HOSPADM

## 2024-11-29 RX ADMIN — ROSUVASTATIN CALCIUM 10 MG: 5 TABLET, FILM COATED ORAL at 21:41

## 2024-11-29 RX ADMIN — GABAPENTIN 900 MG: 300 CAPSULE ORAL at 21:40

## 2024-11-29 RX ADMIN — POTASSIUM CHLORIDE 60 MEQ: 1500 TABLET, EXTENDED RELEASE ORAL at 13:15

## 2024-11-29 RX ADMIN — IOPAMIDOL 79 ML: 755 INJECTION, SOLUTION INTRAVENOUS at 14:14

## 2024-11-29 RX ADMIN — INSULIN ASPART 1 UNITS: 100 INJECTION, SOLUTION INTRAVENOUS; SUBCUTANEOUS at 18:28

## 2024-11-29 RX ADMIN — MAGNESIUM SULFATE HEPTAHYDRATE 2 G: 40 INJECTION, SOLUTION INTRAVENOUS at 15:18

## 2024-11-29 RX ADMIN — SERTRALINE 200 MG: 100 TABLET, FILM COATED ORAL at 18:30

## 2024-11-29 RX ADMIN — QUETIAPINE FUMARATE 100 MG: 50 TABLET ORAL at 21:41

## 2024-11-29 RX ADMIN — CARVEDILOL 12.5 MG: 12.5 TABLET, FILM COATED ORAL at 18:30

## 2024-11-29 RX ADMIN — ACETAMINOPHEN 650 MG: 325 TABLET, FILM COATED ORAL at 18:30

## 2024-11-29 RX ADMIN — DILTIAZEM HYDROCHLORIDE 360 MG: 180 CAPSULE, COATED, EXTENDED RELEASE ORAL at 18:30

## 2024-11-29 RX ADMIN — ENOXAPARIN SODIUM 40 MG: 40 INJECTION SUBCUTANEOUS at 18:30

## 2024-11-29 RX ADMIN — SODIUM CHLORIDE 500 ML: 9 INJECTION, SOLUTION INTRAVENOUS at 13:18

## 2024-11-29 RX ADMIN — CEFTRIAXONE 2 G: 2 INJECTION, POWDER, FOR SOLUTION INTRAMUSCULAR; INTRAVENOUS at 20:03

## 2024-11-29 RX ADMIN — SODIUM CHLORIDE 96 ML: 9 INJECTION, SOLUTION INTRAVENOUS at 14:14

## 2024-11-29 RX ADMIN — CEFEPIME HYDROCHLORIDE 2 G: 2 INJECTION, POWDER, FOR SOLUTION INTRAVENOUS at 14:51

## 2024-11-29 RX ADMIN — FAMOTIDINE 20 MG: 10 INJECTION, SOLUTION INTRAVENOUS at 12:59

## 2024-11-29 RX ADMIN — AZITHROMYCIN MONOHYDRATE 500 MG: 500 INJECTION, POWDER, LYOPHILIZED, FOR SOLUTION INTRAVENOUS at 18:31

## 2024-11-29 ASSESSMENT — ACTIVITIES OF DAILY LIVING (ADL)
ADLS_ACUITY_SCORE: 60
ADLS_ACUITY_SCORE: 42
ADLS_ACUITY_SCORE: 60
ADLS_ACUITY_SCORE: 60

## 2024-11-29 NOTE — PHARMACY-ADMISSION MEDICATION HISTORY
Pharmacy Intern Admission Medication History    Admission medication history is complete. The information provided in this note is only as accurate as the sources available at the time of the update.    Information Source(s): Patient via in-person    Pertinent Information: NA    Changes made to PTA medication list:  Added: diltiazem, quetiapine  Deleted: flexeril, doxycycline, losartan, multivitamin, semaglutide  Changed: None    Allergies reviewed with patient and updates made in EHR: yes    Medication History Completed By: Jolly Cisneros 11/29/2024 4:51 PM    PTA Med List   Medication Sig Last Dose/Taking    carvedilol (COREG) 12.5 MG tablet Take 1 tablet (12.5 mg) by mouth 2 times daily (with meals) 11/29/2024 Morning    clonazePAM (KLONOPIN) 1 MG tablet Take 1 tablet (1 mg) by mouth 2 times daily as needed for anxiety. 60 to last 30 days 11/28/2024 Bedtime    diltiazem ER (TIAZAC) 360 MG 24 hr ER beaded capsule Take 360 mg by mouth daily. 11/28/2024 Morning    Fish Oil-Krill Oil (KRILL & FISH OIL BLEND PO) Take 1 capsule by mouth daily 11/29/2024 Morning    furosemide (LASIX) 20 MG tablet TAKE 1 TABLET (20 MG) BY MOUTH EVERY DAY AS NEEDED FOR EDEMA 11/28/2024 Morning    gabapentin (NEURONTIN) 300 MG capsule TAKE 3 CAPSULES EVERY AM, 2 CAPSULES MIDDAY AND 3 CAPS AT NIGHT 11/28/2024 Evening    glipiZIDE (GLUCOTROL XL) 10 MG 24 hr tablet Take 1 tablet (10 mg) by mouth daily 11/28/2024 Morning    ketoconazole (NIZORAL) 2 % external cream Apply to fold areas BID PRN 11/29/2024 Morning    pantoprazole (PROTONIX) 40 MG EC tablet TAKE 1 TABLET BY MOUTH EVERYDAY AT BEDTIME 11/28/2024 Bedtime    potassium chloride ER (K-TAB) 20 MEQ CR tablet TAKE 2 TABLETS BY MOUTH EVERY DAY 11/28/2024 Morning    QUEtiapine (SEROQUEL) 100 MG tablet Take 100 mg by mouth every evening. 11/28/2024 Bedtime    rosuvastatin (CRESTOR) 10 MG tablet Take 1 tablet (10 mg) by mouth daily 11/28/2024 Bedtime    sertraline (ZOLOFT) 100 MG tablet Take 2  tablets by mouth once daily 11/28/2024 Morning    spironolactone (ALDACTONE) 25 MG tablet Take 1 tablet (25 mg) by mouth daily. 11/28/2024 Morning    zolpidem (AMBIEN) 10 MG tablet Take 1 tablet (10 mg) by mouth at bedtime. Past Week     Jolly Cisneros on 11/29/2024 at 4:53 PM

## 2024-11-29 NOTE — H&P
"[facility]    History and Physical  Hospitalist       Date of Admission:  11/29/2024    Assessment & Plan   Nicole Reyes is a 69 year old female with a past medical history of diabetes mellitus, hypertension, hyperlipidemia, CKD 3, CRISTÓBAL, obesity, GERD, anxiety/depression, chronic respiratory failure on 2 L via nasal cannula at night who presents with shortness of breath, cough.    #Acute on chronic hypoxemic respiratory failure secondary to CAP versus aspiration: Patient comes into the ER for worsening cough and shortness of breath.  Patient notes that she woke up overnight with nausea and nonbloody emesis.  She also had cough productive of yellow sputum.  She notes that she ate Thanksgiving dinner last night which \"was the largest meal I felt in quite a while.\"  She did feel like she had some heartburn with her symptoms which are now resolved.  She denies any current chest pain.  She denies any new or worsening swelling in her legs.  No fevers or chills.  When she got up this morning she had ongoing shortness of breath particularly with exertion.  She notes it feels like her prior episodes of pneumonia so came to the ER for evaluation.  -ED, patient afebrile and nontachycardic.  Normotensive.  Needing 4 L via nasal cannula to maintain saturations.  CBC without leukocytosis.  BMP with hypokalemia to 2.9.  EKG with sinus rhythm with PVCs but no clear acute ischemic changes.  BNP and high-sensitivity troponin not significantly elevated.  Lactic acid mildly elevated at 2.1 with repeat within normal limits.  VBG 7.32/63..  D-dimer was positive at 0.93.  Her CT chest PE was negative for PE but did show patchy consolidation throughout the left lung and mild consolidation in the right lower lobe.  Patient was given cefepime, magnesium and potassium replacement.  -Continue ceftriaxone and azithromycin.  Wean oxygen as able.  -Fall precautions, depending on how she is over coming days she may need " "PT consultation. She lives with her daughter.    -Hold on further IV fluids.  Encourage p.o. intake.    #HTN. HL: Continue meds once verified  #DM2: sliding scale insulin.  Hold glipizide while IP  #GERD: PPI  #Obesity/CRISTÓBAL: Patient is not tolerant of CPAP.  She uses supplemental oxygen.  #Depression/anxiety. RLS: Continue home zoloft, klonopin, flexeril    DVT Prophylaxis: Enoxaparin (Lovenox) SQ  Code Status: Full Code  Medically Ready for Discharge: Anticipated in 2-4 Days    Patient's daughter updated at bedside.    Thompson Hernadez MD    Primary Care Physician   Eli Andrea    Chief Complaint   SOB    History is obtained from the patient, patient's chart and discussed with ER physician.  Patient's daughter also at bedside and provided additional history.    History of Present Illness   Nicole Reyes is a 69 year old female with a past medical history of diabetes mellitus, hypertension, hyperlipidemia, CKD 3, CRISTÓBAL, obesity, GERD, anxiety/depression, chronic respiratory failure on 2 L via nasal cannula at night who presents with shortness of breath, cough.    Patient comes into the ER for worsening cough and shortness of breath.  Patient notes that she woke up overnight with nausea and nonbloody emesis.  She also had cough productive of yellow sputum.  She notes that she ate Thanksgiving dinner last night which \"was the largest meal I felt in quite a while.\"  She did feel like she had some heartburn with her symptoms which are now resolved.  She denies any current chest pain.  She denies any new or worsening swelling in her legs.  No fevers or chills.  When she got up this morning she had ongoing shortness of breath particularly with exertion.  She notes it feels like her prior episodes of pneumonia so came to the ER for evaluation.    In the ED, patient afebrile and nontachycardic.  Normotensive.  Needing 4 L via nasal cannula to maintain saturations.  CBC without leukocytosis.  BMP with hypokalemia to 2.9.  " EKG with sinus rhythm with PVCs but no clear acute ischemic changes.  BNP and high-sensitivity troponin not significantly elevated.  Lactic acid mildly elevated at 2.1 with repeat within normal limits.  VBG 7.32/63..  D-dimer was positive at 0.93.  Her CT chest PE was negative for PE but did show patchy consolidation throughout the left lung and mild consolidation in the right lower lobe.  Patient was given cefepime, magnesium and potassium replacement.    Past Medical History    I have reviewed this patient's medical history and updated it with pertinent information if needed.   Past Medical History:   Diagnosis Date    Arthritis     lt shoulder, rt. knee    Blood transfusion     CKD (chronic kidney disease) stage 3, GFR 30-59 ml/min (H)     Depressive disorder     Essential hypertension, benign     Gastroesophageal reflux disease     Generalized anxiety disorder     Hernia, abdominal     Hiatal hernia     History of blood transfusion     with a hernia repair    Hypertension     Insomnia, unspecified     Iron deficiency anemia 2009    Major depression     sees a psychiatrist    Menopause     age 53    Obesity, unspecified     CRISTÓBAL (obstructive sleep apnea)     bipap    Other chronic pain     chronic pain lt arm from tendonidits    Oxygen dependent     2 LPM at home-uses at noc or extended walking    Pneumonia     due to aspiration from hiatal hernia-    Pneumonia due to 2019 novel coronavirus 2021    Postoperative seroma 10/2011    drained several times    Pure hypercholesterolemia     RLS (restless legs syndrome)     Type II or unspecified type diabetes mellitus without mention of complication, not stated as uncontrolled        Past Surgical History   I have reviewed this patient's surgical history and updated it with pertinent information if needed.  Past Surgical History:   Procedure Laterality Date    BREAST BIOPSY, RT/LT      2 times; benign    BREAST SURGERY  ?    Biopsy x 2 Benign      SECTION       SECTION       SECTION      COLONOSCOPY N/A 2020    Procedure: Colonoscopy with biopsy;  Surgeon: Obinna Gutierrez MD;  Location: [facility] OR    DILATION AND CURETTAGE, HYSTEROSCOPY DIAGNOSTIC, COMBINED  2013    Procedure: COMBINED DILATION AND CURETTAGE, HYSTEROSCOPY DIAGNOSTIC;  DILATION AND CURETTAGE, HYSTEROSCOPY DIAGNOSTIC   Converted to a general;  Surgeon: Robi Edwards MD;  Location: [facility] OR    ESOPHAGOSCOPY, GASTROSCOPY, DUODENOSCOPY (EGD), COMBINED N/A 2015    Procedure: COMBINED ESOPHAGOSCOPY, GASTROSCOPY, DUODENOSCOPY (EGD);  Surgeon: Obinna Gutierrez MD;  Location: [facility] GI    ESOPHAGOSCOPY, GASTROSCOPY, DUODENOSCOPY (EGD), COMBINED N/A 2015    Procedure: COMBINED ENDOSCOPIC ULTRASOUND, ESOPHAGOSCOPY, GASTROSCOPY, DUODENOSCOPY (EGD), FINE NEEDLE ASPIRATE/BIOPSY;  Surgeon: Sabine Olivares MD;  Location:  GI    ESOPHAGOSCOPY, GASTROSCOPY, DUODENOSCOPY (EGD), COMBINED N/A 2020    Procedure: ESOPHAGOGASTRODUODENOSCOPY;  Surgeon: Ascencion Stauffer MD;  Location: [facility] OR    HERNIORRHAPHY INCISIONAL (LOCATION)  10/08/2011     incarcerated hernia repair with mesh;    HERNIORRHAPHY INCISIONAL (LOCATION)  10/16/2011     repair incisional hernia with mesh, and removal of current implanted mesh;    ZZC NONSPECIFIC PROCEDURE      Hernia repair     Z NONSPECIFIC PROCEDURE      Lymph node biopsy in neck (benign)        Prior to Admission Medications   Prior to Admission Medications   Prescriptions Last Dose Informant Patient Reported? Taking?   Fish Oil-Krill Oil (KRILL & FISH OIL BLEND PO)   Yes No   Sig: Take 1 capsule by mouth daily   carvedilol (COREG) 12.5 MG tablet   No No   Sig: Take 1 tablet (12.5 mg) by mouth 2 times daily (with meals)   clonazePAM (KLONOPIN) 1 MG tablet   No No   Sig: Take 1 tablet (1 mg) by mouth 2 times daily as needed for anxiety. 60 to last 30 days   cyclobenzaprine (FLEXERIL) 5 MG tablet   No No   Sig: Take 1  tablet (5 mg) by mouth 3 times daily as needed for muscle spasms   doxycycline hyclate (VIBRAMYCIN) 100 MG capsule   No No   Sig: Take 1 capsule (100 mg) by mouth 2 times daily.   furosemide (LASIX) 20 MG tablet   No No   Sig: TAKE 1 TABLET (20 MG) BY MOUTH EVERY DAY AS NEEDED FOR EDEMA   gabapentin (NEURONTIN) 300 MG capsule   No No   Sig: TAKE 3 CAPSULES EVERY AM, 2 CAPSULES MIDDAY AND 3 CAPS AT NIGHT   glipiZIDE (GLUCOTROL XL) 10 MG 24 hr tablet   No No   Sig: Take 1 tablet (10 mg) by mouth daily   ketoconazole (NIZORAL) 2 % external cream   No No   Sig: Apply to fold areas BID PRN   losartan (COZAAR) 100 MG tablet   No No   Sig: Take 0.5 tablets (50 mg) by mouth daily   multivitamin w/minerals (THERA-VIT-M) tablet   Yes No   Sig: Take 1 tablet by mouth daily   pantoprazole (PROTONIX) 40 MG EC tablet   No No   Sig: TAKE 1 TABLET BY MOUTH EVERYDAY AT BEDTIME   potassium chloride ER (K-TAB) 20 MEQ CR tablet   No No   Sig: TAKE 2 TABLETS BY MOUTH EVERY DAY   rosuvastatin (CRESTOR) 10 MG tablet   No No   Sig: Take 1 tablet (10 mg) by mouth daily   semaglutide (OZEMPIC, 0.25 OR 0.5 MG/DOSE,) 2 MG/3ML pen   No No   Sig: INJECT 0.25MG SUBCUTANEOUSLY EVERY 7 DAYS   sertraline (ZOLOFT) 100 MG tablet   No No   Sig: Take 2 tablets by mouth once daily   spironolactone (ALDACTONE) 25 MG tablet   No No   Sig: Take 1 tablet (25 mg) by mouth daily.   zolpidem (AMBIEN) 10 MG tablet   No No   Sig: Take 1 tablet (10 mg) by mouth at bedtime.      Facility-Administered Medications: None     Allergies   Allergies   Allergen Reactions    Metformin GI Disturbance     High dose    Morphine And Codeine Rash    Penicillins Rash     Pt has tolerated cephalosporins and penems.   Pt tolerated Zosyn 7/6/2019       Social History   I have reviewed this patient's social history and updated it with pertinent information if needed. Nicole Reyes  reports that she quit smoking about 45 years ago. Her smoking use included cigarettes. She  started smoking about 50 years ago. She has a 15 pack-year smoking history. She has never used smokeless tobacco. She reports current alcohol use. She reports that she does not use drugs.    Family History   I have reviewed this patient's family history and updated it with pertinent information if needed.   Family History   Problem Relation Age of Onset    Cardiovascular Mother         CHF    Hypertension Mother     C.A.D. Mother         50s    Lipids Mother     Coronary Artery Disease Mother     Depression Mother     Anxiety Disorder Mother     Cancer Father         Hodgkin's, Leukemia    Other Cancer Father         Hodgkins    Hypertension Brother     Diabetes Brother     Coronary Artery Disease Brother     Other Cancer Brother         Hodgkins    Cancer Brother         Hodgkin's    Coronary Artery Disease Brother     Hypertension Brother     Anxiety Disorder Brother     C.A.D. Brother 61    C.A.D. Brother     Sleep Apnea Sister     Diabetes Sister     Coronary Artery Disease Sister     Hypertension Sister     Depression Sister     Anxiety Disorder Sister     Obesity Sister     Connective Tissue Disorder Sister         Lupus    Diabetes Sister     Other Cancer Sister         Leukemia    Depression Sister     Anxiety Disorder Sister     Lipids Sister     Hypertension Sister     Hypertension Sister     Hypertension Sister     Basal cell carcinoma Daughter 38        top of head    Mental Illness Maternal Grandfather     Hypertension Daughter     Depression Daughter     Anxiety Disorder Daughter     Obesity Daughter     Obesity Son     Obesity Daughter        Review of Systems   The 10 point Review of Systems is negative other than noted in the HPI or here.     Physical Exam   Temp: 97.6  F (36.4  C) Temp src: Temporal BP: 131/86 Pulse: 91   Resp: 15 SpO2: 91 % O2 Device: Nasal cannula Oxygen Delivery: 3 LPM  Vital Signs with Ranges  Temp:  [97.6  F (36.4  C)] 97.6  F (36.4  C)  Pulse:  [77-98] 91  Resp:  [14-28]  15  BP: (119-135)/(76-94) 131/86  SpO2:  [83 %-100 %] 91 %  252 lbs 0 oz    Constitutional: Morbidly obese.  No acute distress.  Awakens to voice and answers appropriately.  HEENT: Normocephalic, MMM, cannot appreciate JVD secondary to body habitus.  Respiratory: Nl WOB, she has diminished breath sounds bilaterally.  Rhonchi more at the left base to mid lung.  No wheeze.  Cardiovascular: Distant, regular, no murmur  GI: Obese, BS+, NT, ND, no rebound or guarding  Lymph/Hematologic: No bruising. No cervical LAD  Skin: No rash  Musculoskeletal: Nl Tone, moderate bilateral lymphedema noted  Neurologic: A&Ox3, Answers appropriately. CNII-XII intact. Moves all extremities. No tremor  Psychiatric: Calm    Data   Data reviewed today:  I personally reviewed   Recent Labs   Lab 11/29/24  1219   WBC 7.7   HGB 12.2   MCV 77*         POTASSIUM 2.9*   CHLORIDE 102   CO2 26   BUN 8.4   CR 0.84   ANIONGAP 14   GRECIA 8.5*   *       Recent Results (from the past 24 hours)   CT Chest Pulmonary Embolism w Contrast    Narrative    CT CHEST PULMONARY EMBOLISM WITH CONTRAST November 29, 2024 2:31 PM    CLINICAL HISTORY: Dyspnea. Hypoxia. Elevated D-dimer.    TECHNIQUE: CT angiogram chest during arterial phase injection IV  contrast. 2D and 3D MIP reconstructions were performed by the CT  technologist. Dose reduction techniques were used.   CONTRAST: 79mL Isovue-370.    COMPARISON: CT of the chest 9/30/2024.    FINDINGS:  ANGIOGRAM CHEST: Pulmonary arteries are normal caliber and negative  for pulmonary emboli. No evidence for thoracic aortic aneurysm.  Thoracic aorta is negative for dissection.     LUNGS AND PLEURA: Patchy consolidation throughout the left lung has  increased since the previous exam, and is most likely infectious or  inflammatory. Mild consolidation in the right lower lobe posteriorly  and medially is new since the previous exam. No pleural effusions. No  pneumothorax.    MEDIASTINUM/AXILLAE: No  lymphadenopathy in the chest. No pericardial  effusion.    CORONARY ARTERY CALCIFICATION: Mild.    UPPER ABDOMEN: A 2.1 cm partially exophytic fat density lesion in the  upper pole of the left kidney is unchanged, and is likely an  angiomyolipoma. A prominent peripherally calcified gallstone measures  3.4 cm. Small to moderate hiatal hernia.    MUSCULOSKELETAL: Degenerative changes in the thoracic spine.      Impression    IMPRESSION:  1.  No evidence for pulmonary embolism.  2.  Patchy consolidation throughout the left lung has increased, and  mild consolidation in the right lower lobe posteriorly and medially is  new since the previous exam. These findings are most likely infectious  or inflammatory. Recommend follow-up to confirm resolution.  3.  Cholelithiasis.  4.  Small to moderate hiatal hernia.  5.  Partially exophytic fat density lesion in the upper pole of the  left kidney is likely an angiomyolipoma, unchanged.    MARY JO FLORES MD         SYSTEM ID:  [id number]       Clinically Significant Risk Factors Present on Admission        # Hypokalemia: Lowest K = 2.9 mmol/L in last 2 days, will replace as needed      # Hypomagnesemia: Lowest Mg = 1.2 mg/dL in last 2 days, will replace as needed       # Hypertension: Noted on problem list

## 2024-11-29 NOTE — ED NOTES
"[facility]  ED Nurse Handoff Report    ED Chief complaint: Chest Pain, Shortness of Breath, and Cough  . ED Diagnosis:   Final diagnoses:   None       Allergies:   Allergies   Allergen Reactions    Metformin GI Disturbance     High dose    Morphine And Codeine Rash    Penicillins Rash     Pt has tolerated cephalosporins and penems.   Pt tolerated Zosyn 7/6/2019       Code Status: Full Code    Activity level - Baseline/Home:  standby.  Activity Level - Current:   standby.   Lift room needed: No.   Bariatric: No   Needed: No   Isolation: No.   Infection: Not Applicable.     Respiratory status: Nasal cannula    Vital Signs (within 30 minutes):   Vitals:    11/29/24 1507 11/29/24 1522 11/29/24 1537 11/29/24 1552   BP: (!) 143/88 (!) 142/89 (!) 140/82 (!) 143/88   Pulse:       Resp:       Temp:       TempSrc:       SpO2: 99% 98% 95%    Weight:           Cardiac Rhythm:  ,      Pain level:    Patient confused: No.   Patient Falls Risk: nonskid shoes/slippers when out of bed and patient and family education.   Elimination Status:  due to void      Patient Report - Initial Complaint: sob, cough.   Focused Assessment: 69 year old female non-smoker with a history of recurrent aspiration pneumonia and CHF presenting alone for evaluation of shortness of breath.  Overnight she developed coughing which she initially thought was a dream.  She then started producing sputum and having pain in her throat and \"esophagus\" (chest).  When she got up to use the restroom about 4-5 hours prior to arrival, she noticed that she was very short of breath.  She does mention history of aspiration pneumonia and wonders if a large meal for Thanksgiving yesterday may have contributed to another recurrence overnight.  She has been sleeping with her head of bed elevated.  Her cough has caused some posttussive emesis but she denies fever or sick contacts.  Her weight has not increased as of yesterday though she does " mention the Thanksgiving meal was salty.  She does not have significant lower extremity edema.  She has no history of VTE.  She is not anticoagulated.  She did not get a flu vaccine this year.      Abnormal Results:   Labs Ordered and Resulted from Time of ED Arrival to Time of ED Departure   D DIMER QUANTITATIVE - Abnormal       Result Value    D-Dimer Quantitative 0.93 (*)    BASIC METABOLIC PANEL - Abnormal    Sodium 142      Potassium 2.9 (*)     Chloride 102      Carbon Dioxide (CO2) 26      Anion Gap 14      Urea Nitrogen 8.4      Creatinine 0.84      GFR Estimate 75      Calcium 8.5 (*)     Glucose 211 (*)    CBC WITH PLATELETS AND DIFFERENTIAL - Abnormal    WBC Count 7.7      RBC Count 5.21 (*)     Hemoglobin 12.2      Hematocrit 39.9      MCV 77 (*)     MCH 23.4 (*)     MCHC 30.6 (*)     RDW 15.2 (*)     Platelet Count 258      % Neutrophils 72      % Lymphocytes 22      % Monocytes 4      % Eosinophils 2      % Basophils 0      % Immature Granulocytes 0      NRBCs per 100 WBC 0      Absolute Neutrophils 5.6      Absolute Lymphocytes 1.7      Absolute Monocytes 0.3      Absolute Eosinophils 0.1      Absolute Basophils 0.0      Absolute Immature Granulocytes 0.0      Absolute NRBCs 0.0     ISTAT GASES LACTATE VENOUS POCT - Abnormal    Lactic Acid POCT 2.1 (*)     Bicarbonate Venous POCT 31 (*)     O2 Sat, Venous POCT 32 (*)     pCO2 Venous POCT 63 (*)     pH Venous POCT 7.30 (*)     pO2 Venous POCT 23 (*)    MAGNESIUM - Abnormal    Magnesium 1.2 (*)    TROPONIN T, HIGH SENSITIVITY - Abnormal    Troponin T, High Sensitivity 15 (*)    BLOOD GAS VENOUS - Abnormal    pH Venous 7.32      pCO2 Venous 63 (*)     pO2 Venous 22 (*)     Bicarbonate Venous 32 (*)     Base Excess/Deficit Venous 4.6 (*)     FIO2 98      Oxyhemoglobin Venous 29 (*)     O2 Sat, Venous 29.1 (*)    TROPONIN T, HIGH SENSITIVITY - Normal    Troponin T, High Sensitivity 14     NT PROBNP INPATIENT - Normal    N terminal Pro BNP Inpatient 473      INFLUENZA A/B, RSV AND SARS-COV2 PCR - Normal    Influenza A PCR Negative      Influenza B PCR Negative      RSV PCR Negative      SARS CoV2 PCR Negative     LACTIC ACID WHOLE BLOOD - Normal    Lactic Acid 1.7     BLOOD CULTURE        CT Chest Pulmonary Embolism w Contrast   Final Result   IMPRESSION:   1.  No evidence for pulmonary embolism.   2.  Patchy consolidation throughout the left lung has increased, and   mild consolidation in the right lower lobe posteriorly and medially is   new since the previous exam. These findings are most likely infectious   or inflammatory. Recommend follow-up to confirm resolution.   3.  Cholelithiasis.   4.  Small to moderate hiatal hernia.   5.  Partially exophytic fat density lesion in the upper pole of the   left kidney is likely an angiomyolipoma, unchanged.      MARY JO FLORES MD            SYSTEM ID:  [id number]          Treatments provided: see MAR  Family Comments: involved  OBS brochure/video discussed/provided to patient:  N/A  ED Medications:   Medications   famotidine (PEPCID) injection 20 mg (20 mg Intravenous $Given 11/29/24 1259)   sodium chloride 0.9% BOLUS 500 mL (0 mLs Intravenous Stopped 11/29/24 1415)   ceFEPIme (MAXIPIME) 2 g vial to attach to  mL bag for ADULTS or NS 50 mL bag for PEDS (2 g Intravenous $New Bag 11/29/24 1451)   potassium chloride donna ER (KLOR-CON M20) CR tablet 60 mEq (60 mEq Oral $Given 11/29/24 1315)   iopamidol (ISOVUE-370) solution 500 mL (79 mLs Intravenous $Given 11/29/24 1414)   CT scan flush use (96 mLs As instructed $Given 11/29/24 1414)   magnesium sulfate 2 g in 50 mL sterile water intermittent infusion (2 g Intravenous $New Bag 11/29/24 1518)       Drips infusing:  No  For the majority of the shift this patient was Green.   Interventions performed were NA.    Sepsis treatment initiated: Yes    Per the ED Provider, Time Zero for severe sepsis or septic shock is:  1521    3 Hour Severe Sepsis Bundle Completion:  1. Initial  Lactic Acid Result:   Recent Labs   Lab Test 11/29/24  1441 11/29/24  1227 09/30/24  1811   LACT 1.7 2.1* 1.1     2. Blood Cultures before Antibiotics: Yes  3. Broad Spectrum Antibiotics Administered:     Anti-infectives (From now, onward)      None          4. 500 ml of IV fluids have been given so far      6 Hour Severe Sepsis Bundle Completion:    1. Repeat Lactic Acid Level: Last result   Lab Results   Component Value Date    LACT 1.7 11/29/2024     2. Patient currently on Vasopressors =  No    Cares/treatment/interventions/medications to be completed following ED care: see inpatient orders    ED Nurse Name: Emilie Beasley RN  4:33 PM      RECEIVING UNIT ED HANDOFF REVIEW    Above ED Nurse Handoff Report was reviewed: Yes  Reviewed by: Zoie Miranda RN on November 29, 2024 at 5:01 PM   ANDIE Arias called the ED to inform them the note was read: Yes

## 2024-11-29 NOTE — ED TRIAGE NOTES
Shortness of breath, productive cough and chest pain that stated this morning. Patient has complex medical history. In triage, sats noted to be 88% on room air. 4L NC applied, sats up to 90%. Patient reports that her sats are normally in the 90's. Wears oxygen at night.

## 2024-11-29 NOTE — ED PROVIDER NOTES
"  Emergency Department Note      History of Present Illness     Chief Complaint   Chest Pain, Shortness of Breath, and Cough    HPI   Nicole Reyes is a 69 year old female non-smoker with a history of recurrent aspiration pneumonia and CHF presenting alone for evaluation of shortness of breath.  Overnight she developed coughing which she initially thought was a dream.  She then started producing sputum and having pain in her throat and \"esophagus\" (chest).  When she got up to use the restroom about 4-5 hours prior to arrival, she noticed that she was very short of breath.  She does mention history of aspiration pneumonia and wonders if a large meal for Thanksgiving yesterday may have contributed to another recurrence overnight.  She has been sleeping with her head of bed elevated.  Her cough has caused some posttussive emesis but she denies fever or sick contacts.  Her weight has not increased as of yesterday though she does mention the Thanksgiving meal was salty.  She does not have significant lower extremity edema.  She has no history of VTE.  She is not anticoagulated.  She did not get a flu vaccine this year.    Independent Historian   None    Review of External Notes   I reviewed the note from 09/30 where Shayy was seen in the ED for aspiration pneumonia, she was treated outpatient with Vibramycin and Vantin.   I reviewed the ECHO where her EF was determined to be 55-60%.    Past Medical History     Medical History and Problem List   Arthritis  Acute respiratory failure   CKD, stage 3  Chronic pain   Degenerative arthritis  Eczema   GERD   RADHA  Hiatal hernia  Hypertension   Hypercapnia   HFmrEF   Hyperlipidemia   History of blood transfusion  Insomnia  Iron deficiency anemia  Intervertebral disc disorders with radiculopathy   Leukocytosis   Major depression  Menopause  Multifocal pneumonia   Morbid obesity   CRISTÓBAL   Oxygen dependent  Pneumonia  Postoperative seroma  Pure hypercholesterolemia  Restless leg " syndrome   Respiratory insufficiency   Spondylolisthesis   Spinal stenosis   Type II diabetes mellitus  Denies history of asthma and COPD    Medications   Ambien   Coreg   Cozaar   Crestor   Flexeril   K-tab   Klonopin   Lasix   Tiadylt   Neurontin   Glucotrol   Ozempic   Protonix   Seroquel   Zoloft     Surgical History   Breast biopsy   Breast surgery    section   Colonoscopy   Dilation and curettage   EGD  Herniorrhaphy incisional   Hysteroscopy   Hernia repair   Lymph node biopsy   Pic line placed     Physical Exam     Patient Vitals for the past 24 hrs:   BP Temp Temp src Pulse Resp SpO2 Weight   24 1552 (!) 143/88 -- -- -- -- -- --   24 1537 (!) 140/82 -- -- -- -- 95 % --   24 1522 (!) 142/89 -- -- -- -- 98 % --   24 1507 (!) 143/88 -- -- -- -- 99 % --   24 1452 (!) 140/80 -- -- -- -- 94 % --   24 1437 (!) 145/77 -- -- -- -- 96 % --   24 1401 -- -- -- 91 -- 91 % --   24 1346 -- -- -- 84 15 97 % --   24 1331 -- -- -- 82 21 92 % --   24 1330 131/86 -- -- 84 28 99 % --   24 1315 120/76 -- -- 85 23 98 % --   24 1300 119/76 -- -- 82 27 96 % --   24 1254 -- -- -- 77 24 96 % --   24 1239 -- -- -- 80 19 95 % --   24 1224 -- -- -- 84 14 100 % --   24 1209 -- -- -- -- -- (!) 83 % --   24 1200 (!) 135/94 97.6  F (36.4  C) Temporal 98 24 (!) 88 % --   24 1159 -- -- -- -- -- -- 114.3 kg (252 lb)     Physical Exam  General: Well-developed and well-nourished. Well appearing elderly  woman. Cooperative.  Head:  Atraumatic.  Eyes:  Conjunctivae, lids, and sclerae are normal.  ENT:    Normal nose. Moist mucous membranes.  Neck:  Supple. Normal range of motion.  CV:  Regular rate and rhythm. Normal heart sounds with no murmurs, rubs, or gallops detected.  Resp:  No respiratory distress. Clear to auscultation bilaterally without decreased breath sounds, wheezing, rales, or rhonchi.  GI:  Soft.  Non-distended. Non-tender.    MS:  Normal ROM.  Trace bilateral lower extremity edema.  Skin:  Warm. Non-diaphoretic. No pallor.  Neuro:  Awake. A&Ox3. Normal strength.  Psych: Normal mood and affect. Normal speech.  Vitals reviewed.    Diagnostics     Lab Results   Labs Ordered and Resulted from Time of ED Arrival to Time of ED Departure   D DIMER QUANTITATIVE - Abnormal       Result Value    D-Dimer Quantitative 0.93 (*)    BASIC METABOLIC PANEL - Abnormal    Sodium 142      Potassium 2.9 (*)     Chloride 102      Carbon Dioxide (CO2) 26      Anion Gap 14      Urea Nitrogen 8.4      Creatinine 0.84      GFR Estimate 75      Calcium 8.5 (*)     Glucose 211 (*)    CBC WITH PLATELETS AND DIFFERENTIAL - Abnormal    WBC Count 7.7      RBC Count 5.21 (*)     Hemoglobin 12.2      Hematocrit 39.9      MCV 77 (*)     MCH 23.4 (*)     MCHC 30.6 (*)     RDW 15.2 (*)     Platelet Count 258      % Neutrophils 72      % Lymphocytes 22      % Monocytes 4      % Eosinophils 2      % Basophils 0      % Immature Granulocytes 0      NRBCs per 100 WBC 0      Absolute Neutrophils 5.6      Absolute Lymphocytes 1.7      Absolute Monocytes 0.3      Absolute Eosinophils 0.1      Absolute Basophils 0.0      Absolute Immature Granulocytes 0.0      Absolute NRBCs 0.0     ISTAT GASES LACTATE VENOUS POCT - Abnormal    Lactic Acid POCT 2.1 (*)     Bicarbonate Venous POCT 31 (*)     O2 Sat, Venous POCT 32 (*)     pCO2 Venous POCT 63 (*)     pH Venous POCT 7.30 (*)     pO2 Venous POCT 23 (*)    MAGNESIUM - Abnormal    Magnesium 1.2 (*)    TROPONIN T, HIGH SENSITIVITY - Abnormal    Troponin T, High Sensitivity 15 (*)    BLOOD GAS VENOUS - Abnormal    pH Venous 7.32      pCO2 Venous 63 (*)     pO2 Venous 22 (*)     Bicarbonate Venous 32 (*)     Base Excess/Deficit Venous 4.6 (*)     FIO2 98      Oxyhemoglobin Venous 29 (*)     O2 Sat, Venous 29.1 (*)    TROPONIN T, HIGH SENSITIVITY - Normal    Troponin T, High Sensitivity 14     NT PROBNP INPATIENT  - Normal    N terminal Pro BNP Inpatient 473     INFLUENZA A/B, RSV AND SARS-COV2 PCR - Normal    Influenza A PCR Negative      Influenza B PCR Negative      RSV PCR Negative      SARS CoV2 PCR Negative     LACTIC ACID WHOLE BLOOD - Normal    Lactic Acid 1.7     BLOOD CULTURE     Imaging   CT Chest Pulmonary Embolism w Contrast   Final Result   IMPRESSION:   1.  No evidence for pulmonary embolism.   2.  Patchy consolidation throughout the left lung has increased, and   mild consolidation in the right lower lobe posteriorly and medially is   new since the previous exam. These findings are most likely infectious   or inflammatory. Recommend follow-up to confirm resolution.   3.  Cholelithiasis.   4.  Small to moderate hiatal hernia.   5.  Partially exophytic fat density lesion in the upper pole of the   left kidney is likely an angiomyolipoma, unchanged.      MARY JO FLORES MD            SYSTEM ID:  [id number]        EKG  Indication: Chest pain and shortness of breath   Time: 1211  Rate 88 bpm. MO interval 198. QRS duration 80. QT/QTc 390/471.   Sinus rhythm with occasional premature ventricular complexes  Left axis deviation   Inferior infarct, age undetermined   Possible anterior infarct, age undetermined    No prior EKG.    Independent Interpretation   I independently interpreted the CT of chest for pulmonary embolism and see multifocal pneumonia.     ED Course      Medications Administered   Medications   famotidine (PEPCID) injection 20 mg (20 mg Intravenous $Given 11/29/24 1259)   sodium chloride 0.9% BOLUS 500 mL (0 mLs Intravenous Stopped 11/29/24 1415)   ceFEPIme (MAXIPIME) 2 g vial to attach to  mL bag for ADULTS or NS 50 mL bag for PEDS (2 g Intravenous $New Bag 11/29/24 1451)   potassium chloride donna ER (KLOR-CON M20) CR tablet 60 mEq (60 mEq Oral $Given 11/29/24 1315)   magnesium sulfate 2 g in 50 mL sterile water intermittent infusion (2 g Intravenous $New Bag 11/29/24 1518)     Discussion of  Management   Admitting Hospitalist, Dr. Thompson Hernadez    ED Course   ED Course as of 11/29/24 1641   Fri Nov 29, 2024   1212 I obtained history and examined the patient as noted above.    1259 I spoke with the ED pharmacist regarding the patient's medications.   1512 I updated Sahyy's daughter.   1559 I spoke with Dr. Hernadez who accepted the patient for admission.     Additional Documentation  The patient lives with her daughter.    Medical Decision Making / Diagnosis     CMS Diagnoses: The patient has signs of sepsis   Sepsis ED evaluation   The patient has signs of sepsis as evidenced by:  1. Presence of 2 SIRS criteria, suspected infection, AND  2. Organ dysfunction: Lactic Acidosis with value >2.0 due to infection    Time zero:  1521  on 11/29/24 as this was the time when  CT resulted, raising suspicion for bacterial infection.    Note: Due to a national blood culture bottle shortage, reduced blood cultures may have been drawn on this patient.    Lactic Acid Results:  Recent Labs   Lab Test 11/29/24  1441 11/29/24  1227 09/30/24  1811   LACT 1.7 2.1* 1.1       3 Hour Bundle 6 Hour Bundle (Reassessment)   Blood Cultures before IV Antibiotics: Yes  Antibiotics given: see below  Prehospital fluid volume (mL):                     Total fluids given (ED +Pre-hospital):  The patient responded to a lesser volume of IV fluids. The initial volume ordered was 500 mL.    Repeat Lactic Acid Level: Ordered by reflex for 2 hours after initial lactic acid collection.  Vasopressors: MAP>65 after initial IVF bolus, will continue to monitor fluid status and vital signs.  Repeat perfusion exam: I attest to having performed a repeat sepsis exam and assessment of perfusion at  1512 .   BMI Readings from Last 1 Encounters:   11/29/24 52.67 kg/m        Anti-infectives (From admission through now)      Start     Dose/Rate Route Frequency Ordered Stop    11/29/24 1300  ceFEPIme (MAXIPIME) 2 g vial to attach to  mL bag for ADULTS or NS  50 mL bag for PEDS         2 g  over 30 Minutes Intravenous ONCE 11/29/24 1259 11/29/24 1521                MIPS    CT for PE was ordered because the patient had an abnormal d-dimer.    KAREN Lucas is a 69 year old woman with a history of recurrent aspiration pneumonia and CHF who developed a cough overnight followed by shortness of breath.  Upon arrival she is hypoxic down to 83%.  She was placed on an Oxymask at 10L which we were able to wean down.  She does not appear to be in significant distress and lungs are grossly clear without significant lower extremity edema.    D-dimer is elevated so she was sent for CT PE study.  This reveals evidence of multifocal pneumonia. (Incidental findings noted below as well.) Given her history of aspiration pneumonia, she was treated with cefepime after cultures were obtained.  EKG is reassuring without acute ST changes or arrhythmias.  Negligibly elevated troponin of 15 actually normalized on repeat.  As above, she does not appear significantly volume overloaded and BNP is normal. Influenza, RSV, and COVID-19 testing is negative.  Lactate normalized with small volume IV fluids, and she does not have leukocytosis.  There is hypokalemia to 2.9 and hypomagnesemia to 1.2 which were repleted.     This woman clearly requires hospitalization for attention to her respiratory failure secondary to pneumonia.  I updated her and her daughter and answered all their questions.  They verbalized understanding and are amenable.  I discussed the case with Dr. Hernadez who accept the patient and has no further orders.    Disposition   The patient was admitted to the hospital.     Diagnosis     ICD-10-CM    1. Acute respiratory failure with hypoxia (H)  J96.01       2. Multifocal pneumonia  J18.9       3. Hypokalemia  E87.6       4. Lactic acidosis  E87.20       5. Elevated troponin  R79.89       6. Hypomagnesemia  E83.42       7. Calculus of gallbladder without cholecystitis without obstruction   K80.20       8. Hiatal hernia  K44.9       9. Angiomyolipoma of kidney  D17.71          I, Eva Aguila, am serving as a scribe at 12:12 PM on 11/29/2024 to document services personally performed by Tameka Bueno MD based on my observations and the provider's statements to me.        Tameka Bueno MD  12/02/24 2100

## 2024-11-30 LAB
ANION GAP SERPL CALCULATED.3IONS-SCNC: 13 MMOL/L (ref 7–15)
BUN SERPL-MCNC: 13.5 MG/DL (ref 8–23)
CALCIUM SERPL-MCNC: 7.8 MG/DL (ref 8.8–10.4)
CHLORIDE SERPL-SCNC: 105 MMOL/L (ref 98–107)
CREAT SERPL-MCNC: 1.12 MG/DL (ref 0.51–0.95)
EGFRCR SERPLBLD CKD-EPI 2021: 53 ML/MIN/1.73M2
ERYTHROCYTE [DISTWIDTH] IN BLOOD BY AUTOMATED COUNT: 15.2 % (ref 10–15)
GLUCOSE BLDC GLUCOMTR-MCNC: 132 MG/DL (ref 70–99)
GLUCOSE BLDC GLUCOMTR-MCNC: 146 MG/DL (ref 70–99)
GLUCOSE BLDC GLUCOMTR-MCNC: 147 MG/DL (ref 70–99)
GLUCOSE BLDC GLUCOMTR-MCNC: 154 MG/DL (ref 70–99)
GLUCOSE BLDC GLUCOMTR-MCNC: 154 MG/DL (ref 70–99)
GLUCOSE BLDC GLUCOMTR-MCNC: 159 MG/DL (ref 70–99)
GLUCOSE SERPL-MCNC: 151 MG/DL (ref 70–99)
HCO3 SERPL-SCNC: 23 MMOL/L (ref 22–29)
HCT VFR BLD AUTO: 33.3 % (ref 35–47)
HGB BLD-MCNC: 10.2 G/DL (ref 11.7–15.7)
MAGNESIUM SERPL-MCNC: 1.7 MG/DL (ref 1.7–2.3)
MCH RBC QN AUTO: 23.2 PG (ref 26.5–33)
MCHC RBC AUTO-ENTMCNC: 30.6 G/DL (ref 31.5–36.5)
MCV RBC AUTO: 76 FL (ref 78–100)
PLATELET # BLD AUTO: 209 10E3/UL (ref 150–450)
POTASSIUM SERPL-SCNC: 3.8 MMOL/L (ref 3.4–5.3)
RBC # BLD AUTO: 4.4 10E6/UL (ref 3.8–5.2)
SODIUM SERPL-SCNC: 141 MMOL/L (ref 135–145)
WBC # BLD AUTO: 16.5 10E3/UL (ref 4–11)

## 2024-11-30 PROCEDURE — 84132 ASSAY OF SERUM POTASSIUM: CPT | Performed by: HOSPITALIST

## 2024-11-30 PROCEDURE — 80048 BASIC METABOLIC PNL TOTAL CA: CPT | Performed by: HOSPITALIST

## 2024-11-30 PROCEDURE — 250N000013 HC RX MED GY IP 250 OP 250 PS 637: Performed by: HOSPITALIST

## 2024-11-30 PROCEDURE — 120N000001 HC R&B MED SURG/OB

## 2024-11-30 PROCEDURE — 250N000011 HC RX IP 250 OP 636: Performed by: HOSPITALIST

## 2024-11-30 PROCEDURE — 85048 AUTOMATED LEUKOCYTE COUNT: CPT | Performed by: HOSPITALIST

## 2024-11-30 PROCEDURE — 85018 HEMOGLOBIN: CPT | Performed by: HOSPITALIST

## 2024-11-30 PROCEDURE — 83735 ASSAY OF MAGNESIUM: CPT | Performed by: HOSPITALIST

## 2024-11-30 PROCEDURE — 258N000003 HC RX IP 258 OP 636: Performed by: HOSPITALIST

## 2024-11-30 PROCEDURE — 36415 COLL VENOUS BLD VENIPUNCTURE: CPT | Performed by: HOSPITALIST

## 2024-11-30 PROCEDURE — 99232 SBSQ HOSP IP/OBS MODERATE 35: CPT | Performed by: HOSPITALIST

## 2024-11-30 RX ORDER — LIDOCAINE 4 G/G
1 PATCH TOPICAL
Status: DISCONTINUED | OUTPATIENT
Start: 2024-12-01 | End: 2024-12-03 | Stop reason: HOSPADM

## 2024-11-30 RX ADMIN — ACETAMINOPHEN 650 MG: 325 TABLET, FILM COATED ORAL at 08:11

## 2024-11-30 RX ADMIN — INSULIN ASPART 1 UNITS: 100 INJECTION, SOLUTION INTRAVENOUS; SUBCUTANEOUS at 11:52

## 2024-11-30 RX ADMIN — CEFTRIAXONE 2 G: 2 INJECTION, POWDER, FOR SOLUTION INTRAMUSCULAR; INTRAVENOUS at 20:10

## 2024-11-30 RX ADMIN — ENOXAPARIN SODIUM 40 MG: 40 INJECTION SUBCUTANEOUS at 07:07

## 2024-11-30 RX ADMIN — GABAPENTIN 900 MG: 300 CAPSULE ORAL at 08:11

## 2024-11-30 RX ADMIN — CARVEDILOL 12.5 MG: 12.5 TABLET, FILM COATED ORAL at 17:26

## 2024-11-30 RX ADMIN — PANTOPRAZOLE SODIUM 40 MG: 40 TABLET, DELAYED RELEASE ORAL at 08:12

## 2024-11-30 RX ADMIN — AZITHROMYCIN MONOHYDRATE 500 MG: 500 INJECTION, POWDER, LYOPHILIZED, FOR SOLUTION INTRAVENOUS at 17:26

## 2024-11-30 RX ADMIN — INSULIN ASPART 1 UNITS: 100 INJECTION, SOLUTION INTRAVENOUS; SUBCUTANEOUS at 08:12

## 2024-11-30 RX ADMIN — ACETAMINOPHEN 650 MG: 325 TABLET, FILM COATED ORAL at 13:54

## 2024-11-30 RX ADMIN — ENOXAPARIN SODIUM 40 MG: 40 INJECTION SUBCUTANEOUS at 17:26

## 2024-11-30 RX ADMIN — SERTRALINE 200 MG: 100 TABLET, FILM COATED ORAL at 08:12

## 2024-11-30 RX ADMIN — GABAPENTIN 900 MG: 300 CAPSULE ORAL at 20:10

## 2024-11-30 RX ADMIN — QUETIAPINE FUMARATE 100 MG: 50 TABLET ORAL at 20:09

## 2024-11-30 RX ADMIN — ROSUVASTATIN CALCIUM 10 MG: 5 TABLET, FILM COATED ORAL at 21:43

## 2024-11-30 RX ADMIN — GABAPENTIN 600 MG: 300 CAPSULE ORAL at 13:54

## 2024-11-30 ASSESSMENT — ACTIVITIES OF DAILY LIVING (ADL)
ADLS_ACUITY_SCORE: 42
ADLS_ACUITY_SCORE: 41
ADLS_ACUITY_SCORE: 42
ADLS_ACUITY_SCORE: 42
ADLS_ACUITY_SCORE: 41
ADLS_ACUITY_SCORE: 42
ADLS_ACUITY_SCORE: 42
ADLS_ACUITY_SCORE: 44
ADLS_ACUITY_SCORE: 44
ADLS_ACUITY_SCORE: 42
ADLS_ACUITY_SCORE: 44
ADLS_ACUITY_SCORE: 41
ADLS_ACUITY_SCORE: 44
ADLS_ACUITY_SCORE: 42
ADLS_ACUITY_SCORE: 46
ADLS_ACUITY_SCORE: 46
ADLS_ACUITY_SCORE: 42
ADLS_ACUITY_SCORE: 41
ADLS_ACUITY_SCORE: 42
ADLS_ACUITY_SCORE: 46
ADLS_ACUITY_SCORE: 46

## 2024-11-30 NOTE — PLAN OF CARE
"End of Shift Summary  For vital signs and complete assessments, please see documentation flowsheets.     Pertinent assessments: Pt A&OX4, VSS, ON 2L NC. Complained of back pain, prn tylenol given. Up SBA with walker. PIV SL.     Major Shift Events: None     Treatment Plan: IV abx, monitor O2.     Bedside Nurse: Dary Wright RN     Problem: Adult Inpatient Plan of Care  Goal: Plan of Care Review  Description: The Plan of Care Review/Shift note should be completed every shift.  The Outcome Evaluation is a brief statement about your assessment that the patient is improving, declining, or no change.  This information will be displayed automatically on your shift  note.  Outcome: Not Progressing  Flowsheets (Taken 11/30/2024 1545)  Outcome Evaluation: on 2L NC  Plan of Care Reviewed With: patient  Overall Patient Progress: no change  Goal: Patient-Specific Goal (Individualized)  Description: You can add care plan individualizations to a care plan. Examples of Individualization might be:  \"Parent requests to be called daily at 9am for status\", \"I have a hard time hearing out of my right ear\", or \"Do not touch me to wake me up as it startles  me\".  Outcome: Not Progressing  Goal: Absence of Hospital-Acquired Illness or Injury  Outcome: Not Progressing  Intervention: Identify and Manage Fall Risk  Recent Flowsheet Documentation  Taken 11/30/2024 1430 by Dary Wright RN  Safety Promotion/Fall Prevention:   supervised activity   safety round/check completed   room organization consistent   room near nurse's station   patient and family education   nonskid shoes/slippers when out of bed   mobility aid in reach   lighting adjusted   increase visualization of patient   increased rounding and observation   clutter free environment maintained  Intervention: Prevent Skin Injury  Recent Flowsheet Documentation  Taken 11/30/2024 1430 by Dary Wright RN  Body Position: position changed independently  Intervention: Prevent " and Manage VTE (Venous Thromboembolism) Risk  Recent Flowsheet Documentation  Taken 11/30/2024 1430 by Dary Wright RN  VTE Prevention/Management: SCDs off (sequential compression devices)  Intervention: Prevent Infection  Recent Flowsheet Documentation  Taken 11/30/2024 1430 by Dary Wright RN  Infection Prevention: rest/sleep promoted  Goal: Optimal Comfort and Wellbeing  Outcome: Not Progressing  Goal: Readiness for Transition of Care  Outcome: Not Progressing     Problem: Gas Exchange Impaired  Goal: Optimal Gas Exchange  Outcome: Not Progressing  Intervention: Optimize Oxygenation and Ventilation  Recent Flowsheet Documentation  Taken 11/30/2024 1430 by Dary Wright RN  Head of Bed (HOB) Positioning: HOB at 20-30 degrees   Goal Outcome Evaluation:      Plan of Care Reviewed With: patient    Overall Patient Progress: no changeOverall Patient Progress: no change    Outcome Evaluation: on 2L NC

## 2024-11-30 NOTE — PLAN OF CARE
"Pt alert and oriented, denied pain . 3L NC of oxygen sating above 93%. No SOB reported. On IV Rocephin and Zithromax.  Problem: Adult Inpatient Plan of Care  Goal: Plan of Care Review  Description: The Plan of Care Review/Shift note should be completed every shift.  The Outcome Evaluation is a brief statement about your assessment that the patient is improving, declining, or no change.  This information will be displayed automatically on your shift  note.  Outcome: Progressing  Flowsheets (Taken 11/30/2024 0525)  Outcome Evaluation: PT on 3l NC. no SOB reported.  Plan of Care Reviewed With: patient  Overall Patient Progress: improving  Goal: Patient-Specific Goal (Individualized)  Description: You can add care plan individualizations to a care plan. Examples of Individualization might be:  \"Parent requests to be called daily at 9am for status\", \"I have a hard time hearing out of my right ear\", or \"Do not touch me to wake me up as it startles  me\".  Outcome: Progressing  Goal: Absence of Hospital-Acquired Illness or Injury  Outcome: Progressing  Intervention: Identify and Manage Fall Risk  Recent Flowsheet Documentation  Taken 11/29/2024 1949 by Amirah Milton RN  Safety Promotion/Fall Prevention: activity supervised  Intervention: Prevent Skin Injury  Recent Flowsheet Documentation  Taken 11/29/2024 1949 by Amirah Milton RN  Body Position: position changed independently  Intervention: Prevent Infection  Recent Flowsheet Documentation  Taken 11/30/2024 0115 by Amirah Milton, RN  Infection Prevention: rest/sleep promoted  Taken 11/29/2024 1949 by Amirah Milton RN  Infection Prevention: hand hygiene promoted  Goal: Optimal Comfort and Wellbeing  Outcome: Progressing  Goal: Readiness for Transition of Care  Outcome: Progressing     Problem: Gas Exchange Impaired  Goal: Optimal Gas Exchange  Outcome: Progressing   Goal Outcome Evaluation:      Plan of Care Reviewed With: patient    Overall Patient Progress: " improvingOverall Patient Progress: improving    Outcome Evaluation: PT on 3l NC. no SOB reported.

## 2024-11-30 NOTE — PLAN OF CARE
"BP (!) 162/75 (BP Location: Right arm)   Pulse 108   Temp 99.9  F (37.7  C) (Oral)   Resp 20   Wt 115.3 kg (254 lb 1.6 oz)   LMP 09/15/2007   SpO2 99%   BMI 53.11 kg/m      Neuro: a/o x4  Cardiac: WNL  Lungs: diminished. 3L NC (2L NC baseline overnight)  GI: WNL  : WNL  Pain: 5/10- tylenol  IV: saline locked  Meds: zithromax, rocephin  Labs/tests: Lact 1.7, K 2.9, Mg 1.2  Diet: mod carb  Activity: assist x1/gb/walker  Misc: BG ACHS. K, Mg protocol.   Plan: Currently resting in bed, able to make need known.         Problem: Adult Inpatient Plan of Care  Goal: Plan of Care Review  Description: The Plan of Care Review/Shift note should be completed every shift.  The Outcome Evaluation is a brief statement about your assessment that the patient is improving, declining, or no change.  This information will be displayed automatically on your shift  note.  Outcome: Progressing  Flowsheets (Taken 11/29/2024 1942)  Outcome Evaluation: 3L NC- wean as able  Plan of Care Reviewed With: patient  Overall Patient Progress: improving  Goal: Patient-Specific Goal (Individualized)  Description: You can add care plan individualizations to a care plan. Examples of Individualization might be:  \"Parent requests to be called daily at 9am for status\", \"I have a hard time hearing out of my right ear\", or \"Do not touch me to wake me up as it startles  me\".  Outcome: Progressing  Goal: Absence of Hospital-Acquired Illness or Injury  Outcome: Progressing  Intervention: Identify and Manage Fall Risk  Recent Flowsheet Documentation  Taken 11/29/2024 1830 by Zoie Miranda RN  Safety Promotion/Fall Prevention:   activity supervised   assistive device/personal items within reach   clutter free environment maintained   nonskid shoes/slippers when out of bed   safety round/check completed  Intervention: Prevent Skin Injury  Recent Flowsheet Documentation  Taken 11/29/2024 1830 by Zoie Miranda, RN  Body Position: position changed " independently  Intervention: Prevent Infection  Recent Flowsheet Documentation  Taken 11/29/2024 1830 by Zoie Miranda, RN  Infection Prevention: rest/sleep promoted  Goal: Optimal Comfort and Wellbeing  Outcome: Progressing  Goal: Readiness for Transition of Care  Outcome: Progressing  Intervention: Mutually Develop Transition Plan  Recent Flowsheet Documentation  Taken 11/29/2024 1906 by Zoie Miranda, RN  Equipment Currently Used at Home: walker, rolling     Problem: Gas Exchange Impaired  Goal: Optimal Gas Exchange  Outcome: Progressing  Intervention: Optimize Oxygenation and Ventilation  Recent Flowsheet Documentation  Taken 11/29/2024 1830 by Zoie Miranda, RN  Head of Bed (HOB) Positioning: HOB at 20-30 degrees           Goal Outcome Evaluation:      Plan of Care Reviewed With: patient    Overall Patient Progress: improvingOverall Patient Progress: improving    Outcome Evaluation: 3L NC- wean as able

## 2024-11-30 NOTE — PROGRESS NOTES
Admitted/transferred from:   2 RN full   skin assessment completed by Zoie Miranda, RN and Rachelle Drake RN.  Skin assessment finding: issues found redness in abdominal folds    Interventions/actions: skin interventions - powder skin when able      Will continue to monitor.

## 2024-11-30 NOTE — PROGRESS NOTES
"[facility]    Hospitalist Progress Note      Assessment & Plan   Nicole Reyes is a 69 year old female with a past medical history of diabetes mellitus, hypertension, hyperlipidemia, CKD 3, CRISTÓBAL, obesity, GERD, anxiety/depression, chronic respiratory failure on 2 L via nasal cannula at night who presents with shortness of breath, cough.     #Acute on chronic hypoxemic respiratory failure secondary to CAP versus aspiration: Patient comes into the ER for worsening cough and shortness of breath.  Patient notes that she woke up overnight with nausea and nonbloody emesis.  She also had cough productive of yellow sputum.  She notes that she ate Thanksgiving dinner last night which \"was the largest meal I felt in quite a while.\"  She did feel like she had some heartburn with her symptoms which are now resolved.  She denies any current chest pain.  She denies any new or worsening swelling in her legs.  No fevers or chills.  When she got up this morning she had ongoing shortness of breath particularly with exertion.  She notes it feels like her prior episodes of pneumonia so came to the ER for evaluation.  -ED, patient afebrile and nontachycardic.  Normotensive.  Needing 4 L via nasal cannula to maintain saturations.  CBC without leukocytosis.  BMP with hypokalemia to 2.9.  EKG with sinus rhythm with PVCs but no clear acute ischemic changes.  BNP and high-sensitivity troponin not significantly elevated.  Lactic acid mildly elevated at 2.1 with repeat within normal limits.  VBG 7.32/63..  D-dimer was positive at 0.93.  Her CT chest PE was negative for PE but did show patchy consolidation throughout the left lung and mild consolidation in the right lower lobe.  Patient was given cefepime, magnesium and potassium replacement.  -Continue ceftriaxone and azithromycin.  Wean oxygen as able.    -Fall precautions.  Quite weak; will have PT evaluate.  She lives with her daughter.    -Hold on further IV " fluids.  Encourage p.o. intake.  -On 11/30, pt still requiring 2L via NC from 4L in ED.  Continue treatment for CAP.  Monitor white count, fever curve (not febrile), etc.  Suspect a couple more days.      #Mild CAMMIE: Pt with slight bump in creatinine on 11/30.  Encourage PO intake and monitor BMP.       #HTN. HL: PTA coreg 12.5 mg BID, diltiazem with hold parameters.  Continue statin.  #DM2: sliding scale insulin.  Hold glipizide while IP  #GERD: PPI  #Obesity/CRISTÓBAL: Patient is not tolerant of CPAP.  She uses supplemental oxygen.  #Depression/anxiety. RLS. Chronic back pain: Continue home zoloft, klonopin, flexeril.  Follows with spine specialist for severe spinal stenosis. Added lidocaine patch.      DVT Prophylaxis: Enoxaparin (Lovenox) SQ  Code Status: Full Code  Medically Ready for Discharge: Anticipated in 2-4 Days    I did call and updated daughter Joanne by phone on 11/30.     Thompson Hernadez MD    Interval History   No events. White count higher today but O2 needs less. SBP a bit softer. Holding BP meds.  Still coughing and SOB with exertion.  Feels generally weak.  No n/v.  She tolerated sandwich last night.      -Data reviewed today: I reviewed all new labs and imaging results over the last 24 hours. I personally reviewed     Physical Exam   Temp: 98.7  F (37.1  C) Temp src: Oral BP: 97/41 Pulse: 59   Resp: 20 SpO2: 92 % O2 Device: Nasal cannula Oxygen Delivery: 2 LPM  Vitals:    11/29/24 1159 11/29/24 1734 11/30/24 0700   Weight: 114.3 kg (252 lb) 115.3 kg (254 lb 1.6 oz) 118 kg (260 lb 2.3 oz)     Vital Signs with Ranges  Temp:  [97.6  F (36.4  C)-99.9  F (37.7  C)] 98.7  F (37.1  C)  Pulse:  [] 59  Resp:  [14-28] 20  BP: ()/() 97/41  SpO2:  [83 %-100 %] 92 %  No intake/output data recorded.    Constitutional: Morbidly obese.  No acute distress.   HEENT: Normocephalic, MMM, cannot appreciate JVD secondary to body habitus.  Respiratory: Nl WOB, she has diminished breath sounds bilaterally.   Rhonchi more at the left base to mid lung.  No wheeze.  Cardiovascular: Distant, regular, no murmur  GI: Obese, BS+, NT, ND, no rebound or guarding  Lymph/Hematologic: No bruising. No cervical LAD  Skin: No rash  Musculoskeletal: Nl Tone, moderate bilateral lymphedema noted  Neurologic: A&Ox3, Answers appropriately. CNII-XII intact. Moves all extremities. No tremor    Medications   Current Facility-Administered Medications   Medication Dose Route Frequency Provider Last Rate Last Admin     Current Facility-Administered Medications   Medication Dose Route Frequency Provider Last Rate Last Admin    azithromycin (ZITHROMAX) 500 mg in  mL intermittent infusion  500 mg Intravenous Q24H Thompson Hernadez  mL/hr at 11/29/24 1831 500 mg at 11/29/24 1831    carvedilol (COREG) tablet 12.5 mg  12.5 mg Oral BID w/meals Thompson Hernadez MD   12.5 mg at 11/29/24 1830    cefTRIAXone (ROCEPHIN) 2 g vial to attach to  ml bag for ADULTS or NS 50 ml bag for PEDS  2 g Intravenous Q24H Thompson Hernadez MD   2 g at 11/29/24 2003    diltiazem ER COATED BEADS (CARDIZEM CD/CARTIA XT) 24 hr capsule 360 mg  360 mg Oral Daily Thompson Hernadez MD   360 mg at 11/29/24 1830    enoxaparin ANTICOAGULANT (LOVENOX) injection 40 mg  40 mg Subcutaneous Q12H Thompson Hernadez MD   40 mg at 11/30/24 0707    gabapentin (NEURONTIN) capsule 600 mg  600 mg Oral Daily Thompson Hernadez MD        gabapentin (NEURONTIN) capsule 900 mg  900 mg Oral BID Thompson Hernadez MD   900 mg at 11/30/24 0811    insulin aspart (NovoLOG) injection (RAPID ACTING)  1-7 Units Subcutaneous TID AC Thompson Hernadez MD   1 Units at 11/30/24 0812    insulin aspart (NovoLOG) injection (RAPID ACTING)  1-5 Units Subcutaneous At Bedtime Thompson Hernadez MD        pantoprazole (PROTONIX) EC tablet 40 mg  40 mg Oral QAM AC Thompson Hernadez MD   40 mg at 11/30/24 0812    QUEtiapine (SEROquel) tablet 100 mg  100 mg Oral QPM Thompson Hernadez MD   100 mg at 11/29/24 2141    rosuvastatin (CRESTOR) tablet 10 mg  10 mg Oral  At Bedtime Thompson Hernadez MD   10 mg at 11/29/24 2141    sertraline (ZOLOFT) tablet 200 mg  200 mg Oral Daily Thompson Hernadez MD   200 mg at 11/30/24 0812    sodium chloride (PF) 0.9% PF flush 3 mL  3 mL Intracatheter Q8H Thompson Hernadez MD   3 mL at 11/30/24 0115       Data   Recent Labs   Lab 11/30/24  0735 11/30/24  0715 11/30/24  0635 11/29/24  1757 11/29/24  1219   WBC  --  16.5*  --   --  7.7   HGB  --  10.2*  --   --  12.2   MCV  --  76*  --   --  77*   PLT  --  209  --   --  258   NA  --  141  --   --  142   POTASSIUM  --  3.8  --   --  2.9*   CHLORIDE  --  105  --   --  102   CO2  --  23  --   --  26   BUN  --  13.5  --   --  8.4   CR  --  1.12*  --   --  0.84   ANIONGAP  --  13  --   --  14   GRECIA  --  7.8*  --   --  8.5*   * 151* 146*   < > 211*    < > = values in this interval not displayed.       Recent Results (from the past 24 hours)   CT Chest Pulmonary Embolism w Contrast    Narrative    CT CHEST PULMONARY EMBOLISM WITH CONTRAST November 29, 2024 2:31 PM    CLINICAL HISTORY: Dyspnea. Hypoxia. Elevated D-dimer.    TECHNIQUE: CT angiogram chest during arterial phase injection IV  contrast. 2D and 3D MIP reconstructions were performed by the CT  technologist. Dose reduction techniques were used.   CONTRAST: 79mL Isovue-370.    COMPARISON: CT of the chest 9/30/2024.    FINDINGS:  ANGIOGRAM CHEST: Pulmonary arteries are normal caliber and negative  for pulmonary emboli. No evidence for thoracic aortic aneurysm.  Thoracic aorta is negative for dissection.     LUNGS AND PLEURA: Patchy consolidation throughout the left lung has  increased since the previous exam, and is most likely infectious or  inflammatory. Mild consolidation in the right lower lobe posteriorly  and medially is new since the previous exam. No pleural effusions. No  pneumothorax.    MEDIASTINUM/AXILLAE: No lymphadenopathy in the chest. No pericardial  effusion.    CORONARY ARTERY CALCIFICATION: Mild.    UPPER ABDOMEN: A 2.1 cm partially  exophytic fat density lesion in the  upper pole of the left kidney is unchanged, and is likely an  angiomyolipoma. A prominent peripherally calcified gallstone measures  3.4 cm. Small to moderate hiatal hernia.    MUSCULOSKELETAL: Degenerative changes in the thoracic spine.      Impression    IMPRESSION:  1.  No evidence for pulmonary embolism.  2.  Patchy consolidation throughout the left lung has increased, and  mild consolidation in the right lower lobe posteriorly and medially is  new since the previous exam. These findings are most likely infectious  or inflammatory. Recommend follow-up to confirm resolution.  3.  Cholelithiasis.  4.  Small to moderate hiatal hernia.  5.  Partially exophytic fat density lesion in the upper pole of the  left kidney is likely an angiomyolipoma, unchanged.    MARY JO FLORES MD         SYSTEM ID:  [id number]

## 2024-11-30 NOTE — CONSULTS
Care Management Initial Consult    General Information  Assessment completed with: Patient,    Type of CM/SW Visit: Initial Assessment    Primary Care Provider verified and updated as needed:     Readmission within the last 30 days:           Advance Care Planning:            Communication Assessment  Patient's communication style: spoken language (English or Bilingual)    Hearing Difficulty or Deaf: no   Wear Glasses or Blind: yes    Cognitive  Cognitive/Neuro/Behavioral: WDL                      Living Environment:   People in home: child(mariama), adult     Current living Arrangements: house      Able to return to prior arrangements: yes       Family/Social Support:  Care provided by: self, child(mariama)  Provides care for: no one  Marital Status:   Support system: Children          Description of Support System: Supportive, Involved         Current Resources:   Patient receiving home care services: No        Community Resources: DME  Equipment currently used at home: walker, rolling  Supplies currently used at home: Diabetic Supplies, Oxygen Tubing/Supplies (Bipap(not using))    Employment/Financial:  Employment Status:          Financial Concerns:             Does the patient's insurance plan have a 3 day qualifying hospital stay waiver?  No    Lifestyle & Psychosocial Needs:  Social Drivers of Health     Food Insecurity: Low Risk  (1/20/2024)    Food Insecurity     Within the past 12 months, did you worry that your food would run out before you got money to buy more?: No     Within the past 12 months, did the food you bought just not last and you didn t have money to get more?: No   Depression: Not at risk (4/5/2024)    PHQ-2     PHQ-2 Score: 0   Housing Stability: Low Risk  (1/20/2024)    Housing Stability     Do you have housing? : Yes     Are you worried about losing your housing?: No   Tobacco Use: Medium Risk (8/21/2024)    Patient History     Smoking Tobacco Use: Former     Smokeless Tobacco Use: Never      Passive Exposure: Not on file   Financial Resource Strain: Low Risk  (1/20/2024)    Financial Resource Strain     Within the past 12 months, have you or your family members you live with been unable to get utilities (heat, electricity) when it was really needed?: No   Recent Concern: Financial Resource Strain - High Risk (11/29/2023)    Financial Resource Strain     Within the past 12 months, have you or your family members you live with been unable to get utilities (heat, electricity) when it was really needed?: Yes   Alcohol Use: Not on file   Transportation Needs: Low Risk  (1/20/2024)    Transportation Needs     Within the past 12 months, has lack of transportation kept you from medical appointments, getting your medicines, non-medical meetings or appointments, work, or from getting things that you need?: No   Physical Activity: Not on file   Interpersonal Safety: Low Risk  (11/29/2024)    Interpersonal Safety     Do you feel physically and emotionally safe where you currently live?: Yes     Within the past 12 months, have you been hit, slapped, kicked or otherwise physically hurt by someone?: No     Within the past 12 months, have you been humiliated or emotionally abused in other ways by your partner or ex-partner?: No   Stress: Stress Concern Present (8/10/2020)    [employer] [place] [employer] - Occupational Stress Questionnaire     Feeling of Stress : To some extent   Social Connections: Unknown (8/10/2020)    Social Connection and Isolation Panel [NHANES]     Frequency of Communication with Friends and Family: More than three times a week     Frequency of Social Gatherings with Friends and Family: Not on file     Attends Adventism Services: Not on file     Active Member of Clubs or Organizations: Not on file     Attends Club or Organization Meetings: Not on file     Marital Status: Not on file   Health Literacy: Not on file       Functional Status:  Prior to admission patient needed  assistance:   Dependent ADLs:: Ambulation-walker        Discussed  Partnership in Safe Discharge Planning  document with patient/family: No    Additional Information:  CM consult placed for elevated readmission risk score of 33%. Patient admitted with increased SOB, CAP vs aspiration pneumonia.  Met with patient at the bedside to discuss discharge planning and introduce CM role. Patient confirms she lives at home with her daughter and is independent with her ADLs and IADLs. She does use a walker for mobility. O2 dependent with 2 L at HS baseline provided through [facility]. She also has a bipap which she is not currently using and has an appointment with sleep medicine in February.  Patient is not currently open to home care but has used [facility] in the past and if home care is recommended she would like referral sent to them.    CM will continue to follow for therapy recommendations and assist with discharge planning.      Arabella Parker RN BSN OCN  Care Coordinator  [facility]  [phone]

## 2024-12-01 ENCOUNTER — APPOINTMENT (OUTPATIENT)
Dept: PHYSICAL THERAPY | Facility: CLINIC | Age: 69
End: 2024-12-01
Attending: HOSPITALIST
Payer: MEDICARE

## 2024-12-01 LAB
ANION GAP SERPL CALCULATED.3IONS-SCNC: 11 MMOL/L (ref 7–15)
BUN SERPL-MCNC: 13.3 MG/DL (ref 8–23)
CALCIUM SERPL-MCNC: 8.2 MG/DL (ref 8.8–10.4)
CHLORIDE SERPL-SCNC: 102 MMOL/L (ref 98–107)
CREAT SERPL-MCNC: 0.95 MG/DL (ref 0.51–0.95)
EGFRCR SERPLBLD CKD-EPI 2021: 65 ML/MIN/1.73M2
ERYTHROCYTE [DISTWIDTH] IN BLOOD BY AUTOMATED COUNT: 15.4 % (ref 10–15)
GLUCOSE BLDC GLUCOMTR-MCNC: 128 MG/DL (ref 70–99)
GLUCOSE BLDC GLUCOMTR-MCNC: 132 MG/DL (ref 70–99)
GLUCOSE BLDC GLUCOMTR-MCNC: 142 MG/DL (ref 70–99)
GLUCOSE BLDC GLUCOMTR-MCNC: 144 MG/DL (ref 70–99)
GLUCOSE BLDC GLUCOMTR-MCNC: 157 MG/DL (ref 70–99)
GLUCOSE SERPL-MCNC: 151 MG/DL (ref 70–99)
HCO3 SERPL-SCNC: 24 MMOL/L (ref 22–29)
HCT VFR BLD AUTO: 30.2 % (ref 35–47)
HGB BLD-MCNC: 9.4 G/DL (ref 11.7–15.7)
MAGNESIUM SERPL-MCNC: 1.6 MG/DL (ref 1.7–2.3)
MCH RBC QN AUTO: 23.6 PG (ref 26.5–33)
MCHC RBC AUTO-ENTMCNC: 31.1 G/DL (ref 31.5–36.5)
MCV RBC AUTO: 76 FL (ref 78–100)
PLATELET # BLD AUTO: 182 10E3/UL (ref 150–450)
PLATELET # BLD AUTO: 212 10E3/UL (ref 150–450)
POTASSIUM SERPL-SCNC: 3.6 MMOL/L (ref 3.4–5.3)
RBC # BLD AUTO: 3.98 10E6/UL (ref 3.8–5.2)
SODIUM SERPL-SCNC: 137 MMOL/L (ref 135–145)
WBC # BLD AUTO: 10.8 10E3/UL (ref 4–11)

## 2024-12-01 PROCEDURE — 82565 ASSAY OF CREATININE: CPT | Performed by: HOSPITALIST

## 2024-12-01 PROCEDURE — 80051 ELECTROLYTE PANEL: CPT | Performed by: HOSPITALIST

## 2024-12-01 PROCEDURE — 258N000003 HC RX IP 258 OP 636: Performed by: HOSPITALIST

## 2024-12-01 PROCEDURE — 120N000001 HC R&B MED SURG/OB

## 2024-12-01 PROCEDURE — 85049 AUTOMATED PLATELET COUNT: CPT | Performed by: HOSPITALIST

## 2024-12-01 PROCEDURE — 97161 PT EVAL LOW COMPLEX 20 MIN: CPT | Mod: GP

## 2024-12-01 PROCEDURE — 80048 BASIC METABOLIC PNL TOTAL CA: CPT | Performed by: HOSPITALIST

## 2024-12-01 PROCEDURE — 85027 COMPLETE CBC AUTOMATED: CPT | Performed by: HOSPITALIST

## 2024-12-01 PROCEDURE — 36415 COLL VENOUS BLD VENIPUNCTURE: CPT | Performed by: HOSPITALIST

## 2024-12-01 PROCEDURE — 250N000013 HC RX MED GY IP 250 OP 250 PS 637: Performed by: HOSPITALIST

## 2024-12-01 PROCEDURE — 99232 SBSQ HOSP IP/OBS MODERATE 35: CPT | Performed by: HOSPITALIST

## 2024-12-01 PROCEDURE — 83735 ASSAY OF MAGNESIUM: CPT | Performed by: HOSPITALIST

## 2024-12-01 PROCEDURE — 97116 GAIT TRAINING THERAPY: CPT | Mod: GP

## 2024-12-01 PROCEDURE — 250N000011 HC RX IP 250 OP 636: Performed by: HOSPITALIST

## 2024-12-01 RX ADMIN — INSULIN ASPART 1 UNITS: 100 INJECTION, SOLUTION INTRAVENOUS; SUBCUTANEOUS at 10:16

## 2024-12-01 RX ADMIN — PANTOPRAZOLE SODIUM 40 MG: 40 TABLET, DELAYED RELEASE ORAL at 06:54

## 2024-12-01 RX ADMIN — AZITHROMYCIN MONOHYDRATE 500 MG: 500 INJECTION, POWDER, LYOPHILIZED, FOR SOLUTION INTRAVENOUS at 17:31

## 2024-12-01 RX ADMIN — ACETAMINOPHEN 650 MG: 325 TABLET, FILM COATED ORAL at 11:05

## 2024-12-01 RX ADMIN — MICONAZOLE NITRATE: 20 POWDER TOPICAL at 20:56

## 2024-12-01 RX ADMIN — GABAPENTIN 900 MG: 300 CAPSULE ORAL at 09:40

## 2024-12-01 RX ADMIN — ENOXAPARIN SODIUM 40 MG: 40 INJECTION SUBCUTANEOUS at 06:55

## 2024-12-01 RX ADMIN — CEFTRIAXONE 2 G: 2 INJECTION, POWDER, FOR SOLUTION INTRAMUSCULAR; INTRAVENOUS at 20:19

## 2024-12-01 RX ADMIN — ROSUVASTATIN CALCIUM 10 MG: 5 TABLET, FILM COATED ORAL at 21:49

## 2024-12-01 RX ADMIN — MICONAZOLE NITRATE: 20 POWDER TOPICAL at 15:57

## 2024-12-01 RX ADMIN — QUETIAPINE FUMARATE 100 MG: 50 TABLET ORAL at 20:17

## 2024-12-01 RX ADMIN — SERTRALINE 200 MG: 100 TABLET, FILM COATED ORAL at 09:40

## 2024-12-01 RX ADMIN — CALCIUM CARBONATE (ANTACID) CHEW TAB 500 MG 1000 MG: 500 CHEW TAB at 11:05

## 2024-12-01 RX ADMIN — ACETAMINOPHEN 650 MG: 325 TABLET, FILM COATED ORAL at 06:54

## 2024-12-01 RX ADMIN — INSULIN ASPART 1 UNITS: 100 INJECTION, SOLUTION INTRAVENOUS; SUBCUTANEOUS at 11:05

## 2024-12-01 RX ADMIN — LIDOCAINE 1 PATCH: 4 PATCH TOPICAL at 09:40

## 2024-12-01 RX ADMIN — GABAPENTIN 900 MG: 300 CAPSULE ORAL at 20:56

## 2024-12-01 ASSESSMENT — ACTIVITIES OF DAILY LIVING (ADL)
ADLS_ACUITY_SCORE: 42
ADLS_ACUITY_SCORE: 41
ADLS_ACUITY_SCORE: 42
ADLS_ACUITY_SCORE: 41
ADLS_ACUITY_SCORE: 42
ADLS_ACUITY_SCORE: 41
ADLS_ACUITY_SCORE: 42
ADLS_ACUITY_SCORE: 42
ADLS_ACUITY_SCORE: 41
ADLS_ACUITY_SCORE: 42
ADLS_ACUITY_SCORE: 41
ADLS_ACUITY_SCORE: 42

## 2024-12-01 NOTE — PLAN OF CARE
Goal Outcome Evaluation:           Overall Patient Progress: no changeOverall Patient Progress: no change    Outcome Evaluation: Discharge with home care when medically ready.

## 2024-12-01 NOTE — PROGRESS NOTES
Care Management Follow Up    Length of Stay (days): 2    Expected Discharge Date: 12/02/2024     Concerns to be Addressed: discharge planning     Patient plan of care discussed at interdisciplinary rounds: Yes    Anticipated Discharge Disposition: Home, Home Care              Anticipated Discharge Services:  Home Care PT  Anticipated Discharge DME:      Patient/family educated on Medicare website which has current facility and service quality ratings: yes  Education Provided on the Discharge Plan:    Patient/Family in Agreement with the Plan: yes    Referrals Placed by CM/SW:  Home Care    Additional Information:  PT recs home care. Sent referral to [facility]. Placed information in AVS.     J CARLOS Chin, LICIVNA  Emergency Room   Please contact the SW on the floor in which the patient is staying for any questions or concerns       Deidre Dunbar, RAJ

## 2024-12-01 NOTE — PLAN OF CARE
A&Ox4 Up SBA. On baseline 2L oxygen overnight. Continuing IV Zithromax and Rocephin. Soft BP. 0200 . Discharge pending.     Problem: Adult Inpatient Plan of Care  Goal: Plan of Care Review  Description: The Plan of Care Review/Shift note should be completed every shift.  The Outcome Evaluation is a brief statement about your assessment that the patient is improving, declining, or no change.  This information will be displayed automatically on your shift  note.  Outcome: Progressing  Flowsheets (Taken 12/1/2024 0647)  Outcome Evaluation: On baseline 2L oxygen overnight. Continuing IV Zithromax and Rocephin. Soft BP.  Plan of Care Reviewed With: patient  Overall Patient Progress: no change  Goal: Absence of Hospital-Acquired Illness or Injury  Outcome: Progressing  Intervention: Identify and Manage Fall Risk  Recent Flowsheet Documentation  Taken 12/1/2024 0243 by Bambi Estrada, RN  Safety Promotion/Fall Prevention: safety round/check completed  Taken 11/30/2024 2010 by Bambi Estrada RN  Safety Promotion/Fall Prevention:   activity supervised   increased rounding and observation   nonskid shoes/slippers when out of bed   patient and family education   safety round/check completed  Intervention: Prevent Skin Injury  Recent Flowsheet Documentation  Taken 11/30/2024 2010 by Bambi Estrada, RN  Body Position: weight shifting  Intervention: Prevent and Manage VTE (Venous Thromboembolism) Risk  Recent Flowsheet Documentation  Taken 11/30/2024 2010 by Bambi Estrada, RN  VTE Prevention/Management: SCDs off (sequential compression devices)  Goal: Optimal Comfort and Wellbeing  Outcome: Progressing  Intervention: Monitor Pain and Promote Comfort  Recent Flowsheet Documentation  Taken 11/30/2024 2010 by Bambi Estrada, RN  Pain Management Interventions:   medication (see MAR)   repositioned  Goal: Readiness for Transition of Care  Outcome: Progressing     Problem: Gas Exchange Impaired  Goal: Optimal Gas  Exchange  Outcome: Progressing  Intervention: Optimize Oxygenation and Ventilation  Recent Flowsheet Documentation  Taken 11/30/2024 2010 by Bambi Estrada, RN  Head of Bed (HOB) Positioning: HOB at 20-30 degrees       Goal Outcome Evaluation:      Plan of Care Reviewed With: patient    Overall Patient Progress: no changeOverall Patient Progress: no change    Outcome Evaluation: On baseline 2L oxygen overnight. Continuing IV Zithromax and Rocephin. Soft BP.

## 2024-12-01 NOTE — DISCHARGE INSTRUCTIONS
Home Care  Your home care referral was sent to [facility].  If you haven't heard from them within the next 24-48 hours, please call them at [phone]

## 2024-12-01 NOTE — PROGRESS NOTES
"   12/01/24 1000   Appointment Info   Signing Clinician's Name / Credentials (PT) Tracy Julien PT, DPT   Living Environment   People in Home child(mariama), adult   Current Living Arrangements house   Home Accessibility stairs within home   Number of Stairs, Within Home, Primary   (1 step on main floor. full flight to basement though pt does not need to complete)   Transportation Anticipated family or friend will provide   Living Environment Comments Pt lives with dtr who works from home   Self-Care   Usual Activity Tolerance fair   Current Activity Tolerance fair   Equipment Currently Used at Home walker, rolling;shower chair;grab bar, toilet;grab bar, tub/shower   Fall history within last six months no   Activity/Exercise/Self-Care Comment Pt reports using 4WW for all mobility at baseline. Dtr helps with laundry, cooking, cleaning. Pt is otherwise IND for ADLs. Usually wear O2 at night only   General Information   Onset of Illness/Injury or Date of Surgery 11/29/24   Referring Physician Thompson Hernadez MD   Pertinent History of Current Problem (include personal factors and/or comorbidities that impact the POC) \"Nicole Reyes is a 69 year old female with a past medical history of diabetes mellitus, hypertension, hyperlipidemia, CKD 3, CRISTÓBAL, obesity, GERD, anxiety/depression, chronic respiratory failure on 2 L via nasal cannula at night who presents with shortness of breath, cough.\"   Existing Precautions/Restrictions fall   Cognition   Affect/Mental Status (Cognition) WFL   Orientation Status (Cognition) oriented x 3   Follows Commands (Cognition) WFL   Pain Assessment   Patient Currently in Pain No   Integumentary/Edema   Integumentary/Edema no deficits were identifed   Posture    Posture Forward head position   Range of Motion (ROM)   Range of Motion ROM is WFL   Strength (Manual Muscle Testing)   Strength Comments impaired functional strength in BLEs   Bed Mobility   Comment, (Bed Mobility) NT, up in chair " upon PT arrival   Transfers   Comment, (Transfers) CGA sit>stand to FWW   Gait/Stairs (Locomotion)   Comment, (Gait/Stairs) CGA for gait with, slow pace, flexed posture   Balance   Balance Comments falls risk, currently benefits from use of AD for safe upright mobility   Sensory Examination   Sensory Perception patient reports no sensory changes   Clinical Impression   Criteria for Skilled Therapeutic Intervention Yes, treatment indicated   PT Diagnosis (PT) impaired IND with functional mobility   Influenced by the following impairments impaired activity tolerance, functional strength, balance   Functional limitations due to impairments impaired bed mobility, transfers, ambulation   Clinical Presentation (PT Evaluation Complexity) stable   Clinical Presentation Rationale Based on current presentation, PMH, social support   Clinical Decision Making (Complexity) low complexity   Planned Therapy Interventions (PT) balance training;bed mobility training;gait training;home exercise program;patient/family education;strengthening;transfer training;progressive activity/exercise;home program guidelines   Risk & Benefits of therapy have been explained evaluation/treatment results reviewed;care plan/treatment goals reviewed;risks/benefits reviewed;current/potential barriers reviewed;participants voiced agreement with care plan;participants included;patient   PT Total Evaluation Time   PT Eval, Low Complexity Minutes (73769) 10   Physical Therapy Goals   PT Frequency Daily   PT Predicted Duration/Target Date for Goal Attainment 12/08/24   PT Goals Bed Mobility;Transfers;Gait;Aerobic Activity   PT: Bed Mobility Modified independent;Supine to/from sit   PT: Transfers Modified independent;Sit to/from stand;Bed to/from chair;Assistive device   PT: Gait Supervision/stand-by assist;Assistive device;Rolling walker;50 feet   PT: Perform aerobic activity with stable cardiovascular response intermittent activity;10 minutes;ambulation    Interventions   Interventions Quick Adds Gait Training;Therapeutic Activity   Gait Training   Gait Training Minutes (00281) 8   Symptoms Noted During/After Treatment (Gait Training) fatigue;shortness of breath   Treatment Detail/Skilled Intervention Pt cued for gait with FWW, CGA. Flexed posture, slow pace. Cued for upright, pt reports unable to change this 2/2 spinal stenosis. Fatigues quickly, requests to return to room 2/2 fatigue. SpO2 88% on return to room after mobility on 2 LPM. Quickly recovers into 90s upon sitting. Declines any further mobility this AM. Encouraged to amb with nursing staff again later today. Remained in chair, alarm armed and all needs met.   Distance in Feet 85'   PT Discharge Planning   PT Plan progress gait, bring 4WW, monitor spO2   PT Discharge Recommendation (DC Rec) home with assist;home with home care physical therapy   PT Rationale for DC Rec Pt currently below reported Lenin baseline. Currently CGA with FWW, limited by decreased activity tolerance and increased O2 needs. Anticipate with further medical management and IP PT, pt can return home with dtr. Rec HHPT to addres continued mobility deficits.   PT Brief overview of current status Ax1 FWW   Physical Therapy Time and Intention   Timed Code Treatment Minutes 8   Total Session Time (sum of timed and untimed services) 18

## 2024-12-01 NOTE — PROGRESS NOTES
"[facility]    Hospitalist Progress Note      Assessment & Plan   Nicole Reyes is a 69 year old female with a past medical history of diabetes mellitus, hypertension, hyperlipidemia, CKD 3, CRISTÓBAL, obesity, GERD, anxiety/depression, chronic respiratory failure on 2 L via nasal cannula at night who presents with shortness of breath, cough.     #Acute on chronic hypoxemic respiratory failure secondary to CAP versus aspiration: Patient comes into the ER for worsening cough and shortness of breath.  Patient notes that she woke up overnight with nausea and nonbloody emesis.  She also had cough productive of yellow sputum.  She notes that she ate Thanksgiving dinner last night which \"was the largest meal I felt in quite a while.\"  She did feel like she had some heartburn with her symptoms which are now resolved.  She denies any current chest pain.  She denies any new or worsening swelling in her legs.  No fevers or chills.  When she got up this morning she had ongoing shortness of breath particularly with exertion.  She notes it feels like her prior episodes of pneumonia so came to the ER for evaluation.  -ED, patient afebrile and nontachycardic.  Normotensive.  Needing 4 L via nasal cannula to maintain saturations.  CBC without leukocytosis.  BMP with hypokalemia to 2.9.  EKG with sinus rhythm with PVCs but no clear acute ischemic changes.  BNP and high-sensitivity troponin not significantly elevated.  Lactic acid mildly elevated at 2.1 with repeat within normal limits.  VBG 7.32/63..  D-dimer was positive at 0.93.  Her CT chest PE was negative for PE but did show patchy consolidation throughout the left lung and mild consolidation in the right lower lobe.  Patient was given cefepime, magnesium and potassium replacement.  -Continue ceftriaxone and azithromycin.  Wean oxygen as able.    -Fall precautions.  Quite weak; will have PT evaluate.  She lives with her daughter.    -Hold on further IV " fluids.  Encourage p.o. intake.  -On 12/1, pt still requiring 2L via NC.  Continue treatment for CAP.  White count improved.  No fevers.  Work on weaning O2 today.     #Acute on chronic anemia: Hgb 9.4 on 12/1 from 12.2 on 11/29.  No clear bleeding.  Will hold ppx lovenox and added SCD's. Recheck CBC tomorrow AM.         #Mild CAMMIE: Pt with slight bump in creatinine on 11/30.  Encourage PO intake and monitor BMP.  Better on 12/1.     #Hypomagnesemia: Replacement     #HTN. HL: PTA coreg 12.5 mg BID with hold parameters.  Holding diltiazem for now.  Continue statin.  #DM2: sliding scale insulin.  Hold glipizide while IP  #GERD: PPI  #Obesity/CRISTÓBAL: Patient is not tolerant of CPAP.  She uses supplemental oxygen.  #Depression/anxiety. RLS. Chronic back pain: Continue home zoloft, klonopin, flexeril.  Follows with spine specialist for severe spinal stenosis. Added lidocaine patch.      DVT Prophylaxis: Enoxaparin (Lovenox) SQ  Code Status: Full Code  Medically Ready for Discharge: Anticipated in 2-4 Days    DaughterJoanne, updated by phone on 12/1.      Thompson Hernadez MD    Interval History   No events. Slept overnight. No BM overnight or yesterday per patient.  None documented in I/Os.  Feels generally fatigued but better than yesterday. Breathing feels a bit better. No pain.     -Data reviewed today: I reviewed all new labs and imaging results over the last 24 hours. I personally reviewed     Physical Exam   Temp: 97.5  F (36.4  C) Temp src: Oral BP: 138/46 Pulse: 62   Resp: 18 SpO2: 95 % O2 Device: Nasal cannula Oxygen Delivery: 2 LPM  Vitals:    11/29/24 1734 11/30/24 0700 12/01/24 0517   Weight: 115.3 kg (254 lb 1.6 oz) 118 kg (260 lb 2.3 oz) 116 kg (255 lb 12.8 oz)     Vital Signs with Ranges  Temp:  [97.5  F (36.4  C)-98.7  F (37.1  C)] 97.5  F (36.4  C)  Pulse:  [62-79] 62  Resp:  [18-20] 18  BP: (104-138)/(46-77) 138/46  SpO2:  [92 %-96 %] 95 %  I/O last 3 completed shifts:  In: 240 [P.O.:240]  Out: -      Constitutional: Morbidly obese.  No acute distress.   HEENT: Normocephalic, MMM, cannot appreciate JVD secondary to body habitus.  Respiratory: Nl WOB, she has diminished breath sounds bilaterally.  Rhonchi more at the left base to mid lung.  No wheeze.  Cardiovascular: Distant, regular, no murmur  GI: Obese, BS+, NT, ND, no rebound or guarding  Lymph/Hematologic: No bruising. No cervical LAD  Skin: No rash  Musculoskeletal: Nl Tone, moderate bilateral lymphedema noted  Neurologic: A&Ox3, Answers appropriately. CNII-XII intact. Moves all extremities. No tremor    Medications   Current Facility-Administered Medications   Medication Dose Route Frequency Provider Last Rate Last Admin     Current Facility-Administered Medications   Medication Dose Route Frequency Provider Last Rate Last Admin    azithromycin (ZITHROMAX) 500 mg in  mL intermittent infusion  500 mg Intravenous Q24H Thompson Hernadez  mL/hr at 11/30/24 1726 500 mg at 11/30/24 1726    carvedilol (COREG) tablet 12.5 mg  12.5 mg Oral BID w/meals Thompson Hernadez MD   12.5 mg at 11/30/24 1726    cefTRIAXone (ROCEPHIN) 2 g vial to attach to  ml bag for ADULTS or NS 50 ml bag for PEDS  2 g Intravenous Q24H Thompson Hernadez MD   2 g at 11/30/24 2010    gabapentin (NEURONTIN) capsule 600 mg  600 mg Oral Daily Thompson Hernadez MD   600 mg at 11/30/24 1354    gabapentin (NEURONTIN) capsule 900 mg  900 mg Oral BID Thompson Hernadez MD   900 mg at 11/30/24 2010    insulin aspart (NovoLOG) injection (RAPID ACTING)  1-7 Units Subcutaneous TID Thompson Gonzáles MD   1 Units at 11/30/24 1152    insulin aspart (NovoLOG) injection (RAPID ACTING)  1-5 Units Subcutaneous At Bedtime Thompson Hernadez MD        Lidocaine (LIDOCARE) 4 % Patch 1 patch  1 patch Transdermal Q24H Thompson Hernadez MD        pantoprazole (PROTONIX) EC tablet 40 mg  40 mg Oral QAM AC Thompson Hernadez MD   40 mg at 12/01/24 0654    QUEtiapine (SEROquel) tablet 100 mg  100 mg Oral QPM Thompson Hernadez MD   100 mg at  11/30/24 2009    rosuvastatin (CRESTOR) tablet 10 mg  10 mg Oral At Bedtime Thompson Hernadez MD   10 mg at 11/30/24 2143    sertraline (ZOLOFT) tablet 200 mg  200 mg Oral Daily Thompson Hernadez MD   200 mg at 11/30/24 0812    sodium chloride (PF) 0.9% PF flush 3 mL  3 mL Intracatheter Q8H Thompson Hernadez MD   3 mL at 11/30/24 1726       Data   Recent Labs   Lab 12/01/24  0701 12/01/24  0205 11/30/24  2118 11/30/24  0735 11/30/24  0715 11/29/24  1757 11/29/24  1219   WBC 10.8  --   --   --  16.5*  --  7.7   HGB 9.4*  --   --   --  10.2*  --  12.2   MCV 76*  --   --   --  76*  --  77*     --   --   --  209  --  258     --   --   --  141  --  142   POTASSIUM 3.6  --   --   --  3.8  --  2.9*   CHLORIDE 102  --   --   --  105  --  102   CO2 24  --   --   --  23  --  26   BUN 13.3  --   --   --  13.5  --  8.4   CR 0.95  --   --   --  1.12*  --  0.84   ANIONGAP 11  --   --   --  13  --  14   GRECIA 8.2*  --   --   --  7.8*  --  8.5*   * 128* 159*   < > 151*   < > 211*    < > = values in this interval not displayed.       No results found for this or any previous visit (from the past 24 hours).     Yes

## 2024-12-02 ENCOUNTER — APPOINTMENT (OUTPATIENT)
Dept: PHYSICAL THERAPY | Facility: CLINIC | Age: 69
DRG: 177 | End: 2024-12-02
Payer: MEDICARE

## 2024-12-02 LAB
ANION GAP SERPL CALCULATED.3IONS-SCNC: 12 MMOL/L (ref 7–15)
BUN SERPL-MCNC: 10.4 MG/DL (ref 8–23)
CALCIUM SERPL-MCNC: 8.1 MG/DL (ref 8.8–10.4)
CHLORIDE SERPL-SCNC: 104 MMOL/L (ref 98–107)
CREAT SERPL-MCNC: 0.74 MG/DL (ref 0.51–0.95)
CREAT SERPL-MCNC: 0.81 MG/DL (ref 0.51–0.95)
EGFRCR SERPLBLD CKD-EPI 2021: 78 ML/MIN/1.73M2
EGFRCR SERPLBLD CKD-EPI 2021: 87 ML/MIN/1.73M2
ERYTHROCYTE [DISTWIDTH] IN BLOOD BY AUTOMATED COUNT: 15.4 % (ref 10–15)
GLUCOSE BLDC GLUCOMTR-MCNC: 136 MG/DL (ref 70–99)
GLUCOSE BLDC GLUCOMTR-MCNC: 144 MG/DL (ref 70–99)
GLUCOSE BLDC GLUCOMTR-MCNC: 146 MG/DL (ref 70–99)
GLUCOSE BLDC GLUCOMTR-MCNC: 168 MG/DL (ref 70–99)
GLUCOSE BLDC GLUCOMTR-MCNC: 168 MG/DL (ref 70–99)
GLUCOSE SERPL-MCNC: 138 MG/DL (ref 70–99)
HCO3 SERPL-SCNC: 25 MMOL/L (ref 22–29)
HCT VFR BLD AUTO: 30.8 % (ref 35–47)
HGB BLD-MCNC: 9.2 G/DL (ref 11.7–15.7)
MAGNESIUM SERPL-MCNC: 1.4 MG/DL (ref 1.7–2.3)
MAGNESIUM SERPL-MCNC: 2.3 MG/DL (ref 1.7–2.3)
MCH RBC QN AUTO: 22.8 PG (ref 26.5–33)
MCHC RBC AUTO-ENTMCNC: 29.9 G/DL (ref 31.5–36.5)
MCV RBC AUTO: 76 FL (ref 78–100)
PLATELET # BLD AUTO: 172 10E3/UL (ref 150–450)
POTASSIUM SERPL-SCNC: 3.5 MMOL/L (ref 3.4–5.3)
RBC # BLD AUTO: 4.03 10E6/UL (ref 3.8–5.2)
SODIUM SERPL-SCNC: 141 MMOL/L (ref 135–145)
WBC # BLD AUTO: 8.3 10E3/UL (ref 4–11)

## 2024-12-02 PROCEDURE — 83735 ASSAY OF MAGNESIUM: CPT | Performed by: HOSPITALIST

## 2024-12-02 PROCEDURE — 97530 THERAPEUTIC ACTIVITIES: CPT | Mod: GP

## 2024-12-02 PROCEDURE — 250N000011 HC RX IP 250 OP 636: Performed by: HOSPITALIST

## 2024-12-02 PROCEDURE — 250N000013 HC RX MED GY IP 250 OP 250 PS 637: Performed by: HOSPITALIST

## 2024-12-02 PROCEDURE — 36415 COLL VENOUS BLD VENIPUNCTURE: CPT | Performed by: HOSPITALIST

## 2024-12-02 PROCEDURE — 85027 COMPLETE CBC AUTOMATED: CPT | Performed by: HOSPITALIST

## 2024-12-02 PROCEDURE — 99232 SBSQ HOSP IP/OBS MODERATE 35: CPT | Performed by: HOSPITALIST

## 2024-12-02 PROCEDURE — 80048 BASIC METABOLIC PNL TOTAL CA: CPT | Performed by: HOSPITALIST

## 2024-12-02 PROCEDURE — 120N000001 HC R&B MED SURG/OB

## 2024-12-02 RX ORDER — MAGNESIUM SULFATE HEPTAHYDRATE 40 MG/ML
4 INJECTION, SOLUTION INTRAVENOUS ONCE
Status: COMPLETED | OUTPATIENT
Start: 2024-12-02 | End: 2024-12-02

## 2024-12-02 RX ORDER — AZITHROMYCIN 250 MG/1
250 TABLET, FILM COATED ORAL DAILY
Status: DISCONTINUED | OUTPATIENT
Start: 2024-12-02 | End: 2024-12-03 | Stop reason: HOSPADM

## 2024-12-02 RX ADMIN — AZITHROMYCIN DIHYDRATE 250 MG: 250 TABLET ORAL at 13:09

## 2024-12-02 RX ADMIN — INSULIN ASPART 1 UNITS: 100 INJECTION, SOLUTION INTRAVENOUS; SUBCUTANEOUS at 15:27

## 2024-12-02 RX ADMIN — ROSUVASTATIN CALCIUM 10 MG: 5 TABLET, FILM COATED ORAL at 22:08

## 2024-12-02 RX ADMIN — PANTOPRAZOLE SODIUM 40 MG: 40 TABLET, DELAYED RELEASE ORAL at 09:18

## 2024-12-02 RX ADMIN — ACETAMINOPHEN 650 MG: 325 TABLET, FILM COATED ORAL at 09:24

## 2024-12-02 RX ADMIN — SERTRALINE 200 MG: 100 TABLET, FILM COATED ORAL at 09:18

## 2024-12-02 RX ADMIN — CEFTRIAXONE 2 G: 2 INJECTION, POWDER, FOR SOLUTION INTRAMUSCULAR; INTRAVENOUS at 20:32

## 2024-12-02 RX ADMIN — GABAPENTIN 900 MG: 300 CAPSULE ORAL at 09:18

## 2024-12-02 RX ADMIN — CARVEDILOL 12.5 MG: 12.5 TABLET, FILM COATED ORAL at 17:04

## 2024-12-02 RX ADMIN — QUETIAPINE FUMARATE 100 MG: 50 TABLET ORAL at 20:33

## 2024-12-02 RX ADMIN — CARVEDILOL 12.5 MG: 12.5 TABLET, FILM COATED ORAL at 09:18

## 2024-12-02 RX ADMIN — INSULIN ASPART 1 UNITS: 100 INJECTION, SOLUTION INTRAVENOUS; SUBCUTANEOUS at 17:04

## 2024-12-02 RX ADMIN — MAGNESIUM SULFATE HEPTAHYDRATE 4 G: 4 INJECTION, SOLUTION INTRAVENOUS at 13:08

## 2024-12-02 RX ADMIN — LIDOCAINE 1 PATCH: 4 PATCH TOPICAL at 09:20

## 2024-12-02 RX ADMIN — MICONAZOLE NITRATE: 20 POWDER TOPICAL at 09:21

## 2024-12-02 RX ADMIN — MICONAZOLE NITRATE: 20 POWDER TOPICAL at 20:34

## 2024-12-02 RX ADMIN — GABAPENTIN 600 MG: 300 CAPSULE ORAL at 13:09

## 2024-12-02 RX ADMIN — GABAPENTIN 900 MG: 300 CAPSULE ORAL at 20:33

## 2024-12-02 ASSESSMENT — ACTIVITIES OF DAILY LIVING (ADL)
ADLS_ACUITY_SCORE: 42
ADLS_ACUITY_SCORE: 44
ADLS_ACUITY_SCORE: 42
ADLS_ACUITY_SCORE: 42
ADLS_ACUITY_SCORE: 44
ADLS_ACUITY_SCORE: 41
ADLS_ACUITY_SCORE: 42
ADLS_ACUITY_SCORE: 44
ADLS_ACUITY_SCORE: 42
ADLS_ACUITY_SCORE: 42
ADLS_ACUITY_SCORE: 44
ADLS_ACUITY_SCORE: 44
ADLS_ACUITY_SCORE: 42
ADLS_ACUITY_SCORE: 42
ADLS_ACUITY_SCORE: 44
ADLS_ACUITY_SCORE: 42

## 2024-12-02 NOTE — CARE PLAN
12/02/24 1233   Home Oxygen Assessment (RN/RT ONLY)   Does patient have oxygen at home? Unknown   1. SpO2 on room air at rest while awake 90       Oxygen LPM at rest 1   3. SpO2 on room air during Activity/with exercise 87   4. SpO2 with oxygen during activity/with exercise 90       Oxygen LPM during activity/with exercise 1

## 2024-12-02 NOTE — PROGRESS NOTES
[facility]     Patient is anticipated to discharge 12/3/24.  Patient agrees to have [facility] provide SN PT services. Patient will receive a phone call from [facility] to schedule an initial home care visit post discharge from the hospital. Anticipated start of care date is [within 24-48 hours of discharge].     Please note: SOC date is based on availability of the day referral was received. If patients discharge date changes, please reach out to Clinical Liaison or the HUB to ensure SOC date is still appropriate for a safe discharge plan.     Thank you for the referral.     SATURNINO Ledezma, LPN  [facility] Liaison   Cell: [phone]

## 2024-12-02 NOTE — PLAN OF CARE
"Orientation: AAOx4,  Pain: pain is managed with scheduled lidocaine patch  O2: NC @ 1LPM  GI/: Ambulates to restroom  Skin: redness on the abd fold  Lung sounds: dim  Activity: A1 GB with walker.  Diet: mod carb diet  Protocols: K, Mg  Major shift events:  Plan: continue with POC  Problem: Adult Inpatient Plan of Care  Goal: Plan of Care Review  Description: The Plan of Care Review/Shift note should be completed every shift.  The Outcome Evaluation is a brief statement about your assessment that the patient is improving, declining, or no change.  This information will be displayed automatically on your shift  note.  Outcome: Progressing  Flowsheets (Taken 12/1/2024 2059)  Plan of Care Reviewed With: patient  Overall Patient Progress: improving  Goal: Patient-Specific Goal (Individualized)  Description: You can add care plan individualizations to a care plan. Examples of Individualization might be:  \"Parent requests to be called daily at 9am for status\", \"I have a hard time hearing out of my right ear\", or \"Do not touch me to wake me up as it startles  me\".  Outcome: Progressing  Goal: Absence of Hospital-Acquired Illness or Injury  Outcome: Progressing  Intervention: Identify and Manage Fall Risk  Recent Flowsheet Documentation  Taken 12/1/2024 2016 by Link Sheppard RN  Safety Promotion/Fall Prevention: safety round/check completed  Intervention: Prevent and Manage VTE (Venous Thromboembolism) Risk  Recent Flowsheet Documentation  Taken 12/1/2024 2016 by Link Sheppard RN  VTE Prevention/Management: SCDs off (sequential compression devices)  Intervention: Prevent Infection  Recent Flowsheet Documentation  Taken 12/1/2024 2016 by Link Sheppard RN  Infection Prevention: rest/sleep promoted  Goal: Optimal Comfort and Wellbeing  Outcome: Progressing  Goal: Readiness for Transition of Care  Outcome: Progressing     Problem: Gas Exchange Impaired  Goal: Optimal Gas Exchange  Outcome: Progressing   Goal Outcome " Evaluation:      Plan of Care Reviewed With: patient    Overall Patient Progress: improvingOverall Patient Progress: improving

## 2024-12-02 NOTE — PROGRESS NOTES
Patient has been assessed for Home Oxygen needs.     Pulse oximetry (SpO2) and Oxygen flow readings:    SpO2 = 90% on room air at rest while awake.    SpO2 improved to 92% on 1 liters/minute at rest.    SpO2 = 87% on room air during activity/with exercise.    *SpO2 improved to 90% on 1 liters/minute during activity/with exercise.

## 2024-12-02 NOTE — PLAN OF CARE
"Pertinent assessments: A&Ox4. VSS on 2L NC. LS dim, denies SOB. Denies pain. Ax1 with walker.     Major Shift Events: None    Treatment Plan: IV zithro and rocephin, wean oxygen as able during day.     Bedside Nurse: Ave Carroll RN     Goal Outcome Evaluation:      Plan of Care Reviewed With: patient    Overall Patient Progress: improvingOverall Patient Progress: improving       Problem: Adult Inpatient Plan of Care  Goal: Plan of Care Review  Description: The Plan of Care Review/Shift note should be completed every shift.  The Outcome Evaluation is a brief statement about your assessment that the patient is improving, declining, or no change.  This information will be displayed automatically on your shift  note.  Outcome: Progressing  Flowsheets (Taken 12/2/2024 0701)  Plan of Care Reviewed With: patient  Overall Patient Progress: improving  Goal: Patient-Specific Goal (Individualized)  Description: You can add care plan individualizations to a care plan. Examples of Individualization might be:  \"Parent requests to be called daily at 9am for status\", \"I have a hard time hearing out of my right ear\", or \"Do not touch me to wake me up as it startles  me\".  Outcome: Progressing  Goal: Absence of Hospital-Acquired Illness or Injury  Outcome: Progressing  Intervention: Identify and Manage Fall Risk  Recent Flowsheet Documentation  Taken 12/2/2024 0000 by Ave Carroll RN  Safety Promotion/Fall Prevention:   activity supervised   assistive device/personal items within reach   clutter free environment maintained   increased rounding and observation   safety round/check completed   supervised activity  Intervention: Prevent Skin Injury  Recent Flowsheet Documentation  Taken 12/2/2024 0000 by Ave Carroll RN  Body Position: position changed independently  Intervention: Prevent and Manage VTE (Venous Thromboembolism) Risk  Recent Flowsheet Documentation  Taken 12/2/2024 0000 by Ave Carroll RN  VTE " Prevention/Management: SCDs off (sequential compression devices)  Intervention: Prevent Infection  Recent Flowsheet Documentation  Taken 12/2/2024 0000 by Ave Carroll, RN  Infection Prevention:   rest/sleep promoted   personal protective equipment utilized   hand hygiene promoted  Goal: Optimal Comfort and Wellbeing  Outcome: Progressing  Goal: Readiness for Transition of Care  Outcome: Progressing     Problem: Gas Exchange Impaired  Goal: Optimal Gas Exchange  Outcome: Progressing  Intervention: Optimize Oxygenation and Ventilation  Recent Flowsheet Documentation  Taken 12/2/2024 0000 by Ave Carroll, RN  Head of Bed (HOB) Positioning: HOB at 30 degrees

## 2024-12-02 NOTE — PROGRESS NOTES
"[facility]    Hospitalist Progress Note             Date of Admission:  11/29/2024                   Day of hospitalization: 3    Assessment and Plan:   Nicole Reyes is a 69 year old female with a past medical history of diabetes mellitus, hypertension, hyperlipidemia, CKD 3, CRISTÓBAL, obesity, GERD, anxiety/depression, chronic respiratory failure on 2 L via nasal cannula at night who presents with shortness of breath, cough.     #Acute on chronic hypoxemic respiratory failure secondary to CAP versus aspiration: Patient comes into the ER for worsening cough and shortness of breath.  Patient notes that she woke up overnight with nausea and nonbloody emesis.  She also had cough productive of yellow sputum.  She notes that she ate Thanksgiving dinner last night which \"was the largest meal I felt in quite a while.\"  She did feel like she had some heartburn with her symptoms which are now resolved.  She denies any current chest pain.  She denies any new or worsening swelling in her legs.  No fevers or chills.  When she got up this morning she had ongoing shortness of breath particularly with exertion.  She notes it feels like her prior episodes of pneumonia so came to the ER for evaluation.  -ED, patient afebrile and nontachycardic.  Normotensive.  Needing 4 L via nasal cannula to maintain saturations.  CBC without leukocytosis.  BMP with hypokalemia to 2.9.  EKG with sinus rhythm with PVCs but no clear acute ischemic changes.  BNP and high-sensitivity troponin not significantly elevated.  Lactic acid mildly elevated at 2.1 with repeat within normal limits.  VBG 7.32/63..  D-dimer was positive at 0.93.  Her CT chest PE was negative for PE but did show patchy consolidation throughout the left lung and mild consolidation in the right lower lobe.  Patient was given cefepime, magnesium and potassium replacement.  -Continue ceftriaxone and azithromycin.  Wean oxygen as able. Still requiring oxygen " therapy, will monitor another night  -Fall precautions.  PT recommends home with home health care  -Hold on further IV fluids.  Encourage p.o. intake.  -On 12/1, pt still requiring 2L via NC.  Continue treatment for CAP.  White count improved.  No fevers.  Work on weaning O2 today.      #Acute on chronic anemia: Hgb 9.4 on 12/1 from 12.2 on 11/29.  No clear bleeding.  Will hold ppx lovenox and added SCD's. Stable at 9.2     #Mild CAMMIE: Pt with slight bump in creatinine on 11/30.  Encourage PO intake and monitor BMP.  Better on 12/1.      #Hypomagnesemia: Replacement     #HTN. HL: PTA coreg 12.5 mg BID with hold parameters.  Holding diltiazem for now.  Continue statin.  #DM2: sliding scale insulin.  Hold glipizide while IP  #GERD: PPI  #Obesity/CRISTÓBAL: Patient is not tolerant of CPAP.  She uses supplemental oxygen.  #Depression/anxiety. RLS. Chronic back pain: Continue home zoloft, klonopin, flexeril.  Follows with spine specialist for severe spinal stenosis. Added lidocaine patch.      # Code status: Full   # DVT: SCDs  # IVF: none            Medically Ready for Discharge: Anticipated Tomorrow                    Radha Watts MD    Subjective   Chief Complaint:  Sob cough  Subjective: still feels quite fatigued. No chest pain, has sob,and cough improving. No nausea, vomiting, tolerating diet. Has lower back pain, no bowel or bladder incontinence           Objective   /60 (BP Location: Left arm)   Pulse 77   Temp 98.4  F (36.9  C) (Oral)   Resp 16   Wt 116 kg (255 lb 12.8 oz)   LMP 09/15/2007   SpO2 90%   BMI 53.46 kg/m       Physical Exam  General: Pt in NAD, normal appearance  HEENT: OP clear MMM, no JVD  Lungs: Bibasilar crackles normal breathing  without accessory muscle usage, no wheezing, rhonchi or crackles  Cardiac: +S1, S2, RRR, no MRG, no edema  Abdominal: normal bowel sounds, NT/ND  Skin: warm, dry, normal turgor, no rash  Psyche: A& O x3, appropriate affect           No intake or output data in  "the 24 hours ending 12/02/24 1338        Labs and Imaging Results:      Recent Labs   Lab 12/02/24  0646 12/01/24  2347 12/01/24  0701   WBC 8.3  --  10.8   HGB 9.2*  --  9.4*    212 182        Recent Labs   Lab 12/02/24  0646 12/01/24  0701    137   CO2 25 24   BUN 10.4 13.3      No results for input(s): \"INR\", \"PTT\" in the last 168 hours.   No results for input(s): \"CKMB\" in the last 168 hours.    Invalid input(s): \"TROPONINT\"   No results for input(s): \"ALBUMIN\", \"AST\", \"ALT\", \"ALKPHOS\", \"BILITOT\" in the last 168 hours.     Micro:     Radio:  CT Chest Pulmonary Embolism w Contrast   Final Result   IMPRESSION:   1.  No evidence for pulmonary embolism.   2.  Patchy consolidation throughout the left lung has increased, and   mild consolidation in the right lower lobe posteriorly and medially is   new since the previous exam. These findings are most likely infectious   or inflammatory. Recommend follow-up to confirm resolution.   3.  Cholelithiasis.   4.  Small to moderate hiatal hernia.   5.  Partially exophytic fat density lesion in the upper pole of the   left kidney is likely an angiomyolipoma, unchanged.      MARY JO FLORES MD            SYSTEM ID:  [id number]              Medications:      Scheduled Meds:    Current Facility-Administered Medications   Medication Dose Route Frequency Provider Last Rate Last Admin    azithromycin (ZITHROMAX) tablet 250 mg  250 mg Oral Daily Radha Watts MD   250 mg at 12/02/24 1309    carvedilol (COREG) tablet 12.5 mg  12.5 mg Oral BID w/meals Thompson Hernadez MD   12.5 mg at 12/02/24 0918    cefTRIAXone (ROCEPHIN) 2 g vial to attach to  ml bag for ADULTS or NS 50 ml bag for PEDS  2 g Intravenous Q24H Thompson Hernadez MD   2 g at 12/01/24 2019    gabapentin (NEURONTIN) capsule 600 mg  600 mg Oral Daily Thompson Hernadez MD   600 mg at 12/02/24 1309    gabapentin (NEURONTIN) capsule 900 mg  900 mg Oral BID Thompson Hernadez MD   900 mg at 12/02/24 0918    insulin aspart " (NovoLOG) injection (RAPID ACTING)  1-7 Units Subcutaneous TID AC Thompson Hernadez MD   1 Units at 12/01/24 1105    insulin aspart (NovoLOG) injection (RAPID ACTING)  1-5 Units Subcutaneous At Bedtime Thompson Hernadez MD        Lidocaine (LIDOCARE) 4 % Patch 1 patch  1 patch Transdermal Q24H Thompson Hernadez MD   1 patch at 12/02/24 0920    magnesium sulfate 4 g in 100 mL sterile water intermittent infusion  4 g Intravenous Once Radha Watts MD 50 mL/hr at 12/02/24 1308 4 g at 12/02/24 1308    miconazole (MICATIN) 2 % powder   Topical BID Thompson Hernadez MD   Given at 12/02/24 0921    pantoprazole (PROTONIX) EC tablet 40 mg  40 mg Oral QAM AC Thompson Hernadez MD   40 mg at 12/02/24 0918    QUEtiapine (SEROquel) tablet 100 mg  100 mg Oral QPM Thompson Hernadez MD   100 mg at 12/01/24 2017    rosuvastatin (CRESTOR) tablet 10 mg  10 mg Oral At Bedtime Thompson Hernadez MD   10 mg at 12/01/24 2149    sertraline (ZOLOFT) tablet 200 mg  200 mg Oral Daily Thompson Hernadez MD   200 mg at 12/02/24 0918    sodium chloride (PF) 0.9% PF flush 3 mL  3 mL Intracatheter Q8H Thompson Hernadez MD   3 mL at 12/02/24 0924     Continuous Infusions:    Current Facility-Administered Medications   Medication Dose Route Frequency Provider Last Rate Last Admin     PRN Meds:    Current Facility-Administered Medications   Medication Dose Route Frequency Provider Last Rate Last Admin    acetaminophen (TYLENOL) tablet 650 mg  650 mg Oral Q4H PRN Thompson Hernadez MD   650 mg at 12/02/24 0924    Or    acetaminophen (TYLENOL) Suppository 650 mg  650 mg Rectal Q4H PRN Thompson Hernadez MD        calcium carbonate (TUMS) chewable tablet 1,000 mg  1,000 mg Oral 4x Daily PRN Thompson Hernadez MD   1,000 mg at 12/01/24 1105    clonazePAM (klonoPIN) tablet 1 mg  1 mg Oral BID PRN Thompson Hernadez MD        glucose gel 15-30 g  15-30 g Oral Q15 Min PRN Thompson Hernadez MD        Or    dextrose 50 % injection 25-50 mL  25-50 mL Intravenous Q15 Min PRN Thompson Hernadez MD        Or    glucagon injection 1 mg  1  mg Subcutaneous Q15 Min PRN Thompson Hernadez MD        lidocaine (LMX4) cream   Topical Q1H PRN Thompson Hernadez MD        lidocaine 1 % 0.1-1 mL  0.1-1 mL Other Q1H PRN Thompson Hernadez MD        melatonin tablet 1 mg  1 mg Oral At Bedtime PRN Thompson Hernadez MD        ondansetron (ZOFRAN ODT) ODT tab 4 mg  4 mg Oral Q6H PRN Thompson Hernadez MD        Or    ondansetron (ZOFRAN) injection 4 mg  4 mg Intravenous Q6H PRN Thompson Hernadez MD        senna-docusate (SENOKOT-S/PERICOLACE) 8.6-50 MG per tablet 1 tablet  1 tablet Oral BID PRN Thompson Hernadez MD        Or    senna-docusate (SENOKOT-S/PERICOLACE) 8.6-50 MG per tablet 2 tablet  2 tablet Oral BID PRN Thompson Hernadez MD        sodium chloride (PF) 0.9% PF flush 3 mL  3 mL Intracatheter q1 min prn Thompsno Hernadez MD   3 mL at 11/30/24 2014

## 2024-12-02 NOTE — PLAN OF CARE
"End of Shift Summary  For vital signs and complete assessments, please see documentation flowsheets.     Pertinent assessments:   VSS on 1-2L NC, unable to wean to RA during day. LS dim. SOBE. SBA w/ RW. Voiding adequate amounts, one bm this shift. Abd fold red, miconazole powder applied.     Major Shift Events: Ambulated with PT. Mild SOBE, better this afternoon    Treatment Plan: IV zithro and rocephin, wean oxygen as able during day.       Problem: Adult Inpatient Plan of Care  Goal: Plan of Care Review  Description: The Plan of Care Review/Shift note should be completed every shift.  The Outcome Evaluation is a brief statement about your assessment that the patient is improving, declining, or no change.  This information will be displayed automatically on your shift  note.  Outcome: Progressing  Flowsheets (Taken 12/1/2024 1956)  Outcome Evaluation: weaned to 1L NC during the day  Overall Patient Progress: improving  Goal: Patient-Specific Goal (Individualized)  Description: You can add care plan individualizations to a care plan. Examples of Individualization might be:  \"Parent requests to be called daily at 9am for status\", \"I have a hard time hearing out of my right ear\", or \"Do not touch me to wake me up as it startles  me\".  Outcome: Progressing  Goal: Absence of Hospital-Acquired Illness or Injury  Outcome: Progressing  Intervention: Identify and Manage Fall Risk  Recent Flowsheet Documentation  Taken 12/1/2024 0940 by Amarilis Luong RN  Safety Promotion/Fall Prevention: safety round/check completed  Intervention: Prevent and Manage VTE (Venous Thromboembolism) Risk  Recent Flowsheet Documentation  Taken 12/1/2024 0940 by Amarilis Luong RN  VTE Prevention/Management: SCDs off (sequential compression devices)  Intervention: Prevent Infection  Recent Flowsheet Documentation  Taken 12/1/2024 0940 by Amarilis Luong RN  Infection Prevention: rest/sleep promoted  Goal: Optimal Comfort and " Wellbeing  Outcome: Progressing  Intervention: Monitor Pain and Promote Comfort  Recent Flowsheet Documentation  Taken 12/1/2024 0940 by Amarilis Luong, RN  Pain Management Interventions:   medication (see MAR)   repositioned  Goal: Readiness for Transition of Care  Outcome: Progressing     Problem: Gas Exchange Impaired  Goal: Optimal Gas Exchange  Outcome: Progressing   Goal Outcome Evaluation:           Overall Patient Progress: improvingOverall Patient Progress: improving    Outcome Evaluation: weaned to 1L NC during the day

## 2024-12-02 NOTE — PLAN OF CARE
"Pertinent assessments: A&O, up sba gait belt and walker, ambulated hallways several times. Weaned O2 to 1L NC, requiring 1L w/ ambulation. Received Tylenol for back pain, refused Lidocaine patch. /168.     Major Shift Events Mag replaced.     Treatment Plan: Discharge pending oxygen improvement.     Bedside Nurse: Joyce Lynch RN     Problem: Adult Inpatient Plan of Care  Goal: Plan of Care Review  Description: The Plan of Care Review/Shift note should be completed every shift.  The Outcome Evaluation is a brief statement about your assessment that the patient is improving, declining, or no change.  This information will be displayed automatically on your shift  note.  Outcome: Progressing  Flowsheets (Taken 12/2/2024 1505)  Outcome Evaluation: Treatment Plan: Discharge pending oxygen improvement.  Plan of Care Reviewed With: patient  Overall Patient Progress: improving  Goal: Patient-Specific Goal (Individualized)  Description: You can add care plan individualizations to a care plan. Examples of Individualization might be:  \"Parent requests to be called daily at 9am for status\", \"I have a hard time hearing out of my right ear\", or \"Do not touch me to wake me up as it startles  me\".  Outcome: Progressing  Goal: Absence of Hospital-Acquired Illness or Injury  Outcome: Progressing  Intervention: Identify and Manage Fall Risk  Recent Flowsheet Documentation  Taken 12/2/2024 1258 by Joyce Lynch RN  Safety Promotion/Fall Prevention:   activity supervised   clutter free environment maintained   increased rounding and observation   increase visualization of patient   nonskid shoes/slippers when out of bed  Intervention: Prevent and Manage VTE (Venous Thromboembolism) Risk  Recent Flowsheet Documentation  Taken 12/2/2024 1258 by Joyce Lynch RN  VTE Prevention/Management: SCDs off (sequential compression devices)  Intervention: Prevent Infection  Recent Flowsheet Documentation  Taken 12/2/2024 " 1258 by Joyce Lynch RN  Infection Prevention: rest/sleep promoted  Goal: Optimal Comfort and Wellbeing  Outcome: Progressing  Goal: Readiness for Transition of Care  Outcome: Progressing     Problem: Gas Exchange Impaired  Goal: Optimal Gas Exchange  Outcome: Progressing   Goal Outcome Evaluation:      Plan of Care Reviewed With: patient    Overall Patient Progress: improvingOverall Patient Progress: improving    Outcome Evaluation: Treatment Plan: Discharge pending oxygen improvement.

## 2024-12-03 ENCOUNTER — PATIENT OUTREACH (OUTPATIENT)
Dept: CARE COORDINATION | Facility: CLINIC | Age: 69
End: 2024-12-03
Payer: MEDICARE

## 2024-12-03 ENCOUNTER — APPOINTMENT (OUTPATIENT)
Dept: GENERAL RADIOLOGY | Facility: CLINIC | Age: 69
End: 2024-12-03
Attending: HOSPITALIST
Payer: MEDICARE

## 2024-12-03 ENCOUNTER — APPOINTMENT (OUTPATIENT)
Dept: PHYSICAL THERAPY | Facility: CLINIC | Age: 69
DRG: 177 | End: 2024-12-03
Payer: MEDICARE

## 2024-12-03 VITALS
RESPIRATION RATE: 20 BRPM | DIASTOLIC BLOOD PRESSURE: 53 MMHG | OXYGEN SATURATION: 95 % | HEART RATE: 67 BPM | BODY MASS INDEX: 52.43 KG/M2 | TEMPERATURE: 97.5 F | WEIGHT: 250.88 LBS | SYSTOLIC BLOOD PRESSURE: 100 MMHG

## 2024-12-03 LAB
ANION GAP SERPL CALCULATED.3IONS-SCNC: 10 MMOL/L (ref 7–15)
BUN SERPL-MCNC: 8.6 MG/DL (ref 8–23)
CALCIUM SERPL-MCNC: 8.4 MG/DL (ref 8.8–10.4)
CHLORIDE SERPL-SCNC: 103 MMOL/L (ref 98–107)
CREAT SERPL-MCNC: 0.74 MG/DL (ref 0.51–0.95)
EGFRCR SERPLBLD CKD-EPI 2021: 87 ML/MIN/1.73M2
ERYTHROCYTE [DISTWIDTH] IN BLOOD BY AUTOMATED COUNT: 15.4 % (ref 10–15)
GLUCOSE BLDC GLUCOMTR-MCNC: 128 MG/DL (ref 70–99)
GLUCOSE BLDC GLUCOMTR-MCNC: 143 MG/DL (ref 70–99)
GLUCOSE SERPL-MCNC: 132 MG/DL (ref 70–99)
HCO3 SERPL-SCNC: 27 MMOL/L (ref 22–29)
HCT VFR BLD AUTO: 31.1 % (ref 35–47)
HGB BLD-MCNC: 9.5 G/DL (ref 11.7–15.7)
MAGNESIUM SERPL-MCNC: 1.7 MG/DL (ref 1.7–2.3)
MCH RBC QN AUTO: 23.3 PG (ref 26.5–33)
MCHC RBC AUTO-ENTMCNC: 30.5 G/DL (ref 31.5–36.5)
MCV RBC AUTO: 76 FL (ref 78–100)
PLATELET # BLD AUTO: 201 10E3/UL (ref 150–450)
POTASSIUM SERPL-SCNC: 3.6 MMOL/L (ref 3.4–5.3)
RBC # BLD AUTO: 4.07 10E6/UL (ref 3.8–5.2)
SODIUM SERPL-SCNC: 140 MMOL/L (ref 135–145)
WBC # BLD AUTO: 8.2 10E3/UL (ref 4–11)

## 2024-12-03 PROCEDURE — 80048 BASIC METABOLIC PNL TOTAL CA: CPT | Performed by: HOSPITALIST

## 2024-12-03 PROCEDURE — 97116 GAIT TRAINING THERAPY: CPT | Mod: GP

## 2024-12-03 PROCEDURE — 36415 COLL VENOUS BLD VENIPUNCTURE: CPT | Performed by: HOSPITALIST

## 2024-12-03 PROCEDURE — 97530 THERAPEUTIC ACTIVITIES: CPT | Mod: GP

## 2024-12-03 PROCEDURE — 82565 ASSAY OF CREATININE: CPT | Performed by: HOSPITALIST

## 2024-12-03 PROCEDURE — 83735 ASSAY OF MAGNESIUM: CPT | Performed by: HOSPITALIST

## 2024-12-03 PROCEDURE — 85041 AUTOMATED RBC COUNT: CPT | Performed by: HOSPITALIST

## 2024-12-03 PROCEDURE — 85014 HEMATOCRIT: CPT | Performed by: HOSPITALIST

## 2024-12-03 PROCEDURE — 71045 X-RAY EXAM CHEST 1 VIEW: CPT

## 2024-12-03 PROCEDURE — 250N000013 HC RX MED GY IP 250 OP 250 PS 637: Performed by: HOSPITALIST

## 2024-12-03 PROCEDURE — 99239 HOSP IP/OBS DSCHRG MGMT >30: CPT | Performed by: HOSPITALIST

## 2024-12-03 RX ORDER — CEFDINIR 300 MG/1
300 CAPSULE ORAL EVERY 12 HOURS SCHEDULED
Status: DISCONTINUED | OUTPATIENT
Start: 2024-12-03 | End: 2024-12-03 | Stop reason: HOSPADM

## 2024-12-03 RX ORDER — FERROUS SULFATE 325(65) MG
325 TABLET ORAL
Qty: 30 TABLET | Refills: 0 | Status: SHIPPED | OUTPATIENT
Start: 2024-12-03 | End: 2025-01-02

## 2024-12-03 RX ORDER — CEFPODOXIME PROXETIL 200 MG/1
200 TABLET, FILM COATED ORAL 2 TIMES DAILY
Qty: 12 TABLET | Refills: 0 | Status: SHIPPED | OUTPATIENT
Start: 2024-12-03 | End: 2024-12-09

## 2024-12-03 RX ADMIN — PANTOPRAZOLE SODIUM 40 MG: 40 TABLET, DELAYED RELEASE ORAL at 09:24

## 2024-12-03 RX ADMIN — CEFDINIR 300 MG: 300 CAPSULE ORAL at 10:50

## 2024-12-03 RX ADMIN — MICONAZOLE NITRATE: 20 POWDER TOPICAL at 09:26

## 2024-12-03 RX ADMIN — INSULIN ASPART 1 UNITS: 100 INJECTION, SOLUTION INTRAVENOUS; SUBCUTANEOUS at 12:10

## 2024-12-03 RX ADMIN — SERTRALINE 200 MG: 100 TABLET, FILM COATED ORAL at 09:24

## 2024-12-03 RX ADMIN — GABAPENTIN 900 MG: 300 CAPSULE ORAL at 09:24

## 2024-12-03 RX ADMIN — ACETAMINOPHEN 650 MG: 325 TABLET, FILM COATED ORAL at 09:24

## 2024-12-03 RX ADMIN — CARVEDILOL 12.5 MG: 12.5 TABLET, FILM COATED ORAL at 09:24

## 2024-12-03 ASSESSMENT — ACTIVITIES OF DAILY LIVING (ADL)
ADLS_ACUITY_SCORE: 44

## 2024-12-03 NOTE — DISCHARGE SUMMARY
"Discharge Summary  Hospitalist Service      Nicole Reyes MRN# [id number]   YOB: 1955 Age: 69 year old     Date of Admission:  11/29/2024  Date of Discharge:  12/3/2024  Admitting Physician:  Thompson Hernadez MD  Discharge Physician: Radha Watts MD   Discharging Service: Hospitalist Service     Primary Provider: Eli Andrea  Primary Care Physician Phone Number: [phone]         Discharge Diagnoses/Problem Oriented Hospital Course (Providers):      Discharge Diagnoses   #Acute on chronic hypoxemic respiratory failure secondary to CAP versus aspiration  #Acute on chronic anemia  #Mild CAMMIE  #HTN. HL  #DM2  #GERD  #Obesity/CRISTÓBAL  #Depression/anxiety. RLS. Chronic back pain  Hospital Course   Nicole Reyes is a 69 year old female with a past medical history of diabetes mellitus, hypertension, hyperlipidemia, CKD 3, CRISTÓBAL, obesity, GERD, anxiety/depression, chronic respiratory failure on 2 L via nasal cannula at night who presents with shortness of breath, cough.     #Acute on chronic hypoxemic respiratory failure secondary to CAP versus aspiration: Patient comes into the ER for worsening cough and shortness of breath.  Patient notes that she woke up overnight with nausea and nonbloody emesis.  She also had cough productive of yellow sputum.  She notes that she ate Thanksgiving dinner last night which \"was the largest meal I felt in quite a while.\"  She did feel like she had some heartburn with her symptoms which are now resolved.  She denies any current chest pain.  She denies any new or worsening swelling in her legs.  No fevers or chills.  When she got up this morning she had ongoing shortness of breath particularly with exertion.  She notes it feels like her prior episodes of pneumonia so came to the ER for evaluation.  -ED, patient afebrile and nontachycardic.  Normotensive.  Needing 4 L via nasal cannula to maintain saturations.  CBC without leukocytosis.  BMP with hypokalemia to 2.9.  " EKG with sinus rhythm with PVCs but no clear acute ischemic changes.  BNP and high-sensitivity troponin not significantly elevated.  Lactic acid mildly elevated at 2.1 with repeat within normal limits.  VBG 7.32/63..  D-dimer was positive at 0.93.  Her CT chest PE was negative for PE but did show patchy consolidation throughout the left lung and mild consolidation in the right lower lobe.  Patient was given cefepime, magnesium and potassium replacement.  -Continue ceftriaxone and azithromycin.  Wean oxygen as able. Still requiring oxygen therapy, will monitor another night  -Fall precautions.  PT recommends home with home health care  -Hold on further IV fluids.  Encourage p.o. intake.  -doing well, follow up cxr shows improving infiltrates. Nursing home oxygen evaluation placed     #Acute on chronic anemia: Hgb 9.4 on 12/1 from 12.2 on 11/29.  No clear bleeding.  Will hold ppx lovenox and added SCD's. Stable at 9.2  - noted to have SHAWNA in the past, started on ferrous sulfate, she is to follow up wit her PCP to further manage    #Mild CAMMIE: Pt with slight bump in creatinine on 11/30.  Encourage PO intake and monitor BMP.  Better on 12/1.      #Hypomagnesemia: Replacement     #HTN. HL: PTA coreg 12.5 mg BID with hold parameters.  Holding diltiazem for now.  Continue statin.  #DM2: sliding scale insulin.  Hold glipizide while IP  #GERD: PPI  #Obesity/CRISTÓBAL: Patient is not tolerant of CPAP.  She uses supplemental oxygen.  #Depression/anxiety. RLS. Chronic back pain: Continue home zoloft, klonopin, flexeril.  Follows with spine specialist for severe spinal stenosis. Added lidocaine patch.              Code Status:      Full Code         Important Results:                  Pending Results:        Unresulted Labs Ordered in the Past 30 Days of this Admission       Date and Time Order Name Status Description    11/29/2024 12:59 PM Blood Culture Peripheral Blood Preliminary                  Discharge Instructions and  Follow-Up:      Follow-up Appointments     Follow-up and recommended labs and tests       Follow up with primary care provider, Eli Andrea, within 7 days   for hospital follow- up.  The following labs/tests are recommended: follow   up with PCP for workup of anemia and management of hypertension.  Your diltiazem and aldactone has been discontinued as your blood pressures   have been in the normal range                 Discharge Disposition:        Discharged to home          Discharge Medications:        Current Discharge Medication List        START taking these medications    Details   cefpodoxime (VANTIN) 200 MG tablet Take 1 tablet (200 mg) by mouth 2 times daily for 6 days.  Qty: 12 tablet, Refills: 0    Associated Diagnoses: Community acquired pneumonia of left lower lobe of lung      ferrous sulfate (FEROSUL) 325 (65 Fe) MG tablet Take 1 tablet (325 mg) by mouth daily (with breakfast).  Qty: 30 tablet, Refills: 0    Associated Diagnoses: Anemia, unspecified type           CONTINUE these medications which have NOT CHANGED    Details   carvedilol (COREG) 12.5 MG tablet Take 1 tablet (12.5 mg) by mouth 2 times daily (with meals)  Qty: 180 tablet, Refills: 3    Associated Diagnoses: Heart failure with mildly reduced ejection fraction (HFmrEF) (H)      clonazePAM (KLONOPIN) 1 MG tablet Take 1 tablet (1 mg) by mouth 2 times daily as needed for anxiety. 60 to last 30 days  Qty: 60 tablet, Refills: 0    Associated Diagnoses: Insomnia, unspecified type; Generalized anxiety disorder      Fish Oil-Krill Oil (KRILL & FISH OIL BLEND PO) Take 1 capsule by mouth daily      furosemide (LASIX) 20 MG tablet TAKE 1 TABLET (20 MG) BY MOUTH EVERY DAY AS NEEDED FOR EDEMA  Qty: 90 tablet, Refills: 3    Associated Diagnoses: Localized edema      gabapentin (NEURONTIN) 300 MG capsule TAKE 3 CAPSULES EVERY AM, 2 CAPSULES MIDDAY AND 3 CAPS AT NIGHT  Qty: 240 capsule, Refills: 0    Associated Diagnoses: Chronic bilateral low back  pain without sciatica      glipiZIDE (GLUCOTROL XL) 10 MG 24 hr tablet Take 1 tablet (10 mg) by mouth daily  Qty: 90 tablet, Refills: 3    Associated Diagnoses: Type 2 diabetes mellitus without complication, without long-term current use of insulin (H)      ketoconazole (NIZORAL) 2 % external cream Apply to fold areas BID PRN  Qty: 60 g, Refills: 5    Associated Diagnoses: Intertrigo      pantoprazole (PROTONIX) 40 MG EC tablet TAKE 1 TABLET BY MOUTH EVERYDAY AT BEDTIME  Qty: 90 tablet, Refills: 2    Associated Diagnoses: Gastroesophageal reflux disease without esophagitis      potassium chloride ER (K-TAB) 20 MEQ CR tablet TAKE 2 TABLETS BY MOUTH EVERY DAY  Qty: 180 tablet, Refills: 1    Associated Diagnoses: Low serum potassium      QUEtiapine (SEROQUEL) 100 MG tablet Take 100 mg by mouth every evening.      rosuvastatin (CRESTOR) 10 MG tablet Take 1 tablet (10 mg) by mouth daily  Qty: 90 tablet, Refills: 3    Associated Diagnoses: Hypercholesteremia      sertraline (ZOLOFT) 100 MG tablet Take 2 tablets by mouth once daily  Qty: 180 tablet, Refills: 3    Associated Diagnoses: Generalized anxiety disorder; Major depressive disorder, recurrent episode, moderate (H)      zolpidem (AMBIEN) 10 MG tablet Take 1 tablet (10 mg) by mouth at bedtime.  Qty: 30 tablet, Refills: 0    Associated Diagnoses: Insomnia, unspecified type           STOP taking these medications       diltiazem ER (TIAZAC) 360 MG 24 hr ER beaded capsule Comments:   Reason for Stopping:         spironolactone (ALDACTONE) 25 MG tablet Comments:   Reason for Stopping:                    Allergies:         Allergies   Allergen Reactions    Metformin GI Disturbance     High dose    Morphine And Codeine Rash    Penicillins Rash     Pt has tolerated cephalosporins and penems.   Pt tolerated Zosyn 7/6/2019            Consultations This Hospital Stay:        No consultations were requested during this admission          Condition and Physical Exam on  Discharge:        Discharge condition: Stable   Discharge vitals: Blood pressure 100/53, pulse 67, temperature 97.5  F (36.4  C), temperature source Oral, resp. rate 20, weight 113.8 kg (250 lb 14.1 oz), last menstrual period 09/15/2007, SpO2 95%, not currently breastfeeding.   General: Pt in NAD, normal appearance  HEENT: OP clear MMM, no JVD  Lungs: Clear to Auscultation Bilateral, normal breathing  without accessory muscle usage, no wheezing, rhonchi or crackles  Cardiac: +S1, S2, RRR, no MRG, no edema  Abdominal: normal bowel sounds, NT/ND, no hepatosplenomegaly  Skin: warm, dry, normal turgor, no rash  Psyche: A& O x3, appropriate affect            Discharge Orders for Skilled Facility (from Discharge Orders):        After Care Instructions       Activity      Your activity upon discharge: activity as tolerated        Diet      Follow this diet upon discharge: Current Diet:Orders Placed This Encounter      Moderate Consistent Carb (60 g CHO per Meal) Diet                     Rehab orders for Skilled Facility (from Discharge Orders):      Referrals     Future Labs/Procedures    Primary Care - Care Coordination Referral     Process Instructions:    Services are provided by a Care Coordinator for people with complex needs   such as: medical, social, or financial troubles.  The Care Coordinator   works with the patient and their Primary Care Provider to determine health   goals, obtain resources, achieve outcomes, and develop care plans that   help coordinate the patient's care.     Comments:         Home Care Referral     Comments:    Your provider has ordered home health services. If you have not been   contacted within 2 days of your discharge please call the selected Home   Care agency listed on your Discharge document.  If a Home Care agency is   NOT listed, please call [phone].    Med Therapy Management Referral     Process Instructions:        This referral will be filtered to a centralized scheduling  office at   [facility] and the patient will receive a call   to schedule an appointment at a [facility] location most convenient for   them.    Comments:    The [facility] department will   contact you to schedule an appointment.  You may also schedule the   appointment by calling [phone] or toll-free at [phone].    This service is designed to help you get the most from your medications.    A specially trained Pharmacist will work closely with you and your   providers to solve any questions, concerns, issues or problems related to   your medications.    Please bring all of your prescription and non-prescription medications   (such as vitamins, over-the-counter medications, and herbals) or a   detailed medication list to your appointment.    If you have a glucose meter or other home monitoring information, please   also bring this to your appointment (i.e. blood glucose log, blood   pressure log, pain log, etc.).                 Discharge Time:      Greater than 30 minutes.        Image Results From This Hospital Stay (For Non-EPIC Providers):        Results for orders placed or performed during the hospital encounter of 11/29/24   CT Chest Pulmonary Embolism w Contrast    Narrative    CT CHEST PULMONARY EMBOLISM WITH CONTRAST November 29, 2024 2:31 PM    CLINICAL HISTORY: Dyspnea. Hypoxia. Elevated D-dimer.    TECHNIQUE: CT angiogram chest during arterial phase injection IV  contrast. 2D and 3D MIP reconstructions were performed by the CT  technologist. Dose reduction techniques were used.   CONTRAST: 79mL Isovue-370.    COMPARISON: CT of the chest 9/30/2024.    FINDINGS:  ANGIOGRAM CHEST: Pulmonary arteries are normal caliber and negative  for pulmonary emboli. No evidence for thoracic aortic aneurysm.  Thoracic aorta is negative for dissection.     LUNGS AND PLEURA: Patchy consolidation throughout the left lung has  increased since the  previous exam, and is most likely infectious or  inflammatory. Mild consolidation in the right lower lobe posteriorly  and medially is new since the previous exam. No pleural effusions. No  pneumothorax.    MEDIASTINUM/AXILLAE: No lymphadenopathy in the chest. No pericardial  effusion.    CORONARY ARTERY CALCIFICATION: Mild.    UPPER ABDOMEN: A 2.1 cm partially exophytic fat density lesion in the  upper pole of the left kidney is unchanged, and is likely an  angiomyolipoma. A prominent peripherally calcified gallstone measures  3.4 cm. Small to moderate hiatal hernia.    MUSCULOSKELETAL: Degenerative changes in the thoracic spine.      Impression    IMPRESSION:  1.  No evidence for pulmonary embolism.  2.  Patchy consolidation throughout the left lung has increased, and  mild consolidation in the right lower lobe posteriorly and medially is  new since the previous exam. These findings are most likely infectious  or inflammatory. Recommend follow-up to confirm resolution.  3.  Cholelithiasis.  4.  Small to moderate hiatal hernia.  5.  Partially exophytic fat density lesion in the upper pole of the  left kidney is likely an angiomyolipoma, unchanged.    MARY JO FLORES MD         SYSTEM ID:  [id number]   XR Chest Port 1 View    Narrative    CHEST ONE VIEW PORTABLE   12/3/2024 10:26 AM     HISTORY:  Right lower lobe infiltrate noted on CT.    COMPARISON: Chest CT 11/29/2024. Chest radiograph 9/30/2024.      Impression    IMPRESSION: Previously described patchy consolidation in the left lung  has improved, given differences in modality. The right lung is clear.  Pulmonary vascularity is within normal limits. Moderate hiatal hernia.  No pneumothorax.     *Note: Due to a large number of results and/or encounters for the requested time period, some results have not been displayed. A complete set of results can be found in Results Review.           Most Recent Lab Results In EPIC (For Non-EPIC Providers):    Most Recent 3  CBC's:  Recent Labs   Lab Test 12/03/24  0705 12/02/24  0646 12/01/24  2347 12/01/24  0701   WBC 8.2 8.3  --  10.8   HGB 9.5* 9.2*  --  9.4*   MCV 76* 76*  --  76*    172 212 182      Most Recent 3 BMP's:  Recent Labs   Lab Test 12/03/24  1208 12/03/24  0705 12/03/24  0210 12/02/24  1006 12/02/24  0646 12/02/24 0224 12/01/24 2347 12/01/24  0952 12/01/24  0701   NA  --  140  --   --  141  --   --   --  137   POTASSIUM  --  3.6  --   --  3.5  --   --   --  3.6   CHLORIDE  --  103  --   --  104  --   --   --  102   CO2  --  27  --   --  25  --   --   --  24   BUN  --  8.6  --   --  10.4  --   --   --  13.3   CR  --  0.74  --   --  0.74  --  0.81  --  0.95   ANIONGAP  --  10  --   --  12  --   --   --  11   GRECIA  --  8.4*  --   --  8.1*  --   --   --  8.2*   * 132* 128*   < > 138*   < >  --    < > 151*    < > = values in this interval not displayed.     Most Recent 3 Troponin's:No lab results found.  Most Recent 3 INR's:  Recent Labs   Lab Test 07/06/19  1921 10/19/16  1630   INR 1.07 0.99     Most Recent 2 LFT's:  Recent Labs   Lab Test 01/25/24  1519 11/08/20  1238   AST 15 26   ALT 10 29   ALKPHOS 77 94   BILITOTAL 0.5 0.5     Most Recent Cholesterol Panel:  Recent Labs   Lab Test 01/25/24  1519   CHOL 262*   *   HDL 39*   TRIG 128     Most Recent 6 Bacteria Isolates From Any Culture (See EPIC Reports for Culture Details):  Recent Labs   Lab Test 06/01/21  1259 06/01/21  1246 11/08/20  1318 11/08/20  1238 07/10/20  1413 05/09/20  1632   CULT No growth No growth No growth No growth No growth  No growth No growth     Most Recent TSH, T4 and HgbA1c:  Recent Labs   Lab Test 05/30/24  1430 01/25/24  1519   TSH  --  3.71   A1C 6.9* 6.3*

## 2024-12-03 NOTE — CARE PLAN
12/03/24 1300   Home Oxygen Assessment (RN/RT ONLY)   Does patient have oxygen at home? Unknown   1. SpO2 on room air at rest while awake 92       Oxygen LPM at rest 1   3. SpO2 on room air during Activity/with exercise 88   4. SpO2 with oxygen during activity/with exercise 92       Oxygen LPM during activity/with exercise 1

## 2024-12-03 NOTE — PLAN OF CARE
Physical Therapy Discharge Summary     Reason for therapy discharge:    Discharged to home with home therapy.     Progress towards therapy goal(s). See goals on Care Plan in [facility] electronic health record for goal details.  Goals partially met.  Barriers to achieving goals:   discharge from facility.     Therapy recommendation(s):    Continued therapy is recommended.  Rationale/Recommendations:  Patient would benefit from Home PT in order to increase strength, activity tolerance and independence with mobility.  Patient requires home PT at this time as attending PT in a clinic setting would be a considerable and significantly taxing effort, limiting their ability to participate in therapy session.

## 2024-12-03 NOTE — PLAN OF CARE
"Goal Outcome Evaluation:      Plan of Care Reviewed With: patient    Overall Patient Progress: improvingOverall Patient Progress: improving    Outcome Evaluation: Patient slept between cares. Denies pain. On 1L NC overnight with sats above 90%. Denies SOB.No cough noted.     Plan: Wean off oxygen    /46 (BP Location: Right arm)   Pulse 64   Temp 97.5  F (36.4  C) (Oral)   Resp 18   Wt 116 kg (255 lb 12.8 oz)   LMP 09/15/2007   SpO2 93%   BMI 53.46 kg/m      Problem: Adult Inpatient Plan of Care  Goal: Plan of Care Review  Description: The Plan of Care Review/Shift note should be completed every shift.  The Outcome Evaluation is a brief statement about your assessment that the patient is improving, declining, or no change.  This information will be displayed automatically on your shift  note.  Outcome: Progressing  Flowsheets (Taken 12/3/2024 0419)  Outcome Evaluation: Patient slept between cares. Denies pain. On 1L NC overnight with sats above 90%.  Plan of Care Reviewed With: patient  Overall Patient Progress: improving  Goal: Patient-Specific Goal (Individualized)  Description: You can add care plan individualizations to a care plan. Examples of Individualization might be:  \"Parent requests to be called daily at 9am for status\", \"I have a hard time hearing out of my right ear\", or \"Do not touch me to wake me up as it startles  me\".  Outcome: Progressing  Goal: Absence of Hospital-Acquired Illness or Injury  Outcome: Progressing  Intervention: Identify and Manage Fall Risk  Recent Flowsheet Documentation  Taken 12/3/2024 0000 by Belia Herrera, RN  Safety Promotion/Fall Prevention:   activity supervised   clutter free environment maintained   increased rounding and observation   increase visualization of patient   nonskid shoes/slippers when out of bed  Intervention: Prevent Skin Injury  Recent Flowsheet Documentation  Taken 12/3/2024 0000 by Belia Herrera, RN  Body Position: position changed " independently  Goal: Optimal Comfort and Wellbeing  Outcome: Progressing  Goal: Readiness for Transition of Care  Outcome: Progressing     Problem: Gas Exchange Impaired  Goal: Optimal Gas Exchange  Outcome: Progressing  Intervention: Optimize Oxygenation and Ventilation  Recent Flowsheet Documentation  Taken 12/3/2024 0000 by Belia Herrera, RN  Head of Bed (HOB) Positioning: HOB at 20-30 degrees

## 2024-12-03 NOTE — PLAN OF CARE
"End of Shift Summary  For vital signs and complete assessments, please see documentation flowsheets.     Pertinent assessments: A&O, up sba gait belt and walker, ambulated hallways several times. Weaned O2 to 1L NC, requiring 1L w/ ambulation. Received Tylenol for back pain, refused Lidocaine patch. /168. Occasional productive cough with little sputum. Sub pannicular rash. All other system assessments unremarkable.     Major Shift Events: none.    Treatment Plan: Discharge pending oxygen improvement. Continue ceftriaxone and axithromycin.    Bedside Nurse: sean drake RN     Goal Outcome Evaluation:      Problem: Adult Inpatient Plan of Care  Goal: Plan of Care Review  Description: The Plan of Care Review/Shift note should be completed every shift.  The Outcome Evaluation is a brief statement about your assessment that the patient is improving, declining, or no change.  This information will be displayed automatically on your shift  note.  Outcome: Progressing  Flowsheets (Taken 12/2/2024 0836)  Outcome Evaluation: cough lessening, discharge pending oxygen weaning. denies pain.  Plan of Care Reviewed With: patient  Overall Patient Progress: improving  Goal: Patient-Specific Goal (Individualized)  Description: You can add care plan individualizations to a care plan. Examples of Individualization might be:  \"Parent requests to be called daily at 9am for status\", \"I have a hard time hearing out of my right ear\", or \"Do not touch me to wake me up as it startles  me\".  Outcome: Progressing  Goal: Absence of Hospital-Acquired Illness or Injury  Outcome: Progressing  Intervention: Identify and Manage Fall Risk  Recent Flowsheet Documentation  Taken 12/2/2024 1520 by Sean Drake RN  Safety Promotion/Fall Prevention:   activity supervised   clutter free environment maintained   increased rounding and observation   increase visualization of patient   nonskid shoes/slippers when out of bed  Intervention: " Prevent Infection  Recent Flowsheet Documentation  Taken 12/2/2024 1530 by Sean Drake, RN  Infection Prevention:   cohorting utilized   environmental surveillance performed   equipment surfaces disinfected   hand hygiene promoted  Goal: Optimal Comfort and Wellbeing  Outcome: Progressing  Goal: Readiness for Transition of Care  Outcome: Progressing     Problem: Gas Exchange Impaired  Goal: Optimal Gas Exchange  Outcome: Progressing         Plan of Care Reviewed With: patient    Overall Patient Progress: improvingOverall Patient Progress: improving    Outcome Evaluation: cough lessening, discharge pending oxygen weaning. denies pain.

## 2024-12-03 NOTE — PROGRESS NOTES
Oxygen Documentation  I certify that this patient, Nicole Reyes has been under my care (or a nurse practitioner or physican's assistant working with me). This is the face-to-face encounter for oxygen medical necessity.      At the time of this encounter, I have reviewed the qualifying testing and have determined that supplemental oxygen is reasonable and necessary and is expected to improve the patient's condition in a home setting.         Patient has continued oxygen desaturation due to Acute Respiratory Failure J96.01  Pneumonia J18.9.    If portability is ordered, is the patient mobile within the home? yes    Was this visit performed as a telehealth visit: No

## 2024-12-03 NOTE — DISCHARGE SUMMARY
Discharge Note    Patient discharged to home via private vehicle  accompanied by daughter.  IV: Discontinued  Prescriptions filled and given to patient/family.   Belongings reviewed and sent with patient.   Home medications returned to patient: NA  Equipment sent with: Pt refused to wait for Home O2 delivery.   patient verbalizes understanding of discharge instructions. AVS given to patient.  Additional education completed? Oxygen Therapy and abx. Pt refused to wait for portable oxygen tank to be delivered to [facility], Awaiting delivery service to call back to see if they are able to deliver to pts home.

## 2024-12-03 NOTE — PROGRESS NOTES
Patient has been assessed for Home Oxygen needs. Oxygen readings:    *Pulse oximetry (SpO2) = 92% on room air at rest while awake.    *SpO2 improved to 96% on 1liters/minute at rest.    *SpO2 = 88% on room air during activity/with exercise.    *SpO2 improved to 92% on 1liters/minute during activity/with exercise.

## 2024-12-04 ENCOUNTER — PATIENT OUTREACH (OUTPATIENT)
Dept: CARE COORDINATION | Facility: CLINIC | Age: 69
End: 2024-12-04
Payer: MEDICARE

## 2024-12-04 ENCOUNTER — TELEPHONE (OUTPATIENT)
Dept: INTERNAL MEDICINE | Facility: CLINIC | Age: 69
End: 2024-12-04
Payer: MEDICARE

## 2024-12-04 DIAGNOSIS — E78.00 HYPERCHOLESTEREMIA: ICD-10-CM

## 2024-12-04 LAB — BACTERIA BLD CULT: NO GROWTH

## 2024-12-04 RX ORDER — ROSUVASTATIN CALCIUM 10 MG/1
10 TABLET, COATED ORAL DAILY
Qty: 90 TABLET | Refills: 0 | Status: SHIPPED | OUTPATIENT
Start: 2024-12-04

## 2024-12-04 NOTE — LETTER
[facility] CARE COORDINATION  [address]  [facility] [address]    December 5, 2024    Nicole VINCE Eric  [address]  [facility] [address]      Dear Shayy,    I am a clinic care coordinator who works with LEONCIO Livingston CNP with the [facility]. I wanted to introduce myself and provide you with my contact information for you to be able to call me with any questions or concerns. I wanted to thank you for spending the time to talk with me.  Below is a description of clinic care coordination and how I can further assist you.       The clinic care coordination team is made up of a registered nurse, , financial resource worker and community health worker who understand the health care system. The goal of clinic care coordination is to help you manage your health and improve access to the health care system. Our team works alongside your provider to assist you in determining your health and social needs. We can help you obtain health care and community resources, providing you with necessary information and education. We can work with you through any barriers and develop a care plan that helps coordinate and strengthen the communication between you and your care team.  Our services are voluntary and are offered without charge to you personally.    Please feel free to contact me with any questions or concerns regarding care coordination and what we can offer.      We are focused on providing you with the highest-quality healthcare experience possible.    Sincerely,     Yanet Negron RN, BSN, CPHN, [facility] Ambulatory Care Management  [facility], and [facility]  [email or web address]  Office: [phone]  Employed by [facility]     Enclosed: Additional information on Care Coordination.     WHAT IS CARE COORDINATION?      [facility] Care Coordination supports patients and families dealing with chronic or  complex health conditions, developmental issues, and social service needs. This service is available to patients of all ages, from babies to seniors. When you re facing a difficult decision about caring for yourself or someone you love, we can help you understand your options. We identify and refer you to community resources that help with financial, legal, mental health, transportation, and other issues. We also help with your medical and related education needs.     IS CARE COORDINATION RIGHT FOR ME? Discuss a referral to Care Coordination with your primary care provider or care team member.     HOW CAN I CONNECT WITH CARE COORDINATION?  Contact your clinic.  Speak with your doctor or clinic staff.  Discuss care coordination with hospital staff before discharge.     MEET YOUR CARE COORDINATION TEAM     Registered Nurse Care Coordinator  Provides education on medications, disease management, and new diagnoses.  Addresses concerns about medical conditions and connects you to resources.  Develops patient-centered goals and communicates with patient s care team.     Social Work Care Coordinator  Provides education on emotional wellbeing.  Connects you to a variety of community-based resources.  Communicates with care team and community partners.     Community Health Worker  Identifies health and social barriers and connects you with community resources.  Develops nonclinical patient and family centered goals.  Enhances communication between patients and care teams.     Financial Resource Worker  Assists patients with applying for health [place] ([place], [place], [place]  Helps patients with applying for county benefits.  Connects patients with [facility].

## 2024-12-04 NOTE — PROGRESS NOTES
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  Calculus of gallbladder without cholecystitis without obstruction - K80.20  Acute respiratory failure with hypoxia (H) - J96.01  Multifocal pneumonia - J18.9     Most Recent Discharge Date: 12/3/2024   Most Recent Discharge Diagnosis: Acute respiratory failure with hypoxia (H) - J96.01  Multifocal pneumonia - J18.9  Hypokalemia - E87.6  Lactic acidosis - E87.20  Elevated troponin - R79.89  Hypomagnesemia - E83.42  Calculus of gallbladder without cholecystitis without obstruction - K80.20  Hiatal hernia - K44.9  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  SOB (shortness of breath) - R06.02  Acute on chronic respiratory failure with hypoxia (H) - J96.21  Community acquired pneumonia of left lower lobe of lung - J18.9  Anemia, unspecified type - D64.9  Heart failure with mildly reduced ejection fraction (HFmrEF) (H) - I50.22  Elevated brain natriuretic peptide (BNP) level - R79.89  Respiratory insufficiency - R06.89  Leukocytosis, unspecified type - D72.829  Spinal stenosis of lumbar region with neurogenic claudication - M48.062  Spondylolisthesis of lumbar region - M43.16  Other spondylosis, lumbar region - M47.896  Intervertebral disc disorders with radiculopathy, lumbar region - M51.16  Spinal stenosis, lumbar region, with neurogenic claudication - M48.062  Spondylolisthesis, lumbar region - M43.16  Acute low back pain, unspecified back pain laterality, unspecified whether sciatica present - M54.50  Recurrent aspiration pneumonia (H) - J69.0  Pain of both shoulder joints - M25.511, M25.512  Controlled substance  agreement signed - Z79.899  Insomnia, unspecified type - G47.00  Morbid obesity (H) - E66.01  Chronic bilateral low back pain without sciatica - M54.50, G89.29  Type 2 diabetes mellitus without complication, without long-term current use of insulin (H) - E11.9  Hyperlipidemia LDL goal <100 - E78.5  Mild episode of recurrent major depressive disorder (H) - F33.0  Generalized anxiety disorder - F41.1  CRISTÓBAL (obstructive sleep apnea) - G47.33  Restless leg syndrome - G25.81  Benign essential hypertension - I10     Transitions of Care Assessment    Discharge Assessment  How are you doing now that you are home?: Still weak and tired but getting up and moving. Finished antibiotic. Has home care RN.  How are your symptoms? (Red Flag symptoms escalate to triage hotline per guidelines): Improved  Do you know how to contact your clinic care team if you have future questions or changes to your health status? : Yes  Does the patient have their discharge instructions? : Yes  Does the patient have questions regarding their discharge instructions? : No  Were you started on any new medications or were there changes to any of your previous medications? : Yes  Does the patient have all of their medications?: Yes  Do you have questions regarding any of your medications? : No  Do you have all of your needed medical supplies or equipment (DME)?  (i.e. oxygen tank, CPAP, cane, etc.): Yes    Post-op ([facility] CTA Only)  If the patient had a surgery or procedure, do they have any questions for a nurse?: No    Post-op (Clinicians Only)  Did the patient have surgery or a procedure: No  Fever: No  Chills: No  Eating & Drinking: eating and drinking without complaints/concerns  PO Intake:  (Mod carb)  Additional Symptoms: decreased appetite  Bowel Function: loose stools  Date of last BM: 12/05/24  Urinary Status: voiding without complaint/concerns    Follow up Plan     Discharge Follow-Up  Discharge follow up appointment scheduled in alignment with  recommended follow up timeframe or Transitions of Risk Category? (Low = within 30 days; Moderate= within 14 days; High= within 7 days): No  Patient's follow up appointment not scheduled: Patient declined scheduling support. Education on the importance of transitions of care follow up. Provided scheduling phone number.    RN CC contacted patient for post-hospital follow up and to introduce Care Coordination. Full assessment not indicated as patient declined to have Care Coordination support at this time. She was agreeable to receiving an introduction letter with additional information on Care Coordination via [employer] [facility]. Verified patient has access to [facility].     RN CC will send patient introduction letter via [employer].     Future Appointments   Date Time Provider Department Center   12/18/2024  2:00 PM Cindy Barnes RPH [facility]   2/20/2025  3:00 PM Goltz, Bennett Ezra, PA-C [facility] SLEEP     Patient will go onto [facility] to schedule her hospital follow up appointment.     Outpatient Plan as outlined on AVS reviewed with patient.    For any urgent concerns, please contact our 24 hour nurse triage line: [phone] ([phone])       Yanet Negron RN, BSN, CPHN, [facility], and [facility]  [email or web address]  Office: [phone]  Employed by [facility] Care Coordination Contact  [facility]/Voicemail    Clinical Data: Care Coordinator Outreach    Outreach Documentation Number of Outreach Attempt   12/4/2024  11:17 AM 1     Left message on patient's voicemail with call back information and requested return call.    Plan: Care Coordinator will try to reach patient again in 1-2 business days.    Yanet Negron RN, BSN, CPHN, [facility] Ambulatory Care Management  [facility], and [facility]  [email or web address]  Office: [phone]  Employed by [facility]

## 2024-12-04 NOTE — TELEPHONE ENCOUNTER
Home Care is calling regarding an established patient with [facility].       Requesting orders from: Eli Andrea  Provider is following patient: Yes  Is this a 60-day recertification request?  No    Orders Requested    Skilled Nursing  Request for delay in care, service is not able to be provided within same scheduled day.   12/7/24 per patient request.    Rayne: [phone] ext [phone]      Confirmed ok to leave a detailed message with call back.  Contact information confirmed and updated as needed.    Tasneem Franco RN

## 2024-12-07 ENCOUNTER — MEDICAL CORRESPONDENCE (OUTPATIENT)
Dept: HEALTH INFORMATION MANAGEMENT | Facility: CLINIC | Age: 69
End: 2024-12-07

## 2024-12-10 ENCOUNTER — TRANSFERRED RECORDS (OUTPATIENT)
Dept: HEALTH INFORMATION MANAGEMENT | Facility: CLINIC | Age: 69
End: 2024-12-10
Payer: MEDICARE

## 2024-12-16 ENCOUNTER — TELEPHONE (OUTPATIENT)
Dept: INTERNAL MEDICINE | Facility: CLINIC | Age: 69
End: 2024-12-16

## 2024-12-16 ENCOUNTER — OFFICE VISIT (OUTPATIENT)
Dept: INTERNAL MEDICINE | Facility: CLINIC | Age: 69
End: 2024-12-16
Payer: MEDICARE

## 2024-12-16 VITALS
SYSTOLIC BLOOD PRESSURE: 125 MMHG | HEART RATE: 64 BPM | TEMPERATURE: 97.3 F | RESPIRATION RATE: 18 BRPM | DIASTOLIC BLOOD PRESSURE: 80 MMHG | BODY MASS INDEX: 52.35 KG/M2 | WEIGHT: 250.5 LBS | OXYGEN SATURATION: 97 %

## 2024-12-16 DIAGNOSIS — J96.21 ACUTE ON CHRONIC RESPIRATORY FAILURE WITH HYPOXEMIA (H): ICD-10-CM

## 2024-12-16 DIAGNOSIS — Z09 HOSPITAL DISCHARGE FOLLOW-UP: Primary | ICD-10-CM

## 2024-12-16 DIAGNOSIS — D64.9 ACUTE ON CHRONIC ANEMIA: ICD-10-CM

## 2024-12-16 DIAGNOSIS — G47.00 INSOMNIA, UNSPECIFIED TYPE: ICD-10-CM

## 2024-12-16 DIAGNOSIS — F41.1 GENERALIZED ANXIETY DISORDER: ICD-10-CM

## 2024-12-16 DIAGNOSIS — E11.9 TYPE 2 DIABETES MELLITUS WITHOUT COMPLICATION, WITHOUT LONG-TERM CURRENT USE OF INSULIN (H): ICD-10-CM

## 2024-12-16 PROCEDURE — 99495 TRANSJ CARE MGMT MOD F2F 14D: CPT

## 2024-12-16 RX ORDER — CLONAZEPAM 1 MG/1
1 TABLET ORAL 2 TIMES DAILY PRN
Qty: 60 TABLET | Refills: 0 | Status: SHIPPED | OUTPATIENT
Start: 2024-12-16

## 2024-12-16 RX ORDER — ZOLPIDEM TARTRATE 10 MG/1
10 TABLET ORAL AT BEDTIME
Qty: 30 TABLET | Refills: 0 | Status: SHIPPED | OUTPATIENT
Start: 2024-12-16

## 2024-12-16 ASSESSMENT — PAIN SCALES - GENERAL: PAINLEVEL_OUTOF10: NO PAIN (0)

## 2024-12-16 NOTE — TELEPHONE ENCOUNTER
Patient is being seen today by by Emre same day provider.  We are needing this patient to establish care with a primary.      Currently we have home care orders needing to be signed and per Emre cannot be signed by a same day provider.      Please update me on the above.  Thank You,    Yaquelin Blas LPN  [facility]

## 2024-12-16 NOTE — TELEPHONE ENCOUNTER
Called patient to assist with scheduling appointment to establish care with a new provider.  Patient stated that she was seen by Iwona in the past and thought she had signed her on as her primary care provider.      Patient stated she is not wanting to change clinics in the middle of all of this, but will if not able to have Iwona be her primary care provider.     Patient requesting to have Iwona sign the home care orders or see if another provider can sign off on them. Patient has been seen by home care nurse and physical therapy already.      Patient requesting [facility] to assist her in the home as well.

## 2024-12-16 NOTE — PROGRESS NOTES
"  Assessment & Plan   Problem List Items Addressed This Visit          Endocrine    Type 2 diabetes mellitus without complication, without long-term current use of insulin (H)       Behavioral    Generalized anxiety disorder    Relevant Medications    clonazePAM (KLONOPIN) 1 MG tablet       Other    Insomnia, unspecified type    Relevant Medications    zolpidem (AMBIEN) 10 MG tablet    clonazePAM (KLONOPIN) 1 MG tablet     Other Visit Diagnoses       Hospital discharge follow-up    -  Primary    Acute on chronic respiratory failure with hypoxemia (H)        Acute on chronic anemia                    MED REC REQUIRED  Post Medication Reconciliation Status:     BMI  Estimated body mass index is 52.35 kg/m  as calculated from the following:    Height as of 9/30/24: 1.473 m (4' 10\").    Weight as of this encounter: 113.6 kg (250 lb 8 oz).             Hi Lucas is a 69 year old, presenting for the following health issues: This is a follow-up for an ED to hospital discharge.  Patient presented to the ED with worsening cough of yellow sputum and shortness of breath. Needing 4 L via nasal cannula to maintain saturations. CBC without leukocytosis. BMP with hypokalemia to 2.9. EKG with sinus rhythm with PVCs but no clear acute ischemic changes. BNP and high-sensitivity troponin not significantly elevated. Lactic acid mildly elevated at 2.1 with repeat within normal limits.  Patient had woke up in the night here with nausea and emesis.  Patient had exertional dyspnea.    Presently, Pt denies any SOB, no chest pain. Pt reports some GERD symptoms in the night because she has a tendency to have snacks in bed which may also be associated with why she is developing  aspiration pneumonia so frequently.  Patient admits to wearing her oxygen at bedtime but does not wear her CPAP.  Patient is taking ferrous sulfate to treat her anemia.  Her last hemoglobin was 9.5.      Hospital F/U        12/16/2024     2:24 PM   Additional " Questions   Roomed by hope r   Accompanied by self         12/16/2024     2:24 PM   Patient Reported Additional Medications   Patient reports taking the following new medications n/a     [facility]        Hospital Follow-up Visit:    Hospital/Nursing Home/IP Rehab Facility: [facility]  Date of Admission: 11/29/2024  Date of Discharge: 12/3/2024  Reason(s) for Admission: Acute on chronic hypoxemic respiratory failure secondary to CAP versus aspiration   Was the patient in the ICU or did the patient experience delirium during hospitalization?  No  Do you have any other stressors you would like to discuss with your provider? No    Problems taking medications regularly:  None  Medication changes since discharge: None  Problems adhering to non-medication therapy:  None    Summary of hospitalization:  [facility] discharge summary reviewed  Diagnostic Tests/Treatments reviewed.  Follow up needed: none  Other Healthcare Providers Involved in Patient s Care:         None  Update since discharge: improved.         Plan of care communicated with patient                 Review of Systems  Constitutional, HEENT, cardiovascular, pulmonary, gi and gu systems are negative, except as otherwise noted.      Objective    BP (!) 168/94 (BP Location: Right arm, Patient Position: Sitting, Cuff Size: Adult Regular)   Pulse 64   Temp 97.3  F (36.3  C) (Tympanic)   Resp 18   Wt 113.6 kg (250 lb 8 oz)   LMP 09/15/2007   SpO2 97%   Breastfeeding No   BMI 52.35 kg/m    Body mass index is 52.35 kg/m .  Physical Exam   GENERAL: alert and no distress  NECK: no adenopathy, no asymmetry, masses, or scars  RESP: lungs clear to auscultation - no rales, rhonchi or wheezes  CV: regular rate and rhythm, normal S1 S2, no S3 or S4, no murmur, click or rub, no peripheral edema  ABDOMEN: soft, nontender, no hepatosplenomegaly, no masses and bowel sounds normal  MS: no gross musculoskeletal defects noted, no  edema  SKIN: no suspicious lesions or rashes  NEURO: Normal strength and tone, mentation intact and speech normal  PSYCH: mentation appears normal, affect normal/bright            Signed Electronically by: LEONCIO Livingston CNP

## 2024-12-17 NOTE — PROGRESS NOTES
Medication Therapy Management (MTM) Encounter    ASSESSMENT:                            Medication Adherence/Access: No issues identified.    Diabetes   Patient is meeting A1c goal of < 7%.  Self monitoring of blood glucose is not at goal of fasting  mg/dL.  Recommend repeat A1c, but could not order today as patient has not established with a PCP at [facility].  If next A1c is elevated could consider adding on Jardiance, which would also provide kidney and heart protection.    Hypertension/Edema:  Patient is meeting blood pressure goal of < 130/80mmHg. Patient has a follow up visit with cardiology in Jan, but encouraged her to reach out to them sooner to discuss blood pressure meds. Discussed she had very low blood pressure while in the hospital and blood pressure now is looking appropriate, so I would continue off of these unless told otherwise by provider.    Hyperlipidemia   Patient would benefit from repeat lipid panel. If LDL and lipids still elevated would recommend increasing statin dose.    GERD:   Stable.    Anxiety, Depression, and Insomnia  Stable.    Supplements   Stable.    Pain:   Stable.    PLAN:                            Reach out to cardiology regarding if you should be on spironolactone and losartan. At this time I would stay off of these since they were discontinued at discharge and I don't see any recommendations to restart and your blood pressure has been good at home.  Establish with primary care provider at the clinic. Recommend repeat labs including A1c and cholesterol panel.     Follow-up: as needed     SUBJECTIVE/OBJECTIVE:                          Shayy Reyes is a 69 year old female seen for a transitions of care visit. She was discharged from [facility] on 12/3/24 for SOB and CAP vs aspiration.      Reason for visit: med review.    Allergies/ADRs: Reviewed in chart  Past Medical History: Reviewed in chart  Tobacco: She reports that she quit smoking about 45 years ago. Her smoking use  included cigarettes. She started smoking about 50 years ago. She has a 15 pack-year smoking history. She has never used smokeless tobacco.  Alcohol: Less than 1 beverage / month    Medication Adherence/Access: Patient takes medications directly from bottles.  Per patient, misses medication 0 time(s) per week. Reports rarely may miss them, occasionally if she can miss if she has a really busy day, but not often.  Medication barriers: none.     Diabetes   Glipizide XL 10 mg daily  Patient is not experiencing side effects.  Reports she did not tolerate Ozempic (nausea), metformin XR (GI issues)  Current diabetes symptoms: none  Diet/Exercise: limited exercise  Blood sugar monitoring: one time(s) daily; Ranges: when fasting 130-160 mg/dL      Eye exam in the last 12 months? No  Foot exam: due  Lab Results   Component Value Date    A1C 6.9 05/30/2024    A1C 6.3 01/25/2024    A1C 7.5 06/05/2023    A1C 6.9 06/02/2022       Hypertension /Edema:  Carvedilol 12.5 mg twice daily   Furosemide 20 as needed for Edema - uses about once a week  Potassium 40 mEq daily  Patient reports she has spironolactone and losartan still at home and these were prescribed by cardiology so she is wondering if she should still be taking them. At first patient said she was still taking them, but then she said she hasn't taken them since being discharged, so unclear to me.  Patient reports no current medication side effects  Patient self monitors blood pressure.  Home BP monitoring 120-130/70-80's typically .       BP Readings from Last 3 Encounters:   12/16/24 125/80   12/03/24 100/53   09/30/24 (!) 144/71     Potassium   Date Value Ref Range Status   12/03/2024 3.6 3.4 - 5.3 mmol/L Final   06/02/2022 3.4 3.4 - 5.3 mmol/L Final   06/04/2021 3.3 (L) 3.4 - 5.3 mmol/L Final       Hyperlipidemia   rosuvastatin 10 mg daily  Patient reports no significant myalgias or other side effects.  The 10-year ASCVD risk score (Corina MEDINA, et al., 2019) is: 23.4%     Values used to calculate the score:      Age: 69 years      Sex: Female      Is Non- : No      Diabetic: Yes      Tobacco smoker: No      Systolic Blood Pressure: 125 mmHg      Is BP treated: Yes      HDL Cholesterol: 39 mg/dL      Total Cholesterol: 262 mg/dL     Recent Labs   Lab Test 01/25/24  1519 06/05/23  1408   CHOL 262* 177   HDL 39* 40*   * 104*   TRIG 128 164*       GERD    Pantoprazole 40 mg once daily   Patient feels that current regimen is effective. Still some breakthrough symptoms. Did get a bed raiser to help prevent reflux. Longstanding issue that has led to her recurrent aspiration pneumonia. Is hoping raiding the head of her bed will help.       Mental Health   Anxiety, Depression, and Insomnia  Sertraline 200 mg once daily  Quetiapine 100 mg every evening  Clonazepam 1 mg twice daily as needed - uses a couple times a week, usually more for trouble getting to sleep due to feeling anxious  Ambien 10 mg at bedtime as needed - about once a week needing this  Patient reports no current medication side effects.  Reports her mood has been good lately. Sleep has also been mostly good on this regimen.  Reports the gabapentin also helps for her sleep.  She see's a psychiatrist who helps manage these, but is only actively prescribing the Seroquel.       Supplements   Ferrous sulfate 325 mg daily  Fish oil daily  No reported issues at this time.        Pain:   Gabapentin 900mg/600mg/900mg three times daily  Patient has chronic low back pain from spinal stenosis. Reports pain is steady around 6-7/10, but reports this isn't new for her.  Admits she dose take Advil-Tylenol combo very rarely, only if pain gets bad. Limits use so as to not hurt her kidneys or liver.    Today's Vitals: LMP 09/15/2007   ----------------  Post Discharge Medication Reconciliation Status: discharge medications reconciled and changed, per note/orders.    I spent 30 minutes with this patient today. All  changes were made via collaborative practice agreement with Physician No Ref-Primary.     A summary of these recommendations was sent via [employer].    Cindy Barnes, PharmD  Medication Therapy Management Pharmacist  Voicemail: [phone]      Telemedicine Visit Details  The patient's medications can be safely assessed via a telemedicine encounter.  Type of service:  Telephone visit  Originating Location (pt. Location): Home    Distant Location (provider location):  On-site  Start Time:  2:00 PM  End Time:  2:30 PM     Medication Therapy Recommendations  No medication therapy recommendations to display

## 2024-12-18 ENCOUNTER — VIRTUAL VISIT (OUTPATIENT)
Dept: PHARMACY | Facility: CLINIC | Age: 69
End: 2024-12-18
Payer: COMMERCIAL

## 2024-12-18 DIAGNOSIS — E78.5 HYPERLIPIDEMIA LDL GOAL <100: ICD-10-CM

## 2024-12-18 DIAGNOSIS — K21.9 GASTROESOPHAGEAL REFLUX DISEASE WITHOUT ESOPHAGITIS: ICD-10-CM

## 2024-12-18 DIAGNOSIS — R60.9 EDEMA, UNSPECIFIED TYPE: ICD-10-CM

## 2024-12-18 DIAGNOSIS — G47.00 INSOMNIA, UNSPECIFIED TYPE: ICD-10-CM

## 2024-12-18 DIAGNOSIS — Z78.9 TAKES DIETARY SUPPLEMENTS: ICD-10-CM

## 2024-12-18 DIAGNOSIS — E11.9 TYPE 2 DIABETES MELLITUS WITHOUT COMPLICATION, WITHOUT LONG-TERM CURRENT USE OF INSULIN (H): Primary | ICD-10-CM

## 2024-12-18 DIAGNOSIS — F33.0 MILD EPISODE OF RECURRENT MAJOR DEPRESSIVE DISORDER (H): ICD-10-CM

## 2024-12-18 DIAGNOSIS — F41.1 GENERALIZED ANXIETY DISORDER: ICD-10-CM

## 2024-12-18 DIAGNOSIS — I10 BENIGN ESSENTIAL HYPERTENSION: ICD-10-CM

## 2024-12-18 DIAGNOSIS — M54.42 MIDLINE LOW BACK PAIN WITH LEFT-SIDED SCIATICA, UNSPECIFIED CHRONICITY: ICD-10-CM

## 2024-12-18 PROCEDURE — 99207 PR NO CHARGE LOS: CPT | Mod: 93 | Performed by: PHARMACIST

## 2024-12-18 NOTE — PATIENT INSTRUCTIONS
"Recommendations from today's MTM visit:                                                    MTM (medication therapy management) is a service provided by a clinical pharmacist designed to help you get the most of out of your medicines.   Today we reviewed what your medicines are for, how to know if they are working, that your medicines are safe and how to make your medicine regimen as easy as possible.      Reach out to cardiology regarding if you should be on spironolactone and losartan. At this time I would stay off of these since they were discontinued at discharge and I don't see any recommendations to restart and your blood pressure has been good at home.  Establish with primary care provider at the clinic. Recommend repeat labs including A1c and cholesterol panel.     Follow-up: as needed     It was great speaking with you today.  I value your experience and would be very thankful for your time in providing feedback in our clinic survey. In the next few days, you may receive an email or text message from [employer] with a link to a survey related to your  clinical pharmacist.\"     To schedule another MTM appointment, please call the clinic directly or you may call the MTM scheduling line at [phone] or toll-free at [phone].     My Clinical Pharmacist's contact information:                                                      Please feel free to contact me with any questions or concerns you have.      Cindy Barnes, PharmD  Medication Therapy Management Pharmacist  Voicemail: [phone]     "

## 2024-12-22 ENCOUNTER — HEALTH MAINTENANCE LETTER (OUTPATIENT)
Age: 69
End: 2024-12-22

## 2024-12-26 ENCOUNTER — PATIENT OUTREACH (OUTPATIENT)
Dept: CARE COORDINATION | Facility: CLINIC | Age: 69
End: 2024-12-26
Payer: MEDICARE

## 2025-01-02 ENCOUNTER — HOSPITAL ENCOUNTER (EMERGENCY)
Facility: CLINIC | Age: 70
Discharge: HOME | DRG: 193 | End: 2025-01-02
Payer: MEDICARE

## 2025-01-02 ENCOUNTER — APPOINTMENT (OUTPATIENT)
Dept: GENERAL RADIOLOGY | Facility: CLINIC | Age: 70
DRG: 193 | End: 2025-01-02
Attending: EMERGENCY MEDICINE
Payer: MEDICARE

## 2025-01-02 VITALS
SYSTOLIC BLOOD PRESSURE: 144 MMHG | HEART RATE: 85 BPM | OXYGEN SATURATION: 95 % | TEMPERATURE: 97.3 F | RESPIRATION RATE: 16 BRPM | DIASTOLIC BLOOD PRESSURE: 93 MMHG

## 2025-01-02 LAB
ANION GAP SERPL CALCULATED.3IONS-SCNC: 14 MMOL/L (ref 7–15)
ATRIAL RATE - MUSE: 78 BPM
BASOPHILS # BLD AUTO: 0 10E3/UL (ref 0–0.2)
BASOPHILS NFR BLD AUTO: 0 %
BUN SERPL-MCNC: 12.5 MG/DL (ref 8–23)
CALCIUM SERPL-MCNC: 10 MG/DL (ref 8.8–10.4)
CHLORIDE SERPL-SCNC: 96 MMOL/L (ref 98–107)
CREAT SERPL-MCNC: 0.89 MG/DL (ref 0.51–0.95)
DIASTOLIC BLOOD PRESSURE - MUSE: NORMAL MMHG
EGFRCR SERPLBLD CKD-EPI 2021: 70 ML/MIN/1.73M2
EOSINOPHIL # BLD AUTO: 0.2 10E3/UL (ref 0–0.7)
EOSINOPHIL NFR BLD AUTO: 2 %
ERYTHROCYTE [DISTWIDTH] IN BLOOD BY AUTOMATED COUNT: 17.8 % (ref 10–15)
GLUCOSE SERPL-MCNC: 164 MG/DL (ref 70–99)
HCO3 SERPL-SCNC: 31 MMOL/L (ref 22–29)
HCT VFR BLD AUTO: 43.7 % (ref 35–47)
HGB BLD-MCNC: 13 G/DL (ref 11.7–15.7)
HOLD SPECIMEN: NORMAL
HOLD SPECIMEN: NORMAL
IMM GRANULOCYTES # BLD: 0 10E3/UL
IMM GRANULOCYTES NFR BLD: 0 %
INTERPRETATION ECG - MUSE: NORMAL
LYMPHOCYTES # BLD AUTO: 0.6 10E3/UL (ref 0.8–5.3)
LYMPHOCYTES NFR BLD AUTO: 8 %
MCH RBC QN AUTO: 22.5 PG (ref 26.5–33)
MCHC RBC AUTO-ENTMCNC: 29.7 G/DL (ref 31.5–36.5)
MCV RBC AUTO: 76 FL (ref 78–100)
MONOCYTES # BLD AUTO: 0.4 10E3/UL (ref 0–1.3)
MONOCYTES NFR BLD AUTO: 6 %
NEUTROPHILS # BLD AUTO: 6.3 10E3/UL (ref 1.6–8.3)
NEUTROPHILS NFR BLD AUTO: 83 %
NRBC # BLD AUTO: 0 10E3/UL
NRBC BLD AUTO-RTO: 0 /100
P AXIS - MUSE: 28 DEGREES
PLATELET # BLD AUTO: 212 10E3/UL (ref 150–450)
POTASSIUM SERPL-SCNC: 3 MMOL/L (ref 3.4–5.3)
PR INTERVAL - MUSE: 212 MS
QRS DURATION - MUSE: 86 MS
QT - MUSE: 406 MS
QTC - MUSE: 462 MS
R AXIS - MUSE: -23 DEGREES
RBC # BLD AUTO: 5.77 10E6/UL (ref 3.8–5.2)
SODIUM SERPL-SCNC: 141 MMOL/L (ref 135–145)
SYSTOLIC BLOOD PRESSURE - MUSE: NORMAL MMHG
T AXIS - MUSE: 99 DEGREES
TROPONIN T SERPL HS-MCNC: 20 NG/L
VENTRICULAR RATE- MUSE: 78 BPM
WBC # BLD AUTO: 7.5 10E3/UL (ref 4–11)

## 2025-01-02 PROCEDURE — 93005 ELECTROCARDIOGRAM TRACING: CPT

## 2025-01-02 PROCEDURE — 99281 EMR DPT VST MAYX REQ PHY/QHP: CPT

## 2025-01-02 PROCEDURE — 85004 AUTOMATED DIFF WBC COUNT: CPT | Performed by: EMERGENCY MEDICINE

## 2025-01-02 PROCEDURE — 84520 ASSAY OF UREA NITROGEN: CPT | Performed by: EMERGENCY MEDICINE

## 2025-01-02 PROCEDURE — 36415 COLL VENOUS BLD VENIPUNCTURE: CPT | Performed by: EMERGENCY MEDICINE

## 2025-01-02 PROCEDURE — 85048 AUTOMATED LEUKOCYTE COUNT: CPT | Performed by: EMERGENCY MEDICINE

## 2025-01-02 PROCEDURE — 71046 X-RAY EXAM CHEST 2 VIEWS: CPT

## 2025-01-02 PROCEDURE — 84484 ASSAY OF TROPONIN QUANT: CPT | Performed by: EMERGENCY MEDICINE

## 2025-01-02 PROCEDURE — 80048 BASIC METABOLIC PNL TOTAL CA: CPT | Performed by: EMERGENCY MEDICINE

## 2025-01-02 ASSESSMENT — COLUMBIA-SUICIDE SEVERITY RATING SCALE - C-SSRS
2. HAVE YOU ACTUALLY HAD ANY THOUGHTS OF KILLING YOURSELF IN THE PAST MONTH?: NO
6. HAVE YOU EVER DONE ANYTHING, STARTED TO DO ANYTHING, OR PREPARED TO DO ANYTHING TO END YOUR LIFE?: NO
1. IN THE PAST MONTH, HAVE YOU WISHED YOU WERE DEAD OR WISHED YOU COULD GO TO SLEEP AND NOT WAKE UP?: NO

## 2025-01-02 ASSESSMENT — ACTIVITIES OF DAILY LIVING (ADL): ADLS_ACUITY_SCORE: 58

## 2025-01-02 NOTE — ED TRIAGE NOTES
Patient report cough, shortness of breath and chest pain that started yesterday. In triage, respirations are even and unlabored. Able to speak in complete sentences.

## 2025-01-03 LAB
ATRIAL RATE - MUSE: 78 BPM
DIASTOLIC BLOOD PRESSURE - MUSE: NORMAL MMHG
INTERPRETATION ECG - MUSE: NORMAL
P AXIS - MUSE: 28 DEGREES
PR INTERVAL - MUSE: 212 MS
QRS DURATION - MUSE: 86 MS
QT - MUSE: 406 MS
QTC - MUSE: 462 MS
R AXIS - MUSE: -23 DEGREES
SYSTOLIC BLOOD PRESSURE - MUSE: NORMAL MMHG
T AXIS - MUSE: 99 DEGREES
VENTRICULAR RATE- MUSE: 78 BPM

## 2025-01-05 ENCOUNTER — APPOINTMENT (OUTPATIENT)
Dept: GENERAL RADIOLOGY | Facility: CLINIC | Age: 70
DRG: 193 | End: 2025-01-05
Attending: EMERGENCY MEDICINE
Payer: MEDICARE

## 2025-01-05 ENCOUNTER — HOSPITAL ENCOUNTER (INPATIENT)
Facility: CLINIC | Age: 70
LOS: 3 days | Discharge: HOME | DRG: 193 | End: 2025-01-08
Attending: EMERGENCY MEDICINE | Admitting: INTERNAL MEDICINE
Payer: MEDICARE

## 2025-01-05 DIAGNOSIS — J96.01 ACUTE RESPIRATORY FAILURE WITH HYPOXIA (H): Primary | ICD-10-CM

## 2025-01-05 DIAGNOSIS — J10.1 INFLUENZA A: ICD-10-CM

## 2025-01-05 LAB
ALBUMIN SERPL BCG-MCNC: 3.8 G/DL (ref 3.5–5.2)
ALP SERPL-CCNC: 105 U/L (ref 40–150)
ALT SERPL W P-5'-P-CCNC: 22 U/L (ref 0–50)
ANION GAP SERPL CALCULATED.3IONS-SCNC: 16 MMOL/L (ref 7–15)
AST SERPL W P-5'-P-CCNC: 49 U/L (ref 0–45)
BASOPHILS # BLD AUTO: 0 10E3/UL (ref 0–0.2)
BASOPHILS NFR BLD AUTO: 1 %
BILIRUB SERPL-MCNC: 0.3 MG/DL
BUN SERPL-MCNC: 10.5 MG/DL (ref 8–23)
CALCIUM SERPL-MCNC: 7.8 MG/DL (ref 8.8–10.4)
CHLORIDE SERPL-SCNC: 95 MMOL/L (ref 98–107)
CREAT SERPL-MCNC: 1.01 MG/DL (ref 0.51–0.95)
EGFRCR SERPLBLD CKD-EPI 2021: 60 ML/MIN/1.73M2
EOSINOPHIL # BLD AUTO: 0 10E3/UL (ref 0–0.7)
EOSINOPHIL NFR BLD AUTO: 0 %
ERYTHROCYTE [DISTWIDTH] IN BLOOD BY AUTOMATED COUNT: 17.2 % (ref 10–15)
EST. AVERAGE GLUCOSE BLD GHB EST-MCNC: 169 MG/DL
FERRITIN SERPL-MCNC: 2 NG/ML (ref 11–328)
FLUAV RNA SPEC QL NAA+PROBE: POSITIVE
FLUBV RNA RESP QL NAA+PROBE: NEGATIVE
GLUCOSE BLDC GLUCOMTR-MCNC: 128 MG/DL (ref 70–99)
GLUCOSE BLDC GLUCOMTR-MCNC: 146 MG/DL (ref 70–99)
GLUCOSE SERPL-MCNC: 140 MG/DL (ref 70–99)
HBA1C MFR BLD: 7.5 %
HCO3 BLDV-SCNC: 34 MMOL/L (ref 21–28)
HCO3 SERPL-SCNC: 27 MMOL/L (ref 22–29)
HCT VFR BLD AUTO: 38.7 % (ref 35–47)
HGB BLD-MCNC: 11.6 G/DL (ref 11.7–15.7)
IMM GRANULOCYTES # BLD: 0 10E3/UL
IMM GRANULOCYTES NFR BLD: 1 %
IRON BINDING CAPACITY (ROCHE): 312 UG/DL (ref 240–430)
IRON SATN MFR SERPL: 14 % (ref 15–46)
IRON SERPL-MCNC: 43 UG/DL (ref 37–145)
LACTATE BLD-SCNC: 1 MMOL/L
LYMPHOCYTES # BLD AUTO: 1.1 10E3/UL (ref 0.8–5.3)
LYMPHOCYTES NFR BLD AUTO: 29 %
MAGNESIUM SERPL-MCNC: 1.4 MG/DL (ref 1.7–2.3)
MCH RBC QN AUTO: 22.6 PG (ref 26.5–33)
MCHC RBC AUTO-ENTMCNC: 30 G/DL (ref 31.5–36.5)
MCV RBC AUTO: 75 FL (ref 78–100)
MONOCYTES # BLD AUTO: 0.3 10E3/UL (ref 0–1.3)
MONOCYTES NFR BLD AUTO: 9 %
NEUTROPHILS # BLD AUTO: 2.2 10E3/UL (ref 1.6–8.3)
NEUTROPHILS NFR BLD AUTO: 61 %
NRBC # BLD AUTO: 0 10E3/UL
NRBC BLD AUTO-RTO: 0 /100
NT-PROBNP SERPL-MCNC: 286 PG/ML (ref 0–900)
PCO2 BLDV: 54 MM HG (ref 40–50)
PH BLDV: 7.4 [PH] (ref 7.32–7.43)
PLATELET # BLD AUTO: 111 10E3/UL (ref 150–450)
PO2 BLDV: 33 MM HG (ref 25–47)
POTASSIUM SERPL-SCNC: 2.9 MMOL/L (ref 3.4–5.3)
POTASSIUM SERPL-SCNC: 3.7 MMOL/L (ref 3.4–5.3)
PROT SERPL-MCNC: 6.7 G/DL (ref 6.4–8.3)
RBC # BLD AUTO: 5.13 10E6/UL (ref 3.8–5.2)
RSV RNA SPEC NAA+PROBE: NEGATIVE
SAO2 % BLDV: 62 % (ref 70–75)
SARS-COV-2 RNA RESP QL NAA+PROBE: NEGATIVE
SODIUM SERPL-SCNC: 138 MMOL/L (ref 135–145)
TROPONIN T SERPL HS-MCNC: 26 NG/L
TROPONIN T SERPL HS-MCNC: 26 NG/L
WBC # BLD AUTO: 3.6 10E3/UL (ref 4–11)

## 2025-01-05 PROCEDURE — 71046 X-RAY EXAM CHEST 2 VIEWS: CPT

## 2025-01-05 PROCEDURE — 82947 ASSAY GLUCOSE BLOOD QUANT: CPT | Performed by: EMERGENCY MEDICINE

## 2025-01-05 PROCEDURE — 84132 ASSAY OF SERUM POTASSIUM: CPT | Performed by: INTERNAL MEDICINE

## 2025-01-05 PROCEDURE — 36415 COLL VENOUS BLD VENIPUNCTURE: CPT | Performed by: EMERGENCY MEDICINE

## 2025-01-05 PROCEDURE — 82728 ASSAY OF FERRITIN: CPT | Performed by: INTERNAL MEDICINE

## 2025-01-05 PROCEDURE — 82803 BLOOD GASES ANY COMBINATION: CPT

## 2025-01-05 PROCEDURE — 83036 HEMOGLOBIN GLYCOSYLATED A1C: CPT | Performed by: INTERNAL MEDICINE

## 2025-01-05 PROCEDURE — 83550 IRON BINDING TEST: CPT | Performed by: INTERNAL MEDICINE

## 2025-01-05 PROCEDURE — 250N000013 HC RX MED GY IP 250 OP 250 PS 637: Performed by: EMERGENCY MEDICINE

## 2025-01-05 PROCEDURE — 84484 ASSAY OF TROPONIN QUANT: CPT | Performed by: EMERGENCY MEDICINE

## 2025-01-05 PROCEDURE — 36415 COLL VENOUS BLD VENIPUNCTURE: CPT | Performed by: INTERNAL MEDICINE

## 2025-01-05 PROCEDURE — 250N000011 HC RX IP 250 OP 636: Performed by: INTERNAL MEDICINE

## 2025-01-05 PROCEDURE — 99285 EMERGENCY DEPT VISIT HI MDM: CPT | Mod: 25

## 2025-01-05 PROCEDURE — 250N000012 HC RX MED GY IP 250 OP 636 PS 637: Performed by: INTERNAL MEDICINE

## 2025-01-05 PROCEDURE — 99222 1ST HOSP IP/OBS MODERATE 55: CPT | Mod: AI | Performed by: INTERNAL MEDICINE

## 2025-01-05 PROCEDURE — 94640 AIRWAY INHALATION TREATMENT: CPT

## 2025-01-05 PROCEDURE — 250N000013 HC RX MED GY IP 250 OP 250 PS 637: Performed by: INTERNAL MEDICINE

## 2025-01-05 PROCEDURE — 120N000001 HC R&B MED SURG/OB

## 2025-01-05 PROCEDURE — 87637 SARSCOV2&INF A&B&RSV AMP PRB: CPT | Performed by: EMERGENCY MEDICINE

## 2025-01-05 PROCEDURE — 87040 BLOOD CULTURE FOR BACTERIA: CPT | Performed by: EMERGENCY MEDICINE

## 2025-01-05 PROCEDURE — 93005 ELECTROCARDIOGRAM TRACING: CPT

## 2025-01-05 PROCEDURE — 83880 ASSAY OF NATRIURETIC PEPTIDE: CPT | Performed by: EMERGENCY MEDICINE

## 2025-01-05 PROCEDURE — 83735 ASSAY OF MAGNESIUM: CPT | Performed by: INTERNAL MEDICINE

## 2025-01-05 PROCEDURE — 250N000009 HC RX 250: Performed by: EMERGENCY MEDICINE

## 2025-01-05 PROCEDURE — 85025 COMPLETE CBC W/AUTO DIFF WBC: CPT | Performed by: EMERGENCY MEDICINE

## 2025-01-05 RX ORDER — SERTRALINE HYDROCHLORIDE 100 MG/1
200 TABLET, FILM COATED ORAL DAILY
Status: DISCONTINUED | OUTPATIENT
Start: 2025-01-05 | End: 2025-01-08 | Stop reason: HOSPADM

## 2025-01-05 RX ORDER — IPRATROPIUM BROMIDE AND ALBUTEROL SULFATE 2.5; .5 MG/3ML; MG/3ML
3 SOLUTION RESPIRATORY (INHALATION) EVERY 4 HOURS PRN
Status: DISCONTINUED | OUTPATIENT
Start: 2025-01-05 | End: 2025-01-08 | Stop reason: HOSPADM

## 2025-01-05 RX ORDER — AMOXICILLIN 250 MG
1 CAPSULE ORAL 2 TIMES DAILY
Status: DISCONTINUED | OUTPATIENT
Start: 2025-01-05 | End: 2025-01-08 | Stop reason: HOSPADM

## 2025-01-05 RX ORDER — CLONAZEPAM 0.5 MG/1
1 TABLET ORAL 2 TIMES DAILY PRN
Status: DISCONTINUED | OUTPATIENT
Start: 2025-01-05 | End: 2025-01-08 | Stop reason: HOSPADM

## 2025-01-05 RX ORDER — CALCIUM CARBONATE 500 MG/1
1000 TABLET, CHEWABLE ORAL 4 TIMES DAILY PRN
Status: DISCONTINUED | OUTPATIENT
Start: 2025-01-05 | End: 2025-01-08 | Stop reason: HOSPADM

## 2025-01-05 RX ORDER — GABAPENTIN 300 MG/1
900 CAPSULE ORAL 2 TIMES DAILY
Status: DISCONTINUED | OUTPATIENT
Start: 2025-01-05 | End: 2025-01-08 | Stop reason: HOSPADM

## 2025-01-05 RX ORDER — ONDANSETRON 2 MG/ML
4 INJECTION INTRAMUSCULAR; INTRAVENOUS EVERY 6 HOURS PRN
Status: DISCONTINUED | OUTPATIENT
Start: 2025-01-05 | End: 2025-01-08 | Stop reason: HOSPADM

## 2025-01-05 RX ORDER — ROSUVASTATIN CALCIUM 10 MG/1
10 TABLET, COATED ORAL DAILY
Status: DISCONTINUED | OUTPATIENT
Start: 2025-01-05 | End: 2025-01-08 | Stop reason: HOSPADM

## 2025-01-05 RX ORDER — ONDANSETRON 4 MG/1
4 TABLET, ORALLY DISINTEGRATING ORAL EVERY 6 HOURS PRN
Status: DISCONTINUED | OUTPATIENT
Start: 2025-01-05 | End: 2025-01-08 | Stop reason: HOSPADM

## 2025-01-05 RX ORDER — POTASSIUM CHLORIDE 1.5 G/1.58G
40 POWDER, FOR SOLUTION ORAL ONCE
Status: COMPLETED | OUTPATIENT
Start: 2025-01-05 | End: 2025-01-05

## 2025-01-05 RX ORDER — AMOXICILLIN 250 MG
2 CAPSULE ORAL 2 TIMES DAILY PRN
Status: DISCONTINUED | OUTPATIENT
Start: 2025-01-05 | End: 2025-01-07

## 2025-01-05 RX ORDER — POTASSIUM CHLORIDE 1500 MG/1
20 TABLET, EXTENDED RELEASE ORAL ONCE
Status: DISCONTINUED | OUTPATIENT
Start: 2025-01-06 | End: 2025-01-05

## 2025-01-05 RX ORDER — OSELTAMIVIR PHOSPHATE 75 MG/1
75 CAPSULE ORAL 2 TIMES DAILY
Status: DISCONTINUED | OUTPATIENT
Start: 2025-01-06 | End: 2025-01-08 | Stop reason: HOSPADM

## 2025-01-05 RX ORDER — ACETAMINOPHEN 650 MG/1
650 SUPPOSITORY RECTAL EVERY 4 HOURS PRN
Status: DISCONTINUED | OUTPATIENT
Start: 2025-01-05 | End: 2025-01-08 | Stop reason: HOSPADM

## 2025-01-05 RX ORDER — AMOXICILLIN 250 MG
1 CAPSULE ORAL 2 TIMES DAILY PRN
Status: DISCONTINUED | OUTPATIENT
Start: 2025-01-05 | End: 2025-01-07

## 2025-01-05 RX ORDER — IPRATROPIUM BROMIDE AND ALBUTEROL SULFATE 2.5; .5 MG/3ML; MG/3ML
3 SOLUTION RESPIRATORY (INHALATION) ONCE
Status: COMPLETED | OUTPATIENT
Start: 2025-01-05 | End: 2025-01-05

## 2025-01-05 RX ORDER — LIDOCAINE 40 MG/G
CREAM TOPICAL
Status: DISCONTINUED | OUTPATIENT
Start: 2025-01-05 | End: 2025-01-08 | Stop reason: HOSPADM

## 2025-01-05 RX ORDER — GLIPIZIDE 10 MG/1
10 TABLET, FILM COATED, EXTENDED RELEASE ORAL DAILY
Status: DISCONTINUED | OUTPATIENT
Start: 2025-01-05 | End: 2025-01-08 | Stop reason: HOSPADM

## 2025-01-05 RX ORDER — ZOLPIDEM TARTRATE 5 MG/1
5 TABLET ORAL
Status: DISCONTINUED | OUTPATIENT
Start: 2025-01-05 | End: 2025-01-08 | Stop reason: HOSPADM

## 2025-01-05 RX ORDER — QUETIAPINE FUMARATE 50 MG/1
100 TABLET, FILM COATED ORAL EVERY EVENING
Status: DISCONTINUED | OUTPATIENT
Start: 2025-01-05 | End: 2025-01-08 | Stop reason: HOSPADM

## 2025-01-05 RX ORDER — POTASSIUM CHLORIDE 1500 MG/1
40 TABLET, EXTENDED RELEASE ORAL ONCE
Status: DISCONTINUED | OUTPATIENT
Start: 2025-01-05 | End: 2025-01-05

## 2025-01-05 RX ORDER — OSELTAMIVIR PHOSPHATE 75 MG/1
75 CAPSULE ORAL ONCE
Status: COMPLETED | OUTPATIENT
Start: 2025-01-05 | End: 2025-01-05

## 2025-01-05 RX ORDER — ACETAMINOPHEN 325 MG/1
650 TABLET ORAL EVERY 4 HOURS PRN
Status: DISCONTINUED | OUTPATIENT
Start: 2025-01-05 | End: 2025-01-08 | Stop reason: HOSPADM

## 2025-01-05 RX ORDER — POTASSIUM CHLORIDE 1500 MG/1
40 TABLET, EXTENDED RELEASE ORAL ONCE
Status: COMPLETED | OUTPATIENT
Start: 2025-01-05 | End: 2025-01-05

## 2025-01-05 RX ORDER — CARVEDILOL 12.5 MG/1
12.5 TABLET ORAL 2 TIMES DAILY WITH MEALS
Status: DISCONTINUED | OUTPATIENT
Start: 2025-01-05 | End: 2025-01-08 | Stop reason: HOSPADM

## 2025-01-05 RX ORDER — ACETAMINOPHEN 325 MG/1
975 TABLET ORAL ONCE
Status: COMPLETED | OUTPATIENT
Start: 2025-01-05 | End: 2025-01-05

## 2025-01-05 RX ORDER — ENOXAPARIN SODIUM 100 MG/ML
40 INJECTION SUBCUTANEOUS EVERY 12 HOURS
Status: DISCONTINUED | OUTPATIENT
Start: 2025-01-05 | End: 2025-01-08 | Stop reason: HOSPADM

## 2025-01-05 RX ORDER — PANTOPRAZOLE SODIUM 40 MG/1
40 TABLET, DELAYED RELEASE ORAL
Status: DISCONTINUED | OUTPATIENT
Start: 2025-01-06 | End: 2025-01-08 | Stop reason: HOSPADM

## 2025-01-05 RX ORDER — MAGNESIUM SULFATE HEPTAHYDRATE 40 MG/ML
4 INJECTION, SOLUTION INTRAVENOUS ONCE
Status: COMPLETED | OUTPATIENT
Start: 2025-01-05 | End: 2025-01-06

## 2025-01-05 RX ORDER — DEXTROSE MONOHYDRATE 25 G/50ML
25-50 INJECTION, SOLUTION INTRAVENOUS
Status: DISCONTINUED | OUTPATIENT
Start: 2025-01-05 | End: 2025-01-08 | Stop reason: HOSPADM

## 2025-01-05 RX ORDER — AMOXICILLIN 250 MG
2 CAPSULE ORAL 2 TIMES DAILY
Status: DISCONTINUED | OUTPATIENT
Start: 2025-01-05 | End: 2025-01-08 | Stop reason: HOSPADM

## 2025-01-05 RX ORDER — NICOTINE POLACRILEX 4 MG
15-30 LOZENGE BUCCAL
Status: DISCONTINUED | OUTPATIENT
Start: 2025-01-05 | End: 2025-01-08 | Stop reason: HOSPADM

## 2025-01-05 RX ADMIN — ACETAMINOPHEN 975 MG: 325 TABLET, FILM COATED ORAL at 14:44

## 2025-01-05 RX ADMIN — QUETIAPINE FUMARATE 100 MG: 50 TABLET ORAL at 19:27

## 2025-01-05 RX ADMIN — POTASSIUM CHLORIDE 40 MEQ: 1.5 POWDER, FOR SOLUTION ORAL at 16:13

## 2025-01-05 RX ADMIN — OSELTAMIVIR PHOSPHATE 75 MG: 75 CAPSULE ORAL at 18:51

## 2025-01-05 RX ADMIN — GLIPIZIDE 10 MG: 10 TABLET, EXTENDED RELEASE ORAL at 18:51

## 2025-01-05 RX ADMIN — ENOXAPARIN SODIUM 40 MG: 40 INJECTION SUBCUTANEOUS at 19:09

## 2025-01-05 RX ADMIN — GABAPENTIN 900 MG: 300 CAPSULE ORAL at 22:02

## 2025-01-05 RX ADMIN — MAGNESIUM SULFATE HEPTAHYDRATE 4 G: 40 INJECTION, SOLUTION INTRAVENOUS at 23:38

## 2025-01-05 RX ADMIN — ROSUVASTATIN 10 MG: 10 TABLET, FILM COATED ORAL at 18:51

## 2025-01-05 RX ADMIN — IPRATROPIUM BROMIDE AND ALBUTEROL SULFATE 3 ML: .5; 3 SOLUTION RESPIRATORY (INHALATION) at 14:47

## 2025-01-05 RX ADMIN — SERTRALINE 200 MG: 100 TABLET, FILM COATED ORAL at 19:09

## 2025-01-05 RX ADMIN — POTASSIUM CHLORIDE 40 MEQ: 1500 TABLET, EXTENDED RELEASE ORAL at 18:51

## 2025-01-05 RX ADMIN — CARVEDILOL 12.5 MG: 12.5 TABLET, FILM COATED ORAL at 19:09

## 2025-01-05 RX ADMIN — INSULIN ASPART 1 UNITS: 100 INJECTION, SOLUTION INTRAVENOUS; SUBCUTANEOUS at 19:39

## 2025-01-05 ASSESSMENT — ACTIVITIES OF DAILY LIVING (ADL)
ADLS_ACUITY_SCORE: 58
ADLS_ACUITY_SCORE: 44
ADLS_ACUITY_SCORE: 38
ADLS_ACUITY_SCORE: 44
ADLS_ACUITY_SCORE: 58
ADLS_ACUITY_SCORE: 44
ADLS_ACUITY_SCORE: 58

## 2025-01-05 NOTE — ED PROVIDER NOTES
Emergency Department Note      History of Present Illness     Chief Complaint   Cough and Shortness of Breath      HPI   Nicole Reyes is a 69 year old female with history of CHF and CRISTÓBAL on 2 L nasal cannula at nighttime presents to the emergency department for upper respiratory tract infection symptoms and shortness of breath.  Patient reports that symptoms started on January 1.  Patient lives with daughter who takes care of her who is currently sick with URI as well at this time.  Patient reports that she has been having a persistent cough that is nonproductive.  Reports nausea and vomiting on the first day, but none since then.  Some chest wall pain with coughing, but no chest pain at rest.  Fever today.  Currently denies any nausea, abdominal pain, or urinary symptoms.  Endorses diarrheal illness today, but no evidence of black or bloody stool.  No hematemesis or coffee-ground emesis.    Independent Historian   None    Review of External Notes   12/3/2024 discharge summary    Past Medical History     Medical History and Problem List   Past Medical History:   Diagnosis Date    Arthritis     Blood transfusion     CKD (chronic kidney disease) stage 3, GFR 30-59 ml/min (H)     Depressive disorder     Essential hypertension, benign     Gastroesophageal reflux disease     Generalized anxiety disorder     Hernia, abdominal     Hiatal hernia     History of blood transfusion 2011    Hypertension     Insomnia, unspecified     Iron deficiency anemia 08/2009    Major depression     Menopause     Obesity, unspecified     CRISTÓBAL (obstructive sleep apnea)     Other chronic pain     Oxygen dependent     Pneumonia     Pneumonia due to 2019 novel coronavirus 11/08/2021    Postoperative seroma 10/2011    Pure hypercholesterolemia     RLS (restless legs syndrome)     Type II or unspecified type diabetes mellitus without mention of complication, not stated as uncontrolled        Medications   No current outpatient medications on  "file.      Surgical History   Past Surgical History:   Procedure Laterality Date    BREAST BIOPSY, RT/LT      2 times; benign    BREAST SURGERY  ?    Biopsy x 2 Benign     SECTION       SECTION       SECTION      COLONOSCOPY N/A 2020    Procedure: Colonoscopy with biopsy;  Surgeon: Obinna Gutierrez MD;  Location: [facility] OR    DILATION AND CURETTAGE, HYSTEROSCOPY DIAGNOSTIC, COMBINED  2013    Procedure: COMBINED DILATION AND CURETTAGE, HYSTEROSCOPY DIAGNOSTIC;  DILATION AND CURETTAGE, HYSTEROSCOPY DIAGNOSTIC   Converted to a general;  Surgeon: Robi Edwards MD;  Location: [facility] OR    ESOPHAGOSCOPY, GASTROSCOPY, DUODENOSCOPY (EGD), COMBINED N/A 2015    Procedure: COMBINED ESOPHAGOSCOPY, GASTROSCOPY, DUODENOSCOPY (EGD);  Surgeon: Obinna Gutierrez MD;  Location:  GI    ESOPHAGOSCOPY, GASTROSCOPY, DUODENOSCOPY (EGD), COMBINED N/A 2015    Procedure: COMBINED ENDOSCOPIC ULTRASOUND, ESOPHAGOSCOPY, GASTROSCOPY, DUODENOSCOPY (EGD), FINE NEEDLE ASPIRATE/BIOPSY;  Surgeon: Sabine Olivares MD;  Location:  GI    ESOPHAGOSCOPY, GASTROSCOPY, DUODENOSCOPY (EGD), COMBINED N/A 2020    Procedure: ESOPHAGOGASTRODUODENOSCOPY;  Surgeon: Ascencion Stauffer MD;  Location: [facility] OR    HERNIORRHAPHY INCISIONAL (LOCATION)  10/08/2011     incarcerated hernia repair with mesh;    HERNIORRHAPHY INCISIONAL (LOCATION)  10/16/2011     repair incisional hernia with mesh, and removal of current implanted mesh;    Z NONSPECIFIC PROCEDURE      Hernia repair     Z NONSPECIFIC PROCEDURE      Lymph node biopsy in neck (benign)        Physical Exam     Patient Vitals for the past 24 hrs:   BP Temp Temp src Pulse Resp SpO2 Height Weight   25 1815 115/56 98.7  F (37.1  C) Oral 76 18 100 % 1.473 m (4' 10\") 110.5 kg (243 lb 8 oz)   25 1700 134/63 -- -- 66 14 90 % -- --   25 1654 -- -- -- 68 18 90 % -- --   25 1629 132/68 -- -- 68 -- 90 % -- --   25 1431 " 138/70 -- -- 77 27 93 % -- --   01/05/25 1418 -- -- -- 78 17 92 % -- --   01/05/25 1400 133/82 -- -- 80 25 94 % -- --   01/05/25 1340 134/81 (!) 102.3  F (39.1  C) Oral 87 (!) 32 (!) 88 % -- --     Physical Exam  Constitutional:       Appearance: Normal appearance.   HENT:      Head: Normocephalic and atraumatic.   Eyes:      Extraocular Movements: Extraocular movements intact.      Conjunctiva/sclera: Conjunctivae normal.   Cardiovascular:      Rate and Rhythm: Normal rate and regular rhythm.   Pulmonary:      Effort: Pulmonary effort is normal. No respiratory distress.      Breath sounds: Coarse breath sounds on expiration bilaterally.  Abdominal:      General: Abdomen is flat. There is no distension.      Palpations: Abdomen is soft.      Tenderness: There is no abdominal tenderness.   Musculoskeletal:      Cervical back: Normal range of motion. No rigidity.       Right lower leg: Minimal edema.      Left lower leg: Minimal edema.   Skin:     General: Skin is warm and dry.   Neurological:      General: No focal deficit present.      Mental Status: Alert and oriented to person, place, and time.   Psychiatric:         Mood and Affect: Mood normal.         Behavior: Behavior normal.    Diagnostics     Lab Results   Labs Ordered and Resulted from Time of ED Arrival to Time of ED Departure   COMPREHENSIVE METABOLIC PANEL - Abnormal       Result Value    Sodium 138      Potassium 2.9 (*)     Carbon Dioxide (CO2) 27      Anion Gap 16 (*)     Urea Nitrogen 10.5      Creatinine 1.01 (*)     GFR Estimate 60 (*)     Calcium 7.8 (*)     Chloride 95 (*)     Glucose 140 (*)     Alkaline Phosphatase 105      AST 49 (*)     ALT 22      Protein Total 6.7      Albumin 3.8      Bilirubin Total 0.3     INFLUENZA A/B, RSV AND SARS-COV2 PCR - Abnormal    Influenza A PCR Positive (*)     Influenza B PCR Negative      RSV PCR Negative      SARS CoV2 PCR Negative     TROPONIN T, HIGH SENSITIVITY - Abnormal    Troponin T, High Sensitivity  26 (*)    CBC WITH PLATELETS AND DIFFERENTIAL - Abnormal    WBC Count 3.6 (*)     RBC Count 5.13      Hemoglobin 11.6 (*)     Hematocrit 38.7      MCV 75 (*)     MCH 22.6 (*)     MCHC 30.0 (*)     RDW 17.2 (*)     Platelet Count 111 (*)     % Neutrophils 61      % Lymphocytes 29      % Monocytes 9      % Eosinophils 0      % Basophils 1      % Immature Granulocytes 1      NRBCs per 100 WBC 0      Absolute Neutrophils 2.2      Absolute Lymphocytes 1.1      Absolute Monocytes 0.3      Absolute Eosinophils 0.0      Absolute Basophils 0.0      Absolute Immature Granulocytes 0.0      Absolute NRBCs 0.0     ISTAT GASES LACTATE VENOUS POCT - Abnormal    Lactic Acid POCT 1.0      Bicarbonate Venous POCT 34 (*)     O2 Sat, Venous POCT 62 (*)     pCO2 Venous POCT 54 (*)     pH Venous POCT 7.40      pO2 Venous POCT 33     TROPONIN T, HIGH SENSITIVITY - Abnormal    Troponin T, High Sensitivity 26 (*)    NT PROBNP INPATIENT - Normal    N terminal Pro BNP Inpatient 286     ROUTINE UA WITH MICROSCOPIC REFLEX TO CULTURE   FERRITIN   BLOOD CULTURE   BLOOD CULTURE       Imaging   XR Chest 2 Views   Final Result   IMPRESSION: No acute cardiopulmonary abnormality. Moderate-sized hiatal hernia with unchanged left basilar atelectasis.          EKG   ECG results from 01/05/25   EKG 12-lead, tracing only     Value    Systolic Blood Pressure     Diastolic Blood Pressure     Ventricular Rate 80    Atrial Rate 80    NE Interval 188    QRS Duration 88        QTc 477    P Axis 19    R AXIS -17    T Axis 86    Interpretation ECG      Sinus rhythm  Low voltage QRS  Cannot rule out Inferior infarct (cited on or before 27-Mar-2024)  Possible Anterior infarct (cited on or before 19-Jul-2021)  Abnormal ECG  When compared with ECG of 02-Jan-2025 15:05,  No significant change was found       *Note: Due to a large number of results and/or encounters for the requested time period, some results have not been displayed. A complete set of results can  be found in Results Review.     See ED course for independent interpretation of EKG.    Independent Interpretation   See ED course    ED Course      Medications Administered   Medications   oseltamivir (TAMIFLU) capsule 75 mg (has no administration in time range)   ipratropium - albuterol 0.5 mg/2.5 mg/3 mL (DUONEB) neb solution 3 mL (has no administration in time range)   ipratropium - albuterol 0.5 mg/2.5 mg/3 mL (DUONEB) neb solution 3 mL (3 mLs Nebulization $Given 1/5/25 1447)   acetaminophen (TYLENOL) tablet 975 mg (975 mg Oral $Given 1/5/25 1444)   potassium chloride (KLOR-CON) Packet 40 mEq (40 mEq Oral $Given 1/5/25 1613)   oseltamivir (TAMIFLU) capsule 75 mg (75 mg Oral $Given 1/5/25 1851)   potassium chloride donna ER (KLOR-CON M20) CR tablet 40 mEq (40 mEq Oral $Given 1/5/25 1851)       Procedures   Procedures     Discussion of Management   See ED course    ED Course   ED Course as of 01/05/25 2001   Sun Jan 05, 2025   1507 XR Chest 2 Views  On my independent review of the chest x-ray, there is bibasilar opacity left greater than right, similar as compared with prior chest x-ray.  No mediastinal widening.  No pneumothorax.   1548 Influenza A(!): Positive   1601 Discussed patient with hospitalist Dr. Novak who accepted patient for admission.   2000 EKG 12-lead, tracing only  Normal sinus rhythm.  Rate of 80.  Normal IL and QRS.  QTc 477.  No acute ST elevation or depression as compared to 1/2/2025 EKG.       Additional Documentation  None    Medical Decision Making / Diagnosis     CMS Diagnoses: None    MIPS       None    MDM   Nicole Reyes is a 69 year old female as described above presents to the emergency department for URI symptoms, fever, and shortness of breath.  Patient hemodynamically stable at time of evaluation.  Close contact currently sick with URI.  Findings most suspicious for viral upper respiratory tract infection with associated acute on chronic respiratory failure with hypoxia.   Differential diagnosis considered includes, but not limited to, viral URI such as influenza or COVID-19 infection, bronchitis, pneumonia, CHF exacerbation, or aspiration pneumonia.  Shortness of breath and infectious workup ordered.  Chest x-ray for evaluation of above discussed differentials.  Patient is not tachycardic at this time or endorsing any pleuritic chest pain to suggest pulmonary embolism.  Nonetheless, if no other explanation of patient's hypoxia with evidence of new tachycardia and increasing oxygen requirement, certainly can consider D-dimer testing or CT PE chest.  Cardiac workup.  Discussed care plan with patient who voiced understanding and agreement with plan.  Answered all questions.  Additional workup and orders as listed in chart.    Ultimately, work up shows influenza A positivity on viral swab.  This is most likely cause of patient's new increase in oxygen requirement.  Patient was given Tamiflu admitted to the hospital service for further evaluation and treatment.  At time of admission, oxygen requirement roughly 5 L/min via oxime mask.  Mild hypokalemia for which patient was given potassium supplementation.  Patient admitted to the hospital service for further evaluation and treatment.    Please refer to ED course above as part of continuation of MDM for details on the patient's treatment course and any potential changes or updates beyond my initial evaluation and MDM creation.    Disposition   The patient was admitted to the hospital.     Diagnosis     ICD-10-CM    1. Acute respiratory failure with hypoxia (H)  J96.01       2. Influenza A  J10.1            Discharge Medications   Current Discharge Medication List            DO Grace GUSMAN Ferris, DO  01/05/25 2002

## 2025-01-05 NOTE — PHARMACY-ADMISSION MEDICATION HISTORY
Pharmacist Admission Medication History    Admission medication history is complete. The information provided in this note is only as accurate as the sources available at the time of the update.    Information Source(s): Patient via in-person  Pertinent Information: had MTM visit with pharmacist on 12/18/2024    Changes made to PTA medication list:  Added: None  Deleted: None  Changed: None    Allergies reviewed with patient and updates made in EHR: no  Medication History Completed By: Darrian Ruiz RPH 1/5/2025 3:35 PM    PTA Med List   Medication Sig Last Dose/Taking    carvedilol (COREG) 12.5 MG tablet Take 1 tablet (12.5 mg) by mouth 2 times daily (with meals) 1/4/2025 Evening    clonazePAM (KLONOPIN) 1 MG tablet Take 1 tablet (1 mg) by mouth 2 times daily as needed for anxiety. 60 to last 30 days Past Month    Fish Oil-Krill Oil (KRILL & FISH OIL BLEND PO) Take 1 capsule by mouth daily Taking    furosemide (LASIX) 20 MG tablet TAKE 1 TABLET (20 MG) BY MOUTH EVERY DAY AS NEEDED FOR EDEMA Past Month    gabapentin (NEURONTIN) 300 MG capsule TAKE 3 CAPSULES EVERY AM, 2 CAPSULES MIDDAY AND 3 CAPS AT NIGHT 1/4/2025 Bedtime    glipiZIDE (GLUCOTROL XL) 10 MG 24 hr tablet Take 1 tablet (10 mg) by mouth daily 1/4/2025 Morning    Ibuprofen-Acetaminophen 125-250 MG TABS Take 1 tablet by mouth 3 times daily as needed. More than a month    ketoconazole (NIZORAL) 2 % external cream Apply to fold areas BID PRN More than a month    pantoprazole (PROTONIX) 40 MG EC tablet TAKE 1 TABLET BY MOUTH EVERYDAY AT BEDTIME 1/4/2025 Bedtime    potassium chloride ER (K-TAB) 20 MEQ CR tablet TAKE 2 TABLETS BY MOUTH EVERY DAY 1/4/2025 Morning    QUEtiapine (SEROQUEL) 100 MG tablet Take 100 mg by mouth every evening. 1/4/2025 Evening    rosuvastatin (CRESTOR) 10 MG tablet TAKE 1 TABLET (10 MG) BY MOUTH DAILY. 1/4/2025 Morning    sertraline (ZOLOFT) 100 MG tablet Take 2 tablets by mouth once daily 1/4/2025 Morning    zolpidem (AMBIEN) 10  MG tablet Take 1 tablet (10 mg) by mouth at bedtime. Past Week

## 2025-01-05 NOTE — ED TRIAGE NOTES
Pt arriv\es from home via EMS. CC SOB and cough started Wed. Pt here on Friday but left before being seen by , but we did xray and labs. Pt at 86% RA on EMS arrival, placed on 4L NC, came up to 93/94%. Per EMS sounds like lots of gunk in lungs. Pt has Hx of pneumonia, per pt she gets it a couple times a year. Per pt increased fatigue, weakness and no appetite. /78, HR 80's.    Upon arrival Pt kept dropping to 80's on NC even up to 6L. RN switched to mask and bumped up to 8L, unable to lower due to dropping to 80's.Pt now 93%     Triage Assessment (Adult)       Row Name 01/05/25 3169          Triage Assessment    Airway WDL WDL        Respiratory WDL    Respiratory WDL X;rhythm/pattern;cough     Rhythm/Pattern, Respiratory dyspnea upon exertion     Cough Frequency frequent     Cough Type nonproductive        Skin Circulation/Temperature WDL    Skin Circulation/Temperature WDL WDL        Cardiac WDL    Cardiac WDL WDL        Peripheral/Neurovascular WDL    Peripheral Neurovascular WDL WDL        Cognitive/Neuro/Behavioral WDL    Cognitive/Neuro/Behavioral WDL WDL

## 2025-01-05 NOTE — H&P
[facility]    History and Physical - Hospitalist Service       Date of Admission:  1/5/2025    Assessment & Plan      Nicole Reyes is a 69 year old female with a PMH significant for DM type II, Hypertension, Hyperlipidemia, CKD 3, obesity, CRISTÓBAL intolerant to CPAP using nocturnal 2 L oxygen, GERD, anxiety/depression who presents to ED with a complaint of cough and shortness of breath, found to have hypoxic respiratory failure due to influenza A and admitted on 1/5/2025.     Acute hypoxic respiratory failure  Influenza A  Obstructive sleep apnea on nocturnal oxygen.  Mild respiratory acidosis and metabolic alkalosis.  -Patient presented with cough, shortness of breath.  -Found to have influenza A positive.  -Sick contact, her daughter at home.  -She also developed fever here, blood cultures done, it is likely due to influenza.  -Currently on 5 L of oxygen on oxy mask saturating mid 90s.  -Continue oseltamivir.  -Continue DuoNebs.  -Titrate and try to wean her off oxygen daytime; will continue PTA nocturnal 2 L/min.  -She is intolerant to CPAP and not using it.  -VBG showed slightly elevated pCO2 and also bicarb, with normal pH which is consistent with 2 disorders, respiratory acidosis and metabolic alkalosis.  -Check BMP in the morning.  -Encourage oral intake.    Hypokalemia.  -Check magnesium level, replacing potassium and magnesium per protocol.  -Got 40 mill equivalent p.o. potassium chloride in ED, ordered additional dose and will continue with protocol.    DM type II.  -PTA on glipizide and will be continued.  -Started on sliding scale insulin    Mild leukopenia, microcytic anemia, thrombocytopenia.  -This is likely due to influenza infection, suspect iron deficiency.  -WBC 3.6, hemoglobin 11.6, MCV 75, with decreased hemoglobin concentration and also platelet is 111.  -Check CBC in the morning.  -Added iron studies to the lab.    Chronic medical  conditions.  Obesity  GERD  Depression  Anxiety  Hypertension        Diet:  Carb controlled diet.  DVT Prophylaxis: Enoxaparin (Lovenox) SQ  Suarez Catheter: Not present  Lines: None     Cardiac Monitoring: None  Code Status:  Full code    Clinically Significant Risk Factors Present on Admission        # Hypokalemia: Lowest K = 2.9 mmol/L in last 2 days, will replace as needed   # Hypochloremia: Lowest Cl = 95 mmol/L in last 2 days, will monitor as appropriate  # Hypocalcemia: Lowest Ca = 7.8 mg/dL in last 2 days, will monitor and replace as appropriate       # Thrombocytopenia: Lowest platelets = 111 in last 2 days, will monitor for bleeding   # Hypertension: Noted on problem list                      Disposition Plan     Medically Ready for Discharge: Anticipated in 2-4 Days         Sam Novak MD  Hospitalist Service  [facility]  Securely message with [employer] (more info)  Text page via [facility] Paging/Directory     ______________________________________________________________________    Chief Complaint   Cough and shortness of breath.    History is obtained from the patient    History of Present Illness   Nicole Reyes is a 69 year old female with PMH significant for DM type II, Hypertension, Hyperlipidemia, CKD 3, obesity, CRISTÓBAL intolerant to CPAP using nocturnal 2 L oxygen, GERD, anxiety/depression who presents to ED with a complaint of cough and shortness of breath.  Patient started to have upper respiratory symptoms about 4 days ago which progressed with worsening shortness of breath and cough.  She lives with her daughter who takes care of her who also has symptoms of upper respiratory tract infection at this time.  She continued to have nonproductive cough, associated with chest wall pain with coughing.  She also developed fever today.  She denied any nausea, vomiting or constipation, had 1 episode of diarrhea today.  There was no associated tenesmus or abdominal pain.  No  urinary urgency, frequency or dysuria.   ED she was found to have fever of 102.3, lab work showed potassium of 2.9, creatinine 1.01, calcium 7.8, glucose 140, troponin 26 and flat.  VBG showed pH of 7.4, pCO2 54, pCO2 62, bicarb 34.  Influenza A positive.  Chest x-ray showed no acute abnormality  ED treatment: Oseltamivir, DuoNeb, Tylenol.        Past Medical History    Past Medical History:   Diagnosis Date    Arthritis     lt shoulder, rt. knee    Blood transfusion     CKD (chronic kidney disease) stage 3, GFR 30-59 ml/min (H)     Depressive disorder     Essential hypertension, benign     Gastroesophageal reflux disease     Generalized anxiety disorder     Hernia, abdominal     Hiatal hernia     History of blood transfusion     with a hernia repair    Hypertension     Insomnia, unspecified     Iron deficiency anemia 2009    Major depression     sees a psychiatrist    Menopause     age 53    Obesity, unspecified     CRISTÓBAL (obstructive sleep apnea)     bipap    Other chronic pain     chronic pain lt arm from tendonidits    Oxygen dependent     2 LPM at home-uses at noc or extended walking    Pneumonia     due to aspiration from hiatal hernia-    Pneumonia due to 2019 novel coronavirus 2021    Postoperative seroma 10/2011    drained several times    Pure hypercholesterolemia     RLS (restless legs syndrome)     Type II or unspecified type diabetes mellitus without mention of complication, not stated as uncontrolled        Past Surgical History   Past Surgical History:   Procedure Laterality Date    BREAST BIOPSY, RT/LT      2 times; benign    BREAST SURGERY  ?    Biopsy x 2 Benign     SECTION       SECTION       SECTION      COLONOSCOPY N/A 2020    Procedure: Colonoscopy with biopsy;  Surgeon: Obinna Gutierrez MD;  Location: [facility] OR    DILATION AND CURETTAGE, HYSTEROSCOPY DIAGNOSTIC, COMBINED  2013    Procedure: COMBINED DILATION AND CURETTAGE, HYSTEROSCOPY  DIAGNOSTIC;  DILATION AND CURETTAGE, HYSTEROSCOPY DIAGNOSTIC   Converted to a general;  Surgeon: Robi Edwards MD;  Location: [facility] OR    ESOPHAGOSCOPY, GASTROSCOPY, DUODENOSCOPY (EGD), COMBINED N/A 12/08/2015    Procedure: COMBINED ESOPHAGOSCOPY, GASTROSCOPY, DUODENOSCOPY (EGD);  Surgeon: Obinna Gutierrez MD;  Location:  GI    ESOPHAGOSCOPY, GASTROSCOPY, DUODENOSCOPY (EGD), COMBINED N/A 12/22/2015    Procedure: COMBINED ENDOSCOPIC ULTRASOUND, ESOPHAGOSCOPY, GASTROSCOPY, DUODENOSCOPY (EGD), FINE NEEDLE ASPIRATE/BIOPSY;  Surgeon: Sabine Olivares MD;  Location:  GI    ESOPHAGOSCOPY, GASTROSCOPY, DUODENOSCOPY (EGD), COMBINED N/A 01/03/2020    Procedure: ESOPHAGOGASTRODUODENOSCOPY;  Surgeon: Ascencion Stauffer MD;  Location: [facility] OR    HERNIORRHAPHY INCISIONAL (LOCATION)  10/08/2011     incarcerated hernia repair with mesh;    HERNIORRHAPHY INCISIONAL (LOCATION)  10/16/2011     repair incisional hernia with mesh, and removal of current implanted mesh;    Z NONSPECIFIC PROCEDURE      Hernia repair     [facility] NONSPECIFIC PROCEDURE      Lymph node biopsy in neck (benign)        Prior to Admission Medications   Prior to Admission Medications   Prescriptions Last Dose Informant Patient Reported? Taking?   Fish Oil-Krill Oil (KRILL & FISH OIL BLEND PO)   Yes Yes   Sig: Take 1 capsule by mouth daily   Ibuprofen-Acetaminophen 125-250 MG TABS More than a month  Yes Yes   Sig: Take 1 tablet by mouth 3 times daily as needed.   QUEtiapine (SEROQUEL) 100 MG tablet 1/4/2025 Evening  Yes Yes   Sig: Take 100 mg by mouth every evening.   carvedilol (COREG) 12.5 MG tablet 1/4/2025 Evening  No Yes   Sig: Take 1 tablet (12.5 mg) by mouth 2 times daily (with meals)   clonazePAM (KLONOPIN) 1 MG tablet Past Month  No Yes   Sig: Take 1 tablet (1 mg) by mouth 2 times daily as needed for anxiety. 60 to last 30 days   furosemide (LASIX) 20 MG tablet Past Month  No Yes   Sig: TAKE 1 TABLET (20 MG) BY MOUTH EVERY DAY AS NEEDED  FOR EDEMA   gabapentin (NEURONTIN) 300 MG capsule 1/4/2025 Bedtime  No Yes   Sig: TAKE 3 CAPSULES EVERY AM, 2 CAPSULES MIDDAY AND 3 CAPS AT NIGHT   glipiZIDE (GLUCOTROL XL) 10 MG 24 hr tablet 1/4/2025 Morning  No Yes   Sig: Take 1 tablet (10 mg) by mouth daily   ketoconazole (NIZORAL) 2 % external cream More than a month  No Yes   Sig: Apply to fold areas BID PRN   pantoprazole (PROTONIX) 40 MG EC tablet 1/4/2025 Bedtime  No Yes   Sig: TAKE 1 TABLET BY MOUTH EVERYDAY AT BEDTIME   potassium chloride ER (K-TAB) 20 MEQ CR tablet 1/4/2025 Morning  No Yes   Sig: TAKE 2 TABLETS BY MOUTH EVERY DAY   rosuvastatin (CRESTOR) 10 MG tablet 1/4/2025 Morning  No Yes   Sig: TAKE 1 TABLET (10 MG) BY MOUTH DAILY.   sertraline (ZOLOFT) 100 MG tablet 1/4/2025 Morning  No Yes   Sig: Take 2 tablets by mouth once daily   zolpidem (AMBIEN) 10 MG tablet Past Week  No Yes   Sig: Take 1 tablet (10 mg) by mouth at bedtime.      Facility-Administered Medications: None        Review of Systems    The 10 point Review of Systems is negative other than noted in the HPI or here.      Physical Exam   Vital Signs: Temp: (!) 102.3  F (39.1  C) Temp src: Oral BP: 138/70 Pulse: 77   Resp: 27 SpO2: 93 % O2 Device: Oxymask Oxygen Delivery: 5 LPM  Weight: 0 lbs 0 oz    General Appearance: Awake and alert, comfortable, not in distress, on oxygen mask 5 L saturating 93%  Respiratory: Distant breath sound due to body habitus, no wheezing  Cardiovascular: S1 and S2 regular, no gallop or murmur  GI: Obese, soft, nontender, nondistended, positive bowel sound  Skin: No rash or exanthems  Extremity: No edema or cyanosis or clubbing    Medical Decision Making       65 MINUTES SPENT BY ME on the date of service doing chart review, history, exam, documentation & further activities per the note.      Data   Imaging results reviewed over the past 24 hrs:   Recent Results (from the past 24 hours)   XR Chest 2 Views    Narrative    EXAM: XR CHEST 2 VIEWS  LOCATION: [facility]  DATE: 1/5/2025    INDICATION: cough, fever  COMPARISON: 1/2/2025      Impression    IMPRESSION: No acute cardiopulmonary abnormality. Moderate-sized hiatal hernia with unchanged left basilar atelectasis.     Recent Labs   Lab 01/05/25  1417 01/02/25  1530   WBC 3.6* 7.5   HGB 11.6* 13.0   MCV 75* 76*   * 212    141   POTASSIUM 2.9* 3.0*   CHLORIDE 95* 96*   CO2 27 31*   BUN 10.5 12.5   CR 1.01* 0.89   ANIONGAP 16* 14   GRECIA 7.8* 10.0   * 164*   ALBUMIN 3.8  --    PROTTOTAL 6.7  --    BILITOTAL 0.3  --    ALKPHOS 105  --    ALT 22  --    AST 49*  --

## 2025-01-06 ENCOUNTER — APPOINTMENT (OUTPATIENT)
Dept: PHYSICAL THERAPY | Facility: CLINIC | Age: 70
DRG: 193 | End: 2025-01-06
Attending: INTERNAL MEDICINE
Payer: MEDICARE

## 2025-01-06 LAB
ALBUMIN UR-MCNC: 50 MG/DL
ANION GAP SERPL CALCULATED.3IONS-SCNC: 14 MMOL/L (ref 7–15)
APPEARANCE UR: ABNORMAL
ATRIAL RATE - MUSE: 80 BPM
BILIRUB UR QL STRIP: NEGATIVE
BUN SERPL-MCNC: 15 MG/DL (ref 8–23)
CALCIUM SERPL-MCNC: 7.9 MG/DL (ref 8.8–10.4)
CHLORIDE SERPL-SCNC: 100 MMOL/L (ref 98–107)
COLOR UR AUTO: YELLOW
CREAT SERPL-MCNC: 1.11 MG/DL (ref 0.51–0.95)
DIASTOLIC BLOOD PRESSURE - MUSE: NORMAL MMHG
EGFRCR SERPLBLD CKD-EPI 2021: 54 ML/MIN/1.73M2
ERYTHROCYTE [DISTWIDTH] IN BLOOD BY AUTOMATED COUNT: 17.4 % (ref 10–15)
GLUCOSE BLDC GLUCOMTR-MCNC: 101 MG/DL (ref 70–99)
GLUCOSE BLDC GLUCOMTR-MCNC: 105 MG/DL (ref 70–99)
GLUCOSE BLDC GLUCOMTR-MCNC: 109 MG/DL (ref 70–99)
GLUCOSE BLDC GLUCOMTR-MCNC: 109 MG/DL (ref 70–99)
GLUCOSE BLDC GLUCOMTR-MCNC: 62 MG/DL (ref 70–99)
GLUCOSE SERPL-MCNC: 115 MG/DL (ref 70–99)
GLUCOSE UR STRIP-MCNC: NEGATIVE MG/DL
HCO3 SERPL-SCNC: 24 MMOL/L (ref 22–29)
HCT VFR BLD AUTO: 38.9 % (ref 35–47)
HGB BLD-MCNC: 11.5 G/DL (ref 11.7–15.7)
HGB UR QL STRIP: ABNORMAL
HYALINE CASTS: 33 /LPF
INTERPRETATION ECG - MUSE: NORMAL
KETONES UR STRIP-MCNC: NEGATIVE MG/DL
LEUKOCYTE ESTERASE UR QL STRIP: ABNORMAL
MAGNESIUM SERPL-MCNC: 1.7 MG/DL (ref 1.7–2.3)
MCH RBC QN AUTO: 22.7 PG (ref 26.5–33)
MCHC RBC AUTO-ENTMCNC: 29.6 G/DL (ref 31.5–36.5)
MCV RBC AUTO: 77 FL (ref 78–100)
MUCOUS THREADS #/AREA URNS LPF: PRESENT /LPF
NITRATE UR QL: NEGATIVE
P AXIS - MUSE: 19 DEGREES
PH UR STRIP: 5.5 [PH] (ref 5–7)
PLATELET # BLD AUTO: 106 10E3/UL (ref 150–450)
POTASSIUM SERPL-SCNC: 3.9 MMOL/L (ref 3.4–5.3)
PR INTERVAL - MUSE: 188 MS
QRS DURATION - MUSE: 88 MS
QT - MUSE: 414 MS
QTC - MUSE: 477 MS
R AXIS - MUSE: -17 DEGREES
RBC # BLD AUTO: 5.07 10E6/UL (ref 3.8–5.2)
RBC URINE: 3 /HPF
SODIUM SERPL-SCNC: 138 MMOL/L (ref 135–145)
SP GR UR STRIP: 1.02 (ref 1–1.03)
SQUAMOUS EPITHELIAL: 4 /HPF
SYSTOLIC BLOOD PRESSURE - MUSE: NORMAL MMHG
T AXIS - MUSE: 86 DEGREES
TRANSITIONAL EPI: 5 /HPF
UROBILINOGEN UR STRIP-MCNC: NORMAL MG/DL
VENTRICULAR RATE- MUSE: 80 BPM
WBC # BLD AUTO: 4.1 10E3/UL (ref 4–11)
WBC URINE: 68 /HPF

## 2025-01-06 PROCEDURE — 120N000001 HC R&B MED SURG/OB

## 2025-01-06 PROCEDURE — 250N000011 HC RX IP 250 OP 636: Performed by: INTERNAL MEDICINE

## 2025-01-06 PROCEDURE — 99232 SBSQ HOSP IP/OBS MODERATE 35: CPT | Performed by: STUDENT IN AN ORGANIZED HEALTH CARE EDUCATION/TRAINING PROGRAM

## 2025-01-06 PROCEDURE — 87086 URINE CULTURE/COLONY COUNT: CPT | Performed by: EMERGENCY MEDICINE

## 2025-01-06 PROCEDURE — 97530 THERAPEUTIC ACTIVITIES: CPT | Mod: GP | Performed by: PHYSICAL THERAPIST

## 2025-01-06 PROCEDURE — 81001 URINALYSIS AUTO W/SCOPE: CPT | Performed by: EMERGENCY MEDICINE

## 2025-01-06 PROCEDURE — 82565 ASSAY OF CREATININE: CPT | Performed by: INTERNAL MEDICINE

## 2025-01-06 PROCEDURE — 250N000013 HC RX MED GY IP 250 OP 250 PS 637: Performed by: STUDENT IN AN ORGANIZED HEALTH CARE EDUCATION/TRAINING PROGRAM

## 2025-01-06 PROCEDURE — 83735 ASSAY OF MAGNESIUM: CPT | Performed by: INTERNAL MEDICINE

## 2025-01-06 PROCEDURE — 85041 AUTOMATED RBC COUNT: CPT | Performed by: INTERNAL MEDICINE

## 2025-01-06 PROCEDURE — 85014 HEMATOCRIT: CPT | Performed by: INTERNAL MEDICINE

## 2025-01-06 PROCEDURE — 250N000013 HC RX MED GY IP 250 OP 250 PS 637: Performed by: INTERNAL MEDICINE

## 2025-01-06 PROCEDURE — 97161 PT EVAL LOW COMPLEX 20 MIN: CPT | Mod: GP | Performed by: PHYSICAL THERAPIST

## 2025-01-06 PROCEDURE — 36415 COLL VENOUS BLD VENIPUNCTURE: CPT | Performed by: INTERNAL MEDICINE

## 2025-01-06 PROCEDURE — 80048 BASIC METABOLIC PNL TOTAL CA: CPT | Performed by: INTERNAL MEDICINE

## 2025-01-06 RX ORDER — FERROUS GLUCONATE 324(38)MG
324 TABLET ORAL EVERY OTHER DAY
Status: DISCONTINUED | OUTPATIENT
Start: 2025-01-07 | End: 2025-01-08 | Stop reason: HOSPADM

## 2025-01-06 RX ORDER — MAGNESIUM OXIDE 400 MG/1
400 TABLET ORAL EVERY 4 HOURS
Status: COMPLETED | OUTPATIENT
Start: 2025-01-06 | End: 2025-01-06

## 2025-01-06 RX ADMIN — Medication 400 MG: at 13:31

## 2025-01-06 RX ADMIN — Medication 400 MG: at 09:11

## 2025-01-06 RX ADMIN — CARVEDILOL 12.5 MG: 12.5 TABLET, FILM COATED ORAL at 18:04

## 2025-01-06 RX ADMIN — ENOXAPARIN SODIUM 40 MG: 40 INJECTION SUBCUTANEOUS at 09:02

## 2025-01-06 RX ADMIN — PANTOPRAZOLE SODIUM 40 MG: 40 TABLET, DELAYED RELEASE ORAL at 09:05

## 2025-01-06 RX ADMIN — ENOXAPARIN SODIUM 40 MG: 40 INJECTION SUBCUTANEOUS at 20:16

## 2025-01-06 RX ADMIN — QUETIAPINE FUMARATE 100 MG: 50 TABLET ORAL at 20:16

## 2025-01-06 RX ADMIN — SERTRALINE 200 MG: 100 TABLET, FILM COATED ORAL at 09:04

## 2025-01-06 RX ADMIN — GLIPIZIDE 10 MG: 10 TABLET, EXTENDED RELEASE ORAL at 09:14

## 2025-01-06 RX ADMIN — ROSUVASTATIN 10 MG: 10 TABLET, FILM COATED ORAL at 09:03

## 2025-01-06 RX ADMIN — OSELTAMIVIR PHOSPHATE 75 MG: 75 CAPSULE ORAL at 09:05

## 2025-01-06 RX ADMIN — CARVEDILOL 12.5 MG: 12.5 TABLET, FILM COATED ORAL at 09:04

## 2025-01-06 RX ADMIN — OSELTAMIVIR PHOSPHATE 75 MG: 75 CAPSULE ORAL at 20:16

## 2025-01-06 RX ADMIN — GABAPENTIN 900 MG: 300 CAPSULE ORAL at 20:15

## 2025-01-06 RX ADMIN — GABAPENTIN 900 MG: 300 CAPSULE ORAL at 09:03

## 2025-01-06 ASSESSMENT — ACTIVITIES OF DAILY LIVING (ADL)
ADLS_ACUITY_SCORE: 48
DEPENDENT_IADLS:: COOKING;LAUNDRY
ADLS_ACUITY_SCORE: 48
ADLS_ACUITY_SCORE: 41
ADLS_ACUITY_SCORE: 48
ADLS_ACUITY_SCORE: 41
ADLS_ACUITY_SCORE: 48
ADLS_ACUITY_SCORE: 48
ADLS_ACUITY_SCORE: 41
ADLS_ACUITY_SCORE: 41
ADLS_ACUITY_SCORE: 48
ADLS_ACUITY_SCORE: 41
ADLS_ACUITY_SCORE: 48
ADLS_ACUITY_SCORE: 41
ADLS_ACUITY_SCORE: 48
ADLS_ACUITY_SCORE: 48
ADLS_ACUITY_SCORE: 41
ADLS_ACUITY_SCORE: 41

## 2025-01-06 NOTE — PLAN OF CARE
"Goal Outcome Evaluation:      Plan of Care Reviewed With: patient    Overall Patient Progress: improvingOverall Patient Progress: improving    Outcome Evaluation: Oxygen improving. BG AC/HS  A & O x4, VSS, denies pain Tele SR, LS diminished, O2 5L oxy max.  & 128, K+ Mg+ protocol, waiting  pharmacy to verify for replacement. Up A x1/gb/w.  PT following.  Will continue POC and monitoring.   Problem: Adult Inpatient Plan of Care  Goal: Plan of Care Review  Description: The Plan of Care Review/Shift note should be completed every shift.  The Outcome Evaluation is a brief statement about your assessment that the patient is improving, declining, or no change.  This information will be displayed automatically on your shift  note.  1/5/2025 2011 by Clem Carmichael RN  Outcome: Progressing  Flowsheets (Taken 1/5/2025 2011)  Outcome Evaluation: Oxygen improving. BG AC/HS  Plan of Care Reviewed With: patient  Overall Patient Progress: improving  1/5/2025 2011 by Clem Carmichael RN  Outcome: Progressing  Flowsheets (Taken 1/5/2025 2011)  Outcome Evaluation: Oxygen improving.  AC/HS  Plan of Care Reviewed With: patient  Overall Patient Progress: improving  Goal: Patient-Specific Goal (Individualized)  Description: You can add care plan individualizations to a care plan. Examples of Individualization might be:  \"Parent requests to be called daily at 9am for status\", \"I have a hard time hearing out of my right ear\", or \"Do not touch me to wake me up as it startles  me\".  1/5/2025 2011 by Clem Carmichael RN  Outcome: Progressing  1/5/2025 2011 by Clem Carmichael RN  Outcome: Progressing  Flowsheets (Taken 1/5/2025 1900)  Individualized Care Needs: no  Anxieties, Fears or Concerns: no  Goal: Absence of Hospital-Acquired Illness or Injury  1/5/2025 2011 by Clem Carmichael RN  Outcome: Progressing  1/5/2025 2011 by Clem Carmichael RN  Outcome: Progressing  Goal: Optimal Comfort and Wellbeing  1/5/2025 2011 by " Clem Carmichael RN  Outcome: Progressing  1/5/2025 2011 by Clem Carmichael RN  Outcome: Progressing  Intervention: Monitor Pain and Promote Comfort  Recent Flowsheet Documentation  Taken 1/5/2025 1830 by Clem Carmichael RN  Pain Management Interventions: declines  Goal: Readiness for Transition of Care  1/5/2025 2011 by Clem Carmichael RN  Outcome: Progressing  1/5/2025 2011 by Clem Carmichael RN  Outcome: Progressing  Intervention: Mutually Develop Transition Plan  Recent Flowsheet Documentation  Taken 1/5/2025 1900 by Clem Carmichael RN  Patient/Family Anticipates Transition to: home with family  Equipment Currently Used at Home: walker, rolling     Problem: Breathing Pattern Ineffective  Goal: Effective Breathing Pattern  1/5/2025 2011 by Clem Carmichael RN  Outcome: Progressing  1/5/2025 2011 by Clem Carmichael RN  Outcome: Progressing

## 2025-01-06 NOTE — CONSULTS
Care Management Initial Consult    General Information  Assessment completed with: Patient,    Type of CM/SW Visit: Initial Assessment    Primary Care Provider verified and updated as needed:     Readmission within the last 30 days: no previous admission in last 30 days      Reason for Consult: discharge planning      Communication Assessment  Patient's communication style: spoken language (English or Bilingual)    Hearing Difficulty or Deaf: no   Wear Glasses or Blind: yes    Cognitive  Cognitive/Neuro/Behavioral: WDL                      Living Environment:   People in home: child(mariama), adult     Current living Arrangements: house      Able to return to prior arrangements: yes       Family/Social Support:  Care provided by: child(mariama)  Provides care for: no one  Marital Status:   Support system: Children          Description of Support System: Supportive, Involved       Current Resources:   Patient receiving home care services: Yes  Skilled Home Care Services: Skilled Nursing, Physical Therapy, Home Health Aid     Community Resources:    Equipment currently used at home: walker, rolling  Supplies currently used at home: Diabetic Supplies, Oxygen Tubing/Supplies    Does the patient's insurance plan have a 3 day qualifying hospital stay waiver?  No    Lifestyle & Psychosocial Needs:  Social Drivers of Health     Food Insecurity: Low Risk  (1/5/2025)    Food Insecurity     Within the past 12 months, did you worry that your food would run out before you got money to buy more?: No     Within the past 12 months, did the food you bought just not last and you didn t have money to get more?: No   Depression: Not at risk (4/5/2024)    PHQ-2     PHQ-2 Score: 0   Housing Stability: Low Risk  (1/5/2025)    Housing Stability     Do you have housing? : Yes     Are you worried about losing your housing?: No   Tobacco Use: Medium Risk (12/18/2024)    Patient History     Smoking Tobacco Use: Former     Smokeless Tobacco Use:  Never     Passive Exposure: Not on file   Financial Resource Strain: Low Risk  (1/5/2025)    Financial Resource Strain     Within the past 12 months, have you or your family members you live with been unable to get utilities (heat, electricity) when it was really needed?: No   Alcohol Use: Not on file   Transportation Needs: Low Risk  (1/5/2025)    Transportation Needs     Within the past 12 months, has lack of transportation kept you from medical appointments, getting your medicines, non-medical meetings or appointments, work, or from getting things that you need?: No   Physical Activity: Not on file   Interpersonal Safety: Low Risk  (1/5/2025)    Interpersonal Safety     Do you feel physically and emotionally safe where you currently live?: Yes     Within the past 12 months, have you been hit, slapped, kicked or otherwise physically hurt by someone?: No     Within the past 12 months, have you been humiliated or emotionally abused in other ways by your partner or ex-partner?: No   Stress: Stress Concern Present (8/10/2020)    [employer] - Occupational Stress Questionnaire     Feeling of Stress : To some extent   Social Connections: Unknown (8/10/2020)    Social Connection and Isolation Panel [NHANES]     Frequency of Communication with Friends and Family: More than three times a week     Frequency of Social Gatherings with Friends and Family: Not on file     Attends Congregational Services: Not on file     Active Member of Clubs or Organizations: Not on file     Attends Club or Organization Meetings: Not on file     Marital Status: Not on file   Health Literacy: Not on file       Functional Status:  Prior to admission patient needed assistance:   Dependent ADLs:: Ambulation-walker  Dependent IADLs:: Cooking, Laundry     Discussed  Partnership in Safe Discharge Planning  document with patient/family: No    Additional Information:  CM/SW consulted for discharge planning/elevated unplanned  readmission risk score of 31%. Pt admitted with respiratory failure secondary to influenza A.   Met with pt at bedside. Pt lives with daughter in a single family home. All needs are on one level. Pt uses a walker at baseline. Pt is independent with personal cares. Daughter assists with IADLs as needed. Pt uses home O2 at HS, pt has a bi-pap and states she has not been using.  Pt is a current pt with [facility] for RN/PT/HHA visits.   Daughter will provide transportation at discharge.  PT eval is pending.    Next Steps: Follow up with PT recommendations.    ADDENDUM:  Therapy is recommending home with resumption of home care.    Joann Woodward RN   Inpatient Care Coordination  [facility]   Phone: [phone]

## 2025-01-06 NOTE — PROGRESS NOTES
[facility]    Patient is currently receiving SN PT home care services from [facility].  and home health team have been notified of patients admission. [facility] liaison will continue to follow patient during hospital stay. If appropriate, provide home care orders to resume services at the time of discharge. If patients discharge date changes, please reach out to clinical liaison or the HUB to ensure SOC/BE date is still appropriate for a safe discharge plan.     Thank you,     SATURNINO Ledezma, LPN  Home Care Liaison   Cell: [phone]

## 2025-01-06 NOTE — PROGRESS NOTES
Medicine Progress Note - Hospitalist Service    Date of Admission:  1/5/2025    Assessment & Plan   Nicole Reyes is a 69 year old female with a PMH significant for DM type II, Hypertension, Hyperlipidemia, CKD 3, obesity, CRISTÓBAL intolerant to CPAP using nocturnal 2 L oxygen, GERD, anxiety/depression who presents to ED with a complaint of cough and shortness of breath, found to have hypoxic respiratory failure due to influenza A and admitted on 1/5/2025.      Acute hypoxemic respiratory failure 2/2 influenza A  Obstructive sleep apnea on nocturnal oxygen  Mild respiratory acidosis and metabolic alkalosis:  Patient presented with cough, shortness of breath.  Found influenza A positive.  Sick contact, her daughter at home.  She also developed fever here, blood cultures done but it is likely due to influenza.  Stable overnight.  VBG showed slightly elevated pCO2 and also bicarb, with normal pH which is consistent with 2 disorders, respiratory acidosis and metabolic alkalosis.  -Currently on 5 L of oxygen NC   -Continue oseltamivir  -Continue DuoNebs prn  -Titrate and try to wean her off oxygen daytime; will continue PTA nocturnal 2 L/min.  -She is intolerant to CPAP and not using it     Hypokalemia:  Likely due to poor oral intake as she has not been eating well.   -Replacing potassium and magnesium per protocol.  -Encourage oral intake     DM type II:  -PTA on glipizide and will be continued  -Started on sliding scale insulin     Mild leukopenia, microcytic anemia, thrombocytopenia:  This is likely due to influenza infection. Iron studies show normal iron levels but low ferritin.  -Daily CBC     Chronic medical conditions  Obesity  GERD  Depression  Anxiety  Hypertension          Diet: Combination Diet Regular Diet Adult; Moderate Consistent Carb (60 g CHO per Meal) Diet    DVT Prophylaxis: Enoxaparin (Lovenox) SQ  Suarez Catheter: Not present  Lines: None     Cardiac Monitoring:  "ACTIVE order. Indication: Electrolyte Imbalance (24 hours)- Magnesium <1.3 mg/ml; Potassium < =2.8 or > 5.5 mg/ml  Code Status: Full Code      Clinically Significant Risk Factors Present on Admission        # Hypokalemia: Lowest K = 2.9 mmol/L in last 2 days, will replace as needed   # Hypochloremia: Lowest Cl = 95 mmol/L in last 2 days, will monitor as appropriate  # Hypocalcemia: Lowest Ca = 7.8 mg/dL in last 2 days, will monitor and replace as appropriate   # Hypomagnesemia: Lowest Mg = 1.4 mg/dL in last 2 days, will replace as needed     # Thrombocytopenia: Lowest platelets = 106 in last 2 days, will monitor for bleeding   # Hypertension: Noted on problem list          # DMII: A1C = 7.5 % (Ref range: <5.7 %) within past 6 months    # Severe Obesity: Estimated body mass index is 51.54 kg/m  as calculated from the following:    Height as of this encounter: 1.473 m (4' 10\").    Weight as of this encounter: 111.9 kg (246 lb 9.6 oz).              Social Drivers of Health    Tobacco Use: Medium Risk (12/18/2024)    Patient History     Smoking Tobacco Use: Former     Smokeless Tobacco Use: Never   Stress: Stress Concern Present (8/10/2020)    [employer] - Occupational Stress Questionnaire     Feeling of Stress : To some extent   Social Connections: Unknown (8/10/2020)    Social Connection and Isolation Panel [NHANES]     Frequency of Communication with Friends and Family: More than three times a week          Disposition Plan     Medically Ready for Discharge: Anticipated in 2-4 Days             Sean Caruso DO  Hospitalist Service  [facility]  Securely message with [place] (more info)  Text page via [facility] Paging/Directory   ______________________________________________________________________    Interval History   NAEO.  Feeling about the same.  Still bothersome cough.  Still needing oxygen supplementation.     Physical Exam   Vital Signs: Temp: 98.6  F (37  C) " Temp src: Oral BP: 100/45 Pulse: 66   Resp: 18 SpO2: 95 % O2 Device: Nasal cannula Oxygen Delivery: 5 LPM  Weight: 246 lbs 9.6 oz    General Appearance: Patient awake & alert.  No apparent distress.   Respiratory: Lungs with mild expiratory wheezing. Work of breathing appears normal on 5L NC  Cardiovascular: Regular rate and rhythm.  No murmurs rubs or gallops.  There is no edema present.  GI: Benign.  Soft.  Non-tender.  Bowel sounds active.   Skin: No rashes or lesions exposed skin.  Neuro:  The patient is moving all extremities.  No obvious focal asymmetries.   Other: Patient is appropriate and oriented x3.     Medical Decision Making       45 MINUTES SPENT BY ME on the date of service doing chart review, history, exam, documentation & further activities per the note.      Data   ------------------------- PAST 24 HR DATA REVIEWED -----------------------------------------------    I have personally reviewed the following data over the past 24 hrs:    4.1  \   11.5 (L)   / 106 (L)     138 100 15.0 /  109 (H)   3.9 24 1.11 (H) \     ALT: 22 AST: 49 (H) AP: 105 TBILI: 0.3   ALB: 3.8 TOT PROTEIN: 6.7 LIPASE: N/A     Trop: 26 (H) BNP: 286     TSH: N/A T4: N/A A1C: 7.5 (H)     Procal: N/A CRP: N/A Lactic Acid: 1.0       Ferritin:  2 (L) % Retic:  N/A LDH:  N/A       Imaging results reviewed over the past 24 hrs:   Recent Results (from the past 24 hours)   XR Chest 2 Views    Narrative    EXAM: XR CHEST 2 VIEWS  LOCATION: [facility]  DATE: 1/5/2025    INDICATION: cough, fever  COMPARISON: 1/2/2025      Impression    IMPRESSION: No acute cardiopulmonary abnormality. Moderate-sized hiatal hernia with unchanged left basilar atelectasis.

## 2025-01-06 NOTE — PLAN OF CARE
"Vss, no co pain/cp/sob, KHALIL at times. , 105. Tele SR. K+ WDL, Mag replaced with rechecks ordered for am. Alarms on for safety, up with SBA, up to chair this shift. Droplet isolation maintained, PT following. Continue poc and monitoring.        Goal Outcome Evaluation:      Plan of Care Reviewed With: patient    Overall Patient Progress: improvingOverall Patient Progress: improving    Outcome Evaluation: weaned O2 down to 3L from 5L per NC      Problem: Adult Inpatient Plan of Care  Goal: Plan of Care Review  Description: The Plan of Care Review/Shift note should be completed every shift.  The Outcome Evaluation is a brief statement about your assessment that the patient is improving, declining, or no change.  This information will be displayed automatically on your shift  note.  Outcome: Not Progressing  Flowsheets (Taken 1/6/2025 150)  Outcome Evaluation: weaned O2 down to 3L from 5L per NC  Plan of Care Reviewed With: patient  Overall Patient Progress: improving  Goal: Patient-Specific Goal (Individualized)  Description: You can add care plan individualizations to a care plan. Examples of Individualization might be:  \"Parent requests to be called daily at 9am for status\", \"I have a hard time hearing out of my right ear\", or \"Do not touch me to wake me up as it startles  me\".  Outcome: Not Progressing  Goal: Absence of Hospital-Acquired Illness or Injury  Outcome: Not Progressing  Intervention: Identify and Manage Fall Risk  Recent Flowsheet Documentation  Taken 1/6/2025 1408 by Amarilis Olivo, RN  Safety Promotion/Fall Prevention: safety round/check completed  Taken 1/6/2025 1316 by Amarilis Olivo, RN  Safety Promotion/Fall Prevention: safety round/check completed  Taken 1/6/2025 1020 by Amarilis Olivo, RN  Safety Promotion/Fall Prevention: safety round/check completed  Taken 1/6/2025 0903 by Amarilis Olivo, RN  Safety Promotion/Fall Prevention: safety round/check completed  Taken 1/6/2025 0846 by " Amarilis Olivo RN  Safety Promotion/Fall Prevention:   activity supervised   assistive device/personal items within reach   treat underlying cause   treat reversible contributory factors   supervised activity   safety round/check completed   room organization consistent   room near nurse's station   clutter free environment maintained   increased rounding and observation   increase visualization of patient   lighting adjusted   mobility aid in reach   nonskid shoes/slippers when out of bed   patient and family education  Taken 1/6/2025 0810 by Amarilis Olivo RN  Safety Promotion/Fall Prevention: safety round/check completed  Taken 1/6/2025 0745 by Amarilis Olivo RN  Safety Promotion/Fall Prevention: safety round/check completed  Intervention: Prevent Skin Injury  Recent Flowsheet Documentation  Taken 1/6/2025 0846 by Amarilis Olivo RN  Body Position: position changed independently  Intervention: Prevent and Manage VTE (Venous Thromboembolism) Risk  Recent Flowsheet Documentation  Taken 1/6/2025 0846 by Amarilis Olivo RN  VTE Prevention/Management: (see MAR) other (see comments)  Goal: Optimal Comfort and Wellbeing  Outcome: Not Progressing  Intervention: Monitor Pain and Promote Comfort  Recent Flowsheet Documentation  Taken 1/6/2025 0950 by Amarilis Olivo RN  Pain Management Interventions:   emotional support   declines   care clustered  Goal: Readiness for Transition of Care  Outcome: Not Progressing     Problem: Fall Injury Risk  Goal: Absence of Fall and Fall-Related Injury  Outcome: Not Progressing  Intervention: Identify and Manage Contributors  Recent Flowsheet Documentation  Taken 1/6/2025 0846 by Amarilis Olivo RN  Medication Review/Management:   medications reviewed   high-risk medications identified  Intervention: Promote Injury-Free Environment  Recent Flowsheet Documentation  Taken 1/6/2025 1408 by Amarilis Olivo RN  Safety Promotion/Fall Prevention: safety round/check  completed  Taken 1/6/2025 1316 by Amarilis Olivo RN  Safety Promotion/Fall Prevention: safety round/check completed  Taken 1/6/2025 1020 by Amarilis Olivo RN  Safety Promotion/Fall Prevention: safety round/check completed  Taken 1/6/2025 0903 by Amarilis Olivo RN  Safety Promotion/Fall Prevention: safety round/check completed  Taken 1/6/2025 0846 by Amarilis Olivo RN  Safety Promotion/Fall Prevention:   activity supervised   assistive device/personal items within reach   treat underlying cause   treat reversible contributory factors   supervised activity   safety round/check completed   room organization consistent   room near nurse's station   clutter free environment maintained   increased rounding and observation   increase visualization of patient   lighting adjusted   mobility aid in reach   nonskid shoes/slippers when out of bed   patient and family education  Taken 1/6/2025 0810 by Amarilis Olivo RN  Safety Promotion/Fall Prevention: safety round/check completed  Taken 1/6/2025 0745 by Amarilis Olivo RN  Safety Promotion/Fall Prevention: safety round/check completed     Problem: Pain Acute  Goal: Optimal Pain Control and Function  Outcome: Not Progressing  Intervention: Develop Pain Management Plan  Recent Flowsheet Documentation  Taken 1/6/2025 0950 by Amarilis Olivo RN  Pain Management Interventions:   emotional support   declines   care clustered  Intervention: Prevent or Manage Pain  Recent Flowsheet Documentation  Taken 1/6/2025 0846 by Amarilis Olivo RN  Medication Review/Management:   medications reviewed   high-risk medications identified     Problem: Breathing Pattern Ineffective  Goal: Effective Breathing Pattern  Outcome: Progressing  Intervention: Promote Improved Breathing Pattern  Recent Flowsheet Documentation  Taken 1/6/2025 0846 by Amarilis Olivo RN  Breathing Techniques/Airway Clearance: deep/controlled cough encouraged  Airway/Ventilation Management: oxygen  therapy provided  Head of Bed (HOB) Positioning: HOB at 20-30 degrees     Problem: Skin Injury Risk Increased  Goal: Skin Health and Integrity  Outcome: Progressing  Intervention: Optimize Skin Protection  Recent Flowsheet Documentation  Taken 1/6/2025 1408 by Amarilis Olivo, RN  Activity Management: (with therapy)   ambulated to bathroom   ambulated in room   ambulated outside room   other (see comments)  Taken 1/6/2025 0868 by Amarilis Olivo, RN  Activity Management:   activity adjusted per tolerance   activity encouraged  Head of Bed (HOB) Positioning: HOB at 20-30 degrees

## 2025-01-06 NOTE — PROGRESS NOTES
01/06/25 1429   Appointment Info   Signing Clinician's Name / Credentials (PT) Amparo Ricks DPT   Living Environment   People in Home child(mariama), adult   Current Living Arrangements house   Home Accessibility   (ramp entry)   Transportation Anticipated family or friend will provide   Living Environment Comments Reports uses 4WW for mobility; IND with basic ADLs, dtr assists with IADLs   Self-Care   Usual Activity Tolerance moderate   Current Activity Tolerance fair   Equipment Currently Used at Home walker, rolling   Fall history within last six months no   General Information   Onset of Illness/Injury or Date of Surgery 01/05/25   Referring Physician Sam Novak MD   Patient/Family Therapy Goals Statement (PT) to go home when I feel stronger   Pertinent History of Current Problem (include personal factors and/or comorbidities that impact the POC) 69 year old female with a PMH significant for DM type II, Hypertension, Hyperlipidemia, CKD 3, obesity, CRISTÓBAL intolerant to CPAP using nocturnal 2 L oxygen, GERD, anxiety/depression who presents to ED with a complaint of cough and shortness of breath, found to have hypoxic respiratory failure due to influenza A and admitted on 1/5/2025   Existing Precautions/Restrictions oxygen therapy device and L/min  (currently on 5L)   General Observations sitting in recliner, NAD   Cognition   Affect/Mental Status (Cognition) WFL   Orientation Status (Cognition) oriented x 3   Follows Commands (Cognition) WFL   Pain Assessment   Patient Currently in Pain No   Integumentary/Edema   Integumentary/Edema Comments see RN assessment   Posture    Posture Forward head position;Protracted shoulders   Range of Motion (ROM)   Range of Motion ROM is WFL   Strength (Manual Muscle Testing)   Strength (Manual Muscle Testing) Deficits observed during functional mobility   Strength Comments decreased activity tolerance   Bed Mobility   Comment, (Bed Mobility) not assessed, in chair at  beginning and end of session   Transfers   Transfers sit-stand transfer   Sit-Stand Transfer   Sit-Stand [place] (Transfers) contact guard   Assistive Device (Sit-Stand Transfers) walker, front-wheeled   Comment, (Sit-Stand Transfer) initially pulls to walker   Gait/Stairs (Locomotion)   [place] Level (Gait) contact guard   Assistive Device (Gait) walker, front-wheeled   Distance in Feet (Gait) 5' for eval; add'l for treat   Pattern (Gait) step-through   Balance   Balance Comments Fair, no overt LOB but unsure if would manage balance checks easily   Sensory Examination   Sensory Perception patient reports no sensory changes   Clinical Impression   Criteria for Skilled Therapeutic Intervention Yes, treatment indicated   PT Diagnosis (PT) decreased functional mobility   Influenced by the following impairments weakness/deconditioning   Functional limitations due to impairments bed mobility (anticipated), transfers, ambulation   Clinical Presentation (PT Evaluation Complexity) stable   Clinical Presentation Rationale clinical judgement   Clinical Decision Making (Complexity) low complexity   Planned Therapy Interventions (PT) balance training;bed mobility training;gait training;home exercise program;patient/family education;strengthening;transfer training;progressive activity/exercise;home program guidelines   Risk & Benefits of therapy have been explained evaluation/treatment results reviewed;care plan/treatment goals reviewed;risks/benefits reviewed;current/potential barriers reviewed;participants voiced agreement with care plan;participants included;patient   PT Total Evaluation Time   PT Eval, Low Complexity Minutes (50931) 9   Physical Therapy Goals   PT Frequency Daily   PT Predicted Duration/Target Date for Goal Attainment 01/11/25   PT Goals Bed Mobility;Transfers;Gait   PT: Bed Mobility Modified independent  (has bed that elevates HOB at home)   PT: Transfers Supervision/stand-by assist;Bed to/from  chair;Sit to/from stand;Assistive device   PT: Gait Supervision/stand-by assist;Assistive device;100 feet   PT Discharge Planning   PT Plan assess bed mobility, inc amb distance (bring 4WW)   PT Discharge Recommendation (DC Rec) home with assist;home with home care physical therapy   PT Rationale for DC Rec Pt appearing just slightly below baseline, likely d/t influenza.  Rec home with dtr's support and HHPT   PT Brief overview of current status SBA with 2WW   Physical Therapy Time and Intention   Timed Code Treatment Minutes 19   Total Session Time (sum of timed and untimed services) 28

## 2025-01-06 NOTE — PLAN OF CARE
"Pt is alert and oriented x4. Pt denies pain or discomfort. Vitals stable pt is on on droplet isolation for influenza A. Pt is on on Tamiflu. Pt is on oxygen 5L oxy mask. Pt is on potassium and magnesium protocol. Pt magnesium was replaced during the shift. Recheck in the morning. Pt is on blood sugar checks. Pt is on Tele monitoring, SB. Plan for PT consult in the morning. Pt urine sample sent to lab. Pt SBA in transfer and cares. Pt is sleeping in bed and call lig within reach.         Goal Outcome Evaluation:      Plan of Care Reviewed With: patient    Overall Patient Progress: improvingOverall Patient Progress: improving    Outcome Evaluation: pt Magnessium was replaced during the shift. pt is on blood sugar checks. pt is on 03 5L oxymask.      Problem: Adult Inpatient Plan of Care  Goal: Plan of Care Review  Description: The Plan of Care Review/Shift note should be completed every shift.  The Outcome Evaluation is a brief statement about your assessment that the patient is improving, declining, or no change.  This information will be displayed automatically on your shift  note.  Outcome: Progressing  Flowsheets (Taken 1/6/2025 0037)  Outcome Evaluation: pt Magnessium was replaced during the shift. pt is on blood sugar checks. pt is on 03 5L oxymask.  Plan of Care Reviewed With: patient  Overall Patient Progress: improving  Goal: Patient-Specific Goal (Individualized)  Description: You can add care plan individualizations to a care plan. Examples of Individualization might be:  \"Parent requests to be called daily at 9am for status\", \"I have a hard time hearing out of my right ear\", or \"Do not touch me to wake me up as it startles  me\".  Outcome: Progressing  Goal: Absence of Hospital-Acquired Illness or Injury  Outcome: Progressing  Intervention: Identify and Manage Fall Risk  Recent Flowsheet Documentation  Taken 1/6/2025 0028 by Alexsander Casiano RN  Safety Promotion/Fall Prevention:   assistive " device/personal items within reach   activity supervised   clutter free environment maintained   increased rounding and observation   increase visualization of patient   nonskid shoes/slippers when out of bed   patient and family education   safety round/check completed  Intervention: Prevent Skin Injury  Recent Flowsheet Documentation  Taken 1/6/2025 0028 by Alexsander Casiano RN  Body Position: position changed independently  Intervention: Prevent and Manage VTE (Venous Thromboembolism) Risk  Recent Flowsheet Documentation  Taken 1/6/2025 0028 by Alexsander Casiano RN  VTE Prevention/Management: SCDs off (sequential compression devices)  Intervention: Prevent Infection  Recent Flowsheet Documentation  Taken 1/6/2025 0028 by Alexsander Casiano RN  Infection Prevention:   single patient room provided   rest/sleep promoted   hand hygiene promoted  Goal: Optimal Comfort and Wellbeing  Outcome: Progressing  Goal: Readiness for Transition of Care  Outcome: Progressing     Problem: Breathing Pattern Ineffective  Goal: Effective Breathing Pattern  Outcome: Progressing  Intervention: Promote Improved Breathing Pattern  Recent Flowsheet Documentation  Taken 1/6/2025 0028 by Alexsander Casiano RN  Supportive Measures:   self-responsibility promoted   self-reflection promoted   self-care encouraged   active listening utilized  Head of Bed (HOB) Positioning: HOB at 20-30 degrees     Problem: Skin Injury Risk Increased  Goal: Skin Health and Integrity  Outcome: Progressing  Intervention: Optimize Skin Protection  Recent Flowsheet Documentation  Taken 1/6/2025 0028 by Alexsander Casiano RN  Activity Management:   activity adjusted per tolerance   activity encouraged  Head of Bed (HOB) Positioning: HOB at 20-30 degrees     Problem: Comorbidity Management  Goal: Maintenance of Asthma Control  Outcome: Progressing  Intervention: Maintain Asthma Symptom Control  Recent Flowsheet  Documentation  Taken 1/6/2025 0028 by Alexsander Casiano RN  Medication Review/Management: medications reviewed  Goal: Maintenance of Behavioral Health Symptom Control  Outcome: Progressing  Intervention: Maintain Behavioral Health Symptom Control  Recent Flowsheet Documentation  Taken 1/6/2025 0028 by Alexsander Casiano RN  Medication Review/Management: medications reviewed  Goal: Maintenance of COPD Symptom Control  Outcome: Progressing  Intervention: Maintain COPD Symptom Control  Recent Flowsheet Documentation  Taken 1/6/2025 0028 by Alexsander Casiano RN  Medication Review/Management: medications reviewed  Goal: Blood Glucose Levels Within Targeted Range  Outcome: Progressing  Intervention: Monitor and Manage Glycemia  Recent Flowsheet Documentation  Taken 1/6/2025 0028 by Alexsander Casiano RN  Medication Review/Management: medications reviewed  Goal: Maintenance of Heart Failure Symptom Control  Outcome: Progressing  Intervention: Maintain Heart Failure Management  Recent Flowsheet Documentation  Taken 1/6/2025 0028 by Alexsander Casiano RN  Medication Review/Management: medications reviewed  Goal: Blood Pressure in Desired Range  Outcome: Progressing  Intervention: Maintain Blood Pressure Management  Recent Flowsheet Documentation  Taken 1/6/2025 0028 by Alexsander Casiano RN  Medication Review/Management: medications reviewed  Goal: Maintenance of Osteoarthritis Symptom Control  Outcome: Progressing  Intervention: Maintain Osteoarthritis Symptom Control  Recent Flowsheet Documentation  Taken 1/6/2025 0028 by Alexsander Casiano RN  Activity Management:   activity adjusted per tolerance   activity encouraged  Medication Review/Management: medications reviewed  Goal: Bariatric Home Regimen Maintained  Outcome: Progressing  Intervention: Maintain and Manage Postbariatric Surgery Care  Recent Flowsheet Documentation  Taken 1/6/2025 0028 by Timoteo Snow  СЕРГЕЙ Beltre  Medication Review/Management: medications reviewed  Goal: Maintenance of Seizure Control  Outcome: Progressing  Intervention: Maintain Seizure Symptom Control  Recent Flowsheet Documentation  Taken 1/6/2025 0028 by Alexsander Casiano RN  Medication Review/Management: medications reviewed     Problem: Fall Injury Risk  Goal: Absence of Fall and Fall-Related Injury  Outcome: Progressing  Intervention: Identify and Manage Contributors  Recent Flowsheet Documentation  Taken 1/6/2025 0028 by Alexsander Casiano RN  Medication Review/Management: medications reviewed  Intervention: Promote Injury-Free Environment  Recent Flowsheet Documentation  Taken 1/6/2025 0028 by Alexsander Casiano RN  Safety Promotion/Fall Prevention:   assistive device/personal items within reach   activity supervised   clutter free environment maintained   increased rounding and observation   increase visualization of patient   nonskid shoes/slippers when out of bed   patient and family education   safety round/check completed     Problem: Pain Acute  Goal: Optimal Pain Control and Function  Outcome: Progressing  Intervention: Optimize Psychosocial Wellbeing  Recent Flowsheet Documentation  Taken 1/6/2025 0028 by Alexsander Casiano RN  Supportive Measures:   self-responsibility promoted   self-reflection promoted   self-care encouraged   active listening utilized  Intervention: Prevent or Manage Pain  Recent Flowsheet Documentation  Taken 1/6/2025 0028 by Alexsander Casiano RN  Medication Review/Management: medications reviewed     Problem: Adult Inpatient Plan of Care  Goal: Plan of Care Review  Description: The Plan of Care Review/Shift note should be completed every shift.  The Outcome Evaluation is a brief statement about your assessment that the patient is improving, declining, or no change.  This information will be displayed automatically on your shift  note.  Outcome: Progressing  Flowsheets  (Taken 1/6/2025 0037)  Outcome Evaluation: pt Magnessium was replaced during the shift. pt is on blood sugar checks. pt is on 03 5L oxymask.  Plan of Care Reviewed With: patient  Overall Patient Progress: improving  Goal: Absence of Hospital-Acquired Illness or Injury  Intervention: Identify and Manage Fall Risk  Recent Flowsheet Documentation  Taken 1/6/2025 0028 by Alexsander Casiano RN  Safety Promotion/Fall Prevention:   assistive device/personal items within reach   activity supervised   clutter free environment maintained   increased rounding and observation   increase visualization of patient   nonskid shoes/slippers when out of bed   patient and family education   safety round/check completed  Intervention: Prevent Skin Injury  Recent Flowsheet Documentation  Taken 1/6/2025 0028 by Alexsander Casiano RN  Body Position: position changed independently  Intervention: Prevent and Manage VTE (Venous Thromboembolism) Risk  Recent Flowsheet Documentation  Taken 1/6/2025 0028 by Alexsander Casiano RN  VTE Prevention/Management: SCDs off (sequential compression devices)  Intervention: Prevent Infection  Recent Flowsheet Documentation  Taken 1/6/2025 0028 by Alexsander Casiano RN  Infection Prevention:   single patient room provided   rest/sleep promoted   hand hygiene promoted     Problem: Breathing Pattern Ineffective  Goal: Effective Breathing Pattern  Intervention: Promote Improved Breathing Pattern  Recent Flowsheet Documentation  Taken 1/6/2025 0028 by Alexsander Casiano RN  Supportive Measures:   self-responsibility promoted   self-reflection promoted   self-care encouraged   active listening utilized  Head of Bed (HOB) Positioning: HOB at 20-30 degrees     Problem: Skin Injury Risk Increased  Goal: Skin Health and Integrity  Intervention: Optimize Skin Protection  Recent Flowsheet Documentation  Taken 1/6/2025 0028 by Alexsander Casiano RN  Activity Management:    activity adjusted per tolerance   activity encouraged  Head of Bed (HOB) Positioning: HOB at 20-30 degrees     Problem: Comorbidity Management  Goal: Maintenance of Asthma Control  Intervention: Maintain Asthma Symptom Control  Recent Flowsheet Documentation  Taken 1/6/2025 0028 by Alexsander Casiano RN  Medication Review/Management: medications reviewed  Goal: Maintenance of Behavioral Health Symptom Control  Intervention: Maintain Behavioral Health Symptom Control  Recent Flowsheet Documentation  Taken 1/6/2025 0028 by Alexsander Casiano RN  Medication Review/Management: medications reviewed  Goal: Maintenance of COPD Symptom Control  Intervention: Maintain COPD Symptom Control  Recent Flowsheet Documentation  Taken 1/6/2025 0028 by Alexsander Casiano RN  Medication Review/Management: medications reviewed  Goal: Blood Glucose Levels Within Targeted Range  Intervention: Monitor and Manage Glycemia  Recent Flowsheet Documentation  Taken 1/6/2025 0028 by Alexsander Casiano RN  Medication Review/Management: medications reviewed  Goal: Maintenance of Heart Failure Symptom Control  Intervention: Maintain Heart Failure Management  Recent Flowsheet Documentation  Taken 1/6/2025 0028 by Alexsander Casiano RN  Medication Review/Management: medications reviewed  Goal: Blood Pressure in Desired Range  Intervention: Maintain Blood Pressure Management  Recent Flowsheet Documentation  Taken 1/6/2025 0028 by Alexsander Casiano RN  Medication Review/Management: medications reviewed  Goal: Maintenance of Osteoarthritis Symptom Control  Intervention: Maintain Osteoarthritis Symptom Control  Recent Flowsheet Documentation  Taken 1/6/2025 0028 by Alexsander Casiano RN  Activity Management:   activity adjusted per tolerance   activity encouraged  Medication Review/Management: medications reviewed  Goal: Bariatric Home Regimen Maintained  Intervention: Maintain and Manage Postbariatric  Surgery Care  Recent Flowsheet Documentation  Taken 1/6/2025 0028 by Alexsander Casiano RN  Medication Review/Management: medications reviewed  Goal: Maintenance of Seizure Control  Intervention: Maintain Seizure Symptom Control  Recent Flowsheet Documentation  Taken 1/6/2025 0028 by Alexsander Casiano RN  Medication Review/Management: medications reviewed     Problem: Fall Injury Risk  Goal: Absence of Fall and Fall-Related Injury  Intervention: Identify and Manage Contributors  Recent Flowsheet Documentation  Taken 1/6/2025 0028 by Alexsander Casiano RN  Medication Review/Management: medications reviewed  Intervention: Promote Injury-Free Environment  Recent Flowsheet Documentation  Taken 1/6/2025 0028 by Alexsander Casiano RN  Safety Promotion/Fall Prevention:   assistive device/personal items within reach   activity supervised   clutter free environment maintained   increased rounding and observation   increase visualization of patient   nonskid shoes/slippers when out of bed   patient and family education   safety round/check completed     Problem: Pain Acute  Goal: Optimal Pain Control and Function  Intervention: Optimize Psychosocial Wellbeing  Recent Flowsheet Documentation  Taken 1/6/2025 0028 by Alexsander Casiano RN  Supportive Measures:   self-responsibility promoted   self-reflection promoted   self-care encouraged   active listening utilized  Intervention: Prevent or Manage Pain  Recent Flowsheet Documentation  Taken 1/6/2025 0028 by Alexsander Casiano RN  Medication Review/Management: medications reviewed

## 2025-01-06 NOTE — PLAN OF CARE
Goal Outcome Evaluation:      Plan of Care Reviewed With: patient    Overall Patient Progress: improvingOverall Patient Progress: improving    Outcome Evaluation: Continue to follow for discharge planning, follow up with PT recommendations.

## 2025-01-07 ENCOUNTER — MEDICAL CORRESPONDENCE (OUTPATIENT)
Dept: HEALTH INFORMATION MANAGEMENT | Facility: CLINIC | Age: 70
End: 2025-01-07

## 2025-01-07 ENCOUNTER — TELEPHONE (OUTPATIENT)
Dept: INTERNAL MEDICINE | Facility: CLINIC | Age: 70
End: 2025-01-07
Payer: MEDICARE

## 2025-01-07 ENCOUNTER — APPOINTMENT (OUTPATIENT)
Dept: PHYSICAL THERAPY | Facility: CLINIC | Age: 70
DRG: 193 | End: 2025-01-07
Payer: MEDICARE

## 2025-01-07 DIAGNOSIS — Z53.9 DIAGNOSIS NOT YET DEFINED: Primary | ICD-10-CM

## 2025-01-07 LAB
BACTERIA UR CULT: NORMAL
GLUCOSE BLDC GLUCOMTR-MCNC: 126 MG/DL (ref 70–99)
GLUCOSE BLDC GLUCOMTR-MCNC: 158 MG/DL (ref 70–99)
GLUCOSE BLDC GLUCOMTR-MCNC: 161 MG/DL (ref 70–99)
GLUCOSE BLDC GLUCOMTR-MCNC: 220 MG/DL (ref 70–99)
GLUCOSE BLDC GLUCOMTR-MCNC: 228 MG/DL (ref 70–99)
GLUCOSE BLDC GLUCOMTR-MCNC: 89 MG/DL (ref 70–99)
MAGNESIUM SERPL-MCNC: 1.8 MG/DL (ref 1.7–2.3)
POTASSIUM SERPL-SCNC: 3.8 MMOL/L (ref 3.4–5.3)

## 2025-01-07 PROCEDURE — 250N000013 HC RX MED GY IP 250 OP 250 PS 637: Performed by: INTERNAL MEDICINE

## 2025-01-07 PROCEDURE — 250N000013 HC RX MED GY IP 250 OP 250 PS 637: Performed by: STUDENT IN AN ORGANIZED HEALTH CARE EDUCATION/TRAINING PROGRAM

## 2025-01-07 PROCEDURE — 97530 THERAPEUTIC ACTIVITIES: CPT | Mod: GP

## 2025-01-07 PROCEDURE — 97110 THERAPEUTIC EXERCISES: CPT | Mod: GP

## 2025-01-07 PROCEDURE — 120N000001 HC R&B MED SURG/OB

## 2025-01-07 PROCEDURE — 84132 ASSAY OF SERUM POTASSIUM: CPT | Performed by: STUDENT IN AN ORGANIZED HEALTH CARE EDUCATION/TRAINING PROGRAM

## 2025-01-07 PROCEDURE — 250N000012 HC RX MED GY IP 250 OP 636 PS 637: Performed by: INTERNAL MEDICINE

## 2025-01-07 PROCEDURE — G0180 MD CERTIFICATION HHA PATIENT: HCPCS

## 2025-01-07 PROCEDURE — 83735 ASSAY OF MAGNESIUM: CPT | Performed by: STUDENT IN AN ORGANIZED HEALTH CARE EDUCATION/TRAINING PROGRAM

## 2025-01-07 PROCEDURE — 99232 SBSQ HOSP IP/OBS MODERATE 35: CPT | Performed by: INTERNAL MEDICINE

## 2025-01-07 PROCEDURE — 250N000011 HC RX IP 250 OP 636: Performed by: INTERNAL MEDICINE

## 2025-01-07 PROCEDURE — 36415 COLL VENOUS BLD VENIPUNCTURE: CPT | Performed by: STUDENT IN AN ORGANIZED HEALTH CARE EDUCATION/TRAINING PROGRAM

## 2025-01-07 RX ORDER — MAGNESIUM OXIDE 400 MG/1
400 TABLET ORAL EVERY 4 HOURS
Status: COMPLETED | OUTPATIENT
Start: 2025-01-07 | End: 2025-01-07

## 2025-01-07 RX ORDER — PREDNISONE 20 MG/1
40 TABLET ORAL DAILY
Status: DISCONTINUED | OUTPATIENT
Start: 2025-01-07 | End: 2025-01-08 | Stop reason: HOSPADM

## 2025-01-07 RX ORDER — POTASSIUM CHLORIDE 1500 MG/1
20 TABLET, EXTENDED RELEASE ORAL ONCE
Status: COMPLETED | OUTPATIENT
Start: 2025-01-07 | End: 2025-01-07

## 2025-01-07 RX ADMIN — POTASSIUM CHLORIDE 20 MEQ: 1500 TABLET, EXTENDED RELEASE ORAL at 08:38

## 2025-01-07 RX ADMIN — OSELTAMIVIR PHOSPHATE 75 MG: 75 CAPSULE ORAL at 20:49

## 2025-01-07 RX ADMIN — OSELTAMIVIR PHOSPHATE 75 MG: 75 CAPSULE ORAL at 08:38

## 2025-01-07 RX ADMIN — ENOXAPARIN SODIUM 40 MG: 40 INJECTION SUBCUTANEOUS at 20:49

## 2025-01-07 RX ADMIN — PREDNISONE 40 MG: 20 TABLET ORAL at 11:52

## 2025-01-07 RX ADMIN — GABAPENTIN 900 MG: 300 CAPSULE ORAL at 08:38

## 2025-01-07 RX ADMIN — INSULIN ASPART 1 UNITS: 100 INJECTION, SOLUTION INTRAVENOUS; SUBCUTANEOUS at 13:39

## 2025-01-07 RX ADMIN — FERROUS GLUCONATE 324 MG: 324 TABLET ORAL at 08:39

## 2025-01-07 RX ADMIN — CARVEDILOL 12.5 MG: 12.5 TABLET, FILM COATED ORAL at 17:33

## 2025-01-07 RX ADMIN — ZOLPIDEM TARTRATE 5 MG: 5 TABLET ORAL at 23:07

## 2025-01-07 RX ADMIN — Medication 400 MG: at 11:53

## 2025-01-07 RX ADMIN — ENOXAPARIN SODIUM 40 MG: 40 INJECTION SUBCUTANEOUS at 08:37

## 2025-01-07 RX ADMIN — QUETIAPINE FUMARATE 100 MG: 50 TABLET ORAL at 20:49

## 2025-01-07 RX ADMIN — PANTOPRAZOLE SODIUM 40 MG: 40 TABLET, DELAYED RELEASE ORAL at 08:39

## 2025-01-07 RX ADMIN — CARVEDILOL 12.5 MG: 12.5 TABLET, FILM COATED ORAL at 08:38

## 2025-01-07 RX ADMIN — SERTRALINE 200 MG: 100 TABLET, FILM COATED ORAL at 08:39

## 2025-01-07 RX ADMIN — CLONAZEPAM 1 MG: 0.5 TABLET ORAL at 23:07

## 2025-01-07 RX ADMIN — Medication 400 MG: at 08:38

## 2025-01-07 RX ADMIN — GABAPENTIN 900 MG: 300 CAPSULE ORAL at 20:49

## 2025-01-07 RX ADMIN — ROSUVASTATIN 10 MG: 10 TABLET, FILM COATED ORAL at 08:38

## 2025-01-07 RX ADMIN — INSULIN ASPART 2 UNITS: 100 INJECTION, SOLUTION INTRAVENOUS; SUBCUTANEOUS at 17:08

## 2025-01-07 ASSESSMENT — ACTIVITIES OF DAILY LIVING (ADL)
ADLS_ACUITY_SCORE: 48

## 2025-01-07 NOTE — PROGRESS NOTES
[facility]    Medicine Progress Note - Hospitalist Service    Date of Admission:  1/5/2025    Assessment & Plan   Nicloe Reyes is a 69 year old female with a PMH significant for DM type II, Hypertension, Hyperlipidemia, CKD 3, obesity, CRISTÓBAL intolerant to CPAP using nocturnal 2 L oxygen, GERD, anxiety/depression who presents to ED with a complaint of cough and shortness of breath, found to have hypoxic respiratory failure due to influenza A and admitted on 1/5/2025.      Acute hypoxemic respiratory failure 2/2 influenza A  Obstructive sleep apnea on nocturnal oxygen  Mild respiratory acidosis and metabolic alkalosis:  Patient presented with cough, shortness of breath.  Found influenza A positive.  Sick contact, her daughter at home.  She also developed fever here, blood cultures done but it is likely due to influenza.  Stable overnight.  VBG showed slightly elevated pCO2 and also bicarb, with normal pH which is consistent with 2 disorders, respiratory acidosis and metabolic alkalosis.  -Currently on 3 L of oxygen NC   -Continue oseltamivir  -Continue DuoNebs prn  -Titrate and try to wean her off oxygen daytime; will continue PTA nocturnal 2 L/min.  -She is intolerant to CPAP and not using it  -She is currently jittery, congested, complaining of some nausea earlier.  She is also having expiratory wheezing.  Continue nebs, add p.o. steroid, continue supplemental oxygen and wean down as able.  As needed Zofran  Hypokalemia:  Likely due to poor oral intake as she has not been eating well.   -Replacing potassium and magnesium per protocol.  -Encourage oral intake     DM type II:  -PTA on glipizide and will be continued  -Started on sliding scale insulin     Mild leukopenia, microcytic anemia, thrombocytopenia:  This is likely due to influenza infection. Iron studies show normal iron levels but low ferritin.  -Daily CBC     Chronic medical  "conditions  Obesity  GERD  Depression  Anxiety  Hypertension          Diet: Combination Diet Regular Diet Adult; Moderate Consistent Carb (60 g CHO per Meal) Diet    DVT Prophylaxis: Enoxaparin (Lovenox) SQ  Suarez Catheter: Not present  Lines: None     Cardiac Monitoring: None  Code Status: Full Code      Clinically Significant Risk Factors        # Hypokalemia: Lowest K = 2.9 mmol/L in last 2 days, will replace as needed   # Hypochloremia: Lowest Cl = 95 mmol/L in last 2 days, will monitor as appropriate  # Hypocalcemia: Lowest Ca = 7.8 mg/dL in last 2 days, will monitor and replace as appropriate   # Hypomagnesemia: Lowest Mg = 1.4 mg/dL in last 2 days, will replace as needed     # Thrombocytopenia: Lowest platelets = 106 in last 2 days, will monitor for bleeding   # Hypertension: Noted on problem list           # DMII: A1C = 7.5 % (Ref range: <5.7 %) within past 6 months, PRESENT ON ADMISSION  # Severe Obesity: Estimated body mass index is 52.9 kg/m  as calculated from the following:    Height as of this encounter: 1.473 m (4' 10\").    Weight as of this encounter: 114.8 kg (253 lb 1.4 oz)., PRESENT ON ADMISSION            Social Drivers of Health    Tobacco Use: Medium Risk (12/18/2024)    Patient History     Smoking Tobacco Use: Former     Smokeless Tobacco Use: Never   Stress: Stress Concern Present (8/10/2020)    [employer] - Occupational Stress Questionnaire     Feeling of Stress : To some extent   Social Connections: Unknown (8/10/2020)    Social Connection and Isolation Panel [NHANES]     Frequency of Communication with Friends and Family: More than three times a week          Disposition Plan       Medically Ready for Discharge: Anticipated Tomorrow pending improvement of her symptoms.             Messi Ross MD  Hospitalist Service  [facility]  Securely message with [employer] (more info)  Text page via [employer] Paging/Directory "   ______________________________________________________________________    Interval History   Patient is seen and examined by me today and medical record reviewed.Overnight events noted and care discussed with nursing staff.  Patient is seen today.  She had congestion and nausea earlier.  No vomiting.  No fever.              Physical Exam   Vital Signs: Temp: 98.2  F (36.8  C) Temp src: Oral BP: 120/56 Pulse: 71   Resp: 20 SpO2: 97 % O2 Device: Nasal cannula Oxygen Delivery: 3 LPM  Weight: 253 lbs 1.41 oz    General Appearance: Patient awake & alert.  No apparent distress.   Respiratory: Lungs with mild expiratory wheezing. Work of breathing appears normal on 5L NC  Cardiovascular: Regular rate and rhythm.  No murmurs rubs or gallops.  There is no edema present.  GI: Benign.  Soft.  Non-tender.  Bowel sounds active.   Skin: No rashes or lesions exposed skin.  Neuro:  The patient is moving all extremities.  No obvious focal asymmetries.   Other: Patient is appropriate and oriented x3.     Medical Decision Making       45 MINUTES SPENT BY ME on the date of service doing chart review, history, exam, documentation & further activities per the note.      Data   ------------------------- PAST 24 HR DATA REVIEWED -----------------------------------------------    I have personally reviewed the following data over the past 24 hrs:    N/A  \   N/A   / N/A     N/A N/A N/A /  126 (H)   3.8 N/A N/A \       Imaging results reviewed over the past 24 hrs:   No results found for this or any previous visit (from the past 24 hours).

## 2025-01-07 NOTE — PLAN OF CARE
"Goal Outcome Evaluation:      Plan of Care Reviewed With: patient    Overall Patient Progress: improvingOverall Patient Progress: improving    Outcome Evaluation: .    Vitals VSS  Neuro A&O x4  Respiratory 93% on 3 LPM NC (2 LPM NC baseline) SOB w/activity, cough  Cardiac/Tele SR w/1st degree AVB  GI/ WDL  Skin Rash at left side of abdomen/groin area   LDAs PIV SL  Labs K+  Mag protocols . BG 62 right before dinner, apple juice given and dinner  Diet mod carb, carb count  Activity A1   Plan Continue to wean O2 as tolerable      Problem: Adult Inpatient Plan of Care  Goal: Plan of Care Review  Description: The Plan of Care Review/Shift note should be completed every shift.  The Outcome Evaluation is a brief statement about your assessment that the patient is improving, declining, or no change.  This information will be displayed automatically on your shift  note.  Outcome: Progressing  Flowsheets (Taken 1/6/2025 1932)  Outcome Evaluation: .  Plan of Care Reviewed With: patient  Overall Patient Progress: improving  Goal: Patient-Specific Goal (Individualized)  Description: You can add care plan individualizations to a care plan. Examples of Individualization might be:  \"Parent requests to be called daily at 9am for status\", \"I have a hard time hearing out of my right ear\", or \"Do not touch me to wake me up as it startles  me\".  Outcome: Progressing  Goal: Absence of Hospital-Acquired Illness or Injury  Outcome: Progressing  Intervention: Identify and Manage Fall Risk  Recent Flowsheet Documentation  Taken 1/6/2025 1645 by Kalie Flowers, RN  Safety Promotion/Fall Prevention:   activity supervised   assistive device/personal items within reach   clutter free environment maintained   safety round/check completed  Intervention: Prevent Skin Injury  Recent Flowsheet Documentation  Taken 1/6/2025 1645 by Kalie Flowers RN  Body Position: position changed independently  Goal: Optimal Comfort and Wellbeing  Outcome: " Progressing  Intervention: Monitor Pain and Promote Comfort  Recent Flowsheet Documentation  Taken 1/6/2025 1642 by Kalie Flowers, RN  Pain Management Interventions: rest  Goal: Readiness for Transition of Care  Outcome: Progressing

## 2025-01-07 NOTE — PLAN OF CARE
"Vss, no co pain/cp/sob, KHALIL at times but improved. , 158, 161. Tele SR 1AVB, now dc'd. K+/Mag both replaced with rechecks ordered for am. Alarms on for safety, up with SBA, up to chair this shift. Droplet isolation maintained, PT following, plan for home with [facility], daily weight obtained. Continue poc and monitoring.        Goal Outcome Evaluation:      Plan of Care Reviewed With: spouse    Overall Patient Progress: improvingOverall Patient Progress: improving    Outcome Evaluation: plan for d/c tomorrow, tele dc'd      Problem: Adult Inpatient Plan of Care  Goal: Plan of Care Review  Description: The Plan of Care Review/Shift note should be completed every shift.  The Outcome Evaluation is a brief statement about your assessment that the patient is improving, declining, or no change.  This information will be displayed automatically on your shift  note.  Outcome: Not Progressing  Flowsheets (Taken 1/7/2025 1500)  Outcome Evaluation: plan for d/c tomorrow, tele dc'd  Plan of Care Reviewed With: spouse  Overall Patient Progress: improving  Goal: Patient-Specific Goal (Individualized)  Description: You can add care plan individualizations to a care plan. Examples of Individualization might be:  \"Parent requests to be called daily at 9am for status\", \"I have a hard time hearing out of my right ear\", or \"Do not touch me to wake me up as it startles  me\".  Outcome: Not Progressing  Goal: Absence of Hospital-Acquired Illness or Injury  Outcome: Not Progressing  Intervention: Identify and Manage Fall Risk  Recent Flowsheet Documentation  Taken 1/7/2025 1405 by Amarilis Olivo, RN  Safety Promotion/Fall Prevention: safety round/check completed  Taken 1/7/2025 1337 by Amarilis Olivo, RN  Safety Promotion/Fall Prevention: safety round/check completed  Taken 1/7/2025 1204 by Amarilis Olivo, RN  Safety Promotion/Fall Prevention: safety round/check completed  Taken 1/7/2025 1151 by Amarilis Olivo, RN  Safety " Promotion/Fall Prevention: safety round/check completed  Taken 1/7/2025 1111 by Amarilis Olivo RN  Safety Promotion/Fall Prevention: safety round/check completed  Taken 1/7/2025 1032 by Amarilis Olivo RN  Safety Promotion/Fall Prevention: safety round/check completed  Taken 1/7/2025 0901 by Amarilis Olivo RN  Safety Promotion/Fall Prevention: safety round/check completed  Taken 1/7/2025 0845 by mAarilis Olivo RN  Safety Promotion/Fall Prevention:   activity supervised   assistive device/personal items within reach   treat underlying cause   treat reversible contributory factors   supervised activity   safety round/check completed   room organization consistent   room near nurse's station   clutter free environment maintained   increased rounding and observation   increase visualization of patient   lighting adjusted   mobility aid in reach   nonskid shoes/slippers when out of bed   patient and family education  Taken 1/7/2025 0724 by Amarilis Olivo RN  Safety Promotion/Fall Prevention: safety round/check completed  Intervention: Prevent Skin Injury  Recent Flowsheet Documentation  Taken 1/7/2025 0845 by Amarilis Olivo RN  Body Position: (side to side in bed for skin assessment)   position changed independently   weight shifting   other (see comments)  Intervention: Prevent and Manage VTE (Venous Thromboembolism) Risk  Recent Flowsheet Documentation  Taken 1/7/2025 0845 by Amarilis Olivo RN  VTE Prevention/Management: (see MAR) other (see comments)  Goal: Optimal Comfort and Wellbeing  Outcome: Not Progressing  Goal: Readiness for Transition of Care  Outcome: Not Progressing     Problem: Breathing Pattern Ineffective  Goal: Effective Breathing Pattern  Outcome: Not Progressing  Intervention: Promote Improved Breathing Pattern  Recent Flowsheet Documentation  Taken 1/7/2025 0845 by Amarilis Olivo RN  Breathing Techniques/Airway Clearance: deep/controlled cough encouraged  Airway/Ventilation  Management: oxygen therapy provided  Head of Bed (HOB) Positioning: HOB at 20-30 degrees     Problem: Fall Injury Risk  Goal: Absence of Fall and Fall-Related Injury  Outcome: Not Progressing  Intervention: Identify and Manage Contributors  Recent Flowsheet Documentation  Taken 1/7/2025 0845 by Amarilis Olivo RN  Medication Review/Management:   medications reviewed   high-risk medications identified  Intervention: Promote Injury-Free Environment  Recent Flowsheet Documentation  Taken 1/7/2025 1405 by Amarilis Olivo RN  Safety Promotion/Fall Prevention: safety round/check completed  Taken 1/7/2025 1337 by Amarilis Olivo RN  Safety Promotion/Fall Prevention: safety round/check completed  Taken 1/7/2025 1204 by Amarilis Olivo RN  Safety Promotion/Fall Prevention: safety round/check completed  Taken 1/7/2025 1151 by Amarilis lOivo RN  Safety Promotion/Fall Prevention: safety round/check completed  Taken 1/7/2025 1111 by Amarilis Olivo RN  Safety Promotion/Fall Prevention: safety round/check completed  Taken 1/7/2025 1032 by Amarilis Olivo RN  Safety Promotion/Fall Prevention: safety round/check completed  Taken 1/7/2025 0901 by Amarilis Olivo RN  Safety Promotion/Fall Prevention: safety round/check completed  Taken 1/7/2025 0845 by Amarilis Olivo RN  Safety Promotion/Fall Prevention:   activity supervised   assistive device/personal items within reach   treat underlying cause   treat reversible contributory factors   supervised activity   safety round/check completed   room organization consistent   room near nurse's station   clutter free environment maintained   increased rounding and observation   increase visualization of patient   lighting adjusted   mobility aid in reach   nonskid shoes/slippers when out of bed   patient and family education  Taken 1/7/2025 0724 by Amarilis Olivo, RN  Safety Promotion/Fall Prevention: safety round/check completed     Problem: Skin Injury Risk  Increased  Goal: Skin Health and Integrity  Outcome: Progressing  Intervention: Optimize Skin Protection  Recent Flowsheet Documentation  Taken 1/7/2025 0845 by Amarilis Olivo, RN  Pressure Reduction Techniques: frequent weight shift encouraged  Activity Management:   activity adjusted per tolerance   activity encouraged  Head of Bed (HOB) Positioning: HOB at 20-30 degrees     Problem: Pain Acute  Goal: Optimal Pain Control and Function  Outcome: Progressing  Intervention: Prevent or Manage Pain  Recent Flowsheet Documentation  Taken 1/7/2025 0845 by Amarilis Olivo, RN  Medication Review/Management:   medications reviewed   high-risk medications identified

## 2025-01-08 ENCOUNTER — APPOINTMENT (OUTPATIENT)
Dept: PHYSICAL THERAPY | Facility: CLINIC | Age: 70
DRG: 193 | End: 2025-01-08
Payer: MEDICARE

## 2025-01-08 VITALS
HEART RATE: 61 BPM | WEIGHT: 243.7 LBS | SYSTOLIC BLOOD PRESSURE: 107 MMHG | HEIGHT: 58 IN | DIASTOLIC BLOOD PRESSURE: 52 MMHG | OXYGEN SATURATION: 96 % | BODY MASS INDEX: 51.15 KG/M2 | RESPIRATION RATE: 18 BRPM | TEMPERATURE: 97.4 F

## 2025-01-08 LAB
CREAT SERPL-MCNC: 0.75 MG/DL (ref 0.51–0.95)
EGFRCR SERPLBLD CKD-EPI 2021: 86 ML/MIN/1.73M2
ERYTHROCYTE [DISTWIDTH] IN BLOOD BY AUTOMATED COUNT: 16.3 % (ref 10–15)
GLUCOSE BLDC GLUCOMTR-MCNC: 158 MG/DL (ref 70–99)
GLUCOSE BLDC GLUCOMTR-MCNC: 171 MG/DL (ref 70–99)
GLUCOSE BLDC GLUCOMTR-MCNC: 175 MG/DL (ref 70–99)
GLUCOSE SERPL-MCNC: 169 MG/DL (ref 70–99)
HCT VFR BLD AUTO: 38.2 % (ref 35–47)
HGB BLD-MCNC: 11.6 G/DL (ref 11.7–15.7)
MAGNESIUM SERPL-MCNC: 2 MG/DL (ref 1.7–2.3)
MCH RBC QN AUTO: 22.8 PG (ref 26.5–33)
MCHC RBC AUTO-ENTMCNC: 30.4 G/DL (ref 31.5–36.5)
MCV RBC AUTO: 75 FL (ref 78–100)
PLATELET # BLD AUTO: 118 10E3/UL (ref 150–450)
POTASSIUM SERPL-SCNC: 4.3 MMOL/L (ref 3.4–5.3)
RBC # BLD AUTO: 5.08 10E6/UL (ref 3.8–5.2)
WBC # BLD AUTO: 2.6 10E3/UL (ref 4–11)

## 2025-01-08 PROCEDURE — 82565 ASSAY OF CREATININE: CPT | Performed by: INTERNAL MEDICINE

## 2025-01-08 PROCEDURE — 85014 HEMATOCRIT: CPT | Performed by: INTERNAL MEDICINE

## 2025-01-08 PROCEDURE — 250N000013 HC RX MED GY IP 250 OP 250 PS 637: Performed by: INTERNAL MEDICINE

## 2025-01-08 PROCEDURE — 97530 THERAPEUTIC ACTIVITIES: CPT | Mod: GP | Performed by: PHYSICAL THERAPIST

## 2025-01-08 PROCEDURE — 84132 ASSAY OF SERUM POTASSIUM: CPT | Performed by: INTERNAL MEDICINE

## 2025-01-08 PROCEDURE — 250N000011 HC RX IP 250 OP 636: Performed by: INTERNAL MEDICINE

## 2025-01-08 PROCEDURE — 36415 COLL VENOUS BLD VENIPUNCTURE: CPT | Performed by: INTERNAL MEDICINE

## 2025-01-08 PROCEDURE — 83735 ASSAY OF MAGNESIUM: CPT | Performed by: INTERNAL MEDICINE

## 2025-01-08 PROCEDURE — 250N000012 HC RX MED GY IP 250 OP 636 PS 637: Performed by: INTERNAL MEDICINE

## 2025-01-08 PROCEDURE — 99239 HOSP IP/OBS DSCHRG MGMT >30: CPT | Performed by: INTERNAL MEDICINE

## 2025-01-08 PROCEDURE — 82947 ASSAY GLUCOSE BLOOD QUANT: CPT | Performed by: INTERNAL MEDICINE

## 2025-01-08 RX ORDER — MAGNESIUM OXIDE 400 MG/1
400 TABLET ORAL EVERY 4 HOURS
Status: DISCONTINUED | OUTPATIENT
Start: 2025-01-08 | End: 2025-01-08 | Stop reason: HOSPADM

## 2025-01-08 RX ORDER — OSELTAMIVIR PHOSPHATE 75 MG/1
75 CAPSULE ORAL 2 TIMES DAILY
Qty: 6 CAPSULE | Refills: 0 | Status: SHIPPED | OUTPATIENT
Start: 2025-01-08 | End: 2025-01-11

## 2025-01-08 RX ORDER — PREDNISONE 20 MG/1
40 TABLET ORAL DAILY
Qty: 8 TABLET | Refills: 0 | Status: SHIPPED | OUTPATIENT
Start: 2025-01-08 | End: 2025-01-12

## 2025-01-08 RX ADMIN — ENOXAPARIN SODIUM 40 MG: 40 INJECTION SUBCUTANEOUS at 09:56

## 2025-01-08 RX ADMIN — SERTRALINE 200 MG: 100 TABLET, FILM COATED ORAL at 11:24

## 2025-01-08 RX ADMIN — GABAPENTIN 900 MG: 300 CAPSULE ORAL at 11:23

## 2025-01-08 RX ADMIN — OSELTAMIVIR PHOSPHATE 75 MG: 75 CAPSULE ORAL at 11:24

## 2025-01-08 RX ADMIN — Medication 400 MG: at 14:08

## 2025-01-08 RX ADMIN — INSULIN ASPART 1 UNITS: 100 INJECTION, SOLUTION INTRAVENOUS; SUBCUTANEOUS at 09:56

## 2025-01-08 RX ADMIN — PANTOPRAZOLE SODIUM 40 MG: 40 TABLET, DELAYED RELEASE ORAL at 11:24

## 2025-01-08 RX ADMIN — CARVEDILOL 12.5 MG: 12.5 TABLET, FILM COATED ORAL at 11:24

## 2025-01-08 RX ADMIN — ROSUVASTATIN 10 MG: 10 TABLET, FILM COATED ORAL at 11:24

## 2025-01-08 RX ADMIN — INSULIN ASPART 1 UNITS: 100 INJECTION, SOLUTION INTRAVENOUS; SUBCUTANEOUS at 14:08

## 2025-01-08 RX ADMIN — PREDNISONE 40 MG: 20 TABLET ORAL at 11:24

## 2025-01-08 ASSESSMENT — ACTIVITIES OF DAILY LIVING (ADL)
ADLS_ACUITY_SCORE: 48

## 2025-01-08 NOTE — PLAN OF CARE
Physical Therapy Discharge Summary    Reason for therapy discharge:    Discharged to home with home therapy.    Progress towards therapy goal(s). See goals on Care Plan in [facility] electronic health record for goal details.  Goals met    Therapy recommendation(s):    Continued therapy is recommended.  Rationale/Recommendations: Pt is below baseline for functional mobility, ROM and strength. Pt would benefit from continued skilled therapy to address these deficits.

## 2025-01-08 NOTE — PLAN OF CARE
DISCHARGE                         1/8/2025  4:07 PM  ----------------------------------------------------------------------------  Discharged to: Home  Via: private transportation  Accompanied by: Family, Daughter.  Discharge Instructions: diet, activity, medications, follow up appointments, when to call the MD, aftercare instructions.  Prescriptions: Filled by [facility] discharge pharmacy and given to patient; medication list reviewed & sent with pt  Follow Up Appointments: information given  Belongings: All sent with pt  IV: d/c'd  Telemetry: d/c'd  Pt exhibits understanding of above discharge instructions; all questions answered.    Discharge Paperwork: Sent home with patient.

## 2025-01-08 NOTE — PROGRESS NOTES
Patient was seen and examined by me at bedside today.  Shayy is gradually improving but she feels weak this morning.  She otherwise hemodynamically stable on 2 L of oxygen.  She can be discharged later today after seen by physical therapy team.  Home care arrangements already ordered.  I also spoke with patient's daughter Joanne who is expecting a call to discharge home later today.

## 2025-01-08 NOTE — PLAN OF CARE
"A & O x 4, VSS remains on 3 LPM via NC with saturations in the 90. PIV SL. K+ and Mag rechecks in for this AM , 171 corrected per sliding scale order. SBA with transfers, able to make needs known. Plan to discharge home with home care today.      Plan of Care Reviewed With: patient    Overall Patient Progress: improvingOverall Patient Progress: improving      Problem: Adult Inpatient Plan of Care  Goal: Plan of Care Review  Description: The Plan of Care Review/Shift note should be completed every shift.  The Outcome Evaluation is a brief statement about your assessment that the patient is improving, declining, or no change.  This information will be displayed automatically on your shift  note.  Outcome: Progressing  Flowsheets (Taken 1/8/2025 0614)  Plan of Care Reviewed With: patient  Overall Patient Progress: improving  Goal: Patient-Specific Goal (Individualized)  Description: You can add care plan individualizations to a care plan. Examples of Individualization might be:  \"Parent requests to be called daily at 9am for status\", \"I have a hard time hearing out of my right ear\", or \"Do not touch me to wake me up as it startles  me\".  Outcome: Progressing  Goal: Absence of Hospital-Acquired Illness or Injury  Outcome: Progressing  Intervention: Identify and Manage Fall Risk  Recent Flowsheet Documentation  Taken 1/7/2025 2050 by Brenna Jones RN  Safety Promotion/Fall Prevention: activity supervised  Intervention: Prevent Skin Injury  Recent Flowsheet Documentation  Taken 1/7/2025 2050 by Brenna Jones RN  Body Position: position changed independently  Intervention: Prevent and Manage VTE (Venous Thromboembolism) Risk  Recent Flowsheet Documentation  Taken 1/7/2025 2050 by Brenna Jones RN  VTE Prevention/Management: (SQ Lovonox) other (see comments)  Intervention: Prevent Infection  Recent Flowsheet Documentation  Taken 1/7/2025 2050 by Brenna Jones RN  Infection Prevention:   single patient room " provided   rest/sleep promoted   hand hygiene promoted  Goal: Optimal Comfort and Wellbeing  Outcome: Progressing  Goal: Readiness for Transition of Care  Outcome: Progressing

## 2025-01-08 NOTE — DISCHARGE INSTRUCTIONS
Your home care referral was sent to [facility]- resumption of RN PT [facility]  If you haven't heard from them within the next 24-48 hours,  Please call them at [phone]

## 2025-01-08 NOTE — DISCHARGE SUMMARY
Physician Discharge Summary           [facility]  Hospitalist Discharge Summary-[facility]    Name: Nicole Reyes    MRN: [id number]     YOB: 1955    Age: 69 year old                                                     Primary care provider: Eli Andrea    Admit date:  2025    Discharge date and time: 2025    Discharge Physician: Messi Ross M.D., M.B.A.       Primary Discharge Diagnosis      Acute hypoxemic respiratory failure 2/2 influenza A  Obstructive sleep apnea on nocturnal oxygen  Mild respiratory acidosis and metabolic alkalosis  Hypokalemia     Secondary Diagnosis /chronic medical conditions     Past Medical History:   Diagnosis Date    Arthritis     lt shoulder, rt. knee    Blood transfusion     CKD (chronic kidney disease) stage 3, GFR 30-59 ml/min (H)     Depressive disorder     Essential hypertension, benign     Gastroesophageal reflux disease     Generalized anxiety disorder     Hernia, abdominal     Hiatal hernia     History of blood transfusion     with a hernia repair    Hypertension     Insomnia, unspecified     Iron deficiency anemia 2009    Major depression     sees a psychiatrist    Menopause     age 53    Obesity, unspecified     CRISTÓBAL (obstructive sleep apnea)     bipap    Other chronic pain     chronic pain lt arm from tendonidits    Oxygen dependent     2 LPM at home-uses at noc or extended walking    Pneumonia     due to aspiration from hiatal hernia-    Pneumonia due to 2019 novel coronavirus 2021    Postoperative seroma 10/2011    drained several times    Pure hypercholesterolemia     RLS (restless legs syndrome)     Type II or unspecified type diabetes mellitus without mention of complication, not stated as uncontrolled      Past Surgical History:  Past Surgical History:   Procedure Laterality Date    BREAST BIOPSY, RT/LT      2 times; benign    BREAST SURGERY  ?    Biopsy x 2 Benign     SECTION        SECTION       SECTION      COLONOSCOPY N/A 2020    Procedure: Colonoscopy with biopsy;  Surgeon: Obinna Gutierrez MD;  Location: [facility] OR    DILATION AND CURETTAGE, HYSTEROSCOPY DIAGNOSTIC, COMBINED  2013    Procedure: COMBINED DILATION AND CURETTAGE, HYSTEROSCOPY DIAGNOSTIC;  DILATION AND CURETTAGE, HYSTEROSCOPY DIAGNOSTIC   Converted to a general;  Surgeon: Robi Edwards MD;  Location: [facility] OR    ESOPHAGOSCOPY, GASTROSCOPY, DUODENOSCOPY (EGD), COMBINED N/A 2015    Procedure: COMBINED ESOPHAGOSCOPY, GASTROSCOPY, DUODENOSCOPY (EGD);  Surgeon: Obinna Gutierrez MD;  Location: [facility] GI    ESOPHAGOSCOPY, GASTROSCOPY, DUODENOSCOPY (EGD), COMBINED N/A 2015    Procedure: COMBINED ENDOSCOPIC ULTRASOUND, ESOPHAGOSCOPY, GASTROSCOPY, DUODENOSCOPY (EGD), FINE NEEDLE ASPIRATE/BIOPSY;  Surgeon: Sabine Olivares MD;  Location:  GI    ESOPHAGOSCOPY, GASTROSCOPY, DUODENOSCOPY (EGD), COMBINED N/A 2020    Procedure: ESOPHAGOGASTRODUODENOSCOPY;  Surgeon: Ascencion Stauffer MD;  Location: [facility] OR    HERNIORRHAPHY INCISIONAL (LOCATION)  10/08/2011     incarcerated hernia repair with mesh;    HERNIORRHAPHY INCISIONAL (LOCATION)  10/16/2011     repair incisional hernia with mesh, and removal of current implanted mesh;    ZZC NONSPECIFIC PROCEDURE      Hernia repair     ZZC NONSPECIFIC PROCEDURE      Lymph node biopsy in neck (benign)            Brief Summary of Hospital stay :       Please refer to  Admission H&P note  and subsequent progress notes in EMR for full details of patient care.    Reason for Presentation to the Hospital(c/c , working diagnosis ): cough and shortness of breath       Brief Summary of Problem list (medical problems addressed during hospital stay) Significant findings(Primary diagnosis )Treatments provided(Hospital course ,consults, procedures)      Nicole Reyes is a 69 year old female with a PMH significant for DM type II, Hypertension, Hyperlipidemia, CKD  "3, obesity, CRISTÓBAL intolerant to CPAP using nocturnal 2 L oxygen, GERD, anxiety/depression who presents to ED with a complaint of cough and shortness of breath, found to have hypoxic respiratory failure due to influenza A and admitted on 1/5/2025.      Acute hypoxemic respiratory failure 2/2 influenza A  Obstructive sleep apnea on nocturnal oxygen  Mild respiratory acidosis and metabolic alkalosis:  Patient presented with cough, shortness of breath.  Found influenza A positive.  Sick contact, her daughter at home.  She also developed fever here, blood cultures done but it is likely due to influenza.  Stable overnight.  VBG showed slightly elevated pCO2 and also bicarb, with normal pH which is consistent with 2 disorders, respiratory acidosis and metabolic alkalosis.  Likely due to poor oral intake as she has not been eating well.   -Replacing potassium and magnesium per protocol.  -Encourage oral intake     DM type II:  -PTA on glipizide and will be continued  -Started on sliding scale insulin     Mild leukopenia, microcytic anemia, thrombocytopenia:  This is likely due to influenza infection. Iron studies show normal iron levels but low ferritin.  -Daily CBC     Chronic medical conditions  Obesity  GERD  Depression  Anxiety  Hypertension      Consultations during hospital stay:       PHYSICAL THERAPY ADULT IP CONSULT  CARE MANAGEMENT / SOCIAL WORK IP CONSULT    Patient discharge Condition:     stable    /52   Pulse 61   Temp 97.4  F (36.3  C) (Oral)   Resp 18   Ht 1.473 m (4' 10\")   Wt 110.5 kg (243 lb 11.2 oz)   LMP 09/15/2007   SpO2 96%   BMI 50.93 kg/m           Discharge Instructions:       Patient/family instructions: Written discharge instruction given to patient/family    Discharge Medications:       Review of your medicines        START taking        Dose / Directions   oseltamivir 75 MG capsule  Commonly known as: TAMIFLU  Indication: Flu      Dose: 75 mg  Take 1 capsule (75 mg) by mouth 2 times " daily for 3 days.  Quantity: 6 capsule  Refills: 0     predniSONE 20 MG tablet  Commonly known as: DELTASONE      Dose: 40 mg  Take 2 tablets (40 mg) by mouth daily for 4 days.  Quantity: 8 tablet  Refills: 0            CONTINUE these medicines which have NOT CHANGED        Dose / Directions   carvedilol 12.5 MG tablet  Commonly known as: COREG  Used for: Heart failure with mildly reduced ejection fraction (HFmrEF) (H)      Dose: 12.5 mg  Take 1 tablet (12.5 mg) by mouth 2 times daily (with meals)  Quantity: 180 tablet  Refills: 3     clonazePAM 1 MG tablet  Commonly known as: klonoPIN  Indication: Feeling Anxious  Used for: Insomnia, unspecified type, Generalized anxiety disorder      Dose: 1 mg  Take 1 tablet (1 mg) by mouth 2 times daily as needed for anxiety. 60 to last 30 days  Quantity: 60 tablet  Refills: 0     furosemide 20 MG tablet  Commonly known as: LASIX  Used for: Localized edema      TAKE 1 TABLET (20 MG) BY MOUTH EVERY DAY AS NEEDED FOR EDEMA  Quantity: 90 tablet  Refills: 3     gabapentin 300 MG capsule  Commonly known as: NEURONTIN  Used for: Chronic bilateral low back pain without sciatica      TAKE 3 CAPSULES EVERY AM, 2 CAPSULES MIDDAY AND 3 CAPS AT NIGHT  Quantity: 240 capsule  Refills: 0     glipiZIDE 10 MG 24 hr tablet  Commonly known as: GLUCOTROL XL  Used for: Type 2 diabetes mellitus without complication, without long-term current use of insulin (H)      Dose: 10 mg  Take 1 tablet (10 mg) by mouth daily  Quantity: 90 tablet  Refills: 3     Ibuprofen-Acetaminophen 125-250 MG Tabs  Indication: Pain      Dose: 1 tablet  Take 1 tablet by mouth 3 times daily as needed.  Refills: 0     ketoconazole 2 % external cream  Commonly known as: NIZORAL  Used for: Intertrigo      Apply to fold areas BID PRN  Quantity: 60 g  Refills: 5     KRILL & FISH OIL BLEND PO      Dose: 1 capsule  Take 1 capsule by mouth daily  Refills: 0     pantoprazole 40 MG EC tablet  Commonly known as: PROTONIX  Used for:  Gastroesophageal reflux disease without esophagitis      TAKE 1 TABLET BY MOUTH EVERYDAY AT BEDTIME  Quantity: 90 tablet  Refills: 2     potassium chloride ER 20 MEQ CR tablet  Commonly known as: K-TAB  Used for: Low serum potassium      TAKE 2 TABLETS BY MOUTH EVERY DAY  Quantity: 180 tablet  Refills: 1     QUEtiapine 100 MG tablet  Commonly known as: SEROquel      Dose: 100 mg  Take 100 mg by mouth every evening.  Refills: 0     rosuvastatin 10 MG tablet  Commonly known as: CRESTOR  Used for: Hypercholesteremia      Dose: 10 mg  TAKE 1 TABLET (10 MG) BY MOUTH DAILY.  Quantity: 90 tablet  Refills: 0     sertraline 100 MG tablet  Commonly known as: ZOLOFT  Used for: GENERALIZED ANXIETY DIS, Major depressive disorder, recurrent episode, moderate (H)      Take 2 tablets by mouth once daily  Quantity: 180 tablet  Refills: 3     zolpidem 10 MG tablet  Commonly known as: AMBIEN  Used for: Insomnia, unspecified type      Dose: 10 mg  Take 1 tablet (10 mg) by mouth at bedtime.  Quantity: 30 tablet  Refills: 0               Where to get your medicines        These medications were sent to [facility] [address] [address]      Phone: [phone]   oseltamivir 75 MG capsule  predniSONE 20 MG tablet          Discharge diet:Orders Placed This Encounter      Combination Diet Regular Diet Adult; Moderate Consistent Carb (60 g CHO per Meal) Diet      Diet          Discharge activity:Activity as tolerated      Discharge follow-up:    Follow up with primary care provider in 7 days or earlier if symptoms return or gets worse.        Other instructions:    We discussed with patient/family about detail discharge instructions as well as discharge medications above including potential risks,side effects and benefits.Patient/family understood benefits and potential serious side effects of taking these medications and need to follow up with PCP if the  "patient develops complications.  Patient is also advised to see a doctor immediately for severe symptoms.        Major procedure performed/  Significant Diagnostic Studies:       Results for orders placed or performed during the hospital encounter of 01/05/25   XR Chest 2 Views    Narrative    EXAM: XR CHEST 2 VIEWS  LOCATION: [facility]  DATE: 1/5/2025    INDICATION: cough, fever  COMPARISON: 1/2/2025      Impression    IMPRESSION: No acute cardiopulmonary abnormality. Moderate-sized hiatal hernia with unchanged left basilar atelectasis.     *Note: Due to a large number of results and/or encounters for the requested time period, some results have not been displayed. A complete set of results can be found in Results Review.       Recent Labs   Lab 01/08/25  0811 01/06/25  0737 01/05/25  1417   WBC 2.6* 4.1 3.6*   HGB 11.6* 11.5* 11.6*   HCT 38.2 38.9 38.7   MCV 75* 77* 75*   * 106* 111*     No results for input(s): \"CULT\" in the last 168 hours.  Recent Labs   Lab 01/08/25  1344 01/08/25  0953 01/08/25  0811 01/07/25  0833 01/07/25  0635 01/06/25  0843 01/06/25  0737 01/05/25  1900 01/05/25  1417 01/02/25  1530   NA  --   --   --   --   --   --  138  --  138 141   POTASSIUM  --   --  4.3  --  3.8  --  3.9   < > 2.9* 3.0*   CHLORIDE  --   --   --   --   --   --  100  --  95* 96*   CO2  --   --   --   --   --   --  24  --  27 31*   ANIONGAP  --   --   --   --   --   --  14  --  16* 14   * 158* 169*   < >  --    < > 115*   < > 140* 164*   BUN  --   --   --   --   --   --  15.0  --  10.5 12.5   CR  --   --  0.75  --   --   --  1.11*  --  1.01* 0.89   GFRESTIMATED  --   --  86  --   --   --  54*  --  60* 70   GRECIA  --   --   --   --   --   --  7.9*  --  7.8* 10.0   MAG  --   --  2.0  --  1.8  --  1.7   < >  --   --    PROTTOTAL  --   --   --   --   --   --   --   --  6.7  --    ALBUMIN  --   --   --   --   --   --   --   --  3.8  --    BILITOTAL  --   --   --   --   --   --   --   --  " "0.3  --    ALKPHOS  --   --   --   --   --   --   --   --  105  --    AST  --   --   --   --   --   --   --   --  49*  --    ALT  --   --   --   --   --   --   --   --  22  --     < > = values in this interval not displayed.       Recent Labs   Lab 01/08/25  1344 01/08/25  0953 01/08/25  0811 01/08/25  0223 01/07/25  2157   * 158* 169* 171* 228*       No results for input(s): \"INR\" in the last 168 hours.          Pending Results:       Unresulted Labs Ordered in the Past 30 Days of this Admission       Date and Time Order Name Status Description    1/5/2025  2:34 PM Blood Culture Arm, Right Preliminary     1/5/2025  2:34 PM Blood Culture Peripheral Blood Preliminary                Patient Allergies:       Allergies   Allergen Reactions    Metformin GI Disturbance     High dose    Morphine And Codeine Rash    Penicillins Rash     Pt has tolerated cephalosporins and penems.   Pt tolerated Zosyn 7/6/2019         Disposition:     Disposition: home      I saw and evaluated the patient on day of discharge and  discharge instructions reviewed  and  all the patient's questions and concerns addressed. Over 30 minutes spent on discharge and coordination of discharge process for this patient.      Disclaimer: This note consists of symbols derived from keyboarding, dictation and/or voice recognition software. As a result, there may be errors in the script that have gone undetected. Please consider this when interpreting information found in this chart      "

## 2025-01-08 NOTE — PROGRESS NOTES
Care Management Discharge Note    Discharge Date: 01/08/2025       Discharge Disposition: Home Care    Discharge Services:  HC PT HHA and RN    Discharge DME:      Discharge Transportation: family or friend will provide    Private pay costs discussed: Not applicable    Does the patient's insurance plan have a 3 day qualifying hospital stay waiver?  Yes     Which insurance plan 3 day waiver is available? ACO REACH    Will the waiver be used for post-acute placement? No    Education Provided on the Discharge Plan: yes   Persons Notified of Discharge Plans: aware- orders are in.  Notified HC agency.  Patient/Family in Agreement with the Plan: yes    Handoff Referral Completed: No, handoff not indicated or clinically appropriate    Additional Information:  Patient discharging later today via family transport. Home care orders for [facility]- RN, HHA and PT on the orders and faxed to HC agency for resumption of care. PT to see at 245.    Addendum 1359  PT recs home w assist or home with HC PT. Already set up.    Ayla Lu RN, BSN, CM  Inpatient Care Coordination  [facility]  [phone]

## 2025-01-09 ENCOUNTER — PATIENT OUTREACH (OUTPATIENT)
Dept: CARE COORDINATION | Facility: CLINIC | Age: 70
End: 2025-01-09
Payer: MEDICARE

## 2025-01-09 ENCOUNTER — MEDICAL CORRESPONDENCE (OUTPATIENT)
Dept: HEALTH INFORMATION MANAGEMENT | Facility: CLINIC | Age: 70
End: 2025-01-09

## 2025-01-09 LAB
BACTERIA BLD CULT: NORMAL
BACTERIA BLD CULT: NORMAL

## 2025-01-09 NOTE — PROGRESS NOTES
Clinic Care Coordination Contact  [facility]/Voicemail    Clinical Data: Care Coordinator Outreach    Outreach Documentation Number of Outreach Attempt   12/4/2024  11:17 AM 1   1/9/2025   2:02 PM 1     Left message on patient's voicemail with call back information and requested return call.    Plan: Care Coordinator will try to reach patient again in 1-2 business days.    Yanet Negron, RN, BSN, CPHN, [facility]  [facility], and [facility]  [email or web address]  Office: [phone]  Employed by [facility]

## 2025-01-10 LAB
BACTERIA BLD CULT: NO GROWTH
BACTERIA BLD CULT: NO GROWTH

## 2025-01-12 DIAGNOSIS — F32.9 MAJOR DEPRESSION: ICD-10-CM

## 2025-01-12 DIAGNOSIS — F41.1 GENERALIZED ANXIETY DISORDER: ICD-10-CM

## 2025-01-13 ENCOUNTER — TELEPHONE (OUTPATIENT)
Dept: INTERNAL MEDICINE | Facility: CLINIC | Age: 70
End: 2025-01-13
Payer: MEDICARE

## 2025-01-13 RX ORDER — ESCITALOPRAM OXALATE 20 MG/1
40 TABLET ORAL AT BEDTIME
Qty: 180 TABLET | Refills: 3 | OUTPATIENT
Start: 2025-01-13

## 2025-01-14 ENCOUNTER — MEDICAL CORRESPONDENCE (OUTPATIENT)
Dept: HEALTH INFORMATION MANAGEMENT | Facility: CLINIC | Age: 70
End: 2025-01-14

## 2025-01-15 ENCOUNTER — TELEPHONE (OUTPATIENT)
Dept: INTERNAL MEDICINE | Facility: CLINIC | Age: 70
End: 2025-01-15
Payer: MEDICARE

## 2025-01-15 ENCOUNTER — PATIENT OUTREACH (OUTPATIENT)
Dept: CARE COORDINATION | Facility: CLINIC | Age: 70
End: 2025-01-15
Payer: MEDICARE

## 2025-01-15 DIAGNOSIS — J10.1 INFLUENZA A: Primary | ICD-10-CM

## 2025-01-15 RX ORDER — GUAIFENESIN 600 MG/1
1200 TABLET, EXTENDED RELEASE ORAL 2 TIMES DAILY
Qty: 40 TABLET | Refills: 0 | Status: SHIPPED | OUTPATIENT
Start: 2025-01-15

## 2025-01-15 NOTE — TELEPHONE ENCOUNTER
Nannette RN with home care calling.  Patient recently hospitalized for influenza.  She continues with a cough productive of yellow phlegm, has stridor in right upper lobe. She has had no fever since discharge, vital signs stable with O2 sat 98%, no shortness of breath.    Nannette asking for Guaifenesin order or similar to thin secretions. DEYSI Magdaleno R.N.     [FreeTextEntry1] : Reviewed today:\par -EKG October 2020: Sinus rhythm, unremarkable\par -Labs October 2020: .  TG < 110.  LFT and creatinine\par -March 2021 CT calcium score 36: CTA coronaries showed luminal disease less than 10% in LAD.\par -ETT February 2021: Exercised 9 minutes Ino protocol.  No angina.  ST segment depression inferolaterally were noted.  Peak systolic blood pressure 200.\par -Echocardiogram February 2021: Normal LV function and wall motion.  Normal diastolic function.  Normal PASP.\par

## 2025-01-15 NOTE — PROGRESS NOTES
[facility] Medicare ACO Reach Population - Proactive Outreach    Background: Patient outreach conducted proactively to support health maintenance initiatives and identify any opportunities to integrated Care Coordination assistance in patient-centered goals.      Patient agreeable to scheduling Hospital/ED follow up? Yes, also agreed to schedule AWV.    If Yes:   Scheduling via phone: CC team member completed warm transfer to scheduling team       Patient accepts CC: No, declined.       Radha Recinos RN  [facility]

## 2025-01-16 ENCOUNTER — MYC REFILL (OUTPATIENT)
Dept: INTERNAL MEDICINE | Facility: CLINIC | Age: 70
End: 2025-01-16
Payer: MEDICARE

## 2025-01-16 DIAGNOSIS — G47.00 INSOMNIA, UNSPECIFIED TYPE: ICD-10-CM

## 2025-01-16 DIAGNOSIS — F41.1 GENERALIZED ANXIETY DISORDER: ICD-10-CM

## 2025-01-17 ENCOUNTER — TELEPHONE (OUTPATIENT)
Dept: INTERNAL MEDICINE | Facility: CLINIC | Age: 70
End: 2025-01-17
Payer: MEDICARE

## 2025-01-17 NOTE — TELEPHONE ENCOUNTER
Bambi PT from [facility] calling for verbal order.   [phone]- secure if call back by today     She will be gone next week so call [phone] ask for Krissy or Radha         Home Care is calling regarding an established patient with [facility].       Requesting orders from: Eli Andrea  Provider is following patient: Yes  Is this a 60-day recertification request?  No    Orders Requested    Physical Therapy  Request for initial certification (first set of orders)   Frequency:   Pt once a week for 3 weeks starting next week.       Information was gathered and will be sent to provider for review.  RN will contact Home Care with information after provider review.  Confirmed ok to leave a detailed message with call back.  Contact information confirmed and updated as needed.    Denia Herrera, RN

## 2025-01-17 NOTE — TELEPHONE ENCOUNTER
Forms/Letter Request    Type of form/letter: Medication Update 1/14/2025      Do we have the form/letter: Yes: placed in provider mailbox for signature    Who is the form from? [facility] # [id number] & [id number]    Where did/will the form come from? form was faxed in    When is form/letter needed by: 5-7    How would you like the form/letter returned: Fax : [phone]    Patient Notified form requests are processed in 5-7 business days:Yes    Could we send this information to you in [employer] or would you prefer to receive a phone call?:   NA

## 2025-01-18 RX ORDER — ZOLPIDEM TARTRATE 10 MG/1
10 TABLET ORAL AT BEDTIME
Qty: 30 TABLET | Refills: 0 | Status: SHIPPED | OUTPATIENT
Start: 2025-01-18

## 2025-01-18 RX ORDER — CLONAZEPAM 1 MG/1
1 TABLET ORAL 2 TIMES DAILY PRN
Qty: 60 TABLET | Refills: 0 | Status: SHIPPED | OUTPATIENT
Start: 2025-01-18

## 2025-01-21 ENCOUNTER — MEDICAL CORRESPONDENCE (OUTPATIENT)
Dept: HEALTH INFORMATION MANAGEMENT | Facility: CLINIC | Age: 70
End: 2025-01-21

## 2025-01-23 NOTE — TELEPHONE ENCOUNTER
Rayne from [facility] calls to follow up on this. Asking why it is taking so long to receive an answer because [facility] is having patient's family become angry that physical therapy hasn't started yet. Home care unable to start physical therapy without provider approval.    Home care needs orders today.     Call back Rayne from [facility] with verbal orders - [phone] extension [phone] (secure voicemail)    Printing out encounter for Iwona to review.    Thank you,  [facility], Triage СЕРГЕЙ Weinstein    12:20 PM 1/23/2025

## 2025-01-23 NOTE — TELEPHONE ENCOUNTER
Huddled with Iwona Andrea who states she is okay with verbal orders to start physical therapy.    Called and spoke with Rayne from [facility] to relay provider okay to verbal orders. No further action needed.    Thank you,  [facility], Triage СЕРГЕЙ Weinstein    12:23 PM 1/23/2025

## 2025-01-24 DIAGNOSIS — F33.1 MAJOR DEPRESSIVE DISORDER, RECURRENT EPISODE, MODERATE (H): ICD-10-CM

## 2025-01-24 DIAGNOSIS — F41.1 GENERALIZED ANXIETY DISORDER: ICD-10-CM

## 2025-01-24 DIAGNOSIS — E11.9 TYPE 2 DIABETES MELLITUS WITHOUT COMPLICATION, WITHOUT LONG-TERM CURRENT USE OF INSULIN (H): ICD-10-CM

## 2025-01-24 RX ORDER — GLIPIZIDE 10 MG/1
10 TABLET, FILM COATED, EXTENDED RELEASE ORAL DAILY
Qty: 90 TABLET | Refills: 1 | Status: SHIPPED | OUTPATIENT
Start: 2025-01-24

## 2025-01-25 RX ORDER — SERTRALINE HYDROCHLORIDE 100 MG/1
TABLET, FILM COATED ORAL
Qty: 180 TABLET | Refills: 3 | Status: SHIPPED | OUTPATIENT
Start: 2025-01-25

## 2025-01-27 ENCOUNTER — TELEPHONE (OUTPATIENT)
Dept: INTERNAL MEDICINE | Facility: CLINIC | Age: 70
End: 2025-01-27

## 2025-01-27 ENCOUNTER — MEDICAL CORRESPONDENCE (OUTPATIENT)
Dept: HEALTH INFORMATION MANAGEMENT | Facility: CLINIC | Age: 70
End: 2025-01-27

## 2025-01-27 ENCOUNTER — OFFICE VISIT (OUTPATIENT)
Dept: INTERNAL MEDICINE | Facility: CLINIC | Age: 70
End: 2025-01-27
Payer: MEDICARE

## 2025-01-27 VITALS
SYSTOLIC BLOOD PRESSURE: 130 MMHG | WEIGHT: 243 LBS | TEMPERATURE: 96.7 F | DIASTOLIC BLOOD PRESSURE: 76 MMHG | HEIGHT: 58 IN | HEART RATE: 98 BPM | RESPIRATION RATE: 18 BRPM | OXYGEN SATURATION: 92 % | BODY MASS INDEX: 51.01 KG/M2

## 2025-01-27 DIAGNOSIS — R05.1 ACUTE COUGH: ICD-10-CM

## 2025-01-27 DIAGNOSIS — J96.21 ACUTE ON CHRONIC RESPIRATORY FAILURE WITH HYPOXEMIA (H): ICD-10-CM

## 2025-01-27 DIAGNOSIS — Z09 HOSPITAL DISCHARGE FOLLOW-UP: Primary | ICD-10-CM

## 2025-01-27 PROCEDURE — 99495 TRANSJ CARE MGMT MOD F2F 14D: CPT

## 2025-01-27 RX ORDER — BENZONATATE 200 MG/1
200 CAPSULE ORAL 3 TIMES DAILY PRN
Qty: 42 CAPSULE | Refills: 0 | Status: SHIPPED | OUTPATIENT
Start: 2025-01-27

## 2025-01-27 ASSESSMENT — PATIENT HEALTH QUESTIONNAIRE - PHQ9
10. IF YOU CHECKED OFF ANY PROBLEMS, HOW DIFFICULT HAVE THESE PROBLEMS MADE IT FOR YOU TO DO YOUR WORK, TAKE CARE OF THINGS AT HOME, OR GET ALONG WITH OTHER PEOPLE: SOMEWHAT DIFFICULT
SUM OF ALL RESPONSES TO PHQ QUESTIONS 1-9: 2
SUM OF ALL RESPONSES TO PHQ QUESTIONS 1-9: 2

## 2025-01-27 NOTE — PROGRESS NOTES
"  Assessment & Plan     Hospital discharge follow-up  Acute on chronic respiratory failure with hypoxemia (H)  Patient presented to ED with complaints of cough, and shortness of breath and found to have hypoxic respiratory failure due to influenza A.  Patient was given Tamiflu and prednisone.    Acute cough  Pt states that she mostly just has a cough that keeps her up at night. DB and exertion and lying supine triggers the cough. Pt reports cough is productive of clear secretions.  - benzonatate (TESSALON) 200 MG capsule; Take 1 capsule (200 mg) by mouth 3 times daily as needed for cough.        MED REC REQUIRED  Post Medication Reconciliation Status:     BMI  Estimated body mass index is 50.79 kg/m  as calculated from the following:    Height as of this encounter: 1.473 m (4' 10\").    Weight as of this encounter: 110.2 kg (243 lb).             Hi Lucas is a 69 year old, presenting for the following health issues: This is a follow-up for an ED to hospital discharge from 1/5/2025-1/8/2025.  Patient presented to ED with complaints of cough, and shortness of breath and found to have hypoxic respiratory failure due to influenza A.  Patient was given Tamiflu and prednisone.    Pt reports that she feels significantly better. Pt states that she mostly just has a cough that keeps her up at night. DB and exertion and lying supine triggers the cough. Pt reports cough is productive of clear secretions. No nasal congestion, no sore throat. Pt reports that she is afebrile. Pt wears oxygen 2LNC at night. Pt reports that she will go next month for a sleep study. Pt states that she likes the small mask that covers her nose but she is a mouth breather and is claustrophobic. Pt reports having a little dysuria intermittently due to inadequate fluid intake. Encouraged pt to mindfully increase fluid intake.  No chief complaint on file.    HPI               Review of Systems  Constitutional, HEENT, cardiovascular, pulmonary, " gi and gu systems are negative, except as otherwise noted.      Objective    LMP 09/15/2007   There is no height or weight on file to calculate BMI.  Physical Exam   GENERAL: alert and no distress  EYES: Eyes grossly normal to inspection, PERRL and conjunctivae and sclerae normal  HENT: ear canals and TM's normal, nose and mouth without ulcers or lesions  NECK: no adenopathy, no asymmetry, masses, or scars  RESP: lungs clear to auscultation - no rales, rhonchi or wheezes and decreased breath sounds throughout  CV: regular rate and rhythm, normal S1 S2, no S3 or S4, no murmur, click or rub, no peripheral edema  ABDOMEN: soft, nontender, no hepatosplenomegaly, no masses and bowel sounds normal  MS: no gross musculoskeletal defects noted, no edema  SKIN: no suspicious lesions or rashes  NEURO: Normal strength and tone, mentation intact and speech normal  PSYCH: mentation appears normal, affect normal/bright            Signed Electronically by: LEONCIO Livingston CNP    Answers submitted by the patient for this visit:  Patient Health Questionnaire (Submitted on 1/27/2025)  If you checked off any problems, how difficult have these problems made it for you to do your work, take care of things at home, or get along with other people?: Somewhat difficult  PHQ9 TOTAL SCORE: 2

## 2025-01-27 NOTE — PATIENT INSTRUCTIONS
Increase your fluid intake and let me know if the burning on urination persists.    Take Tessalon perles 3 times a day as needed for cough.

## 2025-02-03 ENCOUNTER — TELEPHONE (OUTPATIENT)
Dept: INTERNAL MEDICINE | Facility: CLINIC | Age: 70
End: 2025-02-03

## 2025-02-03 NOTE — TELEPHONE ENCOUNTER
Nannette RUSHING with [facility] ([phone]) calling for the following orders:    HHA once a week x9 weeks  SN visits once a week x1 wk, once every 2 weeks x6 wks, once a week x1 week.  DEYSI Magdaleno R.N.

## 2025-02-04 ENCOUNTER — TELEPHONE (OUTPATIENT)
Dept: INTERNAL MEDICINE | Facility: CLINIC | Age: 70
End: 2025-02-04
Payer: MEDICARE

## 2025-02-04 DIAGNOSIS — R53.81 PHYSICAL DECONDITIONING: Primary | ICD-10-CM

## 2025-02-04 NOTE — TELEPHONE ENCOUNTER
Deneen, from PT, [facility] calls for orders.     PT orders.   1 x a week for 2 weeks.   Then every other week for 6 weeks     Phone [phone]    OK to leave detailed message.

## 2025-02-05 ENCOUNTER — MEDICAL CORRESPONDENCE (OUTPATIENT)
Dept: HEALTH INFORMATION MANAGEMENT | Facility: CLINIC | Age: 70
End: 2025-02-05
Payer: MEDICARE

## 2025-02-10 ENCOUNTER — HOSPITAL ENCOUNTER (INPATIENT)
Facility: CLINIC | Age: 70
DRG: 177 | End: 2025-02-10
Attending: EMERGENCY MEDICINE | Admitting: INTERNAL MEDICINE
Payer: MEDICARE

## 2025-02-10 ENCOUNTER — APPOINTMENT (OUTPATIENT)
Dept: CT IMAGING | Facility: CLINIC | Age: 70
DRG: 177 | End: 2025-02-10
Attending: EMERGENCY MEDICINE
Payer: MEDICARE

## 2025-02-10 DIAGNOSIS — J69.0 ASPIRATION PNEUMONIA (H): Primary | ICD-10-CM

## 2025-02-10 DIAGNOSIS — J18.9 PNEUMONIA OF BOTH LUNGS DUE TO INFECTIOUS ORGANISM, UNSPECIFIED PART OF LUNG: ICD-10-CM

## 2025-02-10 DIAGNOSIS — R06.02 SOB (SHORTNESS OF BREATH): ICD-10-CM

## 2025-02-10 DIAGNOSIS — I48.91 ATRIAL FIBRILLATION WITH RVR (H): ICD-10-CM

## 2025-02-10 LAB
ALBUMIN UR-MCNC: 10 MG/DL
ANION GAP SERPL CALCULATED.3IONS-SCNC: 12 MMOL/L (ref 7–15)
APPEARANCE UR: CLEAR
ATRIAL RATE - MUSE: NORMAL BPM
BASE EXCESS BLDV CALC-SCNC: 6.9 MMOL/L (ref -3–3)
BASOPHILS # BLD AUTO: 0 10E3/UL (ref 0–0.2)
BASOPHILS NFR BLD AUTO: 0 %
BILIRUB UR QL STRIP: NEGATIVE
BUN SERPL-MCNC: 10.5 MG/DL (ref 8–23)
CALCIUM SERPL-MCNC: 8.6 MG/DL (ref 8.8–10.4)
CHLORIDE SERPL-SCNC: 100 MMOL/L (ref 98–107)
COLOR UR AUTO: ABNORMAL
CREAT SERPL-MCNC: 0.84 MG/DL (ref 0.51–0.95)
DIASTOLIC BLOOD PRESSURE - MUSE: NORMAL MMHG
EGFRCR SERPLBLD CKD-EPI 2021: 75 ML/MIN/1.73M2
EOSINOPHIL # BLD AUTO: 0.1 10E3/UL (ref 0–0.7)
EOSINOPHIL NFR BLD AUTO: 1 %
ERYTHROCYTE [DISTWIDTH] IN BLOOD BY AUTOMATED COUNT: 18.3 % (ref 10–15)
FLUAV RNA SPEC QL NAA+PROBE: NEGATIVE
FLUBV RNA RESP QL NAA+PROBE: NEGATIVE
GLUCOSE BLDC GLUCOMTR-MCNC: 97 MG/DL (ref 70–99)
GLUCOSE SERPL-MCNC: 149 MG/DL (ref 70–99)
GLUCOSE UR STRIP-MCNC: NEGATIVE MG/DL
HCO3 BLDV-SCNC: 34 MMOL/L (ref 21–28)
HCO3 SERPL-SCNC: 28 MMOL/L (ref 22–29)
HCT VFR BLD AUTO: 37.2 % (ref 35–47)
HGB BLD-MCNC: 11.6 G/DL (ref 11.7–15.7)
HGB UR QL STRIP: NEGATIVE
HOLD SPECIMEN: NORMAL
IMM GRANULOCYTES # BLD: 0.1 10E3/UL
IMM GRANULOCYTES NFR BLD: 1 %
INTERPRETATION ECG - MUSE: NORMAL
KETONES UR STRIP-MCNC: NEGATIVE MG/DL
LACTATE SERPL-SCNC: 1.4 MMOL/L (ref 0.7–2)
LEUKOCYTE ESTERASE UR QL STRIP: ABNORMAL
LYMPHOCYTES # BLD AUTO: 1.7 10E3/UL (ref 0.8–5.3)
LYMPHOCYTES NFR BLD AUTO: 11 %
MAGNESIUM SERPL-MCNC: 1.4 MG/DL (ref 1.7–2.3)
MCH RBC QN AUTO: 23.5 PG (ref 26.5–33)
MCHC RBC AUTO-ENTMCNC: 31.2 G/DL (ref 31.5–36.5)
MCV RBC AUTO: 76 FL (ref 78–100)
MONOCYTES # BLD AUTO: 0.8 10E3/UL (ref 0–1.3)
MONOCYTES NFR BLD AUTO: 5 %
MUCOUS THREADS #/AREA URNS LPF: PRESENT /LPF
NEUTROPHILS # BLD AUTO: 13 10E3/UL (ref 1.6–8.3)
NEUTROPHILS NFR BLD AUTO: 83 %
NITRATE UR QL: NEGATIVE
NRBC # BLD AUTO: 0 10E3/UL
NRBC BLD AUTO-RTO: 0 /100
NT-PROBNP SERPL-MCNC: 1929 PG/ML (ref 0–900)
O2/TOTAL GAS SETTING VFR VENT: 3 %
OXYHGB MFR BLDV: 30 % (ref 70–75)
P AXIS - MUSE: NORMAL DEGREES
PCO2 BLDV: 63 MM HG (ref 40–50)
PH BLDV: 7.34 [PH] (ref 7.32–7.43)
PH UR STRIP: 6.5 [PH] (ref 5–7)
PLATELET # BLD AUTO: 222 10E3/UL (ref 150–450)
PO2 BLDV: 22 MM HG (ref 25–47)
POTASSIUM SERPL-SCNC: 3.2 MMOL/L (ref 3.4–5.3)
PR INTERVAL - MUSE: NORMAL MS
QRS DURATION - MUSE: 82 MS
QT - MUSE: 272 MS
QTC - MUSE: 404 MS
R AXIS - MUSE: 36 DEGREES
RBC # BLD AUTO: 4.93 10E6/UL (ref 3.8–5.2)
RBC URINE: 3 /HPF
RSV RNA SPEC NAA+PROBE: NEGATIVE
SAO2 % BLDV: 30.4 % (ref 70–75)
SARS-COV-2 RNA RESP QL NAA+PROBE: NEGATIVE
SODIUM SERPL-SCNC: 140 MMOL/L (ref 135–145)
SP GR UR STRIP: 1.01 (ref 1–1.03)
SQUAMOUS EPITHELIAL: 1 /HPF
SYSTOLIC BLOOD PRESSURE - MUSE: NORMAL MMHG
T AXIS - MUSE: 53 DEGREES
TROPONIN T SERPL HS-MCNC: 21 NG/L
TROPONIN T SERPL HS-MCNC: 25 NG/L
UFH PPP CHRO-ACNC: 0.25 IU/ML
UROBILINOGEN UR STRIP-MCNC: NORMAL MG/DL
VENTRICULAR RATE- MUSE: 133 BPM
WBC # BLD AUTO: 15.7 10E3/UL (ref 4–11)
WBC URINE: 16 /HPF

## 2025-02-10 PROCEDURE — 83880 ASSAY OF NATRIURETIC PEPTIDE: CPT | Performed by: EMERGENCY MEDICINE

## 2025-02-10 PROCEDURE — 36415 COLL VENOUS BLD VENIPUNCTURE: CPT | Performed by: EMERGENCY MEDICINE

## 2025-02-10 PROCEDURE — 96365 THER/PROPH/DIAG IV INF INIT: CPT | Mod: 59

## 2025-02-10 PROCEDURE — 258N000003 HC RX IP 258 OP 636: Performed by: EMERGENCY MEDICINE

## 2025-02-10 PROCEDURE — 71275 CT ANGIOGRAPHY CHEST: CPT

## 2025-02-10 PROCEDURE — 82805 BLOOD GASES W/O2 SATURATION: CPT | Performed by: EMERGENCY MEDICINE

## 2025-02-10 PROCEDURE — 250N000011 HC RX IP 250 OP 636: Performed by: EMERGENCY MEDICINE

## 2025-02-10 PROCEDURE — 85014 HEMATOCRIT: CPT | Performed by: EMERGENCY MEDICINE

## 2025-02-10 PROCEDURE — 99291 CRITICAL CARE FIRST HOUR: CPT | Mod: 25

## 2025-02-10 PROCEDURE — 82565 ASSAY OF CREATININE: CPT | Performed by: EMERGENCY MEDICINE

## 2025-02-10 PROCEDURE — 250N000013 HC RX MED GY IP 250 OP 250 PS 637: Performed by: INTERNAL MEDICINE

## 2025-02-10 PROCEDURE — 83735 ASSAY OF MAGNESIUM: CPT | Performed by: EMERGENCY MEDICINE

## 2025-02-10 PROCEDURE — 87637 SARSCOV2&INF A&B&RSV AMP PRB: CPT | Performed by: EMERGENCY MEDICINE

## 2025-02-10 PROCEDURE — 84132 ASSAY OF SERUM POTASSIUM: CPT | Performed by: EMERGENCY MEDICINE

## 2025-02-10 PROCEDURE — 250N000011 HC RX IP 250 OP 636: Performed by: INTERNAL MEDICINE

## 2025-02-10 PROCEDURE — 85520 HEPARIN ASSAY: CPT | Performed by: INTERNAL MEDICINE

## 2025-02-10 PROCEDURE — 80048 BASIC METABOLIC PNL TOTAL CA: CPT | Performed by: EMERGENCY MEDICINE

## 2025-02-10 PROCEDURE — 250N000009 HC RX 250: Performed by: EMERGENCY MEDICINE

## 2025-02-10 PROCEDURE — 84484 ASSAY OF TROPONIN QUANT: CPT | Performed by: EMERGENCY MEDICINE

## 2025-02-10 PROCEDURE — 96368 THER/DIAG CONCURRENT INF: CPT | Mod: 59

## 2025-02-10 PROCEDURE — 83605 ASSAY OF LACTIC ACID: CPT | Performed by: EMERGENCY MEDICINE

## 2025-02-10 PROCEDURE — 93005 ELECTROCARDIOGRAM TRACING: CPT

## 2025-02-10 PROCEDURE — 87040 BLOOD CULTURE FOR BACTERIA: CPT | Performed by: EMERGENCY MEDICINE

## 2025-02-10 PROCEDURE — 81001 URINALYSIS AUTO W/SCOPE: CPT | Performed by: INTERNAL MEDICINE

## 2025-02-10 PROCEDURE — 96375 TX/PRO/DX INJ NEW DRUG ADDON: CPT | Mod: 59

## 2025-02-10 PROCEDURE — 36415 COLL VENOUS BLD VENIPUNCTURE: CPT | Performed by: INTERNAL MEDICINE

## 2025-02-10 PROCEDURE — 120N000001 HC R&B MED SURG/OB

## 2025-02-10 PROCEDURE — 96374 THER/PROPH/DIAG INJ IV PUSH: CPT | Mod: 59

## 2025-02-10 PROCEDURE — 99223 1ST HOSP IP/OBS HIGH 75: CPT | Mod: AI | Performed by: INTERNAL MEDICINE

## 2025-02-10 PROCEDURE — 85025 COMPLETE CBC W/AUTO DIFF WBC: CPT | Performed by: EMERGENCY MEDICINE

## 2025-02-10 PROCEDURE — 96361 HYDRATE IV INFUSION ADD-ON: CPT | Mod: 59

## 2025-02-10 PROCEDURE — 87086 URINE CULTURE/COLONY COUNT: CPT | Performed by: INTERNAL MEDICINE

## 2025-02-10 RX ORDER — ONDANSETRON 2 MG/ML
4 INJECTION INTRAMUSCULAR; INTRAVENOUS EVERY 6 HOURS PRN
Status: DISCONTINUED | OUTPATIENT
Start: 2025-02-10 | End: 2025-02-14 | Stop reason: HOSPADM

## 2025-02-10 RX ORDER — IOPAMIDOL 755 MG/ML
500 INJECTION, SOLUTION INTRAVASCULAR ONCE
Status: COMPLETED | OUTPATIENT
Start: 2025-02-10 | End: 2025-02-10

## 2025-02-10 RX ORDER — MAGNESIUM SULFATE HEPTAHYDRATE 40 MG/ML
2 INJECTION, SOLUTION INTRAVENOUS ONCE
Status: COMPLETED | OUTPATIENT
Start: 2025-02-10 | End: 2025-02-10

## 2025-02-10 RX ORDER — MAGNESIUM SULFATE HEPTAHYDRATE 40 MG/ML
2 INJECTION, SOLUTION INTRAVENOUS ONCE
Status: COMPLETED | OUTPATIENT
Start: 2025-02-10 | End: 2025-02-11

## 2025-02-10 RX ORDER — AMOXICILLIN 250 MG
2 CAPSULE ORAL 2 TIMES DAILY PRN
Status: DISCONTINUED | OUTPATIENT
Start: 2025-02-10 | End: 2025-02-14 | Stop reason: HOSPADM

## 2025-02-10 RX ORDER — NICOTINE POLACRILEX 4 MG
15-30 LOZENGE BUCCAL
Status: DISCONTINUED | OUTPATIENT
Start: 2025-02-10 | End: 2025-02-14 | Stop reason: HOSPADM

## 2025-02-10 RX ORDER — CALCIUM CARBONATE 500 MG/1
1000 TABLET, CHEWABLE ORAL 4 TIMES DAILY PRN
Status: DISCONTINUED | OUTPATIENT
Start: 2025-02-10 | End: 2025-02-14 | Stop reason: HOSPADM

## 2025-02-10 RX ORDER — BENZONATATE 100 MG/1
200 CAPSULE ORAL 3 TIMES DAILY PRN
Status: DISCONTINUED | OUTPATIENT
Start: 2025-02-10 | End: 2025-02-14 | Stop reason: HOSPADM

## 2025-02-10 RX ORDER — DEXTROSE MONOHYDRATE 25 G/50ML
25-50 INJECTION, SOLUTION INTRAVENOUS
Status: DISCONTINUED | OUTPATIENT
Start: 2025-02-10 | End: 2025-02-14 | Stop reason: HOSPADM

## 2025-02-10 RX ORDER — ONDANSETRON 4 MG/1
4 TABLET, ORALLY DISINTEGRATING ORAL EVERY 6 HOURS PRN
Status: DISCONTINUED | OUTPATIENT
Start: 2025-02-10 | End: 2025-02-14 | Stop reason: HOSPADM

## 2025-02-10 RX ORDER — CEFTRIAXONE 2 G/1
2 INJECTION, POWDER, FOR SOLUTION INTRAMUSCULAR; INTRAVENOUS EVERY 24 HOURS
Status: DISCONTINUED | OUTPATIENT
Start: 2025-02-11 | End: 2025-02-12

## 2025-02-10 RX ORDER — CLONAZEPAM 0.5 MG/1
1 TABLET ORAL 2 TIMES DAILY PRN
Status: DISCONTINUED | OUTPATIENT
Start: 2025-02-10 | End: 2025-02-14 | Stop reason: HOSPADM

## 2025-02-10 RX ORDER — CEFTRIAXONE 2 G/1
2 INJECTION, POWDER, FOR SOLUTION INTRAMUSCULAR; INTRAVENOUS ONCE
Status: COMPLETED | OUTPATIENT
Start: 2025-02-10 | End: 2025-02-10

## 2025-02-10 RX ORDER — POTASSIUM CHLORIDE 1.5 G/1.58G
40 POWDER, FOR SOLUTION ORAL ONCE
Status: COMPLETED | OUTPATIENT
Start: 2025-02-10 | End: 2025-02-10

## 2025-02-10 RX ORDER — ACETAMINOPHEN 650 MG/1
650 SUPPOSITORY RECTAL EVERY 4 HOURS PRN
Status: DISCONTINUED | OUTPATIENT
Start: 2025-02-10 | End: 2025-02-14 | Stop reason: HOSPADM

## 2025-02-10 RX ORDER — AZITHROMYCIN 250 MG/1
250 TABLET, FILM COATED ORAL DAILY
Status: COMPLETED | OUTPATIENT
Start: 2025-02-11 | End: 2025-02-14

## 2025-02-10 RX ORDER — PANTOPRAZOLE SODIUM 40 MG/1
40 TABLET, DELAYED RELEASE ORAL AT BEDTIME
Status: DISCONTINUED | OUTPATIENT
Start: 2025-02-10 | End: 2025-02-14 | Stop reason: HOSPADM

## 2025-02-10 RX ORDER — METOPROLOL TARTRATE 1 MG/ML
5 INJECTION, SOLUTION INTRAVENOUS ONCE
Status: COMPLETED | OUTPATIENT
Start: 2025-02-10 | End: 2025-02-10

## 2025-02-10 RX ORDER — ACETAMINOPHEN 325 MG/1
650 TABLET ORAL EVERY 4 HOURS PRN
Status: DISCONTINUED | OUTPATIENT
Start: 2025-02-10 | End: 2025-02-14 | Stop reason: HOSPADM

## 2025-02-10 RX ORDER — LIDOCAINE 40 MG/G
CREAM TOPICAL
Status: DISCONTINUED | OUTPATIENT
Start: 2025-02-10 | End: 2025-02-14 | Stop reason: HOSPADM

## 2025-02-10 RX ORDER — HEPARIN SODIUM 10000 [USP'U]/100ML
0-5000 INJECTION, SOLUTION INTRAVENOUS CONTINUOUS
Status: DISCONTINUED | OUTPATIENT
Start: 2025-02-10 | End: 2025-02-11

## 2025-02-10 RX ORDER — GUAIFENESIN 600 MG/1
1200 TABLET, EXTENDED RELEASE ORAL 2 TIMES DAILY
Status: DISCONTINUED | OUTPATIENT
Start: 2025-02-10 | End: 2025-02-14 | Stop reason: HOSPADM

## 2025-02-10 RX ORDER — CARVEDILOL 12.5 MG/1
12.5 TABLET ORAL 2 TIMES DAILY WITH MEALS
Status: DISCONTINUED | OUTPATIENT
Start: 2025-02-10 | End: 2025-02-11

## 2025-02-10 RX ORDER — AZITHROMYCIN 500 MG/5ML
500 INJECTION, POWDER, LYOPHILIZED, FOR SOLUTION INTRAVENOUS ONCE
Status: COMPLETED | OUTPATIENT
Start: 2025-02-10 | End: 2025-02-10

## 2025-02-10 RX ORDER — AMOXICILLIN 250 MG
1 CAPSULE ORAL 2 TIMES DAILY PRN
Status: DISCONTINUED | OUTPATIENT
Start: 2025-02-10 | End: 2025-02-14 | Stop reason: HOSPADM

## 2025-02-10 RX ADMIN — IOPAMIDOL 86 ML: 755 INJECTION, SOLUTION INTRAVENOUS at 15:59

## 2025-02-10 RX ADMIN — MAGNESIUM SULFATE HEPTAHYDRATE 2 G: 40 INJECTION, SOLUTION INTRAVENOUS at 22:19

## 2025-02-10 RX ADMIN — SODIUM CHLORIDE 1000 ML: 9 INJECTION, SOLUTION INTRAVENOUS at 16:20

## 2025-02-10 RX ADMIN — SODIUM CHLORIDE 100 ML: 9 INJECTION, SOLUTION INTRAVENOUS at 15:59

## 2025-02-10 RX ADMIN — PANTOPRAZOLE SODIUM 40 MG: 40 TABLET, DELAYED RELEASE ORAL at 22:19

## 2025-02-10 RX ADMIN — MAGNESIUM SULFATE HEPTAHYDRATE 2 G: 40 INJECTION, SOLUTION INTRAVENOUS at 16:17

## 2025-02-10 RX ADMIN — GUAIFENESIN 1200 MG: 600 TABLET ORAL at 20:11

## 2025-02-10 RX ADMIN — METOPROLOL TARTRATE 5 MG: 5 INJECTION INTRAVENOUS at 15:46

## 2025-02-10 RX ADMIN — AZITHROMYCIN MONOHYDRATE 500 MG: 500 INJECTION, POWDER, LYOPHILIZED, FOR SOLUTION INTRAVENOUS at 17:47

## 2025-02-10 RX ADMIN — HEPARIN SODIUM 1200 UNITS/HR: 10000 INJECTION, SOLUTION INTRAVENOUS at 16:17

## 2025-02-10 RX ADMIN — BENZONATATE 200 MG: 100 CAPSULE ORAL at 20:11

## 2025-02-10 RX ADMIN — POTASSIUM CHLORIDE 40 MEQ: 1.5 POWDER, FOR SOLUTION ORAL at 22:20

## 2025-02-10 RX ADMIN — CEFTRIAXONE 2 G: 2 INJECTION, POWDER, FOR SOLUTION INTRAMUSCULAR; INTRAVENOUS at 17:46

## 2025-02-10 ASSESSMENT — ACTIVITIES OF DAILY LIVING (ADL)
ADLS_ACUITY_SCORE: 57
ADLS_ACUITY_SCORE: 57
ADLS_ACUITY_SCORE: 37
ADLS_ACUITY_SCORE: 37
ADLS_ACUITY_SCORE: 57
ADLS_ACUITY_SCORE: 44

## 2025-02-10 ASSESSMENT — COLUMBIA-SUICIDE SEVERITY RATING SCALE - C-SSRS
6. HAVE YOU EVER DONE ANYTHING, STARTED TO DO ANYTHING, OR PREPARED TO DO ANYTHING TO END YOUR LIFE?: NO
2. HAVE YOU ACTUALLY HAD ANY THOUGHTS OF KILLING YOURSELF IN THE PAST MONTH?: NO
1. IN THE PAST MONTH, HAVE YOU WISHED YOU WERE DEAD OR WISHED YOU COULD GO TO SLEEP AND NOT WAKE UP?: NO

## 2025-02-10 NOTE — ED TRIAGE NOTES
Patient presents to ED with SOB. Daughter reports she may have aspirated something on Saturday. Today SOB started. Pt reports no official diagnosis of Afib, but has an arhythmia.

## 2025-02-10 NOTE — ED PROVIDER NOTES
Emergency Department Note      History of Present Illness     Chief Complaint   Shortness of Breath      HPI   Nicole Reyes is a 69 year old female with history per below who presents to the ER with daughter from their home for evaluation of of palpitations, recent coughing and shortness of breath after questionable aspiration event 2 days ago.  Patient recalls eating a soft croissant/taco with meat inside and had a possible aspiration event.  Since then she has had increased shortness of breath and chest discomfort and low-grade fever.  Yesterday evening and today while checking her oxygen level, the heart rate was elevated.  She cannot feel palpitations.  No leg swelling or history of DVT/PE.  No vomiting or diarrhea.      Review of External Notes   I reviewed the 1/8/25 discharge summary after patient was treated for hypoxemic respiratory failure secondary to influenza A.  History of type 2 diabetes, GERD, obesity, CRISTÓBAL intolerant to CPAP, hypertension, hyperlipidemia, CKD was also discussed.    Past Medical History     Medical History and Problem List   Past Medical History:   Diagnosis Date    Arthritis     Blood transfusion     CKD (chronic kidney disease) stage 3, GFR 30-59 ml/min (H)     Depressive disorder     Essential hypertension, benign     Gastroesophageal reflux disease     Generalized anxiety disorder     Hernia, abdominal     Hiatal hernia     History of blood transfusion 2011    Hypertension     Insomnia, unspecified     Iron deficiency anemia 08/2009    Major depression     Menopause     Obesity, unspecified     CRISTÓBAL (obstructive sleep apnea)     Other chronic pain     Oxygen dependent     Pneumonia     Pneumonia due to 2019 novel coronavirus 11/08/2021    Postoperative seroma 10/2011    Pure hypercholesterolemia     RLS (restless legs syndrome)     Type II or unspecified type diabetes mellitus without mention of complication, not stated as uncontrolled        Medications   benzonatate  (TESSALON) 200 MG capsule  carvedilol (COREG) 12.5 MG tablet  clonazePAM (KLONOPIN) 1 MG tablet  Fish Oil-Krill Oil (KRILL & FISH OIL BLEND PO)  furosemide (LASIX) 20 MG tablet  gabapentin (NEURONTIN) 300 MG capsule  glipiZIDE (GLUCOTROL XL) 10 MG 24 hr tablet  guaiFENesin (MUCINEX) 600 MG 12 hr tablet  Ibuprofen-Acetaminophen 125-250 MG TABS  ketoconazole (NIZORAL) 2 % external cream  pantoprazole (PROTONIX) 40 MG EC tablet  potassium chloride ER (K-TAB) 20 MEQ CR tablet  QUEtiapine (SEROQUEL) 100 MG tablet  rosuvastatin (CRESTOR) 10 MG tablet  sertraline (ZOLOFT) 100 MG tablet  zolpidem (AMBIEN) 10 MG tablet        Surgical History   Past Surgical History:   Procedure Laterality Date    BREAST BIOPSY, RT/LT      2 times; benign    BREAST SURGERY  ?    Biopsy x 2 Benign     SECTION       SECTION       SECTION      COLONOSCOPY N/A 2020    Procedure: Colonoscopy with biopsy;  Surgeon: Obinna Gutierrez MD;  Location:  OR    DILATION AND CURETTAGE, HYSTEROSCOPY DIAGNOSTIC, COMBINED  2013    Procedure: COMBINED DILATION AND CURETTAGE, HYSTEROSCOPY DIAGNOSTIC;  DILATION AND CURETTAGE, HYSTEROSCOPY DIAGNOSTIC   Converted to a general;  Surgeon: Robi Edwards MD;  Location:  OR    ESOPHAGOSCOPY, GASTROSCOPY, DUODENOSCOPY (EGD), COMBINED N/A 2015    Procedure: COMBINED ESOPHAGOSCOPY, GASTROSCOPY, DUODENOSCOPY (EGD);  Surgeon: Obinna Gutierrez MD;  Location:  GI    ESOPHAGOSCOPY, GASTROSCOPY, DUODENOSCOPY (EGD), COMBINED N/A 2015    Procedure: COMBINED ENDOSCOPIC ULTRASOUND, ESOPHAGOSCOPY, GASTROSCOPY, DUODENOSCOPY (EGD), FINE NEEDLE ASPIRATE/BIOPSY;  Surgeon: Sabine Olivares MD;  Location:  GI    ESOPHAGOSCOPY, GASTROSCOPY, DUODENOSCOPY (EGD), COMBINED N/A 2020    Procedure: ESOPHAGOGASTRODUODENOSCOPY;  Surgeon: Ascencion Stauffer MD;  Location:  OR    HERNIORRHAPHY INCISIONAL (LOCATION)  10/08/2011     incarcerated hernia repair with  "mesh;    HERNIORRHAPHY INCISIONAL (LOCATION)  10/16/2011     repair incisional hernia with mesh, and removal of current implanted mesh;    Z NONSPECIFIC PROCEDURE      Hernia repair     [facility] NONSPECIFIC PROCEDURE      Lymph node biopsy in neck (benign)        Physical Exam     Patient Vitals for the past 24 hrs:   BP Temp Temp src Pulse Resp SpO2 Height Weight   02/10/25 1721 -- -- -- 117 -- 96 % -- --   02/10/25 1720 -- -- -- 114 -- 97 % -- --   02/10/25 1719 -- -- -- (!) 156 -- 97 % -- --   02/10/25 1718 -- -- -- 102 -- 96 % -- --   02/10/25 1717 -- -- -- (!) 121 -- 95 % -- --   02/10/25 1715 109/88 -- -- (!) 121 -- 94 % -- --   02/10/25 1645 117/88 -- -- (!) 138 -- 95 % -- --   02/10/25 1630 -- -- -- (!) 128 -- 98 % -- --   02/10/25 1629 (!) 122/109 -- -- -- -- -- -- --   02/10/25 1628 -- -- -- 117 -- 97 % -- --   02/10/25 1627 -- -- -- -- -- 99 % -- --   02/10/25 1625 -- -- -- 109 -- 99 % -- --   02/10/25 1624 -- -- -- -- -- 99 % -- --   02/10/25 1623 -- -- -- -- -- 96 % -- --   02/10/25 1622 -- -- -- -- -- 97 % -- --   02/10/25 1614 (!) 126/110 -- -- 65 -- -- -- --   02/10/25 1532 -- -- -- 109 -- 95 % -- --   02/10/25 1527 -- -- -- (!) 128 -- 96 % -- --   02/10/25 1522 -- -- -- (!) 144 -- 94 % -- --   02/10/25 1517 (!) 188/124 -- -- (!) 123 -- 95 % -- --   02/10/25 1516 -- -- -- (!) 152 -- -- -- --   02/10/25 1515 -- -- -- (!) 135 -- 95 % -- --   02/10/25 1513 -- -- -- (!) 156 -- 94 % -- --   02/10/25 1512 -- -- -- (!) 161 -- 94 % -- --   02/10/25 1511 -- -- -- (!) 142 -- 94 % -- --   02/10/25 1509 -- -- -- (!) 141 -- 94 % -- --   02/10/25 1508 -- -- -- (!) 168 -- 95 % -- --   02/10/25 1402 -- -- -- -- 24 -- -- --   02/10/25 1401 (!) 134/94 97.8  F (36.6  C) Temporal (!) 121 -- 95 % 1.499 m (4' 11\") 112.5 kg (248 lb 0.3 oz)     Physical Exam  VS: Reviewed per above  HENT: Mucous membranes moist  EYES: sclera anicteric  CV: Rate as noted,  regular rhythm.   RESP: Effort normal. Breath sounds are normal " bilaterally.  GI: no tenderness/rebound/guarding, not distended.  NEURO: Alert, moving all extremities  MSK: No deformity of the extremities  SKIN: Warm and dry      Diagnostics     Lab Results   Labs Ordered and Resulted from Time of ED Arrival to Time of ED Departure   BASIC METABOLIC PANEL - Abnormal       Result Value    Sodium 140      Potassium 3.2 (*)     Chloride 100      Carbon Dioxide (CO2) 28      Anion Gap 12      Urea Nitrogen 10.5      Creatinine 0.84      GFR Estimate 75      Calcium 8.6 (*)     Glucose 149 (*)    TROPONIN T, HIGH SENSITIVITY - Abnormal    Troponin T, High Sensitivity 25 (*)    CBC WITH PLATELETS AND DIFFERENTIAL - Abnormal    WBC Count 15.7 (*)     RBC Count 4.93      Hemoglobin 11.6 (*)     Hematocrit 37.2      MCV 76 (*)     MCH 23.5 (*)     MCHC 31.2 (*)     RDW 18.3 (*)     Platelet Count 222      % Neutrophils 83      % Lymphocytes 11      % Monocytes 5      % Eosinophils 1      % Basophils 0      % Immature Granulocytes 1      NRBCs per 100 WBC 0      Absolute Neutrophils 13.0 (*)     Absolute Lymphocytes 1.7      Absolute Monocytes 0.8      Absolute Eosinophils 0.1      Absolute Basophils 0.0      Absolute Immature Granulocytes 0.1      Absolute NRBCs 0.0     BLOOD GAS VENOUS - Abnormal    pH Venous 7.34      pCO2 Venous 63 (*)     pO2 Venous 22 (*)     Bicarbonate Venous 34 (*)     Base Excess/Deficit Venous 6.9 (*)     FIO2 3      Oxyhemoglobin Venous 30 (*)     O2 Sat, Venous 30.4 (*)    NT PROBNP INPATIENT - Abnormal    N terminal Pro BNP Inpatient 1,929 (*)    MAGNESIUM - Abnormal    Magnesium 1.4 (*)    TROPONIN T, HIGH SENSITIVITY - Abnormal    Troponin T, High Sensitivity 21 (*)    INFLUENZA A/B, RSV AND SARS-COV2 PCR - Normal    Influenza A PCR Negative      Influenza B PCR Negative      RSV PCR Negative      SARS CoV2 PCR Negative     LACTIC ACID WHOLE BLOOD - Normal    Lactic Acid 1.4     ROUTINE UA WITH MICROSCOPIC REFLEX TO CULTURE   BLOOD CULTURE   BLOOD CULTURE        Imaging   CT Chest Pulmonary Embolism w Contrast   Final Result   IMPRESSION:   1.  No evidence of pulmonary embolism is seen to the subsegmental level.   2.  Multifocal consolidative and groundglass opacities are present in both lungs. These changes appear unchanged in the left lung and worsened in the right lung. Constellation of findings are suggestive of multifocal pneumonia. Aspiration pneumonia is    also in the differential diagnosis. Recommend 3 month CT chest follow-up exam.   3.  Several new pulmonary nodules are present measuring up to 6 mm in the right middle lobe. These nodules may be in keeping with multifocal infectious process described above. Recommend attention on 3 month CT chest follow-up exam.   4.  Small bilateral pleural effusions.   5.  Stable mediastinal lymphadenopathy.   6.  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      REFERENCE:   Guidelines for Management of Incidental Pulmonary Nodules Detected on CT Images: From the Fleischner Society 2017.    Guidelines apply to incidental nodules in patients who are 35 years or older.   Guidelines do not apply to lung cancer screening, patients with immunosuppression, or patients with known primary cancer.      MULTIPLE NODULES   Nodule size <6 mm   Low-risk patients: No follow-up needed.   High-risk patients: Optional follow-up at 12 months.      Nodule size 6 mm or larger   Low-risk patients: Follow-up CT at 3-6 months, then consider CT at 18-24 months.   High-risk patients: Follow-up CT at 3-6 months, then at 18-24 months if no change.   -Use most suspicious nodule as guide to management.             EKG   ECG results from 02/10/25   EKG 12 lead     Value    Systolic Blood Pressure     Diastolic Blood Pressure     Ventricular Rate 133    Atrial Rate     WA Interval     QRS Duration 82        QTc 404    P Axis     R AXIS 36    T Axis 53    Interpretation ECG       Atrial fibrillation with rapid ventricular response  Low voltage QRS  Abnormal ECG  When compared with ECG of 05-Jan-2025 13:48,  Atrial fibrillation has replaced Sinus rhythm         *Note: Due to a large number of results and/or encounters for the requested time period, some results have not been displayed. A complete set of results can be found in Results Review.         ED Course      Medications Administered   Medications   heparin 25,000 units in 0.45% NaCl 250 mL ANTICOAGULANT infusion (1,200 Units/hr Intravenous $New Bag 2/10/25 1617)   diltiazem (CARDIZEM) 125 mg in sodium chloride 0.9 % 125 mL infusion (0 mg/hr Intravenous Hold 2/10/25 1735)   metoprolol (LOPRESSOR) injection 5 mg (5 mg Intravenous $Given 2/10/25 1546)   sodium chloride 0.9% BOLUS 1,000 mL (0 mLs Intravenous Stopped 2/10/25 1748)   heparin ANTICOAGULANT loading dose for  LOW INTENSITY TREATMENT* Give BEFORE starting heparin infusion (6,000 Units Intravenous $Given 2/10/25 1617)   magnesium sulfate 2 g in 50 mL sterile water intermittent infusion (0 g Intravenous Stopped 2/10/25 1722)   iopamidol (ISOVUE-370) solution 500 mL (86 mLs Intravenous $Given 2/10/25 1559)   CT Scan Flush (100 mLs Intravenous $Given 2/10/25 1559)   cefTRIAXone (ROCEPHIN) 2 g vial to attach to  ml bag for ADULTS or NS 50 ml bag for PEDS (2 g Intravenous $New Bag 2/10/25 1746)   azithromycin (ZITHROMAX) 500 mg in  mL intermittent infusion (500 mg Intravenous $New Bag 2/10/25 1747)       Procedures   Procedures         ED Course   ED Course as of 02/10/25 1848   Mon Feb 10, 2025   1801 I spoke with Dr. Bailey admitting for patient           Medical Decision Making / Diagnosis         MIPS     CT for PE was ordered because the patient is high risk for pulmonary embolism.    KAREN Vazquezquelyn VINCE Reyes is a 69 year old female who presents to the ER for evaluation of recent coughing and chest discomfort and shortness of breath.  On arrival she is  tachycardic.  EKG confirms A-fib with RVR.  She is afebrile.  Lungs sounds are without wheezing.  CT chest with multifocal pneumonia.  No PE.  She was given antibiotics.  She was given metoprolol for heart rate control with some improvement.  She was started on heparin gtt due to elevated [id number] score.  Blood pressure also came down to the low 100 systolic.  He was given IV fluids.  She was given IV magnesium.  She was admitted.  Diltiazem infusion was held due to softer blood pressures and improved heart rates.    Disposition   The patient was admitted to the hospital.     Diagnosis     ICD-10-CM    1. Atrial fibrillation with RVR (H)  I48.91       2. Pneumonia of both lungs due to infectious organism, unspecified part of lung  J18.9            Discharge Medications   New Prescriptions    No medications on file          Shola Christine MD  02/10/25 [id number]

## 2025-02-11 ENCOUNTER — APPOINTMENT (OUTPATIENT)
Dept: CARDIOLOGY | Facility: CLINIC | Age: 70
DRG: 177 | End: 2025-02-11
Attending: INTERNAL MEDICINE
Payer: MEDICARE

## 2025-02-11 ENCOUNTER — TRANSFERRED RECORDS (OUTPATIENT)
Dept: HEALTH INFORMATION MANAGEMENT | Facility: CLINIC | Age: 70
End: 2025-02-11

## 2025-02-11 LAB
ANION GAP SERPL CALCULATED.3IONS-SCNC: 9 MMOL/L (ref 7–15)
ATRIAL RATE - MUSE: 71 BPM
BUN SERPL-MCNC: 8 MG/DL (ref 8–23)
CALCIUM SERPL-MCNC: 8.2 MG/DL (ref 8.8–10.4)
CHLORIDE SERPL-SCNC: 103 MMOL/L (ref 98–107)
CREAT SERPL-MCNC: 0.75 MG/DL (ref 0.51–0.95)
DIASTOLIC BLOOD PRESSURE - MUSE: NORMAL MMHG
EGFRCR SERPLBLD CKD-EPI 2021: 86 ML/MIN/1.73M2
ERYTHROCYTE [DISTWIDTH] IN BLOOD BY AUTOMATED COUNT: 18.3 % (ref 10–15)
GLUCOSE BLDC GLUCOMTR-MCNC: 112 MG/DL (ref 70–99)
GLUCOSE BLDC GLUCOMTR-MCNC: 117 MG/DL (ref 70–99)
GLUCOSE BLDC GLUCOMTR-MCNC: 125 MG/DL (ref 70–99)
GLUCOSE BLDC GLUCOMTR-MCNC: 154 MG/DL (ref 70–99)
GLUCOSE BLDC GLUCOMTR-MCNC: 98 MG/DL (ref 70–99)
GLUCOSE SERPL-MCNC: 119 MG/DL (ref 70–99)
HCO3 SERPL-SCNC: 27 MMOL/L (ref 22–29)
HCT VFR BLD AUTO: 30.8 % (ref 35–47)
HGB BLD-MCNC: 9.6 G/DL (ref 11.7–15.7)
INTERPRETATION ECG - MUSE: NORMAL
LVEF ECHO: NORMAL
MAGNESIUM SERPL-MCNC: 2.1 MG/DL (ref 1.7–2.3)
MCH RBC QN AUTO: 23.9 PG (ref 26.5–33)
MCHC RBC AUTO-ENTMCNC: 31.2 G/DL (ref 31.5–36.5)
MCV RBC AUTO: 77 FL (ref 78–100)
P AXIS - MUSE: 56 DEGREES
PLATELET # BLD AUTO: 152 10E3/UL (ref 150–450)
POTASSIUM SERPL-SCNC: 3.5 MMOL/L (ref 3.4–5.3)
POTASSIUM SERPL-SCNC: 3.5 MMOL/L (ref 3.4–5.3)
PR INTERVAL - MUSE: 204 MS
QRS DURATION - MUSE: 84 MS
QT - MUSE: 430 MS
QTC - MUSE: 467 MS
R AXIS - MUSE: 64 DEGREES
RBC # BLD AUTO: 4.01 10E6/UL (ref 3.8–5.2)
SODIUM SERPL-SCNC: 139 MMOL/L (ref 135–145)
SYSTOLIC BLOOD PRESSURE - MUSE: NORMAL MMHG
T AXIS - MUSE: 60 DEGREES
UFH PPP CHRO-ACNC: 0.14 IU/ML
UFH PPP CHRO-ACNC: 0.2 IU/ML
VENTRICULAR RATE- MUSE: 71 BPM
WBC # BLD AUTO: 9.8 10E3/UL (ref 4–11)

## 2025-02-11 PROCEDURE — 250N000011 HC RX IP 250 OP 636: Performed by: INTERNAL MEDICINE

## 2025-02-11 PROCEDURE — 250N000013 HC RX MED GY IP 250 OP 250 PS 637: Performed by: INTERNAL MEDICINE

## 2025-02-11 PROCEDURE — 250N000012 HC RX MED GY IP 250 OP 636 PS 637: Performed by: INTERNAL MEDICINE

## 2025-02-11 PROCEDURE — 84132 ASSAY OF SERUM POTASSIUM: CPT | Performed by: INTERNAL MEDICINE

## 2025-02-11 PROCEDURE — 99222 1ST HOSP IP/OBS MODERATE 55: CPT | Mod: 25 | Performed by: INTERNAL MEDICINE

## 2025-02-11 PROCEDURE — 93306 TTE W/DOPPLER COMPLETE: CPT

## 2025-02-11 PROCEDURE — 85520 HEPARIN ASSAY: CPT | Performed by: INTERNAL MEDICINE

## 2025-02-11 PROCEDURE — 99232 SBSQ HOSP IP/OBS MODERATE 35: CPT | Performed by: INTERNAL MEDICINE

## 2025-02-11 PROCEDURE — 93010 ELECTROCARDIOGRAM REPORT: CPT | Performed by: INTERNAL MEDICINE

## 2025-02-11 PROCEDURE — 36415 COLL VENOUS BLD VENIPUNCTURE: CPT | Performed by: INTERNAL MEDICINE

## 2025-02-11 PROCEDURE — 87070 CULTURE OTHR SPECIMN AEROBIC: CPT | Performed by: INTERNAL MEDICINE

## 2025-02-11 PROCEDURE — 120N000001 HC R&B MED SURG/OB

## 2025-02-11 PROCEDURE — 83735 ASSAY OF MAGNESIUM: CPT | Performed by: INTERNAL MEDICINE

## 2025-02-11 PROCEDURE — 80048 BASIC METABOLIC PNL TOTAL CA: CPT | Performed by: INTERNAL MEDICINE

## 2025-02-11 PROCEDURE — 93306 TTE W/DOPPLER COMPLETE: CPT | Mod: 26 | Performed by: INTERNAL MEDICINE

## 2025-02-11 PROCEDURE — 85027 COMPLETE CBC AUTOMATED: CPT | Performed by: INTERNAL MEDICINE

## 2025-02-11 PROCEDURE — 999N000157 HC STATISTIC RCP TIME EA 10 MIN

## 2025-02-11 RX ORDER — ZOLPIDEM TARTRATE 5 MG/1
5 TABLET ORAL AT BEDTIME
Status: DISCONTINUED | OUTPATIENT
Start: 2025-02-11 | End: 2025-02-11

## 2025-02-11 RX ORDER — GABAPENTIN 300 MG/1
600 CAPSULE ORAL EVERY 24 HOURS
Status: DISCONTINUED | OUTPATIENT
Start: 2025-02-11 | End: 2025-02-14 | Stop reason: HOSPADM

## 2025-02-11 RX ORDER — METOPROLOL SUCCINATE 25 MG/1
25 TABLET, EXTENDED RELEASE ORAL 2 TIMES DAILY
Status: DISCONTINUED | OUTPATIENT
Start: 2025-02-11 | End: 2025-02-14 | Stop reason: HOSPADM

## 2025-02-11 RX ORDER — GABAPENTIN 300 MG/1
900 CAPSULE ORAL EVERY MORNING
Status: DISCONTINUED | OUTPATIENT
Start: 2025-02-11 | End: 2025-02-11

## 2025-02-11 RX ORDER — POTASSIUM CHLORIDE 1500 MG/1
20 TABLET, EXTENDED RELEASE ORAL ONCE
Status: COMPLETED | OUTPATIENT
Start: 2025-02-11 | End: 2025-02-11

## 2025-02-11 RX ORDER — QUETIAPINE FUMARATE 50 MG/1
100 TABLET, FILM COATED ORAL EVERY EVENING
Status: DISCONTINUED | OUTPATIENT
Start: 2025-02-11 | End: 2025-02-14 | Stop reason: HOSPADM

## 2025-02-11 RX ORDER — GABAPENTIN 300 MG/1
900 CAPSULE ORAL 2 TIMES DAILY
Status: DISCONTINUED | OUTPATIENT
Start: 2025-02-11 | End: 2025-02-14 | Stop reason: HOSPADM

## 2025-02-11 RX ORDER — ROSUVASTATIN CALCIUM 10 MG/1
10 TABLET, COATED ORAL DAILY
Status: DISCONTINUED | OUTPATIENT
Start: 2025-02-11 | End: 2025-02-14 | Stop reason: HOSPADM

## 2025-02-11 RX ORDER — SERTRALINE HYDROCHLORIDE 100 MG/1
200 TABLET, FILM COATED ORAL DAILY
Status: DISCONTINUED | OUTPATIENT
Start: 2025-02-11 | End: 2025-02-14 | Stop reason: HOSPADM

## 2025-02-11 RX ORDER — GLIPIZIDE 5 MG/1
10 TABLET, FILM COATED, EXTENDED RELEASE ORAL DAILY
Status: DISCONTINUED | OUTPATIENT
Start: 2025-02-11 | End: 2025-02-14 | Stop reason: HOSPADM

## 2025-02-11 RX ORDER — POTASSIUM CHLORIDE 1500 MG/1
40 TABLET, EXTENDED RELEASE ORAL DAILY
Status: DISCONTINUED | OUTPATIENT
Start: 2025-02-11 | End: 2025-02-14 | Stop reason: HOSPADM

## 2025-02-11 RX ORDER — ZOLPIDEM TARTRATE 5 MG/1
10 TABLET ORAL AT BEDTIME
Status: DISCONTINUED | OUTPATIENT
Start: 2025-02-12 | End: 2025-02-14 | Stop reason: HOSPADM

## 2025-02-11 RX ADMIN — ROSUVASTATIN 10 MG: 10 TABLET, FILM COATED ORAL at 12:03

## 2025-02-11 RX ADMIN — INSULIN ASPART 1 UNITS: 100 INJECTION, SOLUTION INTRAVENOUS; SUBCUTANEOUS at 13:29

## 2025-02-11 RX ADMIN — GABAPENTIN 900 MG: 300 CAPSULE ORAL at 20:30

## 2025-02-11 RX ADMIN — CEFTRIAXONE 2 G: 2 INJECTION, POWDER, FOR SOLUTION INTRAMUSCULAR; INTRAVENOUS at 13:26

## 2025-02-11 RX ADMIN — QUETIAPINE FUMARATE 100 MG: 50 TABLET ORAL at 20:31

## 2025-02-11 RX ADMIN — GUAIFENESIN 1200 MG: 600 TABLET ORAL at 09:20

## 2025-02-11 RX ADMIN — POTASSIUM CHLORIDE 20 MEQ: 1500 TABLET, EXTENDED RELEASE ORAL at 12:03

## 2025-02-11 RX ADMIN — APIXABAN 5 MG: 5 TABLET, FILM COATED ORAL at 20:32

## 2025-02-11 RX ADMIN — AZITHROMYCIN DIHYDRATE 250 MG: 250 TABLET ORAL at 09:20

## 2025-02-11 RX ADMIN — POTASSIUM CHLORIDE 40 MEQ: 1500 TABLET, EXTENDED RELEASE ORAL at 12:02

## 2025-02-11 RX ADMIN — SERTRALINE 200 MG: 100 TABLET, FILM COATED ORAL at 12:03

## 2025-02-11 RX ADMIN — PANTOPRAZOLE SODIUM 40 MG: 40 TABLET, DELAYED RELEASE ORAL at 20:33

## 2025-02-11 RX ADMIN — METOPROLOL SUCCINATE 25 MG: 25 TABLET, EXTENDED RELEASE ORAL at 20:32

## 2025-02-11 RX ADMIN — HEPARIN SODIUM 1350 UNITS/HR: 10000 INJECTION, SOLUTION INTRAVENOUS at 05:57

## 2025-02-11 RX ADMIN — CLONAZEPAM 1 MG: 0.5 TABLET ORAL at 02:54

## 2025-02-11 RX ADMIN — GUAIFENESIN 1200 MG: 600 TABLET ORAL at 20:31

## 2025-02-11 RX ADMIN — METOPROLOL SUCCINATE 25 MG: 25 TABLET, EXTENDED RELEASE ORAL at 13:31

## 2025-02-11 RX ADMIN — ZOLPIDEM TARTRATE 5 MG: 5 TABLET, COATED ORAL at 00:57

## 2025-02-11 RX ADMIN — POTASSIUM CHLORIDE 20 MEQ: 1500 TABLET, EXTENDED RELEASE ORAL at 02:54

## 2025-02-11 RX ADMIN — GABAPENTIN 900 MG: 300 CAPSULE ORAL at 13:26

## 2025-02-11 RX ADMIN — GLIPIZIDE 10 MG: 5 TABLET, FILM COATED, EXTENDED RELEASE ORAL at 12:02

## 2025-02-11 ASSESSMENT — ACTIVITIES OF DAILY LIVING (ADL)
ADLS_ACUITY_SCORE: 44
ADLS_ACUITY_SCORE: 41
ADLS_ACUITY_SCORE: 44
ADLS_ACUITY_SCORE: 44
ADLS_ACUITY_SCORE: 48
ADLS_ACUITY_SCORE: 44
ADLS_ACUITY_SCORE: 44
ADLS_ACUITY_SCORE: 48
DEPENDENT_IADLS:: CLEANING;LAUNDRY;TRANSPORTATION
ADLS_ACUITY_SCORE: 48
ADLS_ACUITY_SCORE: 44
ADLS_ACUITY_SCORE: 41
ADLS_ACUITY_SCORE: 41
ADLS_ACUITY_SCORE: 48
ADLS_ACUITY_SCORE: 44
ADLS_ACUITY_SCORE: 44
ADLS_ACUITY_SCORE: 41
ADLS_ACUITY_SCORE: 44
ADLS_ACUITY_SCORE: 48
ADLS_ACUITY_SCORE: 44
ADLS_ACUITY_SCORE: 41
ADLS_ACUITY_SCORE: 44

## 2025-02-11 NOTE — PROGRESS NOTES
[facility]    Hospitalist Progress Note  Name: Nicole Reyes    MRN: [id number]  Provider:  Kelechi Paiz DO  Date of Service: 02/11/2025    Summary of Stay: Nicole Reyes is a 69 year old female with a history of CRISTÓBAL not on CPAP but with nocturnal oxygen, chronic kidney disease stage III, HFrEF with improved EF to 55 to 60%, morbid obesity, hypertension, type 2 diabetes mellitus, depression/anxiety admitted on 2/10/2025 with cough with subjective fevers and chills.  Of note, the patient was admitted in 1/2025 for hypoxia secondary to influenza A.  Proximately 2 days prior to arrival she admitted to an aspiration episode while eating dinner.  In the emergency department, the patient was found to have a blood pressure of 109/88, heart rate 117, temperature 97.8  F, respiratory rate 24, SpO2 96% on room air.  Initial lab work showed WBC 15.7, hemoglobin 11.6, potassium 3.2, high-sensitivity troponin 25, proBNP 1929, magnesium 1.4.  CT chest showed multifocal consolidative and groundglass opacities present in both lungs the changes appear unchanged in the left lung and worsened in the right lung suggestive of multifocal pneumonia versus aspiration pneumonia, several new pulmonary nodules present measuring up to 6 mm in the right middle lobe, small bilateral pleural effusions, stable mediastinal lymphadenopathy, stable 1.8 cm fat-containing lesion in the upper pole of the left kidney suggestive of angiomyolipoma.  EKG showed atrial fibrillation with ventricular rate of 133.  The patient was started on a heparin drip and diltiazem drip.  The patient was also started on ceftriaxone and azithromycin with concern for aspiration pneumonia.  Cardiology was consulted to see the patient.    TODAY'S PLAN: Appreciate cardiology recommendations.  Continue heparin drip and diltiazem drip.  Heart rates appear controlled.  Plan is for echocardiogram today.  Suspect her atrial fibrillation is reactive  to her pneumonia.  Anticipate she could transition to oral diltiazem versus other rate control per cardiology.  Continue ceftriaxone and azithromycin.  Patient states she lives with her daughter in a rambler.  She has had home cares since her discharge approximately 1 month ago for influenza.  She ambulates with a walker at home.  She does report poor appetite over the last few days and unsteady gait.  Will ask for physical therapy evaluation tomorrow.  Updated daughter Joanne via phone.  All questions answered.  Anticipate 2 to 3 days in the hospital for the above.  ADDENDUM:  The patient had converted to NSR the night prior.  Cardiology transitioned to oral eliquis and metoprolol.    Problem List:   Suspected Aspiration Pneumonia  Pulmonary Nodules  - Ceftriaxone and Azithromycin  - Antitussives prn  - Recommend repeat CT chest in 2-3 months to assess pulmonary nodules.  This was discussed with the patient    New Onset Atrial Fibrillation with RVR  HFrEF with Improved EF  - Echo in 6/2024 showed EF 45-50%.  EF improved to 55-60% in 10/2024  - Diltiazem drip  - Heparin drip. CHADSVASC = at least 5  - Appreciate Cardiology recommendations  - Telemetry  - Appreciate Pharmacy assistance with DOAC coverage  - Check updated echo    Hypokalemia  Hypomagnesemia  - Electrolyte replacement protocol    Chronic Medical Problems:  Hypertension  Anxiety/Depression  Insomnia  Type 2 Diabetes Mellitus  Chronic Kidney Disease Stage 3  CRISTÓBAL with Nocturnal Oxygen  Morbid Obesity - BMI 50.55    I spent 48 minutes in reviewing this patient's labs, imaging, medications, medical history.  In addition time was spent interviewing the patient, communicating with family, and medical decision making.      DVT Prophylaxis: Heparin drip  Code Status: Full Code  Diet: Combination Diet Regular Diet Adult    Suarez Catheter: Not present    Disposition: Medically Ready for Discharge: Anticipated in 2-4 Days    Goals to discharge include: symptoms  improving, VSS, cardiac work up complete  Family updated today: Yes      Interval History   Pt seen and examined.  Pt reports her chest and abdomen are sore from coughing.    -Data reviewed today: I personally reviewed all new labs and imaging results over the last 24 hours.     Physical Exam   Temp: 98.7  F (37.1  C) Temp src: Oral BP: 121/84 Pulse: 78   Resp: 20 SpO2: 94 % O2 Device: Nasal cannula Oxygen Delivery: 3 LPM  Vitals:    02/10/25 1401 02/10/25 1937 02/11/25 0700   Weight: 112.5 kg (248 lb 0.3 oz) 113.7 kg (250 lb 11.2 oz) 113.5 kg (250 lb 4.8 oz)     Vital Signs with Ranges  Temp:  [97.8  F (36.6  C)-98.7  F (37.1  C)] 98.7  F (37.1  C)  Pulse:  [] 78  Resp:  [18-24] 20  BP: ()/() 121/84  SpO2:  [94 %-99 %] 94 %  I/O last 3 completed shifts:  In: 123 [P.O.:120; I.V.:3]  Out: 100 [Urine:100]    GENERAL: No apparent distress. Awake, alert, and fully oriented.  HEENT: Normocephalic, atraumatic. Extraocular movements intact.  CARDIOVASCULAR: Irregular. No S3.  PULMONARY: Decreased BS bilaterally  GASTROINTESTINAL: Soft, non-tender, non-distended. Bowel sounds normoactive.   EXTREMITIES: No cyanosis or clubbing. No edema.  NEUROLOGICAL: CN 2-12 grossly intact, no focal neurological deficits.  DERMATOLOGICAL: No rash, ulcer, bruising, nor jaundice.    Medications   Current Facility-Administered Medications   Medication Dose Route Frequency Provider Last Rate Last Admin    diltiazem (CARDIZEM) 125 mg in sodium chloride 0.9 % 125 mL infusion  5-15 mg/hr Intravenous Continuous Juanpablo Cifuentes MD   Held at 02/10/25 1732    heparin 25,000 units in 0.45% NaCl 250 mL ANTICOAGULANT infusion  0-5,000 Units/hr Intravenous Continuous Juanpablo Cifuentes MD 13.5 mL/hr at 02/11/25 0557 1,350 Units/hr at 02/11/25 0557    Patient is already receiving anticoagulation with heparin, enoxaparin (LOVENOX), warfarin (COUMADIN)  or other anticoagulant medication   Does not apply Continuous  "PRN Juanpablo Cifuentes MD         Current Facility-Administered Medications   Medication Dose Route Frequency Provider Last Rate Last Admin    azithromycin (ZITHROMAX) tablet 250 mg  250 mg Oral Daily Juanpablo Cifuentes MD        [Held by provider] carvedilol (COREG) tablet 12.5 mg  12.5 mg Oral BID w/meals Juanpablo Cifuentes MD        cefTRIAXone (ROCEPHIN) 2 g vial to attach to  ml bag for ADULTS or NS 50 ml bag for PEDS  2 g Intravenous Q24H Juanpablo Cifuentes MD        guaiFENesin (MUCINEX) 12 hr tablet 1,200 mg  1,200 mg Oral BID Juanpablo Cifuentes MD   1,200 mg at 02/10/25 2011    insulin aspart (NovoLOG) injection (RAPID ACTING)  1-7 Units Subcutaneous TID AC Juanpablo Cifuentes MD        insulin aspart (NovoLOG) injection (RAPID ACTING)  1-5 Units Subcutaneous At Bedtime Juanpablo Cifuentes MD        pantoprazole (PROTONIX) EC tablet 40 mg  40 mg Oral At Bedtime Juanpablo Cifuentes MD   40 mg at 02/10/25 2219    sodium chloride (PF) 0.9% PF flush 3 mL  3 mL Intracatheter Q8H Juanpablo Cifuentes MD   3 mL at 02/10/25 2221    zolpidem (AMBIEN) tablet 5 mg  5 mg Oral At Bedtime Romina Mccall MD   5 mg at 02/11/25 0057     Data     Laboratory:  Recent Labs   Lab 02/11/25  0628 02/10/25  1459   WBC 9.8 15.7*   HGB 9.6* 11.6*   HCT 30.8* 37.2   MCV 77* 76*    222     Recent Labs   Lab 02/11/25  0628 02/11/25  0206 02/11/25  0153 02/10/25  2114 02/10/25  1459     --   --   --  140   POTASSIUM 3.5  --  3.5  --  3.2*   CHLORIDE 103  --   --   --  100   CO2 27  --   --   --  28   ANIONGAP 9  --   --   --  12   * 117*  --  97 149*   BUN 8.0  --   --   --  10.5   CR 0.75  --   --   --  0.84   GFRESTIMATED 86  --   --   --  75   GRECIA 8.2*  --   --   --  8.6*     No results for input(s): \"CULT\" in the last 168 hours.  Troponin I ES   Date Value Ref Range Status   06/01/2021 <0.015 0.000 - 0.045 " ug/L Final     Comment:     The 99th percentile for upper reference range is 0.045 ug/L.  Troponin values   in the range of 0.045 - 0.120 ug/L may be associated with risks of adverse   clinical events.         Imaging:  Recent Results (from the past 24 hours)   CT Chest Pulmonary Embolism w Contrast    Narrative    EXAM: CT CHEST PULMONARY EMBOLISM W CONTRAST  LOCATION: [facility]  DATE: 2/10/2025    INDICATION: palpitations, hypoxia, posible aspiration event  COMPARISON: CTA chest performed on 11/29/2024  TECHNIQUE: CT chest pulmonary angiogram during arterial phase injection of IV contrast. Multiplanar reformats and MIP reconstructions were performed. Dose reduction techniques were used.   CONTRAST: 86mL Isovue 370    FINDINGS:  ANGIOGRAM CHEST: Pulmonary arteries are normal caliber and negative for pulmonary emboli. Thoracic aorta is negative for dissection. No CT evidence of right heart strain.    LUNGS AND PLEURA: Small bilateral pleural effusions are present. Patchy consolidative opacities are seen within the left lung and appear similar in appearance to the prior exam. Patchy groundglass and consolidative opacities in the right middle and lower   lobes appear slightly worsened since the prior exam. A few new lung nodules are also seen including a 5 mm nodule in the right upper lobe (series 7, image 84) as well as a 6 mm nodule in the right middle lobe (series 7, image 137).    MEDIASTINUM/AXILLAE: Stable appearance of multiple prominent mediastinal lymph nodes measuring up to 10 mm in the subcarinal space (series 5, image 36). Heart size appears enlarged.    CORONARY ARTERY CALCIFICATION: Mild.    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 Stable large gallstone within the neck of the gallbladder measuring up to 3.0 cm.    MUSCULOSKELETAL: Multilevel degenerative changes are present in the  spine.      Impression    IMPRESSION:  1.  No evidence of pulmonary embolism is seen to the subsegmental level.  2.  Multifocal consolidative and groundglass opacities are present in both lungs. These changes appear unchanged in the left lung and worsened in the right lung. Constellation of findings are suggestive of multifocal pneumonia. Aspiration pneumonia is   also in the differential diagnosis. Recommend 3 month CT chest follow-up exam.  3.  Several new pulmonary nodules are present measuring up to 6 mm in the right middle lobe. These nodules may be in keeping with multifocal infectious process described above. Recommend attention on 3 month CT chest follow-up exam.  4.  Small bilateral pleural effusions.  5.  Stable mediastinal lymphadenopathy.  6.  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    REFERENCE:  Guidelines for Management of Incidental Pulmonary Nodules Detected on CT Images: From the Fleischner Society 2017.   Guidelines apply to incidental nodules in patients who are 35 years or older.  Guidelines do not apply to lung cancer screening, patients with immunosuppression, or patients with known primary cancer.    MULTIPLE NODULES  Nodule size <6 mm  Low-risk patients: No follow-up needed.  High-risk patients: Optional follow-up at 12 months.    Nodule size 6 mm or larger  Low-risk patients: Follow-up CT at 3-6 months, then consider CT at 18-24 months.  High-risk patients: Follow-up CT at 3-6 months, then at 18-24 months if no change.  -Use most suspicious nodule as guide to management.           Kelechi Paiz DO  [facility] Hospitalist  [address] [place] [address]  Securely message with [employer] (more info)  Text page via [email or web address]   02/11/2025

## 2025-02-11 NOTE — PLAN OF CARE
"Goal Outcome Evaluation:           Overall Patient Progress: no changeOverall Patient Progress: no change    Outcome Evaluation: Productive cough. Forgetful. Abx for aspiration pneumonia.  Personal belongings and call light in reach. Bed alarm on and falls bundle present. For VS and assessment, please see documentation under flowsheets.     Problem: Adult Inpatient Plan of Care  Goal: Plan of Care Review  Description: The Plan of Care Review/Shift note should be completed every shift.  The Outcome Evaluation is a brief statement about your assessment that the patient is improving, declining, or no change.  This information will be displayed automatically on your shift  note.  Outcome: Not Progressing  Flowsheets (Taken 2/11/2025 1217)  Outcome Evaluation: Productive cough. Forgetful. Abx for aspiration pneumonia.  Overall Patient Progress: no change  Goal: Patient-Specific Goal (Individualized)  Description: You can add care plan individualizations to a care plan. Examples of Individualization might be:  \"Parent requests to be called daily at 9am for status\", \"I have a hard time hearing out of my right ear\", or \"Do not touch me to wake me up as it startles  me\".  Outcome: Not Progressing  Goal: Absence of Hospital-Acquired Illness or Injury  Outcome: Not Progressing  Intervention: Identify and Manage Fall Risk  Recent Flowsheet Documentation  Taken 2/11/2025 1200 by Pari Carrillo, RN  Safety Promotion/Fall Prevention:   safety round/check completed   activity supervised  Taken 2/11/2025 1035 by Pari Carrillo, RN  Safety Promotion/Fall Prevention: safety round/check completed  Taken 2/11/2025 0925 by Pari Carrillo, RN  Safety Promotion/Fall Prevention: safety round/check completed  Intervention: Prevent Infection  Recent Flowsheet Documentation  Taken 2/11/2025 1200 by Pari Carrillo, RN  Infection Prevention: single patient room provided  Taken 2/11/2025 1035 by Pari Carrillo, RN  Infection Prevention: " single patient room provided  Taken 2/11/2025 0925 by Pari Carrillo, RN  Infection Prevention: single patient room provided  Goal: Optimal Comfort and Wellbeing  Outcome: Not Progressing  Goal: Readiness for Transition of Care  Outcome: Not Progressing     Problem: Comorbidity Management  Goal: Maintenance of Asthma Control  Outcome: Not Progressing  Goal: Maintenance of Behavioral Health Symptom Control  Outcome: Not Progressing  Goal: Maintenance of COPD Symptom Control  Outcome: Not Progressing  Goal: Blood Glucose Levels Within Targeted Range  Outcome: Not Progressing  Goal: Maintenance of Heart Failure Symptom Control  Outcome: Not Progressing  Goal: Blood Pressure in Desired Range  Outcome: Not Progressing  Goal: Maintenance of Osteoarthritis Symptom Control  Outcome: Not Progressing  Goal: Bariatric Home Regimen Maintained  Outcome: Not Progressing  Goal: Maintenance of Seizure Control  Outcome: Not Progressing     Problem: Dysrhythmia  Goal: Normalized Cardiac Rhythm  Outcome: Not Progressing     Problem: Pneumonia  Goal: Fluid Balance  Outcome: Not Progressing  Goal: Resolution of Infection Signs and Symptoms  Outcome: Not Progressing  Goal: Effective Oxygenation and Ventilation  Outcome: Not Progressing  Intervention: Promote Airway Secretion Clearance  Recent Flowsheet Documentation  Taken 2/11/2025 1200 by Pari Carrillo RN  Cough And Deep Breathing: done independently per patient  Intervention: Optimize Oxygenation and Ventilation  Recent Flowsheet Documentation  Taken 2/11/2025 1200 by Pari Carrillo RN  Head of Bed ([facility]) Positioning: HOB at 30-45 degrees     Problem: Skin Injury Risk Increased  Goal: Skin Health and Integrity  Outcome: Not Progressing  Intervention: Optimize Skin Protection  Recent Flowsheet Documentation  Taken 2/11/2025 1200 by Pari Carrillo RN  Head of Bed ([facility]) Positioning: HOB at 30-45 degrees

## 2025-02-11 NOTE — PLAN OF CARE
Goal Outcome Evaluation:      Plan of Care Reviewed With: patient    Overall Patient Progress: improvingOverall Patient Progress: improving    Outcome Evaluation: Patient lives at home with her daughter. She has HC RN/PT/HHA through [facility]. Home O2 through [facility]. Anticipate d/c home with HC on d/c.

## 2025-02-11 NOTE — CONSULTS
Patient has Medicare D through [employer].    Xarelto/Eliquis  --Upon receipt of RX, Discharge Pharmacy can provide 1 mo free using one-time  voucher.  --Patient is will pay 100% of the first $590 in drug costs (first month will be as much as $591)  --Subsequent fills will be $149/mo.  --If/when total out of pocket drug costs exceed $2000, patient will pay $0 for all covered drugs for the remainder of the year.    Dabigatran  --Patient is will pay 100% of the first $590 in drug costs (fills will be $176/mo until fulfilled)  --Subsequent fills will be $86/mo.  --If/when total out of pocket drug costs exceed $2000, patient will pay $0 for all covered drugs for the remainder of the year.    Sienna Rockwell  Pharmacy Technician/Liaison, Discharge Pharmacy   [phone] (voice or text)  [email or web address]  Pharmacy test claims are estimates and may not reflect final costs.   Suggested alternatives aim to be cost-effective but may not be therapeutically equivalent as this consult is informational and does not constitute medical advice.   Clinical decisions should be made by qualified healthcare providers.

## 2025-02-11 NOTE — H&P
[facility]  Hospitalist Admission Note  Name: Nicole Reyes    MRN: [id number]  YOB: 1955    Age: 69 year old  Date of admission: 2/10/2025  Primary care provider: [facility]    Chief Complaint:  dyspnea    Nicole Reyes is a 69 year old female with PMH including CRISTÓBAL not on CPAP but on nocturnal oxygen, CKD 3, depression, anxiety, obesity, hypertension, type 2 diabetes and recent admission in early January for hypoxia secondary to influenza A who presents with shortness of breath and cough in the setting of an apparent aspiration episode 2 days prior while eating some dinner.  She has had some lingering cough since her admission in January but now over the past 2 days has developed subjective fevers and chills, frequent hacking cough and malaise.  The morning of admission she noticed on her pulse oximeter that she was tachycardic to the 140s.    Here in the emergency room she was tachycardic as fast as below 160s and was noted to be in atrial fibrillation.  She was otherwise hemodynamically stable.  Saturations were in the mid 90s on 2 L/min.  Lab workup was notable for creatinine of 0.84 with potassium of 3.2.  Magnesium was low at 1.4.  Glucose 175.  CBC showed leukocytosis with white blood count of 15.7 K and stable anemia with hemoglobin of 11.6.  BNP was elevated at 1929 and troponin was detectable at 25 and declined to 21 on recheck.  EKG showed A-fib with RVR.  CT PE protocol was negative for pulmonary embolism but did show multifocal consolidation and groundglass.  I note some of these were present during her recent admission in January but she had worsened consolidation in her right lung.  There were also multiple 6 mm or smaller nodules for which follow-up was recommended.    Shayy does have a penicillin allergy.  She was started on ceftriaxone and azithromycin.  She was given IV metoprolol 5 mg and consideration is being given to starting a  diltiazem drip.  She was also started on a heparin drip in the event that she may require cardioversion to restore sinus rhythm.  She will be admitted for further care for suspected aspiration pneumonia and new diagnosis atrial fibrillation.    Assessment and Plan:   Suspected aspiration pneumonia: Unclear to what extent she is hypoxic as she arrived on 2 L/min and has not yet been removed from this in the ER.  At baseline she uses nocturnal O2 for sleep apnea but not during the day.  She has leukocytosis and cough and certainly the aspiration event a couple of days ago during dinner is suspicious as the inciting process.  -- Admit under inpatient status  -- For now seems reasonable to continue with ceftriaxone and azithromycin.  If not improving over the next day or 2 would consider adding anaerobic coverage.  -- Supportive cares with Tessalon Perles, Mucinex, Tylenol etc.  -- Recheck CBC in the morning.  -- Needs repeat CT scan in 3 months.    2.   New diagnosis atrial fibrillation with RVR: Developed palpitations today.  She did have an echocardiogram in October that showed intact EF so it seems reasonable to continue with diltiazem if needed for rate control.  Given that this is a new process we will repeat an echo and consult cardiology for consideration of restoring sinus rhythm, though would likely make sense to treat her pneumonia for at least a couple of days prior to cardioversion if possible.  Time of onset likely within the past 24 hours as she has been using her pulse oximeter frequently and only noticed this morning that she was tachycardic.  Lower lobe  -- Check echocardiogram  -- Consult cardiology  -- Check and replace mag and K, high protocols ordered  -- Cardiac monitoring  -- Continue heparin drip for now  -- Pharmacy liaison consult for DOAC  --Diltiazem drip as blood pressure allows.  Alternately might need to consider digoxin or amiodarone.    3.   CRISTÓBAL intolerant of CPAP: Continue nocturnal  "oxygen here.    4.   History of hypertension: Pressure is actually low normal after receiving 1 dose of IV metoprolol.  Plan to hold home carvedilol 12.5 mg twice daily pending blood pressure trend with likely initiation of diltiazem drip.  -- Consider transition back to beta-blocker in the morning.  If blood pressure remains marginal would use metoprolol rather than carvedilol.    5.   History of anxiety, depression, insomnia: Awaiting med rec, but I believe she is on as needed clonazepam, gabapentin, Seroquel 100 mg in the evening, sertraline and Ambien 10 mg at night.    6..  Type 2 diabetes: A1c in January was 7.5.  On sole glipizide.  -- Hold glipizide for now  --Sliding scale ordered    7.   Incidental finding of pulmonary nodules, persistent infiltrates: 3-month repeat CT scan recommended.    DVT Prophylaxis: Currently on a heparin drip  Code Status: Full Code  Discharge Dispo: Admit under inpatient status  Medically Ready for Discharge: Anticipated in 2-4 Days      Clinically Significant Risk Factors Present on Admission        # Hypokalemia: Lowest K = 3.2 mmol/L in last 2 days, will replace as needed      # Hypomagnesemia: Lowest Mg = 1.4 mg/dL in last 2 days, will replace as needed       # Hypertension: Noted on problem list          # DMII: A1C = 7.5 % (Ref range: <5.7 %) within past 6 months    # Severe Obesity: Estimated body mass index is 50.09 kg/m  as calculated from the following:    Height as of this encounter: 1.499 m (4' 11\").    Weight as of this encounter: 112.5 kg (248 lb 0.3 oz).                    History of Present Illness:    Nicole Reyes is a 69 year old female with PMH including CRISTÓBAL not on CPAP but on nocturnal oxygen, CKD 3, depression, anxiety, obesity, hypertension, type 2 diabetes and recent admission in early January for hypoxia secondary to influenza A who presents with shortness of breath and cough in the setting of an apparent aspiration episode 2 days prior while eating " some dinner.      She reports that after her admission in January for influenza she had lingering cough though was generally improving.  However, over the past couple of days she has developed severe cough that kept her up all of last night and developed some associated musculoskeletal pains.  She has been using her pulse oximeter frequently to make sure she is not dropping her oxygen saturations and this morning she first noticed that her heart rate was in the 140s which was new.  She denies ever being diagnosed with atrial fibrillation before.  She does not feel definite palpitations and only new she may have been in an abnormal rhythm because of her pulse oximeter.  Denies chest pressure but has had some pain with coughing.    Temp at home was 100 Fahrenheit and she has intermittently felt somewhat feverish and chilled.  She is felt fatigued with a lot of malaise and generally miserable.             Past Medical History:  Past Medical History:   Diagnosis Date    Arthritis     lt shoulder, rt. knee    Blood transfusion     CKD (chronic kidney disease) stage 3, GFR 30-59 ml/min (H)     Depressive disorder     Essential hypertension, benign     Gastroesophageal reflux disease     Generalized anxiety disorder     Hernia, abdominal     Hiatal hernia     History of blood transfusion 2011    with a hernia repair    Hypertension     Insomnia, unspecified     Iron deficiency anemia 08/2009    Major depression     sees a psychiatrist    Menopause     age 53    Obesity, unspecified     CRISTÓBAL (obstructive sleep apnea)     bipap    Other chronic pain     chronic pain lt arm from tendonidits    Oxygen dependent     2 LPM at home-uses at noc or extended walking    Pneumonia     due to aspiration from hiatal hernia-11/8    Pneumonia due to 2019 novel coronavirus 11/08/2021    Postoperative seroma 10/2011    drained several times    Pure hypercholesterolemia     RLS (restless legs syndrome)     Type II or unspecified type diabetes  mellitus without mention of complication, not stated as uncontrolled      Past Surgical History:  Past Surgical History:   Procedure Laterality Date    BREAST BIOPSY, RT/LT      2 times; benign    BREAST SURGERY  ?    Biopsy x 2 Benign     SECTION       SECTION       SECTION      COLONOSCOPY N/A 2020    Procedure: Colonoscopy with biopsy;  Surgeon: Obinna Gutierrez MD;  Location: [facility] OR    DILATION AND CURETTAGE, HYSTEROSCOPY DIAGNOSTIC, COMBINED  2013    Procedure: COMBINED DILATION AND CURETTAGE, HYSTEROSCOPY DIAGNOSTIC;  DILATION AND CURETTAGE, HYSTEROSCOPY DIAGNOSTIC   Converted to a general;  Surgeon: Robi Edwards MD;  Location: [facility] OR    ESOPHAGOSCOPY, GASTROSCOPY, DUODENOSCOPY (EGD), COMBINED N/A 2015    Procedure: COMBINED ESOPHAGOSCOPY, GASTROSCOPY, DUODENOSCOPY (EGD);  Surgeon: Obinna Gutierrez MD;  Location:  GI    ESOPHAGOSCOPY, GASTROSCOPY, DUODENOSCOPY (EGD), COMBINED N/A 2015    Procedure: COMBINED ENDOSCOPIC ULTRASOUND, ESOPHAGOSCOPY, GASTROSCOPY, DUODENOSCOPY (EGD), FINE NEEDLE ASPIRATE/BIOPSY;  Surgeon: Sabine Olivares MD;  Location:  GI    ESOPHAGOSCOPY, GASTROSCOPY, DUODENOSCOPY (EGD), COMBINED N/A 2020    Procedure: ESOPHAGOGASTRODUODENOSCOPY;  Surgeon: Ascencion Stauffer MD;  Location: [facility] OR    HERNIORRHAPHY INCISIONAL (LOCATION)  10/08/2011     incarcerated hernia repair with mesh;    HERNIORRHAPHY INCISIONAL (LOCATION)  10/16/2011     repair incisional hernia with mesh, and removal of current implanted mesh;    [facility] NONSPECIFIC PROCEDURE      Hernia repair     [facility] NONSPECIFIC PROCEDURE      Lymph node biopsy in neck (benign)      Social History:  Social History     Tobacco Use    Smoking status: Former     Current packs/day: 0.00     Average packs/day: 1.5 packs/day for 10.0 years (15.0 ttl pk-yrs)     Types: Cigarettes     Start date: 1974     Quit date: 3/18/1979     Years since quittin.9    Smokeless  tobacco: Never    Tobacco comments:     quit 1979   Substance Use Topics    Alcohol use: Yes     Comment: occasional     Social History     Social History Narrative    Not on file     Family History:  Family History   Problem Relation Age of Onset    Cardiovascular Mother         CHF    Hypertension Mother     C.A.D. Mother         50s    Lipids Mother     Coronary Artery Disease Mother     Depression Mother     Anxiety Disorder Mother     Cancer Father         Hodgkin's, Leukemia    Other Cancer Father         Hodgkins    Hypertension Brother     Diabetes Brother     Coronary Artery Disease Brother     Other Cancer Brother         Hodgkins    Cancer Brother         Hodgkin's    Coronary Artery Disease Brother     Hypertension Brother     Anxiety Disorder Brother     C.A.D. Brother 61    C.A.D. Brother     Sleep Apnea Sister     Diabetes Sister     Coronary Artery Disease Sister     Hypertension Sister     Depression Sister     Anxiety Disorder Sister     Obesity Sister     Connective Tissue Disorder Sister         Lupus    Diabetes Sister     Other Cancer Sister         Leukemia    Depression Sister     Anxiety Disorder Sister     Lipids Sister     Hypertension Sister     Hypertension Sister     Hypertension Sister     Basal cell carcinoma Daughter 38        top of head    Mental Illness Maternal Grandfather     Hypertension Daughter     Depression Daughter     Anxiety Disorder Daughter     Obesity Daughter     Obesity Son     Obesity Daughter      Allergies:  Allergies   Allergen Reactions    Metformin GI Disturbance     High dose    Morphine And Codeine Rash    Penicillins Rash     Pt has tolerated cephalosporins and penems.   Pt tolerated Zosyn 7/6/2019     Medications:  Current Facility-Administered Medications   Medication Dose Route Frequency Provider Last Rate Last Admin    azithromycin (ZITHROMAX) 500 mg in  mL intermittent infusion  500 mg Intravenous Once Shola Christine MD   500 mg at 02/10/25  1747    cefTRIAXone (ROCEPHIN) 2 g vial to attach to  ml bag for ADULTS or NS 50 ml bag for PEDS  2 g Intravenous Once Shola Christine MD   2 g at 02/10/25 1746    diltiazem (CARDIZEM) 125 mg in sodium chloride 0.9 % 125 mL infusion  5-15 mg/hr Intravenous Continuous Shola Christine MD   Held at 02/10/25 1735    heparin 25,000 units in 0.45% NaCl 250 mL ANTICOAGULANT infusion  0-5,000 Units/hr Intravenous Continuous Shola Christine MD 12 mL/hr at 02/10/25 1617 1,200 Units/hr at 02/10/25 1617     Current Outpatient Medications   Medication Sig Dispense Refill    benzonatate (TESSALON) 200 MG capsule Take 1 capsule (200 mg) by mouth 3 times daily as needed for cough. 42 capsule 0    carvedilol (COREG) 12.5 MG tablet Take 1 tablet (12.5 mg) by mouth 2 times daily (with meals) 180 tablet 3    clonazePAM (KLONOPIN) 1 MG tablet Take 1 tablet (1 mg) by mouth 2 times daily as needed for anxiety. 60 to last 30 days 60 tablet 0    Fish Oil-Krill Oil (KRILL & FISH OIL BLEND PO) Take 1 capsule by mouth daily      furosemide (LASIX) 20 MG tablet TAKE 1 TABLET (20 MG) BY MOUTH EVERY DAY AS NEEDED FOR EDEMA 90 tablet 3    gabapentin (NEURONTIN) 300 MG capsule TAKE 3 CAPSULES EVERY AM, 2 CAPSULES MIDDAY AND 3 CAPS AT NIGHT 240 capsule 0    glipiZIDE (GLUCOTROL XL) 10 MG 24 hr tablet TAKE 1 TABLET (10 MG) BY MOUTH DAILY. 90 tablet 1    guaiFENesin (MUCINEX) 600 MG 12 hr tablet Take 2 tablets (1,200 mg) by mouth 2 times daily. 40 tablet 0    Ibuprofen-Acetaminophen 125-250 MG TABS Take 1 tablet by mouth 3 times daily as needed.      ketoconazole (NIZORAL) 2 % external cream Apply to fold areas BID PRN 60 g 5    pantoprazole (PROTONIX) 40 MG EC tablet TAKE 1 TABLET BY MOUTH EVERYDAY AT BEDTIME 90 tablet 2    potassium chloride ER (K-TAB) 20 MEQ CR tablet TAKE 2 TABLETS BY MOUTH EVERY  tablet 1    QUEtiapine (SEROQUEL) 100 MG tablet Take 100 mg by mouth every evening.      rosuvastatin (CRESTOR) 10 MG tablet TAKE 1  "TABLET (10 MG) BY MOUTH DAILY. 90 tablet 0    sertraline (ZOLOFT) 100 MG tablet TAKE 2 TABLETS BY MOUTH EVERY  tablet 3    zolpidem (AMBIEN) 10 MG tablet Take 1 tablet (10 mg) by mouth at bedtime. 30 tablet 0         Review of Systems:  A Comprehensive greater than 10 system review of systems was carried out.  Pertinent positives and negatives are noted above.  Otherwise negative for contributory information.     Physical Exam:  Blood pressure 109/88, pulse 117, temperature 97.8  F (36.6  C), temperature source Temporal, resp. rate 24, height 1.499 m (4' 11\"), weight 112.5 kg (248 lb 0.3 oz), last menstrual period 09/15/2007, SpO2 96%, not currently breastfeeding.  Wt Readings from Last 1 Encounters:   02/10/25 112.5 kg (248 lb 0.3 oz)       Exam:  General: Alert, awake, no acute distress.  HEENT: NC/AT, eyes anicteric, external occular movements intact, face symmetric.    Cardiac: Irregularly irregular, tachycardic.  No murmurs appreciated.  Pulmonary: Normal chest rise, normal work of breathing.  Lungs diminished due to body habitus.  Abdomen: soft, non-tender, non-distended.  Bowel Sounds Present.  No guarding.  Extremities: no deformities.  Warm, well perfused.  Skin: no rashes or lesions noted.  Warm and Dry.  Neuro: No focal deficits noted.  Speech clear.  Coordination and strength grossly normal.  Psych: Appropriate affect.    I have personally reviewed the following data including lab tests and imaging:  EKG: Atrial fibrillation with RVR  Imaging:  Results for orders placed or performed during the hospital encounter of 02/10/25   CT Chest Pulmonary Embolism w Contrast    Narrative    EXAM: CT CHEST PULMONARY EMBOLISM W CONTRAST  LOCATION: [facility]  DATE: 2/10/2025    INDICATION: palpitations, hypoxia, posible aspiration event  COMPARISON: CTA chest performed on 11/29/2024  TECHNIQUE: CT chest pulmonary angiogram during arterial phase injection of IV contrast. Multiplanar " reformats and MIP reconstructions were performed. Dose reduction techniques were used.   CONTRAST: 86mL Isovue 370    FINDINGS:  ANGIOGRAM CHEST: Pulmonary arteries are normal caliber and negative for pulmonary emboli. Thoracic aorta is negative for dissection. No CT evidence of right heart strain.    LUNGS AND PLEURA: Small bilateral pleural effusions are present. Patchy consolidative opacities are seen within the left lung and appear similar in appearance to the prior exam. Patchy groundglass and consolidative opacities in the right middle and lower   lobes appear slightly worsened since the prior exam. A few new lung nodules are also seen including a 5 mm nodule in the right upper lobe (series 7, image 84) as well as a 6 mm nodule in the right middle lobe (series 7, image 137).    MEDIASTINUM/AXILLAE: Stable appearance of multiple prominent mediastinal lymph nodes measuring up to 10 mm in the subcarinal space (series 5, image 36). Heart size appears enlarged.    CORONARY ARTERY CALCIFICATION: Mild.    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 Stable large gallstone within the neck of the gallbladder measuring up to 3.0 cm.    MUSCULOSKELETAL: Multilevel degenerative changes are present in the spine.      Impression    IMPRESSION:  1.  No evidence of pulmonary embolism is seen to the subsegmental level.  2.  Multifocal consolidative and groundglass opacities are present in both lungs. These changes appear unchanged in the left lung and worsened in the right lung. Constellation of findings are suggestive of multifocal pneumonia. Aspiration pneumonia is   also in the differential diagnosis. Recommend 3 month CT chest follow-up exam.  3.  Several new pulmonary nodules are present measuring up to 6 mm in the right middle lobe. These nodules may be in keeping with multifocal infectious process described above. Recommend  attention on 3 month CT chest follow-up exam.  4.  Small bilateral pleural effusions.  5.  Stable mediastinal lymphadenopathy.  6.  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    REFERENCE:  Guidelines for Management of Incidental Pulmonary Nodules Detected on CT Images: From the Fleischner Society 2017.   Guidelines apply to incidental nodules in patients who are 35 years or older.  Guidelines do not apply to lung cancer screening, patients with immunosuppression, or patients with known primary cancer.    MULTIPLE NODULES  Nodule size <6 mm  Low-risk patients: No follow-up needed.  High-risk patients: Optional follow-up at 12 months.    Nodule size 6 mm or larger  Low-risk patients: Follow-up CT at 3-6 months, then consider CT at 18-24 months.  High-risk patients: Follow-up CT at 3-6 months, then at 18-24 months if no change.  -Use most suspicious nodule as guide to management.       *Note: Due to a large number of results and/or encounters for the requested time period, some results have not been displayed. A complete set of results can be found in Results Review.     Labs:  Recent Labs   Lab 02/10/25  1459   WBC 15.7*   HGB 11.6*   HCT 37.2   MCV 76*             Lab Results   Component Value Date     02/10/2025     01/06/2025     01/05/2025     06/02/2021     06/01/2021     05/28/2021    Lab Results   Component Value Date    CHLORIDE 100 02/10/2025    CHLORIDE 100 01/06/2025    CHLORIDE 95 01/05/2025    CHLORIDE 109 06/02/2022    CHLORIDE 105 11/10/2021    CHLORIDE 104 11/09/2021    CHLORIDE 103 06/02/2021    CHLORIDE 105 06/01/2021    CHLORIDE 108 05/28/2021    Lab Results   Component Value Date    BUN 10.5 02/10/2025    BUN 15.0 01/06/2025    BUN 10.5 01/05/2025    BUN 16 06/02/2022    BUN 13 11/10/2021    BUN 14 11/09/2021    BUN 11 06/02/2021    BUN 9 06/01/2021    BUN 10 05/28/2021      Lab  "Results   Component Value Date    POTASSIUM 3.2 02/10/2025    POTASSIUM 4.3 01/08/2025    POTASSIUM 3.8 01/07/2025    POTASSIUM 3.4 06/02/2022    POTASSIUM 3.8 11/11/2021    POTASSIUM 4.1 11/10/2021    POTASSIUM 3.3 06/04/2021    POTASSIUM 3.5 06/03/2021    POTASSIUM 4.0 06/02/2021    Lab Results   Component Value Date    CO2 28 02/10/2025    CO2 24 01/06/2025    CO2 27 01/05/2025    CO2 27 06/02/2022    CO2 29 11/10/2021    CO2 30 11/09/2021    CO2 37 06/02/2021    CO2 34 06/01/2021    CO2 28 05/28/2021    Lab Results   Component Value Date    CR 0.84 02/10/2025    CR 0.75 01/08/2025    CR 1.11 01/06/2025    CR 0.85 06/02/2021    CR 0.79 06/01/2021    CR 0.72 05/28/2021        No results for input(s): \"TSH\" in the last 168 hours.  No results for input(s): \"TROPONIN\", \"TROPI\", \"TROPR\", \"TROPONINIS\" in the last 168 hours.    Invalid input(s): \"TROPT\", \"TROP\", \"TROPONINIES\", \"TNIH\"    I've spent 60 minutes in chart review, ordering medications and tests, obtaining additional history from the EMR and the ER provider, evaluating the patient and in documentation for this encounter.    Arcenio Cifuentes MD  Hospitalist  [facility]          "

## 2025-02-11 NOTE — CONSULTS
[facility]    Cardiology Consultation     Date of Admission:  2/10/2025    Assessment & Plan   Nicole Reyes is a 69 year old female who was admitted on 2/10/2025.    Assessment  Atrial fibrillation RVR, newly diagnosed.  Spontaneously converted to normal sinus rhythm 2/10 ~8 PM.  Discontinue heparin and start Eliquis given elevated [id number] score of 4 (female, age x 1, HTN, DM)  Chronic HFpEF, appears well compensated in the absence of daily diuretic use.  Hypertension, well-controlled  Diabetes mellitus type 2  CRISTÓBAL not on CPAP  Morbid obesity    Recommendations  Medications   -discontinue heparin gtt   -start Eliquis 5 mg twice daily   -Discontinue PTA carvedilol and start Toprol-XL 25 mg twice daily  14-day Zio patch monitor to be completed upon discharge to evaluate for recurrent atrial fibrillation  Outpatient cardiology follow-up, will arrange  Okay to discharge from a cardiology perspective pending results of echocardiogram.  Cardiology will sign off    Светлана Maguire PA-C  [facility]  Pager: [phone]    Primary Care Physician   [facility]    Reason for Consult   Reason for consult: I was asked by Dr. Cifuentes to evaluate this patient for new atrial fibrillation.    History of Present Illness   Nicole Reyes is a 69 year old female who presents with CRISTÓBAL not on CPAP,  HFpEF, morbid obesity, hypertension, diabetes mellitus type 2, depression, and anxiety.      Patient was admitted to [facility] 2/10/2025 with cough and subjective fever/chills.  She had a recent hospitalization in January 2025 for hypoxia secondary to influenza A.  In the ED, she was found to be in atrial fibrillation RVR, started on heparin and diltiazem infusions.  Labs remarkable for elevated WBC, hemoglobin 11.6, potassium 3.2, troponin 25, NT proBNP 1929.  Magnesium low at 1.4.  CT chest was completed that revealed findings suggestive of  multifocal pneumonia versus aspiration pneumonia.  She was started on antibiotics.  Given new atrial fibrillation RVR, cardiology was consulted.  Later that evening, patient spontaneously converted to normal sinus rhythm.    Most recent echocardiogram was completed 10/15/2024, revealed LVEF 55 to 60%, normal RV size and function, and no hemodynamically significant valvular disease.    Presently, patient is resting comfortably in bed.  Denies chest pain, palpitations, lightheadedness, dizziness, shortness of breath.    Past Medical History   Past Medical History:   Diagnosis Date    Arthritis     lt shoulder, rt. knee    Blood transfusion     CKD (chronic kidney disease) stage 3, GFR 30-59 ml/min (H)     Depressive disorder     Essential hypertension, benign     Gastroesophageal reflux disease     Generalized anxiety disorder     Hernia, abdominal     Hiatal hernia     History of blood transfusion     with a hernia repair    Hypertension     Insomnia, unspecified     Iron deficiency anemia 2009    Major depression     sees a psychiatrist    Menopause     age 53    Obesity, unspecified     CRISTÓBAL (obstructive sleep apnea)     bipap    Other chronic pain     chronic pain lt arm from tendonidits    Oxygen dependent     2 LPM at home-uses at noc or extended walking    Pneumonia     due to aspiration from hiatal hernia-    Pneumonia due to 2019 novel coronavirus 2021    Postoperative seroma 10/2011    drained several times    Pure hypercholesterolemia     RLS (restless legs syndrome)     Type II or unspecified type diabetes mellitus without mention of complication, not stated as uncontrolled        Past Surgical History   Past Surgical History:   Procedure Laterality Date    BREAST BIOPSY, RT/LT      2 times; benign    BREAST SURGERY  ?    Biopsy x 2 Benign     SECTION       SECTION       SECTION      COLONOSCOPY N/A 2020    Procedure: Colonoscopy with biopsy;  Surgeon:  Obinna Gutierrze MD;  Location: [facility] OR    DILATION AND CURETTAGE, HYSTEROSCOPY DIAGNOSTIC, COMBINED  03/22/2013    Procedure: COMBINED DILATION AND CURETTAGE, HYSTEROSCOPY DIAGNOSTIC;  DILATION AND CURETTAGE, HYSTEROSCOPY DIAGNOSTIC   Converted to a general;  Surgeon: Robi Edwards MD;  Location: [facility] OR    ESOPHAGOSCOPY, GASTROSCOPY, DUODENOSCOPY (EGD), COMBINED N/A 12/08/2015    Procedure: COMBINED ESOPHAGOSCOPY, GASTROSCOPY, DUODENOSCOPY (EGD);  Surgeon: Obinna Gutierrez MD;  Location: [facility] GI    ESOPHAGOSCOPY, GASTROSCOPY, DUODENOSCOPY (EGD), COMBINED N/A 12/22/2015    Procedure: COMBINED ENDOSCOPIC ULTRASOUND, ESOPHAGOSCOPY, GASTROSCOPY, DUODENOSCOPY (EGD), FINE NEEDLE ASPIRATE/BIOPSY;  Surgeon: Sabine Olivares MD;  Location:  GI    ESOPHAGOSCOPY, GASTROSCOPY, DUODENOSCOPY (EGD), COMBINED N/A 01/03/2020    Procedure: ESOPHAGOGASTRODUODENOSCOPY;  Surgeon: Ascencion Stauffer MD;  Location: [facility] OR    HERNIORRHAPHY INCISIONAL (LOCATION)  10/08/2011     incarcerated hernia repair with mesh;    HERNIORRHAPHY INCISIONAL (LOCATION)  10/16/2011     repair incisional hernia with mesh, and removal of current implanted mesh;    [facility] NONSPECIFIC PROCEDURE      Hernia repair     [facility] NONSPECIFIC PROCEDURE      Lymph node biopsy in neck (benign)        Prior to Admission Medications   Prior to Admission Medications   Prescriptions Last Dose Informant Patient Reported? Taking?   Fish Oil-Krill Oil (KRILL & FISH OIL BLEND PO) Past Week Morning  Yes Yes   Sig: Take 1 capsule by mouth daily   Ibuprofen-Acetaminophen 125-250 MG TABS   Yes Yes   Sig: Take 1 tablet by mouth 3 times daily as needed.   QUEtiapine (SEROQUEL) 50 MG tablet Past Week Bedtime  Yes Yes   Sig: Take 100 mg by mouth every evening.   benzonatate (TESSALON) 200 MG capsule   No Yes   Sig: Take 1 capsule (200 mg) by mouth 3 times daily as needed for cough.   carvedilol (COREG) 12.5 MG tablet Past Week  No Yes   Sig: Take 1 tablet (12.5 mg) by  mouth 2 times daily (with meals)   clonazePAM (KLONOPIN) 1 MG tablet   No Yes   Sig: Take 1 tablet (1 mg) by mouth 2 times daily as needed for anxiety. 60 to last 30 days   furosemide (LASIX) 20 MG tablet   No Yes   Sig: TAKE 1 TABLET (20 MG) BY MOUTH EVERY DAY AS NEEDED FOR EDEMA   gabapentin (NEURONTIN) 300 MG capsule Past Week  No Yes   Sig: TAKE 3 CAPSULES EVERY AM, 2 CAPSULES MIDDAY AND 3 CAPS AT NIGHT   glipiZIDE (GLUCOTROL XL) 10 MG 24 hr tablet Past Week Morning  No Yes   Sig: TAKE 1 TABLET (10 MG) BY MOUTH DAILY.   guaiFENesin (MUCINEX) 600 MG 12 hr tablet Past Week  No Yes   Sig: Take 2 tablets (1,200 mg) by mouth 2 times daily.   ketoconazole (NIZORAL) 2 % external cream   No Yes   Sig: Apply to fold areas BID PRN   pantoprazole (PROTONIX) 40 MG EC tablet Past Week Bedtime  No Yes   Sig: TAKE 1 TABLET BY MOUTH EVERYDAY AT BEDTIME   potassium chloride ER (K-TAB) 20 MEQ CR tablet Past Week Bedtime  No Yes   Sig: TAKE 2 TABLETS BY MOUTH EVERY DAY   rosuvastatin (CRESTOR) 10 MG tablet Past Week Bedtime  No Yes   Sig: TAKE 1 TABLET (10 MG) BY MOUTH DAILY.   sertraline (ZOLOFT) 100 MG tablet Past Week Morning  No Yes   Sig: TAKE 2 TABLETS BY MOUTH EVERY DAY   zolpidem (AMBIEN) 10 MG tablet Past Week Bedtime  No Yes   Sig: Take 1 tablet (10 mg) by mouth at bedtime.      Facility-Administered Medications: None     Current Facility-Administered Medications   Medication Dose Route Frequency Provider Last Rate Last Admin    acetaminophen (TYLENOL) tablet 650 mg  650 mg Oral Q4H PRN Juanpablo Cifuentes MD        Or    acetaminophen (TYLENOL) Suppository 650 mg  650 mg Rectal Q4H PRN Juanpablo Cifuentes MD        azithromycin (ZITHROMAX) tablet 250 mg  250 mg Oral Daily Juanpablo Cifuentes MD   250 mg at 02/11/25 0920    benzocaine-menthol (CHLORASEPTIC) 6-10 MG lozenge 1 lozenge  1 lozenge Buccal Q1H PRN Juanpablo Cifuentes MD        benzonatate (TESSALON) capsule 200 mg  200  mg Oral TID PRN Juanpablo Cifuentes MD   200 mg at 02/10/25 2011    calcium carbonate (TUMS) chewable tablet 1,000 mg  1,000 mg Oral 4x Daily PRN Juanpablo Cifuentes MD        [Held by provider] carvedilol (COREG) tablet 12.5 mg  12.5 mg Oral BID w/meals Juanpablo Cifuentes MD        cefTRIAXone (ROCEPHIN) 2 g vial to attach to  ml bag for ADULTS or NS 50 ml bag for PEDS  2 g Intravenous Q24H Juanpablo Cifuentes MD        clonazePAM (klonoPIN) tablet 1 mg  1 mg Oral BID PRN Juanpablo Cifuentes MD   1 mg at 02/11/25 0254    glucose gel 15-30 g  15-30 g Oral Q15 Min PRN Juanpablo Cifuentes MD        Or    dextrose 50 % injection 25-50 mL  25-50 mL Intravenous Q15 Min PRN Juanpablo Cifuentes MD        Or    glucagon injection 1 mg  1 mg Subcutaneous Q15 Min PRN Juanpablo Cifuentes MD        diltiazem (CARDIZEM) 125 mg in sodium chloride 0.9 % 125 mL infusion  5-15 mg/hr Intravenous Continuous Juanpablo Cifuentes MD   Held at 02/10/25 1735    gabapentin (NEURONTIN) capsule 300 mg  300 mg Oral TID Kelechi Paiz DO        glipiZIDE (GLUCOTROL XL) 24 hr tablet 10 mg  10 mg Oral Daily Kelechi Paiz DO        guaiFENesin (MUCINEX) 12 hr tablet 1,200 mg  1,200 mg Oral BID Juanpablo Cifuentes MD   1,200 mg at 02/11/25 0920    heparin 25,000 units in 0.45% NaCl 250 mL ANTICOAGULANT infusion  0-5,000 Units/hr Intravenous Continuous Juanpablo Cifuentes MD 13.5 mL/hr at 02/11/25 0557 1,350 Units/hr at 02/11/25 0557    insulin aspart (NovoLOG) injection (RAPID ACTING)  1-7 Units Subcutaneous TID AC Juanpablo Cifuentes MD        insulin aspart (NovoLOG) injection (RAPID ACTING)  1-5 Units Subcutaneous At Bedtime Juanpablo Cifuentes MD        lidocaine (LMX4) cream   Topical Q1H PRN Juanpablo Cifuentes MD        lidocaine 1 % 0.1-1 mL  0.1-1 mL Other Q1H PRN Esau  Juanpablo Monge MD        melatonin tablet 1 mg  1 mg Oral At Bedtime PRN Juanpablo Cifuentes MD        ondansetron (ZOFRAN ODT) ODT tab 4 mg  4 mg Oral Q6H PRN Juanpablo Cifuentes MD        Or    ondansetron (ZOFRAN) injection 4 mg  4 mg Intravenous Q6H PRN Juanpablo Cifuentes MD        pantoprazole (PROTONIX) EC tablet 40 mg  40 mg Oral At Bedtime Juanpablo Cifuentes MD   40 mg at 02/10/25 2219    Patient is already receiving anticoagulation with heparin, enoxaparin (LOVENOX), warfarin (COUMADIN)  or other anticoagulant medication   Does not apply Continuous PRN Juanpablo Cifuentes MD        potassium chloride ER (K-TAB) TBCR 20 mEq  20 mEq Oral Daily Kelechi Paiz DO        QUEtiapine (SEROquel) tablet 100 mg  100 mg Oral QPM Kelechi Paiz DO        rosuvastatin (CRESTOR) tablet 10 mg  10 mg Oral Daily Kelechi Paiz DO        senna-docusate (SENOKOT-S/PERICOLACE) 8.6-50 MG per tablet 1 tablet  1 tablet Oral BID PRN Juanpablo Cifuentes MD        Or    senna-docusate (SENOKOT-S/PERICOLACE) 8.6-50 MG per tablet 2 tablet  2 tablet Oral BID PRN Juanpablo Cifuentes MD        sertraline (ZOLOFT) tablet 100 mg  100 mg Oral Daily Kelechi Paiz DO        sodium chloride (PF) 0.9% PF flush 3 mL  3 mL Intracatheter Q8H Juanpablo Cifuentes MD   3 mL at 02/11/25 0921    sodium chloride (PF) 0.9% PF flush 3 mL  3 mL Intracatheter q1 min prn Juanpablo Cifuentes MD        [START ON 2/12/2025] zolpidem (AMBIEN) tablet 10 mg  10 mg Oral At Bedtime Kelechi Paiz DO         Current Facility-Administered Medications   Medication Dose Route Frequency Provider Last Rate Last Admin    acetaminophen (TYLENOL) tablet 650 mg  650 mg Oral Q4H PRN Juanpablo Cifuentes MD        Or    acetaminophen (TYLENOL) Suppository 650 mg  650 mg Rectal Q4H PRN Juanpablo Cifuentes MD        azithromycin  (ZITHROMAX) tablet 250 mg  250 mg Oral Daily Juanpablo Cifuentes MD   250 mg at 02/11/25 0920    benzocaine-menthol (CHLORASEPTIC) 6-10 MG lozenge 1 lozenge  1 lozenge Buccal Q1H PRN Juanpablo Cifuentes MD        benzonatate (TESSALON) capsule 200 mg  200 mg Oral TID PRN Juanpablo Cifuentes MD   200 mg at 02/10/25 2011    calcium carbonate (TUMS) chewable tablet 1,000 mg  1,000 mg Oral 4x Daily PRN Juanpablo Cifuentes MD        [Held by provider] carvedilol (COREG) tablet 12.5 mg  12.5 mg Oral BID w/meals Juanpablo Cifuentes MD        cefTRIAXone (ROCEPHIN) 2 g vial to attach to  ml bag for ADULTS or NS 50 ml bag for PEDS  2 g Intravenous Q24H Juanpablo Cifuentes MD        clonazePAM (klonoPIN) tablet 1 mg  1 mg Oral BID PRN Juanpablo Cifuentes MD   1 mg at 02/11/25 0254    glucose gel 15-30 g  15-30 g Oral Q15 Min PRN Juanpablo Cifuentes MD        Or    dextrose 50 % injection 25-50 mL  25-50 mL Intravenous Q15 Min PRN Juanpablo Cifuentes MD        Or    glucagon injection 1 mg  1 mg Subcutaneous Q15 Min PRN Juanpablo Cifuentes MD        diltiazem (CARDIZEM) 125 mg in sodium chloride 0.9 % 125 mL infusion  5-15 mg/hr Intravenous Continuous Juanpablo Cifuentes MD   Held at 02/10/25 1735    gabapentin (NEURONTIN) capsule 300 mg  300 mg Oral TID Kelechi Paiz DO        glipiZIDE (GLUCOTROL XL) 24 hr tablet 10 mg  10 mg Oral Daily Kelechi Paiz DO        guaiFENesin (MUCINEX) 12 hr tablet 1,200 mg  1,200 mg Oral BID Juanpablo Cifuentes MD   1,200 mg at 02/11/25 0920    heparin 25,000 units in 0.45% NaCl 250 mL ANTICOAGULANT infusion  0-5,000 Units/hr Intravenous Continuous Juanpablo Cifuentes MD 13.5 mL/hr at 02/11/25 0557 1,350 Units/hr at 02/11/25 0557    insulin aspart (NovoLOG) injection (RAPID ACTING)  1-7 Units Subcutaneous TID AC Juanpablo Cifuentes MD         insulin aspart (NovoLOG) injection (RAPID ACTING)  1-5 Units Subcutaneous At Bedtime Juanpablo Cifuentes MD        lidocaine (LMX4) cream   Topical Q1H PRN Juanpablo Cifuentes MD        lidocaine 1 % 0.1-1 mL  0.1-1 mL Other Q1H PRN Juanpablo Cifuentes MD        melatonin tablet 1 mg  1 mg Oral At Bedtime PRN Juanpablo Cifuentes MD        ondansetron (ZOFRAN ODT) ODT tab 4 mg  4 mg Oral Q6H PRN Juanpablo Cifuentes MD        Or    ondansetron (ZOFRAN) injection 4 mg  4 mg Intravenous Q6H PRN Juanpablo Cifuentes MD        pantoprazole (PROTONIX) EC tablet 40 mg  40 mg Oral At Bedtime Juanpablo Cifuentes MD   40 mg at 02/10/25 2219    Patient is already receiving anticoagulation with heparin, enoxaparin (LOVENOX), warfarin (COUMADIN)  or other anticoagulant medication   Does not apply Continuous PRN Juanpablo Cifuentes MD        potassium chloride ER (K-TAB) TBCR 20 mEq  20 mEq Oral Daily Kelechi Paiz DO        QUEtiapine (SEROquel) tablet 100 mg  100 mg Oral QPM Kelechi Paiz DO        rosuvastatin (CRESTOR) tablet 10 mg  10 mg Oral Daily Kelechi Paiz DO        senna-docusate (SENOKOT-S/PERICOLACE) 8.6-50 MG per tablet 1 tablet  1 tablet Oral BID PRN Juanpablo Cifuentes MD        Or    senna-docusate (SENOKOT-S/PERICOLACE) 8.6-50 MG per tablet 2 tablet  2 tablet Oral BID PRN Juanpablo Cifuentes MD        sertraline (ZOLOFT) tablet 100 mg  100 mg Oral Daily Kelechi Paiz DO        sodium chloride (PF) 0.9% PF flush 3 mL  3 mL Intracatheter Q8H Juanpablo Cifuentes MD   3 mL at 02/11/25 0921    sodium chloride (PF) 0.9% PF flush 3 mL  3 mL Intracatheter q1 min prn Juanpablo Cifuentes Saleem, MD        [START ON 2/12/2025] zolpidem (AMBIEN) tablet 10 mg  10 mg Oral At Bedtime Kelechi Paiz, DO         Allergies   Allergies   Allergen Reactions    Metformin GI  Disturbance     High dose    Morphine And Codeine Rash    Penicillins Rash     Pt has tolerated cephalosporins and penems.   Pt tolerated Zosyn 7/6/2019       Social History    reports that she quit smoking about 45 years ago. Her smoking use included cigarettes. She started smoking about 51 years ago. She has a 15 pack-year smoking history. She has never used smokeless tobacco. She reports current alcohol use. She reports that she does not use drugs.    Family History   I have reviewed this patient's family history and updated it with pertinent information if needed.  Family History   Problem Relation Age of Onset    Cardiovascular Mother         CHF    Hypertension Mother     C.A.D. Mother         50s    Lipids Mother     Coronary Artery Disease Mother     Depression Mother     Anxiety Disorder Mother     Cancer Father         Hodgkin's, Leukemia    Other Cancer Father         Hodgkins    Hypertension Brother     Diabetes Brother     Coronary Artery Disease Brother     Other Cancer Brother         Hodgkins    Cancer Brother         Hodgkin's    Coronary Artery Disease Brother     Hypertension Brother     Anxiety Disorder Brother     C.A.D. Brother 61    C.A.D. Brother     Sleep Apnea Sister     Diabetes Sister     Coronary Artery Disease Sister     Hypertension Sister     Depression Sister     Anxiety Disorder Sister     Obesity Sister     Connective Tissue Disorder Sister         Lupus    Diabetes Sister     Other Cancer Sister         Leukemia    Depression Sister     Anxiety Disorder Sister     Lipids Sister     Hypertension Sister     Hypertension Sister     Hypertension Sister     Basal cell carcinoma Daughter 38        top of head    Mental Illness Maternal Grandfather     Hypertension Daughter     Depression Daughter     Anxiety Disorder Daughter     Obesity Daughter     Obesity Son     Obesity Daughter           Review of Systems   A comprehensive review of system was performed and is negative other than  "that noted in the HPI or here.     Physical Exam   Vital Signs with Ranges  Temp:  [97.8  F (36.6  C)-98.7  F (37.1  C)] 98.7  F (37.1  C)  Pulse:  [] 78  Resp:  [18-24] 20  BP: ()/() 121/84  SpO2:  [94 %-99 %] 94 %  Wt Readings from Last 4 Encounters:   02/11/25 113.5 kg (250 lb 4.8 oz)   01/27/25 110.2 kg (243 lb)   01/08/25 110.5 kg (243 lb 11.2 oz)   12/16/24 113.6 kg (250 lb 8 oz)     I/O last 3 completed shifts:  In: 123 [P.O.:120; I.V.:3]  Out: 100 [Urine:100]      Vitals: /84 (BP Location: Right arm)   Pulse 78   Temp 98.7  F (37.1  C) (Oral)   Resp 20   Ht 1.499 m (4' 11\")   Wt 113.5 kg (250 lb 4.8 oz)   LMP 09/15/2007   SpO2 94%   BMI 50.55 kg/m      Physical Exam:   GEN: well nourished, in no acute distress.  HEENT:  Pupils equal, round. Sclerae nonicteric.   NECK: Supple, no masses appreciated.  Unable to accurately assess JVP secondary to body habitus  C/V:  Regular rate and rhythm, no murmur, rub or gallop.    RESP: Respirations are unlabored. Clear to auscultation bilaterally without wheezing, rales, or rhonchi.  GI: Abdomen soft, nontender.  EXTREM: no LE edema.  NEURO: Alert and oriented, cooperative.  SKIN: Warm and dry.     No lab results found in last 7 days.    Invalid input(s): \"TROPONINIES\"    Recent Labs   Lab 02/11/25  0848 02/11/25  0628 02/11/25  0206 02/11/25  0153 02/10/25  2114 02/10/25  1459   WBC  --  9.8  --   --   --  15.7*   HGB  --  9.6*  --   --   --  11.6*   MCV  --  77*  --   --   --  76*   PLT  --  152  --   --   --  222   NA  --  139  --   --   --  140   POTASSIUM  --  3.5  --  3.5  --  3.2*   CHLORIDE  --  103  --   --   --  100   CO2  --  27  --   --   --  28   BUN  --  8.0  --   --   --  10.5   CR  --  0.75  --   --   --  0.84   GFRESTIMATED  --  86  --   --   --  75   ANIONGAP  --  9  --   --   --  12   GRECIA  --  8.2*  --   --   --  8.6*   * 119* 117*  --    < > 149*    < > = values in this interval not displayed.     Recent Labs " "  Lab Test 01/25/24  1519 06/05/23  1408   CHOL 262* 177   HDL 39* 40*   * 104*   TRIG 128 164*     Recent Labs   Lab 02/11/25  0628 02/10/25  1459   WBC 9.8 15.7*   HGB 9.6* 11.6*   HCT 30.8* 37.2   MCV 77* 76*    222     Recent Labs   Lab 02/10/25  1646   PHV 7.34   PO2V 22*   PCO2V 63*   HCO3V 34*     Recent Labs   Lab 02/10/25  1459   NTBNPI 1,929*     No results for input(s): \"DD\" in the last 168 hours.  No results for input(s): \"SED\", \"CRP\" in the last 168 hours.  Recent Labs   Lab 02/11/25  0628 02/10/25  1459    222     No results for input(s): \"TSH\" in the last 168 hours.  Recent Labs   Lab 02/10/25  2217   COLOR Light Yellow   APPEARANCE Clear   URINEGLC Negative   URINEBILI Negative   URINEKETONE Negative   SG 1.010   UBLD Negative   URINEPH 6.5   PROTEIN 10*   NITRITE Negative   LEUKEST Trace*   RBCU 3*   WBCU 16*       Imaging:  Recent Results (from the past 48 hours)   CT Chest Pulmonary Embolism w Contrast    Narrative    EXAM: CT CHEST PULMONARY EMBOLISM W CONTRAST  LOCATION: [facility]  DATE: 2/10/2025    INDICATION: palpitations, hypoxia, posible aspiration event  COMPARISON: CTA chest performed on 11/29/2024  TECHNIQUE: CT chest pulmonary angiogram during arterial phase injection of IV contrast. Multiplanar reformats and MIP reconstructions were performed. Dose reduction techniques were used.   CONTRAST: 86mL Isovue 370    FINDINGS:  ANGIOGRAM CHEST: Pulmonary arteries are normal caliber and negative for pulmonary emboli. Thoracic aorta is negative for dissection. No CT evidence of right heart strain.    LUNGS AND PLEURA: Small bilateral pleural effusions are present. Patchy consolidative opacities are seen within the left lung and appear similar in appearance to the prior exam. Patchy groundglass and consolidative opacities in the right middle and lower   lobes appear slightly worsened since the prior exam. A few new lung nodules are also seen including " a 5 mm nodule in the right upper lobe (series 7, image 84) as well as a 6 mm nodule in the right middle lobe (series 7, image 137).    MEDIASTINUM/AXILLAE: Stable appearance of multiple prominent mediastinal lymph nodes measuring up to 10 mm in the subcarinal space (series 5, image 36). Heart size appears enlarged.    CORONARY ARTERY CALCIFICATION: Mild.    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 Stable large gallstone within the neck of the gallbladder measuring up to 3.0 cm.    MUSCULOSKELETAL: Multilevel degenerative changes are present in the spine.      Impression    IMPRESSION:  1.  No evidence of pulmonary embolism is seen to the subsegmental level.  2.  Multifocal consolidative and groundglass opacities are present in both lungs. These changes appear unchanged in the left lung and worsened in the right lung. Constellation of findings are suggestive of multifocal pneumonia. Aspiration pneumonia is   also in the differential diagnosis. Recommend 3 month CT chest follow-up exam.  3.  Several new pulmonary nodules are present measuring up to 6 mm in the right middle lobe. These nodules may be in keeping with multifocal infectious process described above. Recommend attention on 3 month CT chest follow-up exam.  4.  Small bilateral pleural effusions.  5.  Stable mediastinal lymphadenopathy.  6.  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    REFERENCE:  Guidelines for Management of Incidental Pulmonary Nodules Detected on CT Images: From the Fleischner Society 2017.   Guidelines apply to incidental nodules in patients who are 35 years or older.  Guidelines do not apply to lung cancer screening, patients with immunosuppression, or patients with known primary cancer.    MULTIPLE NODULES  Nodule size <6 mm  Low-risk patients: No follow-up  "needed.  High-risk patients: Optional follow-up at 12 months.    Nodule size 6 mm or larger  Low-risk patients: Follow-up CT at 3-6 months, then consider CT at 18-24 months.  High-risk patients: Follow-up CT at 3-6 months, then at 18-24 months if no change.  -Use most suspicious nodule as guide to management.         Echo:  No results found for this or any previous visit (from the past 4320 hours).    Clinically Significant Risk Factors Present on Admission        # Hypokalemia: Lowest K = 3.2 mmol/L in last 2 days, will replace as needed      # Hypomagnesemia: Lowest Mg = 1.4 mg/dL in last 2 days, will replace as needed       # Hypertension: Noted on problem list      # Anemia: based on hgb <11      # DMII: A1C = 7.5 % (Ref range: <5.7 %) within past 6 months    # Severe Obesity: Estimated body mass index is 50.55 kg/m  as calculated from the following:    Height as of this encounter: 1.499 m (4' 11\").    Weight as of this encounter: 113.5 kg (250 lb 4.8 oz).             Cardiac Arrhythmia: Atrial fibrillation: Unspecified    Hypokalemia    Pneumonia    Chronic Fatigue and Other Debilities: Chronic fatigue, unspecified  Other reduced mobility                 "

## 2025-02-11 NOTE — CONSULTS
Care Management Initial Consult    General Information  Assessment completed with: Patient,    Type of CM/SW Visit: Initial Assessment    Primary Care Provider verified and updated as needed: Yes   Readmission within the last 30 days: no previous admission in last 30 days      Reason for Consult: care coordination/care conference, discharge planning    Communication Assessment  Patient's communication style: spoken language (English or Bilingual)    Hearing Difficulty or Deaf: no   Wear Glasses or Blind: yes    Cognitive  Cognitive/Neuro/Behavioral: WDL                      Living Environment:   People in home: child(mariama), adult     Current living Arrangements: house      Able to return to prior arrangements: yes       Family/Social Support:  Care provided by: child(mariama)  Provides care for: no one  Marital Status:   Support system: Children          Description of Support System: Supportive, Involved    Support Assessment: Adequate family and caregiver support    Current Resources:   Patient receiving home care services: Yes  Skilled Home Care Services: Skilled Nursing, Physical Therapy, Home Health Aid     Community Resources: DME  Equipment currently used at home: walker, rolling, grab bar, tub/shower  Supplies currently used at home: Oxygen Tubing/Supplies      Lifestyle & Psychosocial Needs:  Social Drivers of Health     Food Insecurity: Low Risk  (2/10/2025)    Food Insecurity     Within the past 12 months, did you worry that your food would run out before you got money to buy more?: No     Within the past 12 months, did the food you bought just not last and you didn t have money to get more?: No   Depression: Not at risk (1/27/2025)    PHQ-2     PHQ-2 Score: 0   Housing Stability: Low Risk  (2/10/2025)    Housing Stability     Do you have housing? : Yes     Are you worried about losing your housing?: No   Tobacco Use: Medium Risk (1/27/2025)    Patient History     Smoking Tobacco Use: Former      Smokeless Tobacco Use: Never     Passive Exposure: Not on file   Financial Resource Strain: Low Risk  (2/10/2025)    Financial Resource Strain     Within the past 12 months, have you or your family members you live with been unable to get utilities (heat, electricity) when it was really needed?: No   Alcohol Use: Not on file   Transportation Needs: Low Risk  (2/10/2025)    Transportation Needs     Within the past 12 months, has lack of transportation kept you from medical appointments, getting your medicines, non-medical meetings or appointments, work, or from getting things that you need?: No   Physical Activity: Not on file   Interpersonal Safety: Low Risk  (1/5/2025)    Interpersonal Safety     Do you feel physically and emotionally safe where you currently live?: Yes     Within the past 12 months, have you been hit, slapped, kicked or otherwise physically hurt by someone?: No     Within the past 12 months, have you been humiliated or emotionally abused in other ways by your partner or ex-partner?: No   Stress: Stress Concern Present (8/10/2020)    [employer] - Occupational Stress Questionnaire     Feeling of Stress : To some extent   Social Connections: Unknown (8/10/2020)    Social Connection and Isolation Panel [NHANES]     Frequency of Communication with Friends and Family: More than three times a week     Frequency of Social Gatherings with Friends and Family: Not on file     Attends Christianity Services: Not on file     Active Member of Clubs or Organizations: Not on file     Attends Club or Organization Meetings: Not on file     Marital Status: Not on file   Health Literacy: Not on file       Functional Status:  Prior to admission patient needed assistance:   Dependent ADLs:: Ambulation-walker, Bathing  Dependent IADLs:: Cleaning, Laundry, Transportation                Discussed  Partnership in Safe Discharge Planning  document with patient/family: No    Additional  Information:  CM consulted for elevated risk score and discharge planning needs. Patient was admitted from home with PNA, afib and URR 34%. She is being followed by Cardiology and will be assessed by Physical Therapy prior to discharge.    Met with patient at bedside to discuss home services and discharge planning. She verified that she lives at home with her daughter in [place]. She has [facility] for RN/Physical Therapy/HHA services. She uses home O2 at 2L at . Her oxygen supplies are through [facility]. She has a walker that she uses for ambulating. Her daughter works day and evening hours but assists her with any needs while home. Patient manages her own medications and cooking. She has a cleaning service that comes in once in [facility]. She anticipates she will discharge back home with continued HC services on discharge. Explained CM role and that we would continue to follow for discharge planning needs, reach out to [facility] to update them on hospitalization and make sure she has needed orders to continue HC on discharge. Daughter will transport home on discharge.    Patient verifies that her PCP is Iwona Andrea NP at [facility] in [place]. She prefers to make her own appts as she has other appts she needs to schedule around. Updated [facility] of patient's admission.     Next Steps: follow for discharge planning and resumption of home care on discharge            Shellie Loredo RN BSN CM  Inpatient Care Coordination  [facility]  [phone]

## 2025-02-11 NOTE — PHARMACY-ADMISSION MEDICATION HISTORY
Pharmacy Intern Admission Medication History    Admission medication history is complete. The information provided in this note is only as accurate as the sources available at the time of the update.    Information Source(s): Patient and CareEverywhere/SureScripts via in-person    Pertinent Information: None     Changes made to PTA medication list:  Added: None  Deleted: None  Changed: Quetiapine 100mg tablet: 1 tab HS -> 50mg tablet: 2tabs HS    Allergies reviewed with patient and updates made in EHR: yes    Medication History Completed By: Qing Gomes [facility] 2/10/2025 6:53 PM    PTA Med List   Medication Sig Last Dose/Taking    benzonatate (TESSALON) 200 MG capsule Take 1 capsule (200 mg) by mouth 3 times daily as needed for cough. Taking As Needed    carvedilol (COREG) 12.5 MG tablet Take 1 tablet (12.5 mg) by mouth 2 times daily (with meals) Past Week    clonazePAM (KLONOPIN) 1 MG tablet Take 1 tablet (1 mg) by mouth 2 times daily as needed for anxiety. 60 to last 30 days Taking As Needed    Fish Oil-Krill Oil (KRILL & FISH OIL BLEND PO) Take 1 capsule by mouth daily Past Week Morning    furosemide (LASIX) 20 MG tablet TAKE 1 TABLET (20 MG) BY MOUTH EVERY DAY AS NEEDED FOR EDEMA Taking    gabapentin (NEURONTIN) 300 MG capsule TAKE 3 CAPSULES EVERY AM, 2 CAPSULES MIDDAY AND 3 CAPS AT NIGHT Past Week    glipiZIDE (GLUCOTROL XL) 10 MG 24 hr tablet TAKE 1 TABLET (10 MG) BY MOUTH DAILY. Past Week Morning    guaiFENesin (MUCINEX) 600 MG 12 hr tablet Take 2 tablets (1,200 mg) by mouth 2 times daily. Past Week    Ibuprofen-Acetaminophen 125-250 MG TABS Take 1 tablet by mouth 3 times daily as needed. Taking As Needed    ketoconazole (NIZORAL) 2 % external cream Apply to fold areas BID PRN Taking    pantoprazole (PROTONIX) 40 MG EC tablet TAKE 1 TABLET BY MOUTH EVERYDAY AT BEDTIME Past Week Bedtime    potassium chloride ER (K-TAB) 20 MEQ CR tablet TAKE 2 TABLETS BY MOUTH EVERY DAY Past Week Bedtime    QUEtiapine (SEROQUEL)  50 MG tablet Take 100 mg by mouth every evening. Past Week Bedtime    rosuvastatin (CRESTOR) 10 MG tablet TAKE 1 TABLET (10 MG) BY MOUTH DAILY. Past Week Bedtime    sertraline (ZOLOFT) 100 MG tablet TAKE 2 TABLETS BY MOUTH EVERY DAY Past Week Morning    zolpidem (AMBIEN) 10 MG tablet Take 1 tablet (10 mg) by mouth at bedtime. Past Week Bedtime

## 2025-02-11 NOTE — PLAN OF CARE
"Goal Outcome Evaluation:      Plan of Care Reviewed With: patient    Overall Patient Progress: improvingOverall Patient Progress: improving    Outcome Evaluation: VSS. Denies pain. Tele NSR converted at 8pm last night per tele tech. Heparin running 1350u/hr. Hepxa lab check at 1038. Glucose stable. Plan for echo      Problem: Adult Inpatient Plan of Care  Goal: Plan of Care Review  Description: The Plan of Care Review/Shift note should be completed every shift.  The Outcome Evaluation is a brief statement about your assessment that the patient is improving, declining, or no change.  This information will be displayed automatically on your shift  note.  Outcome: Progressing  Flowsheets (Taken 2/11/2025 0653)  Outcome Evaluation: VSS. Denies pain. Tele NSR converted at 8pm last night per tele tech. Heparin running 1350u/hr. Hepxa lab check at 1038. Glucose stable. Plan for echo  Plan of Care Reviewed With: patient  Overall Patient Progress: improving  Goal: Patient-Specific Goal (Individualized)  Description: You can add care plan individualizations to a care plan. Examples of Individualization might be:  \"Parent requests to be called daily at 9am for status\", \"I have a hard time hearing out of my right ear\", or \"Do not touch me to wake me up as it startles  me\".  Outcome: Progressing  Goal: Absence of Hospital-Acquired Illness or Injury  Outcome: Progressing  Intervention: Identify and Manage Fall Risk  Recent Flowsheet Documentation  Taken 2/11/2025 0100 by Tatiana Linn RN  Safety Promotion/Fall Prevention:   activity supervised   assistive device/personal items within reach   safety round/check completed  Intervention: Prevent Skin Injury  Recent Flowsheet Documentation  Taken 2/11/2025 0100 by Tatiana Linn RN  Body Position: position changed independently  Intervention: Prevent and Manage VTE (Venous Thromboembolism) Risk  Recent Flowsheet Documentation  Taken 2/11/2025 0100 by [place], " Tatiana MANRIQUEZ RN  VTE Prevention/Management: SCDs off (sequential compression devices)  Goal: Optimal Comfort and Wellbeing  Outcome: Progressing  Goal: Readiness for Transition of Care  Outcome: Progressing     Problem: Comorbidity Management  Goal: Maintenance of Asthma Control  Outcome: Progressing  Intervention: Maintain Asthma Symptom Control  Recent Flowsheet Documentation  Taken 2/11/2025 0100 by Tatiana Linn RN  Medication Review/Management: medications reviewed  Goal: Maintenance of Behavioral Health Symptom Control  Outcome: Progressing  Intervention: Maintain Behavioral Health Symptom Control  Recent Flowsheet Documentation  Taken 2/11/2025 0100 by Tatiana Linn RN  Medication Review/Management: medications reviewed  Goal: Maintenance of COPD Symptom Control  Outcome: Progressing  Intervention: Maintain COPD Symptom Control  Recent Flowsheet Documentation  Taken 2/11/2025 0100 by Tatiana Linn RN  Medication Review/Management: medications reviewed  Goal: Blood Glucose Levels Within Targeted Range  Outcome: Progressing  Intervention: Monitor and Manage Glycemia  Recent Flowsheet Documentation  Taken 2/11/2025 0100 by Tatiana Linn RN  Medication Review/Management: medications reviewed  Goal: Maintenance of Heart Failure Symptom Control  Outcome: Progressing  Intervention: Maintain Heart Failure Management  Recent Flowsheet Documentation  Taken 2/11/2025 0100 by Tatiana Linn RN  Medication Review/Management: medications reviewed  Goal: Blood Pressure in Desired Range  Outcome: Progressing  Intervention: Maintain Blood Pressure Management  Recent Flowsheet Documentation  Taken 2/11/2025 0100 by Tatiana Linn RN  Medication Review/Management: medications reviewed  Goal: Maintenance of Osteoarthritis Symptom Control  Outcome: Progressing  Intervention: Maintain Osteoarthritis Symptom Control  Recent Flowsheet Documentation  Taken 2/11/2025 0100 by  Tatiana Linn RN  Assistive Device Utilized:   walker   gait belt  Activity Management: ambulated to bathroom  Medication Review/Management: medications reviewed  Goal: Bariatric Home Regimen Maintained  Outcome: Progressing  Intervention: Maintain and Manage Postbariatric Surgery Care  Recent Flowsheet Documentation  Taken 2/11/2025 0100 by Tatiana Linn RN  Medication Review/Management: medications reviewed  Goal: Maintenance of Seizure Control  Outcome: Progressing  Intervention: Maintain Seizure Symptom Control  Recent Flowsheet Documentation  Taken 2/11/2025 0100 by Tatiana Linn RN  Medication Review/Management: medications reviewed     Problem: Dysrhythmia  Goal: Normalized Cardiac Rhythm  Outcome: Progressing  Intervention: Monitor and Manage Cardiac Rhythm Effect  Recent Flowsheet Documentation  Taken 2/11/2025 0100 by Tatiana Linn RN  VTE Prevention/Management: SCDs off (sequential compression devices)     Problem: Pneumonia  Goal: Fluid Balance  Outcome: Progressing  Goal: Resolution of Infection Signs and Symptoms  Outcome: Progressing  Goal: Effective Oxygenation and Ventilation  Outcome: Progressing  Intervention: Promote Airway Secretion Clearance  Recent Flowsheet Documentation  Taken 2/11/2025 0100 by Tatiana Linn RN  Activity Management: ambulated to bathroom

## 2025-02-11 NOTE — PLAN OF CARE
"AO x4. Denies pain. On heparin drip running 1200 unit(s)/hr, in goal range once, recheck 04:44. Dilt ordered but held by provider as BP allows. Tele: A fib RVR initially, soon sustained A fib CVR HR 70s-80s avg. On K protocol recheck 02:00. On Kg protocol, recheck 04:00. On 3L O2 NC. B. PRN tessalon given for cough. Urine sample collected and sent. Up Assist x1 - SBA w/ gb and walker. Plan: echo, cardiology consult          Goal Outcome Evaluation:      Plan of Care Reviewed With: patient    Overall Patient Progress: no changeOverall Patient Progress: no change    Outcome Evaluation: Hep running. K, Mg replaced. Tele: was initially A fib RVR, turned A fib CVR later in shift.      Problem: Adult Inpatient Plan of Care  Goal: Plan of Care Review  Description: The Plan of Care Review/Shift note should be completed every shift.  The Outcome Evaluation is a brief statement about your assessment that the patient is improving, declining, or no change.  This information will be displayed automatically on your shift  note.  Outcome: Progressing  Flowsheets (Taken 2/10/2025 2259)  Outcome Evaluation: Hep running. K, Mg replaced. Tele: was initially A fib RVR, turned A fib CVR later in shift.  Plan of Care Reviewed With: patient  Overall Patient Progress: no change  Goal: Patient-Specific Goal (Individualized)  Description: You can add care plan individualizations to a care plan. Examples of Individualization might be:  \"Parent requests to be called daily at 9am for status\", \"I have a hard time hearing out of my right ear\", or \"Do not touch me to wake me up as it startles  me\".  Outcome: Progressing  Goal: Absence of Hospital-Acquired Illness or Injury  Outcome: Progressing  Intervention: Identify and Manage Fall Risk  Recent Flowsheet Documentation  Taken 2/10/2025 1952 by Lizy Green RN  Safety Promotion/Fall Prevention:   supervised activity   safety round/check completed   room organization consistent   nonskid " shoes/slippers when out of bed   lighting adjusted  Intervention: Prevent Skin Injury  Recent Flowsheet Documentation  Taken 2/10/2025 1952 by Lizy Green RN  Body Position: position changed independently  Intervention: Prevent Infection  Recent Flowsheet Documentation  Taken 2/10/2025 1952 by Lizy Green RN  Infection Prevention:   rest/sleep promoted   single patient room provided  Goal: Optimal Comfort and Wellbeing  Outcome: Progressing  Goal: Readiness for Transition of Care  Outcome: Progressing  Intervention: Mutually Develop Transition Plan  Recent Flowsheet Documentation  Taken 2/10/2025 2020 by Lizy Green RN  Equipment Currently Used at Home:   walker, rolling   grab bar, tub/shower   glucometer     Problem: Comorbidity Management  Goal: Maintenance of Asthma Control  Outcome: Progressing  Intervention: Maintain Asthma Symptom Control  Recent Flowsheet Documentation  Taken 2/10/2025 1952 by Lizy Green RN  Medication Review/Management: medications reviewed  Goal: Maintenance of Behavioral Health Symptom Control  Outcome: Progressing  Intervention: Maintain Behavioral Health Symptom Control  Recent Flowsheet Documentation  Taken 2/10/2025 1952 by Lizy Green RN  Medication Review/Management: medications reviewed  Goal: Maintenance of COPD Symptom Control  Outcome: Progressing  Intervention: Maintain COPD Symptom Control  Recent Flowsheet Documentation  Taken 2/10/2025 1952 by Lizy Green RN  Medication Review/Management: medications reviewed  Goal: Blood Glucose Levels Within Targeted Range  Outcome: Progressing  Intervention: Monitor and Manage Glycemia  Recent Flowsheet Documentation  Taken 2/10/2025 1952 by Lizy Green RN  Medication Review/Management: medications reviewed  Goal: Maintenance of Heart Failure Symptom Control  Outcome: Progressing  Intervention: Maintain Heart Failure Management  Recent Flowsheet Documentation  Taken 2/10/2025 1952 by Lizy Green RN  Medication  Review/Management: medications reviewed  Goal: Blood Pressure in Desired Range  Outcome: Progressing  Intervention: Maintain Blood Pressure Management  Recent Flowsheet Documentation  Taken 2/10/2025 1952 by Lizy Green RN  Medication Review/Management: medications reviewed  Goal: Maintenance of Osteoarthritis Symptom Control  Outcome: Progressing  Intervention: Maintain Osteoarthritis Symptom Control  Recent Flowsheet Documentation  Taken 2/10/2025 1952 by Lizy Green RN  Assistive Device Utilized:   gait belt   walker  Activity Management: activity adjusted per tolerance  Medication Review/Management: medications reviewed  Goal: Bariatric Home Regimen Maintained  Outcome: Progressing  Intervention: Maintain and Manage Postbariatric Surgery Care  Recent Flowsheet Documentation  Taken 2/10/2025 1952 by Lizy Green RN  Medication Review/Management: medications reviewed  Goal: Maintenance of Seizure Control  Outcome: Progressing  Intervention: Maintain Seizure Symptom Control  Recent Flowsheet Documentation  Taken 2/10/2025 1952 by Lizy Green RN  Medication Review/Management: medications reviewed     Problem: Dysrhythmia  Goal: Normalized Cardiac Rhythm  Outcome: Progressing     Problem: Pneumonia  Goal: Fluid Balance  Outcome: Progressing  Goal: Resolution of Infection Signs and Symptoms  Outcome: Progressing  Goal: Effective Oxygenation and Ventilation  Outcome: Progressing  Intervention: Promote Airway Secretion Clearance  Recent Flowsheet Documentation  Taken 2/10/2025 1952 by Lizy Green RN  Cough And Deep Breathing: done independently per patient  Activity Management: activity adjusted per tolerance  Intervention: Optimize Oxygenation and Ventilation  Recent Flowsheet Documentation  Taken 2/10/2025 1952 by Lizy Green RN  Head of Bed (HOB) Positioning: HOB at 20-30 degrees

## 2025-02-11 NOTE — ED NOTES
[facility]  ED Nurse Handoff Report    ED Chief complaint: Shortness of Breath  . ED Diagnosis:   Final diagnoses:   Atrial fibrillation with RVR (H)   Pneumonia of both lungs due to infectious organism, unspecified part of lung       Allergies:   Allergies   Allergen Reactions    Metformin GI Disturbance     High dose    Morphine And Codeine Rash    Penicillins Rash     Pt has tolerated cephalosporins and penems.   Pt tolerated Zosyn 7/6/2019       Code Status: Full Code    Activity level - Baseline/Home:  independent.  Activity Level - Current:   standby.   Lift room needed: No.   Bariatric: No   Needed: No   Isolation: Yes .   Infection: droplet, pna    Respiratory status: Nasal cannula 2 LPM; baseline 2 LPM with HS, pt becomes hypoxic without 2 LPM currently    Vital Signs (within 30 minutes):   Vitals:    02/10/25 1718 02/10/25 1719 02/10/25 1720 02/10/25 1721   BP:       Pulse: 102 (!) 156 114 117   Resp:       Temp:       TempSrc:       SpO2: 96% 97% 97% 96%   Weight:       Height:           Cardiac Rhythm:  ,      Pain level:    Patient confused: No.   Patient Falls Risk: nonskid shoes/slippers when out of bed and patient and family education.   Elimination Status: Has voided pure wic    Patient Report - Initial Complaint: SOB.   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  Patient recalls eating a soft croissant/taco with meat inside and had a possible aspiration event.  Since then she has had increased shortness of breath and chest discomfort and low-grade fever.  Yesterday evening and today while checking her oxygen level, the heart rate was elevated.  She cannot feel palpitations.  No leg swelling  or history of DVT/PE.  No vomiting or diarrhea.   1630  Respiratory  JJ    RespiratoryAirway WDL: WDL  Respiratory WDLRespiratory WDL: .WDL except (2 LPM baseline at HS, SOB with O2 85% RA. Frequent loose non productive (swalloed) sputum. Afebrile.)        1631  Cardiac  JJ    Cardiac (Adult)Cardiac WDL: .WDL except (Afib RVR. Denies CP, SOB)        Abnormal Results:   Labs Ordered and Resulted from Time of ED Arrival to Time of ED Departure   BASIC METABOLIC PANEL - Abnormal       Result Value    Sodium 140      Potassium 3.2 (*)     Chloride 100      Carbon Dioxide (CO2) 28      Anion Gap 12      Urea Nitrogen 10.5      Creatinine 0.84      GFR Estimate 75      Calcium 8.6 (*)     Glucose 149 (*)    TROPONIN T, HIGH SENSITIVITY - Abnormal    Troponin T, High Sensitivity 25 (*)    CBC WITH PLATELETS AND DIFFERENTIAL - Abnormal    WBC Count 15.7 (*)     RBC Count 4.93      Hemoglobin 11.6 (*)     Hematocrit 37.2      MCV 76 (*)     MCH 23.5 (*)     MCHC 31.2 (*)     RDW 18.3 (*)     Platelet Count 222      % Neutrophils 83      % Lymphocytes 11      % Monocytes 5      % Eosinophils 1      % Basophils 0      % Immature Granulocytes 1      NRBCs per 100 WBC 0      Absolute Neutrophils 13.0 (*)     Absolute Lymphocytes 1.7      Absolute Monocytes 0.8      Absolute Eosinophils 0.1      Absolute Basophils 0.0      Absolute Immature Granulocytes 0.1      Absolute NRBCs 0.0     BLOOD GAS VENOUS - Abnormal    pH Venous 7.34      pCO2 Venous 63 (*)     pO2 Venous 22 (*)     Bicarbonate Venous 34 (*)     Base Excess/Deficit Venous 6.9 (*)     FIO2 3      Oxyhemoglobin Venous 30 (*)     O2 Sat, Venous 30.4 (*)    NT PROBNP INPATIENT - Abnormal    N terminal Pro BNP Inpatient 1,929 (*)    MAGNESIUM - Abnormal    Magnesium 1.4 (*)    TROPONIN T, HIGH SENSITIVITY - Abnormal    Troponin T, High Sensitivity 21 (*)    INFLUENZA A/B, RSV AND SARS-COV2 PCR - Normal    Influenza A PCR Negative      Influenza B PCR Negative      RSV PCR  Negative      SARS CoV2 PCR Negative     LACTIC ACID WHOLE BLOOD - Normal    Lactic Acid 1.4     ROUTINE UA WITH MICROSCOPIC REFLEX TO CULTURE   BLOOD CULTURE   BLOOD CULTURE        CT Chest Pulmonary Embolism w Contrast   Final Result   IMPRESSION:   1.  No evidence of pulmonary embolism is seen to the subsegmental level.   2.  Multifocal consolidative and groundglass opacities are present in both lungs. These changes appear unchanged in the left lung and worsened in the right lung. Constellation of findings are suggestive of multifocal pneumonia. Aspiration pneumonia is    also in the differential diagnosis. Recommend 3 month CT chest follow-up exam.   3.  Several new pulmonary nodules are present measuring up to 6 mm in the right middle lobe. These nodules may be in keeping with multifocal infectious process described above. Recommend attention on 3 month CT chest follow-up exam.   4.  Small bilateral pleural effusions.   5.  Stable mediastinal lymphadenopathy.   6.  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      REFERENCE:   Guidelines for Management of Incidental Pulmonary Nodules Detected on CT Images: From the Fleischner Society 2017.    Guidelines apply to incidental nodules in patients who are 35 years or older.   Guidelines do not apply to lung cancer screening, patients with immunosuppression, or patients with known primary cancer.      MULTIPLE NODULES   Nodule size <6 mm   Low-risk patients: No follow-up needed.   High-risk patients: Optional follow-up at 12 months.      Nodule size 6 mm or larger   Low-risk patients: Follow-up CT at 3-6 months, then consider CT at 18-24 months.   High-risk patients: Follow-up CT at 3-6 months, then at 18-24 months if no change.   -Use most suspicious nodule as guide to management.             Treatments provided: heparin gtt, see MAR, continuous O2 2 LPM  Family Comments: per pt., dtr went  home  OBS brochure/video discussed/provided to patient:  No  ED Medications:   Medications   heparin 25,000 units in 0.45% NaCl 250 mL ANTICOAGULANT infusion (1,200 Units/hr Intravenous $New Bag 2/10/25 1617)   diltiazem (CARDIZEM) 125 mg in sodium chloride 0.9 % 125 mL infusion (0 mg/hr Intravenous Hold 2/10/25 1735)   metoprolol (LOPRESSOR) injection 5 mg (5 mg Intravenous $Given 2/10/25 1546)   sodium chloride 0.9% BOLUS 1,000 mL (0 mLs Intravenous Stopped 2/10/25 1748)   heparin ANTICOAGULANT loading dose for  LOW INTENSITY TREATMENT* Give BEFORE starting heparin infusion (6,000 Units Intravenous $Given 2/10/25 1617)   magnesium sulfate 2 g in 50 mL sterile water intermittent infusion (0 g Intravenous Stopped 2/10/25 1722)   iopamidol (ISOVUE-370) solution 500 mL (86 mLs Intravenous $Given 2/10/25 1559)   CT Scan Flush (100 mLs Intravenous $Given 2/10/25 1559)   cefTRIAXone (ROCEPHIN) 2 g vial to attach to  ml bag for ADULTS or NS 50 ml bag for PEDS (2 g Intravenous $New Bag 2/10/25 1746)   azithromycin (ZITHROMAX) 500 mg in  mL intermittent infusion (500 mg Intravenous $New Bag 2/10/25 1747)       Drips infusing:  Yes  For the majority of the shift this patient was Green.   Interventions performed were n/a.    Sepsis treatment initiated: No    Cares/treatment/interventions/medications to be completed following ED care: none    ED Nurse Name: Purnima Drake RN  6:51 PM    RECEIVING UNIT ED HANDOFF REVIEW    Above ED Nurse Handoff Report was reviewed: Yes  Reviewed by: Lizy Green RN on February 10, 2025 at 7:20 PM   ANDIE Arias called the ED to inform them the note was read: No

## 2025-02-12 ENCOUNTER — ORDERS ONLY (AUTO-RELEASED) (OUTPATIENT)
Dept: MEDSURG UNIT | Facility: CLINIC | Age: 70
End: 2025-02-12

## 2025-02-12 DIAGNOSIS — I48.91 ATRIAL FIBRILLATION WITH RVR (H): ICD-10-CM

## 2025-02-12 LAB
ANION GAP SERPL CALCULATED.3IONS-SCNC: 10 MMOL/L (ref 7–15)
BACTERIA UR CULT: NORMAL
BUN SERPL-MCNC: 7.2 MG/DL (ref 8–23)
CALCIUM SERPL-MCNC: 8.3 MG/DL (ref 8.8–10.4)
CHLORIDE SERPL-SCNC: 104 MMOL/L (ref 98–107)
CREAT SERPL-MCNC: 0.74 MG/DL (ref 0.51–0.95)
EGFRCR SERPLBLD CKD-EPI 2021: 87 ML/MIN/1.73M2
ERYTHROCYTE [DISTWIDTH] IN BLOOD BY AUTOMATED COUNT: 18.3 % (ref 10–15)
GLUCOSE BLDC GLUCOMTR-MCNC: 114 MG/DL (ref 70–99)
GLUCOSE BLDC GLUCOMTR-MCNC: 172 MG/DL (ref 70–99)
GLUCOSE BLDC GLUCOMTR-MCNC: 182 MG/DL (ref 70–99)
GLUCOSE BLDC GLUCOMTR-MCNC: 82 MG/DL (ref 70–99)
GLUCOSE BLDC GLUCOMTR-MCNC: 89 MG/DL (ref 70–99)
GLUCOSE SERPL-MCNC: 98 MG/DL (ref 70–99)
HCO3 SERPL-SCNC: 25 MMOL/L (ref 22–29)
HCT VFR BLD AUTO: 31.4 % (ref 35–47)
HGB BLD-MCNC: 9.5 G/DL (ref 11.7–15.7)
MAGNESIUM SERPL-MCNC: 1.6 MG/DL (ref 1.7–2.3)
MCH RBC QN AUTO: 23.6 PG (ref 26.5–33)
MCHC RBC AUTO-ENTMCNC: 30.3 G/DL (ref 31.5–36.5)
MCV RBC AUTO: 78 FL (ref 78–100)
PLATELET # BLD AUTO: 173 10E3/UL (ref 150–450)
POTASSIUM SERPL-SCNC: 3.8 MMOL/L (ref 3.4–5.3)
RBC # BLD AUTO: 4.03 10E6/UL (ref 3.8–5.2)
SODIUM SERPL-SCNC: 139 MMOL/L (ref 135–145)
WBC # BLD AUTO: 9.1 10E3/UL (ref 4–11)

## 2025-02-12 PROCEDURE — 84132 ASSAY OF SERUM POTASSIUM: CPT | Performed by: INTERNAL MEDICINE

## 2025-02-12 PROCEDURE — 36415 COLL VENOUS BLD VENIPUNCTURE: CPT | Performed by: INTERNAL MEDICINE

## 2025-02-12 PROCEDURE — 120N000001 HC R&B MED SURG/OB

## 2025-02-12 PROCEDURE — 250N000013 HC RX MED GY IP 250 OP 250 PS 637: Performed by: INTERNAL MEDICINE

## 2025-02-12 PROCEDURE — 83735 ASSAY OF MAGNESIUM: CPT | Performed by: INTERNAL MEDICINE

## 2025-02-12 PROCEDURE — 99232 SBSQ HOSP IP/OBS MODERATE 35: CPT | Performed by: INTERNAL MEDICINE

## 2025-02-12 PROCEDURE — 250N000011 HC RX IP 250 OP 636: Performed by: INTERNAL MEDICINE

## 2025-02-12 PROCEDURE — 80048 BASIC METABOLIC PNL TOTAL CA: CPT | Performed by: INTERNAL MEDICINE

## 2025-02-12 PROCEDURE — 85014 HEMATOCRIT: CPT | Performed by: INTERNAL MEDICINE

## 2025-02-12 RX ORDER — CEFUROXIME AXETIL 500 MG/1
500 TABLET ORAL EVERY 12 HOURS SCHEDULED
Status: DISCONTINUED | OUTPATIENT
Start: 2025-02-13 | End: 2025-02-14 | Stop reason: HOSPADM

## 2025-02-12 RX ORDER — POTASSIUM CHLORIDE 1500 MG/1
20 TABLET, EXTENDED RELEASE ORAL ONCE
Status: COMPLETED | OUTPATIENT
Start: 2025-02-12 | End: 2025-02-12

## 2025-02-12 RX ORDER — MAGNESIUM OXIDE 400 MG/1
400 TABLET ORAL EVERY 4 HOURS
Status: COMPLETED | OUTPATIENT
Start: 2025-02-12 | End: 2025-02-12

## 2025-02-12 RX ADMIN — AZITHROMYCIN DIHYDRATE 250 MG: 250 TABLET ORAL at 09:55

## 2025-02-12 RX ADMIN — Medication 400 MG: at 09:55

## 2025-02-12 RX ADMIN — PANTOPRAZOLE SODIUM 40 MG: 40 TABLET, DELAYED RELEASE ORAL at 21:31

## 2025-02-12 RX ADMIN — GABAPENTIN 900 MG: 300 CAPSULE ORAL at 09:53

## 2025-02-12 RX ADMIN — APIXABAN 5 MG: 5 TABLET, FILM COATED ORAL at 09:55

## 2025-02-12 RX ADMIN — METOPROLOL SUCCINATE 25 MG: 25 TABLET, EXTENDED RELEASE ORAL at 09:55

## 2025-02-12 RX ADMIN — CEFTRIAXONE 2 G: 2 INJECTION, POWDER, FOR SOLUTION INTRAMUSCULAR; INTRAVENOUS at 13:35

## 2025-02-12 RX ADMIN — ZOLPIDEM TARTRATE 10 MG: 5 TABLET ORAL at 21:31

## 2025-02-12 RX ADMIN — GLIPIZIDE 10 MG: 5 TABLET, FILM COATED, EXTENDED RELEASE ORAL at 09:55

## 2025-02-12 RX ADMIN — QUETIAPINE FUMARATE 100 MG: 50 TABLET ORAL at 21:31

## 2025-02-12 RX ADMIN — GUAIFENESIN 1200 MG: 600 TABLET ORAL at 21:31

## 2025-02-12 RX ADMIN — INSULIN ASPART 1 UNITS: 100 INJECTION, SOLUTION INTRAVENOUS; SUBCUTANEOUS at 12:31

## 2025-02-12 RX ADMIN — APIXABAN 5 MG: 5 TABLET, FILM COATED ORAL at 21:31

## 2025-02-12 RX ADMIN — GABAPENTIN 600 MG: 300 CAPSULE ORAL at 13:34

## 2025-02-12 RX ADMIN — GABAPENTIN 900 MG: 300 CAPSULE ORAL at 21:31

## 2025-02-12 RX ADMIN — POTASSIUM CHLORIDE 40 MEQ: 1500 TABLET, EXTENDED RELEASE ORAL at 10:02

## 2025-02-12 RX ADMIN — ROSUVASTATIN 10 MG: 10 TABLET, FILM COATED ORAL at 09:53

## 2025-02-12 RX ADMIN — GUAIFENESIN 1200 MG: 600 TABLET ORAL at 09:53

## 2025-02-12 RX ADMIN — SERTRALINE 200 MG: 100 TABLET, FILM COATED ORAL at 09:53

## 2025-02-12 RX ADMIN — POTASSIUM CHLORIDE 20 MEQ: 1500 TABLET, EXTENDED RELEASE ORAL at 10:01

## 2025-02-12 RX ADMIN — Medication 400 MG: at 12:03

## 2025-02-12 RX ADMIN — METOPROLOL SUCCINATE 25 MG: 25 TABLET, EXTENDED RELEASE ORAL at 21:31

## 2025-02-12 ASSESSMENT — ACTIVITIES OF DAILY LIVING (ADL)
ADLS_ACUITY_SCORE: 48
ADLS_ACUITY_SCORE: 49
ADLS_ACUITY_SCORE: 48
ADLS_ACUITY_SCORE: 48
ADLS_ACUITY_SCORE: 49
ADLS_ACUITY_SCORE: 49
ADLS_ACUITY_SCORE: 48
ADLS_ACUITY_SCORE: 49
ADLS_ACUITY_SCORE: 48
ADLS_ACUITY_SCORE: 49
ADLS_ACUITY_SCORE: 48
ADLS_ACUITY_SCORE: 49
ADLS_ACUITY_SCORE: 48
ADLS_ACUITY_SCORE: 49

## 2025-02-12 NOTE — PROGRESS NOTES
[facility]    Medicine Progress Note - Hospitalist Service    Date of Admission:  2/10/2025    Assessment & Plan   Nicole Reyes is a 69 year old female with a history of CRISTÓBAL (not on CPAP but with nocturnal oxygen), chronic kidney disease stage III, HFrEF with improved EF to 55 to 60%, severe obesity, hypertension, type 2 diabetes mellitus, depression, and anxiety. She presented to the ED on 2/10/2025 with cough with subjective fevers and chills.  Of note, she was admitted in 1/2025 for hypoxia secondary to influenza A.  Approximately 2 days prior to arrival she had an episode of angelo aspiration while eating dinner.  In the emergency department, the patient was found to have a blood pressure of 109/88, heart rate 117, temperature 97.8  F, respiratory rate 24, SpO2 96% on room air.  Initial lab work showed WBC 15.7, hemoglobin 11.6, potassium 3.2, high-sensitivity troponin 25, proBNP 1929, magnesium 1.4.  CT chest showed multifocal consolidative and groundglass opacities present in both lungs the changes appear unchanged in the left lung and worsened in the right lung suggestive of multifocal pneumonia versus aspiration pneumonia, several new pulmonary nodules present measuring up to 6 mm in the right middle lobe, small bilateral pleural effusions, stable mediastinal lymphadenopathy, stable 1.8 cm fat-containing lesion in the upper pole of the left kidney suggestive of angiomyolipoma.  EKG showed atrial fibrillation with ventricular rate of 133.  She was started on a heparin drip and diltiazem drip for new AFIB. Shew as started on ceftriaxone and azithromycin with concern for aspiration pneumonia. She was admitted for further cares. She spontaneously converted to sinus rhythm and heparin drip was transitioned to Eliquis.      Problem list:    Suspected Aspiration Pneumonia  Pulmonary Nodules  Acute hypoxic respiratory failure  - Change Ceftriaxone to Ceftin in am. Continue oral  "Azithromycin  - Antitussives prn  - Recommend repeat CT chest in 2-3 months to assess pulmonary nodules.  This was discussed with the patient by my colleague  - Speech eval for possible dysphagia  - Still needing some oxygen- SaO2 76 % this am on RA (normally only uses with sleep); wean as tolerated     New Onset Atrial Fibrillation with RVR  HFrEF with Improved EF  - Echo in 6/2024 showed EF 45-50%.  EF improved to 55-60% in 10/2024  - EF 55-60% ths admission on 2/11  - Cardiology consult appreciated  - Continue metoprolol XL 25 mg BID  - Continue Eliquis  - Follow up with patient's primary cardiologist after discharge     Hypokalemia  Hypomagnesemia  - Electrolyte replacement protocol     Chronic Medical Problems:  Hypertension  Anxiety/Depression  Insomnia  Type 2 Diabetes Mellitus  Chronic Kidney Disease Stage 3  CRISTÓBAL with Nocturnal Oxygen  Morbid Obesity - BMI 50.55          Diet: Combination Diet Regular Diet Adult    DVT Prophylaxis: DOAC  Suarez Catheter: Not present  Lines: None     Cardiac Monitoring: ACTIVE order. Indication: Tachyarrhythmias, acute (48 hours)  Code Status: Full Code      Clinically Significant Risk Factors             # Hypomagnesemia: Lowest Mg = 1.6 mg/dL in last 2 days, will replace as needed       # Hypertension: Noted on problem list           # DMII: A1C = 7.5 % (Ref range: <5.7 %) within past 6 months, PRESENT ON ADMISSION  # Severe Obesity: Estimated body mass index is 50.55 kg/m  as calculated from the following:    Height as of this encounter: 1.499 m (4' 11\").    Weight as of this encounter: 113.5 kg (250 lb 4.8 oz)., PRESENT ON ADMISSION            Social Drivers of Health    Tobacco Use: Medium Risk (1/27/2025)    Patient History     Smoking Tobacco Use: Former     Smokeless Tobacco Use: Never   Stress: Stress Concern Present (8/10/2020)    [place] [employer] - Occupational Stress Questionnaire     Feeling of Stress : To some extent   Social Connections: " Unknown (8/10/2020)    Social Connection and Isolation Panel [NHANES]     Frequency of Communication with Friends and Family: More than three times a week          Disposition Plan     Medically Ready for Discharge: Anticipated in 2-4 Days             Bg Rao MD  Hospitalist Service  [facility]  Securely message with [employer] (more info)  Text page via [facility] Paging/Directory   ______________________________________________________________________    Interval History   No new problems. Feeling ok. Getting around alright but not walking long distances.     Physical Exam   Vital Signs: Temp: 98.3  F (36.8  C) Temp src: Oral BP: 125/65 Pulse: 66   Resp: 15 SpO2: 95 % O2 Device: Nasal cannula Oxygen Delivery: 2 LPM  Weight: 250 lbs 4.8 oz    GENERAL:  Comfortable. Cooperative.  PSYCH: pleasant, oriented, No acute distress.  EYES: PERRLA, Normal conjunctiva.  HEART:  Regular rate and rhythm. No JVD. Pulses normal. No edema.  LUNGS:  Clear to auscultation, normal Respiratory effort.  ABDOMEN:  Soft, no hepatosplenomegaly, normal bowel sounds.  EXTREMETIES: No clubbing, cyanosis or ischemia  SKIN:  Dry to touch, No rash.      Medical Decision Making       45 MINUTES SPENT BY ME on the date of service doing chart review, history, exam, documentation & further activities per the note.      Data     I have personally reviewed the following data over the past 24 hrs:    9.1  \   9.5 (L)   / 173     139 104 7.2 (L) /  182 (H)   3.8 25 0.74 \       Imaging results reviewed over the past 24 hrs:   No results found for this or any previous visit (from the past 24 hours).  Recent Labs   Lab 02/12/25  1229 02/12/25  0919 02/12/25  0553 02/11/25  0848 02/11/25  0628 02/11/25  0206 02/11/25  0153 02/10/25  2114 02/10/25  1459   WBC  --   --  9.1  --  9.8  --   --   --  15.7*   HGB  --   --  9.5*  --  9.6*  --   --   --  11.6*   MCV  --   --  78  --  77*  --   --   --  76*   PLT  --   --  173  --  152  --    --   --  222   NA  --   --  139  --  139  --   --   --  140   POTASSIUM  --   --  3.8  --  3.5  --  3.5  --  3.2*   CHLORIDE  --   --  104  --  103  --   --   --  100   CO2  --   --  25  --  27  --   --   --  28   BUN  --   --  7.2*  --  8.0  --   --   --  10.5   CR  --   --  0.74  --  0.75  --   --   --  0.84   ANIONGAP  --   --  10  --  9  --   --   --  12   GRECIA  --   --  8.3*  --  8.2*  --   --   --  8.6*   * 82 98   < > 119*   < >  --    < > 149*    < > = values in this interval not displayed.

## 2025-02-12 NOTE — PLAN OF CARE
"Goal Outcome Evaluation:      Plan of Care Reviewed With: patient    Overall Patient Progress: improvingOverall Patient Progress: improving    Outcome Evaluation: VSS. Denies pain, sob, n/v. on 2L oxymask noc. Tele SR. Glucose stable.      Problem: Adult Inpatient Plan of Care  Goal: Plan of Care Review  Description: The Plan of Care Review/Shift note should be completed every shift.  The Outcome Evaluation is a brief statement about your assessment that the patient is improving, declining, or no change.  This information will be displayed automatically on your shift  note.  Outcome: Progressing  Flowsheets (Taken 2/12/2025 0760)  Outcome Evaluation: VSS. Denies pain, sob, n/v. on 2L oxymask noc. Tele SR. Glucose stable.  Plan of Care Reviewed With: patient  Overall Patient Progress: improving  Goal: Patient-Specific Goal (Individualized)  Description: You can add care plan individualizations to a care plan. Examples of Individualization might be:  \"Parent requests to be called daily at 9am for status\", \"I have a hard time hearing out of my right ear\", or \"Do not touch me to wake me up as it startles  me\".  Outcome: Progressing  Goal: Absence of Hospital-Acquired Illness or Injury  Outcome: Progressing  Intervention: Identify and Manage Fall Risk  Recent Flowsheet Documentation  Taken 2/12/2025 0400 by Tatiana Linn RN  Safety Promotion/Fall Prevention:   activity supervised   assistive device/personal items within reach   safety round/check completed  Goal: Optimal Comfort and Wellbeing  Outcome: Progressing  Goal: Readiness for Transition of Care  Outcome: Progressing     Problem: Comorbidity Management  Goal: Maintenance of Asthma Control  Outcome: Progressing  Goal: Maintenance of Behavioral Health Symptom Control  Outcome: Progressing  Goal: Maintenance of COPD Symptom Control  Outcome: Progressing  Goal: Blood Glucose Levels Within Targeted Range  Outcome: Progressing  Goal: Maintenance of Heart " Failure Symptom Control  Outcome: Progressing  Goal: Blood Pressure in Desired Range  Outcome: Progressing  Goal: Maintenance of Osteoarthritis Symptom Control  Outcome: Progressing  Goal: Bariatric Home Regimen Maintained  Outcome: Progressing  Goal: Maintenance of Seizure Control  Outcome: Progressing     Problem: Dysrhythmia  Goal: Normalized Cardiac Rhythm  Outcome: Progressing     Problem: Pneumonia  Goal: Fluid Balance  Outcome: Progressing  Goal: Resolution of Infection Signs and Symptoms  Outcome: Progressing  Goal: Effective Oxygenation and Ventilation  Outcome: Progressing     Problem: Skin Injury Risk Increased  Goal: Skin Health and Integrity  Outcome: Progressing

## 2025-02-12 NOTE — PLAN OF CARE
"Goal Outcome Evaluation:      Plan of Care Reviewed With: patient    Overall Patient Progress: improvingOverall Patient Progress: improving    Outcome Evaluation: productive cough- specimen sent to lab. off heparin gtt. on room air. on abx.    No c/o pain. Tele SR. Loose stool x1.       Problem: Adult Inpatient Plan of Care  Goal: Plan of Care Review  Description: The Plan of Care Review/Shift note should be completed every shift.  The Outcome Evaluation is a brief statement about your assessment that the patient is improving, declining, or no change.  This information will be displayed automatically on your shift  note.  Outcome: Progressing  Flowsheets (Taken 2/11/2025 1904)  Outcome Evaluation: productive cough- specimen sent to lab. off heparin gtt. on room air. on abx.  Plan of Care Reviewed With: patient  Overall Patient Progress: improving  Goal: Patient-Specific Goal (Individualized)  Description: You can add care plan individualizations to a care plan. Examples of Individualization might be:  \"Parent requests to be called daily at 9am for status\", \"I have a hard time hearing out of my right ear\", or \"Do not touch me to wake me up as it startles  me\".  Outcome: Progressing  Goal: Absence of Hospital-Acquired Illness or Injury  Outcome: Progressing  Intervention: Identify and Manage Fall Risk  Recent Flowsheet Documentation  Taken 2/11/2025 1725 by Giovanna Mooney RN  Safety Promotion/Fall Prevention:   activity supervised   assistive device/personal items within reach   clutter free environment maintained   nonskid shoes/slippers when out of bed   patient and family education   safety round/check completed  Intervention: Prevent Skin Injury  Recent Flowsheet Documentation  Taken 2/11/2025 1725 by Giovanna Mooney RN  Body Position: position changed independently  Goal: Optimal Comfort and Wellbeing  Outcome: Progressing  Goal: Readiness for Transition of Care  Outcome: Progressing     Problem: " Comorbidity Management  Goal: Maintenance of Asthma Control  Outcome: Progressing  Goal: Maintenance of Behavioral Health Symptom Control  Outcome: Progressing  Goal: Maintenance of COPD Symptom Control  Outcome: Progressing  Goal: Blood Glucose Levels Within Targeted Range  Outcome: Progressing  Goal: Maintenance of Heart Failure Symptom Control  Outcome: Progressing  Goal: Blood Pressure in Desired Range  Outcome: Progressing  Goal: Maintenance of Osteoarthritis Symptom Control  Outcome: Progressing  Intervention: Maintain Osteoarthritis Symptom Control  Recent Flowsheet Documentation  Taken 2/11/2025 1725 by Giovanna Mooney RN  Assistive Device Utilized: walker  Activity Management: ambulated to bathroom  Goal: Bariatric Home Regimen Maintained  Outcome: Progressing  Goal: Maintenance of Seizure Control  Outcome: Progressing     Problem: Dysrhythmia  Goal: Normalized Cardiac Rhythm  Outcome: Progressing     Problem: Pneumonia  Goal: Fluid Balance  Outcome: Progressing  Goal: Resolution of Infection Signs and Symptoms  Outcome: Progressing  Goal: Effective Oxygenation and Ventilation  Outcome: Progressing  Intervention: Promote Airway Secretion Clearance  Recent Flowsheet Documentation  Taken 2/11/2025 1725 by Giovanna Mooney RN  Cough And Deep Breathing: done independently per patient  Activity Management: ambulated to bathroom     Problem: Skin Injury Risk Increased  Goal: Skin Health and Integrity  Outcome: Progressing  Intervention: Plan: Nurse Driven Intervention: Moisture Management  Recent Flowsheet Documentation  Taken 2/11/2025 1725 by Giovanna Mooney RN  Moisture Interventions:   Encourage regular toileting   No brief in bed  Bathing/Skin Care: incontinence care  Intervention: Optimize Skin Protection  Recent Flowsheet Documentation  Taken 2/11/2025 1725 by Giovanna Mooney RN  Activity Management: ambulated to bathroom

## 2025-02-12 NOTE — PROGRESS NOTES
[facility]    Patient is currently receiving SN PT HHA home care services from [facility].  and home health team have been notified of patients admission. [facility] liaison will continue to follow patient during hospital stay. If appropriate, provide home care orders to resume services at the time of discharge. If patients discharge date changes, please reach out to clinical liaison or the HUB to ensure SOC/BE date is still appropriate for a safe discharge plan.     Thank you,     SATURNINO Ledezma, LPN  Home Care Liaison   Cell: [phone]

## 2025-02-12 NOTE — PLAN OF CARE
[id number]    VSS on RA when awake, 2L oxymask when asleep (has PRN O2 @ BL per pt). A/O x4. Congested/productive cough. Abx. Tele SR. .    Goal Outcome Evaluation:      Plan of Care Reviewed With: patient    Overall Patient Progress: no changeOverall Patient Progress: no change    Outcome Evaluation: RA days, 2L oxymask when asleep (BL has PRN O2)      Problem: Adult Inpatient Plan of Care  Goal: Plan of Care Review  Description: The Plan of Care Review/Shift note should be completed every shift.  The Outcome Evaluation is a brief statement about your assessment that the patient is improving, declining, or no change.  This information will be displayed automatically on your shift  note.  Outcome: Not Progressing  Flowsheets (Taken 2/11/2025 2301)  Outcome Evaluation: RA days, 2L oxymask when asleep (BL has PRN O2)  Plan of Care Reviewed With: patient  Overall Patient Progress: no change  Goal: Absence of Hospital-Acquired Illness or Injury  Outcome: Not Progressing  Goal: Optimal Comfort and Wellbeing  Outcome: Not Progressing  Goal: Readiness for Transition of Care  Outcome: Not Progressing     Problem: Comorbidity Management  Goal: Maintenance of Asthma Control  Outcome: Not Progressing  Goal: Maintenance of Behavioral Health Symptom Control  Outcome: Not Progressing  Goal: Maintenance of COPD Symptom Control  Outcome: Not Progressing  Goal: Blood Glucose Levels Within Targeted Range  Outcome: Not Progressing  Goal: Maintenance of Heart Failure Symptom Control  Outcome: Not Progressing  Goal: Blood Pressure in Desired Range  Outcome: Not Progressing  Goal: Maintenance of Osteoarthritis Symptom Control  Outcome: Not Progressing  Goal: Bariatric Home Regimen Maintained  Outcome: Not Progressing  Goal: Maintenance of Seizure Control  Outcome: Not Progressing     Problem: Dysrhythmia  Goal: Normalized Cardiac Rhythm  Outcome: Not Progressing     Problem: Pneumonia  Goal: Fluid Balance  Outcome: Not  Progressing  Goal: Resolution of Infection Signs and Symptoms  Outcome: Not Progressing  Goal: Effective Oxygenation and Ventilation  Outcome: Not Progressing  Intervention: Promote Airway Secretion Clearance  Recent Flowsheet Documentation  Taken 2/11/2025 2034 by Ave Bobby RN  Cough And Deep Breathing: done independently per patient     Problem: Skin Injury Risk Increased  Goal: Skin Health and Integrity  Outcome: Not Progressing

## 2025-02-12 NOTE — PLAN OF CARE
"Goal Outcome Evaluation:      Plan of Care Reviewed With: patient    Overall Patient Progress: improvingOverall Patient Progress: improving    Outcome Evaluation: NC 2L NC. Lethargic. Abx continue.  Personal belongings and call light in reach. Bed alarm on and falls bundle present. For VS and assessment, please see documentation under flowsheets.     Problem: Adult Inpatient Plan of Care  Goal: Plan of Care Review  Description: The Plan of Care Review/Shift note should be completed every shift.  The Outcome Evaluation is a brief statement about your assessment that the patient is improving, declining, or no change.  This information will be displayed automatically on your shift  note.  Outcome: Progressing  Flowsheets (Taken 2/12/2025 1240)  Outcome Evaluation: NC 2L NC. Lethargic. Abx continue.  Plan of Care Reviewed With: patient  Overall Patient Progress: improving  Goal: Patient-Specific Goal (Individualized)  Description: You can add care plan individualizations to a care plan. Examples of Individualization might be:  \"Parent requests to be called daily at 9am for status\", \"I have a hard time hearing out of my right ear\", or \"Do not touch me to wake me up as it startles  me\".  Outcome: Progressing  Goal: Absence of Hospital-Acquired Illness or Injury  Outcome: Progressing  Intervention: Identify and Manage Fall Risk  Recent Flowsheet Documentation  Taken 2/12/2025 1233 by Pari Carrillo, RN  Safety Promotion/Fall Prevention: safety round/check completed  Taken 2/12/2025 1203 by Pari Carrillo, RN  Safety Promotion/Fall Prevention: safety round/check completed  Taken 2/12/2025 1000 by Pari Carrillo, RN  Safety Promotion/Fall Prevention: safety round/check completed  Intervention: Prevent Infection  Recent Flowsheet Documentation  Taken 2/12/2025 1233 by Pari Carrillo, RN  Infection Prevention: single patient room provided  Taken 2/12/2025 1203 by Pari Carrillo, RN  Infection Prevention: single patient " room provided  Taken 2/12/2025 1000 by Pari Carrillo RN  Infection Prevention: single patient room provided  Goal: Optimal Comfort and Wellbeing  Outcome: Progressing  Goal: Readiness for Transition of Care  Outcome: Progressing     Problem: Comorbidity Management  Goal: Maintenance of Asthma Control  Outcome: Progressing  Intervention: Maintain Asthma Symptom Control  Recent Flowsheet Documentation  Taken 2/12/2025 1000 by Pari Carrillo RN  Medication Review/Management: medications reviewed  Goal: Maintenance of Behavioral Health Symptom Control  Outcome: Progressing  Intervention: Maintain Behavioral Health Symptom Control  Recent Flowsheet Documentation  Taken 2/12/2025 1000 by Pari Carrillo RN  Medication Review/Management: medications reviewed  Goal: Maintenance of COPD Symptom Control  Outcome: Progressing  Intervention: Maintain COPD Symptom Control  Recent Flowsheet Documentation  Taken 2/12/2025 1000 by Pari Carrillo RN  Medication Review/Management: medications reviewed  Goal: Blood Glucose Levels Within Targeted Range  Outcome: Progressing  Intervention: Monitor and Manage Glycemia  Recent Flowsheet Documentation  Taken 2/12/2025 1000 by Pari Carrillo RN  Medication Review/Management: medications reviewed  Goal: Maintenance of Heart Failure Symptom Control  Outcome: Progressing  Intervention: Maintain Heart Failure Management  Recent Flowsheet Documentation  Taken 2/12/2025 1000 by Pari Carrillo RN  Medication Review/Management: medications reviewed  Goal: Blood Pressure in Desired Range  Outcome: Progressing  Intervention: Maintain Blood Pressure Management  Recent Flowsheet Documentation  Taken 2/12/2025 1000 by Pari Carrillo RN  Medication Review/Management: medications reviewed  Goal: Maintenance of Osteoarthritis Symptom Control  Outcome: Progressing  Intervention: Maintain Osteoarthritis Symptom Control  Recent Flowsheet Documentation  Taken 2/12/2025 1000 by Pari Carrillo  RN  Medication Review/Management: medications reviewed  Goal: Bariatric Home Regimen Maintained  Outcome: Progressing  Intervention: Maintain and Manage Postbariatric Surgery Care  Recent Flowsheet Documentation  Taken 2/12/2025 1000 by Pari Carrillo RN  Medication Review/Management: medications reviewed  Goal: Maintenance of Seizure Control  Outcome: Progressing  Intervention: Maintain Seizure Symptom Control  Recent Flowsheet Documentation  Taken 2/12/2025 1000 by Pari Carrillo RN  Medication Review/Management: medications reviewed     Problem: Dysrhythmia  Goal: Normalized Cardiac Rhythm  Outcome: Progressing     Problem: Pneumonia  Goal: Fluid Balance  Outcome: Progressing  Goal: Resolution of Infection Signs and Symptoms  Outcome: Progressing  Goal: Effective Oxygenation and Ventilation  Outcome: Progressing  Intervention: Optimize Oxygenation and Ventilation  Recent Flowsheet Documentation  Taken 2/12/2025 1000 by Pari Carrillo RN  Airway/Ventilation Management: oxygen therapy provided     Problem: Skin Injury Risk Increased  Goal: Skin Health and Integrity  Outcome: Progressing  Intervention: Plan: Nurse Driven Intervention: Moisture Management  Recent Flowsheet Documentation  Taken 2/12/2025 1000 by Pari Carrillo RN  Moisture Interventions:   Encourage regular toileting   No brief in bed  Taken 2/12/2025 0800 by Pari Carrillo RN  Moisture Interventions:   Encourage regular toileting   No brief in bed  Intervention: Optimize Skin Protection  Recent Flowsheet Documentation  Taken 2/12/2025 1000 by Pari Carrillo RN  Pressure Reduction Techniques: frequent weight shift encouraged

## 2025-02-12 NOTE — TELEPHONE ENCOUNTER
Rayne from [facility].  States they have called or faxed this request 5 times.  Either need Iwona Andrea to give approval or a partner please.  This came in originally on 2/3/25.  DEYSI Magdaleno R.N.

## 2025-02-13 ENCOUNTER — APPOINTMENT (OUTPATIENT)
Dept: SPEECH THERAPY | Facility: CLINIC | Age: 70
DRG: 177 | End: 2025-02-13
Attending: INTERNAL MEDICINE
Payer: MEDICARE

## 2025-02-13 ENCOUNTER — APPOINTMENT (OUTPATIENT)
Dept: PHYSICAL THERAPY | Facility: CLINIC | Age: 70
DRG: 177 | End: 2025-02-13
Attending: INTERNAL MEDICINE
Payer: MEDICARE

## 2025-02-13 LAB
BACTERIA BLD CULT: NORMAL
BACTERIA BLD CULT: NORMAL
BACTERIA SPT CULT: NORMAL
GLUCOSE BLDC GLUCOMTR-MCNC: 111 MG/DL (ref 70–99)
GLUCOSE BLDC GLUCOMTR-MCNC: 121 MG/DL (ref 70–99)
GLUCOSE BLDC GLUCOMTR-MCNC: 142 MG/DL (ref 70–99)
GLUCOSE BLDC GLUCOMTR-MCNC: 184 MG/DL (ref 70–99)
GLUCOSE BLDC GLUCOMTR-MCNC: 71 MG/DL (ref 70–99)
GLUCOSE BLDC GLUCOMTR-MCNC: 94 MG/DL (ref 70–99)
GLUCOSE BLDC GLUCOMTR-MCNC: 94 MG/DL (ref 70–99)
GLUCOSE BLDC GLUCOMTR-MCNC: 95 MG/DL (ref 70–99)
GRAM STAIN RESULT: NORMAL
HOLD SPECIMEN: NORMAL
MAGNESIUM SERPL-MCNC: 1.6 MG/DL (ref 1.7–2.3)
POTASSIUM SERPL-SCNC: 4.2 MMOL/L (ref 3.4–5.3)

## 2025-02-13 PROCEDURE — 250N000013 HC RX MED GY IP 250 OP 250 PS 637: Performed by: INTERNAL MEDICINE

## 2025-02-13 PROCEDURE — 83735 ASSAY OF MAGNESIUM: CPT | Performed by: INTERNAL MEDICINE

## 2025-02-13 PROCEDURE — 97161 PT EVAL LOW COMPLEX 20 MIN: CPT | Mod: GP

## 2025-02-13 PROCEDURE — 97116 GAIT TRAINING THERAPY: CPT | Mod: GP

## 2025-02-13 PROCEDURE — 92610 EVALUATE SWALLOWING FUNCTION: CPT | Mod: GN

## 2025-02-13 PROCEDURE — 99232 SBSQ HOSP IP/OBS MODERATE 35: CPT | Performed by: INTERNAL MEDICINE

## 2025-02-13 PROCEDURE — 120N000001 HC R&B MED SURG/OB

## 2025-02-13 PROCEDURE — 36415 COLL VENOUS BLD VENIPUNCTURE: CPT | Performed by: INTERNAL MEDICINE

## 2025-02-13 PROCEDURE — 84132 ASSAY OF SERUM POTASSIUM: CPT | Performed by: INTERNAL MEDICINE

## 2025-02-13 RX ADMIN — CEFUROXIME AXETIL 500 MG: 500 TABLET, FILM COATED ORAL at 21:05

## 2025-02-13 RX ADMIN — AZITHROMYCIN DIHYDRATE 250 MG: 250 TABLET ORAL at 10:03

## 2025-02-13 RX ADMIN — APIXABAN 5 MG: 5 TABLET, FILM COATED ORAL at 10:02

## 2025-02-13 RX ADMIN — ROSUVASTATIN 10 MG: 10 TABLET, FILM COATED ORAL at 10:02

## 2025-02-13 RX ADMIN — GUAIFENESIN 1200 MG: 600 TABLET ORAL at 21:05

## 2025-02-13 RX ADMIN — CEFUROXIME AXETIL 500 MG: 500 TABLET, FILM COATED ORAL at 10:02

## 2025-02-13 RX ADMIN — GABAPENTIN 900 MG: 300 CAPSULE ORAL at 10:02

## 2025-02-13 RX ADMIN — METOPROLOL SUCCINATE 25 MG: 25 TABLET, EXTENDED RELEASE ORAL at 21:02

## 2025-02-13 RX ADMIN — SERTRALINE 200 MG: 100 TABLET, FILM COATED ORAL at 10:02

## 2025-02-13 RX ADMIN — GUAIFENESIN 1200 MG: 600 TABLET ORAL at 10:02

## 2025-02-13 RX ADMIN — GABAPENTIN 900 MG: 300 CAPSULE ORAL at 21:02

## 2025-02-13 RX ADMIN — INSULIN ASPART 1 UNITS: 100 INJECTION, SOLUTION INTRAVENOUS; SUBCUTANEOUS at 13:54

## 2025-02-13 RX ADMIN — PANTOPRAZOLE SODIUM 40 MG: 40 TABLET, DELAYED RELEASE ORAL at 21:05

## 2025-02-13 RX ADMIN — QUETIAPINE FUMARATE 100 MG: 50 TABLET ORAL at 21:05

## 2025-02-13 RX ADMIN — ZOLPIDEM TARTRATE 10 MG: 5 TABLET ORAL at 21:05

## 2025-02-13 RX ADMIN — APIXABAN 5 MG: 5 TABLET, FILM COATED ORAL at 21:05

## 2025-02-13 RX ADMIN — GLIPIZIDE 10 MG: 5 TABLET, FILM COATED, EXTENDED RELEASE ORAL at 10:02

## 2025-02-13 RX ADMIN — POTASSIUM CHLORIDE 40 MEQ: 1500 TABLET, EXTENDED RELEASE ORAL at 10:02

## 2025-02-13 RX ADMIN — GABAPENTIN 600 MG: 300 CAPSULE ORAL at 13:54

## 2025-02-13 ASSESSMENT — ACTIVITIES OF DAILY LIVING (ADL)
ADLS_ACUITY_SCORE: 49

## 2025-02-13 NOTE — PROGRESS NOTES
"   02/13/25 0900   Appointment Info   Signing Clinician's Name / Credentials (PT) Tracy Mazariegos PT, DPT   Living Environment   People in Home child(mariama), adult   Current Living Arrangements house   Home Accessibility stairs within home   Number of Stairs, Within Home, Primary greater than 10 stairs  (stairs to basement, pt does not complete)   Transportation Anticipated family or friend will provide   Living Environment Comments pt lives with dtr who she reports works from home, could assist as needed   Self-Care   Usual Activity Tolerance moderate   Current Activity Tolerance fair   Equipment Currently Used at Home walker, rolling;grab bar, tub/shower;shower chair   Fall history within last six months no   Activity/Exercise/Self-Care Comment Pt typically [place] with 4WW at baseline. IND for basic ADLs. Has assist from home health aide for bathing. Dtr assists with heavier household tasks. At baseline, wears 2 L O2 at night only   General Information   Onset of Illness/Injury or Date of Surgery 02/10/25   Referring Physician Bg Rao MD   Patient/Family Therapy Goals Statement (PT) to return home   Pertinent History of Current Problem (include personal factors and/or comorbidities that impact the POC) \"Nicole Reyes is a 69 year old female with a history of CRISTÓBAL (not on CPAP but with nocturnal oxygen), chronic kidney disease stage III, HFrEF with improved EF to 55 to 60%, severe obesity, hypertension, type 2 diabetes mellitus, depression, and anxiety. She presented to the ED on 2/10/2025 with cough with subjective fevers and chills.  Of note, she was admitted in 1/2025 for hypoxia secondary to influenza A.  Approximately 2 days prior to arrival she had an episode of angelo aspiration while eating dinner.  In the emergency department, the patient was found to have a blood pressure of 109/88, heart rate 117, temperature 97.8  F, respiratory rate 24, SpO2 96% on room air.  Initial lab work showed WBC 15.7, " "hemoglobin 11.6, potassium 3.2, high-sensitivity troponin 25, proBNP 1929, magnesium 1.4.  CT chest showed multifocal consolidative and groundglass opacities present in both lungs the changes appear unchanged in the left lung and worsened in the right lung suggestive of multifocal pneumonia versus aspiration pneumonia, several new pulmonary nodules present measuring up to 6 mm in the right middle lobe, small bilateral pleural effusions, stable mediastinal lymphadenopathy, stable 1.8 cm fat-containing lesion in the upper pole of the left kidney suggestive of angiomyolipoma.  EKG showed atrial fibrillation with ventricular rate of 133.  She was started on a heparin drip and diltiazem drip for new AFIB. Shew as started on ceftriaxone and azithromycin with concern for aspiration pneumonia. She was admitted for further cares. She spontaneously converted to sinus rhythm and heparin drip was transitioned to Eliquis. \"   Existing Precautions/Restrictions fall;oxygen therapy device and L/min  (1 L via NC)   Cognition   Affect/Mental Status (Cognition) WFL   Orientation Status (Cognition) oriented x 3   Follows Commands (Cognition) WFL   Pain Assessment   Patient Currently in Pain Yes, see Vital Sign flowsheet  (reports chest discomfort with deep breaths)   Integumentary/Edema   Integumentary/Edema no deficits were identifed   Posture    Posture Forward head position;Protracted shoulders   Range of Motion (ROM)   Range of Motion ROM is WFL   Strength (Manual Muscle Testing)   Strength (Manual Muscle Testing) Deficits observed during functional mobility   Strength Comments impaired functional strength to BLEs   Bed Mobility   Bed Mobility supine-sit   Comment, (Bed Mobility) SBA with HOB elevated   Transfers   Transfers sit-stand transfer   Comment, (Transfers) CGA to FWW   Gait/Stairs (Locomotion)   Comment, (Gait/Stairs) CGA for initial gait with FWW, slow pace, downward gaze   Balance   Balance Comments falls risk, " currently benefits from use of FWW for safe upright mobility   Sensory Examination   Sensory Perception Comments baseline numbness in L 1st and 2nd fingers due to carpal tunnel   Clinical Impression   Criteria for Skilled Therapeutic Intervention Yes, treatment indicated   PT Diagnosis (PT) impaired IND with functional mobility   Influenced by the following impairments impaired activity tolerance, functional strength, balance, pain   Functional limitations due to impairments impaired bed mobility, transfers, ambulation   Clinical Presentation (PT Evaluation Complexity) stable   Clinical Presentation Rationale Based on current presentation, PMH, social support   Clinical Decision Making (Complexity) low complexity   Planned Therapy Interventions (PT) balance training;bed mobility training;gait training;home exercise program;patient/family education;strengthening;transfer training;progressive activity/exercise;home program guidelines   Risk & Benefits of therapy have been explained evaluation/treatment results reviewed;care plan/treatment goals reviewed;risks/benefits reviewed;current/potential barriers reviewed;participants voiced agreement with care plan;participants included;patient   PT Total Evaluation Time   PT Eval, Low Complexity Minutes (20488) 10   Physical Therapy Goals   PT Frequency 5x/week   PT Predicted Duration/Target Date for Goal Attainment 02/20/25   PT Goals Bed Mobility;Transfers;Gait;Aerobic Activity   PT: Bed Mobility Modified independent;Supine to/from sit   PT: Transfers Modified independent;Sit to/from stand;Bed to/from chair;Assistive device   PT: Gait Modified independent;Assistive device;Rolling walker;Greater than 200 feet   PT: Perform aerobic activity with stable cardiovascular response intermittent activity;15 minutes;ambulation   Interventions   Interventions Quick Adds Gait Training;Therapeutic Activity   Therapeutic Activity   Therapeutic Activities: dynamic activities to improve  functional performance Minutes (31100) 5   Treatment Detail/Skilled Intervention Increased time spent to gather equipment for safe OOB mobility. donned second gown with Vinod. On 1 L O2 at rest. Max desat to 86% with ambulation on 1 L. Recovers in ~60s with seated rest. Discussed importance of continued mobility with nursing staff as well as safe progression of mobility on discharge home. Pt remained up in chair, alarm armed and needs met.   Gait Training   Gait Training Minutes (67610) 15   Symptoms Noted During/After Treatment (Gait Training) fatigue;shortness of breath   Treatment Detail/Skilled Intervention Pt cued for gait with FWW, SBA. PT managing O2 tank and tubing throughout. slow pace with flexed posture. Cued for upright posture and decr UE reliance on walker with brief improvement noted. Takes seated rest break for recovery between bouts of amb. Steady with no LOB noted.   Distance in Feet 200' x2   PT Discharge Planning   PT Plan bring 4WW for gait, sit<>stand reps, monitor spO2   PT Discharge Recommendation (DC Rec) home with assist;home with home care physical therapy   PT Rationale for DC Rec Anticipate pt is slightly below baseline for mobility. Currently SBA for hallway amb with FWW. Desats to mid 80s with gait on 1 L O2 today. Recommend return home with dtr when medically appropriate, continued HHPT to address continued deficits.   PT Brief overview of current status SBA FWW   PT Total Distance Amb During Session (feet) 400   Physical Therapy Time and Intention   Timed Code Treatment Minutes 20   Total Session Time (sum of timed and untimed services) 30

## 2025-02-13 NOTE — PLAN OF CARE
"VSS. A&Ox4. Tele SR. Weaned to 1L NC. Stable O2sats at 2L NC upon oconnell ambulation. On azithromycin, mucinex, eliquis, & protonix. D/c rocephin. ACHS. Reg diet. K and Mg rechecks for AM. A1, gait, walker.         Goal Outcome Evaluation:      Plan of Care Reviewed With: patient    Overall Patient Progress: improvingOverall Patient Progress: improving    Outcome Evaluation: VSS. A&Ox4. Tele SR. Weaned to 1L NC. Stable O2sats at 2L NC upon oconnell ambulation. On azithromycin, mucinex, eliquis, & protonix. D/c rocephin.      Problem: Adult Inpatient Plan of Care  Goal: Plan of Care Review  Description: The Plan of Care Review/Shift note should be completed every shift.  The Outcome Evaluation is a brief statement about your assessment that the patient is improving, declining, or no change.  This information will be displayed automatically on your shift  note.  Outcome: Progressing  Flowsheets (Taken 2/12/2025 0545)  Outcome Evaluation: VSS. A&Ox4. Tele SR. Weaned to 1L NC. Stable O2sats at 2L NC upon oconnell ambulation. On azithromycin, mucinex, eliquis, & protonix. D/c rocephin.  Plan of Care Reviewed With: patient  Overall Patient Progress: improving  Goal: Patient-Specific Goal (Individualized)  Description: You can add care plan individualizations to a care plan. Examples of Individualization might be:  \"Parent requests to be called daily at 9am for status\", \"I have a hard time hearing out of my right ear\", or \"Do not touch me to wake me up as it startles  me\".  Outcome: Progressing  Goal: Absence of Hospital-Acquired Illness or Injury  Outcome: Progressing  Intervention: Identify and Manage Fall Risk  Recent Flowsheet Documentation  Taken 2/12/2025 2125 by Janet Monge, RN  Safety Promotion/Fall Prevention: safety round/check completed  Taken 2/12/2025 2000 by Janet Monge, RN  Safety Promotion/Fall Prevention: safety round/check completed  Taken 2/12/2025 1940 by Janet Monge, RN  Safety Promotion/Fall Prevention: " safety round/check completed  Taken 2/12/2025 1800 by Janet Monge RN  Safety Promotion/Fall Prevention: safety round/check completed  Taken 2/12/2025 1705 by Janet Monge RN  Safety Promotion/Fall Prevention: safety round/check completed  Taken 2/12/2025 1650 by Janet Monge RN  Safety Promotion/Fall Prevention: safety round/check completed  Taken 2/12/2025 1535 by Janet Monge RN  Safety Promotion/Fall Prevention:   activity supervised   assistive device/personal items within reach   clutter free environment maintained   safety round/check completed  Intervention: Prevent Skin Injury  Recent Flowsheet Documentation  Taken 2/12/2025 2000 by Janet Monge RN  Body Position:   left   turned   position changed independently  Taken 2/12/2025 1650 by Janet Monge RN  Body Position: position changed independently  Intervention: Prevent Infection  Recent Flowsheet Documentation  Taken 2/12/2025 1535 by Janet Monge RN  Infection Prevention:   cohorting utilized   environmental surveillance performed   equipment surfaces disinfected   hand hygiene promoted   personal protective equipment utilized   rest/sleep promoted   single patient room provided  Goal: Optimal Comfort and Wellbeing  Outcome: Progressing  Goal: Readiness for Transition of Care  Outcome: Progressing     Problem: Comorbidity Management  Goal: Maintenance of Asthma Control  Outcome: Progressing  Goal: Maintenance of Behavioral Health Symptom Control  Outcome: Progressing  Goal: Maintenance of COPD Symptom Control  Outcome: Progressing  Goal: Blood Glucose Levels Within Targeted Range  Outcome: Progressing  Goal: Maintenance of Heart Failure Symptom Control  Outcome: Progressing  Goal: Blood Pressure in Desired Range  Outcome: Progressing  Goal: Maintenance of Osteoarthritis Symptom Control  Outcome: Progressing  Intervention: Maintain Osteoarthritis Symptom Control  Recent Flowsheet Documentation  Taken 2/12/2025 1940 by Janet Monge RN  Assistive  Device Utilized:   gait belt   walker  Activity Management:   ambulated to bathroom   back to bed   activity adjusted per tolerance   activity encouraged  Taken 2/12/2025 1930 by Janet Monge RN  Assistive Device Utilized:   gait belt   walker  Activity Management:   ambulated outside room   activity adjusted per tolerance   activity encouraged  Goal: Bariatric Home Regimen Maintained  Outcome: Progressing  Goal: Maintenance of Seizure Control  Outcome: Progressing     Problem: Dysrhythmia  Goal: Normalized Cardiac Rhythm  Outcome: Progressing     Problem: Pneumonia  Goal: Fluid Balance  Outcome: Progressing  Goal: Resolution of Infection Signs and Symptoms  Outcome: Progressing  Goal: Effective Oxygenation and Ventilation  Outcome: Progressing  Intervention: Promote Airway Secretion Clearance  Recent Flowsheet Documentation  Taken 2/12/2025 1940 by Janet Monge RN  Activity Management:   ambulated to bathroom   back to bed   activity adjusted per tolerance   activity encouraged  Taken 2/12/2025 1930 by Janet Monge RN  Activity Management:   ambulated outside room   activity adjusted per tolerance   activity encouraged  Taken 2/12/2025 1650 by Jnaet Monge RN  Cough And Deep Breathing: done independently per patient  Intervention: Optimize Oxygenation and Ventilation  Recent Flowsheet Documentation  Taken 2/12/2025 2000 by Janet Monge RN  Head of Bed (HOB) Positioning: HOB at 15 degrees     Problem: Skin Injury Risk Increased  Goal: Skin Health and Integrity  Outcome: Progressing  Intervention: Plan: Nurse Driven Intervention: Moisture Management  Recent Flowsheet Documentation  Taken 2/12/2025 2100 by Janet Monge RN  Moisture Interventions:   Encourage regular toileting   Incontinence pad  Skin Appearance: Normal (no redness or breakdown)  Plan: Moisture Management:   encourage regular toileting   incontinence pad  Taken 2/12/2025 1800 by Janet Monge RN  Moisture Interventions:   Encourage regular  toileting   Incontinence pad  Taken 2/12/2025 1650 by Janet Monge, RN  Moisture Interventions:   Encourage regular toileting   Incontinence pad  Intervention: Optimize Skin Protection  Recent Flowsheet Documentation  Taken 2/12/2025 2000 by Janet Monge, RN  Head of Bed (HOB) Positioning: HOB at 15 degrees  Taken 2/12/2025 1940 by Janet Monge, RN  Activity Management:   ambulated to bathroom   back to bed   activity adjusted per tolerance   activity encouraged  Taken 2/12/2025 1930 by Janet Monge, RN  Activity Management:   ambulated outside room   activity adjusted per tolerance   activity encouraged

## 2025-02-13 NOTE — PROGRESS NOTES
[facility]    Medicine Progress Note - Hospitalist Service    Date of Admission:  2/10/2025    Assessment & Plan   Nicole Reyes is a 69 year old female with a history of CRISTÓBAL (not on CPAP but with nocturnal oxygen), chronic kidney disease stage III, HFrEF with improved EF to 55 to 60%, severe obesity, hypertension, type 2 diabetes mellitus, depression, and anxiety. She presented to the ED on 2/10/2025 with cough with subjective fevers and chills.  Of note, she was admitted in 1/2025 for hypoxia secondary to influenza A.  Approximately 2 days prior to arrival she had an episode of angelo aspiration while eating dinner.  In the emergency department, the patient was found to have a blood pressure of 109/88, heart rate 117, temperature 97.8  F, respiratory rate 24, SpO2 96% on room air.  Initial lab work showed WBC 15.7, hemoglobin 11.6, potassium 3.2, high-sensitivity troponin 25, proBNP 1929, magnesium 1.4.  CT chest showed multifocal consolidative and groundglass opacities present in both lungs the changes appear unchanged in the left lung and worsened in the right lung suggestive of multifocal pneumonia versus aspiration pneumonia, several new pulmonary nodules present measuring up to 6 mm in the right middle lobe, small bilateral pleural effusions, stable mediastinal lymphadenopathy, stable 1.8 cm fat-containing lesion in the upper pole of the left kidney suggestive of angiomyolipoma.  EKG showed atrial fibrillation with ventricular rate of 133.  She was started on a heparin drip and diltiazem drip for new AFIB. Shew as started on ceftriaxone and azithromycin with concern for aspiration pneumonia. She was admitted for further cares. She spontaneously converted to sinus rhythm and heparin drip was transitioned to Eliquis.      Problem list:     Suspected aspiration pneumonia  Pulmonary nodules  Acute hypoxic respiratory failure  - Slow to improve.  Still coughing quite a bit.  Still  "desaturates with activity.  - Continue oral Ceftin and Azithromycin  - Antitussives prn  - Recommend repeat CT chest in 2-3 months to assess pulmonary nodules.  This was discussed with the patient by my colleague  - Maggie macias for possible dysphagia (pending)  - Needs a bit longer with current cares and SLP eval     New onset atrial fibrillation with RVR  HFrEF with improved EF  - Echo in 6/2024 showed EF 45-50%.  EF improved to 55-60% in 10/2024  - EF 55-60% ths admission on 2/11  - Cardiology consult appreciated  - Continue metoprolol XL 25 mg BID  - Continue Eliquis  - Follow up with patient's primary cardiologist after discharge     Hypokalemia  Hypomagnesemia  - Electrolyte replacement protocol    Weakness  -PT eval appreciated. Home PT on discharge     Chronic Medical Problems:  Hypertension  Anxiety  Depression  Insomnia  Type 2 diabetes mellitus  Chronic kidney disease, stage 3  CRISTÓBAL with use of oxygen at night   Severe obesity - BMI 50.55             Diet: Combination Diet Regular Diet Adult    DVT Prophylaxis: DOAC  Suarez Catheter: Not present  Lines: None     Cardiac Monitoring: None  Code Status: Full Code      Clinically Significant Risk Factors             # Hypomagnesemia: Lowest Mg = 1.6 mg/dL in last 2 days, will replace as needed       # Hypertension: Noted on problem list           # DMII: A1C = 7.5 % (Ref range: <5.7 %) within past 6 months, PRESENT ON ADMISSION  # Severe Obesity: Estimated body mass index is 50.88 kg/m  as calculated from the following:    Height as of this encounter: 1.499 m (4' 11\").    Weight as of this encounter: 114.3 kg (251 lb 14.4 oz)., PRESENT ON ADMISSION            Social Drivers of Health    Tobacco Use: Medium Risk (1/27/2025)    Patient History     Smoking Tobacco Use: Former     Smokeless Tobacco Use: Never   Stress: Stress Concern Present (8/10/2020)    [employer] - Occupational Stress Questionnaire     Feeling of Stress : To some " extent   Social Connections: Unknown (8/10/2020)    Social Connection and Isolation Panel [NHANES]     Frequency of Communication with Friends and Family: More than three times a week          Disposition Plan     Medically Ready for Discharge: Anticipated in 2-4 Days             Bg aRo MD  Hospitalist Service  [facility]  Securely message with "[employer] (more info)  Text page via [employer] Paging/Directory   ______________________________________________________________________    Interval History   No new problems.  Still coughing quite a bit and short of breath.  Still desaturates with activity.  Still fatigued but did okay with physical therapy.    Physical Exam   Vital Signs: Temp: 98.4  F (36.9  C) Temp src: Oral BP: 109/53 Pulse: 67   Resp: 20 SpO2: 95 % O2 Device: None (Room air) Oxygen Delivery: 1 LPM  Weight: 251 lbs 14.4 oz    GENERAL:  Comfortable. Cooperative.  PSYCH: pleasant, oriented, No acute distress.  EYES: PERRLA, Normal conjunctiva.  HEART:  Regular rate and rhythm. No JVD. Pulses normal. No edema.  LUNGS:  Clear to auscultation, normal Respiratory effort.  ABDOMEN:  Soft, no hepatosplenomegaly, normal bowel sounds.  EXTREMETIES: No clubbing, cyanosis or ischemia  SKIN:  Dry to touch, No rash.      Medical Decision Making       40 MINUTES SPENT BY ME on the date of service doing chart review, history, exam, documentation & further activities per the note.      Data     I have personally reviewed the following data over the past 24 hrs:    N/A  \   N/A   / N/A     N/A N/A N/A /  184 (H)   4.2 N/A N/A \       Imaging results reviewed over the past 24 hrs:   No results found for this or any previous visit (from the past 24 hours).  Recent Labs   Lab 02/13/25  1218 02/13/25  0805 02/13/25  0703 02/13/25  0220 02/12/25  0919 02/12/25  0553 02/11/25  0848 02/11/25  0628 02/10/25  6098 02/10/25  2842   WBC  --   --   --   --   --  9.1  --  9.8  --  15.7*   HGB  --   --   --    --   --  9.5*  --  9.6*  --  11.6*   MCV  --   --   --   --   --  78  --  77*  --  76*   PLT  --   --   --   --   --  173  --  152  --  222   NA  --   --   --   --   --  139  --  139  --  140   POTASSIUM  --   --  4.2  --   --  3.8  --  3.5   < > 3.2*   CHLORIDE  --   --   --   --   --  104  --  103  --  100   CO2  --   --   --   --   --  25  --  27  --  28   BUN  --   --   --   --   --  7.2*  --  8.0  --  10.5   CR  --   --   --   --   --  0.74  --  0.75  --  0.84   ANIONGAP  --   --   --   --   --  10  --  9  --  12   GRECIA  --   --   --   --   --  8.3*  --  8.2*  --  8.6*   * 95  --  111*   < > 98   < > 119*   < > 149*    < > = values in this interval not displayed.

## 2025-02-13 NOTE — PLAN OF CARE
"Pt is alert and oriented x4. Pt denies pain or discomfort. Vitals stable. Pt denies nausea or vomiting. Pt is on Azithromycin and Ceftin antibiotics. Pt is on blood sugar checks. Pt is on potassium and magnesium protocol. Recheck in the morning. Pt is on 02 1L NC. Pt has bilateral extremity edema. Pt is on tele monitoring , SR. Pt refused her CPAP at bedtime. Pt is assist of one in transfer and cares. Pt is sleeping in bed and call lig within reach.               Goal Outcome Evaluation:      Plan of Care Reviewed With: patient    Overall Patient Progress: improvingOverall Patient Progress: improving    Outcome Evaluation: pt elana barone SR. pt is on Azithromycin and Ceftin.      Problem: Adult Inpatient Plan of Care  Goal: Plan of Care Review  Description: The Plan of Care Review/Shift note should be completed every shift.  The Outcome Evaluation is a brief statement about your assessment that the patient is improving, declining, or no change.  This information will be displayed automatically on your shift  note.  Outcome: Progressing  Flowsheets (Taken 2/13/2025 0150)  Outcome Evaluation: pt elana Tele montioring, SR. pt is on Azithromycin and Ceftin.  Plan of Care Reviewed With: patient  Overall Patient Progress: improving  Goal: Patient-Specific Goal (Individualized)  Description: You can add care plan individualizations to a care plan. Examples of Individualization might be:  \"Parent requests to be called daily at 9am for status\", \"I have a hard time hearing out of my right ear\", or \"Do not touch me to wake me up as it startles  me\".  Outcome: Progressing  Goal: Absence of Hospital-Acquired Illness or Injury  Outcome: Progressing  Intervention: Identify and Manage Fall Risk  Recent Flowsheet Documentation  Taken 2/13/2025 0021 by Alexsander Casiano RN  Safety Promotion/Fall Prevention:   assistive device/personal items within reach   activity supervised   clutter free environment maintained   " increased rounding and observation   increase visualization of patient   nonskid shoes/slippers when out of bed   patient and family education   safety round/check completed  Intervention: Prevent Skin Injury  Recent Flowsheet Documentation  Taken 2/13/2025 0021 by Alexsander Casiano RN  Body Position: position changed independently  Intervention: Prevent and Manage VTE (Venous Thromboembolism) Risk  Recent Flowsheet Documentation  Taken 2/13/2025 0021 by Alexsander Casiano RN  VTE Prevention/Management: SCDs off (sequential compression devices)  Intervention: Prevent Infection  Recent Flowsheet Documentation  Taken 2/13/2025 0021 by Alexsander Casiano RN  Infection Prevention:   single patient room provided   rest/sleep promoted   hand hygiene promoted  Goal: Optimal Comfort and Wellbeing  Outcome: Progressing  Goal: Readiness for Transition of Care  Outcome: Progressing     Problem: Comorbidity Management  Goal: Maintenance of Asthma Control  Outcome: Progressing  Intervention: Maintain Asthma Symptom Control  Recent Flowsheet Documentation  Taken 2/13/2025 0021 by Alexsander Casiano RN  Medication Review/Management: medications reviewed  Goal: Maintenance of Behavioral Health Symptom Control  Outcome: Progressing  Intervention: Maintain Behavioral Health Symptom Control  Recent Flowsheet Documentation  Taken 2/13/2025 0021 by Alexsander Casiano RN  Medication Review/Management: medications reviewed  Goal: Maintenance of COPD Symptom Control  Outcome: Progressing  Intervention: Maintain COPD Symptom Control  Recent Flowsheet Documentation  Taken 2/13/2025 0021 by Alexsander Casiano RN  Medication Review/Management: medications reviewed  Goal: Blood Glucose Levels Within Targeted Range  Outcome: Progressing  Intervention: Monitor and Manage Glycemia  Recent Flowsheet Documentation  Taken 2/13/2025 0021 by Alexsander Casiano RN  Medication Review/Management:  medications reviewed  Goal: Maintenance of Heart Failure Symptom Control  Outcome: Progressing  Intervention: Maintain Heart Failure Management  Recent Flowsheet Documentation  Taken 2/13/2025 0021 by Alexsander Casiano RN  Medication Review/Management: medications reviewed  Goal: Blood Pressure in Desired Range  Outcome: Progressing  Intervention: Maintain Blood Pressure Management  Recent Flowsheet Documentation  Taken 2/13/2025 0021 by Alexsander Casiano RN  Medication Review/Management: medications reviewed  Goal: Maintenance of Osteoarthritis Symptom Control  Outcome: Progressing  Intervention: Maintain Osteoarthritis Symptom Control  Recent Flowsheet Documentation  Taken 2/13/2025 0021 by Alexsander Casiano RN  Assistive Device Utilized:   gait belt   walker  Activity Management:   activity encouraged   activity adjusted per tolerance  Medication Review/Management: medications reviewed  Goal: Bariatric Home Regimen Maintained  Outcome: Progressing  Intervention: Maintain and Manage Postbariatric Surgery Care  Recent Flowsheet Documentation  Taken 2/13/2025 0021 by Alexsander Casiano RN  Medication Review/Management: medications reviewed  Goal: Maintenance of Seizure Control  Outcome: Progressing  Intervention: Maintain Seizure Symptom Control  Recent Flowsheet Documentation  Taken 2/13/2025 0021 by Alexsander Casiano RN  Medication Review/Management: medications reviewed     Problem: Dysrhythmia  Goal: Normalized Cardiac Rhythm  Outcome: Progressing  Intervention: Monitor and Manage Cardiac Rhythm Effect  Recent Flowsheet Documentation  Taken 2/13/2025 0021 by Alexsander Casiano RN  VTE Prevention/Management: SCDs off (sequential compression devices)     Problem: Pneumonia  Goal: Fluid Balance  Outcome: Progressing  Goal: Resolution of Infection Signs and Symptoms  Outcome: Progressing  Goal: Effective Oxygenation and Ventilation  Outcome: Progressing  Intervention:  Promote Airway Secretion Clearance  Recent Flowsheet Documentation  Taken 2/13/2025 0021 by Alexsander Casiano RN  Cough And Deep Breathing: done independently per patient  Activity Management:   activity encouraged   activity adjusted per tolerance  Intervention: Optimize Oxygenation and Ventilation  Recent Flowsheet Documentation  Taken 2/13/2025 0021 by Alexsander Casiano RN  Airway/Ventilation Management: oxygen therapy provided  Head of Bed (HOB) Positioning: HOB at 20-30 degrees     Problem: Skin Injury Risk Increased  Goal: Skin Health and Integrity  Outcome: Progressing  Intervention: Plan: Nurse Driven Intervention: Moisture Management  Recent Flowsheet Documentation  Taken 2/13/2025 0021 by Alexsander Casiano RN  Moisture Interventions: Encourage regular toileting  Intervention: Optimize Skin Protection  Recent Flowsheet Documentation  Taken 2/13/2025 0021 by Alexsander Casiano RN  Activity Management:   activity encouraged   activity adjusted per tolerance  Head of Bed (HOB) Positioning: HOB at 20-30 degrees     Problem: Fall Injury Risk  Goal: Absence of Fall and Fall-Related Injury  Outcome: Progressing  Intervention: Identify and Manage Contributors  Recent Flowsheet Documentation  Taken 2/13/2025 0021 by Alexsander Casiano RN  Medication Review/Management: medications reviewed  Intervention: Promote Injury-Free Environment  Recent Flowsheet Documentation  Taken 2/13/2025 0021 by Alexsander Casiano RN  Safety Promotion/Fall Prevention:   assistive device/personal items within reach   activity supervised   clutter free environment maintained   increased rounding and observation   increase visualization of patient   nonskid shoes/slippers when out of bed   patient and family education   safety round/check completed     Problem: Pain Acute  Goal: Optimal Pain Control and Function  Outcome: Progressing  Intervention: Prevent or Manage Pain  Recent Flowsheet  Documentation  Taken 2/13/2025 0021 by Alexsander Casiano RN  Medication Review/Management: medications reviewed     Problem: Adult Inpatient Plan of Care  Goal: Plan of Care Review  Description: The Plan of Care Review/Shift note should be completed every shift.  The Outcome Evaluation is a brief statement about your assessment that the patient is improving, declining, or no change.  This information will be displayed automatically on your shift  note.  Outcome: Progressing  Flowsheets (Taken 2/13/2025 0150)  Outcome Evaluation: pt elana barone SR. pt is on Azithromycin and Ceftin.  Plan of Care Reviewed With: patient  Overall Patient Progress: improving  Goal: Absence of Hospital-Acquired Illness or Injury  Intervention: Identify and Manage Fall Risk  Recent Flowsheet Documentation  Taken 2/13/2025 0021 by Alexsander Casiano RN  Safety Promotion/Fall Prevention:   assistive device/personal items within reach   activity supervised   clutter free environment maintained   increased rounding and observation   increase visualization of patient   nonskid shoes/slippers when out of bed   patient and family education   safety round/check completed  Intervention: Prevent Skin Injury  Recent Flowsheet Documentation  Taken 2/13/2025 0021 by Alexsander Casiano RN  Body Position: position changed independently  Intervention: Prevent and Manage VTE (Venous Thromboembolism) Risk  Recent Flowsheet Documentation  Taken 2/13/2025 0021 by Alexsander Casiano RN  VTE Prevention/Management: SCDs off (sequential compression devices)  Intervention: Prevent Infection  Recent Flowsheet Documentation  Taken 2/13/2025 0021 by Alexsander Casiano RN  Infection Prevention:   single patient room provided   rest/sleep promoted   hand hygiene promoted     Problem: Comorbidity Management  Goal: Maintenance of Asthma Control  Intervention: Maintain Asthma Symptom Control  Recent Flowsheet  Documentation  Taken 2/13/2025 0021 by Alexsander Casiano RN  Medication Review/Management: medications reviewed  Goal: Maintenance of Behavioral Health Symptom Control  Intervention: Maintain Behavioral Health Symptom Control  Recent Flowsheet Documentation  Taken 2/13/2025 0021 by Alexsander Casiano RN  Medication Review/Management: medications reviewed  Goal: Maintenance of COPD Symptom Control  Intervention: Maintain COPD Symptom Control  Recent Flowsheet Documentation  Taken 2/13/2025 0021 by Alexsander Casiano RN  Medication Review/Management: medications reviewed  Goal: Blood Glucose Levels Within Targeted Range  Intervention: Monitor and Manage Glycemia  Recent Flowsheet Documentation  Taken 2/13/2025 0021 by Alexsander Casiano RN  Medication Review/Management: medications reviewed  Goal: Maintenance of Heart Failure Symptom Control  Intervention: Maintain Heart Failure Management  Recent Flowsheet Documentation  Taken 2/13/2025 0021 by Alexsander Casiano RN  Medication Review/Management: medications reviewed  Goal: Blood Pressure in Desired Range  Intervention: Maintain Blood Pressure Management  Recent Flowsheet Documentation  Taken 2/13/2025 0021 by Alexsander Casiano RN  Medication Review/Management: medications reviewed  Goal: Maintenance of Osteoarthritis Symptom Control  Intervention: Maintain Osteoarthritis Symptom Control  Recent Flowsheet Documentation  Taken 2/13/2025 0021 by Alexsander Casiano RN  Assistive Device Utilized:   gait belt   walker  Activity Management:   activity encouraged   activity adjusted per tolerance  Medication Review/Management: medications reviewed  Goal: Bariatric Home Regimen Maintained  Intervention: Maintain and Manage Postbariatric Surgery Care  Recent Flowsheet Documentation  Taken 2/13/2025 0021 by Alexsander Casiano RN  Medication Review/Management: medications reviewed  Goal: Maintenance of Seizure  Control  Intervention: Maintain Seizure Symptom Control  Recent Flowsheet Documentation  Taken 2/13/2025 0021 by Alexsander Casiano RN  Medication Review/Management: medications reviewed     Problem: Dysrhythmia  Goal: Normalized Cardiac Rhythm  Intervention: Monitor and Manage Cardiac Rhythm Effect  Recent Flowsheet Documentation  Taken 2/13/2025 0021 by Alexsander Casiano RN  VTE Prevention/Management: SCDs off (sequential compression devices)     Problem: Pneumonia  Goal: Effective Oxygenation and Ventilation  Intervention: Promote Airway Secretion Clearance  Recent Flowsheet Documentation  Taken 2/13/2025 0021 by Alexsander Casiano RN  Cough And Deep Breathing: done independently per patient  Activity Management:   activity encouraged   activity adjusted per tolerance  Intervention: Optimize Oxygenation and Ventilation  Recent Flowsheet Documentation  Taken 2/13/2025 0021 by Alexsander Casiano RN  Airway/Ventilation Management: oxygen therapy provided  Head of Bed (HOB) Positioning: HOB at 20-30 degrees     Problem: Skin Injury Risk Increased  Goal: Skin Health and Integrity  Intervention: Plan: Nurse Driven Intervention: Moisture Management  Recent Flowsheet Documentation  Taken 2/13/2025 0021 by Alexsander Casiano RN  Moisture Interventions: Encourage regular toileting  Intervention: Optimize Skin Protection  Recent Flowsheet Documentation  Taken 2/13/2025 0021 by Alexsander Casiano RN  Activity Management:   activity encouraged   activity adjusted per tolerance  Head of Bed (HOB) Positioning: HOB at 20-30 degrees     Problem: Fall Injury Risk  Goal: Absence of Fall and Fall-Related Injury  Intervention: Identify and Manage Contributors  Recent Flowsheet Documentation  Taken 2/13/2025 0021 by Alexsander Casiano RN  Medication Review/Management: medications reviewed  Intervention: Promote Injury-Free Environment  Recent Flowsheet Documentation  Taken 2/13/2025  0021 by Alexsander Casiano, RN  Safety Promotion/Fall Prevention:   assistive device/personal items within reach   activity supervised   clutter free environment maintained   increased rounding and observation   increase visualization of patient   nonskid shoes/slippers when out of bed   patient and family education   safety round/check completed     Problem: Pain Acute  Goal: Optimal Pain Control and Function  Intervention: Prevent or Manage Pain  Recent Flowsheet Documentation  Taken 2/13/2025 0021 by Alexsander Casiano, RN  Medication Review/Management: medications reviewed

## 2025-02-14 ENCOUNTER — ORDERS ONLY (AUTO-RELEASED) (OUTPATIENT)
Dept: MEDSURG UNIT | Facility: CLINIC | Age: 70
End: 2025-02-14
Payer: MEDICARE

## 2025-02-14 ENCOUNTER — TELEPHONE (OUTPATIENT)
Dept: INTERNAL MEDICINE | Facility: CLINIC | Age: 70
End: 2025-02-14
Payer: MEDICARE

## 2025-02-14 VITALS
SYSTOLIC BLOOD PRESSURE: 122 MMHG | WEIGHT: 251.9 LBS | BODY MASS INDEX: 50.78 KG/M2 | HEIGHT: 59 IN | HEART RATE: 60 BPM | TEMPERATURE: 98.3 F | RESPIRATION RATE: 18 BRPM | DIASTOLIC BLOOD PRESSURE: 58 MMHG | OXYGEN SATURATION: 88 %

## 2025-02-14 VITALS
WEIGHT: 252.5 LBS | SYSTOLIC BLOOD PRESSURE: 131 MMHG | TEMPERATURE: 98.3 F | RESPIRATION RATE: 18 BRPM | HEART RATE: 50 BPM | OXYGEN SATURATION: 92 % | BODY MASS INDEX: 50.9 KG/M2 | HEIGHT: 59 IN | DIASTOLIC BLOOD PRESSURE: 72 MMHG

## 2025-02-14 DIAGNOSIS — Z53.9 DIAGNOSIS NOT YET DEFINED: Primary | ICD-10-CM

## 2025-02-14 DIAGNOSIS — I48.91 ATRIAL FIBRILLATION WITH RVR (H): ICD-10-CM

## 2025-02-14 LAB
GLUCOSE BLDC GLUCOMTR-MCNC: 107 MG/DL (ref 70–99)
GLUCOSE BLDC GLUCOMTR-MCNC: 120 MG/DL (ref 70–99)
MAGNESIUM SERPL-MCNC: 1.6 MG/DL (ref 1.7–2.3)
MAGNESIUM SERPL-MCNC: 1.9 MG/DL (ref 1.7–2.3)
POTASSIUM SERPL-SCNC: 4.4 MMOL/L (ref 3.4–5.3)

## 2025-02-14 PROCEDURE — 250N000013 HC RX MED GY IP 250 OP 250 PS 637: Performed by: INTERNAL MEDICINE

## 2025-02-14 PROCEDURE — 83735 ASSAY OF MAGNESIUM: CPT | Performed by: HOSPITALIST

## 2025-02-14 PROCEDURE — 250N000011 HC RX IP 250 OP 636: Performed by: HOSPITALIST

## 2025-02-14 PROCEDURE — 84132 ASSAY OF SERUM POTASSIUM: CPT | Performed by: INTERNAL MEDICINE

## 2025-02-14 PROCEDURE — 83735 ASSAY OF MAGNESIUM: CPT | Performed by: INTERNAL MEDICINE

## 2025-02-14 PROCEDURE — G0179 MD RECERTIFICATION HHA PT: HCPCS

## 2025-02-14 PROCEDURE — 99239 HOSP IP/OBS DSCHRG MGMT >30: CPT | Performed by: HOSPITALIST

## 2025-02-14 PROCEDURE — 36415 COLL VENOUS BLD VENIPUNCTURE: CPT | Performed by: INTERNAL MEDICINE

## 2025-02-14 PROCEDURE — 36415 COLL VENOUS BLD VENIPUNCTURE: CPT | Performed by: HOSPITALIST

## 2025-02-14 RX ORDER — MAGNESIUM OXIDE 400 MG/1
400 TABLET ORAL EVERY 4 HOURS
Status: DISCONTINUED | OUTPATIENT
Start: 2025-02-14 | End: 2025-02-14 | Stop reason: HOSPADM

## 2025-02-14 RX ORDER — CEFUROXIME AXETIL 500 MG/1
500 TABLET ORAL EVERY 12 HOURS
Qty: 6 TABLET | Refills: 0 | Status: SHIPPED | OUTPATIENT
Start: 2025-02-14 | End: 2025-02-17

## 2025-02-14 RX ORDER — MAGNESIUM SULFATE HEPTAHYDRATE 40 MG/ML
2 INJECTION, SOLUTION INTRAVENOUS ONCE
Status: COMPLETED | OUTPATIENT
Start: 2025-02-14 | End: 2025-02-14

## 2025-02-14 RX ORDER — METOPROLOL SUCCINATE 25 MG/1
25 TABLET, EXTENDED RELEASE ORAL 2 TIMES DAILY
Qty: 60 TABLET | Refills: 1 | Status: SHIPPED | OUTPATIENT
Start: 2025-02-14

## 2025-02-14 RX ADMIN — GUAIFENESIN 1200 MG: 600 TABLET ORAL at 09:06

## 2025-02-14 RX ADMIN — CEFUROXIME AXETIL 500 MG: 500 TABLET, FILM COATED ORAL at 09:06

## 2025-02-14 RX ADMIN — POTASSIUM CHLORIDE 40 MEQ: 1500 TABLET, EXTENDED RELEASE ORAL at 09:06

## 2025-02-14 RX ADMIN — GLIPIZIDE 10 MG: 5 TABLET, FILM COATED, EXTENDED RELEASE ORAL at 09:06

## 2025-02-14 RX ADMIN — METOPROLOL SUCCINATE 25 MG: 25 TABLET, EXTENDED RELEASE ORAL at 09:06

## 2025-02-14 RX ADMIN — APIXABAN 5 MG: 5 TABLET, FILM COATED ORAL at 09:06

## 2025-02-14 RX ADMIN — GABAPENTIN 900 MG: 300 CAPSULE ORAL at 09:05

## 2025-02-14 RX ADMIN — SERTRALINE 200 MG: 100 TABLET, FILM COATED ORAL at 09:06

## 2025-02-14 RX ADMIN — AZITHROMYCIN DIHYDRATE 250 MG: 250 TABLET ORAL at 09:06

## 2025-02-14 RX ADMIN — MAGNESIUM SULFATE HEPTAHYDRATE 2 G: 40 INJECTION, SOLUTION INTRAVENOUS at 05:37

## 2025-02-14 RX ADMIN — ROSUVASTATIN 10 MG: 10 TABLET, FILM COATED ORAL at 09:06

## 2025-02-14 ASSESSMENT — ACTIVITIES OF DAILY LIVING (ADL)
ADLS_ACUITY_SCORE: 49
ADLS_ACUITY_SCORE: 45
ADLS_ACUITY_SCORE: 49
ADLS_ACUITY_SCORE: 45

## 2025-02-14 NOTE — PROGRESS NOTES
Late entry - pulled to note 2/14/25 for evaluation 2/13/25.       02/13/25 1500   Appointment Info   Signing Clinician's Name / Credentials (SLP) Amanda Perry M.A. CCC-SLP   General Information   Onset of Illness/Injury or Date of Surgery 02/10/25   Referring Physician Bg Rao   Pertinent History of Current Problem Nicole Reyes is a 69 year old female with a history of CRISTÓBAL (not on CPAP but with nocturnal oxygen), chronic kidney disease stage III, HFrEF with improved EF to 55 to 60%, severe obesity, hypertension, type 2 diabetes mellitus, depression, and anxiety. She presented to the ED on 2/10/2025 with cough with subjective fevers and chills.  Of note, she was admitted in 1/2025 for hypoxia secondary to influenza A.  Approximately 2 days prior to arrival she had an episode of angelo aspiration while eating dinner.  In the emergency department, the patient was found to have a blood pressure of 109/88, heart rate 117, temperature 97.8  F, respiratory rate 24, SpO2 96% on room air.  Initial lab work showed WBC 15.7, hemoglobin 11.6, potassium 3.2, high-sensitivity troponin 25, proBNP 1929, magnesium 1.4.  CT chest showed multifocal consolidative and groundglass opacities present in both lungs the changes appear unchanged in the left lung and worsened in the right lung suggestive of multifocal pneumonia versus aspiration pneumonia, several new pulmonary nodules present measuring up to 6 mm in the right middle lobe, small bilateral pleural effusions, stable mediastinal lymphadenopathy, stable 1.8 cm fat-containing lesion in the upper pole of the left kidney suggestive of angiomyolipoma.  EKG showed atrial fibrillation with ventricular rate of 133.  She was started on a heparin drip and diltiazem drip for new AFIB. Shew as started on ceftriaxone and azithromycin with concern for aspiration pneumonia. She was admitted for further cares. She spontaneously converted to sinus rhythm and heparin drip was  transitioned to Eliqu.   General Observations Alert, pleasant, cooperative   Type of Evaluation   Type of Evaluation Swallow Evaluation   Oral Motor   Oral Musculature generally intact   Mucosal Quality adequate   Dentition (Oral Motor)   Comment, Dentition (Oral Motor) edentulous on top   Dentition (Oral Motor) dentition impacts chewing ability;edentulous;dental appliance/dentures   Dental Appliance/Denture (Oral Motor) lower   Facial Symmetry (Oral Motor)   Facial Symmetry (Oral Motor) WNL   Lip Function (Oral Motor)   Lip Range of Motion (Oral Motor) WNL   Tongue Function (Oral Motor)   Tongue ROM (Oral Motor) WNL   Jaw Function (Oral Motor)   Jaw Function (Oral Motor) WNL   Cough/Swallow/Gag Reflex (Oral Motor)   Soft Palate/Velum (Oral Motor) WNL   Gag Reflex (Oral Motor) not tested   Volitional Throat Clear/Cough (Oral Motor) WNL   Volitional Swallow (Oral Motor) WNL   Vocal Quality/Secretion Management (Oral Motor)   Vocal Quality (Oral Motor) WNL   General Swallowing Observations   Past History of Dysphagia Pt has been seen by SLP on previous hospitalizations due to concern for dysphagia where dysphagia diet 3 or regular textures and thin liquids was recommended. A VFSS was completed in 2019 revealing no aspiration and a functional swallow at that time.   Respiratory Support room air   Current Diet/Method of Nutritional Intake (General Swallowing Observations, NIS) thin liquids (level 0);regular diet   Swallowing Evaluation Clinical swallow evaluation   Clinical Swallow Evaluation   Feeding Assistance set up only required   Clinical Swallow Evaluation Textures Trialed thin liquids;solid foods   Clinical Swallow Eval: Thin Liquid Texture Trial   Mode of Presentation, Thin Liquids straw;fed by clinician;self-fed   Volume of Liquid or Food Presented 6 oz thin   Oral Phase of Swallow WFL   Pharyngeal Phase of Swallow intact   Diagnostic Statement x2 throat clears, no other overt signs or symptoms of aspiration    Clinical Swallow Evaluation: Solid Food Texture Trial   Mode of Presentation self-fed   Volume Presented 100% mandarin oranges   Oral Phase other (see comments)  (slow but WFL mastication)   Oral Residue   (none)   Pharyngeal Phase intact   Diagnostic Statement slow but WFL mastication, no overt signs or symptoms of aspiration   Esophageal Phase of Swallow   Esophageal comments Pt reports GERD that seems well managed by medication per her report.   Swallowing Recommendations   Diet Consistency Recommendations thin liquids (level 0);regular diet  (self selection of soft/most food)   Supervision Level for Intake patient independent   Mode of Delivery Recommendations bolus size, small;slow rate of intake   Swallowing Maneuver Recommendations alternate food and liquid intake   Monitoring/Assistance Required (Eating/Swallowing) stop eating activities when fatigue is present   Recommended Feeding/Eating Techniques (Swallow Eval) maintain upright sitting position for eating;maintain upright posture during/after eating for 30 minutes   Medication Administration Recommendations, Swallowing (SLP) as tolerated   Instrumental Assessment Recommendations   (VFSS could be considered to rule out silent aspiration; the patient declined participation (her last VFSS in 2019 was WNL).)   Clinical Impression   Criteria for Skilled Therapeutic Interventions Met (SLP Eval) Evaluation only   SLP Diagnosis dysphagia   Risks & Benefits of therapy have been explained evaluation/treatment results reviewed;care plan/treatment goals reviewed;current/potential barriers reviewed;participants voiced agreement with care plan;participants included;patient   Clinical Impression Comments Clinical dysphagia eval completed per MD order. The patient present WNL oral musculature. She is edentulous, wears a bottom denture, nothing on top. Pt eats a regular diet at baseline, generally selects softer foods due to her lack of teeth. During this assessment  she self fed soft regular textures and drank thin liquids. x2 throat clears noted, no other overt signs or symptoms of aspiration. Educated pt about SLP role and options to assess for silent aspiration. The patient showed good understanding, politely declined offer of a VFSS as she had one done in 2019 without deficits.   SLP Total Evaluation Time   Eval: oral/pharyngeal swallow function, clinical swallow Minutes (05531) 25   SLP Discharge Planning   SLP Plan Eval only - OP VFSS could be considered in the future should there be concen for silent aspiration   SLP Discharge Recommendation home   SLP Rationale for DC Rec oroharyngeal swallow appears WFL and at baseline   SLP Brief overview of current status  Recommend a regular texture diet and thin liquids. Self select soft/moist food. Rec reflux precautions: alternate small bites/sips, pace slowly, sit upright during & for 30 min after meals.   SLP Time and Intention   Total Session Time (sum of timed and untimed services) 25

## 2025-02-14 NOTE — PLAN OF CARE
Goal Outcome Evaluation:         Alert and oriented x4; pleasant. VSS. Left hand PIV SL. Tele discontinued. BG ACHS; low this evening. Recheck WNL after eating dinner. SBA. Denies pain or CP. KHALIL but recovers quickly. On RA.

## 2025-02-14 NOTE — PLAN OF CARE
Physical Therapy Discharge Summary    Reason for therapy discharge:    Discharged to home with home therapy.    Progress towards therapy goal(s). See goals on Care Plan in [facility] electronic health record for goal details.  Goals not met.  Barriers to achieving goals:   discharge from facility.    Therapy recommendation(s):    Continued therapy is recommended.  Rationale/Recommendations:  Recommending home with assist and HHPT to progress strength and IND mobility.

## 2025-02-14 NOTE — PROGRESS NOTES
02/13/25 1500   Appointment Info   Signing Clinician's Name / Credentials (SLP) Amanda Perry M.A. CCC-SLP   General Information   Onset of Illness/Injury or Date of Surgery 02/10/25   Referring Physician Bg Rao   Pertinent History of Current Problem Nicole Reyes is a 69 year old female with a history of CRISTÓBAL (not on CPAP but with nocturnal oxygen), chronic kidney disease stage III, HFrEF with improved EF to 55 to 60%, severe obesity, hypertension, type 2 diabetes mellitus, depression, and anxiety. She presented to the ED on 2/10/2025 with cough with subjective fevers and chills.  Of note, she was admitted in 1/2025 for hypoxia secondary to influenza A.  Approximately 2 days prior to arrival she had an episode of angelo aspiration while eating dinner.  In the emergency department, the patient was found to have a blood pressure of 109/88, heart rate 117, temperature 97.8  F, respiratory rate 24, SpO2 96% on room air.  Initial lab work showed WBC 15.7, hemoglobin 11.6, potassium 3.2, high-sensitivity troponin 25, proBNP 1929, magnesium 1.4.  CT chest showed multifocal consolidative and groundglass opacities present in both lungs the changes appear unchanged in the left lung and worsened in the right lung suggestive of multifocal pneumonia versus aspiration pneumonia, several new pulmonary nodules present measuring up to 6 mm in the right middle lobe, small bilateral pleural effusions, stable mediastinal lymphadenopathy, stable 1.8 cm fat-containing lesion in the upper pole of the left kidney suggestive of angiomyolipoma.  EKG showed atrial fibrillation with ventricular rate of 133.  She was started on a heparin drip and diltiazem drip for new AFIB. Shew as started on ceftriaxone and azithromycin with concern for aspiration pneumonia. She was admitted for further cares. She spontaneously converted to sinus rhythm and heparin drip was transitioned to Eliquis.   General Observations Alert, pleasant,  cooperative   Type of Evaluation   Type of Evaluation Swallow Evaluation   Oral Motor   Oral Musculature generally intact   Mucosal Quality adequate   Dentition (Oral Motor)   Comment, Dentition (Oral Motor) edentulous on top   Dentition (Oral Motor) dentition impacts chewing ability;edentulous;dental appliance/dentures   Dental Appliance/Denture (Oral Motor) lower   Facial Symmetry (Oral Motor)   Facial Symmetry (Oral Motor) WNL   Lip Function (Oral Motor)   Lip Range of Motion (Oral Motor) WNL   Tongue Function (Oral Motor)   Tongue ROM (Oral Motor) WNL   Jaw Function (Oral Motor)   Jaw Function (Oral Motor) WNL   Cough/Swallow/Gag Reflex (Oral Motor)   Soft Palate/Velum (Oral Motor) WNL   Gag Reflex (Oral Motor) not tested   Volitional Throat Clear/Cough (Oral Motor) WNL   Volitional Swallow (Oral Motor) WNL   Vocal Quality/Secretion Management (Oral Motor)   Vocal Quality (Oral Motor) WNL   General Swallowing Observations   Past History of Dysphagia Pt has been seen by SLP on previous hospitalizations due to concern for dysphagia where dysphagia diet 3 or regular textures and thin liquids was recommended. A VFSS was completed in 2019 revealing no aspiration and a functional swallow at that time.   Respiratory Support room air   Current Diet/Method of Nutritional Intake (General Swallowing Observations, NIS) thin liquids (level 0);regular diet   Swallowing Evaluation Clinical swallow evaluation   Clinical Swallow Evaluation   Feeding Assistance set up only required   Clinical Swallow Evaluation Textures Trialed thin liquids;solid foods   Clinical Swallow Eval: Thin Liquid Texture Trial   Mode of Presentation, Thin Liquids straw;fed by clinician;self-fed   Volume of Liquid or Food Presented 6 oz thin   Oral Phase of Swallow WFL   Pharyngeal Phase of Swallow intact   Diagnostic Statement x2 throat clears, no other overt signs or symptoms of aspiration   Clinical Swallow Evaluation: Solid Food Texture Trial   Mode  of Presentation self-fed   Volume Presented 100% mandarin oranges   Oral Phase other (see comments)  (slow but WFL mastication)   Oral Residue   (none)   Pharyngeal Phase intact   Diagnostic Statement slow but WFL mastication, no overt signs or symptoms of aspiration   Esophageal Phase of Swallow   Esophageal comments Pt reports GERD that seems well managed by medication per her report.   Swallowing Recommendations   Diet Consistency Recommendations thin liquids (level 0);regular diet  (self selection of soft/most food)   Supervision Level for Intake patient independent   Mode of Delivery Recommendations bolus size, small;slow rate of intake   Swallowing Maneuver Recommendations alternate food and liquid intake   Monitoring/Assistance Required (Eating/Swallowing) stop eating activities when fatigue is present   Recommended Feeding/Eating Techniques (Swallow Eval) maintain upright sitting position for eating;maintain upright posture during/after eating for 30 minutes   Medication Administration Recommendations, Swallowing (SLP) as tolerated   Instrumental Assessment Recommendations   (VFSS could be considered to rule out silent aspiration; the patient declined participation (her last VFSS in 2019 was WNL).)   Clinical Impression   Criteria for Skilled Therapeutic Interventions Met (SLP Eval) Evaluation only   SLP Diagnosis dysphagia   Risks & Benefits of therapy have been explained evaluation/treatment results reviewed;care plan/treatment goals reviewed;current/potential barriers reviewed;participants voiced agreement with care plan;participants included;patient   Clinical Impression Comments Clinical dysphagia eval completed per MD order. The patient present WNL oral musculature. She is edentulous, wears a bottom denture, nothing on top. Pt eats a regular diet at baseline, generally selects softer foods due to her lack of teeth. During this assessment she self fed soft regular textures and drank thin liquids. x2  throat clears noted, no other overt signs or symptoms of aspiration. Educated pt about SLP role and options to assess for silent aspiration. The patient showed good understanding, politely declined offer of a VFSS as she had one done in 2019 without deficits.   SLP Total Evaluation Time   Eval: oral/pharyngeal swallow function, clinical swallow Minutes (39496) 25   SLP Discharge Planning   SLP Plan Eval only - OP VFSS could be considered in the future should there be concen for silent aspiration   SLP Discharge Recommendation home   SLP Rationale for DC Rec oroharyngeal swallow appears WFL and at baseline   SLP Brief overview of current status  Recommend a regular texture diet and thin liquids. Self select soft/moist food. Rec reflux precautions: alternate small bites/sips, pace slowly, sit upright during & for 30 min after meals.   SLP Time and Intention   Total Session Time (sum of timed and untimed services) 25

## 2025-02-14 NOTE — TELEPHONE ENCOUNTER
2/3/25 initial Plan of care approval, focus of care: resp failure, Discipline: Home health aid once a week for 9 weeks form recieved via fax. Form in your mailbox for signature.

## 2025-02-14 NOTE — DISCHARGE SUMMARY
Hospitalist Discharge Summary  [facility]    Nicole Reyes MRN# [id number]   YOB: 1955 Age: 69 year old     Date of Admission:  2/10/2025  Date of Discharge:  2/14/2025  Admitting Physician:  Juanpablo Cifuentes MD  Discharge Physician:  Bhavesh Paiz DO  Discharging Service:  Hospitalist     Primary Provider: [facility]          Discharge Diagnosis:     Suspected aspiration pneumonia  Pulmonary nodules  Acute hypoxic respiratory failure  New onset atrial fibrillation with RVR  HFrEF with improved EF  Hypokalemia  Hypomagnesemia  Hypertension  Anxiety  Depression  Insomnia  Type 2 diabetes mellitus  Chronic kidney disease, stage 3  CRISTÓBAL with use of oxygen at night   Severe obesity - BMI 50.55  Hypocalcemia              Discharge Disposition:     Discharged to home           Allergies:     Allergies   Allergen Reactions    Metformin GI Disturbance     High dose    Morphine And Codeine Rash    Penicillins Rash     Pt has tolerated cephalosporins and penems.   Pt tolerated Zosyn 7/6/2019              Discharge Medications:     Current Discharge Medication List        START taking these medications    Details   apixaban ANTICOAGULANT (ELIQUIS) 5 MG tablet Take 1 tablet (5 mg) by mouth 2 times daily.  Qty: 60 tablet, Refills: 1    Associated Diagnoses: Atrial fibrillation with RVR (H)      cefuroxime (CEFTIN) 500 MG tablet Take 1 tablet (500 mg) by mouth every 12 hours for 3 days.  Qty: 6 tablet, Refills: 0    Associated Diagnoses: Atrial fibrillation with RVR (H)      metoprolol succinate ER (TOPROL XL) 25 MG 24 hr tablet Take 1 tablet (25 mg) by mouth 2 times daily.  Qty: 60 tablet, Refills: 1    Associated Diagnoses: Atrial fibrillation with RVR (H)           CONTINUE these medications which have NOT CHANGED    Details   benzonatate (TESSALON) 200 MG capsule Take 1 capsule (200 mg) by mouth 3 times daily as needed for cough.  Qty: 42  capsule, Refills: 0    Associated Diagnoses: Acute cough      clonazePAM (KLONOPIN) 1 MG tablet Take 1 tablet (1 mg) by mouth 2 times daily as needed for anxiety. 60 to last 30 days  Qty: 60 tablet, Refills: 0    Associated Diagnoses: Insomnia, unspecified type; Generalized anxiety disorder      Fish Oil-Krill Oil (KRILL & FISH OIL BLEND PO) Take 1 capsule by mouth daily      furosemide (LASIX) 20 MG tablet TAKE 1 TABLET (20 MG) BY MOUTH EVERY DAY AS NEEDED FOR EDEMA  Qty: 90 tablet, Refills: 3    Associated Diagnoses: Localized edema      gabapentin (NEURONTIN) 300 MG capsule TAKE 3 CAPSULES EVERY AM, 2 CAPSULES MIDDAY AND 3 CAPS AT NIGHT  Qty: 240 capsule, Refills: 0    Associated Diagnoses: Chronic bilateral low back pain without sciatica      glipiZIDE (GLUCOTROL XL) 10 MG 24 hr tablet TAKE 1 TABLET (10 MG) BY MOUTH DAILY.  Qty: 90 tablet, Refills: 1    Associated Diagnoses: Type 2 diabetes mellitus without complication, without long-term current use of insulin (H)      guaiFENesin (MUCINEX) 600 MG 12 hr tablet Take 2 tablets (1,200 mg) by mouth 2 times daily.  Qty: 40 tablet, Refills: 0    Associated Diagnoses: Influenza A      Ibuprofen-Acetaminophen 125-250 MG TABS Take 1 tablet by mouth 3 times daily as needed.      ketoconazole (NIZORAL) 2 % external cream Apply to fold areas BID PRN  Qty: 60 g, Refills: 5    Associated Diagnoses: Intertrigo      pantoprazole (PROTONIX) 40 MG EC tablet TAKE 1 TABLET BY MOUTH EVERYDAY AT BEDTIME  Qty: 90 tablet, Refills: 2    Associated Diagnoses: Gastroesophageal reflux disease without esophagitis      potassium chloride ER (K-TAB) 20 MEQ CR tablet TAKE 2 TABLETS BY MOUTH EVERY DAY  Qty: 180 tablet, Refills: 1    Associated Diagnoses: Low serum potassium      QUEtiapine (SEROQUEL) 50 MG tablet Take 100 mg by mouth every evening.      rosuvastatin (CRESTOR) 10 MG tablet TAKE 1 TABLET (10 MG) BY MOUTH DAILY.  Qty: 90 tablet, Refills: 0    Associated Diagnoses:  "Hypercholesteremia      sertraline (ZOLOFT) 100 MG tablet TAKE 2 TABLETS BY MOUTH EVERY DAY  Qty: 180 tablet, Refills: 3    Associated Diagnoses: Generalized anxiety disorder; Major depressive disorder, recurrent episode, moderate (H)      zolpidem (AMBIEN) 10 MG tablet Take 1 tablet (10 mg) by mouth at bedtime.  Qty: 30 tablet, Refills: 0    Associated Diagnoses: Insomnia, unspecified type           STOP taking these medications       carvedilol (COREG) 12.5 MG tablet Comments:   Reason for Stopping:                      Condition on Discharge:     Discharge condition: Stable   Discharge vitals: Blood pressure 131/72, pulse 50, temperature 98.3  F (36.8  C), temperature source Oral, resp. rate 18, height 1.499 m (4' 11\"), weight 114.5 kg (252 lb 8 oz), last menstrual period 09/15/2007, SpO2 92%, not currently breastfeeding.   Code status on discharge: Full Code      BASIC PHYSICAL EXAMINATION:  GENERAL: No apparent distress.  CARDIOVASCULAR: Regular rate and rhythm without murmurs.  PULMONARY: Clear to auscultation bilaterally.   GASTROINTESTINAL: Abdomen soft, non-tender.  EXTREMITIES: No edema, pulses intact.  NEUROLOGIC: No focal deficits.            History of Illness:   See detailed admission note for full details.               Procedures excluding imaging which is summarized below:     Please see details in the electronic medical record.           Consultations:     PHARMACY IP CONSULT  CARDIOLOGY IP CONSULT  PHARMACY LIAISON FOR MEDICATION COVERAGE CONSULT  CARE MANAGEMENT / SOCIAL WORK IP CONSULT  PHARMACY DISCHARGE EDUCATION BY PHARMACIST  PHYSICAL THERAPY ADULT IP CONSULT  SPEECH LANGUAGE PATH ADULT IP CONSULT          Significant Results:     Results for orders placed or performed during the hospital encounter of 02/10/25   CT Chest Pulmonary Embolism w Contrast    Narrative    EXAM: CT CHEST PULMONARY EMBOLISM W CONTRAST  LOCATION: [facility]  DATE: 2/10/2025    INDICATION: " palpitations, hypoxia, posible aspiration event  COMPARISON: CTA chest performed on 11/29/2024  TECHNIQUE: CT chest pulmonary angiogram during arterial phase injection of IV contrast. Multiplanar reformats and MIP reconstructions were performed. Dose reduction techniques were used.   CONTRAST: 86mL Isovue 370    FINDINGS:  ANGIOGRAM CHEST: Pulmonary arteries are normal caliber and negative for pulmonary emboli. Thoracic aorta is negative for dissection. No CT evidence of right heart strain.    LUNGS AND PLEURA: Small bilateral pleural effusions are present. Patchy consolidative opacities are seen within the left lung and appear similar in appearance to the prior exam. Patchy groundglass and consolidative opacities in the right middle and lower   lobes appear slightly worsened since the prior exam. A few new lung nodules are also seen including a 5 mm nodule in the right upper lobe (series 7, image 84) as well as a 6 mm nodule in the right middle lobe (series 7, image 137).    MEDIASTINUM/AXILLAE: Stable appearance of multiple prominent mediastinal lymph nodes measuring up to 10 mm in the subcarinal space (series 5, image 36). Heart size appears enlarged.    CORONARY ARTERY CALCIFICATION: Mild.    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 Stable large gallstone within the neck of the gallbladder measuring up to 3.0 cm.    MUSCULOSKELETAL: Multilevel degenerative changes are present in the spine.      Impression    IMPRESSION:  1.  No evidence of pulmonary embolism is seen to the subsegmental level.  2.  Multifocal consolidative and groundglass opacities are present in both lungs. These changes appear unchanged in the left lung and worsened in the right lung. Constellation of findings are suggestive of multifocal pneumonia. Aspiration pneumonia is   also in the differential diagnosis. Recommend 3 month CT chest follow-up  exam.  3.  Several new pulmonary nodules are present measuring up to 6 mm in the right middle lobe. These nodules may be in keeping with multifocal infectious process described above. Recommend attention on 3 month CT chest follow-up exam.  4.  Small bilateral pleural effusions.  5.  Stable mediastinal lymphadenopathy.  6.  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    REFERENCE:  Guidelines for Management of Incidental Pulmonary Nodules Detected on CT Images: From the Fleischner Society 2017.   Guidelines apply to incidental nodules in patients who are 35 years or older.  Guidelines do not apply to lung cancer screening, patients with immunosuppression, or patients with known primary cancer.    MULTIPLE NODULES  Nodule size <6 mm  Low-risk patients: No follow-up needed.  High-risk patients: Optional follow-up at 12 months.    Nodule size 6 mm or larger  Low-risk patients: Follow-up CT at 3-6 months, then consider CT at 18-24 months.  High-risk patients: Follow-up CT at 3-6 months, then at 18-24 months if no change.  -Use most suspicious nodule as guide to management.     Echocardiogram Complete     Value    LVEF  55-60%    Narrative    [id number]  [id number]  [id number]  [id number]^LARRY^JENNIFER^REDDY     [facility]  Echocardiography Laboratory  [address] [place] [address]     Name: ASHANTI PATEL  MRN: [id number]  : 1955  Study Date: 2025 10:54 AM  Age: 69 yrs  Gender: Female  Patient Location: [facility]  Reason For Study: Atrial Fibrillation  Ordering Physician: JENNIFER JUAREZ  Performed By: Madi Ya RDCS     BSA: 2.0 m2  Height: 59 in  Weight: 250 lb  HR: 79  BP: 114/102 mmHg  ______________________________________________________________________________  Procedure  Echocardiogram with two-dimensional, color and spectral  Doppler.  ______________________________________________________________________________  Interpretation Summary     1. The left ventricle is normal in size. There is normal left ventricular wall  thickness. Left ventricular systolic function is normal. The visual ejection  fraction is 55-60%. Diastolic Doppler findings (E/E' ratio and/or other  parameters) suggest left ventricular filling pressures are increased. No  regional wall motion abnormalities noted.  2. The right ventricle is normal size. The right ventricular systolic function  is normal.  3. Trace mitral and tricuspid regurgitation.  4. No pericardial effusion.  5. In comparison to the previous report dated 10/15/2024, the findings are  similar.  ______________________________________________________________________________  Left Ventricle  The left ventricle is normal in size. There is normal left ventricular wall  thickness. Left ventricular systolic function is normal. The visual ejection  fraction is 55-60%. Diastolic Doppler findings (E/E' ratio and/or other  parameters) suggest left ventricular filling pressures are increased. No  regional wall motion abnormalities noted.     Right Ventricle  The right ventricle is normal size. The right ventricular systolic function is  normal.     Atria  The left atrium is mildly dilated. Right atrial size is normal. There is no  color Doppler evidence of an atrial shunt.     Mitral Valve  There is trace mitral regurgitation.     Tricuspid Valve  There is trace tricuspid regurgitation. Right ventricular systolic pressure  could not be approximated due to inadequate tricuspid regurgitation.     Aortic Valve  There is mild trileaflet aortic sclerosis. No aortic regurgitation is present.  No aortic stenosis is present.     Pulmonic Valve  There is trace pulmonic valvular regurgitation.     Vessels  The aortic root is normal size. Normal size ascending aorta. Dilation of the  inferior vena cava is present with normal  respiratory variation in diameter.     Pericardium  There is no pericardial effusion.     Rhythm  Sinus rhythm was noted.  ______________________________________________________________________________  MMode/2D Measurements & Calculations     IVSd: 1.2 cm  LVIDd: 4.8 cm  LVIDs: 2.7 cm  LVPWd: 1.1 cm  IVC diam: 2.7 cm  FS: 43.0 %  LV mass(C)d: 195.9 grams  LV mass(C)dI: 96.6 grams/m2  Ao root diam: 2.8 cm  LA dimension: 4.3 cm  asc Aorta Diam: 3.2 cm  LA/Ao: 1.6  Ao root diam index Ht(cm/m): 1.9  Ao root diam index BSA (cm/m2): 1.4  Asc Ao diam index BSA (cm/m2): 1.6  Asc Ao diam index Ht(cm/m): 2.1  LA Volume (BP): 52.4 ml     LA Volume Index (BP): 25.8 ml/m2  RV Base: 3.1 cm  RWT: 0.44  TAPSE: 1.7 cm     Doppler Measurements & Calculations  MV E max nic: 143.0 cm/sec  MV A max nic: 111.6 cm/sec  MV E/A: 1.3  MV dec slope: 866.7 cm/sec2  MV dec time: 0.17 sec  E/E' av.5  Lateral E/e': 13.8  Medial E/e': 15.1  RV S Nic: 14.3 cm/sec     ______________________________________________________________________________  Report approved by: Al Hawkins MD on 2025 12:16 PM           *Note: Due to a large number of results and/or encounters for the requested time period, some results have not been displayed. A complete set of results can be found in Results Review.       Transthoracic Echocardiogram Results:  No results found for this or any previous visit (from the past 4320 hours).             Pending Results:     Unresulted Labs Ordered in the Past 30 Days of this Admission       Date and Time Order Name Status Description    2/10/2025  4:47 PM Blood Culture Arm, Right Preliminary     2/10/2025  4:42 PM Blood Culture Peripheral Blood Preliminary                         Discharge Instructions and Follow-Up:     Discharge instructions and follow-up:   Discharge Procedure Orders   Follow-Up with Cardiology JOHAN   Standing Status: Future   Referral Priority: Routine: Next available opening Referral Type: Consultation    Requested Specialty: Cardiovascular Disease   Number of Visits Requested: 1     Primary Care - Care Coordination Referral   Standing Status: Future   Referral Priority: Routine: Next available opening Referral Type: Care Coordination   Number of Visits Requested: 1     Med Therapy Management Referral   Referral Priority: Priority: 1-2 Weeks Referral Type: Med Therapy Management   Requested Specialty: Pharmacist   Number of Visits Requested: 1     Reason for your hospital stay   Order Comments: Pneumonia.     Follow-up and recommended labs and tests    Order Comments: Follow up with primary care provider, [facility], within 7 days to evaluate medication change, for hospital follow- up, and regarding new diagnosis.  No follow up labs or test are needed.  Recommend repeat CT chest in 2-3 months to assess pulmonary nodules.  This was discussed with the patient by my colleague  14 day Onofre, cardiac monitor.  Follow-up with cardiology as outpatient.     Activity   Order Comments: Your activity upon discharge: activity as tolerated     Order Specific Question Answer Comments   Is discharge order? Yes      Full Code     Order Specific Question Answer Comments   Code status determined by: Discussion with patient/ legal decision maker      Diet   Order Comments: Follow this diet upon discharge: Current Diet:Orders Placed This Encounter      Combination Diet Regular Diet Adult     Order Specific Question Answer Comments   Is discharge order? Yes      ZIO PATCH MAIL OUT   Standing Status: Future Number of Occurrences: 1 Standing Exp. Date: 02/11/26     Order Specific Question Answer Comments   Order completed for? Adult Cardiology    Patient should wear the monitor for 14 days    Does the patient have an Neither    Is the mail out address correct for Zio? Yes      ZIO PATCH MAIL OUT   Standing Status: Future Standing Exp. Date: 02/14/26     Order Specific Question Answer Comments   Order completed  for? Adult Cardiology    Patient should wear the monitor for 14 days    Does the patient have an Neither    Is the mail out address correct for José Miguel? Yes              Hospital Course:     Nicole Reyes is a 69 year old female with a history of CRISTÓBAL (not on CPAP but with nocturnal oxygen), chronic kidney disease stage III, HFrEF with improved EF to 55 to 60%, severe obesity, hypertension, type 2 diabetes mellitus, depression, and anxiety. She presented to the ED on 2/10/2025 with cough with subjective fevers and chills.  Of note, she was admitted in 1/2025 for hypoxia secondary to influenza A.  Approximately 2 days prior to arrival she had an episode of angelo aspiration while eating dinner.  In the emergency department, the patient was found to have a blood pressure of 109/88, heart rate 117, temperature 97.8  F, respiratory rate 24, SpO2 96% on room air.  Initial lab work showed WBC 15.7, hemoglobin 11.6, potassium 3.2, high-sensitivity troponin 25, proBNP 1929, magnesium 1.4.  CT chest showed multifocal consolidative and groundglass opacities present in both lungs the changes appear unchanged in the left lung and worsened in the right lung suggestive of multifocal pneumonia versus aspiration pneumonia, several new pulmonary nodules present measuring up to 6 mm in the right middle lobe, small bilateral pleural effusions, stable mediastinal lymphadenopathy, stable 1.8 cm fat-containing lesion in the upper pole of the left kidney suggestive of angiomyolipoma.  EKG showed atrial fibrillation with ventricular rate of 133.  She was started on a heparin drip and diltiazem drip for new AFIB. Shew as started on ceftriaxone and azithromycin with concern for aspiration pneumonia. She was admitted for further cares. She spontaneously converted to sinus rhythm and heparin drip was transitioned to Eliquis.      Problem list:     Suspected aspiration pneumonia  Pulmonary nodules  Acute hypoxic respiratory failure  - Slow to  improve.  Was coughing quite a bit.  Still desaturated with activity but now doing quite a bit better without desaturation and on RA.  Ambulating well and feels ready to go home where she lives with her daughter who works from home  - Continue oral Ceftin (3 more days) and Azithromycin (completed in hospital)  - Antitussives as needed  - Recommend repeat CT chest in 2-3 months to assess pulmonary nodules.  This was discussed with the patient by my colleague  - Speech eval for possible dysphagia (pending but contacted RN and will be done prior to discharge today)     New onset atrial fibrillation with RVR  HFrEF with improved EF  - Echo in 6/2024 showed EF 45-50%.  EF improved to 55-60% in 10/2024  - EF 55-60% ths admission on 2/11  - Cardiology consult appreciated  - Continue metoprolol XL 25 mg BID, discontinue PTA coreg  - Continue Eliquis  - Follow up with cardiology as outpatient, to be arranged by cardiology team  - 14 day Ziopatch to be mailed out  - Resume PTA PRN Lasix     Hypokalemia  Hypomagnesemia  - Electrolyte replacement protocol     Weakness  -PT eval appreciated. Home PT and HHC aid on discharge     Chronic Medical Problems:  Hypertension  Anxiety  Depression  Insomnia  Type 2 diabetes mellitus  Chronic kidney disease, stage 3  CRISTÓBAL with use of oxygen at night   Severe obesity - BMI 50.55    The patient was seen, examined, and counseled on this day. The hospitalization and plan of care was reviewed with the patient extensively. All questions were addressed and the patient agreed to follow-up as noted above.      Total time spent in face to face contact with the patient and coordinating discharge was:  35 Minutes    Bhavesh Paiz DO MPH  [facility] Hospitalist  [address] [place] [address]  02/14/2025

## 2025-02-14 NOTE — PROGRESS NOTES
Care Management Discharge Note    Discharge Date: 02/14/2025       Discharge Disposition: Home, Home Care, DME    Discharge Services: Home Care    Discharge DME: Oxygen    Discharge Transportation: family or friend will provide    Private pay costs discussed: Not applicable    Does the patient's insurance plan have a 3 day qualifying hospital stay waiver?  No    PAS Confirmation Code:  NA  Patient/family educated on Medicare website which has current facility and service quality ratings: no    Education Provided on the Discharge Plan:  yes  Persons Notified of Discharge Plans: patient  Patient/Family in Agreement with the Plan: yes    Handoff Referral Completed: Yes, DEVIN PCP: Internal handoff referral completed    Additional Information:  Patient discharging home today with resumption of home care services through [facility]. Discharge orders faxed to [facility].     Updated patient at bedside. She verbalizes understanding and has arranged for her daughter to transport.    Patient did express concern about the cost of Eliquis. Stating it is unaffordable for her. Entered a pharmacy liaison consult for cost of med.    Beena Zhao RN  Care Coordinator  [facility]

## 2025-02-15 LAB
BACTERIA BLD CULT: NO GROWTH
BACTERIA BLD CULT: NO GROWTH

## 2025-02-17 ENCOUNTER — PATIENT OUTREACH (OUTPATIENT)
Dept: CARE COORDINATION | Facility: CLINIC | Age: 70
End: 2025-02-17
Payer: MEDICARE

## 2025-02-17 ENCOUNTER — MYC REFILL (OUTPATIENT)
Dept: INTERNAL MEDICINE | Facility: CLINIC | Age: 70
End: 2025-02-17
Payer: MEDICARE

## 2025-02-17 DIAGNOSIS — G47.00 INSOMNIA, UNSPECIFIED TYPE: ICD-10-CM

## 2025-02-17 DIAGNOSIS — F41.1 GENERALIZED ANXIETY DISORDER: ICD-10-CM

## 2025-02-17 NOTE — PROGRESS NOTES
Clinic Care Coordination Contact  [facility]/Voicemail    Clinical Data: Care Coordinator Outreach    Outreach Documentation Number of Outreach Attempt   2/17/2025   9:41 AM 1     Left message on patient's voicemail with call back information and requested return call.    Plan: Care Coordinator will try to reach patient again in 1-2 business days.    RAJ Dee  [facility]   Primary Care Social Work Care Coordinator  [facility] [place]   Phone: [phone]

## 2025-02-17 NOTE — LETTER
[facility] CARE COORDINATION  [address]  [place] [address]    February 18, 2025    Nicole Reyes  [address]  [place] [address]      Dear Shayy,    I am a clinic care coordinator who works with [facility] with the [facility]. I have been trying to reach you recently to introduce Clinic Care Coordination. Below is a description of clinic care coordination and how I can further assist you.       The clinic care coordination team is made up of a registered nurse, , financial resource worker and community health worker who understand the health care system. The goal of clinic care coordination is to help you manage your health and improve access to the health care system. Our team works alongside your provider to assist you in determining your health and social needs. We can help you obtain health care and community resources, providing you with necessary information and education. We can work with you through any barriers and develop a care plan that helps coordinate and strengthen the communication between you and your care team.  Our services are voluntary and are offered without charge to you personally.    Please feel free to contact me with any questions or concerns regarding care coordination and what we can offer.      We are focused on providing you with the highest-quality healthcare experience possible.    Sincerely,     Lena Drake [facility]  [facility]   Primary Care Social Work Care Coordinator  [facility] [place]   Phone: [phone]

## 2025-02-18 ENCOUNTER — TELEPHONE (OUTPATIENT)
Dept: CARDIOLOGY | Facility: CLINIC | Age: 70
End: 2025-02-18
Payer: MEDICARE

## 2025-02-18 RX ORDER — ZOLPIDEM TARTRATE 10 MG/1
10 TABLET ORAL AT BEDTIME
Qty: 30 TABLET | Refills: 0 | Status: SHIPPED | OUTPATIENT
Start: 2025-02-18

## 2025-02-18 RX ORDER — CLONAZEPAM 1 MG/1
1 TABLET ORAL 2 TIMES DAILY PRN
Qty: 60 TABLET | Refills: 0 | Status: SHIPPED | OUTPATIENT
Start: 2025-02-18

## 2025-02-18 NOTE — TELEPHONE ENCOUNTER
Patient was admitted to [facility] on 2/10/25 with cough and subjective fever/chills. She had a recent hospitalization in January 2025 for hypoxia secondary to influenza A.  In the ED, she was found to be in new onset atrial fibrillation RVR, started on heparin and Diltiazem infusions. Spontaneously converted NSR in PM of 2/10/25.     PMH: CRISTÓBAL not on CPAP, HFpEF, morbid obesity, hypertension, diabetes mellitus type 2, depression, and anxiety.     2/11/25: Echo showed EF of 55-60%. No regional wall motion abnormalities noted. The right ventricle is normal size. The right ventricular systolic function is normal. Trace mitral and tricuspid regurgitation.    Pt was started on Eliquis, Metoprolol and ABX. PTA Coreg was discontinued at time of discharge. 14 day Zio Patch monitor to be mailed out.    Pt is scheduled for an OV on 3/20/25 at 1435 with JOHAN Elizabeth Weaver at our [place] Office.    Discharged to home with [facility] services.    Writer attempted to call pt for a cardiology post discharge phone call, but no answer. VM left to call back with any non emergent cardiac related or medication questions. Reminder left of appt as scheduled above. Writer's phone number was provided. HARRY Uriostegui RN.

## 2025-02-18 NOTE — PROGRESS NOTES
Clinic Care Coordination Contact  [facility]/Voicemail    Clinical Data: Care Coordinator Outreach    Outreach Documentation Number of Outreach Attempt   2/17/2025   9:41 AM 1   2/18/2025   9:31 AM 2     Left message on patient's voicemail with call back information and requested return call.    Plan: Care Coordinator will send care coordination introduction letter with care coordinator contact information and explanation of care coordination services via [employer]. Care Coordinator will do no further outreaches at this time.    Lena Drake [facility]   Primary Care Social Work Care Coordinator  [facility] [place]   Phone: [phone]

## 2025-02-19 ENCOUNTER — TELEPHONE (OUTPATIENT)
Dept: INTERNAL MEDICINE | Facility: CLINIC | Age: 70
End: 2025-02-19
Payer: MEDICARE

## 2025-02-19 NOTE — TELEPHONE ENCOUNTER
Home Care is calling regarding an established patient with [facility].  Requesting orders from: pAryl Ellis [facility]  RN APPROVED: RN able to provide verbal orders.  Home Care will send orders for signature.  RN will close encounter.  Is this a request for a temporary pause in the home care episode?  No    Orders Requested    Skilled Nursing  Request for continuation of care with no increase or decrease in frequency  Frequency: 1 X week for 5 weeks   RN gave verbal order: Yes      Physical Therapy  Request for initial evaluation and treatment (one time)   RN gave verbal order: Yes    HHA (Home Health Aide)  Request for initial certification (first set of orders)   Frequency: 1 X week for 6 weeks   RN gave verbal order: Yes      Caller: [facility] Care Agency: [facility]  Phone number Home Care can be reached at: [phone]  Okay to leave a detailed message?: Yes    Charity Cardenas RN

## 2025-02-19 NOTE — TELEPHONE ENCOUNTER
MTM referral from: Transitions of Care (recent hospital discharge, TCU discharge, or ED visit)    MTM referral outreach attempt #2 on February 19, 2025 at 1:45 PM      Outcome: Patient not reachable after several attempts, sent [employer] message    Use vbc for the carrier/Plan on the flowsheet      [employer] Message Sent    RIGO Vega   [phone]

## 2025-02-20 LAB — GLUCOSE BLDC GLUCOMTR-MCNC: 168 MG/DL (ref 70–99)

## 2025-02-25 ENCOUNTER — TELEPHONE (OUTPATIENT)
Dept: INTERNAL MEDICINE | Facility: CLINIC | Age: 70
End: 2025-02-25
Payer: MEDICARE

## 2025-03-11 ENCOUNTER — TELEPHONE (OUTPATIENT)
Dept: INTERNAL MEDICINE | Facility: CLINIC | Age: 70
End: 2025-03-11
Payer: MEDICARE

## 2025-03-11 NOTE — PROGRESS NOTES
Medication Therapy Management (MTM) Encounter    ASSESSMENT:                            Medication Adherence/Access: See below.    Diabetes   Patient is meeting A1c goal of < 7%.  Self monitoring of blood glucose is at goal of fasting  mg/dL.  Recommend increasing glipizide or adding on additional medication like Jardiance if affordable. Patient declined today since sugars are currently at goal.     Hypertension/Edema/Afib:  Patient is meeting blood pressure goal of < 130/80mmHg.   Recommend patient reach out to [facility] to help with cost of Eliquis.     Hyperlipidemia   Patient would benefit from repeat lipid panel. If LDL and lipids still elevated would recommend increasing statin dose.     GERD:   Recommended increasing pantoprazole to 40 mg twice daily to see if this helps with GERD. Especially since she has had recurrent aspiration pneumonia. Will reach out to Iwona Andrea regarding this.     Anxiety, Depression, and Insomnia  Stable.     Supplements   Stable.     Pain:   Stable.    PLAN:                            I will reach out to Iwona Andrea and see if she is okay with being assigned your official Primary Provider. And refilling your meds  [employer] Prescription Assistance Program is available to patients who have high prescription drug costs and may be having difficulty paying for their medications. Please call #[phone]. Ask them if there is any assistance for Eliquis that you would qualify for.  When I send Iwona a message regarding if she wants to try increasing your pantoprazole dose to 40 mg twice daily to see if this helps    Follow-up: Return in about 6 months (around 9/12/2025) for Medication Therapy Management.    SUBJECTIVE/OBJECTIVE:                          Shayy Reyes is a 69 year old female seen for a follow-up visit.       Reason for visit: med review, diabetes.    Allergies/ADRs: Reviewed in chart  Past Medical History: Reviewed in chart  Tobacco: She reports that she quit smoking about  46 years ago. Her smoking use included cigarettes. She started smoking about 51 years ago. She has a 15 pack-year smoking history. She has never used smokeless tobacco.  Alcohol: Less than 1 beverage / month    Medication Adherence/Access: Patient takes medications directly from bottles.  Per patient, misses medication 0 time(s) per week. Reports rarely may miss them, occasionally if she can miss if she has a really busy day, but not often.  Medication barriers: cost, eliquis is expensive, trying to avoid warfarin but cost is big concerns    Diabetes   Glipizide XL 10 mg daily  Patient is not experiencing side effects.  Reports she did not tolerate Ozempic (nausea), metformin XR (GI issues)  Current diabetes symptoms: none  Diet/Exercise: limited exercise  Blood sugar monitoring: one time(s) daily; Ranges: when fasting 110-130 mg/dL, reports they don't really ever go above 130      Eye exam in the last 12 months? No  Foot exam: due  Lab Results   Component Value Date    A1C 7.5 01/05/2025    A1C 6.9 05/30/2024    A1C 6.3 01/25/2024    A1C 7.5 06/05/2023       Hypertension   /Edema/Afib:  Metoprolol succinate 25 mg twice daily   Furosemide 20 as needed for Edema - uses about once a week  Potassium 40 mEq daily  Eliquis 5 mg twice daily   Patient reports no current medication side effects, no concerns with bruising or bleeding  Patient self monitors blood pressure.  Home BP monitoring reports it has been all over the place at home.     BP Readings from Last 3 Encounters:   03/07/25 119/79   02/14/25 131/72   01/27/25 130/76       Hyperlipidemia   rosuvastatin 10 mg daily  Patient reports no significant myalgias or other side effects.  The 10-year ASCVD risk score (Corina MEDINA, et al., 2019) is: 23.4%    Values used to calculate the score:      Age: 69 years      Sex: Female      Is Non- : No      Diabetic: Yes      Tobacco smoker: No      Systolic Blood Pressure: 125 mmHg      Is BP treated:  Yes      HDL Cholesterol: 39 mg/dL      Total Cholesterol: 262 mg/dL     Recent Labs   Lab Test 01/25/24  1519 06/05/23  1408   CHOL 262* 177   HDL 39* 40*   * 104*   TRIG 128 164*       GERD    Pantoprazole 40 mg once daily   Patient feels that current regimen is effective. Still some breakthrough symptoms, maybe a little worse lately. Did get a bed raiser to help prevent reflux. Longstanding issue that has led to her recurrent aspiration pneumonia concerns. Is hoping raising the head of her bed will help.       Mental Health   Anxiety, Depression, and Insomnia  Sertraline 200 mg once daily  Quetiapine 100 mg every evening  Clonazepam 1 mg twice daily as needed - uses a couple times a week, usually more for trouble getting to sleep due to feeling anxious  Ambien 10 mg at bedtime as needed - about once a week needing this  Patient reports no current medication side effects.  Reports her mood has been good lately. Sleep has also been mostly good on this regimen, reports could be a couple hour up to 7 hours. Always tries to go to sleep naturally first.  Reports the gabapentin also helps for her sleep.  She see's a psychiatrist who helps manage these, but is only actively prescribing the Seroquel.       Supplements   Ferrous sulfate 325 mg daily  Fish oil daily  No reported issues at this time.        Pain:   Gabapentin 900mg/600mg/900mg three times daily  Patient has chronic low back pain from spinal stenosis. Reports pain is steady around 6-7/10, but reports this isn't new for her. Wears a back brace as well to help with the pain.  Admits she dose take Advil-Tylenol combo very rarely, only if pain gets bad. Limits use so as to not hurt her kidneys or liver.    Today's Vitals: LMP 09/15/2007   ----------------  Post Discharge Medication Reconciliation Status: discharge medications reconciled and changed, per note/orders.    I spent 30 minutes with this patient today. All changes were made via collaborative  practice agreement with [facility].     A summary of these recommendations was sent via [employer].    Cindy Barnes, PharmD  Medication Therapy Management Pharmacist  Voicemail: [phone]      Telemedicine Visit Details  The patient's medications can be safely assessed via a telemedicine encounter.  Type of service:  Telephone visit  Originating Location (pt. Location): Home    Distant Location (provider location):  On-site  Start Time:  1:30 PM  End Time:  2:00 PM     Medication Therapy Recommendations  GERD (gastroesophageal reflux disease)   1 Current Medication: pantoprazole (PROTONIX) 40 MG EC tablet   Current Medication Sig: TAKE 1 TABLET BY MOUTH EVERYDAY AT BEDTIME   Rationale: Dose too low - Dosage too low - Effectiveness   Recommendation: Increase Dose   Status: Contact Provider - Awaiting Response   Identified Date: 3/12/2025

## 2025-03-12 ENCOUNTER — VIRTUAL VISIT (OUTPATIENT)
Dept: PHARMACY | Facility: CLINIC | Age: 70
End: 2025-03-12
Attending: INTERNAL MEDICINE
Payer: COMMERCIAL

## 2025-03-12 DIAGNOSIS — E11.9 TYPE 2 DIABETES MELLITUS WITHOUT COMPLICATION, WITHOUT LONG-TERM CURRENT USE OF INSULIN (H): Primary | ICD-10-CM

## 2025-03-12 DIAGNOSIS — R60.9 EDEMA, UNSPECIFIED TYPE: ICD-10-CM

## 2025-03-12 DIAGNOSIS — F41.1 GENERALIZED ANXIETY DISORDER: ICD-10-CM

## 2025-03-12 DIAGNOSIS — K21.9 GASTROESOPHAGEAL REFLUX DISEASE WITHOUT ESOPHAGITIS: ICD-10-CM

## 2025-03-12 DIAGNOSIS — E78.5 HYPERLIPIDEMIA LDL GOAL <100: ICD-10-CM

## 2025-03-12 DIAGNOSIS — I10 BENIGN ESSENTIAL HYPERTENSION: ICD-10-CM

## 2025-03-12 DIAGNOSIS — F33.0 MILD EPISODE OF RECURRENT MAJOR DEPRESSIVE DISORDER: ICD-10-CM

## 2025-03-12 DIAGNOSIS — G47.00 INSOMNIA, UNSPECIFIED TYPE: ICD-10-CM

## 2025-03-12 DIAGNOSIS — M54.42 MIDLINE LOW BACK PAIN WITH LEFT-SIDED SCIATICA, UNSPECIFIED CHRONICITY: ICD-10-CM

## 2025-03-12 DIAGNOSIS — E78.00 HYPERCHOLESTEREMIA: ICD-10-CM

## 2025-03-12 DIAGNOSIS — I48.91 ATRIAL FIBRILLATION WITH RVR (H): ICD-10-CM

## 2025-03-12 DIAGNOSIS — Z78.9 TAKES DIETARY SUPPLEMENTS: ICD-10-CM

## 2025-03-12 PROCEDURE — 99207 PR NO CHARGE LOS: CPT | Mod: 93 | Performed by: PHARMACIST

## 2025-03-12 RX ORDER — ROSUVASTATIN CALCIUM 10 MG/1
10 TABLET, COATED ORAL DAILY
Qty: 90 TABLET | Refills: 0 | Status: SHIPPED | OUTPATIENT
Start: 2025-03-12

## 2025-03-12 NOTE — Clinical Note
Hello,  Patient is wondering if you can be her official PCP. Also wondering if pantoprazole can be increased to 40 mg twice daily due to continued heartburn and recurrent aspiration pneumonia as a result of this.  Cindy Barnes, PharmD Medication Therapy Management Pharmacist Voicemail: [phone]

## 2025-03-12 NOTE — PATIENT INSTRUCTIONS
"Recommendations from today's MTM visit:                                                       I will reach out to Iwona Andrea and see if she is okay with being assigned your official Primary Provider. And refilling your meds  [employer] Prescription Assistance Program is available to patients who have high prescription drug costs and may be having difficulty paying for their medications. Please call #[phone]. Ask them if there is any assistance for Eliquis that you would qualify for.  When I send Iwona a message regarding if she wants to try increasing your pantoprazole dose to 40 mg twice daily to see if this helps    Follow-up: Return in about 6 months (around 9/12/2025) for Medication Therapy Management.    It was great speaking with you today.  I value your experience and would be very thankful for your time in providing feedback in our clinic survey. In the next few days, you may receive an email or text message from [employer] with a link to a survey related to your  clinical pharmacist.\"     To schedule another MTM appointment, please call the clinic directly or you may call the MTM scheduling line at [phone].    My Clinical Pharmacist's contact information:                                                      Please feel free to contact me with any questions or concerns you have.      Cindy Barnes, PharmD  Medication Therapy Management Pharmacist  Voicemail: [phone]     "

## 2025-03-13 ENCOUNTER — MEDICAL CORRESPONDENCE (OUTPATIENT)
Dept: HEALTH INFORMATION MANAGEMENT | Facility: CLINIC | Age: 70
End: 2025-03-13

## 2025-03-20 ENCOUNTER — OFFICE VISIT (OUTPATIENT)
Dept: CARDIOLOGY | Facility: CLINIC | Age: 70
End: 2025-03-20
Payer: MEDICARE

## 2025-03-20 VITALS
HEIGHT: 58 IN | SYSTOLIC BLOOD PRESSURE: 118 MMHG | BODY MASS INDEX: 51.91 KG/M2 | DIASTOLIC BLOOD PRESSURE: 75 MMHG | WEIGHT: 247.3 LBS | HEART RATE: 72 BPM | OXYGEN SATURATION: 94 %

## 2025-03-20 DIAGNOSIS — Z79.01 ON CONTINUOUS ORAL ANTICOAGULATION: Primary | ICD-10-CM

## 2025-03-20 DIAGNOSIS — I48.91 ATRIAL FIBRILLATION WITH RVR (H): ICD-10-CM

## 2025-03-20 RX ORDER — METOPROLOL SUCCINATE 25 MG/1
25 TABLET, EXTENDED RELEASE ORAL 2 TIMES DAILY
Qty: 180 TABLET | Refills: 3 | Status: SHIPPED | OUTPATIENT
Start: 2025-03-20

## 2025-03-20 NOTE — TELEPHONE ENCOUNTER
Chart marked for merge with this chart      Yaquelin from [facility] notified  
Patient declined SLP referral at this time. Patient does not endorse any difficult in swallowing or speech, which was initially reported at start of care.    Please be aware of the above.  Yaquelin Blas LPN  [facility]  
This is not the correct chart for this information to be in.  This patient has a large full chart and this is not the correct encounter.  This may need to be merged with another chart  
Yes

## 2025-03-20 NOTE — PROGRESS NOTES
CARDIOLOGY CLINIC PROGRESS NOTE    DOS: 2025      Nicole Reyes  : 1955, 69 year old  MRN: [id number]      Primary cardiologist: Dr. Ramos       History: 69 year old woman with history of mild nonischemic cardiomyopathy that is now resolved, dyslipidemia, HTN, spinal stenosis, history of substance abuse, DM2, CRISTÓBAL, afib RVR, obesity.     2024 she was found to have cardiomyopathy with EF 45 to 50%.      2024 nuclear stress that showed fixed lateral defect.       Coronary CTA 2024 showed calcium score 15, 55th percentile, mild CAD.    Interval History, reviewed:   24- LOV with Dr. Ramos, Echo ordered.     24 ED visit in [facility] for pneumonia, pulmonary nodules.     Echo 10/15/24: EF 55-60%.     24-12/3/24- Admitted with Acute on chronic hypoxemic respiratory failure secondary to CAP vs aspiration. Diltiazem and spironolactone stopped due to softer BPs.    25 ED visit in [facility] for SOB, cough, chest pain.     25-25- Admitted with hypoxic respiratory failure due to influenza A.     2/10/25-2/15/25 Admission in [facility] for new onset Afib w/ RVR, pneumonia.    Eliquis 5 mg BID, Toprol XL 25 mg BID prescribed. Stopped carvedilol.   Echo 25: EF 55-60%, no RWMAs, no sig valve disease.   Ziopatch ordered. Follow up with Cardiology.        She presents today for follow up.   She has not placed the Zio Patch. She needs help.  She has it with her.   She is taking Eliquis and tolerating. No bleeding concerns.   She is taking metoprolol and tolerating.   BP controlled.   She said she was pretty asx with regard to the afib.         ROS:  Skin:  not assessed     Eyes:  not assessed    ENT:  not assessed    Respiratory:  Positive for dyspnea on exertion, sleep apnea  Cardiovascular:    Positive for, palpitations, fatigue  Gastroenterology: not assessed    Genitourinary:  not assessed    Musculoskeletal:  not assessed    Neurologic:  not assessed    Psychiatric:  not  assessed    Heme/Lymph/Imm:  not assessed    Endocrine:  not assessed      PAST MEDICAL HISTORY:  Past Medical History:   Diagnosis Date    Arthritis     lt shoulder, rt. knee    Blood transfusion     CKD (chronic kidney disease) stage 3, GFR 30-59 ml/min (H)     Depressive disorder     Essential hypertension, benign     Gastroesophageal reflux disease     Generalized anxiety disorder     Hernia, abdominal     Hiatal hernia     History of blood transfusion     with a hernia repair    Hypertension     Insomnia, unspecified     Iron deficiency anemia 2009    Major depression     sees a psychiatrist    Menopause     age 53    Obesity, unspecified     CRISTÓBAL (obstructive sleep apnea)     bipap    Other chronic pain     chronic pain lt arm from tendonidits    Oxygen dependent     2 LPM at home-uses at noc or extended walking    Pneumonia     due to aspiration from hiatal hernia-    Pneumonia due to 2019 novel coronavirus 2021    Postoperative seroma 10/2011    drained several times    Pure hypercholesterolemia     RLS (restless legs syndrome)     Type II or unspecified type diabetes mellitus without mention of complication, not stated as uncontrolled        PAST SURGICAL HISTORY:  Past Surgical History:   Procedure Laterality Date    BREAST BIOPSY, RT/LT      2 times; benign    BREAST SURGERY  ?    Biopsy x 2 Benign     SECTION       SECTION       SECTION      COLONOSCOPY N/A 2020    Procedure: Colonoscopy with biopsy;  Surgeon: Obinna Gutierrez MD;  Location: [facility] OR    DILATION AND CURETTAGE, HYSTEROSCOPY DIAGNOSTIC, COMBINED  2013    Procedure: COMBINED DILATION AND CURETTAGE, HYSTEROSCOPY DIAGNOSTIC;  DILATION AND CURETTAGE, HYSTEROSCOPY DIAGNOSTIC   Converted to a general;  Surgeon: Robi Edwards MD;  Location: [facility] OR    ESOPHAGOSCOPY, GASTROSCOPY, DUODENOSCOPY (EGD), COMBINED N/A 2015    Procedure: COMBINED ESOPHAGOSCOPY, GASTROSCOPY, DUODENOSCOPY  (EGD);  Surgeon: Obinna Gutierrez MD;  Location:  GI    ESOPHAGOSCOPY, GASTROSCOPY, DUODENOSCOPY (EGD), COMBINED N/A 2015    Procedure: COMBINED ENDOSCOPIC ULTRASOUND, ESOPHAGOSCOPY, GASTROSCOPY, DUODENOSCOPY (EGD), FINE NEEDLE ASPIRATE/BIOPSY;  Surgeon: Sabine Olivares MD;  Location:  GI    ESOPHAGOSCOPY, GASTROSCOPY, DUODENOSCOPY (EGD), COMBINED N/A 2020    Procedure: ESOPHAGOGASTRODUODENOSCOPY;  Surgeon: Ascencion Stauffer MD;  Location: [facility] OR    HERNIORRHAPHY INCISIONAL (LOCATION)  10/08/2011     incarcerated hernia repair with mesh;    HERNIORRHAPHY INCISIONAL (LOCATION)  10/16/2011     repair incisional hernia with mesh, and removal of current implanted mesh;    ZZC NONSPECIFIC PROCEDURE      Hernia repair     Z NONSPECIFIC PROCEDURE      Lymph node biopsy in neck (benign)        SOCIAL HISTORY:  Social History     Socioeconomic History    Marital status:     Number of children: 3   Occupational History    Occupation: Unit Clerk     Employer: [employer]     Comment: Labor and Delivery   Tobacco Use    Smoking status: Former     Current packs/day: 0.00     Average packs/day: 1.5 packs/day for 10.0 years (15.0 ttl pk-yrs)     Types: Cigarettes     Start date: 1974     Quit date: 3/18/1979     Years since quittin.0    Smokeless tobacco: Never    Tobacco comments:     quit    Vaping Use    Vaping status: Never Used   Substance and Sexual Activity    Alcohol use: Yes     Comment: occasional    Drug use: No    Sexual activity: Not Currently     Partners: Male     Birth control/protection: None     Comment: menopausal   Other Topics Concern    Parent/sibling w/ CABG, MI or angioplasty before 65F 55M? Yes     Comment: Mother age 54    Seat Belt Yes    Self-Exams Yes     Social Drivers of Health     Financial Resource Strain: Low Risk  (2/10/2025)    Financial Resource Strain     Within the past 12 months, have you or your family members you live with  been unable to get utilities (heat, electricity) when it was really needed?: No   Food Insecurity: Low Risk  (2/10/2025)    Food Insecurity     Within the past 12 months, did you worry that your food would run out before you got money to buy more?: No     Within the past 12 months, did the food you bought just not last and you didn t have money to get more?: No   Transportation Needs: Low Risk  (2/10/2025)    Transportation Needs     Within the past 12 months, has lack of transportation kept you from medical appointments, getting your medicines, non-medical meetings or appointments, work, or from getting things that you need?: No   Stress: Stress Concern Present (8/10/2020)    [employer] - Occupational Stress Questionnaire     Feeling of Stress : To some extent   Social Connections: Unknown (8/10/2020)    Social Connection and Isolation Panel [NHANES]     Frequency of Communication with Friends and Family: More than three times a week   Interpersonal Safety: Low Risk  (2/12/2025)    Interpersonal Safety     Do you feel physically and emotionally safe where you currently live?: Yes     Within the past 12 months, have you been hit, slapped, kicked or otherwise physically hurt by someone?: No     Within the past 12 months, have you been humiliated or emotionally abused in other ways by your partner or ex-partner?: No   Housing Stability: Low Risk  (2/10/2025)    Housing Stability     Do you have housing? : Yes     Are you worried about losing your housing?: No       FAMILY HISTORY:  Family History   Problem Relation Age of Onset    Cardiovascular Mother         CHF    Hypertension Mother     C.A.D. Mother         50s    Lipids Mother     Coronary Artery Disease Mother     Depression Mother     Anxiety Disorder Mother     Cancer Father         Hodgkin's, Leukemia    Other Cancer Father         Hodgkins    Hypertension Brother     Diabetes Brother     Coronary Artery Disease Brother      Other Cancer Brother         Hodgkins    Cancer Brother         Hodgkin's    Coronary Artery Disease Brother     Hypertension Brother     Anxiety Disorder Brother     C.A.D. Brother 61    C.A.D. Brother     Sleep Apnea Sister     Diabetes Sister     Coronary Artery Disease Sister     Hypertension Sister     Depression Sister     Anxiety Disorder Sister     Obesity Sister     Connective Tissue Disorder Sister         Lupus    Diabetes Sister     Other Cancer Sister         Leukemia    Depression Sister     Anxiety Disorder Sister     Lipids Sister     Hypertension Sister     Hypertension Sister     Hypertension Sister     Basal cell carcinoma Daughter 38        top of head    Mental Illness Maternal Grandfather     Hypertension Daughter     Depression Daughter     Anxiety Disorder Daughter     Obesity Daughter     Obesity Son     Obesity Daughter        MEDS:   Current Outpatient Medications   Medication Sig Dispense Refill    apixaban ANTICOAGULANT (ELIQUIS) 5 MG tablet Take 1 tablet (5 mg) by mouth 2 times daily. 180 tablet 3    benzonatate (TESSALON) 200 MG capsule Take 1 capsule (200 mg) by mouth 3 times daily as needed for cough. 42 capsule 0    clonazePAM (KLONOPIN) 1 MG tablet Take 1 tablet (1 mg) by mouth 2 times daily as needed for anxiety. 60 to last 30 days 60 tablet 0    furosemide (LASIX) 20 MG tablet TAKE 1 TABLET (20 MG) BY MOUTH EVERY DAY AS NEEDED FOR EDEMA 90 tablet 3    gabapentin (NEURONTIN) 300 MG capsule TAKE 3 CAPSULES EVERY AM, 2 CAPSULES MIDDAY AND 3 CAPS AT NIGHT 240 capsule 0    glipiZIDE (GLUCOTROL XL) 10 MG 24 hr tablet TAKE 1 TABLET (10 MG) BY MOUTH DAILY. 90 tablet 1    Ibuprofen-Acetaminophen 125-250 MG TABS Take 1 tablet by mouth 3 times daily as needed.      ketoconazole (NIZORAL) 2 % external cream Apply to fold areas BID PRN 60 g 5    metoprolol succinate ER (TOPROL XL) 25 MG 24 hr tablet Take 1 tablet (25 mg) by mouth 2 times daily. 180 tablet 3    pantoprazole (PROTONIX) 40 MG EC  "tablet TAKE 1 TABLET BY MOUTH EVERYDAY AT BEDTIME 90 tablet 2    potassium chloride ER (K-TAB) 20 MEQ CR tablet TAKE 2 TABLETS BY MOUTH EVERY  tablet 1    QUEtiapine (SEROQUEL) 50 MG tablet Take 100 mg by mouth every evening.      rosuvastatin (CRESTOR) 10 MG tablet TAKE 1 TABLET (10 MG) BY MOUTH DAILY. 90 tablet 0    sertraline (ZOLOFT) 100 MG tablet TAKE 2 TABLETS BY MOUTH EVERY  tablet 3    zolpidem (AMBIEN) 10 MG tablet Take 1 tablet (10 mg) by mouth at bedtime. 30 tablet 0     No current facility-administered medications for this visit.       ALLERGIES:   Allergies   Allergen Reactions    Metformin GI Disturbance     High dose    Morphine And Codeine Rash    Penicillins Rash     Pt has tolerated cephalosporins and penems.   Pt tolerated Zosyn 7/6/2019       PHYSICAL EXAM:  Vitals: /75 (BP Location: Right arm, Patient Position: Sitting)   Pulse 72   Ht 1.473 m (4' 10\")   Wt 112.2 kg (247 lb 4.8 oz)   LMP 09/15/2007   SpO2 94%   BMI 51.69 kg/m    Constitutional:  cooperative, alert and oriented, well developed, well nourished, in no acute distress morbidly obese      Skin:  warm and dry to the touch, no apparent skin lesions or masses noted        Head:  normocephalic, no masses or lesions        Eyes:  pupils equal and round, conjunctivae and lids unremarkable        ENT:  no pallor or cyanosis, dentition good        Neck:  JVP normal        Respiratory:  clear to auscultation, normal symmetry        Cardiac: regular rhythm frequent premature beats distant heart sounds              GI:  abdomen soft, BS normoactive obese      Vascular:       right radial artery, 2+             left radial artery, 2+                  Extremities and Musculoskeletal:  no deformities, clubbing, cyanosis, erythema observed        Neurological:  no gross motor deficits, affect appropriate              LABS/DATA:  I reviewed the following:  Echo 2/11/25:  Interpretation Summary  1. The left ventricle is " normal in size. There is normal left ventricular wall  thickness. Left ventricular systolic function is normal. The visual ejection  fraction is 55-60%. Diastolic Doppler findings (E/E' ratio and/or other  parameters) suggest left ventricular filling pressures are increased. No  regional wall motion abnormalities noted.  2. The right ventricle is normal size. The right ventricular systolic function  is normal.  3. Trace mitral and tricuspid regurgitation.  4. No pericardial effusion.  5. In comparison to the previous report dated 10/15/2024, the findings are  similar.      Component      Latest Ref Rng 2/12/2025  5:53 AM 2/14/2025  4:07 AM   Sodium      135 - 145 mmol/L 139     Potassium      3.4 - 5.3 mmol/L 3.8  4.4    Chloride      98 - 107 mmol/L 104     Carbon Dioxide (CO2)      22 - 29 mmol/L 25     Anion Gap      7 - 15 mmol/L 10     Urea Nitrogen      8.0 - 23.0 mg/dL 7.2 (L)     Creatinine      0.51 - 0.95 mg/dL 0.74     GFR Estimate      >60 mL/min/1.73m2 87     Calcium      8.8 - 10.4 mg/dL 8.3 (L)     Glucose      70 - 99 mg/dL 98     WBC      4.0 - 11.0 10e3/uL 9.1     RBC Count      3.80 - 5.20 10e6/uL 4.03     Hemoglobin      11.7 - 15.7 g/dL 9.5 (L)     Hematocrit      35.0 - 47.0 % 31.4 (L)     MCV      78 - 100 fL 78     MCH      26.5 - 33.0 pg 23.6 (L)     MCHC      31.5 - 36.5 g/dL 30.3 (L)     RDW      10.0 - 15.0 % 18.3 (H)     Platelet Count      150 - 450 10e3/uL 173              ASSESSMENT/PLAN:  1.  Mild nonischemic cardiomyopathy, resolved  - Lasix 20 mg PRN. She uses maybe once or twice a month      2. Paroxysmal afib RVR  - Mostly regular on exam today with some premature beats  - Needs to have Zio placed, will place today  - Continue Eliquis 5 mg BID. Will check hgb  - Continue Toprol XL 25 mg BID      3. Dyslipidemia  - On Crestor 10 mg      Meds refilled       Follow up:  Hgb today   Will call with Zio results  Dr. Ramos in 6 months with BMP, hgb    Elizabeth Weaver PA-C      The  longitudinal plan of care for the diagnosis(es)/condition(s) as documented were addressed during this visit. Due to the added complexity in care, I will continue to support Shayy in the subsequent management and with ongoing continuity of care.

## 2025-03-20 NOTE — PATIENT INSTRUCTIONS
Your Eliquis and metoprolol are refilled.   Please get a hemoglobin drawn today.   We will contact you with the heart monitor results.  It will likely be late April or even early May before we get the results.   See Dr. Ramos in 6 months with labs before.

## 2025-03-20 NOTE — LETTER
3/20/2025    [facility] [address] [place] [address]    RE: Nicole Reyes       Dear Colleague,     I had the pleasure of seeing Nicole Reyes in the [facility].      CARDIOLOGY CLINIC PROGRESS NOTE    DOS: 2025      Nicole CLARKE Eric  : 1955, 69 year old  MRN: [id number]      Primary cardiologist: Dr. Ramos       History: 69 year old woman with history of mild nonischemic cardiomyopathy that is now resolved, dyslipidemia, HTN, spinal stenosis, history of substance abuse, DM2, CRISTÓBAL, afib RVR, obesity.     2024 she was found to have cardiomyopathy with EF 45 to 50%.      2024 nuclear stress that showed fixed lateral defect.       Coronary CTA 2024 showed calcium score 15, 55th percentile, mild CAD.    Interval History, reviewed:   24- LOV with Dr. Ramos, Echo ordered.     24 ED visit in [facility] for pneumonia, pulmonary nodules.     Echo 10/15/24: EF 55-60%.     24-12/3/24- Admitted with Acute on chronic hypoxemic respiratory failure secondary to CAP vs aspiration. Diltiazem and spironolactone stopped due to softer BPs.    25 ED visit in [facility] for SOB, cough, chest pain.     25-25- Admitted with hypoxic respiratory failure due to influenza A.     2/10/25-2/15/25 Admission in [facility] for new onset Afib w/ RVR, pneumonia.    Eliquis 5 mg BID, Toprol XL 25 mg BID prescribed. Stopped carvedilol.   Echo 25: EF 55-60%, no RWMAs, no sig valve disease.   Ziopatch ordered. Follow up with Cardiology.        She presents today for follow up.   She has not placed the Zio Patch. She needs help.  She has it with her.   She is taking Eliquis and tolerating. No bleeding concerns.   She is taking metoprolol and tolerating.   BP controlled.   She said she was pretty asx with regard to the afib.         ROS:  Skin:  not assessed     Eyes:  not assessed    ENT:  not assessed    Respiratory:  Positive for  dyspnea on exertion, sleep apnea  Cardiovascular:    Positive for, palpitations, fatigue  Gastroenterology: not assessed    Genitourinary:  not assessed    Musculoskeletal:  not assessed    Neurologic:  not assessed    Psychiatric:  not assessed    Heme/Lymph/Imm:  not assessed    Endocrine:  not assessed      PAST MEDICAL HISTORY:  Past Medical History:   Diagnosis Date     Arthritis     lt shoulder, rt. knee     Blood transfusion      CKD (chronic kidney disease) stage 3, GFR 30-59 ml/min (H)      Depressive disorder      Essential hypertension, benign      Gastroesophageal reflux disease      Generalized anxiety disorder      Hernia, abdominal      Hiatal hernia      History of blood transfusion     with a hernia repair     Hypertension      Insomnia, unspecified      Iron deficiency anemia 2009     Major depression     sees a psychiatrist     Menopause     age 53     Obesity, unspecified      CRISTÓBAL (obstructive sleep apnea)     bipap     Other chronic pain     chronic pain lt arm from tendonidits     Oxygen dependent     2 LPM at home-uses at noc or extended walking     Pneumonia     due to aspiration from hiatal hernia-     Pneumonia due to 2019 novel coronavirus 2021     Postoperative seroma 10/2011    drained several times     Pure hypercholesterolemia      RLS (restless legs syndrome)      Type II or unspecified type diabetes mellitus without mention of complication, not stated as uncontrolled        PAST SURGICAL HISTORY:  Past Surgical History:   Procedure Laterality Date     BREAST BIOPSY, RT/LT      2 times; benign     BREAST SURGERY  ?    Biopsy x 2 Benign      SECTION        SECTION        SECTION       COLONOSCOPY N/A 2020    Procedure: Colonoscopy with biopsy;  Surgeon: Obinna Gutierrez MD;  Location: [facility] OR     DILATION AND CURETTAGE, HYSTEROSCOPY DIAGNOSTIC, COMBINED  2013    Procedure: COMBINED DILATION AND CURETTAGE, HYSTEROSCOPY DIAGNOSTIC;   DILATION AND CURETTAGE, HYSTEROSCOPY DIAGNOSTIC   Converted to a general;  Surgeon: Robi Edwards MD;  Location: [facility] OR     ESOPHAGOSCOPY, GASTROSCOPY, DUODENOSCOPY (EGD), COMBINED N/A 2015    Procedure: COMBINED ESOPHAGOSCOPY, GASTROSCOPY, DUODENOSCOPY (EGD);  Surgeon: Obinna Gutierrez MD;  Location:  GI     ESOPHAGOSCOPY, GASTROSCOPY, DUODENOSCOPY (EGD), COMBINED N/A 2015    Procedure: COMBINED ENDOSCOPIC ULTRASOUND, ESOPHAGOSCOPY, GASTROSCOPY, DUODENOSCOPY (EGD), FINE NEEDLE ASPIRATE/BIOPSY;  Surgeon: Sabine Olivares MD;  Location:  GI     ESOPHAGOSCOPY, GASTROSCOPY, DUODENOSCOPY (EGD), COMBINED N/A 2020    Procedure: ESOPHAGOGASTRODUODENOSCOPY;  Surgeon: Ascencion Stauffer MD;  Location: [facility] OR     HERNIORRHAPHY INCISIONAL (LOCATION)  10/08/2011     incarcerated hernia repair with mesh;     HERNIORRHAPHY INCISIONAL (LOCATION)  10/16/2011     repair incisional hernia with mesh, and removal of current implanted mesh;     ZZC NONSPECIFIC PROCEDURE      Hernia repair      Z NONSPECIFIC PROCEDURE      Lymph node biopsy in neck (benign)        SOCIAL HISTORY:  Social History     Socioeconomic History     Marital status:      Number of children: 3   Occupational History     Occupation: [employer]     Employer: [employer]     Comment: Labor and Delivery   Tobacco Use     Smoking status: Former     Current packs/day: 0.00     Average packs/day: 1.5 packs/day for 10.0 years (15.0 ttl pk-yrs)     Types: Cigarettes     Start date: 1974     Quit date: 3/18/1979     Years since quittin.0     Smokeless tobacco: Never     Tobacco comments:     quit    Vaping Use     Vaping status: Never Used   Substance and Sexual Activity     Alcohol use: Yes     Comment: occasional     Drug use: No     Sexual activity: Not Currently     Partners: Male     Birth control/protection: None     Comment: menopausal   Other Topics Concern     Parent/sibling w/ CABG, MI or  angioplasty before 65F 55M? Yes     Comment: Mother age 54     Seat Belt Yes     Self-Exams Yes     Social Drivers of Health     Financial Resource Strain: Low Risk  (2/10/2025)    Financial Resource Strain      Within the past 12 months, have you or your family members you live with been unable to get utilities (heat, electricity) when it was really needed?: No   Food Insecurity: Low Risk  (2/10/2025)    Food Insecurity      Within the past 12 months, did you worry that your food would run out before you got money to buy more?: No      Within the past 12 months, did the food you bought just not last and you didn t have money to get more?: No   Transportation Needs: Low Risk  (2/10/2025)    Transportation Needs      Within the past 12 months, has lack of transportation kept you from medical appointments, getting your medicines, non-medical meetings or appointments, work, or from getting things that you need?: No   Stress: Stress Concern Present (8/10/2020)    [employer] [place] [employer] - Occupational Stress Questionnaire      Feeling of Stress : To some extent   Social Connections: Unknown (8/10/2020)    Social Connection and Isolation Panel [NHANES]      Frequency of Communication with Friends and Family: More than three times a week   Interpersonal Safety: Low Risk  (2/12/2025)    Interpersonal Safety      Do you feel physically and emotionally safe where you currently live?: Yes      Within the past 12 months, have you been hit, slapped, kicked or otherwise physically hurt by someone?: No      Within the past 12 months, have you been humiliated or emotionally abused in other ways by your partner or ex-partner?: No   Housing Stability: Low Risk  (2/10/2025)    Housing Stability      Do you have housing? : Yes      Are you worried about losing your housing?: No       FAMILY HISTORY:  Family History   Problem Relation Age of Onset     Cardiovascular Mother         CHF     Hypertension Mother       C.A.D. Mother         50s     Lipids Mother      Coronary Artery Disease Mother      Depression Mother      Anxiety Disorder Mother      Cancer Father         Hodgkin's, Leukemia     Other Cancer Father         Hodgkins     Hypertension Brother      Diabetes Brother      Coronary Artery Disease Brother      Other Cancer Brother         Hodgkins     Cancer Brother         Hodgkin's     Coronary Artery Disease Brother      Hypertension Brother      Anxiety Disorder Brother      C.A.D. Brother 61     C.A.D. Brother      Sleep Apnea Sister      Diabetes Sister      Coronary Artery Disease Sister      Hypertension Sister      Depression Sister      Anxiety Disorder Sister      Obesity Sister      Connective Tissue Disorder Sister         Lupus     Diabetes Sister      Other Cancer Sister         Leukemia     Depression Sister      Anxiety Disorder Sister      Lipids Sister      Hypertension Sister      Hypertension Sister      Hypertension Sister      Basal cell carcinoma Daughter 38        top of head     Mental Illness Maternal Grandfather      Hypertension Daughter      Depression Daughter      Anxiety Disorder Daughter      Obesity Daughter      Obesity Son      Obesity Daughter        MEDS:   Current Outpatient Medications   Medication Sig Dispense Refill     apixaban ANTICOAGULANT (ELIQUIS) 5 MG tablet Take 1 tablet (5 mg) by mouth 2 times daily. 180 tablet 3     benzonatate (TESSALON) 200 MG capsule Take 1 capsule (200 mg) by mouth 3 times daily as needed for cough. 42 capsule 0     clonazePAM (KLONOPIN) 1 MG tablet Take 1 tablet (1 mg) by mouth 2 times daily as needed for anxiety. 60 to last 30 days 60 tablet 0     furosemide (LASIX) 20 MG tablet TAKE 1 TABLET (20 MG) BY MOUTH EVERY DAY AS NEEDED FOR EDEMA 90 tablet 3     gabapentin (NEURONTIN) 300 MG capsule TAKE 3 CAPSULES EVERY AM, 2 CAPSULES MIDDAY AND 3 CAPS AT NIGHT 240 capsule 0     glipiZIDE (GLUCOTROL XL) 10 MG 24 hr tablet TAKE 1 TABLET (10 MG) BY MOUTH  "DAILY. 90 tablet 1     Ibuprofen-Acetaminophen 125-250 MG TABS Take 1 tablet by mouth 3 times daily as needed.       ketoconazole (NIZORAL) 2 % external cream Apply to fold areas BID PRN 60 g 5     metoprolol succinate ER (TOPROL XL) 25 MG 24 hr tablet Take 1 tablet (25 mg) by mouth 2 times daily. 180 tablet 3     pantoprazole (PROTONIX) 40 MG EC tablet TAKE 1 TABLET BY MOUTH EVERYDAY AT BEDTIME 90 tablet 2     potassium chloride ER (K-TAB) 20 MEQ CR tablet TAKE 2 TABLETS BY MOUTH EVERY  tablet 1     QUEtiapine (SEROQUEL) 50 MG tablet Take 100 mg by mouth every evening.       rosuvastatin (CRESTOR) 10 MG tablet TAKE 1 TABLET (10 MG) BY MOUTH DAILY. 90 tablet 0     sertraline (ZOLOFT) 100 MG tablet TAKE 2 TABLETS BY MOUTH EVERY  tablet 3     zolpidem (AMBIEN) 10 MG tablet Take 1 tablet (10 mg) by mouth at bedtime. 30 tablet 0     No current facility-administered medications for this visit.       ALLERGIES:   Allergies   Allergen Reactions     Metformin GI Disturbance     High dose     Morphine And Codeine Rash     Penicillins Rash     Pt has tolerated cephalosporins and penems.   Pt tolerated Zosyn 7/6/2019       PHYSICAL EXAM:  Vitals: /75 (BP Location: Right arm, Patient Position: Sitting)   Pulse 72   Ht 1.473 m (4' 10\")   Wt 112.2 kg (247 lb 4.8 oz)   LMP 09/15/2007   SpO2 94%   BMI 51.69 kg/m    Constitutional:  cooperative, alert and oriented, well developed, well nourished, in no acute distress morbidly obese      Skin:  warm and dry to the touch, no apparent skin lesions or masses noted        Head:  normocephalic, no masses or lesions        Eyes:  pupils equal and round, conjunctivae and lids unremarkable        ENT:  no pallor or cyanosis, dentition good        Neck:  JVP normal        Respiratory:  clear to auscultation, normal symmetry        Cardiac: regular rhythm frequent premature beats distant heart sounds              GI:  abdomen soft, BS normoactive obese      Vascular:  "      right radial artery, 2+             left radial artery, 2+                  Extremities and Musculoskeletal:  no deformities, clubbing, cyanosis, erythema observed        Neurological:  no gross motor deficits, affect appropriate              LABS/DATA:  I reviewed the following:  Echo 2/11/25:  Interpretation Summary  1. The left ventricle is normal in size. There is normal left ventricular wall  thickness. Left ventricular systolic function is normal. The visual ejection  fraction is 55-60%. Diastolic Doppler findings (E/E' ratio and/or other  parameters) suggest left ventricular filling pressures are increased. No  regional wall motion abnormalities noted.  2. The right ventricle is normal size. The right ventricular systolic function  is normal.  3. Trace mitral and tricuspid regurgitation.  4. No pericardial effusion.  5. In comparison to the previous report dated 10/15/2024, the findings are  similar.      Component      Latest Ref Rng 2/12/2025  5:53 AM 2/14/2025  4:07 AM   Sodium      135 - 145 mmol/L 139     Potassium      3.4 - 5.3 mmol/L 3.8  4.4    Chloride      98 - 107 mmol/L 104     Carbon Dioxide (CO2)      22 - 29 mmol/L 25     Anion Gap      7 - 15 mmol/L 10     Urea Nitrogen      8.0 - 23.0 mg/dL 7.2 (L)     Creatinine      0.51 - 0.95 mg/dL 0.74     GFR Estimate      >60 mL/min/1.73m2 87     Calcium      8.8 - 10.4 mg/dL 8.3 (L)     Glucose      70 - 99 mg/dL 98     WBC      4.0 - 11.0 10e3/uL 9.1     RBC Count      3.80 - 5.20 10e6/uL 4.03     Hemoglobin      11.7 - 15.7 g/dL 9.5 (L)     Hematocrit      35.0 - 47.0 % 31.4 (L)     MCV      78 - 100 fL 78     MCH      26.5 - 33.0 pg 23.6 (L)     MCHC      31.5 - 36.5 g/dL 30.3 (L)     RDW      10.0 - 15.0 % 18.3 (H)     Platelet Count      150 - 450 10e3/uL 173              ASSESSMENT/PLAN:  1.  Mild nonischemic cardiomyopathy, resolved  - Lasix 20 mg PRN. She uses maybe once or twice a month      2. Paroxysmal afib RVR  - Mostly regular on  exam today with some premature beats  - Needs to have Zio placed, will place today  - Continue Eliquis 5 mg BID. Will check hgb  - Continue Toprol XL 25 mg BID      3. Dyslipidemia  - On Crestor 10 mg      Meds refilled       Follow up:  Hgb today   Will call with Zio results  Dr. Ramos in 6 months with HAZEL, judd Weaver PA-C      The longitudinal plan of care for the diagnosis(es)/condition(s) as documented were addressed during this visit. Due to the added complexity in care, I will continue to support Shayy in the subsequent management and with ongoing continuity of care.      Thank you for allowing me to participate in the care of your patient.      Sincerely,     Elizabeth Weaver PA-C     [facility]  cc:   Светлана Maguire PA-C  [address] [place] [address]

## 2025-03-23 ENCOUNTER — HEALTH MAINTENANCE LETTER (OUTPATIENT)
Age: 70
End: 2025-03-23

## 2025-03-24 ENCOUNTER — TELEPHONE (OUTPATIENT)
Dept: INTERNAL MEDICINE | Facility: CLINIC | Age: 70
End: 2025-03-24
Payer: MEDICARE

## 2025-03-24 NOTE — TELEPHONE ENCOUNTER
Home Care is calling regarding an established patient with [facility].  Requesting orders from: Clinic - [facility]  OTHER ACTION: will need to route orders to Eli Ayala NP  Is this a request for a temporary pause in the home care episode?  No    Orders Requested  [facility] requesting to discontinue cares as pt has met goals - will discontinue on 4/2/2025          Ebony RN - [facility]  [phone] ok LM     Need to verify if order is okay to discharge as provider is not listen as pcp     Please advise     Deedee Alanis, RN

## 2025-03-25 NOTE — TELEPHONE ENCOUNTER
Added Iwona Andrea as primary care provider again.    Called Cris from [facility] and left detailed message to relay provider was okay with verbal orders requested and she will be patient's primary care provider. Instructed to call clinic back if any further questions/concerns.    Thank you,  [place], Triage RN Uday Weinstein    1:20 PM 3/25/2025

## 2025-03-25 NOTE — TELEPHONE ENCOUNTER
Eli Andrea APRN CNP Whitley, Stephanie J RN  Caller: Unspecified (Yesterday,  8:08 AM)  I was originally listed as her PCP but was not supposed to be acting as a PCP as of yet so they removed me from being her PCP.  Now my role is legitimately as primary care provider.  You would have to ask patient if anything has changed for her, but last time I checked I am doing her wellness visits and filling out all her paperwork.    LEONCIO Amado, CNP          The above is a routing comment     Routing to the care team provider is in     Deedee Alanis RN, BSN  [facility]

## 2025-03-26 ENCOUNTER — TRANSFERRED RECORDS (OUTPATIENT)
Dept: HEALTH INFORMATION MANAGEMENT | Facility: CLINIC | Age: 70
End: 2025-03-26
Payer: MEDICARE

## 2025-03-26 NOTE — TELEPHONE ENCOUNTER
[facility] calls back stating patient will be discharging from home care on April 2nd, routing to primary care provider as an FYI.

## 2025-04-07 NOTE — PLAN OF CARE
"Goal Outcome Evaluation:      Plan of Care Reviewed With: patient    Overall Patient Progress: improvingOverall Patient Progress: improving    Outcome Evaluation: .    Vitals VSS  Neuro A&Ox4  Respiratory 94% on 3 LPM NC  Cardiac/Tele WDL  GI/ Continent  Skin Intact  LDAs PIV SL  Labs . K+ and Mag protocols- rechecks in for tomorrow am   Diet mod carb carb count   Activity A1/SBA  Plan Continue to monitor.       Problem: Adult Inpatient Plan of Care  Goal: Plan of Care Review  Description: The Plan of Care Review/Shift note should be completed every shift.  The Outcome Evaluation is a brief statement about your assessment that the patient is improving, declining, or no change.  This information will be displayed automatically on your shift  note.  Outcome: Progressing  Flowsheets (Taken 1/7/2025 1817)  Outcome Evaluation: .  Plan of Care Reviewed With: patient  Overall Patient Progress: improving  Goal: Patient-Specific Goal (Individualized)  Description: You can add care plan individualizations to a care plan. Examples of Individualization might be:  \"Parent requests to be called daily at 9am for status\", \"I have a hard time hearing out of my right ear\", or \"Do not touch me to wake me up as it startles  me\".  Outcome: Progressing  Goal: Absence of Hospital-Acquired Illness or Injury  Outcome: Progressing  Intervention: Identify and Manage Fall Risk  Recent Flowsheet Documentation  Taken 1/7/2025 1710 by Kalie Flowers, RN  Safety Promotion/Fall Prevention: safety round/check completed  Intervention: Prevent Skin Injury  Recent Flowsheet Documentation  Taken 1/7/2025 1710 by Kalie Flowers RN  Body Position: position changed independently  Goal: Optimal Comfort and Wellbeing  Outcome: Progressing  Goal: Readiness for Transition of Care  Outcome: Progressing         " PAST MEDICAL HISTORY:  Migraines

## 2025-04-22 ENCOUNTER — MYC REFILL (OUTPATIENT)
Dept: INTERNAL MEDICINE | Facility: CLINIC | Age: 70
End: 2025-04-22
Payer: MEDICARE

## 2025-04-22 ENCOUNTER — MYC REFILL (OUTPATIENT)
Dept: DERMATOLOGY | Facility: CLINIC | Age: 70
End: 2025-04-22
Payer: MEDICARE

## 2025-04-22 DIAGNOSIS — G47.00 INSOMNIA, UNSPECIFIED TYPE: ICD-10-CM

## 2025-04-22 DIAGNOSIS — F41.1 GENERALIZED ANXIETY DISORDER: ICD-10-CM

## 2025-04-22 DIAGNOSIS — L30.4 INTERTRIGO: ICD-10-CM

## 2025-04-22 RX ORDER — KETOCONAZOLE 20 MG/G
CREAM TOPICAL
Qty: 60 G | Refills: 5 | Status: CANCELLED | OUTPATIENT
Start: 2025-04-22

## 2025-04-23 LAB — CV ZIO PRELIM RESULTS: NORMAL

## 2025-04-23 RX ORDER — CLONAZEPAM 1 MG/1
1 TABLET ORAL 2 TIMES DAILY PRN
Qty: 60 TABLET | Refills: 0 | Status: SHIPPED | OUTPATIENT
Start: 2025-04-23

## 2025-04-23 RX ORDER — ZOLPIDEM TARTRATE 10 MG/1
10 TABLET ORAL AT BEDTIME
Qty: 30 TABLET | Refills: 0 | Status: SHIPPED | OUTPATIENT
Start: 2025-04-23

## 2025-05-27 ENCOUNTER — MYC REFILL (OUTPATIENT)
Dept: INTERNAL MEDICINE | Facility: CLINIC | Age: 70
End: 2025-05-27
Payer: MEDICARE

## 2025-05-27 DIAGNOSIS — F41.1 GENERALIZED ANXIETY DISORDER: ICD-10-CM

## 2025-05-27 DIAGNOSIS — G47.00 INSOMNIA, UNSPECIFIED TYPE: ICD-10-CM

## 2025-05-27 RX ORDER — ZOLPIDEM TARTRATE 10 MG/1
10 TABLET ORAL AT BEDTIME
Qty: 30 TABLET | Refills: 0 | Status: SHIPPED | OUTPATIENT
Start: 2025-05-27

## 2025-05-27 RX ORDER — CLONAZEPAM 1 MG/1
1 TABLET ORAL 2 TIMES DAILY PRN
Qty: 60 TABLET | Refills: 0 | Status: SHIPPED | OUTPATIENT
Start: 2025-05-27

## 2025-06-02 ENCOUNTER — TRANSFERRED RECORDS (OUTPATIENT)
Dept: HEALTH INFORMATION MANAGEMENT | Facility: CLINIC | Age: 70
End: 2025-06-02
Payer: MEDICARE

## 2025-06-02 DIAGNOSIS — Z03.89 OBSERVATION FOR SUSPECTED MALIGNANT NEOPLASM: Primary | ICD-10-CM

## 2025-06-10 ENCOUNTER — HOSPITAL ENCOUNTER (OUTPATIENT)
Dept: CT IMAGING | Facility: CLINIC | Age: 70
Discharge: HOME | End: 2025-06-10
Payer: MEDICARE

## 2025-06-10 DIAGNOSIS — R91.8 PULMONARY NODULES: ICD-10-CM

## 2025-06-10 PROCEDURE — 71250 CT THORAX DX C-: CPT

## 2025-06-11 ENCOUNTER — RESULTS FOLLOW-UP (OUTPATIENT)
Dept: INTERNAL MEDICINE | Facility: CLINIC | Age: 70
End: 2025-06-11

## 2025-06-11 DIAGNOSIS — R91.8 PULMONARY NODULES: Primary | ICD-10-CM

## 2025-06-15 DIAGNOSIS — E78.00 HYPERCHOLESTEREMIA: ICD-10-CM

## 2025-06-17 RX ORDER — ROSUVASTATIN CALCIUM 10 MG/1
10 TABLET, COATED ORAL DAILY
Qty: 90 TABLET | Refills: 0 | Status: SHIPPED | OUTPATIENT
Start: 2025-06-17

## 2025-06-23 ENCOUNTER — MYC REFILL (OUTPATIENT)
Dept: INTERNAL MEDICINE | Facility: CLINIC | Age: 70
End: 2025-06-23
Payer: MEDICARE

## 2025-06-23 ENCOUNTER — TRANSFERRED RECORDS (OUTPATIENT)
Dept: HEALTH INFORMATION MANAGEMENT | Facility: CLINIC | Age: 70
End: 2025-06-23
Payer: MEDICARE

## 2025-06-23 DIAGNOSIS — F41.1 GENERALIZED ANXIETY DISORDER: ICD-10-CM

## 2025-06-23 DIAGNOSIS — M54.50 CHRONIC BILATERAL LOW BACK PAIN WITHOUT SCIATICA: ICD-10-CM

## 2025-06-23 DIAGNOSIS — G47.00 INSOMNIA, UNSPECIFIED TYPE: ICD-10-CM

## 2025-06-23 DIAGNOSIS — G89.29 CHRONIC BILATERAL LOW BACK PAIN WITHOUT SCIATICA: ICD-10-CM

## 2025-06-24 RX ORDER — ZOLPIDEM TARTRATE 10 MG/1
10 TABLET ORAL AT BEDTIME
Qty: 30 TABLET | Refills: 0 | Status: SHIPPED | OUTPATIENT
Start: 2025-06-24

## 2025-06-24 RX ORDER — GABAPENTIN 300 MG/1
CAPSULE ORAL
Qty: 240 CAPSULE | Refills: 0 | Status: SHIPPED | OUTPATIENT
Start: 2025-06-24

## 2025-06-24 RX ORDER — CLONAZEPAM 1 MG/1
1 TABLET ORAL 2 TIMES DAILY PRN
Qty: 60 TABLET | Refills: 0 | Status: SHIPPED | OUTPATIENT
Start: 2025-06-24

## 2025-07-06 ENCOUNTER — HEALTH MAINTENANCE LETTER (OUTPATIENT)
Age: 70
End: 2025-07-06

## 2025-07-22 DIAGNOSIS — E11.9 TYPE 2 DIABETES MELLITUS WITHOUT COMPLICATION, WITHOUT LONG-TERM CURRENT USE OF INSULIN (H): ICD-10-CM

## 2025-07-23 RX ORDER — GLIPIZIDE 10 MG/1
10 TABLET, FILM COATED, EXTENDED RELEASE ORAL DAILY
Qty: 90 TABLET | Refills: 1 | Status: SHIPPED | OUTPATIENT
Start: 2025-07-23

## 2025-08-01 ENCOUNTER — MYC REFILL (OUTPATIENT)
Dept: INTERNAL MEDICINE | Facility: CLINIC | Age: 70
End: 2025-08-01
Payer: MEDICARE

## 2025-08-01 DIAGNOSIS — G47.00 INSOMNIA, UNSPECIFIED TYPE: ICD-10-CM

## 2025-08-01 DIAGNOSIS — F41.1 GENERALIZED ANXIETY DISORDER: ICD-10-CM

## 2025-08-02 RX ORDER — CLONAZEPAM 1 MG/1
1 TABLET ORAL 2 TIMES DAILY PRN
Qty: 60 TABLET | Refills: 0 | Status: SHIPPED | OUTPATIENT
Start: 2025-08-02

## 2025-08-02 RX ORDER — ZOLPIDEM TARTRATE 10 MG/1
10 TABLET ORAL AT BEDTIME
Qty: 30 TABLET | Refills: 0 | Status: SHIPPED | OUTPATIENT
Start: 2025-08-02

## 2025-08-11 ENCOUNTER — TRANSFERRED RECORDS (OUTPATIENT)
Dept: HEALTH INFORMATION MANAGEMENT | Facility: CLINIC | Age: 70
End: 2025-08-11
Payer: MEDICARE

## 2025-08-17 ENCOUNTER — HEALTH MAINTENANCE LETTER (OUTPATIENT)
Age: 70
End: 2025-08-17

## 2025-09-02 ENCOUNTER — MYC REFILL (OUTPATIENT)
Dept: INTERNAL MEDICINE | Facility: CLINIC | Age: 70
End: 2025-09-02
Payer: MEDICARE

## 2025-09-02 DIAGNOSIS — G47.00 INSOMNIA, UNSPECIFIED TYPE: ICD-10-CM

## 2025-09-02 DIAGNOSIS — F41.1 GENERALIZED ANXIETY DISORDER: ICD-10-CM

## 2025-09-04 RX ORDER — CLONAZEPAM 1 MG/1
1 TABLET ORAL 2 TIMES DAILY PRN
Qty: 60 TABLET | Refills: 0 | Status: SHIPPED | OUTPATIENT
Start: 2025-09-04

## 2025-09-04 RX ORDER — ZOLPIDEM TARTRATE 10 MG/1
10 TABLET ORAL AT BEDTIME
Qty: 30 TABLET | Refills: 0 | Status: SHIPPED | OUTPATIENT
Start: 2025-09-04

## (undated) DEVICE — KIT PROCEDURE W/CLEAN-A-SCOPE LINERS V2 200800

## (undated) DEVICE — LINEN HALF SHEET 5512

## (undated) DEVICE — SOL WATER IRRIG 1000ML BOTTLE 2F7114

## (undated) DEVICE — BAG RED BIOHAZARD 37X50" 40GAL A7450PR

## (undated) DEVICE — BAG CLEAR TRASH 1.3M 39X33" P4040C

## (undated) DEVICE — SPECIMEN TRAP MUCOUS SIMILAC NTRL CARE GRAD 80ML 0045860

## (undated) DEVICE — ENDO SNARE POLYPECTOMY OVAL 15MM LOOP SD-240U-15

## (undated) DEVICE — PAD CHUX UNDERPAD 30X36" P3036C

## (undated) DEVICE — GOWN XLG DISP 9545

## (undated) DEVICE — SUCTION CANISTER MEDIVAC LINER 3000ML W/LID 65651-530

## (undated) DEVICE — TUBING SUCTION MEDI-VAC SOFT 3/16"X20' N520A

## (undated) DEVICE — LINEN FULL SHEET 5511

## (undated) RX ORDER — IVABRADINE 5 MG/1
TABLET, FILM COATED ORAL
Status: DISPENSED
Start: 2024-07-18

## (undated) RX ORDER — KETAMINE HCL IN 0.9 % NACL 50 MG/5 ML
SYRINGE (ML) INTRAVENOUS
Status: DISPENSED
Start: 2020-01-03

## (undated) RX ORDER — GLYCOPYRROLATE 0.2 MG/ML
INJECTION INTRAMUSCULAR; INTRAVENOUS
Status: DISPENSED
Start: 2020-12-03

## (undated) RX ORDER — LIDOCAINE HYDROCHLORIDE 10 MG/ML
INJECTION, SOLUTION EPIDURAL; INFILTRATION; INTRACAUDAL; PERINEURAL
Status: DISPENSED
Start: 2020-12-03

## (undated) RX ORDER — PROPOFOL 10 MG/ML
INJECTION, EMULSION INTRAVENOUS
Status: DISPENSED
Start: 2020-12-03

## (undated) RX ORDER — ONDANSETRON 2 MG/ML
INJECTION INTRAMUSCULAR; INTRAVENOUS
Status: DISPENSED
Start: 2020-01-03

## (undated) RX ORDER — FENTANYL CITRATE 50 UG/ML
INJECTION, SOLUTION INTRAMUSCULAR; INTRAVENOUS
Status: DISPENSED
Start: 2020-12-03

## (undated) RX ORDER — PROPOFOL 10 MG/ML
INJECTION, EMULSION INTRAVENOUS
Status: DISPENSED
Start: 2020-01-03

## (undated) RX ORDER — METOPROLOL TARTRATE 50 MG
TABLET ORAL
Status: DISPENSED
Start: 2024-07-18

## (undated) RX ORDER — LIDOCAINE HYDROCHLORIDE 10 MG/ML
INJECTION, SOLUTION EPIDURAL; INFILTRATION; INTRACAUDAL; PERINEURAL
Status: DISPENSED
Start: 2020-01-03

## (undated) RX ORDER — GLYCOPYRROLATE 0.2 MG/ML
INJECTION INTRAMUSCULAR; INTRAVENOUS
Status: DISPENSED
Start: 2020-01-03